# Patient Record
Sex: MALE | Race: WHITE | Employment: FULL TIME | URBAN - METROPOLITAN AREA
[De-identification: names, ages, dates, MRNs, and addresses within clinical notes are randomized per-mention and may not be internally consistent; named-entity substitution may affect disease eponyms.]

---

## 2018-09-11 ENCOUNTER — HOSPITAL ENCOUNTER (OUTPATIENT)
Dept: LAB | Age: 58
Discharge: HOME | End: 2018-09-11

## 2018-09-11 LAB
HIV 1+2 AB+HIV1 P24 AG SERPL QL IA: NONREACTIVE
HIV12 RESULT COMMENT, HHIVC: NORMAL

## 2018-09-11 PROCEDURE — 87389 HIV-1 AG W/HIV-1&-2 AB AG IA: CPT | Performed by: INTERNAL MEDICINE

## 2018-10-09 ENCOUNTER — HOSPITAL ENCOUNTER (OUTPATIENT)
Dept: LAB | Age: 58
Discharge: HOME | End: 2018-10-09

## 2018-10-09 LAB
COMMENT, HOLDF: NORMAL
HAV IGM SER QL: NONREACTIVE
HBV CORE IGM SER QL: NONREACTIVE
HBV SURFACE AG SER QL: <0.1 INDEX
HBV SURFACE AG SER QL: NEGATIVE
HCV AB SERPL QL IA: NONREACTIVE
HCV COMMENT,HCGAC: NORMAL
SAMPLES BEING HELD,HOLD: NORMAL
SP1: NORMAL
SP2: NORMAL
SP3: NORMAL

## 2018-10-09 PROCEDURE — 80074 ACUTE HEPATITIS PANEL: CPT | Performed by: INTERNAL MEDICINE

## 2020-02-28 ENCOUNTER — HOSPITAL ENCOUNTER (OUTPATIENT)
Dept: GENERAL RADIOLOGY | Age: 60
Discharge: HOME | End: 2020-02-28
Attending: INTERNAL MEDICINE
Payer: MEDICAID

## 2020-02-28 DIAGNOSIS — R06.02 SHORTNESS OF BREATH: ICD-10-CM

## 2020-02-28 PROCEDURE — 71046 X-RAY EXAM CHEST 2 VIEWS: CPT

## 2020-03-18 ENCOUNTER — HOSPITAL ENCOUNTER (OUTPATIENT)
Dept: CT IMAGING | Age: 60
Discharge: HOME | End: 2020-03-18
Attending: INTERNAL MEDICINE
Payer: MEDICAID

## 2020-03-18 VITALS — HEIGHT: 68 IN | BODY MASS INDEX: 26.37 KG/M2 | WEIGHT: 174 LBS

## 2020-03-18 DIAGNOSIS — Z87.891 PERSONAL HISTORY OF NICOTINE DEPENDENCE: ICD-10-CM

## 2020-03-18 PROCEDURE — G0297 LDCT FOR LUNG CA SCREEN: HCPCS

## 2021-05-13 ENCOUNTER — APPOINTMENT (OUTPATIENT)
Dept: NON INVASIVE DIAGNOSTICS | Age: 61
DRG: 192 | End: 2021-05-13
Attending: PHYSICIAN ASSISTANT
Payer: MEDICAID

## 2021-05-13 ENCOUNTER — APPOINTMENT (OUTPATIENT)
Dept: GENERAL RADIOLOGY | Age: 61
DRG: 192 | End: 2021-05-13
Attending: EMERGENCY MEDICINE
Payer: MEDICAID

## 2021-05-13 ENCOUNTER — HOSPITAL ENCOUNTER (INPATIENT)
Age: 61
LOS: 8 days | Discharge: HOME | DRG: 192 | End: 2021-05-21
Attending: EMERGENCY MEDICINE | Admitting: INTERNAL MEDICINE
Payer: MEDICAID

## 2021-05-13 DIAGNOSIS — R09.02 HYPOXIA: ICD-10-CM

## 2021-05-13 DIAGNOSIS — R53.81 PHYSICAL DEBILITY: ICD-10-CM

## 2021-05-13 DIAGNOSIS — R06.02 SHORTNESS OF BREATH: ICD-10-CM

## 2021-05-13 DIAGNOSIS — I49.3 FREQUENT PVCS: ICD-10-CM

## 2021-05-13 DIAGNOSIS — J96.01 ACUTE RESPIRATORY FAILURE WITH HYPOXIA (HCC): ICD-10-CM

## 2021-05-13 DIAGNOSIS — E88.09 HYPOALBUMINEMIA: ICD-10-CM

## 2021-05-13 DIAGNOSIS — Z79.899 RECEIVING INOTROPIC MEDICATION: ICD-10-CM

## 2021-05-13 DIAGNOSIS — Z71.89 ACP (ADVANCE CARE PLANNING): ICD-10-CM

## 2021-05-13 DIAGNOSIS — J44.1 COPD EXACERBATION (HCC): Primary | ICD-10-CM

## 2021-05-13 DIAGNOSIS — I50.9 CONGESTIVE HEART FAILURE, UNSPECIFIED HF CHRONICITY, UNSPECIFIED HEART FAILURE TYPE (HCC): ICD-10-CM

## 2021-05-13 DIAGNOSIS — R00.0 SINUS TACHYCARDIA: ICD-10-CM

## 2021-05-13 DIAGNOSIS — I50.21 ACUTE SYSTOLIC HEART FAILURE (HCC): ICD-10-CM

## 2021-05-13 DIAGNOSIS — I50.21 SYSTOLIC CHF, ACUTE (HCC): ICD-10-CM

## 2021-05-13 LAB
ALBUMIN SERPL-MCNC: 3.3 G/DL (ref 3.5–5)
ALBUMIN/GLOB SERPL: 0.9 {RATIO} (ref 1.1–2.2)
ALP SERPL-CCNC: 266 U/L (ref 45–117)
ALT SERPL-CCNC: 1224 U/L (ref 12–78)
ANION GAP SERPL CALC-SCNC: 8 MMOL/L (ref 5–15)
ARTERIAL PATENCY WRIST A: YES
AST SERPL-CCNC: 965 U/L (ref 15–37)
ATRIAL RATE: 124 BPM
ATRIAL RATE: 127 BPM
B PERT DNA SPEC QL NAA+PROBE: NOT DETECTED
BASE EXCESS BLD CALC-SCNC: 0 MMOL/L
BASOPHILS # BLD: 0.2 K/UL (ref 0–0.1)
BASOPHILS NFR BLD: 1 % (ref 0–1)
BDY SITE: ABNORMAL
BILIRUB SERPL-MCNC: 1.5 MG/DL (ref 0.2–1)
BORDETELLA PARAPERTUSSIS PCR, BORPAR: NOT DETECTED
BUN SERPL-MCNC: 32 MG/DL (ref 6–20)
BUN/CREAT SERPL: 21 (ref 12–20)
C PNEUM DNA SPEC QL NAA+PROBE: NOT DETECTED
CA-I BLD-SCNC: 1.48 MMOL/L (ref 1.12–1.32)
CALCIUM SERPL-MCNC: 11 MG/DL (ref 8.5–10.1)
CALCULATED P AXIS, ECG09: 55 DEGREES
CALCULATED R AXIS, ECG10: -78 DEGREES
CALCULATED R AXIS, ECG10: -79 DEGREES
CALCULATED T AXIS, ECG11: 78 DEGREES
CALCULATED T AXIS, ECG11: 91 DEGREES
CHLORIDE SERPL-SCNC: 98 MMOL/L (ref 97–108)
CO2 SERPL-SCNC: 27 MMOL/L (ref 21–32)
COMMENT, HOLDF: NORMAL
COVID-19 RAPID TEST, COVR: NOT DETECTED
CREAT SERPL-MCNC: 1.5 MG/DL (ref 0.7–1.3)
DIAGNOSIS, 93000: NORMAL
DIAGNOSIS, 93000: NORMAL
DIFFERENTIAL METHOD BLD: ABNORMAL
ECHO AO ROOT DIAM: 4.16 CM
ECHO AV AREA PEAK VELOCITY: 2.95 CM2
ECHO AV AREA/BSA PEAK VELOCITY: 1.5 CM2/M2
ECHO AV PEAK GRADIENT: 3.68 MMHG
ECHO AV PEAK VELOCITY: 95.96 CM/S
ECHO EST RA PRESSURE: 10 MMHG
ECHO LA MAJOR AXIS: 4.58 CM
ECHO LA MINOR AXIS: 2.36 CM
ECHO LV E' LATERAL VELOCITY: 2.89 CM/S
ECHO LV E' SEPTAL VELOCITY: 3.53 CM/S
ECHO LV EDV A2C: 160.77 ML
ECHO LV EDV A4C: 146.11 ML
ECHO LV EDV BP: 158.78 ML (ref 67–155)
ECHO LV EDV INDEX A4C: 75.3 ML/M2
ECHO LV EDV INDEX BP: 81.8 ML/M2
ECHO LV EDV NDEX A2C: 82.9 ML/M2
ECHO LV EJECTION FRACTION A2C: 31 PERCENT
ECHO LV EJECTION FRACTION A4C: 24 PERCENT
ECHO LV EJECTION FRACTION BIPLANE: 27.8 PERCENT (ref 55–100)
ECHO LV ESV A2C: 111.05 ML
ECHO LV ESV A4C: 111.39 ML
ECHO LV ESV BP: 114.67 ML (ref 22–58)
ECHO LV ESV INDEX A2C: 57.2 ML/M2
ECHO LV ESV INDEX A4C: 57.4 ML/M2
ECHO LV ESV INDEX BP: 59.1 ML/M2
ECHO LV INTERNAL DIMENSION DIASTOLIC: 5.78 CM (ref 4.2–5.9)
ECHO LV INTERNAL DIMENSION DIASTOLIC: 5.97 CM (ref 4.2–5.9)
ECHO LV INTERNAL DIMENSION SYSTOLIC: 5.24 CM
ECHO LV INTERNAL DIMENSION SYSTOLIC: 5.57 CM
ECHO LV IVSD: 0.95 CM (ref 0.6–1)
ECHO LV POSTERIOR WALL DIASTOLIC: 1 CM (ref 0.6–1)
ECHO LVOT DIAM: 2.44 CM
ECHO LVOT PEAK GRADIENT: 1.48 MMHG
ECHO LVOT PEAK VELOCITY: 60.81 CM/S
ECHO MV A VELOCITY: 33.79 CM/S
ECHO MV AREA PHT: 4.05 CM2
ECHO MV E DECELERATION TIME (DT): 187.43 MS
ECHO MV E VELOCITY: 79.46 CM/S
ECHO MV E/A RATIO: 2.35
ECHO MV E/E' LATERAL: 27.49
ECHO MV E/E' RATIO (AVERAGED): 25
ECHO MV E/E' SEPTAL: 22.51
ECHO MV PRESSURE HALF TIME (PHT): 54.36 MS
ECHO PV MAX VELOCITY: 90.87 CM/S
ECHO PV PEAK INSTANTANEOUS GRADIENT SYSTOLIC: 3.3 MMHG
ECHO RIGHT VENTRICULAR SYSTOLIC PRESSURE (RVSP): 31.28 MMHG
ECHO RV TAPSE: 2.1 CM (ref 1.5–2)
ECHO TV REGURGITANT MAX VELOCITY: 230.63 CM/S
ECHO TV REGURGITANT PEAK GRADIENT: 21.28 MMHG
EOSINOPHIL # BLD: 0.3 K/UL (ref 0–0.4)
EOSINOPHIL NFR BLD: 2 % (ref 0–7)
ERYTHROCYTE [DISTWIDTH] IN BLOOD BY AUTOMATED COUNT: 14.6 % (ref 11.5–14.5)
FLUAV H1 2009 PAND RNA SPEC QL NAA+PROBE: NOT DETECTED
FLUAV H1 RNA SPEC QL NAA+PROBE: NOT DETECTED
FLUAV H3 RNA SPEC QL NAA+PROBE: NOT DETECTED
FLUAV SUBTYP SPEC NAA+PROBE: NOT DETECTED
FLUBV RNA SPEC QL NAA+PROBE: NOT DETECTED
GAS FLOW.O2 O2 DELIVERY SYS: ABNORMAL L/MIN
GLOBULIN SER CALC-MCNC: 3.8 G/DL (ref 2–4)
GLUCOSE SERPL-MCNC: 94 MG/DL (ref 65–100)
HADV DNA SPEC QL NAA+PROBE: NOT DETECTED
HAV IGM SER QL: NONREACTIVE
HBV CORE IGM SER QL: NONREACTIVE
HBV SURFACE AG SER QL: 0.11 INDEX
HBV SURFACE AG SER QL: NEGATIVE
HCO3 BLD-SCNC: 24.3 MMOL/L (ref 22–26)
HCOV 229E RNA SPEC QL NAA+PROBE: NOT DETECTED
HCOV HKU1 RNA SPEC QL NAA+PROBE: NOT DETECTED
HCOV NL63 RNA SPEC QL NAA+PROBE: NOT DETECTED
HCOV OC43 RNA SPEC QL NAA+PROBE: NOT DETECTED
HCT VFR BLD AUTO: 44.6 % (ref 36.6–50.3)
HCV AB SERPL QL IA: NONREACTIVE
HCV COMMENT,HCGAC: NORMAL
HGB BLD-MCNC: 14.3 G/DL (ref 12.1–17)
HMPV RNA SPEC QL NAA+PROBE: NOT DETECTED
HPIV1 RNA SPEC QL NAA+PROBE: NOT DETECTED
HPIV2 RNA SPEC QL NAA+PROBE: NOT DETECTED
HPIV3 RNA SPEC QL NAA+PROBE: NOT DETECTED
HPIV4 RNA SPEC QL NAA+PROBE: NOT DETECTED
IMM GRANULOCYTES # BLD AUTO: 0.2 K/UL (ref 0–0.04)
IMM GRANULOCYTES NFR BLD AUTO: 1 % (ref 0–0.5)
LACTATE SERPL-SCNC: 3.7 MMOL/L (ref 0.4–2)
LYMPHOCYTES # BLD: 2.9 K/UL (ref 0.8–3.5)
LYMPHOCYTES NFR BLD: 18 % (ref 12–49)
M PNEUMO DNA SPEC QL NAA+PROBE: NOT DETECTED
MCH RBC QN AUTO: 33.5 PG (ref 26–34)
MCHC RBC AUTO-ENTMCNC: 32.1 G/DL (ref 30–36.5)
MCV RBC AUTO: 104.4 FL (ref 80–99)
MONOCYTES # BLD: 1.7 K/UL (ref 0–1)
MONOCYTES NFR BLD: 11 % (ref 5–13)
NEUTS SEG # BLD: 10.6 K/UL (ref 1.8–8)
NEUTS SEG NFR BLD: 67 % (ref 32–75)
NRBC # BLD: 0.04 K/UL (ref 0–0.01)
NRBC BLD-RTO: 0.3 PER 100 WBC
O2/TOTAL GAS SETTING VFR VENT: 50 %
P-R INTERVAL, ECG05: 146 MS
PCO2 BLD: 38.6 MMHG (ref 35–45)
PEEP RESPIRATORY: 6 CMH2O
PH BLD: 7.41 [PH] (ref 7.35–7.45)
PIP ISTAT,IPIP: 14
PLATELET # BLD AUTO: 327 K/UL (ref 150–400)
PO2 BLD: 188 MMHG (ref 80–100)
POTASSIUM SERPL-SCNC: 4.4 MMOL/L (ref 3.5–5.1)
PRESSURE SUPPORT SETTING VENT: 8 CMH2O
PROCALCITONIN SERPL-MCNC: 0.42 NG/ML
PROT SERPL-MCNC: 7.1 G/DL (ref 6.4–8.2)
Q-T INTERVAL, ECG07: 284 MS
Q-T INTERVAL, ECG07: 346 MS
QRS DURATION, ECG06: 92 MS
QRS DURATION, ECG06: 98 MS
QTC CALCULATION (BEZET), ECG08: 408 MS
QTC CALCULATION (BEZET), ECG08: 482 MS
RBC # BLD AUTO: 4.27 M/UL (ref 4.1–5.7)
RBC MORPH BLD: ABNORMAL
RSV RNA SPEC QL NAA+PROBE: NOT DETECTED
RV+EV RNA SPEC QL NAA+PROBE: NOT DETECTED
SAMPLES BEING HELD,HOLD: NORMAL
SAO2 % BLD: 100 % (ref 92–97)
SARS-COV-2 PCR, COVPCR: NOT DETECTED
SODIUM SERPL-SCNC: 133 MMOL/L (ref 136–145)
SOURCE, COVRS: NORMAL
SP1: NORMAL
SP2: NORMAL
SP3: NORMAL
SPECIMEN TYPE: ABNORMAL
TROPONIN I SERPL-MCNC: 0.32 NG/ML
TROPONIN I SERPL-MCNC: 0.33 NG/ML
TROPONIN I SERPL-MCNC: 0.38 NG/ML
VENTRICULAR RATE, ECG03: 117 BPM
VENTRICULAR RATE, ECG03: 124 BPM
WBC # BLD AUTO: 15.9 K/UL (ref 4.1–11.1)

## 2021-05-13 PROCEDURE — 74011000250 HC RX REV CODE- 250

## 2021-05-13 PROCEDURE — 0202U NFCT DS 22 TRGT SARS-COV-2: CPT

## 2021-05-13 PROCEDURE — 93005 ELECTROCARDIOGRAM TRACING: CPT

## 2021-05-13 PROCEDURE — 65660000001 HC RM ICU INTERMED STEPDOWN

## 2021-05-13 PROCEDURE — 74011000250 HC RX REV CODE- 250: Performed by: INTERNAL MEDICINE

## 2021-05-13 PROCEDURE — 87040 BLOOD CULTURE FOR BACTERIA: CPT

## 2021-05-13 PROCEDURE — 99223 1ST HOSP IP/OBS HIGH 75: CPT | Performed by: INTERNAL MEDICINE

## 2021-05-13 PROCEDURE — 5A09457 ASSISTANCE WITH RESPIRATORY VENTILATION, 24-96 CONSECUTIVE HOURS, CONTINUOUS POSITIVE AIRWAY PRESSURE: ICD-10-PCS | Performed by: EMERGENCY MEDICINE

## 2021-05-13 PROCEDURE — 80074 ACUTE HEPATITIS PANEL: CPT

## 2021-05-13 PROCEDURE — 74011250636 HC RX REV CODE- 250/636: Performed by: INTERNAL MEDICINE

## 2021-05-13 PROCEDURE — 94640 AIRWAY INHALATION TREATMENT: CPT

## 2021-05-13 PROCEDURE — 82803 BLOOD GASES ANY COMBINATION: CPT

## 2021-05-13 PROCEDURE — 80053 COMPREHEN METABOLIC PANEL: CPT

## 2021-05-13 PROCEDURE — 85025 COMPLETE CBC W/AUTO DIFF WBC: CPT

## 2021-05-13 PROCEDURE — 84484 ASSAY OF TROPONIN QUANT: CPT

## 2021-05-13 PROCEDURE — 36600 WITHDRAWAL OF ARTERIAL BLOOD: CPT

## 2021-05-13 PROCEDURE — 74011250636 HC RX REV CODE- 250/636: Performed by: PHYSICIAN ASSISTANT

## 2021-05-13 PROCEDURE — 94660 CPAP INITIATION&MGMT: CPT

## 2021-05-13 PROCEDURE — 87635 SARS-COV-2 COVID-19 AMP PRB: CPT

## 2021-05-13 PROCEDURE — 99285 EMERGENCY DEPT VISIT HI MDM: CPT

## 2021-05-13 PROCEDURE — 93306 TTE W/DOPPLER COMPLETE: CPT

## 2021-05-13 PROCEDURE — 36415 COLL VENOUS BLD VENIPUNCTURE: CPT

## 2021-05-13 PROCEDURE — 74011250637 HC RX REV CODE- 250/637: Performed by: EMERGENCY MEDICINE

## 2021-05-13 PROCEDURE — 84145 PROCALCITONIN (PCT): CPT

## 2021-05-13 PROCEDURE — 94664 DEMO&/EVAL PT USE INHALER: CPT

## 2021-05-13 PROCEDURE — 83605 ASSAY OF LACTIC ACID: CPT

## 2021-05-13 PROCEDURE — 71045 X-RAY EXAM CHEST 1 VIEW: CPT

## 2021-05-13 PROCEDURE — 74011250637 HC RX REV CODE- 250/637: Performed by: PHYSICIAN ASSISTANT

## 2021-05-13 RX ORDER — BUDESONIDE AND FORMOTEROL FUMARATE DIHYDRATE 160; 4.5 UG/1; UG/1
2 AEROSOL RESPIRATORY (INHALATION) 2 TIMES DAILY
COMMUNITY
End: 2022-06-22

## 2021-05-13 RX ORDER — FUROSEMIDE 10 MG/ML
40 INJECTION INTRAMUSCULAR; INTRAVENOUS 2 TIMES DAILY
Status: DISCONTINUED | OUTPATIENT
Start: 2021-05-13 | End: 2021-05-14

## 2021-05-13 RX ORDER — MILRINONE LACTATE 0.2 MG/ML
0.38 INJECTION, SOLUTION INTRAVENOUS CONTINUOUS
Status: DISCONTINUED | OUTPATIENT
Start: 2021-05-13 | End: 2021-05-14

## 2021-05-13 RX ORDER — ENOXAPARIN SODIUM 100 MG/ML
40 INJECTION SUBCUTANEOUS DAILY
Status: DISCONTINUED | OUTPATIENT
Start: 2021-05-14 | End: 2021-05-14

## 2021-05-13 RX ORDER — NITROGLYCERIN 0.4 MG/1
0.4 TABLET SUBLINGUAL
Status: COMPLETED | OUTPATIENT
Start: 2021-05-13 | End: 2021-05-13

## 2021-05-13 RX ORDER — GUAIFENESIN 100 MG/5ML
162 LIQUID (ML) ORAL
Status: COMPLETED | OUTPATIENT
Start: 2021-05-13 | End: 2021-05-13

## 2021-05-13 RX ORDER — IPRATROPIUM BROMIDE AND ALBUTEROL SULFATE 2.5; .5 MG/3ML; MG/3ML
3 SOLUTION RESPIRATORY (INHALATION)
Status: DISCONTINUED | OUTPATIENT
Start: 2021-05-13 | End: 2021-05-15

## 2021-05-13 RX ORDER — FUROSEMIDE 10 MG/ML
40 INJECTION INTRAMUSCULAR; INTRAVENOUS ONCE
Status: DISCONTINUED | OUTPATIENT
Start: 2021-05-13 | End: 2021-05-13

## 2021-05-13 RX ORDER — BUDESONIDE AND FORMOTEROL FUMARATE DIHYDRATE 160; 4.5 UG/1; UG/1
2 AEROSOL RESPIRATORY (INHALATION)
COMMUNITY
End: 2021-05-13

## 2021-05-13 RX ORDER — ACETAMINOPHEN 325 MG/1
650 TABLET ORAL
Status: DISCONTINUED | OUTPATIENT
Start: 2021-05-13 | End: 2021-05-21 | Stop reason: HOSPADM

## 2021-05-13 RX ORDER — ONDANSETRON 2 MG/ML
4 INJECTION INTRAMUSCULAR; INTRAVENOUS
Status: DISCONTINUED | OUTPATIENT
Start: 2021-05-13 | End: 2021-05-21 | Stop reason: HOSPADM

## 2021-05-13 RX ORDER — SODIUM CHLORIDE 0.9 % (FLUSH) 0.9 %
5-40 SYRINGE (ML) INJECTION EVERY 8 HOURS
Status: DISCONTINUED | OUTPATIENT
Start: 2021-05-13 | End: 2021-05-21 | Stop reason: HOSPADM

## 2021-05-13 RX ORDER — GUAIFENESIN 100 MG/5ML
81 LIQUID (ML) ORAL DAILY
Status: DISCONTINUED | OUTPATIENT
Start: 2021-05-13 | End: 2021-05-21 | Stop reason: HOSPADM

## 2021-05-13 RX ORDER — PROMETHAZINE HYDROCHLORIDE 25 MG/1
12.5 TABLET ORAL
Status: DISCONTINUED | OUTPATIENT
Start: 2021-05-13 | End: 2021-05-21 | Stop reason: HOSPADM

## 2021-05-13 RX ORDER — ACETAMINOPHEN 650 MG/1
650 SUPPOSITORY RECTAL
Status: DISCONTINUED | OUTPATIENT
Start: 2021-05-13 | End: 2021-05-21 | Stop reason: HOSPADM

## 2021-05-13 RX ORDER — SODIUM CHLORIDE 0.9 % (FLUSH) 0.9 %
5-40 SYRINGE (ML) INJECTION AS NEEDED
Status: DISCONTINUED | OUTPATIENT
Start: 2021-05-13 | End: 2021-05-21 | Stop reason: HOSPADM

## 2021-05-13 RX ORDER — ALBUTEROL SULFATE 90 UG/1
2 AEROSOL, METERED RESPIRATORY (INHALATION)
COMMUNITY

## 2021-05-13 RX ORDER — IPRATROPIUM BROMIDE AND ALBUTEROL SULFATE 2.5; .5 MG/3ML; MG/3ML
SOLUTION RESPIRATORY (INHALATION)
Status: COMPLETED
Start: 2021-05-13 | End: 2021-05-13

## 2021-05-13 RX ORDER — POLYETHYLENE GLYCOL 3350 17 G/17G
17 POWDER, FOR SOLUTION ORAL DAILY PRN
Status: DISCONTINUED | OUTPATIENT
Start: 2021-05-13 | End: 2021-05-21 | Stop reason: HOSPADM

## 2021-05-13 RX ADMIN — NITROGLYCERIN 0.4 MG: 0.4 TABLET, ORALLY DISINTEGRATING SUBLINGUAL at 10:59

## 2021-05-13 RX ADMIN — FUROSEMIDE 40 MG: 10 INJECTION, SOLUTION INTRAMUSCULAR; INTRAVENOUS at 12:11

## 2021-05-13 RX ADMIN — SACUBITRIL AND VALSARTAN 1 TABLET: 24; 26 TABLET, FILM COATED ORAL at 20:57

## 2021-05-13 RX ADMIN — Medication 10 ML: at 21:09

## 2021-05-13 RX ADMIN — FUROSEMIDE 40 MG: 10 INJECTION, SOLUTION INTRAMUSCULAR; INTRAVENOUS at 17:31

## 2021-05-13 RX ADMIN — Medication 10 ML: at 16:54

## 2021-05-13 RX ADMIN — IPRATROPIUM BROMIDE AND ALBUTEROL SULFATE 3 ML: .5; 3 SOLUTION RESPIRATORY (INHALATION) at 21:26

## 2021-05-13 RX ADMIN — METHYLPREDNISOLONE SODIUM SUCCINATE 40 MG: 40 INJECTION, POWDER, FOR SOLUTION INTRAMUSCULAR; INTRAVENOUS at 20:56

## 2021-05-13 RX ADMIN — IPRATROPIUM BROMIDE AND ALBUTEROL SULFATE 3 ML: .5; 3 SOLUTION RESPIRATORY (INHALATION) at 08:54

## 2021-05-13 RX ADMIN — MILRINONE LACTATE 0.2 MCG/KG/MIN: 200 INJECTION, SOLUTION INTRAVENOUS at 16:54

## 2021-05-13 RX ADMIN — METHYLPREDNISOLONE SODIUM SUCCINATE 40 MG: 40 INJECTION, POWDER, FOR SOLUTION INTRAMUSCULAR; INTRAVENOUS at 12:11

## 2021-05-13 RX ADMIN — ASPIRIN 162 MG: 81 TABLET, CHEWABLE ORAL at 10:58
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
## 2021-05-13 NOTE — Clinical Note
Right internal jugular vein. Accessed successfully. using fluoro and ultrasound guidance.  6FR X 10 CM SLENDER GLIDESHEATH INSERTED

## 2021-05-13 NOTE — ED TRIAGE NOTES
Triage: Pt arrives ambulatory from home with CC of shortness of breath. He reports he has been short of breath for 2 weeks. Denies CP. Denies cough. Hx of COPD. Respiratory rate 35 in triage.

## 2021-05-13 NOTE — ED NOTES
TRANSFER - OUT REPORT:    Verbal report given to GUICHO Goldstein(name) on Yvonne Nicholas  being transferred to [facility]) for routine progression of care       Report consisted of patients Situation, Background, Assessment and   Recommendations(SBAR). Information from the following report(s) SBAR, Kardex, ED Summary, Procedure Summary, Intake/Output, MAR, Recent Results and Cardiac Rhythm sinus tach was reviewed with the receiving nurse. Lines:   Peripheral IV 05/13/21 Right Antecubital (Active)   Site Assessment Clean, dry, & intact 05/13/21 0826   Phlebitis Assessment 0 05/13/21 0826   Infiltration Assessment 0 05/13/21 0826   Dressing Status Clean, dry, & intact 05/13/21 0826       Peripheral IV 05/13/21 Left Hand (Active)   Site Assessment Clean, dry, & intact 05/13/21 0826   Phlebitis Assessment 0 05/13/21 0826   Infiltration Assessment 0 05/13/21 0826   Dressing Status Clean, dry, & intact 05/13/21 5748        Opportunity for questions and clarification was provided.       Patient transported with:   Monitor  Registered Nurse   tian/RT

## 2021-05-13 NOTE — ED NOTES
65 - Assumed care of patient from triage. Pt presents in respiratory distress. RT called and requested to being bipap.     0878 - RT and Dr Brian Officer at bedside. Pt is on bipap at this time.

## 2021-05-13 NOTE — PROGRESS NOTES
Transition of Care Plan  1. RUR- 11%   2. DISPOSITION: TBD/subject to change pending recommendations; pending medical progression   - Pulmonology and Cardiology Consulted and Following   -Anticipate home with family assistance once medically stable for discharge. TBD. 3. F/U with PCP/Specialist   -Needs to re-establish care with PCP. Has not been seen by PCP since 2014. Will need f/u appointment scheduled prior to discharge. 4. Transport: Family    CM will continue to follow and assist with ZORAIDA needs as they arise. Reason for Admission:  Heart failure ([address]       RUR Score:  11%                   Plan for utilizing home health:     TBD/subject to change pending recommendations     PCP: YES First and Last name: Flores Dillon MD     Name of Practice: [facility]   Are you a current patient: Yes/No:  NO   Approximate date of last visit: July 29, 2014   Can you participate in a virtual visit with your PCP: Unknown                    Current Advanced Directive/Advance Care Plan: Full Code      Healthcare Decision Maker:  Neal Lemus [phone]    64year old male, AOx4. PTA independent with ADL's and IADL's. No DME utilized. Resides with girlfriend in their own home. Receives disability as source of income. Insurance verified: [employer] is utilized for prescriptions. Care Management Interventions  PCP Verified by CM:  Yes  Last Visit to PCP: 07/29/14  Palliative Care Criteria Met (RRAT>21 & CHF Dx)?: No  Mode of Transport at Discharge: (Family)  Transition of Care Consult (CM Consult): (No current CM consults or needs at this time)  Discharge Durable Medical Equipment: No  Physical Therapy Consult: No  Occupational Therapy Consult: No  Speech Therapy Consult: No  Current Support Network: Own Home, Other(Resides with Girlfriend)  Confirm Follow Up Transport: Family  Discharge Location  Discharge Placement: Home with family assistance(TBD/subject to change pending recommenations)    Lyanne Gaucher, MSW/CRM  Care Management  12:41 PM

## 2021-05-13 NOTE — ADVANCED PRACTICE NURSE
[facility], [address], [address]   [phone]  Adriel Perry  5/13/2021  3:41 PM      05/13/21   ECHO ADULT COMPLETE 05/13/2021 5/13/2021    Narrative · LV: Estimated LVEF is 20 - 25%. Normal wall thickness. Dilated left   ventricle. Severely reduced systolic function. Left ventricular diastolic   dysfunction. · LA: Mildly dilated left atrium. · RV: Mildly dilated right ventricle. Reduced systolic function. · AO: Mild aortic root dilatation. Signed by: Ave Chacko MD     D/w Dr. Radha Garner, reading physician. EF not greater than 20%. RV with reduced systolic function as well. BiV failure. Cardiomyopathy, NYHA class IV, will start Milrinone gtt and Entresto. Avoid BB with COPD exacerbation and RV failure. IVC dilated as well, suggesting hepatic congestion. Will need cardiac cath, once stable and compensated.       Radha Phipps PA-C
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
## 2021-05-13 NOTE — ED NOTES
Dr Halima Tomas notified of pt removing bipap and of trop result. Orders received for pt to be NPO and for resp panel with covid-19 PCR. Orders placed.

## 2021-05-13 NOTE — PROGRESS NOTES
Got perfectserve for admission       No ABG and no rapid covid    Cannot place admit order without above results due to covid floor needs

## 2021-05-13 NOTE — PROGRESS NOTES
Spiritual Care Assessment/Progress Note  [facility]      NAME: William Palomino      MRN: [id number]  AGE: 64 y.o. SEX: male  Mandaeism Affiliation: Worship   Language: English     5/13/2021     Total Time (in minutes): 20     Spiritual Assessment begun in [facility] [place] [facility] IMCU through conversation with:         []Patient        [] Family    [] Friend(s)        Reason for Consult: Initial/Spiritual assessment, patient floor     Spiritual beliefs: (Please include comment if needed)     [] Identifies with a angelina tradition:         [] Supported by a angelina community:            [] Claims no spiritual orientation:           [] Seeking spiritual identity:                [] Adheres to an individual form of spirituality:           [x] Not able to assess:                           Identified resources for coping:      [] Prayer                               [] Music                  [] Guided Imagery     [] Family/friends                 [] Pet visits     [] Devotional reading                         [x] Unknown     [] Other:                                               Interventions offered during this visit: (See comments for more details)                Plan of Care:     [] Support spiritual and/or cultural needs    [] Support AMD and/or advance care planning process      [] Support grieving process   [] Coordinate Rites and/or Rituals    [] Coordination with community clergy   [] No spiritual needs identified at this time   [] Detailed Plan of Care below (See Comments)  [] Make referral to Music Therapy  [] Make referral to Pet Therapy     [] Make referral to Addiction services  [] Make referral to [facility]  [] Make referral to Spiritual Care Partner  [] No future visits requested        [x] Follow up upon further referrals     Comments:  visit for initial spiritual assessment. Spoke to patient's nurse, Olivia Merino, who placed In-basket request for  visit on patient's behalf.   Patient on Droplet Plus isolation precautions awaiting Covid-19 rule out. Patient also uses bipap and finds it difficult to communicate via telephone. Attempted to contact patient via bedside telephone, but no answer. Patient appears to be resting. Nurse stated patient would like to speak to  during this hospitalization, but not urgent. May be best to await results of rule out testing and, if negative, can visit patient face-to-face. Please contact spiritual care for further referral or consult. Rev.  Joelle Richards MDiv, [facility], [facility]   paging service: [phone])

## 2021-05-13 NOTE — Clinical Note
Sheath: removed.   6FR X 10 CM SHEATH REMOVED AND TR BAND PLACED ON PATIENTS RIGHT WRIST AND INFLATED WITH 8 CC OF AIR

## 2021-05-13 NOTE — ED NOTES
Pt removed his BIPAP to eat. This RN found pt sitting on edge of bed in tripod position in respiratory distress. Assisted pt back to bed and placed BiPAP on pt. BIPAP increased from 35% to 50%. RT called. Pt presents gray in color. Denies CP. Heart rate more elevated. Estefani Avendano with cardiology in department. Notified him of pt's change in condition. EKG order obtained and completed. Reviewed by Estefani Avendano. Pt reminded importance of leaving BiPAP on and purpose of BiPAP. Pt verbalized his understanding. RT remains at bedside.
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
## 2021-05-13 NOTE — ED NOTES
Per lab, CMP and trop need to be recollected. Dr Michelle Deng notified. Pt also c/o heart burn. Dr Michelle Deng notified.

## 2021-05-14 LAB
ALBUMIN SERPL-MCNC: 3 G/DL (ref 3.5–5)
ALBUMIN/GLOB SERPL: 0.8 {RATIO} (ref 1.1–2.2)
ALP SERPL-CCNC: 219 U/L (ref 45–117)
ALT SERPL-CCNC: 930 U/L (ref 12–78)
ANION GAP SERPL CALC-SCNC: 6 MMOL/L (ref 5–15)
AST SERPL-CCNC: 479 U/L (ref 15–37)
BASOPHILS # BLD: 0 K/UL (ref 0–0.1)
BASOPHILS NFR BLD: 0 % (ref 0–1)
BILIRUB SERPL-MCNC: 0.9 MG/DL (ref 0.2–1)
BNP SERPL-MCNC: 7262 PG/ML
BUN SERPL-MCNC: 35 MG/DL (ref 6–20)
BUN/CREAT SERPL: 24 (ref 12–20)
CALCIUM SERPL-MCNC: 10.6 MG/DL (ref 8.5–10.1)
CHLORIDE SERPL-SCNC: 96 MMOL/L (ref 97–108)
CO2 SERPL-SCNC: 32 MMOL/L (ref 21–32)
CREAT SERPL-MCNC: 1.44 MG/DL (ref 0.7–1.3)
DIFFERENTIAL METHOD BLD: ABNORMAL
EOSINOPHIL # BLD: 0 K/UL (ref 0–0.4)
EOSINOPHIL NFR BLD: 0 % (ref 0–7)
ERYTHROCYTE [DISTWIDTH] IN BLOOD BY AUTOMATED COUNT: 13.6 % (ref 11.5–14.5)
GLOBULIN SER CALC-MCNC: 3.6 G/DL (ref 2–4)
GLUCOSE SERPL-MCNC: 144 MG/DL (ref 65–100)
HCT VFR BLD AUTO: 37.8 % (ref 36.6–50.3)
HGB BLD-MCNC: 12.6 G/DL (ref 12.1–17)
IMM GRANULOCYTES # BLD AUTO: 0.1 K/UL (ref 0–0.04)
IMM GRANULOCYTES NFR BLD AUTO: 1 % (ref 0–0.5)
LYMPHOCYTES # BLD: 0.8 K/UL (ref 0.8–3.5)
LYMPHOCYTES NFR BLD: 6 % (ref 12–49)
MCH RBC QN AUTO: 33.2 PG (ref 26–34)
MCHC RBC AUTO-ENTMCNC: 33.3 G/DL (ref 30–36.5)
MCV RBC AUTO: 99.7 FL (ref 80–99)
MONOCYTES # BLD: 0.4 K/UL (ref 0–1)
MONOCYTES NFR BLD: 3 % (ref 5–13)
NEUTS SEG # BLD: 11.5 K/UL (ref 1.8–8)
NEUTS SEG NFR BLD: 90 % (ref 32–75)
NRBC # BLD: 0 K/UL (ref 0–0.01)
NRBC BLD-RTO: 0 PER 100 WBC
PLATELET # BLD AUTO: 287 K/UL (ref 150–400)
PLATELET COMMENTS,PCOM: ABNORMAL
PMV BLD AUTO: 13 FL (ref 8.9–12.9)
POTASSIUM SERPL-SCNC: 3.8 MMOL/L (ref 3.5–5.1)
PROT SERPL-MCNC: 6.6 G/DL (ref 6.4–8.2)
RBC # BLD AUTO: 3.79 M/UL (ref 4.1–5.7)
RBC MORPH BLD: ABNORMAL
SODIUM SERPL-SCNC: 134 MMOL/L (ref 136–145)
TROPONIN I SERPL-MCNC: 0.26 NG/ML
WBC # BLD AUTO: 12.8 K/UL (ref 4.1–11.1)

## 2021-05-14 PROCEDURE — 74011250636 HC RX REV CODE- 250/636: Performed by: INTERNAL MEDICINE

## 2021-05-14 PROCEDURE — 99222 1ST HOSP IP/OBS MODERATE 55: CPT | Performed by: NURSE PRACTITIONER

## 2021-05-14 PROCEDURE — 74011000250 HC RX REV CODE- 250: Performed by: INTERNAL MEDICINE

## 2021-05-14 PROCEDURE — 99233 SBSQ HOSP IP/OBS HIGH 50: CPT | Performed by: INTERNAL MEDICINE

## 2021-05-14 PROCEDURE — 74011250636 HC RX REV CODE- 250/636: Performed by: PHYSICIAN ASSISTANT

## 2021-05-14 PROCEDURE — 74011250636 HC RX REV CODE- 250/636: Performed by: NURSE PRACTITIONER

## 2021-05-14 PROCEDURE — 77030029684 HC NEB SM VOL KT MONA -A

## 2021-05-14 PROCEDURE — 80053 COMPREHEN METABOLIC PANEL: CPT

## 2021-05-14 PROCEDURE — 36415 COLL VENOUS BLD VENIPUNCTURE: CPT

## 2021-05-14 PROCEDURE — 77010033678 HC OXYGEN DAILY

## 2021-05-14 PROCEDURE — 65660000001 HC RM ICU INTERMED STEPDOWN

## 2021-05-14 PROCEDURE — 85025 COMPLETE CBC W/AUTO DIFF WBC: CPT

## 2021-05-14 PROCEDURE — 83880 ASSAY OF NATRIURETIC PEPTIDE: CPT

## 2021-05-14 PROCEDURE — 77030012794 HC KT NSL CANN/HGH TRAN -A

## 2021-05-14 PROCEDURE — 94664 DEMO&/EVAL PT USE INHALER: CPT

## 2021-05-14 PROCEDURE — 74011250637 HC RX REV CODE- 250/637: Performed by: PHYSICIAN ASSISTANT

## 2021-05-14 PROCEDURE — 99222 1ST HOSP IP/OBS MODERATE 55: CPT | Performed by: INTERNAL MEDICINE

## 2021-05-14 PROCEDURE — 74011250637 HC RX REV CODE- 250/637: Performed by: NURSE PRACTITIONER

## 2021-05-14 PROCEDURE — 94640 AIRWAY INHALATION TREATMENT: CPT

## 2021-05-14 PROCEDURE — 84484 ASSAY OF TROPONIN QUANT: CPT

## 2021-05-14 RX ORDER — MILRINONE LACTATE 0.2 MG/ML
0.2 INJECTION, SOLUTION INTRAVENOUS CONTINUOUS
Status: DISCONTINUED | OUTPATIENT
Start: 2021-05-14 | End: 2021-05-18

## 2021-05-14 RX ORDER — ALBUMIN HUMAN 250 G/1000ML
12.5 SOLUTION INTRAVENOUS 2 TIMES DAILY
Status: DISCONTINUED | OUTPATIENT
Start: 2021-05-14 | End: 2021-05-15

## 2021-05-14 RX ORDER — FUROSEMIDE 10 MG/ML
20 INJECTION INTRAMUSCULAR; INTRAVENOUS 2 TIMES DAILY
Status: DISCONTINUED | OUTPATIENT
Start: 2021-05-14 | End: 2021-05-15

## 2021-05-14 RX ORDER — SPIRONOLACTONE 25 MG/1
12.5 TABLET ORAL DAILY
Status: DISCONTINUED | OUTPATIENT
Start: 2021-05-14 | End: 2021-05-21 | Stop reason: HOSPADM

## 2021-05-14 RX ORDER — HEPARIN SODIUM 5000 [USP'U]/ML
5000 INJECTION, SOLUTION INTRAVENOUS; SUBCUTANEOUS EVERY 8 HOURS
Status: DISCONTINUED | OUTPATIENT
Start: 2021-05-15 | End: 2021-05-21 | Stop reason: HOSPADM

## 2021-05-14 RX ORDER — CALCIUM CARBONATE 200(500)MG
400 TABLET,CHEWABLE ORAL ONCE
Status: COMPLETED | OUTPATIENT
Start: 2021-05-14 | End: 2021-05-14

## 2021-05-14 RX ADMIN — ENOXAPARIN SODIUM 40 MG: 40 INJECTION SUBCUTANEOUS at 09:39

## 2021-05-14 RX ADMIN — METHYLPREDNISOLONE SODIUM SUCCINATE 40 MG: 40 INJECTION, POWDER, FOR SOLUTION INTRAMUSCULAR; INTRAVENOUS at 22:19

## 2021-05-14 RX ADMIN — SACUBITRIL AND VALSARTAN 1 TABLET: 24; 26 TABLET, FILM COATED ORAL at 09:39

## 2021-05-14 RX ADMIN — Medication 10 ML: at 22:35

## 2021-05-14 RX ADMIN — MILRINONE LACTATE 0.3 MCG/KG/MIN: 200 INJECTION, SOLUTION INTRAVENOUS at 23:48

## 2021-05-14 RX ADMIN — IPRATROPIUM BROMIDE AND ALBUTEROL SULFATE 3 ML: .5; 3 SOLUTION RESPIRATORY (INHALATION) at 22:26

## 2021-05-14 RX ADMIN — CALCIUM CARBONATE (ANTACID) CHEW TAB 500 MG 400 MG: 500 CHEW TAB at 22:33

## 2021-05-14 RX ADMIN — IPRATROPIUM BROMIDE AND ALBUTEROL SULFATE 3 ML: .5; 3 SOLUTION RESPIRATORY (INHALATION) at 13:44

## 2021-05-14 RX ADMIN — SPIRONOLACTONE 12.5 MG: 25 TABLET ORAL at 13:09

## 2021-05-14 RX ADMIN — MILRINONE LACTATE 0.2 MCG/KG/MIN: 200 INJECTION, SOLUTION INTRAVENOUS at 09:45

## 2021-05-14 RX ADMIN — SACUBITRIL AND VALSARTAN 1 TABLET: 24; 26 TABLET, FILM COATED ORAL at 22:16

## 2021-05-14 RX ADMIN — ASPIRIN 81 MG: 81 TABLET, CHEWABLE ORAL at 09:39

## 2021-05-14 RX ADMIN — FUROSEMIDE 20 MG: 10 INJECTION, SOLUTION INTRAMUSCULAR; INTRAVENOUS at 17:56

## 2021-05-14 RX ADMIN — IPRATROPIUM BROMIDE AND ALBUTEROL SULFATE 3 ML: .5; 3 SOLUTION RESPIRATORY (INHALATION) at 07:34

## 2021-05-14 RX ADMIN — Medication 10 ML: at 06:39

## 2021-05-14 RX ADMIN — Medication 10 ML: at 14:00

## 2021-05-14 RX ADMIN — METHYLPREDNISOLONE SODIUM SUCCINATE 40 MG: 40 INJECTION, POWDER, FOR SOLUTION INTRAMUSCULAR; INTRAVENOUS at 09:39

## 2021-05-14 RX ADMIN — FUROSEMIDE 40 MG: 10 INJECTION, SOLUTION INTRAMUSCULAR; INTRAVENOUS at 09:39

## 2021-05-14 RX ADMIN — IVABRADINE 2.5 MG: 5 TABLET, FILM COATED ORAL at 17:56

## 2021-05-14 NOTE — PROGRESS NOTES
Spiritual Care Assessment/Progress Note  [facility]      NAME: Bryce Corcoran      MRN: [id number]  AGE: 64 y.o. SEX: male  Gnosticist Affiliation: Samaritan   Language: English     5/14/2021     Total Time (in minutes): 11     Spiritual Assessment begun in [facility] [place] [facility] I[facility] through conversation with:         []Patient        [] Family    [] Friend(s)        Reason for Consult: Initial/Spiritual assessment, patient floor     Spiritual beliefs: (Please include comment if needed)     [] Identifies with a angelina tradition:         [] Supported by a angelina community:            [] Claims no spiritual orientation:           [] Seeking spiritual identity:                [] Adheres to an individual form of spirituality:           [x] Not able to assess:                           Identified resources for coping:      [] Prayer                               [] Music                  [] Guided Imagery     [] Family/friends                 [] Pet visits     [] Devotional reading                         [x] Unknown     [] Other:                                             Interventions offered during this visit: (See comments for more details)                Plan of Care:     [] Support spiritual and/or cultural needs    [] Support AMD and/or advance care planning process      [] Support grieving process   [] Coordinate Rites and/or Rituals    [] Coordination with community clergy   [] No spiritual needs identified at this time   [] Detailed Plan of Care below (See Comments)  [] Make referral to Music Therapy  [] Make referral to Pet Therapy     [] Make referral to Addiction services  [] Make referral to [facility]  [] Make referral to Spiritual Care Partner  [] No future visits requested        [x] Follow up upon further referrals     Comments:  visit for initial spiritual assessment. Patient sleeping in bed facing the window with his back to the door.   Did not respond to knock at door, verbal greeting x 2, or presence in room. Decided not to disturb so he can continue to rest.  Please contact spiritual care for further referral or consult. Rev.  Brett Jang MDiv, [facility], [facility]   paging service: [phone])

## 2021-05-14 NOTE — NURSE NAVIGATOR
Chart reviewed by Heart Failure Nurse Navigator. Heart Failure database completed. EF:  20/25%     ACEi/ARB/ARNi: sacubitril-valsartan 24/26 mg twice daily    BB: held d/t COPD exacerbation and RV failure    Aldosterone Antagonist: need documentation as to contraindication    Obstructive Sleep Apnea Screening: Screening priority 1   STOP-BANG score:   Referred to Sleep Medicine:     CRT not currently indicated. NYHA Functional Class IV      Heart Failure Teach Back in Patient Education. Heart Failure Avoiding Triggers on Discharge Instructions. Cardiologist: CAV      Post discharge follow up phone call to be made within 48-72 hours of discharge.

## 2021-05-14 NOTE — PROGRESS NOTES
Problem: Discharge Planning  Goal: *Discharge to safe environment  Outcome: Progressing Towards Goal     Problem: Breathing Pattern - Ineffective  Goal: *Absence of hypoxia  Outcome: Progressing Towards Goal  Goal: *Use of effective breathing techniques  Outcome: Progressing Towards Goal  Goal: *PALLIATIVE CARE:  Alleviation of Dyspnea  Outcome: Progressing Towards Goal     Problem: Patient Education: Go to Patient Education Activity  Goal: Patient/Family Education  Outcome: Progressing Towards Goal     Problem: Chronic Obstructive Pulmonary Disease (COPD)  Goal: *Oxygen saturation during activity within specified parameters  Outcome: Progressing Towards Goal  Goal: *Able to remain out of bed as prescribed  Outcome: Progressing Towards Goal  Goal: *Absence of hypoxia  Outcome: Progressing Towards Goal  Goal: *Optimize nutritional status  Outcome: Progressing Towards Goal     Problem: Patient Education: Go to Patient Education Activity  Goal: Patient/Family Education  Outcome: Progressing Towards Goal     Problem: Fluid Volume - Risk of, Imbalanced  Goal: *Balanced intake and output  Outcome: Progressing Towards Goal     Problem: Patient Education: Go to Patient Education Activity  Goal: Patient/Family Education  Outcome: Progressing Towards Goal     Problem: Tissue Perfusion - Cardiopulmonary, Altered  Goal: *Optimize tissue perfusion  Outcome: Progressing Towards Goal  Goal: *Absence of hypoxia  Outcome: Progressing Towards Goal     Problem: Patient Education: Go to Patient Education Activity  Goal: Patient/Family Education  Outcome: Progressing Towards Goal

## 2021-05-14 NOTE — PROGRESS NOTES
Hospitalist Progress Note    NAME: Pedro Sharma   :  1960   MRN:  [id number]       Admission HPI/Chief Complaint:  Pedro Sharma is a 64 y.o. male who presents from home with wife for shortness of breath. He has a diagnosis of COPD for which he sees pulmonology. He has been saying that his shortness of breath is getting worse over the weeks. No associated fevers. He has been placed on steroids Zithromax and nebulizers by his pulmonologist but it has no improvement. He  has gotten first shot of Covid vaccine. He was getting worse to the point that he came to the ED with considerable shortness of breath. Was placed on BiPAP in the ER with considerable improvement. Rapid Covid is negative. Chest x-ray shows fluid overload. He is denying having any chest pain though the troponin is elevated. He did report of some heartburn earlier in the ER. We were asked to admit this patient for COPD exacerbation/heart failure  Subjective:   No acute overnight events. Pt feeling much better since admission. Was on bipap yesterday, now weaned down to 2L NC. Reports good UOP. NAD. Review of Systems:  No fever/chills, poor appetite, cough, sputum, SOB, N/V, D/C, CP, or abd pain. Tolerating PO  Objective:   VITALS:   Last 24hrs VS reviewed since prior progress note.  Most recent are:  Patient Vitals for the past 24 hrs:   Temp Pulse Resp BP SpO2   21 0800 -- -- -- -- 94 %   21 0734 -- -- -- -- 96 %   21 0638 97.7 °F (36.5 °C) (!) 121 21 96/79 97 %   21 0359 98 °F (36.7 °C) (!) 124 23 90/74 99 %   21 0308 -- -- -- -- 98 %   21 2353 97.8 °F (36.6 °C) (!) 121 25 98/62 100 %   21 2126 -- -- -- -- (!) 8 %   21 2056 -- (!) 125 -- 110/80 --   21 1846 97.7 °F (36.5 °C) (!) 121 20 (!) 121/92 100 %   21 1414 (!) 45.3 °F (7.4 °C) (!) 122 20 95/66 100 %   21 1402 -- -- -- -- 100 %   21 1312 -- -- -- (!) 166/115 --   21 1250 98.5 °F (36.9 °C) (!) 127 16 128/78 100 %   05/13/21 1245 -- -- -- -- 100 %   05/13/21 1240 -- (!) 126 27 -- 96 %   05/13/21 1237 -- (!) 132 (!) 31 -- 92 %   05/13/21 1236 -- (!) 132 27 -- (!) 80 %   05/13/21 1232 -- (!) 136 25 -- (!) 74 %       Intake/Output Summary (Last 24 hours) at 5/14/2021 1204  Last data filed at 5/14/2021 1131  Gross per 24 hour   Intake --   Output 5450 ml   Net -5450 ml        I had a face to face encounter and independently examined this patient on 5/14/2021, as outlined below:  PHYSICAL EXAM:  General: Alert, cooperative, no acute distress    EENT:  EOMI. Anicteric sclerae. MMM  Resp:  Decreased throughout, no audible w/r/r, No accessory muscle use  CV:  Tachy rate, regular, No edema  GI:  Soft, Non distended, Non tender  Neurologic:  Alert and oriented, normal speech, SARMIENTO  Psych:   Not anxious or agitated  Skin:  No rashes. No jaundice    Reviewed most current lab test results and cultures  YES  Reviewed most current radiology test results   YES  Reviewed patient's current orders and MAR    YES  PMH/SH reviewed - no change compared to H&P    Procedures: see electronic medical records for all procedures/Xrays and details which were not copied into this note but were reviewed prior to creation of Plan. LABS:  I reviewed today's most current labs and imaging studies.   Pertinent labs include:  Recent Labs     05/14/21  0414 05/13/21  0826   WBC 12.8* 15.9*   HGB 12.6 14.3   HCT 37.8 44.6    327     Recent Labs     05/14/21  0414 05/13/21  0946   * 133*   K 3.8 4.4   CL 96* 98   CO2 32 27   * 94   BUN 35* 32*   CREA 1.44* 1.50*   CA 10.6* 11.0*   ALB 3.0* 3.3*   TBILI 0.9 1.5*   * 1,224*       Care Plan discussed with: pt  ___________________________________________________________________  Assessment / Plan:  Acute hypoxic respiratory failure, improving  Started on BiPAP, now on NC, wean as able  Rapid Covid negative  Likely etiology COPD exacerbation and heart failure  Chest x-ray with pulmonary edema and troponin elevation     COPD exacerbation  Cont steroids, nebs  procalcitonin low 0.42     Acute heart failure EF 20%, BiV failure, NYHA IV on admission  Appreciate cardiology, milrinone, entresto, aldactone started  Cont lasix  Avoid BB with COPD exam and RV failure  Will need cardiac cath once stable/compensated, next week  AHF to see  Troponin mildly elevated likely 2/2 resp failure, hypoxia, increased creatinine, trending down  Congestive hepatopathy  Monitor BP    EDGAR vs CKD, unknown baseline  Cardiorenal  monitor    25.0 - 29.9 Overweight / Body mass index is 25.57 kg/m².     Code status: Full Code  Prophylaxis: Hep SQ  Recommended Disposition: tbd    Signed: Morena Farrell MD  5/14/2021 12:04 PM

## 2021-05-14 NOTE — PROGRESS NOTES
Bedside shift change report given to [address] [place] [address] (oncoming nurse) by Elena Hutchins (offgoing nurse). Report included the following information SBAR, Kardex, Intake/Output, MAR, Med Rec Status and Cardiac Rhythm sr.
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
## 2021-05-15 LAB
25(OH)D3 SERPL-MCNC: 16 NG/ML (ref 30–100)
ALBUMIN SERPL-MCNC: 2.9 G/DL (ref 3.5–5)
ALBUMIN/GLOB SERPL: 0.8 {RATIO} (ref 1.1–2.2)
ALP SERPL-CCNC: 215 U/L (ref 45–117)
ALT SERPL-CCNC: 721 U/L (ref 12–78)
ANION GAP SERPL CALC-SCNC: 7 MMOL/L (ref 5–15)
AST SERPL-CCNC: 241 U/L (ref 15–37)
BILIRUB SERPL-MCNC: 0.6 MG/DL (ref 0.2–1)
BNP SERPL-MCNC: 1697 PG/ML
BUN SERPL-MCNC: 48 MG/DL (ref 6–20)
BUN/CREAT SERPL: 28 (ref 12–20)
CALCIUM SERPL-MCNC: 9.6 MG/DL (ref 8.5–10.1)
CHLORIDE SERPL-SCNC: 96 MMOL/L (ref 97–108)
CK SERPL-CCNC: 286 U/L (ref 39–308)
CO2 SERPL-SCNC: 30 MMOL/L (ref 21–32)
CREAT SERPL-MCNC: 1.7 MG/DL (ref 0.7–1.3)
CRP SERPL HS-MCNC: >9.5 MG/L
ERYTHROCYTE [DISTWIDTH] IN BLOOD BY AUTOMATED COUNT: 13.9 % (ref 11.5–14.5)
ERYTHROCYTE [SEDIMENTATION RATE] IN BLOOD: 9 MM/HR (ref 0–20)
FERRITIN SERPL-MCNC: 444 NG/ML (ref 26–388)
GLOBULIN SER CALC-MCNC: 3.7 G/DL (ref 2–4)
GLUCOSE SERPL-MCNC: 134 MG/DL (ref 65–100)
HCT VFR BLD AUTO: 42.3 % (ref 36.6–50.3)
HGB BLD-MCNC: 13.8 G/DL (ref 12.1–17)
HIV 1+2 AB+HIV1 P24 AG SERPL QL IA: NONREACTIVE
HIV12 RESULT COMMENT, HHIVC: NORMAL
IRON SATN MFR SERPL: 8 % (ref 20–50)
IRON SERPL-MCNC: 27 UG/DL (ref 35–150)
MAGNESIUM SERPL-MCNC: 1.7 MG/DL (ref 1.6–2.4)
MCH RBC QN AUTO: 33 PG (ref 26–34)
MCHC RBC AUTO-ENTMCNC: 32.6 G/DL (ref 30–36.5)
MCV RBC AUTO: 101.2 FL (ref 80–99)
MYOGLOBIN SERPL-MCNC: 530 NG/ML (ref 16–116)
NRBC # BLD: 0 K/UL (ref 0–0.01)
NRBC BLD-RTO: 0 PER 100 WBC
PHOSPHATE SERPL-MCNC: 2.4 MG/DL (ref 2.6–4.7)
PLATELET # BLD AUTO: 351 K/UL (ref 150–400)
PMV BLD AUTO: 13 FL (ref 8.9–12.9)
POTASSIUM SERPL-SCNC: 4.3 MMOL/L (ref 3.5–5.1)
PREALB SERPL-MCNC: 16.6 MG/DL (ref 20–40)
PROT SERPL-MCNC: 6.6 G/DL (ref 6.4–8.2)
RBC # BLD AUTO: 4.18 M/UL (ref 4.1–5.7)
SODIUM SERPL-SCNC: 133 MMOL/L (ref 136–145)
T4 FREE SERPL-MCNC: 0.9 NG/DL (ref 0.8–1.5)
TIBC SERPL-MCNC: 340 UG/DL (ref 250–450)
TSH SERPL DL<=0.05 MIU/L-ACNC: 0.37 UIU/ML (ref 0.36–3.74)
URATE SERPL-MCNC: 12.2 MG/DL (ref 3.5–7.2)
VIT B12 SERPL-MCNC: 1883 PG/ML (ref 193–986)
WBC # BLD AUTO: 20 K/UL (ref 4.1–11.1)

## 2021-05-15 PROCEDURE — 84439 ASSAY OF FREE THYROXINE: CPT

## 2021-05-15 PROCEDURE — 84550 ASSAY OF BLOOD/URIC ACID: CPT

## 2021-05-15 PROCEDURE — 82607 VITAMIN B-12: CPT

## 2021-05-15 PROCEDURE — 86141 C-REACTIVE PROTEIN HS: CPT

## 2021-05-15 PROCEDURE — 85652 RBC SED RATE AUTOMATED: CPT

## 2021-05-15 PROCEDURE — 83735 ASSAY OF MAGNESIUM: CPT

## 2021-05-15 PROCEDURE — 85027 COMPLETE CBC AUTOMATED: CPT

## 2021-05-15 PROCEDURE — 74011250636 HC RX REV CODE- 250/636: Performed by: INTERNAL MEDICINE

## 2021-05-15 PROCEDURE — 86658 ENTEROVIRUS ANTIBODY: CPT

## 2021-05-15 PROCEDURE — 74011000250 HC RX REV CODE- 250: Performed by: INTERNAL MEDICINE

## 2021-05-15 PROCEDURE — 80053 COMPREHEN METABOLIC PANEL: CPT

## 2021-05-15 PROCEDURE — 83880 ASSAY OF NATRIURETIC PEPTIDE: CPT

## 2021-05-15 PROCEDURE — 83874 ASSAY OF MYOGLOBIN: CPT

## 2021-05-15 PROCEDURE — 84134 ASSAY OF PREALBUMIN: CPT

## 2021-05-15 PROCEDURE — 82306 VITAMIN D 25 HYDROXY: CPT

## 2021-05-15 PROCEDURE — 65660000001 HC RM ICU INTERMED STEPDOWN

## 2021-05-15 PROCEDURE — 87798 DETECT AGENT NOS DNA AMP: CPT

## 2021-05-15 PROCEDURE — 87389 HIV-1 AG W/HIV-1&-2 AB AG IA: CPT

## 2021-05-15 PROCEDURE — 87532 HHV-6 DNA AMP PROBE: CPT

## 2021-05-15 PROCEDURE — 74011250637 HC RX REV CODE- 250/637: Performed by: INTERNAL MEDICINE

## 2021-05-15 PROCEDURE — 74011250637 HC RX REV CODE- 250/637: Performed by: NURSE PRACTITIONER

## 2021-05-15 PROCEDURE — 36415 COLL VENOUS BLD VENIPUNCTURE: CPT

## 2021-05-15 PROCEDURE — 83540 ASSAY OF IRON: CPT

## 2021-05-15 PROCEDURE — 94660 CPAP INITIATION&MGMT: CPT

## 2021-05-15 PROCEDURE — 86644 CMV ANTIBODY: CPT

## 2021-05-15 PROCEDURE — 74011250636 HC RX REV CODE- 250/636: Performed by: SPECIALIST

## 2021-05-15 PROCEDURE — 99233 SBSQ HOSP IP/OBS HIGH 50: CPT | Performed by: SPECIALIST

## 2021-05-15 PROCEDURE — 82728 ASSAY OF FERRITIN: CPT

## 2021-05-15 PROCEDURE — 82784 ASSAY IGA/IGD/IGG/IGM EACH: CPT

## 2021-05-15 PROCEDURE — 94640 AIRWAY INHALATION TREATMENT: CPT

## 2021-05-15 PROCEDURE — 86618 LYME DISEASE ANTIBODY: CPT

## 2021-05-15 PROCEDURE — 74011250637 HC RX REV CODE- 250/637: Performed by: PHYSICIAN ASSISTANT

## 2021-05-15 PROCEDURE — 84443 ASSAY THYROID STIM HORMONE: CPT

## 2021-05-15 PROCEDURE — 82550 ASSAY OF CK (CPK): CPT

## 2021-05-15 PROCEDURE — 94664 DEMO&/EVAL PT USE INHALER: CPT

## 2021-05-15 PROCEDURE — 84100 ASSAY OF PHOSPHORUS: CPT

## 2021-05-15 RX ORDER — PREDNISONE 20 MG/1
40 TABLET ORAL
Status: COMPLETED | OUTPATIENT
Start: 2021-05-16 | End: 2021-05-17

## 2021-05-15 RX ORDER — ERGOCALCIFEROL 1.25 MG/1
50000 CAPSULE ORAL
Status: DISCONTINUED | OUTPATIENT
Start: 2021-05-15 | End: 2021-05-21 | Stop reason: HOSPADM

## 2021-05-15 RX ORDER — CALCIUM CARBONATE 200(500)MG
400 TABLET,CHEWABLE ORAL
Status: DISCONTINUED | OUTPATIENT
Start: 2021-05-15 | End: 2021-05-21 | Stop reason: HOSPADM

## 2021-05-15 RX ORDER — IPRATROPIUM BROMIDE AND ALBUTEROL SULFATE 2.5; .5 MG/3ML; MG/3ML
3 SOLUTION RESPIRATORY (INHALATION)
Status: DISCONTINUED | OUTPATIENT
Start: 2021-05-15 | End: 2021-05-16

## 2021-05-15 RX ORDER — FUROSEMIDE 10 MG/ML
20 INJECTION INTRAMUSCULAR; INTRAVENOUS DAILY
Status: DISCONTINUED | OUTPATIENT
Start: 2021-05-16 | End: 2021-05-17

## 2021-05-15 RX ORDER — ALLOPURINOL 100 MG/1
50 TABLET ORAL DAILY
Status: DISCONTINUED | OUTPATIENT
Start: 2021-05-15 | End: 2021-05-21 | Stop reason: HOSPADM

## 2021-05-15 RX ORDER — ALLOPURINOL 100 MG/1
100 TABLET ORAL DAILY
Status: DISCONTINUED | OUTPATIENT
Start: 2021-05-15 | End: 2021-05-15

## 2021-05-15 RX ADMIN — METHYLPREDNISOLONE SODIUM SUCCINATE 40 MG: 40 INJECTION, POWDER, FOR SOLUTION INTRAMUSCULAR; INTRAVENOUS at 09:11

## 2021-05-15 RX ADMIN — HEPARIN SODIUM 5000 UNITS: 5000 INJECTION INTRAVENOUS; SUBCUTANEOUS at 14:30

## 2021-05-15 RX ADMIN — Medication 10 ML: at 23:45

## 2021-05-15 RX ADMIN — IPRATROPIUM BROMIDE AND ALBUTEROL SULFATE 3 ML: .5; 3 SOLUTION RESPIRATORY (INHALATION) at 02:59

## 2021-05-15 RX ADMIN — ASPIRIN 81 MG: 81 TABLET, CHEWABLE ORAL at 09:11

## 2021-05-15 RX ADMIN — ERGOCALCIFEROL 50000 UNITS: 1.25 CAPSULE ORAL at 12:14

## 2021-05-15 RX ADMIN — IPRATROPIUM BROMIDE AND ALBUTEROL SULFATE 3 ML: .5; 3 SOLUTION RESPIRATORY (INHALATION) at 08:44

## 2021-05-15 RX ADMIN — SACUBITRIL AND VALSARTAN 1 TABLET: 24; 26 TABLET, FILM COATED ORAL at 20:25

## 2021-05-15 RX ADMIN — ALLOPURINOL 50 MG: 100 TABLET ORAL at 12:14

## 2021-05-15 RX ADMIN — Medication 10 ML: at 05:14

## 2021-05-15 RX ADMIN — IPRATROPIUM BROMIDE AND ALBUTEROL SULFATE 3 ML: .5; 3 SOLUTION RESPIRATORY (INHALATION) at 14:05

## 2021-05-15 RX ADMIN — CALCIUM CARBONATE (ANTACID) CHEW TAB 500 MG 400 MG: 500 CHEW TAB at 20:32

## 2021-05-15 RX ADMIN — MILRINONE LACTATE 0.2 MCG/KG/MIN: 200 INJECTION, SOLUTION INTRAVENOUS at 14:29

## 2021-05-15 RX ADMIN — HEPARIN SODIUM 5000 UNITS: 5000 INJECTION INTRAVENOUS; SUBCUTANEOUS at 23:45

## 2021-05-15 RX ADMIN — Medication 10 ML: at 14:30

## 2021-05-15 RX ADMIN — CALCIUM CARBONATE (ANTACID) CHEW TAB 500 MG 400 MG: 500 CHEW TAB at 12:14

## 2021-05-15 RX ADMIN — IPRATROPIUM BROMIDE AND ALBUTEROL SULFATE 3 ML: .5; 3 SOLUTION RESPIRATORY (INHALATION) at 22:01

## 2021-05-15 RX ADMIN — HEPARIN SODIUM 5000 UNITS: 5000 INJECTION INTRAVENOUS; SUBCUTANEOUS at 05:09

## 2021-05-15 RX ADMIN — IVABRADINE 5 MG: 5 TABLET, FILM COATED ORAL at 17:24

## 2021-05-15 RX ADMIN — IVABRADINE 2.5 MG: 5 TABLET, FILM COATED ORAL at 10:09

## 2021-05-15 RX ADMIN — SPIRONOLACTONE 12.5 MG: 25 TABLET ORAL at 09:11
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
## 2021-05-15 NOTE — PROGRESS NOTES
Hospitalist Progress Note    NAME: Yulissa Duran   :  1960   MRN:  [id number]       Admission HPI/Chief Complaint:  Yulissa Duran is a 64 y.o. male who presents from home with wife for shortness of breath. He has a diagnosis of COPD for which he sees pulmonology. He has been saying that his shortness of breath is getting worse over the weeks. No associated fevers. He has been placed on steroids Zithromax and nebulizers by his pulmonologist but it has no improvement. He  has gotten first shot of Covid vaccine. He was getting worse to the point that he came to the ED with considerable shortness of breath. Was placed on BiPAP in the ER with considerable improvement. Rapid Covid is negative. Chest x-ray shows fluid overload. He is denying having any chest pain though the troponin is elevated. He did report of some heartburn earlier in the ER. We were asked to admit this patient for COPD exacerbation/heart failure  Subjective:   No acute overnight events. Pt feeling much better since admission. Now on RA. NAD. Review of Systems:  No fever/chills, poor appetite, cough, sputum, SOB, N/V, D/C, CP, or abd pain. Tolerating PO  Objective:   VITALS:   Last 24hrs VS reviewed since prior progress note.  Most recent are:  Patient Vitals for the past 24 hrs:   Temp Pulse Resp BP SpO2   05/15/21 0844 -- -- -- -- 95 %   05/15/21 0642 97.8 °F (36.6 °C) (!) 119 24 (!) 108/92 96 %   05/15/21 0337 98 °F (36.7 °C) (!) 128 21 93/75 94 %   21 2348 -- (!) 118 18 90/67 93 %   21 2326 97.6 °F (36.4 °C) (!) 117 18 96/76 (!) 89 %   21 2228 -- -- -- -- 98 %   21 2216 -- (!) 122 -- 95/80 --   21 1917 97.3 °F (36.3 °C) (!) 131 21 (!) 87/44 --   05/14/21 1344 -- -- -- -- (!) 7 %   21 1206 97.7 °F (36.5 °C) (!) 121 20 90/70 97 %       Intake/Output Summary (Last 24 hours) at 5/15/2021 1131  Last data filed at 5/15/2021 0645  Gross per 24 hour   Intake --   Output 1400 ml   Net -1400 ml I had a face to face encounter and independently examined this patient on 5/15/2021, as outlined below:  PHYSICAL EXAM:  General: Alert, cooperative, no acute distress    EENT:  EOMI. Anicteric sclerae. MMM  Resp:  Decreased throughout, no audible w/r/r, No accessory muscle use  CV:  Tachy rate, regular, No edema  GI:  Soft, Non distended, Non tender  Neurologic:  Alert and oriented, normal speech, SARMIENTO  Psych:   Not anxious or agitated  Skin:  No rashes. No jaundice    Reviewed most current lab test results and cultures  YES  Reviewed most current radiology test results   YES  Reviewed patient's current orders and MAR    YES  PMH/SH reviewed - no change compared to H&P    Procedures: see electronic medical records for all procedures/Xrays and details which were not copied into this note but were reviewed prior to creation of Plan. LABS:  I reviewed today's most current labs and imaging studies.   Pertinent labs include:  Recent Labs     05/15/21  0237 05/14/21  0414 05/13/21  0826   WBC 20.0* 12.8* 15.9*   HGB 13.8 12.6 14.3   HCT 42.3 37.8 44.6    287 327     Recent Labs     05/15/21  0237 05/14/21  0414 05/13/21  0946   * 134* 133*   K 4.3 3.8 4.4   CL 96* 96* 98   CO2 30 32 27   * 144* 94   BUN 48* 35* 32*   CREA 1.70* 1.44* 1.50*   CA 9.6 10.6* 11.0*   MG 1.7  --   --    PHOS 2.4*  --   --    ALB 2.9* 3.0* 3.3*   TBILI 0.6 0.9 1.5*   * 930* 1,224*       Care Plan discussed with: pt  ___________________________________________________________________  Assessment / Plan:  Acute hypoxic respiratory failure, resolved  Started on BiPAP, now on RA  Rapid Covid negative  etiology COPD exacerbation and heart failure  Chest x-ray with pulmonary edema and troponin elevation     COPD exacerbation  Cont steroids, nebs  procalcitonin low 0.42  Leukocytosis 2/2 steroids     Acute heart failure EF 20%, BiV failure, NYHA IV on admission  Appreciate AHF/cardiology- milrinone, entresto, aldactone, corlanor, allopurinol started  Lasix, albumin, ASA  Avoid BB with COPD exam and RV failure  Plan RHC/LHC this week  Troponin mildly elevated likely 2/2 resp failure, hypoxia, increased creatinine, trending down  Congestive hepatopathy  Monitor BP  TSH wnl    EDGAR vs CKD, unknown baseline  Cardiorenal  monitor    25.0 - 29.9 Overweight / Body mass index is 25.3 kg/m².     Code status: Full Code  Prophylaxis: Hep SQ  Recommended Disposition: tbd    Signed: Lorena Hall MD  5/15/2021 11:36 AM

## 2021-05-15 NOTE — PROGRESS NOTES
Problem: Breathing Pattern - Ineffective  Goal: *Absence of hypoxia  Outcome: Progressing Towards Goal  Note: Pt sats appropriate on RA     Problem: Chronic Obstructive Pulmonary Disease (COPD)  Goal: *Oxygen saturation during activity within specified parameters  Outcome: Progressing Towards Goal  Note: On RA  Goal: *Able to remain out of bed as prescribed  Outcome: Progressing Towards Goal  Note: Pt up ad tom
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
## 2021-05-15 NOTE — PROGRESS NOTES
Spiritual Care Assessment/Progress Note  [facility]      NAME: Nahid Castellanos      MRN: [id number]  AGE: 64 y.o. SEX: male  Latter day Affiliation: Jew   Language: English     5/15/2021     Total Time (in minutes): 10     Spiritual Assessment begun in [facility] [facility] through conversation with:         []Patient        [] Family    [] Friend(s)        Reason for Consult: Initial visit     Spiritual beliefs: (Please include comment if needed)     [] Identifies with a angelina tradition:         [] Supported by a angelina community:            [] Claims no spiritual orientation:           [] Seeking spiritual identity:                [] Adheres to an individual form of spirituality:           [x] Not able to assess:                           Identified resources for coping:      [] Prayer                               [] Music                  [] Guided Imagery     [] Family/friends                 [] Pet visits     [] Devotional reading                         [x] Unknown     [] Other:                                              Interventions offered during this visit: (See comments for more details)    Patient Interventions: Initial visit           Plan of Care:     [] Support spiritual and/or cultural needs    [] Support AMD and/or advance care planning process      [] Support grieving process   [] Coordinate Rites and/or Rituals    [] Coordination with community clergy   [] No spiritual needs identified at this time   [] Detailed Plan of Care below (See Comments)  [] Make referral to Music Therapy  [] Make referral to Pet Therapy     [] Make referral to Addiction services  [] Make referral to [facility]  [] Make referral to Spiritual Care Partner  [] No future visits requested        [x] Follow up upon further referrals     Attempted to visit pt for initial spiritual assessment. Unable to complete assessment at this time as pt sleeping and did not awake. No family present.   Chaplain Jose Antonio, MDiv, MS, [facility]  [address] ([id number])

## 2021-05-16 LAB
ALBUMIN SERPL-MCNC: 2.9 G/DL (ref 3.5–5)
ALBUMIN/GLOB SERPL: 0.9 {RATIO} (ref 1.1–2.2)
ALP SERPL-CCNC: 196 U/L (ref 45–117)
ALT SERPL-CCNC: 556 U/L (ref 12–78)
ANION GAP SERPL CALC-SCNC: 5 MMOL/L (ref 5–15)
AST SERPL-CCNC: 171 U/L (ref 15–37)
BILIRUB SERPL-MCNC: 0.6 MG/DL (ref 0.2–1)
BNP SERPL-MCNC: 1063 PG/ML
BUN SERPL-MCNC: 45 MG/DL (ref 6–20)
BUN/CREAT SERPL: 36 (ref 12–20)
CALCIUM SERPL-MCNC: 8.4 MG/DL (ref 8.5–10.1)
CHLORIDE SERPL-SCNC: 101 MMOL/L (ref 97–108)
CMV IGG SERPL IA-ACNC: <0.6 U/ML (ref 0–0.59)
CMV IGM SERPL IA-ACNC: <30 AU/ML (ref 0–29.9)
CO2 SERPL-SCNC: 29 MMOL/L (ref 21–32)
COMMENT, HOLDF: NORMAL
CREAT SERPL-MCNC: 1.25 MG/DL (ref 0.7–1.3)
ERYTHROCYTE [DISTWIDTH] IN BLOOD BY AUTOMATED COUNT: 13.8 % (ref 11.5–14.5)
GLOBULIN SER CALC-MCNC: 3.2 G/DL (ref 2–4)
GLUCOSE SERPL-MCNC: 101 MG/DL (ref 65–100)
HCT VFR BLD AUTO: 39.7 % (ref 36.6–50.3)
HGB BLD-MCNC: 13 G/DL (ref 12.1–17)
LACTATE SERPL-SCNC: 0.9 MMOL/L (ref 0.4–2)
MCH RBC QN AUTO: 32.8 PG (ref 26–34)
MCHC RBC AUTO-ENTMCNC: 32.7 G/DL (ref 30–36.5)
MCV RBC AUTO: 100.3 FL (ref 80–99)
NRBC # BLD: 0 K/UL (ref 0–0.01)
NRBC BLD-RTO: 0 PER 100 WBC
PLATELET # BLD AUTO: 331 K/UL (ref 150–400)
PMV BLD AUTO: 12.6 FL (ref 8.9–12.9)
POTASSIUM SERPL-SCNC: 4.5 MMOL/L (ref 3.5–5.1)
PROT SERPL-MCNC: 6.1 G/DL (ref 6.4–8.2)
RBC # BLD AUTO: 3.96 M/UL (ref 4.1–5.7)
SAMPLES BEING HELD,HOLD: NORMAL
SODIUM SERPL-SCNC: 135 MMOL/L (ref 136–145)
WBC # BLD AUTO: 18.8 K/UL (ref 4.1–11.1)

## 2021-05-16 PROCEDURE — 74011250637 HC RX REV CODE- 250/637: Performed by: PHYSICIAN ASSISTANT

## 2021-05-16 PROCEDURE — 94640 AIRWAY INHALATION TREATMENT: CPT

## 2021-05-16 PROCEDURE — 74011250637 HC RX REV CODE- 250/637: Performed by: NURSE PRACTITIONER

## 2021-05-16 PROCEDURE — 74011250636 HC RX REV CODE- 250/636: Performed by: INTERNAL MEDICINE

## 2021-05-16 PROCEDURE — 74011250636 HC RX REV CODE- 250/636: Performed by: NURSE PRACTITIONER

## 2021-05-16 PROCEDURE — 74011000250 HC RX REV CODE- 250: Performed by: INTERNAL MEDICINE

## 2021-05-16 PROCEDURE — 99233 SBSQ HOSP IP/OBS HIGH 50: CPT | Performed by: SPECIALIST

## 2021-05-16 PROCEDURE — 36415 COLL VENOUS BLD VENIPUNCTURE: CPT

## 2021-05-16 PROCEDURE — 80053 COMPREHEN METABOLIC PANEL: CPT

## 2021-05-16 PROCEDURE — 74011250636 HC RX REV CODE- 250/636: Performed by: SPECIALIST

## 2021-05-16 PROCEDURE — 74011000258 HC RX REV CODE- 258: Performed by: NURSE PRACTITIONER

## 2021-05-16 PROCEDURE — 74011250637 HC RX REV CODE- 250/637: Performed by: INTERNAL MEDICINE

## 2021-05-16 PROCEDURE — 85027 COMPLETE CBC AUTOMATED: CPT

## 2021-05-16 PROCEDURE — 74011636637 HC RX REV CODE- 636/637: Performed by: INTERNAL MEDICINE

## 2021-05-16 PROCEDURE — 83605 ASSAY OF LACTIC ACID: CPT

## 2021-05-16 PROCEDURE — 83880 ASSAY OF NATRIURETIC PEPTIDE: CPT

## 2021-05-16 PROCEDURE — 65660000001 HC RM ICU INTERMED STEPDOWN

## 2021-05-16 PROCEDURE — 99233 SBSQ HOSP IP/OBS HIGH 50: CPT | Performed by: NURSE PRACTITIONER

## 2021-05-16 RX ORDER — IPRATROPIUM BROMIDE AND ALBUTEROL SULFATE 2.5; .5 MG/3ML; MG/3ML
3 SOLUTION RESPIRATORY (INHALATION)
Status: DISCONTINUED | OUTPATIENT
Start: 2021-05-16 | End: 2021-05-20

## 2021-05-16 RX ORDER — IPRATROPIUM BROMIDE AND ALBUTEROL SULFATE 2.5; .5 MG/3ML; MG/3ML
3 SOLUTION RESPIRATORY (INHALATION)
Status: DISCONTINUED | OUTPATIENT
Start: 2021-05-16 | End: 2021-05-21 | Stop reason: HOSPADM

## 2021-05-16 RX ADMIN — SPIRONOLACTONE 12.5 MG: 25 TABLET ORAL at 08:54

## 2021-05-16 RX ADMIN — MILRINONE LACTATE 0.2 MCG/KG/MIN: 200 INJECTION, SOLUTION INTRAVENOUS at 10:18

## 2021-05-16 RX ADMIN — HEPARIN SODIUM 5000 UNITS: 5000 INJECTION INTRAVENOUS; SUBCUTANEOUS at 06:54

## 2021-05-16 RX ADMIN — IVABRADINE 7.5 MG: 5 TABLET, FILM COATED ORAL at 08:54

## 2021-05-16 RX ADMIN — ALLOPURINOL 50 MG: 100 TABLET ORAL at 08:54

## 2021-05-16 RX ADMIN — IVABRADINE 7.5 MG: 5 TABLET, FILM COATED ORAL at 17:40

## 2021-05-16 RX ADMIN — ASPIRIN 81 MG: 81 TABLET, CHEWABLE ORAL at 08:54

## 2021-05-16 RX ADMIN — FUROSEMIDE 20 MG: 10 INJECTION, SOLUTION INTRAMUSCULAR; INTRAVENOUS at 08:55

## 2021-05-16 RX ADMIN — Medication 10 ML: at 21:06

## 2021-05-16 RX ADMIN — IRON SUCROSE 200 MG: 20 INJECTION, SOLUTION INTRAVENOUS at 10:15

## 2021-05-16 RX ADMIN — Medication 10 ML: at 06:54

## 2021-05-16 RX ADMIN — HEPARIN SODIUM 5000 UNITS: 5000 INJECTION INTRAVENOUS; SUBCUTANEOUS at 21:06

## 2021-05-16 RX ADMIN — PREDNISONE 40 MG: 20 TABLET ORAL at 08:54

## 2021-05-16 RX ADMIN — CALCIUM CARBONATE (ANTACID) CHEW TAB 500 MG 400 MG: 500 CHEW TAB at 23:48

## 2021-05-16 RX ADMIN — Medication 10 ML: at 17:43

## 2021-05-16 RX ADMIN — SACUBITRIL AND VALSARTAN 1 TABLET: 24; 26 TABLET, FILM COATED ORAL at 08:54

## 2021-05-16 RX ADMIN — IPRATROPIUM BROMIDE AND ALBUTEROL SULFATE 3 ML: .5; 3 SOLUTION RESPIRATORY (INHALATION) at 07:29

## 2021-05-16 RX ADMIN — SACUBITRIL AND VALSARTAN 1 TABLET: 24; 26 TABLET, FILM COATED ORAL at 21:07

## 2021-05-16 RX ADMIN — HEPARIN SODIUM 5000 UNITS: 5000 INJECTION INTRAVENOUS; SUBCUTANEOUS at 17:41

## 2021-05-16 NOTE — PROGRESS NOTES
Hospitalist Progress Note    NAME: El Jean   :  1960   MRN:  [id number]       Admission HPI/Chief Complaint:  El Jean is a 64 y.o. male who presents from home with wife for shortness of breath. He has a diagnosis of COPD for which he sees pulmonology. He has been saying that his shortness of breath is getting worse over the weeks. No associated fevers. He has been placed on steroids Zithromax and nebulizers by his pulmonologist but it has no improvement. He  has gotten first shot of Covid vaccine. He was getting worse to the point that he came to the ED with considerable shortness of breath. Was placed on BiPAP in the ER with considerable improvement. Rapid Covid is negative. Chest x-ray shows fluid overload. He is denying having any chest pain though the troponin is elevated. He did report of some heartburn earlier in the ER. We were asked to admit this patient for COPD exacerbation/heart failure  Subjective:   No acute overnight events. Pt feeling well. Remains on RA. No dyspnea w/ exertion. NAD. Review of Systems:  No fever/chills, poor appetite, cough, sputum, SOB, N/V, D/C, CP, or abd pain. Tolerating PO  Objective:   VITALS:   Last 24hrs VS reviewed since prior progress note.  Most recent are:  Patient Vitals for the past 24 hrs:   Temp Pulse Resp BP SpO2   21 0855 -- (!) 113 -- 95/81 --   21 0731 -- -- -- -- 95 %   21 0653 97.7 °F (36.5 °C) (!) 110 22 105/82 96 %   21 0400 98.2 °F (36.8 °C) (!) 104 19 100/73 95 %   21 0203 -- -- -- -- 95 %   05/15/21 2347 97.8 °F (36.6 °C) (!) 108 20 95/67 99 %   05/15/21 2202 -- -- -- -- 96 %   05/15/21 2025 -- (!) 105 -- 105/81 --   05/15/21 2022 -- (!) 104 16 105/81 95 %   05/15/21 1916 97.9 °F (36.6 °C) (!) 111 19 120/70 95 %   05/15/21 1532 -- (!) 116 22 100/76 95 %   05/15/21 1406 -- -- -- -- 95 %   05/15/21 1202 97.7 °F (36.5 °C) (!) 117 20 100/82 (!) 5 %       Intake/Output Summary (Last 24 hours) at 5/16/2021 1028  Last data filed at 5/16/2021 0400  Gross per 24 hour   Intake --   Output 2050 ml   Net -2050 ml        I had a face to face encounter and independently examined this patient on 5/16/2021, as outlined below:  PHYSICAL EXAM:  General: Alert, cooperative, no acute distress    EENT:  EOMI. Anicteric sclerae. MMM  Resp:  Decreased throughout, occ wheezes, No accessory muscle use  CV:  Tachy rate, regular, No edema  GI:  Soft, Non distended, Non tender  Neurologic:  Alert and oriented, normal speech, SARMIENTO  Psych:   Not anxious or agitated  Skin:  No rashes. No jaundice    Reviewed most current lab test results and cultures  YES  Reviewed most current radiology test results   YES  Reviewed patient's current orders and MAR    YES  PMH/SH reviewed - no change compared to H&P    Procedures: see electronic medical records for all procedures/Xrays and details which were not copied into this note but were reviewed prior to creation of Plan. LABS:  I reviewed today's most current labs and imaging studies.   Pertinent labs include:  Recent Labs     05/16/21  0601 05/15/21  0237 05/14/21 0414   WBC 18.8* 20.0* 12.8*   HGB 13.0 13.8 12.6   HCT 39.7 42.3 37.8    351 287     Recent Labs     05/16/21  0601 05/15/21  0237 05/14/21  0414   * 133* 134*   K 4.5 4.3 3.8    96* 96*   CO2 29 30 32   * 134* 144*   BUN 45* 48* 35*   CREA 1.25 1.70* 1.44*   CA 8.4* 9.6 10.6*   MG  --  1.7  --    PHOS  --  2.4*  --    ALB 2.9* 2.9* 3.0*   TBILI 0.6 0.6 0.9   * 721* 930*       Care Plan discussed with: pt  ___________________________________________________________________  Assessment / Plan:  Acute hypoxic respiratory failure, resolved  Started on BiPAP, now on RA  Rapid Covid negative  etiology COPD exacerbation and heart failure  Chest x-ray with pulmonary edema and troponin elevation     COPD exacerbation  Cont steroids, wean nebs per pt request  procalcitonin low 0.42  Leukocytosis 2/2 steroids     Acute heart failure EF 20%, BiV failure, NYHA IV on admission  Appreciate AHF/cardiology- milrinone, entresto, aldactone, corlanor, allopurinol started  Lasix, albumin, ASA  Avoid BB with COPD exam and RV failure  Plan RHC/C this week  Troponin mildly elevated likely 2/2 resp failure, hypoxia, increased creatinine, trending down  Congestive hepatopathy  Monitor BP  TSH wnl  B12 wnl  Vit D replacement    EDGAR vs CKD, unknown baseline, improving  Cardiorenal  monitor    25.0 - 29.9 Overweight / Body mass index is 25.8 kg/m².     Code status: Full Code  Prophylaxis: Hep SQ  Recommended Disposition: tbd    Signed: Karlie Zhong MD  5/16/2021 10:32 AM
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
## 2021-05-16 NOTE — PROGRESS NOTES
05/15/21 2202   CPAP/BIPAP   CPAP/BIPAP Start/Stop Off  (VS table on room air, NARD noted, not indicated at this time)   Device Mode BIPAP;S/T   $$ Bipap Daily Yes  (on S/B in room)

## 2021-05-17 LAB
ALBUMIN SERPL-MCNC: 2.9 G/DL (ref 3.5–5)
ALBUMIN/GLOB SERPL: 0.9 {RATIO} (ref 1.1–2.2)
ALP SERPL-CCNC: 186 U/L (ref 45–117)
ALT SERPL-CCNC: 477 U/L (ref 12–78)
ANION GAP SERPL CALC-SCNC: 6 MMOL/L (ref 5–15)
AST SERPL-CCNC: 132 U/L (ref 15–37)
BILIRUB SERPL-MCNC: 0.7 MG/DL (ref 0.2–1)
BNP SERPL-MCNC: 1209 PG/ML
BUN SERPL-MCNC: 38 MG/DL (ref 6–20)
BUN/CREAT SERPL: 27 (ref 12–20)
CALCIUM SERPL-MCNC: 7.7 MG/DL (ref 8.5–10.1)
CHLORIDE SERPL-SCNC: 105 MMOL/L (ref 97–108)
CO2 SERPL-SCNC: 28 MMOL/L (ref 21–32)
COXSACKIE A7 IGM, 163291: NEGATIVE TITER
CREAT SERPL-MCNC: 1.41 MG/DL (ref 0.7–1.3)
CV A16 IGG TITR SER IF: ABNORMAL TITER
CV A16 IGM TITR SER IF: NEGATIVE TITER
CV A24 IGG TITR SER IF: ABNORMAL TITER
CV A24 IGM TITR SER IF: NEGATIVE TITER
CV A7 IGG TITR SER IF: ABNORMAL TITER
CV A9 IGG TITR SER IF: ABNORMAL TITER
CV A9 IGM TITR SER IF: NEGATIVE TITER
GLOBULIN SER CALC-MCNC: 3.3 G/DL (ref 2–4)
GLUCOSE SERPL-MCNC: 80 MG/DL (ref 65–100)
MAGNESIUM SERPL-MCNC: 1.6 MG/DL (ref 1.6–2.4)
MYOGLOBIN SERPL-MCNC: 221 NG/ML (ref 16–116)
PHOSPHATE SERPL-MCNC: 1.9 MG/DL (ref 2.6–4.7)
POTASSIUM SERPL-SCNC: 4.1 MMOL/L (ref 3.5–5.1)
PROT SERPL-MCNC: 6.2 G/DL (ref 6.4–8.2)
SODIUM SERPL-SCNC: 139 MMOL/L (ref 136–145)

## 2021-05-17 PROCEDURE — 99152 MOD SED SAME PHYS/QHP 5/>YRS: CPT | Performed by: INTERNAL MEDICINE

## 2021-05-17 PROCEDURE — 36415 COLL VENOUS BLD VENIPUNCTURE: CPT

## 2021-05-17 PROCEDURE — 99233 SBSQ HOSP IP/OBS HIGH 50: CPT | Performed by: INTERNAL MEDICINE

## 2021-05-17 PROCEDURE — 99153 MOD SED SAME PHYS/QHP EA: CPT | Performed by: INTERNAL MEDICINE

## 2021-05-17 PROCEDURE — C1894 INTRO/SHEATH, NON-LASER: HCPCS | Performed by: INTERNAL MEDICINE

## 2021-05-17 PROCEDURE — 74011636637 HC RX REV CODE- 636/637: Performed by: INTERNAL MEDICINE

## 2021-05-17 PROCEDURE — 77030010221 HC SPLNT WR POS TELE -B: Performed by: INTERNAL MEDICINE

## 2021-05-17 PROCEDURE — 93460 R&L HRT ART/VENTRICLE ANGIO: CPT | Performed by: INTERNAL MEDICINE

## 2021-05-17 PROCEDURE — 94640 AIRWAY INHALATION TREATMENT: CPT

## 2021-05-17 PROCEDURE — 74011000250 HC RX REV CODE- 250: Performed by: INTERNAL MEDICINE

## 2021-05-17 PROCEDURE — C1751 CATH, INF, PER/CENT/MIDLINE: HCPCS | Performed by: INTERNAL MEDICINE

## 2021-05-17 PROCEDURE — 74011250637 HC RX REV CODE- 250/637: Performed by: INTERNAL MEDICINE

## 2021-05-17 PROCEDURE — 77030013406 HC CATH CTRL EDWD -B: Performed by: INTERNAL MEDICINE

## 2021-05-17 PROCEDURE — 99233 SBSQ HOSP IP/OBS HIGH 50: CPT | Performed by: NURSE PRACTITIONER

## 2021-05-17 PROCEDURE — 74011250636 HC RX REV CODE- 250/636: Performed by: SPECIALIST

## 2021-05-17 PROCEDURE — 83874 ASSAY OF MYOGLOBIN: CPT

## 2021-05-17 PROCEDURE — 74011250637 HC RX REV CODE- 250/637: Performed by: NURSE PRACTITIONER

## 2021-05-17 PROCEDURE — 74011250636 HC RX REV CODE- 250/636: Performed by: NURSE PRACTITIONER

## 2021-05-17 PROCEDURE — B2111ZZ FLUOROSCOPY OF MULTIPLE CORONARY ARTERIES USING LOW OSMOLAR CONTRAST: ICD-10-PCS | Performed by: INTERNAL MEDICINE

## 2021-05-17 PROCEDURE — 83880 ASSAY OF NATRIURETIC PEPTIDE: CPT

## 2021-05-17 PROCEDURE — 4A023N8 MEASUREMENT OF CARDIAC SAMPLING AND PRESSURE, BILATERAL, PERCUTANEOUS APPROACH: ICD-10-PCS | Performed by: INTERNAL MEDICINE

## 2021-05-17 PROCEDURE — 65660000001 HC RM ICU INTERMED STEPDOWN

## 2021-05-17 PROCEDURE — 74011250636 HC RX REV CODE- 250/636: Performed by: INTERNAL MEDICINE

## 2021-05-17 PROCEDURE — B2161ZZ FLUOROSCOPY OF RIGHT AND LEFT HEART USING LOW OSMOLAR CONTRAST: ICD-10-PCS | Performed by: INTERNAL MEDICINE

## 2021-05-17 PROCEDURE — 77030013744: Performed by: INTERNAL MEDICINE

## 2021-05-17 PROCEDURE — 83735 ASSAY OF MAGNESIUM: CPT

## 2021-05-17 PROCEDURE — 77030004532 HC CATH ANGI DX IMP BSC -A: Performed by: INTERNAL MEDICINE

## 2021-05-17 PROCEDURE — 74011000258 HC RX REV CODE- 258: Performed by: NURSE PRACTITIONER

## 2021-05-17 PROCEDURE — 84100 ASSAY OF PHOSPHORUS: CPT

## 2021-05-17 PROCEDURE — 74011250637 HC RX REV CODE- 250/637: Performed by: PHYSICIAN ASSISTANT

## 2021-05-17 PROCEDURE — 74011000636 HC RX REV CODE- 636: Performed by: INTERNAL MEDICINE

## 2021-05-17 PROCEDURE — 80053 COMPREHEN METABOLIC PANEL: CPT

## 2021-05-17 RX ORDER — FUROSEMIDE 20 MG/1
20 TABLET ORAL DAILY
Status: DISCONTINUED | OUTPATIENT
Start: 2021-05-18 | End: 2021-05-20

## 2021-05-17 RX ORDER — LIDOCAINE HYDROCHLORIDE 10 MG/ML
INJECTION INFILTRATION; PERINEURAL AS NEEDED
Status: DISCONTINUED | OUTPATIENT
Start: 2021-05-17 | End: 2021-05-17 | Stop reason: HOSPADM

## 2021-05-17 RX ORDER — FENTANYL CITRATE 50 UG/ML
INJECTION, SOLUTION INTRAMUSCULAR; INTRAVENOUS AS NEEDED
Status: DISCONTINUED | OUTPATIENT
Start: 2021-05-17 | End: 2021-05-17 | Stop reason: HOSPADM

## 2021-05-17 RX ORDER — HEPARIN SODIUM 1000 [USP'U]/ML
INJECTION, SOLUTION INTRAVENOUS; SUBCUTANEOUS AS NEEDED
Status: DISCONTINUED | OUTPATIENT
Start: 2021-05-17 | End: 2021-05-17 | Stop reason: HOSPADM

## 2021-05-17 RX ORDER — MAGNESIUM SULFATE HEPTAHYDRATE 40 MG/ML
2 INJECTION, SOLUTION INTRAVENOUS ONCE
Status: COMPLETED | OUTPATIENT
Start: 2021-05-17 | End: 2021-05-17

## 2021-05-17 RX ORDER — LANOLIN ALCOHOL/MO/W.PET/CERES
400 CREAM (GRAM) TOPICAL DAILY
Status: DISCONTINUED | OUTPATIENT
Start: 2021-05-17 | End: 2021-05-19

## 2021-05-17 RX ORDER — MIDAZOLAM HYDROCHLORIDE 1 MG/ML
INJECTION, SOLUTION INTRAMUSCULAR; INTRAVENOUS AS NEEDED
Status: DISCONTINUED | OUTPATIENT
Start: 2021-05-17 | End: 2021-05-17 | Stop reason: HOSPADM

## 2021-05-17 RX ADMIN — FUROSEMIDE 20 MG: 10 INJECTION, SOLUTION INTRAMUSCULAR; INTRAVENOUS at 09:39

## 2021-05-17 RX ADMIN — HEPARIN SODIUM 5000 UNITS: 5000 INJECTION INTRAVENOUS; SUBCUTANEOUS at 16:51

## 2021-05-17 RX ADMIN — IVABRADINE 7.5 MG: 5 TABLET, FILM COATED ORAL at 18:12

## 2021-05-17 RX ADMIN — SACUBITRIL AND VALSARTAN 1 TABLET: 24; 26 TABLET, FILM COATED ORAL at 21:20

## 2021-05-17 RX ADMIN — SACUBITRIL AND VALSARTAN 1 TABLET: 24; 26 TABLET, FILM COATED ORAL at 09:39

## 2021-05-17 RX ADMIN — IPRATROPIUM BROMIDE AND ALBUTEROL SULFATE 3 ML: .5; 3 SOLUTION RESPIRATORY (INHALATION) at 07:08

## 2021-05-17 RX ADMIN — HEPARIN SODIUM 5000 UNITS: 5000 INJECTION INTRAVENOUS; SUBCUTANEOUS at 21:20

## 2021-05-17 RX ADMIN — Medication 400 MG: at 12:33

## 2021-05-17 RX ADMIN — Medication 10 ML: at 07:12

## 2021-05-17 RX ADMIN — CALCIUM CARBONATE (ANTACID) CHEW TAB 500 MG 400 MG: 500 CHEW TAB at 21:20

## 2021-05-17 RX ADMIN — PREDNISONE 40 MG: 20 TABLET ORAL at 09:38

## 2021-05-17 RX ADMIN — ALLOPURINOL 50 MG: 100 TABLET ORAL at 09:39

## 2021-05-17 RX ADMIN — Medication 10 ML: at 21:20

## 2021-05-17 RX ADMIN — SODIUM PHOSPHATE, MONOBASIC, MONOHYDRATE: 276; 142 INJECTION, SOLUTION INTRAVENOUS at 18:03

## 2021-05-17 RX ADMIN — ACETAMINOPHEN 650 MG: 325 TABLET ORAL at 18:18

## 2021-05-17 RX ADMIN — MAGNESIUM SULFATE HEPTAHYDRATE 2 G: 40 INJECTION, SOLUTION INTRAVENOUS at 16:36

## 2021-05-17 RX ADMIN — IVABRADINE 7.5 MG: 5 TABLET, FILM COATED ORAL at 09:39

## 2021-05-17 RX ADMIN — SPIRONOLACTONE 12.5 MG: 25 TABLET ORAL at 09:39

## 2021-05-17 RX ADMIN — Medication 10 ML: at 14:00

## 2021-05-17 RX ADMIN — IRON SUCROSE 200 MG: 20 INJECTION, SOLUTION INTRAVENOUS at 09:40

## 2021-05-17 RX ADMIN — ASPIRIN 81 MG: 81 TABLET, CHEWABLE ORAL at 09:39

## 2021-05-17 NOTE — PROGRESS NOTES
Hospitalist Progress Note    NAME: Brea Rosa   :  1960   MRN:  [id number]       Admission HPI/Chief Complaint:  Brea Rosa is a 64 y.o. male who presents from home with wife for shortness of breath. He has a diagnosis of COPD for which he sees pulmonology. He has been saying that his shortness of breath is getting worse over the weeks. No associated fevers. He has been placed on steroids Zithromax and nebulizers by his pulmonologist but it has no improvement. He  has gotten first shot of Covid vaccine. He was getting worse to the point that he came to the ED with considerable shortness of breath. Was placed on BiPAP in the ER with considerable improvement. Rapid Covid is negative. Chest x-ray shows fluid overload. He is denying having any chest pain though the troponin is elevated. He did report of some heartburn earlier in the ER. We were asked to admit this patient for COPD exacerbation/heart failure  Subjective:   No acute overnight events. Pt feeling well. Remains on RA. No dyspnea w/ exertion. NAD. Review of Systems:  No fever/chills, poor appetite, cough, sputum, SOB, N/V, D/C, CP, or abd pain. Tolerating PO  Objective:   VITALS:   Last 24hrs VS reviewed since prior progress note.  Most recent are:  Patient Vitals for the past 24 hrs:   Temp Pulse Resp BP SpO2   21 1105 97.9 °F (36.6 °C) 97 22 102/66 97 %   21 0713 97.7 °F (36.5 °C) (!) 102 18 111/74 98 %   21 0708 -- -- -- -- 97 %   21 0309 98.2 °F (36.8 °C) 92 15 104/74 97 %   21 2336 97 °F (36.1 °C) (!) 101 21 (!) 122/98 93 %   21 2107 -- 100 24 103/72 97 %   21 1800 97.6 °F (36.4 °C) 95 25 101/86 97 %   21 1449 97.6 °F (36.4 °C) 79 24 100/70 94 %       Intake/Output Summary (Last 24 hours) at 2021 1305  Last data filed at 2021 0849  Gross per 24 hour   Intake 240 ml   Output 2750 ml   Net -2510 ml        I had a face to face encounter and independently examined this patient on 5/17/2021, as outlined below:  PHYSICAL EXAM:  General: Alert, cooperative, no acute distress    EENT:  EOMI. Anicteric sclerae. MMM  Resp:  Decreased throughout, No accessory muscle use  CV:  Tachy rate, regular, No edema  GI:  Soft, Non distended, Non tender  Neurologic:  Alert and oriented, normal speech, SARMIENTO  Psych:   Not anxious or agitated  Skin:  No rashes. No jaundice    Reviewed most current lab test results and cultures  YES  Reviewed most current radiology test results   YES  Reviewed patient's current orders and MAR    YES  PMH/SH reviewed - no change compared to H&P    Procedures: see electronic medical records for all procedures/Xrays and details which were not copied into this note but were reviewed prior to creation of Plan. LABS:  I reviewed today's most current labs and imaging studies.   Pertinent labs include:  Recent Labs     05/16/21  0601 05/15/21  0237   WBC 18.8* 20.0*   HGB 13.0 13.8   HCT 39.7 42.3    351     Recent Labs     05/17/21  0315 05/16/21  0601 05/15/21  0237    135* 133*   K 4.1 4.5 4.3    101 96*   CO2 28 29 30   GLU 80 101* 134*   BUN 38* 45* 48*   CREA 1.41* 1.25 1.70*   CA 7.7* 8.4* 9.6   MG 1.6  --  1.7   PHOS 1.9*  --  2.4*   ALB 2.9* 2.9* 2.9*   TBILI 0.7 0.6 0.6   * 556* 721*       Care Plan discussed with: pt  ___________________________________________________________________  Assessment / Plan:  Acute hypoxic respiratory failure, resolved  Started on BiPAP, now on RA  Rapid Covid negative  etiology COPD exacerbation and heart failure  Chest x-ray with pulmonary edema and troponin elevation     COPD exacerbation  Completed steroids, cont nebs  procalcitonin low 0.42  Leukocytosis 2/2 steroids     Acute heart failure EF 20%, BiV failure, NYHA IV on admission  Appreciate AHF/cardiology- milrinone, entresto, aldactone, corlanor, allopurinol  Lasix, albumin, ASA  Avoid BB with COPD exam and RV failure  RHC/LHC today  Troponin mildly elevated likely 2/2 resp failure, hypoxia, increased creatinine- trended down  Congestive hepatopathy  Monitor BP  TSH wnl  B12 wnl  Vit D replacement    EDGAR vs CKD, unknown baseline  Cardiorenal  Monitor    Replete mag, phos    25.0 - 29.9 Overweight / Body mass index is 25.79 kg/m².     Code status: Full Code  Prophylaxis: Hep SQ  Recommended Disposition: tbd    Signed: Kimberly Brice MD  5/17/2021 1:12 PM
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
## 2021-05-17 NOTE — PROGRESS NOTES
Problem: Breathing Pattern - Ineffective  Goal: *Absence of hypoxia  Outcome: Progressing Towards Goal     Problem: Fluid Volume - Risk of, Imbalanced  Goal: *Balanced intake and output  Outcome: Progressing Towards Goal     Problem: Falls - Risk of  Goal: *Absence of Falls  Description: Document Roshni Fall Risk and appropriate interventions in the flowsheet.   Outcome: Progressing Towards Goal  Note: Fall Risk Interventions         Medication Interventions: Evaluate medications/consider consulting pharmacy, Patient to call before getting OOB, Teach patient to arise slowly

## 2021-05-17 NOTE — PROGRESS NOTES
Cardiac Cath Lab Procedure Area Arrival Note:    Jacob Darnell arrived to Cardiac Cath Lab, Procedure Area. Patient identifiers verified with NAME and DATE OF BIRTH. Procedure verified with patient. Consent forms verified. Allergies verified. Patient informed of procedure and plan of care. Questions answered with review. Patient voiced understanding of procedure and plan of care. Patient on cardiac monitor, non-invasive blood pressure, SPO2 monitor. On room air. IV of NS on pump at 75 ml/hr. Patient status doing well without problems. Patient is A&Ox 4. Patient reports no chest pain. Patient medicated during procedure with orders obtained and verified by Dr. Saint Lobstein. Refer to patients Cardiac Cath Lab PROCEDURE REPORT for vital signs, assessment, status, and response during procedure, printed at end of case. Printed report on chart or scanned into chart.

## 2021-05-17 NOTE — PROGRESS NOTES
Cardiac Cath Lab Recovery Arrival Note:      Nahid Castellanos arrived to Cardiac Cath Lab, Recovery Area. Staff introduced to patient. Patient identifiers verified with NAME and DATE OF BIRTH. Procedure verified with patient. Consent forms reviewed and signed by patient or authorized representative and verified. Allergies verified. Patient and family oriented to department. Patient and family informed of procedure and plan of care. Questions answered with review. Patient prepped for procedure, per orders from physician, prior to arrival.    Patient on cardiac monitor, non-invasive blood pressure, SPO2 monitor. On room air. Patient is A&Ox 4. Patient reports no pain. Patient in stretcher, in low position, with side rails up, call bell within reach, patient instructed to call if assistance as needed. Patient prep in: [address], [place]. Prep by: CASTRO Wright RN
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
## 2021-05-17 NOTE — PROGRESS NOTES
Verbal bedside report given to [facility] RN oncoming nurse by Susana Monday. Maverick RN off-going nurse. Report included current pt status and condition, recent results, hx of present illness, heart rate and rhythm, and respiratory status.

## 2021-05-17 NOTE — PROGRESS NOTES
Spiritual Care Assessment/Progress Note  [facility]      NAME: Luis Yang      MRN: [id number]  AGE: 64 y.o. SEX: male  Worship Affiliation: Anabaptist   Language: English     5/17/2021     Total Time (in minutes): 7     Spiritual Assessment begun in [address] through conversation with:         []Patient        [] Family    [] Friend(s)        Reason for Consult: Initial/Spiritual assessment, patient floor     Spiritual beliefs: (Please include comment if needed)     [] Identifies with a angelina tradition:         [] Supported by a angelina community:            [] Claims no spiritual orientation:           [] Seeking spiritual identity:                [] Adheres to an individual form of spirituality:           [x] Not able to assess:                           Identified resources for coping:      [] Prayer                               [] Music                  [] Guided Imagery     [] Family/friends                 [] Pet visits     [] Devotional reading                         [x] Unknown     [] Other:                                              Interventions offered during this visit: (See comments for more details)    Patient Interventions: Initial visit           Plan of Care:     [] Support spiritual and/or cultural needs    [] Support AMD and/or advance care planning process      [] Support grieving process   [] Coordinate Rites and/or Rituals    [] Coordination with community clergy   [] No spiritual needs identified at this time   [] Detailed Plan of Care below (See Comments)  [] Make referral to Music Therapy  [] Make referral to Pet Therapy     [] Make referral to Addiction services  [] Make referral to [facility]  [] Make referral to Spiritual Care Partner  [] No future visits requested        [x] Follow up upon further referrals     Comments:  visit for routine follow up. Patient out of room for a procedure.   Please contact spiritual care for further referral or consult. Rev.  Grazyna Trent MDiv, [facility], [facility]   paging service: [phone])

## 2021-05-17 NOTE — PROGRESS NOTES
TRANSFER - OUT REPORT:    Verbal report given to Anne Marie(name) on Yulissa Semen  being transferred to [facility]) for routine progression of care       Report consisted of patients Situation, Background, Assessment and   Recommendations(SBAR). Information from the following report(s) Procedure Summary was reviewed with the receiving nurse. Lines:   Peripheral IV 05/17/21 Left Arm (Active)   Site Assessment Clean, dry, & intact 05/17/21 1322   Phlebitis Assessment 0 05/17/21 1322   Infiltration Assessment 0 05/17/21 1322   Dressing Status Clean, dry, & intact 05/17/21 1322   Dressing Type Transparent;Tape 05/17/21 1322   Hub Color/Line Status Pink 05/17/21 1322        Opportunity for questions and clarification was provided.       Patient transported with:   Monitor  Registered Nurse

## 2021-05-17 NOTE — PROCEDURES
Findings:  1) No significant CAD  2)no pulmonary HTN  3)Reduced CI  4)Normal left sided and slightly elevated right sided filling pressures  5)Reduced AIMEE may suggest poor RV function  6)high PVC burden    Access: right radial and right IJV no issues    Contrast 26 cc    Recommendations  1)GDMT for CAD  2)Will discuss regarding empiric short term Amiodarone for possible PVC cardiomyopathy

## 2021-05-17 NOTE — PROGRESS NOTES
Physician Progress Note      Celine Lopez  CSN #:                  [id number]  :                       1960  ADMIT DATE:       2021 8:15 AM  DISCH DATE:  RESPONDING  PROVIDER #:        Elda Matt MD          QUERY TEXT:    Patient admitted with acute respiratory failure. Noted documentation of 'COPD-stable' per  Pulmonary consult note and 'COPD exacerbation' in Hospitalists' and Cardiology notes. If possible, please document in progress notes and discharge summary if you are evaluating and /or treating any of the following: The medical record reflects the following:  Risk Factors: 62yo with acute respiratory failure, former smoker  Clinical Indicators:  -  Pulmonary  - Acute hypoxic resp failure- Due to HFrEF. Better after diuresis  - COPD- stable  - CMP EF 25% ECHO   -  Hospitalist  - Acute hypoxic respiratory failure, resolved  -Started on BiPAP, now on RA  -Rapid Covid negative  -etiology COPD exacerbation and heart failure  -Chest x-ray with pulmonary edema and troponin elevation  - COPD exacerbation  -Cont steroids, wean nebs per pt request  -procalcitonin low 0.42  -Leukocytosis 2/2 steroids  Treatment: Pulmonary consult- signed off, steroids and nebulizer treatments    Thank you,  Cindy Hernandez  [phone]  Options provided:  -- COPD exacerbation present as evidenced by, Please document evidence.   -- COPD exacerbation was ruled out  -- Other - I will add my own diagnosis  -- Disagree - Not applicable / Not valid  -- Disagree - Clinically unable to determine / Unknown  -- Refer to Clinical Documentation Reviewer    PROVIDER RESPONSE TEXT:    COPD exacerbation is present as evidenced by symptoms and exam    Query created by: Eduardo Carroll on 2021 4:24 PM      Electronically signed by:  Elda Matt MD 2021 9:49 AM

## 2021-05-17 NOTE — PROGRESS NOTES
Transfer to [address] [place] [address] from Procedure Area    Verbal report given to LISA GREEN [facility] on Luis Yang being transferred to Cardiac Cath Lab  for routine progression of care   Patient is post [facility] procedure. Patient stable upon transfer to . Report consisted of patients Situation, Background, Assessment and   Recommendations(SBAR). Information from the following report(s) SBAR, Procedure Summary and MAR was reviewed with the receiving nurse. Opportunity for questions and clarification was provided. Patient medicated during procedure with orders obtained and verified by Dr. Mary Davis. Refer to patient PROCEDURE REPORT for vital signs, assessment, status, and response during procedure.

## 2021-05-17 NOTE — PROGRESS NOTES
TRANSFER - IN REPORT:    Verbal report received from Philipp on William Palomino  being received from procedure for routine progression of care. Report consisted of patients Situation, Background, Assessment and Recommendations(SBAR). Information from the following report(s) Procedure Summary was reviewed with the receiving clinician. Opportunity for questions and clarification was provided. Assessment completed upon patients arrival to [address] [place] [address] and care [facility]. Cardiac Cath Lab Recovery Arrival Note:    William Palomino arrived to [facility] recovery area. Patient procedure= cardiac catheterization. Patient on cardiac monitor, non-invasive blood pressure, SPO2 monitor. On room air. IV  of 0.9% normal saline on pump at 25 ml/hr. Patient status doing well without problems. Patient is A&Ox 4. Patient reports no complaints. PROCEDURE SITE CHECK:    Procedure site:without any bleeding or hematoma, no pain/discomfort reported at procedure site. No change in patient status. Continue to monitor patient and status.

## 2021-05-18 ENCOUNTER — TELEPHONE (OUTPATIENT)
Dept: CARDIOLOGY CLINIC | Age: 61
End: 2021-05-18

## 2021-05-18 LAB
ALBUMIN SERPL ELPH-MCNC: 3.1 G/DL (ref 2.9–4.4)
ALBUMIN SERPL-MCNC: 2.7 G/DL (ref 3.5–5)
ALBUMIN/GLOB SERPL: 0.8 {RATIO} (ref 1.1–2.2)
ALBUMIN/GLOB SERPL: 1.1 {RATIO} (ref 0.7–1.7)
ALP SERPL-CCNC: 161 U/L (ref 45–117)
ALPHA1 GLOB SERPL ELPH-MCNC: 0.3 G/DL (ref 0–0.4)
ALPHA2 GLOB SERPL ELPH-MCNC: 0.8 G/DL (ref 0.4–1)
ALT SERPL-CCNC: 392 U/L (ref 12–78)
ANION GAP SERPL CALC-SCNC: 5 MMOL/L (ref 5–15)
AST SERPL-CCNC: 90 U/L (ref 15–37)
B BURGDOR IGG+IGM SER IA-IMP: NORMAL
B BURGDOR IGG+IGM SER IA-IMP: NORMAL
B BURGDOR IGG+IGM SER-ACNC: <0.91 ISR (ref 0–0.9)
B-GLOBULIN SERPL ELPH-MCNC: 1.1 G/DL (ref 0.7–1.3)
BACTERIA SPEC CULT: NORMAL
BASOPHILS # BLD: 0 K/UL (ref 0–0.1)
BASOPHILS NFR BLD: 0 % (ref 0–1)
BILIRUB SERPL-MCNC: 0.4 MG/DL (ref 0.2–1)
BNP SERPL-MCNC: 1495 PG/ML
BUN SERPL-MCNC: 35 MG/DL (ref 6–20)
BUN/CREAT SERPL: 30 (ref 12–20)
CALCIUM SERPL-MCNC: 7.3 MG/DL (ref 8.5–10.1)
CHLORIDE SERPL-SCNC: 106 MMOL/L (ref 97–108)
CO2 SERPL-SCNC: 27 MMOL/L (ref 21–32)
CREAT SERPL-MCNC: 1.18 MG/DL (ref 0.7–1.3)
CV B1 AB TITR SER CF: ABNORMAL {TITER}
CV B2 AB TITR SER CF: ABNORMAL {TITER}
CV B3 AB TITR SER CF: ABNORMAL {TITER}
CV B4 AB TITR SER CF: ABNORMAL {TITER}
CV B5 AB TITR SER CF: ABNORMAL {TITER}
CV B6 AB TITR SER CF: ABNORMAL {TITER}
DIFFERENTIAL METHOD BLD: ABNORMAL
EOSINOPHIL # BLD: 0.1 K/UL (ref 0–0.4)
EOSINOPHIL NFR BLD: 1 % (ref 0–7)
ERYTHROCYTE [DISTWIDTH] IN BLOOD BY AUTOMATED COUNT: 14.2 % (ref 11.5–14.5)
GAMMA GLOB SERPL ELPH-MCNC: 0.9 G/DL (ref 0.4–1.8)
GLOBULIN SER CALC-MCNC: 3.5 G/DL (ref 2–4)
GLOBULIN SER-MCNC: 3.1 G/DL (ref 2.2–3.9)
GLUCOSE SERPL-MCNC: 106 MG/DL (ref 65–100)
HCT VFR BLD AUTO: 44.8 % (ref 36.6–50.3)
HGB BLD-MCNC: 14.6 G/DL (ref 12.1–17)
IGA SERPL-MCNC: 270 MG/DL (ref 61–437)
IGG SERPL-MCNC: 851 MG/DL (ref 603–1613)
IGM SERPL-MCNC: 78 MG/DL (ref 20–172)
IMM GRANULOCYTES # BLD AUTO: 0 K/UL (ref 0–0.04)
IMM GRANULOCYTES NFR BLD AUTO: 0 % (ref 0–0.5)
INTERPRETATION SERPL IEP-IMP: NORMAL
KAPPA LC FREE SER-MCNC: 17.4 MG/L (ref 3.3–19.4)
KAPPA LC FREE/LAMBDA FREE SER: 0.94 {RATIO} (ref 0.26–1.65)
LAMBDA LC FREE SERPL-MCNC: 18.5 MG/L (ref 5.7–26.3)
LYMPHOCYTES # BLD: 2.1 K/UL (ref 0.8–3.5)
LYMPHOCYTES NFR BLD: 16 % (ref 12–49)
M PROTEIN SERPL ELPH-MCNC: NORMAL G/DL
MAGNESIUM SERPL-MCNC: 1.9 MG/DL (ref 1.6–2.4)
MCH RBC QN AUTO: 33.6 PG (ref 26–34)
MCHC RBC AUTO-ENTMCNC: 32.6 G/DL (ref 30–36.5)
MCV RBC AUTO: 103.2 FL (ref 80–99)
MONOCYTES # BLD: 1.2 K/UL (ref 0–1)
MONOCYTES NFR BLD: 9 % (ref 5–13)
NEUTS SEG # BLD: 9.9 K/UL (ref 1.8–8)
NEUTS SEG NFR BLD: 74 % (ref 32–75)
NRBC # BLD: 0 K/UL (ref 0–0.01)
NRBC BLD-RTO: 0 PER 100 WBC
PHOSPHATE SERPL-MCNC: 2.4 MG/DL (ref 2.6–4.7)
PLATELET # BLD AUTO: 364 K/UL (ref 150–400)
PMV BLD AUTO: 12.5 FL (ref 8.9–12.9)
POTASSIUM SERPL-SCNC: 3.8 MMOL/L (ref 3.5–5.1)
PROT SERPL-MCNC: 6.2 G/DL (ref 6.4–8.2)
PROT SERPL-MCNC: 6.2 G/DL (ref 6–8.5)
RBC # BLD AUTO: 4.34 M/UL (ref 4.1–5.7)
SERVICE CMNT-IMP: NORMAL
SODIUM SERPL-SCNC: 138 MMOL/L (ref 136–145)
WBC # BLD AUTO: 13.3 K/UL (ref 4.1–11.1)

## 2021-05-18 PROCEDURE — 74011250636 HC RX REV CODE- 250/636: Performed by: INTERNAL MEDICINE

## 2021-05-18 PROCEDURE — 85025 COMPLETE CBC W/AUTO DIFF WBC: CPT

## 2021-05-18 PROCEDURE — 99233 SBSQ HOSP IP/OBS HIGH 50: CPT | Performed by: INTERNAL MEDICINE

## 2021-05-18 PROCEDURE — 74011250636 HC RX REV CODE- 250/636: Performed by: NURSE PRACTITIONER

## 2021-05-18 PROCEDURE — 99223 1ST HOSP IP/OBS HIGH 75: CPT | Performed by: INTERNAL MEDICINE

## 2021-05-18 PROCEDURE — 74011250637 HC RX REV CODE- 250/637: Performed by: PHYSICIAN ASSISTANT

## 2021-05-18 PROCEDURE — 80053 COMPREHEN METABOLIC PANEL: CPT

## 2021-05-18 PROCEDURE — 99233 SBSQ HOSP IP/OBS HIGH 50: CPT | Performed by: NURSE PRACTITIONER

## 2021-05-18 PROCEDURE — 74011000250 HC RX REV CODE- 250: Performed by: INTERNAL MEDICINE

## 2021-05-18 PROCEDURE — 74011250637 HC RX REV CODE- 250/637: Performed by: INTERNAL MEDICINE

## 2021-05-18 PROCEDURE — 36415 COLL VENOUS BLD VENIPUNCTURE: CPT

## 2021-05-18 PROCEDURE — 83880 ASSAY OF NATRIURETIC PEPTIDE: CPT

## 2021-05-18 PROCEDURE — 83735 ASSAY OF MAGNESIUM: CPT

## 2021-05-18 PROCEDURE — 94640 AIRWAY INHALATION TREATMENT: CPT

## 2021-05-18 PROCEDURE — 74011250637 HC RX REV CODE- 250/637: Performed by: NURSE PRACTITIONER

## 2021-05-18 PROCEDURE — 84100 ASSAY OF PHOSPHORUS: CPT

## 2021-05-18 PROCEDURE — 65660000001 HC RM ICU INTERMED STEPDOWN

## 2021-05-18 RX ORDER — MEXILETINE HYDROCHLORIDE 150 MG/1
150 CAPSULE ORAL 3 TIMES DAILY
Status: DISCONTINUED | OUTPATIENT
Start: 2021-05-18 | End: 2021-05-21 | Stop reason: HOSPADM

## 2021-05-18 RX ORDER — DOBUTAMINE HYDROCHLORIDE 200 MG/100ML
2.5 INJECTION INTRAVENOUS CONTINUOUS
Status: DISCONTINUED | OUTPATIENT
Start: 2021-05-18 | End: 2021-05-19

## 2021-05-18 RX ORDER — DIGOXIN 125 MCG
0.12 TABLET ORAL DAILY
Status: DISCONTINUED | OUTPATIENT
Start: 2021-05-18 | End: 2021-05-20

## 2021-05-18 RX ORDER — HYDRALAZINE HYDROCHLORIDE 10 MG/1
10 TABLET, FILM COATED ORAL 3 TIMES DAILY
Status: DISCONTINUED | OUTPATIENT
Start: 2021-05-18 | End: 2021-05-21 | Stop reason: HOSPADM

## 2021-05-18 RX ADMIN — SACUBITRIL AND VALSARTAN 1 TABLET: 24; 26 TABLET, FILM COATED ORAL at 09:10

## 2021-05-18 RX ADMIN — FUROSEMIDE 20 MG: 20 TABLET ORAL at 09:12

## 2021-05-18 RX ADMIN — ALLOPURINOL 50 MG: 100 TABLET ORAL at 09:11

## 2021-05-18 RX ADMIN — DIGOXIN 0.12 MG: 125 TABLET ORAL at 10:02

## 2021-05-18 RX ADMIN — IVABRADINE 7.5 MG: 5 TABLET, FILM COATED ORAL at 18:03

## 2021-05-18 RX ADMIN — Medication 10 ML: at 13:57

## 2021-05-18 RX ADMIN — DOBUTAMINE HYDROCHLORIDE 2.5 MCG/KG/MIN: 200 INJECTION INTRAVENOUS at 10:06

## 2021-05-18 RX ADMIN — MEXILETINE HYDROCHLORIDE 150 MG: 150 CAPSULE ORAL at 23:21

## 2021-05-18 RX ADMIN — IPRATROPIUM BROMIDE AND ALBUTEROL SULFATE 3 ML: .5; 3 SOLUTION RESPIRATORY (INHALATION) at 08:12

## 2021-05-18 RX ADMIN — HEPARIN SODIUM 5000 UNITS: 5000 INJECTION INTRAVENOUS; SUBCUTANEOUS at 06:08

## 2021-05-18 RX ADMIN — ASPIRIN 81 MG: 81 TABLET, CHEWABLE ORAL at 09:10

## 2021-05-18 RX ADMIN — Medication 10 ML: at 21:46

## 2021-05-18 RX ADMIN — HEPARIN SODIUM 5000 UNITS: 5000 INJECTION INTRAVENOUS; SUBCUTANEOUS at 13:57

## 2021-05-18 RX ADMIN — HYDRALAZINE HYDROCHLORIDE 10 MG: 10 TABLET, FILM COATED ORAL at 10:04

## 2021-05-18 RX ADMIN — Medication 400 MG: at 09:12

## 2021-05-18 RX ADMIN — Medication 10 ML: at 06:02

## 2021-05-18 RX ADMIN — CALCIUM CARBONATE (ANTACID) CHEW TAB 500 MG 400 MG: 500 CHEW TAB at 09:20

## 2021-05-18 RX ADMIN — IVABRADINE 7.5 MG: 5 TABLET, FILM COATED ORAL at 09:09

## 2021-05-18 RX ADMIN — HEPARIN SODIUM 5000 UNITS: 5000 INJECTION INTRAVENOUS; SUBCUTANEOUS at 21:42

## 2021-05-18 RX ADMIN — SPIRONOLACTONE 12.5 MG: 25 TABLET ORAL at 09:12

## 2021-05-18 NOTE — TELEPHONE ENCOUNTER
----- Message from Omer Erickson NP sent at 5/18/2021 11:39 AM EDT -----  Can we request last PFTs and office note from [facility]       Last office note from 5/5/21 and PFTs from 8/20/20 will be faxed.

## 2021-05-18 NOTE — PROGRESS NOTES
Hospitalist Progress Note    NAME: El Jean   :  1960   MRN:  [id number]       Admission HPI/Chief Complaint:  El Jean is a 64 y.o. male who presents from home with wife for shortness of breath. He has a diagnosis of COPD for which he sees pulmonology. He has been saying that his shortness of breath is getting worse over the weeks. No associated fevers. He has been placed on steroids Zithromax and nebulizers by his pulmonologist but it has no improvement. He  has gotten first shot of Covid vaccine. He was getting worse to the point that he came to the ED with considerable shortness of breath. Was placed on BiPAP in the ER with considerable improvement. Rapid Covid is negative. Chest x-ray shows fluid overload. He is denying having any chest pain though the troponin is elevated. He did report of some heartburn earlier in the ER. We were asked to admit this patient for COPD exacerbation/heart failure  Subjective:   No acute overnight events. Pt feeling well. Remains on RA. No dyspnea w/ exertion. NAD. Remains on dobutamine drip  Had a left heart and right heart catheterization on  with no significant coronary artery disease noticed    Review of Systems:  No fever/chills, poor appetite, cough, sputum, SOB, N/V, D/C, CP, or abd pain. Tolerating PO  Objective:   VITALS:   Last 24hrs VS reviewed since prior progress note.  Most recent are:  Patient Vitals for the past 24 hrs:   Temp Pulse Resp BP SpO2   21 1332 -- 96 -- 101/65 --   21 1317 -- 94 -- (!) 99/54 --   21 1247 -- 100 -- 90/68 --   21 1217 -- 95 21 (!) 89/73 --   21 1147 -- 91 -- 95/73 --   21 1102 97.9 °F (36.6 °C) (!) 101 19 104/72 97 %   21 1002 -- (!) 106 -- 114/74 --   21 0808 -- -- -- 91/67 97 %   21 0806 98.5 °F (36.9 °C) 96 21 (!) 89/69 97 %   21 0633 97.9 °F (36.6 °C) 99 19 91/71 99 %   21 0200 98.2 °F (36.8 °C) 96 24 101/76 100 %   21 2333 98.2 °F (36.8 °C) 95 20 90/62 94 %   05/17/21 2120 -- 91 -- 116/69 --   05/17/21 2000 -- 89 15 -- 96 %   05/17/21 1853 -- -- -- 92/73 --   05/17/21 1849 98.1 °F (36.7 °C) 95 20 (!) 89/61 96 %   05/17/21 1600 -- -- -- -- 97 %   05/17/21 1514 97.7 °F (36.5 °C) 95 21 96/81 96 %   05/17/21 1445 -- 90 18 (!) 86/65 94 %       Intake/Output Summary (Last 24 hours) at 5/18/2021 1436  Last data filed at 5/18/2021 1400  Gross per 24 hour   Intake 0 ml   Output 1575 ml   Net -1575 ml        I had a face to face encounter and independently examined this patient on 5/18/2021, as outlined below:  PHYSICAL EXAM:  General: Alert, cooperative, no acute distress    EENT:  EOMI. Anicteric sclerae. MMM  Resp:  Decreased throughout, No accessory muscle use  CV:  Tachy rate, regular, No edema  GI:  Soft, Non distended, Non tender  Neurologic:  Alert and oriented, normal speech, SARMIENTO  Psych:   Not anxious or agitated  Skin:  No rashes. No jaundice    Reviewed most current lab test results and cultures  YES  Reviewed most current radiology test results   YES  Reviewed patient's current orders and MAR    YES  PMH/SH reviewed - no change compared to H&P    Procedures: see electronic medical records for all procedures/Xrays and details which were not copied into this note but were reviewed prior to creation of Plan. LABS:  I reviewed today's most current labs and imaging studies.   Pertinent labs include:  Recent Labs     05/18/21  0939 05/16/21  0601   WBC 13.3* 18.8*   HGB 14.6 13.0   HCT 44.8 39.7    331     Recent Labs     05/18/21  0206 05/17/21  0315 05/16/21  0601    139 135*   K 3.8 4.1 4.5    105 101   CO2 27 28 29   * 80 101*   BUN 35* 38* 45*   CREA 1.18 1.41* 1.25   CA 7.3* 7.7* 8.4*   MG 1.9 1.6  --    PHOS 2.4* 1.9*  --    ALB 2.7* 2.9* 2.9*   TBILI 0.4 0.7 0.6   * 477* 556*       Care Plan discussed with: pt  ___________________________________________________________________  Assessment / Plan:  Acute hypoxic respiratory failure, resolved  Started on BiPAP on admission, now on RA  Rapid Covid negative  Etiology most likely is acute CHF  Chest x-ray with pulmonary edema and troponin elevation  Patient is status post right and left heart catheterization with no significant coronary artery disease noticed  Management of heart failure as below     COPD exacerbation  Completed steroids, cont nebs  procalcitonin low 0.42  Leukocytosis 2/2 steroids and trending down     Acute systolic heart failure EF 20%, BiV failure, NYHA IV on admission  Appreciate AHF/cardiology-was started on milrinone, entresto, aldactone, corlanor, allopurinol  Lasix, albumin and ASA  Avoid BB with COPD exam and RV failure  Entresto discontinued due to hypotension  Currently on dobutamine drip, hydralazine 10 mg twice daily added, digoxin 125 mcg added for tachycardia in PVCs  Fluid restriction also discussed with the patient    Congestive hepatopathy/transaminitis  Monitor CMP, liver function is improving after diuresis  TSH wnl  B12 wnl  Vit D replacement    EDGAR vs CKD, unknown baseline  Presented with creatinine of 1.7. Has improved to 1.1 with diuresis  Monitor while being diuresed    Replete mag, phos    25.0 - 29.9 Overweight / Body mass index is 25.48 kg/m².     Code status: Full Code  Prophylaxis: Hep SQ  Recommended Disposition: tbd    Signed: Giselle Trevino MD  5/18/2021 1:12 PM
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
## 2021-05-18 NOTE — CARDIO/PULMONARY
Cardiac Rehab: Living with Heart Failure Booklet given to [facility]. Met with the pt., and his wife to begin HF education. Educated using teach back method. Discussed diagnosis definition and assessed patient understanding. Reviewed importance of daily weight monitoring and Low Sodium diet (0001-8078 mg. Daily), medication compliance and communication with his physician. Discussed the Cardiac Rehab Program, benefits, format, and encouraged enrollment, when eligible. Patient is interested and we will follow up, by phone, once eligible. Will await final plan and enroll if eligible. [facility] contact information is now on his AVS.  Sammy Blackwood RN
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
## 2021-05-18 NOTE — PROGRESS NOTES
ZORAIDA PLAN:  RUR-15%  Disposition-Home with girlfriend  Transport-Girlfriend    Patient will need a Lifevest at discharge    Will need home inotropes as a bridge to LVAD vs bridge to recovery. Will assess in 3 months. Will need OP sleep study    ELMER Saavedra, [address], RN, CRM
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
## 2021-05-19 LAB
ALBUMIN SERPL-MCNC: 2.9 G/DL (ref 3.5–5)
ALBUMIN/GLOB SERPL: 0.9 {RATIO} (ref 1.1–2.2)
ALP SERPL-CCNC: 144 U/L (ref 45–117)
ALT SERPL-CCNC: 316 U/L (ref 12–78)
ANION GAP SERPL CALC-SCNC: 7 MMOL/L (ref 5–15)
AST SERPL-CCNC: 78 U/L (ref 15–37)
BASOPHILS # BLD: 0 K/UL (ref 0–0.1)
BASOPHILS NFR BLD: 0 % (ref 0–1)
BILIRUB SERPL-MCNC: 0.6 MG/DL (ref 0.2–1)
BNP SERPL-MCNC: 996 PG/ML
BUN SERPL-MCNC: 35 MG/DL (ref 6–20)
BUN/CREAT SERPL: 28 (ref 12–20)
CALCIUM SERPL-MCNC: 7.5 MG/DL (ref 8.5–10.1)
CHLORIDE SERPL-SCNC: 105 MMOL/L (ref 97–108)
CO2 SERPL-SCNC: 24 MMOL/L (ref 21–32)
CREAT SERPL-MCNC: 1.25 MG/DL (ref 0.7–1.3)
DIFFERENTIAL METHOD BLD: ABNORMAL
EOSINOPHIL # BLD: 0.5 K/UL (ref 0–0.4)
EOSINOPHIL NFR BLD: 4 % (ref 0–7)
ERYTHROCYTE [DISTWIDTH] IN BLOOD BY AUTOMATED COUNT: 13.9 % (ref 11.5–14.5)
GLOBULIN SER CALC-MCNC: 3.3 G/DL (ref 2–4)
GLUCOSE SERPL-MCNC: 78 MG/DL (ref 65–100)
HCT VFR BLD AUTO: 42.1 % (ref 36.6–50.3)
HGB BLD-MCNC: 13.6 G/DL (ref 12.1–17)
IMM GRANULOCYTES # BLD AUTO: 0.1 K/UL (ref 0–0.04)
IMM GRANULOCYTES NFR BLD AUTO: 0 % (ref 0–0.5)
LYMPHOCYTES # BLD: 1.5 K/UL (ref 0.8–3.5)
LYMPHOCYTES NFR BLD: 12 % (ref 12–49)
MAGNESIUM SERPL-MCNC: 1.6 MG/DL (ref 1.6–2.4)
MCH RBC QN AUTO: 33.3 PG (ref 26–34)
MCHC RBC AUTO-ENTMCNC: 32.3 G/DL (ref 30–36.5)
MCV RBC AUTO: 102.9 FL (ref 80–99)
MONOCYTES # BLD: 1.2 K/UL (ref 0–1)
MONOCYTES NFR BLD: 9 % (ref 5–13)
NEUTS SEG # BLD: 9.3 K/UL (ref 1.8–8)
NEUTS SEG NFR BLD: 75 % (ref 32–75)
NRBC # BLD: 0 K/UL (ref 0–0.01)
NRBC BLD-RTO: 0 PER 100 WBC
PLATELET # BLD AUTO: 320 K/UL (ref 150–400)
PMV BLD AUTO: 12.6 FL (ref 8.9–12.9)
POTASSIUM SERPL-SCNC: 4.3 MMOL/L (ref 3.5–5.1)
PROT SERPL-MCNC: 6.2 G/DL (ref 6.4–8.2)
RBC # BLD AUTO: 4.09 M/UL (ref 4.1–5.7)
SODIUM SERPL-SCNC: 136 MMOL/L (ref 136–145)
WBC # BLD AUTO: 12.5 K/UL (ref 4.1–11.1)

## 2021-05-19 PROCEDURE — 80053 COMPREHEN METABOLIC PANEL: CPT

## 2021-05-19 PROCEDURE — 99233 SBSQ HOSP IP/OBS HIGH 50: CPT | Performed by: NURSE PRACTITIONER

## 2021-05-19 PROCEDURE — 74011250637 HC RX REV CODE- 250/637: Performed by: PHYSICIAN ASSISTANT

## 2021-05-19 PROCEDURE — 74011250637 HC RX REV CODE- 250/637: Performed by: INTERNAL MEDICINE

## 2021-05-19 PROCEDURE — 94664 DEMO&/EVAL PT USE INHALER: CPT

## 2021-05-19 PROCEDURE — 76937 US GUIDE VASCULAR ACCESS: CPT

## 2021-05-19 PROCEDURE — C1751 CATH, INF, PER/CENT/MIDLINE: HCPCS

## 2021-05-19 PROCEDURE — 77030020365 HC SOL INJ SOD CL 0.9% 50ML

## 2021-05-19 PROCEDURE — 74011250637 HC RX REV CODE- 250/637: Performed by: NURSE PRACTITIONER

## 2021-05-19 PROCEDURE — 85025 COMPLETE CBC W/AUTO DIFF WBC: CPT

## 2021-05-19 PROCEDURE — 99232 SBSQ HOSP IP/OBS MODERATE 35: CPT | Performed by: INTERNAL MEDICINE

## 2021-05-19 PROCEDURE — 74011000250 HC RX REV CODE- 250: Performed by: INTERNAL MEDICINE

## 2021-05-19 PROCEDURE — 94640 AIRWAY INHALATION TREATMENT: CPT

## 2021-05-19 PROCEDURE — 74011250636 HC RX REV CODE- 250/636: Performed by: INTERNAL MEDICINE

## 2021-05-19 PROCEDURE — 36415 COLL VENOUS BLD VENIPUNCTURE: CPT

## 2021-05-19 PROCEDURE — 65660000001 HC RM ICU INTERMED STEPDOWN

## 2021-05-19 PROCEDURE — 99233 SBSQ HOSP IP/OBS HIGH 50: CPT | Performed by: INTERNAL MEDICINE

## 2021-05-19 PROCEDURE — 99223 1ST HOSP IP/OBS HIGH 75: CPT | Performed by: NURSE PRACTITIONER

## 2021-05-19 PROCEDURE — 83880 ASSAY OF NATRIURETIC PEPTIDE: CPT

## 2021-05-19 PROCEDURE — 77010033678 HC OXYGEN DAILY

## 2021-05-19 PROCEDURE — 74011250636 HC RX REV CODE- 250/636: Performed by: NURSE PRACTITIONER

## 2021-05-19 PROCEDURE — 36573 INSJ PICC RS&I 5 YR+: CPT | Performed by: NURSE PRACTITIONER

## 2021-05-19 PROCEDURE — 83735 ASSAY OF MAGNESIUM: CPT

## 2021-05-19 RX ORDER — LANOLIN ALCOHOL/MO/W.PET/CERES
400 CREAM (GRAM) TOPICAL 2 TIMES DAILY
Status: DISCONTINUED | OUTPATIENT
Start: 2021-05-19 | End: 2021-05-21 | Stop reason: HOSPADM

## 2021-05-19 RX ORDER — DOBUTAMINE HYDROCHLORIDE 200 MG/100ML
5 INJECTION INTRAVENOUS CONTINUOUS
Status: DISCONTINUED | OUTPATIENT
Start: 2021-05-19 | End: 2021-05-21 | Stop reason: HOSPADM

## 2021-05-19 RX ORDER — MAGNESIUM SULFATE 1 G/100ML
1 INJECTION INTRAVENOUS ONCE
Status: COMPLETED | OUTPATIENT
Start: 2021-05-19 | End: 2021-05-19

## 2021-05-19 RX ADMIN — SPIRONOLACTONE 12.5 MG: 25 TABLET ORAL at 11:03

## 2021-05-19 RX ADMIN — DIGOXIN 0.12 MG: 125 TABLET ORAL at 11:03

## 2021-05-19 RX ADMIN — FUROSEMIDE 20 MG: 20 TABLET ORAL at 11:03

## 2021-05-19 RX ADMIN — ASPIRIN 81 MG: 81 TABLET, CHEWABLE ORAL at 11:02

## 2021-05-19 RX ADMIN — Medication 10 ML: at 06:00

## 2021-05-19 RX ADMIN — MAGNESIUM SULFATE HEPTAHYDRATE 1 G: 1 INJECTION, SOLUTION INTRAVENOUS at 11:03

## 2021-05-19 RX ADMIN — ALLOPURINOL 50 MG: 100 TABLET ORAL at 11:03

## 2021-05-19 RX ADMIN — HEPARIN SODIUM 5000 UNITS: 5000 INJECTION INTRAVENOUS; SUBCUTANEOUS at 06:00

## 2021-05-19 RX ADMIN — MEXILETINE HYDROCHLORIDE 150 MG: 150 CAPSULE ORAL at 21:48

## 2021-05-19 RX ADMIN — MEXILETINE HYDROCHLORIDE 150 MG: 150 CAPSULE ORAL at 15:55

## 2021-05-19 RX ADMIN — HYDRALAZINE HYDROCHLORIDE 10 MG: 10 TABLET, FILM COATED ORAL at 15:54

## 2021-05-19 RX ADMIN — MAGNESIUM GLUCONATE 500 MG ORAL TABLET 400 MG: 500 TABLET ORAL at 11:03

## 2021-05-19 RX ADMIN — MAGNESIUM GLUCONATE 500 MG ORAL TABLET 400 MG: 500 TABLET ORAL at 19:03

## 2021-05-19 RX ADMIN — IPRATROPIUM BROMIDE AND ALBUTEROL SULFATE 3 ML: .5; 3 SOLUTION RESPIRATORY (INHALATION) at 08:04

## 2021-05-19 RX ADMIN — IVABRADINE 7.5 MG: 5 TABLET, FILM COATED ORAL at 19:03

## 2021-05-19 RX ADMIN — IPRATROPIUM BROMIDE AND ALBUTEROL SULFATE 3 ML: .5; 3 SOLUTION RESPIRATORY (INHALATION) at 20:32

## 2021-05-19 RX ADMIN — HEPARIN SODIUM 5000 UNITS: 5000 INJECTION INTRAVENOUS; SUBCUTANEOUS at 15:55

## 2021-05-19 RX ADMIN — HYDRALAZINE HYDROCHLORIDE 10 MG: 10 TABLET, FILM COATED ORAL at 21:49

## 2021-05-19 RX ADMIN — DOBUTAMINE HYDROCHLORIDE 5 MCG/KG/MIN: 200 INJECTION INTRAVENOUS at 11:11

## 2021-05-19 RX ADMIN — DOBUTAMINE HYDROCHLORIDE 5 MCG/KG/MIN: 200 INJECTION INTRAVENOUS at 19:13

## 2021-05-19 RX ADMIN — Medication 10 ML: at 21:49

## 2021-05-19 RX ADMIN — IVABRADINE 7.5 MG: 5 TABLET, FILM COATED ORAL at 11:03

## 2021-05-19 RX ADMIN — HEPARIN SODIUM 5000 UNITS: 5000 INJECTION INTRAVENOUS; SUBCUTANEOUS at 21:48

## 2021-05-19 RX ADMIN — HYDRALAZINE HYDROCHLORIDE 10 MG: 10 TABLET, FILM COATED ORAL at 11:02

## 2021-05-19 RX ADMIN — Medication 10 ML: at 15:56

## 2021-05-19 RX ADMIN — MEXILETINE HYDROCHLORIDE 150 MG: 150 CAPSULE ORAL at 11:15

## 2021-05-19 NOTE — PROGRESS NOTES
Bedside shift change report given to Dawna Naik RN (oncoming nurse) by Pat Tam RN (offgoing nurse). Report included the following information SBAR, Kardex, ED Summary, Intake/Output, MAR, Accordion, Recent Results, Med Rec Status, Cardiac Rhythm NSR and Alarm Parameters .

## 2021-05-19 NOTE — PROGRESS NOTES
PICC Placement Note    PRE-PROCEDURE VERIFICATION  Correct Procedure: yes  Correct Site:  yes  Temperature: Temp: 97.9 °F (36.6 °C), Temperature Source: Temp Source: Oral  Recent Labs     05/19/21  0342 05/19/21  0338   BUN  --  35*   CREA  --  1.25     --    WBC 12.5*  --      Allergies: Ciprofloxacin  Education materials for PICC Care given: yes. See Patient Education activity for further details. PICC Booklet placed at bedside: yes    PROCEDURE DETAIL  Consent was obtained and all questions were answered related to risks and benefits. A double lumen PICC line was inserted, as a sterile procedure using ultrasound and modified Seldinger technique for Home IV Therapy. The following documentation is in addition to the PICC properties in the lines/airways flowsheet :  Lot #: [id number]  Lidocaine 1% administered intradermally :yes  Internal Catheter Total Length: 40 (cm) 2 cm out  Vein Selection for PICC:right basilic  Central Line Bundle followed yes  Complication Related to Insertion:yes, difficulty dropping, Nitinol used w/success    The placement was verified by EKG, MAX P WAVE @ 40 (cm). PER EKG PICC TIP @ C/A junction.           Line is okay to use: yes    Nely Lora RN

## 2021-05-19 NOTE — PROGRESS NOTES
05/18/21 2300   CPAP/BIPAP   CPAP/BIPAP Start/Stop Off  (pt stable on room air, not indicated at this time)   Device Mode BIPAP;S/T   $$ Bipap Daily   (no EIR- no charge)
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
## 2021-05-19 NOTE — PROGRESS NOTES
Hospitalist Progress Note    NAME: Brea Rosa   :  1960   MRN:  [id number]       Admission HPI/Chief Complaint:  Brea Rosa is a 64 y.o. male who presents from home with wife for shortness of breath. He has a diagnosis of COPD for which he sees pulmonology. He has been saying that his shortness of breath is getting worse over the weeks. No associated fevers. He has been placed on steroids Zithromax and nebulizers by his pulmonologist but it has no improvement. He  has gotten first shot of Covid vaccine. He was getting worse to the point that he came to the ED with considerable shortness of breath. Was placed on BiPAP in the ER with considerable improvement. Rapid Covid is negative. Chest x-ray shows fluid overload. He is denying having any chest pain though the troponin is elevated. He did report of some heartburn earlier in the ER. We were asked to admit this patient for COPD exacerbation/heart failure  Subjective:   No acute overnight events. Pt feeling well. Remains on RA. No dyspnea w/ exertion. NAD. Remains on dobutamine drip  Had a left heart and right heart catheterization on  with no significant coronary artery disease noticed  Received a PICC line for outpatient inotropic treatment by advanced heart failure team  Advanced heart failure team also discussed LifeVest with the patient    Review of Systems:  No fever/chills, poor appetite, cough, sputum, SOB, N/V, D/C, CP, or abd pain. Tolerating PO  Objective:   VITALS:   Last 24hrs VS reviewed since prior progress note.  Most recent are:  Patient Vitals for the past 24 hrs:   Temp Pulse Resp BP SpO2   21 1200 97.8 °F (36.6 °C) 97 23 (!) 87/66 99 %   21 1130 -- -- -- -- 97 %   21 1111 -- (!) 111 -- 102/70 --   21 0804 -- -- -- -- 99 %   21 0700 97.9 °F (36.6 °C) (!) 105 17 96/74 99 %   21 0652 -- (!) 103 18 122/87 96 %   21 0500 -- 100 20 103/78 96 %   21 0300 98 °F (36.7 °C) 98 20 (!) 112/97 97 %   05/18/21 2315 98.2 °F (36.8 °C) 97 16 104/66 98 %   05/18/21 2300 -- 96 17 -- 93 %   05/18/21 2015 -- 100 16 (!) 85/73 98 %   05/18/21 2003 97.8 °F (36.6 °C) 100 21 (!) 82/66 100 %   05/18/21 1803 -- (!) 107 -- (!) 89/65 --   05/18/21 1545 97.8 °F (36.6 °C) 98 18 90/71 97 %       Intake/Output Summary (Last 24 hours) at 5/19/2021 1425  Last data filed at 5/18/2021 1805  Gross per 24 hour   Intake 34.01 ml   Output 425 ml   Net -390.99 ml        I had a face to face encounter and independently examined this patient on 5/19/2021, as outlined below:  PHYSICAL EXAM:  General: Alert, cooperative, no acute distress    EENT:  EOMI. Anicteric sclerae. MMM  Resp:  Decreased throughout, No accessory muscle use  CV:  Tachy rate, regular, No edema  GI:  Soft, Non distended, Non tender  Neurologic:  Alert and oriented, normal speech, SARMIENTO  Psych:   Not anxious or agitated  Skin:  No rashes. No jaundice    Reviewed most current lab test results and cultures  YES  Reviewed most current radiology test results   YES  Reviewed patient's current orders and MAR    YES  PMH/SH reviewed - no change compared to H&P    Procedures: see electronic medical records for all procedures/Xrays and details which were not copied into this note but were reviewed prior to creation of Plan. LABS:  I reviewed today's most current labs and imaging studies.   Pertinent labs include:  Recent Labs     05/19/21  0342 05/18/21  0939   WBC 12.5* 13.3*   HGB 13.6 14.6   HCT 42.1 44.8    364     Recent Labs     05/19/21  0338 05/18/21  0206 05/17/21  0315    138 139   K 4.3 3.8 4.1    106 105   CO2 24 27 28   GLU 78 106* 80   BUN 35* 35* 38*   CREA 1.25 1.18 1.41*   CA 7.5* 7.3* 7.7*   MG 1.6 1.9 1.6   PHOS  --  2.4* 1.9*   ALB 2.9* 2.7* 2.9*   TBILI 0.6 0.4 0.7   * 392* 477*       Care Plan discussed with: pt  ___________________________________________________________________  Assessment / Plan:  Acute hypoxic respiratory failure, resolved  Started on BiPAP on admission, now on RA  Rapid Covid negative  Etiology most likely is acute CHF  Chest x-ray with pulmonary edema and troponin elevation  Patient is status post right and left heart catheterization with no significant coronary artery disease noticed  Management of heart failure as below     COPD exacerbation  Completed steroids, cont nebs  procalcitonin low 0.42  Leukocytosis 2/2 steroids and trending down     Acute systolic heart failure EF 20%, BiV failure, NYHA IV on admission  Appreciate AHF/cardiology-was started on milrinone, entresto, aldactone, corlanor, allopurinol  Lasix, albumin and ASA  Avoid BB with COPD exam and RV failure  Entresto discontinued due to hypotension  Currently on dobutamine drip, hydralazine 10 mg twice daily added, digoxin 125 mcg added for tachycardia and PVCs  Was evaluated by the EP and started on mexiletine for PVCs  Fluid restriction also discussed with the patient  Received PICC line for outpatient inotropic treatment  LifeVest discussed with the patient by advanced heart failure team    Congestive hepatopathy/transaminitis  Monitor CMP, liver function is improving after diuresis  TSH wnl  B12 wnl  Vit D replacement    EDGAR vs CKD, unknown baseline  Presented with creatinine of 1.7. Has improved to 1.2 with diuresis  Monitor while being diuresed    Replete mag, phos    25.0 - 29.9 Overweight / Body mass index is 24.34 kg/m².     Code status: Full Code  Prophylaxis: Hep SQ  Recommended Disposition: tbd    Signed: Reymundo Rinaldi MD  5/19/2021 1:12 PM

## 2021-05-19 NOTE — PROGRESS NOTES
Spiritual Care Assessment/Progress Note  [facility]      NAME: Adriel Perry      MRN: [id number]  AGE: 64 y.o.  SEX: male  Taoist Affiliation: Uatsdin   Language: English     5/19/2021     Total Time (in minutes): 29     Spiritual Assessment begun in [facility] IMCU through conversation with:         [x]Patient        [] Family    [] Friend(s)        Reason for Consult: Palliative Care, Initial/Spiritual Assessment     Spiritual beliefs: (Please include comment if needed)     [x] Identifies with a angelina tradition: Religious        [] Supported by a angelina community:            [] Claims no spiritual orientation:           [] Seeking spiritual identity:                [] Adheres to an individual form of spirituality:           [] Not able to assess:                           Identified resources for coping:      [x] Prayer                               [] Music                  [] Guided Imagery     [x] Family/friends                 [] Pet visits     [] Devotional reading                         [] Unknown     [] Other:                                              Interventions offered during this visit: (See comments for more details)    Patient Interventions: Affirmation of emotions/emotional suffering, Affirmation of angelina, Catharsis/review of pertinent events in supportive environment, Coping skills reviewed/reinforced, Iconic (affirming the presence of God/Higher Power), Prayer (actual)           Plan of Care:     [] Support spiritual and/or cultural needs    [] Support AMD and/or advance care planning process      [] Support grieving process   [] Coordinate Rites and/or Rituals    [] Coordination with community clergy   [] No spiritual needs identified at this time   [] Detailed Plan of Care below (See Comments)  [] Make referral to Music Therapy  [] Make referral to Pet Therapy     [] Make referral to Addiction services  [] Make referral to [facility]  [] Make referral to Spiritual Care Partner  [] No future visits requested        [x] Follow up upon further referrals     Comments:  visit for initial spiritual assessment, palliative care consult. Patient reclining in bed, resting. Good eye contact, friendly, smiling. Says he feels okay. Provided spiritual presence and listening as he spoke of his present thoughts, feelings, and concerns. Spoke of his health and events leading to this hospitalization. Spoke of his struggles with COPD saying he described symptoms to his doctor who told him he needed to come to the ED. Says he was shocked and surprised to learn about his heart condition. Expresses feelings of worry and some fear, but says he remains hopeful and finds comfort and hope in his angelina. Spoke about his angelina and angelina journey. He consented to prayer and a prayer was offered. His hope is to be able to return home soon as he awaits further treatment and the next steps in his care. He appeared comforted and encouraged as a result of this visit and expressed gratitude for this visit. Rev.  Unique Corbett MDiv, [facility], [facility]   paging service: [phone])

## 2021-05-19 NOTE — PROGRESS NOTES
Problem: Breathing Pattern - Ineffective  Goal: *Absence of hypoxia  Outcome: Progressing Towards Goal  Note: O2 sats stable on room air  Goal: *Use of effective breathing techniques  Outcome: Progressing Towards Goal     Problem: Chronic Obstructive Pulmonary Disease (COPD)  Goal: *Oxygen saturation during activity within specified parameters  Outcome: Progressing Towards Goal  Note: O2 sats stable on room air  Goal: *Able to remain out of bed as prescribed  Outcome: Progressing Towards Goal     Problem: Falls - Risk of  Goal: *Absence of Falls  Description: Document Roshni Fall Risk and appropriate interventions in the flowsheet.   Outcome: Progressing Towards Goal  Note: Fall Risk Interventions:            Medication Interventions: Evaluate medications/consider consulting pharmacy

## 2021-05-19 NOTE — PROGRESS NOTES
Transition plan of Care  RUR 14%-Low  Disposition plan to discharge home with dobutamine gtt. Referral sent to [facility] via [email or web address]. Will also need skilled nursing care for lab work and PICC routine care. Referral sent to [facility] for skilled nursing and they are reviewing. [facility] had accepted for IV infusion. Awaiting final confirmation for services. Update-[facility] can not provide nursing services. CM will sent to multiple vendors via [email or web address]. Update-referral for Lifevest noted and sent to [facility] via fax [phone]. CM also spoke with local rep Ivanna Alvarez [phone] and she will review and update this CM tomorrow morning.
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
## 2021-05-20 ENCOUNTER — TELEPHONE (OUTPATIENT)
Dept: CARDIOLOGY CLINIC | Age: 61
End: 2021-05-20

## 2021-05-20 LAB
ALBUMIN SERPL-MCNC: 2.9 G/DL (ref 3.5–5)
ALBUMIN/GLOB SERPL: 0.9 {RATIO} (ref 1.1–2.2)
ALP SERPL-CCNC: 137 U/L (ref 45–117)
ALT SERPL-CCNC: 267 U/L (ref 12–78)
ANION GAP SERPL CALC-SCNC: 5 MMOL/L (ref 5–15)
AST SERPL-CCNC: 65 U/L (ref 15–37)
ATRIAL RATE: 95 BPM
B19V DNA SPEC QL NAA+PROBE: NEGATIVE
BASOPHILS # BLD: 0 K/UL (ref 0–0.1)
BASOPHILS NFR BLD: 0 % (ref 0–1)
BILIRUB SERPL-MCNC: 0.6 MG/DL (ref 0.2–1)
BNP SERPL-MCNC: 1188 PG/ML
BUN SERPL-MCNC: 32 MG/DL (ref 6–20)
BUN/CREAT SERPL: 22 (ref 12–20)
CALCIUM SERPL-MCNC: 7.9 MG/DL (ref 8.5–10.1)
CALCULATED P AXIS, ECG09: 60 DEGREES
CALCULATED R AXIS, ECG10: -80 DEGREES
CALCULATED T AXIS, ECG11: 79 DEGREES
CHLORIDE SERPL-SCNC: 104 MMOL/L (ref 97–108)
CO2 SERPL-SCNC: 26 MMOL/L (ref 21–32)
CREAT SERPL-MCNC: 1.46 MG/DL (ref 0.7–1.3)
DIAGNOSIS, 93000: NORMAL
DIFFERENTIAL METHOD BLD: ABNORMAL
DIGOXIN SERPL-MCNC: 0.2 NG/ML (ref 0.9–2)
EBV DNA SPEC QL NAA+PROBE: NEGATIVE
EOSINOPHIL # BLD: 0.5 K/UL (ref 0–0.4)
EOSINOPHIL NFR BLD: 4 % (ref 0–7)
ERYTHROCYTE [DISTWIDTH] IN BLOOD BY AUTOMATED COUNT: 14 % (ref 11.5–14.5)
GLOBULIN SER CALC-MCNC: 3.3 G/DL (ref 2–4)
GLUCOSE SERPL-MCNC: 90 MG/DL (ref 65–100)
HCT VFR BLD AUTO: 38.8 % (ref 36.6–50.3)
HGB BLD-MCNC: 12.6 G/DL (ref 12.1–17)
HHV6 DNA SPEC NAA+PROBE: NEGATIVE
IMM GRANULOCYTES # BLD AUTO: 0.1 K/UL (ref 0–0.04)
IMM GRANULOCYTES NFR BLD AUTO: 0 % (ref 0–0.5)
LYMPHOCYTES # BLD: 1.1 K/UL (ref 0.8–3.5)
LYMPHOCYTES NFR BLD: 9 % (ref 12–49)
MAGNESIUM SERPL-MCNC: 1.7 MG/DL (ref 1.6–2.4)
MCH RBC QN AUTO: 33 PG (ref 26–34)
MCHC RBC AUTO-ENTMCNC: 32.5 G/DL (ref 30–36.5)
MCV RBC AUTO: 101.6 FL (ref 80–99)
MONOCYTES # BLD: 1.3 K/UL (ref 0–1)
MONOCYTES NFR BLD: 10 % (ref 5–13)
MYOGLOBIN SERPL-MCNC: 146 NG/ML (ref 16–116)
NEUTS SEG # BLD: 10 K/UL (ref 1.8–8)
NEUTS SEG NFR BLD: 77 % (ref 32–75)
NRBC # BLD: 0 K/UL (ref 0–0.01)
NRBC BLD-RTO: 0 PER 100 WBC
P-R INTERVAL, ECG05: 168 MS
PLATELET # BLD AUTO: 280 K/UL (ref 150–400)
PMV BLD AUTO: 12.5 FL (ref 8.9–12.9)
POTASSIUM SERPL-SCNC: 4.9 MMOL/L (ref 3.5–5.1)
PROT SERPL-MCNC: 6.2 G/DL (ref 6.4–8.2)
Q-T INTERVAL, ECG07: 364 MS
QRS DURATION, ECG06: 100 MS
QTC CALCULATION (BEZET), ECG08: 457 MS
RBC # BLD AUTO: 3.82 M/UL (ref 4.1–5.7)
SODIUM SERPL-SCNC: 135 MMOL/L (ref 136–145)
SPECIMEN SOURCE: NORMAL
SPECIMEN SOURCE: NORMAL
TROPONIN I SERPL-MCNC: 0.05 NG/ML
VENTRICULAR RATE, ECG03: 95 BPM
WBC # BLD AUTO: 13 K/UL (ref 4.1–11.1)

## 2021-05-20 PROCEDURE — 74011250637 HC RX REV CODE- 250/637: Performed by: INTERNAL MEDICINE

## 2021-05-20 PROCEDURE — 83735 ASSAY OF MAGNESIUM: CPT

## 2021-05-20 PROCEDURE — 93005 ELECTROCARDIOGRAM TRACING: CPT

## 2021-05-20 PROCEDURE — 84484 ASSAY OF TROPONIN QUANT: CPT

## 2021-05-20 PROCEDURE — 80162 ASSAY OF DIGOXIN TOTAL: CPT

## 2021-05-20 PROCEDURE — 65660000001 HC RM ICU INTERMED STEPDOWN

## 2021-05-20 PROCEDURE — 85025 COMPLETE CBC W/AUTO DIFF WBC: CPT

## 2021-05-20 PROCEDURE — 83874 ASSAY OF MYOGLOBIN: CPT

## 2021-05-20 PROCEDURE — 74011250636 HC RX REV CODE- 250/636: Performed by: NURSE PRACTITIONER

## 2021-05-20 PROCEDURE — 74011250637 HC RX REV CODE- 250/637: Performed by: NURSE PRACTITIONER

## 2021-05-20 PROCEDURE — 83880 ASSAY OF NATRIURETIC PEPTIDE: CPT

## 2021-05-20 PROCEDURE — 80053 COMPREHEN METABOLIC PANEL: CPT

## 2021-05-20 PROCEDURE — 36415 COLL VENOUS BLD VENIPUNCTURE: CPT

## 2021-05-20 PROCEDURE — 99233 SBSQ HOSP IP/OBS HIGH 50: CPT | Performed by: NURSE PRACTITIONER

## 2021-05-20 PROCEDURE — 74011250637 HC RX REV CODE- 250/637: Performed by: PHYSICIAN ASSISTANT

## 2021-05-20 PROCEDURE — 74011250636 HC RX REV CODE- 250/636: Performed by: INTERNAL MEDICINE

## 2021-05-20 RX ORDER — DIGOXIN 250 MCG
0.25 TABLET ORAL DAILY
Status: DISCONTINUED | OUTPATIENT
Start: 2021-05-21 | End: 2021-05-21 | Stop reason: HOSPADM

## 2021-05-20 RX ORDER — IPRATROPIUM BROMIDE AND ALBUTEROL SULFATE 2.5; .5 MG/3ML; MG/3ML
3 SOLUTION RESPIRATORY (INHALATION)
Status: DISCONTINUED | OUTPATIENT
Start: 2021-05-20 | End: 2021-05-21 | Stop reason: HOSPADM

## 2021-05-20 RX ORDER — COLCHICINE 0.6 MG/1
0.6 TABLET ORAL DAILY
Status: DISCONTINUED | OUTPATIENT
Start: 2021-05-20 | End: 2021-05-21 | Stop reason: HOSPADM

## 2021-05-20 RX ORDER — FUROSEMIDE 20 MG/1
10 TABLET ORAL DAILY
Status: DISCONTINUED | OUTPATIENT
Start: 2021-05-21 | End: 2021-05-21 | Stop reason: HOSPADM

## 2021-05-20 RX ORDER — MAGNESIUM SULFATE 1 G/100ML
1 INJECTION INTRAVENOUS ONCE
Status: COMPLETED | OUTPATIENT
Start: 2021-05-20 | End: 2021-05-21

## 2021-05-20 RX ADMIN — MAGNESIUM GLUCONATE 500 MG ORAL TABLET 400 MG: 500 TABLET ORAL at 08:32

## 2021-05-20 RX ADMIN — IVABRADINE 7.5 MG: 5 TABLET, FILM COATED ORAL at 18:22

## 2021-05-20 RX ADMIN — MEXILETINE HYDROCHLORIDE 150 MG: 150 CAPSULE ORAL at 22:52

## 2021-05-20 RX ADMIN — HEPARIN SODIUM 5000 UNITS: 5000 INJECTION INTRAVENOUS; SUBCUTANEOUS at 22:51

## 2021-05-20 RX ADMIN — ALLOPURINOL 50 MG: 100 TABLET ORAL at 08:31

## 2021-05-20 RX ADMIN — MAGNESIUM SULFATE HEPTAHYDRATE 1 G: 1 INJECTION, SOLUTION INTRAVENOUS at 10:31

## 2021-05-20 RX ADMIN — COLCHICINE 0.6 MG: 0.6 TABLET, FILM COATED ORAL at 13:33

## 2021-05-20 RX ADMIN — DIGOXIN 0.12 MG: 125 TABLET ORAL at 08:32

## 2021-05-20 RX ADMIN — ASPIRIN 81 MG: 81 TABLET, CHEWABLE ORAL at 08:31

## 2021-05-20 RX ADMIN — HEPARIN SODIUM 5000 UNITS: 5000 INJECTION INTRAVENOUS; SUBCUTANEOUS at 13:34

## 2021-05-20 RX ADMIN — Medication 10 ML: at 07:01

## 2021-05-20 RX ADMIN — DOBUTAMINE HYDROCHLORIDE 5 MCG/KG/MIN: 200 INJECTION INTRAVENOUS at 20:16

## 2021-05-20 RX ADMIN — SPIRONOLACTONE 12.5 MG: 25 TABLET ORAL at 08:32

## 2021-05-20 RX ADMIN — HYDRALAZINE HYDROCHLORIDE 10 MG: 10 TABLET, FILM COATED ORAL at 22:52

## 2021-05-20 RX ADMIN — MEXILETINE HYDROCHLORIDE 150 MG: 150 CAPSULE ORAL at 11:44

## 2021-05-20 RX ADMIN — FUROSEMIDE 20 MG: 20 TABLET ORAL at 08:32

## 2021-05-20 RX ADMIN — HYDRALAZINE HYDROCHLORIDE 10 MG: 10 TABLET, FILM COATED ORAL at 18:22

## 2021-05-20 RX ADMIN — HEPARIN SODIUM 5000 UNITS: 5000 INJECTION INTRAVENOUS; SUBCUTANEOUS at 06:09

## 2021-05-20 RX ADMIN — Medication 10 ML: at 13:33

## 2021-05-20 RX ADMIN — IVABRADINE 7.5 MG: 5 TABLET, FILM COATED ORAL at 08:31

## 2021-05-20 RX ADMIN — Medication 10 ML: at 22:52

## 2021-05-20 RX ADMIN — HYDRALAZINE HYDROCHLORIDE 10 MG: 10 TABLET, FILM COATED ORAL at 08:32

## 2021-05-20 RX ADMIN — MAGNESIUM GLUCONATE 500 MG ORAL TABLET 400 MG: 500 TABLET ORAL at 18:22

## 2021-05-20 RX ADMIN — MEXILETINE HYDROCHLORIDE 150 MG: 150 CAPSULE ORAL at 18:22

## 2021-05-20 RX ADMIN — CALCIUM CARBONATE (ANTACID) CHEW TAB 500 MG 400 MG: 500 CHEW TAB at 22:55

## 2021-05-20 NOTE — PROGRESS NOTES
[address] [facility] Adult  Hospitalist Group                                                                                          Hospitalist Progress Note  Dianna Harding MD  Answering service: [phone] from in house phone        Date of Service:  2021  NAME:  Bernadette Cotter  :  1960  MRN:  [id number]      Admission Summary:   Mechelle Caballero is a 64 y. o. male who presents from home with wife for shortness of breath.  He has a diagnosis of COPD for which he sees pulmonology. Amparo Evans has been saying that his shortness of breath is getting worse over the weeks.  No associated fevers. Amparo Krueger has been placed on steroids Zithromax and nebulizers by his pulmonologist but it has no improvement. Catie Go gotten first shot of Covid vaccine. He was getting worse to the point that he came to the ED with considerable shortness of breath.  Was placed on BiPAP in the ER with considerable improvement.  Rapid Covid is negative.  Chest x-ray shows fluid overload.  He is denying having any chest pain though the troponin is elevated. He did report of some heartburn earlier in the ER.   We were asked to admit this patient for COPD exacerbation    Interval history / Subjective:   Seen and examined discussed with him at bedside awaiting for the LifeVest to be delivered discussed with  ready for discharge with dobutamine drip at home patient has a PICC line     Assessment & Plan:     Acute hypoxic respiratory failure, resolved  Started on BiPAP on admission, now on RA  Rapid Covid negative  Etiology most likely is acute CHF  Chest x-ray with pulmonary edema and troponin elevation  Patient is status post right and left heart catheterization with no significant coronary artery disease noticed     COPD exacerbation  Completed steroids, cont nebs  procalcitonin low 0.42  Leukocytosis 2/2 steroids and trending down     Acute systolic heart failure EF 20%, BiV failure, NYHA IV on admission  Appreciate AHF/cardiology-was started on milrinone, entresto, aldactone, corlanor, allopurinol  Cont Dobutamine  5mcg/kg/min- watch HR and rhythm  No BBrx due to RV dysfunction and while on Dobutamine   No entresto due to hypotension   Lasix, albumin and ASA    Currently on dobutamine drip, hydralazine 10 mg twice daily added, digoxin 0.25  mcg added for tachycardia and PVCs    Was evaluated by the EP and started on mexiletine for PVCs  Fluid restriction also discussed with the patient  Received PICC line for outpatient inotropic treatment  LifeVest discussed with the patient by advanced heart failure team    Congestive hepatopathy/transaminitis  Monitor CMP, liver function is improving after diuresis  TSH wnl  B12 wnl  Vit D replacement    EDGAR vs CKD, unknown baseline  Presented with creatinine of 1.7. Has improved to 1.2 with diuresis  Monitor while being diuresed    Replete mag, phos    25.0 - 29.9 Overweight / Body mass index is 24.34 kg/m². Code status: Full Code  Prophylaxis: Hep SQ  Recommended Disposition: tbd      Care Plan discussed with: Patient/Family and Nurse  Anticipated Disposition: Home w/Family  Anticipated Discharge: Less than 24 hours     Hospital Problems  Never Reviewed        Codes Class Noted POA    Heart failure ([facility] ICD-10-CM: I50.9  ICD-9-CM: 428.9  5/13/2021 Unknown                Review of Systems:   A comprehensive review of systems was negative. Vital Signs:    Last 24hrs VS reviewed since prior progress note.  Most recent are:  Visit Vitals  BP 90/71 (BP 1 Location: Left upper arm, BP Patient Position: At rest;Sitting)   Pulse 98   Temp 98 °F (36.7 °C)   Resp 24   Ht 5' 8\" (1.727 m)   Wt 72.6 kg (160 lb 0.9 oz)   SpO2 99%   BMI 24.34 kg/m²         Intake/Output Summary (Last 24 hours) at 5/20/2021 1239  Last data filed at 5/20/2021 0409  Gross per 24 hour   Intake --   Output 3050 ml   Net -3050 ml        Physical Examination:     I had a face to face encounter with this patient and independently examined them on 5/20/2021 as outlined below:          Constitutional:  No acute distress, cooperative, pleasant    ENT:  Oral mucosa moist, oropharynx benign. Resp:  CTA bilaterally. No wheezing/rhonchi/rales. No accessory muscle use   CV:  Regular rhythm, normal rate, no murmurs, gallops, rubs    GI:  Soft, non distended, non tender. normoactive bowel sounds, no hepatosplenomegaly     Musculoskeletal:  No edema, warm, 2+ pulses throughout    Neurologic:  Moves all extremities. AAOx3, CN II-XII reviewed            Data Review:    I personally reviewed  Image and labs      Labs:     Recent Labs     05/20/21  0024 05/19/21  0342   WBC 13.0* 12.5*   HGB 12.6 13.6   HCT 38.8 42.1    320     Recent Labs     05/20/21  0024 05/19/21  0338 05/18/21  0206   * 136 138   K 4.9 4.3 3.8    105 106   CO2 26 24 27   BUN 32* 35* 35*   CREA 1.46* 1.25 1.18   GLU 90 78 106*   CA 7.9* 7.5* 7.3*   MG 1.7 1.6 1.9   PHOS  --   --  2.4*     Recent Labs     05/20/21  0024 05/19/21  0338 05/18/21  0206   * 316* 392*   * 144* 161*   TBILI 0.6 0.6 0.4   TP 6.2* 6.2* 6.2*   ALB 2.9* 2.9* 2.7*   GLOB 3.3 3.3 3.5     No results for input(s): INR, PTP, APTT, INREXT in the last 72 hours. No results for input(s): FE, TIBC, PSAT, FERR in the last 72 hours. No results found for: FOL, RBCF   No results for input(s): PH, PCO2, PO2 in the last 72 hours.   Recent Labs     05/20/21  1048   TROIQ 0.05*     No results found for: CHOL, CHOLX, CHLST, CHOLV, HDL, HDLP, LDL, LDLC, DLDLP, TGLX, TRIGL, TRIGP, CHHD, CHHDX  No results found for: GLUCPOC  No results found for: COLOR, APPRN, SPGRU, REFSG, MAGDY, PROTU, GLUCU, KETU, BILU, UROU, NAHUM, LEUKU, GLUKE, EPSU, BACTU, WBCU, RBCU, CASTS, UCRY      Medications Reviewed:     Current Facility-Administered Medications   Medication Dose Route Frequency    albuterol-ipratropium (DUO-NEB) 2.5 MG-0.5 MG/3 ML  3 mL Nebulization Q4H PRN    colchicine tablet 0.6 mg 0.6 mg Oral DAILY    [START ON 5/21/2021] furosemide (LASIX) tablet 10 mg  10 mg Oral DAILY    [START ON 5/21/2021] digoxin (LANOXIN) tablet 0.25 mg  0.25 mg Oral DAILY    magnesium oxide (MAG-OX) tablet 400 mg  400 mg Oral BID    DOBUTamine (DOBUTREX) 500 mg/250 mL (2,000 mcg/mL) infusion  5 mcg/kg/min IntraVENous CONTINUOUS    hydrALAZINE (APRESOLINE) tablet 10 mg  10 mg Oral TID    mexiletine (MEXITIL) capsule 150 mg  150 mg Oral TID    ivabradine (CORLANOR) tablet 7.5 mg  7.5 mg Oral BID WITH MEALS    albuterol-ipratropium (DUO-NEB) 2.5 MG-0.5 MG/3 ML  3 mL Nebulization Q4H PRN    ergocalciferol capsule 50,000 Units  50,000 Units Oral Q7D    allopurinoL (ZYLOPRIM) tablet 50 mg  50 mg Oral DAILY    calcium carbonate (TUMS) chewable tablet 400 mg [elemental]  400 mg Oral TID PRN    spironolactone (ALDACTONE) tablet 12.5 mg  12.5 mg Oral DAILY    heparin (porcine) injection 5,000 Units  5,000 Units SubCUTAneous Q8H    sodium chloride (NS) flush 5-40 mL  5-40 mL IntraVENous Q8H    sodium chloride (NS) flush 5-40 mL  5-40 mL IntraVENous PRN    acetaminophen (TYLENOL) tablet 650 mg  650 mg Oral Q6H PRN    Or    acetaminophen (TYLENOL) suppository 650 mg  650 mg Rectal Q6H PRN    polyethylene glycol (MIRALAX) packet 17 g  17 g Oral DAILY PRN    promethazine (PHENERGAN) tablet 12.5 mg  12.5 mg Oral Q6H PRN    Or    ondansetron (ZOFRAN) injection 4 mg  4 mg IntraVENous Q6H PRN    aspirin chewable tablet 81 mg  81 mg Oral DAILY     ______________________________________________________________________  EXPECTED LENGTH OF STAY: 5d 7h  ACTUAL LENGTH OF STAY:          7                 Caio Vo MD

## 2021-05-20 NOTE — PROGRESS NOTES
Transition Plan of Care  RUR 16%-Low  Disposition plan to discharge today if Kacie Marin is approved. Spoke with Rosanne Borges, clinical liaison for [facility] [phone] and they are waiting for approval from insurance. They will deliver to room once complete. Will be discharging home with Dobutamine IV. [facility] will provide that services as well as nursing for PICC care and lab draws. Spoke with Annia Murillo clinical liaison for vendor [phone], after Lifevest placed, she will come to hospital to connect pump.   Raina Lechuga, RN CRM  Ext [phone]

## 2021-05-20 NOTE — PROGRESS NOTES
Bedside shift change report given to Emily RN (oncoming nurse) by Mague Mares RN (offgoing nurse). Report included the following information SBAR, Kardex, ED Summary, Intake/Output, MAR, Accordion, Recent Results and Cardiac Rhythm sinus tach. Hourly rounding done, pt in sinus tach, on room air, O2 saturation greater than 95%, ambulated to bathroom/using urinal at bedside, had small bowel movement, gave patient wash cloths to bathe himself, no complaints of pain throughout shift, call bell within reach, use explained to pt    Problem: Discharge Planning  Goal: *Discharge to safe environment  Outcome: Progressing Towards Goal     Problem: Breathing Pattern - Ineffective  Goal: *Absence of hypoxia  Outcome: Progressing Towards Goal  Goal: *Use of effective breathing techniques  Outcome: Progressing Towards Goal     Problem: Chronic Obstructive Pulmonary Disease (COPD)  Goal: *Oxygen saturation during activity within specified parameters  Outcome: Progressing Towards Goal  Goal: *Able to remain out of bed as prescribed  Outcome: Progressing Towards Goal  Goal: *Absence of hypoxia  Outcome: Progressing Towards Goal  Goal: *Optimize nutritional status  Outcome: Progressing Towards Goal     Problem: Fluid Volume - Risk of, Imbalanced  Goal: *Balanced intake and output  Outcome: Progressing Towards Goal     Problem: Tissue Perfusion - Cardiopulmonary, Altered  Goal: *Optimize tissue perfusion  Outcome: Progressing Towards Goal  Goal: *Absence of hypoxia  Outcome: Progressing Towards Goal     Problem: Falls - Risk of  Goal: *Absence of Falls  Description: Document Roshni Fall Risk and appropriate interventions in the flowsheet.   Outcome: Progressing Towards Goal  Note: Fall Risk Interventions:            Medication Interventions: Bed/chair exit alarm, Teach patient to arise slowly

## 2021-05-20 NOTE — PROGRESS NOTES
Bedside shift change report given to GUICHO Singh (oncoming nurse) by Renuka Erazo (offgoing nurse). Report included the following information SBAR, Kardex, ED Summary, OR Summary, MAR and Recent Results.
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
## 2021-05-21 ENCOUNTER — TELEPHONE (OUTPATIENT)
Dept: CARDIOLOGY CLINIC | Age: 61
End: 2021-05-21

## 2021-05-21 VITALS
SYSTOLIC BLOOD PRESSURE: 111 MMHG | TEMPERATURE: 97.7 F | DIASTOLIC BLOOD PRESSURE: 83 MMHG | OXYGEN SATURATION: 99 % | HEIGHT: 68 IN | WEIGHT: 160.5 LBS | BODY MASS INDEX: 24.32 KG/M2 | HEART RATE: 107 BPM | RESPIRATION RATE: 25 BRPM

## 2021-05-21 PROBLEM — I50.9 HEART FAILURE (HCC): Status: RESOLVED | Noted: 2021-05-13 | Resolved: 2021-05-21

## 2021-05-21 LAB
ALBUMIN SERPL-MCNC: 3 G/DL (ref 3.5–5)
ALBUMIN/GLOB SERPL: 0.8 {RATIO} (ref 1.1–2.2)
ALP SERPL-CCNC: 116 U/L (ref 45–117)
ALT SERPL-CCNC: 236 U/L (ref 12–78)
ANION GAP SERPL CALC-SCNC: 7 MMOL/L (ref 5–15)
AST SERPL-CCNC: 64 U/L (ref 15–37)
BASOPHILS # BLD: 0 K/UL (ref 0–0.1)
BASOPHILS NFR BLD: 0 % (ref 0–1)
BILIRUB SERPL-MCNC: 0.8 MG/DL (ref 0.2–1)
BNP SERPL-MCNC: 2439 PG/ML
BUN SERPL-MCNC: 22 MG/DL (ref 6–20)
BUN/CREAT SERPL: 18 (ref 12–20)
CALCIUM SERPL-MCNC: 8.2 MG/DL (ref 8.5–10.1)
CHLORIDE SERPL-SCNC: 99 MMOL/L (ref 97–108)
CO2 SERPL-SCNC: 27 MMOL/L (ref 21–32)
CREAT SERPL-MCNC: 1.23 MG/DL (ref 0.7–1.3)
DIFFERENTIAL METHOD BLD: ABNORMAL
DIGOXIN SERPL-MCNC: 0.3 NG/ML (ref 0.9–2)
EOSINOPHIL # BLD: 0.5 K/UL (ref 0–0.4)
EOSINOPHIL NFR BLD: 5 % (ref 0–7)
ERYTHROCYTE [DISTWIDTH] IN BLOOD BY AUTOMATED COUNT: 13.9 % (ref 11.5–14.5)
GLOBULIN SER CALC-MCNC: 3.7 G/DL (ref 2–4)
GLUCOSE SERPL-MCNC: 138 MG/DL (ref 65–100)
HCT VFR BLD AUTO: 39.1 % (ref 36.6–50.3)
HGB BLD-MCNC: 12.9 G/DL (ref 12.1–17)
IMM GRANULOCYTES # BLD AUTO: 0.1 K/UL (ref 0–0.04)
IMM GRANULOCYTES NFR BLD AUTO: 1 % (ref 0–0.5)
LYMPHOCYTES # BLD: 1.4 K/UL (ref 0.8–3.5)
LYMPHOCYTES NFR BLD: 12 % (ref 12–49)
MAGNESIUM SERPL-MCNC: 1.8 MG/DL (ref 1.6–2.4)
MCH RBC QN AUTO: 33.1 PG (ref 26–34)
MCHC RBC AUTO-ENTMCNC: 33 G/DL (ref 30–36.5)
MCV RBC AUTO: 100.3 FL (ref 80–99)
MONOCYTES # BLD: 1.3 K/UL (ref 0–1)
MONOCYTES NFR BLD: 12 % (ref 5–13)
NEUTS SEG # BLD: 8.1 K/UL (ref 1.8–8)
NEUTS SEG NFR BLD: 70 % (ref 32–75)
NRBC # BLD: 0 K/UL (ref 0–0.01)
NRBC BLD-RTO: 0 PER 100 WBC
PLATELET # BLD AUTO: 279 K/UL (ref 150–400)
PMV BLD AUTO: 12.6 FL (ref 8.9–12.9)
POTASSIUM SERPL-SCNC: 4.3 MMOL/L (ref 3.5–5.1)
PROT SERPL-MCNC: 6.7 G/DL (ref 6.4–8.2)
RBC # BLD AUTO: 3.9 M/UL (ref 4.1–5.7)
SODIUM SERPL-SCNC: 133 MMOL/L (ref 136–145)
WBC # BLD AUTO: 11.4 K/UL (ref 4.1–11.1)

## 2021-05-21 PROCEDURE — 83735 ASSAY OF MAGNESIUM: CPT

## 2021-05-21 PROCEDURE — 74011250637 HC RX REV CODE- 250/637: Performed by: NURSE PRACTITIONER

## 2021-05-21 PROCEDURE — 99232 SBSQ HOSP IP/OBS MODERATE 35: CPT | Performed by: NURSE PRACTITIONER

## 2021-05-21 PROCEDURE — 74011250636 HC RX REV CODE- 250/636: Performed by: INTERNAL MEDICINE

## 2021-05-21 PROCEDURE — 99233 SBSQ HOSP IP/OBS HIGH 50: CPT | Performed by: NURSE PRACTITIONER

## 2021-05-21 PROCEDURE — 80162 ASSAY OF DIGOXIN TOTAL: CPT

## 2021-05-21 PROCEDURE — 36415 COLL VENOUS BLD VENIPUNCTURE: CPT

## 2021-05-21 PROCEDURE — 74011250637 HC RX REV CODE- 250/637: Performed by: PHYSICIAN ASSISTANT

## 2021-05-21 PROCEDURE — 85025 COMPLETE CBC W/AUTO DIFF WBC: CPT

## 2021-05-21 PROCEDURE — 80053 COMPREHEN METABOLIC PANEL: CPT

## 2021-05-21 PROCEDURE — 74011250637 HC RX REV CODE- 250/637: Performed by: INTERNAL MEDICINE

## 2021-05-21 PROCEDURE — 83880 ASSAY OF NATRIURETIC PEPTIDE: CPT

## 2021-05-21 PROCEDURE — 99233 SBSQ HOSP IP/OBS HIGH 50: CPT | Performed by: INTERNAL MEDICINE

## 2021-05-21 RX ORDER — LANOLIN ALCOHOL/MO/W.PET/CERES
400 CREAM (GRAM) TOPICAL 2 TIMES DAILY
Qty: 120 TABLET | Refills: 2 | Status: SHIPPED | OUTPATIENT
Start: 2021-05-21 | End: 2021-09-16 | Stop reason: SDUPTHER

## 2021-05-21 RX ORDER — MEXILETINE HYDROCHLORIDE 150 MG/1
150 CAPSULE ORAL 3 TIMES DAILY
Qty: 180 CAPSULE | Refills: 2 | Status: SHIPPED | OUTPATIENT
Start: 2021-05-21 | End: 2021-08-06 | Stop reason: SDUPTHER

## 2021-05-21 RX ORDER — SPIRONOLACTONE 25 MG/1
12.5 TABLET ORAL DAILY
Qty: 15 TABLET | Refills: 0 | Status: SHIPPED | OUTPATIENT
Start: 2021-05-22 | End: 2021-05-21 | Stop reason: SDUPTHER

## 2021-05-21 RX ORDER — COLCHICINE 0.6 MG/1
0.6 TABLET ORAL DAILY
Qty: 30 TABLET | Refills: 0 | Status: SHIPPED | OUTPATIENT
Start: 2021-05-22 | End: 2021-05-21 | Stop reason: SDUPTHER

## 2021-05-21 RX ORDER — DIGOXIN 250 MCG
0.25 TABLET ORAL DAILY
Qty: 30 TABLET | Refills: 0 | Status: SHIPPED | OUTPATIENT
Start: 2021-05-22 | End: 2021-05-21 | Stop reason: SDUPTHER

## 2021-05-21 RX ORDER — ALLOPURINOL 100 MG/1
100 TABLET ORAL DAILY
Qty: 90 TABLET | Refills: 2 | Status: SHIPPED | OUTPATIENT
Start: 2021-05-22 | End: 2021-06-21

## 2021-05-21 RX ORDER — HYDRALAZINE HYDROCHLORIDE 10 MG/1
10 TABLET, FILM COATED ORAL 3 TIMES DAILY
Qty: 90 TABLET | Refills: 0 | Status: SHIPPED | OUTPATIENT
Start: 2021-05-21 | End: 2021-05-21 | Stop reason: SDUPTHER

## 2021-05-21 RX ORDER — DOBUTAMINE HYDROCHLORIDE 200 MG/100ML
5 INJECTION INTRAVENOUS CONTINUOUS
Qty: 250 ML | Refills: 0 | Status: SHIPPED
Start: 2021-05-21 | End: 2021-11-03 | Stop reason: ALTCHOICE

## 2021-05-21 RX ORDER — ALLOPURINOL 100 MG/1
50 TABLET ORAL DAILY
Qty: 15 TABLET | Refills: 0 | Status: SHIPPED | OUTPATIENT
Start: 2021-05-22 | End: 2021-05-21 | Stop reason: SDUPTHER

## 2021-05-21 RX ORDER — GUAIFENESIN 100 MG/5ML
81 LIQUID (ML) ORAL DAILY
Qty: 30 TABLET | Refills: 0 | Status: SHIPPED | OUTPATIENT
Start: 2021-05-22 | End: 2021-05-21 | Stop reason: SDUPTHER

## 2021-05-21 RX ORDER — SPIRONOLACTONE 25 MG/1
12.5 TABLET ORAL DAILY
Qty: 90 TABLET | Refills: 2 | Status: SHIPPED | OUTPATIENT
Start: 2021-05-22 | End: 2021-08-06 | Stop reason: SDUPTHER

## 2021-05-21 RX ORDER — MEXILETINE HYDROCHLORIDE 150 MG/1
150 CAPSULE ORAL 3 TIMES DAILY
Qty: 90 CAPSULE | Refills: 0 | Status: SHIPPED | OUTPATIENT
Start: 2021-05-21 | End: 2021-05-21 | Stop reason: SDUPTHER

## 2021-05-21 RX ORDER — FUROSEMIDE 20 MG/1
10 TABLET ORAL DAILY
Qty: 90 TABLET | Refills: 2 | Status: SHIPPED | OUTPATIENT
Start: 2021-05-21 | End: 2021-08-06 | Stop reason: SDUPTHER

## 2021-05-21 RX ORDER — ERGOCALCIFEROL 1.25 MG/1
50000 CAPSULE ORAL
Qty: 12 CAPSULE | Refills: 0 | Status: SHIPPED | OUTPATIENT
Start: 2021-05-22 | End: 2021-08-08

## 2021-05-21 RX ORDER — FUROSEMIDE 20 MG/1
10 TABLET ORAL DAILY
Qty: 30 TABLET | Refills: 0 | Status: SHIPPED | OUTPATIENT
Start: 2021-05-21 | End: 2021-05-21 | Stop reason: SDUPTHER

## 2021-05-21 RX ORDER — GUAIFENESIN 100 MG/5ML
81 LIQUID (ML) ORAL DAILY
Qty: 90 TABLET | Refills: 2 | Status: SHIPPED | OUTPATIENT
Start: 2021-05-22 | End: 2021-08-06 | Stop reason: SDUPTHER

## 2021-05-21 RX ORDER — HYDRALAZINE HYDROCHLORIDE 10 MG/1
10 TABLET, FILM COATED ORAL 3 TIMES DAILY
Qty: 180 TABLET | Refills: 2 | Status: SHIPPED
Start: 2021-05-21 | End: 2021-09-16

## 2021-05-21 RX ORDER — DIGOXIN 250 MCG
0.25 TABLET ORAL DAILY
Qty: 90 TABLET | Refills: 2 | Status: SHIPPED | OUTPATIENT
Start: 2021-05-22 | End: 2021-06-02

## 2021-05-21 RX ORDER — COLCHICINE 0.6 MG/1
0.6 TABLET ORAL DAILY
Qty: 90 TABLET | Refills: 2 | Status: SHIPPED
Start: 2021-05-22 | End: 2021-05-24 | Stop reason: ALTCHOICE

## 2021-05-21 RX ADMIN — MEXILETINE HYDROCHLORIDE 150 MG: 150 CAPSULE ORAL at 08:55

## 2021-05-21 RX ADMIN — SPIRONOLACTONE 12.5 MG: 25 TABLET ORAL at 08:55

## 2021-05-21 RX ADMIN — COLCHICINE 0.6 MG: 0.6 TABLET, FILM COATED ORAL at 08:57

## 2021-05-21 RX ADMIN — DIGOXIN 0.25 MG: 250 TABLET ORAL at 08:57

## 2021-05-21 RX ADMIN — IVABRADINE 7.5 MG: 5 TABLET, FILM COATED ORAL at 08:56

## 2021-05-21 RX ADMIN — HEPARIN SODIUM 5000 UNITS: 5000 INJECTION INTRAVENOUS; SUBCUTANEOUS at 06:44

## 2021-05-21 RX ADMIN — FUROSEMIDE 10 MG: 20 TABLET ORAL at 08:57

## 2021-05-21 RX ADMIN — Medication 10 ML: at 06:49

## 2021-05-21 RX ADMIN — HYDRALAZINE HYDROCHLORIDE 10 MG: 10 TABLET, FILM COATED ORAL at 08:57

## 2021-05-21 RX ADMIN — HEPARIN SODIUM 5000 UNITS: 5000 INJECTION INTRAVENOUS; SUBCUTANEOUS at 14:11

## 2021-05-21 RX ADMIN — MAGNESIUM GLUCONATE 500 MG ORAL TABLET 400 MG: 500 TABLET ORAL at 08:56

## 2021-05-21 RX ADMIN — ASPIRIN 81 MG: 81 TABLET, CHEWABLE ORAL at 08:56

## 2021-05-21 RX ADMIN — ALLOPURINOL 50 MG: 100 TABLET ORAL at 08:56
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
## 2021-05-21 NOTE — DISCHARGE INSTRUCTIONS
Discharge Instructions       PATIENT ID: Sandra Villanueva  MRN: [id number]   YOB: 1960    DATE OF ADMISSION: 2021  8:15 AM    DATE OF DISCHARGE: 2021    PRIMARY CARE PROVIDER: Celena Anderson MD     ATTENDING PHYSICIAN: Moe Marti MD  DISCHARGING PROVIDER: Glenda Pelaez MD    To contact this individual call [phone] and ask the  to page. If unavailable ask to be transferred the Adult Hospitalist Department. DISCHARGE DIAGNOSES CHF     CONSULTATIONS: IP CONSULT TO CARDIOLOGY  IP CONSULT TO PULMONOLOGY  IP CONSULT TO ADVANCED HEART FAILURE  IP CONSULT TO ELECTROPHYSIOLOGY  IP CONSULT TO PALLIATIVE CARE - PROVIDER    PROCEDURES/SURGERIES: Procedure(s):  LEFT AND RIGHT HEART CATH / CORONARY ANGIOGRAPHY    PENDING TEST RESULTS:   At the time of discharge the following test results are still pending:     FOLLOW UP APPOINTMENTS:   Follow-up Information     Follow up With Specialties Details Why Contact Info Additional Information    [facility]  Will call to discuss enrollment in OP program for HF if eligible. [address] [facility] [address] [facility], [address]. Please arrive 15 minutes prior to your appointment time and you will register in the [place] [address], [address], on the first floor of the [address]. Telephone: [phone] Fax: [phone] Driving directions To [facility] [place]. Building: Driving WEST on [address], take exit [address] to [employer]. Turn left onto [address], then turn right into [employer] Parking lot Driving EAST on [address], take exit [address] [facility]. Turn right at the end of the exit ramp. Turn left onto [address], then turn right into [employer] lot.     Celena Anderson MD Internal Medicine In 1 week  [facility] 7 [phone] Jenni Garzon MD Cardiology In 2 weeks  [address] [facility]  [phone]              ADDITIONAL CARE RECOMMENDATIONS:     DIET: Cardiac Diet    ACTIVITY: Activity as tolerated    WOUND CARE:     EQUIPMENT needed:       Radiology      DISCHARGE MEDICATIONS:   See Medication Reconciliation Form    · It is important that you take the medication exactly as they are prescribed. · Keep your medication in the bottles provided by the pharmacist and keep a list of the medication names, dosages, and times to be taken in your wallet. · Do not take other medications without consulting your doctor. NOTIFY YOUR PHYSICIAN FOR ANY OF THE FOLLOWING:   Fever over 101 degrees for 24 hours. Chest pain, shortness of breath, fever, chills, nausea, vomiting, diarrhea, change in mentation, falling, weakness, bleeding. Severe pain or pain not relieved by medications. Or, any other signs or symptoms that you may have questions about.       DISPOSITION:  x  Home With:   OT  PT  HH  RN       SNF/Inpatient Rehab/LTAC    Independent/assisted living    Hospice    Other:     CDMP Checked:   Yes x     PROBLEM LIST Updated:  Yes x       Signed:   Chuy Benavidez MD  5/21/2021  10:26 AM
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
## 2021-05-21 NOTE — ADT AUTH CERT NOTES
Utilization Reviews       Heart Failure - Care Day 8 (5/20/2021) by Max Nguyen       Review Entered Review Status   5/20/2021 14:48 Completed      Criteria Review      Care Day: 8 Care Date: 5/20/2021 Level of Care: Intermediate Care    Guideline Day 3    Level Of Care    (X) Floor to discharge    5/20/2021 14:48:27 EDT by Max Nguyen      INTERMEDIATE CARE    Clinical Status    ( ) * Hemodynamic stability    5/20/2021 14:48:27 EDT by Max Nguyen      HR , 87/66 @ 1200,  BP 90//78    ( ) * Tachypnea absent    5/20/2021 14:48:27 EDT by Max Nguyen      RR 17-24    (X) * Oxygenation at baseline or acceptable for next level of care    5/20/2021 14:48:27 EDT by Max Nguyen      SATS 96-99% ON RA    (X) * Dyspnea at baseline or acceptable for next level of care    ( ) * Cardiac rate and rhythm acceptable    5/20/2021 14:48:27 EDT by Compa Wayne    (X) * Pulmonary edema absent or acceptable for next level of care    5/20/2021 14:48:27 EDT by Gamaliel Calderon 5/20    (X) * Peripheral or sacral edema absent or acceptable for next level of care    5/20/2021 14:48:27 EDT by Evon Mclaughlin    (X) * Mental status at baseline    5/20/2021 14:48:27 EDT by Max Nguyen      A&O    (X) * Volume status acceptable on oral medication    (X) * Renal function at baseline or acceptable for next level of care    5/20/2021 14:48:27 EDT by Max Nguyen      BUN: 32 (H), Creatinine: 1.46 (H)    (X) * Electrolyte abnormalities absent or acceptable for next level of care    5/20/2021 14:48:27 EDT by Max Nguyen      CA 7.9    (X) * Precipitating factors absent or controlled    ( ) * Discharge plans and education understood    Activity    (X) * Ambulatory or acceptable for next level of care    5/20/2021 14:48:27 EDT by Max Nguyen      AMBULATE W/ ASSIST    Routes    (X) * Oral hydration, medications, and diet    5/20/2021 14:48:27 EDT by Lauren Stevens      CARDIAC DIET    Interventions    (X) * Oxygen absent    5/20/2021 14:48:27 EDT by Lauren Stevens      ON RA    (X) Weigh    5/20/2021 14:48:27 EDT by Lauren Stevens      72.6 KG    Medications    (X) Diuretics    5/20/2021 14:48:27 EDT by Lauren Stevens      HYDRALAZINE 10 MG PO TID, LASIX 20 MG PO QD    (X) Possible aldosterone antagonist    5/20/2021 14:48:27 EDT by Genesis Erazo 12.5 MG PO QD    * Milestone   Additional Notes   5/20      IM NOTE:   Interval history / Subjective:   Seen and examined discussed with him at bedside awaiting for the LifeVest to be delivered discussed with  ready for discharge with dobutamine drip at home patient has a PICC line      PHYSICAL EXAM:   Constitutional: No acute distress, cooperative, pleasant    ENT: Oral mucosa moist, oropharynx benign. Resp: CTA bilaterally. No wheezing/rhonchi/rales. No accessory muscle use   CV: Regular rhythm, normal rate, no murmurs, gallops, rubs   GI: Soft, non distended, non tender. normoactive bowel sounds, no hepatosplenomegaly    Musculoskeletal: No edema, warm, 2+ pulses throughout   Neurologic: Moves all extremities.   AAOx3, CN II-XII reviewed      Assessment & Plan:   Acute hypoxic respiratory failure, resolved   Started on BiPAP on admission, now on RA   Rapid Covid negative   Etiology most likely is acute CHF   Chest x-ray with pulmonary edema and troponin elevation   Patient is status post right and left heart catheterization with no significant coronary artery disease noticed       COPD exacerbation   Completed steroids, cont nebs   procalcitonin low 0.42   Leukocytosis 2/2 steroids and trending down       Acute systolic heart failure EF 20%, BiV failure, NYHA IV on admission   Appreciate AHF/cardiology-was started on milrinone, entresto, aldactone, corlanor, allopurinol   Cont Dobutamine  5mcg/kg/min- watch HR and rhythm   No BBrx due to RV dysfunction and while on Dobutamine    No entresto due to hypotension    Lasix, albumin and ASA       Currently on dobutamine drip, hydralazine 10 mg twice daily added, digoxin 0.25  mcg added for tachycardia and PVCs       Was evaluated by the EP and started on mexiletine for PVCs   Fluid restriction also discussed with the patient   Received PICC line for outpatient inotropic treatment   LifeVest discussed with the patient by advanced heart failure team       Congestive hepatopathy/transaminitis   Monitor CMP, liver function is improving after diuresis   TSH wnl   B12 wnl   Vit D replacement       EDGAR vs CKD, unknown baseline   Presented with creatinine of 1.7.  Has improved to 1.2 with diuresis   Monitor while being diuresed       Replete mag, phos   25.0 - 29.9 Overweight / Body mass index is 24.34 kg/m².    Code status: Full Code   Prophylaxis: Hep SQ   Recommended Disposition: tbd            CARDIOLOGY NOTE:   Interval Events   No acute overnight events   Remains tachycardic but overall improved        PLAN OF CARE:   · New onset severe non-ischemic cardiomyopathy, LVEF 15%, low resting cardiac index, tachycardiac intolerant of GDMT   · Plan on discharge home with long-term inotropes as bridge to LV recovery over the next 3 months   · PVC suppression with mexiletine per Dr. Nafisa Bruner   · Coronaries clear; etiology of cardiomyopathy is unclear but coxackie abs titer is high; possible subacute myocarditis or PVC- or tachycardia-mediated   · Consider initating LVAD workup if FEV1 acceptable to tolerate open heart surgery; most recent PFT with severely reduced FEV1 last year, will d/w Dr. Marielena Lu:   Cont Dobutamine  5mcg/kg/min- watch HR and rhythm   No BBrx due to RV dysfunction and while on Dobutamine    No entresto due to hypotension    Cont hydralazine 10mg for elevated SVR from RHC   Increase Digoxin 0.25mcg for tachycardia, goal 0.7-1.2    Continue current dose of spironolactone, 12.5mg- unable to up titrate due to K   Cont Corlanor to 7.5mg BID   Decrease Lasix to 10mg daily PO   Start Colchicine 0.6mg daily for possible subacute myocarditis    Cont allopurinol 50mg daily   Continue ASA    Will need OP CMRI    Labs: prealbumin WNL   Venofer 200mg daily x 2- complete    TFTs WNL   Cont high dose Vit D supplement   Myoglobin 221 from 530   Repeat myoglobin    CK WNL   Gammopathy negative    + Coxsackie A and B abs noted   Trend CBC, CMP, PBNP   Will need OP sleep study    invitae genetic testing- pending    6 min walk today    Nutritionist consult   Heart failure education   Advanced care plan present on file   Plan at discharge: recommend flu and pneumonia vaccinations   Lifevest to be fit today    Cont Mexiletine per Dr. Maryanne Mann   PFTs today, will request [facility] P[facility] records from [facility]    D/w patient about etiology (myocarditis vs PVC), need to assess lung function to consider advanced therapies in the long term.  Will need home inotropes as a bridge to LVAD vs bridge to recovery. Will assess in 3 months. Home tomorrow            LABS:   WBC: 13   Sodium: 135 (L)   Potassium: 4.9   Chloride: 104   CO2: 26   Anion gap: 5   Glucose: 90   BUN: 32 (H)   Creatinine: 1.46 (H)   BUN/Creatinine ratio: 22 (H)   Calcium: 7.9 (L)   Magnesium: 1.7   GFR est non-AA: 49 (L)   GFR est AA: 59 (L)   Bilirubin, total: 0.6   Protein, total: 6.2 (L)   Albumin: 2.9 (L)   Globulin: 3.3   A-G Ratio: 0.9 (L)   ALT: 267 (H)   AST: 65 (H)   Alk.  phosphatase: 137 (H)      NT pro-BNP: 1,188 (H)   Digoxin level: 0.2 (L)   NT pro-BNP: 1,188 (H)   Digoxin level: 0.2 (L)         EKG: Sinus rhythm with occasional premature ventricular complexes    Biatrial enlargement, Left axis deviation,Cannot rule out Anterior infarct , age undetermined    When compared with ECG of 13-MAY-2021 12:42,Inverted T waves have replaced nonspecific T wave abnormality in Anterior    leads       MEDS:   ALLOPURINOL 50 MG PO QD, ASPIRIN 81 MG PO QD, COLCHICINE 0.6 MG PO QD, DOBUTAMINE DRIP,   HEPARIN SQ, HYDRALAZINE 10 MG PO TID, MAG OXIDE 400 MG PO BID, MEXITIL 150 MG PO TID   CORLANOR 7.5 MG PO BID, ALDACTONE 12.5 MG PO QD, DIGOXIN 0.125 MG PO QD, LASIX 20 MG PO QD         VS: T 98.2 F, HR , BP 90//78, RR 19-33, 98% RA   CARDIAC MONITORING, PALLIATIVE CAR CONSULT, AMBULATE W/ ASSIST, CARDIAC DIET, WEARABLE DEFIBRILLATOR       Heart Failure - Care Day 7 (5/19/2021) by Saritha Collins       Review Entered Review Status   5/20/2021 14:32 Completed      Criteria Review      Care Day: 7 Care Date: 5/19/2021 Level of Care: Intermediate Care    Guideline Day 3    Level Of Care    (X) Floor to discharge    5/20/2021 14:32:47 EDT by Saritha Collins      INTERMEDIATE CARE    Clinical Status    ( ) * Hemodynamic stability    5/20/2021 14:32:47 EDT by Saritha Collins      HR , BP 87//87    ( ) * Tachypnea absent    5/20/2021 14:32:47 EDT by Saritha Collins      RR 17-24    (X) * Oxygenation at baseline or acceptable for next level of care    5/20/2021 14:32:47 EDT by Saritha Collins      SATS 96-99% ON RA    (X) * Dyspnea at baseline or acceptable for next level of care    5/20/2021 14:32:47 EDT by Saritha Collins      PER IM NOTE: Pt feeling well. Remains on RA. No dyspnea w/ exertion    ( ) * Cardiac rate and rhythm acceptable    5/20/2021 14:32:47 EDT by Saritha Collins      PER CARDIOLOGY NOTE: Remains tachycardic but overall improved.  HR     (X) * Pulmonary edema absent or acceptable for next level of care    5/20/2021 14:32:47 EDT by Saritha Collins      NO IMAGING 5/19    (X) * Peripheral or sacral edema absent or acceptable for next level of care    5/20/2021 14:32:47 EDT by Tamela Jackson    (X) * Mental status at baseline    5/20/2021 14:32:47 EDT by Saritha Collins      A&O    (X) * Volume status acceptable on oral medication    (X) * Renal function at baseline or acceptable for next level of care    5/20/2021 14:32:47 EDT by Lauren Hitchcock      IMPROVING: Creatinine: 1.25, BUN 35    (X) * Electrolyte abnormalities absent or acceptable for next level of care    5/20/2021 14:32:47 EDT by Lauren Hitchcock      CA 7.5    (X) * Precipitating factors absent or controlled    ( ) * Discharge plans and education understood    Activity    (X) * Ambulatory or acceptable for next level of care    5/20/2021 14:32:47 EDT by Andreas Cuevas W/ ASSIST    Routes    (X) * Oral hydration, medications, and diet    5/20/2021 14:32:47 EDT by Young Christopher DIET    Interventions    (X) * Oxygen absent    5/20/2021 14:32:47 EDT by Lauren Hitchcock      ON RA    (X) Weigh    5/20/2021 14:32:47 EDT by Lauren Hitchcock      72.6 KG    Medications    (X) Diuretics    5/20/2021 14:32:47 EDT by Lauren Hitchcock      HYDRALAZINE 10 MG PO TID    (X) Possible aldosterone antagonist    5/20/2021 14:32:47 EDT by Sandhya Morteza 12.5 MG PO QD    * Milestone   Additional Notes   5/19      IM NOTE:   Interval history / Subjective:   No acute overnight events. Pt feeling well. Remains on RA. No dyspnea w/ exertion. NAD.  Remains on dobutamine drip   Had a left heart and right heart catheterization on 5/17 with no significant coronary artery disease noticed   Received a PICC line for outpatient inotropic treatment by advanced heart failure team   Advanced heart failure team also discussed LifeVest with the patient      General:          Alert, cooperative, no acute distress     EENT:              EOMI. Anicteric sclerae. MMM   Resp:               Decreased throughout, No accessory muscle use   CV:                  Tachy rate, regular, No edema   GI:                   Soft, Non distended, Non tender   Neurologic:       Alert and oriented, normal speech, SARMIENTO   Psych:   Not anxious or agitated   Skin:                No rashes.  No jaundice      Assessment / Plan:   Acute hypoxic respiratory failure, resolved   Started on BiPAP on admission, now on RA   Rapid Covid negative   Etiology most likely is acute CHF   Chest x-ray with pulmonary edema and troponin elevation   Patient is status post right and left heart catheterization with no significant coronary artery disease noticed   Management of heart failure as below       COPD exacerbation   Completed steroids, cont nebs   procalcitonin low 0.42   Leukocytosis 2/2 steroids and trending down       Acute systolic heart failure EF 20%, BiV failure, NYHA IV on admission   Appreciate AHF/cardiology-was started on milrinone, entresto, aldactone, corlanor, allopurinol   Lasix, albumin and ASA   Avoid BB with COPD exam and RV failure   Entresto discontinued due to hypotension   Currently on dobutamine drip, hydralazine 10 mg twice daily added, digoxin 125 mcg added for tachycardia and PVCs   Was evaluated by the EP and started on mexiletine for PVCs   Fluid restriction also discussed with the patient   Received PICC line for outpatient inotropic treatment   LifeVest discussed with the patient by advanced heart failure team       Congestive hepatopathy/transaminitis   Monitor CMP, liver function is improving after diuresis   TSH wnl   B12 wnl   Vit D replacement       EDGAR vs CKD, unknown baseline   Presented with creatinine of 1.7.  Has improved to 1.2 with diuresis   Monitor while being diuresed       Replete mag, phos          CARDIOLOGY NOTE:   REASON FOR CONSULT:   Acute systolic heart failure      Interval Events   No acute overnight events   Creatinine stable   Remains tachycardic but overall improved    Started on Mexiletine per Dr. Freida Orozco for PVC suppression           PLAN OF CARE:   · New onset severe non-ischemic cardiomyopathy, LVEF 15%, low resting cardiac index, tachycardiac intolerant of GDMT   · Plan on discharge home with long-term inotropes as bridge to LV recovery over the next 3 months   · PVC suppression with mexiletine per Dr. Freida Orozco   · Coronaries clear; etiology of cardiomyopathy is unclear but coxackie abs titer is high; possible subacute myocarditis or PVC- or tachycardia-mediated   · Consider iniating LVAD workup if FEV1 acceptable to tolerate open heart surgery; most recent PFT with severely reduced FEV1 last year, will d/w Dr. Alcon Hernadez       PLAN:   Increase Dobutamine to 5mcg/kg/min- watch HR and rhythm   No BBrx due to RV dysfunction and while on Dobutamine    No entresto due to hypotension    Cont hydralazine 10mg for elevated SVR from RHC   Cont Digoxin 0.125mcg for tachycardia, goal 0.7-1.2    Continue current dose of spironolactone, 12.5mg- unable to up titrate due to K   Cont Corlanor to 7.5mg BID   Continue  Lasix 20mg daily PO   Cont allopurinol 50mg daily   Continue ASA    Consider OP CMRI    Labs: prealbumin WNL   Venofer 200mg daily x 2- complete    TFTs WNL   Cont high dose Vit D supplement   Myoglobin 221 from 530   CK WNL   Gammopathy negative    + Coxsackie A and B abs noted   Trend CBC, CMP, PBNP   Will need OP sleep study    invitae genetic testing- pending    6 min walk today    Nutritionist consult   Heart failure education   Advanced care plan present on file   Plan at discharge: recommend flu and pneumonia vaccinations   Will need a lifevest at time of discharge- ordered    Started on Mexiletine per Dr. Wanda Strauss   PFTs today, will request old PFT records from [facility]    D/w patient about etiology (myocarditis vs PVC), need to assess lung function to consider advanced therapies in the long term.  Will need home inotropes as a bridge to LVAD vs bridge to recovery. Will assess in 3 months.           PALLIATIVE CARE CONSULT   64 y.o. male with a past history of  COPD, rt BBB, HTN,syncope, who was admitted on 5/13/2021 from home with a diagnosis of COPD exacerbation requiring BiPAP support, acute systolic heart failure (95%)~SMR onset;with Inotrope support/ dobutamine, acute respiratory failure with hypoxia, sinus tach, frequent PVC's, EDGAR versus CKD, transaminitis. Patient being considered for LVAD work-up.  He may require long-term inotrope support. Full code. No AMD.    Current medical issues leading to Palliative Medicine involvement include: We have been asked to support with advance care planning       PLAN:   1. Pt seen without family present.  Services introduced.     2. Pt alert and verbalized an accurate understanding of his medical condition   3. The goal is to continue current level of care.  I briefly discussed options of LVAD vs transplant explaining the the [facility] team would manage and determine options   4. Discussed the importance of having and AMD.  Pt expressed interest but needs to determine who will be the Ul. Tamika Billingsley shared that his wife and children would have difficulty making hard decisions. Sylvester Dale will think about it and agrees to follow up tomorrow   5. His mother  with hospice care and he had a lot of questions regarding artificial nutrition at end of life that I was able to answer. Sylvester Dale shared that he never understood why they stopped feeding her. 6. I explained that artifical nutrition is also addressed in the AMD and we could ensure that his wishes are writing. 7. All questions and concerns addressed   8. Initial consult note routed to primary continuity provider and/or primary health care team members   9. Communicated plan of care with: Palliative IDT, Hospital Health Care Team      LABS:   WBC: 12.5   BUN: 35 (H)   Creatinine: 1.25   BUN/Creatinine ratio: 28 (H)   Calcium: 7.5 (L)   Magnesium: 1.6   GFR est non-AA: 59 (L)   GFR est AA: >60   Bilirubin, total: 0.6   Protein, total: 6.2 (L)   Albumin: 2.9 (L)   Globulin: 3.3   A-G Ratio: 0.9 (L)   ALT: 316 (H)   AST: 78 (H)   Alk.  phosphatase: 144 (H)      NT pro-BNP: 996 (H)      MEDS:   ALLOPURINOL 50 MG PO QD, ASPIRIN 81 MG PO QD, DOBUTAMINE DRIP, HEPARIN SQ, , MAG OXIDE 400 MG PO BID, MEXITIL 150 MG PO TID, CORLANOR 7.5 MG PO BID, DUO-NEB X 2, DIGOXIN 0.125 MG PO QD, LASIX 20 MG PO QD VS: T 98 F, HR , BP 87//87, RR 17-24, 97% RA   CARDIAC MONITORING, PALLIATIVE CAR CONSULT, AMBULATE W/ ASSIST, CARDIAC DIET, WEARABLE DEFIBRILLATOR

## 2021-05-21 NOTE — TELEPHONE ENCOUNTER
Prior hiwot completed for corlanor via cover [employer]. Awaiting response. Prior hiwot approved, I called pharmacy, copay will be $0-they state patient has not picked up medication yet. I called patient, left voicemail to return my call. Patient returned call and left voicemail-I returned his call and left voicemail. I called patient and he will get corlanor today-he states pharmacy never notified him that it is ready.

## 2021-05-21 NOTE — PROGRESS NOTES
AT  [address] [place] [address] Road bedside  PFT was done but not able to print or transmit result at this time. Arsenio Torres underwent a 6 min walk test. His initial O2  saturation was   100% and his baseline heart rate was  109 BPM. He walked  5202ft at 97% o2 with hr of 115   .  His post O2  saturation was 100   % and his post walk heart rate was  114 BPM.

## 2021-05-21 NOTE — PROGRESS NOTES
Bedside shift change report given to [facility] (oncoming nurse) by Ron Barrett (offgoing nurse). Report included the following information SBAR, Kardex, ED Summary, OR Summary, MAR and Recent Results.

## 2021-05-21 NOTE — PROGRESS NOTES
Transition Plan of Care  RUR 14%-Low  Disposition plan to discharge home today. Patient received Lifevest last night. LAINEY left message for [facility] liaison Trace Jones [phone] that he is stable to discharge today. They have accepted services for Dobutamine as well as skilled nursing. Pradip Martinez RN CRM  Ext [phone]    Ysabel Freire with Chas[facility] Moura liaison for [facility] and they will deliver pump with dobutamine to room at 1400.

## 2021-05-21 NOTE — PROGRESS NOTES
Attempted to schedule hospital follow up PCP appointment. Awaiting call back from the office with a sooner appointment information. Earliest appointment available is  6/8/21. Pending patient discharge.   An Adamson, Care Management Specialist.

## 2021-05-21 NOTE — PROGRESS NOTES
Bedside shift change report given to GUICHO Singh (oncoming nurse) by Consuello Boxer (offgoing nurse). Report included the following information SBAR, Kardex, ED Summary, OR Summary, Intake/Output, MAR and Recent Results.

## 2021-05-21 NOTE — PROGRESS NOTES
Bedside shift change report given to Emily RN (oncoming nurse) by Zuleima Navas RN (offgoing nurse). Report included the following information SBAR, ED Summary, Intake/Output, MAR, Recent Results and Cardiac Rhythm sinus tach. Hourly rounding done, pt in sinus tach, on room air, O2 saturation greater than 95%, ambulated to bathroom, no complaints of pain throughout shift, call bell within reach, pt has washcloths and soap to bathe himself, use explained to pt    Problem: Discharge Planning  Goal: *Discharge to safe environment  Outcome: Progressing Towards Goal     Problem: Breathing Pattern - Ineffective  Goal: *Absence of hypoxia  Outcome: Progressing Towards Goal  Goal: *Use of effective breathing techniques  Outcome: Progressing Towards Goal     Problem: Chronic Obstructive Pulmonary Disease (COPD)  Goal: *Absence of hypoxia  Outcome: Progressing Towards Goal     Problem: Tissue Perfusion - Cardiopulmonary, Altered  Goal: *Optimize tissue perfusion  Outcome: Progressing Towards Goal  Note: Skin assessment     Problem: Falls - Risk of  Goal: *Absence of Falls  Description: Document Roshni Fall Risk and appropriate interventions in the flowsheet.   Outcome: Progressing Towards Goal  Note: Fall Risk Interventions:            Medication Interventions: Teach patient to arise slowly

## 2021-05-24 ENCOUNTER — TELEPHONE (OUTPATIENT)
Dept: CASE MANAGEMENT | Age: 61
End: 2021-05-24

## 2021-05-24 RX ORDER — COLCHICINE 0.6 MG/1
0.6 CAPSULE ORAL DAILY
Qty: 30 CAPSULE | Refills: 0 | Status: SHIPPED | OUTPATIENT
Start: 2021-05-24 | End: 2021-06-23

## 2021-05-24 NOTE — PROCEDURES
[address]   PULMONARY FUNCTION TEST    Name:  Isaac Ramsey  MR#:  [id number]  :  1960  ACCOUNT #:  [de-identified]  DATE OF SERVICE:  2021      CLINICAL INDICATION:  Shortness of breath. Spirometry is performed. Spirometry reveals severe airflow obstruction. The flow volume loop is consistent with an obstructive pattern.       Nelva Goldberg, MD      [id number]  D:  2021 15:14  T:  2021 17:41  JOB #:  [id number]

## 2021-05-24 NOTE — TELEPHONE ENCOUNTER
Requested Prescriptions     Signed Prescriptions Disp Refills    colchicine (MITIGARE) 0.6 mg capsule 30 Capsule 0     Sig: Take 1 Capsule by mouth daily for 30 days.      Authorizing Provider: Gary Savage     Ordering User: Behzad ROCK [place] will only cover capsules

## 2021-05-25 ENCOUNTER — TELEPHONE (OUTPATIENT)
Dept: CASE MANAGEMENT | Age: 61
End: 2021-05-25

## 2021-05-25 NOTE — TELEPHONE ENCOUNTER
Spoke with [email or web address]. Patient is at the pharmacy picking up his medications. Explained this was a follow up phone call to check to see how the patient was doing. She said I was the third person who had called. She states he is doing fine. She would let him know to call me back if he wanted to.

## 2021-05-25 NOTE — DISCHARGE SUMMARY
Discharge Summary       PATIENT ID: Jorge Jorgensen  MRN: [id number]   YOB: 1960    DATE OF ADMISSION: 5/13/2021  8:15 AM    DATE OF DISCHARGE: 5/21/21   PRIMARY CARE PROVIDER: Alcon Nicole MD     ATTENDING PHYSICIAN: Eriberto Blum  DISCHARGING PROVIDER: Maribeth Padilla MD    To contact this individual call [phone] and ask the  to page. If unavailable ask to be transferred the Adult Hospitalist Department. CONSULTATIONS: IP CONSULT TO CARDIOLOGY  IP CONSULT TO PULMONOLOGY  IP CONSULT TO ADVANCED HEART FAILURE  IP CONSULT TO ELECTROPHYSIOLOGY    PROCEDURES/SURGERIES: Procedure(s):  LEFT AND RIGHT HEART CATH / CORONARY ANGIOGRAPHY    ADMITTING [address] [place] [address] COURSE:   Dee Caballero is a 64 y. o. male who presents from home with wife for shortness of breath.  He has a diagnosis of COPD for which he sees pulmonology. Ray Oconnor has been saying that his shortness of breath is getting worse over the weeks.  No associated fevers. Ray Oconnor has been placed on steroids Zithromax and nebulizers by his pulmonologist but it has no improvement. Nolan Schrader gotten first shot of Covid vaccine. He was getting worse to the point that he came to the ED with considerable shortness of breath.  Was placed on BiPAP in the ER with considerable improvement.  Rapid Covid is negative.  Chest x-ray shows fluid overload.  He is denying having any chest pain though the troponin is elevated. He did report of some heartburn earlier in the ER.   We were asked to admit this patient for COPD exacerbation      Assessment & Plan:      Acute hypoxic respiratory failure, resolved       COPD exacerbation resolved     Acute systolic heart failure EF 20%, BiV failure, NYHA IV on admission and NYHA 3 at d/c  Appreciate AHF/cardiology-was started on milrinone, entresto, aldactone, corlanor, allopurinol  Cont Dobutamine  5mcg/kg/min- watch HR and rhythm  No BBrx due to RV dysfunction and while on Dobutamine   No entresto due to hypotension given life vets at d/c      Currently on dobutamine drip, hydralazine 10 mg twice daily added, digoxin 0.25  mcg added for tachycardia and PVCs       Congestive hepatopathy/transaminitis     EDGAR vs CKD, unknown baseline  Presented with creatinine of 1.7. Has improved to 1.2 with diuresis  Monitor while being diuresed     Replete mag, phos     25.0 - 29.9 Overweight / Body mass index is 24.34 kg/m². DISCHARGE DIAGNOSES / PLAN:      1.  d/c home     ADDITIONAL CARE RECOMMENDATIONS:        PENDING TEST RESULTS:   At the time of discharge the following test results are still pending:     FOLLOW UP APPOINTMENTS:    Follow-up Information     Follow up With Specialties Details Why Contact Info Additional Information    [facility]  Will call to discuss enrollment in OP program for HF if eligible. [address], [address]. Please arrive 15 minutes prior to your appointment time and you will register in the [place] [address], [address], on the first floor of the [address] Telephone: [phone] Fax: [phone] Driving directions To [facility] [place]. Building: Driving WEST on [address], take exit [address] to [employer]. Turn left onto [facility] [address] [facility], then turn right into [employer] Parking lot Driving EAST on [address], take exit [address] [facility]. Turn right at the end of the exit ramp. Turn left onto [facility] [address], then turn right into [employer] lot. Britney Blake MD Internal Medicine On 6/8/2021 Hospital follow up PCP appointment Tuesday, 6/8/21 at 12:00 p.m.   Please arrive at 11:30 a.m.  [facility]  [address] [place] [address] [facility] Cardiology On 5/27/2021 1:30pm [address] [place] [address] [phone] DIET: Cardiac Diet    ACTIVITY: Activity as tolerated    WOUND CARE:     EQUIPMENT needed:       DISCHARGE MEDICATIONS:  Discharge Medication List as of 5/21/2021  3:44 PM      START taking these medications    Details   DOBUTamine (DOBUTREX) 500 mg/250 mL (2,000 mcg/mL) infusion 363 mcg/min by IntraVENous route continuous. , No Print, Disp-250 mL, R-0      ergocalciferol (ERGOCALCIFEROL) 1,250 mcg (50,000 unit) capsule Take 1 Capsule by mouth every seven (7) days for 12 doses. , Normal, Disp-12 Capsule, R-0      magnesium oxide (MAG-OX) 400 mg tablet Take 1 Tablet by mouth two (2) times a day., Normal, Disp-120 Tablet, R-2         CONTINUE these medications which have CHANGED    Details   aspirin 81 mg chewable tablet Take 1 Tablet by mouth daily for 270 days. , Normal, Disp-90 Tablet, R-2      allopurinoL (ZYLOPRIM) 100 mg tablet Take 1 Tablet by mouth daily for 30 days. , Normal, Disp-90 Tablet, R-2      digoxin (LANOXIN) 0.25 mg tablet Take 1 Tablet by mouth daily. , Normal, Disp-90 Tablet, R-2      furosemide (LASIX) 20 mg tablet Take 0.5 Tablets by mouth daily. , Normal, Disp-90 Tablet, R-2      hydrALAZINE (APRESOLINE) 10 mg tablet Take 1 Tablet by mouth three (3) times daily. , Normal, Disp-180 Tablet, R-2      ivabradine (CORLANOR) 7.5 mg tablet Take 1 Tablet by mouth two (2) times daily (with meals). , Normal, Disp-180 Tablet, R-2      mexiletine (MEXITIL) 150 mg capsule Take 1 Capsule by mouth three (3) times daily. , Normal, Disp-180 Capsule, R-2      spironolactone (ALDACTONE) 25 mg tablet Take 0.5 Tablets by mouth daily. , Normal, Disp-90 Tablet, R-2      colchicine 0.6 mg tablet Take 1 Tablet by mouth daily for 30 days. , Normal, Disp-90 Tablet, R-2         CONTINUE these medications which have NOT CHANGED    Details   budesonide-formoteroL (Symbicort) 160-4.5 mcg/actuation HFAA Take 2 Puffs by inhalation two (2) times a day., Historical Med      albuterol (ProAir HFA) 90 mcg/actuation inhaler Take 2 Puffs by inhalation every four (4) hours as needed., Historical Med               NOTIFY YOUR PHYSICIAN FOR ANY OF THE FOLLOWING:   Fever over 101 degrees for 24 hours. Chest pain, shortness of breath, fever, chills, nausea, vomiting, diarrhea, change in mentation, falling, weakness, bleeding. Severe pain or pain not relieved by medications. Or, any other signs or symptoms that you may have questions about. DISPOSITION:  x  Home With:   OT  PT  HH  RN       Long term SNF/Inpatient Rehab    Independent/assisted living    Hospice    Other:       PATIENT CONDITION AT DISCHARGE:     Functional status    Poor    x Deconditioned     Independent      Cognition    x Lucid     Forgetful     Dementia      Catheters/lines (plus indication)    Mo    x PICC     PEG     None      Code status   x  Full code     DNR      PHYSICAL EXAMINATION AT DISCHARGE:  General:          Alert, cooperative, no distress, appears stated age. HEENT:           Atraumatic, anicteric sclerae, pink conjunctivae                          No oral ulcers, mucosa moist, throat clear, dentition fair  Neck:               Supple, symmetrical  Lungs:             Clear to auscultation bilaterally. No Wheezing or Rhonchi. No rales. Chest wall:      No tenderness  No Accessory muscle use. Heart:              Regular  rhythm,  No  murmur   No edema  Abdomen:        Soft, non-tender. Not distended. Bowel sounds normal  Extremities:     No cyanosis. No clubbing,                            Skin turgor normal, Capillary refill normal  Skin:                Not pale. Not Jaundiced  No rashes   Psych:             Not anxious or agitated.   Neurologic:      Alert, moves all extremities, answers questions appropriately and responds to commands       CHRONIC MEDICAL DIAGNOSES:  Problem List as of 5/21/2021 Never Reviewed        Codes Class Noted - Resolved    RESOLVED: [facility] failure ([facility] ICD-10-CM: I50.9  ICD-9-CM: 428.9  5/13/2021 - 5/21/2021 Greater than 30  minutes were spent with the patient on counseling and coordination of care    Signed:   Cooper Fang MD  5/25/2021  9:51 AM

## 2021-05-26 ENCOUNTER — TELEPHONE (OUTPATIENT)
Dept: CARDIOLOGY CLINIC | Age: 61
End: 2021-05-26

## 2021-05-26 NOTE — TELEPHONE ENCOUNTER
Patient called and left voicemail in preparation for his appt tomorrow, has questions to be answered: why is he being treated for gout, and can he get his second COVID vaccine( he missed getting it while in hospital)      I called patient, advised him per Naty Lala:  He can have his second vaccine and he is not being treated for gout, the colchicine is for the inflammation in his heart.  Can you please ask him if he can come at 1230 tomorrow instead of 130   He states understanding, has no further questions.

## 2021-05-27 ENCOUNTER — OFFICE VISIT (OUTPATIENT)
Dept: CARDIOLOGY CLINIC | Age: 61
End: 2021-05-27
Payer: MEDICAID

## 2021-05-27 VITALS
RESPIRATION RATE: 18 BRPM | BODY MASS INDEX: 24.95 KG/M2 | SYSTOLIC BLOOD PRESSURE: 92 MMHG | HEART RATE: 91 BPM | DIASTOLIC BLOOD PRESSURE: 70 MMHG | HEIGHT: 68 IN | OXYGEN SATURATION: 97 % | TEMPERATURE: 98.3 F | WEIGHT: 164.6 LBS

## 2021-05-27 DIAGNOSIS — I50.21 ACUTE SYSTOLIC CONGESTIVE HEART FAILURE (HCC): Primary | ICD-10-CM

## 2021-05-27 PROCEDURE — 99496 TRANSJ CARE MGMT HIGH F2F 7D: CPT | Performed by: NURSE PRACTITIONER

## 2021-05-27 RX ORDER — AZITHROMYCIN 250 MG/1
TABLET, FILM COATED ORAL
COMMUNITY
Start: 2021-05-05 | End: 2021-08-06 | Stop reason: ALTCHOICE

## 2021-05-27 RX ORDER — PREDNISONE 10 MG/1
TABLET ORAL
COMMUNITY
Start: 2021-05-05 | End: 2021-07-09
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
## 2021-05-27 NOTE — PROGRESS NOTES
Chief Complaint   Patient presents with   [facility] Follow Up     Visit Vitals  BP 92/70 (BP 1 Location: Left arm, BP Patient Position: Sitting, BP Cuff Size: Small adult)   Pulse 91   Temp 98.3 °F (36.8 °C) (Oral)   Resp 18   Ht 5' 8\" (1.727 m)   Wt 164 lb 9.6 oz (74.7 kg)   SpO2 97%   BMI 25.03 kg/m²     1. Have you been to the ER, urgent care clinic since your last visit? Hospitalized since your last visit? Yes    2. Have you seen or consulted any other health care providers outside of the [address] [place] [address] since your last visit? Include any pap smears or colon screening.  yes

## 2021-05-27 NOTE — PATIENT INSTRUCTIONS
Medication changes: No changes today Please take this to your pharmacy to notify them of the change in medications. Testing Ordered: An order for Cardiac MRI has been placed to be done in 3 months. You will be receiving an automated call from Coordination of Care to schedule this test. If you are unavailable to receive the call or would like to contact coordination of care yourself you may contact [phone] to schedule. You will need to contact coordination of care yourself if you miss their calls as they will only make 3 attempts to reach you. Other Recommendations:  
  
Ensure your drinking an adequate amount of water with a goal of 6-8 eight ounce glasses (1.5-2 liters) of fluid daily. Your urine should be clear and light yellow straw colored. If your blood pressure begins to consistently run below 90/60 and/or you begin to experience dizziness or lightheadedness, please contact the [facility] at [phone]. Follow up 3 weeks with [facility] Please monitor your weights daily upon waking and after using the bathroom. Keep a written records of your weights and bring to your next appointment. If you have a weight gain of 3 or more pounds overnight OR 5 or more pounds in one week please contact our office. Thank you for allowing us the privilege of being a part of your healthcare team! Please do not hesitate to contact our office at [phone] with any questions or concerns. Virtual Heart Failure Support Group [employer] invites you to learn more about heart failure and to share your questions, ideas, and experiences with others. Each month, the Heart Failure Support Group features a new educational topic and a guest speaker, followed by an interactive discussion. Our Heart Failure Nurse Navigator will moderate each session. You will be able to participate by phone, tablet or computer through [address] [place] [address].  This support group takes place on the 3rd Thursday of each month from 6:00-7:30PM. All individuals living with heart failure and their caregivers are welcome to join. If you are interested in participating, please contact us at [email or web address] and you will be sent the link to join the [employer].

## 2021-06-02 ENCOUNTER — TELEPHONE (OUTPATIENT)
Dept: CARDIOLOGY CLINIC | Age: 61
End: 2021-06-02

## 2021-06-02 RX ORDER — DIGOXIN 250 MCG
0.12 TABLET ORAL DAILY
Qty: 90 TABLET | Refills: 2
Start: 2021-06-02 | End: 2021-11-05

## 2021-06-02 NOTE — TELEPHONE ENCOUNTER
I called patient and advised him per CHERRY Lux:  Please decrease digoxin to 1/2 tab daily, check in 1 week   He states understanding. He also states that he is short of breath, has been trying to get his inhalers but is waiting for [facility] auth from [facility]. I advised he call again to notify them that he has been unable to get inhalers. He states BP is 91/62, , weight today is 160.6 lb, has been stable at 160 for past several days, he is taking lasix 10 mg daily.

## 2021-06-04 ENCOUNTER — TELEPHONE (OUTPATIENT)
Dept: CARDIOLOGY CLINIC | Age: 61
End: 2021-06-04

## 2021-06-04 NOTE — TELEPHONE ENCOUNTER
Returned patients call using two patient identifiers. Patient left message stating that he was out of doubatmine bags. However when speaking to patient he states he didn't mean to call [facility]. He also notes he just spoke with [employer] which notified him that new dobutamine bags she be brought to his home today. He states his dobutamine is still infusing and that he does not need to change his bag until tomorrow. Advised patient to call [facility] if he does not receive his new dobutamine bags today.  Azam Hernandez RN

## 2021-06-11 NOTE — ADT AUTH CERT NOTES
URGENT REQUEST:     REF # [id number]  PLEASE SEND FAX OR CALL WITH UPDATE OF DAYS APPROVED AND STATUS OF P2P DC SUMMARY HAS BEEN SUBMITTED ON 6/1      [facility]     [phone] [address] [place] [address]  [phone] PHONE

## 2021-06-16 ENCOUNTER — TELEPHONE (OUTPATIENT)
Dept: CARDIOLOGY CLINIC | Age: 61
End: 2021-06-16

## 2021-06-16 NOTE — TELEPHONE ENCOUNTER
Telephone Call RE:  Appointment reminder     Outcome:     [] Patient confirmed appointment   [] Patient rescheduled appointment for    [] Unable to reach   [] Left message              [] Other:       Confirmed appt with patient, screened for Covid. Reminder to arrive 15 minutes early for check-in and screening.   Owen Augustin

## 2021-06-17 ENCOUNTER — OFFICE VISIT (OUTPATIENT)
Dept: CARDIOLOGY CLINIC | Age: 61
End: 2021-06-17
Payer: MEDICAID

## 2021-06-17 VITALS
HEART RATE: 104 BPM | HEIGHT: 68 IN | BODY MASS INDEX: 24.95 KG/M2 | OXYGEN SATURATION: 96 % | TEMPERATURE: 97.8 F | RESPIRATION RATE: 18 BRPM | WEIGHT: 164.6 LBS | DIASTOLIC BLOOD PRESSURE: 82 MMHG | SYSTOLIC BLOOD PRESSURE: 120 MMHG

## 2021-06-17 DIAGNOSIS — R06.02 SHORTNESS OF BREATH: ICD-10-CM

## 2021-06-17 DIAGNOSIS — Z79.899 RECEIVING INOTROPIC MEDICATION: ICD-10-CM

## 2021-06-17 DIAGNOSIS — I50.84 END STAGE CONGESTIVE HEART FAILURE (HCC): Primary | ICD-10-CM

## 2021-06-17 PROCEDURE — 99214 OFFICE O/P EST MOD 30 MIN: CPT | Performed by: NURSE PRACTITIONER
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
## 2021-06-17 NOTE — PATIENT INSTRUCTIONS
Medication changes: No changes to medications today. Make sure to take the Mexiletine and the hydralazine 3 times a day- roughly every 6-8 hours. You can change the times of day that you take these to fit better with your schedule. Testing Ordered: We will plan for a repeat echocardiogram at the end of August.  You will be receiving an automated call from Coordination of Care to schedule this test. If you are unavailable to receive the call or would like to contact coordination of care yourself you may contact [phone] to schedule. You will need to contact coordination of care yourself if you miss their calls as they will only make 3 attempts to reach you. Other Recommendations:  
  
Ensure your drinking an adequate amount of water with a goal of 6-8 eight ounce glasses (1.5-2 liters) of fluid daily. Your urine should be clear and light yellow straw colored. If your blood pressure begins to consistently run below 90/60 and/or you begin to experience dizziness or lightheadedness, please contact the [facility] at [phone]. Follow up in 2 weeks with Genny Pickett Jajah Please monitor your weights daily upon waking and after using the bathroom. Keep a written records of your weights and bring to your next appointment. If you have a weight gain of 3 or more pounds overnight OR 5 or more pounds in one week please contact our office. Thank you for allowing us the privilege of being a part of your healthcare team! Please do not hesitate to contact our office at [phone] with any questions or concerns. Virtual Heart Failure Support Group [employer] invites you to learn more about heart failure and to share your questions, ideas, and experiences with others. Each month, the Heart Failure Support Group features a new educational topic and a guest speaker, followed by an interactive discussion.  Our Heart Failure Nurse Navigator will moderate each session. You will be able to participate by phone, tablet or computer through [address] [place] [address]. This support group takes place on the 3rd Thursday of each month from 6:00-7:30PM. All individuals living with heart failure and their caregivers are welcome to join. If you are interested in participating, please contact us at [email or web address] and you will be sent the link to join the [employer].

## 2021-06-30 ENCOUNTER — TELEPHONE (OUTPATIENT)
Dept: CARDIOLOGY CLINIC | Age: 61
End: 2021-06-30

## 2021-06-30 NOTE — TELEPHONE ENCOUNTER
Telephone Call RE:  Appointment reminder     Outcome:     [] Patient confirmed appointment   [] Patient rescheduled appointment for    [] Unable to reach   [] Left message              [] Other:       Left appt info and reminder to arrive 15 minutes prior to appt for screening and check in. Left msg concerning travel/exposure/Covid 19 symptoms /waiting for Covid test results and requested a return call if any applied.

## 2021-07-07 ENCOUNTER — TELEPHONE (OUTPATIENT)
Dept: CARDIOLOGY CLINIC | Age: 61
End: 2021-07-07

## 2021-07-08 ENCOUNTER — TELEPHONE (OUTPATIENT)
Dept: CARDIOLOGY CLINIC | Age: 61
End: 2021-07-08

## 2021-07-08 NOTE — TELEPHONE ENCOUNTER
Telephone Call RE:  Appointment reminder     Outcome:     [x] Patient confirmed appointment   [] Patient rescheduled appointment for    [] Unable to reach   [] Left message              [] Other:       Confirmed appt with patient, screened for Covid. Reminder to arrive 15 minutes early for check-in and screening.

## 2021-07-09 ENCOUNTER — OFFICE VISIT (OUTPATIENT)
Dept: CARDIOLOGY CLINIC | Age: 61
End: 2021-07-09
Payer: MEDICAID

## 2021-07-09 VITALS
SYSTOLIC BLOOD PRESSURE: 122 MMHG | TEMPERATURE: 98.1 F | WEIGHT: 166.6 LBS | BODY MASS INDEX: 25.25 KG/M2 | HEIGHT: 68 IN | HEART RATE: 96 BPM | RESPIRATION RATE: 16 BRPM | DIASTOLIC BLOOD PRESSURE: 76 MMHG | OXYGEN SATURATION: 97 %

## 2021-07-09 DIAGNOSIS — I50.21 ACUTE SYSTOLIC CONGESTIVE HEART FAILURE (HCC): ICD-10-CM

## 2021-07-09 DIAGNOSIS — I50.84 END STAGE CONGESTIVE HEART FAILURE (HCC): Primary | ICD-10-CM

## 2021-07-09 DIAGNOSIS — Z79.899 RECEIVING INOTROPIC MEDICATION: ICD-10-CM

## 2021-07-09 PROCEDURE — 93000 ELECTROCARDIOGRAM COMPLETE: CPT | Performed by: NURSE PRACTITIONER

## 2021-07-09 PROCEDURE — 99215 OFFICE O/P EST HI 40 MIN: CPT | Performed by: NURSE PRACTITIONER
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
## 2021-07-09 NOTE — PATIENT INSTRUCTIONS
Medication changes:    No medication changes today. Please take this to your pharmacy to notify them of the change in medications. Testing Ordered:    No testing today. We will request labs from your Home Health nurse. Other Recommendations:      Ensure your drinking an adequate amount of water with a goal of 6-8 eight ounce glasses (1.5-2 liters) of fluid daily. Your urine should be clear and light yellow straw colored. If your blood pressure begins to consistently run below 90/60 and/or you begin to experience dizziness or lightheadedness, please contact the Allie Pugh at [phone]. Follow up in 1 month with [facility]ie P[facility]. Please monitor your weights daily upon waking and after using the bathroom. Keep a written records of your weights and bring to your next appointment. If you have a weight gain of 3 or more pounds overnight OR 5 or more pounds in one week please contact our office. Thank you for allowing us the privilege of being a part of your healthcare team! Please do not hesitate to contact our office at [phone] with any questions or concerns. Virtual Heart Failure [address] invites you to learn more about heart failure and to share your questions, ideas, and experiences with others. Each month, the Heart Failure Support Group features a new educational topic and a guest speaker, followed by an interactive discussion. Our Heart Failure Nurse Navigator will moderate each session. You will be able to participate by phone, tablet or computer through [address] [place] [address]. This support group takes place on the 3rd Thursday of each month from 6:00-7:30PM. All individuals living with heart failure and their caregivers are welcome to join. If you are interested in participating, please contact us at [email or web address] and you will be sent the link to join the [employer].

## 2021-07-16 ENCOUNTER — TELEPHONE (OUTPATIENT)
Dept: CARDIOLOGY CLINIC | Age: 61
End: 2021-07-16
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
## 2021-07-22 ENCOUNTER — TELEPHONE (OUTPATIENT)
Dept: CARDIOLOGY CLINIC | Age: 61
End: 2021-07-22
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
## 2021-07-28 ENCOUNTER — TELEPHONE (OUTPATIENT)
Dept: CARDIOLOGY CLINIC | Age: 61
End: 2021-07-28

## 2021-07-28 NOTE — TELEPHONE ENCOUNTER
Isabelle Rodriguez called from [facility] she states blood pressure has yesterday 7/27 99/69 7/28 96/70 Hr between . She states patient is asymptomatic. Denies sob, swelling, palpitations, chest pain,  Or dizziness. She states that this is a down trend and patiens blood pressure is typically in the one teens systolic. She just wanted to report but patient states he's feeling good. Elbert Baird RN, NP  You 1 hour ago (9:08 AM)   EP  No changes for now    Message text      Called morgan from [facility] and notified her of the above information per Modesto Villasenor. Educated her to call if patients blood pressures trend down any more or if patient becomes symptomatic. She stated understanding and had no further questions.  Jimy Borrero RN

## 2021-07-28 NOTE — TELEPHONE ENCOUNTER
Please call Stefan Montero, patients infusion nurse to discuss patients Blood Pressure. She is concerned because he is a little hypertensive.   Her call back number is [phone]

## 2021-08-06 ENCOUNTER — TELEPHONE (OUTPATIENT)
Dept: CARDIOLOGY CLINIC | Age: 61
End: 2021-08-06

## 2021-08-06 ENCOUNTER — OFFICE VISIT (OUTPATIENT)
Dept: CARDIOLOGY CLINIC | Age: 61
End: 2021-08-06
Payer: MEDICAID

## 2021-08-06 ENCOUNTER — HOSPITAL ENCOUNTER (OUTPATIENT)
Dept: GENERAL RADIOLOGY | Age: 61
Discharge: HOME | End: 2021-08-06
Payer: MEDICAID

## 2021-08-06 VITALS
SYSTOLIC BLOOD PRESSURE: 110 MMHG | RESPIRATION RATE: 24 BRPM | TEMPERATURE: 97.9 F | BODY MASS INDEX: 24.98 KG/M2 | HEART RATE: 91 BPM | DIASTOLIC BLOOD PRESSURE: 68 MMHG | OXYGEN SATURATION: 94 % | WEIGHT: 164.8 LBS | HEIGHT: 68 IN

## 2021-08-06 DIAGNOSIS — Z79.899 RECEIVING INOTROPIC MEDICATION: ICD-10-CM

## 2021-08-06 DIAGNOSIS — L98.9 SKIN LESION: ICD-10-CM

## 2021-08-06 DIAGNOSIS — R06.00 DYSPNEA, UNSPECIFIED TYPE: ICD-10-CM

## 2021-08-06 DIAGNOSIS — I50.21 ACUTE SYSTOLIC CONGESTIVE HEART FAILURE (HCC): Primary | ICD-10-CM

## 2021-08-06 DIAGNOSIS — I50.84 END STAGE CONGESTIVE HEART FAILURE (HCC): ICD-10-CM

## 2021-08-06 PROCEDURE — 71046 X-RAY EXAM CHEST 2 VIEWS: CPT

## 2021-08-06 PROCEDURE — 99215 OFFICE O/P EST HI 40 MIN: CPT | Performed by: NURSE PRACTITIONER

## 2021-08-06 RX ORDER — SPIRONOLACTONE 25 MG/1
12.5 TABLET ORAL DAILY
Qty: 45 TABLET | Refills: 2 | Status: SHIPPED | OUTPATIENT
Start: 2021-08-06 | End: 2022-03-05

## 2021-08-06 RX ORDER — GUAIFENESIN 100 MG/5ML
81 LIQUID (ML) ORAL DAILY
Qty: 90 TABLET | Refills: 2 | Status: SHIPPED | OUTPATIENT
Start: 2021-08-06 | End: 2022-06-27

## 2021-08-06 RX ORDER — MEXILETINE HYDROCHLORIDE 150 MG/1
150 CAPSULE ORAL 3 TIMES DAILY
Qty: 90 CAPSULE | Refills: 1 | Status: SHIPPED | OUTPATIENT
Start: 2021-08-06 | End: 2022-02-02 | Stop reason: SDUPTHER

## 2021-08-06 RX ORDER — FUROSEMIDE 20 MG/1
10 TABLET ORAL DAILY
Qty: 90 TABLET | Refills: 0 | Status: ON HOLD | OUTPATIENT
Start: 2021-08-06 | End: 2022-06-22 | Stop reason: SDUPTHER

## 2021-08-06 NOTE — TELEPHONE ENCOUNTER
Left message on patient voicemail with his full name with message per CASTRO Hester that CXR is stable.
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
## 2021-08-06 NOTE — PATIENT INSTRUCTIONS
Medication changes:  Refills sent to [facility] today        Please take this to your pharmacy to notify them of the change in medications. Testing Ordered:  Please call Pulmonoloist for sleep study    Please have chest xray done today      Other Recommendations:      Ensure your drinking an adequate amount of water with a goal of 6-8 eight ounce glasses (1.5-2 liters) of fluid daily. Your urine should be clear and light yellow straw colored. If your blood pressure begins to consistently run below 90/60 and/or you begin to experience dizziness or lightheadedness, please contact the [facility] at [phone]. Follow up 2-3 days after MRI in September  with [facility]      Please monitor your weights daily upon waking and after using the bathroom. Keep a written records of your weights and bring to your next appointment. If you have a weight gain of 3 or more pounds overnight OR 5 or more pounds in one week please contact our office. Thank you for allowing us the privilege of being a part of your healthcare team! Please do not hesitate to contact our office at [phone] with any questions or concerns. Virtual Heart Failure [address] invites you to learn more about heart failure and to share your questions, ideas, and experiences with others. Each month, the Heart Failure Support Group features a new educational topic and a guest speaker, followed by an interactive discussion. Our Heart Failure Nurse Navigator will moderate each session. You will be able to participate by phone, tablet or computer through [address] [place] [address]. This support group takes place on the 3rd Thursday of each month from 6:00-7:30PM. All individuals living with heart failure and their caregivers are welcome to join. If you are interested in participating, please contact us at [email or web address] and you will be sent the link to join the [employer].

## 2021-08-09 ENCOUNTER — TELEPHONE (OUTPATIENT)
Dept: CARDIOLOGY CLINIC | Age: 61
End: 2021-08-09

## 2021-08-09 NOTE — TELEPHONE ENCOUNTER
Patient called and left a voicemail stating that he thinks is home health nurse should be drawing labs but the [facility] nurse is stating that she needs orders. He would like us to clarify orders for labs with [facility] Nurse. Rasta Martinez RN       Lab work scheduled. See next note.  Rasta Martinez RN

## 2021-08-10 ENCOUNTER — TELEPHONE (OUTPATIENT)
Dept: CARDIOLOGY CLINIC | Age: 61
End: 2021-08-10

## 2021-08-10 NOTE — TELEPHONE ENCOUNTER
Message  Received: Today  Ray Orta, ZANA Albrecht, RN  CMP, proBNP, Mg, CBC, dig with next [facility] visit. Monthly thereafter         Called [facility] and spoke with chirag in nursing department. Notified her on above lab orders per CASTRO Hester NP. She stated understanding and will let pharmacy and [facility] know. She had no further questions ANNA Ragland RN

## 2021-08-12 ENCOUNTER — TELEPHONE (OUTPATIENT)
Dept: CARDIOLOGY CLINIC | Age: 61
End: 2021-08-12

## 2021-08-19 ENCOUNTER — HOSPITAL ENCOUNTER (EMERGENCY)
Age: 61
Discharge: LEFT BEFORE DISCHARGE | End: 2021-08-20
Attending: EMERGENCY MEDICINE
Payer: MEDICAID

## 2021-08-19 VITALS
OXYGEN SATURATION: 95 % | TEMPERATURE: 96.9 F | HEART RATE: 103 BPM | SYSTOLIC BLOOD PRESSURE: 117 MMHG | RESPIRATION RATE: 18 BRPM | DIASTOLIC BLOOD PRESSURE: 82 MMHG

## 2021-08-19 PROCEDURE — 75810000275 HC EMERGENCY DEPT VISIT NO LEVEL OF CARE

## 2021-08-19 NOTE — ED TRIAGE NOTES
He is a heart failure patient who was advised by his nurse to come in after he sent her a picture of his right forearm where he had a \"dot\". It appears to be an area of ecchymosis. He says he thinks he is on a blood thinner. He has an infusion for his heart going of Dobutamine. He says he feels fine.

## 2021-08-20 NOTE — ED NOTES
According to the triage nurse patient has been called on multiple occasions and he has not responded to his phone  Nor has he returned to the emergency department. I did not evaluate this patient.

## 2021-08-20 NOTE — ED NOTES
He was called numerous times by que and Md. I left a message on his phone. He will be removed from the board

## 2021-09-14 ENCOUNTER — HOSPITAL ENCOUNTER (OUTPATIENT)
Dept: MRI IMAGING | Age: 61
Discharge: HOME | End: 2021-09-14
Attending: NURSE PRACTITIONER
Payer: MEDICAID

## 2021-09-14 DIAGNOSIS — I50.84 END STAGE CONGESTIVE HEART FAILURE (HCC): ICD-10-CM

## 2021-09-14 PROCEDURE — 75561 CARDIAC MRI FOR MORPH W/DYE: CPT

## 2021-09-14 PROCEDURE — 74011250636 HC RX REV CODE- 250/636: Performed by: NURSE PRACTITIONER

## 2021-09-14 PROCEDURE — 75561 CARDIAC MRI FOR MORPH W/DYE: CPT | Performed by: INTERNAL MEDICINE

## 2021-09-14 PROCEDURE — A9576 INJ PROHANCE MULTIPACK: HCPCS | Performed by: NURSE PRACTITIONER

## 2021-09-14 RX ADMIN — GADOTERIDOL 22 ML: 279.3 INJECTION, SOLUTION INTRAVENOUS at 13:53

## 2021-09-16 ENCOUNTER — TELEPHONE (OUTPATIENT)
Dept: CARDIOLOGY CLINIC | Age: 61
End: 2021-09-16

## 2021-09-16 ENCOUNTER — OFFICE VISIT (OUTPATIENT)
Dept: CARDIOLOGY CLINIC | Age: 61
End: 2021-09-16
Payer: MEDICAID

## 2021-09-16 VITALS
WEIGHT: 163.7 LBS | SYSTOLIC BLOOD PRESSURE: 108 MMHG | HEART RATE: 104 BPM | OXYGEN SATURATION: 96 % | BODY MASS INDEX: 24.81 KG/M2 | RESPIRATION RATE: 20 BRPM | DIASTOLIC BLOOD PRESSURE: 70 MMHG | TEMPERATURE: 98.2 F | HEIGHT: 68 IN

## 2021-09-16 DIAGNOSIS — R06.02 SHORTNESS OF BREATH: ICD-10-CM

## 2021-09-16 DIAGNOSIS — I50.21 ACUTE SYSTOLIC CONGESTIVE HEART FAILURE (HCC): Primary | ICD-10-CM

## 2021-09-16 PROCEDURE — 99215 OFFICE O/P EST HI 40 MIN: CPT | Performed by: INTERNAL MEDICINE

## 2021-09-16 PROCEDURE — 94618 PULMONARY STRESS TESTING: CPT | Performed by: INTERNAL MEDICINE

## 2021-09-16 PROCEDURE — 93000 ELECTROCARDIOGRAM COMPLETE: CPT | Performed by: INTERNAL MEDICINE

## 2021-09-16 RX ORDER — ALLOPURINOL 100 MG/1
TABLET ORAL
COMMUNITY
Start: 2021-08-20 | End: 2021-09-16

## 2021-09-16 RX ORDER — LANOLIN ALCOHOL/MO/W.PET/CERES
400 CREAM (GRAM) TOPICAL 2 TIMES DAILY
Qty: 60 TABLET | Refills: 2 | Status: SHIPPED | OUTPATIENT
Start: 2021-09-16 | End: 2021-11-30

## 2021-09-16 NOTE — TELEPHONE ENCOUNTER
Called [facility] and spoke with pharmacist nayeli. Advised her per Dr Vianca Pardo to decrease dobutamine rate to 2.5 mcg/kg/min. She states she will reach out to home health company so they can perform rate change. She also states that new dobutamine bags should be arriving today at the patients house around noon.  Amira Neal RN

## 2021-09-16 NOTE — PATIENT INSTRUCTIONS
Medication changes:    STOP TAKING Hydralazine, allopurinol, colchicine    A Nurse will be coming to decrease your dobutamine. Please take this to your pharmacy to notify them of the change in medications. Testing Ordered:    EKG done in clinic    6 min walk done in clinic     Other Recommendations: An order for Echo has been placed to be done 2 weeks. You will be receiving an automated call from [facility] to schedule this test. If you are unavailable to receive the call or would like to contact coordination of care yourself you may contact [phone] to schedule. You will need to contact coordination of care yourself if you miss their calls as they will only make 3 attempts to reach you. A referral to pulmonologist has been placed for evaluation of need for oxygen use. They will reachout to you for scheduling. Please call if you start to have any symptoms such as shortness of breath, weight gain, chest pain, palpitations, or dizziness. [phone] option 2      Ensure your drinking an adequate amount of water with a goal of 6-8 eight ounce glasses (1.5-2 liters) of fluid daily. Your urine should be clear and light yellow straw colored. If your blood pressure begins to consistently run below 90/60 and/or you begin to experience dizziness or lightheadedness, please contact the [facility] at [phone]. Follow up 2-3 weeks with Dr. Feliciano Diaz with [facility]      Please monitor your weights daily upon waking and after using the bathroom. Keep a written records of your weights and bring to your next appointment. If you have a weight gain of 3 or more pounds overnight OR 5 or more pounds in one week please contact our office. Thank you for allowing us the privilege of being a part of your healthcare team! Please do not hesitate to contact our office at [phone] with any questions or concerns.        Virtual Heart Failure Support [address] invites you to learn more about heart failure and to share your questions, ideas, and experiences with others. Each month, the Heart Failure Support Group features a new educational topic and a guest speaker, followed by an interactive discussion. Our Heart Failure Nurse Navigator will moderate each session. You will be able to participate by phone, tablet or computer through [address] [place] [address]. This support group takes place on the 3rd Thursday of each month from 6:00-7:30PM. All individuals living with heart failure and their caregivers are welcome to join. If you are interested in participating, please contact us at [email or web address] and you will be sent the link to join the [employer].

## 2021-09-16 NOTE — TELEPHONE ENCOUNTER
Referral to Pulmonary Disease faxed to [facility] with [facility] ([phone]). This will be reviewed and they will contact patient to schedule his appointment.
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
## 2021-09-20 ENCOUNTER — TELEPHONE (OUTPATIENT)
Dept: CARDIOLOGY CLINIC | Age: 61
End: 2021-09-20
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
## 2021-09-21 NOTE — TELEPHONE ENCOUNTER
Verified patient identity by two identifiers. Coordinated a 3 mo fu with patient, per Dr. Bonnie Hernandez on 12/28/2021, as requested. Patient verbalized all understanding and had no additional questions.     Future Appointments   Date Time Provider Debbi Davis   10/4/2021  8:00 AM ECHO LAB 2 [address]   12/28/2021  9:40 AM MD JAYCE Martino AMB

## 2021-09-23 ENCOUNTER — TELEPHONE (OUTPATIENT)
Dept: CARDIOLOGY CLINIC | Age: 61
End: 2021-09-23

## 2021-09-23 NOTE — TELEPHONE ENCOUNTER
Anastasia from [employer] called stating that there was a stat lab alert on patient for CPK of 528. Np notified.  Meryle Libman RN

## 2021-10-05 ENCOUNTER — TELEPHONE (OUTPATIENT)
Dept: CARDIOLOGY CLINIC | Age: 61
End: 2021-10-05

## 2021-10-05 DIAGNOSIS — I50.21 ACUTE SYSTOLIC CONGESTIVE HEART FAILURE (HCC): Primary | ICD-10-CM

## 2021-10-05 DIAGNOSIS — R06.02 SHORTNESS OF BREATH: ICD-10-CM

## 2021-10-05 NOTE — TELEPHONE ENCOUNTER
Virginia Brothers MD  You 18 hours ago (6:42 PM)   Katarina Pang  I called [facility] to peer for echocardiogram but the case was cancelled on Friday. Let's reorder echocardiogram.      Order placed for echo per Dr. Anya Huddleston.  Uriel Bruce RN

## 2021-10-27 ENCOUNTER — HOSPITAL ENCOUNTER (OUTPATIENT)
Dept: NON INVASIVE DIAGNOSTICS | Age: 61
Discharge: HOME | End: 2021-10-27
Attending: INTERNAL MEDICINE
Payer: MEDICAID

## 2021-10-27 ENCOUNTER — TELEPHONE (OUTPATIENT)
Dept: CARDIOLOGY CLINIC | Age: 61
End: 2021-10-27

## 2021-10-27 VITALS
SYSTOLIC BLOOD PRESSURE: 108 MMHG | HEIGHT: 68 IN | BODY MASS INDEX: 24.83 KG/M2 | WEIGHT: 163.8 LBS | DIASTOLIC BLOOD PRESSURE: 70 MMHG

## 2021-10-27 DIAGNOSIS — I50.21 ACUTE SYSTOLIC CONGESTIVE HEART FAILURE (HCC): ICD-10-CM

## 2021-10-27 PROCEDURE — 93356 MYOCRD STRAIN IMG SPCKL TRCK: CPT

## 2021-10-27 PROCEDURE — 93306 TTE W/DOPPLER COMPLETE: CPT | Performed by: SPECIALIST
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
## 2021-10-28 ENCOUNTER — TELEPHONE (OUTPATIENT)
Dept: CARDIOLOGY CLINIC | Age: 61
End: 2021-10-28

## 2021-10-28 LAB
ECHO AV AREA PEAK VELOCITY: 3.19 CM2
ECHO AV AREA/BSA PEAK VELOCITY: 1.7 CM2/M2
ECHO AV PEAK GRADIENT: 5.15 MMHG
ECHO AV PEAK VELOCITY: 113.46 CM/S
ECHO LA AREA 4C: 11.16 CM2
ECHO LA TO AORTIC ROOT RATIO: 0.3
ECHO LA TO AORTIC ROOT RATIO: 0.3
ECHO LA VOL 4C: 22.53 ML (ref 18–58)
ECHO LA VOLUME INDEX A4C: 11.98 ML/M2 (ref 16–28)
ECHO LV EDV A2C: 102.82 ML
ECHO LV EDV A4C: 122.76 ML
ECHO LV EDV BP: 123.67 ML (ref 67–155)
ECHO LV EDV INDEX A4C: 65.3 ML/M2
ECHO LV EDV INDEX BP: 65.8 ML/M2
ECHO LV EDV NDEX A2C: 54.7 ML/M2
ECHO LV EJECTION FRACTION A2C: 38 PERCENT
ECHO LV EJECTION FRACTION A4C: 38 PERCENT
ECHO LV EJECTION FRACTION BIPLANE: 40.3 PERCENT (ref 55–100)
ECHO LV ESV A2C: 63.76 ML
ECHO LV ESV A4C: 76.57 ML
ECHO LV ESV BP: 73.82 ML (ref 22–58)
ECHO LV ESV INDEX A2C: 33.9 ML/M2
ECHO LV ESV INDEX A4C: 40.7 ML/M2
ECHO LV ESV INDEX BP: 39.3 ML/M2
ECHO LV GLOBAL LONGITUDINAL STRAIN (GLS): -7.5 PERCENT
ECHO LVOT DIAM: 2.23 CM
ECHO LVOT PEAK GRADIENT: 3.43 MMHG
ECHO LVOT PEAK VELOCITY: 92.57 CM/S
ECHO RV INTERNAL DIMENSION: 3.47 CM
ECHO RV TAPSE: 2.78 CM (ref 1.5–2)
GLOBAL LONGITUDINAL STRAIN 2 CHAMBER: -5.8 PERCENT
GLOBAL LONGITUDINAL STRAIN 4 CHAMBER: -8.5 PERCENT
GLOBAL LONGITUDINAL STRAIN LONG AXIS: -8.3 PERCENT

## 2021-10-28 NOTE — TELEPHONE ENCOUNTER
Returned patients call using two patient identifiers. Patient states his [address] told him to call because his  bp this morning was 163/101 at 7:30. He states that he rechecked his bp again just a couple of minutes ago and it is now 140/92 hr 87. He notes that he took all of his morning medications early this morning around 4 am. Patient currently denies sob, swelling, palpitations, chest pain, dizziness, or headaches. Patient states \" im doing good\". Teressa Nelson RN, ZANA  You 37 minutes ago (10:33 AM)   EP  No med changes for today but he does not have a clinic appt scheduled. Lets see him in the next 3 weeks. Called patient using two patient identifiers. Advised patient on above information. Notified him that I will have yovany reachout to schedule 3 week follow up. He stated understanding and had no further questions.  Brenden Morejon RN

## 2021-10-29 ENCOUNTER — TELEPHONE (OUTPATIENT)
Dept: CARDIOLOGY CLINIC | Age: 61
End: 2021-10-29

## 2021-10-29 NOTE — TELEPHONE ENCOUNTER
Per Dr. Mario Abad, pt echo improved, have his dobutamine stopped and keep PICC care for a week to make sure pt does fine off of Dobutamine. Have pt set up for follow-up appointment w/ [facility] MD or NP next week. Call pt and let him know we are going to stop his dobutamine drip and to call [facility] the day after his dobutamine drip is turned off to provide his blood pressure and heart rate. Also check heart rate when I speak with pt to check heart rate on corlanor. [phone] - spoke w/ Aimee Mcclendon at [facility], gave V.O.R.B per Dr. Mario Abad to stop pt dobutamine drip and keep PICC care for a week to make sure pt does fine off of Dobutamine. Aimee Mcclendon stated she would inform nursing and have them stop his Dobutamine drip. [id number] - called pt using two pt identifiers and informed of HARRIETT Abad information above. Asked pt if he could provide me with his BP and heart rate from today, pt reports BP this morning of 153/110 and HR of 109. Asked pt if he could recheck his BP for me, pt retook BP and reports his BP being 93/60 and  and temp 98. 3. informed pt that was a high heart rate and pt states \"you got me excited with all this good news\". Informed pt not to forget to call [facility] the day after home health stops his dobutamine drip with his blood pressures and heart rates, pt states he keeps a daily log of his BP and HR and will call the day after his dobutamine is stopped. Informed I was going to transfer him to front desk to schedule a follow-up with [facility] next week, pt states understanding and was transferred to [facility] to schedule follow-up visit.

## 2021-11-02 NOTE — TELEPHONE ENCOUNTER
1421 - called pt using two pt identifiers to check BP and HR day after Dobutamine is stopped. Pt reports BP this AM was 150/102 and  and weight is 166.6 lbs. Informed pt BP was a little high and asked if pt could recheck his BP, pt rechecked BP and reports it being 151/95 . Confirmed appt w/ [facility] for tomorrow 11/3/21 at 9:00 AM w/ ALEXUS.  Steve Shepherd NP.    Trudy Aldridge RN

## 2021-11-03 ENCOUNTER — OFFICE VISIT (OUTPATIENT)
Dept: CARDIOLOGY CLINIC | Age: 61
End: 2021-11-03
Payer: MEDICAID

## 2021-11-03 VITALS
HEIGHT: 68 IN | DIASTOLIC BLOOD PRESSURE: 92 MMHG | TEMPERATURE: 97.8 F | OXYGEN SATURATION: 96 % | BODY MASS INDEX: 26.55 KG/M2 | WEIGHT: 175.2 LBS | SYSTOLIC BLOOD PRESSURE: 142 MMHG | HEART RATE: 86 BPM

## 2021-11-03 DIAGNOSIS — R06.02 SHORTNESS OF BREATH: ICD-10-CM

## 2021-11-03 DIAGNOSIS — I50.21 ACUTE SYSTOLIC CONGESTIVE HEART FAILURE (HCC): Primary | ICD-10-CM

## 2021-11-03 DIAGNOSIS — E55.9 VITAMIN D DEFICIENCY: ICD-10-CM

## 2021-11-03 PROCEDURE — 99000 SPECIMEN HANDLING OFFICE-LAB: CPT | Performed by: NURSE PRACTITIONER

## 2021-11-03 PROCEDURE — 94618 PULMONARY STRESS TESTING: CPT | Performed by: NURSE PRACTITIONER

## 2021-11-03 PROCEDURE — 99214 OFFICE O/P EST MOD 30 MIN: CPT | Performed by: NURSE PRACTITIONER

## 2021-11-03 RX ORDER — ACETAMINOPHEN 500 MG
2000 TABLET ORAL DAILY
Qty: 30 CAPSULE | Refills: 2 | Status: SHIPPED
Start: 2021-11-03 | End: 2021-11-05 | Stop reason: DRUGHIGH

## 2021-11-03 NOTE — PATIENT INSTRUCTIONS
Medication changes:    Start taking vitamin D (cholecalciferol) 2,000 units 1 tablet every day. Please take this to your pharmacy to notify them of the change in medications. Testing Ordered: An order for Echo has been placed to be done 1 month prior to follow-up w/ [facility]. You will be receiving an automated call from Coordination of Care to schedule this test. If you are unavailable to receive the call or would like to contact coordination of care yourself you may contact [phone] to schedule. You will need to contact coordination of care yourself if you miss their calls as they will only make 3 attempts to reach you. 6 minute walk done today in clinic. Labs were done today in clinic. We will call you with any abnormal results that require a change in medication regimen. Other Recommendations:      Stop wearing lifevest.    Ensure your drinking an adequate amount of water with a goal of 6-8 eight ounce glasses (1.5-2 liters) of fluid daily. Your urine should be clear and light yellow straw colored. If your blood pressure begins to consistently run below 90/60 and/or you begin to experience dizziness or lightheadedness, please [facility] at [phone]. Follow up 1 month w/ echo results with [facility]      Please monitor your weights daily upon waking and after using the bathroom. Keep a written records of your weights and bring to your next appointment. If you have a weight gain of 3 or more pounds overnight OR 5 or more pounds in one week please contact our office. Thank you for allowing us the privilege of being a part of your healthcare team! Please do not hesitate to contact our office at [phone] with any questions or concerns. Virtual Heart Failure [address] invites you to learn more about heart failure and to share your questions, ideas, and experiences with others.  Each month, the Heart Failure Support Group features a new educational topic and a guest speaker, followed by an interactive discussion. Our Heart Failure Nurse Navigator will moderate each session. You will be able to participate by phone, tablet or computer through [address] [place] [address]. This support group takes place on the 3rd Thursday of each month from 6:00-7:30PM. All individuals living with heart failure and their caregivers are welcome to join. If you are interested in participating, please contact us at [email or web address] and you will be sent the link to join the [employer].
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
## 2021-11-05 ENCOUNTER — TELEPHONE (OUTPATIENT)
Dept: CARDIOLOGY CLINIC | Age: 61
End: 2021-11-05

## 2021-11-05 LAB
25(OH)D3+25(OH)D2 SERPL-MCNC: 16.9 NG/ML (ref 30–100)
ALBUMIN SERPL-MCNC: 5.2 G/DL (ref 3.8–4.8)
ALBUMIN/GLOB SERPL: 1.8 {RATIO} (ref 1.2–2.2)
ALP SERPL-CCNC: 83 IU/L (ref 44–121)
ALT SERPL-CCNC: 35 IU/L (ref 0–44)
AST SERPL-CCNC: 47 IU/L (ref 0–40)
BILIRUB SERPL-MCNC: 0.3 MG/DL (ref 0–1.2)
BUN SERPL-MCNC: 29 MG/DL (ref 8–27)
BUN/CREAT SERPL: 16 (ref 10–24)
CALCIUM SERPL-MCNC: 10.4 MG/DL (ref 8.6–10.2)
CHLORIDE SERPL-SCNC: 96 MMOL/L (ref 96–106)
CO2 SERPL-SCNC: 22 MMOL/L (ref 20–29)
CREAT SERPL-MCNC: 1.8 MG/DL (ref 0.76–1.27)
DIGOXIN SERPL-MCNC: 1.8 NG/ML (ref 0.5–0.9)
ERYTHROCYTE [DISTWIDTH] IN BLOOD BY AUTOMATED COUNT: 13.2 % (ref 11.6–15.4)
GLOBULIN SER CALC-MCNC: 2.9 G/DL (ref 1.5–4.5)
GLUCOSE SERPL-MCNC: 94 MG/DL (ref 65–99)
HCT VFR BLD AUTO: 45.4 % (ref 37.5–51)
HGB BLD-MCNC: 14.7 G/DL (ref 13–17.7)
MAGNESIUM SERPL-MCNC: 2 MG/DL (ref 1.6–2.3)
MCH RBC QN AUTO: 33.2 PG (ref 26.6–33)
MCHC RBC AUTO-ENTMCNC: 32.4 G/DL (ref 31.5–35.7)
MCV RBC AUTO: 103 FL (ref 79–97)
NT-PROBNP SERPL-MCNC: 849 PG/ML (ref 0–210)
PLATELET # BLD AUTO: 449 X10E3/UL (ref 150–450)
POTASSIUM SERPL-SCNC: 4.4 MMOL/L (ref 3.5–5.2)
PROT SERPL-MCNC: 8.1 G/DL (ref 6–8.5)
RBC # BLD AUTO: 4.43 X10E6/UL (ref 4.14–5.8)
SODIUM SERPL-SCNC: 139 MMOL/L (ref 134–144)
WBC # BLD AUTO: 11.1 X10E3/UL (ref 3.4–10.8)

## 2021-11-05 RX ORDER — ERGOCALCIFEROL 1.25 MG/1
50000 CAPSULE ORAL
Qty: 12 CAPSULE | Refills: 0 | Status: SHIPPED | OUTPATIENT
Start: 2021-11-05 | End: 2022-01-22

## 2021-11-05 RX ORDER — DIGOXIN 250 MCG
0.12 TABLET ORAL EVERY OTHER DAY
Qty: 90 TABLET | Refills: 2 | Status: SHIPPED
Start: 2021-11-05 | End: 2022-06-22

## 2021-11-05 NOTE — TELEPHONE ENCOUNTER
----- Message from Jacinda Aviles NP sent at 11/5/2021  3:10 PM EDT -----  Please call Lisa Cervantes to discuss their abnormal lab results. Please have him increase to high dose vit D weekly, he has not budged. Reviewed above information and medication changes per CHERRY Lux Np. Educated patient to stop daily vitamin D supplement. Advised patient that new prescription for ergocalciferol has been sent to the pharmacy and he is to take one tablet once a week for 12 weeks. Patient stated understanding and had no further questions.  Verónica Simms RN

## 2021-11-05 NOTE — TELEPHONE ENCOUNTER
I called patient and advised him per Sky Mayers:  Hold dig over weekend, resume 1/2 tab every other day.  [facility] whenever they go back out. He states understanding, he confirmed he is taking digoxin in the evening. I called [facility] nurse Shanel Castellano [phone], she will make final [facility] visit Monday or Tuesday, pull PICC line and draw final set of labs, including digoxin level. She will then discharge patient from [facility] services.

## 2021-11-05 NOTE — PROGRESS NOTES
Please call Liang Barraza to discuss their abnormal lab results. Please have him increase to high dose vit D weekly, he has not budged.

## 2021-11-30 RX ORDER — LANOLIN ALCOHOL/MO/W.PET/CERES
CREAM (GRAM) TOPICAL
Qty: 60 TABLET | Refills: 2 | Status: SHIPPED | OUTPATIENT
Start: 2021-11-30

## 2022-02-02 DIAGNOSIS — I47.20 VENTRICULAR TACHYCARDIA: Primary | ICD-10-CM

## 2022-02-02 RX ORDER — MEXILETINE HYDROCHLORIDE 150 MG/1
150 CAPSULE ORAL 3 TIMES DAILY
Qty: 90 CAPSULE | Refills: 1 | Status: SHIPPED | OUTPATIENT
Start: 2022-02-02 | End: 2022-03-28

## 2022-03-04 ENCOUNTER — TELEPHONE (OUTPATIENT)
Dept: CARDIOLOGY CLINIC | Age: 62
End: 2022-03-04

## 2022-03-05 RX ORDER — SPIRONOLACTONE 25 MG/1
TABLET ORAL
Qty: 25 TABLET | Refills: 2 | Status: SHIPPED
Start: 2022-03-05 | End: 2022-06-22

## 2022-03-17 ENCOUNTER — TELEPHONE (OUTPATIENT)
Dept: CARDIOLOGY CLINIC | Age: 62
End: 2022-03-17

## 2022-03-17 NOTE — TELEPHONE ENCOUNTER
Telephone Call RE:  Appointment reminder     Outcome:     [x] Patient confirmed appointment   [] Patient rescheduled appointment for    [] Unable to reach  [] Left message             [] Other:     ---------------------             [x] Screened for [facility]

## 2022-03-27 DIAGNOSIS — I47.20 VENTRICULAR TACHYCARDIA: ICD-10-CM

## 2022-03-28 RX ORDER — MEXILETINE HYDROCHLORIDE 150 MG/1
CAPSULE ORAL
Qty: 90 CAPSULE | Refills: 1 | Status: SHIPPED | OUTPATIENT
Start: 2022-03-28 | End: 2022-06-27

## 2022-05-09 ENCOUNTER — TELEPHONE (OUTPATIENT)
Dept: CARDIOLOGY CLINIC | Age: 62
End: 2022-05-09

## 2022-05-12 ENCOUNTER — TELEPHONE (OUTPATIENT)
Dept: CARDIOLOGY CLINIC | Age: 62
End: 2022-05-12

## 2022-05-27 ENCOUNTER — TELEPHONE (OUTPATIENT)
Dept: CARDIOLOGY CLINIC | Age: 62
End: 2022-05-27

## 2022-05-27 NOTE — TELEPHONE ENCOUNTER
Prior auth completed for corlanor via covermymeds for next fill (per [employer], patient was given temporary fill). Prior auth approved.

## 2022-06-14 ENCOUNTER — TELEPHONE (OUTPATIENT)
Dept: CARDIOLOGY CLINIC | Age: 62
End: 2022-06-14

## 2022-06-14 NOTE — TELEPHONE ENCOUNTER
Patient called to request that lab results be requested from [facility] for his appt on 6/16/22. I [facility] to [facility]. He also stated that since he started Nucala (prescribed by Pulmonary), he has had swelling on his entire left side, including face, neck, arm, leg. He reports being short of breath. He does not know his weight or blood pressure. I advised he present to ED for possible side effects of Nucala, he states he \"will wait until he comes to clinic, he just wanted to give us a head's up\". I repeated the danger of waiting, he refuses to go to ED. I also advised he call [facility] MD and report side effects. He states understanding.

## 2022-06-15 ENCOUNTER — HOSPITAL ENCOUNTER (INPATIENT)
Age: 62
LOS: 7 days | Discharge: HOME | DRG: 871 | End: 2022-06-22
Attending: STUDENT IN AN ORGANIZED HEALTH CARE EDUCATION/TRAINING PROGRAM | Admitting: HOSPITALIST
Payer: MEDICARE

## 2022-06-15 ENCOUNTER — APPOINTMENT (OUTPATIENT)
Dept: VASCULAR SURGERY | Age: 62
DRG: 871 | End: 2022-06-15
Attending: HOSPITALIST
Payer: MEDICARE

## 2022-06-15 ENCOUNTER — TELEPHONE (OUTPATIENT)
Dept: CARDIOLOGY CLINIC | Age: 62
End: 2022-06-15

## 2022-06-15 ENCOUNTER — APPOINTMENT (OUTPATIENT)
Dept: GENERAL RADIOLOGY | Age: 62
DRG: 871 | End: 2022-06-15
Attending: STUDENT IN AN ORGANIZED HEALTH CARE EDUCATION/TRAINING PROGRAM
Payer: MEDICARE

## 2022-06-15 ENCOUNTER — APPOINTMENT (OUTPATIENT)
Dept: CT IMAGING | Age: 62
DRG: 871 | End: 2022-06-15
Attending: STUDENT IN AN ORGANIZED HEALTH CARE EDUCATION/TRAINING PROGRAM
Payer: MEDICARE

## 2022-06-15 DIAGNOSIS — R09.02 HYPOXIA: ICD-10-CM

## 2022-06-15 DIAGNOSIS — Z79.899 RECEIVING INOTROPIC MEDICATION: ICD-10-CM

## 2022-06-15 DIAGNOSIS — J44.1 ACUTE EXACERBATION OF CHRONIC OBSTRUCTIVE PULMONARY DISEASE (COPD) (HCC): Primary | ICD-10-CM

## 2022-06-15 DIAGNOSIS — M79.89 LEFT ARM SWELLING: ICD-10-CM

## 2022-06-15 DIAGNOSIS — I26.93 SINGLE SUBSEGMENTAL PULMONARY EMBOLISM WITHOUT ACUTE COR PULMONALE (HCC): ICD-10-CM

## 2022-06-15 DIAGNOSIS — I50.9 ACUTE CONGESTIVE HEART FAILURE, UNSPECIFIED HEART FAILURE TYPE (HCC): ICD-10-CM

## 2022-06-15 DIAGNOSIS — R79.89 POSITIVE D DIMER: ICD-10-CM

## 2022-06-15 PROBLEM — J44.9 COPD (CHRONIC OBSTRUCTIVE PULMONARY DISEASE) (HCC): Status: ACTIVE | Noted: 2022-06-15

## 2022-06-15 PROBLEM — A41.9 SEPSIS (HCC): Status: ACTIVE | Noted: 2022-06-15

## 2022-06-15 LAB
ALBUMIN SERPL-MCNC: 3.6 G/DL (ref 3.5–5)
ALBUMIN/GLOB SERPL: 0.7 {RATIO} (ref 1.1–2.2)
ALP SERPL-CCNC: 114 U/L (ref 45–117)
ALT SERPL-CCNC: 160 U/L (ref 12–78)
ANION GAP SERPL CALC-SCNC: 8 MMOL/L (ref 5–15)
APPEARANCE UR: CLEAR
APTT PPP: 25.1 SEC (ref 22.1–31)
APTT PPP: >130 SEC (ref 22.1–31)
AST SERPL-CCNC: 101 U/L (ref 15–37)
BACTERIA URNS QL MICRO: NEGATIVE /HPF
BASOPHILS # BLD: 0 K/UL (ref 0–0.1)
BASOPHILS # BLD: 0.1 K/UL (ref 0–0.1)
BASOPHILS NFR BLD: 0 % (ref 0–1)
BASOPHILS NFR BLD: 1 % (ref 0–1)
BILIRUB SERPL-MCNC: 1.4 MG/DL (ref 0.2–1)
BILIRUB UR QL: NEGATIVE
BNP SERPL-MCNC: ABNORMAL PG/ML
BUN SERPL-MCNC: 38 MG/DL (ref 6–20)
BUN/CREAT SERPL: 21 (ref 12–20)
CALCIUM SERPL-MCNC: 9 MG/DL (ref 8.5–10.1)
CHLORIDE SERPL-SCNC: 100 MMOL/L (ref 97–108)
CO2 SERPL-SCNC: 24 MMOL/L (ref 21–32)
COLOR UR: NORMAL
COMMENT, HOLDF: NORMAL
COMMENT, HOLDF: NORMAL
CREAT SERPL-MCNC: 1.79 MG/DL (ref 0.7–1.3)
D DIMER PPP FEU-MCNC: 4.01 MG/L FEU (ref 0–0.65)
DIFFERENTIAL METHOD BLD: ABNORMAL
DIFFERENTIAL METHOD BLD: ABNORMAL
EOSINOPHIL # BLD: 0 K/UL (ref 0–0.4)
EOSINOPHIL # BLD: 0 K/UL (ref 0–0.4)
EOSINOPHIL NFR BLD: 0 % (ref 0–7)
EOSINOPHIL NFR BLD: 0 % (ref 0–7)
EPITH CASTS URNS QL MICRO: NORMAL /LPF
ERYTHROCYTE [DISTWIDTH] IN BLOOD BY AUTOMATED COUNT: 14.6 % (ref 11.5–14.5)
ERYTHROCYTE [DISTWIDTH] IN BLOOD BY AUTOMATED COUNT: 14.8 % (ref 11.5–14.5)
ERYTHROCYTE [SEDIMENTATION RATE] IN BLOOD: 6 MM/HR (ref 0–20)
GLOBULIN SER CALC-MCNC: 5 G/DL (ref 2–4)
GLUCOSE SERPL-MCNC: 127 MG/DL (ref 65–100)
GLUCOSE UR STRIP.AUTO-MCNC: NEGATIVE MG/DL
HCT VFR BLD AUTO: 46.2 % (ref 36.6–50.3)
HCT VFR BLD AUTO: 47.4 % (ref 36.6–50.3)
HGB BLD-MCNC: 14.7 G/DL (ref 12.1–17)
HGB BLD-MCNC: 15.1 G/DL (ref 12.1–17)
HGB UR QL STRIP: NEGATIVE
HYALINE CASTS URNS QL MICRO: NORMAL /LPF (ref 0–5)
IMM GRANULOCYTES # BLD AUTO: 0 K/UL (ref 0–0.04)
IMM GRANULOCYTES # BLD AUTO: 0 K/UL (ref 0–0.04)
IMM GRANULOCYTES NFR BLD AUTO: 0 % (ref 0–0.5)
IMM GRANULOCYTES NFR BLD AUTO: 0 % (ref 0–0.5)
KETONES UR QL STRIP.AUTO: NEGATIVE MG/DL
LACTATE SERPL-SCNC: 2 MMOL/L (ref 0.4–2)
LACTATE SERPL-SCNC: 2.3 MMOL/L (ref 0.4–2)
LEUKOCYTE ESTERASE UR QL STRIP.AUTO: NEGATIVE
LYMPHOCYTES # BLD: 0.6 K/UL (ref 0.8–3.5)
LYMPHOCYTES # BLD: 1.5 K/UL (ref 0.8–3.5)
LYMPHOCYTES NFR BLD: 16 % (ref 12–49)
LYMPHOCYTES NFR BLD: 7 % (ref 12–49)
MAGNESIUM SERPL-MCNC: 1.9 MG/DL (ref 1.6–2.4)
MCH RBC QN AUTO: 30.9 PG (ref 26–34)
MCH RBC QN AUTO: 31 PG (ref 26–34)
MCHC RBC AUTO-ENTMCNC: 31.8 G/DL (ref 30–36.5)
MCHC RBC AUTO-ENTMCNC: 31.9 G/DL (ref 30–36.5)
MCV RBC AUTO: 97.1 FL (ref 80–99)
MCV RBC AUTO: 97.3 FL (ref 80–99)
MONOCYTES # BLD: 0.2 K/UL (ref 0–1)
MONOCYTES # BLD: 0.9 K/UL (ref 0–1)
MONOCYTES NFR BLD: 10 % (ref 5–13)
MONOCYTES NFR BLD: 2 % (ref 5–13)
NEUTS SEG # BLD: 7 K/UL (ref 1.8–8)
NEUTS SEG # BLD: 8.3 K/UL (ref 1.8–8)
NEUTS SEG NFR BLD: 73 % (ref 32–75)
NEUTS SEG NFR BLD: 91 % (ref 32–75)
NITRITE UR QL STRIP.AUTO: NEGATIVE
NRBC # BLD: 0 K/UL (ref 0–0.01)
NRBC # BLD: 0 K/UL (ref 0–0.01)
NRBC BLD-RTO: 0 PER 100 WBC
NRBC BLD-RTO: 0 PER 100 WBC
PH UR STRIP: 5.5 [PH] (ref 5–8)
PLATELET # BLD AUTO: 321 K/UL (ref 150–400)
PLATELET # BLD AUTO: 354 K/UL (ref 150–400)
PLATELET COMMENTS,PCOM: ABNORMAL
PMV BLD AUTO: 12.6 FL (ref 8.9–12.9)
PMV BLD AUTO: 12.9 FL (ref 8.9–12.9)
POTASSIUM SERPL-SCNC: 4.8 MMOL/L (ref 3.5–5.1)
PROCALCITONIN SERPL-MCNC: 0.2 NG/ML
PROT SERPL-MCNC: 8.6 G/DL (ref 6.4–8.2)
PROT UR STRIP-MCNC: NEGATIVE MG/DL
RBC # BLD AUTO: 4.76 M/UL (ref 4.1–5.7)
RBC # BLD AUTO: 4.87 M/UL (ref 4.1–5.7)
RBC #/AREA URNS HPF: NORMAL /HPF (ref 0–5)
RBC MORPH BLD: ABNORMAL
SAMPLES BEING HELD,HOLD: NORMAL
SAMPLES BEING HELD,HOLD: NORMAL
SODIUM SERPL-SCNC: 132 MMOL/L (ref 136–145)
SP GR UR REFRACTOMETRY: 1.02 (ref 1–1.03)
THERAPEUTIC RANGE,PTTT: ABNORMAL SECS (ref 58–77)
THERAPEUTIC RANGE,PTTT: NORMAL SECS (ref 58–77)
TROPONIN-HIGH SENSITIVITY: 149 NG/L (ref 0–76)
TROPONIN-HIGH SENSITIVITY: 194 NG/L (ref 0–76)
TROPONIN-HIGH SENSITIVITY: 205 NG/L (ref 0–76)
UR CULT HOLD, URHOLD: NORMAL
UROBILINOGEN UR QL STRIP.AUTO: 0.2 EU/DL (ref 0.2–1)
WBC # BLD AUTO: 9.1 K/UL (ref 4.1–11.1)
WBC # BLD AUTO: 9.6 K/UL (ref 4.1–11.1)
WBC URNS QL MICRO: NORMAL /HPF (ref 0–4)

## 2022-06-15 PROCEDURE — 94664 DEMO&/EVAL PT USE INHALER: CPT

## 2022-06-15 PROCEDURE — 94640 AIRWAY INHALATION TREATMENT: CPT

## 2022-06-15 PROCEDURE — 96375 TX/PRO/DX INJ NEW DRUG ADDON: CPT

## 2022-06-15 PROCEDURE — 74011000258 HC RX REV CODE- 258: Performed by: HOSPITALIST

## 2022-06-15 PROCEDURE — 99222 1ST HOSP IP/OBS MODERATE 55: CPT | Performed by: NURSE PRACTITIONER

## 2022-06-15 PROCEDURE — 96368 THER/DIAG CONCURRENT INF: CPT

## 2022-06-15 PROCEDURE — 71045 X-RAY EXAM CHEST 1 VIEW: CPT

## 2022-06-15 PROCEDURE — 96366 THER/PROPH/DIAG IV INF ADDON: CPT

## 2022-06-15 PROCEDURE — 74011250636 HC RX REV CODE- 250/636: Performed by: NURSE PRACTITIONER

## 2022-06-15 PROCEDURE — 74011000250 HC RX REV CODE- 250: Performed by: NURSE PRACTITIONER

## 2022-06-15 PROCEDURE — 96376 TX/PRO/DX INJ SAME DRUG ADON: CPT

## 2022-06-15 PROCEDURE — 80053 COMPREHEN METABOLIC PANEL: CPT

## 2022-06-15 PROCEDURE — 85652 RBC SED RATE AUTOMATED: CPT

## 2022-06-15 PROCEDURE — 96365 THER/PROPH/DIAG IV INF INIT: CPT

## 2022-06-15 PROCEDURE — 81001 URINALYSIS AUTO W/SCOPE: CPT

## 2022-06-15 PROCEDURE — 99285 EMERGENCY DEPT VISIT HI MDM: CPT

## 2022-06-15 PROCEDURE — 85730 THROMBOPLASTIN TIME PARTIAL: CPT

## 2022-06-15 PROCEDURE — 74011250636 HC RX REV CODE- 250/636: Performed by: STUDENT IN AN ORGANIZED HEALTH CARE EDUCATION/TRAINING PROGRAM

## 2022-06-15 PROCEDURE — G0378 HOSPITAL OBSERVATION PER HR: HCPCS

## 2022-06-15 PROCEDURE — 83605 ASSAY OF LACTIC ACID: CPT

## 2022-06-15 PROCEDURE — 74011250637 HC RX REV CODE- 250/637: Performed by: HOSPITALIST

## 2022-06-15 PROCEDURE — 84484 ASSAY OF TROPONIN QUANT: CPT

## 2022-06-15 PROCEDURE — 71275 CT ANGIOGRAPHY CHEST: CPT

## 2022-06-15 PROCEDURE — 74011000636 HC RX REV CODE- 636: Performed by: RADIOLOGY

## 2022-06-15 PROCEDURE — 85025 COMPLETE CBC W/AUTO DIFF WBC: CPT

## 2022-06-15 PROCEDURE — 74011000250 HC RX REV CODE- 250: Performed by: HOSPITALIST

## 2022-06-15 PROCEDURE — 74011250636 HC RX REV CODE- 250/636: Performed by: HOSPITALIST

## 2022-06-15 PROCEDURE — 83880 ASSAY OF NATRIURETIC PEPTIDE: CPT

## 2022-06-15 PROCEDURE — 84145 PROCALCITONIN (PCT): CPT

## 2022-06-15 PROCEDURE — 93005 ELECTROCARDIOGRAM TRACING: CPT

## 2022-06-15 PROCEDURE — 96367 TX/PROPH/DG ADDL SEQ IV INF: CPT

## 2022-06-15 PROCEDURE — 85379 FIBRIN DEGRADATION QUANT: CPT

## 2022-06-15 PROCEDURE — 83735 ASSAY OF MAGNESIUM: CPT

## 2022-06-15 PROCEDURE — 74011000250 HC RX REV CODE- 250: Performed by: STUDENT IN AN ORGANIZED HEALTH CARE EDUCATION/TRAINING PROGRAM

## 2022-06-15 PROCEDURE — 65660000001 HC RM ICU INTERMED STEPDOWN

## 2022-06-15 PROCEDURE — 36415 COLL VENOUS BLD VENIPUNCTURE: CPT

## 2022-06-15 PROCEDURE — 96374 THER/PROPH/DIAG INJ IV PUSH: CPT

## 2022-06-15 PROCEDURE — 93971 EXTREMITY STUDY: CPT

## 2022-06-15 RX ORDER — FUROSEMIDE 20 MG/1
10 TABLET ORAL DAILY
Status: DISCONTINUED | OUTPATIENT
Start: 2022-06-16 | End: 2022-06-15

## 2022-06-15 RX ORDER — HEPARIN SODIUM 10000 [USP'U]/100ML
18-36 INJECTION, SOLUTION INTRAVENOUS
Status: DISCONTINUED | OUTPATIENT
Start: 2022-06-15 | End: 2022-06-15 | Stop reason: SDUPTHER

## 2022-06-15 RX ORDER — PROMETHAZINE HYDROCHLORIDE 25 MG/1
12.5 TABLET ORAL
Status: DISCONTINUED | OUTPATIENT
Start: 2022-06-15 | End: 2022-06-22 | Stop reason: HOSPADM

## 2022-06-15 RX ORDER — ONDANSETRON 2 MG/ML
4 INJECTION INTRAMUSCULAR; INTRAVENOUS
Status: DISCONTINUED | OUTPATIENT
Start: 2022-06-15 | End: 2022-06-22 | Stop reason: HOSPADM

## 2022-06-15 RX ORDER — SODIUM CHLORIDE 0.9 % (FLUSH) 0.9 %
5-40 SYRINGE (ML) INJECTION EVERY 8 HOURS
Status: DISCONTINUED | OUTPATIENT
Start: 2022-06-15 | End: 2022-06-22 | Stop reason: HOSPADM

## 2022-06-15 RX ORDER — MILRINONE LACTATE 0.2 MG/ML
0.38 INJECTION, SOLUTION INTRAVENOUS CONTINUOUS
Status: DISCONTINUED | OUTPATIENT
Start: 2022-06-15 | End: 2022-06-17

## 2022-06-15 RX ORDER — ACETAMINOPHEN 650 MG/1
650 SUPPOSITORY RECTAL
Status: DISCONTINUED | OUTPATIENT
Start: 2022-06-15 | End: 2022-06-22 | Stop reason: HOSPADM

## 2022-06-15 RX ORDER — ACETAMINOPHEN 325 MG/1
650 TABLET ORAL
Status: DISCONTINUED | OUTPATIENT
Start: 2022-06-15 | End: 2022-06-22 | Stop reason: HOSPADM

## 2022-06-15 RX ORDER — DIGOXIN 125 MCG
0.12 TABLET ORAL EVERY OTHER DAY
Status: DISCONTINUED | OUTPATIENT
Start: 2022-06-15 | End: 2022-06-16

## 2022-06-15 RX ORDER — POLYETHYLENE GLYCOL 3350 17 G/17G
17 POWDER, FOR SOLUTION ORAL DAILY PRN
Status: DISCONTINUED | OUTPATIENT
Start: 2022-06-15 | End: 2022-06-22 | Stop reason: HOSPADM

## 2022-06-15 RX ORDER — MEXILETINE HYDROCHLORIDE 150 MG/1
150 CAPSULE ORAL EVERY 8 HOURS
Status: DISCONTINUED | OUTPATIENT
Start: 2022-06-15 | End: 2022-06-22 | Stop reason: HOSPADM

## 2022-06-15 RX ORDER — ENOXAPARIN SODIUM 100 MG/ML
40 INJECTION SUBCUTANEOUS DAILY
Status: DISCONTINUED | OUTPATIENT
Start: 2022-06-16 | End: 2022-06-15

## 2022-06-15 RX ORDER — IPRATROPIUM BROMIDE AND ALBUTEROL SULFATE 2.5; .5 MG/3ML; MG/3ML
3 SOLUTION RESPIRATORY (INHALATION)
Status: COMPLETED | OUTPATIENT
Start: 2022-06-15 | End: 2022-06-15

## 2022-06-15 RX ORDER — BUMETANIDE 0.25 MG/ML
1 INJECTION INTRAMUSCULAR; INTRAVENOUS 2 TIMES DAILY
Status: DISCONTINUED | OUTPATIENT
Start: 2022-06-15 | End: 2022-06-18

## 2022-06-15 RX ORDER — HEPARIN SODIUM 10000 [USP'U]/100ML
16-36 INJECTION, SOLUTION INTRAVENOUS
Status: DISCONTINUED | OUTPATIENT
Start: 2022-06-15 | End: 2022-06-17 | Stop reason: SDUPTHER

## 2022-06-15 RX ORDER — LANOLIN ALCOHOL/MO/W.PET/CERES
400 CREAM (GRAM) TOPICAL 2 TIMES DAILY
Status: DISCONTINUED | OUTPATIENT
Start: 2022-06-15 | End: 2022-06-22 | Stop reason: HOSPADM

## 2022-06-15 RX ORDER — ALBUTEROL SULFATE 0.83 MG/ML
2.5 SOLUTION RESPIRATORY (INHALATION)
Status: DISCONTINUED | OUTPATIENT
Start: 2022-06-15 | End: 2022-06-17

## 2022-06-15 RX ORDER — FUROSEMIDE 10 MG/ML
40 INJECTION INTRAMUSCULAR; INTRAVENOUS
Status: COMPLETED | OUTPATIENT
Start: 2022-06-15 | End: 2022-06-15

## 2022-06-15 RX ORDER — SPIRONOLACTONE 25 MG/1
12.5 TABLET ORAL DAILY
Status: DISCONTINUED | OUTPATIENT
Start: 2022-06-16 | End: 2022-06-19

## 2022-06-15 RX ORDER — SODIUM CHLORIDE 0.9 % (FLUSH) 0.9 %
5-40 SYRINGE (ML) INJECTION AS NEEDED
Status: DISCONTINUED | OUTPATIENT
Start: 2022-06-15 | End: 2022-06-22 | Stop reason: HOSPADM

## 2022-06-15 RX ADMIN — MILRINONE LACTATE 0.38 MCG/KG/MIN: 0.2 INJECTION, SOLUTION INTRAVENOUS at 17:46

## 2022-06-15 RX ADMIN — ALBUTEROL SULFATE 2.5 MG: 2.5 SOLUTION RESPIRATORY (INHALATION) at 16:00

## 2022-06-15 RX ADMIN — VANCOMYCIN HYDROCHLORIDE 2250 MG: 10 INJECTION, POWDER, LYOPHILIZED, FOR SOLUTION INTRAVENOUS at 17:36

## 2022-06-15 RX ADMIN — Medication 10 ML: at 16:07

## 2022-06-15 RX ADMIN — BUMETANIDE 1 MG: 0.25 INJECTION, SOLUTION INTRAMUSCULAR; INTRAVENOUS at 17:31

## 2022-06-15 RX ADMIN — PIPERACILLIN AND TAZOBACTAM 4.5 G: 4; .5 INJECTION, POWDER, LYOPHILIZED, FOR SOLUTION INTRAVENOUS at 15:53

## 2022-06-15 RX ADMIN — Medication 400 MG: at 17:35

## 2022-06-15 RX ADMIN — METHYLPREDNISOLONE SODIUM SUCCINATE 125 MG: 125 INJECTION, POWDER, FOR SOLUTION INTRAMUSCULAR; INTRAVENOUS at 11:12

## 2022-06-15 RX ADMIN — DIGOXIN 0.12 MG: 125 TABLET ORAL at 17:56

## 2022-06-15 RX ADMIN — Medication 5 ML: at 22:00

## 2022-06-15 RX ADMIN — ALBUTEROL SULFATE 2.5 MG: 2.5 SOLUTION RESPIRATORY (INHALATION) at 20:36

## 2022-06-15 RX ADMIN — HEPARIN SODIUM AND DEXTROSE 16 UNITS/KG/HR: 10000; 5 INJECTION INTRAVENOUS at 16:01

## 2022-06-15 RX ADMIN — FUROSEMIDE 40 MG: 10 INJECTION, SOLUTION INTRAVENOUS at 11:44

## 2022-06-15 RX ADMIN — IOPAMIDOL 70 ML: 755 INJECTION, SOLUTION INTRAVENOUS at 12:08

## 2022-06-15 RX ADMIN — IPRATROPIUM BROMIDE AND ALBUTEROL SULFATE 3 ML: .5; 3 SOLUTION RESPIRATORY (INHALATION) at 11:30

## 2022-06-15 RX ADMIN — MEXILETINE HYDROCHLORIDE 150 MG: 150 CAPSULE ORAL at 17:53

## 2022-06-15 RX ADMIN — IVABRADINE 7.5 MG: 7.5 TABLET, FILM COATED ORAL at 17:55

## 2022-06-15 RX ADMIN — METHYLPREDNISOLONE SODIUM SUCCINATE 40 MG: 40 INJECTION, POWDER, FOR SOLUTION INTRAMUSCULAR; INTRAVENOUS at 15:52

## 2022-06-15 RX ADMIN — METHYLPREDNISOLONE SODIUM SUCCINATE 40 MG: 40 INJECTION, POWDER, FOR SOLUTION INTRAMUSCULAR; INTRAVENOUS at 22:34

## 2022-06-15 RX ADMIN — PIPERACILLIN SODIUM AND TAZOBACTAM SODIUM 3.38 G: 3; 375 INJECTION, POWDER, FOR SOLUTION INTRAVENOUS at 22:35

## 2022-06-15 NOTE — ED TRIAGE NOTES
Patient presents from home with complaints of worsening SOB and left arm swelling over the last 4 days. Patient has history of copd and CHF. Patient dusky in triage and very tachypneaic.  Patient back to room 4
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
## 2022-06-15 NOTE — H&P
[address] [facility]  HISTORY AND PHYSICAL    Name:  Pilo Padilla  MR#:  [id number]  :  1960  ACCOUNT #:  [de-identified]  ADMIT DATE:  06/15/2022      ADMITTING ATTENDING:  Cl Plata MD    CHIEF COMPLAINT:  Shortness of breath and swelling of left upper extremity. HISTORY OF PRESENT ILLNESS:  This is a 70-year-old male with a past medical history of severe nonischemic cardiomyopathy presumed secondary to myocarditis () with improved cardiac function, hypertension, COPD, asthma, dilated ascending aorta, and former smoker. He comes over here because of shortness of breath and left upper extremity swelling. As far as his shortness of breath is concerned, he says that he has been having worsening shortness of breath since last 2 months, which has really got worse in the last couple of days. In the ER, initially the patient required 15 liters and then he was put on BiPAP and currently he feels relatively stable. He says that he never had any chest pain, fever, cough, nausea, vomiting, headache, or dizziness. No changes in bowel movement. No nasal congestion. No abdominal pain. No chest pain. He also mentions that his shortness of breath is so worse now, it is hard for him to breathe even while he is well rested. He also says that since last 3-4 days, he also has swelling in his left upper extremity. He has some tingling, but no significant pain. He says that his swelling of left upper arm is getting slightly better. Addendum:  While in the ED the patient was hypothermic and had to be put on [employer]. REVIEW OF SYSTEMS:  Pertinent positive as above. Rest of review of systems were done, they were all negative except for above. PAST MEDICAL HISTORY:  1. Severe nonischemic cardiomyopathy. 2.  Hypertension. 3.  COPD. 4.  Asthma. 5.  Dilated ascending aorta. 6.  Former smoker. PAST SURGICAL HISTORY:  Bilateral shoulder repair.     SOCIAL HISTORY:  The patient is a former smoker, quit in 2021. Nonalcoholic. Non-IV drug abuser. ALLERGIES:  THE PATIENT IS ALLERGIC TO CIPROFLOXACIN. FAMILY HISTORY:  Not significant for coronary artery disease. HOME MEDICATIONS:  The patient is on following medications:  1. Mexitil 150 mg capsule p.o. t.i.d.  2.  Aldactone 25 mg tablet, half tablet p.o. daily. 3.  Magnesium oxide 400 mg p.o. b.i.d.  4.  Digoxin 0.25 mg tablet 0.5 mg every other day. 5.  Lasix 20 mg tablet, half tablet daily. 6.  Corlanor 7.5 mg tablet p.o. b.i.d.  7.  Symbicort 160/4.5 two puffs b.i.d.  8.  Albuterol ProAir two puffs q.4 h. p.r.n. PHYSICAL EXAMINATION:  VITAL SIGNS:  At the time when the patient was seen, the patient's vitals were as follows:  Blood pressure 143/125, pulse 103, afebrile, respiratory rate 23, and saturating 90% on BiPAP. HEENT:  Head:  Normocephalic, atraumatic. Eyes:  PERRLA. EOMI. Ears:  Bilateral hearing normal.  No growth. Nose:  No polyps, no bleeding. Mouth:  Dry mucous membranes. Decent oral hygiene. NECK:  Supple. No JVD. No thyromegaly. RESPIRATORY:  Bilateral diminished breath sounds. No wheezing. No crackles. CARDIOVASCULAR:  S1, S2 normal.  No murmurs, rubs, or gallops. GI:  Bowel sounds present. Soft, nontender, and nondistended. NEUROLOGIC:  Cranial nerves II-XII intact. Motor 5/5 bilaterally in upper limbs and lower limbs. Sensory normal.  PSYCHIATRIC:  Mood and affect appropriate. Alert, awake, and oriented x3. EXTREMITIES:  Left upper extremity is swollen until half of arm on the left upper extremity. LABORATORY AND RADIOLOGICAL RESULTS:  WBC 9.6, hemoglobin 15.1, hematocrit 47, and a platelet count of 460. D-dimer of 4.01. Sodium 132, potassium 4.8, chloride 100, bicarbonate 24, BUN 38, creatinine 1.79. High sensitivity troponin 205, [place] [address]. CTA of the chest was done which shows small segmental filling defect in the right lower lobe, nonocclusive.   Mild cardiomegaly with findings compatible with diminished right heart function, more likely related to intrinsic heart disease versus acute pulmonary hypertension relating to the pulmonary embolism. Left basilar consolidation suspicious for pneumonia, emphysema, small right and trace left pleural effusion. EKG not done. We will wait for the EKG. ASSESSMENT AND PLAN:  This is a 77-year-old male with a past medical history of severe nonischemic cardiomyopathy; hypertension; chronic obstructive pulmonary disease; asthma; dilated ascending aorta; and former smoker. He comes over here with left upper extremity swelling and progressively worsening shortness of breath suggestive of pulmonary embolism. 1.  Acute respiratory failure with hypoxia:  Unclear etiology, but seems likely secondary to acute pulmonary embolism. We will put the patient on heparin protocol. We will get an echo on the patient and we will also consult the Pulmonary regarding the same. I am in the process of talking to Interventional Radiology to see if there is any acute intervention needed. The patient has a history of severe nonischemic cardiomyopathy secondary to presumed myocarditis. He had normal coronaries done in 05/2021, but his troponin is elevated. We will cycle the troponins and we will get an EKG and we will follow the echo on the patient. I have already requested the patient's cardiology team to evaluate the patient. 2.  Probable pneumonia:  I have low suspicion that the patient has pneumonia, but for now, we will put the patient on antibiotics. If the patient continues to get better, we will take the patient off antibiotics. 3.  Hypertension: We will continue the patient's home medications. 4.  Chronic obstructive pulmonary disease: We will restart the patient's inhalers. For now, we will also put the patient on IV steroids. We might need to consider taking him off it tomorrow. 5.  Dilated ascending aorta:  Stable.   6.  Former smoker: I encouraged the patient for quitting smoking and he has not been smoking since the last 1-1/2 years. 7.  The patient is full code. Addendum:  Severe sepsis likely sec to pneumonia  -Follow cultures  -Add vancomycin to Zosyn  [employer], wean as tolerated  -Low threshold to transfer the patient to ICU  -Sepsis reassessment completed    >35 min of critical care time spent in taking care of the patient including evaluating the patient, coordinating with consultants, RN, and developing the plan.       Lora Saucedo MD      [id number]  D:  06/15/2022 14:37  T:  06/15/2022 15:57  JOB #:  [id number]

## 2022-06-15 NOTE — TELEPHONE ENCOUNTER
Telephone Call RE:  Appointment reminder     Outcome:     [] Patient confirmed appointment   [] Patient rescheduled appointment for    [x] Unable to reach  [x] Left message             [] Other:     ---------------------             [] Screened for [facility]

## 2022-06-15 NOTE — PROGRESS NOTES
[id number]  Acute respiratory failure  Acute PE  Pneumonia    Spoke to IR, the patient has a small PE, nothing to do besides anticoagulation  Raghavendra Silverman MD

## 2022-06-15 NOTE — PROGRESS NOTES
Pharmacist Note - Vancomycin Dosing    Consult provided for this 58 y.o. male for indication of empiric for acute respiratory failure. Antibiotic regimen(s): vancomycin + Zosyn  Patient on vancomycin PTA? NO     Recent Labs     06/15/22  1529 06/15/22  1018   WBC 9.1 9.6   CREA  --  1.79*   BUN  --  38*     Frequency of BMP: x 1 tomorrow (Scr seems to be at baseline)  Height: 172.7 cm  Weight: 92.5 kg  Est CrCl: 47 ml/min  Temp (24hrs), Av.9 °F (36.1 °C), Min:93.9 °F (34.4 °C), Max:97.9 °F (36.6 °C)    Cultures: NA    MRSA Swab ordered (if applicable)? YES    The plan below is expected to result in a target range of AUC/ABBI 400-600    Therapy will be initiated with a loading dose of 2250 mg IV x 1 to be followed by a maintenance dose of 1250 mg IV every 24 hours. Pharmacy to follow patient daily and order levels / make dose adjustments as appropriate. *Vancomycin has been dosed used Bayesian kinetics software to target an AUC/ABBI of 400-600, which provides adequate exposure for an assumed infection due to MRSA with an ABBI of 1 or less while reducing the risk of nephrotoxicity as seen with traditional trough based dosing goals.

## 2022-06-15 NOTE — ED NOTES
Md notified of pt mews score and temp. Md notified of how pt refused rectal temp.   Md notified of pt being put on bear hugger

## 2022-06-15 NOTE — ED NOTES
Verbal shift change report given to Nancy Watts RN (oncoming nurse) by William Mcgill RN (offgoing nurse). Report included the following information SBAR, ED Summary, Intake/Output, MAR and Recent Results.

## 2022-06-16 LAB
25(OH)D3 SERPL-MCNC: 32.4 NG/ML (ref 30–100)
ALBUMIN SERPL-MCNC: 2.7 G/DL (ref 3.5–5)
ALBUMIN/GLOB SERPL: 0.7 {RATIO} (ref 1.1–2.2)
ALP SERPL-CCNC: 80 U/L (ref 45–117)
ALT SERPL-CCNC: 111 U/L (ref 12–78)
ANION GAP SERPL CALC-SCNC: 9 MMOL/L (ref 5–15)
APTT PPP: 48.9 SEC (ref 22.1–31)
APTT PPP: 78.9 SEC (ref 22.1–31)
APTT PPP: 83.5 SEC (ref 22.1–31)
APTT PPP: 83.8 SEC (ref 22.1–31)
AST SERPL-CCNC: 78 U/L (ref 15–37)
ATRIAL RATE: 103 BPM
BASOPHILS # BLD: 0 K/UL (ref 0–0.1)
BASOPHILS NFR BLD: 0 % (ref 0–1)
BILIRUB SERPL-MCNC: 1.2 MG/DL (ref 0.2–1)
BNP SERPL-MCNC: ABNORMAL PG/ML
BUN SERPL-MCNC: 38 MG/DL (ref 6–20)
BUN/CREAT SERPL: 20 (ref 12–20)
CALCIUM SERPL-MCNC: 8 MG/DL (ref 8.5–10.1)
CALCULATED P AXIS, ECG09: 40 DEGREES
CALCULATED R AXIS, ECG10: -85 DEGREES
CALCULATED T AXIS, ECG11: 70 DEGREES
CHLORIDE SERPL-SCNC: 101 MMOL/L (ref 97–108)
CK SERPL-CCNC: 850 U/L (ref 39–308)
CO2 SERPL-SCNC: 24 MMOL/L (ref 21–32)
COMMENT, HOLDF: NORMAL
CREAT SERPL-MCNC: 1.88 MG/DL (ref 0.7–1.3)
DIAGNOSIS, 93000: NORMAL
DIFFERENTIAL METHOD BLD: ABNORMAL
DIGOXIN SERPL-MCNC: 1.6 NG/ML (ref 0.9–2)
EOSINOPHIL # BLD: 0.1 K/UL (ref 0–0.4)
EOSINOPHIL NFR BLD: 2 % (ref 0–7)
ERYTHROCYTE [DISTWIDTH] IN BLOOD BY AUTOMATED COUNT: 14.6 % (ref 11.5–14.5)
FERRITIN SERPL-MCNC: 186 NG/ML (ref 26–388)
GLOBULIN SER CALC-MCNC: 3.7 G/DL (ref 2–4)
GLUCOSE SERPL-MCNC: 114 MG/DL (ref 65–100)
HCT VFR BLD AUTO: 37 % (ref 36.6–50.3)
HGB BLD-MCNC: 12.3 G/DL (ref 12.1–17)
IMM GRANULOCYTES # BLD AUTO: 0 K/UL (ref 0–0.04)
IMM GRANULOCYTES NFR BLD AUTO: 0 % (ref 0–0.5)
IRON SATN MFR SERPL: 6 % (ref 20–50)
IRON SERPL-MCNC: 24 UG/DL (ref 35–150)
LYMPHOCYTES # BLD: 0.4 K/UL (ref 0.8–3.5)
LYMPHOCYTES NFR BLD: 8 % (ref 12–49)
MCH RBC QN AUTO: 31.5 PG (ref 26–34)
MCHC RBC AUTO-ENTMCNC: 33.2 G/DL (ref 30–36.5)
MCV RBC AUTO: 94.6 FL (ref 80–99)
MONOCYTES # BLD: 0.3 K/UL (ref 0–1)
MONOCYTES NFR BLD: 5 % (ref 5–13)
NEUTS SEG # BLD: 4.7 K/UL (ref 1.8–8)
NEUTS SEG NFR BLD: 85 % (ref 32–75)
NRBC # BLD: 0 K/UL (ref 0–0.01)
NRBC BLD-RTO: 0 PER 100 WBC
P-R INTERVAL, ECG05: 156 MS
PLATELET # BLD AUTO: 269 K/UL (ref 150–400)
PLATELET COMMENTS,PCOM: ABNORMAL
PMV BLD AUTO: 12.3 FL (ref 8.9–12.9)
POTASSIUM SERPL-SCNC: 3.9 MMOL/L (ref 3.5–5.1)
PROT SERPL-MCNC: 6.4 G/DL (ref 6.4–8.2)
Q-T INTERVAL, ECG07: 356 MS
QRS DURATION, ECG06: 98 MS
QTC CALCULATION (BEZET), ECG08: 466 MS
RBC # BLD AUTO: 3.91 M/UL (ref 4.1–5.7)
RBC MORPH BLD: ABNORMAL
SAMPLES BEING HELD,HOLD: NORMAL
SODIUM SERPL-SCNC: 134 MMOL/L (ref 136–145)
THERAPEUTIC RANGE,PTTT: ABNORMAL SECS (ref 58–77)
TIBC SERPL-MCNC: 415 UG/DL (ref 250–450)
TROPONIN-HIGH SENSITIVITY: 183 NG/L (ref 0–76)
VENTRICULAR RATE, ECG03: 103 BPM
WBC # BLD AUTO: 5.5 K/UL (ref 4.1–11.1)

## 2022-06-16 PROCEDURE — G0378 HOSPITAL OBSERVATION PER HR: HCPCS

## 2022-06-16 PROCEDURE — 74011250637 HC RX REV CODE- 250/637: Performed by: HOSPITALIST

## 2022-06-16 PROCEDURE — 74011250636 HC RX REV CODE- 250/636: Performed by: PHYSICIAN ASSISTANT

## 2022-06-16 PROCEDURE — 74011250636 HC RX REV CODE- 250/636: Performed by: NURSE PRACTITIONER

## 2022-06-16 PROCEDURE — 85730 THROMBOPLASTIN TIME PARTIAL: CPT

## 2022-06-16 PROCEDURE — 82306 VITAMIN D 25 HYDROXY: CPT

## 2022-06-16 PROCEDURE — 96376 TX/PRO/DX INJ SAME DRUG ADON: CPT

## 2022-06-16 PROCEDURE — 83540 ASSAY OF IRON: CPT

## 2022-06-16 PROCEDURE — 96368 THER/DIAG CONCURRENT INF: CPT

## 2022-06-16 PROCEDURE — 74011000250 HC RX REV CODE- 250: Performed by: NURSE PRACTITIONER

## 2022-06-16 PROCEDURE — APPSS45 APP SPLIT SHARED TIME 31-45 MINUTES: Performed by: NURSE PRACTITIONER

## 2022-06-16 PROCEDURE — 83880 ASSAY OF NATRIURETIC PEPTIDE: CPT

## 2022-06-16 PROCEDURE — 94660 CPAP INITIATION&MGMT: CPT

## 2022-06-16 PROCEDURE — 74011250637 HC RX REV CODE- 250/637: Performed by: PHYSICIAN ASSISTANT

## 2022-06-16 PROCEDURE — 85025 COMPLETE CBC W/AUTO DIFF WBC: CPT

## 2022-06-16 PROCEDURE — 36415 COLL VENOUS BLD VENIPUNCTURE: CPT

## 2022-06-16 PROCEDURE — 74011000250 HC RX REV CODE- 250: Performed by: HOSPITALIST

## 2022-06-16 PROCEDURE — 77010033678 HC OXYGEN DAILY

## 2022-06-16 PROCEDURE — 65660000001 HC RM ICU INTERMED STEPDOWN

## 2022-06-16 PROCEDURE — 94664 DEMO&/EVAL PT USE INHALER: CPT

## 2022-06-16 PROCEDURE — 74011250636 HC RX REV CODE- 250/636: Performed by: HOSPITALIST

## 2022-06-16 PROCEDURE — 74011000258 HC RX REV CODE- 258: Performed by: HOSPITALIST

## 2022-06-16 PROCEDURE — 96366 THER/PROPH/DIAG IV INF ADDON: CPT

## 2022-06-16 PROCEDURE — 94640 AIRWAY INHALATION TREATMENT: CPT

## 2022-06-16 PROCEDURE — 74011000250 HC RX REV CODE- 250: Performed by: PHYSICIAN ASSISTANT

## 2022-06-16 PROCEDURE — 82550 ASSAY OF CK (CPK): CPT

## 2022-06-16 PROCEDURE — 80053 COMPREHEN METABOLIC PANEL: CPT

## 2022-06-16 PROCEDURE — 96375 TX/PRO/DX INJ NEW DRUG ADDON: CPT

## 2022-06-16 PROCEDURE — 82728 ASSAY OF FERRITIN: CPT

## 2022-06-16 PROCEDURE — 81240 F2 GENE: CPT

## 2022-06-16 PROCEDURE — 80162 ASSAY OF DIGOXIN TOTAL: CPT

## 2022-06-16 RX ORDER — BUDESONIDE 0.5 MG/2ML
500 INHALANT ORAL
Status: DISCONTINUED | OUTPATIENT
Start: 2022-06-16 | End: 2022-06-17

## 2022-06-16 RX ORDER — DIGOXIN 125 MCG
0.06 TABLET ORAL EVERY OTHER DAY
Status: DISCONTINUED | OUTPATIENT
Start: 2022-06-17 | End: 2022-06-22 | Stop reason: HOSPADM

## 2022-06-16 RX ORDER — HEPARIN SODIUM 1000 [USP'U]/ML
2000 INJECTION, SOLUTION INTRAVENOUS; SUBCUTANEOUS ONCE
Status: COMPLETED | OUTPATIENT
Start: 2022-06-16 | End: 2022-06-16

## 2022-06-16 RX ORDER — DOXYCYCLINE HYCLATE 100 MG
100 TABLET ORAL 2 TIMES DAILY
Status: COMPLETED | OUTPATIENT
Start: 2022-06-16 | End: 2022-06-20

## 2022-06-16 RX ADMIN — ARFORMOTEROL TARTRATE: 15 SOLUTION RESPIRATORY (INHALATION) at 21:06

## 2022-06-16 RX ADMIN — METHYLPREDNISOLONE SODIUM SUCCINATE 40 MG: 40 INJECTION, POWDER, FOR SOLUTION INTRAMUSCULAR; INTRAVENOUS at 16:08

## 2022-06-16 RX ADMIN — HEPARIN SODIUM AND DEXTROSE 12 UNITS/KG/HR: 10000; 5 INJECTION INTRAVENOUS at 07:32

## 2022-06-16 RX ADMIN — MILRINONE LACTATE 0.38 MCG/KG/MIN: 0.2 INJECTION, SOLUTION INTRAVENOUS at 14:10

## 2022-06-16 RX ADMIN — METHYLPREDNISOLONE SODIUM SUCCINATE 40 MG: 40 INJECTION, POWDER, FOR SOLUTION INTRAMUSCULAR; INTRAVENOUS at 04:49

## 2022-06-16 RX ADMIN — ALBUTEROL SULFATE 2.5 MG: 2.5 SOLUTION RESPIRATORY (INHALATION) at 15:16

## 2022-06-16 RX ADMIN — SODIUM CHLORIDE, PRESERVATIVE FREE 1 G: 5 INJECTION INTRAVENOUS at 16:20

## 2022-06-16 RX ADMIN — MILRINONE LACTATE 0.38 MCG/KG/MIN: 0.2 INJECTION, SOLUTION INTRAVENOUS at 23:33

## 2022-06-16 RX ADMIN — MILRINONE LACTATE 0.38 MCG/KG/MIN: 0.2 INJECTION, SOLUTION INTRAVENOUS at 03:16

## 2022-06-16 RX ADMIN — ALBUTEROL SULFATE 2.5 MG: 2.5 SOLUTION RESPIRATORY (INHALATION) at 08:14

## 2022-06-16 RX ADMIN — BUMETANIDE 1 MG: 0.25 INJECTION, SOLUTION INTRAMUSCULAR; INTRAVENOUS at 16:08

## 2022-06-16 RX ADMIN — Medication 400 MG: at 16:27

## 2022-06-16 RX ADMIN — DOXYCYCLINE HYCLATE 100 MG: 100 TABLET, COATED ORAL at 16:20

## 2022-06-16 RX ADMIN — Medication 5 ML: at 06:00

## 2022-06-16 RX ADMIN — METHYLPREDNISOLONE SODIUM SUCCINATE 40 MG: 40 INJECTION, POWDER, FOR SOLUTION INTRAMUSCULAR; INTRAVENOUS at 23:06

## 2022-06-16 RX ADMIN — MEXILETINE HYDROCHLORIDE 150 MG: 150 CAPSULE ORAL at 16:33

## 2022-06-16 RX ADMIN — SPIRONOLACTONE 12.5 MG: 25 TABLET ORAL at 16:21

## 2022-06-16 RX ADMIN — PIPERACILLIN SODIUM AND TAZOBACTAM SODIUM 3.38 G: 3; 375 INJECTION, POWDER, FOR SOLUTION INTRAVENOUS at 06:43

## 2022-06-16 RX ADMIN — ARFORMOTEROL TARTRATE: 15 SOLUTION RESPIRATORY (INHALATION) at 08:14

## 2022-06-16 RX ADMIN — IVABRADINE 7.5 MG: 7.5 TABLET, FILM COATED ORAL at 16:34

## 2022-06-16 RX ADMIN — Medication 10 ML: at 23:06

## 2022-06-16 RX ADMIN — ALBUTEROL SULFATE 2.5 MG: 2.5 SOLUTION RESPIRATORY (INHALATION) at 02:24

## 2022-06-16 RX ADMIN — Medication 400 MG: at 23:05

## 2022-06-16 RX ADMIN — Medication 10 ML: at 16:27

## 2022-06-16 RX ADMIN — ALBUTEROL SULFATE 2.5 MG: 2.5 SOLUTION RESPIRATORY (INHALATION) at 21:06

## 2022-06-16 RX ADMIN — HEPARIN SODIUM 2000 UNITS: 1000 INJECTION INTRAVENOUS; SUBCUTANEOUS at 16:30
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
## 2022-06-16 NOTE — ED NOTES
Patient is to remain NPO with no exceptions until an attending can reassess later this AM per NP. PO medication should be re-timed to 0900.
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
## 2022-06-16 NOTE — ED NOTES
Verbal shift change report given to GUICHO Sánchez (oncoming nurse) by Lova Duane, RN (offgoing nurse). Report included the following information SBAR, ED Summary, Intake/Output, MAR and Recent Results.

## 2022-06-16 NOTE — ED NOTES
Patient stated he was given ice chips during previous shift. Patient has had no ice chips or water since 2100. NP notified about potential change in diet.

## 2022-06-16 NOTE — ED NOTES
Called respiratory therapy and asked them to wean pt off BIPAP per verbal order from Dr. Ashley Neff.

## 2022-06-16 NOTE — PROGRESS NOTES
TRANSFER - IN REPORT:    Verbal report received from PoornimaRN(name) on Evette Don  being received from ED(unit) for routine progression of care      Report consisted of patients Situation, Background, Assessment and   Recommendations(SBAR). Information from the following report(s) SBAR, ED Summary, Procedure Summary, Intake/Output, MAR, Recent Results and Med Rec Status was reviewed with the receiving nurse. Opportunity for questions and clarification was provided. Assessment completed upon patients arrival to unit and care assumed.

## 2022-06-16 NOTE — PROGRESS NOTES
Problem: Falls - Risk of  Goal: *Absence of Falls  Description: Document [facility] Fall Risk and appropriate interventions in the flowsheet.   Note: Fall Risk Interventions:  Mobility Interventions: Patient to call before getting OOB         Medication Interventions: Patient to call before getting OOB

## 2022-06-16 NOTE — NURSE NAVIGATOR
Chart reviewed by Heart Failure Nurse Navigator. Heart Failure database completed. EF:  Prior EF 5/2021 20/25; 10/2021 38%; repeat echo pending    ACEi/ARB/ARNi: held due to acute decompensation    BB: held due to acute decompensation    Aldosterone Antagonist: spironolactone 12.5 mg daily    Obstructive Sleep Apnea Screening: screening priority 1   Referred to Sleep Medicine:     CRT: no Life Vest currently, nor ICD. NYHA Functional Class III on admission. Heart Failure Teach Back in Patient Education. Heart Failure Avoiding Triggers on Discharge Instructions. Cardiologist: Dr. Pam Ocampo [facility])      Post discharge follow up phone call to be made within 48-72 hours of discharge. Provider Message on KARDEX:     TJ requires ACP conversation with all HF patients. Please include . lifegoals (smart phrase) in your documentation. Patient's personal goals include:   Important upcoming milestones or family events: The patient identifies the following as important for living well:      Thank you,  HF Navigator Team

## 2022-06-16 NOTE — ED NOTES
All patient's oral meds where on hold this morning related to being on BIPAP and NPO>  Pt taken off of BiPap and tolerated well. Pt given food and ate 100% and will now get PO meds. Milrinone drip is out; pharmacy notified awaiting another bag.
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
## 2022-06-16 NOTE — PROGRESS NOTES
[address]  Hospitalist Group                                                                                          Hospitalist Progress Note  Clifford Diaz MD  Answering service: [phone] from in house phone        Date of Service:  2022  NAME:  Stepan Mccloud  :  1960  MRN:  [id number]      Admission Summary: This is a 66-year-old male with a past medical history of severe nonischemic cardiomyopathy presumed secondary to myocarditis () with improved cardiac function, hypertension, COPD, asthma, dilated ascending aorta, and former smoker. He comes over here because of shortness of breath and left upper extremity swelling. As far as his shortness of breath is concerned, he says that he has been having worsening shortness of breath since last 2 months, which has really got worse in the last couple of days. In the ER, initially the patient required 15 liters and then he was put on BiPAP and currently he feels relatively stable. He says that he never had any chest pain, fever, cough, nausea, vomiting, headache, or dizziness. No changes in bowel movement. No nasal congestion. No abdominal pain. No chest pain. He also mentions that his shortness of breath is so worse now, it is hard for him to breathe even while he is well rested. He also says that since last 3-4 days, he also has swelling in his left upper extremity. He has some tingling, but no significant pain. He says that his swelling of left upper arm is getting slightly better.        Interval history / Subjective:   F/u Acute respiratory failure  Continues to be on Bipap, failed weaning yesterday, will tray again   Was hypothermic last night     Assessment & Plan:     Acute respiratory failure with hypoxia  Unclear etiology, secondary to acute PE vs Pneumonia vs CHF vs COPD exacerbation  -Urinalysis unremarkable  -Follow cultures  -Echo  -CTA subsegmental PE  -Heparin gtt, switch to NOAC once more stable  -Continue Vanc/Zosyn  -Appreciate adv heart failure  -Consult Pulmonary    Acute on chronic CHF NYHA III  -Last Echo 10/2021 improvement from 20-25% to 38%  -Repeat Echo  -on Milrinone gtt  -Bumex IV BID  -No BB/ACEi? ARB/ARNI  -continue digoxin/corlanor/aldactone  -Continue Mexilitine  -Appreciate discussion with adv heart failure    Probable Pneumonia: Antibiotics as above  Acute RUE DVT/PE: sec to ?sedentary. Heparin gtt, switch to NOAC when more stable    CKD stage III. stable  Hypertension: Continue home meds  COPD: continue home inhalers. Will switch Solumedrol to short course of oral prednisone  Dilated ascending aorta:  Stable. Former smoker: Encouraged the patient for quitting smoking and he has not been smoking since the last 1-1/2 years. NPO, will start cardiac diet if is off bipap     Code status: FULL CODE  Prophylaxis: Heparin gtt    >35 min of critical care time spent with the patient reviewing the records, formulating the plan, coordinating with the consultants and updating the patient        Plan: Wean off supp oxygen as able, will see if the patient can come off [address] discussed with: Patient  Anticipated Disposition: home in 2-3 days     Hospital Problems  Date Reviewed: 6/15/2022          Codes Class Noted POA    COPD (chronic obstructive pulmonary disease) ([facility] [address] ICD-10-CM: J44.9  ICD-9-CM: 193  6/15/2022 Unknown        * (Principal) Sepsis ([facility] [address] ICD-10-CM: A41.9  ICD-9-CM: 038.9, 995.91  6/15/2022 Yes                Review of Systems:   A comprehensive review of systems was negative except for that written in the HPI. Vital Signs:    Last 24hrs VS reviewed since prior progress note.  Most recent are:  Visit Vitals  /66   Pulse (!) 109   Temp 97.9 °F (36.6 °C)   Resp 22   Ht 5' 8\" (1.727 m)   Wt 92.5 kg (203 lb 14.8 oz)   SpO2 99%   BMI 31.01 kg/m²         Intake/Output Summary (Last 24 hours) at 6/16/2022 0980  Last data filed at 6/16/2022 0235  Gross per 24 hour   Intake 600 ml   Output --   Net 600 ml        Physical Examination:     I had a face to face encounter with this patient and independently examined them on 6/16/2022 as outlined below:          Constitutional:  No acute distress   ENT:  Oral mucosa moist, oropharynx benign. Resp:  Bilateral diminished breath sounds but no wheezes. No accessory muscle use. CV:  Regular rhythm, normal rate, no murmurs, gallops, rubs    GI:  Soft, non distended, non tender. normoactive bowel sounds, no hepatosplenomegaly     Musculoskeletal:  No edema, warm, 2+ pulses throughout    Neurologic:  Moves all extremities. AAOx3, CN II-XII reviewed            Data Review:    Review and/or order of clinical lab test      Labs:     Recent Labs     06/16/22  0311 06/15/22  1529   WBC 5.5 9.1   HGB 12.3 14.7   HCT 37.0 46.2    321     Recent Labs     06/16/22  0311 06/15/22  1018   * 132*   K 3.9 4.8    100   CO2 24 24   BUN 38* 38*   CREA 1.88* 1.79*   * 127*   CA 8.0* 9.0   MG  --  1.9     Recent Labs     06/16/22  0311 06/15/22  1018   * 160*   AP 80 114   TBILI 1.2* 1.4*   TP 6.4 8.6*   ALB 2.7* 3.6   GLOB 3.7 5.0*     Recent Labs     06/16/22  0647 06/15/22  2344 06/15/22  2157   APTT 83.5* 78.9* >130.0*      Recent Labs     06/16/22 0311   TIBC 415   PSAT 6*   FERR 186      No results found for: FOL, RBCF   No results for input(s): PH, PCO2, PO2 in the last 72 hours. No results for input(s): CPK, CKNDX, TROIQ in the last 72 hours.     No lab exists for component: CPKMB  No results found for: CHOL, CHOLX, CHLST, CHOLV, HDL, HDLP, LDL, LDLC, DLDLP, TGLX, TRIGL, TRIGP, CHHD, CHHDX  No results found for: [facility]  Lab Results   Component Value Date/Time    Color YELLOW/STRAW 06/15/2022 03:29 PM    Appearance CLEAR 06/15/2022 03:29 PM    Specific gravity 1.022 06/15/2022 03:29 PM    pH (UA) 5.5 06/15/2022 03:29 PM    Protein Negative 06/15/2022 03:29 PM    Glucose Negative 06/15/2022 03:29 PM Ketone Negative 06/15/2022 03:29 PM    Bilirubin Negative 06/15/2022 03:29 PM    Urobilinogen 0.2 06/15/2022 03:29 PM    Nitrites Negative 06/15/2022 03:29 PM    Leukocyte Esterase Negative 06/15/2022 03:29 PM    Epithelial cells FEW 06/15/2022 03:29 PM    Bacteria Negative 06/15/2022 03:29 PM    WBC 0-4 06/15/2022 03:29 PM    RBC 0-5 06/15/2022 03:29 PM         Medications Reviewed:     Current Facility-Administered Medications   Medication Dose Route Frequency    arformoterol 15 mcg/budesonide 0.25 mg neb solution   Nebulization BID RT    albuterol (PROVENTIL VENTOLIN) nebulizer solution 2.5 mg  2.5 mg Nebulization Q4H RT    ivabradine (CORLANOR) tablet 7.5 mg  7.5 mg Oral BID WITH MEALS    digoxin (LANOXIN) tablet 0.125 mg  0.125 mg Oral EVERY OTHER DAY    magnesium oxide (MAG-OX) tablet 400 mg  400 mg Oral BID    spironolactone (ALDACTONE) tablet 12.5 mg  12.5 mg Oral DAILY    mexiletine (MEXITIL) capsule 150 mg  150 mg Oral Q8H    sodium chloride (NS) flush 5-40 mL  5-40 mL IntraVENous Q8H    sodium chloride (NS) flush 5-40 mL  5-40 mL IntraVENous PRN    acetaminophen (TYLENOL) tablet 650 mg  650 mg Oral Q6H PRN    Or    acetaminophen (TYLENOL) suppository 650 mg  650 mg Rectal Q6H PRN    polyethylene glycol (MIRALAX) packet 17 g  17 g Oral DAILY PRN    promethazine (PHENERGAN) tablet 12.5 mg  12.5 mg Oral Q6H PRN    Or    ondansetron (ZOFRAN) injection 4 mg  4 mg IntraVENous Q6H PRN    methylPREDNISolone (PF) (SOLU-MEDROL) injection 40 mg  40 mg IntraVENous Q6H    piperacillin-tazobactam (ZOSYN) 3.375 g in 0.9% sodium chloride (MBP/ADV) 100 mL MBP  3.375 g IntraVENous Q8H    heparin 25,000 units in D5W 250 ml infusion  16-36 Units/kg/hr IntraVENous TITRATE    milrinone (PRIMACOR) 20 MG/100 ML D5W infusion  0.375 mcg/kg/min IntraVENous CONTINUOUS    bumetanide (BUMEX) injection 1 mg  1 mg IntraVENous BID    vancomycin (VANCOCIN) 1,250 mg in 0.9% sodium chloride 250 mL ([id number])  1,250 mg IntraVENous Q24H    vancomycin - pharmacy to dose   Other Rx Dosing/Monitoring     Current Outpatient Medications   Medication Sig    mexiletine (MEXITIL) 150 mg capsule TAKE ONE CAPSULE BY MOUTH THREE TIMES A DAY    spironolactone (ALDACTONE) 25 mg tablet TAKE 1/2 TABLET BY MOUTH DAILY    magnesium oxide (MAG-OX) 400 mg tablet TAKE ONE TABLET BY MOUTH TWICE A DAY    digoxin (LANOXIN) 0.25 mg tablet Take 0.5 Tablets by mouth every other day.  furosemide (LASIX) 20 mg tablet Take 0.5 Tablets by mouth daily.  ivabradine (CORLANOR) 7.5 mg tablet Take 1 Tablet by mouth two (2) times daily (with meals).  budesonide-formoteroL (Symbicort) 160-4.5 mcg/actuation HFAA Take 2 Puffs by inhalation two (2) times a day.  albuterol (ProAir HFA) 90 mcg/actuation inhaler Take 2 Puffs by inhalation every four (4) hours as needed.  Patient uses at most twice per week     ______________________________________________________________________  EXPECTED LENGTH OF STAY: - - -  ACTUAL LENGTH OF STAY:          Yady Gomez MD

## 2022-06-17 ENCOUNTER — APPOINTMENT (OUTPATIENT)
Dept: GENERAL RADIOLOGY | Age: 62
DRG: 871 | End: 2022-06-17
Attending: INTERNAL MEDICINE
Payer: MEDICARE

## 2022-06-17 ENCOUNTER — APPOINTMENT (OUTPATIENT)
Dept: NON INVASIVE DIAGNOSTICS | Age: 62
DRG: 871 | End: 2022-06-17
Attending: HOSPITALIST
Payer: MEDICARE

## 2022-06-17 ENCOUNTER — APPOINTMENT (OUTPATIENT)
Dept: GENERAL RADIOLOGY | Age: 62
DRG: 871 | End: 2022-06-17
Attending: FAMILY MEDICINE
Payer: MEDICARE

## 2022-06-17 ENCOUNTER — APPOINTMENT (OUTPATIENT)
Dept: VASCULAR SURGERY | Age: 62
DRG: 871 | End: 2022-06-17
Attending: INTERNAL MEDICINE
Payer: MEDICARE

## 2022-06-17 LAB
ANION GAP SERPL CALC-SCNC: 11 MMOL/L (ref 5–15)
APTT PPP: 40.7 SEC (ref 22.1–31)
APTT PPP: 49.8 SEC (ref 22.1–31)
APTT PPP: 55 SEC (ref 22.1–31)
APTT PPP: >130 SEC (ref 22.1–31)
BASOPHILS # BLD: 0 K/UL (ref 0–0.1)
BASOPHILS NFR BLD: 0 % (ref 0–1)
BNP SERPL-MCNC: ABNORMAL PG/ML
BUN SERPL-MCNC: 45 MG/DL (ref 6–20)
BUN/CREAT SERPL: 22 (ref 12–20)
CALCIUM SERPL-MCNC: 7.8 MG/DL (ref 8.5–10.1)
CHLORIDE SERPL-SCNC: 98 MMOL/L (ref 97–108)
CO2 SERPL-SCNC: 20 MMOL/L (ref 21–32)
CREAT SERPL-MCNC: 2.07 MG/DL (ref 0.7–1.3)
DIFFERENTIAL METHOD BLD: ABNORMAL
DIGOXIN SERPL-MCNC: 1.3 NG/ML (ref 0.9–2)
ECHO LA DIAMETER INDEX: 2.22 CM/M2
ECHO LA DIAMETER: 4.3 CM
ECHO LA VOL 4C: 93 ML (ref 18–58)
ECHO LA VOLUME INDEX A4C: 48 ML/M2 (ref 16–34)
ECHO LV FRACTIONAL SHORTENING: 12 % (ref 28–44)
ECHO LV INTERNAL DIMENSION DIASTOLE INDEX: 2.99 CM/M2
ECHO LV INTERNAL DIMENSION DIASTOLIC: 5.8 CM (ref 4.2–5.9)
ECHO LV INTERNAL DIMENSION SYSTOLIC INDEX: 2.63 CM/M2
ECHO LV INTERNAL DIMENSION SYSTOLIC: 5.1 CM
ECHO LV IVSD: 0.9 CM (ref 0.6–1)
ECHO LV MASS 2D: 218.1 G (ref 88–224)
ECHO LV MASS INDEX 2D: 112.4 G/M2 (ref 49–115)
ECHO LV POSTERIOR WALL DIASTOLIC: 1 CM (ref 0.6–1)
ECHO LV RELATIVE WALL THICKNESS RATIO: 0.34
ECHO RV TAPSE: 1.5 CM (ref 1.7–?)
ECHO TV REGURGITANT MAX VELOCITY: 3.24 M/S
ECHO TV REGURGITANT PEAK GRADIENT: 42 MMHG
EOSINOPHIL # BLD: 0 K/UL (ref 0–0.4)
EOSINOPHIL NFR BLD: 0 % (ref 0–7)
ERYTHROCYTE [DISTWIDTH] IN BLOOD BY AUTOMATED COUNT: 14.7 % (ref 11.5–14.5)
GLUCOSE SERPL-MCNC: 152 MG/DL (ref 65–100)
HCT VFR BLD AUTO: 38.3 % (ref 36.6–50.3)
HGB BLD-MCNC: 12.6 G/DL (ref 12.1–17)
IMM GRANULOCYTES # BLD AUTO: 0.1 K/UL (ref 0–0.04)
IMM GRANULOCYTES NFR BLD AUTO: 1 % (ref 0–0.5)
LYMPHOCYTES # BLD: 0.4 K/UL (ref 0.8–3.5)
LYMPHOCYTES NFR BLD: 3 % (ref 12–49)
MCH RBC QN AUTO: 32.1 PG (ref 26–34)
MCHC RBC AUTO-ENTMCNC: 32.9 G/DL (ref 30–36.5)
MCV RBC AUTO: 97.7 FL (ref 80–99)
MONOCYTES # BLD: 0.9 K/UL (ref 0–1)
MONOCYTES NFR BLD: 7 % (ref 5–13)
NEUTS SEG # BLD: 12.1 K/UL (ref 1.8–8)
NEUTS SEG NFR BLD: 89 % (ref 32–75)
NRBC # BLD: 0 K/UL (ref 0–0.01)
NRBC BLD-RTO: 0 PER 100 WBC
PLATELET # BLD AUTO: 323 K/UL (ref 150–400)
PLATELET COMMENTS,PCOM: ABNORMAL
PMV BLD AUTO: 12.1 FL (ref 8.9–12.9)
POTASSIUM SERPL-SCNC: 4.2 MMOL/L (ref 3.5–5.1)
RBC # BLD AUTO: 3.92 M/UL (ref 4.1–5.7)
RBC MORPH BLD: ABNORMAL
SODIUM SERPL-SCNC: 129 MMOL/L (ref 136–145)
THERAPEUTIC RANGE,PTTT: ABNORMAL SECS (ref 58–77)
WBC # BLD AUTO: 13.5 K/UL (ref 4.1–11.1)

## 2022-06-17 PROCEDURE — 74011000250 HC RX REV CODE- 250: Performed by: PHYSICIAN ASSISTANT

## 2022-06-17 PROCEDURE — 94640 AIRWAY INHALATION TREATMENT: CPT

## 2022-06-17 PROCEDURE — 65660000001 HC RM ICU INTERMED STEPDOWN

## 2022-06-17 PROCEDURE — 36415 COLL VENOUS BLD VENIPUNCTURE: CPT

## 2022-06-17 PROCEDURE — 74011250636 HC RX REV CODE- 250/636: Performed by: HOSPITALIST

## 2022-06-17 PROCEDURE — 80162 ASSAY OF DIGOXIN TOTAL: CPT

## 2022-06-17 PROCEDURE — 80048 BASIC METABOLIC PNL TOTAL CA: CPT

## 2022-06-17 PROCEDURE — 96368 THER/DIAG CONCURRENT INF: CPT

## 2022-06-17 PROCEDURE — 74011250636 HC RX REV CODE- 250/636: Performed by: PHYSICIAN ASSISTANT

## 2022-06-17 PROCEDURE — 93306 TTE W/DOPPLER COMPLETE: CPT

## 2022-06-17 PROCEDURE — 93970 EXTREMITY STUDY: CPT

## 2022-06-17 PROCEDURE — 93306 TTE W/DOPPLER COMPLETE: CPT | Performed by: INTERNAL MEDICINE

## 2022-06-17 PROCEDURE — 74011250636 HC RX REV CODE- 250/636: Performed by: INTERNAL MEDICINE

## 2022-06-17 PROCEDURE — 74011250637 HC RX REV CODE- 250/637: Performed by: NURSE PRACTITIONER

## 2022-06-17 PROCEDURE — 83880 ASSAY OF NATRIURETIC PEPTIDE: CPT

## 2022-06-17 PROCEDURE — 85730 THROMBOPLASTIN TIME PARTIAL: CPT

## 2022-06-17 PROCEDURE — 96375 TX/PRO/DX INJ NEW DRUG ADDON: CPT

## 2022-06-17 PROCEDURE — G0378 HOSPITAL OBSERVATION PER HR: HCPCS

## 2022-06-17 PROCEDURE — 74011000250 HC RX REV CODE- 250: Performed by: NURSE PRACTITIONER

## 2022-06-17 PROCEDURE — 74011250637 HC RX REV CODE- 250/637: Performed by: HOSPITALIST

## 2022-06-17 PROCEDURE — 71045 X-RAY EXAM CHEST 1 VIEW: CPT

## 2022-06-17 PROCEDURE — 74011250636 HC RX REV CODE- 250/636: Performed by: NURSE PRACTITIONER

## 2022-06-17 PROCEDURE — 74011250637 HC RX REV CODE- 250/637: Performed by: PHYSICIAN ASSISTANT

## 2022-06-17 PROCEDURE — 99233 SBSQ HOSP IP/OBS HIGH 50: CPT | Performed by: NURSE PRACTITIONER

## 2022-06-17 PROCEDURE — 74011000258 HC RX REV CODE- 258: Performed by: NURSE PRACTITIONER

## 2022-06-17 PROCEDURE — 96366 THER/PROPH/DIAG IV INF ADDON: CPT

## 2022-06-17 PROCEDURE — 85025 COMPLETE CBC W/AUTO DIFF WBC: CPT

## 2022-06-17 PROCEDURE — 74011000250 HC RX REV CODE- 250: Performed by: HOSPITALIST

## 2022-06-17 PROCEDURE — 73620 X-RAY EXAM OF FOOT: CPT

## 2022-06-17 PROCEDURE — 96376 TX/PRO/DX INJ SAME DRUG ADON: CPT

## 2022-06-17 RX ORDER — IPRATROPIUM BROMIDE AND ALBUTEROL SULFATE 2.5; .5 MG/3ML; MG/3ML
3 SOLUTION RESPIRATORY (INHALATION)
Status: DISCONTINUED | OUTPATIENT
Start: 2022-06-17 | End: 2022-06-18

## 2022-06-17 RX ORDER — ALBUTEROL SULFATE 0.83 MG/ML
2.5 SOLUTION RESPIRATORY (INHALATION)
Status: DISCONTINUED | OUTPATIENT
Start: 2022-06-17 | End: 2022-06-20

## 2022-06-17 RX ORDER — HEPARIN SODIUM 1000 [USP'U]/ML
40 INJECTION, SOLUTION INTRAVENOUS; SUBCUTANEOUS ONCE
Status: COMPLETED | OUTPATIENT
Start: 2022-06-17 | End: 2022-06-17

## 2022-06-17 RX ORDER — IPRATROPIUM BROMIDE AND ALBUTEROL SULFATE 2.5; .5 MG/3ML; MG/3ML
3 SOLUTION RESPIRATORY (INHALATION)
Status: DISCONTINUED | OUTPATIENT
Start: 2022-06-17 | End: 2022-06-17

## 2022-06-17 RX ORDER — MILRINONE LACTATE 0.2 MG/ML
0.25 INJECTION, SOLUTION INTRAVENOUS CONTINUOUS
Status: DISCONTINUED | OUTPATIENT
Start: 2022-06-17 | End: 2022-06-20

## 2022-06-17 RX ORDER — TRAMADOL HYDROCHLORIDE 50 MG/1
50 TABLET ORAL
Status: DISCONTINUED | OUTPATIENT
Start: 2022-06-17 | End: 2022-06-22 | Stop reason: HOSPADM

## 2022-06-17 RX ADMIN — Medication 400 MG: at 20:31

## 2022-06-17 RX ADMIN — MEXILETINE HYDROCHLORIDE 150 MG: 150 CAPSULE ORAL at 18:10

## 2022-06-17 RX ADMIN — IPRATROPIUM BROMIDE AND ALBUTEROL SULFATE 3 ML: .5; 3 SOLUTION RESPIRATORY (INHALATION) at 15:36

## 2022-06-17 RX ADMIN — MEXILETINE HYDROCHLORIDE 150 MG: 150 CAPSULE ORAL at 01:33

## 2022-06-17 RX ADMIN — DIGOXIN 0.06 MG: 125 TABLET ORAL at 15:12

## 2022-06-17 RX ADMIN — IRON SUCROSE 200 MG: 20 INJECTION, SOLUTION INTRAVENOUS at 09:27

## 2022-06-17 RX ADMIN — IVABRADINE 7.5 MG: 7.5 TABLET, FILM COATED ORAL at 18:10

## 2022-06-17 RX ADMIN — IPRATROPIUM BROMIDE AND ALBUTEROL SULFATE 3 ML: .5; 3 SOLUTION RESPIRATORY (INHALATION) at 20:36

## 2022-06-17 RX ADMIN — MILRINONE LACTATE 0.25 MCG/KG/MIN: 0.2 INJECTION, SOLUTION INTRAVENOUS at 19:42

## 2022-06-17 RX ADMIN — METHYLPREDNISOLONE SODIUM SUCCINATE 40 MG: 40 INJECTION, POWDER, FOR SOLUTION INTRAMUSCULAR; INTRAVENOUS at 04:51

## 2022-06-17 RX ADMIN — Medication 10 ML: at 15:19

## 2022-06-17 RX ADMIN — Medication 10 ML: at 06:00

## 2022-06-17 RX ADMIN — SPIRONOLACTONE 12.5 MG: 25 TABLET ORAL at 08:53

## 2022-06-17 RX ADMIN — SODIUM CHLORIDE, PRESERVATIVE FREE 1 G: 5 INJECTION INTRAVENOUS at 13:34

## 2022-06-17 RX ADMIN — Medication 400 MG: at 11:37

## 2022-06-17 RX ADMIN — Medication 12 UNITS/KG/HR: at 01:31

## 2022-06-17 RX ADMIN — MEXILETINE HYDROCHLORIDE 150 MG: 150 CAPSULE ORAL at 09:00

## 2022-06-17 RX ADMIN — IPRATROPIUM BROMIDE AND ALBUTEROL SULFATE 3 ML: .5; 3 SOLUTION RESPIRATORY (INHALATION) at 11:28

## 2022-06-17 RX ADMIN — BUMETANIDE 1 MG: 0.25 INJECTION, SOLUTION INTRAMUSCULAR; INTRAVENOUS at 08:52

## 2022-06-17 RX ADMIN — IVABRADINE 7.5 MG: 7.5 TABLET, FILM COATED ORAL at 09:26

## 2022-06-17 RX ADMIN — Medication 10 ML: at 21:40

## 2022-06-17 RX ADMIN — METHYLPREDNISOLONE SODIUM SUCCINATE 60 MG: 40 INJECTION, POWDER, FOR SOLUTION INTRAMUSCULAR; INTRAVENOUS at 15:14

## 2022-06-17 RX ADMIN — METHYLPREDNISOLONE SODIUM SUCCINATE 40 MG: 40 INJECTION, POWDER, FOR SOLUTION INTRAMUSCULAR; INTRAVENOUS at 08:51

## 2022-06-17 RX ADMIN — MILRINONE LACTATE 0.38 MCG/KG/MIN: 0.2 INJECTION, SOLUTION INTRAVENOUS at 08:47

## 2022-06-17 RX ADMIN — HEPARIN SODIUM 3210 UNITS: 1000 INJECTION INTRAVENOUS; SUBCUTANEOUS at 08:57

## 2022-06-17 RX ADMIN — DOXYCYCLINE HYCLATE 100 MG: 100 TABLET, COATED ORAL at 08:54

## 2022-06-17 RX ADMIN — METHYLPREDNISOLONE SODIUM SUCCINATE 60 MG: 40 INJECTION, POWDER, FOR SOLUTION INTRAMUSCULAR; INTRAVENOUS at 20:31

## 2022-06-17 RX ADMIN — DOXYCYCLINE HYCLATE 100 MG: 100 TABLET, COATED ORAL at 18:10

## 2022-06-17 RX ADMIN — Medication 11 UNITS/KG/HR: at 16:26

## 2022-06-17 NOTE — PROGRESS NOTES
Physician Progress Note      Christina Holley  CSN #:                  [id number]  :                       1960  ADMIT DATE:       6/15/2022 10:13 AM  DISCH DATE:  RESPONDING  PROVIDER #:        Geeta Johnson MD          QUERY TEXT:    Pt admitted with CHF . Pt noted to have pneumonia . If possible, please document in the progress notes and discharge summary if you are evaluating and/or treating any of the following:    Note: CAP and HCAP indicate where the pneumonia was acquired, not a specific type. The medical record reflects the following:  Risk Factors: COPD/ CHF  Clinical Indicators:  CTA  Left basilar consolidation suspicious for pneumonia. Treatment: rocephin IV, vancomycin IV, zosyn IV    Thank you,  Ya Quintero RN, CDI, CRCR, CCDS  Certified Clinical Documentation Integrity  Specialist  [phone]  You can also contact me via [facility]. Options provided:  -- Bacterial pneumonia  -- Gram negative pneumonia  -- Gram positive pneumonia  -- MRSA pneumonia  -- MSSA pneumonia  -- Aspiration pneumonia  -- Other - I will add my own diagnosis  -- Disagree - Not applicable / Not valid  -- Disagree - Clinically unable to determine / Unknown  -- Refer to Clinical Documentation Reviewer    PROVIDER RESPONSE TEXT:    Provider is clinically unable to determine a response to this query.     Query created by: Toyin Milligan on 2022 10:22 AM      Electronically signed by:  Geeta Johnson MD 2022 11:27 AM

## 2022-06-17 NOTE — PROGRESS NOTES
Transition of Care Plan  RUR 10%    Disposition   Home     Transportation  Wife     Medical follow up   PCP and specialist    Contact  Life Partner-- \"wife\"  jessica Aldridge [phone]    Reason for Admission:  --SEPSIS  (SOB, upper lrft extremity swelling/ Acute respiratory failure with hypoxia)  Hx of nonischemic cardiomyopathy,  Hypertension,  COPD, asthma                   RUR Score:        10%             Plan for utilizing home health:     Not at this time but will arrange if ordered     PCP: First and Last name: Page Love MD     Name of Practice:    Are you a current patient: Yes/No: yes   Approximate date of last visit:    Can you participate in a virtual visit with your PCP:                     Current Advanced Directive/Advance Care Plan: Full Code      Healthcare Decision Maker:   Click here to complete [address] including selection of the Healthcare Decision Maker Relationship (ie \"Primary\")             Primary Decision Maker: Adama Gann Girlfriend - [phone]                  Transition of Care Plan:       Home with life partner and medical follow up     CM met with patient in his room to introduce self and explain role. Patient was alert and oriented-- Confirmed demographics, PCP and insurance-- [employer]. Secures medications at [facility]   Yes-- SN  [facility]  Home infusion  Yes 2021-- [facility] (dobutamine)  Rehab/ None  Life Vest   _-[facility] 2021  Self care prior to admission    Patient lives at home with his life partner,  and 34year old her son  In a one level home. Patient was self care and independent --driving and using no dme prior to admission. Patient has 2 other adult sons who live in the area. He uses no home 02 nor DME    No longer needing life vest nor inotrope. (dsicharged 5/21 with life vest and inotrope)    He hopes he will not require home IV abx when discharged but said he would use [facility] again to administer any form of infusion at home. Patient was a  by profession. He has been receiving SSDI for about 2 years. CM will follow patient's medical progress and assist with any needs that arise-- pending medical progress. Care Management Interventions  PCP Verified by CM:  Yes  Mode of Transport at Discharge:  (life partner )  Discharge Durable Medical Equipment: No  Physical Therapy Consult: No  Occupational Therapy Consult: No  Speech Therapy Consult: No  Support Systems: Spouse/Significant Other,Child(boris),Other Family Member(s)  Confirm Follow Up Transport: Family (self)  Discharge Location  Patient Expects to be Discharged to[de-identified] Home (CM to follow for transition of care needs)

## 2022-06-17 NOTE — NURSE NAVIGATOR
Heart Failure Nurse Navigator rounds completed. HF NN provided introduction to self and nurse navigator role. Patient states he has Living With Heart Failure booklet, calendar and magnet. He has no access to a computer to participate in the support group meeting. Introduced patient to Preparing for Successful Discharge - Heart Failure check list and explained that knowledge of these topics will help facilitate successful self management upon discharge. Patient states he does daily weights. He states he has the calendar in his bag here. He does not use salt. Advised patient that follow up appointment will be scheduled and placed on Discharge Instructions prior to discharge. Patient given [facility] flyer and is agreeable to services as needed. Will continue to follow until discharge.
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
## 2022-06-17 NOTE — PROGRESS NOTES
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 He comes over here because of shortness of breath and left upper extremity swelling. As far as his shortness of breath is concerned, he says that he has been having worsening shortness of breath since last 2 months, which has really got worse in the last couple of days. In the ER, initially the patient required 15 liters and then he was put on BiPAP and currently he feels relatively stable. He says that he never had any chest pain, fever, cough, nausea, vomiting, headache, or dizziness. No changes in bowel movement. No nasal congestion. No abdominal pain. No chest pain. He also mentions that his shortness of breath is so worse now, it is hard for him to breathe even while he is well rested. He also says that since last 3-4 days, he also has swelling in his left upper extremity. He has some tingling, but no significant pain. He says that his swelling of left upper arm is getting slightly better. Interval history / Subjective:   F/u Acute respiratory failure    Patient is seen and examined at bedside this AM. He c/o right foot swelling - duplex and XR ordered. He still has left arm swelling- explained that it is due to clot.  He dis not like nebulizer and wants to use his home inhalers - I am ok with that   Discussed with nursing      Assessment & Plan:     Acute hypoxia respiratory failure - improving Secondary to acute PE vs Pneumonia vs CHF vs COPD exacerbation  -CTA subsegmental PE  - procalcitonin 0.2   -Heparin gtt, switch to NOAC once more stable  -Appreciate Pulmonary input  - c/w IV abx Ceftriaxone, Doxy  - increased IV steroids 60mg q6h per pulm  - c/w home inhalers. Added duoneb  - off bipap. saturating on 3l NC    Acute on chronic CHF NYHA III  -Echo : 30 - 35%. Moderate global hypokinesis present. modearte TR  -on Milrinone gtt - decreased the rate   -Bumex IV BID  -No BB/ACEi? ARB/ARNI  -continue digoxin/corlanor/aldactone, Mexilitine  -Appreciate adv heart failure input     Acute RUE DVT/PE:   - c/w  Heparin gtt, switch to NOAC when more stable    CKD stage III. stable  Hypertension: Continue home meds  Dilated ascending aorta:  Stable. Former smoker:       Code status: FULL CODE  Prophylaxis: Heparin gtt    Plan: Wean off supp oxygen as able, will see if the patient can come off bipap  Care Plan discussed with: Patient  Anticipated Disposition: likely home with [facility] in 2-3 days     Hospital Problems  Date Reviewed: 6/15/2022          Codes Class Noted POA    COPD (chronic obstructive pulmonary disease) ([facility] ICD-10-CM: J44.9  ICD-9-CM: 653  6/15/2022 Unknown        * (Principal) Sepsis ([facility] ICD-10-CM: A41.9  ICD-9-CM: 038.9, 995.91  6/15/2022 Yes                Review of Systems:   A comprehensive review of systems was negative except for that written in the HPI. Vital Signs:    Last 24hrs VS reviewed since prior progress note.  Most recent are:  Visit Vitals  /89   Pulse (!) 121   Temp 97.5 °F (36.4 °C)   Resp 18   Ht 5' 8\" (1.727 m)   Wt 80.3 kg (177 lb 0.5 oz)   SpO2 100%   BMI 26.92 kg/m²         Intake/Output Summary (Last 24 hours) at 6/17/2022 1723  Last data filed at 6/17/2022 1522  Gross per 24 hour   Intake --   Output 1550 ml   Net -1550 ml        Physical Examination:     I had a face to face encounter with this patient and independently examined them on 6/17/2022 as outlined below:          Constitutional:  moderate distress   ENT:  Oral mucosa moist, oropharynx benign. Resp:  Bilateral diminished breath sounds but no wheezes. No accessory muscle use. CV:  Regular rhythm, normal rate, no murmurs, gallops, rubs    GI:  Soft, non distended, non tender. normoactive bowel sounds, no hepatosplenomegaly     Musculoskeletal:  left arm swollen. Right leg 1+ edema     Neurologic:  Moves all extremities. AAOx3, CN II-XII reviewed            Data Review:    Review and/or order of clinical lab test      Labs:     Recent Labs     06/17/22  0529 06/16/22 0311   WBC 13.5* 5.5   HGB 12.6 12.3   HCT 38.3 37.0    269     Recent Labs     06/17/22  0529 06/16/22  0311 06/15/22  1018   * 134* 132*   K 4.2 3.9 4.8   CL 98 101 100   CO2 20* 24 24   BUN 45* 38* 38*   CREA 2.07* 1.88* 1.79*   * 114* 127*   CA 7.8* 8.0* 9.0   MG  --   --  1.9     Recent Labs     06/16/22  0311 06/15/22  1018   * 160*   AP 80 114   TBILI 1.2* 1.4*   TP 6.4 8.6*   ALB 2.7* 3.6   GLOB 3.7 5.0*     Recent Labs     06/17/22  1532 06/17/22  1150 06/17/22  0529   APTT 55.0* >130.0* 49.8*      Recent Labs     06/16/22 0311   TIBC 415   PSAT 6*   FERR 186      No results found for: FOL, RBCF   No results for input(s): PH, PCO2, PO2 in the last 72 hours.   Recent Labs     06/16/22  1319   *     No results found for: CHOL, CHOLX, CHLST, CHOLV, HDL, HDLP, LDL, LDLC, DLDLP, TGLX, TRIGL, TRIGP, CHHD, CHHDX  No results found for: [facility]  Lab Results   Component Value Date/Time    Color YELLOW/STRAW 06/15/2022 03:29 PM    Appearance CLEAR 06/15/2022 03:29 PM    Specific gravity 1.022 06/15/2022 03:29 PM    pH (UA) 5.5 06/15/2022 03:29 PM    Protein Negative 06/15/2022 03:29 PM    Glucose Negative 06/15/2022 03:29 PM    Ketone Negative 06/15/2022 03:29 PM    Bilirubin Negative 06/15/2022 03:29 PM    Urobilinogen 0.2 06/15/2022 03:29 PM    Nitrites Negative 06/15/2022 03:29 PM    Leukocyte Esterase Negative 06/15/2022 03:29 PM    Epithelial cells FEW 06/15/2022 03:29 PM    Bacteria Negative 06/15/2022 03:29 PM    WBC 0-4 06/15/2022 03:29 PM    RBC 0-5 06/15/2022 03:29 PM         Medications Reviewed:     Current Facility-Administered Medications   Medication Dose Route Frequency    heparin 25,000 units in  mL infusion  16-36 Units/kg/hr IntraVENous TITRATE    albuterol (PROVENTIL VENTOLIN) nebulizer solution 2.5 mg  2.5 mg Nebulization Q6H PRN    milrinone (PRIMACOR) 20 MG/100 ML D5W infusion  0.25 mcg/kg/min IntraVENous CONTINUOUS    fluticasone-umeclidin-vilanter (TRELEGY ELLIPTA) inhaler 1 Puff (Patient Supplied)  1 Puff Inhalation DAILY    methylPREDNISolone (PF) (SOLU-MEDROL) injection 60 mg  60 mg IntraVENous Q6H    albuterol-ipratropium (DUO-NEB) 2.5 MG-0.5 MG/3 ML  3 mL Nebulization Q4HWA RT    traMADoL (ULTRAM) tablet 50 mg  50 mg Oral Q6H PRN    iron sucrose (VENOFER) 200 mg in 0.9% sodium chloride 100 mL IVPB  200 mg IntraVENous DAILY    digoxin (LANOXIN) tablet 0.0625 mg  0.0625 mg Oral EVERY OTHER DAY    cefTRIAXone (ROCEPHIN) 1 g in 0.9% sodium chloride 10 mL IV syringe  1 g IntraVENous Q24H    doxycycline (VIBRA-TABS) tablet 100 mg  100 mg Oral BID    ivabradine (CORLANOR) tablet 7.5 mg  7.5 mg Oral BID WITH MEALS    magnesium oxide (MAG-OX) tablet 400 mg  400 mg Oral BID    spironolactone (ALDACTONE) tablet 12.5 mg  12.5 mg Oral DAILY    mexiletine (MEXITIL) capsule 150 mg  150 mg Oral Q8H    sodium chloride (NS) flush 5-40 mL  5-40 mL IntraVENous Q8H    sodium chloride (NS) flush 5-40 mL  5-40 mL IntraVENous PRN    acetaminophen (TYLENOL) tablet 650 mg  650 mg Oral Q6H PRN    Or    acetaminophen (TYLENOL) suppository 650 mg  650 mg Rectal Q6H PRN    polyethylene glycol (MIRALAX) packet 17 g  17 g Oral DAILY PRN    promethazine (PHENERGAN) tablet 12.5 mg  12.5 mg Oral Q6H PRN    Or    ondansetron (ZOFRAN) injection 4 mg  4 mg IntraVENous Q6H PRN    [Held by provider] bumetanide (BUMEX) injection 1 mg  1 mg IntraVENous BID     ______________________________________________________________________  EXPECTED LENGTH OF STAY: 3d 19h  ACTUAL LENGTH OF STAY:          2                 Joselyn Boyd MD
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
## 2022-06-17 NOTE — PROGRESS NOTES
Occupational Therapy    Orders acknowledged, chart reviewed. Patient reporting severe LUE pain and edema that has increased within the hour. Patient also reporting difficulty with breathing and SOB. L UE re-positioned to support hand (still maintaining neutral position/not elevated). Patient demonstrating LB functional ROM; stating once pain decreases will complete ADLs and functional mobility OOB with nursing. Will follow up once medically stable and appropriate to participate. Thank you,  Rayne Chow  Regarding student involvement in patient care:  A student participated in this treatment session. Per CMS Medicare statements and AOTA guidelines I certify that the following was true:  1. I was present and directly observed the entire session. 2. I made all skilled judgments and clinical decisions regarding care. 3. I am the practitioner responsible for assessment, treatment, and documentation.

## 2022-06-17 NOTE — PROGRESS NOTES
Physical Therapy    Consult received and chart reviewed. Patient admitted with SOB, LUE DVT, hypoxia. He has history of CHF and previously required lifvest and pressors, but has been able to wean from those prior to admission. At this time, he is reporting severe LUE pain that has been acutely changing since he sat up. He reports increased edema and erythema. Strong radial pulse noted and no tingling or numbness reported. Elevated RUE and contacted RN and MD.  Note most recent aPTT was 130. We will follow up with PT assessment when he is more medically appropriate to participate.     Ilene Elizondo, PT

## 2022-06-18 ENCOUNTER — APPOINTMENT (OUTPATIENT)
Dept: ULTRASOUND IMAGING | Age: 62
DRG: 871 | End: 2022-06-18
Attending: INTERNAL MEDICINE
Payer: MEDICARE

## 2022-06-18 ENCOUNTER — APPOINTMENT (OUTPATIENT)
Dept: GENERAL RADIOLOGY | Age: 62
DRG: 871 | End: 2022-06-18
Attending: INTERNAL MEDICINE
Payer: MEDICARE

## 2022-06-18 LAB
ANION GAP SERPL CALC-SCNC: 11 MMOL/L (ref 5–15)
APTT PPP: 52.9 SEC (ref 22.1–31)
APTT PPP: 56.9 SEC (ref 22.1–31)
APTT PPP: >130 SEC (ref 22.1–31)
ARTERIAL PATENCY WRIST A: POSITIVE
ARTERIAL PATENCY WRIST A: POSITIVE
BASE DEFICIT BLD-SCNC: 2.4 MMOL/L
BASE DEFICIT BLD-SCNC: 4.7 MMOL/L
BDY SITE: ABNORMAL
BDY SITE: ABNORMAL
BNP SERPL-MCNC: ABNORMAL PG/ML
BUN SERPL-MCNC: 51 MG/DL (ref 6–20)
BUN/CREAT SERPL: 21 (ref 12–20)
CALCIUM SERPL-MCNC: 8.8 MG/DL (ref 8.5–10.1)
CHLORIDE SERPL-SCNC: 93 MMOL/L (ref 97–108)
CO2 SERPL-SCNC: 23 MMOL/L (ref 21–32)
CREAT SERPL-MCNC: 2.44 MG/DL (ref 0.7–1.3)
DIGOXIN SERPL-MCNC: 1.5 NG/ML (ref 0.9–2)
ERYTHROCYTE [DISTWIDTH] IN BLOOD BY AUTOMATED COUNT: 14.8 % (ref 11.5–14.5)
GAS FLOW.O2 O2 DELIVERY SYS: ABNORMAL L/MIN
GAS FLOW.O2 O2 DELIVERY SYS: ABNORMAL L/MIN
GLUCOSE SERPL-MCNC: 151 MG/DL (ref 65–100)
HCO3 BLD-SCNC: 18.9 MMOL/L (ref 22–26)
HCO3 BLD-SCNC: 21.7 MMOL/L (ref 22–26)
HCT VFR BLD AUTO: 38.4 % (ref 36.6–50.3)
HGB BLD-MCNC: 12.7 G/DL (ref 12.1–17)
MCH RBC QN AUTO: 31.2 PG (ref 26–34)
MCHC RBC AUTO-ENTMCNC: 33.1 G/DL (ref 30–36.5)
MCV RBC AUTO: 94.3 FL (ref 80–99)
NRBC # BLD: 0 K/UL (ref 0–0.01)
NRBC BLD-RTO: 0 PER 100 WBC
PCO2 BLD: 30.5 MMHG (ref 35–45)
PCO2 BLD: 34.9 MMHG (ref 35–45)
PH BLD: 7.4 [PH] (ref 7.35–7.45)
PH BLD: 7.4 [PH] (ref 7.35–7.45)
PLATELET # BLD AUTO: 288 K/UL (ref 150–400)
PMV BLD AUTO: 12.5 FL (ref 8.9–12.9)
PO2 BLD: 106 MMHG (ref 80–100)
PO2 BLD: 91 MMHG (ref 80–100)
POTASSIUM SERPL-SCNC: 4.7 MMOL/L (ref 3.5–5.1)
RBC # BLD AUTO: 4.07 M/UL (ref 4.1–5.7)
SAO2 % BLD: 97.2 % (ref 92–97)
SAO2 % BLD: 98.2 % (ref 92–97)
SODIUM SERPL-SCNC: 127 MMOL/L (ref 136–145)
SPECIMEN TYPE: ABNORMAL
SPECIMEN TYPE: ABNORMAL
THERAPEUTIC RANGE,PTTT: ABNORMAL SECS (ref 58–77)
WBC # BLD AUTO: 11.3 K/UL (ref 4.1–11.1)

## 2022-06-18 PROCEDURE — 74011250636 HC RX REV CODE- 250/636: Performed by: INTERNAL MEDICINE

## 2022-06-18 PROCEDURE — 74011250636 HC RX REV CODE- 250/636: Performed by: PHYSICIAN ASSISTANT

## 2022-06-18 PROCEDURE — 85730 THROMBOPLASTIN TIME PARTIAL: CPT

## 2022-06-18 PROCEDURE — 96368 THER/DIAG CONCURRENT INF: CPT

## 2022-06-18 PROCEDURE — 74011250636 HC RX REV CODE- 250/636: Performed by: HOSPITALIST

## 2022-06-18 PROCEDURE — 94640 AIRWAY INHALATION TREATMENT: CPT

## 2022-06-18 PROCEDURE — 74011000250 HC RX REV CODE- 250: Performed by: INTERNAL MEDICINE

## 2022-06-18 PROCEDURE — 74011250637 HC RX REV CODE- 250/637: Performed by: HOSPITALIST

## 2022-06-18 PROCEDURE — 74011250636 HC RX REV CODE- 250/636: Performed by: NURSE PRACTITIONER

## 2022-06-18 PROCEDURE — 74011250637 HC RX REV CODE- 250/637: Performed by: INTERNAL MEDICINE

## 2022-06-18 PROCEDURE — 65660000001 HC RM ICU INTERMED STEPDOWN

## 2022-06-18 PROCEDURE — 96375 TX/PRO/DX INJ NEW DRUG ADDON: CPT

## 2022-06-18 PROCEDURE — 74011250637 HC RX REV CODE- 250/637: Performed by: PHYSICIAN ASSISTANT

## 2022-06-18 PROCEDURE — 82803 BLOOD GASES ANY COMBINATION: CPT

## 2022-06-18 PROCEDURE — 74011000250 HC RX REV CODE- 250: Performed by: HOSPITALIST

## 2022-06-18 PROCEDURE — 74011250637 HC RX REV CODE- 250/637: Performed by: NURSE PRACTITIONER

## 2022-06-18 PROCEDURE — 96366 THER/PROPH/DIAG IV INF ADDON: CPT

## 2022-06-18 PROCEDURE — 71045 X-RAY EXAM CHEST 1 VIEW: CPT

## 2022-06-18 PROCEDURE — 51798 US URINE CAPACITY MEASURE: CPT

## 2022-06-18 PROCEDURE — 80048 BASIC METABOLIC PNL TOTAL CA: CPT

## 2022-06-18 PROCEDURE — 99233 SBSQ HOSP IP/OBS HIGH 50: CPT | Performed by: NURSE PRACTITIONER

## 2022-06-18 PROCEDURE — 83880 ASSAY OF NATRIURETIC PEPTIDE: CPT

## 2022-06-18 PROCEDURE — 80162 ASSAY OF DIGOXIN TOTAL: CPT

## 2022-06-18 PROCEDURE — 74011000258 HC RX REV CODE- 258: Performed by: NURSE PRACTITIONER

## 2022-06-18 PROCEDURE — 74011000250 HC RX REV CODE- 250: Performed by: PHYSICIAN ASSISTANT

## 2022-06-18 PROCEDURE — 85027 COMPLETE CBC AUTOMATED: CPT

## 2022-06-18 PROCEDURE — 36415 COLL VENOUS BLD VENIPUNCTURE: CPT

## 2022-06-18 PROCEDURE — G0378 HOSPITAL OBSERVATION PER HR: HCPCS

## 2022-06-18 PROCEDURE — 96376 TX/PRO/DX INJ SAME DRUG ADON: CPT

## 2022-06-18 PROCEDURE — 36600 WITHDRAWAL OF ARTERIAL BLOOD: CPT

## 2022-06-18 PROCEDURE — 94660 CPAP INITIATION&MGMT: CPT

## 2022-06-18 RX ORDER — ALPRAZOLAM 0.5 MG/1
0.5 TABLET ORAL DAILY PRN
Status: DISCONTINUED | OUTPATIENT
Start: 2022-06-18 | End: 2022-06-22 | Stop reason: HOSPADM

## 2022-06-18 RX ORDER — HEPARIN SODIUM 1000 [USP'U]/ML
80 INJECTION, SOLUTION INTRAVENOUS; SUBCUTANEOUS ONCE
Status: COMPLETED | OUTPATIENT
Start: 2022-06-18 | End: 2022-06-18

## 2022-06-18 RX ORDER — BUMETANIDE 0.25 MG/ML
2 INJECTION INTRAMUSCULAR; INTRAVENOUS EVERY 8 HOURS
Status: DISCONTINUED | OUTPATIENT
Start: 2022-06-18 | End: 2022-06-19

## 2022-06-18 RX ORDER — CALCIUM CARBONATE 200(500)MG
200 TABLET,CHEWABLE ORAL
Status: DISCONTINUED | OUTPATIENT
Start: 2022-06-18 | End: 2022-06-22 | Stop reason: HOSPADM

## 2022-06-18 RX ORDER — LIDOCAINE 4 G/100G
1 PATCH TOPICAL EVERY 24 HOURS
Status: DISCONTINUED | OUTPATIENT
Start: 2022-06-18 | End: 2022-06-22 | Stop reason: HOSPADM

## 2022-06-18 RX ORDER — TIZANIDINE 4 MG/1
2 TABLET ORAL
Status: DISCONTINUED | OUTPATIENT
Start: 2022-06-18 | End: 2022-06-22 | Stop reason: HOSPADM

## 2022-06-18 RX ORDER — HYDROMORPHONE HYDROCHLORIDE 1 MG/ML
0.5 INJECTION, SOLUTION INTRAMUSCULAR; INTRAVENOUS; SUBCUTANEOUS
Status: DISCONTINUED | OUTPATIENT
Start: 2022-06-18 | End: 2022-06-22 | Stop reason: HOSPADM

## 2022-06-18 RX ORDER — LANOLIN ALCOHOL/MO/W.PET/CERES
3 CREAM (GRAM) TOPICAL
Status: DISCONTINUED | OUTPATIENT
Start: 2022-06-18 | End: 2022-06-22 | Stop reason: HOSPADM

## 2022-06-18 RX ORDER — IPRATROPIUM BROMIDE 0.5 MG/2.5ML
0.5 SOLUTION RESPIRATORY (INHALATION)
Status: DISCONTINUED | OUTPATIENT
Start: 2022-06-18 | End: 2022-06-20

## 2022-06-18 RX ADMIN — CALCIUM CARBONATE (ANTACID) CHEW TAB 500 MG 200 MG: 500 CHEW TAB at 23:12

## 2022-06-18 RX ADMIN — Medication 10 ML: at 13:21

## 2022-06-18 RX ADMIN — BUMETANIDE 2 MG: 0.25 INJECTION, SOLUTION INTRAMUSCULAR; INTRAVENOUS at 13:07

## 2022-06-18 RX ADMIN — HYDROMORPHONE HYDROCHLORIDE 0.5 MG: 1 INJECTION, SOLUTION INTRAMUSCULAR; INTRAVENOUS; SUBCUTANEOUS at 13:17

## 2022-06-18 RX ADMIN — MEXILETINE HYDROCHLORIDE 150 MG: 150 CAPSULE ORAL at 20:41

## 2022-06-18 RX ADMIN — IRON SUCROSE 200 MG: 20 INJECTION, SOLUTION INTRAVENOUS at 09:26

## 2022-06-18 RX ADMIN — Medication 400 MG: at 23:13

## 2022-06-18 RX ADMIN — MEXILETINE HYDROCHLORIDE 150 MG: 150 CAPSULE ORAL at 09:27

## 2022-06-18 RX ADMIN — MILRINONE LACTATE 0.25 MCG/KG/MIN: 0.2 INJECTION, SOLUTION INTRAVENOUS at 06:40

## 2022-06-18 RX ADMIN — METHYLPREDNISOLONE SODIUM SUCCINATE 60 MG: 40 INJECTION, POWDER, FOR SOLUTION INTRAMUSCULAR; INTRAVENOUS at 15:13

## 2022-06-18 RX ADMIN — DOXYCYCLINE HYCLATE 100 MG: 100 TABLET, COATED ORAL at 09:27

## 2022-06-18 RX ADMIN — METHYLPREDNISOLONE SODIUM SUCCINATE 60 MG: 40 INJECTION, POWDER, FOR SOLUTION INTRAMUSCULAR; INTRAVENOUS at 03:44

## 2022-06-18 RX ADMIN — Medication 10 ML: at 05:30

## 2022-06-18 RX ADMIN — ONDANSETRON HYDROCHLORIDE 4 MG: 2 SOLUTION INTRAMUSCULAR; INTRAVENOUS at 21:04

## 2022-06-18 RX ADMIN — Medication 3 MG: at 23:13

## 2022-06-18 RX ADMIN — POLYETHYLENE GLYCOL 3350 17 G: 17 POWDER, FOR SOLUTION ORAL at 09:46

## 2022-06-18 RX ADMIN — HEPARIN SODIUM 6420 UNITS: 1000 INJECTION, SOLUTION INTRAVENOUS; SUBCUTANEOUS at 00:37

## 2022-06-18 RX ADMIN — SODIUM CHLORIDE, PRESERVATIVE FREE 1 G: 5 INJECTION INTRAVENOUS at 13:21

## 2022-06-18 RX ADMIN — IVABRADINE 7.5 MG: 7.5 TABLET, FILM COATED ORAL at 17:04

## 2022-06-18 RX ADMIN — METHYLPREDNISOLONE SODIUM SUCCINATE 60 MG: 40 INJECTION, POWDER, FOR SOLUTION INTRAMUSCULAR; INTRAVENOUS at 21:18

## 2022-06-18 RX ADMIN — Medication 10 ML: at 23:27

## 2022-06-18 RX ADMIN — DOXYCYCLINE HYCLATE 100 MG: 100 TABLET, COATED ORAL at 17:04

## 2022-06-18 RX ADMIN — TIZANIDINE 2 MG: 4 TABLET ORAL at 17:04

## 2022-06-18 RX ADMIN — METHYLPREDNISOLONE SODIUM SUCCINATE 60 MG: 40 INJECTION, POWDER, FOR SOLUTION INTRAMUSCULAR; INTRAVENOUS at 09:27

## 2022-06-18 RX ADMIN — MEXILETINE HYDROCHLORIDE 150 MG: 150 CAPSULE ORAL at 00:44

## 2022-06-18 RX ADMIN — CHLOROTHIAZIDE 500 MG: 250 SUSPENSION ORAL at 15:13

## 2022-06-18 RX ADMIN — IVABRADINE 7.5 MG: 7.5 TABLET, FILM COATED ORAL at 09:27

## 2022-06-18 RX ADMIN — Medication 400 MG: at 13:20

## 2022-06-18 RX ADMIN — BUMETANIDE 2 MG: 0.25 INJECTION, SOLUTION INTRAMUSCULAR; INTRAVENOUS at 23:13

## 2022-06-18 RX ADMIN — ALPRAZOLAM 0.5 MG: 0.5 TABLET ORAL at 13:18

## 2022-06-18 NOTE — PROGRESS NOTES
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 He comes over here because of shortness of breath and left upper extremity swelling. As far as his shortness of breath is concerned, he says that he has been having worsening shortness of breath since last 2 months, which has really got worse in the last couple of days. In the ER, initially the patient required 15 liters and then he was put on BiPAP and currently he feels relatively stable. He says that he never had any chest pain, fever, cough, nausea, vomiting, headache, or dizziness. No changes in bowel movement. No nasal congestion. No abdominal pain. No chest pain. He also mentions that his shortness of breath is so worse now, it is hard for him to breathe even while he is well rested. He also says that since last 3-4 days, he also has swelling in his left upper extremity. He has some tingling, but no significant pain. He says that his swelling of left upper arm is getting slightly better. Interval history / Subjective:   F/u Acute respiratory failure    Patient is seen and examined at bedside this AM. He is sob and sitting on edge of bed. Still has left arm swelling - reassured him. He feels sob after neb treatment - refusing now   Discussed with nursing     RRT at 11am due to tachypnea - ABG compensated, CXR : clear. bumex restarted by nephro.  Added small dose of xanax and dilaudid prn     Spoke with flo Ochoa on phone and updated patient's status - resp distress worsneing- unclear reason - normal ABG and CXr- clear but pt has severe COPD, possible PNA, PE and CHF. Now worsening cr - nephor on board, restarted diuretics. High risk for decompensation      Assessment & Plan:     Acute hypoxia respiratory failure - improving   Secondary to acute PE vs Pneumonia vs CHF vs COPD exacerbation  -CTA subsegmental PE  - procalcitonin 0.2   -Heparin gtt, switch to NOAC once more stable  -Appreciate Pulmonary input  - c/w IV abx Ceftriaxone, Doxy  - increased IV steroids 60mg q6h per pulm 6/17  - c/w home inhalers. Pt refusing nebs. Added atrovent inhaler   - off bipap. saturating on RA, placed on O2 due to tachypnea   - ABG 6/18: normal. CXR: clear  - bipap hs     Acute on chronic CHF NYHA III  -Echo: 30 - 35%. Moderate global hypokinesis present. modeartdanica TR  -on Milrinone gtt - decreased the rate   -No BB/ACEi/ARB/ARNI  -continue digoxin/corlanor/aldactone, Mexilitine  -Appreciate adv heart failure input  - restarted bumex 2mg tid by nephro      Acute RUE DVT/PE:   - c/w  Heparin gtt, switch to NOAC when more stable    EDGAR on CKD stage III. - cr worsening  - appreciate nephrology input  - Renal us ordered  - will monitor renal function    Hyponatremia - worsening  - aldactone on hold  - will monitor    Hypertension: Continue home meds  Dilated ascending aorta:  Stable. Former smoker    Poor prognosis.  High risk for decompensation     Code status: FULL   Prophylaxis: Heparin gtt  Care Plan discussed with: Patient  Anticipated Disposition: likely home with [place] when ready      Hospital Problems  Date Reviewed: 6/15/2022          Codes Class Noted POA    COPD (chronic obstructive pulmonary disease) ([facility] [address] ICD-10-CM: J44.9  ICD-9-CM: 332  6/15/2022 Unknown        * (Principal) Sepsis ([facility] [address] ICD-10-CM: A41.9  ICD-9-CM: 038.9, 995.91  6/15/2022 Yes                Review of Systems:   A comprehensive review of systems was negative except for that written in the HPI. Vital Signs:    Last 24hrs VS reviewed since prior progress note. Most recent are:  Visit Vitals  BP (!) 142/95   Pulse (!) 104   Temp 96.9 °F (36.1 °C)   Resp 22   Ht 5' 8\" (1.727 m)   Wt 80.6 kg (177 lb 11.1 oz)   SpO2 100%   BMI 27.02 kg/m²         Intake/Output Summary (Last 24 hours) at 6/18/2022 1500  Last data filed at 6/18/2022 0435  Gross per 24 hour   Intake 73.27 ml   Output 775 ml   Net -701.73 ml        Physical Examination:     I had a face to face encounter with this patient and independently examined them on 6/18/2022 as outlined below:          Constitutional:  moderate - severe resp distress   ENT:  Oral mucosa moist, oropharynx benign. Resp:  Bilateral diminished breath sounds, tachypnea    CV:  Regular rhythm, normal rate, no murmurs, gallops, rubs    GI:  Soft, non distended, non tender. normoactive bowel sounds, no hepatosplenomegaly     Musculoskeletal:  left arm swollen. Right leg 1+ edema     Neurologic:  Moves all extremities. AAOx3, CN II-XII reviewed            Data Review:    Review and/or order of clinical lab test      Labs:     Recent Labs     06/18/22  0533 06/17/22  0529   WBC 11.3* 13.5*   HGB 12.7 12.6   HCT 38.4 38.3    323     Recent Labs     06/18/22  0533 06/17/22  0529 06/16/22  0311   * 129* 134*   K 4.7 4.2 3.9   CL 93* 98 101   CO2 23 20* 24   BUN 51* 45* 38*   CREA 2.44* 2.07* 1.88*   * 152* 114*   CA 8.8 7.8* 8.0*     Recent Labs     06/16/22  0311   *   AP 80   TBILI 1.2*   TP 6.4   ALB 2.7*   GLOB 3.7     Recent Labs     06/18/22  0940 06/18/22  0533 06/17/22  2304   APTT 56.9* >130.0* 40.7*      Recent Labs     06/16/22 0311   TIBC 415   PSAT 6*   FERR 186      No results found for: FOL, RBCF   No results for input(s): PH, PCO2, PO2 in the last 72 hours.   Recent Labs     06/16/22  1319   *     No results found for: CHOL, CHOLX, CHLST, CHOLV, HDL, HDLP, LDL, LDLC, DLDLP, TGLX, TRIGL, TRIGP, CHHD, CHHDX  No results found for: [facility]  Lab Results   Component Value Date/Time    Color YELLOW/STRAW 06/15/2022 03:29 PM    Appearance CLEAR 06/15/2022 03:29 PM    Specific gravity 1.022 06/15/2022 03:29 PM    pH (UA) 5.5 06/15/2022 03:29 PM    Protein Negative 06/15/2022 03:29 PM    Glucose Negative 06/15/2022 03:29 PM    Ketone Negative 06/15/2022 03:29 PM    Bilirubin Negative 06/15/2022 03:29 PM    Urobilinogen 0.2 06/15/2022 03:29 PM    Nitrites Negative 06/15/2022 03:29 PM    Leukocyte Esterase Negative 06/15/2022 03:29 PM    Epithelial cells FEW 06/15/2022 03:29 PM    Bacteria Negative 06/15/2022 03:29 PM    WBC 0-4 06/15/2022 03:29 PM    RBC 0-5 06/15/2022 03:29 PM         Medications Reviewed:     Current Facility-Administered Medications   Medication Dose Route Frequency    bumetanide (BUMEX) injection 2 mg  2 mg IntraVENous Q8H    chlorothiazide (DIURIL) 250 mg/5 mL oral suspension 500 mg  500 mg Oral Q12H    ALPRAZolam (XANAX) tablet 0.5 mg  0.5 mg Oral DAILY PRN    HYDROmorphone (DILAUDID) injection 0.5 mg  0.5 mg IntraVENous Q6H PRN    heparin 25,000 units in  mL infusion  16-36 Units/kg/hr IntraVENous TITRATE    albuterol (PROVENTIL VENTOLIN) nebulizer solution 2.5 mg  2.5 mg Nebulization Q6H PRN    milrinone (PRIMACOR) 20 MG/100 ML D5W infusion  0.25 mcg/kg/min IntraVENous CONTINUOUS    fluticasone-umeclidin-vilanter (TRELEGY ELLIPTA) inhaler 1 Puff (Patient Supplied)  1 Puff Inhalation DAILY    methylPREDNISolone (PF) (SOLU-MEDROL) injection 60 mg  60 mg IntraVENous Q6H    albuterol-ipratropium (DUO-NEB) 2.5 MG-0.5 MG/3 ML  3 mL Nebulization Q4HWA RT    traMADoL (ULTRAM) tablet 50 mg  50 mg Oral Q6H PRN    digoxin (LANOXIN) tablet 0.0625 mg  0.0625 mg Oral EVERY OTHER DAY    cefTRIAXone (ROCEPHIN) 1 g in 0.9% sodium chloride 10 mL IV syringe  1 g IntraVENous Q24H    doxycycline (VIBRA-TABS) tablet 100 mg  100 mg Oral BID    ivabradine (CORLANOR) tablet 7.5 mg 7.5 mg Oral BID WITH MEALS    magnesium oxide (MAG-OX) tablet 400 mg  400 mg Oral BID    [Held by provider] spironolactone (ALDACTONE) tablet 12.5 mg  12.5 mg Oral DAILY    mexiletine (MEXITIL) capsule 150 mg  150 mg Oral Q8H    sodium chloride (NS) flush 5-40 mL  5-40 mL IntraVENous Q8H    sodium chloride (NS) flush 5-40 mL  5-40 mL IntraVENous PRN    acetaminophen (TYLENOL) tablet 650 mg  650 mg Oral Q6H PRN    Or    acetaminophen (TYLENOL) suppository 650 mg  650 mg Rectal Q6H PRN    polyethylene glycol (MIRALAX) packet 17 g  17 g Oral DAILY PRN    promethazine (PHENERGAN) tablet 12.5 mg  12.5 mg Oral Q6H PRN    Or    ondansetron (ZOFRAN) injection 4 mg  4 mg IntraVENous Q6H PRN     ______________________________________________________________________  EXPECTED LENGTH OF STAY: 3d 19h  ACTUAL LENGTH OF STAY:          3                 Chiara Butler MD

## 2022-06-18 NOTE — ROUTINE PROCESS
Came to bedside to do US, XR was also there to do images as well. XR obtain images first. When went to bedside. patient was having hard time catching breath and being still for images. Patient differed US images for now, try again later. GUICHO Chung) called and made aware of patient current status.

## 2022-06-18 NOTE — PROGRESS NOTES
Pt having difficulty breathing,unable to catch breath, using accessory muscles to breath. Rapid response called, Dr Ignacia perez. RRT at bedside. Pt seems very anxious. Xanax and dialudid given for back pain. Pt sats are in 90's.

## 2022-06-18 NOTE — PROGRESS NOTES
Problem: Falls - Risk of  Goal: *Absence of Falls  Description: Document Manuelmegan Costa Fall Risk and appropriate interventions in the flowsheet.   Outcome: Progressing Towards Goal  Note: Fall Risk Interventions:  Mobility Interventions: Strengthening exercises (ROM-active/passive),Patient to call before getting OOB,Communicate number of staff needed for ambulation/transfer         Medication Interventions: Patient to call before getting OOB,Teach patient to arise slowly                   Problem: Heart Failure: Day 2  Goal: Respiratory  Outcome: Progressing Towards Goal  Goal: *Oxygen saturation within defined limits  Outcome: Progressing Towards Goal

## 2022-06-18 NOTE — PROGRESS NOTES
Verbal shift change report given to Yoel Anthony RN (oncoming nurse) by Alec Hogue RN (offgoing nurse).  Report included the following information SBAR, ED Summary, Intake/Output, MAR and Recent Results
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
## 2022-06-18 NOTE — PROGRESS NOTES
Chart reviewed in preparation for PT eval. Upon entering room pt observed on toilet having BM with staff. Requesting f/u later. Will defer at this time and f/u as able and appropriate. Follow up: Pt subsequently had a rapid response called later in morning due to tachychardia, tachypneic, with increased anxiety. Will continue to defer at this time.      Terrance Saucedo PT, DPT
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
## 2022-06-18 NOTE — PROGRESS NOTES
Rapid response called overhead at this time. RRT responding. Pt sitting on edge of bed with increased work of breathing. Pt slightly tachycardiac in low 100's, tachypneic in the low 30's, however O2 sats remained mid to upper 90's. Pt appears anxious. No PRN meds at this time. Dr. Yoav Stack at bedside. Orders received for anxiety and pain medications. Assisted pt back into bed, side rails x 3 raised for pt safety. Call bell within reach. Checked in with primary RN prior to leaving to address concerns.

## 2022-06-19 ENCOUNTER — APPOINTMENT (OUTPATIENT)
Dept: ULTRASOUND IMAGING | Age: 62
DRG: 871 | End: 2022-06-19
Attending: INTERNAL MEDICINE
Payer: MEDICARE

## 2022-06-19 LAB
ALBUMIN SERPL-MCNC: 3 G/DL (ref 3.5–5)
ANION GAP SERPL CALC-SCNC: 11 MMOL/L (ref 5–15)
APTT PPP: 31.2 SEC (ref 22.1–31)
BNP SERPL-MCNC: ABNORMAL PG/ML
BUN SERPL-MCNC: 65 MG/DL (ref 6–20)
BUN/CREAT SERPL: 21 (ref 12–20)
CALCIUM SERPL-MCNC: 8.4 MG/DL (ref 8.5–10.1)
CHLORIDE SERPL-SCNC: 92 MMOL/L (ref 97–108)
CK SERPL-CCNC: 1047 U/L (ref 39–308)
CO2 SERPL-SCNC: 23 MMOL/L (ref 21–32)
CREAT SERPL-MCNC: 3.06 MG/DL (ref 0.7–1.3)
ERYTHROCYTE [DISTWIDTH] IN BLOOD BY AUTOMATED COUNT: 14.7 % (ref 11.5–14.5)
GLUCOSE SERPL-MCNC: 130 MG/DL (ref 65–100)
HCT VFR BLD AUTO: 37 % (ref 36.6–50.3)
HGB BLD-MCNC: 12.2 G/DL (ref 12.1–17)
MCH RBC QN AUTO: 31 PG (ref 26–34)
MCHC RBC AUTO-ENTMCNC: 33 G/DL (ref 30–36.5)
MCV RBC AUTO: 94.1 FL (ref 80–99)
MYOGLOBIN SERPL-MCNC: 1792 NG/ML (ref 16–116)
NRBC # BLD: 0.03 K/UL (ref 0–0.01)
NRBC BLD-RTO: 0.3 PER 100 WBC
PHOSPHATE SERPL-MCNC: 4.6 MG/DL (ref 2.6–4.7)
PLATELET # BLD AUTO: 230 K/UL (ref 150–400)
POTASSIUM SERPL-SCNC: 4.4 MMOL/L (ref 3.5–5.1)
POTASSIUM SERPL-SCNC: 5.8 MMOL/L (ref 3.5–5.1)
RBC # BLD AUTO: 3.93 M/UL (ref 4.1–5.7)
SODIUM SERPL-SCNC: 126 MMOL/L (ref 136–145)
THERAPEUTIC RANGE,PTTT: ABNORMAL SECS (ref 58–77)
WBC # BLD AUTO: 10 K/UL (ref 4.1–11.1)

## 2022-06-19 PROCEDURE — 74011250636 HC RX REV CODE- 250/636: Performed by: PHYSICIAN ASSISTANT

## 2022-06-19 PROCEDURE — 74011000250 HC RX REV CODE- 250: Performed by: PHYSICIAN ASSISTANT

## 2022-06-19 PROCEDURE — 96368 THER/DIAG CONCURRENT INF: CPT

## 2022-06-19 PROCEDURE — 74011250636 HC RX REV CODE- 250/636: Performed by: NURSE PRACTITIONER

## 2022-06-19 PROCEDURE — 85730 THROMBOPLASTIN TIME PARTIAL: CPT

## 2022-06-19 PROCEDURE — 74011250637 HC RX REV CODE- 250/637: Performed by: HOSPITALIST

## 2022-06-19 PROCEDURE — 36415 COLL VENOUS BLD VENIPUNCTURE: CPT

## 2022-06-19 PROCEDURE — 65660000001 HC RM ICU INTERMED STEPDOWN

## 2022-06-19 PROCEDURE — 85027 COMPLETE CBC AUTOMATED: CPT

## 2022-06-19 PROCEDURE — 83874 ASSAY OF MYOGLOBIN: CPT

## 2022-06-19 PROCEDURE — 83880 ASSAY OF NATRIURETIC PEPTIDE: CPT

## 2022-06-19 PROCEDURE — 74011250637 HC RX REV CODE- 250/637: Performed by: INTERNAL MEDICINE

## 2022-06-19 PROCEDURE — 74011250637 HC RX REV CODE- 250/637: Performed by: PHYSICIAN ASSISTANT

## 2022-06-19 PROCEDURE — 74011000250 HC RX REV CODE- 250: Performed by: HOSPITALIST

## 2022-06-19 PROCEDURE — 97161 PT EVAL LOW COMPLEX 20 MIN: CPT | Performed by: PHYSICAL THERAPIST

## 2022-06-19 PROCEDURE — 99233 SBSQ HOSP IP/OBS HIGH 50: CPT | Performed by: NURSE PRACTITIONER

## 2022-06-19 PROCEDURE — 82550 ASSAY OF CK (CPK): CPT

## 2022-06-19 PROCEDURE — 80069 RENAL FUNCTION PANEL: CPT

## 2022-06-19 PROCEDURE — 94640 AIRWAY INHALATION TREATMENT: CPT

## 2022-06-19 PROCEDURE — 76770 US EXAM ABDO BACK WALL COMP: CPT

## 2022-06-19 PROCEDURE — 97530 THERAPEUTIC ACTIVITIES: CPT | Performed by: PHYSICAL THERAPIST

## 2022-06-19 PROCEDURE — 74011250636 HC RX REV CODE- 250/636: Performed by: INTERNAL MEDICINE

## 2022-06-19 PROCEDURE — G0378 HOSPITAL OBSERVATION PER HR: HCPCS

## 2022-06-19 PROCEDURE — 74011000250 HC RX REV CODE- 250: Performed by: INTERNAL MEDICINE

## 2022-06-19 PROCEDURE — 84132 ASSAY OF SERUM POTASSIUM: CPT

## 2022-06-19 PROCEDURE — 74011250636 HC RX REV CODE- 250/636: Performed by: HOSPITALIST

## 2022-06-19 PROCEDURE — 96366 THER/PROPH/DIAG IV INF ADDON: CPT

## 2022-06-19 PROCEDURE — 96376 TX/PRO/DX INJ SAME DRUG ADON: CPT

## 2022-06-19 RX ORDER — BUMETANIDE 0.25 MG/ML
2 INJECTION INTRAMUSCULAR; INTRAVENOUS EVERY 8 HOURS
Status: COMPLETED | OUTPATIENT
Start: 2022-06-19 | End: 2022-06-21

## 2022-06-19 RX ORDER — HEPARIN SODIUM 1000 [USP'U]/ML
6420 INJECTION, SOLUTION INTRAVENOUS; SUBCUTANEOUS ONCE
Status: COMPLETED | OUTPATIENT
Start: 2022-06-19 | End: 2022-06-19

## 2022-06-19 RX ADMIN — IVABRADINE 7.5 MG: 7.5 TABLET, FILM COATED ORAL at 17:02

## 2022-06-19 RX ADMIN — Medication 10 ML: at 22:31

## 2022-06-19 RX ADMIN — METHYLPREDNISOLONE SODIUM SUCCINATE 60 MG: 40 INJECTION, POWDER, FOR SOLUTION INTRAMUSCULAR; INTRAVENOUS at 16:39

## 2022-06-19 RX ADMIN — METHYLPREDNISOLONE SODIUM SUCCINATE 60 MG: 40 INJECTION, POWDER, FOR SOLUTION INTRAMUSCULAR; INTRAVENOUS at 22:31

## 2022-06-19 RX ADMIN — HEPARIN SODIUM 6420 UNITS: 1000 INJECTION INTRAVENOUS; SUBCUTANEOUS at 07:20

## 2022-06-19 RX ADMIN — APIXABAN 10 MG: 5 TABLET, FILM COATED ORAL at 16:38

## 2022-06-19 RX ADMIN — Medication 10 ML: at 07:23

## 2022-06-19 RX ADMIN — MEXILETINE HYDROCHLORIDE 150 MG: 150 CAPSULE ORAL at 17:02

## 2022-06-19 RX ADMIN — DOXYCYCLINE HYCLATE 100 MG: 100 TABLET, COATED ORAL at 16:38

## 2022-06-19 RX ADMIN — TRAMADOL HYDROCHLORIDE 50 MG: 50 TABLET, FILM COATED ORAL at 22:00

## 2022-06-19 RX ADMIN — DOXYCYCLINE HYCLATE 100 MG: 100 TABLET, COATED ORAL at 17:05

## 2022-06-19 RX ADMIN — CHLOROTHIAZIDE 500 MG: 250 SUSPENSION ORAL at 17:02

## 2022-06-19 RX ADMIN — Medication 17 UNITS/KG/HR: at 07:19

## 2022-06-19 RX ADMIN — Medication 400 MG: at 13:09

## 2022-06-19 RX ADMIN — BUMETANIDE 2 MG: 0.25 INJECTION INTRAMUSCULAR; INTRAVENOUS at 13:10

## 2022-06-19 RX ADMIN — SODIUM CHLORIDE, PRESERVATIVE FREE 1 G: 5 INJECTION INTRAVENOUS at 13:12

## 2022-06-19 RX ADMIN — MEXILETINE HYDROCHLORIDE 150 MG: 150 CAPSULE ORAL at 01:38

## 2022-06-19 RX ADMIN — MILRINONE LACTATE 0.25 MCG/KG/MIN: 0.2 INJECTION, SOLUTION INTRAVENOUS at 21:00

## 2022-06-19 RX ADMIN — CHLOROTHIAZIDE 500 MG: 250 SUSPENSION ORAL at 01:45

## 2022-06-19 RX ADMIN — BUMETANIDE 2 MG: 0.25 INJECTION, SOLUTION INTRAMUSCULAR; INTRAVENOUS at 07:22

## 2022-06-19 RX ADMIN — Medication 10 ML: at 14:00

## 2022-06-19 RX ADMIN — MILRINONE LACTATE 0.25 MCG/KG/MIN: 0.2 INJECTION, SOLUTION INTRAVENOUS at 00:00

## 2022-06-19 RX ADMIN — DOXYCYCLINE HYCLATE 100 MG: 100 TABLET, COATED ORAL at 08:48

## 2022-06-19 RX ADMIN — Medication 400 MG: at 22:32

## 2022-06-19 RX ADMIN — Medication 3 MG: at 22:31

## 2022-06-19 RX ADMIN — MEXILETINE HYDROCHLORIDE 150 MG: 150 CAPSULE ORAL at 08:49

## 2022-06-19 RX ADMIN — BUMETANIDE 2 MG: 0.25 INJECTION INTRAMUSCULAR; INTRAVENOUS at 22:31

## 2022-06-19 RX ADMIN — METHYLPREDNISOLONE SODIUM SUCCINATE 60 MG: 40 INJECTION, POWDER, FOR SOLUTION INTRAMUSCULAR; INTRAVENOUS at 01:37

## 2022-06-19 RX ADMIN — METHYLPREDNISOLONE SODIUM SUCCINATE 60 MG: 40 INJECTION, POWDER, FOR SOLUTION INTRAMUSCULAR; INTRAVENOUS at 08:50

## 2022-06-19 RX ADMIN — IVABRADINE 7.5 MG: 7.5 TABLET, FILM COATED ORAL at 08:49

## 2022-06-19 NOTE — PROGRESS NOTES
[facility] Adult  Hospitalist Group                                                                                          Hospitalist Progress Note  Heaven Castorena MD  Answering service: [phone] from in house phone        Date of Service:  2022  NAME:  Kelley Berger  :  1960  MRN:  [id number]      Admission Summary: This is a 80-year-old male with a past medical history of severe nonischemic cardiomyopathy presumed secondary to myocarditis () with improved cardiac function, hypertension, COPD, asthma, dilated ascending aorta, and former smoker. He comes over here because of shortness of breath and left upper extremity swelling. As far as his shortness of breath is concerned, he says that he has been having worsening shortness of breath since last 2 months, which has really got worse in the last couple of days. In the ER, initially the patient required 15 liters and then he was put on BiPAP and currently he feels relatively stable. He says that he never had any chest pain, fever, cough, nausea, vomiting, headache, or dizziness. No changes in bowel movement. No nasal congestion. No abdominal pain. No chest pain. He also mentions that his shortness of breath is so worse now, it is hard for him to breathe even while he is well rested. He also says that since last 3-4 days, he also has swelling in his left upper extremity. He has some tingling, but no significant pain. He says that his swelling of left upper arm is getting slightly better. Interval history / Subjective:   F/u Acute respiratory failure    Patient is seen and examined at bedside this AM. He feels better today. Sob improving. UOP 1900ml.    Discussed with nursing and nephro Dr. Patti Cisneros - repeat K is normal          Assessment & Plan:     Acute hypoxia respiratory failure - improving   Secondary to acute PE vs Pneumonia vs CHF vs COPD exacerbation  -CTA subsegmental PE  - procalcitonin 0.2 -Heparin gtt - changed to Eliquis on 6/19   -Appreciate Pulmonary input  - c/w IV abx Ceftriaxone, Doxy  - increased IV steroids 60mg q6h per pulm 6/17  - c/w home inhalers. Pt refusing nebs. Added atrovent inhaler   - off bipap. - ABG 6/18: normal. CXR: clear  - saturating on 3l/ RA ( pt using oxygen for symptom relief)     Acute on chronic CHF NYHA III  -Echo: 30 - 35%. Moderate global hypokinesis present. modearte TR  -on Milrinone gtt - decreased the rate   -No BB/ACEi/ARB/ARNI  -continue corlanor, Mexilitine. Digoxin on hold   -Appreciate adv heart failure input  - c/w bumex 2mg tid by nephro      Acute RUE DVT/PE:   - started on Eliquis 6/19    EDGAR on CKD stage III. - cr worsening  - appreciate nephrology input  - Renal us: No hydronephrosis  - will monitor renal function    Hyponatremia - worsening  - aldactone on hold  - will monitor    Hypertension: Continue home meds  Dilated ascending aorta:  Stable. Former smoker    Poor prognosis. High risk for decompensation     Code status: FULL   Prophylaxis: Heparin gtt  Care Plan discussed with: Patient  Anticipated Disposition: likely home with [facility] when ready      Hospital Problems  Date Reviewed: 6/15/2022          Codes Class Noted POA    COPD (chronic obstructive pulmonary disease) ([facility] ICD-10-CM: J44.9  ICD-9-CM: 107  6/15/2022 Unknown        * (Principal) Sepsis ([facility] ICD-10-CM: A41.9  ICD-9-CM: 038.9, 995.91  6/15/2022 Yes                Review of Systems:   A comprehensive review of systems was negative except for that written in the HPI. Vital Signs:    Last 24hrs VS reviewed since prior progress note.  Most recent are:  Visit Vitals  /89   Pulse (!) 108   Temp 97.5 °F (36.4 °C)   Resp 18   Ht 5' 8\" (1.727 m)   Wt 78.4 kg (172 lb 13.5 oz)   SpO2 96%   BMI 26.28 kg/m²         Intake/Output Summary (Last 24 hours) at 6/19/2022 1418  Last data filed at 6/19/2022 0813  Gross per 24 hour   Intake 370.05 ml   Output 3300 ml   Net -2929.95 ml Physical Examination:     I had a face to face encounter with this patient and independently examined them on 6/19/2022 as outlined below:          Constitutional:  moderate resp distress   ENT:  Oral mucosa moist, oropharynx benign. Resp:  Bilateral diminished breath sounds, tachypnea    CV:  Regular rhythm, normal rate, no murmurs, gallops, rubs    GI:  Soft, non distended, non tender. normoactive bowel sounds, no hepatosplenomegaly     Musculoskeletal:  left arm swollen. Right leg 1+ edema     Neurologic:  Moves all extremities. AAOx3, CN II-XII reviewed            Data Review:    Review and/or order of clinical lab test      Labs:     Recent Labs     06/19/22 0414 06/18/22  0533   WBC 10.0 11.3*   HGB 12.2 12.7   HCT 37.0 38.4    288     Recent Labs     06/19/22  0902 06/19/22  0414 06/18/22  0533 06/17/22  0529 06/17/22  0529   NA  --  126* 127*  --  129*   K 4.4 5.8* 4.7   < > 4.2   CL  --  92* 93*  --  98   CO2  --  23 23  --  20*   BUN  --  65* 51*  --  45*   CREA  --  3.06* 2.44*  --  2.07*   GLU  --  130* 151*  --  152*   CA  --  8.4* 8.8  --  7.8*   PHOS  --  4.6  --   --   --     < > = values in this interval not displayed. Recent Labs     06/19/22 0414   ALB 3.0*     Recent Labs     06/19/22  0414 06/18/22  1415 06/18/22  0940   APTT 31.2* 52.9* 56.9*      No results for input(s): FE, TIBC, PSAT, FERR in the last 72 hours. No results found for: FOL, RBCF   No results for input(s): PH, PCO2, PO2 in the last 72 hours.   Recent Labs     06/19/22  0902   CPK 1,047*     No results found for: CHOL, CHOLX, CHLST, CHOLV, HDL, HDLP, LDL, LDLC, DLDLP, TGLX, TRIGL, TRIGP, CHHD, CHHDX  No results found for: [facility]  Lab Results   Component Value Date/Time    Color YELLOW/STRAW 06/15/2022 03:29 PM    Appearance CLEAR 06/15/2022 03:29 PM    Specific gravity 1.022 06/15/2022 03:29 PM    pH (UA) 5.5 06/15/2022 03:29 PM    Protein Negative 06/15/2022 03:29 PM    Glucose Negative 06/15/2022 03:29 PM Ketone Negative 06/15/2022 03:29 PM    Bilirubin Negative 06/15/2022 03:29 PM    Urobilinogen 0.2 06/15/2022 03:29 PM    Nitrites Negative 06/15/2022 03:29 PM    Leukocyte Esterase Negative 06/15/2022 03:29 PM    Epithelial cells FEW 06/15/2022 03:29 PM    Bacteria Negative 06/15/2022 03:29 PM    WBC 0-4 06/15/2022 03:29 PM    RBC 0-5 06/15/2022 03:29 PM         Medications Reviewed:     Current Facility-Administered Medications   Medication Dose Route Frequency    bumetanide (BUMEX) injection 2 mg  2 mg IntraVENous Q8H    chlorothiazide (DIURIL) 250 mg/5 mL oral suspension 500 mg  500 mg Oral Q12H    ALPRAZolam (XANAX) tablet 0.5 mg  0.5 mg Oral DAILY PRN    HYDROmorphone (DILAUDID) injection 0.5 mg  0.5 mg IntraVENous Q6H PRN    melatonin tablet 3 mg  3 mg Oral QHS    ipratropium (ATROVENT) 0.02 % nebulizer solution 0.5 mg  0.5 mg Nebulization Q6H RT    tiZANidine (ZANAFLEX) tablet 2 mg  2 mg Oral Q6H PRN    lidocaine 4 % patch 1 Patch  1 Patch TransDERmal Q24H    calcium carbonate (TUMS) chewable tablet 200 mg [elemental]  200 mg Oral TID PRN    heparin 25,000 units in  mL infusion  16-36 Units/kg/hr IntraVENous TITRATE    albuterol (PROVENTIL VENTOLIN) nebulizer solution 2.5 mg  2.5 mg Nebulization Q6H PRN    milrinone (PRIMACOR) 20 MG/100 ML D5W infusion  0.25 mcg/kg/min IntraVENous CONTINUOUS    fluticasone-umeclidin-vilanter (TRELEGY ELLIPTA) inhaler 1 Puff (Patient Supplied)  1 Puff Inhalation DAILY    methylPREDNISolone (PF) (SOLU-MEDROL) injection 60 mg  60 mg IntraVENous Q6H    traMADoL (ULTRAM) tablet 50 mg  50 mg Oral Q6H PRN    [Held by provider] digoxin (LANOXIN) tablet 0.0625 mg  0.0625 mg Oral EVERY OTHER DAY    cefTRIAXone (ROCEPHIN) 1 g in 0.9% sodium chloride 10 mL IV syringe  1 g IntraVENous Q24H    doxycycline (VIBRA-TABS) tablet 100 mg  100 mg Oral BID    ivabradine (CORLANOR) tablet 7.5 mg  7.5 mg Oral BID WITH MEALS    magnesium oxide (MAG-OX) tablet 400 mg  400 mg Oral BID    mexiletine (MEXITIL) capsule 150 mg  150 mg Oral Q8H    sodium chloride (NS) flush 5-40 mL  5-40 mL IntraVENous Q8H    sodium chloride (NS) flush 5-40 mL  5-40 mL IntraVENous PRN    acetaminophen (TYLENOL) tablet 650 mg  650 mg Oral Q6H PRN    Or    acetaminophen (TYLENOL) suppository 650 mg  650 mg Rectal Q6H PRN    polyethylene glycol (MIRALAX) packet 17 g  17 g Oral DAILY PRN    promethazine (PHENERGAN) tablet 12.5 mg  12.5 mg Oral Q6H PRN    Or    ondansetron (ZOFRAN) injection 4 mg  4 mg IntraVENous Q6H PRN     ______________________________________________________________________  EXPECTED LENGTH OF STAY: 3d 19h  ACTUAL LENGTH OF STAY:          4                 Bautista Radford MD

## 2022-06-19 NOTE — PROGRESS NOTES
Nephrology Progress Note  [facility]     www. [email or web address] [employer]  Phone - [phone]   Patient: Avvia Lockhart    YOB: 1960        Date- 6/19/2022   Admit Date: 6/15/2022  CC: Follow up for EDGAR on CKD          IMPRESSION & PLAN:   ·  EDGAR stage II(suspect secondary to, contrast-induced nephropathy, cardiorenal syndrome)  · CKD stage III (baseline creatinine 1.6-1.8)  · Hyperkalemia  · Systolic heart failure(EF 30 to 35%)  · Volume overload  · Acute respiratory failure  · Cyanosis  · Hyponatremia  · Nonischemic cardiomyopathy  · Acute right upper extremity DVT with PE   Severe COPD    PLAN-   Creatinine slightly elevated.  Hyperkalemia and acidosis better.  1.9L monitor urine output.  Plan to continue on Bumex 2 mg IV 3 times daily.  Check labs daily.  No acute need for RRT from volume laboratory standpoint. Subjective: Interval History:   -Seen and examined this morning.  -Hypoxia significantly improved. -Creatinine elevated but hyperkalemia better, 1.9 L urine output    Objective:   Vitals:    06/19/22 0600 06/19/22 0800 06/19/22 0843 06/19/22 1000   BP:   (!) 102/58    Pulse: (!) 101  (!) 110 (!) 106   Resp:   18    Temp:   97.6 °F (36.4 °C)    SpO2:  94% 94%    Weight:       Height:          06/18 0701 - 06/19 0700  In: 370.1 [P.O.:240; I.V.:130.1]  Out: 1950 [Urine:1950]  Last 3 Recorded Weights in this Encounter    06/16/22 1530 06/17/22 1641 06/18/22 0225   Weight: 80.3 kg (177 lb 0.5 oz) 80.3 kg (177 lb 0.5 oz) 80.6 kg (177 lb 11.1 oz)      Physical exam:    GEN: NAD  NECK- Supple, no mass  RESP: No wheezing, Clear b/l  CVS: S1,S2  RRR  NEURO: Normal speech, Non focal  EXT: No Edema   PSYCH: Normal Mood    Chart reviewed. Pertinent Notes reviewed.      Data Review :  Recent Labs     06/19/22  0902 06/19/22  0414 06/18/22  0533 06/17/22  0529 06/17/22  0529   NA  --  126* 127*  --  129*   K 4.4 5.8* 4.7   < > 4.2   CL  --  92* 93*  --  98   CO2  --  23 23 --  20*   BUN  --  65* 51*  --  45*   CREA  --  3.06* 2.44*  --  2.07*   GLU  --  130* 151*  --  152*   CA  --  8.4* 8.8  --  7.8*   PHOS  --  4.6  --   --   --     < > = values in this interval not displayed. Recent Labs     06/19/22  0414 06/18/22  0533 06/17/22  0529   WBC 10.0 11.3* 13.5*   HGB 12.2 12.7 12.6   HCT 37.0 38.4 38.3    288 323     No results for input(s): FE, TIBC, PSAT, FERR in the last 72 hours.    Medication list  reviewed  Current Facility-Administered Medications   Medication Dose Route Frequency    bumetanide (BUMEX) injection 2 mg  2 mg IntraVENous Q8H    chlorothiazide (DIURIL) 250 mg/5 mL oral suspension 500 mg  500 mg Oral Q12H    ALPRAZolam (XANAX) tablet 0.5 mg  0.5 mg Oral DAILY PRN    HYDROmorphone (DILAUDID) injection 0.5 mg  0.5 mg IntraVENous Q6H PRN    melatonin tablet 3 mg  3 mg Oral QHS    ipratropium (ATROVENT) 0.02 % nebulizer solution 0.5 mg  0.5 mg Nebulization Q6H RT    tiZANidine (ZANAFLEX) tablet 2 mg  2 mg Oral Q6H PRN    lidocaine 4 % patch 1 Patch  1 Patch TransDERmal Q24H    calcium carbonate (TUMS) chewable tablet 200 mg [elemental]  200 mg Oral TID PRN    heparin 25,000 units in  mL infusion  16-36 Units/kg/hr IntraVENous TITRATE    albuterol (PROVENTIL VENTOLIN) nebulizer solution 2.5 mg  2.5 mg Nebulization Q6H PRN    milrinone (PRIMACOR) 20 MG/100 ML D5W infusion  0.25 mcg/kg/min IntraVENous CONTINUOUS    fluticasone-umeclidin-vilanter (TRELEGY ELLIPTA) inhaler 1 Puff (Patient Supplied)  1 Puff Inhalation DAILY    methylPREDNISolone (PF) (SOLU-MEDROL) injection 60 mg  60 mg IntraVENous Q6H    traMADoL (ULTRAM) tablet 50 mg  50 mg Oral Q6H PRN    [Held by provider] digoxin (LANOXIN) tablet 0.0625 mg  0.0625 mg Oral EVERY OTHER DAY    cefTRIAXone (ROCEPHIN) 1 g in 0.9% sodium chloride 10 mL IV syringe  1 g IntraVENous Q24H    doxycycline (VIBRA-TABS) tablet 100 mg  100 mg Oral BID    ivabradine (CORLANOR) tablet 7.5 mg  7.5 mg Oral BID WITH MEALS    magnesium oxide (MAG-OX) tablet 400 mg  400 mg Oral BID    mexiletine (MEXITIL) capsule 150 mg  150 mg Oral Q8H    sodium chloride (NS) flush 5-40 mL  5-40 mL IntraVENous Q8H    sodium chloride (NS) flush 5-40 mL  5-40 mL IntraVENous PRN    acetaminophen (TYLENOL) tablet 650 mg  650 mg Oral Q6H PRN    Or    acetaminophen (TYLENOL) suppository 650 mg  650 mg Rectal Q6H PRN    polyethylene glycol (MIRALAX) packet 17 g  17 g Oral DAILY PRN    promethazine (PHENERGAN) tablet 12.5 mg  12.5 mg Oral Q6H PRN    Or    ondansetron (ZOFRAN) injection 4 mg  4 mg IntraVENous Q6H PRN          Noni Vitale MD              [facility]  [facility]  [facility] [address], [address]  [place], [address] [place]  Phone - [phone]               Fax - [phone]

## 2022-06-19 NOTE — PROGRESS NOTES
2130  Bedside shift change (staff change) report given to [facility] (oncoming nurse) by Glenn Camacho RN (offgoing nurse). Report included the following information SBAR, Kardex, ED Summary, Intake/Output, and Recent Results.
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
## 2022-06-19 NOTE — PROGRESS NOTES
0700  Dressing changed to weeping left arm, 4x4, roll gauze and tape applied. 0745  Bedside shift change report given to Stanton Coto (oncoming nurse) by [facility] nurse). Report included the following information SBAR, Kardex, ED Summary, Intake/Output, and Recent Results.
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
## 2022-06-20 LAB
ALBUMIN SERPL-MCNC: 3.4 G/DL (ref 3.5–5)
ANION GAP SERPL CALC-SCNC: 12 MMOL/L (ref 5–15)
BNP SERPL-MCNC: ABNORMAL PG/ML
BUN SERPL-MCNC: 73 MG/DL (ref 6–20)
BUN/CREAT SERPL: 27 (ref 12–20)
CALCIUM SERPL-MCNC: 8.9 MG/DL (ref 8.5–10.1)
CHLORIDE SERPL-SCNC: 89 MMOL/L (ref 97–108)
CK SERPL-CCNC: 966 U/L (ref 39–308)
CO2 SERPL-SCNC: 27 MMOL/L (ref 21–32)
CREAT SERPL-MCNC: 2.73 MG/DL (ref 0.7–1.3)
DIGOXIN SERPL-MCNC: 1.1 NG/ML (ref 0.9–2)
ERYTHROCYTE [SEDIMENTATION RATE] IN BLOOD: 4 MM/HR (ref 0–20)
GLUCOSE SERPL-MCNC: 130 MG/DL (ref 65–100)
MYOGLOBIN SERPL-MCNC: 1589 NG/ML (ref 16–116)
PHOSPHATE SERPL-MCNC: 3.6 MG/DL (ref 2.6–4.7)
POTASSIUM SERPL-SCNC: 3.9 MMOL/L (ref 3.5–5.1)
PROCALCITONIN SERPL-MCNC: 0.42 NG/ML
SODIUM SERPL-SCNC: 128 MMOL/L (ref 136–145)

## 2022-06-20 PROCEDURE — 94640 AIRWAY INHALATION TREATMENT: CPT

## 2022-06-20 PROCEDURE — 74011250636 HC RX REV CODE- 250/636: Performed by: PHYSICIAN ASSISTANT

## 2022-06-20 PROCEDURE — 96376 TX/PRO/DX INJ SAME DRUG ADON: CPT

## 2022-06-20 PROCEDURE — 74011250637 HC RX REV CODE- 250/637: Performed by: NURSE PRACTITIONER

## 2022-06-20 PROCEDURE — 74011000250 HC RX REV CODE- 250: Performed by: HOSPITALIST

## 2022-06-20 PROCEDURE — 74011250636 HC RX REV CODE- 250/636: Performed by: INTERNAL MEDICINE

## 2022-06-20 PROCEDURE — 74011250636 HC RX REV CODE- 250/636: Performed by: NURSE PRACTITIONER

## 2022-06-20 PROCEDURE — 80069 RENAL FUNCTION PANEL: CPT

## 2022-06-20 PROCEDURE — 74011250637 HC RX REV CODE- 250/637: Performed by: PHYSICIAN ASSISTANT

## 2022-06-20 PROCEDURE — 36415 COLL VENOUS BLD VENIPUNCTURE: CPT

## 2022-06-20 PROCEDURE — 99233 SBSQ HOSP IP/OBS HIGH 50: CPT | Performed by: INTERNAL MEDICINE

## 2022-06-20 PROCEDURE — 84145 PROCALCITONIN (PCT): CPT

## 2022-06-20 PROCEDURE — 85652 RBC SED RATE AUTOMATED: CPT

## 2022-06-20 PROCEDURE — 74011250637 HC RX REV CODE- 250/637: Performed by: INTERNAL MEDICINE

## 2022-06-20 PROCEDURE — 65660000001 HC RM ICU INTERMED STEPDOWN

## 2022-06-20 PROCEDURE — 97165 OT EVAL LOW COMPLEX 30 MIN: CPT

## 2022-06-20 PROCEDURE — 82550 ASSAY OF CK (CPK): CPT

## 2022-06-20 PROCEDURE — 74011250637 HC RX REV CODE- 250/637: Performed by: HOSPITALIST

## 2022-06-20 PROCEDURE — 74011000250 HC RX REV CODE- 250: Performed by: PHYSICIAN ASSISTANT

## 2022-06-20 PROCEDURE — 74011000250 HC RX REV CODE- 250: Performed by: INTERNAL MEDICINE

## 2022-06-20 PROCEDURE — 97535 SELF CARE MNGMENT TRAINING: CPT

## 2022-06-20 PROCEDURE — 96366 THER/PROPH/DIAG IV INF ADDON: CPT

## 2022-06-20 PROCEDURE — G0378 HOSPITAL OBSERVATION PER HR: HCPCS

## 2022-06-20 PROCEDURE — 83874 ASSAY OF MYOGLOBIN: CPT

## 2022-06-20 PROCEDURE — 80162 ASSAY OF DIGOXIN TOTAL: CPT

## 2022-06-20 PROCEDURE — 83880 ASSAY OF NATRIURETIC PEPTIDE: CPT

## 2022-06-20 RX ORDER — MILRINONE LACTATE 0.2 MG/ML
0.12 INJECTION, SOLUTION INTRAVENOUS CONTINUOUS
Status: DISCONTINUED | OUTPATIENT
Start: 2022-06-20 | End: 2022-06-21

## 2022-06-20 RX ADMIN — IVABRADINE 7.5 MG: 7.5 TABLET, FILM COATED ORAL at 09:54

## 2022-06-20 RX ADMIN — CHLOROTHIAZIDE 500 MG: 250 SUSPENSION ORAL at 03:50

## 2022-06-20 RX ADMIN — APIXABAN 10 MG: 5 TABLET, FILM COATED ORAL at 17:49

## 2022-06-20 RX ADMIN — MILRINONE LACTATE 0.25 MCG/KG/MIN: 0.2 INJECTION, SOLUTION INTRAVENOUS at 13:08

## 2022-06-20 RX ADMIN — MILRINONE LACTATE 0.12 MCG/KG/MIN: 0.2 INJECTION, SOLUTION INTRAVENOUS at 13:49

## 2022-06-20 RX ADMIN — Medication 400 MG: at 22:18

## 2022-06-20 RX ADMIN — IVABRADINE 7.5 MG: 7.5 TABLET, FILM COATED ORAL at 17:49

## 2022-06-20 RX ADMIN — DOXYCYCLINE HYCLATE 100 MG: 100 TABLET, COATED ORAL at 09:53

## 2022-06-20 RX ADMIN — Medication 400 MG: at 13:40

## 2022-06-20 RX ADMIN — Medication 3 MG: at 22:18

## 2022-06-20 RX ADMIN — MEXILETINE HYDROCHLORIDE 150 MG: 150 CAPSULE ORAL at 09:54

## 2022-06-20 RX ADMIN — Medication 10 ML: at 13:09

## 2022-06-20 RX ADMIN — METHYLPREDNISOLONE SODIUM SUCCINATE 40 MG: 40 INJECTION, POWDER, FOR SOLUTION INTRAMUSCULAR; INTRAVENOUS at 13:08

## 2022-06-20 RX ADMIN — BUMETANIDE 2 MG: 0.25 INJECTION INTRAMUSCULAR; INTRAVENOUS at 22:18

## 2022-06-20 RX ADMIN — CALCIUM CARBONATE (ANTACID) CHEW TAB 500 MG 200 MG: 500 CHEW TAB at 09:57

## 2022-06-20 RX ADMIN — METHYLPREDNISOLONE SODIUM SUCCINATE 60 MG: 40 INJECTION, POWDER, FOR SOLUTION INTRAMUSCULAR; INTRAVENOUS at 03:50

## 2022-06-20 RX ADMIN — BUMETANIDE 2 MG: 0.25 INJECTION INTRAMUSCULAR; INTRAVENOUS at 13:08

## 2022-06-20 RX ADMIN — METHYLPREDNISOLONE SODIUM SUCCINATE 40 MG: 40 INJECTION, POWDER, FOR SOLUTION INTRAMUSCULAR; INTRAVENOUS at 22:18

## 2022-06-20 RX ADMIN — DOXYCYCLINE HYCLATE 100 MG: 100 TABLET, COATED ORAL at 17:49

## 2022-06-20 RX ADMIN — SODIUM CHLORIDE, PRESERVATIVE FREE 1 G: 5 INJECTION INTRAVENOUS at 13:08

## 2022-06-20 RX ADMIN — Medication 10 ML: at 22:18

## 2022-06-20 RX ADMIN — APIXABAN 10 MG: 5 TABLET, FILM COATED ORAL at 09:53

## 2022-06-20 RX ADMIN — MEXILETINE HYDROCHLORIDE 150 MG: 150 CAPSULE ORAL at 01:06

## 2022-06-20 RX ADMIN — MEXILETINE HYDROCHLORIDE 150 MG: 150 CAPSULE ORAL at 17:49

## 2022-06-20 RX ADMIN — Medication 10 ML: at 06:38

## 2022-06-20 RX ADMIN — BUMETANIDE 2 MG: 0.25 INJECTION INTRAMUSCULAR; INTRAVENOUS at 06:38

## 2022-06-20 NOTE — PROGRESS NOTES
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 He comes over here because of shortness of breath and left upper extremity swelling. As far as his shortness of breath is concerned, he says that he has been having worsening shortness of breath since last 2 months, which has really got worse in the last couple of days. In the ER, initially the patient required 15 liters and then he was put on BiPAP and currently he feels relatively stable. He says that he never had any chest pain, fever, cough, nausea, vomiting, headache, or dizziness. No changes in bowel movement. No nasal congestion. No abdominal pain. No chest pain. He also mentions that his shortness of breath is so worse now, it is hard for him to breathe even while he is well rested. He also says that since last 3-4 days, he also has swelling in his left upper extremity. He has some tingling, but no significant pain. He says that his swelling of left upper arm is getting slightly better. Interval history / Subjective:   F/u Acute respiratory failure    Patient is seen and examined at bedside this AM. He feels ok. Sob improving. He does have sore throat.    Discussed with nursing and cm          Assessment & Plan:     Acute hypoxia respiratory failure - improved  Secondary to acute PE vs Pneumonia vs CHF vs COPD exacerbation  -CTA subsegmental PE  - procalcitonin 0.2   -Heparin gtt - changed to Eliquis on 6/19   -Appreciate Pulmonary input  - completed abx Ceftriaxone, Doxy  - IV steroids - weaning down   - c/w home inhalers. Pt refusing nebs  - off bipap.   - saturating on RA ( pt using oxygen for symptom relief)     Acute on chronic CHF NYHA III  -Echo: 30 - 35%. Moderate global hypokinesis present. modearte TR  -on Milrinone gtt - weaning down   -No BB/ACEi/ARB/ARNI  -continue corlanor, Mexilitine. Digoxin on hold   -Appreciate adv heart failure input  - c/w bumex 2mg tid by nephro      Acute RUE DVT/PE:   - started on Eliquis 6/19    EDGAR on CKD stage III. - cr improving   - appreciate nephrology input  - Renal us: No hydronephrosis  - will monitor renal function    Hyponatremia - improving   - aldactone on hold  - will monitor    Dilated ascending aorta:  Stable. Former smoker    Poor prognosis. High risk for decompensation     Code status: FULL   Prophylaxis: Heparin gtt  Care Plan discussed with: Patient  Anticipated Disposition: likely home with [place] in 2-3 days      Hospital Problems  Date Reviewed: 6/15/2022          Codes Class Noted POA    COPD (chronic obstructive pulmonary disease) ([facility] ICD-10-CM: J44.9  ICD-9-CM: 544  6/15/2022 Unknown        * (Principal) Sepsis ([facility] ICD-10-CM: A41.9  ICD-9-CM: 038.9, 995.91  6/15/2022 Yes                Review of Systems:   A comprehensive review of systems was negative except for that written in the HPI. Vital Signs:    Last 24hrs VS reviewed since prior progress note.  Most recent are:  Visit Vitals  /69 (BP 1 Location: Right upper arm, BP Patient Position: Sitting)   Pulse (!) 104   Temp 98.4 °F (36.9 °C)   Resp 17   Ht 5' 8\" (1.727 m)   Wt 74.2 kg (163 lb 9.3 oz)   SpO2 96%   BMI 24.87 kg/m²         Intake/Output Summary (Last 24 hours) at 6/20/2022 1519  Last data filed at 6/20/2022 1348  Gross per 24 hour   Intake 114 ml   Output 4550 ml   Net -4436 ml        Physical Examination:     I had a face to face encounter with this patient and independently examined them on 6/20/2022 as outlined below:          Constitutional:  moderate resp distress   ENT:  Oral mucosa moist, oropharynx benign. Resp:  Bilateral diminished breath sounds, tachypnea    CV:  Regular rhythm, normal rate, no murmurs, gallops, rubs    GI:  Soft, non distended, non tender. normoactive bowel sounds, no hepatosplenomegaly     Musculoskeletal:  left arm swollen. Right leg 1+ edema     Neurologic:  Moves all extremities. AAOx3, CN II-XII reviewed            Data Review:    Review and/or order of clinical lab test      Labs:     Recent Labs     06/19/22 0414 06/18/22  0533   WBC 10.0 11.3*   HGB 12.2 12.7   HCT 37.0 38.4    288     Recent Labs     06/20/22  0358 06/19/22  0902 06/19/22 0414 06/18/22  0533 06/18/22  0533   *  --  126*  --  127*   K 3.9 4.4 5.8*   < > 4.7   CL 89*  --  92*  --  93*   CO2 27  --  23  --  23   BUN 73*  --  65*  --  51*   CREA 2.73*  --  3.06*  --  2.44*   *  --  130*  --  151*   CA 8.9  --  8.4*  --  8.8   PHOS 3.6  --  4.6  --   --     < > = values in this interval not displayed. Recent Labs     06/20/22 0358 06/19/22 0414   ALB 3.4* 3.0*     Recent Labs     06/19/22  0414 06/18/22  1415 06/18/22  0940   APTT 31.2* 52.9* 56.9*      No results for input(s): FE, TIBC, PSAT, FERR in the last 72 hours. No results found for: FOL, RBCF   No results for input(s): PH, PCO2, PO2 in the last 72 hours.   Recent Labs     06/20/22  0359 06/19/22  0902   * 1,047*     No results found for: CHOL, CHOLX, CHLST, CHOLV, HDL, HDLP, LDL, LDLC, DLDLP, TGLX, TRIGL, TRIGP, CHHD, CHHDX  No results found for: [facility]  Lab Results   Component Value Date/Time    Color YELLOW/STRAW 06/15/2022 03:29 PM    Appearance CLEAR 06/15/2022 03:29 PM    Specific gravity 1.022 06/15/2022 03:29 PM    pH (UA) 5.5 06/15/2022 03:29 PM    Protein Negative 06/15/2022 03:29 PM    Glucose Negative 06/15/2022 03:29 PM    Ketone Negative 06/15/2022 03:29 PM Bilirubin Negative 06/15/2022 03:29 PM    Urobilinogen 0.2 06/15/2022 03:29 PM    Nitrites Negative 06/15/2022 03:29 PM    Leukocyte Esterase Negative 06/15/2022 03:29 PM    Epithelial cells FEW 06/15/2022 03:29 PM    Bacteria Negative 06/15/2022 03:29 PM    WBC 0-4 06/15/2022 03:29 PM    RBC 0-5 06/15/2022 03:29 PM         Medications Reviewed:     Current Facility-Administered Medications   Medication Dose Route Frequency    methylPREDNISolone (PF) (SOLU-MEDROL) injection 40 mg  40 mg IntraVENous Q8H    milrinone (PRIMACOR) 20 MG/100 ML D5W infusion  0.125 mcg/kg/min IntraVENous CONTINUOUS    bumetanide (BUMEX) injection 2 mg  2 mg IntraVENous Q8H    apixaban (ELIQUIS) tablet 10 mg  10 mg Oral BID    [START ON 6/26/2022] apixaban (ELIQUIS) tablet 5 mg  5 mg Oral BID    ALPRAZolam (XANAX) tablet 0.5 mg  0.5 mg Oral DAILY PRN    HYDROmorphone (DILAUDID) injection 0.5 mg  0.5 mg IntraVENous Q6H PRN    melatonin tablet 3 mg  3 mg Oral QHS    tiZANidine (ZANAFLEX) tablet 2 mg  2 mg Oral Q6H PRN    lidocaine 4 % patch 1 Patch  1 Patch TransDERmal Q24H    calcium carbonate (TUMS) chewable tablet 200 mg [elemental]  200 mg Oral TID PRN    fluticasone-umeclidin-vilanter (TRELEGY ELLIPTA) inhaler 1 Puff (Patient Supplied)  1 Puff Inhalation DAILY    traMADoL (ULTRAM) tablet 50 mg  50 mg Oral Q6H PRN    [Held by provider] digoxin (LANOXIN) tablet 0.0625 mg  0.0625 mg Oral EVERY OTHER DAY    doxycycline (VIBRA-TABS) tablet 100 mg  100 mg Oral BID    ivabradine (CORLANOR) tablet 7.5 mg  7.5 mg Oral BID WITH MEALS    magnesium oxide (MAG-OX) tablet 400 mg  400 mg Oral BID    mexiletine (MEXITIL) capsule 150 mg  150 mg Oral Q8H    sodium chloride (NS) flush 5-40 mL  5-40 mL IntraVENous Q8H    sodium chloride (NS) flush 5-40 mL  5-40 mL IntraVENous PRN    acetaminophen (TYLENOL) tablet 650 mg  650 mg Oral Q6H PRN    Or    acetaminophen (TYLENOL) suppository 650 mg  650 mg Rectal Q6H PRN    polyethylene glycol (MIRALAX) packet 17 g  17 g Oral DAILY PRN    promethazine (PHENERGAN) tablet 12.5 mg  12.5 mg Oral Q6H PRN    Or    ondansetron (ZOFRAN) injection 4 mg  4 mg IntraVENous Q6H PRN     ______________________________________________________________________  EXPECTED LENGTH OF STAY: 3d 19h  ACTUAL LENGTH OF STAY:          Barry Cheung MD
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
## 2022-06-20 NOTE — PROGRESS NOTES
Occupational Therapy    Seen for OT evaluation, patient reports up ad/tom independent in room and setup for all ADLs, completed evaluation with HR up to 110 during standing activity, no other needs. Formal note to follow    Abiodun Laird.  MS Harrison OTR/L
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
## 2022-06-20 NOTE — PROGRESS NOTES
0800 Bedside shift change report given to Easiaid (oncoming nurse) by Oksana Serna (offgoing nurse).  Report included the following information SBAR, Kardex, MAR and Cardiac Rhythm ST.
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
## 2022-06-20 NOTE — PROGRESS NOTES
Nephrology Progress Note  [address] [facility]     www. [employer]  Phone - [phone]   Patient: Evette Don    YOB: 1960        Date- 6/20/2022   Admit Date: 6/15/2022  CC: Follow up for EDGAR on CKD          IMPRESSION & PLAN:   ·  EDGAR stage II(suspect secondary to, contrast-induced nephropathy, cardiorenal syndrome)  · CKD stage III (baseline creatinine 1.6-1.8)  · Hyperkalemia, improving  · Hyponatremia  · Systolic heart failure(EF 30 to 35%)  · Volume overload  · Acute respiratory failure  · Cyanosis  · Hyponatremia  · Nonischemic cardiomyopathy  · Acute right upper extremity DVT with PE   Severe COPD    PLAN-   Creatinine better, UOP improving   Cont current diuretics and inotropes   Cont daily labs and I/Os   No need for RRT at this time   Subjective: Interval History:   Seen and examined this morning. Resting in bed. Cr better, excellent UOP. No cp, sob reported. BP stable. Objective:   Vitals:    06/20/22 0400 06/20/22 0401 06/20/22 0559 06/20/22 0646   BP: 123/78   (!) 116/94   Pulse: (!) 109 (!) 107 (!) 113 (!) 106   Resp: 21   20   Temp:    97.8 °F (36.6 °C)   SpO2: 100%   100%   Weight:       Height:          06/19 0701 - 06/20 0700  In: 114 [I.V.:114]  Out: 5650 [Urine:5650]  Last 3 Recorded Weights in this Encounter    06/18/22 0225 06/19/22 1027 06/20/22 0348   Weight: 80.6 kg (177 lb 11.1 oz) 78.4 kg (172 lb 13.5 oz) 74.2 kg (163 lb 9.3 oz)      Physical exam:    GEN: NAD  NECK- Supple, no mass  RESP: No wheezing, Clear b/l  CVS: S1,S2  RRR  NEURO: Normal speech, Non focal  EXT: No Edema   PSYCH: Normal Mood    Chart reviewed. Pertinent Notes reviewed.      Data Review :  Recent Labs     06/20/22  0358 06/19/22  0902 06/19/22  0414 06/18/22  0533 06/18/22  0533   *  --  126*  --  127*   K 3.9 4.4 5.8*   < > 4.7   CL 89*  --  92*  --  93*   CO2 27  --  23  --  23   BUN 73*  --  65*  --  51*   CREA 2.73*  --  3.06*  --  2.44*   *  -- 130*  --  151*   CA 8.9  --  8.4*  --  8.8   PHOS 3.6  --  4.6  --   --     < > = values in this interval not displayed. Recent Labs     06/19/22  0414 06/18/22  0533   WBC 10.0 11.3*   HGB 12.2 12.7   HCT 37.0 38.4    288     No results for input(s): FE, TIBC, PSAT, FERR in the last 72 hours.    Medication list  reviewed  Current Facility-Administered Medications   Medication Dose Route Frequency    bumetanide (BUMEX) injection 2 mg  2 mg IntraVENous Q8H    apixaban (ELIQUIS) tablet 10 mg  10 mg Oral BID    [START ON 6/26/2022] apixaban (ELIQUIS) tablet 5 mg  5 mg Oral BID    ALPRAZolam (XANAX) tablet 0.5 mg  0.5 mg Oral DAILY PRN    HYDROmorphone (DILAUDID) injection 0.5 mg  0.5 mg IntraVENous Q6H PRN    melatonin tablet 3 mg  3 mg Oral QHS    ipratropium (ATROVENT) 0.02 % nebulizer solution 0.5 mg  0.5 mg Nebulization Q6H RT    tiZANidine (ZANAFLEX) tablet 2 mg  2 mg Oral Q6H PRN    lidocaine 4 % patch 1 Patch  1 Patch TransDERmal Q24H    calcium carbonate (TUMS) chewable tablet 200 mg [elemental]  200 mg Oral TID PRN    albuterol (PROVENTIL VENTOLIN) nebulizer solution 2.5 mg  2.5 mg Nebulization Q6H PRN    milrinone (PRIMACOR) 20 MG/100 ML D5W infusion  0.25 mcg/kg/min IntraVENous CONTINUOUS    fluticasone-umeclidin-vilanter (TRELEGY ELLIPTA) inhaler 1 Puff (Patient Supplied)  1 Puff Inhalation DAILY    methylPREDNISolone (PF) (SOLU-MEDROL) injection 60 mg  60 mg IntraVENous Q6H    traMADoL (ULTRAM) tablet 50 mg  50 mg Oral Q6H PRN    [Held by provider] digoxin (LANOXIN) tablet 0.0625 mg  0.0625 mg Oral EVERY OTHER DAY    cefTRIAXone (ROCEPHIN) 1 g in 0.9% sodium chloride 10 mL IV syringe  1 g IntraVENous Q24H    doxycycline (VIBRA-TABS) tablet 100 mg  100 mg Oral BID    ivabradine (CORLANOR) tablet 7.5 mg  7.5 mg Oral BID WITH MEALS    magnesium oxide (MAG-OX) tablet 400 mg  400 mg Oral BID    mexiletine (MEXITIL) capsule 150 mg  150 mg Oral Q8H    sodium chloride (NS) flush 5-40 mL  5-40 mL IntraVENous Q8H    sodium chloride (NS) flush 5-40 mL  5-40 mL IntraVENous PRN    acetaminophen (TYLENOL) tablet 650 mg  650 mg Oral Q6H PRN    Or    acetaminophen (TYLENOL) suppository 650 mg  650 mg Rectal Q6H PRN    polyethylene glycol (MIRALAX) packet 17 g  17 g Oral DAILY PRN    promethazine (PHENERGAN) tablet 12.5 mg  12.5 mg Oral Q6H PRN    Or    ondansetron (ZOFRAN) injection 4 mg  4 mg IntraVENous Q6H PRN          Austin Moffett MD              [facility]  formerly [facility] [place] [address], [place], [address]  Phone - [phone]               Fax - [phone]

## 2022-06-21 LAB
ALBUMIN SERPL-MCNC: 3.3 G/DL (ref 3.5–5)
ANION GAP SERPL CALC-SCNC: 6 MMOL/L (ref 5–15)
ANION GAP SERPL CALC-SCNC: 9 MMOL/L (ref 5–15)
ARTERIAL PATENCY WRIST A: YES
BASE EXCESS BLDA CALC-SCNC: 19.3 MMOL/L
BDY SITE: ABNORMAL
BNP SERPL-MCNC: ABNORMAL PG/ML
BUN SERPL-MCNC: 66 MG/DL (ref 6–20)
BUN SERPL-MCNC: 69 MG/DL (ref 6–20)
BUN/CREAT SERPL: 29 (ref 12–20)
BUN/CREAT SERPL: 32 (ref 12–20)
CALCIUM SERPL-MCNC: 9.2 MG/DL (ref 8.5–10.1)
CALCIUM SERPL-MCNC: 9.6 MG/DL (ref 8.5–10.1)
CHLORIDE SERPL-SCNC: 79 MMOL/L (ref 97–108)
CHLORIDE SERPL-SCNC: 79 MMOL/L (ref 97–108)
CO2 SERPL-SCNC: 42 MMOL/L (ref 21–32)
CO2 SERPL-SCNC: 42 MMOL/L (ref 21–32)
CREAT SERPL-MCNC: 2.06 MG/DL (ref 0.7–1.3)
CREAT SERPL-MCNC: 2.36 MG/DL (ref 0.7–1.3)
DIGOXIN SERPL-MCNC: 0.8 NG/ML (ref 0.9–2)
ERYTHROCYTE [DISTWIDTH] IN BLOOD BY AUTOMATED COUNT: 14.2 % (ref 11.5–14.5)
FIO2 ON VENT: 21 %
GLUCOSE SERPL-MCNC: 115 MG/DL (ref 65–100)
GLUCOSE SERPL-MCNC: 123 MG/DL (ref 65–100)
HCO3 BLDA-SCNC: 45 MMOL/L (ref 22–26)
HCT VFR BLD AUTO: 41.3 % (ref 36.6–50.3)
HGB BLD-MCNC: 14 G/DL (ref 12.1–17)
MCH RBC QN AUTO: 31.6 PG (ref 26–34)
MCHC RBC AUTO-ENTMCNC: 33.9 G/DL (ref 30–36.5)
MCV RBC AUTO: 93.2 FL (ref 80–99)
NRBC # BLD: 0.03 K/UL (ref 0–0.01)
NRBC BLD-RTO: 0.2 PER 100 WBC
PCO2 BLDA: 53 MMHG (ref 35–45)
PH BLDA: 7.55 [PH] (ref 7.35–7.45)
PHOSPHATE SERPL-MCNC: 2.6 MG/DL (ref 2.6–4.7)
PLATELET # BLD AUTO: 256 K/UL (ref 150–400)
PMV BLD AUTO: 12.9 FL (ref 8.9–12.9)
PO2 BLDA: 73 MMHG (ref 80–100)
POTASSIUM SERPL-SCNC: 3 MMOL/L (ref 3.5–5.1)
POTASSIUM SERPL-SCNC: 3.3 MMOL/L (ref 3.5–5.1)
RBC # BLD AUTO: 4.43 M/UL (ref 4.1–5.7)
SAO2 % BLD: 96 % (ref 92–97)
SAO2% DEVICE SAO2% SENSOR NAME: ABNORMAL
SODIUM SERPL-SCNC: 127 MMOL/L (ref 136–145)
SODIUM SERPL-SCNC: 130 MMOL/L (ref 136–145)
SPECIMEN SITE: ABNORMAL
WBC # BLD AUTO: 15.1 K/UL (ref 4.1–11.1)

## 2022-06-21 PROCEDURE — 83880 ASSAY OF NATRIURETIC PEPTIDE: CPT

## 2022-06-21 PROCEDURE — G0378 HOSPITAL OBSERVATION PER HR: HCPCS

## 2022-06-21 PROCEDURE — 74011000250 HC RX REV CODE- 250: Performed by: INTERNAL MEDICINE

## 2022-06-21 PROCEDURE — 99232 SBSQ HOSP IP/OBS MODERATE 35: CPT | Performed by: NURSE PRACTITIONER

## 2022-06-21 PROCEDURE — 80162 ASSAY OF DIGOXIN TOTAL: CPT

## 2022-06-21 PROCEDURE — 36415 COLL VENOUS BLD VENIPUNCTURE: CPT

## 2022-06-21 PROCEDURE — 74011250637 HC RX REV CODE- 250/637: Performed by: HOSPITALIST

## 2022-06-21 PROCEDURE — 96366 THER/PROPH/DIAG IV INF ADDON: CPT

## 2022-06-21 PROCEDURE — 96376 TX/PRO/DX INJ SAME DRUG ADON: CPT

## 2022-06-21 PROCEDURE — 85027 COMPLETE CBC AUTOMATED: CPT

## 2022-06-21 PROCEDURE — 80048 BASIC METABOLIC PNL TOTAL CA: CPT

## 2022-06-21 PROCEDURE — 74011250636 HC RX REV CODE- 250/636: Performed by: PHYSICIAN ASSISTANT

## 2022-06-21 PROCEDURE — 36600 WITHDRAWAL OF ARTERIAL BLOOD: CPT

## 2022-06-21 PROCEDURE — 74011000250 HC RX REV CODE- 250: Performed by: HOSPITALIST

## 2022-06-21 PROCEDURE — 94640 AIRWAY INHALATION TREATMENT: CPT

## 2022-06-21 PROCEDURE — 80069 RENAL FUNCTION PANEL: CPT

## 2022-06-21 PROCEDURE — 65660000001 HC RM ICU INTERMED STEPDOWN

## 2022-06-21 PROCEDURE — 82803 BLOOD GASES ANY COMBINATION: CPT

## 2022-06-21 PROCEDURE — 74011250637 HC RX REV CODE- 250/637: Performed by: INTERNAL MEDICINE

## 2022-06-21 RX ORDER — PREDNISONE 20 MG/1
40 TABLET ORAL
Status: DISCONTINUED | OUTPATIENT
Start: 2022-06-22 | End: 2022-06-22 | Stop reason: HOSPADM

## 2022-06-21 RX ORDER — POTASSIUM CHLORIDE 750 MG/1
40 TABLET, FILM COATED, EXTENDED RELEASE ORAL
Status: COMPLETED | OUTPATIENT
Start: 2022-06-21 | End: 2022-06-21

## 2022-06-21 RX ADMIN — APIXABAN 10 MG: 5 TABLET, FILM COATED ORAL at 19:38

## 2022-06-21 RX ADMIN — POTASSIUM CHLORIDE 40 MEQ: 750 TABLET, EXTENDED RELEASE ORAL at 11:13

## 2022-06-21 RX ADMIN — IVABRADINE 7.5 MG: 7.5 TABLET, FILM COATED ORAL at 19:38

## 2022-06-21 RX ADMIN — METHYLPREDNISOLONE SODIUM SUCCINATE 40 MG: 40 INJECTION, POWDER, FOR SOLUTION INTRAMUSCULAR; INTRAVENOUS at 13:28

## 2022-06-21 RX ADMIN — Medication 10 ML: at 19:39

## 2022-06-21 RX ADMIN — MEXILETINE HYDROCHLORIDE 150 MG: 150 CAPSULE ORAL at 19:38

## 2022-06-21 RX ADMIN — Medication 400 MG: at 13:29

## 2022-06-21 RX ADMIN — IVABRADINE 7.5 MG: 7.5 TABLET, FILM COATED ORAL at 11:14

## 2022-06-21 RX ADMIN — Medication 400 MG: at 22:22

## 2022-06-21 RX ADMIN — MEXILETINE HYDROCHLORIDE 150 MG: 150 CAPSULE ORAL at 11:14

## 2022-06-21 RX ADMIN — Medication 3 MG: at 22:22

## 2022-06-21 RX ADMIN — Medication 10 ML: at 22:22

## 2022-06-21 RX ADMIN — APIXABAN 10 MG: 5 TABLET, FILM COATED ORAL at 11:14

## 2022-06-21 RX ADMIN — BUMETANIDE 2 MG: 0.25 INJECTION INTRAMUSCULAR; INTRAVENOUS at 06:17

## 2022-06-21 RX ADMIN — METHYLPREDNISOLONE SODIUM SUCCINATE 40 MG: 40 INJECTION, POWDER, FOR SOLUTION INTRAMUSCULAR; INTRAVENOUS at 06:17

## 2022-06-21 RX ADMIN — METHYLPREDNISOLONE SODIUM SUCCINATE 40 MG: 40 INJECTION, POWDER, FOR SOLUTION INTRAMUSCULAR; INTRAVENOUS at 22:22

## 2022-06-21 RX ADMIN — Medication 10 ML: at 06:18

## 2022-06-21 RX ADMIN — MEXILETINE HYDROCHLORIDE 150 MG: 150 CAPSULE ORAL at 06:24

## 2022-06-21 NOTE — PROGRESS NOTES
1119 this RN in pt room when RT attempted ABG, pt refused stating he doesn't think he needs it. MD notified  [address] [facility] [address] Analilia Nunez talked to pt, explained need for ABG, pt now agreeable to ABG.  RT notified

## 2022-06-21 NOTE — PROGRESS NOTES
Nephrology Progress Note  [address] [facility]     www. [facility]  Phone - [phone]   Patient: Chikis Hilario    YOB: 1960        Date- 6/21/2022   Admit Date: 6/15/2022  CC: Follow up for EDGAR on CKD          IMPRESSION & PLAN:   ·  EDGAR stage II(suspect secondary to, contrast-induced nephropathy, cardiorenal syndrome)  · CKD stage III (baseline creatinine 1.6-1.8)  · Hyperkalemia, improving  · Hyponatremia  · Systolic heart failure(EF 30 to 35%)  · Volume overload  · Acute respiratory failure  · Cyanosis  · Hyponatremia  · Nonischemic cardiomyopathy  · Acute right upper extremity DVT with PE   Severe COPD    PLAN-   Creatinine better, UOP improving   Agree with discontinuing diuretics   Replete K   Trend bicarb, should drop down after holding loop diuretics   Cont milrinone per HF service   Subjective: Interval History:   Seen and examined. On RA, feeling better. UOP > 6 L , bicarb up, K low. No cp, sob, n/v/d reported    Objective:   Vitals:    06/21/22 0554 06/21/22 0617 06/21/22 0700 06/21/22 0744   BP:  115/76 112/83    Pulse: (!) 108 (!) 106 (!) 110    Resp:  14 18    Temp:  97.8 °F (36.6 °C) 98 °F (36.7 °C)    SpO2:  96% 97% 91%   Weight:   68.3 kg (150 lb 9.6 oz)    Height:          06/20 0701 - 06/21 0700  In: 134 [I.V.:134]  Out: 7330 [Urine:7330]  Last 3 Recorded Weights in this Encounter    06/19/22 1027 06/20/22 0348 06/21/22 0700   Weight: 78.4 kg (172 lb 13.5 oz) 74.2 kg (163 lb 9.3 oz) 68.3 kg (150 lb 9.6 oz)      Physical exam:    GEN: NAD  NECK- Supple, no mass  RESP: No wheezing, Clear b/l  CVS: S1,S2  RRR  NEURO: Normal speech, Non focal  EXT: No Edema   PSYCH: Normal Mood    Chart reviewed. Pertinent Notes reviewed.      Data Review :  Recent Labs     06/21/22  0721 06/21/22  0359 06/20/22  0358 06/19/22  0902 06/19/22  0414   * 127* 128*  --  126*   K 3.3* 3.0* 3.9   < > 5.8*   CL 79* 79* 89*  --  92*   CO2 42* 42* 27  --  23   BUN 66* 69* 73*  --  65*   CREA 2.06* 2.36* 2.73*  --  3.06*   * 123* 130*  --  130*   CA 9.2 9.6 8.9  --  8.4*   PHOS  --  2.6 3.6  --  4.6    < > = values in this interval not displayed. Recent Labs     06/21/22  0359 06/19/22  0414   WBC 15.1* 10.0   HGB 14.0 12.2   HCT 41.3 37.0    230     No results for input(s): FE, TIBC, PSAT, FERR in the last 72 hours.    Medication list  reviewed  Current Facility-Administered Medications   Medication Dose Route Frequency    potassium chloride SR (KLOR-CON 10) tablet 40 mEq  40 mEq Oral NOW    methylPREDNISolone (PF) (SOLU-MEDROL) injection 40 mg  40 mg IntraVENous Q8H    apixaban (ELIQUIS) tablet 10 mg  10 mg Oral BID    [START ON 6/26/2022] apixaban (ELIQUIS) tablet 5 mg  5 mg Oral BID    ALPRAZolam (XANAX) tablet 0.5 mg  0.5 mg Oral DAILY PRN    HYDROmorphone (DILAUDID) injection 0.5 mg  0.5 mg IntraVENous Q6H PRN    melatonin tablet 3 mg  3 mg Oral QHS    tiZANidine (ZANAFLEX) tablet 2 mg  2 mg Oral Q6H PRN    lidocaine 4 % patch 1 Patch  1 Patch TransDERmal Q24H    calcium carbonate (TUMS) chewable tablet 200 mg [elemental]  200 mg Oral TID PRN    fluticasone-umeclidin-vilanter (TRELEGY ELLIPTA) inhaler 1 Puff (Patient Supplied)  1 Puff Inhalation DAILY    traMADoL (ULTRAM) tablet 50 mg  50 mg Oral Q6H PRN    [Held by provider] digoxin (LANOXIN) tablet 0.0625 mg  0.0625 mg Oral EVERY OTHER DAY    ivabradine (CORLANOR) tablet 7.5 mg  7.5 mg Oral BID WITH MEALS    magnesium oxide (MAG-OX) tablet 400 mg  400 mg Oral BID    mexiletine (MEXITIL) capsule 150 mg  150 mg Oral Q8H    sodium chloride (NS) flush 5-40 mL  5-40 mL IntraVENous Q8H    sodium chloride (NS) flush 5-40 mL  5-40 mL IntraVENous PRN    acetaminophen (TYLENOL) tablet 650 mg  650 mg Oral Q6H PRN    Or    acetaminophen (TYLENOL) suppository 650 mg  650 mg Rectal Q6H PRN    polyethylene glycol (MIRALAX) packet 17 g  17 g Oral DAILY PRN    promethazine (PHENERGAN) tablet 12.5 mg  12.5 mg Oral Q6H PRN    Or    ondansetron (ZOFRAN) injection 4 mg  4 mg IntraVENous Q6H PRN          [facility]  [facility] [place] [address], [facility], [address] [facility]  Phone - [phone]               Fax - [phone]

## 2022-06-21 NOTE — PROGRESS NOTES
[address] 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 He comes over here because of shortness of breath and left upper extremity swelling. As far as his shortness of breath is concerned, he says that he has been having worsening shortness of breath since last 2 months, which has really got worse in the last couple of days. In the ER, initially the patient required 15 liters and then he was put on BiPAP and currently he feels relatively stable. He says that he never had any chest pain, fever, cough, nausea, vomiting, headache, or dizziness. No changes in bowel movement. No nasal congestion. No abdominal pain. No chest pain. He also mentions that his shortness of breath is so worse now, it is hard for him to breathe even while he is well rested. He also says that since last 3-4 days, he also has swelling in his left upper extremity. He has some tingling, but no significant pain. He says that his swelling of left upper arm is getting slightly better. Interval history / Subjective:   F/u Acute respiratory failure    Patient is seen and examined at bedside this AM. He feels ok. Sob improving. Wants to go home.  Explained that he needs to be weaned off Milrinone gtt  Discussed with nursing and cm          Assessment & Plan:     Acute hypoxia respiratory failure - improved  Secondary to acute PE vs Pneumonia vs CHF vs COPD exacerbation  -CTA subsegmental PE  - procalcitonin 0.2   -Heparin gtt - changed to Eliquis on 6/19   -Appreciate Pulmonary input  - completed abx Ceftriaxone, Doxy  - Steroids - changed to po prednisone tomorrow   - c/w home inhalers. Pt refusing nebs  - off bipap.   - saturating on RA     Acute on chronic CHF NYHA III  -Echo: 30 - 35%. Moderate global hypokinesis present. modearte TR  -on Milrinone gtt - weaning off   -No BB/ACEi/ARB/ARNI  -continue corlanor, Mexilitine. Digoxin on hold   -Appreciate adv heart failure input  - stopped diuretics due to elevated bicarb     Acute RUE DVT/PE:   - started on Eliquis 6/19    EDGAR on CKD stage III. - cr improving   - appreciate nephrology input  - Renal us: No hydronephrosis  - will monitor renal function    Hyponatremia - improving   - aldactone on hold  - will monitor    Dilated ascending aorta:  Stable. Former smoker    Poor prognosis. Code status: FULL   Prophylaxis: Eliquis   Care Plan discussed with: Patient  Anticipated Disposition: possible dc tomorrow home with [facility] Problems  Date Reviewed: 6/15/2022          Codes Class Noted POA    COPD (chronic obstructive pulmonary disease) ([facility] ICD-10-CM: J44.9  ICD-9-CM: 926  6/15/2022 Unknown        * (Principal) Sepsis ([facility] ICD-10-CM: A41.9  ICD-9-CM: 038.9, 995.91  6/15/2022 Yes                Review of Systems:   A comprehensive review of systems was negative except for that written in the HPI. Vital Signs:    Last 24hrs VS reviewed since prior progress note.  Most recent are:  Visit Vitals  BP 99/85 (BP 1 Location: Right upper arm, BP Patient Position: At rest)   Pulse (!) 104   Temp 98.2 °F (36.8 °C)   Resp 17   Ht 5' 8\" (1.727 m)   Wt 68.3 kg (150 lb 9.6 oz)   SpO2 99%   BMI 22.90 kg/m²         Intake/Output Summary (Last 24 hours) at 6/21/2022 1558  Last data filed at 6/21/2022 1400  Gross per 24 hour   Intake --   Output 7830 ml   Net -7830 ml        Physical Examination:     I had a face to face encounter with this patient and independently examined them on 6/21/2022 as outlined below:          Constitutional:  mild resp distress   ENT:  Oral mucosa moist, oropharynx benign. Resp:  Bilateral diminished breath sounds   CV:  Regular rhythm, normal rate, no murmurs, gallops, rubs    GI:  Soft, non distended, non tender. normoactive bowel sounds, no hepatosplenomegaly     Musculoskeletal:  left arm swollen. Right leg 1+ edema     Neurologic:  Moves all extremities. AAOx3, CN II-XII reviewed            Data Review:    Review and/or order of clinical lab test      Labs:     Recent Labs     06/21/22  0359 06/19/22 0414   WBC 15.1* 10.0   HGB 14.0 12.2   HCT 41.3 37.0    230     Recent Labs     06/21/22  0721 06/21/22 0359 06/20/22  0358 06/19/22  0902 06/19/22  0414   * 127* 128*  --  126*   K 3.3* 3.0* 3.9   < > 5.8*   CL 79* 79* 89*  --  92*   CO2 42* 42* 27  --  23   BUN 66* 69* 73*  --  65*   CREA 2.06* 2.36* 2.73*  --  3.06*   * 123* 130*  --  130*   CA 9.2 9.6 8.9  --  8.4*   PHOS  --  2.6 3.6  --  4.6    < > = values in this interval not displayed. Recent Labs     06/21/22  0359 06/20/22 0358 06/19/22 0414   ALB 3.3* 3.4* 3.0*     Recent Labs     06/19/22 0414   APTT 31.2*      No results for input(s): FE, TIBC, PSAT, FERR in the last 72 hours. No results found for: FOL, RBCF   No results for input(s): PH, PCO2, PO2 in the last 72 hours.   Recent Labs     06/20/22  0359 06/19/22  0902   * 1,047*     No results found for: CHOL, CHOLX, CHLST, CHOLV, HDL, HDLP, LDL, LDLC, DLDLP, TGLX, TRIGL, TRIGP, CHHD, CHHDX  No results found for: [facility]  Lab Results   Component Value Date/Time    Color YELLOW/STRAW 06/15/2022 03:29 PM    Appearance CLEAR 06/15/2022 03:29 PM    Specific gravity 1.022 06/15/2022 03:29 PM    pH (UA) 5.5 06/15/2022 03:29 PM    Protein Negative 06/15/2022 03:29 PM    Glucose Negative 06/15/2022 03:29 PM    Ketone Negative 06/15/2022 03:29 PM    Bilirubin Negative 06/15/2022 03:29 PM Urobilinogen 0.2 06/15/2022 03:29 PM    Nitrites Negative 06/15/2022 03:29 PM    Leukocyte Esterase Negative 06/15/2022 03:29 PM    Epithelial cells FEW 06/15/2022 03:29 PM    Bacteria Negative 06/15/2022 03:29 PM    WBC 0-4 06/15/2022 03:29 PM    RBC 0-5 06/15/2022 03:29 PM         Medications Reviewed:     Current Facility-Administered Medications   Medication Dose Route Frequency    methylPREDNISolone (PF) (SOLU-MEDROL) injection 40 mg  40 mg IntraVENous Q8H    [START ON 6/22/2022] predniSONE (DELTASONE) tablet 40 mg  40 mg Oral DAILY WITH BREAKFAST    apixaban (ELIQUIS) tablet 10 mg  10 mg Oral BID    [START ON 6/26/2022] apixaban (ELIQUIS) tablet 5 mg  5 mg Oral BID    ALPRAZolam (XANAX) tablet 0.5 mg  0.5 mg Oral DAILY PRN    HYDROmorphone (DILAUDID) injection 0.5 mg  0.5 mg IntraVENous Q6H PRN    melatonin tablet 3 mg  3 mg Oral QHS    tiZANidine (ZANAFLEX) tablet 2 mg  2 mg Oral Q6H PRN    lidocaine 4 % patch 1 Patch  1 Patch TransDERmal Q24H    calcium carbonate (TUMS) chewable tablet 200 mg [elemental]  200 mg Oral TID PRN    fluticasone-umeclidin-vilanter (TRELEGY ELLIPTA) inhaler 1 Puff (Patient Supplied)  1 Puff Inhalation DAILY    traMADoL (ULTRAM) tablet 50 mg  50 mg Oral Q6H PRN    [Held by provider] digoxin (LANOXIN) tablet 0.0625 mg  0.0625 mg Oral EVERY OTHER DAY    ivabradine (CORLANOR) tablet 7.5 mg  7.5 mg Oral BID WITH MEALS    magnesium oxide (MAG-OX) tablet 400 mg  400 mg Oral BID    mexiletine (MEXITIL) capsule 150 mg  150 mg Oral Q8H    sodium chloride (NS) flush 5-40 mL  5-40 mL IntraVENous Q8H    sodium chloride (NS) flush 5-40 mL  5-40 mL IntraVENous PRN    acetaminophen (TYLENOL) tablet 650 mg  650 mg Oral Q6H PRN    Or    acetaminophen (TYLENOL) suppository 650 mg  650 mg Rectal Q6H PRN    polyethylene glycol (MIRALAX) packet 17 g  17 g Oral DAILY PRN    promethazine (PHENERGAN) tablet 12.5 mg  12.5 mg Oral Q6H PRN    Or    ondansetron (ZOFRAN) injection 4 mg  4 mg IntraVENous Q6H PRN     ______________________________________________________________________  EXPECTED LENGTH OF STAY: 3d 19h  ACTUAL LENGTH OF STAY:          Teresita Andrade MD
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
## 2022-06-21 NOTE — PROGRESS NOTES
Chart checked, pt cleared by nursing. Attempted to work with pt but he adamantly and somewhat politely declined to work with me stating that he was expecting his wife momentarily and wanted to have breakfast with her and had just been up amb to the bathroom and felt like he was making gains. Will follow and see as able and pt is agreeable. Baby Delmi, PT
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
## 2022-06-22 ENCOUNTER — TELEPHONE (OUTPATIENT)
Dept: CARDIOLOGY CLINIC | Age: 62
End: 2022-06-22

## 2022-06-22 VITALS
DIASTOLIC BLOOD PRESSURE: 87 MMHG | HEART RATE: 119 BPM | SYSTOLIC BLOOD PRESSURE: 122 MMHG | WEIGHT: 153.66 LBS | OXYGEN SATURATION: 91 % | TEMPERATURE: 98.4 F | BODY MASS INDEX: 23.29 KG/M2 | HEIGHT: 68 IN | RESPIRATION RATE: 18 BRPM

## 2022-06-22 LAB
ANION GAP SERPL CALC-SCNC: 6 MMOL/L (ref 5–15)
BNP SERPL-MCNC: ABNORMAL PG/ML
BUN SERPL-MCNC: 59 MG/DL (ref 6–20)
BUN/CREAT SERPL: 34 (ref 12–20)
CALCIUM SERPL-MCNC: 8.6 MG/DL (ref 8.5–10.1)
CHLORIDE SERPL-SCNC: 82 MMOL/L (ref 97–108)
CO2 SERPL-SCNC: 42 MMOL/L (ref 21–32)
CREAT SERPL-MCNC: 1.74 MG/DL (ref 0.7–1.3)
DIGOXIN SERPL-MCNC: 0.8 NG/ML (ref 0.9–2)
ERYTHROCYTE [DISTWIDTH] IN BLOOD BY AUTOMATED COUNT: 14.5 % (ref 11.5–14.5)
F2 GENE MUT ANL BLD/T: NORMAL
GLUCOSE SERPL-MCNC: 145 MG/DL (ref 65–100)
HCT VFR BLD AUTO: 38.4 % (ref 36.6–50.3)
HGB BLD-MCNC: 13.2 G/DL (ref 12.1–17)
MCH RBC QN AUTO: 31.3 PG (ref 26–34)
MCHC RBC AUTO-ENTMCNC: 34.4 G/DL (ref 30–36.5)
MCV RBC AUTO: 91 FL (ref 80–99)
NRBC # BLD: 0.05 K/UL (ref 0–0.01)
NRBC BLD-RTO: 0.3 PER 100 WBC
PLATELET # BLD AUTO: 236 K/UL (ref 150–400)
PMV BLD AUTO: 12.4 FL (ref 8.9–12.9)
POTASSIUM SERPL-SCNC: 3.5 MMOL/L (ref 3.5–5.1)
RBC # BLD AUTO: 4.22 M/UL (ref 4.1–5.7)
SODIUM SERPL-SCNC: 130 MMOL/L (ref 136–145)
WBC # BLD AUTO: 17.2 K/UL (ref 4.1–11.1)

## 2022-06-22 PROCEDURE — 74011636637 HC RX REV CODE- 636/637: Performed by: PHYSICIAN ASSISTANT

## 2022-06-22 PROCEDURE — 94640 AIRWAY INHALATION TREATMENT: CPT

## 2022-06-22 PROCEDURE — 74011000250 HC RX REV CODE- 250: Performed by: HOSPITALIST

## 2022-06-22 PROCEDURE — 36415 COLL VENOUS BLD VENIPUNCTURE: CPT

## 2022-06-22 PROCEDURE — G0378 HOSPITAL OBSERVATION PER HR: HCPCS

## 2022-06-22 PROCEDURE — 85027 COMPLETE CBC AUTOMATED: CPT

## 2022-06-22 PROCEDURE — 94760 N-INVAS EAR/PLS OXIMETRY 1: CPT

## 2022-06-22 PROCEDURE — 74011250637 HC RX REV CODE- 250/637: Performed by: INTERNAL MEDICINE

## 2022-06-22 PROCEDURE — 74011250637 HC RX REV CODE- 250/637: Performed by: NURSE PRACTITIONER

## 2022-06-22 PROCEDURE — 83880 ASSAY OF NATRIURETIC PEPTIDE: CPT

## 2022-06-22 PROCEDURE — 74011250637 HC RX REV CODE- 250/637: Performed by: HOSPITALIST

## 2022-06-22 PROCEDURE — 80162 ASSAY OF DIGOXIN TOTAL: CPT

## 2022-06-22 PROCEDURE — 80048 BASIC METABOLIC PNL TOTAL CA: CPT

## 2022-06-22 RX ORDER — FLUTICASONE FUROATE, UMECLIDINIUM BROMIDE AND VILANTEROL TRIFENATATE 200; 62.5; 25 UG/1; UG/1; UG/1
1 POWDER RESPIRATORY (INHALATION) DAILY
Qty: 1 EACH | Refills: 0 | Status: SHIPPED
Start: 2022-06-22

## 2022-06-22 RX ORDER — FUROSEMIDE 20 MG/1
10 TABLET ORAL AS NEEDED
Qty: 90 TABLET | Refills: 0 | Status: SHIPPED | OUTPATIENT
Start: 2022-06-22

## 2022-06-22 RX ORDER — PREDNISONE 10 MG/1
TABLET ORAL
Qty: 20 TABLET | Refills: 0 | Status: SHIPPED | OUTPATIENT
Start: 2022-06-23 | End: 2022-07-01

## 2022-06-22 RX ORDER — DIGOXIN 125 MCG
0.06 TABLET ORAL EVERY OTHER DAY
Qty: 30 TABLET | Refills: 0 | Status: SHIPPED | OUTPATIENT
Start: 2022-06-22

## 2022-06-22 RX ADMIN — MEXILETINE HYDROCHLORIDE 150 MG: 150 CAPSULE ORAL at 04:04

## 2022-06-22 RX ADMIN — Medication 400 MG: at 11:58

## 2022-06-22 RX ADMIN — APIXABAN 10 MG: 5 TABLET, FILM COATED ORAL at 10:04

## 2022-06-22 RX ADMIN — PREDNISONE 40 MG: 20 TABLET ORAL at 10:09

## 2022-06-22 RX ADMIN — Medication 10 ML: at 06:47

## 2022-06-22 RX ADMIN — MEXILETINE HYDROCHLORIDE 150 MG: 150 CAPSULE ORAL at 10:06

## 2022-06-22 RX ADMIN — IVABRADINE 7.5 MG: 7.5 TABLET, FILM COATED ORAL at 10:05

## 2022-06-22 RX ADMIN — CALCIUM CARBONATE (ANTACID) CHEW TAB 500 MG 200 MG: 500 CHEW TAB at 04:04

## 2022-06-22 NOTE — PROGRESS NOTES
Nephrology Progress Note  [address]     [facility]. [facility]  Phone - [phone]   Patient: Evette Don    YOB: 1960        Date- 6/22/2022   Admit Date: 6/15/2022  CC: Follow up for EDGAR on CKD          IMPRESSION & PLAN:   ·  EDGAR stage II(suspect secondary to, contrast-induced nephropathy, cardiorenal syndrome)  · CKD stage III (baseline creatinine 1.6-1.8)  · Hyperkalemia, improving  · Hyponatremia  · Systolic heart failure(EF 30 to 35%)  · Volume overload  · Acute respiratory failure  · Cyanosis  · Hyponatremia  · Nonischemic cardiomyopathy  · Acute right upper extremity DVT with PE   Severe COPD    PLAN:   Creatinine better, UOP improving   Cont present care   Ok for d/c from renal perspective   Will need f/u with us in 2 weeks post d/c   Subjective: Interval History:   Seen and examined. On RA, feeling better. Cr stable. Bicarb remains at 43. Pt feeling well. Denies cp, sob, n/v/d    Objective:   Vitals:    06/22/22 0647 06/22/22 0731 06/22/22 0800 06/22/22 1000   BP: 116/87      Pulse: (!) 103  (!) 104 (!) 110   Resp: 18      Temp: 97.7 °F (36.5 °C)      SpO2: 95% 92%     Weight:       Height:          06/21 0701 - 06/22 0700  In: -   Out: 2100 [Urine:2100]  Last 3 Recorded Weights in this Encounter    06/20/22 0348 06/21/22 0700 06/22/22 0345   Weight: 74.2 kg (163 lb 9.3 oz) 68.3 kg (150 lb 9.6 oz) 69.7 kg (153 lb 10.6 oz)      Physical exam:    GEN: NAD  NECK- Supple, no mass  RESP: No wheezing, Clear b/l  CVS: S1,S2  RRR  NEURO: Normal speech, Non focal  EXT: No Edema   PSYCH: Normal Mood    Chart reviewed. Pertinent Notes reviewed.      Data Review :  Recent Labs     06/22/22  0345 06/21/22  0721 06/21/22  0359 06/20/22  0358 06/20/22  0358   * 130* 127*   < > 128*   K 3.5 3.3* 3.0*   < > 3.9   CL 82* 79* 79*   < > 89*   CO2 42* 42* 42*   < > 27   BUN 59* 66* 69*   < > 73*   CREA 1.74* 2.06* 2.36*   < > 2.73*   * 115* 123*   < > 130*   CA 8.6 9.2 9.6   < > 8.9   PHOS  --   --  2.6  --  3.6    < > = values in this interval not displayed. Recent Labs     06/22/22  0345 06/21/22  0359   WBC 17.2* 15.1*   HGB 13.2 14.0   HCT 38.4 41.3    256     No results for input(s): FE, TIBC, PSAT, FERR in the last 72 hours.    Medication list  reviewed  Current Facility-Administered Medications   Medication Dose Route Frequency    predniSONE (DELTASONE) tablet 40 mg  40 mg Oral DAILY WITH BREAKFAST    apixaban (ELIQUIS) tablet 10 mg  10 mg Oral BID    [START ON 6/26/2022] apixaban (ELIQUIS) tablet 5 mg  5 mg Oral BID    ALPRAZolam (XANAX) tablet 0.5 mg  0.5 mg Oral DAILY PRN    HYDROmorphone (DILAUDID) injection 0.5 mg  0.5 mg IntraVENous Q6H PRN    melatonin tablet 3 mg  3 mg Oral QHS    tiZANidine (ZANAFLEX) tablet 2 mg  2 mg Oral Q6H PRN    lidocaine 4 % patch 1 Patch  1 Patch TransDERmal Q24H    calcium carbonate (TUMS) chewable tablet 200 mg [elemental]  200 mg Oral TID PRN    fluticasone-umeclidin-vilanter (TRELEGY ELLIPTA) inhaler 1 Puff (Patient Supplied)  1 Puff Inhalation DAILY    traMADoL (ULTRAM) tablet 50 mg  50 mg Oral Q6H PRN    [Held by provider] digoxin (LANOXIN) tablet 0.0625 mg  0.0625 mg Oral EVERY OTHER DAY    ivabradine (CORLANOR) tablet 7.5 mg  7.5 mg Oral BID WITH MEALS    magnesium oxide (MAG-OX) tablet 400 mg  400 mg Oral BID    mexiletine (MEXITIL) capsule 150 mg  150 mg Oral Q8H    sodium chloride (NS) flush 5-40 mL  5-40 mL IntraVENous Q8H    sodium chloride (NS) flush 5-40 mL  5-40 mL IntraVENous PRN    acetaminophen (TYLENOL) tablet 650 mg  650 mg Oral Q6H PRN    Or    acetaminophen (TYLENOL) suppository 650 mg  650 mg Rectal Q6H PRN    polyethylene glycol (MIRALAX) packet 17 g  17 g Oral DAILY PRN    promethazine (PHENERGAN) tablet 12.5 mg  12.5 mg Oral Q6H PRN    Or    ondansetron (ZOFRAN) injection 4 mg  4 mg IntraVENous Q6H PRN          Ilda Mayberry MD              [facility] [address], [address]  [place], [address]  Phone - [phone]               Fax - [phone]

## 2022-06-22 NOTE — DISCHARGE INSTRUCTIONS
Please bring this form with you to show your primary care provider at your follow-up appointment. Primary care provider:  Dr. Elston Essex, MD    Discharging provider:  Sangeeta Hunt MD    You have been admitted to the hospital with the following diagnoses:  · [facility] [J44.9]    FOLLOW-UP CARE RECOMMENDATIONS:    APPOINTMENTS:  · Follow-up with primary care provider, Dr. Elston Essex, MD  -  Please call [phone] shortly after discharge to set up an appointment to be seen in  1 week   · Cardiology, Pulmonology and Nephrology f/u in 1-2 weeks     FOLLOW-UP TESTS recommended: BMP in 1 week     PENDING TEST RESULTS:  At the time of your discharge the following test results are still pending: none   Please make sure you review these results with your outpatient follow-up provider(s). SYMPTOMS to watch for: chest pain, shortness of breath, fever, chills, nausea, vomiting, diarrhea, change in mentation, falling, weakness, bleeding. DIET/what to eat:  Cardiac Diet    ACTIVITY:  Activity as tolerated    What to do if new or unexpected symptoms occur? If you experience any of the above symptoms (or should other concerns or questions arise after discharge) please call your primary care physician. Return to the emergency room if you cannot get hold of your doctor. · It is very important that you keep your follow-up appointment(s). · Please bring discharge papers, medication list (and/or medication bottles) to your follow-up appointments for review by your outpatient provider(s). · Please check the list of medications and be sure it includes every medication (even non-prescription medications) that your provider wants you to take. · It is important that you take the medication exactly as they are prescribed.    · Keep your medication in the bottles provided by the pharmacist and keep a list of the medication names, dosages, and times to be taken in your wallet. · Do not take other medications without consulting your doctor. · If you have any questions about your medications or other instructions, please talk to your nurse or care provider before you leave the hospital.    I understand that if any problems occur once I am at home I am to contact my physician. These instructions were explained to me and I had the opportunity to ask questions.

## 2022-06-22 NOTE — DISCHARGE SUMMARY
Discharge Summary     Patient:  Maira Aguayo       MRN: [id number]       YOB: 1960       Age: 58 y.o. Date of admission:  6/15/2022    Date of discharge:  6/22/2022    Primary care provider: Dr. Sol Ellison MD    Admitting provider:  Bartolo Hammonds MD    Discharging provider:  Wagner Gordon 91.: [phone]. If unavailable, call [phone] and ask the  to page the triage hospitalist.    Consultations  · [address] [place] [address]  · IP CONSULT TO NEPHROLOGY  · IP CONSULT TO PULMONOLOGY    Procedures  · * No surgery found *    Discharge destination: Home. The patient is stable for discharge. Admission diagnosis  · COPD (chronic obstructive pulmonary disease) ([facility] [J44.9]    Current Discharge Medication List      START taking these medications    Details   fluticasone-umeclidin-vilanter (Trelegy Ellipta) 200-62.5-25 mcg inhaler Take 1 Puff by inhalation daily. Qty: 1 Each, Refills: 0  Start date: 6/22/2022      !! apixaban (ELIQUIS) 5 mg tablet Take 2 Tablets by mouth two (2) times a day for 8 doses. Qty: 16 Tablet, Refills: 0  Start date: 6/22/2022, End date: 6/26/2022      !! apixaban (ELIQUIS) 5 mg tablet Take 1 Tablet by mouth two (2) times a day. Indications: a clot in the lung  Qty: 60 Tablet, Refills: 0  Start date: 6/26/2022      predniSONE (DELTASONE) 10 mg tablet Take 40 mg by mouth daily (with breakfast) for 2 days, THEN 30 mg daily (with breakfast) for 2 days, THEN 20 mg daily (with breakfast) for 2 days, THEN 10 mg daily (with breakfast) for 2 days. Qty: 20 Tablet, Refills: 0  Start date: 6/23/2022, End date: 7/1/2022       !! - Potential duplicate medications found. Please discuss with provider.       CONTINUE these medications which have NOT CHANGED    Details   mexiletine (MEXITIL) 150 mg capsule TAKE ONE CAPSULE BY MOUTH THREE TIMES A DAY  Qty: 90 Capsule, Refills: 1    Associated Diagnoses: Ventricular tachycardia (HCC)      magnesium oxide (MAG-OX) 400 mg tablet TAKE ONE TABLET BY MOUTH TWICE A DAY  Qty: 60 Tablet, Refills: 2      digoxin (LANOXIN) 0.25 mg tablet Take 0.5 Tablets by mouth every other day. Qty: 90 Tablet, Refills: 2      ivabradine (CORLANOR) 7.5 mg tablet Take 1 Tablet by mouth two (2) times daily (with meals). Qty: 180 Tablet, Refills: 2      albuterol (ProAir HFA) 90 mcg/actuation inhaler Take 2 Puffs by inhalation every four (4) hours as needed. Patient uses at most twice per week         STOP taking these medications       spironolactone (ALDACTONE) 25 mg tablet Comments:   Reason for Stopping:         furosemide (LASIX) 20 mg tablet Comments:   Reason for Stopping:         budesonide-formoteroL (Symbicort) 160-4.5 mcg/actuation HFAA Comments:   Reason for Stopping:                Follow-up Information     Follow up With Specialties Details Why Contact Info    Belinda Alexis MD Internal Medicine Physician In 1 week  [address] [place] [address] Drive 14 [facility], [address] Vascular Surgery, Cardiovascular Disease Physician In 1 week  [address]      Sammy Walters MD Nephrology In 1 week  [address] [id number] [phone]      [facility] Pulmonary Disease In 2 weeks  [address] [place] [address]  [phone]            Final discharge diagnoses and brief hospital course  This is a 42-year-old male with a past medical history of severe nonischemic cardiomyopathy 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 Vincent Larkin comes over here because of shortness of breath and left upper extremity swelling.  As far as his shortness of breath is concerned, he says that he has been having worsening shortness of breath since last 2 months, which has really got worse in the last couple of days.  In the ER, initially the patient required 15 liters and then he was put on BiPAP and currently he feels relatively stable.  He says that he never had any chest pain, fever, cough, nausea, vomiting, headache, or dizziness.  No changes in bowel movement.  No nasal congestion.  No abdominal pain.  No chest pain.  He also mentions that his shortness of breath is so worse now, it is hard for him to breathe even while he is well rested. Ai Coppola also says that since last 3-4 days, he also has swelling in his left upper extremity.  He has some tingling, but no significant pain.  He says that his swelling of left upper arm is getting slightly better. Acute hypoxia respiratory failure resolved   Secondary to acute PE, Pneumonia , CHF and COPD exacerbation  -CTA subsegmental PE  - procalcitonin 0.2   -Heparin gtt - changed to Eliquis on 6/19   -Appreciate Pulmonary input  - completed abx Ceftriaxone, Doxy  - on po prednisone    - c/w home inhalers. Pt refusing nebs  - off bipap.   - saturating on RA      Acute on chronic CHF NYHA III  -Echo: 30 - 35%. Moderate global hypokinesis present. sylvain TR  -on Milrinone gtt - weaned off 6/21  -No BB/ACEi/ARB/ARNI  -continue corlanor, Mexilitine, Digoxin   -Appreciate adv heart failure input  - stopped diuretics due to elevated bicarb   - refused AICD/ life vest      Acute RUE DVT/PE:   - started on Eliquis 6/19     EDGAR on CKD stage III. - cr improving   - appreciate nephrology input  - Renal us: No hydronephrosis  - will monitor renal function  - metabolic alkalosis with sec resp acidosis due to over diuresis  - held diuretics on dc  - discussed with nephrology Dr. Leticia Hancock: ok to dc      Hyponatremia - improved  - aldactone on hold  - will monitor    Leucocytosis - likely due to steroids      Dilated ascending aorta:  Stable.   Former smoker    Discharge recommendations:  PCP f/u in  1 week   Cardiology, Nephrology and pulmonology f/u   BMP in 1 week  Diuretics on hold   Refused [facility]    Discharge plan discussed in detail with patient. He understood and agreed      Physical examination at discharge  Visit Vitals  /87 (BP 1 Location: Right upper arm, BP Patient Position: At rest)   Pulse (!) 119   Temp 98.4 °F (36.9 °C)   Resp 18   Ht 5' 8\" (1.727 m)   Wt 69.7 kg (153 lb 10.6 oz)   SpO2 91%   BMI 23.36 kg/m²     Constitutional:  no distress   ENT:  Oral mucosa moist, oropharynx benign. Resp:  Bilateral diminished breath sounds   CV:  Regular rhythm, normal rate, no murmurs, gallops, rubs    GI:  Soft, non distended, non tender. normoactive bowel sounds, no hepatosplenomegaly     Musculoskeletal:  left arm swollen. Right leg 1+ edema     Neurologic:  Moves all extremities. AAOx3, CN II-XII reviewed                               Pertinent imaging studies:    Per EMR     Recent Labs     06/22/22  0345 06/21/22 0359   WBC 17.2* 15.1*   HGB 13.2 14.0   HCT 38.4 41.3    256     Recent Labs     06/22/22  0345 06/21/22  0721 06/21/22  0359 06/20/22  0358 06/20/22 0358   * 130* 127*   < > 128*   K 3.5 3.3* 3.0*   < > 3.9   CL 82* 79* 79*   < > 89*   CO2 42* 42* 42*   < > 27   BUN 59* 66* 69*   < > 73*   CREA 1.74* 2.06* 2.36*   < > 2.73*   * 115* 123*   < > 130*   CA 8.6 9.2 9.6   < > 8.9   PHOS  --   --  2.6  --  3.6    < > = values in this interval not displayed. Recent Labs     06/21/22  0359 06/20/22  0358   ALB 3.3* 3.4*     No results for input(s): INR, PTP, APTT, INREXT in the last 72 hours. No results for input(s): FE, TIBC, PSAT, FERR in the last 72 hours.    Recent Labs     06/21/22  1901   PH 7.55*   PCO2 53*   PO2 73*     Recent Labs     06/20/22  0359   *     No components found for: GLPOC    Chronic Diagnoses:    Problem List as of 6/22/2022 Date Reviewed: 6/15/2022          Codes Class Noted - Resolved    COPD (chronic obstructive pulmonary disease) ([facility] ICD-10-CM: J44.9  ICD-9-CM: 751  6/15/2022 - Present        * (Principal) Sepsis ([facility] ICD-10-CM: A41.9  ICD-9-CM: 038.9, 995.91  6/15/2022 - Present        RESOLVED: Heart failure ([facility] ICD-10-CM: I50.9  ICD-9-CM: 428.9  5/13/2021 - 5/21/2021              Time spent on discharge related activities today greater than 30 minutes. Signed:  Aron Schofield MD                 Hospitalist, Internal Medicine      Cc:  Lyudmila Lees MD

## 2022-06-22 NOTE — PROGRESS NOTES
I have reviewed discharge instructions with the patient including heart failure teaching, follow up appointments, and medications. The patient verbalized understanding.
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
## 2022-06-22 NOTE — TELEPHONE ENCOUNTER
Received call from Lindy Barakat NP requesting one week follow up appointment. Hospital follow up appointment has been scheduled for Wednesday, 6/29/22 at 8:30 a.m.
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
## 2022-06-22 NOTE — PROGRESS NOTES
2000 Verbal bedside update given to Darris Boas, RN returning nurse by Patti Siemens, RN off-going nurse. Report included update on pt status and condition, recent results,heart rate and rhythm, respiratory status and MAR.

## 2022-06-23 NOTE — ADT AUTH CERT NOTES
Utilization Reviews         Pulmonary Embolism - Care Day 7 (6/21/2022) by Kirsten Ponce       Review Status Review Entered   Completed 6/22/2022 15:09      Criteria Review      Care Day: 7 Care Date: 6/21/2022 Level of Care: Intermediate Care    Guideline Day 2    Level Of Care    ( ) ICU or floor    6/22/2022 15:09:32 EDT by Kirsten Ponce      6/21 imcu    Clinical Status    ( ) * Hemodynamic stability    6/22/2022 15:09:32 EDT by Kirsten Ponce        BP 99/85    (X) * Oxygenation stable or improved    6/22/2022 15:09:32 EDT by Kirsten Ponce      100% RA    (X) * Dyspnea absent or improved    (X) * Tachypnea absent or improved    6/22/2022 15:09:32 EDT by Kirsten Ponce      RR 18    ( ) * PTT in therapeutic range or not indicated    6/22/2022 15:09:32 EDT by Kirsten Ponce      not indicated    Activity    ( ) * Limited ambulation    6/22/2022 15:09:32 EDT by Kirsten Ponce      up with assist    Routes    (X) Oral hydration    (X) Oral medications    6/22/2022 15:09:32 EDT by Kirsten Ponce      PO eliquis 10mg 2xdaily  PO corlanor 7.5mg 2xdaily  PO mag-ox 400mg 2xdaily  PO melatonin 3mg bedtime  PO mexitil 150mg q8h  PO klor-con 40 meq given x1    (X) Usual diet    6/22/2022 15:09:32 EDT by Kirsten Ponce      reg diet    Interventions    (X) Platelet count    1/48/3198 15:09:32 EDT by Kirsten Ponce      256    * Milestone   Additional Notes   6/21 imcu      Pertinent Updates: On RA, feeling better. UOP > 6 L , bicarb up, K low.        Vitals:   97.5 °F (36.4 °C) 108 114/91 17 100 % RA    103 108   /96 103/75 117/91      Abnl/Pertinent Labs:   WBC: 15.1 (H)   NRBC: 0.2 (H)   Sodium: 127 (L)   Potassium: 3.0 (L)   Chloride: 79 (L)   CO2: 42 (HH)   Glucose: 123 (H)   BUN: 69 (H)   Creatinine: 2.36 (H)   BUN/Creatinine ratio: 29 (H)   GFR est non-AA: 28 (L)   GFR est AA: 34 (L)   Albumin: 3.3 (L)   NT pro-BNP: 12,525 (H)   Digoxin level: 0.8 (L) Glucose: 123 (H)   pH: 7.55 (H)   PCO2: 53 (H)   PO2: 73 (L)   BICARBONATE: 45 (H)      Physical Exam:   General: Awake, alert, no acute distress    HEENT: NC/AT, EOMI, moist mucous membranes    Neck: Supple, no meningismus, no masses or swelling    HEART: irregularly irregular, no murmurs/rubs/gallops    Lungs: No deformities, normal chest rise and fall, no increased work of breathing    Lung auscultation: Decreased air movement bilaterally, relatively clear to auscultation    Abdomen: Soft/NT, non rigid mildly distended    Extremities: No cyanosis/clubbing, normal peripheral pulses, LUE edema/erythema   Skin: Dry, normal temperature    Neuro: A&O x 3, normal speech    Psych: Normal affect, normal mood       Cardio:   Goal weight 162-167lb based on previous RHC   Cont Corlanor 7.5mg BID   Continue Eliquis    S/p Venofer 200mg x 2    Cont Vit D daily supplement   Continue mexilitine per Dr. Gordo Chapman IV solumedrol per pulmonary, transition to PO tomorrow    Continue duonebs per pulmonary    Does not have AICD or Lifevest     Consider RHC as OP   Palliative Care consult to discuss goals/limits of care   Recommend 6MWT to eval home O2 need but will defer to Pulmonary        Pulmo:   -- O2 with titration above 90%. Currently on room air   --It sounds like he had an allergic reaction to one of the nebs.      --reduce steroids to PO    -- Rocephin/doxy; completed    -- Anticoagulation   -- Volume management   -- Nebs   -- followed by Dr Javon Bruno; patient should only be on trelegy alone on discharge        Nephro:   Creatinine better, UOP improving   Agree with discontinuing diuretics   Replete K   Trend bicarb, should drop down after holding loop diuretics   Cont milrinone per HF service      Medications:   IN trelegy ellipta 1puff daily   IV bumex 2mg q8h dcd   IV rocephin 1g q24h   IV solu-medrol 40mg q8h dcd      Orders:   Daily labs               Pulmonary Embolism - Care Day 6 (6/20/2022) by Gómez Love Garrett       Review Status Review Entered   Completed 6/22/2022 14:48      Criteria Review      Care Day: 6 Care Date: 6/20/2022 Level of Care: Intermediate Care    Guideline Day 2    Level Of Care    ( ) ICU or floor    6/22/2022 14:48:31 EDT by Kirsten Ponce      6/20 imcu    Clinical Status    ( ) * Hemodynamic stability    6/22/2022 14:48:31 EDT by Kirsten Ponce        /90    (X) * Oxygenation stable or improved    6/22/2022 14:48:31 EDT by Kirsten Ponce      95% ra    (X) * Dyspnea absent or improved    ( ) * Tachypnea absent or improved    6/22/2022 14:48:31 EDT by Kirsten Ponce      RR 20    (X) * PTT in therapeutic range or not indicated    6/22/2022 14:48:31 EDT by Kirsten Ponce      not indicated    Activity    ( ) * Limited ambulation    6/22/2022 14:48:31 EDT by Kirsten Ponce      aat with assist    Routes    (X) Oral hydration    (X) Oral medications    6/22/2022 14:48:31 EDT by Kirsten Ponce      PO eliquis 10mg 2xdaily  PO tums 200mg 3xdaily prn x1  PO corlanor 7.5mg 2xdaily  PO mag-ox 400mg 2xdaily  PO melatonin 3mg bedtime  PO mexitil 150mh q8h  PO diuril 500mg q12h  PO vibra-tabs 100mg 2xdaily    (X) Usual diet    6/22/2022 14:48:31 EDT by Kirsten Ponce      reg diet    Interventions    (X) Platelet count    4/91/3290 14:48:31 EDT by Kirsten Ponce      236    * Milestone   Additional Notes   6/20 imcu      Pertinent Updates:   Notes worsening breathing with neb medications.  Reports less wheeze       Vitals:   97.8 °F (36.6 °C) 106 116/94 100 %     102 105   /90 116/94 100/71      Abnl/Pertinent Labs:   WBC: 17.2 (H)   NRBC: 0.3 (H)   Sodium: 130 (L)   Chloride: 82 (L)   CO2: 42 (HH)   Glucose: 145 (H)   BUN: 59 (H)   Creatinine: 1.74 (H)   BUN/Creatinine ratio: 34 (H)   GFR est non-AA: 40 (L)   GFR est AA: 48 (L)   NT pro-BNP: 10,569 (H)   Digoxin level: 0.8 (L)      Physical Exam:   General: Awake, alert, no acute distress HEENT: NC/AT, EOMI, moist mucous membranes    Neck: Supple, no meningismus, no masses or swelling    HEART: irregularly irregular, no murmurs/rubs/gallops    Lungs: No deformities, normal chest rise and fall, no increased work of breathing    Lung auscultation: Decreased air movement bilaterally, relatively clear to auscultation    Abdomen: Soft/NT, non rigid mildly distended    Extremities: No cyanosis/clubbing, normal peripheral pulses, LUE edema/erythema   Skin: Dry, normal temperature    Neuro: A&O x 3, normal speech    Psych: Normal affect, normal mood       Nephro:   PLAN-   Creatinine better, UOP improving   Cont current diuretics and inotropes   Cont daily labs and I/Os   No need for RRT at this time      Pulmo:   -- O2 with titration above 90%. Currently on room air   --It sounds like he had an allergic reaction to one of the nebs. I have stopped them all for now, continue trelegy though    --reduce steroids   -- Rocephin/doxy   -- Anticoagulation   -- Currently on milrinone drip   -- Volume management   -- ICS/LABA   -- Nebs   -- f/u factor v/prothrombin labs, still pending   -- followed by Dr Enid Seay        Age appropriate cancer screenings as outpatient   Will need to follow up on the pulm nodule seen on CT in 6 to 12 months      Cardio:   Decrease milrinone 0.125mcg/kg/min and wean to off tomorrow    Remains in Bumex 2mg TID- further recs per nephrology   Goal weight 162-167lb based on previous RHC   No BBrx during acute decompensation    No ACE/ARB/ARNI during acute decompensation     Hold digoxin for elevated level of 1.5, goal 0.7-1.2. trend   Hold spironolactone today for hyponatremia and hyperkalemia    Cont Corlanor 7.5mg BID   Not taking ASA?     Continue heparin gtt per primary   S/p Venofer 200mg x 2    Cont Vit D daily supplement   Continue mexilitine per Dr. Romero Mom solumedrol per pulmonary    Continue duonebs per pulmonary    Cont antibiotics per primary team   Repeat PCT, ESR,   Check CK, Myoglobin- rhabdo? Does not have AICD or Lifevest     Consider RHC when able to lay flat       Medications:   IN trelegy ellipta 1puff daily   IV bumex 2mg q8h    IV rocephin 1g q24h   IV solu-medrol 40mg changed to q8h   primacor drip      Orders:   daily labs, daily weights      OT:   Based on the objective data described below, the patient presents with fair activity tolerance and endurance s/p admission with sepsis with ARF, CHF now with R UE DVT. Currently independent to mod I with functional mobility in room and up to setup for ADLs with HR up to 115 with activity requiring retraining for pacing, rest breaks and activity modifications, patient receptive and stood for 4 minutes without A. Recommend home with family and no OT needs.        Current Level of Function (ADLs/self-care): setup to mod I       Functional Outcome Measure:  The patient scored 65/100 on the mildly decreased from baseline outcome measure which is indicative of mild impairment.                 Pulmonary Embolism - Care Day 5 (6/19/2022) by David Zapata       Review Status Review Entered   Completed 6/22/2022 13:17      Criteria Review      Care Day: 5 Care Date: 6/19/2022 Level of Care: Intermediate Care    Guideline Day 2    Level Of Care    ( ) ICU or floor    6/22/2022 13:17:15 EDT by David Zapata      6/19 imcu    Clinical Status    ( ) * Hemodynamic stability    6/22/2022 13:17:15 EDT by David Zapata        /58    (X) * Oxygenation stable or improved    6/22/2022 13:17:15 EDT by David Zapata      94% ra    (X) * Dyspnea absent or improved    ( ) * Tachypnea absent or improved    6/22/2022 13:17:15 EDT by David Zapata      RR 24    ( ) * PTT in therapeutic range or not indicated    6/22/2022 13:17:15 EDT by David Zapata      aPTT: 31.2 (H)    Activity    ( ) * Limited ambulation    6/22/2022 13:17:15 EDT by David Zapata      aat with assist    Routes    (X) Oral hydration    (X) Oral medications    6/22/2022 13:17:15 EDT by Manny Edwards      PO eliquis 10mg 2xdaily  PO corlanor 7.5mg 2xdaily  PO mag-ox 400mg 2xdaily  PO melatonin 3mg bedtime  PO mexitil 150mh q8h  PO ultram 50mg q6h prn given x1  PO diuril 500mg q12h  PO vibra-tabs 100mg 2xdaily    (X) Usual diet    6/22/2022 13:17:15 EDT by Manny Edwards      reg diet    Interventions    (X) Platelet count    8/38/9102 13:17:15 EDT by Manny Edwards      230    (X) Possible PTT    Medications    (X) Anticoagulants    6/22/2022 13:17:16 EDT by Manny Edwards      IV heparin 6420 units given x1  heparin 37015 units titrate dcd    * Milestone   Additional Notes   6/19 imcu      Pertinent Updates:   -Hypoxia significantly improved. -Creatinine elevated but hyperkalemia better, 1.9 L urine output      Vitals:   97.6 110 18 102/58 94% RA    107 109   /58 120/83 106/89      Abnl/Pertinent Labs:   NRBC: 0.3 (H)   RBC: 3.93 (L)   RDW: 14.7 (H)   Sodium: 126 (L)   Potassium: 5.8, 4.4   Chloride: 92 (L)   Glucose: 130 (H)   BUN: 65 (H)   Creatinine: 3.06 (H)   BUN/Creatinine ratio: 21 (H)   Calcium: 8.4 (L)   GFR est non-AA: 21 (L)   GFR est AA: 25 (L)   Albumin: 3.0 (L)   CK: 1,047 (H)   Myoglobin: 1,792 (H)   NT pro-BNP: 24,398 (H)      Physical Exam:   Constitutional: moderate resp distress   ENT: Oral mucosa moist, oropharynx benign. Resp: Bilateral diminished breath sounds, tachypnea    CV: Regular rhythm, normal rate, no murmurs, gallops, rubs    GI: Soft, non distended, non tender. normoactive bowel sounds, no hepatosplenomegaly     Musculoskeletal: left arm swollen. Right leg 1+ edema     Neurologic: Moves all extremities.  AAOx3, CN II-XII reviewed      MD Consults/Assessments & Plans:   Acute hypoxia respiratory failure - improving    - procalcitonin 0.2    -Heparin gtt - changed to Eliquis on 6/19    -Appreciate Pulmonary input   - c/w IV abx Ceftriaxone, Doxy   - c/w home inhalers.  Pt refusing nebs. Added atrovent inhaler    - off bipap.    - saturating on 3l/ RA ( pt using oxygen for symptom relief)        Acute on chronic CHF NYHA III   -Echo: 30 - 35%.  Moderate global hypokinesis present. modearte TR   -on Milrinone gtt - decreased the rate    -No BB/ACEi/ARB/ARNI   -continue corlanor, Mexilitine. Digoxin on hold    -Appreciate adv heart failure input   - c/w bumex 2mg tid by nephro         Acute RUE DVT/PE:    - started on Eliquis 6/19       EDGAR on CKD stage III. - cr worsening   - appreciate nephrology input   - Renal us: No hydronephrosis   - will monitor renal function       Hyponatremia - worsening   - aldactone on hold   - will monitor       Hypertension: Continue home meds   Dilated ascending aorta:  Stable. Former smoker       Poor prognosis. High risk for decompensation        Code status: FULL    Prophylaxis: Heparin gtt   Care Plan discussed with: Patient   [facility]       Cardio:   RECOMMENDATIONS:   Continue Milrinone 0.25mcg/kg/min- limited with HR at this time    Remains in Bumex 2mg TID- further recs per nephrology   Goal weight 162-167lb based on previous RHC   No BBrx during acute decompensation    No ACE/ARB/ARNI during acute decompensation     Hold digoxin for elevated level of 1.5, goal 0.7-1.2. trend   Hold spironolactone today for hyponatremia and hyperkalemia    Cont Corlanor 7.5mg BID   Not taking ASA? Continue heparin gtt per primary   S/p Venofer 200mg x 2    Cont Vit D daily supplement   Continue mexilitine per Dr. Carvalho Erp solumedrol per pulmonary    Continue duonebs per pulmonary    Cont antibiotics per primary team   Repeat PCT, ESR,   Check CK, Myoglobin- rhabdo? Does not have AICD or Lifevest     Consider RHC when able to lay flat        Nephro:   PLAN-   Creatinine slightly elevated. Hyperkalemia and acidosis better. 1.9L monitor urine output. Plan to continue on Bumex 2 mg IV 3 times daily. Check labs daily. No acute need for RRT from volume laboratory standpoint. Medications:   IN trelegy ellipta 1puff daily   TD lidocaine 4% 1patch q24h   IV bumex 2mg q8h dcd   IV rocephin 1g q24h   IV solu-medrol 40mg q6h   primacor drip      Orders:   daily labs, daily weights      PT:   Based on the objective data described below, the patient presents with independent bed mobility.  He is sitting up in the bed on arrival and prefers to sit in the bed as opposed to a chair.  He also declined a recliner. Cecy Bonds getting organized and putting on a gown he is able to come to standing with supervision and has good standing balance without an assistive device.  He was able to stand EOB for 5 minutes and take some steps forward and back with unchanging vitals and no dizziness.  He has been getting up independently to use the urinal.  He was so SOB yesterday that today we elected to stay close to the bed and will begin ambulation at our next visit if he continues to progress.  He was not fatigued with this exertion today. He is to have family visitors this afternoon due to Father's Noel Hernandez wants to wait and see how he progresses before he decides on [facility].     Current Level of Function Impacting Discharge (mobility/balance):  Not yet ambulating away from the bed.     Functional Outcome Measure:  The patient scored Total: 55/100 on the Barthel Index outcome measure which is indicative of being 45 in basic self-care.     Other factors to consider for discharge: None    Patient will benefit from skilled therapy intervention to address the above noted impairments.                 Pulmonary Embolism - Care Day 3 (6/17/2022) by Daniela Gonzalez       Review Status Review Entered   Completed 6/17/2022 14:35      Criteria Review      Care Day: 3 Care Date: 6/17/2022 Level of Care: Intermediate Care    Guideline Day 2    Level Of Care    (X) ICU or floor    6/17/2022 14:35:07 EDT by Daniela Gonzalez      6/17 Intermediate Care    Clinical Status    ( ) * Hemodynamic stability    6/17/2022 14:35:07 EDT by Humberto Vegas      97.5 °F (36.4 °C) 123  122/101  28 100 % 3L nc    ( ) * Oxygenation stable or improved    6/17/2022 14:35:07 EDT by Humberto Vegas      weaned to 3L nc    (X) * Dyspnea absent or improved    6/17/2022 14:35:07 EDT by Humberto Vegas      improved    ( ) * Tachypnea absent or improved    6/17/2022 14:35:07 EDT by Humberto Vegas      RR: 24, 28    ( ) * PTT in therapeutic range or not indicated    6/17/2022 14:35:07 EDT by Humberto Vegas      aPTT: 49.8 (H)    Activity    ( ) * Limited ambulation    6/17/2022 14:35:07 EDT by Humberto Vegas      AAt w/ assist    Routes    (X) Oral hydration    6/17/2022 14:35:07 EDT by Humberto Vegas      po hydration    (X) Oral medications    6/17/2022 14:35:07 EDT by Humberto Vegas      PO Aldactone 12.5mg daily  PO Corlanor 7.5mg 2x daily  PO Mag-Ox 400mg 2x daily  PO Mexitil 150mg q8h  po digoxin 0.0625 mg q other day  PO doxycycline 100mg 2x daily  Neb albuterol 2.5mg q4h  Neb Duo Neb 3ml q6h increased to q4h while awake    (X) Usual diet    6/17/2022 14:35:07 EDT by Humberto Vegas      ADULT DIET Regular    Interventions    (X) Platelet count    4/96/8119 14:35:07 EDT by Humberto Vegas      PLATELET: 284    (X) Possible PTT    6/17/2022 14:35:07 EDT by Humberto Vegas      aPTT: 49.8 (H)    (X) Possible cardiac monitoring    6/17/2022 14:35:07 EDT by Humberto Vegas      c/r monitoring    Medications    (X) Anticoagulants    6/17/2022 14:35:07 EDT by Humberto Vegas      heparin drip titrate  IV heparin 3210 units given 1x    * Milestone   Additional Notes   6/17 Intermediate Care       Pertinent Updates:   on 3L nc 32% fiO2      Abnl/Pertinent Labs   aPTT: >130.0 (HH)   WBC: 13.5 (H)   RBC: 3.92 (L)   RDW: 14.7 (H)   Sodium: 129 (L)   CO2: 20 (L)   Glucose: 152 (H)   BUN: 45 (H)   Creatinine: 2.07 (H)   BUN/Creatinine ratio: 22 (H) Calcium: 7.8 (L)   GFR est non-AA: 33 (L)   GFR est AA: 40 (L)   NT pro-BNP: 11,822 (H)   Digoxin level: 1.3      Right Lower Venous   The common femoral, great saphenous, profunda femoral, femoral, popliteal, gastrocnemius, posterior tibial, peroneal and saphenous femoral junction vein(s) were imaged in the transverse and longitudinal planes. The vessels showed normal color filling and compressibility. Doppler interrogation of the veins showed phasic and spontaneous flow. Left Lower Venous   The common femoral, great saphenous, saphenous femoral junction, profunda femoral, femoral, popliteal, gastrocnemius, posterior tibial and peroneal vein(s) were imaged in the transverse and longitudinal planes. The vessels showed normal color filling and compressibility. Doppler interrogation of the veins showed phasic and spontaneous flow. Physical Exam:   General: Awake, alert, no acute distress    HEENT: NC/AT, EOMI, moist mucous membranes    Neck: Supple, no meningismus, no masses or swelling    HEART: irregularly irregular, no murmurs/rubs/gallops    Lungs: No deformities, normal chest rise and fall, no increased work of breathing    Lung auscultation: Decreased air movement bilaterally, relatively clear to auscultation    Abdomen: Soft/NT, non rigid mildly distended    Extremities: No cyanosis/clubbing, normal peripheral pulses, LUE edema/erythema   Skin: Dry, normal temperature    Neuro: A&O x 3, normal speech       MD Assessments & Plans:   Per Pulmonary:   O2, bipap if needed.  Typically not on o2 at home.    Increase steroids   Rocephin/doxy   Anticoagulation   Cardiac meds incl milrinone drip   Volume management   ICS/LABA   Nebs   f/u factor v/prothrombin labs      Per Cardiology   Decrease Milrinone 0.25mcg/kg/min due to worsening tachycardia    Hold Bumex for increased creatinine, does not appear significantly volume overloaded    Goal weight 162-167lb based on previous RHC   No BBrx during acute decompensation    No ACE/ARB/ARNI during acute decompensation     Cont digoxin 0.0625mcg every other day, goal 0.7-1.2. Check level    Continue spironolactone, 12.5mg   Cont Corlanor 7.5mg BID   Cont IV solumedrol per pulmonary    Cont antibiotics per primary team   Neg UA, PCT and ESR   Does not have AICD or Lifevest    Repeat TTE ordered       Medications:   IV Bumex 1mg 2x daily   IV rocephin 1g q24h    IN fluticasone-umeclidin-vilanter inhaler 1 Puff daily   IV Venofer 200mg daily   IV solumedrol 40mg q6h increased to IV 60mg q6h   Iv Primacor 0.375mcg/kg/min changed to 0.25mcg/kg/min 6ml/hr      Orders:   labs, OT PT consult   xr foot RT      PT Note:   Patient admitted with SOB, LUE DVT, hypoxia.  He has history of CHF and previously required lifvest and pressors, but has been able to wean from those prior to admission.  At this time, he is reporting severe LUE pain that has been acutely changing since he sat up. Cory Lara reports increased edema and erythema.  Strong radial pulse noted and no tingling or numbness reported.  Elevated RUE and contacted RN and MD. Daisy Graham most recent aPTT was 130.  We will follow up with PT assessment when he is more medically appropriate to participate. OT Note:   Patient reporting severe LUE pain and edema that has increased within the hour. Patient also reporting difficulty with breathing and SOB. L UE re-positioned to support hand (still maintaining neutral position/not elevated). Patient demonstrating LB functional ROM; stating once pain decreases will complete ADLs and functional mobility OOB with nursing.  Will follow up once medically stable and appropriate to participate      CM note:   Transition of Care Plan:       Home with life partner and medical follow up    CM met with patient in his room to introduce self and explain role.  Patient was alert and oriented-- Confirmed demographics, PCP and insurance-- [employer].  Secures medications at [facility]

## 2022-06-25 DIAGNOSIS — I47.20 VENTRICULAR TACHYCARDIA: ICD-10-CM

## 2022-06-27 RX ORDER — IVABRADINE 7.5 MG/1
TABLET, FILM COATED ORAL
Qty: 60 TABLET | Refills: 2 | Status: SHIPPED | OUTPATIENT
Start: 2022-06-27

## 2022-06-27 RX ORDER — GUAIFENESIN 100 MG/5ML
LIQUID (ML) ORAL
Qty: 30 TABLET | Refills: 2 | Status: SHIPPED | OUTPATIENT
Start: 2022-06-27

## 2022-06-27 RX ORDER — MEXILETINE HYDROCHLORIDE 150 MG/1
CAPSULE ORAL
Qty: 90 CAPSULE | Refills: 1 | Status: SHIPPED | OUTPATIENT
Start: 2022-06-27

## 2022-06-28 ENCOUNTER — TELEPHONE (OUTPATIENT)
Dept: CARDIOLOGY CLINIC | Age: 62
End: 2022-06-28

## 2022-06-28 NOTE — TELEPHONE ENCOUNTER
Telephone Call RE:  Appointment reminder     Outcome:     [x] Patient confirmed appointment   [] Patient rescheduled appointment for    [] Unable to reach  [] Left message             [] Other:     ---------------------             [] Screened for [facility]
normal...

## 2022-07-01 NOTE — ADT AUTH CERT NOTES
6/10/2020 Last office visit, refill requested:  gabapentin (NEURONTIN) 400 MG capsule 90 capsule 0 9/22/2020        Wt Readings from Last 1 Encounters:   09/23/20 99.3 kg        BP Readings from Last 2 Encounters:   09/23/20 130/80   08/05/20 (!) 138/94   ]    Lab Results   Component Value Date    SODIUM 130 (L) 05/13/2020    POTASSIUM 3.8 05/13/2020    CHLORIDE 97 (L) 05/13/2020    CO2 28 05/13/2020    BUN 10 05/13/2020    CREATININE 0.79 05/13/2020    GLUCOSE 105 (H) 05/13/2020     No results found for: HGBA1C  TSH (mcUnits/mL)   Date Value   07/07/2017 2.100     Lab Results   Component Value Date    CHOLESTEROL 165 06/10/2020    HDL 59 06/10/2020    CALCLDL 82 06/10/2020    TRIGLYCERIDE 118 06/10/2020     Lab Results   Component Value Date    AST 21 05/11/2020    GPT 17 05/11/2020    ALKPT 108 05/11/2020    BILIRUBIN 1.8 (H) 05/11/2020        Utilization Reviews         Pulmonary Embolism - Care Day 7 (6/21/2022) by Abdon Moore       Review Status Review Entered   Completed 6/22/2022 15:09      Criteria Review      Care Day: 7 Care Date: 6/21/2022 Level of Care: Intermediate Care    Guideline Day 2    Level Of Care    ( ) ICU or floor    6/22/2022 15:09:32 EDT by Abdon Moore      6/21 imcu    Clinical Status    ( ) * Hemodynamic stability    6/22/2022 15:09:32 EDT by Abdon Moore        BP 99/85    (X) * Oxygenation stable or improved    6/22/2022 15:09:32 EDT by Abdon Moore      100% RA    (X) * Dyspnea absent or improved    (X) * Tachypnea absent or improved    6/22/2022 15:09:32 EDT by Abdon Moore      RR 18    ( ) * PTT in therapeutic range or not indicated    6/22/2022 15:09:32 EDT by Abdon Moore      not indicated    Activity    ( ) * Limited ambulation    6/22/2022 15:09:32 EDT by Abdon Moore      up with assist    Routes    (X) Oral hydration    (X) Oral medications    6/22/2022 15:09:32 EDT by Abdon Moore      PO eliquis 10mg 2xdaily  PO corlanor 7.5mg 2xdaily  PO mag-ox 400mg 2xdaily  PO melatonin 3mg bedtime  PO mexitil 150mg q8h  PO klor-con 40 meq given x1    (X) Usual diet    6/22/2022 15:09:32 EDT by Abdon Moore      reg diet    Interventions    (X) Platelet count    9/32/7707 15:09:32 EDT by Abdon Moore      256    * Milestone   Additional Notes   6/21 imcu      Pertinent Updates: On RA, feeling better. UOP > 6 L , bicarb up, K low.        Vitals:   97.5 °F (36.4 °C) 108 114/91 17 100 % RA    103 108   /96 103/75 117/91      Abnl/Pertinent Labs:   WBC: 15.1 (H)   NRBC: 0.2 (H)   Sodium: 127 (L)   Potassium: 3.0 (L)   Chloride: 79 (L)   CO2: 42 (HH)   Glucose: 123 (H)   BUN: 69 (H)   Creatinine: 2.36 (H)   BUN/Creatinine ratio: 29 (H)   GFR est non-AA: 28 (L)   GFR est AA: 34 (L)   Albumin: 3.3 (L)   NT pro-BNP: 12,525 (H)   Digoxin level: 0.8 (L) Glucose: 123 (H)   pH: 7.55 (H)   PCO2: 53 (H)   PO2: 73 (L)   BICARBONATE: 45 (H)      Physical Exam:   General: Awake, alert, no acute distress    HEENT: NC/AT, EOMI, moist mucous membranes    Neck: Supple, no meningismus, no masses or swelling    HEART: irregularly irregular, no murmurs/rubs/gallops    Lungs: No deformities, normal chest rise and fall, no increased work of breathing    Lung auscultation: Decreased air movement bilaterally, relatively clear to auscultation    Abdomen: Soft/NT, non rigid mildly distended    Extremities: No cyanosis/clubbing, normal peripheral pulses, LUE edema/erythema   Skin: Dry, normal temperature    Neuro: A&O x 3, normal speech    Psych: Normal affect, normal mood       Cardio:   Goal weight 162-167lb based on previous RHC   Cont Corlanor 7.5mg BID   Continue Eliquis    S/p Venofer 200mg x 2    Cont Vit D daily supplement   Continue mexilitine per Dr. Salamanca Comes IV solumedrol per pulmonary, transition to PO tomorrow    Continue duonebs per pulmonary    Does not have AICD or Lifevest     Consider RHC as OP   Palliative Care consult to discuss goals/limits of care   Recommend 6MWT to eval home O2 need but will defer to Pulmonary        Pulmo:   -- O2 with titration above 90%. Currently on room air   --It sounds like he had an allergic reaction to one of the nebs.      --reduce steroids to PO    -- Rocephin/doxy; completed    -- Anticoagulation   -- Volume management   -- Nebs   -- followed by Dr Natalia Candelario; patient should only be on trelegy alone on discharge        Nephro:   Creatinine better, UOP improving   Agree with discontinuing diuretics   Replete K   Trend bicarb, should drop down after holding loop diuretics   Cont milrinone per HF service      Medications:   IN trelegy ellipta 1puff daily   IV bumex 2mg q8h dcd   IV rocephin 1g q24h   IV solu-medrol 40mg q8h dcd      Orders:   Daily labs               Pulmonary Embolism - Care Day 6 (6/20/2022) by Mady Orlando Garrett       Review Status Review Entered   Completed 6/22/2022 14:48      Criteria Review      Care Day: 6 Care Date: 6/20/2022 Level of Care: Intermediate Care    Guideline Day 2    Level Of Care    ( ) ICU or floor    6/22/2022 14:48:31 EDT by Jasmin Hampton      6/20 imcu    Clinical Status    ( ) * Hemodynamic stability    6/22/2022 14:48:31 EDT by Jasmin Hampton        /90    (X) * Oxygenation stable or improved    6/22/2022 14:48:31 EDT by Jasmin Hampton      95% ra    (X) * Dyspnea absent or improved    ( ) * Tachypnea absent or improved    6/22/2022 14:48:31 EDT by Jasmin Hampton      RR 20    (X) * PTT in therapeutic range or not indicated    6/22/2022 14:48:31 EDT by Jasmin Hampton      not indicated    Activity    ( ) * Limited ambulation    6/22/2022 14:48:31 EDT by Jasmin Hampton      aat with assist    Routes    (X) Oral hydration    (X) Oral medications    6/22/2022 14:48:31 EDT by Jasmin Hampton      PO eliquis 10mg 2xdaily  PO tums 200mg 3xdaily prn x1  PO corlanor 7.5mg 2xdaily  PO mag-ox 400mg 2xdaily  PO melatonin 3mg bedtime  PO mexitil 150mh q8h  PO diuril 500mg q12h  PO vibra-tabs 100mg 2xdaily    (X) Usual diet    6/22/2022 14:48:31 EDT by Jasmin Hampton      reg diet    Interventions    (X) Platelet count    6/80/8645 14:48:31 EDT by Jasmin Hampton      236    * Milestone   Additional Notes   6/20 imcu      Pertinent Updates:   Notes worsening breathing with neb medications.  Reports less wheeze       Vitals:   97.8 °F (36.6 °C) 106 116/94 100 %     102 105   /90 116/94 100/71      Abnl/Pertinent Labs:   WBC: 17.2 (H)   NRBC: 0.3 (H)   Sodium: 130 (L)   Chloride: 82 (L)   CO2: 42 (HH)   Glucose: 145 (H)   BUN: 59 (H)   Creatinine: 1.74 (H)   BUN/Creatinine ratio: 34 (H)   GFR est non-AA: 40 (L)   GFR est AA: 48 (L)   NT pro-BNP: 10,569 (H)   Digoxin level: 0.8 (L)      Physical Exam:   General: Awake, alert, no acute distress HEENT: NC/AT, EOMI, moist mucous membranes    Neck: Supple, no meningismus, no masses or swelling    HEART: irregularly irregular, no murmurs/rubs/gallops    Lungs: No deformities, normal chest rise and fall, no increased work of breathing    Lung auscultation: Decreased air movement bilaterally, relatively clear to auscultation    Abdomen: Soft/NT, non rigid mildly distended    Extremities: No cyanosis/clubbing, normal peripheral pulses, LUE edema/erythema   Skin: Dry, normal temperature    Neuro: A&O x 3, normal speech    Psych: Normal affect, normal mood       Nephro:   PLAN-   Creatinine better, UOP improving   Cont current diuretics and inotropes   Cont daily labs and I/Os   No need for RRT at this time      Pulmo:   -- O2 with titration above 90%. Currently on room air   --It sounds like he had an allergic reaction to one of the nebs. I have stopped them all for now, continue trelegy though    --reduce steroids   -- Rocephin/doxy   -- Anticoagulation   -- Currently on milrinone drip   -- Volume management   -- ICS/LABA   -- Nebs   -- f/u factor v/prothrombin labs, still pending   -- followed by Dr Rolando Darling        Age appropriate cancer screenings as outpatient   Will need to follow up on the pulm nodule seen on CT in 6 to 12 months      Cardio:   Decrease milrinone 0.125mcg/kg/min and wean to off tomorrow    Remains in Bumex 2mg TID- further recs per nephrology   Goal weight 162-167lb based on previous RHC   No BBrx during acute decompensation    No ACE/ARB/ARNI during acute decompensation     Hold digoxin for elevated level of 1.5, goal 0.7-1.2. trend   Hold spironolactone today for hyponatremia and hyperkalemia    Cont Corlanor 7.5mg BID   Not taking ASA?     Continue heparin gtt per primary   S/p Venofer 200mg x 2    Cont Vit D daily supplement   Continue mexilitine per Dr. Matt Ratel solumedrol per pulmonary    Continue duonebs per pulmonary    Cont antibiotics per primary team   Repeat PCT, ESR,   Check CK, Myoglobin- rhabdo? Does not have AICD or Lifevest     Consider RHC when able to lay flat       Medications:   IN trelegy ellipta 1puff daily   IV bumex 2mg q8h    IV rocephin 1g q24h   IV solu-medrol 40mg changed to q8h   primacor drip      Orders:   daily labs, daily weights      OT:   Based on the objective data described below, the patient presents with fair activity tolerance and endurance s/p admission with sepsis with ARF, CHF now with R UE DVT. Currently independent to mod I with functional mobility in room and up to setup for ADLs with HR up to 115 with activity requiring retraining for pacing, rest breaks and activity modifications, patient receptive and stood for 4 minutes without A. Recommend home with family and no OT needs.        Current Level of Function (ADLs/self-care): setup to mod I       Functional Outcome Measure:  The patient scored 65/100 on the mildly decreased from baseline outcome measure which is indicative of mild impairment.                 Pulmonary Embolism - Care Day 5 (6/19/2022) by Rod Clark       Review Status Review Entered   Completed 6/22/2022 13:17      Criteria Review      Care Day: 5 Care Date: 6/19/2022 Level of Care: Intermediate Care    Guideline Day 2    Level Of Care    ( ) ICU or floor    6/22/2022 13:17:15 EDT by Rod Clark      6/19 imcu    Clinical Status    ( ) * Hemodynamic stability    6/22/2022 13:17:15 EDT by Rod Clark        /58    (X) * Oxygenation stable or improved    6/22/2022 13:17:15 EDT by Rod Clark      94% ra    (X) * Dyspnea absent or improved    ( ) * Tachypnea absent or improved    6/22/2022 13:17:15 EDT by Rod Clark      RR 24    ( ) * PTT in therapeutic range or not indicated    6/22/2022 13:17:15 EDT by Rod Clark      aPTT: 31.2 (H)    Activity    ( ) * Limited ambulation    6/22/2022 13:17:15 EDT by Rod Clark      aat with assist    Routes    (X) Oral hydration    (X) Oral medications    6/22/2022 13:17:15 EDT by Merline Keel      PO eliquis 10mg 2xdaily  PO corlanor 7.5mg 2xdaily  PO mag-ox 400mg 2xdaily  PO melatonin 3mg bedtime  PO mexitil 150mh q8h  PO ultram 50mg q6h prn given x1  PO diuril 500mg q12h  PO vibra-tabs 100mg 2xdaily    (X) Usual diet    6/22/2022 13:17:15 EDT by Merline Keel      reg diet    Interventions    (X) Platelet count    7/43/8054 13:17:15 EDT by Merline Keel      230    (X) Possible PTT    Medications    (X) Anticoagulants    6/22/2022 13:17:16 EDT by Merline Keel      IV heparin 6420 units given x1  heparin 45129 units titrate dcd    * Milestone   Additional Notes   6/19 imcu      Pertinent Updates:   -Hypoxia significantly improved. -Creatinine elevated but hyperkalemia better, 1.9 L urine output      Vitals:   97.6 110 18 102/58 94% RA    107 109   /58 120/83 106/89      Abnl/Pertinent Labs:   NRBC: 0.3 (H)   RBC: 3.93 (L)   RDW: 14.7 (H)   Sodium: 126 (L)   Potassium: 5.8, 4.4   Chloride: 92 (L)   Glucose: 130 (H)   BUN: 65 (H)   Creatinine: 3.06 (H)   BUN/Creatinine ratio: 21 (H)   Calcium: 8.4 (L)   GFR est non-AA: 21 (L)   GFR est AA: 25 (L)   Albumin: 3.0 (L)   CK: 1,047 (H)   Myoglobin: 1,792 (H)   NT pro-BNP: 24,398 (H)      Physical Exam:   Constitutional: moderate resp distress   ENT: Oral mucosa moist, oropharynx benign. Resp: Bilateral diminished breath sounds, tachypnea    CV: Regular rhythm, normal rate, no murmurs, gallops, rubs    GI: Soft, non distended, non tender. normoactive bowel sounds, no hepatosplenomegaly     Musculoskeletal: left arm swollen. Right leg 1+ edema     Neurologic: Moves all extremities.  AAOx3, CN II-XII reviewed      MD Consults/Assessments & Plans:   Acute hypoxia respiratory failure - improving    - procalcitonin 0.2    -Heparin gtt - changed to Eliquis on 6/19    -Appreciate Pulmonary input   - c/w IV abx Ceftriaxone, Doxy   - c/w home inhalers.  Pt refusing nebs. Added atrovent inhaler    - off bipap.    - saturating on 3l/ RA ( pt using oxygen for symptom relief)        Acute on chronic CHF NYHA III   -Echo: 30 - 35%.  Moderate global hypokinesis present. modearte TR   -on Milrinone gtt - decreased the rate    -No BB/ACEi/ARB/ARNI   -continue corlanor, Mexilitine. Digoxin on hold    -Appreciate adv heart failure input   - c/w bumex 2mg tid by nephro         Acute RUE DVT/PE:    - started on Eliquis 6/19       EDGAR on CKD stage III. - cr worsening   - appreciate nephrology input   - Renal us: No hydronephrosis   - will monitor renal function       Hyponatremia - worsening   - aldactone on hold   - will monitor       Hypertension: Continue home meds   Dilated ascending aorta:  Stable. Former smoker       Poor prognosis. High risk for decompensation        Code status: FULL    Prophylaxis: Heparin gtt   Care Plan discussed with: Patient   [facility]       Cardio:   RECOMMENDATIONS:   Continue Milrinone 0.25mcg/kg/min- limited with HR at this time    Remains in Bumex 2mg TID- further recs per nephrology   Goal weight 162-167lb based on previous RHC   No BBrx during acute decompensation    No ACE/ARB/ARNI during acute decompensation     Hold digoxin for elevated level of 1.5, goal 0.7-1.2. trend   Hold spironolactone today for hyponatremia and hyperkalemia    Cont Corlanor 7.5mg BID   Not taking ASA? Continue heparin gtt per primary   S/p Venofer 200mg x 2    Cont Vit D daily supplement   Continue mexilitine per Dr. Paolo Lucas solumedrol per pulmonary    Continue duonebs per pulmonary    Cont antibiotics per primary team   Repeat PCT, ESR,   Check CK, Myoglobin- rhabdo? Does not have AICD or Lifevest     Consider RHC when able to lay flat        Nephro:   PLAN-   Creatinine slightly elevated. Hyperkalemia and acidosis better. 1.9L monitor urine output. Plan to continue on Bumex 2 mg IV 3 times daily. Check labs daily. No acute need for RRT from volume laboratory standpoint. Medications:   IN trelegy ellipta 1puff daily   TD lidocaine 4% 1patch q24h   IV bumex 2mg q8h dcd   IV rocephin 1g q24h   IV solu-medrol 40mg q6h   primacor drip      Orders:   daily labs, daily weights      PT:   Based on the objective data described below, the patient presents with independent bed mobility.  He is sitting up in the bed on arrival and prefers to sit in the bed as opposed to a chair.  He also declined a recliner. Chioma Fam getting organized and putting on a gown he is able to come to standing with supervision and has good standing balance without an assistive device.  He was able to stand EOB for 5 minutes and take some steps forward and back with unchanging vitals and no dizziness.  He has been getting up independently to use the urinal.  He was so SOB yesterday that today we elected to stay close to the bed and will begin ambulation at our next visit if he continues to progress.  He was not fatigued with this exertion today. He is to have family visitors this afternoon due to Father's Sunita Cyr wants to wait and see how he progresses before he decides on [facility].     Current Level of Function Impacting Discharge (mobility/balance):  Not yet ambulating away from the bed.     Functional Outcome Measure:  The patient scored Total: 55/100 on the Barthel Index outcome measure which is indicative of being 45 in basic self-care.     Other factors to consider for discharge: None    Patient will benefit from skilled therapy intervention to address the above noted impairments.                 Pulmonary Embolism - Care Day 3 (6/17/2022) by Francis Malin       Review Status Review Entered   Completed 6/17/2022 14:35      Criteria Review      Care Day: 3 Care Date: 6/17/2022 Level of Care: Intermediate Care    Guideline Day 2    Level Of Care    (X) ICU or floor    6/17/2022 14:35:07 EDT by Francis Malin      6/17 Intermediate Care    Clinical Status    ( ) * Hemodynamic stability    6/17/2022 14:35:07 EDT by Matt Hodges      97.5 °F (36.4 °C) 123  122/101  28 100 % 3L nc    ( ) * Oxygenation stable or improved    6/17/2022 14:35:07 EDT by Matt Hodges      weaned to 3L nc    (X) * Dyspnea absent or improved    6/17/2022 14:35:07 EDT by Matt Hodges      improved    ( ) * Tachypnea absent or improved    6/17/2022 14:35:07 EDT by Matt Hodges      RR: 24, 28    ( ) * PTT in therapeutic range or not indicated    6/17/2022 14:35:07 EDT by Matt Hodges      aPTT: 49.8 (H)    Activity    ( ) * Limited ambulation    6/17/2022 14:35:07 EDT by Matt Hodges      AAt w/ assist    Routes    (X) Oral hydration    6/17/2022 14:35:07 EDT by Matt Hodges      po hydration    (X) Oral medications    6/17/2022 14:35:07 EDT by Matt Hodges      PO Aldactone 12.5mg daily  PO Corlanor 7.5mg 2x daily  PO Mag-Ox 400mg 2x daily  PO Mexitil 150mg q8h  po digoxin 0.0625 mg q other day  PO doxycycline 100mg 2x daily  Neb albuterol 2.5mg q4h  Neb Duo Neb 3ml q6h increased to q4h while awake    (X) Usual diet    6/17/2022 14:35:07 EDT by Matt Hodges      ADULT DIET Regular    Interventions    (X) Platelet count    5/76/8989 14:35:07 EDT by Matt Hodges      PLATELET: 158    (X) Possible PTT    6/17/2022 14:35:07 EDT by Matt Hodges      aPTT: 49.8 (H)    (X) Possible cardiac monitoring    6/17/2022 14:35:07 EDT by Matt Hodges      c/r monitoring    Medications    (X) Anticoagulants    6/17/2022 14:35:07 EDT by Matt Hodges      heparin drip titrate  IV heparin 3210 units given 1x    * Milestone   Additional Notes   6/17 Intermediate Care       Pertinent Updates:   on 3L nc 32% fiO2      Abnl/Pertinent Labs   aPTT: >130.0 (HH)   WBC: 13.5 (H)   RBC: 3.92 (L)   RDW: 14.7 (H)   Sodium: 129 (L)   CO2: 20 (L)   Glucose: 152 (H)   BUN: 45 (H)   Creatinine: 2.07 (H)   BUN/Creatinine ratio: 22 (H) Calcium: 7.8 (L)   GFR est non-AA: 33 (L)   GFR est AA: 40 (L)   NT pro-BNP: 11,822 (H)   Digoxin level: 1.3      Right Lower Venous   The common femoral, great saphenous, profunda femoral, femoral, popliteal, gastrocnemius, posterior tibial, peroneal and saphenous femoral junction vein(s) were imaged in the transverse and longitudinal planes. The vessels showed normal color filling and compressibility. Doppler interrogation of the veins showed phasic and spontaneous flow. Left Lower Venous   The common femoral, great saphenous, saphenous femoral junction, profunda femoral, femoral, popliteal, gastrocnemius, posterior tibial and peroneal vein(s) were imaged in the transverse and longitudinal planes. The vessels showed normal color filling and compressibility. Doppler interrogation of the veins showed phasic and spontaneous flow. Physical Exam:   General: Awake, alert, no acute distress    HEENT: NC/AT, EOMI, moist mucous membranes    Neck: Supple, no meningismus, no masses or swelling    HEART: irregularly irregular, no murmurs/rubs/gallops    Lungs: No deformities, normal chest rise and fall, no increased work of breathing    Lung auscultation: Decreased air movement bilaterally, relatively clear to auscultation    Abdomen: Soft/NT, non rigid mildly distended    Extremities: No cyanosis/clubbing, normal peripheral pulses, LUE edema/erythema   Skin: Dry, normal temperature    Neuro: A&O x 3, normal speech       MD Assessments & Plans:   Per Pulmonary:   O2, bipap if needed.  Typically not on o2 at home.    Increase steroids   Rocephin/doxy   Anticoagulation   Cardiac meds incl milrinone drip   Volume management   ICS/LABA   Nebs   f/u factor v/prothrombin labs      Per Cardiology   Decrease Milrinone 0.25mcg/kg/min due to worsening tachycardia    Hold Bumex for increased creatinine, does not appear significantly volume overloaded    Goal weight 162-167lb based on previous RHC   No BBrx during acute decompensation    No ACE/ARB/ARNI during acute decompensation     Cont digoxin 0.0625mcg every other day, goal 0.7-1.2. Check level    Continue spironolactone, 12.5mg   Cont Corlanor 7.5mg BID   Cont IV solumedrol per pulmonary    Cont antibiotics per primary team   Neg UA, PCT and ESR   Does not have AICD or Lifevest    Repeat TTE ordered       Medications:   IV Bumex 1mg 2x daily   IV rocephin 1g q24h    IN fluticasone-umeclidin-vilanter inhaler 1 Puff daily   IV Venofer 200mg daily   IV solumedrol 40mg q6h increased to IV 60mg q6h   Iv Primacor 0.375mcg/kg/min changed to 0.25mcg/kg/min 6ml/hr      Orders:   labs, OT PT consult   xr foot RT      PT Note:   Patient admitted with SOB, LUE DVT, hypoxia.  He has history of CHF and previously required lifvest and pressors, but has been able to wean from those prior to admission.  At this time, he is reporting severe LUE pain that has been acutely changing since he sat up. Brittnee Anderson reports increased edema and erythema.  Strong radial pulse noted and no tingling or numbness reported.  Elevated RUE and contacted RN and MD. Riri Rinaldi most recent aPTT was 130.  We will follow up with PT assessment when he is more medically appropriate to participate. OT Note:   Patient reporting severe LUE pain and edema that has increased within the hour. Patient also reporting difficulty with breathing and SOB. L UE re-positioned to support hand (still maintaining neutral position/not elevated). Patient demonstrating LB functional ROM; stating once pain decreases will complete ADLs and functional mobility OOB with nursing.  Will follow up once medically stable and appropriate to participate      CM note:   Transition of Care Plan:       Home with life partner and medical follow up    CM met with patient in his room to introduce self and explain role.  Patient was alert and oriented-- Confirmed demographics, PCP and insurance-- [employer]

## 2022-08-08 ENCOUNTER — APPOINTMENT (OUTPATIENT)
Dept: CT IMAGING | Age: 62
DRG: 291 | End: 2022-08-08
Attending: EMERGENCY MEDICINE
Payer: MEDICARE

## 2022-08-08 ENCOUNTER — HOSPITAL ENCOUNTER (INPATIENT)
Age: 62
LOS: 11 days | Discharge: HOME HEALTH | DRG: 291 | End: 2022-08-19
Attending: EMERGENCY MEDICINE | Admitting: STUDENT IN AN ORGANIZED HEALTH CARE EDUCATION/TRAINING PROGRAM
Payer: MEDICARE

## 2022-08-08 ENCOUNTER — APPOINTMENT (OUTPATIENT)
Dept: GENERAL RADIOLOGY | Age: 62
DRG: 291 | End: 2022-08-08
Attending: STUDENT IN AN ORGANIZED HEALTH CARE EDUCATION/TRAINING PROGRAM
Payer: MEDICARE

## 2022-08-08 ENCOUNTER — APPOINTMENT (OUTPATIENT)
Dept: GENERAL RADIOLOGY | Age: 62
DRG: 291 | End: 2022-08-08
Attending: EMERGENCY MEDICINE
Payer: MEDICARE

## 2022-08-08 DIAGNOSIS — R06.02 SHORTNESS OF BREATH: ICD-10-CM

## 2022-08-08 DIAGNOSIS — R63.30 FEEDING DIFFICULTIES: ICD-10-CM

## 2022-08-08 DIAGNOSIS — E88.09 HYPOALBUMINEMIA: ICD-10-CM

## 2022-08-08 DIAGNOSIS — Z51.5 PALLIATIVE CARE ENCOUNTER: ICD-10-CM

## 2022-08-08 DIAGNOSIS — I46.9 CARDIAC ARREST (HCC): Primary | ICD-10-CM

## 2022-08-08 DIAGNOSIS — I50.9 CHRONIC CONGESTIVE HEART FAILURE, UNSPECIFIED HEART FAILURE TYPE (HCC): ICD-10-CM

## 2022-08-08 DIAGNOSIS — R25.1 EPISODE OF SHAKING: ICD-10-CM

## 2022-08-08 LAB
ALBUMIN SERPL-MCNC: 2.8 G/DL (ref 3.5–5)
ALBUMIN/GLOB SERPL: 0.6 {RATIO} (ref 1.1–2.2)
ALP SERPL-CCNC: 91 U/L (ref 45–117)
ALT SERPL-CCNC: 28 U/L (ref 12–78)
AMPHET UR QL SCN: NEGATIVE
ANION GAP SERPL CALC-SCNC: 10 MMOL/L (ref 5–15)
ANION GAP SERPL CALC-SCNC: 11 MMOL/L (ref 5–15)
APPEARANCE UR: ABNORMAL
ARTERIAL PATENCY WRIST A: POSITIVE
AST SERPL-CCNC: 32 U/L (ref 15–37)
ATRIAL RATE: 64 BPM
ATRIAL RATE: 82 BPM
BACTERIA URNS QL MICRO: ABNORMAL /HPF
BARBITURATES UR QL SCN: NEGATIVE
BASE DEFICIT BLD-SCNC: 1.2 MMOL/L
BASOPHILS # BLD: 0.1 K/UL (ref 0–0.1)
BASOPHILS NFR BLD: 1 % (ref 0–1)
BDY SITE: ABNORMAL
BENZODIAZ UR QL: POSITIVE
BILIRUB SERPL-MCNC: 0.4 MG/DL (ref 0.2–1)
BILIRUB UR QL: NEGATIVE
BNP SERPL-MCNC: 8490 PG/ML
BUN SERPL-MCNC: 27 MG/DL (ref 6–20)
BUN SERPL-MCNC: 32 MG/DL (ref 6–20)
BUN/CREAT SERPL: 17 (ref 12–20)
BUN/CREAT SERPL: 18 (ref 12–20)
CA-I BLD-SCNC: 1.15 MMOL/L (ref 1.12–1.32)
CALCIUM SERPL-MCNC: 9.1 MG/DL (ref 8.5–10.1)
CALCIUM SERPL-MCNC: 9.2 MG/DL (ref 8.5–10.1)
CALCULATED P AXIS, ECG09: 39 DEGREES
CALCULATED P AXIS, ECG09: 53 DEGREES
CALCULATED R AXIS, ECG10: -76 DEGREES
CALCULATED R AXIS, ECG10: -86 DEGREES
CALCULATED T AXIS, ECG11: 81 DEGREES
CALCULATED T AXIS, ECG11: 93 DEGREES
CANNABINOIDS UR QL SCN: NEGATIVE
CHLORIDE SERPL-SCNC: 101 MMOL/L (ref 97–108)
CHLORIDE SERPL-SCNC: 103 MMOL/L (ref 97–108)
CO2 SERPL-SCNC: 24 MMOL/L (ref 21–32)
CO2 SERPL-SCNC: 25 MMOL/L (ref 21–32)
COCAINE UR QL SCN: NEGATIVE
COLOR UR: ABNORMAL
COMMENT, HOLDF: NORMAL
CREAT SERPL-MCNC: 1.52 MG/DL (ref 0.7–1.3)
CREAT SERPL-MCNC: 1.86 MG/DL (ref 0.7–1.3)
DIAGNOSIS, 93000: NORMAL
DIAGNOSIS, 93000: NORMAL
DIFFERENTIAL METHOD BLD: ABNORMAL
DIGOXIN SERPL-MCNC: <0.2 NG/ML (ref 0.9–2)
DRUG SCRN COMMENT,DRGCM: ABNORMAL
EOSINOPHIL # BLD: 0.4 K/UL (ref 0–0.4)
EOSINOPHIL NFR BLD: 3 % (ref 0–7)
EPITH CASTS URNS QL MICRO: ABNORMAL /LPF
ERYTHROCYTE [DISTWIDTH] IN BLOOD BY AUTOMATED COUNT: 17.1 % (ref 11.5–14.5)
ERYTHROCYTE [DISTWIDTH] IN BLOOD BY AUTOMATED COUNT: 17.2 % (ref 11.5–14.5)
GAS FLOW.O2 O2 DELIVERY SYS: ABNORMAL L/MIN
GAS FLOW.O2 SETTING OXYMISER: 14 BPM
GLOBULIN SER CALC-MCNC: 4.6 G/DL (ref 2–4)
GLUCOSE SERPL-MCNC: 129 MG/DL (ref 65–100)
GLUCOSE SERPL-MCNC: 216 MG/DL (ref 65–100)
GLUCOSE UR STRIP.AUTO-MCNC: NEGATIVE MG/DL
HCO3 BLD-SCNC: 25.5 MMOL/L (ref 22–26)
HCT VFR BLD AUTO: 35.1 % (ref 36.6–50.3)
HCT VFR BLD AUTO: 36.1 % (ref 36.6–50.3)
HGB BLD-MCNC: 11.4 G/DL (ref 12.1–17)
HGB BLD-MCNC: 11.4 G/DL (ref 12.1–17)
HGB UR QL STRIP: ABNORMAL
HYALINE CASTS URNS QL MICRO: ABNORMAL /LPF (ref 0–5)
IMM GRANULOCYTES # BLD AUTO: 0 K/UL
IMM GRANULOCYTES NFR BLD AUTO: 0 %
KETONES UR QL STRIP.AUTO: NEGATIVE MG/DL
LACTATE SERPL-SCNC: 1.6 MMOL/L (ref 0.4–2)
LACTATE SERPL-SCNC: 4.7 MMOL/L (ref 0.4–2)
LEUKOCYTE ESTERASE UR QL STRIP.AUTO: NEGATIVE
LYMPHOCYTES # BLD: 2.3 K/UL (ref 0.8–3.5)
LYMPHOCYTES NFR BLD: 16 % (ref 12–49)
MAGNESIUM SERPL-MCNC: 3.8 MG/DL (ref 1.6–2.4)
MCH RBC QN AUTO: 30 PG (ref 26–34)
MCH RBC QN AUTO: 30.2 PG (ref 26–34)
MCHC RBC AUTO-ENTMCNC: 31.6 G/DL (ref 30–36.5)
MCHC RBC AUTO-ENTMCNC: 32.5 G/DL (ref 30–36.5)
MCV RBC AUTO: 92.9 FL (ref 80–99)
MCV RBC AUTO: 95 FL (ref 80–99)
METAMYELOCYTES NFR BLD MANUAL: 2 %
METHADONE UR QL: NEGATIVE
MONOCYTES # BLD: 1.6 K/UL (ref 0–1)
MONOCYTES NFR BLD: 11 % (ref 5–13)
NEUTS BAND NFR BLD MANUAL: 1 % (ref 0–6)
NEUTS SEG # BLD: 9.5 K/UL (ref 1.8–8)
NEUTS SEG NFR BLD: 66 % (ref 32–75)
NITRITE UR QL STRIP.AUTO: NEGATIVE
NRBC # BLD: 0 K/UL (ref 0–0.01)
NRBC # BLD: 0 K/UL (ref 0–0.01)
NRBC BLD-RTO: 0 PER 100 WBC
NRBC BLD-RTO: 0 PER 100 WBC
O2/TOTAL GAS SETTING VFR VENT: 100 %
OPIATES UR QL: NEGATIVE
P-R INTERVAL, ECG05: 202 MS
P-R INTERVAL, ECG05: 228 MS
PCO2 BLD: 50 MMHG (ref 35–45)
PCP UR QL: NEGATIVE
PEEP RESPIRATORY: 5 CMH2O
PH BLD: 7.32 [PH] (ref 7.35–7.45)
PH UR STRIP: 6 [PH] (ref 5–8)
PHOSPHATE SERPL-MCNC: 7.3 MG/DL (ref 2.6–4.7)
PLATELET # BLD AUTO: 262 K/UL (ref 150–400)
PLATELET # BLD AUTO: 291 K/UL (ref 150–400)
PMV BLD AUTO: 11 FL (ref 8.9–12.9)
PMV BLD AUTO: 11.3 FL (ref 8.9–12.9)
PO2 BLD: 472 MMHG (ref 80–100)
POTASSIUM SERPL-SCNC: 3.8 MMOL/L (ref 3.5–5.1)
POTASSIUM SERPL-SCNC: 4 MMOL/L (ref 3.5–5.1)
PRESSURE SUPPORT SETTING VENT: 8 CMH2O
PROT SERPL-MCNC: 7.4 G/DL (ref 6.4–8.2)
PROT UR STRIP-MCNC: 300 MG/DL
Q-T INTERVAL, ECG07: 478 MS
Q-T INTERVAL, ECG07: 544 MS
QRS DURATION, ECG06: 150 MS
QRS DURATION, ECG06: 162 MS
QTC CALCULATION (BEZET), ECG08: 558 MS
QTC CALCULATION (BEZET), ECG08: 561 MS
RBC # BLD AUTO: 3.78 M/UL (ref 4.1–5.7)
RBC # BLD AUTO: 3.8 M/UL (ref 4.1–5.7)
RBC #/AREA URNS HPF: ABNORMAL /HPF (ref 0–5)
RBC MORPH BLD: ABNORMAL
RBC MORPH BLD: ABNORMAL
SAMPLES BEING HELD,HOLD: NORMAL
SAO2 % BLD: 100 % (ref 92–97)
SODIUM SERPL-SCNC: 136 MMOL/L (ref 136–145)
SODIUM SERPL-SCNC: 138 MMOL/L (ref 136–145)
SP GR UR REFRACTOMETRY: 1.02 (ref 1–1.03)
SPECIMEN TYPE: ABNORMAL
TROPONIN-HIGH SENSITIVITY: 72 NG/L (ref 0–76)
TROPONIN-HIGH SENSITIVITY: 802 NG/L (ref 0–76)
TROPONIN-HIGH SENSITIVITY: 938 NG/L (ref 0–76)
UR CULT HOLD, URHOLD: NORMAL
UROBILINOGEN UR QL STRIP.AUTO: 0.2 EU/DL (ref 0.2–1)
VENTILATION MODE VENT: ABNORMAL
VENTRICULAR RATE, ECG03: 64 BPM
VENTRICULAR RATE, ECG03: 82 BPM
VT SETTING VENT: 500 ML
WBC # BLD AUTO: 14.2 K/UL (ref 4.1–11.1)
WBC # BLD AUTO: 18 K/UL (ref 4.1–11.1)
WBC URNS QL MICRO: ABNORMAL /HPF (ref 0–4)

## 2022-08-08 PROCEDURE — 84100 ASSAY OF PHOSPHORUS: CPT

## 2022-08-08 PROCEDURE — 96374 THER/PROPH/DIAG INJ IV PUSH: CPT

## 2022-08-08 PROCEDURE — 71045 X-RAY EXAM CHEST 1 VIEW: CPT

## 2022-08-08 PROCEDURE — 80162 ASSAY OF DIGOXIN TOTAL: CPT

## 2022-08-08 PROCEDURE — 77030005513 HC CATH URETH FOL11 MDII -B

## 2022-08-08 PROCEDURE — 06HY33Z INSERTION OF INFUSION DEVICE INTO LOWER VEIN, PERCUTANEOUS APPROACH: ICD-10-PCS | Performed by: STUDENT IN AN ORGANIZED HEALTH CARE EDUCATION/TRAINING PROGRAM

## 2022-08-08 PROCEDURE — 99223 1ST HOSP IP/OBS HIGH 75: CPT | Performed by: PSYCHIATRY & NEUROLOGY

## 2022-08-08 PROCEDURE — 70450 CT HEAD/BRAIN W/O DYE: CPT

## 2022-08-08 PROCEDURE — 84484 ASSAY OF TROPONIN QUANT: CPT

## 2022-08-08 PROCEDURE — 85027 COMPLETE CBC AUTOMATED: CPT

## 2022-08-08 PROCEDURE — 82803 BLOOD GASES ANY COMBINATION: CPT

## 2022-08-08 PROCEDURE — 83735 ASSAY OF MAGNESIUM: CPT

## 2022-08-08 PROCEDURE — 65620000000 HC RM CCU GENERAL

## 2022-08-08 PROCEDURE — 74011000250 HC RX REV CODE- 250: Performed by: STUDENT IN AN ORGANIZED HEALTH CARE EDUCATION/TRAINING PROGRAM

## 2022-08-08 PROCEDURE — 93005 ELECTROCARDIOGRAM TRACING: CPT

## 2022-08-08 PROCEDURE — 99285 EMERGENCY DEPT VISIT HI MDM: CPT

## 2022-08-08 PROCEDURE — 85025 COMPLETE CBC W/AUTO DIFF WBC: CPT

## 2022-08-08 PROCEDURE — 80307 DRUG TEST PRSMV CHEM ANLYZR: CPT

## 2022-08-08 PROCEDURE — 83880 ASSAY OF NATRIURETIC PEPTIDE: CPT

## 2022-08-08 PROCEDURE — 95816 EEG AWAKE AND DROWSY: CPT | Performed by: PSYCHIATRY & NEUROLOGY

## 2022-08-08 PROCEDURE — 81001 URINALYSIS AUTO W/SCOPE: CPT

## 2022-08-08 PROCEDURE — 77030013797 HC KT TRNSDUC PRSSR EDWD -A

## 2022-08-08 PROCEDURE — 74011250636 HC RX REV CODE- 250/636: Performed by: STUDENT IN AN ORGANIZED HEALTH CARE EDUCATION/TRAINING PROGRAM

## 2022-08-08 PROCEDURE — 74011000258 HC RX REV CODE- 258: Performed by: STUDENT IN AN ORGANIZED HEALTH CARE EDUCATION/TRAINING PROGRAM

## 2022-08-08 PROCEDURE — 83605 ASSAY OF LACTIC ACID: CPT

## 2022-08-08 PROCEDURE — 74011000258 HC RX REV CODE- 258: Performed by: EMERGENCY MEDICINE

## 2022-08-08 PROCEDURE — 36415 COLL VENOUS BLD VENIPUNCTURE: CPT

## 2022-08-08 PROCEDURE — 74011250636 HC RX REV CODE- 250/636: Performed by: EMERGENCY MEDICINE

## 2022-08-08 PROCEDURE — 94002 VENT MGMT INPAT INIT DAY: CPT

## 2022-08-08 PROCEDURE — 5A1935Z RESPIRATORY VENTILATION, LESS THAN 24 CONSECUTIVE HOURS: ICD-10-PCS | Performed by: STUDENT IN AN ORGANIZED HEALTH CARE EDUCATION/TRAINING PROGRAM

## 2022-08-08 PROCEDURE — 3E033XZ INTRODUCTION OF VASOPRESSOR INTO PERIPHERAL VEIN, PERCUTANEOUS APPROACH: ICD-10-PCS | Performed by: EMERGENCY MEDICINE

## 2022-08-08 PROCEDURE — 95706 EEG WO VID 2-12HR INTMT MNTR: CPT | Performed by: STUDENT IN AN ORGANIZED HEALTH CARE EDUCATION/TRAINING PROGRAM

## 2022-08-08 PROCEDURE — 80053 COMPREHEN METABOLIC PANEL: CPT

## 2022-08-08 PROCEDURE — 36600 WITHDRAWAL OF ARTERIAL BLOOD: CPT

## 2022-08-08 RX ORDER — FENTANYL CITRATE 50 UG/ML
100 INJECTION, SOLUTION INTRAMUSCULAR; INTRAVENOUS ONCE
Status: DISCONTINUED | OUTPATIENT
Start: 2022-08-08 | End: 2022-08-08

## 2022-08-08 RX ORDER — ONDANSETRON 4 MG/1
4 TABLET, ORALLY DISINTEGRATING ORAL
Status: DISCONTINUED | OUTPATIENT
Start: 2022-08-08 | End: 2022-08-20 | Stop reason: HOSPADM

## 2022-08-08 RX ORDER — ONDANSETRON 2 MG/ML
4 INJECTION INTRAMUSCULAR; INTRAVENOUS
Status: DISCONTINUED | OUTPATIENT
Start: 2022-08-08 | End: 2022-08-20 | Stop reason: HOSPADM

## 2022-08-08 RX ORDER — ACETAMINOPHEN 650 MG/1
650 SUPPOSITORY RECTAL
Status: DISCONTINUED | OUTPATIENT
Start: 2022-08-08 | End: 2022-08-20 | Stop reason: HOSPADM

## 2022-08-08 RX ORDER — CHLORHEXIDINE GLUCONATE 1.2 MG/ML
15 RINSE ORAL EVERY 12 HOURS
Status: DISCONTINUED | OUTPATIENT
Start: 2022-08-08 | End: 2022-08-20 | Stop reason: HOSPADM

## 2022-08-08 RX ORDER — SODIUM CHLORIDE 0.9 % (FLUSH) 0.9 %
5-40 SYRINGE (ML) INJECTION EVERY 8 HOURS
Status: DISCONTINUED | OUTPATIENT
Start: 2022-08-08 | End: 2022-08-20 | Stop reason: HOSPADM

## 2022-08-08 RX ORDER — MILRINONE LACTATE 0.2 MG/ML
0.25 INJECTION, SOLUTION INTRAVENOUS CONTINUOUS
Status: DISCONTINUED | OUTPATIENT
Start: 2022-08-08 | End: 2022-08-20 | Stop reason: HOSPADM

## 2022-08-08 RX ORDER — MAGNESIUM SULFATE HEPTAHYDRATE 40 MG/ML
2 INJECTION, SOLUTION INTRAVENOUS
Status: COMPLETED | OUTPATIENT
Start: 2022-08-08 | End: 2022-08-09

## 2022-08-08 RX ORDER — DEXMEDETOMIDINE HYDROCHLORIDE 4 UG/ML
.1-1.5 INJECTION, SOLUTION INTRAVENOUS
Status: DISCONTINUED | OUTPATIENT
Start: 2022-08-08 | End: 2022-08-16

## 2022-08-08 RX ORDER — POLYETHYLENE GLYCOL 3350 17 G/17G
17 POWDER, FOR SOLUTION ORAL DAILY PRN
Status: DISCONTINUED | OUTPATIENT
Start: 2022-08-08 | End: 2022-08-20 | Stop reason: HOSPADM

## 2022-08-08 RX ORDER — FENTANYL CITRATE 50 UG/ML
100 INJECTION, SOLUTION INTRAMUSCULAR; INTRAVENOUS ONCE
Status: COMPLETED | OUTPATIENT
Start: 2022-08-08 | End: 2022-08-08

## 2022-08-08 RX ORDER — SODIUM CHLORIDE 0.9 % (FLUSH) 0.9 %
5-40 SYRINGE (ML) INJECTION AS NEEDED
Status: DISCONTINUED | OUTPATIENT
Start: 2022-08-08 | End: 2022-08-20 | Stop reason: HOSPADM

## 2022-08-08 RX ORDER — FENTANYL CITRATE 50 UG/ML
INJECTION, SOLUTION INTRAMUSCULAR; INTRAVENOUS
Status: DISCONTINUED
Start: 2022-08-08 | End: 2022-08-08

## 2022-08-08 RX ORDER — SODIUM CHLORIDE 0.9 % (FLUSH) 0.9 %
5-40 SYRINGE (ML) INJECTION EVERY 8 HOURS
Status: DISCONTINUED | OUTPATIENT
Start: 2022-08-08 | End: 2022-08-16

## 2022-08-08 RX ORDER — ACETAMINOPHEN 325 MG/1
650 TABLET ORAL
Status: DISCONTINUED | OUTPATIENT
Start: 2022-08-08 | End: 2022-08-20 | Stop reason: HOSPADM

## 2022-08-08 RX ORDER — NOREPINEPHRINE BITARTRATE/D5W 8 MG/250ML
.5-2 PLASTIC BAG, INJECTION (ML) INTRAVENOUS
Status: DISPENSED | OUTPATIENT
Start: 2022-08-08 | End: 2022-08-09

## 2022-08-08 RX ORDER — CHLORHEXIDINE GLUCONATE 1.2 MG/ML
15 RINSE ORAL EVERY 12 HOURS
Status: DISCONTINUED | OUTPATIENT
Start: 2022-08-08 | End: 2022-08-08 | Stop reason: SDUPTHER

## 2022-08-08 RX ORDER — ENOXAPARIN SODIUM 100 MG/ML
40 INJECTION SUBCUTANEOUS DAILY
Status: DISCONTINUED | OUTPATIENT
Start: 2022-08-09 | End: 2022-08-13

## 2022-08-08 RX ORDER — FENTANYL CITRATE 50 UG/ML
50 INJECTION, SOLUTION INTRAMUSCULAR; INTRAVENOUS
Status: ACTIVE | OUTPATIENT
Start: 2022-08-08 | End: 2022-08-08

## 2022-08-08 RX ORDER — MIDAZOLAM HYDROCHLORIDE 1 MG/ML
4 INJECTION, SOLUTION INTRAMUSCULAR; INTRAVENOUS ONCE
Status: COMPLETED | OUTPATIENT
Start: 2022-08-08 | End: 2022-08-08

## 2022-08-08 RX ORDER — IPRATROPIUM BROMIDE AND ALBUTEROL SULFATE 2.5; .5 MG/3ML; MG/3ML
3 SOLUTION RESPIRATORY (INHALATION)
Status: DISCONTINUED | OUTPATIENT
Start: 2022-08-08 | End: 2022-08-20 | Stop reason: HOSPADM

## 2022-08-08 RX ADMIN — FENTANYL CITRATE 100 MCG: 50 INJECTION, SOLUTION INTRAMUSCULAR; INTRAVENOUS at 12:37

## 2022-08-08 RX ADMIN — SODIUM CHLORIDE, PRESERVATIVE FREE 10 ML: 5 INJECTION INTRAVENOUS at 15:29

## 2022-08-08 RX ADMIN — SODIUM CHLORIDE, PRESERVATIVE FREE 10 ML: 5 INJECTION INTRAVENOUS at 21:36

## 2022-08-08 RX ADMIN — NOREPINEPHRINE BITARTRATE 8 MCG/MIN: 1 INJECTION, SOLUTION, CONCENTRATE INTRAVENOUS at 12:48

## 2022-08-08 RX ADMIN — DEXTROSE MONOHYDRATE 150 MG: 50 INJECTION, SOLUTION INTRAVENOUS at 13:10

## 2022-08-08 RX ADMIN — MAGNESIUM SULFATE IN WATER 2 G: 40 INJECTION, SOLUTION INTRAVENOUS at 16:28

## 2022-08-08 RX ADMIN — MILRINONE LACTATE 0.38 MCG/KG/MIN: 0.2 INJECTION, SOLUTION INTRAVENOUS at 23:50

## 2022-08-08 RX ADMIN — MILRINONE LACTATE 0.38 MCG/KG/MIN: 0.2 INJECTION, SOLUTION INTRAVENOUS at 13:58

## 2022-08-08 RX ADMIN — AMIODARONE HYDROCHLORIDE 1 MG/MIN: 50 INJECTION, SOLUTION INTRAVENOUS at 13:18

## 2022-08-08 RX ADMIN — VASOPRESSIN 0.04 UNITS/MIN: 20 INJECTION PARENTERAL at 17:54

## 2022-08-08 RX ADMIN — DEXMEDETOMIDINE HYDROCHLORIDE 1.5 MCG/KG/HR: 4 INJECTION, SOLUTION INTRAVENOUS at 21:54

## 2022-08-08 RX ADMIN — MAGNESIUM SULFATE IN WATER 2 G: 40 INJECTION, SOLUTION INTRAVENOUS at 15:41

## 2022-08-08 RX ADMIN — AMIODARONE HYDROCHLORIDE 1 MG/MIN: 50 INJECTION, SOLUTION INTRAVENOUS at 18:36

## 2022-08-08 RX ADMIN — SODIUM CHLORIDE, PRESERVATIVE FREE 20 MG: 5 INJECTION INTRAVENOUS at 21:35

## 2022-08-08 RX ADMIN — DEXMEDETOMIDINE HYDROCHLORIDE 1.5 MCG/KG/HR: 4 INJECTION, SOLUTION INTRAVENOUS at 18:35

## 2022-08-08 RX ADMIN — DEXMEDETOMIDINE HYDROCHLORIDE 0.4 MCG/KG/HR: 4 INJECTION, SOLUTION INTRAVENOUS at 15:18

## 2022-08-08 RX ADMIN — MIDAZOLAM 4 MG: 1 INJECTION, SOLUTION INTRAMUSCULAR; INTRAVENOUS at 15:56

## 2022-08-08 RX ADMIN — CHLORHEXIDINE GLUCONATE 15 ML: 1.2 RINSE ORAL at 21:35

## 2022-08-08 NOTE — ED TRIAGE NOTES
Pt arrives with EMS from home intubated. Witnessed arrest after \"seizure like activity. CPR done by EMS and shocked x 2 in field before ROSC return    Pt had recent defibrillator put in. Per family, pt was defib yesterday     5mg versed given by EMS post intubation.      700 cc NS given by EMS

## 2022-08-08 NOTE — ED NOTES
TRANSFER - OUT REPORT:    Verbal report given to GUICHO Walton(name) on Genet Sanchez  being transferred to [facility](unit) for routine progression of care       Report consisted of patients Situation, Background, Assessment and   Recommendations(SBAR). Information from the following report(s) SBAR, Kardex, ED Summary, Procedure Summary, MAR, Recent Results, and Cardiac Rhythm NSR  was reviewed with the receiving nurse. Lines:   Peripheral IV 08/08/22 Left Antecubital (Active)   Site Assessment Clean, dry, & intact 08/08/22 1235   Phlebitis Assessment 0 08/08/22 1235   Infiltration Assessment 0 08/08/22 1235       Peripheral IV 08/08/22 Right Forearm (Active)       Intraosseous Line 08/08/22 Left (Active)   Site Assessment Clean, dry, & intact 08/08/22 1245   Dressing Status Clean, dry, & intact 08/08/22 1245        Opportunity for questions and clarification was provided.       Patient transported with:   Monitor  O2 @ VENT liters  Registered Nurse

## 2022-08-08 NOTE — CONSULTS
Consult notes, case reviewed with intensivist.Agree with code ICE. Please interrogate AICD if not done already. Continue milrinone support and add vasopressin for BP support. Full consult in the morning with Dr. Chase Freitas.
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
## 2022-08-08 NOTE — CONSULTS
Neuro consult completed. Dictated note to follow. EEG reviewed at bedside, very low voltage, no seizure activity seen, full note to follow.
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
## 2022-08-08 NOTE — PROCEDURES
Procedure Note - Central Venous Access:   Performed by Kacie Rojas MD  Diagnosis: Vfib arrest  Insertion Date: 08/08/22  Time:3:47 PM  Obtained Consent? no; emergent   Procedure Location:  ICU. Immediately prior to the procedure, the patient was reevaluated and found suitable for the planned procedure and any planned medications. Immediately prior to the procedure a time out was called to verify the correct patient, procedure, equipment, staff, and marking as appropriate. Central line Bundle:  Full sterile barrier precautions used. 7-Step Sterility Protocol followed. (cap, mask sterile gown, sterile gloves, large sterile sheet, hand hygiene, 2% chlorhexidine for cutaneous antisepsis)  5 mL 1% Lidocaine placed at insertion site. Patient positioned in Trendelenburg? N/A   The site was prepped with ChloraPrep, sterile drape. Catheter inserted into a new site. Using Seldinger technique a zoll quatro cooling catheter was placed in the Right femoral vein via direct cannulation with 1 number of attempts for TTM. Ultrasound Guidance was utilized. There was good dark, non-pulsatile blood return in all ports. Femoral Site? yes. If Yes, reason femoral site was chosen: Quatro catheter  Catheter secured. Biopatch/CHG bio-occlusive dressing in place? yes. The following complications were encountered: None. A follow-up chest x-ray was ordered post procedure. The procedure was tolerated well.         Kacie Rojas MD  Critical Care Medicine  [facility]

## 2022-08-08 NOTE — PROGRESS NOTES
Critical Care consulted for admission for Vfib arrest, admit orders placed. Will evaluate patient shortly, full note to follow.

## 2022-08-08 NOTE — PROGRESS NOTES
1400 TRANSFER - IN REPORT:    Verbal report received from [facility]) on Mally Butler  being received from ED(unit) for urgent transfer      Report consisted of patients Situation, Background, Assessment and   Recommendations(SBAR). Information from the following report(s) SBAR was reviewed with the receiving nurse. Opportunity for questions and clarification was provided. Assessment completed upon patients arrival to unit and care assumed. Primary Nurse Jet Milner RN and Jania Darnell RN performed a dual skin assessment on this patient Impairment noted- left head abrasion. SHAYAN InToflaquita    Steven score is 10    1500 Neela Angela MD at bedside. Quatro catheter placed in L femoral. Ordered target temperature for 36 degrees celsius for 24 hours. 1510 Pt attatched to [employer]. 1515 Posturing noted. Percedex ordered by Neela Angela MD and started. Neurology paged. EEG ordered by Kalee Saucedo MD.     1556 Pt continues to posture. Nystagmus noted. Neela Angela MD at bedside. 4 mg IV versed ordered. 1606 Goal temp of 36.9 achieved. 1630 Shivering noted. Bairhugger placed on pt.    1700 EEG tech at bedside    [address] NP updated on patient condition. Vasopressin ordered by Neela Angela MD.    Sd Harrell MP at bedside. No orders received. 1930 Bedside and Verbal shift change report given to [address] [place] [address] (oncoming nurse) by Cat RN (offgoing nurse). Report included the following information SBAR.

## 2022-08-09 ENCOUNTER — APPOINTMENT (OUTPATIENT)
Dept: NON INVASIVE DIAGNOSTICS | Age: 62
DRG: 291 | End: 2022-08-09
Attending: STUDENT IN AN ORGANIZED HEALTH CARE EDUCATION/TRAINING PROGRAM
Payer: MEDICARE

## 2022-08-09 ENCOUNTER — APPOINTMENT (OUTPATIENT)
Dept: GENERAL RADIOLOGY | Age: 62
DRG: 291 | End: 2022-08-09
Attending: STUDENT IN AN ORGANIZED HEALTH CARE EDUCATION/TRAINING PROGRAM
Payer: MEDICARE

## 2022-08-09 LAB
ANION GAP SERPL CALC-SCNC: 10 MMOL/L (ref 5–15)
ANION GAP SERPL CALC-SCNC: 8 MMOL/L (ref 5–15)
ANION GAP SERPL CALC-SCNC: 9 MMOL/L (ref 5–15)
APTT PPP: 29.5 SEC (ref 22.1–31)
ARTERIAL PATENCY WRIST A: POSITIVE
BASE DEFICIT BLD-SCNC: 2.8 MMOL/L
BDY SITE: ABNORMAL
BUN SERPL-MCNC: 33 MG/DL (ref 6–20)
BUN SERPL-MCNC: 36 MG/DL (ref 6–20)
BUN SERPL-MCNC: 41 MG/DL (ref 6–20)
BUN/CREAT SERPL: 17 (ref 12–20)
BUN/CREAT SERPL: 17 (ref 12–20)
BUN/CREAT SERPL: 18 (ref 12–20)
CALCIUM SERPL-MCNC: 8.9 MG/DL (ref 8.5–10.1)
CALCIUM SERPL-MCNC: 8.9 MG/DL (ref 8.5–10.1)
CALCIUM SERPL-MCNC: 9.1 MG/DL (ref 8.5–10.1)
CHLORIDE SERPL-SCNC: 101 MMOL/L (ref 97–108)
CHLORIDE SERPL-SCNC: 101 MMOL/L (ref 97–108)
CHLORIDE SERPL-SCNC: 102 MMOL/L (ref 97–108)
CO2 SERPL-SCNC: 23 MMOL/L (ref 21–32)
CO2 SERPL-SCNC: 24 MMOL/L (ref 21–32)
CO2 SERPL-SCNC: 26 MMOL/L (ref 21–32)
CREAT SERPL-MCNC: 1.96 MG/DL (ref 0.7–1.3)
CREAT SERPL-MCNC: 2.06 MG/DL (ref 0.7–1.3)
CREAT SERPL-MCNC: 2.22 MG/DL (ref 0.7–1.3)
ECHO AR MAX VEL PISA: 2.4 M/S
ECHO AV AREA PEAK VELOCITY: 3.4 CM2
ECHO AV AREA/BSA PEAK VELOCITY: 1.8 CM2/M2
ECHO AV PEAK GRADIENT: 2 MMHG
ECHO AV PEAK VELOCITY: 0.7 M/S
ECHO AV REGURGITANT PHT: 1428.4 MILLISECOND
ECHO AV VELOCITY RATIO: 0.86
ECHO EST RA PRESSURE: 3 MMHG
ECHO LV FRACTIONAL SHORTENING: 6 % (ref 28–44)
ECHO LV INTERNAL DIMENSION DIASTOLE INDEX: 3.41 CM/M2
ECHO LV INTERNAL DIMENSION DIASTOLIC: 6.3 CM (ref 4.2–5.9)
ECHO LV INTERNAL DIMENSION SYSTOLIC INDEX: 3.19 CM/M2
ECHO LV INTERNAL DIMENSION SYSTOLIC: 5.9 CM
ECHO LV IVSD: 0.7 CM (ref 0.6–1)
ECHO LV MASS 2D: 157.8 G (ref 88–224)
ECHO LV MASS INDEX 2D: 85.3 G/M2 (ref 49–115)
ECHO LV POSTERIOR WALL DIASTOLIC: 0.6 CM (ref 0.6–1)
ECHO LV RELATIVE WALL THICKNESS RATIO: 0.19
ECHO LVOT AREA: 4.2 CM2
ECHO LVOT DIAM: 2.3 CM
ECHO LVOT PEAK GRADIENT: 1 MMHG
ECHO LVOT PEAK VELOCITY: 0.6 M/S
ECHO MV E VELOCITY: 0.44 M/S
ECHO RIGHT VENTRICULAR SYSTOLIC PRESSURE (RVSP): 31 MMHG
ECHO RV INTERNAL DIMENSION: 5.1 CM
ECHO RV TAPSE: 1.6 CM (ref 1.7–?)
ECHO TV REGURGITANT MAX VELOCITY: 2.66 M/S
ECHO TV REGURGITANT PEAK GRADIENT: 28 MMHG
ERYTHROCYTE [DISTWIDTH] IN BLOOD BY AUTOMATED COUNT: 17.2 % (ref 11.5–14.5)
GAS FLOW.O2 O2 DELIVERY SYS: ABNORMAL L/MIN
GAS FLOW.O2 SETTING OXYMISER: 18 BPM
GLUCOSE SERPL-MCNC: 134 MG/DL (ref 65–100)
GLUCOSE SERPL-MCNC: 174 MG/DL (ref 65–100)
GLUCOSE SERPL-MCNC: 179 MG/DL (ref 65–100)
HCO3 BLD-SCNC: 22 MMOL/L (ref 22–26)
HCT VFR BLD AUTO: 35.6 % (ref 36.6–50.3)
HGB BLD-MCNC: 11.3 G/DL (ref 12.1–17)
INR PPP: 1.1 (ref 0.9–1.1)
MAGNESIUM SERPL-MCNC: 2.7 MG/DL (ref 1.6–2.4)
MAGNESIUM SERPL-MCNC: 2.8 MG/DL (ref 1.6–2.4)
MAGNESIUM SERPL-MCNC: 3.2 MG/DL (ref 1.6–2.4)
MCH RBC QN AUTO: 30.1 PG (ref 26–34)
MCHC RBC AUTO-ENTMCNC: 31.7 G/DL (ref 30–36.5)
MCV RBC AUTO: 94.7 FL (ref 80–99)
NRBC # BLD: 0 K/UL (ref 0–0.01)
NRBC BLD-RTO: 0 PER 100 WBC
O2/TOTAL GAS SETTING VFR VENT: 40 %
PCO2 BLD: 37.7 MMHG (ref 35–45)
PEEP RESPIRATORY: 5 CMH2O
PH BLD: 7.38 [PH] (ref 7.35–7.45)
PLATELET # BLD AUTO: 275 K/UL (ref 150–400)
PMV BLD AUTO: 11.3 FL (ref 8.9–12.9)
PO2 BLD: 136 MMHG (ref 80–100)
POTASSIUM SERPL-SCNC: 4.5 MMOL/L (ref 3.5–5.1)
POTASSIUM SERPL-SCNC: 4.8 MMOL/L (ref 3.5–5.1)
POTASSIUM SERPL-SCNC: 4.9 MMOL/L (ref 3.5–5.1)
PRESSURE SUPPORT SETTING VENT: 8 CMH2O
PROTHROMBIN TIME: 11.4 SEC (ref 9–11.1)
RBC # BLD AUTO: 3.76 M/UL (ref 4.1–5.7)
SAO2 % BLD: 99 % (ref 92–97)
SODIUM SERPL-SCNC: 133 MMOL/L (ref 136–145)
SODIUM SERPL-SCNC: 135 MMOL/L (ref 136–145)
SODIUM SERPL-SCNC: 136 MMOL/L (ref 136–145)
SPECIMEN TYPE: ABNORMAL
THERAPEUTIC RANGE,PTTT: NORMAL SECS (ref 58–77)
TROPONIN-HIGH SENSITIVITY: 790 NG/L (ref 0–76)
VENTILATION MODE VENT: ABNORMAL
VT SETTING VENT: 50 ML
WBC # BLD AUTO: 16.9 K/UL (ref 4.1–11.1)

## 2022-08-09 PROCEDURE — 74018 RADEX ABDOMEN 1 VIEW: CPT

## 2022-08-09 PROCEDURE — 74011000250 HC RX REV CODE- 250: Performed by: STUDENT IN AN ORGANIZED HEALTH CARE EDUCATION/TRAINING PROGRAM

## 2022-08-09 PROCEDURE — 74011000258 HC RX REV CODE- 258: Performed by: STUDENT IN AN ORGANIZED HEALTH CARE EDUCATION/TRAINING PROGRAM

## 2022-08-09 PROCEDURE — 84484 ASSAY OF TROPONIN QUANT: CPT

## 2022-08-09 PROCEDURE — 94664 DEMO&/EVAL PT USE INHALER: CPT

## 2022-08-09 PROCEDURE — 85730 THROMBOPLASTIN TIME PARTIAL: CPT

## 2022-08-09 PROCEDURE — 93306 TTE W/DOPPLER COMPLETE: CPT

## 2022-08-09 PROCEDURE — 93306 TTE W/DOPPLER COMPLETE: CPT | Performed by: INTERNAL MEDICINE

## 2022-08-09 PROCEDURE — 74011250636 HC RX REV CODE- 250/636: Performed by: EMERGENCY MEDICINE

## 2022-08-09 PROCEDURE — 74011250636 HC RX REV CODE- 250/636: Performed by: INTERNAL MEDICINE

## 2022-08-09 PROCEDURE — 83735 ASSAY OF MAGNESIUM: CPT

## 2022-08-09 PROCEDURE — 65620000000 HC RM CCU GENERAL

## 2022-08-09 PROCEDURE — 36600 WITHDRAWAL OF ARTERIAL BLOOD: CPT

## 2022-08-09 PROCEDURE — 5A0945A ASSISTANCE WITH RESPIRATORY VENTILATION, 24-96 CONSECUTIVE HOURS, HIGH NASAL FLOW/VELOCITY: ICD-10-PCS | Performed by: STUDENT IN AN ORGANIZED HEALTH CARE EDUCATION/TRAINING PROGRAM

## 2022-08-09 PROCEDURE — 74011250636 HC RX REV CODE- 250/636: Performed by: STUDENT IN AN ORGANIZED HEALTH CARE EDUCATION/TRAINING PROGRAM

## 2022-08-09 PROCEDURE — 99222 1ST HOSP IP/OBS MODERATE 55: CPT | Performed by: NURSE PRACTITIONER

## 2022-08-09 PROCEDURE — 94640 AIRWAY INHALATION TREATMENT: CPT

## 2022-08-09 PROCEDURE — 74011250637 HC RX REV CODE- 250/637: Performed by: STUDENT IN AN ORGANIZED HEALTH CARE EDUCATION/TRAINING PROGRAM

## 2022-08-09 PROCEDURE — 74011000258 HC RX REV CODE- 258: Performed by: EMERGENCY MEDICINE

## 2022-08-09 PROCEDURE — 36415 COLL VENOUS BLD VENIPUNCTURE: CPT

## 2022-08-09 PROCEDURE — 85610 PROTHROMBIN TIME: CPT

## 2022-08-09 PROCEDURE — P9045 ALBUMIN (HUMAN), 5%, 250 ML: HCPCS | Performed by: INTERNAL MEDICINE

## 2022-08-09 PROCEDURE — 97166 OT EVAL MOD COMPLEX 45 MIN: CPT

## 2022-08-09 PROCEDURE — 80048 BASIC METABOLIC PNL TOTAL CA: CPT

## 2022-08-09 PROCEDURE — P9045 ALBUMIN (HUMAN), 5%, 250 ML: HCPCS | Performed by: STUDENT IN AN ORGANIZED HEALTH CARE EDUCATION/TRAINING PROGRAM

## 2022-08-09 PROCEDURE — 71045 X-RAY EXAM CHEST 1 VIEW: CPT

## 2022-08-09 PROCEDURE — 82803 BLOOD GASES ANY COMBINATION: CPT

## 2022-08-09 PROCEDURE — 94003 VENT MGMT INPAT SUBQ DAY: CPT

## 2022-08-09 PROCEDURE — 85027 COMPLETE CBC AUTOMATED: CPT

## 2022-08-09 PROCEDURE — 74011000250 HC RX REV CODE- 250: Performed by: EMERGENCY MEDICINE

## 2022-08-09 RX ORDER — ALBUMIN HUMAN 50 G/1000ML
12.5 SOLUTION INTRAVENOUS ONCE
Status: COMPLETED | OUTPATIENT
Start: 2022-08-09 | End: 2022-08-09

## 2022-08-09 RX ORDER — ALBUMIN HUMAN 50 G/1000ML
25 SOLUTION INTRAVENOUS ONCE
Status: COMPLETED | OUTPATIENT
Start: 2022-08-09 | End: 2022-08-09

## 2022-08-09 RX ORDER — ARFORMOTEROL TARTRATE 15 UG/2ML
15 SOLUTION RESPIRATORY (INHALATION)
Status: DISCONTINUED | OUTPATIENT
Start: 2022-08-09 | End: 2022-08-20 | Stop reason: HOSPADM

## 2022-08-09 RX ORDER — ALBUMIN HUMAN 50 G/1000ML
SOLUTION INTRAVENOUS
Status: DISPENSED
Start: 2022-08-09 | End: 2022-08-09

## 2022-08-09 RX ORDER — BUDESONIDE 0.5 MG/2ML
500 INHALANT ORAL
Status: DISCONTINUED | OUTPATIENT
Start: 2022-08-09 | End: 2022-08-20 | Stop reason: HOSPADM

## 2022-08-09 RX ORDER — NOREPINEPHRINE BITARTRATE/D5W 8 MG/250ML
.5-16 PLASTIC BAG, INJECTION (ML) INTRAVENOUS
Status: DISCONTINUED | OUTPATIENT
Start: 2022-08-09 | End: 2022-08-12

## 2022-08-09 RX ORDER — GUAIFENESIN 100 MG/5ML
81 LIQUID (ML) ORAL DAILY
Status: DISCONTINUED | OUTPATIENT
Start: 2022-08-09 | End: 2022-08-20 | Stop reason: HOSPADM

## 2022-08-09 RX ORDER — IPRATROPIUM BROMIDE 0.5 MG/2.5ML
0.5 SOLUTION RESPIRATORY (INHALATION)
Status: DISCONTINUED | OUTPATIENT
Start: 2022-08-09 | End: 2022-08-20 | Stop reason: HOSPADM

## 2022-08-09 RX ADMIN — ARFORMOTEROL TARTRATE 15 MCG: 15 SOLUTION RESPIRATORY (INHALATION) at 12:18

## 2022-08-09 RX ADMIN — ARFORMOTEROL TARTRATE 15 MCG: 15 SOLUTION RESPIRATORY (INHALATION) at 20:36

## 2022-08-09 RX ADMIN — IPRATROPIUM BROMIDE 0.5 MG: 0.5 SOLUTION RESPIRATORY (INHALATION) at 12:18

## 2022-08-09 RX ADMIN — AMIODARONE HYDROCHLORIDE 0.5 MG/MIN: 50 INJECTION, SOLUTION INTRAVENOUS at 08:15

## 2022-08-09 RX ADMIN — ENOXAPARIN SODIUM 40 MG: 100 INJECTION SUBCUTANEOUS at 08:00

## 2022-08-09 RX ADMIN — VASOPRESSIN 0.04 UNITS/MIN: 20 INJECTION PARENTERAL at 19:15

## 2022-08-09 RX ADMIN — VASOPRESSIN 0.04 UNITS/MIN: 20 INJECTION PARENTERAL at 10:00

## 2022-08-09 RX ADMIN — ALBUMIN (HUMAN) 25 G: 12.5 INJECTION, SOLUTION INTRAVENOUS at 09:38

## 2022-08-09 RX ADMIN — SODIUM CHLORIDE, PRESERVATIVE FREE 10 ML: 5 INJECTION INTRAVENOUS at 13:10

## 2022-08-09 RX ADMIN — DEXMEDETOMIDINE HYDROCHLORIDE 1.5 MCG/KG/HR: 4 INJECTION, SOLUTION INTRAVENOUS at 05:14

## 2022-08-09 RX ADMIN — NOREPINEPHRINE BITARTRATE 5 MCG/MIN: 1 INJECTION, SOLUTION, CONCENTRATE INTRAVENOUS at 06:51

## 2022-08-09 RX ADMIN — CHLORHEXIDINE GLUCONATE 15 ML: 1.2 RINSE ORAL at 21:13

## 2022-08-09 RX ADMIN — ASPIRIN 81 MG CHEWABLE TABLET 81 MG: 81 TABLET CHEWABLE at 11:06

## 2022-08-09 RX ADMIN — IPRATROPIUM BROMIDE 0.5 MG: 0.5 SOLUTION RESPIRATORY (INHALATION) at 20:36

## 2022-08-09 RX ADMIN — CHLORHEXIDINE GLUCONATE 15 ML: 1.2 RINSE ORAL at 08:00

## 2022-08-09 RX ADMIN — BUDESONIDE 500 MCG: 0.5 INHALANT RESPIRATORY (INHALATION) at 12:18

## 2022-08-09 RX ADMIN — VASOPRESSIN 0.04 UNITS/MIN: 20 INJECTION PARENTERAL at 02:02

## 2022-08-09 RX ADMIN — SODIUM CHLORIDE, PRESERVATIVE FREE 20 MG: 5 INJECTION INTRAVENOUS at 08:00

## 2022-08-09 RX ADMIN — MILRINONE LACTATE 0.38 MCG/KG/MIN: 0.2 INJECTION, SOLUTION INTRAVENOUS at 13:28

## 2022-08-09 RX ADMIN — ALBUMIN (HUMAN) 12.5 G: 12.5 INJECTION, SOLUTION INTRAVENOUS at 02:53

## 2022-08-09 RX ADMIN — BUDESONIDE 500 MCG: 0.5 INHALANT RESPIRATORY (INHALATION) at 20:36

## 2022-08-09 RX ADMIN — DEXMEDETOMIDINE HYDROCHLORIDE 1.5 MCG/KG/HR: 4 INJECTION, SOLUTION INTRAVENOUS at 01:34

## 2022-08-09 RX ADMIN — DEXMEDETOMIDINE HYDROCHLORIDE 0.4 MCG/KG/HR: 4 INJECTION, SOLUTION INTRAVENOUS at 18:15
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
## 2022-08-09 NOTE — PROGRESS NOTES
CRITICAL CARE NOTE      Name: Ravi Dale   : 1960   MRN: [id number]   Date: 2022      REASON FOR ICU ADMISSION:  Vfib arrest, acute hypoxic respiratory failure     PRINCIPAL ICU DIAGNOSIS   Post Vfib arrest on TTM  Suspect hypoxemic brain injury  NICM EF 25-30% s/p ICD  COPD  CKD 3/4    BRIEF PATIENT SUMMARY   Pt is 58 y.o M w/ PMH NICM (25-30% NYHA class IIIB ) s/p ICD, COPD who presented to ED via EMS after Vfib arrest. Pt recently w/ ICD placement and initiation of continuous IV milrinone therapy at OSH about 3wks PTA. Pt continued on milrinone in ED, started on levophed, and received bolus amiodarone and started on infusion. Critical care consulted for admission and pt started on TTM after CTH neg for bleed, EEG neg for seizures. COMPREHENSIVE ASSESSMENT & PLAN:SYSTEM BASED     24 HOUR EVENTS:   Pt w/ neurological improvement, alert and following commands this AM. Extubated to high flow prongs. On minimal levophed, continued on milrinone. POCUS eval consistent w/ volume responsiveness. Will give IVF bolus and continue to monitor in ICU.      NEUROLOGICAL:   Awake this AM, slowed and confused  Suspect anoxic injury, monitor for improvement  Re-warmed  Monitor for acute neuro change  Delirium precautions, avoid benzo/opiates  PT/OT    PULMONOLOGY:   O2 per HFNP, wean as tolerated  Re-start home inhalers  spO2 goal 88-92%  PRN duo-nebs  Sit up, OOB as tolerated, IS    CARDIOVASCULAR:   ICD from [address] placed 22 by Dr. Mckenzie Esparza per ED RN report  Device Interrogated in ED w/ follow report  C/w milrinone, amiodarone gtt  On minimal levophed, vasopressin gtt, MAP goal >70, wean as tolerated  CE's downtrended, ECHO pending read  Advanced heart failure team consulted, appreciate input  Re-start ASA, previously not tolerating GDMT per HF team notes, will discuss along w/ timing of re-initiation of AC  Ensure euvolemia    GASTROINTESTINAL   Swallow eval  ADAT  PPI     RENAL/ELECTROLYTE/FLUIDS:   Renal fx at baseline  Strict I/Os  Daily RFP  Mg/Phos/K -> 2/3/4    ENDOCRINE:   Glucose goal 120-180    HEMATOLOGY/ONCOLOGY:   Lovenox ppx, monitor for signs of acute bleed    INFECTIOUS DISEASE:   No acute issues    ICU DAILY CHECKLIST     Code Status:Full per discussion w/ wife at bedside in ED  Will consult palliative care given overall medical co-morbidities      DVT Prophylaxis:SCDs, lovenox  T/L/D: R femoral quatro cooling catheter, RIJ maza  SUP: Pepsid  Diet: NPO  Activity Level:Bed rest  ABCDEF Bundle/Checklist Completed:Yes  Disposition: Stay in ICU  Multidisciplinary Rounds Completed:  N/A  Patient/Family Updated: Yes    Nellie Moore is 58 y.o M w/ PMH NICM (25-30% NYHA class IIIB ) s/p ICD, COPD who presented to ED via EMS after Vfib arrest. Hx obtained from wife at bedside due to acuity of condition. States pt collapsed mid conversation approx 1hr PTA, states pt w/ head impact on floor on collapse w/ \"3 big twitches\" immediately (no incontinence or other tonic/clonic activity). On EMS arrival pt noted to be in Vfib, ACLS protocol started w/ ROSC after defibrillation x2, pt intubated for airway protection in field, given versed en route. Wife states pt had 1 episode of \"chest cramping\" day prior, unsure if it was ICD discharge. Pt recently w/ ICD placement and initiation of continuous IV milrinone therapy at OSH about 3wks PTA. Pt continued on milrinone in ED, started on levophed, and received bolus amiodarone and started on infusion. Critical care consulted for admission. SUBJECTIVE   Review of Systems   Unable to perform ROS: Mental acuity      OBJECTIVE     Labs and Data: Reviewed 08/09/22  Medications: Reviewed 08/09/22  Imaging: Reviewed 08/09/22    Physical Exam  Vitals and nursing note reviewed. Constitutional:       General: He is not in acute distress. Appearance: He is normal weight.  He is ill-appearing (chronically). He is not diaphoretic. HENT:      Head: Normocephalic. Comments: Bruising around L eye     Mouth/Throat:      Mouth: Mucous membranes are dry. Pharynx: No oropharyngeal exudate. Eyes:      General: No scleral icterus. Extraocular Movements: Extraocular movements intact. Conjunctiva/sclera: Conjunctivae normal.      Pupils: Pupils are equal, round, and reactive to light. Cardiovascular:      Rate and Rhythm: Normal rate and regular rhythm. Heart sounds: Normal heart sounds. No murmur heard. Comments: RIJ/R fem CVC  Pulmonary:      Effort: Pulmonary effort is normal. No respiratory distress. Breath sounds: No wheezing. Abdominal:      General: Abdomen is flat. Bowel sounds are normal. There is no distension. Palpations: Abdomen is soft. Tenderness: There is no abdominal tenderness. Musculoskeletal:         General: No swelling. Normal range of motion. Cervical back: Normal range of motion and neck supple. Skin:     General: Skin is dry. Capillary Refill: Capillary refill takes less than 2 seconds. Coloration: Skin is not jaundiced. Neurological:      General: No focal deficit present. Mental Status: He is alert. He is disoriented.    Psychiatric:      Comments: Slow to respond, pleasantly confused        Visit Vitals  /70   Pulse 62   Temp 99 °F (37.2 °C)   Resp 21   Ht 5' 8\" (1.727 m)   Wt 71.6 kg (157 lb 13.6 oz)   SpO2 92%   BMI 24.00 kg/m²    O2 Flow Rate (L/min): 30 l/min O2 Device: Hi flow nasal cannula, Heated Temp (24hrs), Av.5 °F (36.4 °C), Min:96.1 °F (35.6 °C), Max:99.5 °F (37.5 °C)           Intake/Output:     Intake/Output Summary (Last 24 hours) at 2022 1003  Last data filed at 2022 0900  Gross per 24 hour   Intake 1973.9 ml   Output 860 ml   Net 1113.9 ml       Imaging    06/15/22    ECHO ADULT COMPLETE 2022    Interpretation Summary  Formatting of this result is different from the original.      Left Ventricle: Moderately reduced left ventricular systolic function with a visually estimated EF of 30 - 35%. Left ventricle is mildly dilated. Normal wall thickness. Moderate global hypokinesis present. Aortic Valve: Tricuspid valve. Mild to moderate paravalvular regurgitation. Mitral Valve: Mild regurgitation. Tricuspid Valve: Moderate regurgitation. Left Atrium: Left atrium is mildly dilated. Aorta: Not assessed. Signed by: Manuel Mckinney MD on 6/17/2022  4:54 PM     Other pertinent imaging reviewed and interpreted as noted above    CRITICAL CARE DOCUMENTATION  I had a face to face encounter with the patient, reviewed and interpreted patient data including clinical events, labs, images, vital signs, I/O's, and examined patient. I have discussed the case and the plan and management of the patient's care with the consulting services, the bedside nurses and the respiratory therapist.      NOTE OF PERSONAL INVOLVEMENT IN CARE   This patient has a high probability of imminent, clinically significant deterioration, which requires the highest level of preparedness to intervene urgently. I participated in the decision-making and personally managed or directed the management of the following life and organ supporting interventions that required my frequent assessment to treat or prevent imminent deterioration. I personally spent 45 minutes of critical care time. This is time spent at this critically ill patient's bedside actively involved in patient care as well as the coordination of care. This does not include any procedural time which has been billed separately. Joo Person MD  Staff [facility]
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
## 2022-08-09 NOTE — PROGRESS NOTES
Renal Dosing/Monitoring  Medication: famotidine   Current regimen:  20mg every 12 hr  Recent Labs     08/09/22  1313 08/09/22  0634 08/09/22  0000   CREA 2.22* 2.06* 1.96*   BUN 41* 36* 33*     Estimated CrCl:  30-35 ml/min  Plan: Change to 20mg IV q24h for Crcl <50 ml/min per [facility] P&T protocol

## 2022-08-09 NOTE — PROGRESS NOTES
1930: Bedside and Verbal shift change report given to Sandra Castellanos RN (oncoming nurse) by GUICHO Walton (offgoing nurse). Report included the following information SBAR, Kardex, ED Summary, OR Summary, Procedure Summary, Intake/Output, MAR, Recent Results, Cardiac Rhythm SR, and Alarm Parameters . 1945: Lines and gtts verified. 46: MD notified of UOP < 30cc/hr for 3 hours. Orders received for albumin. 7424: Pt able to nod head appropriately. 0630Cesar Sanchez MD at bedside. Pt following commands in all extremities. Orders received to re-warm. 0937Jenny Gu MD at beside. Pt placed on SBT. 0730: Bedside and Verbal shift change report given to GUICHO Walton (oncoming nurse) by Sandra Castellanos RN (offgoing nurse). Report included the following information SBAR, Kardex, ED Summary, OR Summary, Procedure Summary, Intake/Output, MAR, Recent Results, Cardiac Rhythm SR, and Alarm Parameters .

## 2022-08-09 NOTE — PROGRESS NOTES
0730 Bedside and Verbal shift change report given to Cat RN (oncoming nurse) by Ofelia Burgess (offgoing nurse). Report included the following information SBAR.     0745 ECHO at bedside. Ana Mendoza MD and RT at bedside. Pt extubated to hi flow. Precedex off.     Isamar Blanchard MD ordered albumin. 2666 Pt reached goal temp of 37 degrees celsius. [address] at bedside. Nebulizers ordered and aspirin ordered. 1100 Pt passed bedside swallow screen. 845 [facility] OT at bedside working with patient. 1400 Pt temperature not registering on monitor. When RN arrived to room to assess pt was getting out of bed, pulling at lines. Pt helped back to bed. Bed alarm reset and video consent obtained from wife. Patient: William Hoffman MRN: [id number]    Age: 58 y.o. YOB: 1960 Room/Bed: [facility]/   Consent for video monitoring obtained after the below has been explained to the patient/family on 8/9/2022: They are being monitored continuously in an effort to promote their safety. That there may be times when the camera will be discontinued to provide care to me to ensure my dignity, such as during bathing or any activity that risks me being exposed. They have the right to opt out of having this surveillance monitoring at any time. It has been explained to the patient/family and they understand that the hospital does not maintain any recording of this surveillance monitoring. Brittnee Guerra, RN    1600 Leonila Santillan MD ordered milrinone to be decreased to . 25mcg/kg/min    1620 Levophed off.     1745 Pt continuing to attempt to get out of bed. Indiana Sena MD notified. Precedex restarted.
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
## 2022-08-10 LAB
ANION GAP SERPL CALC-SCNC: 11 MMOL/L (ref 5–15)
APTT PPP: 30.3 SEC (ref 22.1–31)
BUN SERPL-MCNC: 46 MG/DL (ref 6–20)
BUN/CREAT SERPL: 29 (ref 12–20)
CALCIUM SERPL-MCNC: 8.6 MG/DL (ref 8.5–10.1)
CHLORIDE SERPL-SCNC: 102 MMOL/L (ref 97–108)
CO2 SERPL-SCNC: 23 MMOL/L (ref 21–32)
CREAT SERPL-MCNC: 1.59 MG/DL (ref 0.7–1.3)
ERYTHROCYTE [DISTWIDTH] IN BLOOD BY AUTOMATED COUNT: 17.6 % (ref 11.5–14.5)
GLUCOSE SERPL-MCNC: 138 MG/DL (ref 65–100)
HCT VFR BLD AUTO: 29.8 % (ref 36.6–50.3)
HGB BLD-MCNC: 9.6 G/DL (ref 12.1–17)
INR PPP: 1.2 (ref 0.9–1.1)
MAGNESIUM SERPL-MCNC: 2.4 MG/DL (ref 1.6–2.4)
MCH RBC QN AUTO: 30.1 PG (ref 26–34)
MCHC RBC AUTO-ENTMCNC: 32.2 G/DL (ref 30–36.5)
MCV RBC AUTO: 93.4 FL (ref 80–99)
NRBC # BLD: 0.02 K/UL (ref 0–0.01)
NRBC BLD-RTO: 0.1 PER 100 WBC
PLATELET # BLD AUTO: 175 K/UL (ref 150–400)
PMV BLD AUTO: 12 FL (ref 8.9–12.9)
POTASSIUM SERPL-SCNC: 4.4 MMOL/L (ref 3.5–5.1)
PROTHROMBIN TIME: 12.2 SEC (ref 9–11.1)
RBC # BLD AUTO: 3.19 M/UL (ref 4.1–5.7)
SODIUM SERPL-SCNC: 136 MMOL/L (ref 136–145)
THERAPEUTIC RANGE,PTTT: NORMAL SECS (ref 58–77)
WBC # BLD AUTO: 13.4 K/UL (ref 4.1–11.1)

## 2022-08-10 PROCEDURE — 92610 EVALUATE SWALLOWING FUNCTION: CPT

## 2022-08-10 PROCEDURE — 85730 THROMBOPLASTIN TIME PARTIAL: CPT

## 2022-08-10 PROCEDURE — 74011250637 HC RX REV CODE- 250/637: Performed by: STUDENT IN AN ORGANIZED HEALTH CARE EDUCATION/TRAINING PROGRAM

## 2022-08-10 PROCEDURE — 85027 COMPLETE CBC AUTOMATED: CPT

## 2022-08-10 PROCEDURE — 97530 THERAPEUTIC ACTIVITIES: CPT

## 2022-08-10 PROCEDURE — 97535 SELF CARE MNGMENT TRAINING: CPT

## 2022-08-10 PROCEDURE — 74011000250 HC RX REV CODE- 250: Performed by: STUDENT IN AN ORGANIZED HEALTH CARE EDUCATION/TRAINING PROGRAM

## 2022-08-10 PROCEDURE — 85610 PROTHROMBIN TIME: CPT

## 2022-08-10 PROCEDURE — 74011250636 HC RX REV CODE- 250/636: Performed by: STUDENT IN AN ORGANIZED HEALTH CARE EDUCATION/TRAINING PROGRAM

## 2022-08-10 PROCEDURE — 74011250636 HC RX REV CODE- 250/636: Performed by: INTERNAL MEDICINE

## 2022-08-10 PROCEDURE — 99233 SBSQ HOSP IP/OBS HIGH 50: CPT | Performed by: PSYCHIATRY & NEUROLOGY

## 2022-08-10 PROCEDURE — 97161 PT EVAL LOW COMPLEX 20 MIN: CPT

## 2022-08-10 PROCEDURE — 99233 SBSQ HOSP IP/OBS HIGH 50: CPT | Performed by: INTERNAL MEDICINE

## 2022-08-10 PROCEDURE — 51798 US URINE CAPACITY MEASURE: CPT

## 2022-08-10 PROCEDURE — 36415 COLL VENOUS BLD VENIPUNCTURE: CPT

## 2022-08-10 PROCEDURE — 65620000000 HC RM CCU GENERAL

## 2022-08-10 PROCEDURE — 94640 AIRWAY INHALATION TREATMENT: CPT

## 2022-08-10 PROCEDURE — 80048 BASIC METABOLIC PNL TOTAL CA: CPT

## 2022-08-10 PROCEDURE — 74011000258 HC RX REV CODE- 258: Performed by: STUDENT IN AN ORGANIZED HEALTH CARE EDUCATION/TRAINING PROGRAM

## 2022-08-10 PROCEDURE — 83735 ASSAY OF MAGNESIUM: CPT

## 2022-08-10 PROCEDURE — P9045 ALBUMIN (HUMAN), 5%, 250 ML: HCPCS | Performed by: STUDENT IN AN ORGANIZED HEALTH CARE EDUCATION/TRAINING PROGRAM

## 2022-08-10 RX ORDER — ALBUMIN HUMAN 50 G/1000ML
SOLUTION INTRAVENOUS
Status: DISCONTINUED
Start: 2022-08-10 | End: 2022-08-10

## 2022-08-10 RX ORDER — ALBUMIN HUMAN 50 G/1000ML
25 SOLUTION INTRAVENOUS ONCE
Status: COMPLETED | OUTPATIENT
Start: 2022-08-10 | End: 2022-08-10

## 2022-08-10 RX ORDER — ALBUMIN HUMAN 50 G/1000ML
12.5 SOLUTION INTRAVENOUS ONCE
Status: COMPLETED | OUTPATIENT
Start: 2022-08-10 | End: 2022-08-10

## 2022-08-10 RX ADMIN — VASOPRESSIN 0.02 UNITS/MIN: 20 INJECTION PARENTERAL at 17:45

## 2022-08-10 RX ADMIN — BUDESONIDE 500 MCG: 0.5 INHALANT RESPIRATORY (INHALATION) at 20:31

## 2022-08-10 RX ADMIN — ACETAMINOPHEN 650 MG: 325 TABLET ORAL at 19:46

## 2022-08-10 RX ADMIN — SODIUM CHLORIDE, PRESERVATIVE FREE 20 MG: 5 INJECTION INTRAVENOUS at 08:00

## 2022-08-10 RX ADMIN — BUDESONIDE 500 MCG: 0.5 INHALANT RESPIRATORY (INHALATION) at 08:20

## 2022-08-10 RX ADMIN — SODIUM CHLORIDE, PRESERVATIVE FREE 10 ML: 5 INJECTION INTRAVENOUS at 14:02

## 2022-08-10 RX ADMIN — CHLORHEXIDINE GLUCONATE 15 ML: 1.2 RINSE ORAL at 08:00

## 2022-08-10 RX ADMIN — MILRINONE LACTATE 0.25 MCG/KG/MIN: 0.2 INJECTION, SOLUTION INTRAVENOUS at 09:00

## 2022-08-10 RX ADMIN — DEXMEDETOMIDINE HYDROCHLORIDE 1.5 MCG/KG/HR: 4 INJECTION, SOLUTION INTRAVENOUS at 00:52

## 2022-08-10 RX ADMIN — ALBUMIN (HUMAN) 25 G: 12.5 INJECTION, SOLUTION INTRAVENOUS at 17:00

## 2022-08-10 RX ADMIN — IPRATROPIUM BROMIDE 0.5 MG: 0.5 SOLUTION RESPIRATORY (INHALATION) at 15:41

## 2022-08-10 RX ADMIN — CHLORHEXIDINE GLUCONATE 15 ML: 1.2 RINSE ORAL at 19:51

## 2022-08-10 RX ADMIN — ALBUMIN (HUMAN) 12.5 G: 12.5 INJECTION, SOLUTION INTRAVENOUS at 09:00

## 2022-08-10 RX ADMIN — IPRATROPIUM BROMIDE 0.5 MG: 0.5 SOLUTION RESPIRATORY (INHALATION) at 20:31

## 2022-08-10 RX ADMIN — VASOPRESSIN 0.04 UNITS/MIN: 20 INJECTION PARENTERAL at 03:52

## 2022-08-10 RX ADMIN — ARFORMOTEROL TARTRATE 15 MCG: 15 SOLUTION RESPIRATORY (INHALATION) at 20:31

## 2022-08-10 RX ADMIN — IPRATROPIUM BROMIDE 0.5 MG: 0.5 SOLUTION RESPIRATORY (INHALATION) at 08:20

## 2022-08-10 RX ADMIN — ASPIRIN 81 MG CHEWABLE TABLET 81 MG: 81 TABLET CHEWABLE at 08:00

## 2022-08-10 RX ADMIN — ARFORMOTEROL TARTRATE 15 MCG: 15 SOLUTION RESPIRATORY (INHALATION) at 08:20

## 2022-08-10 RX ADMIN — ENOXAPARIN SODIUM 40 MG: 100 INJECTION SUBCUTANEOUS at 08:00

## 2022-08-10 RX ADMIN — DEXMEDETOMIDINE HYDROCHLORIDE 1.5 MCG/KG/HR: 4 INJECTION, SOLUTION INTRAVENOUS at 04:32

## 2022-08-10 NOTE — PROGRESS NOTES
08/10/22 1542   Oxygen Therapy   O2 Sat (%) 100 %   Pulse via Oximetry 96 beats per minute   O2 Device Heated; Hi flow nasal cannula   O2 Flow Rate (L/min) 18 l/min  (weaned)   O2 Temperature 93.2 °F (34 °C)   FIO2 (%) 55 %  (weaned)

## 2022-08-10 NOTE — PROGRESS NOTES
Neurology Progress Note     NAME: Adela Pickett   :  1960   MRN:  [id number]   DATE:  8/10/2022    Assessment:     Active Problems:    Cardiac arrest ([address] (2022)      Pt 63yo male admitted 22 with out-of-hospital ventricular fibrillation arrest with resuscitation by EMS, with episode of decerebrate posturing with shaking while intubated, sedated, and on hypothermia protocol in the CCU. EEG 22 with very low amplitude and no signs of seizure activity. History and EEG findings were concerning for possible global hypoxic injury. CTH negative. Pt was warmed and extubated yesterday. Exam-temp 101.1 alert, oriented to person, [address] [place] [address], not age - \"21\", not month or year \"April\" \"\", no dysarthria, language-able to name repeat and follow all commands, remainder of exam is nonfocal and unremarkable    Out of hospital Vfib arrest.  He has made remarkable neurological recovery. Still waxing and waning memory. Hopefully memory will continue to clear with time. Plan:   Please call if needed    Subjective:   Patient is sitting in a chair beside the bed without any complaints. Nursing report waxing and waning orientation.     Objective:   Chart reviewed since last seen    Current Facility-Administered Medications   Medication Dose Route Frequency    albumin human 5% (BUMINATE) 5 % solution        alteplase (CATHFLO) 1 mg in sterile water (preservative free) 1 mL injection  1 mg InterCATHeter PRN    aspirin chewable tablet 81 mg  81 mg Oral DAILY    budesonide (PULMICORT) 500 mcg/2 ml nebulizer suspension  500 mcg Nebulization BID RT    And    arformoteroL (BROVANA) neb solution 15 mcg  15 mcg Nebulization BID RT    ipratropium (ATROVENT) 0.02 % nebulizer solution 0.5 mg  0.5 mg Nebulization Q6H RT    NOREPINephrine (LEVOPHED) 8 mg in 5% dextrose 250mL (32 mcg/mL) infusion  0.5-16 mcg/min IntraVENous TITRATE    famotidine (PF) (PEPCID) 20 mg in 0.9% sodium chloride 10 mL injection  20 mg IntraVENous Q24H    amiodarone (CORDARONE) 375 mg/250 mL D5W infusion  0.5-1 mg/min IntraVENous TITRATE    milrinone (PRIMACOR) 20 MG/100 ML D5W infusion  0.25 mcg/kg/min (Order-Specific) IntraVENous CONTINUOUS    dexmedeTOMidine in 0.9 % NaCl (PRECEDEX) 400 mcg/100 mL (4 mcg/mL) infusion soln  0.1-1.5 mcg/kg/hr IntraVENous TITRATE    sodium chloride (NS) flush 5-40 mL  5-40 mL IntraVENous Q8H    sodium chloride (NS) flush 5-40 mL  5-40 mL IntraVENous PRN    acetaminophen (TYLENOL) suppository 650 mg  650 mg Rectal Q4H PRN    chlorhexidine (PERIDEX) 0.12 % mouthwash 15 mL  15 mL Oral Q12H    sodium chloride (NS) flush 5-40 mL  5-40 mL IntraVENous Q8H    sodium chloride (NS) flush 5-40 mL  5-40 mL IntraVENous PRN    acetaminophen (TYLENOL) tablet 650 mg  650 mg Oral Q6H PRN    Or    acetaminophen (TYLENOL) suppository 650 mg  650 mg Rectal Q6H PRN    polyethylene glycol (MIRALAX) packet 17 g  17 g Oral DAILY PRN    ondansetron (ZOFRAN ODT) tablet 4 mg  4 mg Oral Q8H PRN    Or    ondansetron (ZOFRAN) injection 4 mg  4 mg IntraVENous Q6H PRN    enoxaparin (LOVENOX) injection 40 mg  40 mg SubCUTAneous DAILY    vasopressin (VASOSTRICT) 20 Units in 0.9% sodium chloride 100 mL infusion  0.04 Units/min IntraVENous CONTINUOUS    albuterol-ipratropium (DUO-NEB) 2.5 MG-0.5 MG/3 ML  3 mL Nebulization Q4H PRN       Visit Vitals  BP 95/63   Pulse 81   Temp (!) 101.1 °F (38.4 °C)   Resp 23   Ht 5' 8\" (1.727 m)   Wt 165 lb 5.5 oz (75 kg)   SpO2 100%   BMI 25.14 kg/m²     Temp (24hrs), Av.2 °F (37.3 °C), Min:98.4 °F (36.9 °C), Max:101.1 °F (38.4 °C)      08/10 0701 - 08/10 1900  In: 352 [I.V.:352]  Out: 240 [Urine:240]  1901 - 08/10 0700  In: 2746.5 [I.V.:2746.5]  Out: 960 [Urine:960]      Physical Exam:  General: Well developed well nourished patient in no apparent distress. Cardiac: Regular rate and rhythm  Extremities: 2+ Radial pulses, no cyanosis or edema    Neurological Exam:  Mental Status: oriented to person, [facility], [place], [place], not age - \"21\", not month or year \"April\" \"2003\", no dysarthria, language-able to name repeat and follow all commands   Cranial Nerves:   VFF, PERRL, EOMI, no nystagmus, no ptosis. Facial movement is symmetric. Palate is midline. Tongue is midline. Hearing is intact. Motor:  5/5 strength in upper and lower proximal and distal muscles. Normal bulk and tone. No PD. No tremors   Reflexes:   Deep tendon reflexes 2+ and symmetric. Toes downgoing.    Sensory:   Intact to LT   Gait:  Unable to assess due to patient factors   Cerebellar:  Intact FTN and AVELINO         Lab Review   Recent Results (from the past 24 hour(s))   METABOLIC PANEL, BASIC    Collection Time: 08/09/22  1:13 PM   Result Value Ref Range    Sodium 133 (L) 136 - 145 mmol/L    Potassium 4.8 3.5 - 5.1 mmol/L    Chloride 101 97 - 108 mmol/L    CO2 24 21 - 32 mmol/L    Anion gap 8 5 - 15 mmol/L    Glucose 134 (H) 65 - 100 mg/dL    BUN 41 (H) 6 - 20 MG/DL    Creatinine 2.22 (H) 0.70 - 1.30 MG/DL    BUN/Creatinine ratio 18 12 - 20      GFR est AA 37 (L) >60 ml/min/1.73m2    GFR est non-AA 30 (L) >60 ml/min/1.73m2    Calcium 9.1 8.5 - 10.1 MG/DL   MAGNESIUM    Collection Time: 08/09/22  1:13 PM   Result Value Ref Range    Magnesium 2.7 (H) 1.6 - 2.4 mg/dL   METABOLIC PANEL, BASIC    Collection Time: 08/10/22  4:37 AM   Result Value Ref Range    Sodium 136 136 - 145 mmol/L    Potassium 4.4 3.5 - 5.1 mmol/L    Chloride 102 97 - 108 mmol/L    CO2 23 21 - 32 mmol/L    Anion gap 11 5 - 15 mmol/L    Glucose 138 (H) 65 - 100 mg/dL    BUN 46 (H) 6 - 20 MG/DL    Creatinine 1.59 (H) 0.70 - 1.30 MG/DL    BUN/Creatinine ratio 29 (H) 12 - 20      GFR est AA 54 (L) >60 ml/min/1.73m2    GFR est non-AA 44 (L) >60 ml/min/1.73m2    Calcium 8.6 8.5 - 10.1 MG/DL   MAGNESIUM    Collection Time: 08/10/22  4:37 AM   Result Value Ref Range    Magnesium 2.4 1.6 - 2.4 mg/dL   PROTHROMBIN TIME + INR    Collection Time: 08/10/22  4:37 AM   Result Value Ref Range    INR 1.2 (H) 0.9 - 1.1      Prothrombin time 12.2 (H) 9.0 - 11.1 sec   PTT    Collection Time: 08/10/22  4:37 AM   Result Value Ref Range    aPTT 30.3 22.1 - 31.0 sec    aPTT, therapeutic range     58.0 - 77.0 SECS   CBC W/O DIFF    Collection Time: 08/10/22  4:38 AM   Result Value Ref Range    WBC 13.4 (H) 4.1 - 11.1 K/uL    RBC 3.19 (L) 4.10 - 5.70 M/uL    HGB 9.6 (L) 12.1 - 17.0 g/dL    HCT 29.8 (L) 36.6 - 50.3 %    MCV 93.4 80.0 - 99.0 FL    MCH 30.1 26.0 - 34.0 PG    MCHC 32.2 30.0 - 36.5 g/dL    RDW 17.6 (H) 11.5 - 14.5 %    PLATELET 038 668 - 952 K/uL    MPV 12.0 8.9 - 12.9 FL    NRBC 0.1 (H) 0  WBC    ABSOLUTE NRBC 0.02 (H) 0.00 - 0.01 K/uL       Additional comments:  I have reviewed the patient's new clinical lab test results. I have personally reviewed the patient's radiographs. CT Results (most recent):  Results from Hospital Encounter encounter on 08/08/22    CT HEAD WO CONT    Narrative  CLINICAL HISTORY: post arrest, fall, front left contusion on eliquis  INDICATION: post arrest, fall, front left contusion on eliquis  COMPARISON: None. CT dose reduction was achieved through use of a standardized protocol tailored  for this examination and automatic exposure control for dose modulation. TECHNIQUE: Serial axial images with a collimation of 5 mm were obtained from the  skull base through the vertex  FINDINGS:  The sulci and ventricles are within normal limits for patient age. There is no  evidence of an acute infarction, hemorrhage, or mass-effect. There is no  evidence of midline shift or hydrocephalus. Posterior fossa structures are  unremarkable. No extra-axial collections are seen. Mastoid air cells are well pneumatized and clear. There is no evidence of depressed skull fractures of soft tissue swelling.     Impression  No acute intracranial process. Care Plan discussed with:  Patient x   Family    RN x   Care Manager    Consultant/Specialist:       Signed: Greer Ellis MD

## 2022-08-10 NOTE — PROGRESS NOTES
0730 Bedside and Verbal shift change report given to Cat RN (oncoming nurse) by David Smith (offgoing nurse). Report included the following information Lam Sosa MD at bedside. Ordered to stop vaso and half precedex and turn off if tolerated. 0855 BP 80s/40s. Anna Encarnacion MD notified. Ordered to restart vaso at half dose and give bolus of 5% albumin. Alonso Lind MD at bedside. NICOM spot check monitoring ordered. 0940 Pt disconnected from zoll machine    1000 NICOM at bedside. CO 4.4, CI 2.7, SVI 36, TPR 1268    1030 Pt up to chair with PT and OT    1230 Speech therapy at bedside. Pt cleared for single sips of full liquid. 1600 Returned to bed. 1930 Bedside and Verbal shift change report given to Cat RN (oncoming nurse) by Crystal Clark (offgoing nurse). Report included the following information SBAR.

## 2022-08-10 NOTE — PROGRESS NOTES
Problem: Self Care Deficits Care Plan (Adult)  Goal: *Acute Goals and Plan of Care (Insert Text)  Description: FUNCTIONAL STATUS PRIOR TO ADMISSION: Patient was independent and active without use of DME.     HOME SUPPORT: The patient lived with wife but did not require assist.    Occupational Therapy Goals  Initiated 8/9/2022  1. Patient will perform grooming in standing with independence within 7 day(s). 2.  Patient will perform bathing with independence within 7 day(s). 3.  Patient will perform lower body dressing with independence within 7 day(s). 4.  Patient will perform toilet transfers with independence within 7 day(s). 5.  Patient will perform all aspects of toileting with independence within 7 day(s). 6.  Patient will participate in upper extremity therapeutic exercise/activities with independence for 5 minutes within 7 day(s). 7.  Patient will utilize energy conservation techniques during functional activities with verbal cues within 7 day(s). Outcome: Progressing Towards Goal     OCCUPATIONAL THERAPY TREATMENT  Patient: Agustina Park (29 y.o. male)  Date: 8/10/2022  Diagnosis: Cardiac arrest [facility]) [I46.9] <principal problem not specified>      Precautions: Fall  Chart, occupational therapy assessment, plan of care, and goals were reviewed. ASSESSMENT  Patient continues with skilled OT services and is progressing towards goals. Pt's performance of ADL/IADL tasks continues to be limited at this time by impaired balance, cognition (attention, sequencing, orientation, command following, safety awareness), generalized weakness, and decreased activity tolerance. Pt received semi-supine, A&Ox1 (person only), but agreeable to participation in therapy session. He is somewhat perseverative during ADLs and conversation, though easily redirectable with VC. Though pt mobilized with CGA overall, he remains a high fall risk d/t impaired safety awareness and impulsivity.  Continue to recommend d/c to rehab as pt is well below his functional baseline of independent. Current Level of Function Impacting Discharge (ADLs): CGA seated LB dressing, CGA functional transfers in prep for ADLs    Other factors to consider for discharge: PLOF, cognition          PLAN :  Patient continues to benefit from skilled intervention to address the above impairments. Continue treatment per established plan of care to address goals. Recommendation for discharge: (in order for the patient to meet his/her long term goals)  Therapy 3 hours per day 5-7 days per week    This discharge recommendation:  Has been made in collaboration with the attending provider and/or case management    IF patient discharges home will need the following DME: TBD       SUBJECTIVE:   Patient stated Are we in the [place]?     OBJECTIVE DATA SUMMARY:   Cognitive/Behavioral Status:  Neurologic State: Alert  Orientation Level: Oriented to person;Disoriented to place; Disoriented to situation;Disoriented to time  Cognition: Follows commands; Impulsive;Memory loss;Decreased attention/concentration  Perception: Appears intact  Perseveration: Perseverates during conversation  Safety/Judgement: Decreased awareness of environment;Decreased awareness of need for assistance;Decreased awareness of need for safety;Decreased insight into deficits    Functional Mobility and Transfers for ADLs:  Bed Mobility:  Rolling: Contact guard assistance; Additional time; Other (comment) (cues for sequencing)  Supine to Sit: Contact guard assistance;Assist x1;Additional time (cues for sequencing)  Scooting: Contact guard assistance    Transfers:  Sit to Stand: Contact guard assistance;Assist x1     Bed to Chair: Minimum assistance;Assist x1 (for safety, balance)    Balance:  Sitting: Intact; Without support  Standing: Impaired; With support  Standing - Static: Fair  Standing - Dynamic : Fair;Constant support    ADL Intervention:                                Lower Body Dressing Assistance  Socks: Contact guard assistance  Leg Crossed Method Used: No  Position Performed: Seated edge of bed  Cues: Verbal cues provided         Cognitive Retraining  Orientation Retraining: Reorienting;Situation;Time;Awareness of environment  Problem Solving: Awareness of environment; Identifying the task  Executive Functions: Executing cognitive plans  Following Commands: Follows one step commands/directions  Safety/Judgement: Decreased awareness of environment;Decreased awareness of need for assistance;Decreased awareness of need for safety;Decreased insight into deficits  Cues: Verbal cues provided      Pain:  None reported     Activity Tolerance:   Good and Fair    After treatment patient left in no apparent distress:   Sitting in chair, Call bell within reach, and Bed / chair alarm activated, RN notified of session/pt status     COMMUNICATION/COLLABORATION:   The patients plan of care was discussed with: Physical therapist and Registered nurse.      Alona Carlos OT  Time Calculation: 24 mins

## 2022-08-10 NOTE — PROGRESS NOTES
08/10/22 0822   Oxygen Therapy   O2 Sat (%) 100 %   Pulse via Oximetry 66 beats per minute   O2 Device Heated; Hi flow nasal cannula   O2 Flow Rate (L/min) 24 l/min  (WEANED)   O2 Temperature 93.2 °F (34 °C)   FIO2 (%) 62 %  (weaned)

## 2022-08-10 NOTE — PROGRESS NOTES
CRITICAL CARE NOTE      Name: Genet Sanchez   : 1960   MRN: [id number]   Date: 8/10/2022      REASON FOR ICU ADMISSION:  Vfib arrest, acute hypoxic respiratory failure     PRINCIPAL ICU DIAGNOSIS   Post Vfib arrest on TTM  Suspect hypoxemic brain injury  NICM EF 25-30% s/p ICD  COPD  CKD 3/4    BRIEF PATIENT SUMMARY   Pt is 58 y.o M w/ PMH NICM (25-30% NYHA class IIIB ) s/p ICD, COPD who presented to ED via EMS after Vfib arrest. Pt recently w/ ICD placement and initiation of continuous IV milrinone therapy at OSH about 3wks PTA. Pt continued on milrinone in ED, started on levophed, and received bolus amiodarone and started on infusion. Critical care consulted for admission and pt started on TTM after CTH neg for bleed, EEG neg for seizures. Neuro improvement, extubated and re-warmed. COMPREHENSIVE ASSESSMENT & PLAN:SYSTEM BASED     24 HOUR EVENTS:   Improving mentation, waxing and waining. Improved UOP, remains on vaso and milrinone. NEUROLOGICAL:   Awake this AM, precedex off.   Suspect anoxic injury, monitor for improvement  Re-warmed  Monitor for acute neuro change  Delirium precautions, avoid benzo/opiates  PT/OT    PULMONOLOGY:   O2 per HFNP, wean as tolerated  C/w home inhalers  spO2 goal 88-92%  PRN duo-nebs  Sit up, OOB as tolerated, IS    CARDIOVASCULAR:   ICD from [address] placed 22 by Dr. Lauretha Lesches per ED RN report  Device Interrogated in ED w/ follow report  C/w milrinone, amio stopped, monitor  On minimal vasopressin gtt, wean as tolerated  Advanced heart failure team consulted, appreciate input  Re-start ASA, previously not tolerating GDMT per HF team notes, will discuss along w/ timing of re-initiation of AC  Ensure euvolemia    GASTROINTESTINAL   Swallow eval  ADAT  PPI     RENAL/ELECTROLYTE/FLUIDS:   Renal fx at baseline  Strict I/Os  Daily RFP  Mg/Phos/K -> 2/3/4    ENDOCRINE:   Glucose goal 120-180    HEMATOLOGY/ONCOLOGY:   Lovenox ppx, monitor for signs of acute bleed    INFECTIOUS DISEASE:   No acute issues    ICU DAILY CHECKLIST     Code Status:Full per discussion w/ wife at bedside in ED  Will consult palliative care given overall medical co-morbidities      DVT Prophylaxis:SCDs, lovenox  T/L/D: R femoral quatro cooling catheter, RIJ maza  SUP: [employer]  Diet: NPO  Activity Level:Bed rest  ABCDEF Bundle/Checklist Completed:Yes  Disposition: Stay in ICU  Multidisciplinary Rounds Completed:  N/A  Patient/Family Updated: Yes    Edilma Gibbs is 58 y.o M w/ PMH NICM (25-30% NYHA class IIIB ) s/p ICD, COPD who presented to ED via EMS after Vfib arrest. Hx obtained from wife at bedside due to acuity of condition. States pt collapsed mid conversation approx 1hr PTA, states pt w/ head impact on floor on collapse w/ \"3 big twitches\" immediately (no incontinence or other tonic/clonic activity). On EMS arrival pt noted to be in Vfib, ACLS protocol started w/ ROSC after defibrillation x2, pt intubated for airway protection in field, given versed en route. Wife states pt had 1 episode of \"chest cramping\" day prior, unsure if it was ICD discharge. Pt recently w/ ICD placement and initiation of continuous IV milrinone therapy at OSH about 3wks PTA. Pt continued on milrinone in ED, started on levophed, and received bolus amiodarone and started on infusion. Critical care consulted for admission. 8/9 Pt w/ neurological improvement, alert and following commands. Extubated to high flow prongs. On minimal levophed, continued on milrinone. SUBJECTIVE   Review of Systems   Unable to perform ROS: Mental acuity      OBJECTIVE     Labs and Data: Reviewed 08/10/22  Medications: Reviewed 08/10/22  Imaging: Reviewed 08/10/22    Physical Exam  Vitals and nursing note reviewed. Constitutional:       General: He is not in acute distress. Appearance: He is normal weight. He is ill-appearing (chronically). He is not diaphoretic.    HENT:      Head: Normocephalic. Comments: Bruising around L eye     Mouth/Throat:      Mouth: Mucous membranes are dry. Pharynx: No oropharyngeal exudate. Eyes:      General: No scleral icterus. Extraocular Movements: Extraocular movements intact. Conjunctiva/sclera: Conjunctivae normal.      Pupils: Pupils are equal, round, and reactive to light. Cardiovascular:      Rate and Rhythm: Normal rate and regular rhythm. Heart sounds: Normal heart sounds. No murmur heard. Comments: RIJ/R fem CVC  Pulmonary:      Effort: Pulmonary effort is normal. No respiratory distress. Breath sounds: No wheezing. Abdominal:      General: Abdomen is flat. Bowel sounds are normal. There is no distension. Palpations: Abdomen is soft. Tenderness: There is no abdominal tenderness. Musculoskeletal:         General: No swelling. Normal range of motion. Cervical back: Normal range of motion and neck supple. Skin:     General: Skin is dry. Capillary Refill: Capillary refill takes less than 2 seconds. Coloration: Skin is not jaundiced. Neurological:      General: No focal deficit present. Mental Status: He is alert. He is disoriented.    Psychiatric:         Mood and Affect: Mood normal.      Comments: pleasantly confused        Visit Vitals  BP (!) 75/47   Pulse 73   Temp 99.9 °F (37.7 °C)   Resp 15   Ht 5' 8\" (1.727 m)   Wt 75 kg (165 lb 5.5 oz)   SpO2 100%   BMI 25.14 kg/m²    O2 Flow Rate (L/min): 24 l/min (WEANED) O2 Device: Heated, Hi flow nasal cannula Temp (24hrs), Av.1 °F (37.3 °C), Min:98.4 °F (36.9 °C), Max:99.9 °F (37.7 °C)           Intake/Output:     Intake/Output Summary (Last 24 hours) at 8/10/2022 1009  Last data filed at 8/10/2022 1000  Gross per 24 hour   Intake 1186.45 ml   Output 765 ml   Net 421.45 ml         Imaging    06/15/22    ECHO ADULT COMPLETE 2022    Interpretation Summary  Formatting of this result is different from the original. Left Ventricle: Moderately reduced left ventricular systolic function with a visually estimated EF of 30 - 35%. Left ventricle is mildly dilated. Normal wall thickness. Moderate global hypokinesis present. Aortic Valve: Tricuspid valve. Mild to moderate paravalvular regurgitation. Mitral Valve: Mild regurgitation. Tricuspid Valve: Moderate regurgitation. Left Atrium: Left atrium is mildly dilated. Aorta: Not assessed. Signed by: Wadell Goodell, MD on 6/17/2022  4:54 PM     Other pertinent imaging reviewed and interpreted as noted above    CRITICAL CARE DOCUMENTATION  I had a face to face encounter with the patient, reviewed and interpreted patient data including clinical events, labs, images, vital signs, I/O's, and examined patient. I have discussed the case and the plan and management of the patient's care with the consulting services, the bedside nurses and the respiratory therapist.      NOTE OF PERSONAL INVOLVEMENT IN CARE   This patient has a high probability of imminent, clinically significant deterioration, which requires the highest level of preparedness to intervene urgently. I participated in the decision-making and personally managed or directed the management of the following life and organ supporting interventions that required my frequent assessment to treat or prevent imminent deterioration. I personally spent 45 minutes of critical care time. This is time spent at this critically ill patient's bedside actively involved in patient care as well as the coordination of care. This does not include any procedural time which has been billed separately. Joo Melton MD  Staff [address] [place] [address]
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
## 2022-08-10 NOTE — PROGRESS NOTES
1930: Bedside and Verbal shift change report given to Ciro Diaz RN (oncoming nurse) by GUICHO Walton (offgoing nurse). Report included the following information SBAR, Kardex, ED Summary, OR Summary, Procedure Summary, Intake/Output, MAR, Recent Results, Cardiac Rhythm SR, and Alarm Parameters . 1945: Lines and gtts verified. 0730: Bedside and Verbal shift change report given to Isabelle RN (oncoming nurse) by Ciro Diaz RN (offgoing nurse). Report included the following information SBAR, Kardex, ED Summary, OR Summary, Procedure Summary, Intake/Output, MAR, Recent Results, Cardiac Rhythm SR, and Alarm Parameters .
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
## 2022-08-11 PROBLEM — I50.23 ACUTE ON CHRONIC SYSTOLIC HEART FAILURE (HCC): Status: ACTIVE | Noted: 2022-08-11

## 2022-08-11 LAB
ANION GAP SERPL CALC-SCNC: 11 MMOL/L (ref 5–15)
APTT PPP: 30.1 SEC (ref 22.1–31)
BUN SERPL-MCNC: 32 MG/DL (ref 6–20)
BUN/CREAT SERPL: 27 (ref 12–20)
CALCIUM SERPL-MCNC: 8.9 MG/DL (ref 8.5–10.1)
CHLORIDE SERPL-SCNC: 103 MMOL/L (ref 97–108)
CO2 SERPL-SCNC: 23 MMOL/L (ref 21–32)
CREAT SERPL-MCNC: 1.18 MG/DL (ref 0.7–1.3)
ERYTHROCYTE [DISTWIDTH] IN BLOOD BY AUTOMATED COUNT: 17.7 % (ref 11.5–14.5)
GLUCOSE SERPL-MCNC: 108 MG/DL (ref 65–100)
HCT VFR BLD AUTO: 26.9 % (ref 36.6–50.3)
HGB BLD-MCNC: 8.7 G/DL (ref 12.1–17)
INR PPP: 1.2 (ref 0.9–1.1)
MAGNESIUM SERPL-MCNC: 2 MG/DL (ref 1.6–2.4)
MCH RBC QN AUTO: 30 PG (ref 26–34)
MCHC RBC AUTO-ENTMCNC: 32.3 G/DL (ref 30–36.5)
MCV RBC AUTO: 92.8 FL (ref 80–99)
NRBC # BLD: 0 K/UL (ref 0–0.01)
NRBC BLD-RTO: 0 PER 100 WBC
PLATELET # BLD AUTO: 172 K/UL (ref 150–400)
PMV BLD AUTO: 11.9 FL (ref 8.9–12.9)
POTASSIUM SERPL-SCNC: 3.9 MMOL/L (ref 3.5–5.1)
PROCALCITONIN SERPL-MCNC: 0.54 NG/ML
PROTHROMBIN TIME: 12.7 SEC (ref 9–11.1)
RBC # BLD AUTO: 2.9 M/UL (ref 4.1–5.7)
SODIUM SERPL-SCNC: 137 MMOL/L (ref 136–145)
THERAPEUTIC RANGE,PTTT: NORMAL SECS (ref 58–77)
WBC # BLD AUTO: 11.2 K/UL (ref 4.1–11.1)

## 2022-08-11 PROCEDURE — 74011250637 HC RX REV CODE- 250/637: Performed by: STUDENT IN AN ORGANIZED HEALTH CARE EDUCATION/TRAINING PROGRAM

## 2022-08-11 PROCEDURE — 85730 THROMBOPLASTIN TIME PARTIAL: CPT

## 2022-08-11 PROCEDURE — 85027 COMPLETE CBC AUTOMATED: CPT

## 2022-08-11 PROCEDURE — 74011250636 HC RX REV CODE- 250/636: Performed by: STUDENT IN AN ORGANIZED HEALTH CARE EDUCATION/TRAINING PROGRAM

## 2022-08-11 PROCEDURE — P9045 ALBUMIN (HUMAN), 5%, 250 ML: HCPCS | Performed by: STUDENT IN AN ORGANIZED HEALTH CARE EDUCATION/TRAINING PROGRAM

## 2022-08-11 PROCEDURE — 74011250636 HC RX REV CODE- 250/636: Performed by: INTERNAL MEDICINE

## 2022-08-11 PROCEDURE — 83735 ASSAY OF MAGNESIUM: CPT

## 2022-08-11 PROCEDURE — 65620000000 HC RM CCU GENERAL

## 2022-08-11 PROCEDURE — 94640 AIRWAY INHALATION TREATMENT: CPT

## 2022-08-11 PROCEDURE — 85610 PROTHROMBIN TIME: CPT

## 2022-08-11 PROCEDURE — 84145 PROCALCITONIN (PCT): CPT

## 2022-08-11 PROCEDURE — 97530 THERAPEUTIC ACTIVITIES: CPT

## 2022-08-11 PROCEDURE — 97116 GAIT TRAINING THERAPY: CPT

## 2022-08-11 PROCEDURE — 92526 ORAL FUNCTION THERAPY: CPT

## 2022-08-11 PROCEDURE — 99233 SBSQ HOSP IP/OBS HIGH 50: CPT | Performed by: NURSE PRACTITIONER

## 2022-08-11 PROCEDURE — 92523 SPEECH SOUND LANG COMPREHEN: CPT

## 2022-08-11 PROCEDURE — 97535 SELF CARE MNGMENT TRAINING: CPT

## 2022-08-11 PROCEDURE — 74011000250 HC RX REV CODE- 250: Performed by: STUDENT IN AN ORGANIZED HEALTH CARE EDUCATION/TRAINING PROGRAM

## 2022-08-11 PROCEDURE — 80048 BASIC METABOLIC PNL TOTAL CA: CPT

## 2022-08-11 PROCEDURE — 36415 COLL VENOUS BLD VENIPUNCTURE: CPT

## 2022-08-11 RX ORDER — PHENOBARBITAL SODIUM 65 MG/ML
65 INJECTION INTRAMUSCULAR
Status: DISCONTINUED | OUTPATIENT
Start: 2022-08-11 | End: 2022-08-12

## 2022-08-11 RX ORDER — HALOPERIDOL 5 MG/ML
INJECTION INTRAMUSCULAR
Status: DISPENSED
Start: 2022-08-11 | End: 2022-08-12

## 2022-08-11 RX ORDER — HALOPERIDOL 5 MG/ML
5 INJECTION INTRAMUSCULAR ONCE
Status: COMPLETED | OUTPATIENT
Start: 2022-08-11 | End: 2022-08-11

## 2022-08-11 RX ORDER — ALBUMIN HUMAN 50 G/1000ML
25 SOLUTION INTRAVENOUS ONCE
Status: COMPLETED | OUTPATIENT
Start: 2022-08-11 | End: 2022-08-12

## 2022-08-11 RX ORDER — MIDAZOLAM HYDROCHLORIDE 1 MG/ML
0.5 INJECTION, SOLUTION INTRAMUSCULAR; INTRAVENOUS ONCE
Status: COMPLETED | OUTPATIENT
Start: 2022-08-11 | End: 2022-08-11

## 2022-08-11 RX ORDER — DIPHENHYDRAMINE HYDROCHLORIDE 50 MG/ML
25 INJECTION, SOLUTION INTRAMUSCULAR; INTRAVENOUS ONCE
Status: COMPLETED | OUTPATIENT
Start: 2022-08-11 | End: 2022-08-11

## 2022-08-11 RX ORDER — MIDODRINE HYDROCHLORIDE 5 MG/1
5 TABLET ORAL EVERY 8 HOURS
Status: DISCONTINUED | OUTPATIENT
Start: 2022-08-11 | End: 2022-08-15

## 2022-08-11 RX ADMIN — DEXMEDETOMIDINE HYDROCHLORIDE 1.4 MCG/KG/HR: 4 INJECTION, SOLUTION INTRAVENOUS at 22:08

## 2022-08-11 RX ADMIN — IPRATROPIUM BROMIDE 0.5 MG: 0.5 SOLUTION RESPIRATORY (INHALATION) at 13:41

## 2022-08-11 RX ADMIN — ACETAMINOPHEN 650 MG: 325 TABLET ORAL at 12:15

## 2022-08-11 RX ADMIN — MIDODRINE HYDROCHLORIDE 5 MG: 5 TABLET ORAL at 08:43

## 2022-08-11 RX ADMIN — MILRINONE LACTATE 0.25 MCG/KG/MIN: 0.2 INJECTION, SOLUTION INTRAVENOUS at 22:08

## 2022-08-11 RX ADMIN — DEXMEDETOMIDINE HYDROCHLORIDE 0.2 MCG/KG/HR: 4 INJECTION, SOLUTION INTRAVENOUS at 02:36

## 2022-08-11 RX ADMIN — DIPHENHYDRAMINE HYDROCHLORIDE 25 MG: 50 INJECTION INTRAMUSCULAR; INTRAVENOUS at 17:11

## 2022-08-11 RX ADMIN — DEXMEDETOMIDINE HYDROCHLORIDE 1.4 MCG/KG/HR: 4 INJECTION, SOLUTION INTRAVENOUS at 18:13

## 2022-08-11 RX ADMIN — SODIUM CHLORIDE, PRESERVATIVE FREE 20 MG: 5 INJECTION INTRAVENOUS at 08:43

## 2022-08-11 RX ADMIN — IPRATROPIUM BROMIDE 0.5 MG: 0.5 SOLUTION RESPIRATORY (INHALATION) at 01:43

## 2022-08-11 RX ADMIN — SODIUM CHLORIDE, PRESERVATIVE FREE 10 ML: 5 INJECTION INTRAVENOUS at 15:19

## 2022-08-11 RX ADMIN — ENOXAPARIN SODIUM 40 MG: 100 INJECTION SUBCUTANEOUS at 08:43

## 2022-08-11 RX ADMIN — ARFORMOTEROL TARTRATE 15 MCG: 15 SOLUTION RESPIRATORY (INHALATION) at 08:13

## 2022-08-11 RX ADMIN — MIDAZOLAM 0.5 MG: 1 INJECTION, SOLUTION INTRAMUSCULAR; INTRAVENOUS at 17:10

## 2022-08-11 RX ADMIN — CHLORHEXIDINE GLUCONATE 15 ML: 1.2 RINSE ORAL at 21:51

## 2022-08-11 RX ADMIN — ASPIRIN 81 MG CHEWABLE TABLET 81 MG: 81 TABLET CHEWABLE at 08:43

## 2022-08-11 RX ADMIN — DEXMEDETOMIDINE HYDROCHLORIDE 0.2 MCG/KG/HR: 4 INJECTION, SOLUTION INTRAVENOUS at 06:21

## 2022-08-11 RX ADMIN — HALOPERIDOL LACTATE 5 MG: 5 INJECTION, SOLUTION INTRAMUSCULAR at 15:15

## 2022-08-11 RX ADMIN — ALBUMIN (HUMAN) 25 G: 12.5 INJECTION, SOLUTION INTRAVENOUS at 16:37

## 2022-08-11 RX ADMIN — ACETAMINOPHEN 650 MG: 325 TABLET ORAL at 06:38

## 2022-08-11 RX ADMIN — HALOPERIDOL LACTATE 5 MG: 5 INJECTION, SOLUTION INTRAMUSCULAR at 17:11

## 2022-08-11 RX ADMIN — IPRATROPIUM BROMIDE 0.5 MG: 0.5 SOLUTION RESPIRATORY (INHALATION) at 08:49

## 2022-08-11 RX ADMIN — IPRATROPIUM BROMIDE AND ALBUTEROL SULFATE 3 ML: .5; 3 SOLUTION RESPIRATORY (INHALATION) at 06:30

## 2022-08-11 RX ADMIN — MILRINONE LACTATE 0.25 MCG/KG/MIN: 0.2 INJECTION, SOLUTION INTRAVENOUS at 04:16

## 2022-08-11 RX ADMIN — BUDESONIDE 500 MCG: 0.5 INHALANT RESPIRATORY (INHALATION) at 08:13

## 2022-08-11 NOTE — PROGRESS NOTES
Critical Care Team update    Pt w/ acute episode of non-direct ible agitation, endorsing AVH, refractory to precedex gtt at max dose. Improvement w/ versed/benadryl/haldol. Significant other did not endorse EtOH on admission, however clinical suspicion of VIRGEN. Will place on CIWA and symptom based phenobarb dosing. Joo Ramos MD  Staff [facility]
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
## 2022-08-11 NOTE — PROGRESS NOTES
CRITICAL CARE PROGRESS/ TRANSFER NOTE      Name: Selena Lemos   : 1960   MRN: [id number]   Date: 2022      REASON FOR ICU ADMISSION:  Vfib arrest, acute hypoxic respiratory failure     PRINCIPAL ICU DIAGNOSIS   Post Vfib arrest   Suspect hypoxemic brain injury  ICU delirium  NICM EF (prior 25-30%) s/p ICD, now 10-15% post Vfib arrest  COPD  EDGAR ON CKD 3, Present on admission    BRIEF PATIENT SUMMARY   Pt is 58 y.o M w/ PMH NICM (25-30% NYHA class IIIB ) s/p ICD, COPD who presented to ED via EMS after Vfib arrest. Pt recently w/ ICD placement and initiation of continuous IV milrinone therapy at OSH about 3wks PTA. Pt continued on milrinone in ED, started on levophed, and received bolus amiodarone and started on infusion. Critical care consulted for admission and pt started on TTM after CTH neg for bleed, EEG neg for seizures. Neuro improvement, extubated and re-warmed. COMPREHENSIVE ASSESSMENT & PLAN:SYSTEM BASED     24 HOUR EVENTS:   Impulsive but re-direct ible, off vasopressin, remains on milrinone.      NEUROLOGICAL:   Awake this AM, precedex off, will require sitter 2/2 impulsivity   Suspect anoxic injury, monitor for improvement  Monitor for acute neuro change  Delirium precautions, avoid benzo/opiates  PT/OT    PULMONOLOGY:   O2 per HFNP, wean as tolerated to NC  C/w home inhalers  spO2 goal 88-92%  PRN duo-nebs  Sit up, OOB as tolerated, IS    CARDIOVASCULAR:   ICD from [address] placed 22 by Dr. Mary Anne Morales per ED RN report  Device Interrogated in ED w/ follow report  C/w milrinone, amio stopped, monitor  Advanced heart failure team consulted, appreciate input  ASA, previously unable to tolerate other GDMT  AC if still indicated, awaiting heart failure team input  Ensure euvolemia    GASTROINTESTINAL   Dysphagia diet  PPI     RENAL/ELECTROLYTE/FLUIDS:   Renal fx at baseline  Strict I/Os  Daily RFP  Mg/Phos/K -> 2/3/4    ENDOCRINE:   Glucose goal 120-180    HEMATOLOGY/ONCOLOGY:   Lovenox ppx, monitor for signs of acute bleed    INFECTIOUS DISEASE:   No acute issues    ICU DAILY CHECKLIST     Code Status:Full per discussion w/ wife at bedside in ED  Will consult palliative care given overall medical co-morbidities      DVT Prophylaxis:SCDs, lovenox  T/L/D: CHANDRIKA maza  SUP: Pepsid  Diet: NPO  Activity Level: Ad tom w/ supervision, fall precautions  ABCDEF Bundle/Checklist Completed:Yes  Disposition: Stay in ICU, transfer to floor if able to wean HFNC and keep off precedex  Multidisciplinary Rounds Completed:  yes  Patient/Family Updated: Yes, palliative consulted given overall co-morbidities    Cece Regalado     Pt is 58 y.o M w/ PMH NICM (25-30% NYHA class IIIB ) s/p ICD, COPD who presented to ED via EMS after Vfib arrest. Hx obtained from wife at bedside due to acuity of condition. States pt collapsed mid conversation approx 1hr PTA, states pt w/ head impact on floor on collapse w/ \"3 big twitches\" immediately (no incontinence or other tonic/clonic activity). On EMS arrival pt noted to be in Vfib, ACLS protocol started w/ ROSC after defibrillation x2, pt intubated for airway protection in field, given versed en route. Wife states pt had 1 episode of \"chest cramping\" day prior, unsure if it was ICD discharge. Pt recently w/ ICD placement and initiation of continuous IV milrinone therapy at OSH about 3wks PTA. Pt continued on milrinone in ED, started on levophed, and received bolus amiodarone and started on infusion. Critical care consulted for admission. 8/9 Pt w/ neurological improvement, alert and following commands. Extubated to high flow prongs. On minimal levophed, continued on milrinone. SUBJECTIVE   Review of Systems   Unable to perform ROS: Mental acuity      OBJECTIVE     Labs and Data: Reviewed 08/11/22  Medications: Reviewed 08/11/22  Imaging: Reviewed 08/11/22    Physical Exam  Vitals and nursing note reviewed. Constitutional:       General: He is not in acute distress. Appearance: He is normal weight. He is ill-appearing (chronically). He is not diaphoretic. HENT:      Head: Normocephalic. Comments: Bruising around L eye     Mouth/Throat:      Mouth: Mucous membranes are dry. Pharynx: No oropharyngeal exudate. Eyes:      General: No scleral icterus. Extraocular Movements: Extraocular movements intact. Conjunctiva/sclera: Conjunctivae normal.      Pupils: Pupils are equal, round, and reactive to light. Cardiovascular:      Rate and Rhythm: Normal rate and regular rhythm. Heart sounds: Normal heart sounds. No murmur heard. Comments: RIJ/R fem CVC  Pulmonary:      Effort: Pulmonary effort is normal. No respiratory distress. Breath sounds: No wheezing. Abdominal:      General: Abdomen is flat. Bowel sounds are normal. There is no distension. Palpations: Abdomen is soft. Tenderness: There is no abdominal tenderness. Musculoskeletal:         General: No swelling. Normal range of motion. Cervical back: Normal range of motion and neck supple. Skin:     General: Skin is dry. Capillary Refill: Capillary refill takes less than 2 seconds. Coloration: Skin is not jaundiced. Neurological:      General: No focal deficit present. Mental Status: He is alert. He is disoriented.    Psychiatric:         Mood and Affect: Mood normal.      Comments: pleasantly confused        Visit Vitals  /82   Pulse (!) 113   Temp 98.1 °F (36.7 °C)   Resp 23   Ht 5' 8\" (1.727 m)   Wt 74 kg (163 lb 2.3 oz)   SpO2 97%   BMI 24.81 kg/m²    O2 Flow Rate (L/min): 2 l/min O2 Device: Nasal cannula Temp (24hrs), Av.3 °F (36.8 °C), Min:98.1 °F (36.7 °C), Max:98.5 °F (36.9 °C)           Intake/Output:     Intake/Output Summary (Last 24 hours) at 2022 1445  Last data filed at 2022 1328  Gross per 24 hour   Intake 836.44 ml   Output 1765 ml   Net -928.56 ml Imaging    06/15/22    ECHO ADULT COMPLETE 06/17/2022 6/17/2022    Interpretation Summary  Formatting of this result is different from the original.      Left Ventricle: Moderately reduced left ventricular systolic function with a visually estimated EF of 30 - 35%. Left ventricle is mildly dilated. Normal wall thickness. Moderate global hypokinesis present. Aortic Valve: Tricuspid valve. Mild to moderate paravalvular regurgitation. Mitral Valve: Mild regurgitation. Tricuspid Valve: Moderate regurgitation. Left Atrium: Left atrium is mildly dilated. Aorta: Not assessed. Signed by: Zoila Mix MD on 6/17/2022  4:54 PM     Other pertinent imaging reviewed and interpreted as noted above    CRITICAL CARE DOCUMENTATION  I had a face to face encounter with the patient, reviewed and interpreted patient data including clinical events, labs, images, vital signs, I/O's, and examined patient. I have discussed the case and the plan and management of the patient's care with the consulting services, the bedside nurses and the respiratory therapist.      NOTE OF PERSONAL INVOLVEMENT IN CARE   This patient has a high probability of imminent, clinically significant deterioration, which requires the highest level of preparedness to intervene urgently. I participated in the decision-making and personally managed or directed the management of the following life and organ supporting interventions that required my frequent assessment to treat or prevent imminent deterioration. I personally spent 45 minutes of critical care time. This is time spent at this critically ill patient's bedside actively involved in patient care as well as the coordination of care. This does not include any procedural time which has been billed separately. Joo Avila MD  Staff [facility]

## 2022-08-11 NOTE — PROGRESS NOTES
1930: Bedside and Verbal shift change report given to Gisselle Dhillon RN (oncoming nurse) by GUICHO Walton (offgoing nurse). Report included the following information SBAR, Kardex, ED Summary, OR Summary, Procedure Summary, Intake/Output, MAR, Recent Results, Cardiac Rhythm SR/ST, and Alarm Parameters . 1945: Gtts verified. 2312: Precedex gtt restarted d/t agitation. 200: Vaso gtt weaned off.    0730: Bedside and Verbal shift change report given to Marivel Ngo RN (oncoming nurse) by Gisselle Dhillon RN (offgoing nurse). Report included the following information SBAR, Kardex, ED Summary, OR Summary, Procedure Summary, Intake/Output, MAR, Recent Results, Cardiac Rhythm SR, and Alarm Parameters .
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
## 2022-08-11 NOTE — PROGRESS NOTES
1930-Bedside shift change report given to Nickie Campbell (oncoming nurse) by Michael Lewis RN (offgoing nurse). Report included the following information SBAR, Kardex, Intake/Output, MAR, Recent Results, and Cardiac Rhythm NSR . Drips: yi@ 0.25, [email or web address]    2000-Care assumed. 2255-Pt waking up, asking for assistance with urinal when staff entered. Pt wheezing with exertion, desatting on 2L NC, pt refused neb treatments and stated he was \"fine\". 0020-Pt complaining of chest pain, EKG ordered STAT. No change from previous EKGs. Neb tx given PRN for wheezing, pt agreeable and conversational at this time. Pain subsided without intervention at this time. 0200-Pt in and out of ventricular rhythm. BP stable, no changes to HR.    0730-Bedside shift change report given to Simona Molina RN (oncoming nurse) by Joe Busby RN (offgoing nurse). Report included the following information SBAR, Kardex, Intake/Output, MAR, Recent Results, and Cardiac Rhythm NSR .

## 2022-08-11 NOTE — PROGRESS NOTES
Problem: Dysphagia (Adult)  Goal: *Acute Goals and Plan of Care (Insert Text)  Description: Speech pathology goals  Initiated 8/10/2022  1. Patient will tolerate full liquid diet with no overt s/s aspiration or adverse effects within 7 days  2. Patient will tolerate diet liberalization within 7 days  Outcome: Progressing Towards Goal     Problem: Neurolinguistics Impaired (Adult)  Goal: *Acute Goals and Plan of Care (Insert Text)  Description: Speech Therapy Goals  Initiated 8/11/22    1. Pt will be oriented with use of visual aids with 100% accuracy within 7 days. 2. Pt will utilize memory strategies with 60% accuracy within 7 days. Outcome: Progressing Towards Goal     SPEECH LANGUAGE PATHOLOGY DYSPHAGIA TREATMENT AND INTEGRATED LANGUAGE EVALUATION  Patient: Eze Ozuna (3 y.o. male)  Date: 8/11/2022  Diagnosis: Cardiac arrest ([facility] [address] [I46.9] <principal problem not specified>      Precautions:  Fall    ASSESSMENT:  Patient with improved tolerance of PO trials on this date without any overt s/s of aspiration nor overt difficulty. Pt still with periods of tachypnea and is very confused and therefore will advance diet to easy to chew with supervision. Will continue follow. Additionally, pt presents with moderate integrated language deficits characterized by decreased orientation, poor verbal recall, reduced problem solving, and no insight into deficits. Will benefit from continued SLP at this and the next level of care. PLAN:  Recommendations and Planned Interventions:  --easy to chew diet/thin liquids  --good oral care  --supervision    Recommend utilizing the following strategies to facilitate patient's functional command following  and orientation  -- Provide patient with visual cues     Patient continues to benefit from skilled intervention to address the above impairments. Continue treatment per established plan of care.   Discharge Recommendations:  Inpatient Rehab     SUBJECTIVE:   Patient stated, \"Wow I feel like royalty unsarcastically after SLP gave him a container of applesauce. OBJECTIVE:   Cognitive and Communication Status:  Neurologic State: Confused  Orientation Level: Oriented to person, Disoriented to time, Disoriented to situation, Disoriented to place  Cognition: Follows commands  Perception: Appears intact  Perseveration: Perseverates during conversation  Safety/Judgement: Decreased awareness of environment, Decreased awareness of need for assistance, Decreased awareness of need for safety, Decreased insight into deficits      Speech Treatment and Interventions:    Neuro-Linguistics:        Verbal Problem Solving: Impaired  Verbal Organization: Impaired  Thought Organization: Impaired     Memory: Impaired  Attention : Impaired               Pain:  Pain Scale 1: Numeric (0 - 10)  Pain Intensity 1: 5  Pain Location 1: Chest    After treatment:   Call bell within reach and Nursing notified    COMMUNICATION/EDUCATION:     The patient's plan of care including recommendations, planned interventions, and recommended diet changes were discussed with: Registered nurse.        DAHLIA Barnard  Time Calculation: 18 mins
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
## 2022-08-11 NOTE — PROGRESS NOTES
0730 Bedside and Verbal shift change report given to Cecelia So (oncoming nurse) by Eugene Lockhart (offgoing nurse). Report included the following information SBAR, Kardex, ED Summary, Procedure Summary, Intake/Output, MAR, Accordion, and Recent Results. 1510 Pt becoming very agitated on 1.5 precedex pulling lines, Dr. Be Romano paged, orders for haldol received.

## 2022-08-12 LAB
ANION GAP SERPL CALC-SCNC: 9 MMOL/L (ref 5–15)
APTT PPP: 31.5 SEC (ref 22.1–31)
ATRIAL RATE: 80 BPM
BUN SERPL-MCNC: 19 MG/DL (ref 6–20)
BUN/CREAT SERPL: 22 (ref 12–20)
CALCIUM SERPL-MCNC: 9 MG/DL (ref 8.5–10.1)
CALCULATED P AXIS, ECG09: 57 DEGREES
CALCULATED R AXIS, ECG10: -80 DEGREES
CALCULATED T AXIS, ECG11: -58 DEGREES
CHLORIDE SERPL-SCNC: 110 MMOL/L (ref 97–108)
CO2 SERPL-SCNC: 24 MMOL/L (ref 21–32)
CREAT SERPL-MCNC: 0.87 MG/DL (ref 0.7–1.3)
DIAGNOSIS, 93000: NORMAL
ERYTHROCYTE [DISTWIDTH] IN BLOOD BY AUTOMATED COUNT: 17.9 % (ref 11.5–14.5)
GLUCOSE SERPL-MCNC: 127 MG/DL (ref 65–100)
HCT VFR BLD AUTO: 26.9 % (ref 36.6–50.3)
HGB BLD-MCNC: 8.7 G/DL (ref 12.1–17)
INR PPP: 1.2 (ref 0.9–1.1)
MAGNESIUM SERPL-MCNC: 1.9 MG/DL (ref 1.6–2.4)
MCH RBC QN AUTO: 30.6 PG (ref 26–34)
MCHC RBC AUTO-ENTMCNC: 32.3 G/DL (ref 30–36.5)
MCV RBC AUTO: 94.7 FL (ref 80–99)
NRBC # BLD: 0 K/UL (ref 0–0.01)
NRBC BLD-RTO: 0 PER 100 WBC
P-R INTERVAL, ECG05: 200 MS
PLATELET # BLD AUTO: 197 K/UL (ref 150–400)
PMV BLD AUTO: 11.7 FL (ref 8.9–12.9)
POTASSIUM SERPL-SCNC: 3.6 MMOL/L (ref 3.5–5.1)
PROTHROMBIN TIME: 12.7 SEC (ref 9–11.1)
Q-T INTERVAL, ECG07: 468 MS
QRS DURATION, ECG06: 116 MS
QTC CALCULATION (BEZET), ECG08: 539 MS
RBC # BLD AUTO: 2.84 M/UL (ref 4.1–5.7)
SODIUM SERPL-SCNC: 143 MMOL/L (ref 136–145)
THERAPEUTIC RANGE,PTTT: ABNORMAL SECS (ref 58–77)
VENTRICULAR RATE, ECG03: 80 BPM
WBC # BLD AUTO: 9.9 K/UL (ref 4.1–11.1)

## 2022-08-12 PROCEDURE — 74011250636 HC RX REV CODE- 250/636: Performed by: STUDENT IN AN ORGANIZED HEALTH CARE EDUCATION/TRAINING PROGRAM

## 2022-08-12 PROCEDURE — 65620000000 HC RM CCU GENERAL

## 2022-08-12 PROCEDURE — 93005 ELECTROCARDIOGRAM TRACING: CPT

## 2022-08-12 PROCEDURE — 92526 ORAL FUNCTION THERAPY: CPT

## 2022-08-12 PROCEDURE — 92507 TX SP LANG VOICE COMM INDIV: CPT

## 2022-08-12 PROCEDURE — 36415 COLL VENOUS BLD VENIPUNCTURE: CPT

## 2022-08-12 PROCEDURE — 97530 THERAPEUTIC ACTIVITIES: CPT

## 2022-08-12 PROCEDURE — 85610 PROTHROMBIN TIME: CPT

## 2022-08-12 PROCEDURE — 83735 ASSAY OF MAGNESIUM: CPT

## 2022-08-12 PROCEDURE — 80048 BASIC METABOLIC PNL TOTAL CA: CPT

## 2022-08-12 PROCEDURE — 74011250637 HC RX REV CODE- 250/637: Performed by: STUDENT IN AN ORGANIZED HEALTH CARE EDUCATION/TRAINING PROGRAM

## 2022-08-12 PROCEDURE — 94640 AIRWAY INHALATION TREATMENT: CPT

## 2022-08-12 PROCEDURE — 99233 SBSQ HOSP IP/OBS HIGH 50: CPT | Performed by: NURSE PRACTITIONER

## 2022-08-12 PROCEDURE — 97116 GAIT TRAINING THERAPY: CPT

## 2022-08-12 PROCEDURE — 74011000250 HC RX REV CODE- 250: Performed by: STUDENT IN AN ORGANIZED HEALTH CARE EDUCATION/TRAINING PROGRAM

## 2022-08-12 PROCEDURE — 85730 THROMBOPLASTIN TIME PARTIAL: CPT

## 2022-08-12 PROCEDURE — 74011250636 HC RX REV CODE- 250/636: Performed by: INTERNAL MEDICINE

## 2022-08-12 PROCEDURE — 97535 SELF CARE MNGMENT TRAINING: CPT

## 2022-08-12 PROCEDURE — 85027 COMPLETE CBC AUTOMATED: CPT

## 2022-08-12 RX ORDER — PHENOBARBITAL SODIUM 65 MG/ML
65 INJECTION INTRAMUSCULAR
Status: DISCONTINUED | OUTPATIENT
Start: 2022-08-12 | End: 2022-08-20 | Stop reason: HOSPADM

## 2022-08-12 RX ORDER — PHENOBARBITAL 32.4 MG/1
64.8 TABLET ORAL 3 TIMES DAILY
Status: DISCONTINUED | OUTPATIENT
Start: 2022-08-12 | End: 2022-08-20 | Stop reason: HOSPADM

## 2022-08-12 RX ORDER — LIDOCAINE 4 G/100G
1 PATCH TOPICAL EVERY 24 HOURS
Status: DISCONTINUED | OUTPATIENT
Start: 2022-08-12 | End: 2022-08-16

## 2022-08-12 RX ORDER — CALCIUM CARBONATE 200(500)MG
200 TABLET,CHEWABLE ORAL
Status: DISCONTINUED | OUTPATIENT
Start: 2022-08-12 | End: 2022-08-20 | Stop reason: HOSPADM

## 2022-08-12 RX ORDER — LORAZEPAM 2 MG/1
2 TABLET ORAL ONCE
Status: COMPLETED | OUTPATIENT
Start: 2022-08-12 | End: 2022-08-12

## 2022-08-12 RX ORDER — DEXTROMETHORPHAN POLISTIREX 30 MG/5ML
30 SUSPENSION ORAL EVERY 12 HOURS
Status: DISCONTINUED | OUTPATIENT
Start: 2022-08-12 | End: 2022-08-20 | Stop reason: HOSPADM

## 2022-08-12 RX ADMIN — MIDODRINE HYDROCHLORIDE 5 MG: 5 TABLET ORAL at 21:02

## 2022-08-12 RX ADMIN — PHENOBARBITAL 64.8 MG: 32.4 TABLET ORAL at 18:48

## 2022-08-12 RX ADMIN — IPRATROPIUM BROMIDE AND ALBUTEROL SULFATE 3 ML: .5; 3 SOLUTION RESPIRATORY (INHALATION) at 17:50

## 2022-08-12 RX ADMIN — PHENOBARBITAL SODIUM 65 MG: 65 INJECTION INTRAMUSCULAR; INTRAVENOUS at 00:46

## 2022-08-12 RX ADMIN — ACETAMINOPHEN 650 MG: 325 TABLET ORAL at 01:00

## 2022-08-12 RX ADMIN — CHLORHEXIDINE GLUCONATE 15 ML: 1.2 RINSE ORAL at 09:46

## 2022-08-12 RX ADMIN — Medication 30 MG: at 21:29

## 2022-08-12 RX ADMIN — SODIUM CHLORIDE, PRESERVATIVE FREE 20 MG: 5 INJECTION INTRAVENOUS at 20:17

## 2022-08-12 RX ADMIN — SODIUM CHLORIDE, PRESERVATIVE FREE 20 MG: 5 INJECTION INTRAVENOUS at 09:45

## 2022-08-12 RX ADMIN — ARFORMOTEROL TARTRATE 15 MCG: 15 SOLUTION RESPIRATORY (INHALATION) at 20:38

## 2022-08-12 RX ADMIN — PHENOBARBITAL SODIUM 65 MG: 65 INJECTION INTRAMUSCULAR; INTRAVENOUS at 12:41

## 2022-08-12 RX ADMIN — IPRATROPIUM BROMIDE 0.5 MG: 0.5 SOLUTION RESPIRATORY (INHALATION) at 14:46

## 2022-08-12 RX ADMIN — DEXMEDETOMIDINE HYDROCHLORIDE 1.4 MCG/KG/HR: 4 INJECTION, SOLUTION INTRAVENOUS at 05:55

## 2022-08-12 RX ADMIN — DEXMEDETOMIDINE HYDROCHLORIDE 1.2 MCG/KG/HR: 4 INJECTION, SOLUTION INTRAVENOUS at 09:45

## 2022-08-12 RX ADMIN — CHLORHEXIDINE GLUCONATE 15 ML: 1.2 RINSE ORAL at 21:03

## 2022-08-12 RX ADMIN — MILRINONE LACTATE 0.25 MCG/KG/MIN: 0.2 INJECTION, SOLUTION INTRAVENOUS at 13:44

## 2022-08-12 RX ADMIN — BUDESONIDE 500 MCG: 0.5 INHALANT RESPIRATORY (INHALATION) at 08:25

## 2022-08-12 RX ADMIN — MIDODRINE HYDROCHLORIDE 5 MG: 5 TABLET ORAL at 06:38

## 2022-08-12 RX ADMIN — ARFORMOTEROL TARTRATE 15 MCG: 15 SOLUTION RESPIRATORY (INHALATION) at 08:25

## 2022-08-12 RX ADMIN — CALCIUM CARBONATE (ANTACID) CHEW TAB 500 MG 200 MG: 500 CHEW TAB at 15:51

## 2022-08-12 RX ADMIN — ASPIRIN 81 MG CHEWABLE TABLET 81 MG: 81 TABLET CHEWABLE at 09:46

## 2022-08-12 RX ADMIN — SODIUM CHLORIDE, PRESERVATIVE FREE 10 ML: 5 INJECTION INTRAVENOUS at 17:13

## 2022-08-12 RX ADMIN — ACETAMINOPHEN 650 MG: 325 TABLET ORAL at 11:29

## 2022-08-12 RX ADMIN — ENOXAPARIN SODIUM 40 MG: 100 INJECTION SUBCUTANEOUS at 09:45

## 2022-08-12 RX ADMIN — MIDODRINE HYDROCHLORIDE 5 MG: 5 TABLET ORAL at 15:08

## 2022-08-12 RX ADMIN — ACETAMINOPHEN 650 MG: 325 TABLET ORAL at 17:12

## 2022-08-12 RX ADMIN — IPRATROPIUM BROMIDE 0.5 MG: 0.5 SOLUTION RESPIRATORY (INHALATION) at 20:39

## 2022-08-12 RX ADMIN — IPRATROPIUM BROMIDE 0.5 MG: 0.5 SOLUTION RESPIRATORY (INHALATION) at 08:25

## 2022-08-12 RX ADMIN — IPRATROPIUM BROMIDE AND ALBUTEROL SULFATE 3 ML: .5; 3 SOLUTION RESPIRATORY (INHALATION) at 00:00

## 2022-08-12 RX ADMIN — IPRATROPIUM BROMIDE AND ALBUTEROL SULFATE 3 ML: .5; 3 SOLUTION RESPIRATORY (INHALATION) at 12:42

## 2022-08-12 RX ADMIN — Medication 30 MG: at 13:44

## 2022-08-12 RX ADMIN — LORAZEPAM 2 MG: 2 TABLET ORAL at 18:48

## 2022-08-12 RX ADMIN — IPRATROPIUM BROMIDE 0.5 MG: 0.5 SOLUTION RESPIRATORY (INHALATION) at 00:29

## 2022-08-12 RX ADMIN — DEXMEDETOMIDINE HYDROCHLORIDE 1 MCG/KG/HR: 4 INJECTION, SOLUTION INTRAVENOUS at 19:36

## 2022-08-12 RX ADMIN — DEXMEDETOMIDINE HYDROCHLORIDE 1.4 MCG/KG/HR: 4 INJECTION, SOLUTION INTRAVENOUS at 02:05

## 2022-08-12 RX ADMIN — BUDESONIDE 500 MCG: 0.5 INHALANT RESPIRATORY (INHALATION) at 20:39

## 2022-08-12 NOTE — PROGRESS NOTES
1930-Bedside shift change report given to Yury Guzmán RN (oncoming nurse) by Leslie Parker RN (offgoing nurse). Report included the following information SBAR, Kardex, Intake/Output, MAR, Recent Results, and Cardiac Rhythm NSR . Drips: Bruce@[employer], yi@0.25    2000-Care assumed. Pt resting comfortably at this time. 0500-Pt up with one assistance to bedside commode to void. Conversational, redirectable when confused. Pt at ease with no agitation at this time. 0630-Per MD, stopped precedex. Pt A/Ox2, conversational and calm at this time. 0730-Bedside shift change report given to Adelso Cyr (oncoming nurse) by Yury Guzmán RN (offgoing nurse). Report included the following information SBAR, Kardex, Intake/Output, MAR, Recent Results, and Cardiac Rhythm NSR .      Drips: [email or web address]

## 2022-08-12 NOTE — PROGRESS NOTES
Problem: Self Care Deficits Care Plan (Adult)  Goal: *Acute Goals and Plan of Care (Insert Text)  Description: FUNCTIONAL STATUS PRIOR TO ADMISSION: Patient was independent and active without use of DME.     HOME SUPPORT: The patient lived with wife but did not require assist.    Occupational Therapy Goals  Initiated 8/9/2022  1. Patient will perform grooming in standing with independence within 7 day(s). 2.  Patient will perform bathing with independence within 7 day(s). 3.  Patient will perform lower body dressing with independence within 7 day(s). 4.  Patient will perform toilet transfers with independence within 7 day(s). 5.  Patient will perform all aspects of toileting with independence within 7 day(s). 6.  Patient will participate in upper extremity therapeutic exercise/activities with independence for 5 minutes within 7 day(s). 7.  Patient will utilize energy conservation techniques during functional activities with verbal cues within 7 day(s). Outcome: Progressing Towards Goal   OCCUPATIONAL THERAPY TREATMENT  Patient: Genesis Saravia (70 y.o. male)  Date: 8/12/2022  Diagnosis: Cardiac arrest [facility]) [I46.9] <principal problem not specified>      Precautions: Fall  Chart, occupational therapy assessment, plan of care, and goals were reviewed. ASSESSMENT  Patient continues with skilled OT services and is progressing towards goals. Pt's performance of ADL/IADL tasks continues to be limited at this time by impaired cognition (safety awareness, impulsivity, attention), decreased activity tolerance, and impaired balance. Pt received semi-supine and agreeable to participation in therapy session. 3LNC donned for functional mobility/OOB ADLs as pt with consistently low, but unreliable, pleth readings. Pt continues to require min to CGA to complete functional transfers and mobility to sink within room, primary assist required d/t poor safety awareness and impulsivity.  Pt making intermittent sexually inappropriate comments towards therapists, however redirectable. As pt is well below his functional baseline, is a high fall risk, and with significant cognitive/safety deficits recommend d/c to IPR. Second session completed following discussion with RN regarding HF team's request for completion of MOCA. Pt scored 15/30. Assessment results outlined below, RN notified, and scored assessment placed in pt's binder. Current Level of Function Impacting Discharge (ADLs): up to min A functional transfers for safety, standing grooming close CGA for safety     Other factors to consider for discharge: PLOF, poor insight/safety awareness, cognitive deficits          PLAN :  Patient continues to benefit from skilled intervention to address the above impairments. Continue treatment per established plan of care to address goals. Recommendation for discharge: (in order for the patient to meet his/her long term goals)  Therapy 3 hours per day 5-7 days per week    This discharge recommendation:  Has not yet been discussed the attending provider and/or case management    IF patient discharges home will need the following DME: TBD       SUBJECTIVE:   Patient stated Oh why are you closing the door? You want privacy with me?    OBJECTIVE DATA SUMMARY:   Cognitive/Behavioral Status:  Neurologic State: Alert;Confused  Orientation Level: Oriented to person;Oriented to place; Disoriented to situation;Disoriented to time  Cognition: Decreased attention/concentration;Decreased command following; Impulsive;Poor safety awareness  Perception: Appears intact  Perseveration: Perseverates during ADLS;Perseverates during conversation;Perseverates during mobility  Safety/Judgement: Decreased insight into deficits; Decreased awareness of need for safety;Decreased awareness of need for assistance;Decreased awareness of environment    Functional Mobility and Transfers for ADLs:  Bed Mobility:  Rolling: Contact guard assistance  Supine to Sit: Contact guard assistance  Scooting: Contact guard assistance    Transfers:  Sit to Stand: Contact guard assistance;Assist x1  Functional Transfers  Bathroom Mobility: Contact guard assistance (to sink within room)       Balance:  Sitting: Intact; Without support  Standing: Impaired; With support  Standing - Static: Fair;Constant support  Standing - Dynamic : Fair;Constant support    ADL Intervention:       Grooming  Grooming Assistance: Contact guard assistance  Position Performed: Standing (at sink)  Washing Hands: Contact guard assistance  Brushing/Combing Hair: Set-up (seated)  Cues: Verbal cues provided                                  Cognitive Retraining  Safety/Judgement: Decreased insight into deficits; Decreased awareness of need for safety;Decreased awareness of need for assistance;Decreased awareness of environment    [place] Cognitive Assessment [facility] ([place], [place])):    Patient scored 15/30. The patient scored lower in areas of visuospatial/executive, attention, language, and delayed recall. Confounding factor(s) include: impaired attention, pain, pt without reading glasses at time of assessment. The [place] Cognitive Assessment [facility] ([place], [place])) is designed to assess cognition in areas of visuospatial, executive, naming, memory, attention, language, abstraction, delayed recall, and orientation. Score of greater than or equal to 26/30 is considered normal cognition. Score of less than 26 indicates mild cognitive impairment. Score of 16 or lower indicates severe cognitive impairment. Paulette Amos I., Betzaida Leyva., JERONIMO Garcia. (2005). The [facility] Cognitive Assessment, MoCA: A brief screening tool for mild cognitive impairment. Journal of the [address] [place] [address], 76, [phone].        Pain:  Pt continues to report R flank pain and pain from coughing     Activity Tolerance:   Fair    After treatment patient left in no apparent distress:   Call bell within reach, Bed / chair alarm activated, and Side rails x 3, bed in modified chair position, RN notified of session     COMMUNICATION/COLLABORATION:   The patients plan of care was discussed with: Physical therapist and Registered nurse.      Francisco Javier Jiang OT  Total Time: 40 minutes
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
## 2022-08-12 NOTE — PROGRESS NOTES
Problem: Mobility Impaired (Adult and Pediatric)  Goal: *Acute Goals and Plan of Care (Insert Text)  Description: FUNCTIONAL STATUS PRIOR TO ADMISSION: Patient was independent and active without use of DME.    HOME SUPPORT PRIOR TO ADMISSION: The patient lived with wife but did not require assist.    Physical Therapy Goals  Initiated 8/10/2022  1. Patient will move from supine to sit and sit to supine  in bed with modified independence within 7 day(s). 2.  Patient will transfer from bed to chair and chair to bed with modified independence using the least restrictive device within 7 day(s). 3.  Patient will perform sit to stand with modified independence within 7 day(s). 4.  Patient will ambulate with modified independence for 50 feet with the least restrictive device within 7 day(s). Outcome: Progressing Towards Goal   PHYSICAL THERAPY TREATMENT  Patient: Suha Baker (2 y.o. male)  Date: 8/12/2022  Diagnosis: Cardiac arrest [facility]) [I46.9] <principal problem not specified>      Precautions: Fall  Chart, physical therapy assessment, plan of care and goals were reviewed. ASSESSMENT  Patient continues with skilled PT services and is progressing towards goals. Patient demonstrates improved activity tolerance on this date though continues to be limited by JARRETT with inconsistent SPO2 pleth reading, and continues to demonstrate lack of insight into deficits and poor environmental safety awareness with very high risk for falls. Noted patient code atlas yesterday with episode of non-redirectable agitation. Upon entering room, noted patient with breathing treatment mask hooked to O2 on wall, though mask lying beside patient, and patient at 95% on RA. Patient with no agitation during session today, though did demonstrate sexually inappropriate conversation. Patient impulsive with mobility but redirectable.  Patient ambulated 60 feet with min A via L HHA with mod-max cues for safety and environmental safety awareness. Patient demonstrated increasing dyspnea with increased ambulation, though not able to obtain accurate SPO2 reading. Upon return to room, patient educated and cued to use PLB and SPO2 improved to 95% on 3L via NC with good pleth. Patient continues to demonstrate high risk for falls and poor safety, as well as increased need for assistance to reduce risk for falls, and will require IPR at discharge for continued rehabilitation. Current Level of Function Impacting Discharge (mobility/balance): poor safety awareness requiring min A for ambulation of 60 feet    Other factors to consider for discharge: poor safety awareness, lack of insight into deficits, impulsivity          PLAN :  Patient continues to benefit from skilled intervention to address the above impairments. Continue treatment per established plan of care. to address goals. Recommendation for discharge: (in order for the patient to meet his/her long term goals)  Therapy 3 hours per day 5-7 days per week    This discharge recommendation:  A follow-up discussion with the attending provider and/or case management is planned    IF patient discharges home will need the following DME: to be determined (TBD)       SUBJECTIVE:   Patient stated \"That's a compliment because you know big fingers mean big something else.     OBJECTIVE DATA SUMMARY:   Critical Behavior:  Neurologic State: Alert, Confused  Orientation Level: Oriented to person, Disoriented to time, Disoriented to situation, Disoriented to place  Cognition: Impulsive  Safety/Judgement: Decreased insight into deficits  Functional Mobility Training:  Bed Mobility:  Rolling: Contact guard assistance  Supine to Sit: Contact guard assistance     Scooting: Contact guard assistance        Transfers:  Sit to Stand: Contact guard assistance;Assist x1  Stand to Sit: Contact guard assistance;Assist x1                             Balance:  Sitting: Intact; Without support  Standing: Impaired; With support  Standing - Static: Fair;Constant support  Standing - Dynamic : Fair;Constant support  Ambulation/Gait Training:  Distance (ft): 60 Feet (ft)  Assistive Device: Gait belt; Other (comment) (L HHA)  Ambulation - Level of Assistance: Minimal assistance        Gait Abnormalities: Decreased step clearance; Altered arm swing;Path deviations;Trunk sway increased        Base of Support: Widened     Speed/Milena: Pace decreased (<100 feet/min)  Step Length: Right shortened;Left shortened          Therapeutic Exercises:   Patient educated on PLB to improve O2 saturation and to reduce SOB    Pain Ratin/10    Activity Tolerance:   Fair, desaturates with exertion and requires oxygen, and requires rest breaks    After treatment patient left in no apparent distress:   Supine in bed, Call bell within reach, and Bed / chair alarm activated    COMMUNICATION/COLLABORATION:   The patients plan of care was discussed with: Occupational therapist and Registered nurse.      Josh Nesbitt, PT   Time Calculation: 25 mins

## 2022-08-12 NOTE — PROGRESS NOTES
Physician Progress Note      Lonnie Mcdaniels  CSN #:                  [id number]  :                       1960  ADMIT DATE:       2022 12:25 PM  [address] [place] DATE:  RESPONDING  PROVIDER #:        Katie Teran MD          QUERY TEXT:    Pt admitted with Acute CHF/ Cardiac Arrest. Pt noted to have VFib arrest, NICM, EDGAR, Possible PAULINO. If possible, please document in the progress notes and discharge summary if you are evaluating and/or treating any of the following: The medical record reflects the following:    Risk Factors: CHF, CKD, NICM, COPD    Clinical Indicators:  Lab trends and VS trends since 22    WBC:      14.2->18.0->16.9->13.4->11.2->9.9  Temp:     97.3-> TTM (96.1-96.8)-> Warmed-> 99.5->101.1->98.5  Cr:           1.86->1.96->2.06->2.22->1.59->1.18->0.87  Trop:       72->802->938->790     PN- Intensivist  Stage C, NYHA class IIIA symptoms  - Unknown etiology dilated cardiomyopathy, LVEF 20-25% (new onset 2021) improved to 33% (by cMRI on dobutamine )  - Most likely etiology is myocarditis as evidenced by area of healing mycoarditis by cMRI and high titer of coxackie antibodies; another explanation would be PVC- or tachycardia-mediated. - Low resting cardiac index 1.76 with normal filling pressures (21) on chronic inotropic support with dobutamine 5 mcg/kg/min     PN- Cardiology  Pt w/ neurological improvement, alert and following commands this AM. Extubated to high flow prongs. On minimal levophed, continued on milrinone. POCUS eval consistent w/ volume responsiveness. Will give IVF bolus and continue to monitor in ICU.    8/10 PN- Neuro  History and EEG findings were concerning for possible global hypoxic injury. CTH negative. Pt was warmed and extubated yesterday. Exam-temp 101.1 alert, oriented to person    ECHO 22:    -  Left? Ventricle: Severely reduced left ventricular systolic function with a visually estimated EF of 10 -15%.  Left ventricle is severely dilated. Normal wall thickness. Severe global hypokinesis present. -  Mitral?Valve: Mild regurgitation.  -  Tricuspid? Valve: Moderate to severe regurgitation. Normal RVSP. Treatment: TTM, Milrinone 0.25mcg/kg/min continuous IV, Levophed titratable gtt for goal MAP>65 mmHg, Amiodarone 150mg bolus followed by 0.5-1 mg/min continuous gtt, EEG, IVF bolus with 5% Albmuni 12.5-25gm IV, ASA 81mg tablet PO Daily, APAP 650mg tablet PO Q 6hrs PRN mild pain/ fever, Precedex titratable infusion RASS 0 to -1, Vasopressin continuous gtt 0.04 Units/min, Imaging    Thank you,  Sameera Rocha, JONN, RN, CCRN  [phone]  Options provided:  -- Cardiogenic Shock  -- Hemorrhagic Shock  -- Neurogenic Shock  -- Traumatic Shock  -- Septic Shock  -- Hypovolemic Shock  -- Hypovolemia without Shock  -- Hypotension without Shock  -- Other - I will add my own diagnosis  -- Disagree - Not applicable / Not valid  -- Disagree - Clinically unable to determine / Unknown  -- Refer to Clinical Documentation Reviewer    PROVIDER RESPONSE TEXT:    This patient has Cardiogenic Shock.     Query created by: Timmy Belle on 8/12/2022 12:20 PM      Electronically signed by:  Josselyn Luis MD 8/12/2022 12:23 PM

## 2022-08-12 NOTE — PROGRESS NOTES
Spiritual Care Assessment/Progress Note  [facility]      NAME: Eleonora Benton      MRN: [id number]  AGE: 58 y.o.  SEX: male  Jainism Affiliation: Latter-day   Language: English     8/12/2022     Total Time (in minutes): 5     Spiritual Assessment begun in [facility] CORONARY CARE through conversation with:         []Patient        [] Family    [] Friend(s)        Reason for Consult: Palliative Care, Initial/Spiritual Assessment     Spiritual beliefs: (Please include comment if needed)     [] Identifies with a angelina tradition:         [] Supported by a angelina community:            [] Claims no spiritual orientation:           [] Seeking spiritual identity:                [] Adheres to an individual form of spirituality:           [x] Not able to assess:                           Identified resources for coping:      [] Prayer                               [] Music                  [] Guided Imagery     [] Family/friends                 [] Pet visits     [] Devotional reading                         [] Unknown     [] Other:                                               Interventions offered during this visit: (See comments for more details)    Patient Interventions: Initial visit, Other (comment) (Note left at bedside)     Family/Friend(s): Other (comment) (Note left at bedside)     Plan of Care:     [] Support spiritual and/or cultural needs    [] Support AMD and/or advance care planning process      [] Support grieving process   [] Coordinate Rites and/or Rituals    [] Coordination with community clergy   [] No spiritual needs identified at this time   [] Detailed Plan of Care below (See Comments)  [] Make referral to Music Therapy  [] Make referral to Pet Therapy     [] Make referral to Addiction services  [] Make referral to [facility]  [] Make referral to Spiritual Care Partner  [] No future visits requested        [] Contact Spiritual Care for further referrals     Comments: Visited Mr Isabelle Mar in CCU-24 for initial Palliative Care spiritual assessment. Mr Azul Mann was sitting up in bed with his eyes closed; he did not respond when name was called. Left note at bedside assuring patient and family of  availability for support. : Rev. Kathryn Sen.  Jabier Shankar; [facility], to contact [phone] [address] call: [phone]

## 2022-08-12 NOTE — PROGRESS NOTES
CRITICAL CARE PROGRESS/ TRANSFER NOTE      Name: Nano Orlando   : 1960   MRN: [id number]   Date: 2022      REASON FOR ICU ADMISSION:  Vfib arrest, acute hypoxic respiratory failure     PRINCIPAL ICU DIAGNOSIS   Post Vfib arrest  Cardiogenic shock  Suspect hypoxemic brain injury  ICU delirium  Suspect alcohol withdrawal syndrome  NICM EF (prior 25-30%) s/p ICD, now 10-15% post Vfib arrest  COPD  EDGAR ON CKD 3, Present on admission    BRIEF PATIENT SUMMARY   Pt is 58 y.o M w/ PMH NICM (25-30% NYHA class IIIB ) s/p ICD, COPD who presented to ED via EMS after Vfib arrest. Pt recently w/ ICD placement and initiation of continuous IV milrinone therapy at OSH about 3wks PTA. Pt continued on milrinone in ED, started on levophed, and received bolus amiodarone and started on infusion. Critical care consulted for admission and pt started on TTM after CTH neg for bleed, EEG neg for seizures. Neuro improvement, extubated and re-warmed. COMPREHENSIVE ASSESSMENT & PLAN:SYSTEM BASED     24 HOUR EVENTS:   Remains intermittently confused and agitated, only required push phenobarb 1 time overnight.     Remains on Precedex infusion and milrinone drip    NEUROLOGICAL:   Precedex drip as needed, symptom triggered phenobarb for CIWA greater than 12  will require sitter 2/2 impulsivity   Suspect anoxic injury, versus ICU delirium versus VIRGEN however likely multifactorial  Monitor for acute neuro change  Delirium precautions, avoid benzo/opiates  PT/OT    PULMONOLOGY:   O2 per nasal cannula  C/w home inhalers  spO2 goal 88-92%  PRN duo-nebs  Sit up, OOB as tolerated, IS    CARDIOVASCULAR:   ICD from [facility] placed 22 by Dr. Katya Duckworth per ED RN report  Device Interrogated in ED w/ follow report  C/w milrinone, amio stopped, monitor  Advanced heart failure team consulted, appreciate input  ASA, previously unable to tolerate other GDMT  [facility] if still indicated, awaiting heart failure team input  Ensure euvolemia    GASTROINTESTINAL   Dysphagia diet  PPI     RENAL/ELECTROLYTE/FLUIDS:   Renal fx at baseline  Strict I/Os  Daily RFP  Mg/Phos/K -> 2/3/4    ENDOCRINE:   Glucose goal 120-180    HEMATOLOGY/ONCOLOGY:   Lovenox ppx, monitor for signs of acute bleed    INFECTIOUS DISEASE:   No acute issues    ICU DAILY CHECKLIST     Code Status:Full per discussion w/ wife at bedside in ED  Will consult palliative care given overall medical co-morbidities      DVT Prophylaxis:SCDs, lovenox  T/L/D: CHANDRIKA maza  SUP: Pepsid  Diet: NPO  Activity Level: Ad tom w/ supervision, fall precautions  ABCDEF Bundle/Checklist Completed:Yes  Disposition: Stay in ICU, transfer to floor if able to wean HFNC and keep off precedex  Multidisciplinary Rounds Completed:  yes  Patient/Family Updated: Yes, palliative consulted given overall co-morbidities    Fátimane Gosselin     Pt is 58 y.o M w/ PMH NICM (25-30% NYHA class IIIB ) s/p ICD, COPD who presented to ED via EMS after Vfib arrest. Hx obtained from wife at bedside due to acuity of condition. States pt collapsed mid conversation approx 1hr PTA, states pt w/ head impact on floor on collapse w/ \"3 big twitches\" immediately (no incontinence or other tonic/clonic activity). On EMS arrival pt noted to be in Vfib, ACLS protocol started w/ ROSC after defibrillation x2, pt intubated for airway protection in field, given versed en route. Wife states pt had 1 episode of \"chest cramping\" day prior, unsure if it was ICD discharge. Pt recently w/ ICD placement and initiation of continuous IV milrinone therapy at OSH about 3wks PTA. Pt continued on milrinone in ED, started on levophed, and received bolus amiodarone and started on infusion. Critical care consulted for admission. 8/9 Pt w/ neurological improvement, alert and following commands. Extubated to high flow prongs. On minimal levophed, continued on milrinone.    8/11 acutely agitated possible alcohol withdrawal syndrome    SUBJECTIVE   Review of Systems   Unable to perform ROS: Mental acuity      OBJECTIVE     Labs and Data: Reviewed 22  Medications: Reviewed 22  Imaging: Reviewed 22    Physical Exam  Vitals and nursing note reviewed. Constitutional:       General: He is not in acute distress. Appearance: He is normal weight. He is ill-appearing (chronically). He is not diaphoretic. HENT:      Head: Normocephalic. Comments: Bruising around L eye     Mouth/Throat:      Mouth: Mucous membranes are dry. Pharynx: No oropharyngeal exudate. Eyes:      General: No scleral icterus. Extraocular Movements: Extraocular movements intact. Conjunctiva/sclera: Conjunctivae normal.      Pupils: Pupils are equal, round, and reactive to light. Cardiovascular:      Rate and Rhythm: Normal rate and regular rhythm. Heart sounds: Normal heart sounds. No murmur heard. Comments: RIJ/R fem CVC  Pulmonary:      Effort: Pulmonary effort is normal. No respiratory distress. Breath sounds: No wheezing. Abdominal:      General: Abdomen is flat. Bowel sounds are normal. There is no distension. Palpations: Abdomen is soft. Tenderness: There is no abdominal tenderness. Musculoskeletal:         General: No swelling. Normal range of motion. Cervical back: Normal range of motion and neck supple. Skin:     General: Skin is dry. Capillary Refill: Capillary refill takes less than 2 seconds. Coloration: Skin is not jaundiced. Neurological:      General: No focal deficit present. Mental Status: He is alert. He is disoriented.    Psychiatric:         Mood and Affect: Mood normal.      Comments: pleasantly confused        Visit Vitals  /70   Pulse (!) 106   Temp 98 °F (36.7 °C)   Resp 27   Ht 5' 8\" (1.727 m)   Wt 71.4 kg (157 lb 6.5 oz)   SpO2 93%   BMI 23.93 kg/m²    O2 Flow Rate (L/min): 2 l/min O2 Device: Nasal cannula Temp (24hrs), Av.1 °F (36.7 °C), Min:98 °F (36.7 °C), Max:98.5 °F (36.9 °C)           Intake/Output:     Intake/Output Summary (Last 24 hours) at 8/12/2022 1224  Last data filed at 8/12/2022 1215  Gross per 24 hour   Intake 1154.86 ml   Output 2100 ml   Net -945.14 ml         Imaging    06/15/22    ECHO ADULT COMPLETE 06/17/2022 6/17/2022    Interpretation Summary  Formatting of this result is different from the original.      Left Ventricle: Moderately reduced left ventricular systolic function with a visually estimated EF of 30 - 35%. Left ventricle is mildly dilated. Normal wall thickness. Moderate global hypokinesis present. Aortic Valve: Tricuspid valve. Mild to moderate paravalvular regurgitation. Mitral Valve: Mild regurgitation. Tricuspid Valve: Moderate regurgitation. Left Atrium: Left atrium is mildly dilated. Aorta: Not assessed. Signed by: Jocy Brink MD on 6/17/2022  4:54 PM     Other pertinent imaging reviewed and interpreted as noted above    CRITICAL CARE DOCUMENTATION  I had a face to face encounter with the patient, reviewed and interpreted patient data including clinical events, labs, images, vital signs, I/O's, and examined patient. I have discussed the case and the plan and management of the patient's care with the consulting services, the bedside nurses and the respiratory therapist.      NOTE OF PERSONAL INVOLVEMENT IN CARE   This patient has a high probability of imminent, clinically significant deterioration, which requires the highest level of preparedness to intervene urgently. I participated in the decision-making and personally managed or directed the management of the following life and organ supporting interventions that required my frequent assessment to treat or prevent imminent deterioration. I personally spent 45 minutes of critical care time. This is time spent at this critically ill patient's bedside actively involved in patient care as well as the coordination of care.   This does not include any procedural time which has been billed separately. Joo Read MD  Staff [facility]

## 2022-08-12 NOTE — PROGRESS NOTES
0730 Shift report received from [address] into chair, 1 person assist    1130 Pt c/o pain in chest, PRN tylenol given    1240 Pt anxious + agitated, states he can't breathe. PRN phenobarb given for ciwa 22 and duoneb given    1530 Pt c/o pain in ribs + chest, unable to give him any PRN pain medication. Intensivist made aware, orders received. 1745 Extreme agitation, patient is confused asking about his dvds, states he is short of breath, trying to get out of bed. Ciwa 25, no orders for phenobarb, restarted precedex given prn breathing treatment, and redirected.

## 2022-08-12 NOTE — PROGRESS NOTES
Problem: Dysphagia (Adult)  Goal: *Acute Goals and Plan of Care (Insert Text)  Description: Speech pathology goals  Initiated 8/10/2022  1. Patient will tolerate full liquid diet with no overt s/s aspiration or adverse effects within 7 days  2. Patient will tolerate diet liberalization within 7 days  Outcome: Progressing Towards Goal     Problem: Neurolinguistics Impaired (Adult)  Goal: *Acute Goals and Plan of Care (Insert Text)  Description: Speech Therapy Goals  Initiated 8/11/22    1. Pt will be oriented with use of visual aids with 100% accuracy within 7 days. 2. Pt will utilize memory strategies with 60% accuracy within 7 days. Outcome: Progressing Towards Goal     SPEECH LANGUAGE PATHOLOGY DYSPHAGIA AND SPEECH TREATMENT  Patient: Suha Baker (49 y.o. male)  Date: 8/12/2022  Diagnosis: Cardiac arrest [facility]) [I46.9] <principal problem not specified>      Precautions:  Fall    ASSESSMENT:  Patient has been tolerating soft and bite sized diet/thin liquids with no difficulties or adverse effects. Bedside, patient with no difficulties or s/s of aspiration with any consistencies, however very impulsive with PO. Given tolerance and bedside presentation, recommend continue soft and bite sized diet/thin liquids with precautions as outlined below. SLP to continue to follow for diet tolerance and liberalization as appropriate. Patient also continues with integrated language deficits characterized by poor orientation, poor verbal recall, reduced problem solving, and no insight into deficits. Patient required max cues to identify what he was missing to consume pudding and pears (spoon). Benefited from visual cues to facilitate orientation. Will benefit from continued SLP at this and the next level of care.         PLAN:  Recommendations and Planned Interventions:  -- soft and bite sized diet/ thin liquids  -- general aspiration precautions including completely upright for all PO   -- assistance during mealtimes as needed due to impulsivity  -- SLP to continue to follow for diet tolerance and integrated language    Patient continues to benefit from skilled intervention to address the above impairments. Continue treatment per established plan of care. Discharge Recommendations: To Be Determined     SUBJECTIVE:   Patient stated Would you like a bite?  re:pears. OBJECTIVE:   Cognitive and Communication Status:  Neurologic State: Alert, Confused  Orientation Level: Oriented to person, Disoriented to time, Disoriented to situation, Disoriented to place  Cognition: Impulsive  Perception: Appears intact  Perseveration: Perseverates during ADLS  Safety/Judgement: Decreased insight into deficits    Dysphagia Treatment and Interventions:  Oral Assessment:  Oral Assessment  Labial: No impairment  Dentition: Natural  Oral Hygiene: moist oral mucosa  Lingual: No impairment  Velum: No impairment  Mandible: No impairment  P.O. Trials:  Patient Position: sitting upright in bed  Vocal quality prior to P.O.: No impairment  Consistency Presented: Thin liquid; Solid  How Presented: Self-fed/presented     Bolus Acceptance: No impairment  Bolus Formation/Control: No impairment     Propulsion: No impairment  Oral Residue: None  Initiation of Swallow: No impairment  Laryngeal Elevation: Functional  Aspiration Signs/Symptoms: None  Pharyngeal Phase Characteristics: No impairment, issues, or problems                                                                                           Speech Treatment and Interventions:    Language Comprehension and Expression:     Verbal Expression     Neuro-Linguistics:        Verbal Problem Solving: Impaired  Verbal Organization: Impaired  Thought Organization: Impaired     Memory: Impaired  Attention : Impaired         Pain:  Pain Scale 1: Numeric (0 - 10)  Pain Intensity 1: 3  Pain Location 1: Chest    After treatment:   Patient left in no apparent distress in bed, Call bell within reach, and Nursing notified    COMMUNICATION/EDUCATION:     The patient's plan of care including recommendations, planned interventions, and recommended diet changes were discussed with: Registered nurse.      Rich Servin [facility] Pathologist     Time Calculation: 10 mins

## 2022-08-13 LAB
ANION GAP SERPL CALC-SCNC: 8 MMOL/L (ref 5–15)
APTT PPP: 29.3 SEC (ref 22.1–31)
BUN SERPL-MCNC: 17 MG/DL (ref 6–20)
BUN/CREAT SERPL: 20 (ref 12–20)
CALCIUM SERPL-MCNC: 8.8 MG/DL (ref 8.5–10.1)
CHLORIDE SERPL-SCNC: 106 MMOL/L (ref 97–108)
CO2 SERPL-SCNC: 24 MMOL/L (ref 21–32)
CREAT SERPL-MCNC: 0.86 MG/DL (ref 0.7–1.3)
ERYTHROCYTE [DISTWIDTH] IN BLOOD BY AUTOMATED COUNT: 18.2 % (ref 11.5–14.5)
GLUCOSE SERPL-MCNC: 123 MG/DL (ref 65–100)
HCT VFR BLD AUTO: 27.6 % (ref 36.6–50.3)
HGB BLD-MCNC: 8.9 G/DL (ref 12.1–17)
INR PPP: 1.2 (ref 0.9–1.1)
MAGNESIUM SERPL-MCNC: 1.7 MG/DL (ref 1.6–2.4)
MCH RBC QN AUTO: 30.6 PG (ref 26–34)
MCHC RBC AUTO-ENTMCNC: 32.2 G/DL (ref 30–36.5)
MCV RBC AUTO: 94.8 FL (ref 80–99)
NRBC # BLD: 0.02 K/UL (ref 0–0.01)
NRBC BLD-RTO: 0.2 PER 100 WBC
PLATELET # BLD AUTO: 241 K/UL (ref 150–400)
PMV BLD AUTO: 11.2 FL (ref 8.9–12.9)
POTASSIUM SERPL-SCNC: 3.5 MMOL/L (ref 3.5–5.1)
PROTHROMBIN TIME: 12.3 SEC (ref 9–11.1)
RBC # BLD AUTO: 2.91 M/UL (ref 4.1–5.7)
SODIUM SERPL-SCNC: 138 MMOL/L (ref 136–145)
THERAPEUTIC RANGE,PTTT: NORMAL SECS (ref 58–77)
WBC # BLD AUTO: 11.6 K/UL (ref 4.1–11.1)

## 2022-08-13 PROCEDURE — 74011250636 HC RX REV CODE- 250/636: Performed by: STUDENT IN AN ORGANIZED HEALTH CARE EDUCATION/TRAINING PROGRAM

## 2022-08-13 PROCEDURE — 85730 THROMBOPLASTIN TIME PARTIAL: CPT

## 2022-08-13 PROCEDURE — 74011000250 HC RX REV CODE- 250: Performed by: STUDENT IN AN ORGANIZED HEALTH CARE EDUCATION/TRAINING PROGRAM

## 2022-08-13 PROCEDURE — 36415 COLL VENOUS BLD VENIPUNCTURE: CPT

## 2022-08-13 PROCEDURE — 74011250637 HC RX REV CODE- 250/637: Performed by: STUDENT IN AN ORGANIZED HEALTH CARE EDUCATION/TRAINING PROGRAM

## 2022-08-13 PROCEDURE — 74011250637 HC RX REV CODE- 250/637: Performed by: INTERNAL MEDICINE

## 2022-08-13 PROCEDURE — 85027 COMPLETE CBC AUTOMATED: CPT

## 2022-08-13 PROCEDURE — 65660000001 HC RM ICU INTERMED STEPDOWN

## 2022-08-13 PROCEDURE — 74011250636 HC RX REV CODE- 250/636: Performed by: INTERNAL MEDICINE

## 2022-08-13 PROCEDURE — 94640 AIRWAY INHALATION TREATMENT: CPT

## 2022-08-13 PROCEDURE — 85610 PROTHROMBIN TIME: CPT

## 2022-08-13 PROCEDURE — 83735 ASSAY OF MAGNESIUM: CPT

## 2022-08-13 PROCEDURE — 80048 BASIC METABOLIC PNL TOTAL CA: CPT

## 2022-08-13 RX ORDER — SPIRONOLACTONE 25 MG/1
12.5 TABLET ORAL DAILY
Status: DISCONTINUED | OUTPATIENT
Start: 2022-08-13 | End: 2022-08-20 | Stop reason: HOSPADM

## 2022-08-13 RX ADMIN — IPRATROPIUM BROMIDE 0.5 MG: 0.5 SOLUTION RESPIRATORY (INHALATION) at 07:54

## 2022-08-13 RX ADMIN — IPRATROPIUM BROMIDE AND ALBUTEROL SULFATE 3 ML: .5; 3 SOLUTION RESPIRATORY (INHALATION) at 16:16

## 2022-08-13 RX ADMIN — IPRATROPIUM BROMIDE AND ALBUTEROL SULFATE 3 ML: .5; 3 SOLUTION RESPIRATORY (INHALATION) at 00:47

## 2022-08-13 RX ADMIN — SODIUM CHLORIDE, PRESERVATIVE FREE 20 MG: 5 INJECTION INTRAVENOUS at 21:27

## 2022-08-13 RX ADMIN — SODIUM CHLORIDE, PRESERVATIVE FREE 20 MG: 5 INJECTION INTRAVENOUS at 08:38

## 2022-08-13 RX ADMIN — ACETAMINOPHEN 650 MG: 325 TABLET ORAL at 23:27

## 2022-08-13 RX ADMIN — ASPIRIN 81 MG CHEWABLE TABLET 81 MG: 81 TABLET CHEWABLE at 08:38

## 2022-08-13 RX ADMIN — IPRATROPIUM BROMIDE 0.5 MG: 0.5 SOLUTION RESPIRATORY (INHALATION) at 19:36

## 2022-08-13 RX ADMIN — EMPAGLIFLOZIN 10 MG: 10 TABLET, FILM COATED ORAL at 14:06

## 2022-08-13 RX ADMIN — SODIUM CHLORIDE, PRESERVATIVE FREE 10 ML: 5 INJECTION INTRAVENOUS at 14:06

## 2022-08-13 RX ADMIN — Medication 30 MG: at 21:27

## 2022-08-13 RX ADMIN — PHENOBARBITAL 64.8 MG: 32.4 TABLET ORAL at 08:38

## 2022-08-13 RX ADMIN — Medication 30 MG: at 08:43

## 2022-08-13 RX ADMIN — MIDODRINE HYDROCHLORIDE 5 MG: 5 TABLET ORAL at 05:11

## 2022-08-13 RX ADMIN — ENOXAPARIN SODIUM 40 MG: 100 INJECTION SUBCUTANEOUS at 08:38

## 2022-08-13 RX ADMIN — SODIUM CHLORIDE, PRESERVATIVE FREE 10 ML: 5 INJECTION INTRAVENOUS at 21:34

## 2022-08-13 RX ADMIN — IPRATROPIUM BROMIDE 0.5 MG: 0.5 SOLUTION RESPIRATORY (INHALATION) at 13:48

## 2022-08-13 RX ADMIN — APIXABAN 5 MG: 5 TABLET, FILM COATED ORAL at 17:01

## 2022-08-13 RX ADMIN — PHENOBARBITAL 64.8 MG: 32.4 TABLET ORAL at 21:28

## 2022-08-13 RX ADMIN — ARFORMOTEROL TARTRATE 15 MCG: 15 SOLUTION RESPIRATORY (INHALATION) at 07:53

## 2022-08-13 RX ADMIN — PHENOBARBITAL SODIUM 65 MG: 65 INJECTION INTRAMUSCULAR; INTRAVENOUS at 11:13

## 2022-08-13 RX ADMIN — DEXMEDETOMIDINE HYDROCHLORIDE 1 MCG/KG/HR: 4 INJECTION, SOLUTION INTRAVENOUS at 01:43

## 2022-08-13 RX ADMIN — BUDESONIDE 500 MCG: 0.5 INHALANT RESPIRATORY (INHALATION) at 19:36

## 2022-08-13 RX ADMIN — APIXABAN 5 MG: 5 TABLET, FILM COATED ORAL at 11:46

## 2022-08-13 RX ADMIN — SODIUM CHLORIDE, PRESERVATIVE FREE 10 ML: 5 INJECTION INTRAVENOUS at 21:33

## 2022-08-13 RX ADMIN — MILRINONE LACTATE 0.25 MCG/KG/MIN: 0.2 INJECTION, SOLUTION INTRAVENOUS at 09:58

## 2022-08-13 RX ADMIN — CALCIUM CARBONATE (ANTACID) CHEW TAB 500 MG 200 MG: 500 CHEW TAB at 17:01

## 2022-08-13 RX ADMIN — BUDESONIDE 500 MCG: 0.5 INHALANT RESPIRATORY (INHALATION) at 07:53

## 2022-08-13 RX ADMIN — CALCIUM CARBONATE (ANTACID) CHEW TAB 500 MG 200 MG: 500 CHEW TAB at 08:38

## 2022-08-13 RX ADMIN — ARFORMOTEROL TARTRATE 15 MCG: 15 SOLUTION RESPIRATORY (INHALATION) at 19:36

## 2022-08-13 RX ADMIN — MIDODRINE HYDROCHLORIDE 5 MG: 5 TABLET ORAL at 14:06

## 2022-08-13 RX ADMIN — ACETAMINOPHEN 650 MG: 325 TABLET ORAL at 05:11

## 2022-08-13 RX ADMIN — CALCIUM CARBONATE (ANTACID) CHEW TAB 500 MG 200 MG: 500 CHEW TAB at 11:46

## 2022-08-13 RX ADMIN — SPIRONOLACTONE 12.5 MG: 25 TABLET ORAL at 14:06

## 2022-08-13 NOTE — PROGRESS NOTES
0730 Bedside shift change report given to [address] (oncoming nurse) by Nava Gomez RN (offgoing nurse). Report included the following information SBAR, Kardex, Intake/Output, MAR, Recent Results, Med Rec Status, and Cardiac Rhythm Sinus Tachy . [address] [facility] at bedside. Orders received to perform NICOM. 1045 NICOM performed. Discussed with Nav Davies MD.    CO 4.1; CI 2.7; SVI 25.    1100 Pt more agitated, taking off leads, stating \"I'm getting out of here. \" PRN phenobarbital given for CIWA > 12.    1930 Bedside shift change report given to Marla 1690 (oncoming nurse) by Cora Peralta RN (offgoing nurse). Report included the following information SBAR, Kardex, ED Summary, Procedure Summary, Intake/Output, MAR, Recent Results, Med Rec Status, and Cardiac Rhythm Sinus Tachy .
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
## 2022-08-13 NOTE — PROGRESS NOTES
1930 Bedside and Verbal shift change report given to Grace Kaba RN (oncoming nurse) by Lauro Duron RN (offgoing nurse). Report included the following information SBAR, MAR, and Cardiac Rhythm ST, SR, Vpaced . 2130 Patient sitting on the side of the bed with keys in his hands. He reports \"I'm going home and I'll be back in the morning. \" RN discussed patient's current condition and reinforced how unsafe it would be for patient to leave the hospital. He verbalized understanding and threw keys to his wife. Wife left patient's bedside shortly thereafter. Patient cooperative and took PO meds w/o complaint. Currently up to the chair. 2300 Patient back in bed.    2325 Patient c/o chest pain. Tylenol given. 0000 Bedside and Verbal shift change report given to [facility], RN (oncoming nurse) by Grace Kaba RN (offgoing nurse).  Report included the following information SBAR, MAR, and Cardiac Rhythm ST .
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
## 2022-08-13 NOTE — PROGRESS NOTES
Pt accepted for transfer to [address] [place] [address] Unit with telemetry by Hospitalist service.      Sravan Means MD
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
## 2022-08-14 ENCOUNTER — APPOINTMENT (OUTPATIENT)
Dept: GENERAL RADIOLOGY | Age: 62
DRG: 291 | End: 2022-08-14
Attending: STUDENT IN AN ORGANIZED HEALTH CARE EDUCATION/TRAINING PROGRAM
Payer: MEDICARE

## 2022-08-14 LAB
ANION GAP SERPL CALC-SCNC: 10 MMOL/L (ref 5–15)
ANION GAP SERPL CALC-SCNC: 9 MMOL/L (ref 5–15)
APTT PPP: 34.6 SEC (ref 22.1–31)
B PERT DNA SPEC QL NAA+PROBE: NOT DETECTED
BNP SERPL-MCNC: ABNORMAL PG/ML
BORDETELLA PARAPERTUSSIS PCR, BORPAR: NOT DETECTED
BUN SERPL-MCNC: 17 MG/DL (ref 6–20)
BUN SERPL-MCNC: 22 MG/DL (ref 6–20)
BUN/CREAT SERPL: 13 (ref 12–20)
BUN/CREAT SERPL: 14 (ref 12–20)
C PNEUM DNA SPEC QL NAA+PROBE: NOT DETECTED
CALCIUM SERPL-MCNC: 9.6 MG/DL (ref 8.5–10.1)
CALCIUM SERPL-MCNC: 9.7 MG/DL (ref 8.5–10.1)
CHLORIDE SERPL-SCNC: 101 MMOL/L (ref 97–108)
CHLORIDE SERPL-SCNC: 104 MMOL/L (ref 97–108)
CK SERPL-CCNC: 195 U/L (ref 39–308)
CO2 SERPL-SCNC: 22 MMOL/L (ref 21–32)
CO2 SERPL-SCNC: 28 MMOL/L (ref 21–32)
CREAT SERPL-MCNC: 1.32 MG/DL (ref 0.7–1.3)
CREAT SERPL-MCNC: 1.56 MG/DL (ref 0.7–1.3)
ERYTHROCYTE [DISTWIDTH] IN BLOOD BY AUTOMATED COUNT: 19 % (ref 11.5–14.5)
FLUAV H1 2009 PAND RNA SPEC QL NAA+PROBE: NOT DETECTED
FLUAV H1 RNA SPEC QL NAA+PROBE: NOT DETECTED
FLUAV H3 RNA SPEC QL NAA+PROBE: NOT DETECTED
FLUAV SUBTYP SPEC NAA+PROBE: NOT DETECTED
FLUBV RNA SPEC QL NAA+PROBE: NOT DETECTED
GLUCOSE SERPL-MCNC: 97 MG/DL (ref 65–100)
GLUCOSE SERPL-MCNC: 98 MG/DL (ref 65–100)
HADV DNA SPEC QL NAA+PROBE: NOT DETECTED
HCOV 229E RNA SPEC QL NAA+PROBE: NOT DETECTED
HCOV HKU1 RNA SPEC QL NAA+PROBE: NOT DETECTED
HCOV NL63 RNA SPEC QL NAA+PROBE: NOT DETECTED
HCOV OC43 RNA SPEC QL NAA+PROBE: NOT DETECTED
HCT VFR BLD AUTO: 31 % (ref 36.6–50.3)
HGB BLD-MCNC: 10.1 G/DL (ref 12.1–17)
HMPV RNA SPEC QL NAA+PROBE: NOT DETECTED
HPIV1 RNA SPEC QL NAA+PROBE: NOT DETECTED
HPIV2 RNA SPEC QL NAA+PROBE: NOT DETECTED
HPIV3 RNA SPEC QL NAA+PROBE: NOT DETECTED
HPIV4 RNA SPEC QL NAA+PROBE: NOT DETECTED
INR PPP: 1.3 (ref 0.9–1.1)
LACTATE SERPL-SCNC: 1.7 MMOL/L (ref 0.4–2)
M PNEUMO DNA SPEC QL NAA+PROBE: NOT DETECTED
MAGNESIUM SERPL-MCNC: 1.5 MG/DL (ref 1.6–2.4)
MAGNESIUM SERPL-MCNC: 1.7 MG/DL (ref 1.6–2.4)
MCH RBC QN AUTO: 30.8 PG (ref 26–34)
MCHC RBC AUTO-ENTMCNC: 32.6 G/DL (ref 30–36.5)
MCV RBC AUTO: 94.5 FL (ref 80–99)
NRBC # BLD: 0.03 K/UL (ref 0–0.01)
NRBC BLD-RTO: 0.2 PER 100 WBC
PLATELET # BLD AUTO: 352 K/UL (ref 150–400)
PMV BLD AUTO: 11.2 FL (ref 8.9–12.9)
POTASSIUM SERPL-SCNC: 3.8 MMOL/L (ref 3.5–5.1)
POTASSIUM SERPL-SCNC: 4 MMOL/L (ref 3.5–5.1)
PROCALCITONIN SERPL-MCNC: 0.9 NG/ML
PROTHROMBIN TIME: 13 SEC (ref 9–11.1)
RBC # BLD AUTO: 3.28 M/UL (ref 4.1–5.7)
RSV RNA SPEC QL NAA+PROBE: NOT DETECTED
RV+EV RNA SPEC QL NAA+PROBE: NOT DETECTED
SARS-COV-2 PCR, COVPCR: NOT DETECTED
SODIUM SERPL-SCNC: 136 MMOL/L (ref 136–145)
SODIUM SERPL-SCNC: 138 MMOL/L (ref 136–145)
THERAPEUTIC RANGE,PTTT: ABNORMAL SECS (ref 58–77)
WBC # BLD AUTO: 16.4 K/UL (ref 4.1–11.1)

## 2022-08-14 PROCEDURE — 83735 ASSAY OF MAGNESIUM: CPT

## 2022-08-14 PROCEDURE — 65620000000 HC RM CCU GENERAL

## 2022-08-14 PROCEDURE — 82550 ASSAY OF CK (CPK): CPT

## 2022-08-14 PROCEDURE — 74011000250 HC RX REV CODE- 250: Performed by: STUDENT IN AN ORGANIZED HEALTH CARE EDUCATION/TRAINING PROGRAM

## 2022-08-14 PROCEDURE — 74011250636 HC RX REV CODE- 250/636: Performed by: INTERNAL MEDICINE

## 2022-08-14 PROCEDURE — 84145 PROCALCITONIN (PCT): CPT

## 2022-08-14 PROCEDURE — 94640 AIRWAY INHALATION TREATMENT: CPT

## 2022-08-14 PROCEDURE — 74011250636 HC RX REV CODE- 250/636

## 2022-08-14 PROCEDURE — 80048 BASIC METABOLIC PNL TOTAL CA: CPT

## 2022-08-14 PROCEDURE — 83605 ASSAY OF LACTIC ACID: CPT

## 2022-08-14 PROCEDURE — 83880 ASSAY OF NATRIURETIC PEPTIDE: CPT

## 2022-08-14 PROCEDURE — 74011000258 HC RX REV CODE- 258: Performed by: INTERNAL MEDICINE

## 2022-08-14 PROCEDURE — 74011250637 HC RX REV CODE- 250/637: Performed by: STUDENT IN AN ORGANIZED HEALTH CARE EDUCATION/TRAINING PROGRAM

## 2022-08-14 PROCEDURE — 85610 PROTHROMBIN TIME: CPT

## 2022-08-14 PROCEDURE — 74011250637 HC RX REV CODE- 250/637: Performed by: INTERNAL MEDICINE

## 2022-08-14 PROCEDURE — 85730 THROMBOPLASTIN TIME PARTIAL: CPT

## 2022-08-14 PROCEDURE — 77030013797 HC KT TRNSDUC PRSSR EDWD -A

## 2022-08-14 PROCEDURE — 77030005513 HC CATH URETH FOL11 MDII -B

## 2022-08-14 PROCEDURE — 0202U NFCT DS 22 TRGT SARS-COV-2: CPT

## 2022-08-14 PROCEDURE — 71045 X-RAY EXAM CHEST 1 VIEW: CPT

## 2022-08-14 PROCEDURE — 85027 COMPLETE CBC AUTOMATED: CPT

## 2022-08-14 PROCEDURE — 74011250636 HC RX REV CODE- 250/636: Performed by: STUDENT IN AN ORGANIZED HEALTH CARE EDUCATION/TRAINING PROGRAM

## 2022-08-14 PROCEDURE — 74011000250 HC RX REV CODE- 250: Performed by: INTERNAL MEDICINE

## 2022-08-14 PROCEDURE — 87040 BLOOD CULTURE FOR BACTERIA: CPT

## 2022-08-14 PROCEDURE — 36415 COLL VENOUS BLD VENIPUNCTURE: CPT

## 2022-08-14 RX ORDER — HALOPERIDOL 5 MG/ML
5 INJECTION INTRAMUSCULAR ONCE
Status: COMPLETED | OUTPATIENT
Start: 2022-08-14 | End: 2022-08-14

## 2022-08-14 RX ORDER — BUMETANIDE 0.25 MG/ML
1 INJECTION INTRAMUSCULAR; INTRAVENOUS ONCE
Status: COMPLETED | OUTPATIENT
Start: 2022-08-14 | End: 2022-08-14

## 2022-08-14 RX ORDER — HALOPERIDOL 5 MG/ML
INJECTION INTRAMUSCULAR
Status: COMPLETED
Start: 2022-08-14 | End: 2022-08-14

## 2022-08-14 RX ADMIN — DEXMEDETOMIDINE HYDROCHLORIDE 1 MCG/KG/HR: 4 INJECTION, SOLUTION INTRAVENOUS at 18:21

## 2022-08-14 RX ADMIN — SODIUM CHLORIDE, PRESERVATIVE FREE 10 ML: 5 INJECTION INTRAVENOUS at 21:23

## 2022-08-14 RX ADMIN — PHENOBARBITAL 64.8 MG: 32.4 TABLET ORAL at 21:22

## 2022-08-14 RX ADMIN — HALOPERIDOL 5 MG: 5 INJECTION INTRAMUSCULAR at 14:27

## 2022-08-14 RX ADMIN — Medication 30 MG: at 08:51

## 2022-08-14 RX ADMIN — PHENOBARBITAL 64.8 MG: 32.4 TABLET ORAL at 08:44

## 2022-08-14 RX ADMIN — SODIUM CHLORIDE, PRESERVATIVE FREE 10 ML: 5 INJECTION INTRAVENOUS at 06:13

## 2022-08-14 RX ADMIN — METHYLPREDNISOLONE SODIUM SUCCINATE 40 MG: 40 INJECTION, POWDER, FOR SOLUTION INTRAMUSCULAR; INTRAVENOUS at 14:16

## 2022-08-14 RX ADMIN — BUDESONIDE 500 MCG: 0.5 INHALANT RESPIRATORY (INHALATION) at 07:55

## 2022-08-14 RX ADMIN — IPRATROPIUM BROMIDE 0.5 MG: 0.5 SOLUTION RESPIRATORY (INHALATION) at 20:21

## 2022-08-14 RX ADMIN — SODIUM CHLORIDE, PRESERVATIVE FREE 10 ML: 5 INJECTION INTRAVENOUS at 14:31

## 2022-08-14 RX ADMIN — BUDESONIDE 500 MCG: 0.5 INHALANT RESPIRATORY (INHALATION) at 20:21

## 2022-08-14 RX ADMIN — BUMETANIDE 1 MG: 0.25 INJECTION, SOLUTION INTRAMUSCULAR; INTRAVENOUS at 11:41

## 2022-08-14 RX ADMIN — BUMETANIDE 0.5 MG/HR: 0.25 INJECTION, SOLUTION INTRAMUSCULAR; INTRAVENOUS at 12:10

## 2022-08-14 RX ADMIN — CALCIUM CARBONATE (ANTACID) CHEW TAB 500 MG 200 MG: 500 CHEW TAB at 07:08

## 2022-08-14 RX ADMIN — ASPIRIN 81 MG CHEWABLE TABLET 81 MG: 81 TABLET CHEWABLE at 08:44

## 2022-08-14 RX ADMIN — SODIUM CHLORIDE, PRESERVATIVE FREE 20 ML: 5 INJECTION INTRAVENOUS at 21:23

## 2022-08-14 RX ADMIN — APIXABAN 5 MG: 5 TABLET, FILM COATED ORAL at 18:21

## 2022-08-14 RX ADMIN — APIXABAN 5 MG: 5 TABLET, FILM COATED ORAL at 08:44

## 2022-08-14 RX ADMIN — MILRINONE LACTATE 0.25 MCG/KG/MIN: 0.2 INJECTION, SOLUTION INTRAVENOUS at 22:07

## 2022-08-14 RX ADMIN — ARFORMOTEROL TARTRATE 15 MCG: 15 SOLUTION RESPIRATORY (INHALATION) at 07:55

## 2022-08-14 RX ADMIN — CALCIUM CARBONATE (ANTACID) CHEW TAB 500 MG 200 MG: 500 CHEW TAB at 18:21

## 2022-08-14 RX ADMIN — HALOPERIDOL LACTATE 5 MG: 5 INJECTION, SOLUTION INTRAMUSCULAR at 14:27

## 2022-08-14 RX ADMIN — DEXMEDETOMIDINE HYDROCHLORIDE 1.1 MCG/KG/HR: 4 INJECTION, SOLUTION INTRAVENOUS at 22:07

## 2022-08-14 RX ADMIN — EMPAGLIFLOZIN 10 MG: 10 TABLET, FILM COATED ORAL at 08:50

## 2022-08-14 RX ADMIN — CALCIUM CARBONATE (ANTACID) CHEW TAB 500 MG 200 MG: 500 CHEW TAB at 11:33

## 2022-08-14 RX ADMIN — ARFORMOTEROL TARTRATE 15 MCG: 15 SOLUTION RESPIRATORY (INHALATION) at 20:21

## 2022-08-14 RX ADMIN — METHYLPREDNISOLONE SODIUM SUCCINATE 40 MG: 40 INJECTION, POWDER, FOR SOLUTION INTRAMUSCULAR; INTRAVENOUS at 20:02

## 2022-08-14 RX ADMIN — SODIUM CHLORIDE, PRESERVATIVE FREE 20 MG: 5 INJECTION INTRAVENOUS at 21:22

## 2022-08-14 RX ADMIN — IPRATROPIUM BROMIDE 0.5 MG: 0.5 SOLUTION RESPIRATORY (INHALATION) at 07:55

## 2022-08-14 RX ADMIN — IPRATROPIUM BROMIDE 0.5 MG: 0.5 SOLUTION RESPIRATORY (INHALATION) at 02:39

## 2022-08-14 RX ADMIN — PIPERACILLIN AND TAZOBACTAM 4.5 G: 4; .5 INJECTION, POWDER, LYOPHILIZED, FOR SOLUTION INTRAVENOUS at 14:20

## 2022-08-14 RX ADMIN — IPRATROPIUM BROMIDE AND ALBUTEROL SULFATE 3 ML: .5; 3 SOLUTION RESPIRATORY (INHALATION) at 00:02

## 2022-08-14 RX ADMIN — MIDODRINE HYDROCHLORIDE 5 MG: 5 TABLET ORAL at 21:22

## 2022-08-14 RX ADMIN — BUMETANIDE 0.5 MG/HR: 0.25 INJECTION, SOLUTION INTRAMUSCULAR; INTRAVENOUS at 21:23

## 2022-08-14 RX ADMIN — SODIUM CHLORIDE, PRESERVATIVE FREE 20 MG: 5 INJECTION INTRAVENOUS at 08:44

## 2022-08-14 RX ADMIN — PIPERACILLIN AND TAZOBACTAM 3.38 G: 3; .375 INJECTION, POWDER, FOR SOLUTION INTRAVENOUS at 20:02

## 2022-08-14 RX ADMIN — MILRINONE LACTATE 0.25 MCG/KG/MIN: 0.2 INJECTION, SOLUTION INTRAVENOUS at 03:29

## 2022-08-14 RX ADMIN — DEXMEDETOMIDINE HYDROCHLORIDE 0.4 MCG/KG/HR: 4 INJECTION, SOLUTION INTRAVENOUS at 12:18

## 2022-08-14 RX ADMIN — SPIRONOLACTONE 12.5 MG: 25 TABLET ORAL at 08:44

## 2022-08-14 RX ADMIN — Medication 30 MG: at 21:22
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
## 2022-08-14 NOTE — PROGRESS NOTES
[address] [facility]  Hospitalist Group                                                                                          Hospitalist Progress Note  Dawn Flynn MD  Answering service: [phone] OR [phone] from in house phone        Date of Service:  2022  NAME:  Abram Hartley  :  1960  MRN:  [id number]      Admission Summary:   Abram Hartley is a 58 y.o. male with a pmhx of ICD placement, COPD, alcohol abuse, who presented via EMS after V fib arrest. ROSC was aceived after defibrillation x 2, patient was intubated in the field and transported to [facility] for further management. Of note, patient had recent ICD placement with continuous IV milrinone therapy at OSH about 3 weeks ago. Patient was started on levophed and amio in the ED and admitted to ICU for further management. Patient was successfully extubated on , and has remained stable on 1 to 2L NC. He developed severe delirium which was attributed to ICU delirium and alcohol withdrawal. He was started on phenobarb therapy with improvement in withdrawal symptoms. Of note, patient claims to only drink a few times per week and does not drink heavily. Interval history / Subjective: This morning patient was increasingly wheezing compared to previous. He denied any chest pain or SOB.       Assessment & Plan:     #EDGAR on CKD  #Respiratory distress   #History of COPD   - Pt with doubled Cr this morning, poor urine output   - Increased WOB and wheezing on examination   - Tachycardiac with boarderline Bps   - Concern for clinical worsening   Plan:   - Transfer back to CCU for higher level of care  - Obtain CXR, RVP, lactic acid, pro shasta   - Consider diuresis given increase in Cr and low urine output, defer to heart failure team  - Continue pulmicort/brovana, ipratropium  - Supplemental oxygen therapy goal SpO2 >88%     #Cardiac arrest 2/2 ventral fibrillation  #ICD in place on milrinone therapy  - Dilated cardiomyopathy with LVEF 20-25%, improved to 33%  - ICD in place; currently on continuous milrinone therapy  - Currently in sinus tachycardia  Plan:  - Telemetry  - Cardiology consulted, appreciate any additional recommendations       #Alcohol withdrawal  - Pt with symptoms of acute hospital withdrawal, improved on phenobarb therapy  - Has required prn doses today  Plan:  - Currently on phenobarb 64.8mg with additional prn for withdrawal symptoms  - Will reassess daily and wean as tolerated  - CIWA in place     History of PE on eliquis  - Continue eliquis therapy          Code status: FULL CODE  Prophylaxis: Eliquis therapy  Care Plan discussed with: patient, heart failure team, ICU team  Anticipated Disposition: >48h     Hospital Problems  Date Reviewed: 6/15/2022            Codes Class Noted POA    Acute on chronic systolic heart failure ([facility] [address] ICD-10-CM: B73.62  ICD-9-CM: 428.23  8/11/2022 Unknown        Cardiac arrest [facility]) ICD-10-CM: I46.9  ICD-9-CM: 427.5  8/8/2022 Unknown             Review of Systems:   A comprehensive review of systems was negative except for that written in the HPI. Vital Signs:    Last 24hrs VS reviewed since prior progress note. Most recent are:  Visit Vitals  BP (!) 142/96 (BP 1 Location: Left upper arm, BP Patient Position: At rest)   Pulse (!) 112   Temp (!) 96.6 °F (35.9 °C)   Resp (!) 33   Ht 5' 8\" (1.727 m)   Wt 72 kg (158 lb 11.7 oz)   SpO2 95%   BMI 24.14 kg/m²         Intake/Output Summary (Last 24 hours) at 8/14/2022 1315  Last data filed at 8/14/2022 0600  Gross per 24 hour   Intake 88.4 ml   Output 795 ml   Net -706.6 ml        Physical Examination:     I had a face to face encounter with this patient and independently examined them on 8/14/2022 as outlined below:          Constitutional:  Cooperative, pleasant    ENT:  Oral mucosa moist, oropharynx benign. Resp:  Mild increase work of breathing. Wheezing bilaterally.      CV:  Tachycardiac, occasional irregular beat; warm, cap refill 2 seconds    GI:  Soft, non distended, non tender. normoactive bowel sounds, no hepatosplenomegaly     MSK:  2+ pulses throughout    Neurologic:  Moves all extremities. AAOx3, CN II-XII reviewed            Data Review:    Review and/or order of clinical lab test  Review and/or order of tests in the radiology section of [facility]  Review and/or order of tests in the medicine section of [facility]      Labs:     Recent Labs     08/14/22 0416 08/13/22 0411   WBC 16.4* 11.6*   HGB 10.1* 8.9*   HCT 31.0* 27.6*    241     Recent Labs     08/14/22 0416 08/13/22 0411 08/12/22 0407    138 143   K 4.0 3.5 3.6    106 110*   CO2 22 24 24   BUN 17 17 19   CREA 1.32* 0.86 0.87   GLU 98 123* 127*   CA 9.6 8.8 9.0   MG 1.7 1.7 1.9     No results for input(s): ALT, AP, TBIL, TBILI, TP, ALB, GLOB, GGT, AML, LPSE in the last 72 hours. No lab exists for component: SGOT, GPT, AMYP, HLPSE  Recent Labs     08/14/22 0416 08/13/22 0411 08/12/22 0407   INR 1.3* 1.2* 1.2*   PTP 13.0* 12.3* 12.7*   APTT 34.6* 29.3 31.5*      No results for input(s): FE, TIBC, PSAT, FERR in the last 72 hours. No results found for: FOL, RBCF   No results for input(s): PH, PCO2, PO2 in the last 72 hours.   Recent Labs     08/14/22 0416        No results found for: CHOL, CHOLX, CHLST, CHOLV, HDL, HDLP, LDL, LDLC, DLDLP, TGLX, TRIGL, TRIGP, CHHD, CHHDX  No results found for: [facility]  Lab Results   Component Value Date/Time    Color YELLOW/STRAW 08/08/2022 05:26 PM    Appearance CLOUDY (A) 08/08/2022 05:26 PM    Specific gravity 1.016 08/08/2022 05:26 PM    pH (UA) 6.0 08/08/2022 05:26 PM    Protein 300 (A) 08/08/2022 05:26 PM    Glucose Negative 08/08/2022 05:26 PM    Ketone Negative 08/08/2022 05:26 PM    Bilirubin Negative 08/08/2022 05:26 PM    Urobilinogen 0.2 08/08/2022 05:26 PM    Nitrites Negative 08/08/2022 05:26 PM    Leukocyte Esterase Negative 08/08/2022 05:26 PM    Epithelial cells FEW 08/08/2022 05:26 PM Bacteria 1+ (A) 08/08/2022 05:26 PM    WBC 5-10 08/08/2022 05:26 PM    RBC  08/08/2022 05:26 PM         Medications Reviewed:     Current Facility-Administered Medications   Medication Dose Route Frequency    bumetanide (BUMEX) 0.25 mg/mL infusion  0.5 mg/hr IntraVENous CONTINUOUS    apixaban (ELIQUIS) tablet 5 mg  5 mg Oral BID    empagliflozin (JARDIANCE) tablet 10 mg  10 mg Oral DAILY    spironolactone (ALDACTONE) tablet 12.5 mg  12.5 mg Oral DAILY    famotidine (PF) (PEPCID) 20 mg in 0.9% sodium chloride 10 mL injection  20 mg IntraVENous Q12H    dextromethorphan (DELSYM) 30 mg/5 mL syrup 30 mg  30 mg Oral Q12H    calcium carbonate (TUMS) chewable tablet 200 mg [elemental]  200 mg Oral TID WITH MEALS    lidocaine 4 % patch 1 Patch  1 Patch TransDERmal Q24H    PHENobarbital (LUMINAL) injection 65 mg  65 mg IntraVENous Q8H PRN    PHENobarbitaL (LUMINAL) tablet 64.8 mg  64.8 mg Oral TID    midodrine (PROAMATINE) tablet 5 mg  5 mg Oral Q8H    alteplase (CATHFLO) 1 mg in sterile water (preservative free) 1 mL injection  1 mg InterCATHeter PRN    aspirin chewable tablet 81 mg  81 mg Oral DAILY    budesonide (PULMICORT) 500 mcg/2 ml nebulizer suspension  500 mcg Nebulization BID RT    And    arformoteroL (BROVANA) neb solution 15 mcg  15 mcg Nebulization BID RT    ipratropium (ATROVENT) 0.02 % nebulizer solution 0.5 mg  0.5 mg Nebulization Q6H RT    milrinone (PRIMACOR) 20 MG/100 ML D5W infusion  0.25 mcg/kg/min (Order-Specific) IntraVENous CONTINUOUS    dexmedeTOMidine in 0.9 % NaCl (PRECEDEX) 400 mcg/100 mL (4 mcg/mL) infusion soln  0.1-1.5 mcg/kg/hr IntraVENous TITRATE    sodium chloride (NS) flush 5-40 mL  5-40 mL IntraVENous Q8H    sodium chloride (NS) flush 5-40 mL  5-40 mL IntraVENous PRN    acetaminophen (TYLENOL) suppository 650 mg  650 mg Rectal Q4H PRN    chlorhexidine (PERIDEX) 0.12 % mouthwash 15 mL  15 mL Oral Q12H    sodium chloride (NS) flush 5-40 mL  5-40 mL IntraVENous Q8H    sodium chloride (NS) flush 5-40 mL  5-40 mL IntraVENous PRN    acetaminophen (TYLENOL) tablet 650 mg  650 mg Oral Q6H PRN    Or    acetaminophen (TYLENOL) suppository 650 mg  650 mg Rectal Q6H PRN    polyethylene glycol (MIRALAX) packet 17 g  17 g Oral DAILY PRN    ondansetron (ZOFRAN ODT) tablet 4 mg  4 mg Oral Q8H PRN    Or    ondansetron (ZOFRAN) injection 4 mg  4 mg IntraVENous Q6H PRN    albuterol-ipratropium (DUO-NEB) 2.5 MG-0.5 MG/3 ML  3 mL Nebulization Q4H PRN     ______________________________________________________________________  EXPECTED LENGTH OF STAY: 3d 19h  ACTUAL LENGTH OF STAY:          6                 Diana Hartley MD     CRITICAL CARE ATTESTATION:  I had a face to face encounter with the patient, reviewed and interpreted patient data including clinical events, labs, images, vital signs, I/O's, and examined patient. I have discussed the case and the plan and management of the patient's care with the consulting services, the bedside nurses and necessary ancillary providers. NOTE OF PERSONAL INVOLVEMENT IN CARE   This patient has a high probability of imminent, clinically significant deterioration, which requires the highest level of preparedness to intervene urgently. I participated in the decision-making and personally managed or directed the management of the following life and organ supporting interventions that required my frequent assessment to treat or prevent imminent deterioration. I personally spent 30 minutes of critical care time. This is time spent at this critically ill patient's bedside actively involved in patient care as well as the coordination of care and discussions with the patient's family. This does not include any procedural time which has been billed separately.

## 2022-08-14 NOTE — PROGRESS NOTES
0000: Bedside shift change report given to Severiano Keeler, RN (oncoming nurse) by Brittnee Bear RN (offgoing nurse). Report included the following information SBAR, Kardex, ED Summary, Intake/Output, MAR, Recent Results, Cardiac Rhythm Sinus Tach with PVC's, Alarm Parameters , and Quality Measures. 0140: Patient restless, removing nasal cannula and requesting to sit on the side of the bed. RN suggested patient sit in the recliner for safety. Patient transferred to recliner via stand/pivot. 0400: Patient less restless. OOB in recliner working on a crossword/word search.
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
## 2022-08-14 NOTE — PROGRESS NOTES
0730 Bedside shift change report given to [address] (oncoming nurse) by Carolyn Charlton RN (offgoing nurse). Report included the following information SBAR, Kardex, ED Summary, Procedure Summary, Intake/Output, MAR, Recent Results, Med Rec Status, Cardiac Rhythm Sinus Tachy, and Alarm Parameters . 0900 Pt tachypneic with increasing dyspnea on exertion. Decreased urine output overnight. Deandra Bear MD notified. [address] at bedside. Orders received to transduce CVP and send labs. CVP 20-22.     1030 Labs sent. 1115 Pt appears hypoxic and dyspneic at rest. Kristian Jameson MD at bedside. Orders received to give 1mg bumex and start bumex gtt @ 0.5    1200 Pt becoming more agitated and confused, hallucinating objects outside the window, attempted to get out of bed multiple times despite being told to call for assistance. Precedex gtt restarted @ 0.4.     1300 Pt unable to use urinal d/t decreased safety awareness and strict I/Os required. Coude delgado catheter placed per Kristian Jameson MD.    1400 Blood cultures drawn and abx started per Kristian Jameson MD    [id number] Pt still restless and agitated. Threw himself to the edge of the bed and pulling at IV tubing. Pt states \"I need to get my pants back on so I can get the hell out of here. \" Kristian Jameson MD notified. 5mg Halodol given. 1600 Pt resting comfortably and breathing regularly. 1930 Bedside shift change report given to Shivani Munguia (oncoming nurse) by Audrey Torres RN (offgoing nurse). Report included the following information SBAR, Kardex, ED Summary, Procedure Summary, Intake/Output, MAR, Recent Results, Med Rec Status, Cardiac Rhythm NSR, and Alarm Parameters .

## 2022-08-15 LAB
ANION GAP SERPL CALC-SCNC: 11 MMOL/L (ref 5–15)
APTT PPP: 34.2 SEC (ref 22.1–31)
BACTERIA SPEC CULT: NORMAL
BACTERIA SPEC CULT: NORMAL
BNP SERPL-MCNC: ABNORMAL PG/ML
BUN SERPL-MCNC: 26 MG/DL (ref 6–20)
BUN/CREAT SERPL: 16 (ref 12–20)
CALCIUM SERPL-MCNC: 9.3 MG/DL (ref 8.5–10.1)
CHLORIDE SERPL-SCNC: 100 MMOL/L (ref 97–108)
CO2 SERPL-SCNC: 27 MMOL/L (ref 21–32)
CREAT SERPL-MCNC: 1.63 MG/DL (ref 0.7–1.3)
ERYTHROCYTE [DISTWIDTH] IN BLOOD BY AUTOMATED COUNT: 18.4 % (ref 11.5–14.5)
GLUCOSE SERPL-MCNC: 125 MG/DL (ref 65–100)
HCT VFR BLD AUTO: 30.8 % (ref 36.6–50.3)
HGB BLD-MCNC: 10.2 G/DL (ref 12.1–17)
INR PPP: 1.4 (ref 0.9–1.1)
MAGNESIUM SERPL-MCNC: 1.5 MG/DL (ref 1.6–2.4)
MCH RBC QN AUTO: 30.3 PG (ref 26–34)
MCHC RBC AUTO-ENTMCNC: 33.1 G/DL (ref 30–36.5)
MCV RBC AUTO: 91.4 FL (ref 80–99)
NRBC # BLD: 0 K/UL (ref 0–0.01)
NRBC BLD-RTO: 0 PER 100 WBC
PLATELET # BLD AUTO: 321 K/UL (ref 150–400)
PMV BLD AUTO: 11.2 FL (ref 8.9–12.9)
POTASSIUM SERPL-SCNC: 3.5 MMOL/L (ref 3.5–5.1)
PROTHROMBIN TIME: 14.2 SEC (ref 9–11.1)
RBC # BLD AUTO: 3.37 M/UL (ref 4.1–5.7)
SERVICE CMNT-IMP: NORMAL
SODIUM SERPL-SCNC: 138 MMOL/L (ref 136–145)
THERAPEUTIC RANGE,PTTT: ABNORMAL SECS (ref 58–77)
WBC # BLD AUTO: 8.5 K/UL (ref 4.1–11.1)

## 2022-08-15 PROCEDURE — 85027 COMPLETE CBC AUTOMATED: CPT

## 2022-08-15 PROCEDURE — 74011250637 HC RX REV CODE- 250/637: Performed by: INTERNAL MEDICINE

## 2022-08-15 PROCEDURE — 74011250636 HC RX REV CODE- 250/636: Performed by: INTERNAL MEDICINE

## 2022-08-15 PROCEDURE — 74011000250 HC RX REV CODE- 250: Performed by: STUDENT IN AN ORGANIZED HEALTH CARE EDUCATION/TRAINING PROGRAM

## 2022-08-15 PROCEDURE — 97530 THERAPEUTIC ACTIVITIES: CPT

## 2022-08-15 PROCEDURE — 83880 ASSAY OF NATRIURETIC PEPTIDE: CPT

## 2022-08-15 PROCEDURE — 85610 PROTHROMBIN TIME: CPT

## 2022-08-15 PROCEDURE — 65620000000 HC RM CCU GENERAL

## 2022-08-15 PROCEDURE — 92526 ORAL FUNCTION THERAPY: CPT | Performed by: SPEECH-LANGUAGE PATHOLOGIST

## 2022-08-15 PROCEDURE — 92507 TX SP LANG VOICE COMM INDIV: CPT | Performed by: SPEECH-LANGUAGE PATHOLOGIST

## 2022-08-15 PROCEDURE — 74011000258 HC RX REV CODE- 258: Performed by: INTERNAL MEDICINE

## 2022-08-15 PROCEDURE — 99233 SBSQ HOSP IP/OBS HIGH 50: CPT | Performed by: NURSE PRACTITIONER

## 2022-08-15 PROCEDURE — 94640 AIRWAY INHALATION TREATMENT: CPT

## 2022-08-15 PROCEDURE — 97116 GAIT TRAINING THERAPY: CPT

## 2022-08-15 PROCEDURE — 36415 COLL VENOUS BLD VENIPUNCTURE: CPT

## 2022-08-15 PROCEDURE — 83735 ASSAY OF MAGNESIUM: CPT

## 2022-08-15 PROCEDURE — 85730 THROMBOPLASTIN TIME PARTIAL: CPT

## 2022-08-15 PROCEDURE — 74011250637 HC RX REV CODE- 250/637: Performed by: STUDENT IN AN ORGANIZED HEALTH CARE EDUCATION/TRAINING PROGRAM

## 2022-08-15 PROCEDURE — 74011250636 HC RX REV CODE- 250/636: Performed by: STUDENT IN AN ORGANIZED HEALTH CARE EDUCATION/TRAINING PROGRAM

## 2022-08-15 PROCEDURE — P9045 ALBUMIN (HUMAN), 5%, 250 ML: HCPCS | Performed by: INTERNAL MEDICINE

## 2022-08-15 PROCEDURE — 80048 BASIC METABOLIC PNL TOTAL CA: CPT

## 2022-08-15 RX ORDER — ALBUMIN HUMAN 50 G/1000ML
12.5 SOLUTION INTRAVENOUS ONCE
Status: COMPLETED | OUTPATIENT
Start: 2022-08-15 | End: 2022-08-15

## 2022-08-15 RX ORDER — MIDODRINE HYDROCHLORIDE 5 MG/1
10 TABLET ORAL EVERY 8 HOURS
Status: DISCONTINUED | OUTPATIENT
Start: 2022-08-15 | End: 2022-08-18

## 2022-08-15 RX ORDER — NOREPINEPHRINE BITARTRATE/D5W 8 MG/250ML
.5-3 PLASTIC BAG, INJECTION (ML) INTRAVENOUS
Status: DISCONTINUED | OUTPATIENT
Start: 2022-08-15 | End: 2022-08-16

## 2022-08-15 RX ADMIN — ALBUMIN (HUMAN) 12.5 G: 12.5 INJECTION, SOLUTION INTRAVENOUS at 13:04

## 2022-08-15 RX ADMIN — Medication 30 MG: at 21:24

## 2022-08-15 RX ADMIN — SODIUM CHLORIDE, PRESERVATIVE FREE 20 MG: 5 INJECTION INTRAVENOUS at 09:58

## 2022-08-15 RX ADMIN — MILRINONE LACTATE 0.25 MCG/KG/MIN: 0.2 INJECTION, SOLUTION INTRAVENOUS at 17:37

## 2022-08-15 RX ADMIN — PIPERACILLIN AND TAZOBACTAM 3.38 G: 3; .375 INJECTION, POWDER, FOR SOLUTION INTRAVENOUS at 12:18

## 2022-08-15 RX ADMIN — METHYLPREDNISOLONE SODIUM SUCCINATE 40 MG: 40 INJECTION, POWDER, FOR SOLUTION INTRAMUSCULAR; INTRAVENOUS at 02:16

## 2022-08-15 RX ADMIN — Medication 30 MG: at 09:26

## 2022-08-15 RX ADMIN — SODIUM CHLORIDE, PRESERVATIVE FREE 10 ML: 5 INJECTION INTRAVENOUS at 06:36

## 2022-08-15 RX ADMIN — APIXABAN 5 MG: 5 TABLET, FILM COATED ORAL at 17:46

## 2022-08-15 RX ADMIN — ARFORMOTEROL TARTRATE 15 MCG: 15 SOLUTION RESPIRATORY (INHALATION) at 21:26

## 2022-08-15 RX ADMIN — ALBUMIN (HUMAN) 12.5 G: 12.5 INJECTION, SOLUTION INTRAVENOUS at 10:58

## 2022-08-15 RX ADMIN — MIDODRINE HYDROCHLORIDE 10 MG: 5 TABLET ORAL at 21:04

## 2022-08-15 RX ADMIN — ARFORMOTEROL TARTRATE 15 MCG: 15 SOLUTION RESPIRATORY (INHALATION) at 07:20

## 2022-08-15 RX ADMIN — MIDODRINE HYDROCHLORIDE 5 MG: 5 TABLET ORAL at 06:34

## 2022-08-15 RX ADMIN — METHYLPREDNISOLONE SODIUM SUCCINATE 40 MG: 40 INJECTION, POWDER, FOR SOLUTION INTRAMUSCULAR; INTRAVENOUS at 10:00

## 2022-08-15 RX ADMIN — CHLORHEXIDINE GLUCONATE 15 ML: 1.2 RINSE ORAL at 09:59

## 2022-08-15 RX ADMIN — EMPAGLIFLOZIN 10 MG: 10 TABLET, FILM COATED ORAL at 09:26

## 2022-08-15 RX ADMIN — METHYLPREDNISOLONE SODIUM SUCCINATE 40 MG: 40 INJECTION, POWDER, FOR SOLUTION INTRAMUSCULAR; INTRAVENOUS at 21:05

## 2022-08-15 RX ADMIN — METHYLPREDNISOLONE SODIUM SUCCINATE 40 MG: 40 INJECTION, POWDER, FOR SOLUTION INTRAMUSCULAR; INTRAVENOUS at 13:05

## 2022-08-15 RX ADMIN — IPRATROPIUM BROMIDE 0.5 MG: 0.5 SOLUTION RESPIRATORY (INHALATION) at 15:27

## 2022-08-15 RX ADMIN — CALCIUM CARBONATE (ANTACID) CHEW TAB 500 MG 200 MG: 500 CHEW TAB at 09:59

## 2022-08-15 RX ADMIN — CALCIUM CARBONATE (ANTACID) CHEW TAB 500 MG 200 MG: 500 CHEW TAB at 17:46

## 2022-08-15 RX ADMIN — ACETAMINOPHEN 650 MG: 325 TABLET ORAL at 06:40

## 2022-08-15 RX ADMIN — DEXMEDETOMIDINE HYDROCHLORIDE 1 MCG/KG/HR: 4 INJECTION, SOLUTION INTRAVENOUS at 03:15

## 2022-08-15 RX ADMIN — DEXMEDETOMIDINE HYDROCHLORIDE 0.8 MCG/KG/HR: 4 INJECTION, SOLUTION INTRAVENOUS at 09:21

## 2022-08-15 RX ADMIN — IPRATROPIUM BROMIDE 0.5 MG: 0.5 SOLUTION RESPIRATORY (INHALATION) at 07:23

## 2022-08-15 RX ADMIN — CALCIUM CARBONATE (ANTACID) CHEW TAB 500 MG 200 MG: 500 CHEW TAB at 13:04

## 2022-08-15 RX ADMIN — BUDESONIDE 500 MCG: 0.5 INHALANT RESPIRATORY (INHALATION) at 21:26

## 2022-08-15 RX ADMIN — PIPERACILLIN AND TAZOBACTAM 3.38 G: 3; .375 INJECTION, POWDER, FOR SOLUTION INTRAVENOUS at 21:05

## 2022-08-15 RX ADMIN — PIPERACILLIN AND TAZOBACTAM 3.38 G: 3; .375 INJECTION, POWDER, FOR SOLUTION INTRAVENOUS at 04:29

## 2022-08-15 RX ADMIN — BUDESONIDE 500 MCG: 0.5 INHALANT RESPIRATORY (INHALATION) at 07:20

## 2022-08-15 RX ADMIN — SODIUM CHLORIDE, PRESERVATIVE FREE 10 ML: 5 INJECTION INTRAVENOUS at 21:05

## 2022-08-15 RX ADMIN — SODIUM CHLORIDE, PRESERVATIVE FREE 10 ML: 5 INJECTION INTRAVENOUS at 21:06

## 2022-08-15 RX ADMIN — SODIUM CHLORIDE, PRESERVATIVE FREE 20 MG: 5 INJECTION INTRAVENOUS at 21:05

## 2022-08-15 RX ADMIN — ASPIRIN 81 MG CHEWABLE TABLET 81 MG: 81 TABLET CHEWABLE at 09:58

## 2022-08-15 RX ADMIN — MIDODRINE HYDROCHLORIDE 10 MG: 5 TABLET ORAL at 13:04

## 2022-08-15 RX ADMIN — APIXABAN 5 MG: 5 TABLET, FILM COATED ORAL at 09:59

## 2022-08-15 RX ADMIN — CHLORHEXIDINE GLUCONATE 15 ML: 1.2 RINSE ORAL at 21:05

## 2022-08-15 RX ADMIN — IPRATROPIUM BROMIDE 0.5 MG: 0.5 SOLUTION RESPIRATORY (INHALATION) at 21:26

## 2022-08-15 RX ADMIN — SODIUM CHLORIDE, PRESERVATIVE FREE 10 ML: 5 INJECTION INTRAVENOUS at 13:05

## 2022-08-15 NOTE — PROGRESS NOTES
Problem: Mobility Impaired (Adult and Pediatric)  Goal: *Acute Goals and Plan of Care (Insert Text)  Description: FUNCTIONAL STATUS PRIOR TO ADMISSION: Patient was independent and active without use of DME.    HOME SUPPORT PRIOR TO ADMISSION: The patient lived with wife but did not require assist.    Physical Therapy Goals  Initiated 8/10/2022  1. Patient will move from supine to sit and sit to supine  in bed with modified independence within 7 day(s). 2.  Patient will transfer from bed to chair and chair to bed with modified independence using the least restrictive device within 7 day(s). 3.  Patient will perform sit to stand with modified independence within 7 day(s). 4.  Patient will ambulate with modified independence for 50 feet with the least restrictive device within 7 day(s). Outcome: Progressing Towards Goal   PHYSICAL THERAPY TREATMENT  Patient: Greta Whitney (27 y.o. male)  Date: 8/15/2022  Diagnosis: Cardiac arrest [facility]) [I46.9] <principal problem not specified>      Precautions: Fall  Chart, physical therapy assessment, plan of care and goals were reviewed. ASSESSMENT  Patient continues with skilled PT services and is progressing towards goals. Patient with improved mental status, improved command following, and improved O2 saturation today, however continues to demonstrate impulsivity with mobility and today with increased need for assistance during gait with ataxic-appearing gait mechanics, which are new. Patient found on 2L/min O2 with SPO2 100% at rest with general hypotension but stable throughout session. Patient completed bed mobility with SBA and transfers with CGA. Previously patient demonstrating widened KARINA, however today patient demonstrated narrow KARINA with intermittent scissoring of LEs during ambulation, requiring min-mod A x 1-2 to prevent falls.  Patient cued for upright posture, to maintain COG over KARINA, and to widen KARINA, which patient able to correct however did not maintain throughout rest of gait training. SPO2 stable >95% on 2L/min throughout mobility. Patient left sitting in chair with chair alarm activated and call bell in reach. Continued recommendation for IPR at discharge to improve safety and independence with functional mobility. Current Level of Function Impacting Discharge (mobility/balance): ambulates 40 feet with min-mod A x 1-2     Other factors to consider for discharge: AMS, impulsivity, high risk for falls         PLAN :  Patient continues to benefit from skilled intervention to address the above impairments. Continue treatment per established plan of care. to address goals. Recommendation for discharge: (in order for the patient to meet his/her long term goals)  Therapy 3 hours per day 5-7 days per week    This discharge recommendation:  A follow-up discussion with the attending provider and/or case management is planned    IF patient discharges home will need the following DME: to be determined (TBD)       SUBJECTIVE:   Patient stated I need to put some pants on.    OBJECTIVE DATA SUMMARY:   Critical Behavior:  Neurologic State: Alert, Appropriate for age  Orientation Level: Oriented to place, Oriented to person, Oriented to situation, Disoriented to time  Cognition: Memory loss, Impulsive, Impaired decision making, Follows commands, Recognition of people/places  Safety/Judgement: Decreased insight into deficits, Decreased awareness of need for safety, Decreased awareness of need for assistance, Decreased awareness of environment  Functional Mobility Training:  Bed Mobility:  Rolling: Stand-by assistance  Supine to Sit: Stand-by assistance     Scooting: Stand-by assistance        Transfers:  Sit to Stand: Contact guard assistance;Assist x1  Stand to Sit: Contact guard assistance;Assist x1        Bed to Chair: Minimum assistance;Assist x1                    Balance:  Sitting: Intact; Without support  Standing: Impaired; With support  Standing - Static: Fair;Constant support  Standing - Dynamic : Fair;Poor;Constant support  Ambulation/Gait Training:  Distance (ft): 40 Feet (ft)  Assistive Device: Gait belt; Other (comment) (R HHA)  Ambulation - Level of Assistance: Minimal assistance; Moderate assistance;Assist x1        Gait Abnormalities: Ataxic;Decreased step clearance; Path deviations;Scissoring;Trunk sway increased        Base of Support: Narrowed     Speed/Milena: Pace decreased (<100 feet/min)  Step Length: Right shortened;Left shortened             Pain Ratin/10     Activity Tolerance:   Good    After treatment patient left in no apparent distress:   Sitting in chair, Call bell within reach, and Bed / chair alarm activated    COMMUNICATION/COLLABORATION:   The patients plan of care was discussed with: Occupational therapist and Registered nurse.      Yusra Larkin, PT   Time Calculation: 30 mins
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
## 2022-08-15 NOTE — PROGRESS NOTES
Problem: Self Care Deficits Care Plan (Adult)  Goal: *Acute Goals and Plan of Care (Insert Text)  Description: FUNCTIONAL STATUS PRIOR TO ADMISSION: Patient was independent and active without use of DME.     HOME SUPPORT: The patient lived with wife but did not require assist.    Occupational Therapy Goals  Initiated 8/9/2022  1. Patient will perform grooming in standing with independence within 7 day(s). 2.  Patient will perform bathing with independence within 7 day(s). 3.  Patient will perform lower body dressing with independence within 7 day(s). 4.  Patient will perform toilet transfers with independence within 7 day(s). 5.  Patient will perform all aspects of toileting with independence within 7 day(s). 6.  Patient will participate in upper extremity therapeutic exercise/activities with independence for 5 minutes within 7 day(s). 7.  Patient will utilize energy conservation techniques during functional activities with verbal cues within 7 day(s). Outcome: Progressing Towards Goal       OCCUPATIONAL THERAPY TREATMENT  Patient: Francesca Mckinley (25 y.o. male)  Date: 8/15/2022  Diagnosis: Cardiac arrest [facility]) [I46.9] <principal problem not specified>      Precautions: Fall  Chart, occupational therapy assessment, plan of care, and goals were reviewed. ASSESSMENT  Patient continues with skilled OT services and is slowly progressing towards goals. Pt more appropriate and cooperative this date with conversation. Pt was SBA for bed mobility with intact sitting balance, CGA to stand. BP soft throughout session but pt without complaints. With functional mobility, pt with several LOBs, needing mod A x 1-2 for correction. ADLs performed seated due to prolonged hypotension and RN to setup IV for Albumin. Pt remains below his ADL and functional mobility baseline, needing A for balance, standing ADLs. Recommend IPR.     Current Level of Function Impacting Discharge (ADLs): mod A for balance loss, decreased insight into deficits, high fall risk    Other factors to consider for discharge:          PLAN :  Patient continues to benefit from skilled intervention to address the above impairments. Continue treatment per established plan of care to address goals. Recommend with staff: OOB to chair, BSC    Recommend next OT session: Standing ADLs    Recommendation for discharge: (in order for the patient to meet his/her long term goals)  Therapy 3 hours per day 5-7 days per week    This discharge recommendation:  Has been made in collaboration with the attending provider and/or case management    IF patient discharges home will need the following DME: TBD       SUBJECTIVE:   Patient stated I was a .     OBJECTIVE DATA SUMMARY:   Cognitive/Behavioral Status:  Neurologic State: Alert  Orientation Level: Oriented to person;Oriented to situation;Disoriented to time;Disoriented to place  Cognition: Decreased attention/concentration; Follows commands             Functional Mobility and Transfers for ADLs:  Bed Mobility:  Rolling: Stand-by assistance  Supine to Sit: Stand-by assistance  Scooting: Stand-by assistance    Transfers:  Sit to Stand: Contact guard assistance;Assist x1     Bed to Chair: Minimum assistance;Assist x1    Balance:  Sitting: Intact; Without support  Standing: Impaired; With support  Standing - Static: Fair;Constant support  Standing - Dynamic : Fair;Poor;Constant support    ADL Intervention:   Performed seated due to hypotension post short distance functional mobility. Grooming  Position Performed: Seated in chair  Brushing Teeth: Set-up         Type of Bath: Chlorhexidine (CHG)         Pain:  No complaints    Activity Tolerance:   Fair    After treatment patient left in no apparent distress:   Sitting in chair, Call bell within reach, and Bed / chair alarm activated    COMMUNICATION/COLLABORATION:   The patients plan of care was discussed with: Physical therapist and Registered nurse. Jalen Pike, OT  Time Calculation: 33 mins

## 2022-08-15 NOTE — PROGRESS NOTES
Problem: Dysphagia (Adult)  Goal: *Acute Goals and Plan of Care (Insert Text)  Description: Speech pathology goals  Initiated 8/10/2022  1. Patient will tolerate full liquid diet with no overt s/s aspiration or adverse effects within 7 days  2. Patient will tolerate diet liberalization within 7 days  Outcome: Progressing Towards Goal     Problem: Neurolinguistics Impaired (Adult)  Goal: *Acute Goals and Plan of Care (Insert Text)  Description: Speech Therapy Goals  Initiated 8/11/22    1. Pt will be oriented with use of visual aids with 100% accuracy within 7 days. 2. Pt will utilize memory strategies with 60% accuracy within 7 days. Outcome: Progressing Towards Goal     SPEECH LANGUAGE PATHOLOGY DYSPHAGIA AND SPEECH TREATMENT  Patient: Greta Whitney (2 y.o. male)  Date: 8/15/2022  Diagnosis: Cardiac arrest [facility]) [I46.9] <principal problem not specified>      Precautions:  Fall    ASSESSMENT:  Patient and RN report good intake of meals and no difficulty. Lunch tray at bedside and all meal eaten. Patient tolerated solid and thin liquids without difficulty but with delayed cough x 1. No other s/s aspiration. Patient oriented to self and situation this date. Oriented to hospital but not [facility]. Oriented to month but not day or date. Introduced recall strategies and patient verbalized understanding but became drowsy and closed his eyes. PLAN:  Recommendations and Planned Interventions:  Continue easy to chew diet, thin liquids  Sit up for all meals  Small bites and sips  Recall strategies:  Write, repeat, visualize and associate  Patient continues to benefit from skilled intervention to address the above impairments. Continue treatment per established plan of care. Discharge Recommendations: To Be Determined     SUBJECTIVE:   Patient stated I should write that down. Mendel Baumgarten  RN approved visit.     OBJECTIVE:   Cognitive and Communication Status:  Neurologic State: Alert  Orientation Level: Oriented to person, Oriented to situation, Disoriented to time, Disoriented to place  Cognition: Decreased attention/concentration, Follows commands  Perception: Appears intact  Perseveration: Perseverates during ADLS, Perseverates during conversation, Perseverates during mobility  Safety/Judgement: Decreased insight into deficits, Decreased awareness of need for safety, Decreased awareness of need for assistance, Decreased awareness of environment    Dysphagia Treatment and Interventions:  Oral Assessment:     P.O. Trials:  Patient Position: Upright in bed  Vocal quality prior to P.O.: No impairment  Consistency Presented: Thin liquid; Solid  How Presented: Self-fed/presented;Straw     Bolus Acceptance: No impairment  Bolus Formation/Control: No impairment     Propulsion: No impairment  Oral Residue: None        Aspiration Signs/Symptoms: Delayed cough/throat clear                      Speech Treatment and Interventions:    Language Comprehension and Expression:  Auditory Comprehension   Hearing Aid: None  Verbal Expression     Neuro-Linguistics:                    Memory: Impaired                                 After treatment:   Patient left in no apparent distress in bed, Call bell within reach, and Nursing notified    COMMUNICATION/EDUCATION:   Patient was educated regarding role of SLP, recall strategies and POC. The patient's plan of care including recommendations, planned interventions, and recommended diet changes were discussed with: Registered nurse.        Kristin Garcia, SLP  Time Calculation: 18 mins

## 2022-08-15 NOTE — PROGRESS NOTES
~2500ml urine output over past 6 hrs  Bumex gtt @ 0.5mg/hr  CVP recheck-10, (unable to continuously monitor d/t access)    Spoke with MD Meneses~bumex gtt off for now  Pt stable on 3LNC

## 2022-08-15 NOTE — ROUTINE PROCESS
07:30 SBAR from [address] [facility]    10:00 Very talkative this am, CVP around 3, will hold Bumex and aldactone, MD and HF made aware. Will slowly wean Precedex. 10:15 Working with PT, up in chair with chair alarm on. He is oriented but still not quite 100% intact, BP soft    11:30 BP still very low, assisted back to bed, receiving albumin    12:55 CVP better but bp still somewhat low, more albumin ordered. 16:00 Off Precedex, BP better and CVP improved. he is very talkative but not agitated. 19:30 Bedside shift change report given to Jamar Winkler (oncoming nurse) by MOM (offgoing nurse). Report included the following information SBAR, Kardex, Procedure Summary, Intake/Output, MAR, Accordion, Recent Results, Med Rec Status, Cardiac Rhythm V Paced/SNR with right BBB, Alarm Parameters , Pre Procedure Checklist, Procedure Verification, and Quality Measures.
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
## 2022-08-16 LAB
ANION GAP SERPL CALC-SCNC: 8 MMOL/L (ref 5–15)
APTT PPP: 25.4 SEC (ref 22.1–31)
BNP SERPL-MCNC: ABNORMAL PG/ML
BUN SERPL-MCNC: 44 MG/DL (ref 6–20)
BUN/CREAT SERPL: 25 (ref 12–20)
CALCIUM SERPL-MCNC: 9.1 MG/DL (ref 8.5–10.1)
CHLORIDE SERPL-SCNC: 98 MMOL/L (ref 97–108)
CO2 SERPL-SCNC: 28 MMOL/L (ref 21–32)
CREAT SERPL-MCNC: 1.77 MG/DL (ref 0.7–1.3)
ERYTHROCYTE [DISTWIDTH] IN BLOOD BY AUTOMATED COUNT: 18.2 % (ref 11.5–14.5)
GLUCOSE SERPL-MCNC: 132 MG/DL (ref 65–100)
HCT VFR BLD AUTO: 28.4 % (ref 36.6–50.3)
HGB BLD-MCNC: 9.7 G/DL (ref 12.1–17)
INR PPP: 1.3 (ref 0.9–1.1)
MAGNESIUM SERPL-MCNC: 1.6 MG/DL (ref 1.6–2.4)
MCH RBC QN AUTO: 30.3 PG (ref 26–34)
MCHC RBC AUTO-ENTMCNC: 34.2 G/DL (ref 30–36.5)
MCV RBC AUTO: 88.8 FL (ref 80–99)
NRBC # BLD: 0 K/UL (ref 0–0.01)
NRBC BLD-RTO: 0 PER 100 WBC
PLATELET # BLD AUTO: 349 K/UL (ref 150–400)
PMV BLD AUTO: 11.3 FL (ref 8.9–12.9)
POTASSIUM SERPL-SCNC: 3.1 MMOL/L (ref 3.5–5.1)
PROTHROMBIN TIME: 13.2 SEC (ref 9–11.1)
RBC # BLD AUTO: 3.2 M/UL (ref 4.1–5.7)
SODIUM SERPL-SCNC: 134 MMOL/L (ref 136–145)
THERAPEUTIC RANGE,PTTT: NORMAL SECS (ref 58–77)
WBC # BLD AUTO: 21.2 K/UL (ref 4.1–11.1)

## 2022-08-16 PROCEDURE — 74011000250 HC RX REV CODE- 250: Performed by: STUDENT IN AN ORGANIZED HEALTH CARE EDUCATION/TRAINING PROGRAM

## 2022-08-16 PROCEDURE — 74011250637 HC RX REV CODE- 250/637: Performed by: NURSE PRACTITIONER

## 2022-08-16 PROCEDURE — 36415 COLL VENOUS BLD VENIPUNCTURE: CPT

## 2022-08-16 PROCEDURE — 85610 PROTHROMBIN TIME: CPT

## 2022-08-16 PROCEDURE — 74011250637 HC RX REV CODE- 250/637: Performed by: INTERNAL MEDICINE

## 2022-08-16 PROCEDURE — 74011250636 HC RX REV CODE- 250/636: Performed by: INTERNAL MEDICINE

## 2022-08-16 PROCEDURE — 97535 SELF CARE MNGMENT TRAINING: CPT

## 2022-08-16 PROCEDURE — 99233 SBSQ HOSP IP/OBS HIGH 50: CPT | Performed by: NURSE PRACTITIONER

## 2022-08-16 PROCEDURE — 92526 ORAL FUNCTION THERAPY: CPT | Performed by: SPEECH-LANGUAGE PATHOLOGIST

## 2022-08-16 PROCEDURE — 74011000258 HC RX REV CODE- 258: Performed by: INTERNAL MEDICINE

## 2022-08-16 PROCEDURE — 83880 ASSAY OF NATRIURETIC PEPTIDE: CPT

## 2022-08-16 PROCEDURE — 94640 AIRWAY INHALATION TREATMENT: CPT

## 2022-08-16 PROCEDURE — 74011250637 HC RX REV CODE- 250/637: Performed by: STUDENT IN AN ORGANIZED HEALTH CARE EDUCATION/TRAINING PROGRAM

## 2022-08-16 PROCEDURE — 97116 GAIT TRAINING THERAPY: CPT

## 2022-08-16 PROCEDURE — 83735 ASSAY OF MAGNESIUM: CPT

## 2022-08-16 PROCEDURE — 80048 BASIC METABOLIC PNL TOTAL CA: CPT

## 2022-08-16 PROCEDURE — 65660000001 HC RM ICU INTERMED STEPDOWN

## 2022-08-16 PROCEDURE — 85730 THROMBOPLASTIN TIME PARTIAL: CPT

## 2022-08-16 PROCEDURE — 74011250637 HC RX REV CODE- 250/637: Performed by: HOSPITALIST

## 2022-08-16 PROCEDURE — 85027 COMPLETE CBC AUTOMATED: CPT

## 2022-08-16 PROCEDURE — 74011250636 HC RX REV CODE- 250/636: Performed by: STUDENT IN AN ORGANIZED HEALTH CARE EDUCATION/TRAINING PROGRAM

## 2022-08-16 RX ORDER — HYDROCODONE BITARTRATE AND ACETAMINOPHEN 5; 325 MG/1; MG/1
1 TABLET ORAL
Status: DISCONTINUED | OUTPATIENT
Start: 2022-08-16 | End: 2022-08-20 | Stop reason: HOSPADM

## 2022-08-16 RX ORDER — POTASSIUM CHLORIDE 29.8 MG/ML
20 INJECTION INTRAVENOUS
Status: COMPLETED | OUTPATIENT
Start: 2022-08-16 | End: 2022-08-16

## 2022-08-16 RX ORDER — FAMOTIDINE 20 MG/1
20 TABLET, FILM COATED ORAL DAILY
Status: DISCONTINUED | OUTPATIENT
Start: 2022-08-17 | End: 2022-08-20 | Stop reason: HOSPADM

## 2022-08-16 RX ORDER — BUMETANIDE 1 MG/1
1 TABLET ORAL DAILY
Status: DISCONTINUED | OUTPATIENT
Start: 2022-08-16 | End: 2022-08-18

## 2022-08-16 RX ORDER — MAGNESIUM SULFATE HEPTAHYDRATE 40 MG/ML
2 INJECTION, SOLUTION INTRAVENOUS ONCE
Status: COMPLETED | OUTPATIENT
Start: 2022-08-16 | End: 2022-08-16

## 2022-08-16 RX ORDER — FAMOTIDINE 20 MG/1
20 TABLET, FILM COATED ORAL 2 TIMES DAILY
Status: DISCONTINUED | OUTPATIENT
Start: 2022-08-16 | End: 2022-08-16

## 2022-08-16 RX ORDER — TRAZODONE HYDROCHLORIDE 50 MG/1
TABLET ORAL
Status: CANCELLED | OUTPATIENT
Start: 2022-08-16

## 2022-08-16 RX ADMIN — METHYLPREDNISOLONE SODIUM SUCCINATE 40 MG: 40 INJECTION, POWDER, FOR SOLUTION INTRAMUSCULAR; INTRAVENOUS at 13:51

## 2022-08-16 RX ADMIN — SPIRONOLACTONE 12.5 MG: 25 TABLET ORAL at 08:37

## 2022-08-16 RX ADMIN — CALCIUM CARBONATE (ANTACID) CHEW TAB 500 MG 200 MG: 500 CHEW TAB at 18:16

## 2022-08-16 RX ADMIN — SODIUM CHLORIDE, PRESERVATIVE FREE 10 ML: 5 INJECTION INTRAVENOUS at 22:25

## 2022-08-16 RX ADMIN — POTASSIUM CHLORIDE 20 MEQ: 29.8 INJECTION, SOLUTION INTRAVENOUS at 06:36

## 2022-08-16 RX ADMIN — ACETAMINOPHEN 650 MG: 325 TABLET ORAL at 14:58

## 2022-08-16 RX ADMIN — CALCIUM CARBONATE (ANTACID) CHEW TAB 500 MG 200 MG: 500 CHEW TAB at 08:36

## 2022-08-16 RX ADMIN — BUDESONIDE 500 MCG: 0.5 INHALANT RESPIRATORY (INHALATION) at 07:20

## 2022-08-16 RX ADMIN — SODIUM CHLORIDE, PRESERVATIVE FREE 10 ML: 5 INJECTION INTRAVENOUS at 05:44

## 2022-08-16 RX ADMIN — Medication 30 MG: at 20:39

## 2022-08-16 RX ADMIN — MIDODRINE HYDROCHLORIDE 10 MG: 5 TABLET ORAL at 05:44

## 2022-08-16 RX ADMIN — IPRATROPIUM BROMIDE 0.5 MG: 0.5 SOLUTION RESPIRATORY (INHALATION) at 03:16

## 2022-08-16 RX ADMIN — CHLORHEXIDINE GLUCONATE 15 ML: 1.2 RINSE ORAL at 08:35

## 2022-08-16 RX ADMIN — ARFORMOTEROL TARTRATE 15 MCG: 15 SOLUTION RESPIRATORY (INHALATION) at 19:59

## 2022-08-16 RX ADMIN — ASPIRIN 81 MG CHEWABLE TABLET 81 MG: 81 TABLET CHEWABLE at 08:36

## 2022-08-16 RX ADMIN — ARFORMOTEROL TARTRATE 15 MCG: 15 SOLUTION RESPIRATORY (INHALATION) at 07:20

## 2022-08-16 RX ADMIN — BUDESONIDE 500 MCG: 0.5 INHALANT RESPIRATORY (INHALATION) at 19:59

## 2022-08-16 RX ADMIN — IPRATROPIUM BROMIDE 0.5 MG: 0.5 SOLUTION RESPIRATORY (INHALATION) at 07:20

## 2022-08-16 RX ADMIN — FAMOTIDINE 20 MG: 20 TABLET, FILM COATED ORAL at 10:49

## 2022-08-16 RX ADMIN — APIXABAN 5 MG: 5 TABLET, FILM COATED ORAL at 18:16

## 2022-08-16 RX ADMIN — EMPAGLIFLOZIN 10 MG: 10 TABLET, FILM COATED ORAL at 08:37

## 2022-08-16 RX ADMIN — APIXABAN 5 MG: 5 TABLET, FILM COATED ORAL at 08:37

## 2022-08-16 RX ADMIN — PIPERACILLIN AND TAZOBACTAM 3.38 G: 3; .375 INJECTION, POWDER, FOR SOLUTION INTRAVENOUS at 20:33

## 2022-08-16 RX ADMIN — MILRINONE LACTATE 0.25 MCG/KG/MIN: 0.2 INJECTION, SOLUTION INTRAVENOUS at 10:48

## 2022-08-16 RX ADMIN — MAGNESIUM SULFATE HEPTAHYDRATE 2 G: 40 INJECTION, SOLUTION INTRAVENOUS at 06:36

## 2022-08-16 RX ADMIN — Medication 30 MG: at 08:35

## 2022-08-16 RX ADMIN — SODIUM CHLORIDE, PRESERVATIVE FREE 10 ML: 5 INJECTION INTRAVENOUS at 05:45

## 2022-08-16 RX ADMIN — CALCIUM CARBONATE (ANTACID) CHEW TAB 500 MG 200 MG: 500 CHEW TAB at 11:02

## 2022-08-16 RX ADMIN — PIPERACILLIN AND TAZOBACTAM 3.38 G: 3; .375 INJECTION, POWDER, FOR SOLUTION INTRAVENOUS at 11:02

## 2022-08-16 RX ADMIN — POTASSIUM CHLORIDE 20 MEQ: 29.8 INJECTION, SOLUTION INTRAVENOUS at 07:32

## 2022-08-16 RX ADMIN — SODIUM CHLORIDE, PRESERVATIVE FREE 10 ML: 5 INJECTION INTRAVENOUS at 13:51

## 2022-08-16 RX ADMIN — METHYLPREDNISOLONE SODIUM SUCCINATE 40 MG: 40 INJECTION, POWDER, FOR SOLUTION INTRAMUSCULAR; INTRAVENOUS at 22:25

## 2022-08-16 RX ADMIN — IPRATROPIUM BROMIDE 0.5 MG: 0.5 SOLUTION RESPIRATORY (INHALATION) at 15:30

## 2022-08-16 RX ADMIN — HYDROCODONE BITARTRATE AND ACETAMINOPHEN 1 TABLET: 5; 325 TABLET ORAL at 18:16

## 2022-08-16 RX ADMIN — METHYLPREDNISOLONE SODIUM SUCCINATE 40 MG: 40 INJECTION, POWDER, FOR SOLUTION INTRAMUSCULAR; INTRAVENOUS at 01:59

## 2022-08-16 RX ADMIN — BUMETANIDE 1 MG: 1 TABLET ORAL at 08:37

## 2022-08-16 RX ADMIN — MIDODRINE HYDROCHLORIDE 10 MG: 5 TABLET ORAL at 13:51

## 2022-08-16 RX ADMIN — POTASSIUM CHLORIDE 20 MEQ: 29.8 INJECTION, SOLUTION INTRAVENOUS at 09:50

## 2022-08-16 RX ADMIN — PIPERACILLIN AND TAZOBACTAM 3.38 G: 3; .375 INJECTION, POWDER, FOR SOLUTION INTRAVENOUS at 03:06

## 2022-08-16 RX ADMIN — CHLORHEXIDINE GLUCONATE 15 ML: 1.2 RINSE ORAL at 20:33

## 2022-08-16 RX ADMIN — IPRATROPIUM BROMIDE 0.5 MG: 0.5 SOLUTION RESPIRATORY (INHALATION) at 19:59

## 2022-08-16 RX ADMIN — POTASSIUM CHLORIDE 20 MEQ: 29.8 INJECTION, SOLUTION INTRAVENOUS at 08:34

## 2022-08-16 NOTE — ROUTINE PROCESS
07:30 SBAR from Karen Fox    10:35  at bedside for transfer    10:55 Patients wife at bedside    11:30 Up walking in halls with PT.     11:35 TRANSFER - OUT REPORT:    Verbal report given to Robbin(name) on Abram Half  being transferred to CVSU(unit) for routine progression of care       Report consisted of patients Situation, Background, Assessment and   Recommendations(SBAR). Information from the following report(s) SBAR, Kardex, Procedure Summary, Intake/Output, MAR, Accordion, Recent Results, Med Rec Status, Cardiac Rhythm ST right BBB, Alarm Parameters , Pre Procedure Checklist, Procedure Verification, and Quality Measures was reviewed with the receiving nurse. Lines:   Venous Access Device Mojica - PTA Upper chest (subclavicular area, right (Active)   Central Line Being Utilized Yes 08/16/22 0800   Criteria for Appropriate Use Irritant/vesicant medication 08/16/22 0800   Site Assessment Clean, dry, & intact 08/16/22 0800   Date of Last Dressing Change 08/16/22 08/16/22 0800   Dressing Status Clean, dry, & intact 08/16/22 0800   Dressing Type Bacteriocidal;Disk with Chlorhexadine gluconate (CHG); Transparent 08/16/22 0800   Action Taken Open ports on tubing capped 08/16/22 0800   Positive Blood Return (Medial Site) Yes 08/16/22 0800   Action Taken (Medial Site) Infusing 08/16/22 0800   Positive Blood Return (Lateral Site) Yes 08/16/22 0800   Action Taken (Lateral Site) Infusing 08/16/22 0800   Alcohol Cap Used Yes 08/16/22 0800       Peripheral IV 08/14/22 Anterior; Left Forearm (Active)   Site Assessment Clean, dry, & intact 08/16/22 0800   Phlebitis Assessment 0 08/16/22 0800   Infiltration Assessment 0 08/16/22 0800   Dressing Status Clean, dry, & intact 08/16/22 0800   Dressing Type Bacteriocidal;Transparent 08/16/22 0800   Hub Color/Line Status Blue 08/16/22 0800   Action Taken Open ports on tubing capped 08/16/22 0800   Alcohol Cap Used Yes 08/16/22 0800        Opportunity for questions and clarification was provided.       Patient transported with:   Monitor  Registered Nurse  Tech

## 2022-08-16 NOTE — PROGRESS NOTES
Problem: Mobility Impaired (Adult and Pediatric)  Goal: *Acute Goals and Plan of Care (Insert Text)  Description: FUNCTIONAL STATUS PRIOR TO ADMISSION: Patient was independent and active without use of DME.    HOME SUPPORT PRIOR TO ADMISSION: The patient lived with wife but did not require assist.    Physical Therapy Goals  Initiated 8/10/2022  1. Patient will move from supine to sit and sit to supine  in bed with modified independence within 7 day(s). 2.  Patient will transfer from bed to chair and chair to bed with modified independence using the least restrictive device within 7 day(s). 3.  Patient will perform sit to stand with modified independence within 7 day(s). 4.  Patient will ambulate with modified independence for 50 feet with the least restrictive device within 7 day(s). Outcome: Progressing Towards Goal   PHYSICAL THERAPY TREATMENT  Patient: Nathanael Jordan (86 y.o. male)  Date: 8/16/2022  Diagnosis: Cardiac arrest [facility]) [I46.9] <principal problem not specified>      Precautions: Fall  Chart, physical therapy assessment, plan of care and goals were reviewed. ASSESSMENT  Patient continues with skilled PT services and is progressing towards goals. Patient found standing at sink with OT participating in ADLs. Patient agreeable to gait training with PT. Patient ambulated 50 feet x 2 with standing rest break and cues for PLB 2/2 endorsement of SOB between bouts. Patient continues to demonstrate ataxic gait pattern with scissoring, narrow KARINA, and increased trunk sway, though is able to widen stance and improve gait stability with frequent cues. Patient with improved oxygenation during activity today, demonstrating SPO2 >90% on RA while ambulating and recovering to 96% with seated rest at end of gait and cues for PLB.  Patient did get tachy up to HR 131bpm during gait but again recovering to 90s with seated rest. Patient continues to demonstrate improved safety and commend following with less impulsivity during therapeutic activities and continues to be excellent candidate for IPR at discharge to promote return to independent PLOF. .     Current Level of Function Impacting Discharge (mobility/balance): ambulates 50 feet x 2 with min A    Other factors to consider for discharge: JARRETT, tachy with exertion, ataxic gait, impulsivity          PLAN :  Patient continues to benefit from skilled intervention to address the above impairments. Continue treatment per established plan of care. to address goals. Recommendation for discharge: (in order for the patient to meet his/her long term goals)  Therapy 3 hours per day 5-7 days per week    This discharge recommendation:  A follow-up discussion with the attending provider and/or case management is planned    IF patient discharges home will need the following DME: to be determined (TBD)       SUBJECTIVE:   Patient stated I want to walk.     OBJECTIVE DATA SUMMARY:   Critical Behavior:  Neurologic State: Alert  Orientation Level: Oriented X4  Cognition: Appropriate decision making, Appropriate for age attention/concentration, Appropriate safety awareness, Memory loss  Safety/Judgement: Decreased insight into deficits, Decreased awareness of need for safety, Decreased awareness of need for assistance, Decreased awareness of environment  Functional Mobility Training:  Bed Mobility:     Supine to Sit: Stand-by assistance              Transfers:  Sit to Stand: Contact guard assistance  Stand to Sit: Contact guard assistance        Bed to Chair: Contact guard assistance;Minimum assistance; Additional time                    Balance:  Sitting: Intact; Without support  Standing: Impaired; Without support  Standing - Static: Constant support;Good  Standing - Dynamic : Fair  Ambulation/Gait Training:  Distance (ft): 50 Feet (ft) (50 feet x 2)  Assistive Device: Gait belt  Ambulation - Level of Assistance: Minimal assistance;Assist x1        Gait Abnormalities: Ataxic; Altered arm swing;Decreased step clearance; Path deviations;Scissoring;Trunk sway increased        Base of Support: Narrowed     Speed/Milena: Pace decreased (<100 feet/min); Fluctuations  Step Length: Right shortened;Left shortened                    Pain Ratin/10    Activity Tolerance:   Good, SpO2 stable on RA, requires rest breaks, and observed SOB with activity    After treatment patient left in no apparent distress:   Sitting in chair, Call bell within reach, and Bed / chair alarm activated    COMMUNICATION/COLLABORATION:   The patients plan of care was discussed with: Occupational therapist and Registered nurse.      Kat Dasilva, PT   Time Calculation: 11 mins
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
## 2022-08-16 NOTE — PROGRESS NOTES
Problem: Self Care Deficits Care Plan (Adult)  Goal: *Acute Goals and Plan of Care (Insert Text)  Description: FUNCTIONAL STATUS PRIOR TO ADMISSION: Patient was independent and active without use of DME.     HOME SUPPORT: The patient lived with wife but did not require assist.    Occupational Therapy Goals  Initiated 8/9/2022, continue all goals 8/16/22  1. Patient will perform grooming in standing with independence within 7 day(s). 2.  Patient will perform bathing with independence within 7 day(s). 3.  Patient will perform lower body dressing with independence within 7 day(s). 4.  Patient will perform toilet transfers with independence within 7 day(s). 5.  Patient will perform all aspects of toileting with independence within 7 day(s). 6.  Patient will participate in upper extremity therapeutic exercise/activities with independence for 5 minutes within 7 day(s). 7.  Patient will utilize energy conservation techniques during functional activities with verbal cues within 7 day(s). Outcome: Progressing Towards Goal    OCCUPATIONAL THERAPY TREATMENT/WEEKLY RE-ASSESSMENT  Patient: Rajat Macias (89 y.o. male)  Date: 8/16/2022  Diagnosis: Cardiac arrest [facility]) [I46.9] <principal problem not specified>      Precautions: Fall  Chart, occupational therapy assessment, plan of care, and goals were reviewed. ASSESSMENT: all OT goals remain appropriate    Patient continues with skilled OT services and is progressing towards goals. Pt with improved  BP today and tolerated session on room air. Max HR 131bpm with activity. He requires constant CGA for balance and standing ADLs due to balance deficits and fall risk. CGA to stand at sink to perform grooming ADLs. Balance improved with continued activity but pt remains well below his active and independent baseline. Recommend IPR at discharge to maximize optimal recovery.     Current Level of Function Impacting Discharge (ADLs): CGA, min A for occasional balance loss, short term memory loss    Other factors to consider for discharge: from home         PLAN :  Goals have been updated based on progression since last assessment. Patient continues to benefit from skilled intervention to address the above impairments. Continue to follow patient 5 times a week to address goals. Recommend with staff: Eriberto Hopper to chairDEEPAK x 1    Recommend next OT session: ADLs, bathroom mobility    Recommendation for discharge: (in order for the patient to meet his/her long term goals)  Therapy 3 hours per day 5-7 days per week    This discharge recommendation:  Has been made in collaboration with the attending provider and/or case management    IF patient discharges home will need the following DME: TBD       SUBJECTIVE:   Patient stated is that a real plant!?    OBJECTIVE DATA SUMMARY:   Cognitive/Behavioral Status:  Neurologic State: Alert  Orientation Level: Oriented X4  Cognition: Appropriate decision making; Appropriate for age attention/concentration; Appropriate safety awareness;Memory loss             Functional Mobility and Transfers for ADLs:  Bed Mobility:  Supine to Sit: Stand-by assistance    Transfers:  Sit to Stand: Contact guard assistance     Bed to Chair: Contact guard assistance;Minimum assistance; Additional time    Balance:  Sitting: Intact; Without support  Standing: Impaired; Without support  Standing - Static: Constant support;Good  Standing - Dynamic : Fair    ADL Intervention:       Grooming  Position Performed: Standing (at sink)  Brushing Teeth: Contact guard assistance                   Pain:  No complaints    Activity Tolerance: WNL  Max HR 131bpm with activity    After treatment patient left in no apparent distress:   Sitting in chair, Call bell within reach, Bed / chair alarm activated, and Caregiver / family present    COMMUNICATION/COLLABORATION:   The patients plan of care was discussed with: Physical therapist and Registered nurse.      Leanne Sanabria, OT  Time Calculation: 19 mins
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
## 2022-08-16 NOTE — PROGRESS NOTES
Problem: Dysphagia (Adult)  Goal: *Acute Goals and Plan of Care (Insert Text)  Description: Speech pathology goals  Initiated 8/10/2022  1. Patient will tolerate full liquid diet with no overt s/s aspiration or adverse effects within 7 days  2. Patient will tolerate diet liberalization within 7 days  Outcome: Progressing Towards Goal       SPEECH LANGUAGE PATHOLOGY DYSPHAGIA TREATMENT  Patient: Alma Nguyen (63 y.o. male)  Date: 8/16/2022  Diagnosis: Cardiac arrest [facility]) [I46.9] <principal problem not specified>      Precautions: Fall    ASSESSMENT:  Patient seen in follow up. Deferred cog goals but patient was O x 4 today. He had eaten little past few days but he, family and RN deny difficulty with PO. Was not interested in trying solids today, had hiccups. Engaged in session. Tolerating diet well. PLAN:  Recommendations and Planned Interventions:  Continue regular texture easy to chew diet. Upright for PO  Continue cog goals as needed, will benefit from cog-communication goals in next level of care  Patient continues to benefit from skilled intervention to address the above impairments. Continue treatment per established plan of care. Discharge Recommendations:  Home Health, Inpatient Rehab, and Outpatient     SUBJECTIVE:   Patient stated If she has the hiccups I tell her our account is overdrawn [to scre her] and that works every time. \" Good sense of humor, joking and interactive    OBJECTIVE:   Cognitive and Communication Status:  Neurologic State: Alert  Orientation Level: Oriented X4  Cognition: Appropriate for age attention/concentration  Perception: Appears intact  Perseveration: No perseveration noted  Safety/Judgement: Decreased insight into deficits, Decreased awareness of need for safety, Decreased awareness of need for assistance, Decreased awareness of environment  Dysphagia Treatment:  Oral Assessment:  Oral Assessment  Labial: No impairment  Dentition: Natural  Oral Hygiene: clean  Lingual: No impairment  P.O. Trials:  Patient Position: up at edge of bed  Vocal quality prior to P.O.: No impairment  Consistency Presented: Thin liquid;Puree (frozen ice)  How Presented: Self-fed/presented;Spoon     Bolus Acceptance: No impairment  Bolus Formation/Control: No impairment     Propulsion: No impairment  Oral Residue: None     Laryngeal Elevation: Functional  Aspiration Signs/Symptoms:  (cough on first sip of soda from a bottle, was not repeated with subsequent sips)                                                         Pain:  Pain Scale 1: Numeric (0 - 10)  Pain Intensity 1: 0       After treatment:   Patient left in no apparent distress sitting up in chair    COMMUNICATION/EDUCATION:   Patient was educated regarding reasons for SLP eval and importance of upright posture for safe swallow. Patient expressed understanding and agreement. The patient's plan of care including recommendations, planned interventions, and recommended diet changes were discussed with: Registered nurse.      DAHLIA Damico  Time Calculation: 15 mins

## 2022-08-16 NOTE — PROGRESS NOTES
Famotidine - Pharmacy Renal dose protocol:  Current regimen:  20 mg po Q 12 hr  Recent Labs     08/16/22  0307 08/15/22  0327 08/14/22 2110   CREA 1.77* 1.63* 1.56*   BUN 44* 26* 22*     Estimated CrCl:  41 ml/min    Plan:   Change to 20 mg po Q 24 hr with respect to creatinine clearance less than 50 mL/min per [facility]/[employer]-approved Renal Dosing Adjustment protocol. Pharmacy will continue to monitor this patient daily for changes in clinical status and renal function.

## 2022-08-16 NOTE — PROGRESS NOTES
2000 Received report from BODØ and assumed care. 2200 Wife called and update provided. 2300 Assisted to [facility] for BM.  0000 Complete chlorhexidine bed bath provided, tolerated well.  0315 Labs drawn. 0630 Dr. Janett Merchant at bedside, update provided, order received for electrolyte replacement. 0730 Bedside and Verbal shift change report given to BERTHA (oncoming nurse) by Jami Mendosa (offgoing nurse). Report included the following information SBAR, Kardex, Intake/Output, MAR, and Recent Results.
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
## 2022-08-16 NOTE — PROGRESS NOTES
[facility] Adult  Hospitalist Group                                                                                          Hospitalist Progress Note  Odilia Beavers MD  Answering service: [phone] OR [phone] from in house phone        Date of Service:  2022  NAME:  Keke Lundy  :  1960  MRN:  [id number]      Admission Summary:   Keke Lundy is a 58 y.o. male with a pmhx of ICD placement, COPD, alcohol abuse, who presented via EMS after V fib arrest. ROSC was aceived after defibrillation x 2, patient was intubated in the field and transported to [facility] for further management. Of note, patient had recent ICD placement with continuous IV milrinone therapy at OSH about 3 weeks ago. Patient was started on levophed and amio in the ED and admitted to ICU for further management. Patient was successfully extubated on , and has remained stable on 1 to 2L NC. He developed severe delirium which was attributed to ICU delirium and alcohol withdrawal. He was started on phenobarb therapy with improvement in withdrawal symptoms. Of note, patient claims to only drink a few times per week and does not drink heavily. On  the patient was increasingly wheezy and because of concerns of worsening the patient was transferred to ICU       Interval history / Subjective:   Respiratory distress  The patient was transferred back to hospitalist service   Currently on room air and saturating 92-97%  No fever  Leukocytosis   Hiccups  Wants to get delgado's out     Assessment & Plan:       #Respiratory distress  #History of COPD  - Solumedrol/Pulmicort    ? Aspiration Pneumonia  -Follow cultures, no growth so far  -On Zosyn continue for a total of 5 days    Hypokalemia: replace as needed  Hypomagnesemia: replace as needed  #Cardiac arrest 2/2 ventricular fibrillation  #ICD in place on milrinone therapy  - Dilated cardiomyopathy with LVEF 20-25%, improved to 33%  - ICD in place; currently on continuous milrinone therapy  -On Midodrine  -Mo catheter to come out 8/16  -Continue ASA/Aldactone/Jardiance/Bumex/Aldactone  -Appreciate Adv heart failure    #EDGAR on CKD: creatinine worsening likely sec to overdiuresis  -Albumin given 8/15  -Monitor, if creatinine worse tomorrow, will consider nephrology input      Anemia: follow work up  #Alcohol withdrawal  - Pt with symptoms of acute hospital withdrawal, improved on phenobarb therapy  -Continue CIWA protocol     History of PE on eliquis  - Continue eliquis therapy      Cardiac diet     Code status: FULL CODE  Prophylaxis: Eliquis therapy  Care Plan discussed with: patient, CCU team  Anticipated Disposition: >48h        Hospital Problems  Date Reviewed: 6/15/2022            Codes Class Noted POA    Acute on chronic systolic heart failure ([facility] [address] ICD-10-CM: J64.11  ICD-9-CM: 428.23  8/11/2022 Unknown        Cardiac arrest [facility]) ICD-10-CM: I46.9  ICD-9-CM: 427.5  8/8/2022 Unknown             Review of Systems:   A comprehensive review of systems was negative except for that written in the HPI. Vital Signs:    Last 24hrs VS reviewed since prior progress note. Most recent are:  Visit Vitals  BP (!) 131/98   Pulse (!) 111   Temp 98.1 °F (36.7 °C)   Resp 22   Ht 5' 8\" (1.727 m)   Wt 71.5 kg (157 lb 10.1 oz)   SpO2 92%   BMI 23.97 kg/m²         Intake/Output Summary (Last 24 hours) at 8/16/2022 1000  Last data filed at 8/16/2022 0900  Gross per 24 hour   Intake 1704.63 ml   Output 1955 ml   Net -250.37 ml        Physical Examination:     I had a face to face encounter with this patient and independently examined them on 8/16/2022 as outlined below:          Constitutional:  No acute distress   ENT:  Oral mucosa moist, oropharynx benign. Resp:  CTA bilaterally. No wheezing/rhonchi/rales. No accessory muscle use. CV:  Regular rhythm, normal rate, no murmurs, gallops, rubs    GI:  Soft, non distended, non tender.  normoactive bowel sounds, no hepatosplenomegaly     Musculoskeletal:  No edema, warm, 2+ pulses throughout    Neurologic:  Moves all extremities. AAOx3, CN II-XII reviewed            Data Review:    Review and/or order of clinical lab test      Labs:     Recent Labs     08/16/22  0307 08/15/22  0327   WBC 21.2* 8.5   HGB 9.7* 10.2*   HCT 28.4* 30.8*    321     Recent Labs     08/16/22  0307 08/15/22  0327 08/14/22  2110   * 138 138   K 3.1* 3.5 3.8   CL 98 100 101   CO2 28 27 28   BUN 44* 26* 22*   CREA 1.77* 1.63* 1.56*   * 125* 97   CA 9.1 9.3 9.7   MG 1.6 1.5* 1.5*     No results for input(s): ALT, AP, TBIL, TBILI, TP, ALB, GLOB, GGT, AML, LPSE in the last 72 hours. No lab exists for component: SGOT, GPT, AMYP, HLPSE  Recent Labs     08/16/22  0307 08/15/22  0327 08/14/22  0416   INR 1.3* 1.4* 1.3*   PTP 13.2* 14.2* 13.0*   APTT 25.4 34.2* 34.6*      No results for input(s): FE, TIBC, PSAT, FERR in the last 72 hours. No results found for: FOL, RBCF   No results for input(s): PH, PCO2, PO2 in the last 72 hours.   Recent Labs     08/14/22 0416        No results found for: CHOL, CHOLX, CHLST, CHOLV, HDL, HDLP, LDL, LDLC, DLDLP, TGLX, TRIGL, TRIGP, CHHD, CHHDX  No results found for: [facility]  Lab Results   Component Value Date/Time    Color YELLOW/STRAW 08/08/2022 05:26 PM    Appearance CLOUDY (A) 08/08/2022 05:26 PM    Specific gravity 1.016 08/08/2022 05:26 PM    pH (UA) 6.0 08/08/2022 05:26 PM    Protein 300 (A) 08/08/2022 05:26 PM    Glucose Negative 08/08/2022 05:26 PM    Ketone Negative 08/08/2022 05:26 PM    Bilirubin Negative 08/08/2022 05:26 PM    Urobilinogen 0.2 08/08/2022 05:26 PM    Nitrites Negative 08/08/2022 05:26 PM    Leukocyte Esterase Negative 08/08/2022 05:26 PM    Epithelial cells FEW 08/08/2022 05:26 PM    Bacteria 1+ (A) 08/08/2022 05:26 PM    WBC 5-10 08/08/2022 05:26 PM    RBC  08/08/2022 05:26 PM         Medications Reviewed:     Current Facility-Administered Medications Medication Dose Route Frequency    potassium chloride 20 mEq in 50 ml IVPB  20 mEq IntraVENous Q1H    methylPREDNISolone (PF) (SOLU-MEDROL) injection 40 mg  40 mg IntraVENous Q8H    bumetanide (BUMEX) tablet 1 mg  1 mg Oral DAILY    famotidine (PEPCID) tablet 20 mg  20 mg Oral BID    midodrine (PROAMATINE) tablet 10 mg  10 mg Oral Q8H    NOREPINephrine (LEVOPHED) 8 mg in 5% dextrose 250mL (32 mcg/mL) infusion  0.5-30 mcg/min IntraVENous TITRATE    piperacillin-tazobactam (ZOSYN) 3.375 g in 0.9% sodium chloride (MBP/ADV) 100 mL MBP  3.375 g IntraVENous Q8H    apixaban (ELIQUIS) tablet 5 mg  5 mg Oral BID    empagliflozin (JARDIANCE) tablet 10 mg  10 mg Oral DAILY    spironolactone (ALDACTONE) tablet 12.5 mg  12.5 mg Oral DAILY    dextromethorphan (DELSYM) 30 mg/5 mL syrup 30 mg  30 mg Oral Q12H    calcium carbonate (TUMS) chewable tablet 200 mg [elemental]  200 mg Oral TID WITH MEALS    PHENobarbital (LUMINAL) injection 65 mg  65 mg IntraVENous Q8H PRN    [Held by provider] PHENobarbitaL (LUMINAL) tablet 64.8 mg  64.8 mg Oral TID    alteplase (CATHFLO) 1 mg in sterile water (preservative free) 1 mL injection  1 mg InterCATHeter PRN    aspirin chewable tablet 81 mg  81 mg Oral DAILY    budesonide (PULMICORT) 500 mcg/2 ml nebulizer suspension  500 mcg Nebulization BID RT    And    arformoteroL (BROVANA) neb solution 15 mcg  15 mcg Nebulization BID RT    ipratropium (ATROVENT) 0.02 % nebulizer solution 0.5 mg  0.5 mg Nebulization Q6H RT    milrinone (PRIMACOR) 20 MG/100 ML D5W infusion  0.25 mcg/kg/min (Order-Specific) IntraVENous CONTINUOUS    dexmedeTOMidine in 0.9 % NaCl (PRECEDEX) 400 mcg/100 mL (4 mcg/mL) infusion soln  0.1-1.5 mcg/kg/hr IntraVENous TITRATE    sodium chloride (NS) flush 5-40 mL  5-40 mL IntraVENous Q8H    sodium chloride (NS) flush 5-40 mL  5-40 mL IntraVENous PRN    acetaminophen (TYLENOL) suppository 650 mg  650 mg Rectal Q4H PRN    chlorhexidine (PERIDEX) 0.12 % mouthwash 15 mL  15 mL Oral Q12H    sodium chloride (NS) flush 5-40 mL  5-40 mL IntraVENous Q8H    sodium chloride (NS) flush 5-40 mL  5-40 mL IntraVENous PRN    acetaminophen (TYLENOL) tablet 650 mg  650 mg Oral Q6H PRN    Or    acetaminophen (TYLENOL) suppository 650 mg  650 mg Rectal Q6H PRN    polyethylene glycol (MIRALAX) packet 17 g  17 g Oral DAILY PRN    ondansetron (ZOFRAN ODT) tablet 4 mg  4 mg Oral Q8H PRN    Or    ondansetron (ZOFRAN) injection 4 mg  4 mg IntraVENous Q6H PRN    albuterol-ipratropium (DUO-NEB) 2.5 MG-0.5 MG/3 ML  3 mL Nebulization Q4H PRN     ______________________________________________________________________  EXPECTED LENGTH OF STAY: 3d 19h  ACTUAL LENGTH OF STAY:          Malou Navarro MD

## 2022-08-17 ENCOUNTER — APPOINTMENT (OUTPATIENT)
Dept: GENERAL RADIOLOGY | Age: 62
DRG: 291 | End: 2022-08-17
Attending: INTERNAL MEDICINE
Payer: MEDICARE

## 2022-08-17 LAB
ANION GAP SERPL CALC-SCNC: 15 MMOL/L (ref 5–15)
APTT PPP: 32.9 SEC (ref 22.1–31)
BNP SERPL-MCNC: ABNORMAL PG/ML
BUN SERPL-MCNC: 46 MG/DL (ref 6–20)
BUN/CREAT SERPL: 19 (ref 12–20)
CALCIUM SERPL-MCNC: 10.5 MG/DL (ref 8.5–10.1)
CHLORIDE SERPL-SCNC: 97 MMOL/L (ref 97–108)
CO2 SERPL-SCNC: 23 MMOL/L (ref 21–32)
CREAT SERPL-MCNC: 2.38 MG/DL (ref 0.7–1.3)
ERYTHROCYTE [DISTWIDTH] IN BLOOD BY AUTOMATED COUNT: 19 % (ref 11.5–14.5)
GLUCOSE SERPL-MCNC: 138 MG/DL (ref 65–100)
HCT VFR BLD AUTO: 32.2 % (ref 36.6–50.3)
HGB BLD-MCNC: 10.4 G/DL (ref 12.1–17)
INR PPP: 1.5 (ref 0.9–1.1)
MAGNESIUM SERPL-MCNC: 2.4 MG/DL (ref 1.6–2.4)
MCH RBC QN AUTO: 30.5 PG (ref 26–34)
MCHC RBC AUTO-ENTMCNC: 32.3 G/DL (ref 30–36.5)
MCV RBC AUTO: 94.4 FL (ref 80–99)
NRBC # BLD: 0.03 K/UL (ref 0–0.01)
NRBC BLD-RTO: 0.2 PER 100 WBC
PLATELET # BLD AUTO: 451 K/UL (ref 150–400)
PMV BLD AUTO: 11.3 FL (ref 8.9–12.9)
POTASSIUM SERPL-SCNC: 4.1 MMOL/L (ref 3.5–5.1)
PROTHROMBIN TIME: 15.1 SEC (ref 9–11.1)
RBC # BLD AUTO: 3.41 M/UL (ref 4.1–5.7)
SODIUM SERPL-SCNC: 135 MMOL/L (ref 136–145)
THERAPEUTIC RANGE,PTTT: ABNORMAL SECS (ref 58–77)
WBC # BLD AUTO: 17.9 K/UL (ref 4.1–11.1)

## 2022-08-17 PROCEDURE — 97530 THERAPEUTIC ACTIVITIES: CPT

## 2022-08-17 PROCEDURE — 85730 THROMBOPLASTIN TIME PARTIAL: CPT

## 2022-08-17 PROCEDURE — 74011000250 HC RX REV CODE- 250: Performed by: STUDENT IN AN ORGANIZED HEALTH CARE EDUCATION/TRAINING PROGRAM

## 2022-08-17 PROCEDURE — 83880 ASSAY OF NATRIURETIC PEPTIDE: CPT

## 2022-08-17 PROCEDURE — 74011250637 HC RX REV CODE- 250/637: Performed by: STUDENT IN AN ORGANIZED HEALTH CARE EDUCATION/TRAINING PROGRAM

## 2022-08-17 PROCEDURE — 74011250636 HC RX REV CODE- 250/636: Performed by: INTERNAL MEDICINE

## 2022-08-17 PROCEDURE — 65660000001 HC RM ICU INTERMED STEPDOWN

## 2022-08-17 PROCEDURE — 74011250637 HC RX REV CODE- 250/637: Performed by: NURSE PRACTITIONER

## 2022-08-17 PROCEDURE — 94640 AIRWAY INHALATION TREATMENT: CPT

## 2022-08-17 PROCEDURE — 85610 PROTHROMBIN TIME: CPT

## 2022-08-17 PROCEDURE — 83735 ASSAY OF MAGNESIUM: CPT

## 2022-08-17 PROCEDURE — 99233 SBSQ HOSP IP/OBS HIGH 50: CPT | Performed by: NURSE PRACTITIONER

## 2022-08-17 PROCEDURE — 92507 TX SP LANG VOICE COMM INDIV: CPT

## 2022-08-17 PROCEDURE — 74011000258 HC RX REV CODE- 258: Performed by: INTERNAL MEDICINE

## 2022-08-17 PROCEDURE — 97116 GAIT TRAINING THERAPY: CPT

## 2022-08-17 PROCEDURE — 80048 BASIC METABOLIC PNL TOTAL CA: CPT

## 2022-08-17 PROCEDURE — 92526 ORAL FUNCTION THERAPY: CPT

## 2022-08-17 PROCEDURE — 74011250637 HC RX REV CODE- 250/637: Performed by: INTERNAL MEDICINE

## 2022-08-17 PROCEDURE — 74011250637 HC RX REV CODE- 250/637: Performed by: HOSPITALIST

## 2022-08-17 PROCEDURE — 71045 X-RAY EXAM CHEST 1 VIEW: CPT

## 2022-08-17 PROCEDURE — 85027 COMPLETE CBC AUTOMATED: CPT

## 2022-08-17 PROCEDURE — 36415 COLL VENOUS BLD VENIPUNCTURE: CPT

## 2022-08-17 PROCEDURE — 97535 SELF CARE MNGMENT TRAINING: CPT

## 2022-08-17 RX ADMIN — ACETAMINOPHEN 650 MG: 325 TABLET ORAL at 13:34

## 2022-08-17 RX ADMIN — CALCIUM CARBONATE (ANTACID) CHEW TAB 500 MG 200 MG: 500 CHEW TAB at 12:25

## 2022-08-17 RX ADMIN — PIPERACILLIN AND TAZOBACTAM 3.38 G: 3; .375 INJECTION, POWDER, FOR SOLUTION INTRAVENOUS at 12:25

## 2022-08-17 RX ADMIN — MILRINONE LACTATE 0.25 MCG/KG/MIN: 0.2 INJECTION, SOLUTION INTRAVENOUS at 06:09

## 2022-08-17 RX ADMIN — CALCIUM CARBONATE (ANTACID) CHEW TAB 500 MG 200 MG: 500 CHEW TAB at 17:42

## 2022-08-17 RX ADMIN — BUDESONIDE 500 MCG: 0.5 INHALANT RESPIRATORY (INHALATION) at 07:35

## 2022-08-17 RX ADMIN — SODIUM CHLORIDE, PRESERVATIVE FREE 10 ML: 5 INJECTION INTRAVENOUS at 13:34

## 2022-08-17 RX ADMIN — METHYLPREDNISOLONE SODIUM SUCCINATE 40 MG: 40 INJECTION, POWDER, FOR SOLUTION INTRAMUSCULAR; INTRAVENOUS at 13:34

## 2022-08-17 RX ADMIN — EMPAGLIFLOZIN 10 MG: 10 TABLET, FILM COATED ORAL at 09:29

## 2022-08-17 RX ADMIN — Medication 30 MG: at 09:29

## 2022-08-17 RX ADMIN — ASPIRIN 81 MG CHEWABLE TABLET 81 MG: 81 TABLET CHEWABLE at 09:21

## 2022-08-17 RX ADMIN — IPRATROPIUM BROMIDE 0.5 MG: 0.5 SOLUTION RESPIRATORY (INHALATION) at 13:28

## 2022-08-17 RX ADMIN — APIXABAN 5 MG: 5 TABLET, FILM COATED ORAL at 17:42

## 2022-08-17 RX ADMIN — IPRATROPIUM BROMIDE 0.5 MG: 0.5 SOLUTION RESPIRATORY (INHALATION) at 21:13

## 2022-08-17 RX ADMIN — ARFORMOTEROL TARTRATE 15 MCG: 15 SOLUTION RESPIRATORY (INHALATION) at 07:36

## 2022-08-17 RX ADMIN — HYDROCODONE BITARTRATE AND ACETAMINOPHEN 1 TABLET: 5; 325 TABLET ORAL at 03:35

## 2022-08-17 RX ADMIN — SODIUM CHLORIDE, PRESERVATIVE FREE 10 ML: 5 INJECTION INTRAVENOUS at 22:32

## 2022-08-17 RX ADMIN — ARFORMOTEROL TARTRATE 15 MCG: 15 SOLUTION RESPIRATORY (INHALATION) at 21:13

## 2022-08-17 RX ADMIN — Medication 30 MG: at 22:26

## 2022-08-17 RX ADMIN — BUDESONIDE 500 MCG: 0.5 INHALANT RESPIRATORY (INHALATION) at 21:13

## 2022-08-17 RX ADMIN — CALCIUM CARBONATE (ANTACID) CHEW TAB 500 MG 200 MG: 500 CHEW TAB at 09:20

## 2022-08-17 RX ADMIN — SPIRONOLACTONE 12.5 MG: 25 TABLET ORAL at 09:21

## 2022-08-17 RX ADMIN — PIPERACILLIN AND TAZOBACTAM 3.38 G: 3; .375 INJECTION, POWDER, FOR SOLUTION INTRAVENOUS at 20:34

## 2022-08-17 RX ADMIN — FAMOTIDINE 20 MG: 20 TABLET, FILM COATED ORAL at 09:22

## 2022-08-17 RX ADMIN — CHLORHEXIDINE GLUCONATE 15 ML: 1.2 RINSE ORAL at 20:34

## 2022-08-17 RX ADMIN — BUMETANIDE 1 MG: 1 TABLET ORAL at 09:21

## 2022-08-17 RX ADMIN — PIPERACILLIN AND TAZOBACTAM 3.38 G: 3; .375 INJECTION, POWDER, FOR SOLUTION INTRAVENOUS at 03:35

## 2022-08-17 RX ADMIN — METHYLPREDNISOLONE SODIUM SUCCINATE 40 MG: 40 INJECTION, POWDER, FOR SOLUTION INTRAMUSCULAR; INTRAVENOUS at 22:26

## 2022-08-17 RX ADMIN — APIXABAN 5 MG: 5 TABLET, FILM COATED ORAL at 09:21

## 2022-08-17 RX ADMIN — SODIUM CHLORIDE, PRESERVATIVE FREE 10 ML: 5 INJECTION INTRAVENOUS at 05:13

## 2022-08-17 RX ADMIN — IPRATROPIUM BROMIDE 0.5 MG: 0.5 SOLUTION RESPIRATORY (INHALATION) at 07:37

## 2022-08-17 RX ADMIN — METHYLPREDNISOLONE SODIUM SUCCINATE 40 MG: 40 INJECTION, POWDER, FOR SOLUTION INTRAMUSCULAR; INTRAVENOUS at 06:00

## 2022-08-17 RX ADMIN — CHLORHEXIDINE GLUCONATE 15 ML: 1.2 RINSE ORAL at 09:23

## 2022-08-17 NOTE — PROGRESS NOTES
Transitions of Care Plan  RUR: 24% - high  Clinical Update: s/p cardiac arrest; anoxic injury; on milrinone  Consults: multiple  Baseline: chronic milrinone at home - followed by Nitin; resides w/ wife; independent  Barriers to Discharge: medical  Disposition: acute rehab if able to wean off milrinone; home health if appropriate w/ inotrope therapy (?safety concern?)  Estimated Discharge Date: 2+ days    CM updated by [facility] NP - patient not medically stable for discharge or inotrope wean. Nitin HF MD requests patient remain on inotrope; patient remains a safety concern for impulsivity with PICC line if to discharge home. Additionally, therapy recommends acute rehab. CM updated [facility] - patient not medically stable - will provide update next week on plans. CM will continue to follow.     Patt Romano, MPH  Care Manager [facility]  Available via [employer] or
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
## 2022-08-17 NOTE — PROGRESS NOTES
Problem: Dysphagia (Adult)  Goal: *Acute Goals and Plan of Care (Insert Text)  Description: Speech pathology goals  Initiated 8/10/2022  1. Patient will tolerate full liquid diet with no overt s/s aspiration or adverse effects within 7 days. MET  2. Patient will tolerate diet liberalization within 7 days. MET  Outcome: Resolved/Met     Problem: Neurolinguistics Impaired (Adult)  Goal: *Acute Goals and Plan of Care (Insert Text)  Description: Speech Therapy Goals  Initiated 8/11/22    1. Pt will be oriented with use of visual aids with 100% accuracy within 7 days. 2. Pt will utilize memory strategies with 60% accuracy within 7 days. Outcome: Progressing Towards Goal     SPEECH LANGUAGE PATHOLOGY DYSPHAGIA AND SPEECH TREATMENT  Patient: Roshan Lindsey (53 y.o. male)  Date: 8/17/2022  Diagnosis: Cardiac arrest [facility]) [I46.9] <principal problem not specified>      Precautions:  Fall    ASSESSMENT:  Patient with no oral/pharyngeal dysphagia, and no overt s/s aspiration observed. Recommend advance to regular/thin liquid diet, and SLP will complete POC. Patient with improved orientation per Orientation Log. Patient reported improvements, however he acknowledges that he is still not at his mental status baseline. Recommend continue integrated language treatment at next level of care. PLAN:  Recommendations and Planned Interventions:  -Regular/thin liquid diet  -SLP to follow for integrated language in this setting and at next level of care  Patient continues to benefit from skilled intervention to address the above impairments. Continue treatment per established plan of care. Discharge Recommendations: To Be Determined     SUBJECTIVE:   Patient stated I am so sorry as SLP startled patient when awakening him and he yelled.     OBJECTIVE:   Cognitive and Communication Status:  Neurologic State: Alert  Orientation Level: Oriented X4  Cognition: Follows commands  Perception: Appears intact  Perseveration: No perseveration noted  Safety/Judgement: Decreased awareness of need for assistance, Decreased awareness of need for safety    Dysphagia Treatment and Interventions:     P.O. Trials:  Patient Position: upright in bed  Vocal quality prior to P.O.: No impairment  Consistency Presented: Thin liquid; Solid  How Presented: Self-fed/presented;Straw;Successive swallows     Bolus Acceptance: No impairment  Bolus Formation/Control: No impairment     Propulsion: No impairment  Oral Residue: None        Aspiration Signs/Symptoms: None  Pharyngeal Phase Characteristics: No impairment, issues, or problems                  Speech Treatment and Interventions:     Language Comprehension and Expression:  Auditory Comprehension   Hearing Aid: None  Verbal Expression     Neuro-Linguistics:      Orientation Log (O-Log)    City: 3  Kind of Place: 3  Name of Hospital: 3  Month: 2  Date: 3  Year: 3  Day of the Week: 3  Clock Time: 3  Etiology/Event: 3  Pathology/Deficits: 3  Raw Score:  29/30      O-Log Scoring Key:  3 = correct spontaneously or upon first free recall attempt;  2 = correct upon logical cueing (e.g., \"That was yesterday, so today must be \");  1 = correct upon multiple choice or phonemic cuing; and  0 = incorrect despite cueing, inappropriate response, or unable to respond. Pain:  Pain Scale 1: Numeric (0 - 10)  Pain Intensity 1: 0  Pain Location 1: Rib cage    After treatment:   Patient left in no apparent distress in bed, Call bell within reach, and Nursing notified    COMMUNICATION/EDUCATION:   Patient was educated regarding his deficit(s) of WFL swallow, cognitive deficits as this relates to his diagnosis. He demonstrated Good understanding as evidenced by verbalizing understanding. The patient's plan of care including recommendations, planned interventions, and recommended diet changes were discussed with: Registered nurse.        Jose M Nava SLP  Time Calculation: 16 mins

## 2022-08-17 NOTE — PROGRESS NOTES
[address] [facility]  Hospitalist Group                                                                                          Hospitalist Progress Note  Aniket Elam MD  Answering service: [phone] from in house phone        Date of Service:  2022  NAME:  Suha Baker  :  1960  MRN:  [id number]      Admission Summary:   Suha Baekr is a 58 y.o. male with a pmhx of ICD placement, COPD, alcohol abuse, who presented via EMS after V fib arrest. ROSC was aceived after defibrillation x 2, patient was intubated in the field and transported to [facility] for further management. Of note, patient had recent ICD placement with continuous IV milrinone therapy at OSH about 3 weeks ago. Patient was started on levophed and amio in the ED and admitted to ICU for further management. Patient was successfully extubated on , and has remained stable on 1 to 2L NC. He developed severe delirium which was attributed to ICU delirium and alcohol withdrawal. He was started on phenobarb therapy with improvement in withdrawal symptoms. Of note, patient claims to only drink a few times per week and does not drink heavily. On  the patient was increasingly wheezy and because of concerns of worsening the patient was transferred to ICU       Interval history / Subjective:   Respiratory distress    Patient is seen and examined at bedside this AM. He is alert and oriented x3. Wants to go home. Discussed with nursing, cm, PT     Assessment & Plan:     #Cardiac arrest 2/2 ventricular fibrillation  # Acute on Chronic CHF  #ICD in place  - Dilated cardiomyopathy with LVEF 20-25%, improved to 33%  - currently on continuous milrinone therapy  -Continue ASA/Aldactone/Jardiance  -  hold Bumex/Aldactone due to worsening cr   -Appreciate Adv heart failure    #Acute hypoxic Respiratory failure   # Hx COPD  ? Aspiration Pneumonia  - Solumedrol - weaning down   - leucocytosis improving 17 - could be due to steroids ?  -cultures, no growth so far  -On Zosyn x 5 days  - saturating on 3l NC, try to wean off     #EDGAR on CKD: creatinine worsening   - likely sec to overdiuresis  -Albumin given 8/15  - nephrology consulted  - will monitor renal function      #Alcohol withdrawal  Encephalopathy - improved   -  improved on phenobarb therapy  -Continue CIWA protocol     History of PE   - Continue eliquis      Code status: FULL CODE  Prophylaxis: [address] discussed with: patient  Anticipated Disposition: >48h        Hospital Problems  Date Reviewed: 6/15/2022            Codes Class Noted POA    Acute on chronic systolic heart failure ([facility] [address] ICD-10-CM: X95.47  ICD-9-CM: 428.23  8/11/2022 Unknown        Cardiac arrest [facility]) ICD-10-CM: I46.9  ICD-9-CM: 427.5  8/8/2022 Unknown           Review of Systems:   A comprehensive review of systems was negative except for that written in the HPI. Vital Signs:    Last 24hrs VS reviewed since prior progress note. Most recent are:  Visit Vitals  BP (!) 133/99 (BP 1 Location: Right upper arm, BP Patient Position: At rest;Sitting)   Pulse (!) 113   Temp 97.6 °F (36.4 °C)   Resp 18   Ht 5' 8\" (1.727 m)   Wt 71.2 kg (156 lb 15.5 oz)   SpO2 96%   BMI 23.87 kg/m²         Intake/Output Summary (Last 24 hours) at 8/17/2022 1701  Last data filed at 8/17/2022 1521  Gross per 24 hour   Intake 760 ml   Output 660 ml   Net 100 ml          Physical Examination:     I had a face to face encounter with this patient and independently examined them on 8/17/2022 as outlined below:          Constitutional:  No acute distress   ENT:  Oral mucosa moist, oropharynx benign. Resp:  CTA bilaterally. No wheezing/rhonchi/rales. No accessory muscle use. CV:  Regular rhythm, Tachy, no murmurs, gallops, rubs    GI:  Soft, non distended, non tender. normoactive bowel sounds, no hepatosplenomegaly     Musculoskeletal:  No edema, warm, 2+ pulses throughout    Neurologic:  Moves all extremities. AAOx3, CN II-XII reviewed            Data Review:    Review and/or order of clinical lab test      Labs:     Recent Labs     08/17/22 0354 08/16/22 0307   WBC 17.9* 21.2*   HGB 10.4* 9.7*   HCT 32.2* 28.4*   * 349       Recent Labs     08/17/22  0354 08/16/22  0307 08/15/22  0327   * 134* 138   K 4.1 3.1* 3.5   CL 97 98 100   CO2 23 28 27   BUN 46* 44* 26*   CREA 2.38* 1.77* 1.63*   * 132* 125*   CA 10.5* 9.1 9.3   MG 2.4 1.6 1.5*       No results for input(s): ALT, AP, TBIL, TBILI, TP, ALB, GLOB, GGT, AML, LPSE in the last 72 hours. No lab exists for component: SGOT, GPT, AMYP, HLPSE  Recent Labs     08/17/22  0354 08/16/22  0307 08/15/22  0327   INR 1.5* 1.3* 1.4*   PTP 15.1* 13.2* 14.2*   APTT 32.9* 25.4 34.2*        No results for input(s): FE, TIBC, PSAT, FERR in the last 72 hours. No results found for: FOL, RBCF   No results for input(s): PH, PCO2, PO2 in the last 72 hours. No results for input(s): CPK, CKNDX, TROIQ in the last 72 hours.     No lab exists for component: CPKMB    No results found for: CHOL, CHOLX, CHLST, CHOLV, HDL, HDLP, LDL, LDLC, DLDLP, TGLX, TRIGL, TRIGP, CHHD, CHHDX  No results found for: [facility]  Lab Results   Component Value Date/Time    Color YELLOW/STRAW 08/08/2022 05:26 PM    Appearance CLOUDY (A) 08/08/2022 05:26 PM    Specific gravity 1.016 08/08/2022 05:26 PM    pH (UA) 6.0 08/08/2022 05:26 PM    Protein 300 (A) 08/08/2022 05:26 PM    Glucose Negative 08/08/2022 05:26 PM    Ketone Negative 08/08/2022 05:26 PM    Bilirubin Negative 08/08/2022 05:26 PM    Urobilinogen 0.2 08/08/2022 05:26 PM    Nitrites Negative 08/08/2022 05:26 PM    Leukocyte Esterase Negative 08/08/2022 05:26 PM    Epithelial cells FEW 08/08/2022 05:26 PM    Bacteria 1+ (A) 08/08/2022 05:26 PM    WBC 5-10 08/08/2022 05:26 PM    RBC  08/08/2022 05:26 PM         Medications Reviewed:     Current Facility-Administered Medications   Medication Dose Route Frequency    methylPREDNISolone (PF) (SOLU-MEDROL) injection 40 mg  40 mg IntraVENous Q8H    bumetanide (BUMEX) tablet 1 mg  1 mg Oral DAILY    famotidine (PEPCID) tablet 20 mg  20 mg Oral DAILY    HYDROcodone-acetaminophen (NORCO) 5-325 mg per tablet 1 Tablet  1 Tablet Oral Q6H PRN    [Held by provider] midodrine (PROAMATINE) tablet 10 mg  10 mg Oral Q8H    piperacillin-tazobactam (ZOSYN) 3.375 g in 0.9% sodium chloride (MBP/ADV) 100 mL MBP  3.375 g IntraVENous Q8H    apixaban (ELIQUIS) tablet 5 mg  5 mg Oral BID    empagliflozin (JARDIANCE) tablet 10 mg  10 mg Oral DAILY    spironolactone (ALDACTONE) tablet 12.5 mg  12.5 mg Oral DAILY    dextromethorphan (DELSYM) 30 mg/5 mL syrup 30 mg  30 mg Oral Q12H    calcium carbonate (TUMS) chewable tablet 200 mg [elemental]  200 mg Oral TID WITH MEALS    PHENobarbital (LUMINAL) injection 65 mg  65 mg IntraVENous Q8H PRN    [Held by provider] PHENobarbitaL (LUMINAL) tablet 64.8 mg  64.8 mg Oral TID    alteplase (CATHFLO) 1 mg in sterile water (preservative free) 1 mL injection  1 mg InterCATHeter PRN    aspirin chewable tablet 81 mg  81 mg Oral DAILY    budesonide (PULMICORT) 500 mcg/2 ml nebulizer suspension  500 mcg Nebulization BID RT    And    arformoteroL (BROVANA) neb solution 15 mcg  15 mcg Nebulization BID RT    ipratropium (ATROVENT) 0.02 % nebulizer solution 0.5 mg  0.5 mg Nebulization Q6H RT    milrinone (PRIMACOR) 20 MG/100 ML D5W infusion  0.25 mcg/kg/min (Order-Specific) IntraVENous CONTINUOUS    sodium chloride (NS) flush 5-40 mL  5-40 mL IntraVENous Q8H    sodium chloride (NS) flush 5-40 mL  5-40 mL IntraVENous PRN    acetaminophen (TYLENOL) suppository 650 mg  650 mg Rectal Q4H PRN    chlorhexidine (PERIDEX) 0.12 % mouthwash 15 mL  15 mL Oral Q12H    sodium chloride (NS) flush 5-40 mL  5-40 mL IntraVENous PRN    acetaminophen (TYLENOL) tablet 650 mg  650 mg Oral Q6H PRN    Or    acetaminophen (TYLENOL) suppository 650 mg  650 mg Rectal Q6H PRN    polyethylene glycol (MIRALAX) packet 17 g 17 g Oral DAILY PRN    ondansetron (ZOFRAN ODT) tablet 4 mg  4 mg Oral Q8H PRN    Or    ondansetron (ZOFRAN) injection 4 mg  4 mg IntraVENous Q6H PRN    albuterol-ipratropium (DUO-NEB) 2.5 MG-0.5 MG/3 ML  3 mL Nebulization Q4H PRN     ______________________________________________________________________  EXPECTED LENGTH OF STAY: 3d 19h  ACTUAL LENGTH OF STAY:          Mamie Arana MD

## 2022-08-17 NOTE — NURSE NAVIGATOR
HEART FAILURE NURSE NAVIGATOR NOTE  [address]    Patient chart was reviewed by Heart Failure (HF) Nurse Navigators for compliance of prescribed treatment with guidelines directed medical therapy (GDMT) and HF database completed. Please, review beneath recommendations for symptomatic patients with HF with Reduced Ejection Fraction ? 40% (HFrEF) for your consideration when taking care of this patient. Current Medical Therapy:    Name Margo HA 1960   LVEF 10/15%   NYHA Functional Class IIIa   ARNi/ACEi/ARB Contraindicated d/t hypotension   Beta-blocker Contraindicated d/t end stage lung disease   Aldosterone Antagonist Spironolactone 12.5 mg qd   SGLT2 inhibitor Jardiance 10mg qd   Hydralazine/Isosorbide Dinitrate    Consulting Cardiologist: [facility]     Recommendations:    Please, add the following GDMT for HFrEF ? 40% [Class 1] or document in discharge summary/progress note why patient cannot take the medication:  ARNi/ACEi or ARB  Beta-blockers (carvedilol, sustained-release metoprolol succinate or bisoprolol)  Aldosterone antagonists GFR > 30 and K< 5  SGLT2 inhibitor  Hydralazine/Isosorbide dinitrate for  Americans with Class III/IV symptoms  Adjust diuretic dose at discharge if hospitalized for volume overload    Consider adding the following GDMT for HFrEF ? 40%, if appropriate [Class 2b]:   Ivabradine for patients on maximally tolerated beta-blocker dose in order to achieve HR 70-80bpm  Digoxin, goal level 0.5-0.9  Polyunsaturated fatty acids  Vericuguat  For patient with hyperkalemia while on RAASi > 5.5, consider adding potassium binders (patiromer, sodium zirconium cycosilicate)    Note: the following medications may be potentially harmful in heart failure [Class 3]:  Calcium channel blockers (doxazosin, diltiazem, verapamil, nifedipine)  Antiarrhythmics (flecanide, disopyrimide, sotalol, dronedarone)  Diabetes medications (thiasolidinediones, saxagliptin, alogliptin)  NSAIDs and RODRIGUEZ 2 inhibitors    Consider vaccinations for respiratory illnesses (flu, pneumonia, covid) [Class 2b]    For eligible patients with LVEF < 35% consider discharge with wearable defibrillation [Class 2b] and/or referral for ICD implantation [Class 1] for prevention of sudden cardiac death. If your patient is a woman in childbearing age (16-50 years). If appropriate, please,  patient to speak to provider when planning to become pregnant due to teratogenic effect of some HF medications and increased risk/potential contraindication of pregnancy [Class 1]    Due to severely reduced LVEF < 25% and/or recurrent hospitalizations this patient may benefit from referral to Advanced Heart Failure Program to assess suitability for advanced therapies, such as left-ventricular assist device, heart transplantation, palliative inotropes or palliation [Class 1]. To obtain in-patient consultation or refer to [address] [place] [address], call [phone]    Patient Education:     Teach back in heart failure education provided, including information about medical therapy, lifestyle modifications, diet and fluid restrictions, physical activity. Educational resources provided: Living with Heart Failure booklet; Signs/Symptoms magnet; Weight Calendar; [facility] flyer; Preparation for Successful Discharge Checklist.  Information provided about HF support group. Heart failure avoiding triggers on discharge instructions. Plan for Transitional Care:    Post discharge follow up phone call to be made within 48-72 hours of discharge. Patient will follow-up with PCP. Patient will follow-up with Primary Cardiologist.  Obstructive sleep apnea screening done and patient was referred to Sleep Medicine. Referral/follow-up with Cardiac Rehabilitation.   Referral/follow-up with Advanced Heart Failure Specialist.  Referral/follow-up with Palliative Care Specialist.      Heart Failure Nurse Navigator  Phone: [phone]  /  [phone]    *Recommendations listed above are based on 2022 AHA/ACC/HFSA Guideline for the Management of Heart Failure: A Report of the [address] on Clinical Practice Guidelines. Circulation 2022; [id number]. and 2017 AHA/ACC/HRS guideline for management of patients with ventricular arrhythmias and the prevention of sudden cardiac death: A Report of the [facility] of Cardiology/American Heart Association Task Force on Clinical Practice Guidelines and the Heart Rhythm Society.  Heart Rhythm, Vol 15, No 10, October 2018 *Class of Recommendation: Class 1 (strong), Class 2a (moderate), Class 2b (weak), Class 3 (not recommended, potentially harmful)

## 2022-08-17 NOTE — PROGRESS NOTES
Comprehensive Nutrition Assessment    Type and Reason for Visit: Initial (LOS)    Nutrition Recommendations/Plan:   Continue with Easy to Chew diet per SLP rec's       Malnutrition Assessment:  Malnutrition Status:  No malnutrition (08/17/22 1050)         Nutrition Assessment:    57 yo male admitted for cardiac arrest.  PMHx: cardiomyopathy s/p ICD placement, COPD, ETOH abuse. Intubated on admission, extubated 8/9. Noted to have gone through ETOH withdrawal since admission. Pt sleeping soundly in bed at time of visit, did not wake to me calling his name. Last intake documented as % meal.  SLP following and recommending Easy to [employer] which is ordered. Last phos obtained: 7.3 (from 8/8). Weight hx in EMR indicates 4% weight los sover last 10 months, not significant for time frame. Pt appeared well nourished per visual assessment. Nutritionally Significant Medications:  Bumex, Tums, Jardiance, Pepcid, solumedrol, Aldactone, Milrinone      Estimated Daily Nutrient Needs:  Energy Requirements Based On: Kcal/kg  Weight Used for Energy Requirements: Current  Energy (kcal/day): 9315-2866 (25-30 kcals/kg)  Weight Used for Protein Requirements: Current  Protein (g/day): 89 (1.25 gm/kg (20%))  Method Used for Fluid Requirements: 1 ml/kcal  Fluid (ml/day): 1780    Nutrition Related Findings:   Edema: none  Last BM: 08/16/22, Formed    Wounds: None      Current Nutrition Therapies:  Diet: Easy to Chew  Supplements: none  Meal intake: Patient Vitals for the past 168 hrs:   % Diet Eaten   08/15/22 0925 76 - 100%   08/11/22 0901 26 - 50%     Supplement intake: No data found. Nutrition Support: none      Anthropometric Measures:  Height: 5' 8\" (172.7 cm)  Ideal Body Weight (IBW): 154 lbs (70 kg)     Current Body Wt:  71.2 kg (156 lb 15.5 oz), 101.9 % IBW. Standing scale  Current BMI (kg/m2): 23.9        Weight Adjustment: No adjustment                 BMI Category: Normal weight (BMI 18.5-24. 9)    Wt Readings from Last 10 Encounters:   08/17/22 71.2 kg (156 lb 15.5 oz)   06/22/22 69.7 kg (153 lb 10.6 oz)   11/03/21 79.5 kg (175 lb 3.2 oz)   10/27/21 74.3 kg (163 lb 12.8 oz)   09/16/21 74.3 kg (163 lb 11.2 oz)   08/06/21 74.8 kg (164 lb 12.8 oz)   07/09/21 75.6 kg (166 lb 9.6 oz)   06/17/21 74.7 kg (164 lb 9.6 oz)   05/27/21 74.7 kg (164 lb 9.6 oz)   05/21/21 72.8 kg (160 lb 7.9 oz)           Nutrition Diagnosis:   No nutrition diagnosis at this time     Nutrition Interventions:   Food and/or Nutrient Delivery: Continue current diet  Nutrition Education/Counseling: No recommendations at this time  Coordination of Nutrition Care: Continue to monitor while inpatient       Goals:     Goals: PO intake 75% or greater, within 7 days       Nutrition Monitoring and Evaluation:      Food/Nutrient Intake Outcomes: Food and nutrient intake  Physical Signs/Symptoms Outcomes: Biochemical data, GI status, Weight    Discharge Planning:    Continue current diet    Carrol Rain RD  Available via [employer]

## 2022-08-17 NOTE — PROGRESS NOTES
Problem: Self Care Deficits Care Plan (Adult)  Goal: *Acute Goals and Plan of Care (Insert Text)  Description: FUNCTIONAL STATUS PRIOR TO ADMISSION: Patient was independent and active without use of DME.     HOME SUPPORT: The patient lived with wife but did not require assist.    Occupational Therapy Goals  Initiated 8/9/2022, continue all goals 8/16/22  1. Patient will perform grooming in standing with independence within 7 day(s). 2.  Patient will perform bathing with independence within 7 day(s). 3.  Patient will perform lower body dressing with independence within 7 day(s). 4.  Patient will perform toilet transfers with independence within 7 day(s). 5.  Patient will perform all aspects of toileting with independence within 7 day(s). 6.  Patient will participate in upper extremity therapeutic exercise/activities with independence for 5 minutes within 7 day(s). 7.  Patient will utilize energy conservation techniques during functional activities with verbal cues within 7 day(s). Outcome: Progressing Towards Goal     OCCUPATIONAL THERAPY TREATMENT  Patient: Genet Sanchez (50 y.o. male)  Date: 8/17/2022  Diagnosis: Cardiac arrest [facility]) [I46.9] <principal problem not specified>      Precautions: Fall  Chart, occupational therapy assessment, plan of care, and goals were reviewed. ASSESSMENT  Patient continues with skilled OT services and is progressing towards goals. Pt's performance of ADL/IADL tasks continues to be limited at this time by impaired balance, cardiopulmonary endurance, activity tolerance, and cognition (safety awareness, attention, impulsivity). Pt received standing from EOB to use urinal and RN present. Pt's NC doffed and SpO2 83%. NC replaced and set on 3L, SpO2 recovering with seated rest break and max VC for PLB. Functional mobility to bathroom completed with close CGA for safety and assist of 2 for line management.  Toileting and standing grooming tasks completed with close CGA and VC for safety/activity pacing. With standing activity and short mobility within room, pt becoming increasingly SOB and noted SpO2 dropping to 83-85% on 3L. Pt returned to sitting, O2 recovered, and pt requesting return to supine to rest. BP elevated (130-140/100s) and RN notified of session/pt status. Will continue to follow. Current Level of Function Impacting Discharge (ADLs): CGA to min A for safety     Other factors to consider for discharge: PLOF, cognitive status          PLAN :  Patient continues to benefit from skilled intervention to address the above impairments. Continue treatment per established plan of care to address goals. Recommendation for discharge: (in order for the patient to meet his/her long term goals)  Therapy 3 hours per day 5-7 days per week    This discharge recommendation:  Has been made in collaboration with the attending provider and/or case management    IF patient discharges home will need the following DME: TBD       SUBJECTIVE:   Patient stated Yes dear.     OBJECTIVE DATA SUMMARY:   Cognitive/Behavioral Status:  Neurologic State: Alert  Orientation Level: Oriented X4  Cognition: Follows commands;Decreased attention/concentration  Perception: Appears intact  Perseveration: No perseveration noted  Safety/Judgement: Decreased awareness of need for assistance;Decreased awareness of need for safety    Functional Mobility and Transfers for ADLs:  Bed Mobility:  Sit to Supine: Stand-by assistance    Transfers:  Sit to Stand: Contact guard assistance  Functional Transfers  Bathroom Mobility: Contact guard assistance  Toilet Transfer : Contact guard assistance  Cues: Verbal cues provided (for safety)       Balance:  Sitting: Intact; Without support  Standing: Impaired; Without support  Standing - Static: Good;Fair  Standing - Dynamic : Fair    ADL Intervention:       Grooming  Grooming Assistance: Contact guard assistance  Position Performed: Standing (at sink)  Washing Face: Contact guard assistance  Washing Hands: Contact guard assistance                   Upper Body [address]: Minimum  assistance (for line mgt)    Lower Body Dressing Assistance  Socks: Stand-by assistance  Leg Crossed Method Used: No  Position Performed: Seated edge of bed  Cues: Verbal cues provided (for safety/activity pacing)    Toileting  Toileting Assistance: Contact guard assistance  Bladder Hygiene: Contact guard assistance  Bowel Hygiene: Contact guard assistance  Clothing Management: Contact guard assistance  Cues: Verbal cues provided    Cognitive Retraining  Safety/Judgement: Decreased awareness of need for assistance;Decreased awareness of need for safety    Pain:  None reported     Activity Tolerance:   Fair and observed SOB with activity    After treatment patient left in no apparent distress:   Supine in bed, Call bell within reach, Bed / chair alarm activated, and Side rails x 3, RN notified     COMMUNICATION/COLLABORATION:   The patients plan of care was discussed with: Physical therapist and Registered nurse.      Ish Pop OT  Time Calculation: 28 mins
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
## 2022-08-17 NOTE — PROGRESS NOTES
Problem: Falls - Risk of  Goal: *Absence of Falls  Description: Document Hilda Led Fall Risk and appropriate interventions in the flowsheet. Outcome: Progressing Towards Goal  Note: Fall Risk Interventions:  Mobility Interventions: Bed/chair exit alarm    Mentation Interventions: Door open when patient unattended    Medication Interventions: Assess postural VS orthostatic hypotension    Elimination Interventions: Call light in reach    History of Falls Interventions: Door open when patient unattended         Problem: Patient Education: Go to Patient Education Activity  Goal: Patient/Family Education  Outcome: Progressing Towards Goal     Problem: Pressure Injury - Risk of  Goal: *Prevention of pressure injury  Description: Document Steven Scale and appropriate interventions in the flowsheet.   Outcome: Progressing Towards Goal  Note: Pressure Injury Interventions:  Sensory Interventions: Discuss PT/OT consult with provider, Float heels    Moisture Interventions: Apply protective barrier, creams and emollients    Activity Interventions: Chair cushion    Mobility Interventions: Chair cushion    Nutrition Interventions: Offer support with meals,snacks and hydration    Friction and Shear Interventions: Minimize layers                Problem: Infection - Risk of, Central Venous Catheter-Associated Bloodstream Infection  Goal: *Absence of infection signs and symptoms  Outcome: Progressing Towards Goal

## 2022-08-18 ENCOUNTER — APPOINTMENT (OUTPATIENT)
Dept: ULTRASOUND IMAGING | Age: 62
DRG: 291 | End: 2022-08-18
Attending: INTERNAL MEDICINE
Payer: MEDICARE

## 2022-08-18 LAB
ANION GAP SERPL CALC-SCNC: 13 MMOL/L (ref 5–15)
APPEARANCE UR: CLEAR
APTT PPP: 33.4 SEC (ref 22.1–31)
BILIRUB UR QL: NEGATIVE
BNP SERPL-MCNC: ABNORMAL PG/ML
BUN SERPL-MCNC: 58 MG/DL (ref 6–20)
BUN/CREAT SERPL: 22 (ref 12–20)
CALCIUM SERPL-MCNC: 10 MG/DL (ref 8.5–10.1)
CHLORIDE SERPL-SCNC: 95 MMOL/L (ref 97–108)
CO2 SERPL-SCNC: 24 MMOL/L (ref 21–32)
COLOR UR: ABNORMAL
CREAT SERPL-MCNC: 2.58 MG/DL (ref 0.7–1.3)
CREAT UR-MCNC: <13 MG/DL
EOSINOPHIL #/AREA URNS HPF: NEGATIVE /[HPF]
ERYTHROCYTE [DISTWIDTH] IN BLOOD BY AUTOMATED COUNT: 18.9 % (ref 11.5–14.5)
GLUCOSE SERPL-MCNC: 129 MG/DL (ref 65–100)
GLUCOSE UR STRIP.AUTO-MCNC: 250 MG/DL
HCT VFR BLD AUTO: 30 % (ref 36.6–50.3)
HGB BLD-MCNC: 9.5 G/DL (ref 12.1–17)
HGB UR QL STRIP: NEGATIVE
INR PPP: 2 (ref 0.9–1.1)
KETONES UR QL STRIP.AUTO: NEGATIVE MG/DL
LEUKOCYTE ESTERASE UR QL STRIP.AUTO: NEGATIVE
MAGNESIUM SERPL-MCNC: 2.2 MG/DL (ref 1.6–2.4)
MCH RBC QN AUTO: 30 PG (ref 26–34)
MCHC RBC AUTO-ENTMCNC: 31.7 G/DL (ref 30–36.5)
MCV RBC AUTO: 94.6 FL (ref 80–99)
NITRITE UR QL STRIP.AUTO: NEGATIVE
NRBC # BLD: 0.05 K/UL (ref 0–0.01)
NRBC BLD-RTO: 0.4 PER 100 WBC
PH UR STRIP: 6 [PH] (ref 5–8)
PLATELET # BLD AUTO: 381 K/UL (ref 150–400)
PMV BLD AUTO: 11.4 FL (ref 8.9–12.9)
POTASSIUM SERPL-SCNC: 3.8 MMOL/L (ref 3.5–5.1)
PROCALCITONIN SERPL-MCNC: 0.34 NG/ML
PROT UR STRIP-MCNC: NEGATIVE MG/DL
PROT UR-MCNC: 11 MG/DL (ref 0–11.9)
PROT/CREAT UR-RTO: <0.8
PROTHROMBIN TIME: 19.6 SEC (ref 9–11.1)
RBC # BLD AUTO: 3.17 M/UL (ref 4.1–5.7)
SODIUM SERPL-SCNC: 132 MMOL/L (ref 136–145)
SP GR UR REFRACTOMETRY: 1.01 (ref 1–1.03)
THERAPEUTIC RANGE,PTTT: ABNORMAL SECS (ref 58–77)
UROBILINOGEN UR QL STRIP.AUTO: 0.2 EU/DL (ref 0.2–1)
WBC # BLD AUTO: 12.3 K/UL (ref 4.1–11.1)

## 2022-08-18 PROCEDURE — 94640 AIRWAY INHALATION TREATMENT: CPT

## 2022-08-18 PROCEDURE — 76770 US EXAM ABDO BACK WALL COMP: CPT

## 2022-08-18 PROCEDURE — 74011250636 HC RX REV CODE- 250/636: Performed by: INTERNAL MEDICINE

## 2022-08-18 PROCEDURE — 99233 SBSQ HOSP IP/OBS HIGH 50: CPT | Performed by: NURSE PRACTITIONER

## 2022-08-18 PROCEDURE — 74011250637 HC RX REV CODE- 250/637: Performed by: INTERNAL MEDICINE

## 2022-08-18 PROCEDURE — 85730 THROMBOPLASTIN TIME PARTIAL: CPT

## 2022-08-18 PROCEDURE — 65660000001 HC RM ICU INTERMED STEPDOWN

## 2022-08-18 PROCEDURE — 83735 ASSAY OF MAGNESIUM: CPT

## 2022-08-18 PROCEDURE — 36415 COLL VENOUS BLD VENIPUNCTURE: CPT

## 2022-08-18 PROCEDURE — 85610 PROTHROMBIN TIME: CPT

## 2022-08-18 PROCEDURE — 84145 PROCALCITONIN (PCT): CPT

## 2022-08-18 PROCEDURE — 80048 BASIC METABOLIC PNL TOTAL CA: CPT

## 2022-08-18 PROCEDURE — 87205 SMEAR GRAM STAIN: CPT

## 2022-08-18 PROCEDURE — 83880 ASSAY OF NATRIURETIC PEPTIDE: CPT

## 2022-08-18 PROCEDURE — 97116 GAIT TRAINING THERAPY: CPT

## 2022-08-18 PROCEDURE — 74011250637 HC RX REV CODE- 250/637: Performed by: HOSPITALIST

## 2022-08-18 PROCEDURE — 81003 URINALYSIS AUTO W/O SCOPE: CPT

## 2022-08-18 PROCEDURE — 74011250637 HC RX REV CODE- 250/637: Performed by: NURSE PRACTITIONER

## 2022-08-18 PROCEDURE — 84156 ASSAY OF PROTEIN URINE: CPT

## 2022-08-18 PROCEDURE — 74011250637 HC RX REV CODE- 250/637: Performed by: STUDENT IN AN ORGANIZED HEALTH CARE EDUCATION/TRAINING PROGRAM

## 2022-08-18 PROCEDURE — 74011000250 HC RX REV CODE- 250: Performed by: STUDENT IN AN ORGANIZED HEALTH CARE EDUCATION/TRAINING PROGRAM

## 2022-08-18 PROCEDURE — 85027 COMPLETE CBC AUTOMATED: CPT

## 2022-08-18 PROCEDURE — 74011000250 HC RX REV CODE- 250: Performed by: NURSE PRACTITIONER

## 2022-08-18 PROCEDURE — 74011000258 HC RX REV CODE- 258: Performed by: INTERNAL MEDICINE

## 2022-08-18 RX ORDER — BUMETANIDE 0.25 MG/ML
2 INJECTION INTRAMUSCULAR; INTRAVENOUS ONCE
Status: COMPLETED | OUTPATIENT
Start: 2022-08-18 | End: 2022-08-18

## 2022-08-18 RX ADMIN — IPRATROPIUM BROMIDE 0.5 MG: 0.5 SOLUTION RESPIRATORY (INHALATION) at 14:55

## 2022-08-18 RX ADMIN — ARFORMOTEROL TARTRATE 15 MCG: 15 SOLUTION RESPIRATORY (INHALATION) at 21:38

## 2022-08-18 RX ADMIN — Medication 30 MG: at 22:06

## 2022-08-18 RX ADMIN — BUDESONIDE 500 MCG: 0.5 INHALANT RESPIRATORY (INHALATION) at 21:38

## 2022-08-18 RX ADMIN — ASPIRIN 81 MG CHEWABLE TABLET 81 MG: 81 TABLET CHEWABLE at 09:58

## 2022-08-18 RX ADMIN — MILRINONE LACTATE 0.25 MCG/KG/MIN: 0.2 INJECTION, SOLUTION INTRAVENOUS at 22:18

## 2022-08-18 RX ADMIN — PIPERACILLIN AND TAZOBACTAM 3.38 G: 3; .375 INJECTION, POWDER, FOR SOLUTION INTRAVENOUS at 22:06

## 2022-08-18 RX ADMIN — BUMETANIDE 2 MG: 0.25 INJECTION, SOLUTION INTRAMUSCULAR; INTRAVENOUS at 10:14

## 2022-08-18 RX ADMIN — APIXABAN 5 MG: 5 TABLET, FILM COATED ORAL at 09:58

## 2022-08-18 RX ADMIN — CHLORHEXIDINE GLUCONATE 15 ML: 1.2 RINSE ORAL at 09:58

## 2022-08-18 RX ADMIN — MILRINONE LACTATE 0.25 MCG/KG/MIN: 0.2 INJECTION, SOLUTION INTRAVENOUS at 01:55

## 2022-08-18 RX ADMIN — METHYLPREDNISOLONE SODIUM SUCCINATE 40 MG: 40 INJECTION, POWDER, FOR SOLUTION INTRAMUSCULAR; INTRAVENOUS at 22:06

## 2022-08-18 RX ADMIN — CALCIUM CARBONATE (ANTACID) CHEW TAB 500 MG 200 MG: 500 CHEW TAB at 12:11

## 2022-08-18 RX ADMIN — IPRATROPIUM BROMIDE 0.5 MG: 0.5 SOLUTION RESPIRATORY (INHALATION) at 07:55

## 2022-08-18 RX ADMIN — SODIUM CHLORIDE, PRESERVATIVE FREE 10 ML: 5 INJECTION INTRAVENOUS at 22:07

## 2022-08-18 RX ADMIN — PIPERACILLIN AND TAZOBACTAM 3.38 G: 3; .375 INJECTION, POWDER, FOR SOLUTION INTRAVENOUS at 04:36

## 2022-08-18 RX ADMIN — SODIUM CHLORIDE, PRESERVATIVE FREE 10 ML: 5 INJECTION INTRAVENOUS at 15:49

## 2022-08-18 RX ADMIN — Medication 30 MG: at 10:14

## 2022-08-18 RX ADMIN — APIXABAN 5 MG: 5 TABLET, FILM COATED ORAL at 17:34

## 2022-08-18 RX ADMIN — IPRATROPIUM BROMIDE 0.5 MG: 0.5 SOLUTION RESPIRATORY (INHALATION) at 21:38

## 2022-08-18 RX ADMIN — IVABRADINE 5 MG: 5 TABLET, FILM COATED ORAL at 17:34

## 2022-08-18 RX ADMIN — METHYLPREDNISOLONE SODIUM SUCCINATE 40 MG: 40 INJECTION, POWDER, FOR SOLUTION INTRAMUSCULAR; INTRAVENOUS at 05:54

## 2022-08-18 RX ADMIN — CALCIUM CARBONATE (ANTACID) CHEW TAB 500 MG 200 MG: 500 CHEW TAB at 10:02

## 2022-08-18 RX ADMIN — ARFORMOTEROL TARTRATE 15 MCG: 15 SOLUTION RESPIRATORY (INHALATION) at 07:54

## 2022-08-18 RX ADMIN — EMPAGLIFLOZIN 10 MG: 10 TABLET, FILM COATED ORAL at 10:15

## 2022-08-18 RX ADMIN — FAMOTIDINE 20 MG: 20 TABLET, FILM COATED ORAL at 09:58

## 2022-08-18 RX ADMIN — METHYLPREDNISOLONE SODIUM SUCCINATE 40 MG: 40 INJECTION, POWDER, FOR SOLUTION INTRAMUSCULAR; INTRAVENOUS at 15:49

## 2022-08-18 RX ADMIN — BUDESONIDE 500 MCG: 0.5 INHALANT RESPIRATORY (INHALATION) at 07:54

## 2022-08-18 RX ADMIN — CHLORHEXIDINE GLUCONATE 15 ML: 1.2 RINSE ORAL at 22:07

## 2022-08-18 RX ADMIN — CALCIUM CARBONATE (ANTACID) CHEW TAB 500 MG 200 MG: 500 CHEW TAB at 17:34

## 2022-08-18 RX ADMIN — IPRATROPIUM BROMIDE 0.5 MG: 0.5 SOLUTION RESPIRATORY (INHALATION) at 01:52

## 2022-08-18 RX ADMIN — SODIUM CHLORIDE, PRESERVATIVE FREE 10 ML: 5 INJECTION INTRAVENOUS at 05:54

## 2022-08-18 RX ADMIN — PIPERACILLIN AND TAZOBACTAM 3.38 G: 3; .375 INJECTION, POWDER, FOR SOLUTION INTRAVENOUS at 12:10

## 2022-08-18 NOTE — PROGRESS NOTES
Problem: Mobility Impaired (Adult and Pediatric)  Goal: *Acute Goals and Plan of Care (Insert Text)  Description: FUNCTIONAL STATUS PRIOR TO ADMISSION: Patient was independent and active without use of DME.    HOME SUPPORT PRIOR TO ADMISSION: The patient lived with wife but did not require assist.  Physical Therapy Goals  Initiated 8/17/2022 - continue all goals  1. Patient will move from supine to sit and sit to supine  in bed with modified independence within 7 day(s). 2.  Patient will transfer from bed to chair and chair to bed with modified independence using the least restrictive device within 7 day(s). 3.  Patient will perform sit to stand with modified independence within 7 day(s). 4.  Patient will ambulate with modified independence for 50 feet with the least restrictive device within 7 day(s). Physical Therapy Goals  Initiated 8/10/2022  1. Patient will move from supine to sit and sit to supine  in bed with modified independence within 7 day(s). 2.  Patient will transfer from bed to chair and chair to bed with modified independence using the least restrictive device within 7 day(s). 3.  Patient will perform sit to stand with modified independence within 7 day(s). 4.  Patient will ambulate with modified independence for 50 feet with the least restrictive device within 7 day(s). Outcome: Progressing Towards Goal     PHYSICAL THERAPY TREATMENT  Patient: Nathanael Jordan (23 y.o. male)  Date: 8/18/2022  Diagnosis: Cardiac arrest [facility]) [I46.9] <principal problem not specified>      Precautions: Fall  Chart, physical therapy assessment, plan of care and goals were reviewed. ASSESSMENT  Patient continues with skilled PT services and is progressing towards goals. Pt up on arrival attempting to take IV into the bathroom. IV plugged in and pt turned off bed alarm. Pt was able to ambulate with an unsteady gait. Pt reports increase SOB with task. Pt reports SOB better with removal mask.  HR 120-130's bpm. Unable to ambulate pt with oxygen due to doctors and RN talking to pt. Pulse oximetry assessment   91% at rest on room air (if 88% or less, skip next steps)  86% while ambulating on room air      Current Level of Function Impacting Discharge (mobility/balance): min A     Other factors to consider for discharge: impulsivity, decrease balance, safety awareness, oxygen needs? PLAN :  Patient continues to benefit from skilled intervention to address the above impairments. Continue treatment per established plan of care. to address goals. Recommendation for discharge: (in order for the patient to meet his/her long term goals)  Therapy 3 hours per day 5-7 days per week if unable then HHPT with assistance     This discharge recommendation:  Has not yet been discussed the attending provider and/or case management    IF patient discharges home will need the following DME: none       SUBJECTIVE:   Patient stated It is better without this mask .     OBJECTIVE DATA SUMMARY:   Critical Behavior:  Neurologic State: Alert  Orientation Level: Oriented X4  Cognition: Follows commands  Safety/Judgement: Decreased awareness of need for assistance, Decreased awareness of need for safety  Functional Mobility Training:  Bed Mobility:                    Transfers:                                   Balance:  Standing: Impaired  Standing - Dynamic : Fair  Ambulation/Gait Training:  Distance (ft): 80 Feet (ft)     Ambulation - Level of Assistance: Minimal assistance;Contact guard assistance        Gait Abnormalities: Decreased step clearance; Path deviations        Base of Support: Center of gravity altered     Speed/Milena: Pace decreased (<100 feet/min)  Step Length: Left shortened;Right shortened                    Stairs:               Therapeutic Exercises:     Pain Rating:  none    Activity Tolerance:   observed SOB with activity    After treatment patient left in no apparent distress:   Sitting in chair and Call bell within reach unable to put chair alarm on due to interruption, notified RN    COMMUNICATION/COLLABORATION:   The patients plan of care was discussed with: Registered nurse.      Jordan Reddy PTA   Time Calculation: 17 mins
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
## 2022-08-18 NOTE — PROGRESS NOTES
2330 Bedside shift change report given to Calvin Victor (oncoming nurse) by Chichi Hodges (offgoing nurse). Report included the following information SBAR, Kardex, Intake/Output, MAR, Recent Results, and Cardiac Rhythm NSR/Sinus tachy . 0730 Bedside shift change report given to Emily (oncoming nurse) by Calvin Victor (offgoing nurse). Report included the following information SBAR, Kardex, Intake/Output, MAR, Recent Results, and Cardiac Rhythm Sinus tach . Problem: Falls - Risk of  Goal: *Absence of Falls  Description: Document Alexandra Mendez Fall Risk and appropriate interventions in the flowsheet.   Outcome: Progressing Towards Goal  Note: Fall Risk Interventions:  Mobility Interventions: Assess mobility with egress test, Bed/chair exit alarm, Communicate number of staff needed for ambulation/transfer, OT consult for ADLs, Patient to call before getting OOB, PT Consult for mobility concerns, Strengthening exercises (ROM-active/passive)    Mentation Interventions: Adequate sleep, hydration, pain control, Bed/chair exit alarm, Door open when patient unattended, Evaluate medications/consider consulting pharmacy, Increase mobility, More frequent rounding, Reorient patient, Toileting rounds, Update white board    Medication Interventions: Bed/chair exit alarm, Evaluate medications/consider consulting pharmacy, Patient to call before getting OOB, Teach patient to arise slowly    Elimination Interventions: Bed/chair exit alarm, Call light in reach, Patient to call for help with toileting needs, Stay With Me (per policy), Toileting schedule/hourly rounds, Urinal in reach    History of Falls Interventions: Bed/chair exit alarm, Consult care management for discharge planning, Door open when patient unattended, Evaluate medications/consider consulting pharmacy         Problem: Patient Education: Go to Patient Education Activity  Goal: Patient/Family Education  Outcome: Progressing Towards Goal     Problem: Pressure Injury - Risk of  Goal: *Prevention of pressure injury  Description: Document Steven Scale and appropriate interventions in the flowsheet.   Outcome: Progressing Towards Goal  Note: Pressure Injury Interventions:  Sensory Interventions: Assess changes in LOC, Assess need for specialty bed, Avoid rigorous massage over bony prominences, Check visual cues for pain, Discuss PT/OT consult with provider, Float heels, Keep linens dry and wrinkle-free, Maintain/enhance activity level, Minimize linen layers, Pressure redistribution bed/mattress (bed type)    Moisture Interventions: Absorbent underpads, Assess need for specialty bed, Limit adult briefs, Minimize layers    Activity Interventions: Assess need for specialty bed, Increase time out of bed, Pressure redistribution bed/mattress(bed type), PT/OT evaluation    Mobility Interventions: Assess need for specialty bed, Pressure redistribution bed/mattress (bed type), PT/OT evaluation    Nutrition Interventions: Document food/fluid/supplement intake    Friction and Shear Interventions: Lift sheet, Minimize layers                Problem: Patient Education: Go to Patient Education Activity  Goal: Patient/Family Education  Outcome: Progressing Towards Goal     Problem: Infection - Risk of, Central Venous Catheter-Associated Bloodstream Infection  Goal: *Absence of infection signs and symptoms  Outcome: Progressing Towards Goal     Problem: Patient Education: Go to Patient Education Activity  Goal: Patient/Family Education  Outcome: Progressing Towards Goal     Problem: Infection - Risk of, Urinary Catheter-Associated Urinary Tract Infection  Goal: *Absence of infection signs and symptoms  Outcome: Resolved/Met     Problem: Heart Failure: Discharge Outcomes  Goal: *Verbalizes understanding and describes prescribed diet  Outcome: Progressing Towards Goal  Goal: *Verbalizes understanding/describes prescribed medications  Outcome: Progressing Towards Goal  Goal: *Describes available resources and support systems  Description: (eg: Home Health, Palliative Care, Advanced Medical Directive)  Outcome: Progressing Towards Goal     Problem: Chronic Obstructive Pulmonary Disease (COPD)  Goal: *Oxygen saturation during activity within specified parameters  Outcome: Progressing Towards Goal  Goal: *Absence of hypoxia  Outcome: Progressing Towards Goal  Goal: *Optimize nutritional status  Outcome: Progressing Towards Goal     Problem: Patient Education: Go to Patient Education Activity  Goal: Patient/Family Education  Outcome: Progressing Towards Goal

## 2022-08-18 NOTE — PROGRESS NOTES
Bedside shift change report given to Magi Davis (oncoming nurse) by Yeyo Lindsay (offgoing nurse).  Report included the following information SBAR, ED Summary, Procedure Summary, Intake/Output, MAR, Recent Results, Med Rec Status, and Cardiac Rhythm ST .

## 2022-08-18 NOTE — PROGRESS NOTES
[facility] Adult  Hospitalist Group                                                                                          Hospitalist Progress Note  Lilo Taylor MD  Answering service: [phone] OR [phone] from in house phone        Date of Service:  2022  NAME:  Claudette Grief  :  1960  MRN:  [id number]      Admission Summary:   Claudette Grief is a 58 y.o. male with a pmhx of ICD placement, COPD, alcohol abuse, who presented via EMS after V fib arrest. ROSC was aceived after defibrillation x 2, patient was intubated in the field and transported to [facility] for further management. Of note, patient had recent ICD placement with continuous IV milrinone therapy at OSH about 3 weeks ago. Patient was started on levophed and amio in the ED and admitted to ICU for further management. Patient was successfully extubated on , and has remained stable on 1 to 2L NC. He developed severe delirium which was attributed to ICU delirium and alcohol withdrawal. He was started on phenobarb therapy with improvement in withdrawal symptoms. Of note, patient claims to only drink a few times per week and does not drink heavily.      On  the patient was increasingly wheezy and because of concerns of worsening the patient was transferred to ICU       Interval history / Subjective:   He has no complaints, he has no CP or shortness of breath, he remains on IV Milrinone drip      Assessment & Plan:     #Cardiac arrest 2/2 ventricular fibrillation  # Acute on Chronic CHF  #ICD in place  - Dilated cardiomyopathy with LVEF 20-25%, improved to 33%  - currently on continuous milrinone therapy  -Continue ASA/Aldactone/Jardiance  -  hold Bumex/Aldactone due to worsening cr   - Appreciate Adv heart failure he remains on IV Milrinone Drip ,pro BNP increased however so did renal function, he does not have symptoms of orthopnea or dyspnea currently     #Acute hypoxic Respiratory failure   # Hx COPD  ?Aspiration Pneumonia  - Solumedrol - weaning down   - leucocytosis improving 17 - could be due to steroids ?  -cultures, no growth so far  - Zosyn x 5 days course   --08/18  Also h/o severe COPD, appears to no longer be in exacerbation        #EDGAR on CKD: creatinine worsening   - likely sec to overdiuresis  -Albumin given 8/15  - nephrology consulted  - will monitor renal function      #Alcohol withdrawal  Encephalopathy - improved   -  improved on phenobarb therapy  -Continue CIWA protocol     History of PE   - Continue eliquis      Code status: FULL CODE  Prophylaxis: [address] discussed with: patient  Anticipated Disposition: >48h        Hospital Problems  Date Reviewed: 6/15/2022            Codes Class Noted POA    Acute on chronic systolic heart failure ([facility] [address] ICD-10-CM: S88.55  ICD-9-CM: 428.23  8/11/2022 Unknown        Cardiac arrest [facility]) ICD-10-CM: I46.9  ICD-9-CM: 427.5  8/8/2022 Unknown         Review of Systems:   A comprehensive review of systems was negative except for that written in the HPI. Vital Signs:    Last 24hrs VS reviewed since prior progress note. Most recent are:  Visit Vitals  /88 (BP 1 Location: Right upper arm)   Pulse (!) 109   Temp 97.6 °F (36.4 °C)   Resp 16   Ht 5' 8\" (1.727 m)   Wt 71.6 kg (157 lb 13.6 oz)   SpO2 95%   BMI 24.00 kg/m²         Intake/Output Summary (Last 24 hours) at 8/18/2022 1659  Last data filed at 8/18/2022 1552  Gross per 24 hour   Intake 1154.4 ml   Output 2250 ml   Net -1095.6 ml          Physical Examination:     I had a face to face encounter with this patient and independently examined them on 8/18/2022 as outlined below:          Constitutional:  No acute distress   ENT:  Oral mucosa moist, oropharynx benign. Resp:  CTA bilaterally. No wheezing/rhonchi/rales. No accessory muscle use. CV:  Regular rhythm, Tachy, no murmurs, gallops, rubs    GI:  Soft, non distended, non tender.  normoactive bowel sounds, no hepatosplenomegaly     Musculoskeletal:  No edema, warm, 2+ pulses throughout    Neurologic:  Moves all extremities. AAOx3, CN II-XII reviewed            Data Review:    Review and/or order of clinical lab test      Labs:     Recent Labs     08/18/22 0411 08/17/22 0354   WBC 12.3* 17.9*   HGB 9.5* 10.4*   HCT 30.0* 32.2*    451*       Recent Labs     08/18/22 0411 08/17/22 0354 08/16/22  0307   * 135* 134*   K 3.8 4.1 3.1*   CL 95* 97 98   CO2 24 23 28   BUN 58* 46* 44*   CREA 2.58* 2.38* 1.77*   * 138* 132*   CA 10.0 10.5* 9.1   MG 2.2 2.4 1.6       No results for input(s): ALT, AP, TBIL, TBILI, TP, ALB, GLOB, GGT, AML, LPSE in the last 72 hours. No lab exists for component: SGOT, GPT, AMYP, HLPSE  Recent Labs     08/18/22 0411 08/17/22 0354 08/16/22  0307   INR 2.0* 1.5* 1.3*   PTP 19.6* 15.1* 13.2*   APTT 33.4* 32.9* 25.4        No results for input(s): FE, TIBC, PSAT, FERR in the last 72 hours. No results found for: FOL, RBCF   No results for input(s): PH, PCO2, PO2 in the last 72 hours. No results for input(s): CPK, CKNDX, TROIQ in the last 72 hours.     No lab exists for component: CPKMB    No results found for: CHOL, CHOLX, CHLST, CHOLV, HDL, HDLP, LDL, LDLC, DLDLP, TGLX, TRIGL, TRIGP, CHHD, CHHDX  No results found for: Juan Miller  Lab Results   Component Value Date/Time    Color YELLOW/STRAW 08/08/2022 05:26 PM    Appearance CLOUDY (A) 08/08/2022 05:26 PM    Specific gravity 1.016 08/08/2022 05:26 PM    pH (UA) 6.0 08/08/2022 05:26 PM    Protein 300 (A) 08/08/2022 05:26 PM    Glucose Negative 08/08/2022 05:26 PM    Ketone Negative 08/08/2022 05:26 PM    Bilirubin Negative 08/08/2022 05:26 PM    Urobilinogen 0.2 08/08/2022 05:26 PM    Nitrites Negative 08/08/2022 05:26 PM    Leukocyte Esterase Negative 08/08/2022 05:26 PM    Epithelial cells FEW 08/08/2022 05:26 PM    Bacteria 1+ (A) 08/08/2022 05:26 PM    WBC 5-10 08/08/2022 05:26 PM    RBC  08/08/2022 05:26 PM Medications Reviewed:     Current Facility-Administered Medications   Medication Dose Route Frequency    ivabradine (CORLANOR) tablet 5 mg  5 mg Oral BID WITH MEALS    methylPREDNISolone (PF) (SOLU-MEDROL) injection 40 mg  40 mg IntraVENous Q8H    famotidine (PEPCID) tablet 20 mg  20 mg Oral DAILY    HYDROcodone-acetaminophen (NORCO) 5-325 mg per tablet 1 Tablet  1 Tablet Oral Q6H PRN    piperacillin-tazobactam (ZOSYN) 3.375 g in 0.9% sodium chloride (MBP/ADV) 100 mL MBP  3.375 g IntraVENous Q8H    apixaban (ELIQUIS) tablet 5 mg  5 mg Oral BID    empagliflozin (JARDIANCE) tablet 10 mg  10 mg Oral DAILY    spironolactone (ALDACTONE) tablet 12.5 mg  12.5 mg Oral DAILY    dextromethorphan (DELSYM) 30 mg/5 mL syrup 30 mg  30 mg Oral Q12H    calcium carbonate (TUMS) chewable tablet 200 mg [elemental]  200 mg Oral TID WITH MEALS    PHENobarbital (LUMINAL) injection 65 mg  65 mg IntraVENous Q8H PRN    [Held by provider] PHENobarbitaL (LUMINAL) tablet 64.8 mg  64.8 mg Oral TID    alteplase (CATHFLO) 1 mg in sterile water (preservative free) 1 mL injection  1 mg InterCATHeter PRN    aspirin chewable tablet 81 mg  81 mg Oral DAILY    budesonide (PULMICORT) 500 mcg/2 ml nebulizer suspension  500 mcg Nebulization BID RT    And    arformoteroL (BROVANA) neb solution 15 mcg  15 mcg Nebulization BID RT    ipratropium (ATROVENT) 0.02 % nebulizer solution 0.5 mg  0.5 mg Nebulization Q6H RT    milrinone (PRIMACOR) 20 MG/100 ML D5W infusion  0.25 mcg/kg/min (Order-Specific) IntraVENous CONTINUOUS    sodium chloride (NS) flush 5-40 mL  5-40 mL IntraVENous Q8H    sodium chloride (NS) flush 5-40 mL  5-40 mL IntraVENous PRN    acetaminophen (TYLENOL) suppository 650 mg  650 mg Rectal Q4H PRN    chlorhexidine (PERIDEX) 0.12 % mouthwash 15 mL  15 mL Oral Q12H    sodium chloride (NS) flush 5-40 mL  5-40 mL IntraVENous PRN    acetaminophen (TYLENOL) tablet 650 mg  650 mg Oral Q6H PRN    Or    acetaminophen (TYLENOL) suppository 650 mg 650 mg Rectal Q6H PRN    polyethylene glycol (MIRALAX) packet 17 g  17 g Oral DAILY PRN    ondansetron (ZOFRAN ODT) tablet 4 mg  4 mg Oral Q8H PRN    Or    ondansetron (ZOFRAN) injection 4 mg  4 mg IntraVENous Q6H PRN    albuterol-ipratropium (DUO-NEB) 2.5 MG-0.5 MG/3 ML  3 mL Nebulization Q4H PRN     ______________________________________________________________________  EXPECTED LENGTH OF STAY: 3d 19h  ACTUAL LENGTH OF STAY:          10                 Nellie Rodriguez MD

## 2022-08-18 NOTE — PROGRESS NOTES
Problem: Falls - Risk of  Goal: *Absence of Falls  Description: Document Miguel Kaur Fall Risk and appropriate interventions in the flowsheet.   Outcome: Progressing Towards Goal  Note: Fall Risk Interventions:  Mobility Interventions: Assess mobility with egress test, Bed/chair exit alarm, Communicate number of staff needed for ambulation/transfer, OT consult for ADLs, Patient to call before getting OOB, PT Consult for mobility concerns, Strengthening exercises (ROM-active/passive)    Mentation Interventions: Adequate sleep, hydration, pain control, Bed/chair exit alarm, Door open when patient unattended, Evaluate medications/consider consulting pharmacy, Increase mobility, More frequent rounding, Reorient patient, Toileting rounds, Update white board    Medication Interventions: Bed/chair exit alarm, Evaluate medications/consider consulting pharmacy, Patient to call before getting OOB, Teach patient to arise slowly    Elimination Interventions: Bed/chair exit alarm, Call light in reach, Patient to call for help with toileting needs, Stay With Me (per policy), Toileting schedule/hourly rounds, Urinal in reach    History of Falls Interventions: Bed/chair exit alarm, Consult care management for discharge planning, Door open when patient unattended, Evaluate medications/consider consulting pharmacy         Problem: Heart Failure: Discharge Outcomes  Goal: *Demonstrates ability to perform prescribed activity without shortness of breath or discomfort  Outcome: Progressing Towards Goal  Goal: *Left ventricular function assessment completed prior to or during stay, or planned for post-discharge  Outcome: Progressing Towards Goal  Goal: *ACEI prescribed if LVEF less than 40% and no contraindications or ARB prescribed  Outcome: Progressing Towards Goal  Goal: *Verbalizes understanding and describes prescribed diet  Outcome: Progressing Towards Goal  Goal: *Verbalizes understanding/describes prescribed medications  Outcome: Progressing Towards Goal  Goal: *Describes available resources and support systems  Description: (eg: Home Health, Palliative Care, Advanced Medical Directive)  Outcome: Progressing Towards Goal
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
## 2022-08-18 NOTE — NURSE NAVIGATOR
Heart Failure Nurse Navigator rounds completed. HF NN provided introduction to self and nurse navigator role. Living With Heart Failure booklet given to patient, along with weight calendar, magnet, and HF Support Group flyer. Introduced patient to Preparing for Successful Discharge - Heart Failure check list and explained that knowledge of these topics will help facilitate successful self management upon discharge. Educated using teach back method. Discussed diagnosis definition and assessed patient understanding. Reviewed importance of daily weight monitoring and low sodium diet (1500-2000mg daily). Reinforced daily weights and what weight gain to report to MD.  Encouraged activity and rest periods within symptom limitations and as ordered by physician. Advised patient that follow up appointment will be scheduled and placed on Discharge Instructions prior to discharge. Patient states he may be discharged to rehab. Patient given [facility] flyer and is agreeable to services as needed. Patient provided with contact information for HF NN and encouraged to call with questions. Will continue to follow until discharge.

## 2022-08-18 NOTE — PROGRESS NOTES
1630: Bedside shift change report given to [place] [address] (oncoming nurse) by Richie Scott (offgoing nurse). Report included the following information SBAR, Kardex, ED Summary, Procedure Summary, Intake/Output, MAR, and Recent Results. 1930: Bedside shift change report given to [facility] (oncoming nurse) by Jada Zhang (offgoing nurse). Report included the following information SBAR, Kardex, ED Summary, Procedure Summary, Intake/Output, MAR, and Recent Results. Charting and patient care of Jerald Marie by Annmarie Morales RN from 6645 to 2000 was supervised and reviewed by this RN.

## 2022-08-19 VITALS
SYSTOLIC BLOOD PRESSURE: 137 MMHG | HEART RATE: 107 BPM | DIASTOLIC BLOOD PRESSURE: 87 MMHG | HEIGHT: 68 IN | RESPIRATION RATE: 18 BRPM | TEMPERATURE: 98 F | OXYGEN SATURATION: 98 % | WEIGHT: 153 LBS | BODY MASS INDEX: 23.19 KG/M2

## 2022-08-19 LAB
ANION GAP SERPL CALC-SCNC: 11 MMOL/L (ref 5–15)
APTT PPP: 35.3 SEC (ref 22.1–31)
BNP SERPL-MCNC: ABNORMAL PG/ML
BUN SERPL-MCNC: 61 MG/DL (ref 6–20)
BUN/CREAT SERPL: 27 (ref 12–20)
CALCIUM SERPL-MCNC: 9.1 MG/DL (ref 8.5–10.1)
CHLORIDE SERPL-SCNC: 94 MMOL/L (ref 97–108)
CO2 SERPL-SCNC: 29 MMOL/L (ref 21–32)
CREAT SERPL-MCNC: 2.27 MG/DL (ref 0.7–1.3)
GLUCOSE SERPL-MCNC: 129 MG/DL (ref 65–100)
INR PPP: 2 (ref 0.9–1.1)
MAGNESIUM SERPL-MCNC: 2.2 MG/DL (ref 1.6–2.4)
POTASSIUM SERPL-SCNC: 2.9 MMOL/L (ref 3.5–5.1)
PROTHROMBIN TIME: 19.8 SEC (ref 9–11.1)
SODIUM SERPL-SCNC: 134 MMOL/L (ref 136–145)
THERAPEUTIC RANGE,PTTT: ABNORMAL SECS (ref 58–77)

## 2022-08-19 PROCEDURE — 99233 SBSQ HOSP IP/OBS HIGH 50: CPT | Performed by: NURSE PRACTITIONER

## 2022-08-19 PROCEDURE — 74011250637 HC RX REV CODE- 250/637: Performed by: INTERNAL MEDICINE

## 2022-08-19 PROCEDURE — 80048 BASIC METABOLIC PNL TOTAL CA: CPT

## 2022-08-19 PROCEDURE — 83735 ASSAY OF MAGNESIUM: CPT

## 2022-08-19 PROCEDURE — 74011000250 HC RX REV CODE- 250: Performed by: STUDENT IN AN ORGANIZED HEALTH CARE EDUCATION/TRAINING PROGRAM

## 2022-08-19 PROCEDURE — 74011250636 HC RX REV CODE- 250/636: Performed by: HOSPITALIST

## 2022-08-19 PROCEDURE — 83880 ASSAY OF NATRIURETIC PEPTIDE: CPT

## 2022-08-19 PROCEDURE — 85610 PROTHROMBIN TIME: CPT

## 2022-08-19 PROCEDURE — 85730 THROMBOPLASTIN TIME PARTIAL: CPT

## 2022-08-19 PROCEDURE — 74011250637 HC RX REV CODE- 250/637: Performed by: STUDENT IN AN ORGANIZED HEALTH CARE EDUCATION/TRAINING PROGRAM

## 2022-08-19 PROCEDURE — 74011250637 HC RX REV CODE- 250/637: Performed by: NURSE PRACTITIONER

## 2022-08-19 PROCEDURE — 74011250636 HC RX REV CODE- 250/636: Performed by: INTERNAL MEDICINE

## 2022-08-19 PROCEDURE — 94640 AIRWAY INHALATION TREATMENT: CPT

## 2022-08-19 PROCEDURE — 74011250637 HC RX REV CODE- 250/637: Performed by: HOSPITALIST

## 2022-08-19 PROCEDURE — 36415 COLL VENOUS BLD VENIPUNCTURE: CPT

## 2022-08-19 PROCEDURE — 74011000258 HC RX REV CODE- 258: Performed by: INTERNAL MEDICINE

## 2022-08-19 RX ORDER — MILRINONE LACTATE 0.2 MG/ML
0.25 INJECTION, SOLUTION INTRAVENOUS CONTINUOUS
Qty: 300 ML | Refills: 0 | Status: SHIPPED
Start: 2022-08-19

## 2022-08-19 RX ORDER — POTASSIUM CHLORIDE 750 MG/1
40 TABLET, FILM COATED, EXTENDED RELEASE ORAL
Status: COMPLETED | OUTPATIENT
Start: 2022-08-19 | End: 2022-08-19

## 2022-08-19 RX ORDER — SPIRONOLACTONE 25 MG/1
12.5 TABLET ORAL DAILY
Qty: 30 TABLET | Refills: 0 | Status: SHIPPED | OUTPATIENT
Start: 2022-08-20

## 2022-08-19 RX ADMIN — POTASSIUM CHLORIDE 40 MEQ: 750 TABLET, FILM COATED, EXTENDED RELEASE ORAL at 12:00

## 2022-08-19 RX ADMIN — ASPIRIN 81 MG CHEWABLE TABLET 81 MG: 81 TABLET CHEWABLE at 08:33

## 2022-08-19 RX ADMIN — METHYLPREDNISOLONE SODIUM SUCCINATE 40 MG: 40 INJECTION, POWDER, FOR SOLUTION INTRAMUSCULAR; INTRAVENOUS at 04:37

## 2022-08-19 RX ADMIN — Medication 30 MG: at 08:34

## 2022-08-19 RX ADMIN — CALCIUM CARBONATE (ANTACID) CHEW TAB 500 MG 200 MG: 500 CHEW TAB at 18:19

## 2022-08-19 RX ADMIN — CALCIUM CARBONATE (ANTACID) CHEW TAB 500 MG 200 MG: 500 CHEW TAB at 08:33

## 2022-08-19 RX ADMIN — CHLORHEXIDINE GLUCONATE 15 ML: 1.2 RINSE ORAL at 08:34

## 2022-08-19 RX ADMIN — APIXABAN 5 MG: 5 TABLET, FILM COATED ORAL at 08:34

## 2022-08-19 RX ADMIN — CHLORHEXIDINE GLUCONATE 15 ML: 1.2 RINSE ORAL at 21:02

## 2022-08-19 RX ADMIN — APIXABAN 5 MG: 5 TABLET, FILM COATED ORAL at 18:19

## 2022-08-19 RX ADMIN — BUDESONIDE 500 MCG: 0.5 INHALANT RESPIRATORY (INHALATION) at 07:55

## 2022-08-19 RX ADMIN — ARFORMOTEROL TARTRATE 15 MCG: 15 SOLUTION RESPIRATORY (INHALATION) at 07:55

## 2022-08-19 RX ADMIN — METHYLPREDNISOLONE SODIUM SUCCINATE 40 MG: 40 INJECTION, POWDER, FOR SOLUTION INTRAMUSCULAR; INTRAVENOUS at 18:18

## 2022-08-19 RX ADMIN — PIPERACILLIN AND TAZOBACTAM 3.38 G: 3; .375 INJECTION, POWDER, FOR SOLUTION INTRAVENOUS at 04:36

## 2022-08-19 RX ADMIN — EMPAGLIFLOZIN 10 MG: 10 TABLET, FILM COATED ORAL at 08:34

## 2022-08-19 RX ADMIN — IPRATROPIUM BROMIDE 0.5 MG: 0.5 SOLUTION RESPIRATORY (INHALATION) at 02:57

## 2022-08-19 RX ADMIN — SODIUM CHLORIDE, PRESERVATIVE FREE 10 ML: 5 INJECTION INTRAVENOUS at 14:00

## 2022-08-19 RX ADMIN — SODIUM CHLORIDE, PRESERVATIVE FREE 10 ML: 5 INJECTION INTRAVENOUS at 21:02

## 2022-08-19 RX ADMIN — SODIUM CHLORIDE, PRESERVATIVE FREE 10 ML: 5 INJECTION INTRAVENOUS at 05:23

## 2022-08-19 RX ADMIN — IPRATROPIUM BROMIDE 0.5 MG: 0.5 SOLUTION RESPIRATORY (INHALATION) at 07:55

## 2022-08-19 RX ADMIN — CALCIUM CARBONATE (ANTACID) CHEW TAB 500 MG 200 MG: 500 CHEW TAB at 12:00

## 2022-08-19 RX ADMIN — IVABRADINE 5 MG: 5 TABLET, FILM COATED ORAL at 18:19

## 2022-08-19 RX ADMIN — MILRINONE LACTATE 0.25 MCG/KG/MIN: 0.2 INJECTION, SOLUTION INTRAVENOUS at 18:18

## 2022-08-19 RX ADMIN — SPIRONOLACTONE 12.5 MG: 25 TABLET ORAL at 08:33

## 2022-08-19 RX ADMIN — IVABRADINE 5 MG: 5 TABLET, FILM COATED ORAL at 08:33

## 2022-08-19 RX ADMIN — FAMOTIDINE 20 MG: 20 TABLET, FILM COATED ORAL at 08:33

## 2022-08-19 NOTE — PROGRESS NOTES
Nephrology Progress Note  Selena Cruz  Date of Admission : 8/8/2022    CC:  Follow up for EDGAR on CKD       Assessment and Plan     EDGAR on CKD:  - suspect 2/2 overdiuresis   - U/S neg, urine eos neg  - Cr better  - would resume oral diuretics today and monitor  - on home dose of inotropes per HF service    Hypokalemia:  - repletion ordered    CKD 3b:  - baseline Cr 1.6 to 1.8  - from chronic HTN, CMP and ongoing need for diuretics     HFrEF:  - on home milrinone, ICD in place  - resume oral bumex     Vfib arrest     Aspiration PNA:  - CXR from 8/17 looked clear  - would consider stopping abx, especially if urine eos are +     Hx of PE on eliquis  COPD  EtOH withdrawal       Interval History:  Seen and examined. Feeling ok. Mild confusion. K low. Cr better. Stable UOP. No cp, sob, n/v/d reported. Current Medications: all current  Medications have been eviewed in EPIC  Review of Systems: Pertinent items are noted in HPI. Objective:  Vitals:    Vitals:    08/19/22 0756 08/19/22 0833 08/19/22 1000 08/19/22 1122   BP:    (!) 122/98   Pulse:  (!) 102 96 100   Resp:    18   Temp:    98 °F (36.7 °C)   SpO2: 95%      Weight:       Height:         Intake and Output:  No intake/output data recorded. 08/17 1901 - 08/19 0700  In: 1701.1 [P.O.:816; I.V.:885.1]  Out: 4350 [Urine:4350]    Physical Examination:    General: NAD,Conversant   Neck:  Supple, no mass  Resp:  Lungs CTA B/L, no wheezing , normal respiratory effort  CV:  RRR,  no murmur or rub, no LE edema  GI:  Soft, NT, + Bowel sounds, no hepatosplenomegaly  Neurologic:  Non focal  Psych:             AAO x 3 appropriate affect   Skin:  No Rash  :  No delgado    []    High complexity decision making was performed  []    Patient is at high-risk of decompensation with multiple organ involvement    Lab Data Personally Reviewed: I have reviewed all the pertinent labs, microbiology data and radiology studies during assessment.     Recent Labs     08/19/22  1666 08/18/22 0411 08/17/22  0354   * 132* 135*   K 2.9* 3.8 4.1   CL 94* 95* 97   CO2 29 24 23   * 129* 138*   BUN 61* 58* 46*   CREA 2.27* 2.58* 2.38*   CA 9.1 10.0 10.5*   MG 2.2 2.2 2.4   INR 2.0* 2.0* 1.5*     Recent Labs     08/18/22 0411 08/17/22 0354   WBC 12.3* 17.9*   HGB 9.5* 10.4*   HCT 30.0* 32.2*    451*     No results found for: SDES  Lab Results   Component Value Date/Time    Culture result:  08/14/2022 02:22 PM     MRSA NOT PRESENT. Apparent Staphylococus aureus (not MRSA noted). Culture result:  08/14/2022 02:22 PM     Screening of patient nares for MRSA is for surveillance purposes and, if positive, to facilitate isolation considerations in high risk settings. It is not intended for automatic decolonization interventions per se as regimens are not sufficiently effective to warrant routine use. Culture result: NO GROWTH 5 DAYS 08/14/2022 02:22 PM     Recent Results (from the past 24 hour(s))   URINALYSIS W/ RFLX MICROSCOPIC    Collection Time: 08/18/22  5:22 PM   Result Value Ref Range    Color YELLOW/STRAW      Appearance CLEAR CLEAR      Specific gravity 1.008 1.003 - 1.030      pH (UA) 6.0 5.0 - 8.0      Protein Negative NEG mg/dL    Glucose 250 (A) NEG mg/dL    Ketone Negative NEG mg/dL    Bilirubin Negative NEG      Blood Negative NEG      Urobilinogen 0.2 0.2 - 1.0 EU/dL    Nitrites Negative NEG      Leukocyte Esterase Negative NEG     EOSINOPHILS, URINE    Collection Time: 08/18/22  5:22 PM   Result Value Ref Range    Eosinophils,urine Negative     PROTEIN/CREATININE RATIO, URINE    Collection Time: 08/18/22  5:22 PM   Result Value Ref Range    Protein, urine random 11 0.0 - 11.9 mg/dL    Creatinine, urine <13.00 mg/dL    Protein/Creat.  urine Ratio <0.8    PROTHROMBIN TIME + INR    Collection Time: 08/19/22  3:49 AM   Result Value Ref Range    INR 2.0 (H) 0.9 - 1.1      Prothrombin time 19.8 (H) 9.0 - 11.1 sec   PTT    Collection Time: 08/19/22  3:49 AM   Result Value Ref Range    aPTT 35.3 (H) 22.1 - 31.0 sec    aPTT, therapeutic range     58.0 - 77.0 SECS   MAGNESIUM    Collection Time: 08/19/22  3:49 AM   Result Value Ref Range    Magnesium 2.2 1.6 - 2.4 mg/dL   NT-PRO BNP    Collection Time: 08/19/22  3:49 AM   Result Value Ref Range    NT pro-BNP >35,000 (H) <857 PG/ML   METABOLIC PANEL, BASIC    Collection Time: 08/19/22  3:49 AM   Result Value Ref Range    Sodium 134 (L) 136 - 145 mmol/L    Potassium 2.9 (L) 3.5 - 5.1 mmol/L    Chloride 94 (L) 97 - 108 mmol/L    CO2 29 21 - 32 mmol/L    Anion gap 11 5 - 15 mmol/L    Glucose 129 (H) 65 - 100 mg/dL    BUN 61 (H) 6 - 20 MG/DL    Creatinine 2.27 (H) 0.70 - 1.30 MG/DL    BUN/Creatinine ratio 27 (H) 12 - 20      GFR est AA 36 (L) >60 ml/min/1.73m2    GFR est non-AA 29 (L) >60 ml/min/1.73m2    Calcium 9.1 8.5 - 10.1 MG/DL               Neil Garcia MD  [address]  Phone - [phone]   Fax - [phone]  www. [email or web address]

## 2022-08-19 NOTE — PROGRESS NOTES
Transitions of Care Plan  RUR: 24% - high  Clinical Update: chronic milrinone; ready for d/c today  Consults: [facility]; Nephrology; Therapy  Baseline: independent without DME; resides w/ wife; home inotrope  Barriers to Discharge: medical  Disposition: home health and home infusion - [address] and Infusion Services  Estimated Discharge Date: 8/19/22    CM spoke with patient and wife at bedside today regarding discharge plan. Patient and wife both endorse that patient was open with [facility] for home in[place]rope prior to admission. Patient's wife will be able to  patient this evening at 9PM.     CM spoke with [facility] - confirmed that patient is open with them for infusion and home health services. Need new orders and neida sykes/Galina note. CM spoke with nursing team - OK for patient to be on home infusion pump while awaiting for his ride as long as he is able to manage it. [facility] RN updated CM that patient is independent with inotrope hookup/connection and self manages per conversation with wife and patient. CM advised [facility] that patient is independent with his hookup. Orders are on their way. 1304 - CM [facility] f: [phone].    1430 - CM received acceptance from [address] for home [place]. 1630 - Updated by [facility] that ETA for delivery is 8pm. [facility] RN updated of same. Wife to be here around 9:30PM for transport home. Medicare pt has received, reviewed, and signed 2nd IM letter informing them of their right to appeal the discharge. Signed copied has been placed on pt bedside chart.     Ca Robison, MPH  Care Manager [facility]  Available via [employer] Touch or  [email or web address]

## 2022-08-19 NOTE — PROGRESS NOTES
Occupational Therapy:     Chart reviewed in preparation for OT tx session. Pt received sitting on EOB, attempting to ambulate without assistance and tangled in IV pole. Pt declining participation in [place] mobility or ADLs and perseverating on needing to leave hospital to, \"deal with my family\" (pt concerned about family having stable housing, finding his wallet, repairing vehicle). Despite agitation, pt eventually redirected and assisted back to bed. Will defer and follow up as able and appropriate.      Thank you,   Nasra Vivar, OTD, OTR/L

## 2022-08-19 NOTE — PROGRESS NOTES
1630 Bedside shift change report given to Yany Kwon (oncoming nurse) by Jinny Levi (offgoing nurse). Report included the following information SBAR, Kardex, Intake/Output, MAR, and Recent Results. 2000 Bedside shift change report given to Rick Purvis (oncoming nurse) by Yany Kwon (offgoing nurse). Report included the following information SBAR, Kardex, Intake/Output, MAR, and Recent Results.

## 2022-08-19 NOTE — DISCHARGE SUMMARY
[facility] Hospitalist Discharge Summary    Patient ID:  Francesca flynn. 1960.  [id number]    Admit date: 8/8/2022 12:25 PM    Discharge date: 08/19/22     Admitting Physician: Chrissy Cortez MD  Attending Physician: King Mariam MD  Primary Care Physician: Jinny Scott MD     Discharge Physician: Suad Aguilar MD    Discharged Condition: Stable    Indication for Admission: No chief complaint on file. Reason for hospitalization:   Patient Active Problem List   Diagnosis Code    COPD (chronic obstructive pulmonary disease) ([facility] J44.9    Sepsis ([facility] A41.9    Cardiac arrest ([facility] I46.9    Acute on chronic systolic heart failure [facility]) I50.23         Hospital Course:   58 y.o. male with a pmhx of ICD placement, COPD, alcohol abuse, who presented via EMS after V fib arrest. ROSC was aceived after defibrillation x 2, patient was intubated in the field and transported to [facility] for further management. Of note, patient had recent ICD placement with continuous IV milrinone therapy at OSH about 3 weeks ago. Patient was started on levophed and amio in the ED and admitted to ICU for further management. Patient was successfully extubated on 8/9, and has remained stable on 1 to 2L NC. He developed severe delirium which was attributed to ICU delirium and alcohol withdrawal. He was started on phenobarb therapy with improvement in withdrawal symptoms and is alcohol withdrawal encephalopathy resolved entirely. He was admitted for cardiac arrest secondary to ventricular fibrillation with acute on chronic systolic congestive heart failure with. He was maintained on IV milrinone drip, with addition of Aldactone and Jardiance he was clinically euvolemic for discharge, and heart failure management was consulted and were is in conversation with his primary congestive heart failure team regarding medication adjustments.   IV milrinone supply was coordinated by case management            Discharge Exam:  Visit Vitals  BP (!) 125/97   Pulse (!) 104   Temp 97 °F (36.1 °C)   Resp 19   Ht 5' 8\" (1.727 m)   Wt 69.4 kg (153 lb)   SpO2 98%   BMI 23.26 kg/m²      General: No acute distress, speaking in full sentences, no use of accessory muscles   HEENT: Pupils equal and reactive to light and accommodation, oropharynx is clear   Neck: Supple, no lymphadenopathy, no JVD   Lungs: Clear to auscultation bilaterally   Cardiovascular: Regular rate and rhythm with normal S1 and S2   Abdomen: Soft, nontender, nondistended, normoactive bowel sounds   Extremities: No cyanosis clubbing or edema   Neuro: Nonfocal, A&O x3   Psych: Normal affect     Consults: IP CONSULT TO ADVANCED HEART FAILURE  IP CONSULT TO NEUROLOGY  IP CONSULT TO PALLIATIVE CARE - PROVIDER  IP CONSULT TO NEPHROLOGY    Code Status: Full Code    Significant Diagnostic Studies:   CMP:   Lab Results   Component Value Date/Time     (L) 08/19/2022 03:49 AM    K 2.9 (L) 08/19/2022 03:49 AM    CL 94 (L) 08/19/2022 03:49 AM    CO2 29 08/19/2022 03:49 AM    AGAP 11 08/19/2022 03:49 AM     (H) 08/19/2022 03:49 AM    BUN 61 (H) 08/19/2022 03:49 AM    CREA 2.27 (H) 08/19/2022 03:49 AM    GFRAA 36 (L) 08/19/2022 03:49 AM    GFRNA 29 (L) 08/19/2022 03:49 AM    CA 9.1 08/19/2022 03:49 AM    MG 2.2 08/19/2022 03:49 AM         CBC:  No results found for: WBC, HGB, HCT, PLT, HGBEXT, HCTEXT, PLTEXT    Lab Results   Component Value Date/Time    INR 2.0 (H) 08/19/2022 03:49 AM    INR 2.0 (H) 08/18/2022 04:11 AM    INR 1.5 (H) 08/17/2022 03:54 AM    Prothrombin time 19.8 (H) 08/19/2022 03:49 AM    Prothrombin time 19.6 (H) 08/18/2022 04:11 AM    Prothrombin time 15.1 (H) 08/17/2022 03:54 AM       ABG:  No results found for: PH, PHI, PCO2, PCO2I, PO2, PO2I, HCO3, HCO3I, FIO2, FIO2I        Lab Results   Component Value Date/Time     08/14/2022 04:16 AM    Troponin-I, Qt. 0.05 (H) 05/20/2021 10:48 AM           Radiology Reports :   [unfilled]      Discharge Medications:  Current Discharge Medication List        START taking these medications    Details   empagliflozin (JARDIANCE) 10 mg tablet Take 1 Tablet by mouth daily. Qty: 30 Tablet, Refills: 0      spironolactone (ALDACTONE) 25 mg tablet Take 0.5 Tablets by mouth daily. Qty: 30 Tablet, Refills: 0      milrinone (PRIMACOR) 20 mg/100 mL (200 mcg/mL) infusion 17.25 mcg/min by IntraVENous route continuous. Qty: 300 mL, Refills: 0           CONTINUE these medications which have NOT CHANGED    Details   aspirin 81 mg chewable tablet TAKE ONE TABLET BY MOUTH DAILY  DAYS  Qty: 30 Tablet, Refills: 2      Corlanor 7.5 mg tablet TAKE ONE TABLET BY MOUTH TWICE A DAY WITH MEALS  Qty: 60 Tablet, Refills: 2      mexiletine (MEXITIL) 150 mg capsule TAKE ONE CAPSULE BY MOUTH THREE TIMES A DAY  Qty: 90 Capsule, Refills: 1    Associated Diagnoses: Ventricular tachycardia (HCC)      fluticasone-umeclidin-vilanter (Trelegy Ellipta) 200-62.5-25 mcg inhaler Take 1 Puff by inhalation daily. Qty: 1 Each, Refills: 0      apixaban (ELIQUIS) 5 mg tablet Take 1 Tablet by mouth two (2) times a day. Indications: a clot in the lung  Qty: 60 Tablet, Refills: 0      digoxin (LANOXIN) 0.125 mg tablet Take 0.5 Tablets by mouth every other day. Qty: 30 Tablet, Refills: 0      furosemide (LASIX) 20 mg tablet Take 0.5 Tablets by mouth as needed for PRN Reason (Other) (at the discretion of [facility]). Qty: 90 Tablet, Refills: 0      magnesium oxide (MAG-OX) 400 mg tablet TAKE ONE TABLET BY MOUTH TWICE A DAY  Qty: 60 Tablet, Refills: 2      albuterol (ProAir HFA) 90 mcg/actuation inhaler Take 2 Puffs by inhalation every four (4) hours as needed.  Patient uses at most twice per week             Medications Discontinued during this hospitalization:   Medications Discontinued During This Encounter   Medication Reason    fentaNYL citrate (PF) injection 100 mcg     fentaNYL (PF) 1,500 mcg/30 mL (50 mcg/mL) infusion     fentaNYL citrate (PF) 50 mcg/mL injection     fentaNYL (PF) 1,500 mcg/30 mL (50 mcg/mL) infusion     chlorhexidine (PERIDEX) 0.12 % mouthwash 15 mL DUPLICATE ORDER    . PHARMACY TO SUBSTITUTE PER PROTOCOL (Reordered from: fluticasone-umeclidin-vilanter (Trelegy Ellipta) 200-62.5-25 mcg inhaler) Formulary Change    famotidine (PF) (PEPCID) 20 mg in 0.9% sodium chloride 10 mL injection     albumin human 5% (BUMINATE) 5 % solution     amiodarone (CORDARONE) 375 mg/250 mL D5W infusion     famotidine (PF) (PEPCID) 20 mg in 0.9% sodium chloride 10 mL injection     vasopressin (VASOSTRICT) 20 Units in 0.9% sodium chloride 100 mL infusion     NOREPINephrine (LEVOPHED) 8 mg in 5% dextrose 250mL (32 mcg/mL) infusion     PHENobarbital (LUMINAL) injection 65 mg     enoxaparin (LOVENOX) injection 40 mg     piperacillin-tazobactam (ZOSYN) 3.375 g in 0.9% sodium chloride (MBP/ADV) 100 mL MBP DOSE ADJUSTMENT    bumetanide (BUMEX) 0.25 mg/mL infusion     midodrine (PROAMATINE) tablet 5 mg     methylPREDNISolone (PF) (SOLU-MEDROL) injection 40 mg     lidocaine 4 % patch 1 Patch     famotidine (PF) (PEPCID) 20 mg in 0.9% sodium chloride 10 mL injection     sodium chloride (NS) flush 5-40 mL     famotidine (PEPCID) tablet 20 mg     dexmedeTOMidine in 0.9 % NaCl (PRECEDEX) 400 mcg/100 mL (4 mcg/mL) infusion soln     NOREPINephrine (LEVOPHED) 8 mg in 5% dextrose 250mL (32 mcg/mL) infusion     bumetanide (BUMEX) tablet 1 mg     midodrine (PROAMATINE) tablet 10 mg     methylPREDNISolone (PF) (SOLU-MEDROL) injection 40 mg     piperacillin-tazobactam (ZOSYN) 3.375 g in 0.9% sodium chloride (MBP/ADV) 100 mL MBP        Patient Instructions: Activity: as tolerated. Diet:   ADULT DIET Regular    Therapy Ordered:  No therapy plan of the specified type found. Follow-up appointments:   Follow-up Appointments   Procedures    FOLLOW UP VISIT Appointment in: Other (Specify) With his  primary Heart Failure Clinic     With his  primary Heart Failure Clinic     Standing Status: Standing     Number of Occurrences:   1     Order Specific Question:   Appointment in     Answer: Other (Specify)       Time Spent on Discharge greater than 30 minutes.     Mejia Combs MD  8/19/2022 7:02 PM

## 2022-08-19 NOTE — PROGRESS NOTES
Spiritual Care Assessment/Progress Note  [facility]      NAME: Caesar Juares      MRN: [id number]  AGE: 58 y.o.  SEX: male  Christian Affiliation: Christian   Language: English     8/19/2022     Total Time (in minutes): 48     Spiritual Assessment begun in [facility] [place] [facility] CV SERVICES UNIT through conversation with:         [x]Patient        [] Family    [] Friend(s)        Reason for Consult: Initial/Spiritual assessment, patient floor     Spiritual beliefs: (Please include comment if needed)     [x] Identifies with a angelina tradition:    Quaker      [] Supported by a angelina community:            [] Claims no spiritual orientation:           [] Seeking spiritual identity:                [] Adheres to an individual form of spirituality:           [] Not able to assess:                           Identified resources for coping:      [x] Prayer                               [] Music                  [] Guided Imagery     [x] Family/friends                 [] Pet visits     [] Devotional reading                         [] Unknown     [] Other:                                               Interventions offered during this visit: (See comments for more details)    Patient Interventions: Affirmation of emotions/emotional suffering, Affirmation of angelina, Catharsis/review of pertinent events in supportive environment, Initial/Spiritual assessment, patient floor, Life review/legacy, Normalization of emotional/spiritual concerns, Prayer (assurance of), Christian beliefs/image of God discussed     Family/Friend(s): Other (comment) (Note left at bedside)     Plan of Care:     [] Support spiritual and/or cultural needs    [] Support AMD and/or advance care planning process      [] Support grieving process   [] Coordinate Rites and/or Rituals    [] Coordination with community clergy   [] No spiritual needs identified at this time   [] Detailed Plan of Care below (See Comments)  [] Make referral to Music Therapy  [] Make referral to Pet Therapy     [] Make referral to Addiction services  [] Make referral to [facility]  [] Make referral to Spiritual Care Partner  [] No future visits requested        [x] Contact Spiritual Care for further referrals     Comments:   Spiritual care assessment with Mr. Moira Gilmore on [id number]-[id number]. Pt lying in bed, connected to IV's, awake, alert and gave a friendly welcome to . He shared much life review, that he has been  since 1991, has 3 sons, and shared about their lives. He considers himself of the [id number], as he grew up in and occasionally attends a [employer]. He expressed a good outlook of his condition and understands that he will have to take life real easy from now on. He has hopes that his 28 y.o. son that is coming home will be helpful. He expressed no needs except to be remembered in prayer. He expressed much gratitude for the visit.      Chaplain Shayne Reyes M.Div.  Pedro Gonzalez ([id number])
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
## 2022-08-19 NOTE — NURSE NAVIGATOR
Follow up scheduled with Dr. Lucendia Brunner on 8/24/22 at 2 pm.  Information on After Visit Summary.

## 2022-08-19 NOTE — PROGRESS NOTES
08/18/22 2000   Roshni Fall Risk   Mobility 1.0   Mobility Interventions Assess mobility with egress test   Mentation 0   Mentation Interventions Adequate sleep, hydration, pain control   Medication 1   Medication Interventions Evaluate medications/consider consulting pharmacy   Elimination 0   Elimination Interventions Bed/chair exit alarm   Prior Fall History 1   History of Falls Interventions Bed/chair exit alarm   Total Score 3   Standard Fall Precautions Yes   High Fall Risk Yes     Pt is high fall risk, please coordinate with PT for six minute walk test.

## 2022-08-20 LAB
BACTERIA SPEC CULT: NORMAL
SERVICE CMNT-IMP: NORMAL

## 2022-08-20 NOTE — ROUTINE PROCESS
1830: I have reviewed discharge instructions with the patient. The patient verbalized understanding. Pt waiting for ride. 1930: Bedside and Verbal shift change report given to GUICHO Rod (oncoming nurse) by Marie Duron RN (offgoing nurse). Report included the following information SBAR, Kardex, MAR, Cardiac Rhythm SR-ST, and Quality Measures.

## 2022-08-20 NOTE — PROGRESS NOTES
1930: Bedside and Verbal shift change report given to GUICHO Rod (oncoming nurse) by Fortino Soto RN (offgoing nurse). Report included the following information SBAR, Kardex, Procedure Summary, Intake/Output, MAR, Recent Results, and Cardiac Rhythm SR-ST .      2200: Patient left room with wife for discharge prior to me pulling out IV. Called patient and patient met me in ED. Peripheral IV removed at this time. 2200: I have reviewed discharge instructions with the patient. The patient verbalized understanding.

## 2022-08-22 ENCOUNTER — TELEPHONE (OUTPATIENT)
Dept: CASE MANAGEMENT | Age: 62
End: 2022-08-22

## 2022-08-22 NOTE — TELEPHONE ENCOUNTER
HEART FAILURE NURSE NAVIGATOR POST DISCHARGE FOLLOW UP PHONE CALL     [facility] NN contacted patient by telephone to perform post hospital discharge follow up call. Mr. Enrique Kincaid states he has not picked up his new medications yet. Stressed the importance of getting his new prescriptions as soon as possible. Reinforced importance of daily weights and dietary restrictions, following low sodium diet. Confirmed knowledge of scheduled follow up appointment: Mr. Enrique Kincaid was not aware of the follow up appointment on 8/24/22. He took down information and he will call Dr. Missy Estrella office. Reviewed the importance of seeing the cardiologist after hospitalization for heart failure.     Home health nurse is not scheduled until 8/25/22

## 2023-04-21 NOTE — TELEPHONE ENCOUNTER
Received refill request for mexiletine 150 mg po caps. Refill authorized.     Future Appointments   Date Time Provider Debbi Davis   6/29/2022  8:30 AM Leatha Hester NP [facility] BS AMB [Dear  ___] : Dear  [unfilled], [Courtesy Letter:] : I had the pleasure of seeing your patient, [unfilled], in my office today. [Please see my note below.] : Please see my note below. [Consult Closing:] : Thank you very much for allowing me to participate in the care of this patient.  If you have any questions, please do not hesitate to contact me. [Sincerely,] : Sincerely, [FreeTextEntry2] : \par  [FreeTextEntry3] : YeouChing Hsu, MD \par Division of Pediatric Endocrinology \par [facility] \par  of Pediatrics \par [employer] [facility] [employer] [facility]\par

## 2023-07-14 ENCOUNTER — TELEPHONE (OUTPATIENT)
Age: 63
End: 2023-07-14

## 2023-07-14 NOTE — TELEPHONE ENCOUNTER
Attempted to schedule new patient for his LVAD visit in August, 2023, but there was no answer. Left a message.

## 2023-07-20 ENCOUNTER — TELEPHONE (OUTPATIENT)
Age: 63
End: 2023-07-20

## 2023-07-20 NOTE — TELEPHONE ENCOUNTER
Attempted to schedule new patient from [facility] for his August LVAD appointment with Dr. Sofie Solis. Left a message. This is the 3rd attempt to schedule patient since a week ago.

## 2023-08-10 ENCOUNTER — TELEPHONE (OUTPATIENT)
Age: 63
End: 2023-08-10

## 2023-08-10 NOTE — TELEPHONE ENCOUNTER
Called for Enrollment and network information for our facility.  Pt. Medicare ID: [id number]   Pt is enrolled and our provider and facility are both showing as in-network
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
## 2023-08-23 ENCOUNTER — TELEPHONE (OUTPATIENT)
Age: 63
End: 2023-08-23

## 2023-08-23 NOTE — TELEPHONE ENCOUNTER
Contacted pt. via phone and left VM with contact info. Purpose of call was to inquire abt. Pt. needs and introduce social work services prior to upcoming visit. Left call back information.

## 2023-08-28 ENCOUNTER — TELEPHONE (OUTPATIENT)
Age: 63
End: 2023-08-28

## 2023-08-28 PROBLEM — I42.8 NON-ISCHEMIC CARDIOMYOPATHY (HCC): Status: ACTIVE | Noted: 2023-08-28

## 2023-08-28 PROBLEM — I50.22 CHRONIC SYSTOLIC HEART FAILURE (HCC): Status: ACTIVE | Noted: 2022-08-11

## 2023-08-28 PROBLEM — Z79.01 CHRONIC ANTICOAGULATION: Status: ACTIVE | Noted: 2023-08-28

## 2023-08-28 PROBLEM — Z95.810 S/P ICD (INTERNAL CARDIAC DEFIBRILLATOR) PROCEDURE: Status: ACTIVE | Noted: 2023-08-28

## 2023-08-28 NOTE — PROGRESS NOTES
[address] in [place], [place]  Mechanical Circulatory Support Clinic Note    Patient name: José Luis Sanders  Patient : 1960  Patient MRN: [id number]  Date of service: 23    Primary care physician: Angelo Wiseman MD  Electrophysiologist: Lia Banegas MD  LVAD cardiologist: MARJORIE    CHIEF COMPLAINT:  New referral for heart failure, LVAD, anticoagulation management    ASSESSMENT:  José Luis Sanders is a 61 y.o. male with history of chronic systolic heart failure due to non ischemic cardiomyopathy, s/p HM3 LVAD implantation (2023) as destination therapy, stage D, on optimal GDMT limited by hypovolemia  Not a transplant candidate due to advanced COPD, was declined at [facility] for LVAD and transplant. Next annual LVAD evaluation in 2024    PLAN:  Heart Failure/Cardiomyopathy: NYHA class IV, due to advanced lung disease  Continue current medical therapy for heart failure:  Beta-blocker: Continue Toprol XL 50 mg daily  ACE/ARB/ARNi: Continue Entresto 49-51 mg BID  MRA: Unable to tolerate due to hypovolemia  SGLT2 inhibitor: Unable to tolerate due to hypovolemia  Diuretic: Does not require diuretics  Reinforced low salt diet  Reinforced fluid restriction to 6 x 8oz glasses per day    LVAD/Anticoagulation:  Continue current device speed of 5400 RPM  No signs infection or stroke. Recurrent bleeding with reduced INR target to 2-2.5  Continue dressing changes three times weekly  Chronic anticoagulation with coumadin, will be followed by LVAD RN. Not on aspirin due to recurrent GI bleed  LVAD/anticoagulation labs: CBC, CMP, Mg, LDH, PT/INR  Check Vitamin D, Iron/Ferritin, Uric acid    Arrhythmia/ICD:  ICD interrogation every 3 months per routine   Follow-up with EP cardiologist    Hypertension:  Doppler MAP 82 without AM medications, within target range.  I asked him not to start Amlodipine which was initiated on discharge from [facility] yesterday but patient

## 2023-08-28 NOTE — TELEPHONE ENCOUNTER
Telephone Call RE:  Appointment reminder     Outcome:     [x] Patient confirmed appointment   [] Patient rescheduled appointment for    [] Unable to reach  [] Left message              [] Other:       Pt is hospitalized at [facility] but expects to be discharged today. I told him to call us if he's unable to make the appt tomorrow morning, but he feels confident that he will be here.

## 2023-08-28 NOTE — PATIENT INSTRUCTIONS
Medication changes:    STOP amlodipine     Testing Ordered: An order for Echo has been placed to be done October 2023. You will be receiving an automated call from Coordination of Care to schedule this test. If you are unavailable to receive the call or would like to contact coordination of care yourself you may contact [phone] to schedule. You will need to contact coordination of care yourself if you miss their calls as they will only make 3 attempts to reach you. Lab work has been drawn today. You will be contacted with any abnormal results requiring changes to your current plan of care. Other Recommendations: Follow up with pulmonary associates as soon as possible     Continue to change drive line exit site dressing 3 times a week using sterile technique,     Ensure you are drinking an adequate amount of water with a goal of 6-8 eight ounce glasses (1.5-2 liters) of fluid daily. Your urine should be clear and light yellow straw colored. Please bring a dressing change kit to your next appointment. We will plan to have your caregiver change your dressing at this appointment. Please bring ALL your equipment with you to your next appointment. You can utilize a rolling suitcase to help you with this     Ensure your back up/emergency bag stays with you at all times when out of the house. Follow up in 1 month  with [address]      For further patient and caregiver resources as well as access to online LVAD support groups visit [email or web address]       Please bring your daily sheets and medications to your next appointment. Please monitor your weights daily upon waking and after using the bathroom. Keep a written records of your weights and bring to your next appointment. If you have a weight gain of 3 or more pounds overnight OR 5 or more pounds in one week please contact our office.        Thank you for allowing us the privilege of being a part of your

## 2023-08-29 ENCOUNTER — TELEPHONE (OUTPATIENT)
Age: 63
End: 2023-08-29

## 2023-08-29 ENCOUNTER — OFFICE VISIT (OUTPATIENT)
Age: 63
End: 2023-08-29

## 2023-08-29 VITALS
OXYGEN SATURATION: 97 % | BODY MASS INDEX: 26.83 KG/M2 | TEMPERATURE: 98.7 F | HEART RATE: 65 BPM | RESPIRATION RATE: 20 BRPM | WEIGHT: 177 LBS | HEIGHT: 68 IN | SYSTOLIC BLOOD PRESSURE: 82 MMHG

## 2023-08-29 DIAGNOSIS — E55.9 VITAMIN D DEFICIENCY: ICD-10-CM

## 2023-08-29 DIAGNOSIS — R06.02 SHORTNESS OF BREATH: ICD-10-CM

## 2023-08-29 DIAGNOSIS — D50.8 OTHER IRON DEFICIENCY ANEMIA: ICD-10-CM

## 2023-08-29 DIAGNOSIS — M10.9 GOUT OF KNEE, UNSPECIFIED CAUSE, UNSPECIFIED CHRONICITY, UNSPECIFIED LATERALITY: ICD-10-CM

## 2023-08-29 DIAGNOSIS — Z95.811 LVAD (LEFT VENTRICULAR ASSIST DEVICE) PRESENT (HCC): ICD-10-CM

## 2023-08-29 DIAGNOSIS — I50.22 CHRONIC SYSTOLIC HEART FAILURE (HCC): Primary | ICD-10-CM

## 2023-08-29 DIAGNOSIS — Z79.01 CHRONIC ANTICOAGULATION: ICD-10-CM

## 2023-08-29 DIAGNOSIS — I50.22 CHRONIC SYSTOLIC HEART FAILURE (HCC): ICD-10-CM

## 2023-08-29 DIAGNOSIS — E83.42 HYPOMAGNESEMIA: ICD-10-CM

## 2023-08-29 DIAGNOSIS — I42.8 NON-ISCHEMIC CARDIOMYOPATHY (HCC): ICD-10-CM

## 2023-08-29 DIAGNOSIS — Z95.810 S/P ICD (INTERNAL CARDIAC DEFIBRILLATOR) PROCEDURE: ICD-10-CM

## 2023-08-29 RX ORDER — METOPROLOL SUCCINATE 50 MG/1
50 TABLET, EXTENDED RELEASE ORAL DAILY
COMMUNITY

## 2023-08-29 RX ORDER — WARFARIN SODIUM 2 MG/1
2 TABLET ORAL
COMMUNITY

## 2023-08-29 RX ORDER — AMLODIPINE BESYLATE 5 MG/1
5 TABLET ORAL DAILY
COMMUNITY
End: 2023-08-29 | Stop reason: ALTCHOICE

## 2023-08-29 RX ORDER — SACUBITRIL AND VALSARTAN 49; 51 MG/1; MG/1
1 TABLET, FILM COATED ORAL 2 TIMES DAILY
COMMUNITY

## 2023-08-29 RX ORDER — PANTOPRAZOLE SODIUM 40 MG/1
40 TABLET, DELAYED RELEASE ORAL DAILY
COMMUNITY

## 2023-08-29 RX ORDER — ENOXAPARIN SODIUM 100 MG/ML
80 INJECTION SUBCUTANEOUS 2 TIMES DAILY
COMMUNITY

## 2023-08-29 RX ORDER — WARFARIN SODIUM 3 MG/1
3 TABLET ORAL
COMMUNITY

## 2023-08-29 ASSESSMENT — PATIENT HEALTH QUESTIONNAIRE - PHQ9
1. LITTLE INTEREST OR PLEASURE IN DOING THINGS: 0
SUM OF ALL RESPONSES TO PHQ9 QUESTIONS 1 & 2: 0
2. FEELING DOWN, DEPRESSED OR HOPELESS: 0
SUM OF ALL RESPONSES TO PHQ QUESTIONS 1-9: 0

## 2023-08-29 NOTE — PROGRESS NOTES
[address] in [address]      LVAD  LVAD Type[de-identified] Left Ventricular Assist Device (LVAD)  Pump Speed (rpm): 5400  Pump Flow (lpm): 3.8  MAP (mmHg): 82  Set Low Speed (rpm): 5400  Pump Pulse Index (PI): 3.9  Pump Power (Mei): 4.2  Backup Controller Present: No  Driveline Dressing: Clean, Dry and Intact  Outpatient: Yes  MAP in Therapeutic Range (Outpatient): Yes       Evan Nagy was seen today in clinic for a visit with Dr. Clarisa Holstein as a new LVAD patient. Patient denied s/s of driveline infection or driveline trauma (including  drops). Driveline inspected for integrity. Above reported to provider. LVAD interrogation completed in clinic, results reported to provider. See flow sheet for details. 6 low flows noted on controller last 6 alarms review. All orders entered per VORB. All provider instructions placed in AVS and reviewed with patient. The following education was provided:      -Bringing back up bag with you at all times when out of the house. -Bringing equipment to clinic for check off.   -Reviewed general clinic policies including lab reminders, need to have any procedure done at LVAD capable hospital, anti coag management only through [facility], day time and after hours contact numbers, need for PCP. Reviewed questions. Patient and spouse verbalized understanding, denied questions at end of visit.

## 2023-08-29 NOTE — TELEPHONE ENCOUNTER
Called and spoke with [facility] to request follow up for patient. Notified   patient with significant wheezing and shortness of breath and wheezing in clinic and need for first available appointment for follow up.  Staff stated they have an opening tomorrow at 10am.

## 2023-08-29 NOTE — TELEPHONE ENCOUNTER
Called patient and notified of available appointment with PAR tomorrow at 10 am and inquiring if patient would like to book for this appointment. Patient confirmed that he would like to take this appointment. Returned call to [facility] and notified patient would like to confirm for tomorrow 08/30 at 10am at [facility] location, staff verbalized understanding, stated they will book pt in this appt.

## 2023-08-30 ENCOUNTER — ANTI-COAG VISIT (OUTPATIENT)
Age: 63
End: 2023-08-30

## 2023-08-30 ENCOUNTER — TELEPHONE (OUTPATIENT)
Age: 63
End: 2023-08-30

## 2023-08-30 DIAGNOSIS — N18.30 ANEMIA IN STAGE 3 CHRONIC KIDNEY DISEASE, UNSPECIFIED WHETHER STAGE 3A OR 3B CKD (HCC): ICD-10-CM

## 2023-08-30 DIAGNOSIS — I50.9 HEART FAILURE, NYHA CLASS 4 (HCC): ICD-10-CM

## 2023-08-30 DIAGNOSIS — N18.30 STAGE 3 CHRONIC KIDNEY DISEASE, UNSPECIFIED WHETHER STAGE 3A OR 3B CKD (HCC): ICD-10-CM

## 2023-08-30 DIAGNOSIS — D63.1 ANEMIA IN STAGE 3 CHRONIC KIDNEY DISEASE, UNSPECIFIED WHETHER STAGE 3A OR 3B CKD (HCC): ICD-10-CM

## 2023-08-30 DIAGNOSIS — D50.8 OTHER IRON DEFICIENCY ANEMIA: ICD-10-CM

## 2023-08-30 DIAGNOSIS — I50.9 HEART FAILURE, NYHA CLASS 3 (HCC): ICD-10-CM

## 2023-08-30 LAB
25(OH)D3+25(OH)D2 SERPL-MCNC: 25.3 NG/ML (ref 30–100)
ALBUMIN SERPL-MCNC: 4.3 G/DL (ref 3.9–4.9)
ALBUMIN/GLOB SERPL: 1.3 {RATIO} (ref 1.2–2.2)
ALP SERPL-CCNC: 94 IU/L (ref 44–121)
ALT SERPL-CCNC: 16 IU/L (ref 0–44)
AST SERPL-CCNC: 22 IU/L (ref 0–40)
BASOPHILS # BLD AUTO: 0.2 X10E3/UL (ref 0–0.2)
BASOPHILS NFR BLD AUTO: 1 %
BILIRUB SERPL-MCNC: 0.4 MG/DL (ref 0–1.2)
BUN SERPL-MCNC: 29 MG/DL (ref 8–27)
BUN/CREAT SERPL: 15 (ref 10–24)
CALCIUM SERPL-MCNC: 9 MG/DL (ref 8.6–10.2)
CHLORIDE SERPL-SCNC: 100 MMOL/L (ref 96–106)
CO2 SERPL-SCNC: 20 MMOL/L (ref 20–29)
CREAT SERPL-MCNC: 1.99 MG/DL (ref 0.76–1.27)
DIGOXIN SERPL-MCNC: <0.4 NG/ML (ref 0.5–0.9)
EGFRCR SERPLBLD CKD-EPI 2021: 37 ML/MIN/1.73
EOSINOPHIL # BLD AUTO: 0.7 X10E3/UL (ref 0–0.4)
EOSINOPHIL NFR BLD AUTO: 4 %
ERYTHROCYTE [DISTWIDTH] IN BLOOD BY AUTOMATED COUNT: 15.4 % (ref 11.6–15.4)
FERRITIN SERPL-MCNC: 39 NG/ML (ref 30–400)
GLOBULIN SER CALC-MCNC: 3.2 G/DL (ref 1.5–4.5)
GLUCOSE SERPL-MCNC: 96 MG/DL (ref 70–99)
HCT VFR BLD AUTO: 30.2 % (ref 37.5–51)
HGB BLD-MCNC: 9.3 G/DL (ref 13–17.7)
IMM GRANULOCYTES # BLD AUTO: 0.1 X10E3/UL (ref 0–0.1)
IMM GRANULOCYTES NFR BLD AUTO: 1 %
INR PPP: 1.3 (ref 0.9–1.2)
IRON SATN MFR SERPL: 6 % (ref 15–55)
IRON SERPL-MCNC: 30 UG/DL (ref 38–169)
LDH SERPL L TO P-CCNC: 262 IU/L (ref 121–224)
LYMPHOCYTES # BLD AUTO: 1.5 X10E3/UL (ref 0.7–3.1)
LYMPHOCYTES NFR BLD AUTO: 9 %
MAGNESIUM SERPL-MCNC: 2 MG/DL (ref 1.6–2.3)
MCH RBC QN AUTO: 25.4 PG (ref 26.6–33)
MCHC RBC AUTO-ENTMCNC: 30.8 G/DL (ref 31.5–35.7)
MCV RBC AUTO: 83 FL (ref 79–97)
MONOCYTES # BLD AUTO: 1.9 X10E3/UL (ref 0.1–0.9)
MONOCYTES NFR BLD AUTO: 12 %
NEUTROPHILS # BLD AUTO: 12.1 X10E3/UL (ref 1.4–7)
NEUTROPHILS NFR BLD AUTO: 73 %
NT-PROBNP SERPL-MCNC: 506 PG/ML (ref 0–210)
PLATELET # BLD AUTO: 389 X10E3/UL (ref 150–450)
POTASSIUM SERPL-SCNC: 4.7 MMOL/L (ref 3.5–5.2)
PROT SERPL-MCNC: 7.5 G/DL (ref 6–8.5)
PROTHROMBIN TIME: 13.7 SEC (ref 9.1–12)
RBC # BLD AUTO: 3.66 X10E6/UL (ref 4.14–5.8)
SODIUM SERPL-SCNC: 138 MMOL/L (ref 134–144)
TIBC SERPL-MCNC: 472 UG/DL (ref 250–450)
UIBC SERPL-MCNC: 442 UG/DL (ref 111–343)
URATE SERPL-MCNC: 6.8 MG/DL (ref 3.8–8.4)
WBC # BLD AUTO: 16.5 X10E3/UL (ref 3.4–10.8)

## 2023-08-30 RX ORDER — MELATONIN
2000 DAILY
Qty: 180 TABLET | Refills: 1 | Status: SHIPPED | OUTPATIENT
Start: 2023-08-30

## 2023-08-30 NOTE — TELEPHONE ENCOUNTER
Reviewed provider instructions for iron infusions and vitamin D and placed orders VORB     Called and spoke with patient regarding infusion and vitmain D. Patient stated that he feels confused by dosing of vitamin D in units and not mg and he is unsure which one to buy. Told patient that we can send the script to the pharmacy so that the pharmacist can fill and/or assist patient in picking out the right vitamin D OTC. He verbalized understanding. Notified patient of pending iron infusions and that he will be contacted to schedule these. He verbalized understanding.        Requested Prescriptions     Signed Prescriptions Disp Refills    vitamin D3 (CHOLECALCIFEROL) 25 MCG (1000 UT) TABS tablet 180 tablet 1     Sig: Take 2 tablets by mouth daily     Authorizing Provider: Mariah Lucero     Ordering User: Annemarie Nichols

## 2023-08-30 NOTE — TELEPHONE ENCOUNTER
----- Message from Sergio Adams MD sent at 8/30/2023  9:12 AM EDT -----  [facility] from yesterday: has Vitamin D deficiency. He should start taking Vitamin D 2000 units daily over the counter. Has iron deficiency anemia. Lets set him up for IV iron infusion. If insurance covers will do Injectafer 750 mg x 2, 6 weeks apart. Otherwise Venofer. Let him know uric acid is normal, do not think his knee pain is gout.  INR still low, he needs to still be on Lovenox

## 2023-08-31 ENCOUNTER — TELEPHONE (OUTPATIENT)
Age: 63
End: 2023-08-31

## 2023-08-31 NOTE — TELEPHONE ENCOUNTER
Contacted patient via phone for INR reminder.     Telephone Call RE:  INR Reminder      Outcome:     [] Patient verbalizes understanding    [x] Unable to reach   [x] Left message              []     Left a message for patient in reference to INR reminder for tomorrow    Vinny Ol[place], [place]

## 2023-09-01 ENCOUNTER — ANTI-COAG VISIT (OUTPATIENT)
Age: 63
End: 2023-09-01

## 2023-09-01 PROBLEM — I50.9 HEART FAILURE, NYHA CLASS 3 (HCC): Status: ACTIVE | Noted: 2023-09-01

## 2023-09-01 PROBLEM — D63.1 ANEMIA IN STAGE 3 CHRONIC KIDNEY DISEASE (HCC): Status: ACTIVE | Noted: 2023-09-01

## 2023-09-01 PROBLEM — D50.9 IRON DEFICIENCY ANEMIA: Status: ACTIVE | Noted: 2023-09-01

## 2023-09-01 PROBLEM — N18.30 STAGE 3 CHRONIC KIDNEY DISEASE (HCC): Status: ACTIVE | Noted: 2023-09-01

## 2023-09-01 PROBLEM — N18.30 CKD (CHRONIC KIDNEY DISEASE), STAGE III (HCC): Status: ACTIVE | Noted: 2023-09-01

## 2023-09-01 PROBLEM — I50.9 HEART FAILURE, NYHA CLASS 4 (HCC): Status: ACTIVE | Noted: 2023-09-01

## 2023-09-01 PROBLEM — N18.30 ANEMIA IN STAGE 3 CHRONIC KIDNEY DISEASE (HCC): Status: ACTIVE | Noted: 2023-09-01

## 2023-09-01 LAB — INR BLD: 2.7

## 2023-09-01 RX ORDER — SODIUM CHLORIDE 9 MG/ML
5-250 INJECTION, SOLUTION INTRAVENOUS PRN
Status: CANCELLED | OUTPATIENT
Start: 2023-09-01

## 2023-09-01 RX ORDER — ALBUTEROL SULFATE 90 UG/1
4 AEROSOL, METERED RESPIRATORY (INHALATION) PRN
Status: CANCELLED | OUTPATIENT
Start: 2023-09-01

## 2023-09-01 RX ORDER — ONDANSETRON 2 MG/ML
8 INJECTION INTRAMUSCULAR; INTRAVENOUS
Status: CANCELLED | OUTPATIENT
Start: 2023-09-01

## 2023-09-01 RX ORDER — FAMOTIDINE 10 MG/ML
20 INJECTION, SOLUTION INTRAVENOUS
Status: CANCELLED | OUTPATIENT
Start: 2023-09-01

## 2023-09-01 RX ORDER — SODIUM CHLORIDE 0.9 % (FLUSH) 0.9 %
5-40 SYRINGE (ML) INJECTION PRN
Status: CANCELLED | OUTPATIENT
Start: 2023-09-01

## 2023-09-01 RX ORDER — ACETAMINOPHEN 325 MG/1
650 TABLET ORAL
Status: CANCELLED | OUTPATIENT
Start: 2023-09-01

## 2023-09-01 RX ORDER — HEPARIN SODIUM (PORCINE) LOCK FLUSH IV SOLN 100 UNIT/ML 100 UNIT/ML
500 SOLUTION INTRAVENOUS PRN
Status: CANCELLED | OUTPATIENT
Start: 2023-09-01

## 2023-09-01 RX ORDER — DIPHENHYDRAMINE HYDROCHLORIDE 50 MG/ML
50 INJECTION INTRAMUSCULAR; INTRAVENOUS
Status: CANCELLED | OUTPATIENT
Start: 2023-09-01

## 2023-09-01 RX ORDER — EPINEPHRINE 1 MG/ML
0.3 INJECTION, SOLUTION, CONCENTRATE INTRAVENOUS PRN
Status: CANCELLED | OUTPATIENT
Start: 2023-09-01

## 2023-09-01 RX ORDER — SODIUM CHLORIDE 9 MG/ML
INJECTION, SOLUTION INTRAVENOUS CONTINUOUS
Status: CANCELLED | OUTPATIENT
Start: 2023-09-01

## 2023-09-06 ENCOUNTER — TELEPHONE (OUTPATIENT)
Age: 63
End: 2023-09-06

## 2023-09-06 NOTE — TELEPHONE ENCOUNTER
Called regarding INR check due yesterday. Left message requesting patient return call regarding INR.      Called and spoke [facility] who states the last result they have for patient is from 09/01

## 2023-09-08 ENCOUNTER — TELEPHONE (OUTPATIENT)
Age: 63
End: 2023-09-08

## 2023-09-08 ENCOUNTER — ANTI-COAG VISIT (OUTPATIENT)
Age: 63
End: 2023-09-08

## 2023-09-08 DIAGNOSIS — Z79.01 CHRONIC ANTICOAGULATION: Primary | ICD-10-CM

## 2023-09-08 LAB — INR BLD: 1.9

## 2023-09-08 NOTE — PROGRESS NOTES
[address] [facility] in [place], [place]      INR result reviewed with Dr. Sona Bekc  who made the following recommendations (Kellie Horton): continue 3mg daily  and recheck 09/12. Patient notified of provider instructions via voicemail, requested call back to confirm.     Amanda Fisher RN

## 2023-09-08 NOTE — TELEPHONE ENCOUNTER
Telephone Call RE:  Lab Reminder      Outcome:     [] Patient verbalizes understanding    [] Unable to reach   [x] Left message -Overdue INR- Requested patient call with result               []       Hamlet Lundberg RN

## 2023-09-11 NOTE — TELEPHONE ENCOUNTER
Spoke with staff who stated patient's last appt 08/30 and will fax visit note to [facility]

## 2023-09-12 ENCOUNTER — ANTI-COAG VISIT (OUTPATIENT)
Age: 63
End: 2023-09-12
Payer: MEDICARE

## 2023-09-12 DIAGNOSIS — Z79.01 CHRONIC ANTICOAGULATION: Primary | ICD-10-CM

## 2023-09-12 LAB — INR BLD: 2.6

## 2023-09-12 PROCEDURE — 93793 ANTICOAG MGMT PT WARFARIN: CPT | Performed by: NURSE PRACTITIONER

## 2023-09-12 NOTE — PROGRESS NOTES
[address] [facility] in [place], [place]      INR result reviewed with Malaika Cisneros  who made the following recommendations (Nikki Heart): no changes and recheck 1 week. Patient notified and verbalized understanding. They had no further questions.  (See anticoag tracker)     Tess Ortiz RN

## 2023-09-18 ENCOUNTER — TELEPHONE (OUTPATIENT)
Age: 63
End: 2023-09-18

## 2023-09-18 NOTE — TELEPHONE ENCOUNTER
Contacted patient via phone for INR reminder. Telephone Call RE:  INR Reminder      Outcome:     [x] Patient verbalizes understanding    [] Unable to reach   [] Left message              [x]     Spoke with patient in reference to INR reminder for tomorrow. Patient agrees to conduct INR and self report.     Mayela Ornelas MA

## 2023-09-19 RX ORDER — EPINEPHRINE 1 MG/ML
0.3 INJECTION, SOLUTION, CONCENTRATE INTRAVENOUS PRN
Status: CANCELLED | OUTPATIENT
Start: 2023-09-19

## 2023-09-19 RX ORDER — ALBUTEROL SULFATE 90 UG/1
4 AEROSOL, METERED RESPIRATORY (INHALATION) PRN
Status: CANCELLED | OUTPATIENT
Start: 2023-09-19

## 2023-09-19 RX ORDER — SODIUM CHLORIDE 9 MG/ML
5-250 INJECTION, SOLUTION INTRAVENOUS PRN
Status: CANCELLED | OUTPATIENT
Start: 2023-09-19

## 2023-09-19 RX ORDER — SODIUM CHLORIDE 9 MG/ML
INJECTION, SOLUTION INTRAVENOUS CONTINUOUS
Status: CANCELLED | OUTPATIENT
Start: 2023-09-19

## 2023-09-19 RX ORDER — FAMOTIDINE 10 MG/ML
20 INJECTION, SOLUTION INTRAVENOUS
Status: CANCELLED | OUTPATIENT
Start: 2023-09-19

## 2023-09-19 RX ORDER — ACETAMINOPHEN 325 MG/1
650 TABLET ORAL
Status: CANCELLED | OUTPATIENT
Start: 2023-09-19

## 2023-09-19 RX ORDER — HEPARIN SODIUM (PORCINE) LOCK FLUSH IV SOLN 100 UNIT/ML 100 UNIT/ML
500 SOLUTION INTRAVENOUS PRN
Status: CANCELLED | OUTPATIENT
Start: 2023-09-19

## 2023-09-19 RX ORDER — SODIUM CHLORIDE 0.9 % (FLUSH) 0.9 %
5-40 SYRINGE (ML) INJECTION PRN
Status: CANCELLED | OUTPATIENT
Start: 2023-09-19

## 2023-09-19 RX ORDER — ONDANSETRON 2 MG/ML
8 INJECTION INTRAMUSCULAR; INTRAVENOUS
Status: CANCELLED | OUTPATIENT
Start: 2023-09-19

## 2023-09-19 RX ORDER — DIPHENHYDRAMINE HYDROCHLORIDE 50 MG/ML
50 INJECTION INTRAMUSCULAR; INTRAVENOUS
Status: CANCELLED | OUTPATIENT
Start: 2023-09-19

## 2023-09-20 ENCOUNTER — ANTI-COAG VISIT (OUTPATIENT)
Age: 63
End: 2023-09-20
Payer: MEDICARE

## 2023-09-20 DIAGNOSIS — I50.22 CHRONIC SYSTOLIC HEART FAILURE (HCC): ICD-10-CM

## 2023-09-20 DIAGNOSIS — E83.42 HYPOMAGNESEMIA: ICD-10-CM

## 2023-09-20 DIAGNOSIS — Z79.01 CHRONIC ANTICOAGULATION: Primary | ICD-10-CM

## 2023-09-20 DIAGNOSIS — I50.21 ACUTE SYSTOLIC (CONGESTIVE) HEART FAILURE (HCC): ICD-10-CM

## 2023-09-20 DIAGNOSIS — R06.02 SHORTNESS OF BREATH: ICD-10-CM

## 2023-09-20 DIAGNOSIS — Z79.01 CHRONIC ANTICOAGULATION: ICD-10-CM

## 2023-09-20 LAB — INR BLD: 2.2

## 2023-09-20 PROCEDURE — 93793 ANTICOAG MGMT PT WARFARIN: CPT | Performed by: NURSE PRACTITIONER

## 2023-09-20 RX ORDER — WARFARIN SODIUM 3 MG/1
TABLET ORAL
Qty: 55 TABLET | Refills: 1 | Status: SHIPPED | OUTPATIENT
Start: 2023-09-20 | End: 2023-09-21 | Stop reason: SDUPTHER

## 2023-09-20 RX ORDER — METOPROLOL SUCCINATE 50 MG/1
50 TABLET, EXTENDED RELEASE ORAL DAILY
Qty: 30 TABLET | Refills: 0 | Status: SHIPPED | OUTPATIENT
Start: 2023-09-20 | End: 2023-09-21 | Stop reason: SDUPTHER

## 2023-09-20 RX ORDER — PANTOPRAZOLE SODIUM 40 MG/1
40 TABLET, DELAYED RELEASE ORAL DAILY
Qty: 30 TABLET | Refills: 0 | Status: SHIPPED | OUTPATIENT
Start: 2023-09-20 | End: 2023-09-21 | Stop reason: SDUPTHER

## 2023-09-20 NOTE — PROGRESS NOTES
Called and spoke with patient who stated he is out of metoprolol, Protonix and 3mg warfarin. States that he is transferring to [facility], states they should be calling [facility] to coordinate transfer of scripts. Informed pt will send refills of meds pt is out of to local pharmacy. He verbalized understanding       Reviewed with provider and refills sent to [facility] on meds patient is out of per VORB.      Requested Prescriptions     Signed Prescriptions Disp Refills    metoprolol succinate (TOPROL XL) 50 MG extended release tablet 30 tablet 0     Sig: Take 1 tablet by mouth daily    pantoprazole (PROTONIX) 40 MG tablet 30 tablet 0     Sig: Take 1 tablet by mouth daily    warfarin (COUMADIN) 3 MG tablet 55 tablet 1     Sig: Every Monday, Wednesday, Friday, Saturday

## 2023-09-20 NOTE — PROGRESS NOTES
[address] [facility] in [place], [place]      INR result reviewed with Coreen Lefort, NP who made the following recommendations (Buck Irizarry): no change to dosing  and recheck 1 week. Patient notified and verbalized understanding. They had no further questions.  (See anticoag tracker)     Ivonne Archuleta RN

## 2023-09-21 ENCOUNTER — TELEPHONE (OUTPATIENT)
Age: 63
End: 2023-09-21

## 2023-09-21 DIAGNOSIS — I50.22 CHRONIC SYSTOLIC HEART FAILURE (HCC): ICD-10-CM

## 2023-09-21 DIAGNOSIS — I50.21 ACUTE SYSTOLIC (CONGESTIVE) HEART FAILURE (HCC): ICD-10-CM

## 2023-09-21 RX ORDER — WARFARIN SODIUM 2 MG/1
TABLET ORAL
Qty: 40 TABLET | Refills: 0 | Status: SHIPPED | OUTPATIENT
Start: 2023-09-21

## 2023-09-21 RX ORDER — PANTOPRAZOLE SODIUM 40 MG/1
40 TABLET, DELAYED RELEASE ORAL DAILY
Qty: 90 TABLET | Refills: 1 | Status: SHIPPED | OUTPATIENT
Start: 2023-09-21

## 2023-09-21 RX ORDER — WARFARIN SODIUM 3 MG/1
TABLET ORAL
Qty: 55 TABLET | Refills: 1 | Status: SHIPPED | OUTPATIENT
Start: 2023-09-21

## 2023-09-21 RX ORDER — MAGNESIUM OXIDE 400 MG/1
1 TABLET ORAL 2 TIMES DAILY
Qty: 180 TABLET | Refills: 1 | Status: SHIPPED | OUTPATIENT
Start: 2023-09-21

## 2023-09-21 RX ORDER — MELATONIN
2000 DAILY
Qty: 180 TABLET | Refills: 1 | Status: SHIPPED | OUTPATIENT
Start: 2023-09-21

## 2023-09-21 RX ORDER — METOPROLOL SUCCINATE 50 MG/1
50 TABLET, EXTENDED RELEASE ORAL DAILY
Qty: 90 TABLET | Refills: 1 | Status: SHIPPED | OUTPATIENT
Start: 2023-09-21

## 2023-09-21 RX ORDER — SACUBITRIL AND VALSARTAN 49; 51 MG/1; MG/1
1 TABLET, FILM COATED ORAL 2 TIMES DAILY
Qty: 180 TABLET | Refills: 1 | Status: SHIPPED | OUTPATIENT
Start: 2023-09-21

## 2023-09-21 NOTE — TELEPHONE ENCOUNTER
Called and spoke to [facility] pharmacy regarding patient's scripts. They stated that patient needs the below listed scripts sent to them. Stated patient requested \"compliance packaging\" (stated similar to bubble packing meds will come in a pouch). Requested warfarin be left out of packaging so that patient can make dose adjustments. Staff stated they will make note to package warfarin separately. Requested Prescriptions     Signed Prescriptions Disp Refills    metoprolol succinate (TOPROL XL) 50 MG extended release tablet 90 tablet 1     Sig: Take 1 tablet by mouth daily     Authorizing Provider: Everardo Nguyen     Ordering User: FILI PELLETIER    sacubitril-valsartan (ENTRESTO) 49-51 MG per tablet 180 tablet 1     Sig: Take 1 tablet by mouth 2 times daily     Authorizing Provider: Everardo Nguyen     Ordering User: FILI PELLETIER    magnesium oxide (MAG-OX) 400 MG tablet 180 tablet 1     Sig: Take 1 tablet by mouth 2 times daily     Authorizing Provider: Everardo Nguyen     Ordering User: FILI PELLETIER    pantoprazole (PROTONIX) 40 MG tablet 90 tablet 1     Sig: Take 1 tablet by mouth daily     Authorizing Provider: Everardo Nguyen     Ordering User: FILI Lake    warfarin (COUMADIN) 3 MG tablet 55 tablet 1     Sig: Take one tablet my mouth every Monday, Wednesday, Friday, Saturday     Authorizing Provider: Everardo Nguyen     Ordering User: Tess Ortiz    warfarin (COUMADIN) 2 MG tablet 40 tablet 0     Sig: Take one tablet by mouth every Tuesday and Thursday     Authorizing Provider: Leann Villalpando User: Tess Ortiz    vitamin D3 (CHOLECALCIFEROL) 25 MCG (1000 UT) TABS tablet 180 tablet 1     Sig: Take 2 tablets by mouth daily     Authorizing Provider: Everardo Nguyen     Ordering User: Tess Ortiz         Spoke with patient and notified of scripts sent to pharmacy. Mentioned that Trelegy was not sent as this has to come through pulmonology.  He stated he has 13

## 2023-09-26 ENCOUNTER — TELEPHONE (OUTPATIENT)
Age: 63
End: 2023-09-26

## 2023-09-26 NOTE — TELEPHONE ENCOUNTER
Contacted patient via phone for lab reminder. Telephone Call RE:  Lab Reminder      Outcome:     [] Patient verbalizes understanding    [x] Unable to reach   [x] Left message              [x] Labs faxed to desired lab.       Tarun Beyer, [address]

## 2023-09-27 ENCOUNTER — TELEPHONE (OUTPATIENT)
Age: 63
End: 2023-09-27

## 2023-09-28 ENCOUNTER — TELEPHONE (OUTPATIENT)
Age: 63
End: 2023-09-28

## 2023-09-28 NOTE — TELEPHONE ENCOUNTER
Called pt to reschedule his no-show appt but did not reach him. Left voicemail asking him to call us back.

## 2023-09-29 ENCOUNTER — TELEPHONE (OUTPATIENT)
Age: 63
End: 2023-09-29

## 2023-09-29 DIAGNOSIS — I50.21 ACUTE SYSTOLIC (CONGESTIVE) HEART FAILURE (HCC): ICD-10-CM

## 2023-09-29 DIAGNOSIS — I50.22 CHRONIC SYSTOLIC HEART FAILURE (HCC): ICD-10-CM

## 2023-09-29 RX ORDER — METOPROLOL SUCCINATE 50 MG/1
50 TABLET, EXTENDED RELEASE ORAL DAILY
Qty: 7 TABLET | Refills: 0 | Status: SHIPPED | OUTPATIENT
Start: 2023-09-29

## 2023-09-29 RX ORDER — MAGNESIUM OXIDE 400 MG/1
400 TABLET ORAL 2 TIMES DAILY
Qty: 14 TABLET | Refills: 0 | Status: SHIPPED | OUTPATIENT
Start: 2023-09-29

## 2023-09-29 RX ORDER — PANTOPRAZOLE SODIUM 40 MG/1
40 TABLET, DELAYED RELEASE ORAL DAILY
Qty: 7 TABLET | Refills: 0 | Status: SHIPPED | OUTPATIENT
Start: 2023-09-29

## 2023-09-29 NOTE — TELEPHONE ENCOUNTER
Received call from patient's wife stating that patient is completely out of Metoprolol, Protonix, Entresto and Magnesium. Scripts were previously sent to [facility] as well as mail order pharmacy. Patient's wife stated that the pharmacy told here they did not have any thing for her to . Reviewed with Nadege Choudhary who ordered short supply VORB. 1 week supply sent to [facility] pharmacy. Spoke with [facility] who stated that they received scripts and will fill for patient. Returned call to patient and left voicemail notifing that he can  medications later tonight.

## 2023-10-02 LAB
BASOPHILS # BLD AUTO: 0.1 X10E3/UL (ref 0–0.2)
BASOPHILS NFR BLD AUTO: 1 %
EOSINOPHIL # BLD AUTO: 0 X10E3/UL (ref 0–0.4)
EOSINOPHIL NFR BLD AUTO: 0 %
ERYTHROCYTE [DISTWIDTH] IN BLOOD BY AUTOMATED COUNT: 15.2 % (ref 11.6–15.4)
HCT VFR BLD AUTO: 24.9 % (ref 37.5–51)
HGB BLD-MCNC: 7.7 G/DL (ref 13–17.7)
IMM GRANULOCYTES # BLD AUTO: 0.1 X10E3/UL (ref 0–0.1)
IMM GRANULOCYTES NFR BLD AUTO: 1 %
LYMPHOCYTES # BLD AUTO: 2.2 X10E3/UL (ref 0.7–3.1)
LYMPHOCYTES NFR BLD AUTO: 13 %
MCH RBC QN AUTO: 25.1 PG (ref 26.6–33)
MCHC RBC AUTO-ENTMCNC: 30.9 G/DL (ref 31.5–35.7)
MCV RBC AUTO: 81 FL (ref 79–97)
MONOCYTES # BLD AUTO: 1.7 X10E3/UL (ref 0.1–0.9)
MONOCYTES NFR BLD AUTO: 10 %
NEUTROPHILS # BLD AUTO: 12.7 X10E3/UL (ref 1.4–7)
NEUTROPHILS NFR BLD AUTO: 75 %
PLATELET # BLD AUTO: 451 X10E3/UL (ref 150–450)
RBC # BLD AUTO: 3.07 X10E6/UL (ref 4.14–5.8)
WBC # BLD AUTO: 16.7 X10E3/UL (ref 3.4–10.8)

## 2023-10-03 ENCOUNTER — TELEPHONE (OUTPATIENT)
Age: 63
End: 2023-10-03

## 2023-10-03 ENCOUNTER — ANTI-COAG VISIT (OUTPATIENT)
Age: 63
End: 2023-10-03
Payer: MEDICARE

## 2023-10-03 DIAGNOSIS — Z79.01 CHRONIC ANTICOAGULATION: Primary | ICD-10-CM

## 2023-10-03 DIAGNOSIS — I50.22 CHRONIC SYSTOLIC HEART FAILURE (HCC): Primary | ICD-10-CM

## 2023-10-03 LAB
ALBUMIN SERPL-MCNC: 4.5 G/DL (ref 3.9–4.9)
ALBUMIN/GLOB SERPL: 1.5 {RATIO} (ref 1.2–2.2)
ALP SERPL-CCNC: 90 IU/L (ref 44–121)
ALT SERPL-CCNC: 19 IU/L (ref 0–44)
AST SERPL-CCNC: 25 IU/L (ref 0–40)
BILIRUB SERPL-MCNC: <0.2 MG/DL (ref 0–1.2)
BUN SERPL-MCNC: 37 MG/DL (ref 8–27)
BUN/CREAT SERPL: 17 (ref 10–24)
CALCIUM SERPL-MCNC: 9.1 MG/DL (ref 8.6–10.2)
CHLORIDE SERPL-SCNC: 105 MMOL/L (ref 96–106)
CO2 SERPL-SCNC: 18 MMOL/L (ref 20–29)
CREAT SERPL-MCNC: 2.18 MG/DL (ref 0.76–1.27)
EGFRCR SERPLBLD CKD-EPI 2021: 33 ML/MIN/1.73
GLOBULIN SER CALC-MCNC: 3.1 G/DL (ref 1.5–4.5)
GLUCOSE SERPL-MCNC: 91 MG/DL (ref 70–99)
INR PPP: 2.7 (ref 0.9–1.2)
LDH SERPL L TO P-CCNC: 223 IU/L (ref 121–224)
MAGNESIUM SERPL-MCNC: 2.1 MG/DL (ref 1.6–2.3)
NT-PROBNP SERPL-MCNC: 466 PG/ML (ref 0–210)
POTASSIUM SERPL-SCNC: 4.9 MMOL/L (ref 3.5–5.2)
PROT SERPL-MCNC: 7.6 G/DL (ref 6–8.5)
PROTHROMBIN TIME: 27.5 SEC (ref 9.1–12)
SODIUM SERPL-SCNC: 142 MMOL/L (ref 134–144)

## 2023-10-03 PROCEDURE — 93793 ANTICOAG MGMT PT WARFARIN: CPT | Performed by: NURSE PRACTITIONER

## 2023-10-03 NOTE — PROGRESS NOTES
[address] [facility] in [place], [place]      INR result reviewed with Mulu Sanchez NP who made the following recommendations (Barbara Hutchison): decrease Wednesday dose to 2mg and recheck friday. Patient notified and verbalized understanding. They had no further questions.  (See anticoag tracker)     Yanet Ramsey RN

## 2023-10-03 NOTE — TELEPHONE ENCOUNTER
Reviewed with provider who ordered VORB STAT CBC. Spoke to patient who stated he can come to lab and have these completed tomorrow morning.

## 2023-10-03 NOTE — TELEPHONE ENCOUNTER
Spoke to patient regarding s/s of bleeding due to hemaglobin drop of 9.3-->7.7. Patient denied any bleeding, dark tarry stools. Denies nose bleeds. Does note some dizzy ness but has not had any alarms.

## 2023-10-04 DIAGNOSIS — I50.22 CHRONIC SYSTOLIC HEART FAILURE (HCC): ICD-10-CM

## 2023-10-05 ENCOUNTER — TELEPHONE (OUTPATIENT)
Age: 63
End: 2023-10-05

## 2023-10-05 DIAGNOSIS — N18.30 ANEMIA IN STAGE 3 CHRONIC KIDNEY DISEASE, UNSPECIFIED WHETHER STAGE 3A OR 3B CKD (HCC): Primary | ICD-10-CM

## 2023-10-05 DIAGNOSIS — I50.22 CHRONIC SYSTOLIC HEART FAILURE (HCC): ICD-10-CM

## 2023-10-05 DIAGNOSIS — D63.1 ANEMIA IN STAGE 3 CHRONIC KIDNEY DISEASE, UNSPECIFIED WHETHER STAGE 3A OR 3B CKD (HCC): Primary | ICD-10-CM

## 2023-10-05 LAB
BASOPHILS # BLD AUTO: 0.1 X10E3/UL (ref 0–0.2)
BASOPHILS NFR BLD AUTO: 0 %
EOSINOPHIL # BLD AUTO: 0 X10E3/UL (ref 0–0.4)
EOSINOPHIL NFR BLD AUTO: 0 %
ERYTHROCYTE [DISTWIDTH] IN BLOOD BY AUTOMATED COUNT: 14.9 % (ref 11.6–15.4)
HCT VFR BLD AUTO: 23.9 % (ref 37.5–51)
HGB BLD-MCNC: 7.5 G/DL (ref 13–17.7)
IMM GRANULOCYTES # BLD AUTO: 0.1 X10E3/UL (ref 0–0.1)
IMM GRANULOCYTES NFR BLD AUTO: 1 %
LYMPHOCYTES # BLD AUTO: 1.5 X10E3/UL (ref 0.7–3.1)
LYMPHOCYTES NFR BLD AUTO: 10 %
MCH RBC QN AUTO: 25.3 PG (ref 26.6–33)
MCHC RBC AUTO-ENTMCNC: 31.4 G/DL (ref 31.5–35.7)
MCV RBC AUTO: 81 FL (ref 79–97)
MONOCYTES # BLD AUTO: 1.3 X10E3/UL (ref 0.1–0.9)
MONOCYTES NFR BLD AUTO: 9 %
NEUTROPHILS # BLD AUTO: 11.6 X10E3/UL (ref 1.4–7)
NEUTROPHILS NFR BLD AUTO: 80 %
PLATELET # BLD AUTO: 392 X10E3/UL (ref 150–450)
RBC # BLD AUTO: 2.97 X10E6/UL (ref 4.14–5.8)
WBC # BLD AUTO: 14.5 X10E3/UL (ref 3.4–10.8)

## 2023-10-05 NOTE — TELEPHONE ENCOUNTER
Received hbg recheck back showing trend of 9.9--> 7.7 --> 7.5. Reviewed with Dorcas Garibay who stated VORB heme occult stool recheck, CBC recheck Monday. Spoke with patient who denied any stool changes, bleeding, dizzyiness or other new symptoms. Educated patient of heme occult testing and cbc recheck Monday. Educated to notify [facility] provider ASAP for signs of bleeding, melena, increased dizzyness or LVAD alarms. He verbalized understanding. Also reminded to check INR tomorrow.

## 2023-10-05 NOTE — TELEPHONE ENCOUNTER
----- Message from Maribel Mckeon RN sent at 10/5/2023 11:42 AM EDT -----    ----- Message -----  From: TAIWO Rivas - ROSSANA  Sent: 10/5/2023  10:00 AM EDT  To: Jesus Sanches RN    Please call Jose A Allen to discuss their abnormal lab results. Is he bleeding?

## 2023-10-06 ENCOUNTER — TELEPHONE (OUTPATIENT)
Age: 63
End: 2023-10-06

## 2023-10-06 DIAGNOSIS — D63.1 ANEMIA IN STAGE 3 CHRONIC KIDNEY DISEASE, UNSPECIFIED WHETHER STAGE 3A OR 3B CKD (HCC): ICD-10-CM

## 2023-10-06 DIAGNOSIS — I50.22 CHRONIC SYSTOLIC HEART FAILURE (HCC): ICD-10-CM

## 2023-10-06 DIAGNOSIS — N18.30 ANEMIA IN STAGE 3 CHRONIC KIDNEY DISEASE, UNSPECIFIED WHETHER STAGE 3A OR 3B CKD (HCC): ICD-10-CM

## 2023-10-06 NOTE — TELEPHONE ENCOUNTER
Telephone Call RE:  Appointment reminder     Outcome:     [] Patient confirmed appointment   [] Patient rescheduled appointment for    [x] Unable to reach  [x] Left message              [] Other:

## 2023-10-09 DIAGNOSIS — I50.22 CHRONIC SYSTOLIC HEART FAILURE (HCC): ICD-10-CM

## 2023-10-09 NOTE — PATIENT INSTRUCTIONS
Medication changes:    None at this time     Testing Ordered:    None today     Other Recommendations:     Provider has requested that you present to the Emergency Room for further evaluation     Continue to change drive line exit site dressing 3 times a week using sterile technique,     Ensure you are drinking an adequate amount of water with a goal of 6-8 eight ounce glasses (1.5-2 liters) of fluid daily. Your urine should be clear and light yellow straw colored. Monthly LVAD Education Tip:    [employer]                      Follow up in at post hospital discharge with [address]      For further patient and caregiver resources as well as access to online LVAD support groups visit [email or web address]       Please bring your daily sheets and medications to your next appointment. Please monitor your weights daily upon waking and after using the bathroom. Keep a written records of your weights and bring to your next appointment. If you have a weight gain of 3 or more pounds overnight OR 5 or more pounds in one week please contact our office. Thank you for allowing us the privilege of being a part of your healthcare team! Please do not hesitate to contact our office at [phone] with any questions or concerns.

## 2023-10-10 ENCOUNTER — ANTI-COAG VISIT (OUTPATIENT)
Age: 63
End: 2023-10-10
Payer: MEDICARE

## 2023-10-10 ENCOUNTER — TELEPHONE (OUTPATIENT)
Age: 63
End: 2023-10-10

## 2023-10-10 DIAGNOSIS — Z79.01 CHRONIC ANTICOAGULATION: Primary | ICD-10-CM

## 2023-10-10 LAB — INR BLD: 1.3

## 2023-10-10 PROCEDURE — 93793 ANTICOAG MGMT PT WARFARIN: CPT | Performed by: NURSE PRACTITIONER

## 2023-10-10 NOTE — TELEPHONE ENCOUNTER
Received call from patient stating that he checked INR yesterday and it was 1.0. Patient stated that they have received dressing change kits from [employer] but have not been contacted about INR meter yet. Patient's wife stated that he has not been to the lab to get his bloodwork done because yesterday he \"got dizzy everytime he stood up\" states they are going coming to [facility] for iron infusions tomorrow and will complete labwork as soon as possible. Patient denied stool changes, bleeding or dark or tarry stools. Denies any other bleeding incidents. Reviewed above, specifically dizzyness in setting of low hemoglobin with ADILENE Maurer who amendeded coumadin instructions (see anticoag tracker) and stated patient should have repeat CBC completed. Called and discussed with patient (see anticoag encounter) He verbalized understanding.

## 2023-10-10 NOTE — TELEPHONE ENCOUNTER
Patient called stating that his medications have not come from mail order pharmacy. States they were supposed to come today but have not arrived. Asked patient if he had reached out to pharmacy to check the status of his order. He stated he had not called them but would do so now.

## 2023-10-10 NOTE — TELEPHONE ENCOUNTER
Received call from [facility] with [facility] services requesting call back regarding patient's INR readings from today and yesterday (1.0 and 1.3). Returned call to Kelli Mahoney'KALIE''  with [facility] who requested updated prescription and fax number to send results (patient has been calling results to [facility]). Discussed with Selena that patient has pending prescription for INR monitoring with [employer] and should transition to them soon. She stated that updated [facility] script should not interfere with patient's approval for [employer]. [facility] prescription for home INR monitoring received via email and given to provider for completion.

## 2023-10-10 NOTE — PROGRESS NOTES
[address] in [facility], [facility]      INR result reviewed with Marcy Blankenship NP who made the following recommendations (Raul Banuelos): 4mg tonight and recheck friday. Patient's girlfriend notified and verbalized understanding. They had no further questions. (See anticoag tracker)     Reviewed need to have CBC completed ASAP. She confirmed understanding.       Tatianna Berkowitz RN

## 2023-10-11 ENCOUNTER — HOSPITAL ENCOUNTER (OUTPATIENT)
Facility: HOSPITAL | Age: 63
Setting detail: INFUSION SERIES
End: 2023-10-11
Payer: MEDICARE

## 2023-10-11 ENCOUNTER — HOSPITAL ENCOUNTER (OUTPATIENT)
Facility: HOSPITAL | Age: 63
Discharge: HOME | End: 2023-10-13
Attending: INTERNAL MEDICINE
Payer: MEDICARE

## 2023-10-11 ENCOUNTER — TELEPHONE (OUTPATIENT)
Age: 63
End: 2023-10-11

## 2023-10-11 VITALS — WEIGHT: 176.37 LBS | HEIGHT: 68 IN | BODY MASS INDEX: 26.73 KG/M2

## 2023-10-11 VITALS
HEART RATE: 89 BPM | SYSTOLIC BLOOD PRESSURE: 90 MMHG | DIASTOLIC BLOOD PRESSURE: 62 MMHG | OXYGEN SATURATION: 98 % | TEMPERATURE: 98.8 F | RESPIRATION RATE: 18 BRPM

## 2023-10-11 DIAGNOSIS — N18.30 STAGE 3 CHRONIC KIDNEY DISEASE, UNSPECIFIED WHETHER STAGE 3A OR 3B CKD (HCC): Primary | ICD-10-CM

## 2023-10-11 DIAGNOSIS — D63.1 ANEMIA IN STAGE 3 CHRONIC KIDNEY DISEASE, UNSPECIFIED WHETHER STAGE 3A OR 3B CKD (HCC): ICD-10-CM

## 2023-10-11 DIAGNOSIS — I50.22 CHRONIC SYSTOLIC HEART FAILURE (HCC): ICD-10-CM

## 2023-10-11 DIAGNOSIS — D50.8 OTHER IRON DEFICIENCY ANEMIA: ICD-10-CM

## 2023-10-11 DIAGNOSIS — Z95.811 LVAD (LEFT VENTRICULAR ASSIST DEVICE) PRESENT (HCC): ICD-10-CM

## 2023-10-11 DIAGNOSIS — N18.30 ANEMIA IN STAGE 3 CHRONIC KIDNEY DISEASE, UNSPECIFIED WHETHER STAGE 3A OR 3B CKD (HCC): ICD-10-CM

## 2023-10-11 DIAGNOSIS — I50.9 HEART FAILURE, NYHA CLASS 4 (HCC): ICD-10-CM

## 2023-10-11 LAB
BASOPHILS # BLD AUTO: 0.2 X10E3/UL (ref 0–0.2)
BASOPHILS NFR BLD AUTO: 1 %
ECHO BSA: 1.95 M2
ECHO LA DIAMETER INDEX: 1.24 CM/M2
ECHO LA DIAMETER: 2.4 CM
ECHO LV FRACTIONAL SHORTENING: 11 % (ref 28–44)
ECHO LV INTERNAL DIMENSION DIASTOLE INDEX: 2.38 CM/M2
ECHO LV INTERNAL DIMENSION DIASTOLIC: 4.6 CM (ref 4.2–5.9)
ECHO LV INTERNAL DIMENSION SYSTOLIC INDEX: 2.12 CM/M2
ECHO LV INTERNAL DIMENSION SYSTOLIC: 4.1 CM
ECHO LV IVSD: 0.5 CM (ref 0.6–1)
ECHO LV MASS 2D: 90.5 G (ref 88–224)
ECHO LV MASS INDEX 2D: 46.9 G/M2 (ref 49–115)
ECHO LV POSTERIOR WALL DIASTOLIC: 0.8 CM (ref 0.6–1)
ECHO LV RELATIVE WALL THICKNESS RATIO: 0.35
ECHO RV TAPSE: 1.6 CM (ref 1.7–?)
ECHO TV REGURGITANT MAX VELOCITY: 2.18 M/S
ECHO TV REGURGITANT PEAK GRADIENT: 19 MMHG
EOSINOPHIL # BLD AUTO: 1.2 X10E3/UL (ref 0–0.4)
EOSINOPHIL NFR BLD AUTO: 9 %
ERYTHROCYTE [DISTWIDTH] IN BLOOD BY AUTOMATED COUNT: 14.7 % (ref 11.6–15.4)
HCT VFR BLD AUTO: 22.9 % (ref 37.5–51)
HGB BLD-MCNC: 7 G/DL (ref 13–17.7)
IMM GRANULOCYTES # BLD AUTO: 0.1 X10E3/UL (ref 0–0.1)
IMM GRANULOCYTES NFR BLD AUTO: 1 %
LYMPHOCYTES # BLD AUTO: 1.2 X10E3/UL (ref 0.7–3.1)
LYMPHOCYTES NFR BLD AUTO: 9 %
MCH RBC QN AUTO: 24 PG (ref 26.6–33)
MCHC RBC AUTO-ENTMCNC: 30.6 G/DL (ref 31.5–35.7)
MCV RBC AUTO: 78 FL (ref 79–97)
MONOCYTES # BLD AUTO: 1.5 X10E3/UL (ref 0.1–0.9)
MONOCYTES NFR BLD AUTO: 11 %
NEUTROPHILS # BLD AUTO: 9.2 X10E3/UL (ref 1.4–7)
NEUTROPHILS NFR BLD AUTO: 69 %
PLATELET # BLD AUTO: 363 X10E3/UL (ref 150–450)
RBC # BLD AUTO: 2.92 X10E6/UL (ref 4.14–5.8)
WBC # BLD AUTO: 13.2 X10E3/UL (ref 3.4–10.8)

## 2023-10-11 PROCEDURE — 96374 THER/PROPH/DIAG INJ IV PUSH: CPT

## 2023-10-11 PROCEDURE — 6360000002 HC RX W HCPCS: Performed by: INTERNAL MEDICINE

## 2023-10-11 PROCEDURE — 93306 TTE W/DOPPLER COMPLETE: CPT

## 2023-10-11 PROCEDURE — 2580000003 HC RX 258: Performed by: INTERNAL MEDICINE

## 2023-10-11 PROCEDURE — 93306 TTE W/DOPPLER COMPLETE: CPT | Performed by: INTERNAL MEDICINE

## 2023-10-11 RX ORDER — ONDANSETRON 2 MG/ML
8 INJECTION INTRAMUSCULAR; INTRAVENOUS
Status: DISCONTINUED | OUTPATIENT
Start: 2023-10-11 | End: 2023-10-12

## 2023-10-11 RX ORDER — ONDANSETRON 2 MG/ML
8 INJECTION INTRAMUSCULAR; INTRAVENOUS
OUTPATIENT
Start: 2023-11-08

## 2023-10-11 RX ORDER — PREDNISONE 10 MG/1
TABLET ORAL
Status: ON HOLD | COMMUNITY
Start: 2023-09-27 | End: 2023-10-16 | Stop reason: HOSPADM

## 2023-10-11 RX ORDER — ALBUTEROL SULFATE 90 UG/1
4 AEROSOL, METERED RESPIRATORY (INHALATION) PRN
Status: DISCONTINUED | OUTPATIENT
Start: 2023-10-11 | End: 2023-10-27 | Stop reason: HOSPADM

## 2023-10-11 RX ORDER — DIPHENHYDRAMINE HYDROCHLORIDE 50 MG/ML
50 INJECTION INTRAMUSCULAR; INTRAVENOUS
OUTPATIENT
Start: 2023-11-08

## 2023-10-11 RX ORDER — DIPHENHYDRAMINE HYDROCHLORIDE 50 MG/ML
50 INJECTION INTRAMUSCULAR; INTRAVENOUS
Status: DISCONTINUED | OUTPATIENT
Start: 2023-10-11 | End: 2023-10-12 | Stop reason: HOSPADM

## 2023-10-11 RX ORDER — SODIUM CHLORIDE 9 MG/ML
INJECTION, SOLUTION INTRAVENOUS CONTINUOUS
Status: DISCONTINUED | OUTPATIENT
Start: 2023-10-11 | End: 2023-10-27 | Stop reason: HOSPADM

## 2023-10-11 RX ORDER — EPINEPHRINE 1 MG/ML
0.3 INJECTION, SOLUTION, CONCENTRATE INTRAVENOUS PRN
OUTPATIENT
Start: 2023-11-08

## 2023-10-11 RX ORDER — SODIUM CHLORIDE 0.9 % (FLUSH) 0.9 %
5-40 SYRINGE (ML) INJECTION PRN
OUTPATIENT
Start: 2023-11-08

## 2023-10-11 RX ORDER — SODIUM CHLORIDE 9 MG/ML
5-250 INJECTION, SOLUTION INTRAVENOUS PRN
OUTPATIENT
Start: 2023-11-08

## 2023-10-11 RX ORDER — ACETAMINOPHEN 325 MG/1
650 TABLET ORAL
OUTPATIENT
Start: 2023-11-08

## 2023-10-11 RX ORDER — ALBUTEROL SULFATE 90 UG/1
4 AEROSOL, METERED RESPIRATORY (INHALATION) PRN
OUTPATIENT
Start: 2023-11-08

## 2023-10-11 RX ORDER — HEPARIN 100 UNIT/ML
500 SYRINGE INTRAVENOUS PRN
Status: DISCONTINUED | OUTPATIENT
Start: 2023-10-11 | End: 2023-10-12 | Stop reason: HOSPADM

## 2023-10-11 RX ORDER — EPINEPHRINE 1 MG/ML
0.3 INJECTION, SOLUTION, CONCENTRATE INTRAVENOUS PRN
Status: DISCONTINUED | OUTPATIENT
Start: 2023-10-11 | End: 2023-10-27 | Stop reason: HOSPADM

## 2023-10-11 RX ORDER — ACETAMINOPHEN 325 MG/1
650 TABLET ORAL
Status: DISCONTINUED | OUTPATIENT
Start: 2023-10-11 | End: 2023-10-12 | Stop reason: HOSPADM

## 2023-10-11 RX ORDER — SODIUM CHLORIDE 9 MG/ML
5-250 INJECTION, SOLUTION INTRAVENOUS PRN
Status: DISCONTINUED | OUTPATIENT
Start: 2023-10-11 | End: 2023-10-12 | Stop reason: HOSPADM

## 2023-10-11 RX ORDER — HEPARIN 100 UNIT/ML
500 SYRINGE INTRAVENOUS PRN
OUTPATIENT
Start: 2023-11-08

## 2023-10-11 RX ORDER — SODIUM CHLORIDE 9 MG/ML
INJECTION, SOLUTION INTRAVENOUS CONTINUOUS
OUTPATIENT
Start: 2023-11-08

## 2023-10-11 RX ORDER — SODIUM CHLORIDE 0.9 % (FLUSH) 0.9 %
5-40 SYRINGE (ML) INJECTION PRN
Status: DISCONTINUED | OUTPATIENT
Start: 2023-10-11 | End: 2023-10-12 | Stop reason: HOSPADM

## 2023-10-11 RX ADMIN — SODIUM CHLORIDE, PRESERVATIVE FREE 10 ML: 5 INJECTION INTRAVENOUS at 14:25

## 2023-10-11 RX ADMIN — SODIUM CHLORIDE 25 ML/HR: 9 INJECTION, SOLUTION INTRAVENOUS at 14:27

## 2023-10-11 RX ADMIN — IRON SUCROSE 200 MG: 20 INJECTION, SOLUTION INTRAVENOUS at 14:48

## 2023-10-11 ASSESSMENT — PAIN SCALES - GENERAL
PAINLEVEL_OUTOF10: 0
PAINLEVEL_OUTOF10: 0

## 2023-10-11 NOTE — TELEPHONE ENCOUNTER
Patient in [facility]OB this morning for echo. States that he did not receive medications yesterday and that the pharmacy told him they may come today or Monday. States he is out of several medications including inhaler. Patient has significant wheezing with activity and states he has had to use rescue inhaler to support breathing. Called and spoke with [address] [place] [address] who provided tracking number #[id number]. Per Tracking number shipment is on the truck and will arrive before 5pm. Tracking number and update provided to patient and patient's SO in Echo room. They verbalized understanding.

## 2023-10-12 ENCOUNTER — TELEPHONE (OUTPATIENT)
Age: 63
End: 2023-10-12

## 2023-10-12 ENCOUNTER — OFFICE VISIT (OUTPATIENT)
Age: 63
End: 2023-10-12
Payer: MEDICARE

## 2023-10-12 ENCOUNTER — HOSPITAL ENCOUNTER (INPATIENT)
Facility: HOSPITAL | Age: 63
LOS: 4 days | Discharge: HOME | DRG: 812 | End: 2023-10-16
Attending: STUDENT IN AN ORGANIZED HEALTH CARE EDUCATION/TRAINING PROGRAM | Admitting: HOSPITALIST
Payer: MEDICARE

## 2023-10-12 ENCOUNTER — APPOINTMENT (OUTPATIENT)
Facility: HOSPITAL | Age: 63
DRG: 812 | End: 2023-10-12
Payer: MEDICARE

## 2023-10-12 VITALS
SYSTOLIC BLOOD PRESSURE: 90 MMHG | HEART RATE: 104 BPM | BODY MASS INDEX: 28.16 KG/M2 | TEMPERATURE: 98.8 F | OXYGEN SATURATION: 100 % | WEIGHT: 179.4 LBS | RESPIRATION RATE: 16 BRPM | HEIGHT: 67 IN

## 2023-10-12 DIAGNOSIS — Z95.811 LVAD (LEFT VENTRICULAR ASSIST DEVICE) PRESENT (HCC): ICD-10-CM

## 2023-10-12 DIAGNOSIS — Z95.811 LVAD (LEFT VENTRICULAR ASSIST DEVICE) PRESENT (HCC): Primary | ICD-10-CM

## 2023-10-12 DIAGNOSIS — D64.9 ANEMIA, UNSPECIFIED TYPE: Primary | ICD-10-CM

## 2023-10-12 DIAGNOSIS — R42 DIZZINESS: ICD-10-CM

## 2023-10-12 LAB
ALBUMIN SERPL-MCNC: 3.8 G/DL (ref 3.5–5)
ALBUMIN/GLOB SERPL: 0.9 (ref 1.1–2.2)
ALP SERPL-CCNC: 107 U/L (ref 45–117)
ALT SERPL-CCNC: 31 U/L (ref 12–78)
ANION GAP SERPL CALC-SCNC: 6 MMOL/L (ref 5–15)
APPEARANCE UR: CLEAR
APTT PPP: 31.1 SEC (ref 22.1–31)
APTT PPP: 33.4 SEC (ref 22.1–31)
AST SERPL-CCNC: 32 U/L (ref 15–37)
BACTERIA URNS QL MICRO: NEGATIVE /HPF
BASOPHILS # BLD: 0.1 K/UL (ref 0–0.1)
BASOPHILS # BLD: 0.2 K/UL (ref 0–0.1)
BASOPHILS NFR BLD: 1 % (ref 0–1)
BASOPHILS NFR BLD: 1 % (ref 0–1)
BILIRUB SERPL-MCNC: 0.3 MG/DL (ref 0.2–1)
BILIRUB UR QL: NEGATIVE
BUN SERPL-MCNC: 31 MG/DL (ref 6–20)
BUN/CREAT SERPL: 12 (ref 12–20)
CALCIUM SERPL-MCNC: 9.5 MG/DL (ref 8.5–10.1)
CHLORIDE SERPL-SCNC: 105 MMOL/L (ref 97–108)
CO2 SERPL-SCNC: 26 MMOL/L (ref 21–32)
COLOR UR: NORMAL
COMMENT:: NORMAL
CREAT SERPL-MCNC: 2.65 MG/DL (ref 0.7–1.3)
DIFFERENTIAL METHOD BLD: ABNORMAL
DIFFERENTIAL METHOD BLD: ABNORMAL
EKG DIAGNOSIS: NORMAL
EKG Q-T INTERVAL: 292 MS
EKG QRS DURATION: 56 MS
EKG QTC CALCULATION (BAZETT): 400 MS
EKG R AXIS: -82 DEGREES
EKG T AXIS: -22 DEGREES
EKG VENTRICULAR RATE: 113 BPM
EOSINOPHIL # BLD: 1.1 K/UL (ref 0–0.4)
EOSINOPHIL # BLD: 1.1 K/UL (ref 0–0.4)
EOSINOPHIL NFR BLD: 7 % (ref 0–7)
EOSINOPHIL NFR BLD: 8 % (ref 0–7)
EPITH CASTS URNS QL MICRO: NORMAL /LPF
ERYTHROCYTE [DISTWIDTH] IN BLOOD BY AUTOMATED COUNT: 15.8 % (ref 11.5–14.5)
ERYTHROCYTE [DISTWIDTH] IN BLOOD BY AUTOMATED COUNT: 16 % (ref 11.5–14.5)
ERYTHROCYTE [DISTWIDTH] IN BLOOD BY AUTOMATED COUNT: 16 % (ref 11.5–14.5)
GLOBULIN SER CALC-MCNC: 4.1 G/DL (ref 2–4)
GLUCOSE SERPL-MCNC: 103 MG/DL (ref 65–100)
GLUCOSE UR STRIP.AUTO-MCNC: NEGATIVE MG/DL
HAPTOGLOB SERPL-MCNC: 207 MG/DL (ref 30–200)
HCT VFR BLD AUTO: 20.2 % (ref 36.6–50.3)
HCT VFR BLD AUTO: 22.2 % (ref 36.6–50.3)
HCT VFR BLD AUTO: 23.3 % (ref 36.6–50.3)
HGB BLD-MCNC: 6 G/DL (ref 12.1–17)
HGB BLD-MCNC: 6.6 G/DL (ref 12.1–17)
HGB BLD-MCNC: 7.2 G/DL (ref 12.1–17)
HGB UR QL STRIP: NEGATIVE
HISTORY CHECK: NORMAL
HYALINE CASTS URNS QL MICRO: NORMAL /LPF (ref 0–5)
IMM GRANULOCYTES # BLD AUTO: 0 K/UL (ref 0–0.04)
IMM GRANULOCYTES # BLD AUTO: 0.2 K/UL (ref 0–0.04)
IMM GRANULOCYTES NFR BLD AUTO: 0 % (ref 0–0.5)
IMM GRANULOCYTES NFR BLD AUTO: 1 % (ref 0–0.5)
INR PPP: 1.3 (ref 0.9–1.1)
INR PPP: 1.3 (ref 0.9–1.1)
IRON SATN MFR SERPL: 86 % (ref 20–50)
IRON SERPL-MCNC: 451 UG/DL (ref 35–150)
KETONES UR QL STRIP.AUTO: NEGATIVE MG/DL
LACTATE SERPL-SCNC: 1.2 MMOL/L (ref 0.4–2)
LDH SERPL L TO P-CCNC: 256 U/L (ref 85–241)
LEUKOCYTE ESTERASE UR QL STRIP.AUTO: NEGATIVE
LYMPHOCYTES # BLD: 1 K/UL (ref 0.8–3.5)
LYMPHOCYTES # BLD: 1.3 K/UL (ref 0.8–3.5)
LYMPHOCYTES NFR BLD: 10 % (ref 12–49)
LYMPHOCYTES NFR BLD: 6 % (ref 12–49)
MCH RBC QN AUTO: 23.8 PG (ref 26–34)
MCH RBC QN AUTO: 23.9 PG (ref 26–34)
MCH RBC QN AUTO: 25 PG (ref 26–34)
MCHC RBC AUTO-ENTMCNC: 29.7 G/DL (ref 30–36.5)
MCHC RBC AUTO-ENTMCNC: 29.7 G/DL (ref 30–36.5)
MCHC RBC AUTO-ENTMCNC: 30.9 G/DL (ref 30–36.5)
MCV RBC AUTO: 80.1 FL (ref 80–99)
MCV RBC AUTO: 80.5 FL (ref 80–99)
MCV RBC AUTO: 80.9 FL (ref 80–99)
MONOCYTES # BLD: 1.5 K/UL (ref 0–1)
MONOCYTES # BLD: 1.8 K/UL (ref 0–1)
MONOCYTES NFR BLD: 11 % (ref 5–13)
MONOCYTES NFR BLD: 11 % (ref 5–13)
NEUTS SEG # BLD: 11.8 K/UL (ref 1.8–8)
NEUTS SEG # BLD: 9.3 K/UL (ref 1.8–8)
NEUTS SEG NFR BLD: 70 % (ref 32–75)
NEUTS SEG NFR BLD: 74 % (ref 32–75)
NITRITE UR QL STRIP.AUTO: NEGATIVE
NRBC # BLD: 0 K/UL (ref 0–0.01)
NRBC BLD-RTO: 0 PER 100 WBC
PH UR STRIP: 5.5 (ref 5–8)
PLATELET # BLD AUTO: 324 K/UL (ref 150–400)
PLATELET # BLD AUTO: 328 K/UL (ref 150–400)
PLATELET # BLD AUTO: 360 K/UL (ref 150–400)
PMV BLD AUTO: 10.7 FL (ref 8.9–12.9)
PMV BLD AUTO: 10.8 FL (ref 8.9–12.9)
PMV BLD AUTO: 10.9 FL (ref 8.9–12.9)
POTASSIUM SERPL-SCNC: 4.5 MMOL/L (ref 3.5–5.1)
PROT SERPL-MCNC: 7.9 G/DL (ref 6.4–8.2)
PROT UR STRIP-MCNC: NEGATIVE MG/DL
PROTHROMBIN TIME: 13.7 SEC (ref 9–11.1)
PROTHROMBIN TIME: 13.7 SEC (ref 9–11.1)
RBC # BLD AUTO: 2.51 M/UL (ref 4.1–5.7)
RBC # BLD AUTO: 2.77 M/UL (ref 4.1–5.7)
RBC # BLD AUTO: 2.88 M/UL (ref 4.1–5.7)
RBC #/AREA URNS HPF: NORMAL /HPF (ref 0–5)
RBC MORPH BLD: ABNORMAL
SODIUM SERPL-SCNC: 137 MMOL/L (ref 136–145)
SP GR UR REFRACTOMETRY: 1.02 (ref 1–1.03)
SPECIMEN HOLD: NORMAL
SPECIMEN HOLD: NORMAL
THERAPEUTIC RANGE: ABNORMAL SECS (ref 58–77)
THERAPEUTIC RANGE: ABNORMAL SECS (ref 58–77)
TIBC SERPL-MCNC: 525 UG/DL (ref 250–450)
UFH PPP CHRO-ACNC: 0.31 IU/ML
UFH PPP CHRO-ACNC: <0.1 IU/ML
UROBILINOGEN UR QL STRIP.AUTO: 0.2 EU/DL (ref 0.2–1)
WBC # BLD AUTO: 13.3 K/UL (ref 4.1–11.1)
WBC # BLD AUTO: 14.2 K/UL (ref 4.1–11.1)
WBC # BLD AUTO: 16.1 K/UL (ref 4.1–11.1)
WBC URNS QL MICRO: NORMAL /HPF (ref 0–4)

## 2023-10-12 PROCEDURE — 99215 OFFICE O/P EST HI 40 MIN: CPT | Performed by: NURSE PRACTITIONER

## 2023-10-12 PROCEDURE — P9016 RBC LEUKOCYTES REDUCED: HCPCS

## 2023-10-12 PROCEDURE — 83010 ASSAY OF HAPTOGLOBIN QUANT: CPT

## 2023-10-12 PROCEDURE — 85025 COMPLETE CBC W/AUTO DIFF WBC: CPT

## 2023-10-12 PROCEDURE — 83615 LACTATE (LD) (LDH) ENZYME: CPT

## 2023-10-12 PROCEDURE — 2060000000 HC ICU INTERMEDIATE R&B

## 2023-10-12 PROCEDURE — 85730 THROMBOPLASTIN TIME PARTIAL: CPT

## 2023-10-12 PROCEDURE — 2580000003 HC RX 258: Performed by: HOSPITALIST

## 2023-10-12 PROCEDURE — 71045 X-RAY EXAM CHEST 1 VIEW: CPT

## 2023-10-12 PROCEDURE — 86901 BLOOD TYPING SEROLOGIC RH(D): CPT

## 2023-10-12 PROCEDURE — 86900 BLOOD TYPING SEROLOGIC ABO: CPT

## 2023-10-12 PROCEDURE — 87040 BLOOD CULTURE FOR BACTERIA: CPT

## 2023-10-12 PROCEDURE — 83605 ASSAY OF LACTIC ACID: CPT

## 2023-10-12 PROCEDURE — 86850 RBC ANTIBODY SCREEN: CPT

## 2023-10-12 PROCEDURE — 6360000002 HC RX W HCPCS: Performed by: HOSPITALIST

## 2023-10-12 PROCEDURE — 85610 PROTHROMBIN TIME: CPT

## 2023-10-12 PROCEDURE — 86923 COMPATIBILITY TEST ELECTRIC: CPT

## 2023-10-12 PROCEDURE — 30233N1 TRANSFUSION OF NONAUTOLOGOUS RED BLOOD CELLS INTO PERIPHERAL VEIN, PERCUTANEOUS APPROACH: ICD-10-PCS | Performed by: INTERNAL MEDICINE

## 2023-10-12 PROCEDURE — 85027 COMPLETE CBC AUTOMATED: CPT

## 2023-10-12 PROCEDURE — 94640 AIRWAY INHALATION TREATMENT: CPT

## 2023-10-12 PROCEDURE — 6370000000 HC RX 637 (ALT 250 FOR IP): Performed by: HOSPITALIST

## 2023-10-12 PROCEDURE — 93010 ELECTROCARDIOGRAM REPORT: CPT | Performed by: INTERNAL MEDICINE

## 2023-10-12 PROCEDURE — C9113 INJ PANTOPRAZOLE SODIUM, VIA: HCPCS | Performed by: HOSPITALIST

## 2023-10-12 PROCEDURE — 85520 HEPARIN ASSAY: CPT

## 2023-10-12 PROCEDURE — 81001 URINALYSIS AUTO W/SCOPE: CPT

## 2023-10-12 PROCEDURE — 80053 COMPREHEN METABOLIC PANEL: CPT

## 2023-10-12 PROCEDURE — 99285 EMERGENCY DEPT VISIT HI MDM: CPT

## 2023-10-12 PROCEDURE — 36415 COLL VENOUS BLD VENIPUNCTURE: CPT

## 2023-10-12 PROCEDURE — 83540 ASSAY OF IRON: CPT

## 2023-10-12 PROCEDURE — 93005 ELECTROCARDIOGRAM TRACING: CPT | Performed by: NURSE PRACTITIONER

## 2023-10-12 PROCEDURE — 83550 IRON BINDING TEST: CPT

## 2023-10-12 PROCEDURE — 93750 INTERROGATION VAD IN PERSON: CPT | Performed by: NURSE PRACTITIONER

## 2023-10-12 RX ORDER — SODIUM CHLORIDE 9 MG/ML
INJECTION, SOLUTION INTRAVENOUS PRN
Status: DISCONTINUED | OUTPATIENT
Start: 2023-10-12 | End: 2023-10-16 | Stop reason: HOSPADM

## 2023-10-12 RX ORDER — HEPARIN SODIUM 1000 [USP'U]/ML
2000 INJECTION, SOLUTION INTRAVENOUS; SUBCUTANEOUS PRN
Status: DISCONTINUED | OUTPATIENT
Start: 2023-10-12 | End: 2023-10-16 | Stop reason: HOSPADM

## 2023-10-12 RX ORDER — SODIUM CHLORIDE 0.9 % (FLUSH) 0.9 %
5-40 SYRINGE (ML) INJECTION PRN
Status: DISCONTINUED | OUTPATIENT
Start: 2023-10-12 | End: 2023-10-16 | Stop reason: HOSPADM

## 2023-10-12 RX ORDER — HEPARIN SODIUM 10000 [USP'U]/100ML
5-30 INJECTION, SOLUTION INTRAVENOUS CONTINUOUS
Status: DISPENSED | OUTPATIENT
Start: 2023-10-12 | End: 2023-10-16

## 2023-10-12 RX ORDER — MAGNESIUM OXIDE 400 MG/1
400 TABLET ORAL 2 TIMES DAILY
Status: DISCONTINUED | OUTPATIENT
Start: 2023-10-12 | End: 2023-10-12 | Stop reason: SDUPTHER

## 2023-10-12 RX ORDER — HEPARIN SODIUM 1000 [USP'U]/ML
4000 INJECTION, SOLUTION INTRAVENOUS; SUBCUTANEOUS PRN
Status: DISCONTINUED | OUTPATIENT
Start: 2023-10-12 | End: 2023-10-16 | Stop reason: HOSPADM

## 2023-10-12 RX ORDER — VITAMIN B COMPLEX
2000 TABLET ORAL DAILY
Status: DISCONTINUED | OUTPATIENT
Start: 2023-10-13 | End: 2023-10-16 | Stop reason: HOSPADM

## 2023-10-12 RX ORDER — SODIUM CHLORIDE 9 MG/ML
INJECTION, SOLUTION INTRAVENOUS PRN
Status: DISCONTINUED | OUTPATIENT
Start: 2023-10-12 | End: 2023-10-13

## 2023-10-12 RX ORDER — HEPARIN SODIUM 1000 [USP'U]/ML
4000 INJECTION, SOLUTION INTRAVENOUS; SUBCUTANEOUS ONCE
Status: COMPLETED | OUTPATIENT
Start: 2023-10-12 | End: 2023-10-12

## 2023-10-12 RX ORDER — ACETAMINOPHEN 325 MG/1
650 TABLET ORAL EVERY 6 HOURS PRN
Status: DISCONTINUED | OUTPATIENT
Start: 2023-10-12 | End: 2023-10-16 | Stop reason: HOSPADM

## 2023-10-12 RX ORDER — IPRATROPIUM BROMIDE AND ALBUTEROL SULFATE 2.5; .5 MG/3ML; MG/3ML
1 SOLUTION RESPIRATORY (INHALATION) EVERY 4 HOURS PRN
Status: DISCONTINUED | OUTPATIENT
Start: 2023-10-12 | End: 2023-10-16 | Stop reason: HOSPADM

## 2023-10-12 RX ORDER — SODIUM CHLORIDE 0.9 % (FLUSH) 0.9 %
5-40 SYRINGE (ML) INJECTION EVERY 12 HOURS SCHEDULED
Status: DISCONTINUED | OUTPATIENT
Start: 2023-10-12 | End: 2023-10-16 | Stop reason: HOSPADM

## 2023-10-12 RX ORDER — ONDANSETRON 2 MG/ML
4 INJECTION INTRAMUSCULAR; INTRAVENOUS EVERY 6 HOURS PRN
Status: DISCONTINUED | OUTPATIENT
Start: 2023-10-12 | End: 2023-10-16 | Stop reason: HOSPADM

## 2023-10-12 RX ORDER — ARFORMOTEROL TARTRATE 15 UG/2ML
15 SOLUTION RESPIRATORY (INHALATION)
Status: DISCONTINUED | OUTPATIENT
Start: 2023-10-12 | End: 2023-10-13

## 2023-10-12 RX ORDER — METOPROLOL SUCCINATE 50 MG/1
50 TABLET, EXTENDED RELEASE ORAL DAILY
Status: DISCONTINUED | OUTPATIENT
Start: 2023-10-13 | End: 2023-10-16 | Stop reason: HOSPADM

## 2023-10-12 RX ORDER — ONDANSETRON 4 MG/1
4 TABLET, ORALLY DISINTEGRATING ORAL EVERY 8 HOURS PRN
Status: DISCONTINUED | OUTPATIENT
Start: 2023-10-12 | End: 2023-10-16 | Stop reason: HOSPADM

## 2023-10-12 RX ORDER — BUDESONIDE 0.5 MG/2ML
0.5 INHALANT ORAL
Status: DISCONTINUED | OUTPATIENT
Start: 2023-10-12 | End: 2023-10-13

## 2023-10-12 RX ORDER — LANOLIN ALCOHOL/MO/W.PET/CERES
400 CREAM (GRAM) TOPICAL 2 TIMES DAILY
Status: DISCONTINUED | OUTPATIENT
Start: 2023-10-12 | End: 2023-10-16 | Stop reason: HOSPADM

## 2023-10-12 RX ORDER — ACETAMINOPHEN 650 MG/1
650 SUPPOSITORY RECTAL EVERY 6 HOURS PRN
Status: DISCONTINUED | OUTPATIENT
Start: 2023-10-12 | End: 2023-10-16 | Stop reason: HOSPADM

## 2023-10-12 RX ORDER — IPRATROPIUM BROMIDE AND ALBUTEROL SULFATE 2.5; .5 MG/3ML; MG/3ML
1 SOLUTION RESPIRATORY (INHALATION)
Status: DISCONTINUED | OUTPATIENT
Start: 2023-10-12 | End: 2023-10-13

## 2023-10-12 RX ORDER — HYDRALAZINE HYDROCHLORIDE 20 MG/ML
10 INJECTION INTRAMUSCULAR; INTRAVENOUS EVERY 4 HOURS PRN
Status: DISCONTINUED | OUTPATIENT
Start: 2023-10-12 | End: 2023-10-16 | Stop reason: HOSPADM

## 2023-10-12 RX ADMIN — SACUBITRIL AND VALSARTAN 1 TABLET: 49; 51 TABLET, FILM COATED ORAL at 21:54

## 2023-10-12 RX ADMIN — MAGNESIUM OXIDE TAB 400 MG (241.3 MG ELEMENTAL MG) 400 MG: 400 (241.3 MG) TAB at 21:54

## 2023-10-12 RX ADMIN — SODIUM CHLORIDE, PRESERVATIVE FREE 40 MG: 5 INJECTION INTRAVENOUS at 14:22

## 2023-10-12 RX ADMIN — MAGNESIUM OXIDE TAB 400 MG (241.3 MG ELEMENTAL MG) 400 MG: 400 (241.3 MG) TAB at 14:24

## 2023-10-12 RX ADMIN — IPRATROPIUM BROMIDE AND ALBUTEROL SULFATE 1 DOSE: .5; 3 SOLUTION RESPIRATORY (INHALATION) at 16:10

## 2023-10-12 RX ADMIN — HEPARIN SODIUM 12 UNITS/KG/HR: 10000 INJECTION, SOLUTION INTRAVENOUS at 15:29

## 2023-10-12 RX ADMIN — HEPARIN SODIUM 4000 UNITS: 1000 INJECTION INTRAVENOUS; SUBCUTANEOUS at 15:27

## 2023-10-12 RX ADMIN — SODIUM CHLORIDE, PRESERVATIVE FREE 10 ML: 5 INJECTION INTRAVENOUS at 21:55

## 2023-10-12 ASSESSMENT — PAIN - FUNCTIONAL ASSESSMENT: PAIN_FUNCTIONAL_ASSESSMENT: 0-10

## 2023-10-12 ASSESSMENT — ENCOUNTER SYMPTOMS
VOMITING: 0
NAUSEA: 0
WHEEZING: 1
DIARRHEA: 1
BLOOD IN STOOL: 0
ABDOMINAL DISTENTION: 0
COUGH: 1
SHORTNESS OF BREATH: 1
ABDOMINAL PAIN: 0
SHORTNESS OF BREATH: 1

## 2023-10-12 ASSESSMENT — PATIENT HEALTH QUESTIONNAIRE - PHQ9
SUM OF ALL RESPONSES TO PHQ QUESTIONS 1-9: 0
SUM OF ALL RESPONSES TO PHQ9 QUESTIONS 1 & 2: 0
1. LITTLE INTEREST OR PLEASURE IN DOING THINGS: 0
2. FEELING DOWN, DEPRESSED OR HOPELESS: 0
SUM OF ALL RESPONSES TO PHQ QUESTIONS 1-9: 0

## 2023-10-12 ASSESSMENT — PAIN SCALES - GENERAL
PAINLEVEL_OUTOF10: 0
PAINLEVEL_OUTOF10: 0
PAINLEVEL_OUTOF10: 7

## 2023-10-12 NOTE — PROGRESS NOTES
[address] in [facility]  Mechanical Circulatory Support Clinic Note    Patient name: Lucina Merritt  Patient : 1960  Patient MRN: [id number]  Date of service: 10/12/23    Primary care physician: Alisha Kraus MD  Electrophysiologist: Cristina Neumann MD  LVAD cardiologist: MARJORIE    Chief Complaint   Patient presents with    Shortness of Breath     W/ exertion    Dizziness     When walking    Congestive Heart Failure    Follow-up        ASSESSMENT:  Lucina Merritt is a 61 y.o. male with history of chronic systolic heart failure due to non ischemic cardiomyopathy, s/p HM3 LVAD implantation (2023) as destination therapy, stage D, on optimal GDMT limited by hypovolemia  Not a transplant candidate due to advanced COPD, was declined at [facility] for LVAD and transplant. Next annual LVAD evaluation in 2024    PLAN:  Acute blood loss anemia, with sub therapeutic INR  Hgb trending down off coumadin  Will admit for GI evaluation  Start heparin as bridge to procedures    Heart Failure/Cardiomyopathy: NYHA class IV, due to advanced lung disease  Continue current medical therapy for heart failure:  Beta-blocker: Continue Toprol XL 50 mg daily  ACE/ARB/ARNi: Continue Entresto 49-51 mg BID  MRA: Unable to tolerate due to hypovolemia  SGLT2 inhibitor: Unable to tolerate due to hypovolemia  Diuretic: Does not require diuretics  Reinforced low salt diet  Reinforced fluid restriction to 6 x 8oz glasses per day    LVAD/Anticoagulation:  Continue current device speed of 5400 RPM  No signs infection or stroke.    Recurrent bleeding with reduced INR target to 2-2.5- may need to reduce INR goal further pending GI evaluation   Continue dressing changes three times weekly  Chronic anticoagulation with coumadin- hold coumadin and bridge with heparin   Not on aspirin due to recurrent GI bleed  LVAD/anticoagulation labs: while admitted  S/p Iron infusion x
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
## 2023-10-12 NOTE — PROGRESS NOTES
Being admitted for severe symptomatic anemia, HB 6.6  Patient gave consent for blood transfusion     I have discussed with the patient the rationale for blood component transfusion; its benefits in treating or preventing fatigue, organ damage, or death; and its risk which includes mild transfusion reactions, rare risk of blood borne infection, or more serious but rare reactions. I have discussed the alternatives to transfusion, including the risk and consequences of not receiving transfusion. The patient had an opportunity to ask questions and had agreed to proceed with transfusion of blood components.
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
## 2023-10-12 NOTE — PROGRESS NOTES
1930  Verbal bedside report given to MICHAELA Lopez and Alan James, RN oncoming nurse by Melani Nguyen. Julissa Ellington, RN off-going nurse. Report included current pt status and condition, recent results, hx of present illness, heart rate and rhythm (NSR), and respiratory status. 1900  Changed drive line dressing, loose edges, pet hair embedded in adhesive, grey discolouration of dressing edges, adhesive worn and loose. Discussed need for assiduous adherence to sterile technique and drive line dressing change schedule. Pt says he is not sure when the dressing was last changed but he thinks that he probably averages dressing changes every 5 days. High and urgent need for education regarding paramount importance of general personal hygiene and LVAD specific antiseptic hygiene requirements. 1800  Patient arrived on CVSU via stretcher. Patient oriented to room and call bell, vital signs obtained. Opportunity for questions and clarification was provided. Assessment completed, rate and rhythm documented. Pt arrived with 4 batteries, 4 clips, controller on pt, backup in bag.    1700  Verbal report received by CEDRIC Ellington RN from [facility], [address] [place] [address]  on pt incoming from ED for progression of care. Report consisted of SBAR, Kardex, ED Summary and Cardiac Rhythm.

## 2023-10-12 NOTE — TELEPHONE ENCOUNTER
ANGELA RN received call from [address] [place] [address] with alert lab value: Spoke with Brayan at [facility]:    Hgb - 7.0    Patient is in [facility] [place] ED at this time; ANGELA NP ADILENE Maurer aware.

## 2023-10-12 NOTE — PROGRESS NOTES
We received the consultation request for this patient, but he is an established patient of [address] [place] [address]. I have forwarded the request to them to see.       TAIWO Norton NP

## 2023-10-12 NOTE — ED NOTES
TRANSFER - OUT REPORT:    Verbal report given to Gris on Azul Bui  being transferred to CVSU for routine progression of patient care       Report consisted of patient's Situation, Background, Assessment and   Recommendations(SBAR). Information from the following report(s) Nurse Handoff Report, Index, ED Encounter Summary, ED SBAR, and Adult Overview was reviewed with the receiving nurse. [place] Assessment:    Presents to emergency department  because of falls (Syncope, seizure, or loss of consciousness): No  Age > 70: No  Altered Mental Status, Intoxication with alcohol or substance confusion (Disorientation, impaired judgment, poor safety awaremess, or inability to follow instructions): No  Impaired Mobility: Ambulates or transfers with assistive devices or assistance; Unable to ambulate or transer.: Yes             Lines:   Peripheral IV 10/12/23 Left Antecubital (Active)   Site Assessment Clean, dry & intact 10/12/23 1024   Line Status Blood return noted;Specimen collected; Flushed 10/12/23 1024   Line Care Connections checked and tightened 10/12/23 1024   Phlebitis Assessment No symptoms 10/12/23 1024   Infiltration Assessment 0 10/12/23 1024   Alcohol Cap Used Yes 10/12/23 1024   Dressing Status New dressing applied;Clean, dry & intact 10/12/23 1024   Dressing Type Transparent 10/12/23 1024   Dressing Intervention New 10/12/23 1024       Peripheral IV 10/12/23 Posterior;Right Forearm (Active)   Site Assessment Clean, dry & intact 10/12/23 1420   Line Status Blood return noted; Flushed;Normal saline locked;Specimen collected 10/12/23 1420   Line Care Connections checked and tightened 10/12/23 1420   Phlebitis Assessment No symptoms 10/12/23 1420   Infiltration Assessment 0 10/12/23 1420   Dressing Status Clean, dry & intact; New dressing applied 10/12/23 1420   Dressing Type Transparent 10/12/23 1420   Dressing Intervention New 10/12/23 1420        Opportunity for questions and clarification was

## 2023-10-12 NOTE — PROGRESS NOTES
[address] in [address]      LVAD  LVAD Type[de-identified] Left Ventricular Assist Device (LVAD)  Pump Speed (rpm): 5400  Pump Flow (lpm): 3.5  MAP (mmHg): 90 (o2 100% no palpable pulse)  Set Low Speed (rpm): 5400  Pump Pulse Index (PI): 3  Pump Power (Mei): 4.1  Battery Life Checked: Yes  Backup Controller Present: Yes  Driveline Dressing: Clean, Dry and Intact  Outpatient: Yes  MAP in Therapeutic Range (Outpatient): Yes       Ana Clement was seen today in clinic for a visit with Christian Castrejon NP. Patient denied s/s of driveline infection or driveline trauma (including  drops). Driveline inspected for integrity. Patient notes one \"quick\" low flow alarm a few days ago. Reports diarrhea since iron infusion yesterday. Hemoglobin drop reviewed with Christian Castrejon who recommended VORB patient be taken to the ER for evaluation. Patient noted he did get med shipment yesterday however several were not sent with shipment. Above reported to provider. LVAD interrogation completed in clinic, results reported to provider. See flow sheet for details. All orders entered per VORB. All provider instructions placed in AVS and reviewed with patient. Educated the patient on importance of notifying [facility] promptly for any LVAD alarms and if unable to obtain medications for any length of time. He verbalized understanding. reviewed questions. Patient verbalized understanding, denied questions at end of visit. Patient escorted to ER waiting room and signed in.

## 2023-10-12 NOTE — ED TRIAGE NOTES
TRIAGE NOTE:  Patient arrives from the LVAD office with low hemoglobin, 7.0. Patient denies any bloody stool, + dizziness, + wheezing with movement.

## 2023-10-12 NOTE — ED NOTES
10:09 AM  I have evaluated the patient as the Provider in Rapid Medical Evaluation (RME). I have reviewed his vital signs and the triage nurse assessment. I have talked with the patient and any available family and advised that I am the provider in triage and have ordered the appropriate study to initiate their work up based on the clinical presentation during my assessment. I have advised that the patient will be accommodated in the Main ED as soon as possible. I have also requested to contact the triage nurse or myself immediately if the patient experiences any changes in their condition during this brief waiting period. 51-year-old male with past medical history of asthma, cardiac arrest, CHF, CKD presents with low hemoglobin. Patient had his labs done yesterday at [facility].  Patient reports to feeling dizziness for the last 3-4 days and intermittent wheezing. No other symptoms at this time. Denies blood in stool.     UVALDO Campbell PA-C  10/12/23 1012

## 2023-10-13 ENCOUNTER — ANESTHESIA EVENT (OUTPATIENT)
Facility: HOSPITAL | Age: 63
End: 2023-10-13
Payer: MEDICARE

## 2023-10-13 ENCOUNTER — ANESTHESIA (OUTPATIENT)
Facility: HOSPITAL | Age: 63
End: 2023-10-13
Payer: MEDICARE

## 2023-10-13 PROBLEM — Z95.811 LVAD (LEFT VENTRICULAR ASSIST DEVICE) PRESENT (HCC): Status: ACTIVE | Noted: 2023-10-13

## 2023-10-13 PROBLEM — K92.2 UPPER GI BLEED: Status: ACTIVE | Noted: 2023-10-13

## 2023-10-13 LAB
ALBUMIN SERPL-MCNC: 3.3 G/DL (ref 3.5–5)
ALBUMIN/GLOB SERPL: 0.9 (ref 1.1–2.2)
ALP SERPL-CCNC: 93 U/L (ref 45–117)
ALT SERPL-CCNC: 26 U/L (ref 12–78)
ANION GAP SERPL CALC-SCNC: 3 MMOL/L (ref 5–15)
AST SERPL-CCNC: 28 U/L (ref 15–37)
BILIRUB DIRECT SERPL-MCNC: 0.1 MG/DL (ref 0–0.2)
BILIRUB SERPL-MCNC: 0.5 MG/DL (ref 0.2–1)
BUN SERPL-MCNC: 28 MG/DL (ref 6–20)
BUN/CREAT SERPL: 13 (ref 12–20)
CALCIUM SERPL-MCNC: 9.1 MG/DL (ref 8.5–10.1)
CHLORIDE SERPL-SCNC: 105 MMOL/L (ref 97–108)
CO2 SERPL-SCNC: 25 MMOL/L (ref 21–32)
CREAT SERPL-MCNC: 2.24 MG/DL (ref 0.7–1.3)
ERYTHROCYTE [DISTWIDTH] IN BLOOD BY AUTOMATED COUNT: 16.2 % (ref 11.5–14.5)
FERRITIN SERPL-MCNC: 164 NG/ML (ref 26–388)
GLOBULIN SER CALC-MCNC: 3.7 G/DL (ref 2–4)
GLUCOSE SERPL-MCNC: 92 MG/DL (ref 65–100)
HCT VFR BLD AUTO: 22.1 % (ref 36.6–50.3)
HCT VFR BLD AUTO: 26.4 % (ref 36.6–50.3)
HGB BLD-MCNC: 6.9 G/DL (ref 12.1–17)
HGB BLD-MCNC: 8.6 G/DL (ref 12.1–17)
HISTORY CHECK: NORMAL
INR PPP: 1.4 (ref 0.9–1.1)
IRON SATN MFR SERPL: 21 % (ref 20–50)
IRON SERPL-MCNC: 96 UG/DL (ref 35–150)
LDH SERPL L TO P-CCNC: 229 U/L (ref 85–241)
MAGNESIUM SERPL-MCNC: 2.1 MG/DL (ref 1.6–2.4)
MCH RBC QN AUTO: 25.1 PG (ref 26–34)
MCHC RBC AUTO-ENTMCNC: 31.2 G/DL (ref 30–36.5)
MCV RBC AUTO: 80.4 FL (ref 80–99)
NRBC # BLD: 0.02 K/UL (ref 0–0.01)
NRBC BLD-RTO: 0.2 PER 100 WBC
PLATELET # BLD AUTO: 312 K/UL (ref 150–400)
PMV BLD AUTO: 10.7 FL (ref 8.9–12.9)
POTASSIUM SERPL-SCNC: 4.2 MMOL/L (ref 3.5–5.1)
PROT SERPL-MCNC: 7 G/DL (ref 6.4–8.2)
PROTHROMBIN TIME: 14.2 SEC (ref 9–11.1)
RBC # BLD AUTO: 2.75 M/UL (ref 4.1–5.7)
SODIUM SERPL-SCNC: 133 MMOL/L (ref 136–145)
TIBC SERPL-MCNC: 450 UG/DL (ref 250–450)
UFH PPP CHRO-ACNC: 0.27 IU/ML
UFH PPP CHRO-ACNC: 0.29 IU/ML
WBC # BLD AUTO: 12.1 K/UL (ref 4.1–11.1)

## 2023-10-13 PROCEDURE — 0W3P8ZZ CONTROL BLEEDING IN GASTROINTESTINAL TRACT, VIA NATURAL OR ARTIFICIAL OPENING ENDOSCOPIC: ICD-10-PCS | Performed by: INTERNAL MEDICINE

## 2023-10-13 PROCEDURE — 80048 BASIC METABOLIC PNL TOTAL CA: CPT

## 2023-10-13 PROCEDURE — 36415 COLL VENOUS BLD VENIPUNCTURE: CPT

## 2023-10-13 PROCEDURE — P9016 RBC LEUKOCYTES REDUCED: HCPCS

## 2023-10-13 PROCEDURE — 99233 SBSQ HOSP IP/OBS HIGH 50: CPT | Performed by: INTERNAL MEDICINE

## 2023-10-13 PROCEDURE — 3600007512: Performed by: INTERNAL MEDICINE

## 2023-10-13 PROCEDURE — 2580000003 HC RX 258: Performed by: HOSPITALIST

## 2023-10-13 PROCEDURE — 36430 TRANSFUSION BLD/BLD COMPNT: CPT

## 2023-10-13 PROCEDURE — 7100000011 HC PHASE II RECOVERY - ADDTL 15 MIN: Performed by: INTERNAL MEDICINE

## 2023-10-13 PROCEDURE — 3700000000 HC ANESTHESIA ATTENDED CARE: Performed by: INTERNAL MEDICINE

## 2023-10-13 PROCEDURE — 2500000003 HC RX 250 WO HCPCS: Performed by: NURSE ANESTHETIST, CERTIFIED REGISTERED

## 2023-10-13 PROCEDURE — 2709999900 HC NON-CHARGEABLE SUPPLY: Performed by: INTERNAL MEDICINE

## 2023-10-13 PROCEDURE — 85018 HEMOGLOBIN: CPT

## 2023-10-13 PROCEDURE — C9113 INJ PANTOPRAZOLE SODIUM, VIA: HCPCS | Performed by: HOSPITALIST

## 2023-10-13 PROCEDURE — 3700000001 HC ADD 15 MINUTES (ANESTHESIA): Performed by: INTERNAL MEDICINE

## 2023-10-13 PROCEDURE — 85520 HEPARIN ASSAY: CPT

## 2023-10-13 PROCEDURE — 85027 COMPLETE CBC AUTOMATED: CPT

## 2023-10-13 PROCEDURE — 6360000002 HC RX W HCPCS: Performed by: NURSE ANESTHETIST, CERTIFIED REGISTERED

## 2023-10-13 PROCEDURE — 85610 PROTHROMBIN TIME: CPT

## 2023-10-13 PROCEDURE — A4216 STERILE WATER/SALINE, 10 ML: HCPCS | Performed by: HOSPITALIST

## 2023-10-13 PROCEDURE — 7100000010 HC PHASE II RECOVERY - FIRST 15 MIN: Performed by: INTERNAL MEDICINE

## 2023-10-13 PROCEDURE — 2060000000 HC ICU INTERMEDIATE R&B

## 2023-10-13 PROCEDURE — 83615 LACTATE (LD) (LDH) ENZYME: CPT

## 2023-10-13 PROCEDURE — 82728 ASSAY OF FERRITIN: CPT

## 2023-10-13 PROCEDURE — 83540 ASSAY OF IRON: CPT

## 2023-10-13 PROCEDURE — 6360000002 HC RX W HCPCS: Performed by: HOSPITALIST

## 2023-10-13 PROCEDURE — 6370000000 HC RX 637 (ALT 250 FOR IP): Performed by: HOSPITALIST

## 2023-10-13 PROCEDURE — 83735 ASSAY OF MAGNESIUM: CPT

## 2023-10-13 PROCEDURE — 93750 INTERROGATION VAD IN PERSON: CPT | Performed by: INTERNAL MEDICINE

## 2023-10-13 PROCEDURE — 80076 HEPATIC FUNCTION PANEL: CPT

## 2023-10-13 PROCEDURE — 83550 IRON BINDING TEST: CPT

## 2023-10-13 PROCEDURE — 3600007502: Performed by: INTERNAL MEDICINE

## 2023-10-13 PROCEDURE — 85014 HEMATOCRIT: CPT

## 2023-10-13 PROCEDURE — APPNB30 APP NON BILLABLE TIME 0-30 MINS: Performed by: NURSE PRACTITIONER

## 2023-10-13 PROCEDURE — 2720000010 HC SURG SUPPLY STERILE: Performed by: INTERNAL MEDICINE

## 2023-10-13 RX ORDER — SODIUM CHLORIDE 9 MG/ML
25 INJECTION, SOLUTION INTRAVENOUS PRN
Status: DISCONTINUED | OUTPATIENT
Start: 2023-10-13 | End: 2023-10-13 | Stop reason: HOSPADM

## 2023-10-13 RX ORDER — SODIUM CHLORIDE 9 MG/ML
INJECTION, SOLUTION INTRAVENOUS PRN
Status: DISCONTINUED | OUTPATIENT
Start: 2023-10-13 | End: 2023-10-13

## 2023-10-13 RX ORDER — SODIUM CHLORIDE 0.9 % (FLUSH) 0.9 %
5-40 SYRINGE (ML) INJECTION EVERY 12 HOURS SCHEDULED
Status: DISCONTINUED | OUTPATIENT
Start: 2023-10-13 | End: 2023-10-13 | Stop reason: HOSPADM

## 2023-10-13 RX ORDER — SODIUM CHLORIDE 9 MG/ML
INJECTION, SOLUTION INTRAVENOUS CONTINUOUS
Status: DISCONTINUED | OUTPATIENT
Start: 2023-10-13 | End: 2023-10-13

## 2023-10-13 RX ORDER — LIDOCAINE HYDROCHLORIDE 20 MG/ML
INJECTION, SOLUTION EPIDURAL; INFILTRATION; INTRACAUDAL; PERINEURAL PRN
Status: DISCONTINUED | OUTPATIENT
Start: 2023-10-13 | End: 2023-10-13 | Stop reason: SDUPTHER

## 2023-10-13 RX ORDER — PHENYLEPHRINE HCL IN 0.9% NACL 0.4MG/10ML
SYRINGE (ML) INTRAVENOUS PRN
Status: DISCONTINUED | OUTPATIENT
Start: 2023-10-13 | End: 2023-10-13 | Stop reason: SDUPTHER

## 2023-10-13 RX ORDER — ARFORMOTEROL TARTRATE 15 UG/2ML
15 SOLUTION RESPIRATORY (INHALATION)
Status: DISCONTINUED | OUTPATIENT
Start: 2023-10-13 | End: 2023-10-15

## 2023-10-13 RX ORDER — SODIUM CHLORIDE 0.9 % (FLUSH) 0.9 %
5-40 SYRINGE (ML) INJECTION PRN
Status: DISCONTINUED | OUTPATIENT
Start: 2023-10-13 | End: 2023-10-13 | Stop reason: HOSPADM

## 2023-10-13 RX ORDER — BUDESONIDE 0.5 MG/2ML
0.5 INHALANT ORAL
Status: DISCONTINUED | OUTPATIENT
Start: 2023-10-13 | End: 2023-10-15

## 2023-10-13 RX ORDER — IPRATROPIUM BROMIDE AND ALBUTEROL SULFATE 2.5; .5 MG/3ML; MG/3ML
1 SOLUTION RESPIRATORY (INHALATION)
Status: DISCONTINUED | OUTPATIENT
Start: 2023-10-13 | End: 2023-10-15

## 2023-10-13 RX ADMIN — PROPOFOL 20 MG: 10 INJECTION, EMULSION INTRAVENOUS at 13:00

## 2023-10-13 RX ADMIN — MAGNESIUM OXIDE TAB 400 MG (241.3 MG ELEMENTAL MG) 400 MG: 400 (241.3 MG) TAB at 22:34

## 2023-10-13 RX ADMIN — PROPOFOL 20 MG: 10 INJECTION, EMULSION INTRAVENOUS at 13:01

## 2023-10-13 RX ADMIN — PROPOFOL 20 MG: 10 INJECTION, EMULSION INTRAVENOUS at 12:56

## 2023-10-13 RX ADMIN — Medication 100 MCG: at 12:57

## 2023-10-13 RX ADMIN — PROPOFOL 20 MG: 10 INJECTION, EMULSION INTRAVENOUS at 13:04

## 2023-10-13 RX ADMIN — Medication 100 MCG: at 13:12

## 2023-10-13 RX ADMIN — PROPOFOL 20 MG: 10 INJECTION, EMULSION INTRAVENOUS at 13:12

## 2023-10-13 RX ADMIN — SODIUM CHLORIDE, PRESERVATIVE FREE 40 MG: 5 INJECTION INTRAVENOUS at 16:35

## 2023-10-13 RX ADMIN — PROPOFOL 20 MG: 10 INJECTION, EMULSION INTRAVENOUS at 13:06

## 2023-10-13 RX ADMIN — PROPOFOL 20 MG: 10 INJECTION, EMULSION INTRAVENOUS at 13:14

## 2023-10-13 RX ADMIN — MAGNESIUM OXIDE TAB 400 MG (241.3 MG ELEMENTAL MG) 400 MG: 400 (241.3 MG) TAB at 09:44

## 2023-10-13 RX ADMIN — PROPOFOL 20 MG: 10 INJECTION, EMULSION INTRAVENOUS at 13:02

## 2023-10-13 RX ADMIN — PROPOFOL 20 MG: 10 INJECTION, EMULSION INTRAVENOUS at 13:03

## 2023-10-13 RX ADMIN — HEPARIN SODIUM 2000 UNITS: 1000 INJECTION INTRAVENOUS; SUBCUTANEOUS at 04:51

## 2023-10-13 RX ADMIN — Medication 100 MCG: at 13:15

## 2023-10-13 RX ADMIN — SODIUM CHLORIDE, PRESERVATIVE FREE 10 ML: 5 INJECTION INTRAVENOUS at 09:44

## 2023-10-13 RX ADMIN — METOPROLOL SUCCINATE 50 MG: 50 TABLET, EXTENDED RELEASE ORAL at 09:44

## 2023-10-13 RX ADMIN — PROPOFOL 20 MG: 10 INJECTION, EMULSION INTRAVENOUS at 13:10

## 2023-10-13 RX ADMIN — PROPOFOL 40 MG: 10 INJECTION, EMULSION INTRAVENOUS at 12:58

## 2023-10-13 RX ADMIN — PROPOFOL 20 MG: 10 INJECTION, EMULSION INTRAVENOUS at 13:09

## 2023-10-13 RX ADMIN — PROPOFOL 20 MG: 10 INJECTION, EMULSION INTRAVENOUS at 13:08

## 2023-10-13 RX ADMIN — SACUBITRIL AND VALSARTAN 1 TABLET: 49; 51 TABLET, FILM COATED ORAL at 09:44

## 2023-10-13 RX ADMIN — PROPOFOL 60 MG: 10 INJECTION, EMULSION INTRAVENOUS at 12:54

## 2023-10-13 RX ADMIN — Medication 100 MCG: at 13:18

## 2023-10-13 RX ADMIN — Medication 100 MCG: at 13:09

## 2023-10-13 RX ADMIN — PROPOFOL 20 MG: 10 INJECTION, EMULSION INTRAVENOUS at 12:57

## 2023-10-13 RX ADMIN — Medication 100 MCG: at 13:21

## 2023-10-13 RX ADMIN — HEPARIN SODIUM 14 UNITS/KG/HR: 10000 INJECTION, SOLUTION INTRAVENOUS at 22:34

## 2023-10-13 RX ADMIN — SODIUM CHLORIDE, PRESERVATIVE FREE 10 ML: 5 INJECTION INTRAVENOUS at 22:35

## 2023-10-13 RX ADMIN — Medication 200 MCG: at 12:54

## 2023-10-13 RX ADMIN — PROPOFOL 20 MG: 10 INJECTION, EMULSION INTRAVENOUS at 13:07

## 2023-10-13 RX ADMIN — Medication 100 MCG: at 13:06

## 2023-10-13 RX ADMIN — PROPOFOL 20 MG: 10 INJECTION, EMULSION INTRAVENOUS at 13:20

## 2023-10-13 RX ADMIN — LIDOCAINE HYDROCHLORIDE 100 MG: 20 INJECTION, SOLUTION EPIDURAL; INFILTRATION; INTRACAUDAL; PERINEURAL at 12:54

## 2023-10-13 RX ADMIN — PROPOFOL 20 MG: 10 INJECTION, EMULSION INTRAVENOUS at 13:16

## 2023-10-13 RX ADMIN — PROPOFOL 40 MG: 10 INJECTION, EMULSION INTRAVENOUS at 12:55

## 2023-10-13 RX ADMIN — Medication 100 MCG: at 13:03

## 2023-10-13 RX ADMIN — PROPOFOL 20 MG: 10 INJECTION, EMULSION INTRAVENOUS at 12:59

## 2023-10-13 RX ADMIN — Medication 100 MCG: at 13:00

## 2023-10-13 RX ADMIN — Medication 2000 UNITS: at 09:44

## 2023-10-13 RX ADMIN — PROPOFOL 20 MG: 10 INJECTION, EMULSION INTRAVENOUS at 13:18

## 2023-10-13 RX ADMIN — SODIUM CHLORIDE, PRESERVATIVE FREE 40 MG: 5 INJECTION INTRAVENOUS at 02:24

## 2023-10-13 RX ADMIN — PROPOFOL 20 MG: 10 INJECTION, EMULSION INTRAVENOUS at 13:05

## 2023-10-13 ASSESSMENT — ENCOUNTER SYMPTOMS
DIARRHEA: 1
SHORTNESS OF BREATH: 1
VOMITING: 0
WHEEZING: 1
NAUSEA: 0
ABDOMINAL DISTENTION: 0
BLOOD IN STOOL: 0
COUGH: 1

## 2023-10-13 NOTE — PROGRESS NOTES
[address] in [facility] [place]        Procedure: Push Enteroscopy Procedure      Pre Procedure: 1457  Speed: 5400  Flow: 3.8  MAP: 80  PI: 4.8  Power: 4      Fixed Speed:5000  Low Speed: 5400     Post Procedure 4131  Speed:5400  Flow: 4.2   MAP: 70   PI: 2.3   Power:3.9     VAD Coordinator managed LVAD equipment during procedure.

## 2023-10-13 NOTE — ANESTHESIA POSTPROCEDURE EVALUATION
Department of Anesthesiology  Postprocedure Note    Patient: Evan Nagy  MRN: [id number]  YOB: 1960  Date of evaluation: 10/13/2023      Procedure Summary     Date: 10/13/23 Room / Location: [address] ENDO 06 / [address] ENDOSCOPY    Anesthesia Start: 2526 Anesthesia Stop: 3213    Procedure: EGD ESOPHAGOGASTRODUODENOSCOPY (LVAD) (Upper GI Region) Diagnosis:       Anemia, unspecified type      (Anemia, unspecified type [D64.9])    Surgeons: Marin Hogue MD Responsible Provider: Darrell Anthony MD    Anesthesia Type: MAC ASA Status: 4          Anesthesia Type: MAC    Kelvin Phase I: Kelvin Score: 9    Kelvin Phase II:        Anesthesia Post Evaluation    Patient location during evaluation: PACU  Patient participation: complete - patient participated  Level of consciousness: awake  Pain score: 0  Airway patency: patent  Nausea & Vomiting: no nausea  Complications: no  Cardiovascular status: blood pressure returned to baseline and hemodynamically stable  Respiratory status: acceptable  Hydration status: stable  Pain management: adequate and satisfactory to patient

## 2023-10-13 NOTE — PROGRESS NOTES
Per family pt with hx of yeast in urine - has old urinary stent in place.  Respiratory cx from 11/24 and 11/25 growing yeast .   - cont diflucan for now 200 qd x 10 days  - Today is 10th dose, ID recs continuing until 12/7.    Verified patient name and date of birth, scheduled procedure, and informed consent. Reviewed general discharge instructions and  information. Assessed patient. Awake, alert, and oriented per baseline. Vital signs stable (see vital sign flowsheet). Respiratory status within defined limits, abdomen soft and non tender. Skin with in defined limits. Initial RN admission and assessment performed and documented in Endoscopy navigator. Patient evaluated by anesthesia in pre-procedure holding. All procedural vital signs, airway assessment, and level of consciousness information monitored and recorded by anesthesia staff on the anesthesia record. Report received from CRNA post procedure. Patient transported to recovery area by RN. Endoscope was pre-cleaned at bedside immediately following procedure by Tulio Roy. Endoscopy recovery  Patient returned to baseline, vital signs stable (see vital sign flowsheet). Patient offered liquids and tolerated well. Respiratory status within defined limits. Abdomen soft not tender. Skin with in defined limits. Responsible party driving patient home was given the opportunity to ask questions. Patient discharged with documented belongings.

## 2023-10-13 NOTE — PROGRESS NOTES
Charting and patient care of Evan Nagy by Isela Luong RN from 1561 to 0800 was supervised and reviewed by this RN.

## 2023-10-13 NOTE — PROGRESS NOTES
10/12/23  1022 10/13/23  0235    133*   K 4.5 4.2    105   CO2 26 25   BUN 31* 28*   MG  --  2.1     Recent Labs     10/12/23  1022 10/13/23  0235   ALT 31 26   GLOB 4.1* 3.7     Recent Labs     10/12/23  1022 10/12/23  1417 10/13/23  0235   INR 1.3* 1.3* 1.4*   APTT 33.4* 31.1*  --       Recent Labs     10/12/23  1022 10/13/23  0233   TIBC 525* 450      No results found for: \"FOL\", \"RBCF\"   No results for input(s): \"PH\", \"PCO2\", \"PO2\" in the last 72 hours. No results for input(s): \"CPK\" in the last 72 hours.     Invalid input(s): \"CPKMB\", \"CKNDX\", \"TROIQ\"  No results found for: \"CHOL\", \"CHOLX\", \"CHLST\", \"CHOLV\", \"HDL\", \"HDLC\", \"LDL\", \"LDLC\", \"TGLX\", \"TRIGL\"  No results found for: \"GLUCPOC\"  [unfilled]      Medications Reviewed:     Current Facility-Administered Medications   Medication Dose Route Frequency    0.9 % sodium chloride infusion   IntraVENous PRN    budesonide (PULMICORT) nebulizer suspension 500 mcg  0.5 mg Nebulization BID RT    And    ipratropium 0.5 mg-albuterol 2.5 mg (DUONEB) nebulizer solution 1 Dose  1 Dose Inhalation BID RT    And    arformoterol tartrate (BROVANA) nebulizer solution 15 mcg  15 mcg Nebulization BID RT    0.9 % sodium chloride infusion   IntraVENous PRN    sodium chloride flush 0.9 % injection 5-40 mL  5-40 mL IntraVENous 2 times per day    sodium chloride flush 0.9 % injection 5-40 mL  5-40 mL IntraVENous PRN    0.9 % sodium chloride infusion   IntraVENous PRN    ondansetron (ZOFRAN-ODT) disintegrating tablet 4 mg  4 mg Oral Q8H PRN    Or    ondansetron (ZOFRAN) injection 4 mg  4 mg IntraVENous Q6H PRN    acetaminophen (TYLENOL) tablet 650 mg  650 mg Oral Q6H PRN    Or    acetaminophen (TYLENOL) suppository 650 mg  650 mg Rectal Q6H PRN    pantoprazole (PROTONIX) 40 mg in sodium chloride (PF) 0.9 % 10 mL injection  40 mg IntraVENous Q12H    metoprolol succinate (TOPROL XL) extended release tablet 50 mg  50 mg Oral Daily    [Held by provider] sacubitril-valsartan (ENTRESTO) 49-51 MG per tablet 1 tablet  1 tablet Oral BID    Vitamin D (CHOLECALCIFEROL) tablet 2,000 Units  2,000 Units Oral Daily    ipratropium 0.5 mg-albuterol 2.5 mg (DUONEB) nebulizer solution 1 Dose  1 Dose Inhalation Q4H PRN    heparin (porcine) injection 4,000 Units  4,000 Units IntraVENous PRN    heparin (porcine) injection 2,000 Units  2,000 Units IntraVENous PRN    heparin 25,000 units in dextrose 5% 250 mL (premix) infusion  5-30 Units/kg/hr IntraVENous Continuous    magnesium oxide (MAG-OX) tablet 400 mg  400 mg Oral BID    hydrALAZINE (APRESOLINE) injection 10 mg  10 mg IntraVENous Q4H PRN     Facility-Administered Medications Ordered in Other Encounters   Medication Dose Route Frequency    alteplase (CATHFLO) 2 mg in sterile water 2 mL injection  2 mg IntraCATHeter PRN    0.9 % sodium chloride infusion   IntraVENous Continuous    EPINEPHrine PF 1 MG/ML injection (Anaphylaxis) 0.3 mg  0.3 mg IntraMUSCular PRN    albuterol sulfate HFA (PROVENTIL;VENTOLIN;PROAIR) 108 (90 Base) MCG/ACT inhaler 4 puff  4 puff Inhalation PRN     ______________________________________________________________________  EXPECTED LENGTH OF STAY: 5  ACTUAL LENGTH OF STAY:          1                 Aniket Robertson MD

## 2023-10-13 NOTE — OP NOTE
[address], [address]  [phone]           Push Enteroscopy Procedure Note    Merline Piccolo  1960  [id number]    Indication: Recurrent anemia, h/o AVMs (s/p LVAD)    : Ira Purdy MD    Staff: Circulator: Bi Yepez RN  Endoscopy Technician: Lydia Schaumann     Referring Provider: Arpita Lopez MD    Anesthesia/Sedation:  MAC anesthesia      Procedure Details     After infomed consent was obtained for the procedure, with all risks and benefits of procedure explained the patient was taken to the endoscopy suite and placed in the left lateral decubitus position. Following sequential administration of sedation as per above, the endoscope was inserted into the mouth and advanced under direct vision to mid-jejunum. A careful inspection was made as the gastroscope was withdrawn, including a retroflexed view of the proximal stomach; findings and interventions are described below. Findings:   Esophagus:normal  Stomach: 1 angioectasia(s) 3 mm in size located in   body  Duodenum/jejunum: normal - no obvious blood or AVMs    Therapies:  Argon plasma Coagulation of gastric AVM    Specimens: * No specimens in log *            EBL: Minimal    Complications:  None; patient tolerated the procedure well. Impression:      -Single small, 3 mm, non-bleeding AVM found in the mid gastric body - treated with APC  -Otherwise negative exam - no AVMs, blood or other source of bleeding. Recommendations:  -Return patient to hospital floor for ongoing care  -Resume normal medications  -Okay to start heparin drip today and resume coumadin in am if no obvious bleeding. -Advance diet as tolerated  -Will sign off. Please call back if any further questions/concerns.        Ira Purdy MD  10/13/2023  1:25 PM
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
## 2023-10-13 NOTE — PLAN OF CARE
2000:  Bedside shift change report given to Huma Sellers RN (oncoming nurse) by Inez Jj RN (offgoing nurse). Report included the following information Nurse Handoff Report, Index, Adult Overview, Intake/Output, MAR, and Recent Results. 0730: Bedside shift change report given to [address], RN (oncoming nurse) by Huma Sellers RN (offgoing nurse). Report included the following information Nurse Handoff Report, Index, Adult Overview, Intake/Output, MAR, and Recent Results.      Problem: Discharge Planning  Goal: Discharge to home or other facility with appropriate resources  Outcome: Progressing  Flowsheets (Taken 10/12/2023 2059)  Discharge to home or other facility with appropriate resources: Identify barriers to discharge with patient and caregiver     Problem: Safety - Adult  Goal: Free from fall injury  Outcome: Progressing     Problem: Pain  Goal: Verbalizes/displays adequate comfort level or baseline comfort level  Outcome: Progressing  Flowsheets (Taken 10/12/2023 2059)  Verbalizes/displays adequate comfort level or baseline comfort level: Encourage patient to monitor pain and request assistance

## 2023-10-13 NOTE — PROGRESS NOTES
0730: Bedside shift change report given to Georgi Christina RN (oncoming nurse) by Martín Staples RN (offgoing nurse). Report included the following information Nurse Handoff Report, MAR, and Recent Results. 4046: Heparin drip turned off per order. 0935: Patient refusing bed alarm. Patient educated on importance of calling for assistance when needed. 1200: Patient down to Endoscopy. 1400: Patient returned from Endoscopy. 1639: Heparin drip restarted per order, see MAR.    1930: Bedside shift change report given to Piedad Lin and Raquel Martins, Keke (oncoming nurse) by Georgi Christina RN (offgoing nurse). Report included the following information Nurse Handoff Report, MAR, and Recent Results.

## 2023-10-14 LAB
ABO + RH BLD: NORMAL
ALBUMIN SERPL-MCNC: 3 G/DL (ref 3.5–5)
ALBUMIN/GLOB SERPL: 0.9 (ref 1.1–2.2)
ALP SERPL-CCNC: 99 U/L (ref 45–117)
ALT SERPL-CCNC: 24 U/L (ref 12–78)
ANION GAP SERPL CALC-SCNC: 5 MMOL/L (ref 5–15)
AST SERPL-CCNC: 24 U/L (ref 15–37)
BILIRUB DIRECT SERPL-MCNC: <0.1 MG/DL (ref 0–0.2)
BILIRUB SERPL-MCNC: 0.3 MG/DL (ref 0.2–1)
BLD PROD TYP BPU: NORMAL
BLD PROD TYP BPU: NORMAL
BLOOD BANK DISPENSE STATUS: NORMAL
BLOOD BANK DISPENSE STATUS: NORMAL
BLOOD GROUP ANTIBODIES SERPL: NORMAL
BPU ID: NORMAL
BPU ID: NORMAL
BUN SERPL-MCNC: 26 MG/DL (ref 6–20)
BUN/CREAT SERPL: 13 (ref 12–20)
CALCIUM SERPL-MCNC: 8.4 MG/DL (ref 8.5–10.1)
CHLORIDE SERPL-SCNC: 108 MMOL/L (ref 97–108)
CO2 SERPL-SCNC: 25 MMOL/L (ref 21–32)
CREAT SERPL-MCNC: 2.02 MG/DL (ref 0.7–1.3)
CROSSMATCH RESULT: NORMAL
CROSSMATCH RESULT: NORMAL
ERYTHROCYTE [DISTWIDTH] IN BLOOD BY AUTOMATED COUNT: 16.7 % (ref 11.5–14.5)
GLOBULIN SER CALC-MCNC: 3.4 G/DL (ref 2–4)
GLUCOSE SERPL-MCNC: 118 MG/DL (ref 65–100)
HCT VFR BLD AUTO: 29.5 % (ref 36.6–50.3)
HGB BLD-MCNC: 9.4 G/DL (ref 12.1–17)
INR PPP: 1.3 (ref 0.9–1.1)
LDH SERPL L TO P-CCNC: 250 U/L (ref 85–241)
MAGNESIUM SERPL-MCNC: 2.1 MG/DL (ref 1.6–2.4)
MCH RBC QN AUTO: 26.8 PG (ref 26–34)
MCHC RBC AUTO-ENTMCNC: 31.9 G/DL (ref 30–36.5)
MCV RBC AUTO: 84 FL (ref 80–99)
NRBC # BLD: 0 K/UL (ref 0–0.01)
NRBC BLD-RTO: 0 PER 100 WBC
PLATELET # BLD AUTO: 286 K/UL (ref 150–400)
PMV BLD AUTO: 10.7 FL (ref 8.9–12.9)
POTASSIUM SERPL-SCNC: 4.2 MMOL/L (ref 3.5–5.1)
PROT SERPL-MCNC: 6.4 G/DL (ref 6.4–8.2)
PROTHROMBIN TIME: 13.2 SEC (ref 9–11.1)
RBC # BLD AUTO: 3.51 M/UL (ref 4.1–5.7)
SODIUM SERPL-SCNC: 138 MMOL/L (ref 136–145)
SPECIMEN EXP DATE BLD: NORMAL
UFH PPP CHRO-ACNC: 0.44 IU/ML
UFH PPP CHRO-ACNC: 0.55 IU/ML
UNIT DIVISION: 0
UNIT DIVISION: 0
WBC # BLD AUTO: 12.3 K/UL (ref 4.1–11.1)

## 2023-10-14 PROCEDURE — A4216 STERILE WATER/SALINE, 10 ML: HCPCS | Performed by: HOSPITALIST

## 2023-10-14 PROCEDURE — 36415 COLL VENOUS BLD VENIPUNCTURE: CPT

## 2023-10-14 PROCEDURE — 85520 HEPARIN ASSAY: CPT

## 2023-10-14 PROCEDURE — 2580000003 HC RX 258: Performed by: HOSPITALIST

## 2023-10-14 PROCEDURE — 85027 COMPLETE CBC AUTOMATED: CPT

## 2023-10-14 PROCEDURE — 80048 BASIC METABOLIC PNL TOTAL CA: CPT

## 2023-10-14 PROCEDURE — C9113 INJ PANTOPRAZOLE SODIUM, VIA: HCPCS | Performed by: HOSPITALIST

## 2023-10-14 PROCEDURE — 80076 HEPATIC FUNCTION PANEL: CPT

## 2023-10-14 PROCEDURE — 83735 ASSAY OF MAGNESIUM: CPT

## 2023-10-14 PROCEDURE — 6370000000 HC RX 637 (ALT 250 FOR IP): Performed by: HOSPITALIST

## 2023-10-14 PROCEDURE — 93750 INTERROGATION VAD IN PERSON: CPT | Performed by: INTERNAL MEDICINE

## 2023-10-14 PROCEDURE — 85610 PROTHROMBIN TIME: CPT

## 2023-10-14 PROCEDURE — 6370000000 HC RX 637 (ALT 250 FOR IP): Performed by: INTERNAL MEDICINE

## 2023-10-14 PROCEDURE — 83615 LACTATE (LD) (LDH) ENZYME: CPT

## 2023-10-14 PROCEDURE — 6360000002 HC RX W HCPCS: Performed by: HOSPITALIST

## 2023-10-14 PROCEDURE — 2060000000 HC ICU INTERMEDIATE R&B

## 2023-10-14 PROCEDURE — 99232 SBSQ HOSP IP/OBS MODERATE 35: CPT | Performed by: INTERNAL MEDICINE

## 2023-10-14 RX ORDER — WARFARIN SODIUM 2 MG/1
2 TABLET ORAL
Status: COMPLETED | OUTPATIENT
Start: 2023-10-14 | End: 2023-10-14

## 2023-10-14 RX ADMIN — SODIUM CHLORIDE, PRESERVATIVE FREE 10 ML: 5 INJECTION INTRAVENOUS at 08:20

## 2023-10-14 RX ADMIN — HEPARIN SODIUM 16 UNITS/KG/HR: 10000 INJECTION, SOLUTION INTRAVENOUS at 16:49

## 2023-10-14 RX ADMIN — MAGNESIUM OXIDE TAB 400 MG (241.3 MG ELEMENTAL MG) 400 MG: 400 (241.3 MG) TAB at 08:20

## 2023-10-14 RX ADMIN — WARFARIN SODIUM 2 MG: 2 TABLET ORAL at 14:27

## 2023-10-14 RX ADMIN — Medication 2000 UNITS: at 08:20

## 2023-10-14 RX ADMIN — HEPARIN SODIUM 2000 UNITS: 1000 INJECTION INTRAVENOUS; SUBCUTANEOUS at 00:36

## 2023-10-14 RX ADMIN — MAGNESIUM OXIDE TAB 400 MG (241.3 MG ELEMENTAL MG) 400 MG: 400 (241.3 MG) TAB at 21:52

## 2023-10-14 RX ADMIN — SODIUM CHLORIDE, PRESERVATIVE FREE 40 MG: 5 INJECTION INTRAVENOUS at 00:22

## 2023-10-14 RX ADMIN — WARFARIN SODIUM 2 MG: 2 TABLET ORAL at 17:57

## 2023-10-14 RX ADMIN — SODIUM CHLORIDE, PRESERVATIVE FREE 40 MG: 5 INJECTION INTRAVENOUS at 12:24

## 2023-10-14 RX ADMIN — SODIUM CHLORIDE, PRESERVATIVE FREE 10 ML: 5 INJECTION INTRAVENOUS at 21:52

## 2023-10-14 RX ADMIN — METOPROLOL SUCCINATE 50 MG: 50 TABLET, EXTENDED RELEASE ORAL at 08:20

## 2023-10-14 NOTE — PROGRESS NOTES
Hospitalist Progress Note  Alejandrina Talavera MD  Answering service:         Date of Service:  10/14/2023  NAME:  Duran Lipscomb  :  1960  MRN:  [id number]      Admission Summary:   Duran Lipscomb is a 61 y.o. male with PMH significant for advanced heart failure/cardiomyopathy status post LVAD placed 2023 is sent to the ED from Dr. Duque Mon heart failure team for severe anemia. Patient was receiving IV iron in the outpatient setting, he continued to experience dizziness, shortness of breath hemoglobin down to 7 yesterday, 6.6 today. He has history of AVM and ablation. He is on warfarin. He denied nausea or vomiting, hematemesis, melena or hematochezia. Patient has COPD and endorses intermittent wheezing. He is not on home oxygen. He uses albuterol inhaler as needed. He is former smoker, quitted 4 days ago. Drinks alcohol occasionally. Other significant labs in the ED include WBC 16.1, creatinine 2.65 with baseline creatinine ranging from 1.3-2   CXR completed, report pending       Interval history / Subjective:   No bleeding per rectum now        Assessment & Plan:      Acute severe symptomatic anemia, suspect GI loss given history of AVM in the setting of LVAD, warfarin use  -Transfuse to keep hemoglobin peripheral above 8  -Continue Protonix  -GI consulted: EGD today:Single small, 3 mm, non-bleeding AVM found in the mid gastric body - treated with APC  -will resume heparin drip   Monitor cbc stable, restart coumadin today dose per AHF   Diet advance   -     Advanced cardiomyopathy status post LVAD placed 2023  -Being closely followed by advanced heart failure team.  Patient on warfarin, INR subtherapeutic, advanced heart failure team advised to continue heparin to bridge for procedures  -Continue Toprol, Entresto.   Patient stated to be unable to tolerate MRA due to hypovolemia SGLT2

## 2023-10-14 NOTE — PROGRESS NOTES
0730: Bedside shift change report given to Saint Mallick, RN (oncoming nurse) by Rush Delacruz RN (offgoing nurse). Report included the following information Nurse Handoff Report, MAR, and Recent Results. 8179: Patient refusing bed alarm. Patient educated on importance of calling for assistance when needed. 1345: Anti-Xa drawn. 1500: Anti-Xa therapeutic, no rate change. See MAR.    1930: Bedside shift change report given to Rush Delacruz RN (oncoming nurse) by Saint Mallick, RN (offgoing nurse). Report included the following information Nurse Handoff Report, MAR, and Recent Results.

## 2023-10-14 NOTE — PLAN OF CARE
1930: Bedside and Verbal shift change report given to [address] [place] [address] (oncoming nurse) by Priscilla Daniel (offgoing nurse). Report included the following information Nurse Handoff Report, Index, Adult Overview, Intake/Output, MAR, and Cardiac Rhythm NSR .         0730: Bedside and Verbal shift change report given to Priscilla Daniel (oncoming nurse) by [address] (offgoing nurse). Report included the following information Nurse Handoff Report, Index, Adult Overview, Intake/Output, MAR, and Cardiac Rhythm NSR.      Care Plan:   Problem: Discharge Planning  Goal: Discharge to home or other facility with appropriate resources  Outcome: Progressing  Flowsheets (Taken 10/13/2023 2000)  Discharge to home or other facility with appropriate resources: Identify barriers to discharge with patient and caregiver     Problem: Safety - Adult  Goal: Free from fall injury  Outcome: Progressing     Problem: Pain  Goal: Verbalizes/displays adequate comfort level or baseline comfort level  Outcome: Progressing     Problem: ABCDS Injury Assessment  Goal: Absence of physical injury  Outcome: Progressing
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
## 2023-10-15 LAB
ALBUMIN SERPL-MCNC: 3.3 G/DL (ref 3.5–5)
ALBUMIN/GLOB SERPL: 0.9 (ref 1.1–2.2)
ALP SERPL-CCNC: 96 U/L (ref 45–117)
ALT SERPL-CCNC: 25 U/L (ref 12–78)
ANION GAP SERPL CALC-SCNC: 4 MMOL/L (ref 5–15)
AST SERPL-CCNC: 20 U/L (ref 15–37)
BILIRUB DIRECT SERPL-MCNC: 0.1 MG/DL (ref 0–0.2)
BILIRUB SERPL-MCNC: 0.3 MG/DL (ref 0.2–1)
BUN SERPL-MCNC: 24 MG/DL (ref 6–20)
BUN/CREAT SERPL: 11 (ref 12–20)
CALCIUM SERPL-MCNC: 8.4 MG/DL (ref 8.5–10.1)
CHLORIDE SERPL-SCNC: 106 MMOL/L (ref 97–108)
CO2 SERPL-SCNC: 26 MMOL/L (ref 21–32)
CREAT SERPL-MCNC: 2.11 MG/DL (ref 0.7–1.3)
ERYTHROCYTE [DISTWIDTH] IN BLOOD BY AUTOMATED COUNT: 17 % (ref 11.5–14.5)
GLOBULIN SER CALC-MCNC: 3.6 G/DL (ref 2–4)
GLUCOSE SERPL-MCNC: 110 MG/DL (ref 65–100)
HCT VFR BLD AUTO: 31.2 % (ref 36.6–50.3)
HGB BLD-MCNC: 9.6 G/DL (ref 12.1–17)
INR PPP: 1.2 (ref 0.9–1.1)
LDH SERPL L TO P-CCNC: 234 U/L (ref 85–241)
MAGNESIUM SERPL-MCNC: 1.8 MG/DL (ref 1.6–2.4)
MCH RBC QN AUTO: 26.2 PG (ref 26–34)
MCHC RBC AUTO-ENTMCNC: 30.8 G/DL (ref 30–36.5)
MCV RBC AUTO: 85 FL (ref 80–99)
NRBC # BLD: 0 K/UL (ref 0–0.01)
NRBC BLD-RTO: 0 PER 100 WBC
PLATELET # BLD AUTO: 310 K/UL (ref 150–400)
PMV BLD AUTO: 10.7 FL (ref 8.9–12.9)
POTASSIUM SERPL-SCNC: 4.2 MMOL/L (ref 3.5–5.1)
PROT SERPL-MCNC: 6.9 G/DL (ref 6.4–8.2)
PROTHROMBIN TIME: 12.5 SEC (ref 9–11.1)
RBC # BLD AUTO: 3.67 M/UL (ref 4.1–5.7)
SODIUM SERPL-SCNC: 136 MMOL/L (ref 136–145)
UFH PPP CHRO-ACNC: 0.5 IU/ML
WBC # BLD AUTO: 11.4 K/UL (ref 4.1–11.1)

## 2023-10-15 PROCEDURE — 93750 INTERROGATION VAD IN PERSON: CPT | Performed by: INTERNAL MEDICINE

## 2023-10-15 PROCEDURE — 80048 BASIC METABOLIC PNL TOTAL CA: CPT

## 2023-10-15 PROCEDURE — 36415 COLL VENOUS BLD VENIPUNCTURE: CPT

## 2023-10-15 PROCEDURE — 99231 SBSQ HOSP IP/OBS SF/LOW 25: CPT | Performed by: INTERNAL MEDICINE

## 2023-10-15 PROCEDURE — 83735 ASSAY OF MAGNESIUM: CPT

## 2023-10-15 PROCEDURE — 6360000002 HC RX W HCPCS: Performed by: HOSPITALIST

## 2023-10-15 PROCEDURE — 80076 HEPATIC FUNCTION PANEL: CPT

## 2023-10-15 PROCEDURE — 85027 COMPLETE CBC AUTOMATED: CPT

## 2023-10-15 PROCEDURE — 6370000000 HC RX 637 (ALT 250 FOR IP): Performed by: HOSPITALIST

## 2023-10-15 PROCEDURE — 2060000000 HC ICU INTERMEDIATE R&B

## 2023-10-15 PROCEDURE — 83615 LACTATE (LD) (LDH) ENZYME: CPT

## 2023-10-15 PROCEDURE — 85610 PROTHROMBIN TIME: CPT

## 2023-10-15 PROCEDURE — A4216 STERILE WATER/SALINE, 10 ML: HCPCS | Performed by: HOSPITALIST

## 2023-10-15 PROCEDURE — 85520 HEPARIN ASSAY: CPT

## 2023-10-15 PROCEDURE — C9113 INJ PANTOPRAZOLE SODIUM, VIA: HCPCS | Performed by: HOSPITALIST

## 2023-10-15 PROCEDURE — 2580000003 HC RX 258: Performed by: HOSPITALIST

## 2023-10-15 RX ADMIN — MAGNESIUM OXIDE TAB 400 MG (241.3 MG ELEMENTAL MG) 400 MG: 400 (241.3 MG) TAB at 09:12

## 2023-10-15 RX ADMIN — SODIUM CHLORIDE, PRESERVATIVE FREE 10 ML: 5 INJECTION INTRAVENOUS at 20:55

## 2023-10-15 RX ADMIN — Medication 2000 UNITS: at 09:12

## 2023-10-15 RX ADMIN — MAGNESIUM OXIDE TAB 400 MG (241.3 MG ELEMENTAL MG) 400 MG: 400 (241.3 MG) TAB at 20:54

## 2023-10-15 RX ADMIN — METOPROLOL SUCCINATE 50 MG: 50 TABLET, EXTENDED RELEASE ORAL at 09:12

## 2023-10-15 RX ADMIN — SODIUM CHLORIDE, PRESERVATIVE FREE 10 ML: 5 INJECTION INTRAVENOUS at 09:12

## 2023-10-15 RX ADMIN — SODIUM CHLORIDE, PRESERVATIVE FREE 40 MG: 5 INJECTION INTRAVENOUS at 01:40

## 2023-10-15 RX ADMIN — SODIUM CHLORIDE, PRESERVATIVE FREE 40 MG: 5 INJECTION INTRAVENOUS at 23:49

## 2023-10-15 RX ADMIN — SODIUM CHLORIDE, PRESERVATIVE FREE 40 MG: 5 INJECTION INTRAVENOUS at 12:00

## 2023-10-15 RX ADMIN — HEPARIN SODIUM 16 UNITS/KG/HR: 10000 INJECTION, SOLUTION INTRAVENOUS at 12:06

## 2023-10-15 NOTE — PROGRESS NOTES
Hospitalist Progress Note  Rina Lockwood MD  Answering service: [phone]        Date of Service:  10/15/2023  NAME:  Yaz Chavez  :  1960  MRN:  [id number]      Admission Summary:   Yaz Chavez is a 61 y.o. male with PMH significant for advanced heart failure/cardiomyopathy status post LVAD placed 2023 is sent to the ED from Dr. Jeanette Velazquez heart failure team for severe anemia. Patient was receiving IV iron in the outpatient setting, he continued to experience dizziness, shortness of breath hemoglobin down to 7 yesterday, 6.6 today. He has history of AVM and ablation. He is on warfarin. He denied nausea or vomiting, hematemesis, melena or hematochezia. Patient has COPD and endorses intermittent wheezing. He is not on home oxygen. He uses albuterol inhaler as needed. He is former smoker, quitted 4 days ago. Drinks alcohol occasionally. Other significant labs in the ED include WBC 16.1, creatinine 2.65 with baseline creatinine ranging from 1.3-2   CXR completed, report pending       Interval history / Subjective:   No bleeding per rectum now        Assessment & Plan:      Acute severe symptomatic anemia, suspect GI loss given history of AVM in the setting of LVAD, warfarin use  -Transfuse to keep hemoglobin peripheral above 8  -Continue Protonix  -GI consulted: EGD today:Single small, 3 mm, non-bleeding AVM found in the mid gastric body - treated with APC  -will resume heparin drip   Monitor cbc stable, restart coumadin 10/14  dose per AHF   Diet advance   -     Advanced cardiomyopathy status post LVAD placed 2023  -Being closely followed by advanced heart failure team.  Patient on warfarin, INR subtherapeutic, advanced heart failure team advised to continue heparin to bridge for procedures  -Continue Toprol, Entresto.   Patient stated to be unable to tolerate MRA due to hypovolemia

## 2023-10-15 NOTE — PROGRESS NOTES
0730: Bedside shift change report given to Martine Lott RN (oncoming nurse) by David Scott RN (offgoing nurse). Report included the following information Nurse Handoff Report, MAR, and Recent Results. 0900: Patient refusing bed alarm. Patient educated on importance of calling for assistance when needed. 1400: Bedside shift change report given to [facility] RN (oncoming nurse) by Martine Lott RN (offgoing nurse). Report included the following information Nurse Handoff Report, MAR, and Recent Results.

## 2023-10-15 NOTE — PROGRESS NOTES
[address] in [place], [place]  Inpatient Progress Note      Patient name: José Luis aSnders  Patient : 1960  Patient MRN: [id number]  Consulting MD: Gennaro Perez MD  Date of service: 10/15/23    REASON FOR REFERRAL:  Management of chronic systolic heart failure     ASSESSMENT:  José Luis Sanders is a 61 y.o. male with history of chronic systolic heart failure due to non ischemic cardiomyopathy, s/p HM3 LVAD implantation ([facility] 2023) as destination therapy, stage D, who remains NYHA class IV despite LVAD support and optimal GDMT (limited by hypovolemia)  Patient now has recovered LVEF to 55%; but not a candidate for explant due to severe COPD  Not a candidate for heart transplant due to advanced COPD -  declined by [facility] for both [facility]DT and transplant and for the same reasons declined for [facility]DT by [facility]   Found to have acute on chronic anemia (Hg of 7, down form 9.3 in august) at outpatient appointment on 10/12 so was sent to the ED for admission to further w/o source of bleeding/acute anemia > EGD today showed angioectasia in the stomach, treated with APC  Plan to resume anticoagulation with coumadin and either discharge once INR at goal; no aspirin or discharge with lovenox bridge if approved by GI service  Patient needs 6MW prior to discharge to determine requirement for oxygen therapy     RECOMMENDATIONS:  Continue current device speed of 5400 RPM  Continue dressing changes three times weekly  Continue current medical therapy for heart failure  Continue Toprol XL 50 mg daily. Unable to tolerate ACEi/ARB/ARNi due to renal dysfunction and hypovolemia  Unable to tolerate MRA due to renal dysfunction and hypovolemia. Unable to tolerate SGLT2i due to hypovolemia.   No diuretics due to IVVD  Reinforced low salt diet (2 g)  Goal fluid intake (once able to resume a diet) 48-64 oz per day  Not on allopurinol or uloric, check uric acid level  Increase coumadin 3mg disintegrating tablet 4 mg, 4 mg, Oral, Q8H PRN **OR** ondansetron (ZOFRAN) injection 4 mg, 4 mg, IntraVENous, Q6H PRN, Elliott VMAMADEO SMITH MD    acetaminophen (TYLENOL) tablet 650 mg, 650 mg, Oral, Q6H PRN **OR** acetaminophen (TYLENOL) suppository 650 mg, 650 mg, Rectal, Q6H PRN, Elliott GEOCATHERINE SMITH MD    pantoprazole (PROTONIX) 40 mg in sodium chloride (PF) 0.9 % 10 mL injection, 40 mg, IntraVENous, Q12H, Elliott GCTIKBERE SMITH MD, 40 mg at 10/15/23 0140    metoprolol succinate (TOPROL XL) extended release tablet 50 mg, 50 mg, Oral, Daily, Elliott RVXFOAQ MD LUIS, 50 mg at 10/15/23 0912    Vitamin D (CHOLECALCIFEROL) tablet 2,000 Units, 2,000 Units, Oral, Daily, Lorelei Jones MD, 2,000 Units at 10/15/23 0912    ipratropium 0.5 mg-albuterol 2.5 mg (DUONEB) nebulizer solution 1 Dose, 1 Dose, Inhalation, Q4H PRN, Elliott QVMOAAO MD LUIS    heparin (porcine) injection 4,000 Units, 4,000 Units, IntraVENous, PRN, Elliott UFWQCIM MD LUIS    heparin (porcine) injection 2,000 Units, 2,000 Units, IntraVENous, PRN, Angel Gallagher MD, 2,000 Units at 10/14/23 0036    heparin 25,000 units in dextrose 5% 250 mL (premix) infusion, 5-30 Units/kg/hr, IntraVENous, Continuous, IVONNE Gallagher MD, Last Rate: 13 mL/hr at 10/14/23 2152, 16 Units/kg/hr at 10/14/23 2152    magnesium oxide (MAG-OX) tablet 400 mg, 400 mg, Oral, BID, Elliott BZPRINCESS SMITH MD, 400 mg at 10/15/23 0912    hydrALAZINE (APRESOLINE) injection 10 mg, 10 mg, IntraVENous, Q4H PRN, Geni Garibay, TAIWO - NP    Facility-Administered Medications Ordered in Other Encounters:     alteplase (CATHFLO) 2 mg in sterile water 2 mL injection, 2 mg, IntraCATHeter, PRN, Preston AVERY MD    0.9 % sodium chloride infusion, , IntraVENous, Continuous, Farshad Gillespie MD    EPINEPHrine PF 1 MG/ML injection (Anaphylaxis) 0.3 mg, 0.3 mg, IntraMUSCular, PRN, Preston AVERY MD    albuterol sulfate HFA (PROVENTIL;VENTOLIN;PROAIR) 108 (90 Base) MCG/ACT inhaler 4 puff, 4 puff, Inhalation, DARÍO, Felicita Hudson MD    PATIENT CARE TEAM:  Patient Care Team:  Michael Holland MD as PCP - Evin Bermeo MD as PCP - Empaneled Provider     Thank you for allowing me to participate in this patient's care.     Noemi Verdugo MD   [address] [place] [address] Drive  [address] [place] [address], [address]  Phone: [phone]

## 2023-10-15 NOTE — PLAN OF CARE
1930: Bedside and Verbal shift change report given to [address] [place] [address] (oncoming nurse) by Vanessa Sheehan (offgoing nurse). Report included the following information Nurse Handoff Report, Index, Adult Overview, Intake/Output, MAR, and Cardiac Rhythm NSR .        0730: Bedside and Verbal shift change report given to Vanessa Sheehan (oncoming nurse) by [facility] (offgoing nurse). Report included the following information Nurse Handoff Report, Index, Adult Overview, Intake/Output, MAR, and Cardiac Rhythm NSR.       Care Plan:  Problem: Discharge Planning  Goal: Discharge to home or other facility with appropriate resources  Outcome: Progressing  Flowsheets (Taken 10/14/2023 2000)  Discharge to home or other facility with appropriate resources: Identify barriers to discharge with patient and caregiver     Problem: Safety - Adult  Goal: Free from fall injury  Outcome: Progressing     Problem: Pain  Goal: Verbalizes/displays adequate comfort level or baseline comfort level  Outcome: Progressing  Flowsheets  Taken 10/15/2023 0338  Verbalizes/displays adequate comfort level or baseline comfort level: Encourage patient to monitor pain and request assistance  Taken 10/14/2023 2300  Verbalizes/displays adequate comfort level or baseline comfort level: Encourage patient to monitor pain and request assistance  Taken 10/14/2023 2000  Verbalizes/displays adequate comfort level or baseline comfort level: Encourage patient to monitor pain and request assistance     Problem: ABCDS Injury Assessment  Goal: Absence of physical injury  Outcome: Progressing

## 2023-10-16 ENCOUNTER — TELEPHONE (OUTPATIENT)
Age: 63
End: 2023-10-16

## 2023-10-16 VITALS
HEIGHT: 67 IN | OXYGEN SATURATION: 96 % | TEMPERATURE: 98 F | SYSTOLIC BLOOD PRESSURE: 104 MMHG | WEIGHT: 174.6 LBS | HEART RATE: 62 BPM | BODY MASS INDEX: 27.4 KG/M2 | RESPIRATION RATE: 19 BRPM | DIASTOLIC BLOOD PRESSURE: 85 MMHG

## 2023-10-16 LAB
ALBUMIN SERPL-MCNC: 3.2 G/DL (ref 3.5–5)
ALBUMIN/GLOB SERPL: 0.9 (ref 1.1–2.2)
ALP SERPL-CCNC: 95 U/L (ref 45–117)
ALT SERPL-CCNC: 23 U/L (ref 12–78)
ANION GAP SERPL CALC-SCNC: 5 MMOL/L (ref 5–15)
AST SERPL-CCNC: 22 U/L (ref 15–37)
BILIRUB DIRECT SERPL-MCNC: <0.1 MG/DL (ref 0–0.2)
BILIRUB SERPL-MCNC: 0.2 MG/DL (ref 0.2–1)
BUN SERPL-MCNC: 27 MG/DL (ref 6–20)
BUN/CREAT SERPL: 13 (ref 12–20)
CALCIUM SERPL-MCNC: 8.6 MG/DL (ref 8.5–10.1)
CHLORIDE SERPL-SCNC: 109 MMOL/L (ref 97–108)
CO2 SERPL-SCNC: 26 MMOL/L (ref 21–32)
CREAT SERPL-MCNC: 2.01 MG/DL (ref 0.7–1.3)
ERYTHROCYTE [DISTWIDTH] IN BLOOD BY AUTOMATED COUNT: 17.5 % (ref 11.5–14.5)
GLOBULIN SER CALC-MCNC: 3.5 G/DL (ref 2–4)
GLUCOSE SERPL-MCNC: 113 MG/DL (ref 65–100)
HCT VFR BLD AUTO: 30 % (ref 36.6–50.3)
HGB BLD-MCNC: 9.5 G/DL (ref 12.1–17)
INR PPP: 1.2 (ref 0.9–1.1)
LDH SERPL L TO P-CCNC: 258 U/L (ref 85–241)
MCH RBC QN AUTO: 26.8 PG (ref 26–34)
MCHC RBC AUTO-ENTMCNC: 31.7 G/DL (ref 30–36.5)
MCV RBC AUTO: 84.7 FL (ref 80–99)
NRBC # BLD: 0 K/UL (ref 0–0.01)
NRBC BLD-RTO: 0 PER 100 WBC
PLATELET # BLD AUTO: 303 K/UL (ref 150–400)
PMV BLD AUTO: 10.8 FL (ref 8.9–12.9)
POTASSIUM SERPL-SCNC: 4 MMOL/L (ref 3.5–5.1)
PROT SERPL-MCNC: 6.7 G/DL (ref 6.4–8.2)
PROTHROMBIN TIME: 12.1 SEC (ref 9–11.1)
RBC # BLD AUTO: 3.54 M/UL (ref 4.1–5.7)
SODIUM SERPL-SCNC: 140 MMOL/L (ref 136–145)
UFH PPP CHRO-ACNC: 0.41 IU/ML
WBC # BLD AUTO: 9.9 K/UL (ref 4.1–11.1)

## 2023-10-16 PROCEDURE — 85520 HEPARIN ASSAY: CPT

## 2023-10-16 PROCEDURE — 2580000003 HC RX 258: Performed by: NURSE PRACTITIONER

## 2023-10-16 PROCEDURE — 85027 COMPLETE CBC AUTOMATED: CPT

## 2023-10-16 PROCEDURE — 83615 LACTATE (LD) (LDH) ENZYME: CPT

## 2023-10-16 PROCEDURE — 6360000002 HC RX W HCPCS: Performed by: HOSPITALIST

## 2023-10-16 PROCEDURE — 99233 SBSQ HOSP IP/OBS HIGH 50: CPT | Performed by: INTERNAL MEDICINE

## 2023-10-16 PROCEDURE — 80076 HEPATIC FUNCTION PANEL: CPT

## 2023-10-16 PROCEDURE — 6370000000 HC RX 637 (ALT 250 FOR IP): Performed by: HOSPITALIST

## 2023-10-16 PROCEDURE — 80048 BASIC METABOLIC PNL TOTAL CA: CPT

## 2023-10-16 PROCEDURE — 93750 INTERROGATION VAD IN PERSON: CPT | Performed by: INTERNAL MEDICINE

## 2023-10-16 PROCEDURE — 36415 COLL VENOUS BLD VENIPUNCTURE: CPT

## 2023-10-16 PROCEDURE — APPNB30 APP NON BILLABLE TIME 0-30 MINS: Performed by: NURSE PRACTITIONER

## 2023-10-16 PROCEDURE — 94760 N-INVAS EAR/PLS OXIMETRY 1: CPT

## 2023-10-16 PROCEDURE — 6360000002 HC RX W HCPCS: Performed by: NURSE PRACTITIONER

## 2023-10-16 PROCEDURE — 85610 PROTHROMBIN TIME: CPT

## 2023-10-16 PROCEDURE — 2580000003 HC RX 258: Performed by: HOSPITALIST

## 2023-10-16 RX ORDER — PANTOPRAZOLE SODIUM 40 MG/1
40 TABLET, DELAYED RELEASE ORAL
Status: DISCONTINUED | OUTPATIENT
Start: 2023-10-16 | End: 2023-10-16 | Stop reason: HOSPADM

## 2023-10-16 RX ORDER — WARFARIN SODIUM 4 MG/1
4 TABLET ORAL
Status: DISCONTINUED | OUTPATIENT
Start: 2023-10-16 | End: 2023-10-16 | Stop reason: HOSPADM

## 2023-10-16 RX ORDER — ENOXAPARIN SODIUM 100 MG/ML
1 INJECTION SUBCUTANEOUS ONCE
Status: COMPLETED | OUTPATIENT
Start: 2023-10-16 | End: 2023-10-16

## 2023-10-16 RX ORDER — ENOXAPARIN SODIUM 100 MG/ML
80 INJECTION SUBCUTANEOUS 2 TIMES DAILY
Qty: 10 EACH | Refills: 0 | Status: SHIPPED | OUTPATIENT
Start: 2023-10-16

## 2023-10-16 RX ADMIN — HEPARIN SODIUM 16 UNITS/KG/HR: 10000 INJECTION, SOLUTION INTRAVENOUS at 07:12

## 2023-10-16 RX ADMIN — PANTOPRAZOLE SODIUM 40 MG: 40 TABLET, DELAYED RELEASE ORAL at 16:39

## 2023-10-16 RX ADMIN — METOPROLOL SUCCINATE 50 MG: 50 TABLET, EXTENDED RELEASE ORAL at 09:21

## 2023-10-16 RX ADMIN — ENOXAPARIN SODIUM 80 MG: 100 INJECTION SUBCUTANEOUS at 16:39

## 2023-10-16 RX ADMIN — SODIUM CHLORIDE, PRESERVATIVE FREE 10 ML: 5 INJECTION INTRAVENOUS at 09:22

## 2023-10-16 RX ADMIN — Medication 2000 UNITS: at 09:21

## 2023-10-16 RX ADMIN — MAGNESIUM OXIDE TAB 400 MG (241.3 MG ELEMENTAL MG) 400 MG: 400 (241.3 MG) TAB at 09:21

## 2023-10-16 RX ADMIN — IRON SUCROSE 200 MG: 20 INJECTION, SOLUTION INTRAVENOUS at 13:41

## 2023-10-16 ASSESSMENT — ENCOUNTER SYMPTOMS
SHORTNESS OF BREATH: 0
ABDOMINAL DISTENTION: 0
DIARRHEA: 0
NAUSEA: 0
BLOOD IN STOOL: 0
WHEEZING: 0
VOMITING: 0
COUGH: 0

## 2023-10-16 ASSESSMENT — PAIN SCALES - GENERAL
PAINLEVEL_OUTOF10: 0

## 2023-10-16 NOTE — PROGRESS NOTES
[address] in [place], [place]    Met with patient at bedside to verify serial numbers on equipment. While speaking with patient he stated that he thought that a nurse saw a suture in his drive line. Drive line dressing changed using sterile technique. Small amount of dried drainage. No suture observed. Educated patient on importance of wearing anchor both to protect line in case of driveline pulls and to prevent excessive wear on driveline. He verbalized understanding, he stated the anchors he had previously were causing skin irritation. Placed different brand anchor on patient and instructed to notify nurse if skin irritation occurs.

## 2023-10-16 NOTE — TELEPHONE ENCOUNTER
Attempted to call patient's wife at work number to inquire about dressing change education. No answer and no option to leave voicemail. Will follow up with patient and spouse.

## 2023-10-16 NOTE — DISCHARGE INSTRUCTIONS
Discharge Instructions       PATIENT ID: Steve Santos  MRN: [id number]   YOB: 1960    DATE OF ADMISSION: [unfilled]    DATE OF DISCHARGE: 10/16/2023    PRIMARY CARE PROVIDER: @PCP@     ATTENDING PHYSICIAN: [unfilled]  DISCHARGING PROVIDER: Sita Simms MD    To contact this individual call [phone] and ask the  to page. If unavailable ask to be transferred the Adult Hospitalist Department. DISCHARGE DIAGNOSES GIB, LVAD     CONSULTATIONS: [unfilled]    PROCEDURES/SURGERIES: Procedure(s):  EGD ESOPHAGOGASTRODUODENOSCOPY (LVAD)    PENDING TEST RESULTS:   At the time of discharge the following test results are still pending: none     FOLLOW UP APPOINTMENTS:   [unfilled]     ADDITIONAL CARE RECOMMENDATIONS:   Resume your coumadin per the directions from your cardiologist, should have Daily PT/INR checking until your INR reach your target goal 2-2.5, can stop sc lovenox when INR> 2. Follow up advanced heart failure team  3. Daily weight  , call your cardiologist if gain weight > 5lbs in 2 days     DIET: cardiac diet  Oral Nutritional Supplements: Boost Glucose Control Once daily     ACTIVITY: activity as tolerated          Radiology      DISCHARGE MEDICATIONS:   See Medication Reconciliation Form    It is important that you take the medication exactly as they are prescribed. Keep your medication in the bottles provided by the pharmacist and keep a list of the medication names, dosages, and times to be taken in your wallet. Do not take other medications without consulting your doctor. NOTIFY YOUR PHYSICIAN FOR ANY OF THE FOLLOWING:   Fever over 101 degrees for 24 hours. Chest pain, shortness of breath, fever, chills, nausea, vomiting, diarrhea, change in mentation, falling, weakness, bleeding. Severe pain or pain not relieved by medications. Or, any other signs or symptoms that you may have questions about.       DISPOSITION:    Home With:   OT  PT  [facility]  RN       SNF/Inpatient

## 2023-10-16 NOTE — PROGRESS NOTES
1630  Pt self-administered lovenox shot    1330  LVAD coordinator changing dressing. 0920  Pt waiting for breakfast.    0730  Verbal bedside report received from Joseph De La Cruz RN off-going nurse by Oliver Erwin. RACHEL Lowry oncoming nurse. Report included current pt status and condition, recent results, hx of present illness, heart rate and rhythm (NSR w/1st degree AVB), and respiratory status. Pt awake and alert.

## 2023-10-16 NOTE — PROGRESS NOTES
Clinical Pharmacy Note: IV to PO Automatic Conversion  Please note: Melody Sotelo medication(s) (pantoprazole) has/have been changed from IV to PO based on the following critiera:    Patient is tolerating oral medications  Patient is tolerating a diet more advanced than clear liquids  Patient is not requiring vasopressors    This IV to PO conversion is based on the [employer] approved automatic conversion policy for eligible patients. Please call with questions.

## 2023-10-16 NOTE — DISCHARGE SUMMARY
cardiomyopathy status post LVAD placed March 2023  -Being closely followed by advanced heart failure team.  Patient on warfarin, INR subtherapeutic, advanced heart failure team advised to continue heparin to bridge for procedures  -Continue Toprol, Entresto. Patient stated to be unable to tolerate MRA due to hypovolemia SGLT2 inhibitor due to hypoglycemia. -LVAD management per advanced heart failure team  -INR not goal yet (target 22.5). [facility] pt to go home with sc lovenox, and pt having home INR checking daily , used lovenox at home in the past, had 4 left, will give extra       History of COPD with scattered wheezing without clinical evidence of exacerbation: Bronchodilators as needed             PENDING TEST RESULTS:   At the time of discharge the following test results are still pending: none     [facility] APPOINTMENTS:    [facility]     ADDITIONAL CARE RECOMMENDATIONS:   Resume your coumadin per the directions from your cardiologist, should have Daily PT/INR checking until your INR reach your target goal 2-2.5, can stop sc lovenox when INR> 2. Follow up advanced heart failure team  3.  Daily weight      DIET: cardiac diet      ACTIVITY: activity as tolerated          DISCHARGE MEDICATIONS:     Medication List        CONTINUE taking these medications      albuterol sulfate  (90 Base) MCG/ACT inhaler  Commonly known as: PROVENTIL;VENTOLIN;PROAIR     enoxaparin 80 MG/0.8ML  Commonly known as: LOVENOX  Inject 0.8 mLs into the skin 2 times daily     magnesium oxide 400 MG tablet  Commonly known as: MAG-OX  Take 1 tablet by mouth 2 times daily     metoprolol succinate 50 MG extended release tablet  Commonly known as: TOPROL XL  Take 1 tablet by mouth daily     MULTI-VITAMIN DAILY PO     pantoprazole 40 MG tablet  Commonly known as: PROTONIX  Take 1 tablet by mouth daily     Trelegy Ellipta 200-62.5-25 MCG/ACT Aepb inhaler  Generic drug: fluticasone-umeclidin-vilant     vitamin D3 25 MCG (1000 UT) Tabs tablet  Commonly known as: CHOLECALCIFEROL  Take 2 tablets by mouth daily     * warfarin 3 MG tablet  Commonly known as: COUMADIN  Take as directed. If you are unsure how to take this medication, talk to your nurse or doctor. Original instructions: Take one tablet my mouth every Monday, Wednesday, Friday, Saturday     * warfarin 2 MG tablet  Commonly known as: COUMADIN  Take as directed. If you are unsure how to take this medication, talk to your nurse or doctor. Original instructions: Take one tablet by mouth every Tuesday and Thursday           * This list has 2 medication(s) that are the same as other medications prescribed for you. Read the directions carefully, and ask your doctor or other care provider to review them with you. STOP taking these medications      Entresto 49-51 MG per tablet  Generic drug: sacubitril-valsartan     predniSONE 10 MG tablet  Commonly known as: Deanna Rubio               Where to Get Your Medications        These medications were sent to [facility] [id number] [address]      Phone: [phone]   enoxaparin 80 MG/0.8ML           NOTIFY YOUR PHYSICIAN FOR ANY OF THE FOLLOWING:   Fever over 101 degrees for 24 hours. Chest pain, shortness of breath, fever, chills, nausea, vomiting, diarrhea, change in mentation, falling, weakness, bleeding. Severe pain or pain not relieved by medications. Or, any other signs or symptoms that you may have questions about.     DISPOSITION:    Home With:   OT  PT  HH  RN       Long term SNF/Inpatient Rehab    Independent/assisted living    Hospice   X Other:       PATIENT CONDITION AT DISCHARGE:     Functional status    Poor     Deconditioned    X Independent      Cognition   XX Lucid     Forgetful     Dementia      Catheters/lines (plus indication)    Oliveros     PICC     PEG     None      Code status    X Full code     DNR

## 2023-10-16 NOTE — PLAN OF CARE
Problem: Discharge Planning  Goal: Discharge to home or other facility with appropriate resources  Outcome: Progressing     Problem: Safety - Adult  Goal: Free from fall injury  10/15/2023 2050 by Ventura Patel RN  Outcome: Progressing  10/15/2023 1123 by Rangel Noel RN  Outcome: Progressing     Problem: Pain  Goal: Verbalizes/displays adequate comfort level or baseline comfort level  Outcome: Progressing     Problem: ABCDS Injury Assessment  Goal: Absence of physical injury  Outcome: Progressing

## 2023-10-16 NOTE — PLAN OF CARE
Problem: Discharge Planning  Goal: Discharge to home or other facility with appropriate resources  Outcome: Completed     Problem: Safety - Adult  Goal: Free from fall injury  Outcome: Completed     Problem: Pain  Goal: Verbalizes/displays adequate comfort level or baseline comfort level  Outcome: Completed     Problem: ABCDS Injury Assessment  Goal: Absence of physical injury  Outcome: Completed

## 2023-10-16 NOTE — PROGRESS NOTES
Pharmacist Note - Warfarin Dosing  Consult provided for this 63 y.o.male to manage warfarin for LVAD    INR Goal: 2 - 2.5 - confirmed with Dr. King Patrick regimen/ tablet size: 2 mg on Tue/Thur, 3 mg all other days per 10/10 anticoag visit     - patient's INR was 1.2-1.3 on 10/10, 10/12, and 10/13 despite compliance with this regimen     - instructed to take one time dose of 4 mg on 10/10 then resume    Drugs that may increase INR: None  Drugs that may decrease INR: None  Other current anticoagulants/ drugs that may increase bleeding risk: Heparin  Risk factors: Decompensated Heart Failure  Daily INR ordered through: yes    Recent Labs     10/14/23  0651 10/15/23  0156 10/16/23  0218   HGB 9.4* 9.6* 9.5*   INR 1.3* 1.2* 1.2*     Date                    INR                  Dose  10/14 @ 1427       1.3       2 mg   10/14 @ 1757         2 mg   10/15                     1.2       ---  10/16                     1.2       4 mg                                                                               Assessment/ Plan: Will order warfarin 4 mg PO x 1 dose. Pharmacy will continue to monitor daily and adjust therapy as indicated. Please contact the pharmacist at x  or  for outpatient recommendations if needed.

## 2023-10-17 ENCOUNTER — TELEPHONE (OUTPATIENT)
Age: 63
End: 2023-10-17

## 2023-10-17 LAB
BACTERIA SPEC CULT: NORMAL
BACTERIA SPEC CULT: NORMAL
SERVICE CMNT-IMP: NORMAL
SERVICE CMNT-IMP: NORMAL

## 2023-10-17 NOTE — TELEPHONE ENCOUNTER
[address] in [address] [facility], [facility]       Date of call: 10/17/23  Date of discharge: 10/16/23  Discharge disposition: Home or [address] and left message for patient requesting call back to review med list form discharge and coordinate dressing change check off.      10/19: Second call to patient made, left voicemail requesting call back      Alem Osman RN  [address] [facility]

## 2023-10-18 ENCOUNTER — ANTI-COAG VISIT (OUTPATIENT)
Age: 63
End: 2023-10-18
Payer: MEDICARE

## 2023-10-18 DIAGNOSIS — Z79.01 CHRONIC ANTICOAGULATION: Primary | ICD-10-CM

## 2023-10-18 LAB — INR BLD: 1.4

## 2023-10-18 PROCEDURE — 93793 ANTICOAG MGMT PT WARFARIN: CPT | Performed by: NURSE PRACTITIONER

## 2023-10-18 NOTE — PROGRESS NOTES
[address] [facility] in [place], [place]      INR result reviewed with Angelique Palacios NP who made the following recommendations (Roseline Press): 4mg for two days  and recheck friday. Patient notified via voicemail, requested call back to confirm. (See anticoag tracker)     10/19: second voicemail left patient patient requesting call back regarding instructions. 10/20: third voicemail left patient patient requesting call back regarding instructions.       Marcy Cohen RN

## 2023-10-18 NOTE — PROGRESS NOTES
Physician Progress Note      PATIENT:               Seda Maxwell  CSN #:                  [id number]  :                       1960  ADMIT DATE:       10/12/2023 12:11 PM  DISCH DATE:        10/16/2023 8:05 PM  RESPONDING  PROVIDER #: Andre Petty MD          QUERY TEXT:    Patient admitted with Acute Blood Loss Anemia, noted to have \"suspect GI loss   given history of AVM\" documented in H&P. If possible, please document in   progress notes and discharge summary the cause of the GI bleeding: The medical record reflects the following:  Risk Factors: pmhx AVM, Warfarin use    Clinical Indicators: Hgb 6.6-->6.0->6.9,  OP note - Recurrent anemia, h/o AVMs (s/p LVAD)  Stomach: 1 Angio ectasia(s) 3 mm in size located in body  Duodenum/jejunum: normal - no obvious blood or AVMs  Single small, 3 mm, non-bleeding AVM found in the mid gastric body - treated   with APC  Otherwise negative exam - no AVMs, blog due to hemorrhoids. ]]od or other   source of bleeding. Per DS - Acute severe symptomatic anemia, suspect GI loss given history of AVM   in the setting of  LVAD, warfarin use    Treatment: Therapies: Argon plasma Coagulation of gastric AVM  -Continue Protonix  -GI consulted: EGD  -Transfuse to keep hemoglobin peripheral above 8  - restart coumadin 10/14    Thank you,  Jennifer Bosch, RN/CDI  [employer] or [employer] # [phone]. Options provided:  -- GI bleeding due to ##please specify, please specify. -- GI bleeding due to  -- Other - I will add my own diagnosis  -- Disagree - Not applicable / Not valid  -- Disagree - Clinically unable to determine / Unknown  -- Refer to Clinical Documentation Reviewer    PROVIDER RESPONSE TEXT:    This patient has GI bleeding due to unknown etiology, on top of coumadin    Query created by: Jennifer Bosch on 10/18/2023 7:25 AM      Electronically signed by:   Andre Petty MD 10/18/2023 5:56 PM

## 2023-10-19 ENCOUNTER — TELEPHONE (OUTPATIENT)
Age: 63
End: 2023-10-19

## 2023-10-19 NOTE — TELEPHONE ENCOUNTER
Contacted patient via phone for INR reminder.     Telephone Call RE:  INR Reminder      Outcome:     [] Patient verbalizes understanding    [x] Unable to reach   [x] Left message              []     Left a message for patient in reference to INR reminder for tomorrow    Mendez [place]

## 2023-10-24 ENCOUNTER — TELEPHONE (OUTPATIENT)
Age: 63
End: 2023-10-24

## 2023-10-24 DIAGNOSIS — Z79.899 ENCOUNTER FOR MONITORING DIURETIC THERAPY: ICD-10-CM

## 2023-10-24 DIAGNOSIS — Z51.81 ENCOUNTER FOR MONITORING DIURETIC THERAPY: ICD-10-CM

## 2023-10-24 DIAGNOSIS — Z79.01 CHRONIC ANTICOAGULATION: Primary | ICD-10-CM

## 2023-10-24 DIAGNOSIS — I50.22 CHRONIC SYSTOLIC HEART FAILURE (HCC): ICD-10-CM

## 2023-10-24 DIAGNOSIS — R06.02 SHORTNESS OF BREATH: ICD-10-CM

## 2023-10-24 DIAGNOSIS — D63.1 ANEMIA IN STAGE 3 CHRONIC KIDNEY DISEASE, UNSPECIFIED WHETHER STAGE 3A OR 3B CKD (HCC): ICD-10-CM

## 2023-10-24 DIAGNOSIS — N18.30 ANEMIA IN STAGE 3 CHRONIC KIDNEY DISEASE, UNSPECIFIED WHETHER STAGE 3A OR 3B CKD (HCC): ICD-10-CM

## 2023-10-24 NOTE — TELEPHONE ENCOUNTER
Spoke with patient's wife Praful Sheehan who stated patient is unable to come to appointment today because it's too short notice to order a lyft ride through insurance. Confirms patient is able to do this however needs a few days notice and was not aware of appointment. States patient is out of INR strips so is unable to test at home. Confirms patient can go to the lab tomorrow to have INR performed. Lab orders entered per [address]       Requested patient return call to clinic as soon as possible to discuss outstanding needs. Ashley confirms she will request patient call.

## 2023-10-25 DIAGNOSIS — N18.30 ANEMIA IN STAGE 3 CHRONIC KIDNEY DISEASE, UNSPECIFIED WHETHER STAGE 3A OR 3B CKD (HCC): ICD-10-CM

## 2023-10-25 DIAGNOSIS — D63.1 ANEMIA IN STAGE 3 CHRONIC KIDNEY DISEASE, UNSPECIFIED WHETHER STAGE 3A OR 3B CKD (HCC): ICD-10-CM

## 2023-10-25 DIAGNOSIS — Z79.01 CHRONIC ANTICOAGULATION: ICD-10-CM

## 2023-10-26 ENCOUNTER — TELEPHONE (OUTPATIENT)
Age: 63
End: 2023-10-26

## 2023-10-26 NOTE — TELEPHONE ENCOUNTER
Left message with patient requesting call back as soon as possible to coordinate labwork and clinic appointment.

## 2023-10-26 NOTE — TELEPHONE ENCOUNTER
Called and spoke with nurse with [facility] who stated that patient has not sent in any INR since 10/17. Informed nurse that patient's wife reported patient is out of testing strips and requested they initiate shipment of strips for patient. Nurse stated they will reach out to patient to address shipment of strips.

## 2023-10-27 NOTE — TELEPHONE ENCOUNTER
Discussed inability to contact patient at [employer]. Letter to be sent to patient's home regarding attempt to contact.

## 2023-10-30 ENCOUNTER — TELEPHONE (OUTPATIENT)
Age: 63
End: 2023-10-30

## 2023-10-30 ENCOUNTER — ANTI-COAG VISIT (OUTPATIENT)
Age: 63
End: 2023-10-30
Payer: MEDICARE

## 2023-10-30 DIAGNOSIS — Z79.01 CHRONIC ANTICOAGULATION: Primary | ICD-10-CM

## 2023-10-30 LAB
INR BLD: 3.1
INR BLD: 3.2
INR BLD: 3.4

## 2023-10-30 PROCEDURE — 93793 ANTICOAG MGMT PT WARFARIN: CPT | Performed by: INTERNAL MEDICINE

## 2023-10-30 NOTE — PROGRESS NOTES
[address] in [address] [place] [address]      INR result reviewed with Dr. Yossi Zayas MD  who made the following recommendations (Tone Edouard): 1mg for two days and recheck wednesday. Patient's wife notified and verbalized understanding. Also left patient voicemail with instructions. They had no further questions.  (See anticoag tracker)     Moon Ramírez RN

## 2023-10-30 NOTE — TELEPHONE ENCOUNTER
Received voicemail from patient's wife stating that [employer] rep came to patient's house over the weekend for  home INR meter training. Stated that rep was upset as patient's step son had smoked in the house. She also stated that she heard patient wheezing while walking, \"freaked out\" and left after telling them that patient was inappropriate for services. Received second voicemail from patient's wife stating that patient was having low flows this morning \"for about 5 hours\"  on and off states patient is now sleeping. Returned call to patient's cell phone with no answer, left message requesting callback asa regarding alarms. Called patient's wife at work. She states she did not hear alarms but patient reported them to her. States that once the patient went to sleep the alarms appeared to stop. She stated that patient took 2mg of coumadin two days over the weekend. Requested that patient call office as soon as possible to address alarms. She stated she would call patient and ask him to return call to office.

## 2023-10-30 NOTE — TELEPHONE ENCOUNTER
Dicussed with Dr. Jael Asencio who requested patient come in for LVAD interrogation. Left message with patient with coumadin instructions and requesting call back ASAP to assess alarms. Called and spoke with patient's girlfriend Darren Guerin at her work number. She stated she is unable to bring patient to clinic tomorrow as she has to be at work at [employer]. Notified of coumadin instructions and need to speak with patient asap regarding alarms. She stated she was able to talk to patient earlier. Requested she call patient again and request he call [facility], she verbalized understanding. Notified of visit on Thursday and requested she have patient set up medicare ride today for this. She stated she will request patient set up this ride.

## 2023-10-31 ENCOUNTER — TELEPHONE (OUTPATIENT)
Age: 63
End: 2023-10-31

## 2023-10-31 NOTE — TELEPHONE ENCOUNTER
Contacted patient via phone for INR reminder.     Telephone Call RE:  INR Reminder      Outcome:     [] Patient verbalizes understanding    [x] Unable to reach   [x] Left message              []     Left a message for patient in reference to INR reminder for tomorrow    Theola Cranker, [facility]

## 2023-11-01 ENCOUNTER — ANTI-COAG VISIT (OUTPATIENT)
Age: 63
End: 2023-11-01

## 2023-11-01 DIAGNOSIS — Z79.01 CHRONIC ANTICOAGULATION: Primary | ICD-10-CM

## 2023-11-01 LAB — INR BLD: 3.2

## 2023-11-01 NOTE — PROGRESS NOTES
[address] in [place], [place]      INR result reviewed with Rhea Baca NP who made the following recommendations (Nils Walker): hold tonight and recheck Friday. Patient notified of provider instructions via voicemail- requested call back to confirm. Also placed call to wife's work who stated they believe patient's wife isn't working today.        Eliana Gallardo RN

## 2023-11-02 ENCOUNTER — TELEPHONE (OUTPATIENT)
Age: 63
End: 2023-11-02

## 2023-11-02 DIAGNOSIS — R06.02 SHORTNESS OF BREATH: ICD-10-CM

## 2023-11-02 DIAGNOSIS — Z95.811 LVAD (LEFT VENTRICULAR ASSIST DEVICE) PRESENT (HCC): ICD-10-CM

## 2023-11-02 DIAGNOSIS — Z79.01 CHRONIC ANTICOAGULATION: Primary | ICD-10-CM

## 2023-11-02 DIAGNOSIS — I50.22 CHRONIC SYSTOLIC HEART FAILURE (HCC): ICD-10-CM

## 2023-11-02 NOTE — TELEPHONE ENCOUNTER
Per Dr. Jose L Sethi patient contacted after hours pager last night to report he does not have transportation to appointment this morning and therefore is unable to present. Patient stated he does not have Internet to conduct MyChart virtual visit. Reviewed with Willard Libman, NP and Coreen Lefort, NP who recommended appointment be rescheduled to another time so patient may be evaluated in person and patient to present for overdue labs ASAP. Attempted to contact patient. Message left. Contacted patient's significant other, Ashley (PHI release auth verified). Informed her of need to reschedule and to have labs done ASAP. She stated will take patient for lab work tomorrow morning and requested orders be sent to [facility]. She stated appointment must be prior to 10:00am. Inquired about medicaid transportation. She confirmed will be able to request and stated okay to choose a time after 10:00am. Appointment scheduled 11/14/23 at 1100 with Dr. Jose L Sethi. Patient's wife verbalized understanding. She stated he has been having some issues with his depression lately and is frustrated with all the appointments, labs, etc. Inquired if patient had spoken with PCP. She stated he does not have a PCP and is not willing to establish. Per wife that will only add to his frustration. She stated she will notify of rescheduling of appointment and again confirmed patient will present to lab tomorrow for draw. Informed her patient to present to ED with any worsening symptoms/concerns. She verbalized understanding and had no further questions. Lab orders faxed. Willard Libman, NP updated.      Favio Carmen RN  VAD Coordinator

## 2023-11-03 DIAGNOSIS — Z79.01 CHRONIC ANTICOAGULATION: ICD-10-CM

## 2023-11-03 DIAGNOSIS — I50.22 CHRONIC SYSTOLIC HEART FAILURE (HCC): ICD-10-CM

## 2023-11-03 DIAGNOSIS — Z95.811 LVAD (LEFT VENTRICULAR ASSIST DEVICE) PRESENT (HCC): ICD-10-CM

## 2023-11-03 DIAGNOSIS — R06.02 SHORTNESS OF BREATH: ICD-10-CM

## 2023-11-03 LAB
BASOPHILS # BLD AUTO: 0.2 X10E3/UL (ref 0–0.2)
BASOPHILS NFR BLD AUTO: 2 %
EOSINOPHIL # BLD AUTO: 0.3 X10E3/UL (ref 0–0.4)
EOSINOPHIL NFR BLD AUTO: 4 %
ERYTHROCYTE [DISTWIDTH] IN BLOOD BY AUTOMATED COUNT: 17.9 % (ref 11.6–15.4)
HCT VFR BLD AUTO: 37.3 % (ref 37.5–51)
HGB BLD-MCNC: 11.8 G/DL (ref 13–17.7)
IMM GRANULOCYTES # BLD AUTO: 0.1 X10E3/UL (ref 0–0.1)
IMM GRANULOCYTES NFR BLD AUTO: 1 %
LYMPHOCYTES # BLD AUTO: 1.9 X10E3/UL (ref 0.7–3.1)
LYMPHOCYTES NFR BLD AUTO: 23 %
MCH RBC QN AUTO: 26.9 PG (ref 26.6–33)
MCHC RBC AUTO-ENTMCNC: 31.6 G/DL (ref 31.5–35.7)
MCV RBC AUTO: 85 FL (ref 79–97)
MONOCYTES # BLD AUTO: 0.9 X10E3/UL (ref 0.1–0.9)
MONOCYTES NFR BLD AUTO: 11 %
NEUTROPHILS # BLD AUTO: 5 X10E3/UL (ref 1.4–7)
NEUTROPHILS NFR BLD AUTO: 59 %
PLATELET # BLD AUTO: 340 X10E3/UL (ref 150–450)
RBC # BLD AUTO: 4.38 X10E6/UL (ref 4.14–5.8)
WBC # BLD AUTO: 8.4 X10E3/UL (ref 3.4–10.8)

## 2023-11-03 NOTE — PROGRESS NOTES
Late entry: Spoke with patient's wife 11/2/23 at 5048 who stated message received and will go for labs 11/3/23. She requested orders be faxed to [address] [place] [address]. Lab orders faxed. She had no further questions.  Jesu Mendiola RN

## 2023-11-04 LAB
ALBUMIN SERPL-MCNC: 4.7 G/DL (ref 3.9–4.9)
ALBUMIN/GLOB SERPL: 1.5 {RATIO} (ref 1.2–2.2)
ALP SERPL-CCNC: 100 IU/L (ref 44–121)
ALT SERPL-CCNC: 21 IU/L (ref 0–44)
AST SERPL-CCNC: 26 IU/L (ref 0–40)
BILIRUB SERPL-MCNC: <0.2 MG/DL (ref 0–1.2)
BUN SERPL-MCNC: 25 MG/DL (ref 8–27)
BUN/CREAT SERPL: 13 (ref 10–24)
CALCIUM SERPL-MCNC: 9.8 MG/DL (ref 8.6–10.2)
CHLORIDE SERPL-SCNC: 102 MMOL/L (ref 96–106)
CO2 SERPL-SCNC: 24 MMOL/L (ref 20–29)
CREAT SERPL-MCNC: 1.97 MG/DL (ref 0.76–1.27)
EGFRCR SERPLBLD CKD-EPI 2021: 37 ML/MIN/1.73
GLOBULIN SER CALC-MCNC: 3.1 G/DL (ref 1.5–4.5)
GLUCOSE SERPL-MCNC: 90 MG/DL (ref 70–99)
INR PPP: 2.1 (ref 0.9–1.2)
LDH SERPL L TO P-CCNC: 267 IU/L (ref 121–224)
MAGNESIUM SERPL-MCNC: 2 MG/DL (ref 1.6–2.3)
NT-PROBNP SERPL-MCNC: 821 PG/ML (ref 0–210)
POTASSIUM SERPL-SCNC: 4.2 MMOL/L (ref 3.5–5.2)
PROT SERPL-MCNC: 7.8 G/DL (ref 6–8.5)
PROTHROMBIN TIME: 21.8 SEC (ref 9.1–12)
SODIUM SERPL-SCNC: 142 MMOL/L (ref 134–144)

## 2023-11-06 ENCOUNTER — TELEPHONE (OUTPATIENT)
Age: 63
End: 2023-11-06

## 2023-11-06 ENCOUNTER — ANTI-COAG VISIT (OUTPATIENT)
Age: 63
End: 2023-11-06
Payer: MEDICARE

## 2023-11-06 DIAGNOSIS — Z79.01 CHRONIC ANTICOAGULATION: Primary | ICD-10-CM

## 2023-11-06 LAB — INR BLD: 2.3

## 2023-11-06 PROCEDURE — 93793 ANTICOAG MGMT PT WARFARIN: CPT | Performed by: NURSE PRACTITIONER

## 2023-11-06 NOTE — TELEPHONE ENCOUNTER
Called and was keturah to reach pt. Schedule time tomorrow morning to do the [facility] application for transportation with pt.

## 2023-11-06 NOTE — PROGRESS NOTES
[address] [facility] in [place], [place]      INR result reviewed with Brett Guidry NP who made the following recommendations (Ophelia Dawson): change maintenance plan to 3mg every Sunday and Friday, 2 mg all other days and recheck 1 week. Patient notified of provider instructions via voicemail, requested call back to confirm.      11/8: second call to patient to notify of directions, left voicemail requesting call back     (See anticoag tracker)     Felicita Dang RN

## 2023-11-07 ENCOUNTER — TELEPHONE (OUTPATIENT)
Age: 63
End: 2023-11-07

## 2023-11-07 NOTE — TELEPHONE ENCOUNTER
Called pt. For scheduled transportation service application via phone. Pt. Left VM with reminder to CB.

## 2023-11-09 ENCOUNTER — TELEPHONE (OUTPATIENT)
Age: 63
End: 2023-11-09

## 2023-11-09 NOTE — PROGRESS NOTES
Patient returned call, confirmed he received coumadin instructions.  Stated he will set up transportation for appointment next week

## 2023-11-10 ENCOUNTER — TELEPHONE (OUTPATIENT)
Age: 63
End: 2023-11-10

## 2023-11-10 NOTE — TELEPHONE ENCOUNTER
Contacted patient via phone for INR reminder. Telephone Call RE:  INR Reminder      Outcome:     [] Patient verbalizes understanding    [x] Unable to reach   [x] Left message              []     INR reminder for Monday.      Lauri Aguilar, [facility]

## 2023-11-13 ENCOUNTER — ANTI-COAG VISIT (OUTPATIENT)
Age: 63
End: 2023-11-13
Payer: MEDICARE

## 2023-11-13 DIAGNOSIS — Z79.01 CHRONIC ANTICOAGULATION: Primary | ICD-10-CM

## 2023-11-13 LAB — INR BLD: 1.8

## 2023-11-13 PROCEDURE — 93793 ANTICOAG MGMT PT WARFARIN: CPT | Performed by: NURSE PRACTITIONER

## 2023-11-13 NOTE — PROGRESS NOTES
[address] in [facility], [facility]      INR result reviewed with Dave Morrow who made the following recommendations (Nikki Heart): 3mg tonight and recheck wednesday. Patient notified and verbalized understanding. They had no further questions. (See anticoag tracker)     Patient requesting a shower bag. States he has not used one before and his wife usually \"holds controller outside [employer] while he showers. Educated patient we will provider shower bag at tomorrow's visit and teach him how to use it. Requested he write down the serial number of all equipment and bring with him to clinic, he verbalized understanding.       Tess Ortiz RN

## 2023-11-14 ENCOUNTER — OFFICE VISIT (OUTPATIENT)
Age: 63
End: 2023-11-14
Payer: MEDICARE

## 2023-11-14 VITALS
TEMPERATURE: 97.5 F | RESPIRATION RATE: 20 BRPM | OXYGEN SATURATION: 98 % | HEART RATE: 98 BPM | SYSTOLIC BLOOD PRESSURE: 112 MMHG | BODY MASS INDEX: 29.35 KG/M2 | HEIGHT: 67 IN | WEIGHT: 187 LBS

## 2023-11-14 DIAGNOSIS — D63.1 ANEMIA IN STAGE 3 CHRONIC KIDNEY DISEASE, UNSPECIFIED WHETHER STAGE 3A OR 3B CKD (HCC): ICD-10-CM

## 2023-11-14 DIAGNOSIS — N18.30 ANEMIA IN STAGE 3 CHRONIC KIDNEY DISEASE, UNSPECIFIED WHETHER STAGE 3A OR 3B CKD (HCC): ICD-10-CM

## 2023-11-14 DIAGNOSIS — I42.8 NON-ISCHEMIC CARDIOMYOPATHY (HCC): ICD-10-CM

## 2023-11-14 DIAGNOSIS — Z95.811 LVAD (LEFT VENTRICULAR ASSIST DEVICE) PRESENT (HCC): Primary | ICD-10-CM

## 2023-11-14 DIAGNOSIS — Z13.220 SCREENING FOR HYPERLIPIDEMIA: ICD-10-CM

## 2023-11-14 PROCEDURE — 93750 INTERROGATION VAD IN PERSON: CPT | Performed by: INTERNAL MEDICINE

## 2023-11-14 PROCEDURE — G0009 ADMIN PNEUMOCOCCAL VACCINE: HCPCS | Performed by: INTERNAL MEDICINE

## 2023-11-14 PROCEDURE — 99215 OFFICE O/P EST HI 40 MIN: CPT | Performed by: INTERNAL MEDICINE

## 2023-11-14 PROCEDURE — 90677 PCV20 VACCINE IM: CPT | Performed by: INTERNAL MEDICINE

## 2023-11-14 PROCEDURE — 90473 IMMUNE ADMIN ORAL/NASAL: CPT | Performed by: INTERNAL MEDICINE

## 2023-11-14 RX ORDER — AMLODIPINE BESYLATE 2.5 MG/1
2.5 TABLET ORAL 2 TIMES DAILY
Qty: 30 TABLET | Refills: 3 | Status: SHIPPED | OUTPATIENT
Start: 2023-11-14

## 2023-11-14 ASSESSMENT — PATIENT HEALTH QUESTIONNAIRE - PHQ9
SUM OF ALL RESPONSES TO PHQ QUESTIONS 1-9: 0
SUM OF ALL RESPONSES TO PHQ9 QUESTIONS 1 & 2: 0
SUM OF ALL RESPONSES TO PHQ QUESTIONS 1-9: 0
2. FEELING DOWN, DEPRESSED OR HOPELESS: 0
1. LITTLE INTEREST OR PLEASURE IN DOING THINGS: 0

## 2023-11-14 NOTE — PROGRESS NOTES
[address] in [address] Note    Patient name: Lucina Merritt  Patient : 1960  Patient MRN: [id number]  Date of service: 23    Primary care physician: Alisha Kraus MD  LVAD cardiologist: Lindsay Skaggs MD    CHIEF COMPLAINT:  Postoperative care    ASSESSMENT:  Lucina Merritt is a 61 y.o. male with history of chronic systolic heart failure due to non ischemic cardiomyopathy, s/p HM3 LVAD implantation ([facility] 2023) as destination therapy, stage D, remains NYHA class IV despite LVAD support due to severe COPD; currently with recovered LVEF to 55%; optimal GDMT limited by hypovolemia  Patient is not a candidate for heart transplant due to advanced COPD -  declined by [facility] for both LVAD-DT and transplant and for the same reasons declined for LVAD-DT by [facility]   Found to have acute anemia recently with hgb down to 7 > EGD showed angioectasia in the stomach, treated with APC; coumadin resumed  Patient presents with recurrent low flow alarm in clinic which is a combination of hypertension, dehydration and perhaps too high device speed for the narrow LV now that EF recovered to normal > will start with hydration and optimization of blood pressure; his speed may need to be reduced if still low flows and INR level improves to therapeutic    RECOMMENDATIONS:  Continue current device speed of 5400 RPM  Continue dressing changes two times weekly   consult transpotation, explore barriers to compliance  Patient to bring caregiver to next visit to demonstrate dressing changes and education level about device and controller exchange  Start norvasc 2.5 twice daily  Medical therapy for heart failure limited by bronchospasm and IVVD  Continue Toprol XL 50 mg daily.   Unable to tolerate ACEi/ARB/ARNi due to renal dysfunction and hypovolemia  Unable to tolerate MRA due to renal dysfunction and

## 2023-11-14 NOTE — PROGRESS NOTES
[address] in [address]      LVAD  LVAD Type[de-identified] Left Ventricular Assist Device (LVAD)  Pump Speed (rpm): 5400  Pump Flow (lpm): 2.7  MAP (mmHg): 112 (O2 98% PALPABLE PULSE)  Set Low Speed (rpm): 5000  Pump Pulse Index (PI): 9.4  Pump Power (Mei): 4.4  Battery Life Checked: Yes  Backup Controller Present: Yes  Driveline Dressing: Clean, Dry and Intact  Outpatient: Yes  MAP in Therapeutic Range (Outpatient): No       José Luis Sanders was seen today in clinic for a visit with Dr. Lubna Prater MD     Patient denied s/s of driveline infection or driveline trauma (including  drops). Driveline inspected for integrity. Above reported to provider. Patient having many intermittent low flows in clinic with high Pis. Per provider likely due to dehydration and hypertension. Patient drank two large cups of water in clinic and was educated to increase hydration at home and call for weight gain of 3lb overnight or 5lb in 1 week. Per provider patient to return in 1 week for nurse visit for alarm check and bp check. Instructed to start amlodipine today. Discussed with patient who verbalized understanding. Educated patient on use of shower bag. Educated on importance of using bag as equipment not waterproof. He verbalized understanding. Reviewed  education including green arrows and what to do if green arrows not illuminated, patient able to teach back to call 911 and VAD center in this scenario. Educated patient that pump off is a life threatening scenario, he verbalized understanding. LVAD interrogation completed in clinic, results reported to provider. See flow sheet for details. All orders entered per [facility]. All provider instructions placed in AVS and reviewed with patient. Educated the patient on medication changes, increased hydration and weight monitoring, expected follow up. reviewed questions.  Patient verbalized

## 2023-11-14 NOTE — PROGRESS NOTES
Jolene Oseguera is a 61 y.o. male with a history of NICM with Heartmate 3 implant date of 05/27/2023. Patient was seen in clinic for routine follow up at which time it was noted he does not have a shower bag. Patient stated he was given one at [facility] but not longer has this. Shower bag replaced to ensure safe operation of device per  guidelines.

## 2023-11-14 NOTE — PATIENT INSTRUCTIONS
bring your daily sheets and medications to your next appointment. Please monitor your weights daily upon waking and after using the bathroom. Keep a written records of your weights and bring to your next appointment. If you have a weight gain of 3 or more pounds overnight OR 5 or more pounds in one week please contact our office. Thank you for allowing us the privilege of being a part of your healthcare team! Please do not hesitate to contact our office at [phone] with any questions or concerns.

## 2023-11-15 ENCOUNTER — ANTI-COAG VISIT (OUTPATIENT)
Age: 63
End: 2023-11-15
Payer: MEDICARE

## 2023-11-15 ENCOUNTER — TELEPHONE (OUTPATIENT)
Age: 63
End: 2023-11-15

## 2023-11-15 DIAGNOSIS — Z79.01 CHRONIC ANTICOAGULATION: Primary | ICD-10-CM

## 2023-11-15 LAB
CHOLEST SERPL-MCNC: 193 MG/DL
HDLC SERPL-MCNC: 41 MG/DL
HDLC SERPL: 4.7 (ref 0–5)
INR BLD: 2.1
IRON SATN MFR SERPL: 13 % (ref 20–50)
IRON SERPL-MCNC: 59 UG/DL (ref 35–150)
LDLC SERPL CALC-MCNC: ABNORMAL MG/DL (ref 0–100)
LDLC SERPL DIRECT ASSAY-MCNC: 100 MG/DL (ref 0–100)
TIBC SERPL-MCNC: 470 UG/DL (ref 250–450)
TRIGL SERPL-MCNC: 414 MG/DL
VLDLC SERPL CALC-MCNC: ABNORMAL MG/DL

## 2023-11-15 PROCEDURE — 93793 ANTICOAG MGMT PT WARFARIN: CPT | Performed by: NURSE PRACTITIONER

## 2023-11-15 NOTE — TELEPHONE ENCOUNTER
Called patient regarding alarms. He confirmed that he started Norvasc last night. States alarms are decreased in frequency but still occurring. Estimates he has had 4 brief low flow alarms this morning.

## 2023-11-15 NOTE — TELEPHONE ENCOUNTER
Discussed with Cande Garland who stated Brayton Luriemeka patient okay to return in 1 week for MAP check as long as alarms do not increase in frequency or patient does not develop new symptoms. Left message for patient with provider instructions to notify [facility] if alarms increase in frequency or duration. Requested call back to confirm.

## 2023-11-15 NOTE — PROGRESS NOTES
[address] in [facility], [address] [place] [address]      INR result reviewed with Gio Rodriguez who made the following recommendations (Ghada Fairfield): no changes and recheck 1 week. Patient notified and verbalized understanding. They had no further questions.  (See anticoag tracker)     Santo Becerra RN

## 2023-11-16 ENCOUNTER — TELEPHONE (OUTPATIENT)
Age: 63
End: 2023-11-16

## 2023-11-20 ENCOUNTER — ANTI-COAG VISIT (OUTPATIENT)
Age: 63
End: 2023-11-20

## 2023-11-20 LAB — INR BLD: 1.4

## 2023-11-20 NOTE — PROGRESS NOTES
[address] [facility] in [place], [place]      INR result reviewed with Korin Ramirez NP who made the following recommendations (Vesta Bienenstock): 3mg x 2 days and recheck Wednesday. Patient notified via voicemail, requested call back to confirm.  (See anticoag tracker)     Ambrosio Hatch RN

## 2023-11-22 ENCOUNTER — ANTI-COAG VISIT (OUTPATIENT)
Age: 63
End: 2023-11-22

## 2023-11-22 ENCOUNTER — TELEPHONE (OUTPATIENT)
Age: 63
End: 2023-11-22

## 2023-11-22 ENCOUNTER — NURSE ONLY (OUTPATIENT)
Age: 63
End: 2023-11-22
Payer: MEDICARE

## 2023-11-22 VITALS
HEART RATE: 73 BPM | OXYGEN SATURATION: 96 % | RESPIRATION RATE: 18 BRPM | BODY MASS INDEX: 28.43 KG/M2 | SYSTOLIC BLOOD PRESSURE: 86 MMHG | HEIGHT: 68 IN

## 2023-11-22 DIAGNOSIS — Z79.01 CHRONIC ANTICOAGULATION: ICD-10-CM

## 2023-11-22 DIAGNOSIS — Z95.811 LVAD (LEFT VENTRICULAR ASSIST DEVICE) PRESENT (HCC): Primary | ICD-10-CM

## 2023-11-22 DIAGNOSIS — I50.22 CHRONIC SYSTOLIC HEART FAILURE (HCC): ICD-10-CM

## 2023-11-22 LAB
INR BLD: 2.6
POC INR: 2.6
PROTHROMBIN TIME, POC: NORMAL

## 2023-11-22 PROCEDURE — 85610 PROTHROMBIN TIME: CPT | Performed by: NURSE PRACTITIONER

## 2023-11-22 PROCEDURE — 93750 INTERROGATION VAD IN PERSON: CPT | Performed by: NURSE PRACTITIONER

## 2023-11-22 NOTE — PROGRESS NOTES
[facility]  [facility] in [place], [place]      1427: INR result reviewed with YUMI Quiles NP who made the following recommendations (VORB): Continue  current coumadin regimen (3 mg every Sun, Fri; 2 mg all other days) and recheck INR 11/27/23. (See anticoag tracker). Attempted to contact patient to notify. Message left. Will await return call.     Hortensia Avery RN

## 2023-11-22 NOTE — TELEPHONE ENCOUNTER
0762: Attempted to contact patient and significant other to request appointment scheduled at 10:00 be moved to 11:00a or later this afternoon due to coordinator availability and to request patient bring dressing kit to appointment. Messages left. Will await return call. 12: Spoke with patient who confirmed will come at 11:00am and bring dressing kit. He had no further questions.      Josiah Villarreal RN  VAD Coordinator

## 2023-11-24 ENCOUNTER — TELEPHONE (OUTPATIENT)
Age: 63
End: 2023-11-24

## 2023-11-24 RX ORDER — OMEGA-3 FATTY ACIDS/FISH OIL 300-1000MG
1 CAPSULE ORAL 2 TIMES DAILY
Qty: 60 CAPSULE | Refills: 2 | Status: SHIPPED | OUTPATIENT
Start: 2023-11-24

## 2023-11-24 NOTE — TELEPHONE ENCOUNTER
From: Lindsay Skaggs MD  Sent: 11/23/2023   2:10 PM EST  Omega 3 acids 1000mg twice daily  Venofer 200mg IV x 2 doses    Orders placed per Dr. Indira Nelson. Patient notified. Informed him he will be contacted for infusion scheduling. He verbalized understanding and had no further questions.      Dyan Hinkle RN  VAD Coordinator

## 2023-11-24 NOTE — TELEPHONE ENCOUNTER
Contacted patient via phone for INR reminder. Telephone Call RE:  INR Reminder      Outcome:     [x] Patient verbalizes understanding    [] Unable to reach   [] Left message              [x]     Spoke with patient in reference to INR reminder for Monday. Patient agrees to conduct INR and self report.     Phong Rodriguez [place]

## 2023-11-27 ENCOUNTER — ANTI-COAG VISIT (OUTPATIENT)
Age: 63
End: 2023-11-27
Payer: MEDICARE

## 2023-11-27 DIAGNOSIS — Z79.01 CHRONIC ANTICOAGULATION: Primary | ICD-10-CM

## 2023-11-27 LAB — INR BLD: 1.4

## 2023-11-27 PROCEDURE — 93793 ANTICOAG MGMT PT WARFARIN: CPT | Performed by: INTERNAL MEDICINE

## 2023-11-27 NOTE — PROGRESS NOTES
[address] in [address] [place] [address]      INR result reviewed with Dr. Moody Cockayne, MD  who made the following recommendations (Miguel Gould): 3mg daily and recheck thursday. Patient notified and verbalized understanding. They had no further questions.  (See anticoag tracker)     Elma Libman, RN

## 2023-11-28 ENCOUNTER — TELEPHONE (OUTPATIENT)
Age: 63
End: 2023-11-28

## 2023-11-29 NOTE — TELEPHONE ENCOUNTER
Called and spoke to patient in regards to plan for patient to meet with [facility] for home INR meter training scheduled for 11/29. Spoke to [employer] who stated they do not have  available for 11/29. Stated they can schedule with  12/6 at [address]  and spoke with patient, notified we will plan to complete this training in clinic 12/6 at 1pm. He verbalized understanding.

## 2023-12-05 ENCOUNTER — ANTI-COAG VISIT (OUTPATIENT)
Age: 63
End: 2023-12-05
Payer: MEDICARE

## 2023-12-05 DIAGNOSIS — Z79.01 CHRONIC ANTICOAGULATION: Primary | ICD-10-CM

## 2023-12-05 LAB — INR BLD: 4.6

## 2023-12-05 PROCEDURE — 93793 ANTICOAG MGMT PT WARFARIN: CPT | Performed by: NURSE PRACTITIONER

## 2023-12-05 NOTE — PROGRESS NOTES
[address] in [place], [place]      INR result reviewed with Sravanthi rBock NP who made the following recommendations (Nathalia Code): hold tonight, 1 mg tomorrow and recheck thursday. Patient notified and verbalized understanding. They had no further questions. (See anticoag tracker)     Patient stated that he has 1mg and 3mg coumadin tablets. Confirmed he will take 1mg, tracker updated. Patient stated that he was not aware of nurse visit planned for tomorrow, not sure if his wife is able to bring him. States his alarms are \"quite\" today but states he had several LVAD alarms over the weekend. Requested patient call his wife and arrange transportation to nurse visit tomorrow to evaluate BP and low flows. Educated patient on importance of evaluating BP and lvad alarms. He verbalized understanding, stated he will call his wife (who is at work now) and return call to [facility] to notify if he has transportation to appt.      Itz Kc RN

## 2023-12-06 ENCOUNTER — NURSE ONLY (OUTPATIENT)
Age: 63
End: 2023-12-06

## 2023-12-06 ENCOUNTER — TELEPHONE (OUTPATIENT)
Age: 63
End: 2023-12-06

## 2023-12-06 VITALS — HEART RATE: 84 BPM | SYSTOLIC BLOOD PRESSURE: 98 MMHG | OXYGEN SATURATION: 94 % | RESPIRATION RATE: 20 BRPM

## 2023-12-06 DIAGNOSIS — Z79.01 CHRONIC ANTICOAGULATION: Primary | ICD-10-CM

## 2023-12-06 DIAGNOSIS — Z51.81 ENCOUNTER FOR MONITORING DIURETIC THERAPY: ICD-10-CM

## 2023-12-06 DIAGNOSIS — R06.02 SHORTNESS OF BREATH: ICD-10-CM

## 2023-12-06 DIAGNOSIS — Z95.811 LVAD (LEFT VENTRICULAR ASSIST DEVICE) PRESENT (HCC): ICD-10-CM

## 2023-12-06 DIAGNOSIS — Z79.899 ENCOUNTER FOR MONITORING DIURETIC THERAPY: ICD-10-CM

## 2023-12-06 DIAGNOSIS — I50.22 CHRONIC SYSTOLIC HEART FAILURE (HCC): ICD-10-CM

## 2023-12-06 RX ORDER — AMLODIPINE BESYLATE 2.5 MG/1
5 TABLET ORAL 2 TIMES DAILY
Qty: 30 TABLET | Refills: 3 | Status: SHIPPED | OUTPATIENT
Start: 2023-12-06

## 2023-12-06 NOTE — PROGRESS NOTES
[address] in [facility], [address] [place] [address]  LVAD Outpatient Nurse Visit    Chief Complaint   Patient presents with    Other     Nurse Visit- Alarm and BP check          BP (!) 98/0 Comment: No palpable radial pulse  Pulse 84   Resp 20   SpO2 94%      LVAD  LVAD Type[de-identified] Left Ventricular Assist Device (LVAD)  Pump Speed (rpm): 5200  Pump Flow (lpm): 2.9  MAP (mmHg): 98 (no palpable radial pulse)  Set Low Speed (rpm): 4800  Pump Pulse Index (PI): 7.5  Pump Power (Mei): 4  Battery Life Checked: Yes  Backup Controller Present: Yes  Driveline Dressing: Clean, Dry and Intact  Outpatient: Yes  MAP in Therapeutic Range (Outpatient): No       Rosalva Washington is a 61 y.o. male with a history of NICM with Heartmate 3  implant date of 05/27/2023. Patient was seen in clinic for follow up of BP and LVAD alarms. LVAD interrogation completed. Notable for several low flow alarms, patient thinks they are somewhat improved. Medications reviewed. Reviewed all information with Richard Alegria who recommended V.O.R.B increase norvasc to 5mg BID and have patient increase fluid intake return in two weeks for recheck with next provider visit. All instructions placed in after visit summary and reviewed with patient. Education provided on medication changes, and increasing fluid intake. Educate patient and wife on INR goal, frequency of labwork and paging system. Discussed use of emergency pager and demonstrated how to use this. Time given to ask questions. Patient verbalized understanding and had no further questions.

## 2023-12-06 NOTE — PATIENT INSTRUCTIONS
and caregiver resources as well as access to online LVAD support groups visit [email or web address]       Please bring your daily sheets and medications to your next appointment. Please monitor your weights daily upon waking and after using the bathroom. Keep a written records of your weights and bring to your next appointment. If you have a weight gain of 3 or more pounds overnight OR 5 or more pounds in one week please contact our office. Thank you for allowing us the privilege of being a part of your healthcare team! Please do not hesitate to contact our office at [phone] with any questions or concerns.

## 2023-12-07 ENCOUNTER — ANTI-COAG VISIT (OUTPATIENT)
Age: 63
End: 2023-12-07
Payer: MEDICARE

## 2023-12-07 DIAGNOSIS — Z79.01 CHRONIC ANTICOAGULATION: Primary | ICD-10-CM

## 2023-12-07 LAB
ALBUMIN SERPL-MCNC: 4.2 G/DL (ref 3.9–4.9)
ALBUMIN/GLOB SERPL: 1.4 {RATIO} (ref 1.2–2.2)
ALP SERPL-CCNC: 101 IU/L (ref 44–121)
ALT SERPL-CCNC: 22 IU/L (ref 0–44)
AST SERPL-CCNC: 33 IU/L (ref 0–40)
BASOPHILS # BLD AUTO: 0.1 X10E3/UL (ref 0–0.2)
BASOPHILS NFR BLD AUTO: 1 %
BILIRUB SERPL-MCNC: 0.3 MG/DL (ref 0–1.2)
BUN SERPL-MCNC: 27 MG/DL (ref 8–27)
BUN/CREAT SERPL: 15 (ref 10–24)
CALCIUM SERPL-MCNC: 9.1 MG/DL (ref 8.6–10.2)
CHLORIDE SERPL-SCNC: 103 MMOL/L (ref 96–106)
CO2 SERPL-SCNC: 23 MMOL/L (ref 20–29)
CREAT SERPL-MCNC: 1.76 MG/DL (ref 0.76–1.27)
EGFRCR SERPLBLD CKD-EPI 2021: 43 ML/MIN/1.73
EOSINOPHIL # BLD AUTO: 0.1 X10E3/UL (ref 0–0.4)
EOSINOPHIL NFR BLD AUTO: 1 %
ERYTHROCYTE [DISTWIDTH] IN BLOOD BY AUTOMATED COUNT: 17.2 % (ref 11.6–15.4)
GLOBULIN SER CALC-MCNC: 3 G/DL (ref 1.5–4.5)
GLUCOSE SERPL-MCNC: 116 MG/DL (ref 70–99)
HCT VFR BLD AUTO: 36.8 % (ref 37.5–51)
HGB BLD-MCNC: 12 G/DL (ref 13–17.7)
IMM GRANULOCYTES # BLD AUTO: 0 X10E3/UL (ref 0–0.1)
IMM GRANULOCYTES NFR BLD AUTO: 0 %
INR BLD: 3.7
LDH SERPL L TO P-CCNC: 261 IU/L (ref 121–224)
LYMPHOCYTES # BLD AUTO: 1.3 X10E3/UL (ref 0.7–3.1)
LYMPHOCYTES NFR BLD AUTO: 13 %
MAGNESIUM SERPL-MCNC: 1.8 MG/DL (ref 1.6–2.3)
MCH RBC QN AUTO: 28.2 PG (ref 26.6–33)
MCHC RBC AUTO-ENTMCNC: 32.6 G/DL (ref 31.5–35.7)
MCV RBC AUTO: 86 FL (ref 79–97)
MONOCYTES # BLD AUTO: 0.9 X10E3/UL (ref 0.1–0.9)
MONOCYTES NFR BLD AUTO: 10 %
NEUTROPHILS # BLD AUTO: 7.2 X10E3/UL (ref 1.4–7)
NEUTROPHILS NFR BLD AUTO: 75 %
PLATELET # BLD AUTO: 305 X10E3/UL (ref 150–450)
POTASSIUM SERPL-SCNC: 4.6 MMOL/L (ref 3.5–5.2)
PROT SERPL-MCNC: 7.2 G/DL (ref 6–8.5)
RBC # BLD AUTO: 4.26 X10E6/UL (ref 4.14–5.8)
SODIUM SERPL-SCNC: 142 MMOL/L (ref 134–144)
WBC # BLD AUTO: 9.7 X10E3/UL (ref 3.4–10.8)

## 2023-12-07 PROCEDURE — 93793 ANTICOAG MGMT PT WARFARIN: CPT | Performed by: NURSE PRACTITIONER

## 2023-12-07 NOTE — PROGRESS NOTES
[address] in [place], [place]      INR result reviewed with Venessa Mathew NP who made the following recommendations (Lauren Mathews): 1mg tonight and recheck Monday. Patient notified and verbalized understanding. They had no further questions.  (See anticoag tracker)     Chastity Schmidt RN

## 2023-12-08 LAB — NT-PROBNP SERPL-MCNC: 434 PG/ML (ref 0–210)

## 2023-12-11 ENCOUNTER — ANTI-COAG VISIT (OUTPATIENT)
Age: 63
End: 2023-12-11
Payer: MEDICARE

## 2023-12-11 DIAGNOSIS — Z79.01 CHRONIC ANTICOAGULATION: Primary | ICD-10-CM

## 2023-12-11 LAB — INR BLD: 3.2

## 2023-12-11 PROCEDURE — 93793 ANTICOAG MGMT PT WARFARIN: CPT | Performed by: NURSE PRACTITIONER

## 2023-12-11 NOTE — PROGRESS NOTES
[address] in [place], [place]      INR result reviewed with Felipa Olszewski, NP who made the following recommendations (Bhavana Boyce): hold tonight and recheck Friday . Patient notified of provider instructions via voicemail, requested call back to confirm.    (See anticoag tracker)     12/12- second call placed to patient regarding coumadin instructions- requested call back to confirm      12/13: Patient returned call, stated he received instructions, made coumadin adjustments and will send in INR tomorrow     Guzman Giles RN

## 2023-12-26 ENCOUNTER — ANTI-COAG VISIT (OUTPATIENT)
Age: 63
End: 2023-12-26

## 2023-12-26 ENCOUNTER — NURSE ONLY (OUTPATIENT)
Age: 63
End: 2023-12-26

## 2023-12-26 DIAGNOSIS — Z79.01 CHRONIC ANTICOAGULATION: ICD-10-CM

## 2023-12-26 DIAGNOSIS — R68.89 SUSPECTED SOFT TISSUE INFECTION: ICD-10-CM

## 2023-12-26 DIAGNOSIS — L08.9 SOFT TISSUE INFECTION: ICD-10-CM

## 2023-12-26 DIAGNOSIS — I50.22 CHRONIC SYSTOLIC HEART FAILURE (HCC): ICD-10-CM

## 2023-12-26 DIAGNOSIS — T81.42XA INFECTION FOLLOWING A PROCEDURE, DEEP INCISIONAL SURGICAL SITE, INITIAL ENCOUNTER: ICD-10-CM

## 2023-12-26 DIAGNOSIS — Z95.811 LVAD (LEFT VENTRICULAR ASSIST DEVICE) PRESENT (HCC): Primary | ICD-10-CM

## 2023-12-26 LAB — INR BLD: 1.8

## 2023-12-26 RX ORDER — CEPHALEXIN 500 MG/1
500 CAPSULE ORAL 4 TIMES DAILY
Qty: 28 CAPSULE | Refills: 0 | Status: SHIPPED | OUTPATIENT
Start: 2023-12-26 | End: 2023-12-26 | Stop reason: SDUPTHER

## 2023-12-26 RX ORDER — CEPHALEXIN 500 MG/1
500 CAPSULE ORAL 4 TIMES DAILY
Qty: 28 CAPSULE | Refills: 0 | Status: SHIPPED | OUTPATIENT
Start: 2023-12-26 | End: 2024-01-02

## 2023-12-26 NOTE — PATIENT INSTRUCTIONS
Medication changes:    START keflex 4 times daily for 7 days       Testing Ordered:    A driveline culture has been collected today. You will be contacted with any abnormal results requiring changes to your current plan of care. An order for CT of the chest and abdomen has been placed to be done as soon as possible. You will be receiving an automated call from Coordination of Care to schedule this test. If you are unavailable to receive the call or would like to contact coordination of care yourself you may contact [phone] to schedule. You will need to contact coordination of care yourself if you miss their calls as they will only make 3 attempts to reach you. Other Recommendations:     Call as soon as possible for any controller drops, driveline pulls, or pain at driveline site    Continue to change drive line exit site dressing 3 times a week using sterile technique,     Ensure you are drinking an adequate amount of water with a goal of 6-8 eight ounce glasses (1.5-2 liters) of fluid daily. Your urine should be clear and light yellow straw colored. Monthly LVAD Education Tip:    Contacting the VAD Pager    There may be times when you need to reach the VAD team outside of normal clinic hours. A member of the VAD team is available for urgent and emergent needs after hours and any time the clinic is closed. It is important for you to understand when and how to contact the VAD team to ensure you receive the care you need. Remember, the VAD pager is reserved for urgent and emergent needs related to your LVAD/Heart Failure and should only be utilized in appropriate situations. If you are experiencing a true emergency of any nature, you should ALWAYS CALL 911 FIRST! How to contact the VAD Pager:  Dial the pager number ([phone]) on your phone. When prompted enter in your callback number followed by the # sign. A member of the LVAD team will return your call.      When to contact the VAD

## 2023-12-26 NOTE — PROGRESS NOTES
[address] in [place], [place]  LVAD Outpatient Nurse Visit    Chief Complaint   Patient presents with    Other     Nurse visit- INR check          There were no vitals taken for this visit. LVAD  LVAD Type[de-identified] Left Ventricular Assist Device (LVAD)  Driveline Dressing: Changed per order     Ayan Haywood is a 61 y.o. male with history of chronic systolic heart failure due to non ischemic cardiomyopathy, s/p HM3 LVAD implantation ([facility] 5/27/2023). Patient was seen in clinic for INR check. Patient's wife stated she is worried about driveline as patient dropped his consolidated bag about two weeks ago. States since then she reports swelling at driveline site. States patient had 3 days of pain that self resolved. Denies noticing any drainage from site. Driveline dressing change completed using sterile technique. Driveline site with small amount of thick brown mucous-like drainage at insertion site. About 2 inches of erythema and swelling noted above drive line. Reviewed all information with Francis Muhammad who recommended V.O.R.B obtain wound culture, CT chest abdomen and 7 day course of keflex, daily dressing changes . All instructions placed in after visit summary and reviewed with patient. Education provided on s/s of drive line infection, need to call immediately for any controller drops, or pain at driveline site. Time given to ask questions. Patient verbalized understanding and had no further questions.

## 2023-12-28 LAB — SPECIMEN STATUS REPORT: NORMAL

## 2023-12-29 ENCOUNTER — TELEPHONE (OUTPATIENT)
Age: 63
End: 2023-12-29

## 2023-12-29 RX ORDER — SULFAMETHOXAZOLE AND TRIMETHOPRIM 400; 80 MG/1; MG/1
2 TABLET ORAL 2 TIMES DAILY
Qty: 40 TABLET | Refills: 0 | Status: SHIPPED | OUTPATIENT
Start: 2023-12-29 | End: 2024-01-08

## 2023-12-29 NOTE — TELEPHONE ENCOUNTER
Reviewed positive drive line culture results with Reggie Vera who stated VORB have patient finish 7 day course of antibiotics (will finish Monday) and come back to clinic for re culture and reassessment of driveline. Patient scheduled for nurse visit and CT scan Wednesday 01/03. Called and spoke with patient. Notified of positive wound culture. Patient confirmed he started antibiotics and will finish course. Patient verbalized understanding of scheduled CT and nurse visit.

## 2023-12-29 NOTE — TELEPHONE ENCOUNTER
Additional culture results received showing culture growing staph, enterobacter and group b strep. Reviewed with Bailey Thapa who stated VORB have patient stop Keflex, start bactrim (400-80) two tablets BID for 10 days. Called and notified patient of change in antibiotics. Instructed to stop keflex and start bactrim. He verbalized understanding.

## 2023-12-30 LAB
BACTERIA SPEC AEROBE CULT: ABNORMAL
BACTERIA SPEC ANAEROBE CULT: ABNORMAL

## 2024-01-01 LAB
BACTERIA SPEC AEROBE CULT: ABNORMAL
BACTERIA SPEC ANAEROBE CULT: ABNORMAL

## 2024-01-02 ENCOUNTER — TELEPHONE (OUTPATIENT)
Age: 64
End: 2024-01-02

## 2024-01-02 NOTE — TELEPHONE ENCOUNTER
Voicemail received from patient's wife requesting clarification regarding tomorrow's appointments.  Reviewed CT scan ordered tomorrow at 12. Instructed patient's wife to have patient present to outpatient registration for CT scan and then to clinic for nurse visit.  She verbalized understanding.

## 2024-01-03 ENCOUNTER — ANTI-COAG VISIT (OUTPATIENT)
Age: 64
End: 2024-01-03

## 2024-01-03 ENCOUNTER — NURSE ONLY (OUTPATIENT)
Age: 64
End: 2024-01-03
Payer: MEDICARE

## 2024-01-03 ENCOUNTER — HOSPITAL ENCOUNTER (OUTPATIENT)
Facility: HOSPITAL | Age: 64
Discharge: HOME | End: 2024-01-06
Payer: MEDICARE

## 2024-01-03 DIAGNOSIS — R06.02 SHORTNESS OF BREATH: ICD-10-CM

## 2024-01-03 DIAGNOSIS — Z51.81 ENCOUNTER FOR MONITORING DIURETIC THERAPY: ICD-10-CM

## 2024-01-03 DIAGNOSIS — R68.89 SUSPECTED SOFT TISSUE INFECTION: ICD-10-CM

## 2024-01-03 DIAGNOSIS — Z79.01 CHRONIC ANTICOAGULATION: Primary | ICD-10-CM

## 2024-01-03 DIAGNOSIS — T81.42XA INFECTION FOLLOWING A PROCEDURE, DEEP INCISIONAL SURGICAL SITE, INITIAL ENCOUNTER: ICD-10-CM

## 2024-01-03 DIAGNOSIS — L08.9 SOFT TISSUE INFECTION: ICD-10-CM

## 2024-01-03 DIAGNOSIS — I50.22 CHRONIC SYSTOLIC HEART FAILURE (HCC): ICD-10-CM

## 2024-01-03 DIAGNOSIS — Z95.811 LVAD (LEFT VENTRICULAR ASSIST DEVICE) PRESENT (HCC): ICD-10-CM

## 2024-01-03 DIAGNOSIS — Z79.899 ENCOUNTER FOR MONITORING DIURETIC THERAPY: ICD-10-CM

## 2024-01-03 LAB
INR BLD: 2.6
POC INR: 2.6
PROTHROMBIN TIME, POC: NORMAL

## 2024-01-03 PROCEDURE — 99211 OFF/OP EST MAY X REQ PHY/QHP: CPT | Performed by: NURSE PRACTITIONER

## 2024-01-03 PROCEDURE — 36415 COLL VENOUS BLD VENIPUNCTURE: CPT | Performed by: NURSE PRACTITIONER

## 2024-01-03 PROCEDURE — 99000 SPECIMEN HANDLING OFFICE-LAB: CPT | Performed by: NURSE PRACTITIONER

## 2024-01-03 PROCEDURE — 74150 CT ABDOMEN W/O CONTRAST: CPT

## 2024-01-03 PROCEDURE — 71250 CT THORAX DX C-: CPT

## 2024-01-03 PROCEDURE — 85610 PROTHROMBIN TIME: CPT | Performed by: NURSE PRACTITIONER

## 2024-01-03 NOTE — PROGRESS NOTES
[facility]   in [place], [place]      INR result reviewed with YUMI Quiles NP  who made the following recommendations (VORB): 1mg tonight and recheck tomorrow. Patient notified at clinic visit and verbalized understanding. They had no further questions. (See anticoag tracker)     Funmi Barth RN         Has had 2 episodes of spotting. In  when she ran out of patch. In August, spotting after IC. Has had some vaginal odor. Nuswab (23) +BV. Tx'd with metrogel. Pap/HPV 23) n/n    Stenotic os, given cytotec for today's procedure. Took 2 tabs PO last night, 2 tabs vaginally this AM.  Did have some cramping.    -> SIS done, shows thin endometrium, submucosal fibroid. Small endocervical polyp x2 (11mm and 8mm). - EMB deferred  -discussed  cervical polyps typically benign. Discussed expectant management vs removal of cervical polyps. For surgical removal, would recommend OR/D&C/hysteroscopy, kiersten given her stenotic os. Declines for now. Will monitor. OK to continue her MHT as endometrium appears thin, does not appear to be the source of her spotting. Sonohysterography procedure    Yani Lua is a ,  61 y.o. female Black /  No LMP recorded. Patient is postmenopausal.     She presents for a sonohysterography. The indications for this procedure were reviewed with the patient. The procedure was explained in detail and all questions were answered. Procedure: The patient was placed in the lithotomy position. A graves speculum was introduced into the vagina and the cervix was visualized. The cytotec tablets were seen and removed with a swab. The cervix was prepped with zephiran. solution. A Cook's Hysterography catheter could not be introduced. A flexible os finder was used to gently dilate the cervix. The cook catheter was then introduced into the uterine cavity and the speculum was removed. Sterile sonohysterography with 3D Reconstruction was performed. The endometrial cavity was distended with sterile saline. ~4cc used. Excellent distension achieved. The findings are as follows: normal endometrium with submucosal fibroid and endocervical polyp x2 (11 and 8 mm). The patient tolerated the procedure well without complication, and was discharged to home.

## 2024-01-03 NOTE — PATIENT INSTRUCTIONS
Medication changes:    Coumadin changes per tracker     No other changes at this time- we will notify you of antibiotic changes based on the results of your culture     Testing Ordered:    Lab work and wound culture  has been collected today. You will be contacted with any abnormal results requiring changes to your current plan of care.      Other Recommendations:     We will add soap and saline dressing change kits to your supply order. Please order these from [employer] and start using them when they arrive. Change your drive line dressing 3 times per week.     Continue to change drive line exit site dressing 3 times a week using sterile technique,     Ensure you are drinking an adequate amount of water with a goal of 6-8 eight ounce glasses (1.5-2 liters) of fluid daily. Your urine should be clear and light yellow straw colored.       Monthly LVAD Education Tip:    LVAD TERMS AND FUNCTIONS     DRIVE LINE Exits out of your body at the “drive-line” site and is typically anchored to your stomach or upper thigh.     The all white cable that connects your  and pump inside your heart.     TIPS:  Never pull on your drive-line or let your power leads get twisted around it.       Controls and monitors your LVAD system. Uses lights, sounds, and on-screen messages to communicate your LVADs operating status.     TIPS:  Complete a self-test daily [in AM] by pressing and holding down the “battery” button x 3 seconds.      POWER LEADS White and black power leads - Each supply power to your LVAD from either the wall or batteries.     TIPS:  White power lead provides pump settings-Disconnect/Reconnect FIRST    NEVER disconnect both at the same time.      MOBILE POWER UNIT Plugs into an AC outlet [wall] to provide your LVAD power.     TIPS:  Use while napping and sleeping.       BATTERY You always need TWO batteries at a time to provide power to your LVAD.     Each battery inserts into a battery clip

## 2024-01-03 NOTE — PROGRESS NOTES
[facility]  [facility] in [place], [place]  LVAD Outpatient Nurse Visit    Chief Complaint   Patient presents with    Other     Nurse visit- driveline culture, INR check, collect labs          There were no vitals taken for this visit.     LVAD  Driveline Dressing: Changed per order     Sanford Joiner is a 63 y.o. male with history of chronic systolic heart failure due to non ischemic cardiomyopathy, s/p HM3 LVAD implantation ([facility] 5/27/2023).     Patient was seen in clinic for collection of labs and driveline culture. Dressing change completed using sterile technique.  Drive line noted to have small dark grey/green drainage, redness and swelling surrounding drivleine site extending about one inch out. Patient states site is tender.     Educated patient and wife on how to use soap and saline dressing kits. Provided with dressing change change handout for their reference. Demonstrated dressing change with soap and saline while wife observed. She verbalized understanding. Requested they call and order kits from [employer].  Both verbalized understanding.     Reviewed all information with YUMI Quiles who recommended V.O.R.B send drive line culture, full set of labwork, patient to continue bactrim for now and changes will be recommended based on results of culture. All instructions placed in after visit summary and reviewed with patient. Educated patient and wife that RN will call Thursday or Friday with updated instructions regarding antibiotics. Educated to change driveline dressing using soap and saline kits. Time given to ask questions. Patient verbalized understanding and had no further questions.

## 2024-01-04 ENCOUNTER — TELEPHONE (OUTPATIENT)
Age: 64
End: 2024-01-04

## 2024-01-04 DIAGNOSIS — I50.22 CHRONIC SYSTOLIC HEART FAILURE (HCC): Primary | ICD-10-CM

## 2024-01-04 DIAGNOSIS — I50.22 CHRONIC SYSTOLIC HEART FAILURE (HCC): ICD-10-CM

## 2024-01-04 DIAGNOSIS — Z79.01 CHRONIC ANTICOAGULATION: ICD-10-CM

## 2024-01-04 LAB
ALBUMIN SERPL-MCNC: 5.1 G/DL (ref 3.9–4.9)
ALBUMIN/GLOB SERPL: 1.4 {RATIO} (ref 1.2–2.2)
ALP SERPL-CCNC: 112 IU/L (ref 44–121)
ALT SERPL-CCNC: 22 IU/L (ref 0–44)
AST SERPL-CCNC: 29 IU/L (ref 0–40)
BASOPHILS # BLD AUTO: 0.2 X10E3/UL (ref 0–0.2)
BASOPHILS NFR BLD AUTO: 2 %
BILIRUB SERPL-MCNC: <0.2 MG/DL (ref 0–1.2)
BUN SERPL-MCNC: 45 MG/DL (ref 8–27)
BUN/CREAT SERPL: 15 (ref 10–24)
CALCIUM SERPL-MCNC: 10.2 MG/DL (ref 8.6–10.2)
CHLORIDE SERPL-SCNC: 100 MMOL/L (ref 96–106)
CO2 SERPL-SCNC: 20 MMOL/L (ref 20–29)
CREAT SERPL-MCNC: 3.03 MG/DL (ref 0.76–1.27)
EGFRCR SERPLBLD CKD-EPI 2021: 22 ML/MIN/1.73
EOSINOPHIL # BLD AUTO: 0.3 X10E3/UL (ref 0–0.4)
EOSINOPHIL NFR BLD AUTO: 3 %
ERYTHROCYTE [DISTWIDTH] IN BLOOD BY AUTOMATED COUNT: 16.6 % (ref 11.6–15.4)
GLOBULIN SER CALC-MCNC: 3.6 G/DL (ref 1.5–4.5)
GLUCOSE SERPL-MCNC: 94 MG/DL (ref 70–99)
HCT VFR BLD AUTO: 38.3 % (ref 37.5–51)
HGB BLD-MCNC: 12.2 G/DL (ref 13–17.7)
IMM GRANULOCYTES # BLD AUTO: 0.1 X10E3/UL (ref 0–0.1)
IMM GRANULOCYTES NFR BLD AUTO: 1 %
LDH SERPL L TO P-CCNC: 269 IU/L (ref 121–224)
LYMPHOCYTES # BLD AUTO: 1.2 X10E3/UL (ref 0.7–3.1)
LYMPHOCYTES NFR BLD AUTO: 12 %
MAGNESIUM SERPL-MCNC: 2.6 MG/DL (ref 1.6–2.3)
MCH RBC QN AUTO: 27.5 PG (ref 26.6–33)
MCHC RBC AUTO-ENTMCNC: 31.9 G/DL (ref 31.5–35.7)
MCV RBC AUTO: 86 FL (ref 79–97)
MONOCYTES # BLD AUTO: 1.7 X10E3/UL (ref 0.1–0.9)
MONOCYTES NFR BLD AUTO: 17 %
NEUTROPHILS # BLD AUTO: 6.6 X10E3/UL (ref 1.4–7)
NEUTROPHILS NFR BLD AUTO: 65 %
NT-PROBNP SERPL-MCNC: 548 PG/ML (ref 0–210)
PLATELET # BLD AUTO: 355 X10E3/UL (ref 150–450)
POTASSIUM SERPL-SCNC: 5.8 MMOL/L (ref 3.5–5.2)
PROT SERPL-MCNC: 8.7 G/DL (ref 6–8.5)
RBC # BLD AUTO: 4.44 X10E6/UL (ref 4.14–5.8)
SODIUM SERPL-SCNC: 139 MMOL/L (ref 134–144)
WBC # BLD AUTO: 10 X10E3/UL (ref 3.4–10.8)

## 2024-01-04 RX ORDER — MAGNESIUM OXIDE 400 MG/1
1 TABLET ORAL DAILY
Qty: 180 TABLET | Refills: 1
Start: 2024-01-04

## 2024-01-04 RX ORDER — SULFAMETHOXAZOLE AND TRIMETHOPRIM 400; 80 MG/1; MG/1
1 TABLET ORAL 2 TIMES DAILY
Qty: 20 TABLET | Refills: 0
Start: 2024-01-04 | End: 2024-01-14

## 2024-01-04 NOTE — TELEPHONE ENCOUNTER
Requested Prescriptions     Signed Prescriptions Disp Refills    magnesium oxide (MAG-OX) 400 MG tablet 180 tablet 1     Sig: Take 1 tablet by mouth daily     Authorizing Provider: TAWANA HIGHTOWER     Ordering User: FILI PELLETIER    sulfamethoxazole-trimethoprim (BACTRIM) 400-80 MG per tablet 20 tablet 0     Sig: Take 1 tablet by mouth 2 times daily for 10 days     Authorizing Provider: TAWANA HIGHTOWER     Ordering User: FILI PELLETIER     Medications Discontinued During This Encounter   Medication Reason    Multiple Vitamin (MULTI-VITAMIN DAILY PO) Side effects       Reviewed recent labwork with RUBEN Hightower who ordered VORB have patient stop multivitamin, decrease mag to one tablet daily, decrease bactrim to 1 tablet twice daily and return to labs for STAT recheck of BMP.    Called and spoke with patient. Reviewed provider instructions regarding medication dose changes. Patient verbalized understanding, read back medication changes for confirmation.  States that he does not have a ride to the lab as his wife will not be off work until late however he can present to lab first thing tomorrow. Requested patient present as soon as possible. He verbalized understanding.     RUBEN Hightower updated patient patient states he is unable to go to lab today but will present as soon as possible.

## 2024-01-04 NOTE — TELEPHONE ENCOUNTER
----- Message from TAIWO Jimenez NP sent at 1/4/2024 10:12 AM EST -----  We need to get a STAT repeat BMP to check on that creatinine and potassium.  Have him decrease his Mg to daily and stop his multi vitamin for now.  Decrease his bactrim to 1 tablet BID for the rest of the ~4 days that he has left.

## 2024-01-05 ENCOUNTER — TELEPHONE (OUTPATIENT)
Age: 64
End: 2024-01-05

## 2024-01-05 NOTE — TELEPHONE ENCOUNTER
Recent labwork and culture results reviewed at [facility]. Per Dr. Jose Miguel GATICA patient should present to [facility] emergency room.     Called and left voicemail with patient advising of provider reccomdation to present to emergency room.     Placed call to patient's wife's work ([employer])- requested to speak to Ashley. Staff stated Ashley was on break and then line disconnected. Called again and spoke to patient's wife Ashley. Reviewed provider instructions to have patient present to the ER. Patient's wife stated patient received voicemail and stated to her on the phone that he doesn't wish to come into the emergency room at this time. Reviewed with Ashley increased creatine and potassium should be addressed as soon as possible. She verbalized understanding and states she will bring patient to the ER as soon as able.      YUMI Quiles and ADILENE Maurer both updated on the above.

## 2024-01-07 LAB
BACTERIA SPEC AEROBE CULT: ABNORMAL
BACTERIA SPEC AEROBE CULT: ABNORMAL

## 2024-01-08 ENCOUNTER — ANTI-COAG VISIT (OUTPATIENT)
Age: 64
End: 2024-01-08

## 2024-01-08 ENCOUNTER — TELEPHONE (OUTPATIENT)
Age: 64
End: 2024-01-08

## 2024-01-08 ENCOUNTER — APPOINTMENT (OUTPATIENT)
Facility: HOSPITAL | Age: 64
DRG: 315 | End: 2024-01-08
Payer: MEDICARE

## 2024-01-08 ENCOUNTER — HOSPITAL ENCOUNTER (INPATIENT)
Facility: HOSPITAL | Age: 64
LOS: 8 days | Discharge: HOME | DRG: 315 | End: 2024-01-16
Attending: STUDENT IN AN ORGANIZED HEALTH CARE EDUCATION/TRAINING PROGRAM | Admitting: INTERNAL MEDICINE
Payer: MEDICARE

## 2024-01-08 DIAGNOSIS — Z95.811 LVAD (LEFT VENTRICULAR ASSIST DEVICE) PRESENT (HCC): ICD-10-CM

## 2024-01-08 DIAGNOSIS — N17.9 ACUTE KIDNEY INJURY SUPERIMPOSED ON CKD (HCC): Primary | ICD-10-CM

## 2024-01-08 DIAGNOSIS — R06.02 SHORTNESS OF BREATH: ICD-10-CM

## 2024-01-08 DIAGNOSIS — N18.9 ACUTE KIDNEY INJURY SUPERIMPOSED ON CKD (HCC): Primary | ICD-10-CM

## 2024-01-08 DIAGNOSIS — K92.1 MELENA: ICD-10-CM

## 2024-01-08 PROBLEM — T82.7XXA INFECTION ASSOCIATED WITH DRIVELINE OF LEFT VENTRICULAR ASSIST DEVICE (LVAD) (HCC): Status: ACTIVE | Noted: 2024-01-08

## 2024-01-08 LAB
ABO + RH BLD: NORMAL
ALBUMIN SERPL-MCNC: 3.7 G/DL (ref 3.5–5)
ALBUMIN/GLOB SERPL: 0.8 (ref 1.1–2.2)
ALP SERPL-CCNC: 108 U/L (ref 45–117)
ALT SERPL-CCNC: 36 U/L (ref 12–78)
ANION GAP SERPL CALC-SCNC: 7 MMOL/L (ref 5–15)
APTT PPP: 41.2 SEC (ref 22.1–31)
AST SERPL-CCNC: 28 U/L (ref 15–37)
BACTERIA SPEC AEROBE CULT: ABNORMAL
BACTERIA SPEC AEROBE CULT: ABNORMAL
BACTERIA SPEC ANAEROBE CULT: ABNORMAL
BASOPHILS # BLD: 0.1 K/UL (ref 0–0.1)
BASOPHILS NFR BLD: 1 % (ref 0–1)
BILIRUB SERPL-MCNC: 0.3 MG/DL (ref 0.2–1)
BLOOD GROUP ANTIBODIES SERPL: NORMAL
BUN SERPL-MCNC: 45 MG/DL (ref 6–20)
BUN/CREAT SERPL: 13 (ref 12–20)
CALCIUM SERPL-MCNC: 8.8 MG/DL (ref 8.5–10.1)
CHLORIDE SERPL-SCNC: 104 MMOL/L (ref 97–108)
CO2 SERPL-SCNC: 23 MMOL/L (ref 21–32)
COMMENT:: NORMAL
COMMENT:: NORMAL
CREAT SERPL-MCNC: 3.59 MG/DL (ref 0.7–1.3)
DIFFERENTIAL METHOD BLD: ABNORMAL
EOSINOPHIL # BLD: 0.4 K/UL (ref 0–0.4)
EOSINOPHIL NFR BLD: 5 % (ref 0–7)
ERYTHROCYTE [DISTWIDTH] IN BLOOD BY AUTOMATED COUNT: 17.9 % (ref 11.5–14.5)
FERRITIN SERPL-MCNC: 62 NG/ML (ref 26–388)
FLUAV RNA SPEC QL NAA+PROBE: NOT DETECTED
FLUBV RNA SPEC QL NAA+PROBE: NOT DETECTED
FOLATE SERPL-MCNC: 16.6 NG/ML (ref 5–21)
GLOBULIN SER CALC-MCNC: 4.7 G/DL (ref 2–4)
GLUCOSE SERPL-MCNC: 100 MG/DL (ref 65–100)
HCT VFR BLD AUTO: 34.3 % (ref 36.6–50.3)
HGB BLD-MCNC: 11.3 G/DL (ref 12.1–17)
IMM GRANULOCYTES # BLD AUTO: 0.1 K/UL (ref 0–0.04)
IMM GRANULOCYTES NFR BLD AUTO: 1 % (ref 0–0.5)
INR BLD: 2.6
INR PPP: 2.1 (ref 0.9–1.1)
IRON SATN MFR SERPL: 15 % (ref 20–50)
IRON SERPL-MCNC: 69 UG/DL (ref 35–150)
LACTATE SERPL-SCNC: 0.9 MMOL/L (ref 0.4–2)
LDH SERPL L TO P-CCNC: 298 U/L (ref 85–241)
LYMPHOCYTES # BLD: 0.9 K/UL (ref 0.8–3.5)
LYMPHOCYTES NFR BLD: 10 % (ref 12–49)
MCH RBC QN AUTO: 28.8 PG (ref 26–34)
MCHC RBC AUTO-ENTMCNC: 32.9 G/DL (ref 30–36.5)
MCV RBC AUTO: 87.5 FL (ref 80–99)
MONOCYTES # BLD: 0.7 K/UL (ref 0–1)
MONOCYTES NFR BLD: 8 % (ref 5–13)
NEUTS SEG # BLD: 6.7 K/UL (ref 1.8–8)
NEUTS SEG NFR BLD: 75 % (ref 32–75)
NRBC # BLD: 0.02 K/UL (ref 0–0.01)
NRBC BLD-RTO: 0.2 PER 100 WBC
PLATELET # BLD AUTO: 220 K/UL (ref 150–400)
PMV BLD AUTO: 11 FL (ref 8.9–12.9)
POTASSIUM SERPL-SCNC: 4.2 MMOL/L (ref 3.5–5.1)
PROT SERPL-MCNC: 8.4 G/DL (ref 6.4–8.2)
PROTHROMBIN TIME: 20.9 SEC (ref 9–11.1)
RBC # BLD AUTO: 3.92 M/UL (ref 4.1–5.7)
SARS-COV-2 RNA RESP QL NAA+PROBE: NOT DETECTED
SODIUM SERPL-SCNC: 134 MMOL/L (ref 136–145)
SPECIMEN EXP DATE BLD: NORMAL
SPECIMEN HOLD: NORMAL
SPECIMEN HOLD: NORMAL
T4 FREE SERPL-MCNC: 1 NG/DL (ref 0.8–1.5)
THERAPEUTIC RANGE: ABNORMAL SECS (ref 58–77)
TIBC SERPL-MCNC: 463 UG/DL (ref 250–450)
TSH SERPL DL<=0.05 MIU/L-ACNC: 4.36 UIU/ML (ref 0.36–3.74)
VIT B12 SERPL-MCNC: 570 PG/ML (ref 193–986)
WBC # BLD AUTO: 8.8 K/UL (ref 4.1–11.1)

## 2024-01-08 PROCEDURE — 87040 BLOOD CULTURE FOR BACTERIA: CPT

## 2024-01-08 PROCEDURE — 80053 COMPREHEN METABOLIC PANEL: CPT

## 2024-01-08 PROCEDURE — 93005 ELECTROCARDIOGRAM TRACING: CPT | Performed by: STUDENT IN AN ORGANIZED HEALTH CARE EDUCATION/TRAINING PROGRAM

## 2024-01-08 PROCEDURE — 99223 1ST HOSP IP/OBS HIGH 75: CPT | Performed by: INTERNAL MEDICINE

## 2024-01-08 PROCEDURE — 6370000000 HC RX 637 (ALT 250 FOR IP): Performed by: INTERNAL MEDICINE

## 2024-01-08 PROCEDURE — 85730 THROMBOPLASTIN TIME PARTIAL: CPT

## 2024-01-08 PROCEDURE — 86900 BLOOD TYPING SEROLOGIC ABO: CPT

## 2024-01-08 PROCEDURE — 93750 INTERROGATION VAD IN PERSON: CPT | Performed by: INTERNAL MEDICINE

## 2024-01-08 PROCEDURE — 86850 RBC ANTIBODY SCREEN: CPT

## 2024-01-08 PROCEDURE — 99221 1ST HOSP IP/OBS SF/LOW 40: CPT | Performed by: INTERNAL MEDICINE

## 2024-01-08 PROCEDURE — 84443 ASSAY THYROID STIM HORMONE: CPT

## 2024-01-08 PROCEDURE — 36415 COLL VENOUS BLD VENIPUNCTURE: CPT

## 2024-01-08 PROCEDURE — 71045 X-RAY EXAM CHEST 1 VIEW: CPT

## 2024-01-08 PROCEDURE — 83615 LACTATE (LD) (LDH) ENZYME: CPT

## 2024-01-08 PROCEDURE — 82607 VITAMIN B-12: CPT

## 2024-01-08 PROCEDURE — 82746 ASSAY OF FOLIC ACID SERUM: CPT

## 2024-01-08 PROCEDURE — 84439 ASSAY OF FREE THYROXINE: CPT

## 2024-01-08 PROCEDURE — 87636 SARSCOV2 & INF A&B AMP PRB: CPT

## 2024-01-08 PROCEDURE — 83540 ASSAY OF IRON: CPT

## 2024-01-08 PROCEDURE — 82728 ASSAY OF FERRITIN: CPT

## 2024-01-08 PROCEDURE — 83550 IRON BINDING TEST: CPT

## 2024-01-08 PROCEDURE — 99285 EMERGENCY DEPT VISIT HI MDM: CPT

## 2024-01-08 PROCEDURE — 2580000003 HC RX 258: Performed by: INTERNAL MEDICINE

## 2024-01-08 PROCEDURE — 85610 PROTHROMBIN TIME: CPT

## 2024-01-08 PROCEDURE — 86901 BLOOD TYPING SEROLOGIC RH(D): CPT

## 2024-01-08 PROCEDURE — 2060000000 HC ICU INTERMEDIATE R&B

## 2024-01-08 PROCEDURE — 85025 COMPLETE CBC W/AUTO DIFF WBC: CPT

## 2024-01-08 PROCEDURE — 2500000003 HC RX 250 WO HCPCS: Performed by: INTERNAL MEDICINE

## 2024-01-08 PROCEDURE — 83605 ASSAY OF LACTIC ACID: CPT

## 2024-01-08 RX ORDER — ONDANSETRON 4 MG/1
4 TABLET, ORALLY DISINTEGRATING ORAL EVERY 8 HOURS PRN
Status: DISCONTINUED | OUTPATIENT
Start: 2024-01-08 | End: 2024-01-16 | Stop reason: HOSPADM

## 2024-01-08 RX ORDER — METOPROLOL SUCCINATE 50 MG/1
50 TABLET, EXTENDED RELEASE ORAL DAILY
Status: DISCONTINUED | OUTPATIENT
Start: 2024-01-08 | End: 2024-01-10

## 2024-01-08 RX ORDER — SODIUM CHLORIDE 9 MG/ML
INJECTION, SOLUTION INTRAVENOUS PRN
Status: DISCONTINUED | OUTPATIENT
Start: 2024-01-08 | End: 2024-01-16 | Stop reason: HOSPADM

## 2024-01-08 RX ORDER — WARFARIN SODIUM 3 MG/1
3 TABLET ORAL
COMMUNITY

## 2024-01-08 RX ORDER — WARFARIN SODIUM 1 MG/1
2 TABLET ORAL
COMMUNITY

## 2024-01-08 RX ORDER — ONDANSETRON 2 MG/ML
4 INJECTION INTRAMUSCULAR; INTRAVENOUS EVERY 6 HOURS PRN
Status: DISCONTINUED | OUTPATIENT
Start: 2024-01-08 | End: 2024-01-16 | Stop reason: HOSPADM

## 2024-01-08 RX ORDER — VITAMIN B COMPLEX
2000 TABLET ORAL DAILY
Status: DISCONTINUED | OUTPATIENT
Start: 2024-01-08 | End: 2024-01-16 | Stop reason: HOSPADM

## 2024-01-08 RX ORDER — SODIUM CHLORIDE 0.9 % (FLUSH) 0.9 %
5-40 SYRINGE (ML) INJECTION EVERY 12 HOURS SCHEDULED
Status: DISCONTINUED | OUTPATIENT
Start: 2024-01-08 | End: 2024-01-16 | Stop reason: HOSPADM

## 2024-01-08 RX ORDER — AMLODIPINE BESYLATE 5 MG/1
5 TABLET ORAL 2 TIMES DAILY
Status: DISCONTINUED | OUTPATIENT
Start: 2024-01-08 | End: 2024-01-16 | Stop reason: HOSPADM

## 2024-01-08 RX ORDER — 0.9 % SODIUM CHLORIDE 0.9 %
500 INTRAVENOUS SOLUTION INTRAVENOUS ONCE
Status: COMPLETED | OUTPATIENT
Start: 2024-01-08 | End: 2024-01-08

## 2024-01-08 RX ORDER — SODIUM CHLORIDE 0.9 % (FLUSH) 0.9 %
5-40 SYRINGE (ML) INJECTION PRN
Status: DISCONTINUED | OUTPATIENT
Start: 2024-01-08 | End: 2024-01-16 | Stop reason: HOSPADM

## 2024-01-08 RX ORDER — WARFARIN SODIUM 2 MG/1
2 TABLET ORAL
Status: COMPLETED | OUTPATIENT
Start: 2024-01-08 | End: 2024-01-08

## 2024-01-08 RX ORDER — CALCIUM CARBONATE 300MG(750)
1 TABLET,CHEWABLE ORAL 2 TIMES DAILY
COMMUNITY

## 2024-01-08 RX ORDER — POLYETHYLENE GLYCOL 3350 17 G/17G
17 POWDER, FOR SOLUTION ORAL DAILY PRN
Status: DISCONTINUED | OUTPATIENT
Start: 2024-01-08 | End: 2024-01-16 | Stop reason: HOSPADM

## 2024-01-08 RX ORDER — ALBUTEROL SULFATE 2.5 MG/3ML
2.5 SOLUTION RESPIRATORY (INHALATION) EVERY 4 HOURS PRN
Status: DISCONTINUED | OUTPATIENT
Start: 2024-01-08 | End: 2024-01-16 | Stop reason: HOSPADM

## 2024-01-08 RX ORDER — SULFAMETHOXAZOLE AND TRIMETHOPRIM 400; 80 MG/1; MG/1
2 TABLET ORAL 2 TIMES DAILY
Status: ON HOLD | COMMUNITY
Start: 2023-12-31 | End: 2024-01-16 | Stop reason: HOSPADM

## 2024-01-08 RX ORDER — ACETAMINOPHEN 325 MG/1
650 TABLET ORAL EVERY 6 HOURS PRN
Status: DISCONTINUED | OUTPATIENT
Start: 2024-01-08 | End: 2024-01-16 | Stop reason: HOSPADM

## 2024-01-08 RX ORDER — ACETAMINOPHEN 650 MG/1
650 SUPPOSITORY RECTAL EVERY 6 HOURS PRN
Status: DISCONTINUED | OUTPATIENT
Start: 2024-01-08 | End: 2024-01-16 | Stop reason: HOSPADM

## 2024-01-08 RX ADMIN — AMLODIPINE BESYLATE 5 MG: 5 TABLET ORAL at 20:50

## 2024-01-08 RX ADMIN — WARFARIN SODIUM 2 MG: 2 TABLET ORAL at 17:49

## 2024-01-08 RX ADMIN — Medication 2000 UNITS: at 18:48

## 2024-01-08 RX ADMIN — DOXYCYCLINE 100 MG: 100 INJECTION, POWDER, LYOPHILIZED, FOR SOLUTION INTRAVENOUS at 15:55

## 2024-01-08 RX ADMIN — ACETAMINOPHEN 650 MG: 325 TABLET ORAL at 18:47

## 2024-01-08 RX ADMIN — SODIUM CHLORIDE, PRESERVATIVE FREE 10 ML: 5 INJECTION INTRAVENOUS at 20:51

## 2024-01-08 RX ADMIN — SODIUM CHLORIDE 500 ML: 9 INJECTION, SOLUTION INTRAVENOUS at 15:24

## 2024-01-08 ASSESSMENT — PAIN - FUNCTIONAL ASSESSMENT: PAIN_FUNCTIONAL_ASSESSMENT: NONE - DENIES PAIN

## 2024-01-08 ASSESSMENT — PAIN SCALES - GENERAL: PAINLEVEL_OUTOF10: 6

## 2024-01-08 ASSESSMENT — PAIN DESCRIPTION - LOCATION: LOCATION: SHOULDER

## 2024-01-08 NOTE — ED NOTES
Emergency Department LVAD Documentation   The ED nurse is responsible for assessment & documentation of the LVAD when assuming care & every 4 hours. Required documentation includes: Vital signs, LVAD Hum, doppler blood pressure, device parameters (Flow, RPM, Power, Pulsatility Index), driveline and dressing assessment, and back up equipment at bedside. LVAD provider should be notified upon patient arrival to the emergency department.       Date: 1/8/2024  Time of assessment: 1330    LVAD Hum auscultated/present over the left upper quadrant: YES    Pulse 82   Temp 98 °F (36.7 °C) (Temporal)   Resp 20   Ht 1.727 m (5' 8\")   Wt 83 kg (182 lb 15.7 oz)   SpO2 94%   BMI 27.82 kg/m²        Doppler Blood Pressure (MAP): 84    Goal MAP 70-90, unless otherwise noted. Notify provider if outside of defined limits.     LVAD dressing assessment: clean/dry    LVAD driveline intact: Yes    Device Parameters  Flow: 3.1  RPM: 5200  Power: 6.2  Pulsatility Index (PI): 3.8    Backup  at bedside:  4 batteries on  at bedside     Contact CVICU/CVSU for LVAD tower       Notify the Ventricular Assist Device (VAD) coordinator for power change of greater than 2 from baseline or PI less than 3.  Pager number: [phone]

## 2024-01-08 NOTE — CONSENT
Informed Consent for Blood Component Transfusion Note    I have discussed with the patient the rationale for blood component transfusion; its benefits in treating or preventing fatigue, organ damage, or death; and its risk which includes mild transfusion reactions, rare risk of blood borne infection, or more serious but rare reactions. I have discussed the alternatives to transfusion, including the risk and consequences of not receiving transfusion. The patient had an opportunity to ask questions and had agreed to proceed with transfusion of blood components.    Electronically signed by Donna Hamlin MD on 1/8/24 at 4:12 PM EST

## 2024-01-08 NOTE — TELEPHONE ENCOUNTER
Received call from patient's wife stating that patient is on the way to the emergency room. States he has been having dark stools over the weekend.  (Patient was told to come to ER Friday for K of 5.8, Creatine of 3.0, driveline infection)     Requested serial number from [facility] and MPU from patient's wife. She provided the serial number from [facility] however states that the MPU is probably \"in the attic\" states \"he hasn't used that in 6 months\" and states patient refuses to use it due to he does not like to get the cord tangled on things.  She confirmed they do have  an MPU but it is packed away.     Dr. Gillespie and RUBEN Garibay notified of patient enroute to ER.
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
## 2024-01-08 NOTE — PROGRESS NOTES
[facility]  [facility] in [place], [place]    Reviewed with YUMI Quiles NP   per provider VORB pt to take 1mg tonight, however patient is currently in the emergency room and will be admitted per providers. Instructions not provided due to pending admission. Provider made aware.     Funmi Barth RN

## 2024-01-08 NOTE — ED NOTES
TRANSFER - OUT REPORT:    Verbal report given to Freda RAMOS on Sanford Joiner  being transferred to [facility] for routine progression of patient care       Report consisted of patient's Situation, Background, Assessment and   Recommendations(SBAR).     Information from the following report(s) ED Encounter Summary, ED SBAR, MAR, and Recent Results was reviewed with the receiving nurse.    Kinder Fall Assessment:                           Lines:   Peripheral IV 01/08/24 Left Antecubital (Active)        Opportunity for questions and clarification was provided.      Patient transported with:  Monitor and Registered Nurse and LVAD tow

## 2024-01-08 NOTE — CONSULTS
[facility]  [facility] in [place], [place]  Inpatient Progress Note      Patient name: Sanford Joiner  Patient : 1960  Patient MRN: [id number]  Consulting MD: Donna Hamlin MD  Date of service: 24    REASON FOR REFERRAL:  LVAD management    ASSESSMENT:  Sanford Joiner is a 63 y.o. male admitted with driveline infection and possible upper GI bleed, reporting 3-4 days of dark stools.   History of chronic systolic heart failure due to non ischemic cardiomyopathy, s/p HM3 LVAD implantation ([facility] 2023) as destination therapy, stage D, remains NYHA class IV despite LVAD support due to severe COPD; currently with recovered LVEF to 55% and small LV cavity with resulting low flow alarms; optimal GDMT limited by hypovolemia  Patient is not a candidate for heart transplant due to advanced COPD -  declined by [facility] for both LVAD-DT and transplant and for the same reasons declined for LVAD-DT by [facility]     RECOMMENDATIONS:  Left Ventricular Assist Device  Continue current device speed at 5200 rpm, may need to decrease speed further for ongoing low low alarms  Continue dressing changes three times weekly  Driveline infection with Enterobacter and MSSA. DAWOOD to Bactrim. Will discontinue.  Doxycycline started.   Consult ID for driveline infection  Possible GI bleed, noted dark stools.  Monitor H/H. If notable drop in H/H will consult GI for evaluation.     Medical Therapy for Heart Failure  Beta-blocker: Continue Toprol XL 50 mg daily  ACEi/ARB/ARNI: Unable to tolerate due to renal dysfunction and hypovolemia  MRA:Unable to tolerate due to renal dysfunction and hypovolemia  SGLT2i: Unable to tolerate due to hypovolemia    Volume Management  Does not require diuretics  Liberalize fluid intake    Anticoagulation and Antiplatelet Therapy  Anticoagulation with Warfarin, goal INR 2-2.5  Off aspirin for recurrent bleeding    Antiarrhythmic and Device Therapy  ICD interrogation

## 2024-01-08 NOTE — ED TRIAGE NOTES
Patient arrives from home as referral due to labs drawn last Wednesday. Patient reports high potassium and concerns over a bleed. Patient has LVAD. Wife also reports that the LVAD had a low flow alert three times last night. Patient is currently on antibiotics. Patient reports increased shortness of breath and fatigue. Patient reports dark stools since this weekend.

## 2024-01-09 ENCOUNTER — APPOINTMENT (OUTPATIENT)
Facility: HOSPITAL | Age: 64
DRG: 315 | End: 2024-01-09
Payer: MEDICARE

## 2024-01-09 PROBLEM — N18.9 ACUTE KIDNEY INJURY SUPERIMPOSED ON CKD (HCC): Status: ACTIVE | Noted: 2024-01-09

## 2024-01-09 PROBLEM — N17.9 ACUTE KIDNEY INJURY SUPERIMPOSED ON CKD (HCC): Status: ACTIVE | Noted: 2024-01-09

## 2024-01-09 LAB
ALBUMIN SERPL-MCNC: 3.8 G/DL (ref 3.5–5)
ALBUMIN/GLOB SERPL: 1.1 (ref 1.1–2.2)
ALP SERPL-CCNC: 97 U/L (ref 45–117)
ALT SERPL-CCNC: 36 U/L (ref 12–78)
ANION GAP SERPL CALC-SCNC: 5 MMOL/L (ref 5–15)
AST SERPL-CCNC: 29 U/L (ref 15–37)
BASOPHILS # BLD: 0.1 K/UL (ref 0–0.1)
BASOPHILS NFR BLD: 2 % (ref 0–1)
BILIRUB SERPL-MCNC: 0.3 MG/DL (ref 0.2–1)
BUN SERPL-MCNC: 44 MG/DL (ref 6–20)
BUN/CREAT SERPL: 14 (ref 12–20)
CALCIUM SERPL-MCNC: 8.3 MG/DL (ref 8.5–10.1)
CHLORIDE SERPL-SCNC: 106 MMOL/L (ref 97–108)
CO2 SERPL-SCNC: 23 MMOL/L (ref 21–32)
CREAT SERPL-MCNC: 3.18 MG/DL (ref 0.7–1.3)
DIFFERENTIAL METHOD BLD: ABNORMAL
EOSINOPHIL # BLD: 0.4 K/UL (ref 0–0.4)
EOSINOPHIL NFR BLD: 8 % (ref 0–7)
ERYTHROCYTE [DISTWIDTH] IN BLOOD BY AUTOMATED COUNT: 17.6 % (ref 11.5–14.5)
GLOBULIN SER CALC-MCNC: 3.5 G/DL (ref 2–4)
GLUCOSE SERPL-MCNC: 119 MG/DL (ref 65–100)
HCT VFR BLD AUTO: 31.8 % (ref 36.6–50.3)
HGB BLD-MCNC: 10.3 G/DL (ref 12.1–17)
IMM GRANULOCYTES # BLD AUTO: 0.1 K/UL (ref 0–0.04)
IMM GRANULOCYTES NFR BLD AUTO: 1 % (ref 0–0.5)
INR PPP: 1.9 (ref 0.9–1.1)
LYMPHOCYTES # BLD: 1 K/UL (ref 0.8–3.5)
LYMPHOCYTES NFR BLD: 17 % (ref 12–49)
MCH RBC QN AUTO: 28.9 PG (ref 26–34)
MCHC RBC AUTO-ENTMCNC: 32.4 G/DL (ref 30–36.5)
MCV RBC AUTO: 89.1 FL (ref 80–99)
MONOCYTES # BLD: 0.8 K/UL (ref 0–1)
MONOCYTES NFR BLD: 13 % (ref 5–13)
NEUTS SEG # BLD: 3.5 K/UL (ref 1.8–8)
NEUTS SEG NFR BLD: 59 % (ref 32–75)
NRBC # BLD: 0 K/UL (ref 0–0.01)
NRBC BLD-RTO: 0 PER 100 WBC
PHOSPHATE SERPL-MCNC: 3 MG/DL (ref 2.6–4.7)
PLATELET # BLD AUTO: 160 K/UL (ref 150–400)
PMV BLD AUTO: 10.9 FL (ref 8.9–12.9)
POTASSIUM SERPL-SCNC: 4.6 MMOL/L (ref 3.5–5.1)
PROT SERPL-MCNC: 7.3 G/DL (ref 6.4–8.2)
PROTHROMBIN TIME: 18.9 SEC (ref 9–11.1)
RBC # BLD AUTO: 3.57 M/UL (ref 4.1–5.7)
SODIUM SERPL-SCNC: 134 MMOL/L (ref 136–145)
WBC # BLD AUTO: 5.9 K/UL (ref 4.1–11.1)

## 2024-01-09 PROCEDURE — 2060000000 HC ICU INTERMEDIATE R&B

## 2024-01-09 PROCEDURE — APPSS30 APP SPLIT SHARED TIME 16-30 MINUTES: Performed by: NURSE PRACTITIONER

## 2024-01-09 PROCEDURE — 6370000000 HC RX 637 (ALT 250 FOR IP): Performed by: INTERNAL MEDICINE

## 2024-01-09 PROCEDURE — 85025 COMPLETE CBC W/AUTO DIFF WBC: CPT

## 2024-01-09 PROCEDURE — 94640 AIRWAY INHALATION TREATMENT: CPT

## 2024-01-09 PROCEDURE — 97530 THERAPEUTIC ACTIVITIES: CPT

## 2024-01-09 PROCEDURE — 2580000003 HC RX 258: Performed by: INTERNAL MEDICINE

## 2024-01-09 PROCEDURE — 76770 US EXAM ABDO BACK WALL COMP: CPT

## 2024-01-09 PROCEDURE — 2500000003 HC RX 250 WO HCPCS: Performed by: INTERNAL MEDICINE

## 2024-01-09 PROCEDURE — 99233 SBSQ HOSP IP/OBS HIGH 50: CPT | Performed by: INTERNAL MEDICINE

## 2024-01-09 PROCEDURE — 85610 PROTHROMBIN TIME: CPT

## 2024-01-09 PROCEDURE — 80053 COMPREHEN METABOLIC PANEL: CPT

## 2024-01-09 PROCEDURE — 36415 COLL VENOUS BLD VENIPUNCTURE: CPT

## 2024-01-09 PROCEDURE — 97161 PT EVAL LOW COMPLEX 20 MIN: CPT

## 2024-01-09 PROCEDURE — 84100 ASSAY OF PHOSPHORUS: CPT

## 2024-01-09 PROCEDURE — 93750 INTERROGATION VAD IN PERSON: CPT | Performed by: INTERNAL MEDICINE

## 2024-01-09 RX ORDER — HYDRALAZINE HYDROCHLORIDE 20 MG/ML
10 INJECTION INTRAMUSCULAR; INTRAVENOUS EVERY 4 HOURS PRN
Status: DISCONTINUED | OUTPATIENT
Start: 2024-01-09 | End: 2024-01-16 | Stop reason: HOSPADM

## 2024-01-09 RX ADMIN — DOXYCYCLINE 100 MG: 100 INJECTION, POWDER, LYOPHILIZED, FOR SOLUTION INTRAVENOUS at 15:07

## 2024-01-09 RX ADMIN — SODIUM CHLORIDE, PRESERVATIVE FREE 10 ML: 5 INJECTION INTRAVENOUS at 09:15

## 2024-01-09 RX ADMIN — Medication 2000 UNITS: at 09:14

## 2024-01-09 RX ADMIN — SODIUM CHLORIDE, PRESERVATIVE FREE 10 ML: 5 INJECTION INTRAVENOUS at 23:00

## 2024-01-09 RX ADMIN — DOXYCYCLINE 100 MG: 100 INJECTION, POWDER, LYOPHILIZED, FOR SOLUTION INTRAVENOUS at 04:17

## 2024-01-09 RX ADMIN — AMLODIPINE BESYLATE 5 MG: 5 TABLET ORAL at 09:14

## 2024-01-09 RX ADMIN — METOPROLOL SUCCINATE 50 MG: 50 TABLET, EXTENDED RELEASE ORAL at 09:14

## 2024-01-09 RX ADMIN — WARFARIN SODIUM 3 MG: 2 TABLET ORAL at 16:44

## 2024-01-09 RX ADMIN — AMLODIPINE BESYLATE 5 MG: 5 TABLET ORAL at 23:07

## 2024-01-09 NOTE — CARE COORDINATION
Care Management Initial Assessment       RUR: 18% - moderate  Readmission? No  1st IM letter given? Yes - 1/8/24  1st  letter given: No    Chart reviewed. Attempted to speak with patient re initial assessment. Patient asleep and did not awake to knock on the door. [facility]. Admitted to [facility] for driveline infection - anticipate patient will need long term IV antibiotics at discharge. Patient with previous home infusion services through [facility] - able to accept patient on managed Medicare; however, unable to assess driveline or perform dressing changes.    At baseline - patient resides with wife at address on file. Wife employed at [employer]. Patient is independent with his LVAD management and ambulation.    CM will continue to follow.       01/09/24 1049   Service Assessment   Patient Orientation Alert and Oriented   Cognition Alert   History Provided By Patient   Primary Caregiver Self   Support Systems Spouse/Significant Other   Patient's Healthcare Decision Maker is: Legal Next of Kin   PCP Verified by CM Yes   Last Visit to PCP Within last year   Prior Functional Level Independent in ADLs/IADLs   Current Functional Level Independent in ADLs/IADLs   Can patient return to prior living arrangement Yes   Ability to make needs known: Good   Family able to assist with home care needs: Yes   Financial Resources Medicare;Medicaid   Community Resources None   Social/Functional History   Lives With Spouse   Type of Home House   Home Equipment None   Receives Help From Family   ADL Assistance Independent   Homemaking Assistance Independent   Homemaking Responsibilities No   Ambulation Assistance Independent   Transfer Assistance Independent   Active  No   Patient's  Info wife   Mode of Transportation Car   Occupation On disability   Discharge Planning   Type of Residence House   Living

## 2024-01-09 NOTE — PLAN OF CARE
Problem: Discharge Planning  Goal: Discharge to home or other facility with appropriate resources  1/9/2024 0647 by Madina Foss RN  Outcome: Progressing  Flowsheets (Taken 1/8/2024 2000)  Discharge to home or other facility with appropriate resources: Identify barriers to discharge with patient and caregiver  1/8/2024 1820 by Freda Lara RN  Outcome: Progressing  1/8/2024 1819 by Freda Lara RN  Outcome: Progressing     Problem: Safety - Adult  Goal: Free from fall injury  1/9/2024 0647 by Madina Foss RN  Outcome: Progressing  1/8/2024 1820 by Freda Lara RN  Outcome: Progressing  1/8/2024 1819 by Freda Lara RN  Outcome: Progressing     Problem: Skin/Tissue Integrity  Goal: Absence of new skin breakdown  Description: 1.  Monitor for areas of redness and/or skin breakdown  2.  Assess vascular access sites hourly  3.  Every 4-6 hours minimum:  Change oxygen saturation probe site  4.  Every 4-6 hours:  If on nasal continuous positive airway pressure, respiratory therapy assess nares and determine need for appliance change or resting period.  1/9/2024 0647 by Madina Foss RN  Outcome: Progressing  1/8/2024 1820 by Freda Lara RN  Outcome: Progressing
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
## 2024-01-10 LAB
ALBUMIN SERPL-MCNC: 3.4 G/DL (ref 3.5–5)
ALBUMIN/GLOB SERPL: 0.9 (ref 1.1–2.2)
ALP SERPL-CCNC: 88 U/L (ref 45–117)
ALT SERPL-CCNC: 36 U/L (ref 12–78)
ANION GAP SERPL CALC-SCNC: 8 MMOL/L (ref 5–15)
AST SERPL-CCNC: 19 U/L (ref 15–37)
BACTERIA SPEC CULT: NORMAL
BACTERIA SPEC CULT: NORMAL
BASOPHILS # BLD: 0.1 K/UL (ref 0–0.1)
BASOPHILS NFR BLD: 2 % (ref 0–1)
BILIRUB SERPL-MCNC: 0.3 MG/DL (ref 0.2–1)
BUN SERPL-MCNC: 38 MG/DL (ref 6–20)
BUN/CREAT SERPL: 16 (ref 12–20)
CALCIUM SERPL-MCNC: 8.6 MG/DL (ref 8.5–10.1)
CHLORIDE SERPL-SCNC: 108 MMOL/L (ref 97–108)
CO2 SERPL-SCNC: 21 MMOL/L (ref 21–32)
CREAT SERPL-MCNC: 2.33 MG/DL (ref 0.7–1.3)
DIFFERENTIAL METHOD BLD: ABNORMAL
EOSINOPHIL # BLD: 0.3 K/UL (ref 0–0.4)
EOSINOPHIL NFR BLD: 5 % (ref 0–7)
ERYTHROCYTE [DISTWIDTH] IN BLOOD BY AUTOMATED COUNT: 17.8 % (ref 11.5–14.5)
GLOBULIN SER CALC-MCNC: 3.7 G/DL (ref 2–4)
GLUCOSE SERPL-MCNC: 106 MG/DL (ref 65–100)
HCT VFR BLD AUTO: 31.2 % (ref 36.6–50.3)
HEMOCCULT STL QL: POSITIVE
HGB BLD-MCNC: 10.3 G/DL (ref 12.1–17)
IMM GRANULOCYTES # BLD AUTO: 0.1 K/UL (ref 0–0.04)
IMM GRANULOCYTES NFR BLD AUTO: 1 % (ref 0–0.5)
INR PPP: 1.7 (ref 0.9–1.1)
LYMPHOCYTES # BLD: 1.3 K/UL (ref 0.8–3.5)
LYMPHOCYTES NFR BLD: 19 % (ref 12–49)
MCH RBC QN AUTO: 28.9 PG (ref 26–34)
MCHC RBC AUTO-ENTMCNC: 33 G/DL (ref 30–36.5)
MCV RBC AUTO: 87.4 FL (ref 80–99)
MONOCYTES # BLD: 0.9 K/UL (ref 0–1)
MONOCYTES NFR BLD: 13 % (ref 5–13)
NEUTS SEG # BLD: 4.2 K/UL (ref 1.8–8)
NEUTS SEG NFR BLD: 60 % (ref 32–75)
NRBC # BLD: 0 K/UL (ref 0–0.01)
NRBC BLD-RTO: 0 PER 100 WBC
PHOSPHATE SERPL-MCNC: 2.8 MG/DL (ref 2.6–4.7)
PLATELET # BLD AUTO: 164 K/UL (ref 150–400)
PMV BLD AUTO: 10.7 FL (ref 8.9–12.9)
POTASSIUM SERPL-SCNC: 4.7 MMOL/L (ref 3.5–5.1)
PROT SERPL-MCNC: 7.1 G/DL (ref 6.4–8.2)
PROTHROMBIN TIME: 17.2 SEC (ref 9–11.1)
RBC # BLD AUTO: 3.57 M/UL (ref 4.1–5.7)
SERVICE CMNT-IMP: NORMAL
SODIUM SERPL-SCNC: 137 MMOL/L (ref 136–145)
WBC # BLD AUTO: 6.9 K/UL (ref 4.1–11.1)

## 2024-01-10 PROCEDURE — 99233 SBSQ HOSP IP/OBS HIGH 50: CPT | Performed by: INTERNAL MEDICINE

## 2024-01-10 PROCEDURE — 2580000003 HC RX 258: Performed by: INTERNAL MEDICINE

## 2024-01-10 PROCEDURE — 85610 PROTHROMBIN TIME: CPT

## 2024-01-10 PROCEDURE — 6370000000 HC RX 637 (ALT 250 FOR IP): Performed by: FAMILY MEDICINE

## 2024-01-10 PROCEDURE — 2060000000 HC ICU INTERMEDIATE R&B

## 2024-01-10 PROCEDURE — 6370000000 HC RX 637 (ALT 250 FOR IP): Performed by: NURSE PRACTITIONER

## 2024-01-10 PROCEDURE — 2500000003 HC RX 250 WO HCPCS: Performed by: INTERNAL MEDICINE

## 2024-01-10 PROCEDURE — 85025 COMPLETE CBC W/AUTO DIFF WBC: CPT

## 2024-01-10 PROCEDURE — 6370000000 HC RX 637 (ALT 250 FOR IP): Performed by: INTERNAL MEDICINE

## 2024-01-10 PROCEDURE — 93750 INTERROGATION VAD IN PERSON: CPT | Performed by: INTERNAL MEDICINE

## 2024-01-10 PROCEDURE — 82272 OCCULT BLD FECES 1-3 TESTS: CPT

## 2024-01-10 PROCEDURE — 36415 COLL VENOUS BLD VENIPUNCTURE: CPT

## 2024-01-10 PROCEDURE — APPSS30 APP SPLIT SHARED TIME 16-30 MINUTES: Performed by: NURSE PRACTITIONER

## 2024-01-10 PROCEDURE — 80053 COMPREHEN METABOLIC PANEL: CPT

## 2024-01-10 PROCEDURE — 84100 ASSAY OF PHOSPHORUS: CPT

## 2024-01-10 RX ORDER — PANTOPRAZOLE SODIUM 40 MG/1
40 TABLET, DELAYED RELEASE ORAL
Status: DISCONTINUED | OUTPATIENT
Start: 2024-01-11 | End: 2024-01-16 | Stop reason: HOSPADM

## 2024-01-10 RX ORDER — METOPROLOL SUCCINATE 50 MG/1
50 TABLET, EXTENDED RELEASE ORAL 2 TIMES DAILY
Status: DISCONTINUED | OUTPATIENT
Start: 2024-01-10 | End: 2024-01-16 | Stop reason: HOSPADM

## 2024-01-10 RX ORDER — PANTOPRAZOLE SODIUM 40 MG/1
40 TABLET, DELAYED RELEASE ORAL
Status: DISCONTINUED | OUTPATIENT
Start: 2024-01-10 | End: 2024-01-10

## 2024-01-10 RX ADMIN — DOXYCYCLINE 100 MG: 100 INJECTION, POWDER, LYOPHILIZED, FOR SOLUTION INTRAVENOUS at 14:45

## 2024-01-10 RX ADMIN — AMLODIPINE BESYLATE 5 MG: 5 TABLET ORAL at 08:52

## 2024-01-10 RX ADMIN — DOXYCYCLINE 100 MG: 100 INJECTION, POWDER, LYOPHILIZED, FOR SOLUTION INTRAVENOUS at 04:22

## 2024-01-10 RX ADMIN — AMLODIPINE BESYLATE 5 MG: 5 TABLET ORAL at 21:52

## 2024-01-10 RX ADMIN — WARFARIN SODIUM 3 MG: 2 TABLET ORAL at 18:33

## 2024-01-10 RX ADMIN — PANTOPRAZOLE SODIUM 40 MG: 40 TABLET, DELAYED RELEASE ORAL at 16:00

## 2024-01-10 RX ADMIN — METOPROLOL SUCCINATE 50 MG: 50 TABLET, EXTENDED RELEASE ORAL at 21:52

## 2024-01-10 RX ADMIN — SODIUM CHLORIDE, PRESERVATIVE FREE 10 ML: 5 INJECTION INTRAVENOUS at 08:52

## 2024-01-10 RX ADMIN — METOPROLOL SUCCINATE 50 MG: 50 TABLET, EXTENDED RELEASE ORAL at 08:52

## 2024-01-10 RX ADMIN — Medication 2000 UNITS: at 08:52

## 2024-01-10 RX ADMIN — SODIUM CHLORIDE, PRESERVATIVE FREE 10 ML: 5 INJECTION INTRAVENOUS at 21:52

## 2024-01-10 ASSESSMENT — PAIN SCALES - GENERAL
PAINLEVEL_OUTOF10: 0
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
## 2024-01-10 NOTE — CARE COORDINATION
Transition of Care Plan:    RUR: 18% - moderate  Prior Level of Functioning: independent; LVAD  Disposition: home vs home health  Follow up appointments: [facility]  DME needed: none  Transportation at discharge: wife vs Medicaid  Caregiver Contact: wife - Ashley - [phone]  Discharge Caregiver contacted prior to discharge? no  Care Conference needed? no  Barriers to discharge: medical    Chart reviewed. Patient's driveline infection currently being treated with doxycycline. No skilled therapy needs for discharge. CM will monitor for possible HH skilled nursing needs.    CM will continue to follow.    Jabari Mckeon, MPH  Care Manager l [facility]  Available via [employer] or

## 2024-01-11 ENCOUNTER — ANESTHESIA (OUTPATIENT)
Facility: HOSPITAL | Age: 64
End: 2024-01-11
Payer: MEDICARE

## 2024-01-11 ENCOUNTER — ANESTHESIA EVENT (OUTPATIENT)
Facility: HOSPITAL | Age: 64
End: 2024-01-11
Payer: MEDICARE

## 2024-01-11 LAB
ALBUMIN SERPL-MCNC: 3.5 G/DL (ref 3.5–5)
ALBUMIN/GLOB SERPL: 0.9 (ref 1.1–2.2)
ALP SERPL-CCNC: 84 U/L (ref 45–117)
ALT SERPL-CCNC: 30 U/L (ref 12–78)
ANION GAP SERPL CALC-SCNC: 8 MMOL/L (ref 5–15)
AST SERPL-CCNC: 19 U/L (ref 15–37)
BASOPHILS # BLD: 0.1 K/UL (ref 0–0.1)
BASOPHILS NFR BLD: 2 % (ref 0–1)
BILIRUB SERPL-MCNC: 0.3 MG/DL (ref 0.2–1)
BUN SERPL-MCNC: 36 MG/DL (ref 6–20)
BUN/CREAT SERPL: 17 (ref 12–20)
CALCIUM SERPL-MCNC: 8.8 MG/DL (ref 8.5–10.1)
CHLORIDE SERPL-SCNC: 109 MMOL/L (ref 97–108)
CO2 SERPL-SCNC: 21 MMOL/L (ref 21–32)
CREAT SERPL-MCNC: 2.06 MG/DL (ref 0.7–1.3)
DIFFERENTIAL METHOD BLD: ABNORMAL
EKG ATRIAL RATE: 81 BPM
EKG DIAGNOSIS: NORMAL
EKG P-R INTERVAL: 148 MS
EKG Q-T INTERVAL: 382 MS
EKG QRS DURATION: 100 MS
EKG QTC CALCULATION (BAZETT): 443 MS
EKG R AXIS: 242 DEGREES
EKG T AXIS: 135 DEGREES
EKG VENTRICULAR RATE: 81 BPM
EOSINOPHIL # BLD: 0.2 K/UL (ref 0–0.4)
EOSINOPHIL NFR BLD: 3 % (ref 0–7)
ERYTHROCYTE [DISTWIDTH] IN BLOOD BY AUTOMATED COUNT: 17.8 % (ref 11.5–14.5)
ERYTHROCYTE [DISTWIDTH] IN BLOOD BY AUTOMATED COUNT: 17.9 % (ref 11.5–14.5)
GLOBULIN SER CALC-MCNC: 3.7 G/DL (ref 2–4)
GLUCOSE SERPL-MCNC: 109 MG/DL (ref 65–100)
HCT VFR BLD AUTO: 28.4 % (ref 36.6–50.3)
HCT VFR BLD AUTO: 30.8 % (ref 36.6–50.3)
HGB BLD-MCNC: 9.3 G/DL (ref 12.1–17)
HGB BLD-MCNC: 9.8 G/DL (ref 12.1–17)
IMM GRANULOCYTES # BLD AUTO: 0.1 K/UL (ref 0–0.04)
IMM GRANULOCYTES NFR BLD AUTO: 2 % (ref 0–0.5)
INR PPP: 1.5 (ref 0.9–1.1)
INR PPP: 1.5 (ref 0.9–1.1)
LYMPHOCYTES # BLD: 1.8 K/UL (ref 0.8–3.5)
LYMPHOCYTES NFR BLD: 24 % (ref 12–49)
MCH RBC QN AUTO: 28.6 PG (ref 26–34)
MCH RBC QN AUTO: 29.2 PG (ref 26–34)
MCHC RBC AUTO-ENTMCNC: 31.8 G/DL (ref 30–36.5)
MCHC RBC AUTO-ENTMCNC: 32.7 G/DL (ref 30–36.5)
MCV RBC AUTO: 89 FL (ref 80–99)
MCV RBC AUTO: 89.8 FL (ref 80–99)
MONOCYTES # BLD: 0.8 K/UL (ref 0–1)
MONOCYTES NFR BLD: 11 % (ref 5–13)
NEUTS SEG # BLD: 4.4 K/UL (ref 1.8–8)
NEUTS SEG NFR BLD: 58 % (ref 32–75)
NRBC # BLD: 0 K/UL (ref 0–0.01)
NRBC # BLD: 0 K/UL (ref 0–0.01)
NRBC BLD-RTO: 0 PER 100 WBC
NRBC BLD-RTO: 0 PER 100 WBC
PHOSPHATE SERPL-MCNC: 3.4 MG/DL (ref 2.6–4.7)
PLATELET # BLD AUTO: 178 K/UL (ref 150–400)
PLATELET # BLD AUTO: 189 K/UL (ref 150–400)
PMV BLD AUTO: 10.8 FL (ref 8.9–12.9)
PMV BLD AUTO: 11.3 FL (ref 8.9–12.9)
POTASSIUM SERPL-SCNC: 4.2 MMOL/L (ref 3.5–5.1)
PROT SERPL-MCNC: 7.2 G/DL (ref 6.4–8.2)
PROTHROMBIN TIME: 15.5 SEC (ref 9–11.1)
PROTHROMBIN TIME: 15.6 SEC (ref 9–11.1)
RBC # BLD AUTO: 3.19 M/UL (ref 4.1–5.7)
RBC # BLD AUTO: 3.43 M/UL (ref 4.1–5.7)
SODIUM SERPL-SCNC: 138 MMOL/L (ref 136–145)
UFH PPP CHRO-ACNC: 0.26 IU/ML
WBC # BLD AUTO: 7.1 K/UL (ref 4.1–11.1)
WBC # BLD AUTO: 7.4 K/UL (ref 4.1–11.1)

## 2024-01-11 PROCEDURE — 3600007502: Performed by: INTERNAL MEDICINE

## 2024-01-11 PROCEDURE — 2500000003 HC RX 250 WO HCPCS: Performed by: INTERNAL MEDICINE

## 2024-01-11 PROCEDURE — 93010 ELECTROCARDIOGRAM REPORT: CPT | Performed by: SPECIALIST

## 2024-01-11 PROCEDURE — 3600007512: Performed by: INTERNAL MEDICINE

## 2024-01-11 PROCEDURE — 80053 COMPREHEN METABOLIC PANEL: CPT

## 2024-01-11 PROCEDURE — P9045 ALBUMIN (HUMAN), 5%, 250 ML: HCPCS | Performed by: ANESTHESIOLOGY

## 2024-01-11 PROCEDURE — 85520 HEPARIN ASSAY: CPT

## 2024-01-11 PROCEDURE — 6360000002 HC RX W HCPCS: Performed by: ANESTHESIOLOGY

## 2024-01-11 PROCEDURE — 7100000010 HC PHASE II RECOVERY - FIRST 15 MIN: Performed by: INTERNAL MEDICINE

## 2024-01-11 PROCEDURE — 0DJ08ZZ INSPECTION OF UPPER INTESTINAL TRACT, VIA NATURAL OR ARTIFICIAL OPENING ENDOSCOPIC: ICD-10-PCS | Performed by: INTERNAL MEDICINE

## 2024-01-11 PROCEDURE — 6370000000 HC RX 637 (ALT 250 FOR IP): Performed by: INTERNAL MEDICINE

## 2024-01-11 PROCEDURE — APPSS30 APP SPLIT SHARED TIME 16-30 MINUTES: Performed by: NURSE PRACTITIONER

## 2024-01-11 PROCEDURE — 3700000000 HC ANESTHESIA ATTENDED CARE: Performed by: INTERNAL MEDICINE

## 2024-01-11 PROCEDURE — 99233 SBSQ HOSP IP/OBS HIGH 50: CPT | Performed by: INTERNAL MEDICINE

## 2024-01-11 PROCEDURE — 3700000001 HC ADD 15 MINUTES (ANESTHESIA): Performed by: INTERNAL MEDICINE

## 2024-01-11 PROCEDURE — 85025 COMPLETE CBC W/AUTO DIFF WBC: CPT

## 2024-01-11 PROCEDURE — 2580000003 HC RX 258: Performed by: INTERNAL MEDICINE

## 2024-01-11 PROCEDURE — 6370000000 HC RX 637 (ALT 250 FOR IP): Performed by: NURSE PRACTITIONER

## 2024-01-11 PROCEDURE — 6370000000 HC RX 637 (ALT 250 FOR IP)

## 2024-01-11 PROCEDURE — 7100000011 HC PHASE II RECOVERY - ADDTL 15 MIN: Performed by: INTERNAL MEDICINE

## 2024-01-11 PROCEDURE — 6360000002 HC RX W HCPCS: Performed by: NURSE PRACTITIONER

## 2024-01-11 PROCEDURE — 93750 INTERROGATION VAD IN PERSON: CPT | Performed by: INTERNAL MEDICINE

## 2024-01-11 PROCEDURE — 85610 PROTHROMBIN TIME: CPT

## 2024-01-11 PROCEDURE — 6370000000 HC RX 637 (ALT 250 FOR IP): Performed by: HOSPITALIST

## 2024-01-11 PROCEDURE — 2060000000 HC ICU INTERMEDIATE R&B

## 2024-01-11 PROCEDURE — 84100 ASSAY OF PHOSPHORUS: CPT

## 2024-01-11 PROCEDURE — 36415 COLL VENOUS BLD VENIPUNCTURE: CPT

## 2024-01-11 PROCEDURE — 85027 COMPLETE CBC AUTOMATED: CPT

## 2024-01-11 PROCEDURE — 2500000003 HC RX 250 WO HCPCS: Performed by: ANESTHESIOLOGY

## 2024-01-11 RX ORDER — SODIUM CHLORIDE 9 MG/ML
INJECTION, SOLUTION INTRAVENOUS CONTINUOUS
Status: DISCONTINUED | OUTPATIENT
Start: 2024-01-11 | End: 2024-01-16 | Stop reason: HOSPADM

## 2024-01-11 RX ORDER — HEPARIN SODIUM 1000 [USP'U]/ML
4000 INJECTION, SOLUTION INTRAVENOUS; SUBCUTANEOUS PRN
Status: DISCONTINUED | OUTPATIENT
Start: 2024-01-11 | End: 2024-01-13

## 2024-01-11 RX ORDER — DEXMEDETOMIDINE HYDROCHLORIDE 100 UG/ML
INJECTION, SOLUTION INTRAVENOUS PRN
Status: DISCONTINUED | OUTPATIENT
Start: 2024-01-11 | End: 2024-01-11 | Stop reason: SDUPTHER

## 2024-01-11 RX ORDER — SODIUM CHLORIDE 0.9 % (FLUSH) 0.9 %
5-40 SYRINGE (ML) INJECTION PRN
Status: DISCONTINUED | OUTPATIENT
Start: 2024-01-11 | End: 2024-01-11 | Stop reason: HOSPADM

## 2024-01-11 RX ORDER — SODIUM CHLORIDE 0.9 % (FLUSH) 0.9 %
5-40 SYRINGE (ML) INJECTION EVERY 12 HOURS SCHEDULED
Status: DISCONTINUED | OUTPATIENT
Start: 2024-01-11 | End: 2024-01-11 | Stop reason: HOSPADM

## 2024-01-11 RX ORDER — FLUCONAZOLE 100 MG/1
100 TABLET ORAL DAILY
Status: DISCONTINUED | OUTPATIENT
Start: 2024-01-12 | End: 2024-01-16 | Stop reason: HOSPADM

## 2024-01-11 RX ORDER — SODIUM CHLORIDE 9 MG/ML
25 INJECTION, SOLUTION INTRAVENOUS PRN
Status: DISCONTINUED | OUTPATIENT
Start: 2024-01-11 | End: 2024-01-11 | Stop reason: HOSPADM

## 2024-01-11 RX ORDER — LIDOCAINE HYDROCHLORIDE 20 MG/ML
INJECTION, SOLUTION EPIDURAL; INFILTRATION; INTRACAUDAL; PERINEURAL PRN
Status: DISCONTINUED | OUTPATIENT
Start: 2024-01-11 | End: 2024-01-11 | Stop reason: SDUPTHER

## 2024-01-11 RX ORDER — HEPARIN SODIUM 10000 [USP'U]/100ML
5-30 INJECTION, SOLUTION INTRAVENOUS CONTINUOUS
Status: DISCONTINUED | OUTPATIENT
Start: 2024-01-11 | End: 2024-01-11

## 2024-01-11 RX ORDER — PHENYLEPHRINE HCL IN 0.9% NACL 0.4MG/10ML
SYRINGE (ML) INTRAVENOUS PRN
Status: DISCONTINUED | OUTPATIENT
Start: 2024-01-11 | End: 2024-01-11 | Stop reason: SDUPTHER

## 2024-01-11 RX ORDER — ALBUMIN, HUMAN INJ 5% 5 %
SOLUTION INTRAVENOUS PRN
Status: DISCONTINUED | OUTPATIENT
Start: 2024-01-11 | End: 2024-01-11 | Stop reason: SDUPTHER

## 2024-01-11 RX ORDER — HEPARIN SODIUM 10000 [USP'U]/100ML
5-30 INJECTION, SOLUTION INTRAVENOUS CONTINUOUS
Status: DISCONTINUED | OUTPATIENT
Start: 2024-01-11 | End: 2024-01-13

## 2024-01-11 RX ORDER — HEPARIN SODIUM 1000 [USP'U]/ML
2000 INJECTION, SOLUTION INTRAVENOUS; SUBCUTANEOUS PRN
Status: DISCONTINUED | OUTPATIENT
Start: 2024-01-11 | End: 2024-01-13

## 2024-01-11 RX ORDER — FLUCONAZOLE 100 MG/1
200 TABLET ORAL ONCE
Status: COMPLETED | OUTPATIENT
Start: 2024-01-11 | End: 2024-01-11

## 2024-01-11 RX ORDER — WARFARIN SODIUM 4 MG/1
4 TABLET ORAL
Status: COMPLETED | OUTPATIENT
Start: 2024-01-11 | End: 2024-01-11

## 2024-01-11 RX ORDER — HYDRALAZINE HYDROCHLORIDE 10 MG/1
10 TABLET, FILM COATED ORAL EVERY 8 HOURS SCHEDULED
Status: DISCONTINUED | OUTPATIENT
Start: 2024-01-11 | End: 2024-01-13

## 2024-01-11 RX ADMIN — Medication 120 MCG: at 09:51

## 2024-01-11 RX ADMIN — DOXYCYCLINE 100 MG: 100 INJECTION, POWDER, LYOPHILIZED, FOR SOLUTION INTRAVENOUS at 16:03

## 2024-01-11 RX ADMIN — FLUCONAZOLE 200 MG: 100 TABLET ORAL at 14:07

## 2024-01-11 RX ADMIN — DOXYCYCLINE 100 MG: 100 INJECTION, POWDER, LYOPHILIZED, FOR SOLUTION INTRAVENOUS at 04:16

## 2024-01-11 RX ADMIN — WARFARIN SODIUM 4 MG: 4 TABLET ORAL at 16:03

## 2024-01-11 RX ADMIN — AMLODIPINE BESYLATE 5 MG: 5 TABLET ORAL at 20:54

## 2024-01-11 RX ADMIN — PROPOFOL 40 MG: 10 INJECTION, EMULSION INTRAVENOUS at 09:52

## 2024-01-11 RX ADMIN — DEXMEDETOMIDINE 12 MCG: 100 INJECTION, SOLUTION INTRAVENOUS at 09:46

## 2024-01-11 RX ADMIN — ALBUMIN (HUMAN) 12.5 G: 12.5 INJECTION, SOLUTION INTRAVENOUS at 09:35

## 2024-01-11 RX ADMIN — Medication 2000 UNITS: at 10:56

## 2024-01-11 RX ADMIN — ALBUMIN (HUMAN) 12.5 G: 12.5 INJECTION, SOLUTION INTRAVENOUS at 09:42

## 2024-01-11 RX ADMIN — PANTOPRAZOLE SODIUM 40 MG: 40 TABLET, DELAYED RELEASE ORAL at 16:03

## 2024-01-11 RX ADMIN — Medication 80 MCG: at 09:48

## 2024-01-11 RX ADMIN — SODIUM CHLORIDE, PRESERVATIVE FREE 10 ML: 5 INJECTION INTRAVENOUS at 10:56

## 2024-01-11 RX ADMIN — METOPROLOL SUCCINATE 50 MG: 50 TABLET, EXTENDED RELEASE ORAL at 10:56

## 2024-01-11 RX ADMIN — HEPARIN SODIUM 2000 UNITS: 1000 INJECTION INTRAVENOUS; SUBCUTANEOUS at 21:31

## 2024-01-11 RX ADMIN — HYDRALAZINE HYDROCHLORIDE 10 MG: 10 TABLET, FILM COATED ORAL at 14:07

## 2024-01-11 RX ADMIN — DEXMEDETOMIDINE 8 MCG: 100 INJECTION, SOLUTION INTRAVENOUS at 09:52

## 2024-01-11 RX ADMIN — LIDOCAINE HYDROCHLORIDE 100 MG: 20 INJECTION, SOLUTION EPIDURAL; INFILTRATION; INTRACAUDAL; PERINEURAL at 09:45

## 2024-01-11 RX ADMIN — METOPROLOL SUCCINATE 50 MG: 50 TABLET, EXTENDED RELEASE ORAL at 20:54

## 2024-01-11 RX ADMIN — PROPOFOL 40 MG: 10 INJECTION, EMULSION INTRAVENOUS at 09:55

## 2024-01-11 RX ADMIN — PROPOFOL 40 MG: 10 INJECTION, EMULSION INTRAVENOUS at 09:49

## 2024-01-11 RX ADMIN — AMLODIPINE BESYLATE 5 MG: 5 TABLET ORAL at 10:56

## 2024-01-11 RX ADMIN — PROPOFOL 40 MG: 10 INJECTION, EMULSION INTRAVENOUS at 09:46

## 2024-01-11 RX ADMIN — SODIUM CHLORIDE, PRESERVATIVE FREE 10 ML: 5 INJECTION INTRAVENOUS at 20:55

## 2024-01-11 RX ADMIN — HYDRALAZINE HYDROCHLORIDE 10 MG: 10 TABLET, FILM COATED ORAL at 20:54

## 2024-01-11 RX ADMIN — HEPARIN SODIUM 12 UNITS/KG/HR: 10000 INJECTION, SOLUTION INTRAVENOUS at 14:30

## 2024-01-11 ASSESSMENT — PAIN SCALES - GENERAL
PAINLEVEL_OUTOF10: 0

## 2024-01-11 ASSESSMENT — PAIN - FUNCTIONAL ASSESSMENT: PAIN_FUNCTIONAL_ASSESSMENT: 0-10

## 2024-01-11 NOTE — ANESTHESIA PRE PROCEDURE
intravenous.      Anesthetic plan and risks discussed with patient.      Plan discussed with CRNA.                    Ronnie Herrera MD   1/11/2024

## 2024-01-11 NOTE — ANESTHESIA POSTPROCEDURE EVALUATION
Department of Anesthesiology  Postprocedure Note    Patient: Sanford Joiner  MRN: [id number]  YOB: 1960  Date of evaluation: 1/11/2024    Procedure Summary       Date: 01/11/24 Room / Location: [facility] ENDO 04 / [facility]COPY    Anesthesia Start: 0944 Anesthesia Stop: 1004    Procedure: EGD ESOPHAGOGASTRODUODENOSCOPY (Upper GI Region) Diagnosis:       Anemia, unspecified type      (Anemia, unspecified type [D64.9])    Surgeons: Praful Buck MD Responsible Provider: Ronnie Herrera MD    Anesthesia Type: MAC ASA Status: 4            Anesthesia Type: MAC    Kelvin Phase I: Kelvin Score: 10    Kelvin Phase II: Kelvin Score: 10    Anesthesia Post Evaluation  Post-Anesthesia Evaluation and Assessment    Patient: Sanford Joiner MRN: [id number]  SSN: [id number]    YOB: 1960  Age: 63 y.o.  Sex: male      I have evaluated the patient and they are stable and ready for discharge from the PACU.     Cardiovascular Function/Vital Signs  Visit Vitals  /78   Pulse 51   Temp 98.1 °F (36.7 °C) (Temporal)   Resp 16   Ht 1.727 m (5' 8\")   Wt 81.2 kg (179 lb 0.2 oz)   SpO2 97%   BMI 27.22 kg/m²       Patient is status post Monitor Anesthesia Care anesthesia for Procedure(s):  EGD ESOPHAGOGASTRODUODENOSCOPY.    Nausea/Vomiting: None    Postoperative hydration reviewed and adequate.    Pain:      Managed    Neurological Status:       At baseline    Mental Status, Level of Consciousness: Alert and  oriented to person, place, and time    Pulmonary Status:       Adequate oxygenation and airway patent    Complications related to anesthesia: None    Post-anesthesia assessment completed. No concerns    Signed By: Ronnie Herrera MD     January 11, 2024                No notable events documented.
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
## 2024-01-11 NOTE — PLAN OF CARE
0005 low flow, asymptomatic    0045 low flow, asymptomatic    0100 low flow, asymptomatic    0105 low flow, asymptomatic    0205 low flow, asymptomatic    0225 low flow, asymptomatic    0355 low flow, asymptomatic    0400 low flow, asymptomatic  Problem: Discharge Planning  Goal: Discharge to home or other facility with appropriate resources  Outcome: Progressing  Flowsheets (Taken 1/10/2024 2000)  Discharge to home or other facility with appropriate resources: Identify barriers to discharge with patient and caregiver     Problem: Safety - Adult  Goal: Free from fall injury  Outcome: Progressing     Problem: Skin/Tissue Integrity  Goal: Absence of new skin breakdown  Description: 1.  Monitor for areas of redness and/or skin breakdown  2.  Assess vascular access sites hourly  3.  Every 4-6 hours minimum:  Change oxygen saturation probe site  4.  Every 4-6 hours:  If on nasal continuous positive airway pressure, respiratory therapy assess nares and determine need for appliance change or resting period.  Outcome: Progressing     Problem: Chronic Conditions and Co-morbidities  Goal: Patient's chronic conditions and co-morbidity symptoms are monitored and maintained or improved  Outcome: Progressing

## 2024-01-11 NOTE — OP NOTE
[facility]  Praful Buck M.D.  [address] [id number] [phone]               Esophagogastroduodenoscopy (EGD) Procedure Note    NAME: Sanford Joiner  :  1960  MRN:  [id number]    Indications:  Melena      : Praful Buck MD    Referring Provider:  Ranjan Porter MD    Staff: Circulator: Elvia Griffin RN; Stormy Navarrete RN  Endoscopy Technician: Jair Carrion    Prosthetic devices, grafts, tissues, transplant, or devices implanted: none    Medicine:  MAC anesthesia      Procedure Details:  After informed consent was obtained with all risks and benefits of the procedure explained and preprocedure exam completed, the patient was placed in the left lateral decubitus position.  Universal protocol for patient identification was performed and documented in the nursing notes.  Throughout the procedure, the patient's blood pressure was monitored at least every five minutes; pulse, and oxygen saturations were monitored continuously.  All vital signs were documented in the nursing notes.  The endoscope was inserted into the mouth and advanced under direct vision to second portion of the duodenum.  A careful inspection was made as the gastroscope was withdrawn, including a retroflexed view of the proximal stomach; findings and interventions are described below.      Findings:   Esophagus: small white spots throughout the esophagus c/w candidal esophagitis  Stomach: 3 cm hiatal hernia, rest of the stomach was normal  Duodenum: normal    Interventions:    none    Specimens:   * No specimens in log *-with current INR     EBL: None          Complications:     No immediate complications        Impression:  -See post-procedure diagnoses. No bleeding.    Recommendations:  -Continue to monitor clinical course  -Resume diet  -Serial CBCs, transfuse as needed  -If continued active bleeding with progressive anemia, next consider capsule

## 2024-01-12 LAB
ALBUMIN SERPL-MCNC: 3.6 G/DL (ref 3.5–5)
ALBUMIN/GLOB SERPL: 1 (ref 1.1–2.2)
ALP SERPL-CCNC: 70 U/L (ref 45–117)
ALT SERPL-CCNC: 27 U/L (ref 12–78)
ANION GAP SERPL CALC-SCNC: 9 MMOL/L (ref 5–15)
AST SERPL-CCNC: 23 U/L (ref 15–37)
BILIRUB SERPL-MCNC: 0.2 MG/DL (ref 0.2–1)
BUN SERPL-MCNC: 36 MG/DL (ref 6–20)
BUN/CREAT SERPL: 20 (ref 12–20)
CALCIUM SERPL-MCNC: 8.6 MG/DL (ref 8.5–10.1)
CHLORIDE SERPL-SCNC: 110 MMOL/L (ref 97–108)
CO2 SERPL-SCNC: 21 MMOL/L (ref 21–32)
COMMENT:: NORMAL
CREAT SERPL-MCNC: 1.78 MG/DL (ref 0.7–1.3)
ERYTHROCYTE [DISTWIDTH] IN BLOOD BY AUTOMATED COUNT: 18.1 % (ref 11.5–14.5)
ERYTHROCYTE [DISTWIDTH] IN BLOOD BY AUTOMATED COUNT: 18.1 % (ref 11.5–14.5)
GLOBULIN SER CALC-MCNC: 3.6 G/DL (ref 2–4)
GLUCOSE SERPL-MCNC: 108 MG/DL (ref 65–100)
HCT VFR BLD AUTO: 26.3 % (ref 36.6–50.3)
HCT VFR BLD AUTO: 28.9 % (ref 36.6–50.3)
HGB BLD-MCNC: 8.4 G/DL (ref 12.1–17)
HGB BLD-MCNC: 9.3 G/DL (ref 12.1–17)
INR PPP: 1.7 (ref 0.9–1.1)
MAGNESIUM SERPL-MCNC: 1.7 MG/DL (ref 1.6–2.4)
MCH RBC QN AUTO: 28.9 PG (ref 26–34)
MCH RBC QN AUTO: 29.1 PG (ref 26–34)
MCHC RBC AUTO-ENTMCNC: 31.9 G/DL (ref 30–36.5)
MCHC RBC AUTO-ENTMCNC: 32.2 G/DL (ref 30–36.5)
MCV RBC AUTO: 90.3 FL (ref 80–99)
MCV RBC AUTO: 90.4 FL (ref 80–99)
NRBC # BLD: 0 K/UL (ref 0–0.01)
NRBC # BLD: 0 K/UL (ref 0–0.01)
NRBC BLD-RTO: 0 PER 100 WBC
NRBC BLD-RTO: 0 PER 100 WBC
PLATELET # BLD AUTO: 189 K/UL (ref 150–400)
PLATELET # BLD AUTO: 220 K/UL (ref 150–400)
PMV BLD AUTO: 11.1 FL (ref 8.9–12.9)
PMV BLD AUTO: 11.3 FL (ref 8.9–12.9)
POTASSIUM SERPL-SCNC: 4.4 MMOL/L (ref 3.5–5.1)
PROT SERPL-MCNC: 7.2 G/DL (ref 6.4–8.2)
PROTHROMBIN TIME: 17.3 SEC (ref 9–11.1)
RBC # BLD AUTO: 2.91 M/UL (ref 4.1–5.7)
RBC # BLD AUTO: 3.2 M/UL (ref 4.1–5.7)
SODIUM SERPL-SCNC: 140 MMOL/L (ref 136–145)
SPECIMEN HOLD: NORMAL
UFH PPP CHRO-ACNC: 0.43 IU/ML
UFH PPP CHRO-ACNC: 0.45 IU/ML
WBC # BLD AUTO: 7.4 K/UL (ref 4.1–11.1)
WBC # BLD AUTO: 8.4 K/UL (ref 4.1–11.1)

## 2024-01-12 PROCEDURE — 36415 COLL VENOUS BLD VENIPUNCTURE: CPT

## 2024-01-12 PROCEDURE — 80053 COMPREHEN METABOLIC PANEL: CPT

## 2024-01-12 PROCEDURE — 6370000000 HC RX 637 (ALT 250 FOR IP): Performed by: NURSE PRACTITIONER

## 2024-01-12 PROCEDURE — 83735 ASSAY OF MAGNESIUM: CPT

## 2024-01-12 PROCEDURE — 6370000000 HC RX 637 (ALT 250 FOR IP)

## 2024-01-12 PROCEDURE — APPNB30 APP NON BILLABLE TIME 0-30 MINS: Performed by: NURSE PRACTITIONER

## 2024-01-12 PROCEDURE — 2060000000 HC ICU INTERMEDIATE R&B

## 2024-01-12 PROCEDURE — 85520 HEPARIN ASSAY: CPT

## 2024-01-12 PROCEDURE — 2580000003 HC RX 258: Performed by: INTERNAL MEDICINE

## 2024-01-12 PROCEDURE — 85610 PROTHROMBIN TIME: CPT

## 2024-01-12 PROCEDURE — 6370000000 HC RX 637 (ALT 250 FOR IP): Performed by: INTERNAL MEDICINE

## 2024-01-12 PROCEDURE — 99232 SBSQ HOSP IP/OBS MODERATE 35: CPT | Performed by: INTERNAL MEDICINE

## 2024-01-12 PROCEDURE — 93750 INTERROGATION VAD IN PERSON: CPT | Performed by: INTERNAL MEDICINE

## 2024-01-12 PROCEDURE — 85027 COMPLETE CBC AUTOMATED: CPT

## 2024-01-12 PROCEDURE — 2500000003 HC RX 250 WO HCPCS: Performed by: INTERNAL MEDICINE

## 2024-01-12 RX ORDER — WARFARIN SODIUM 2 MG/1
2 TABLET ORAL
Status: COMPLETED | OUTPATIENT
Start: 2024-01-12 | End: 2024-01-12

## 2024-01-12 RX ADMIN — AMLODIPINE BESYLATE 5 MG: 5 TABLET ORAL at 08:55

## 2024-01-12 RX ADMIN — PANTOPRAZOLE SODIUM 40 MG: 40 TABLET, DELAYED RELEASE ORAL at 15:08

## 2024-01-12 RX ADMIN — METOPROLOL SUCCINATE 50 MG: 50 TABLET, EXTENDED RELEASE ORAL at 21:13

## 2024-01-12 RX ADMIN — HYDRALAZINE HYDROCHLORIDE 10 MG: 10 TABLET, FILM COATED ORAL at 07:21

## 2024-01-12 RX ADMIN — HYDRALAZINE HYDROCHLORIDE 10 MG: 10 TABLET, FILM COATED ORAL at 21:13

## 2024-01-12 RX ADMIN — DOXYCYCLINE 100 MG: 100 INJECTION, POWDER, LYOPHILIZED, FOR SOLUTION INTRAVENOUS at 15:08

## 2024-01-12 RX ADMIN — AMLODIPINE BESYLATE 5 MG: 5 TABLET ORAL at 21:13

## 2024-01-12 RX ADMIN — DOXYCYCLINE 100 MG: 100 INJECTION, POWDER, LYOPHILIZED, FOR SOLUTION INTRAVENOUS at 04:20

## 2024-01-12 RX ADMIN — SODIUM CHLORIDE, PRESERVATIVE FREE 10 ML: 5 INJECTION INTRAVENOUS at 21:14

## 2024-01-12 RX ADMIN — HYDRALAZINE HYDROCHLORIDE 10 MG: 10 TABLET, FILM COATED ORAL at 15:08

## 2024-01-12 RX ADMIN — PANTOPRAZOLE SODIUM 40 MG: 40 TABLET, DELAYED RELEASE ORAL at 07:21

## 2024-01-12 RX ADMIN — Medication 2000 UNITS: at 08:55

## 2024-01-12 RX ADMIN — POLYETHYLENE GLYCOL-3350 AND ELECTROLYTES 2000 ML: 236; 6.74; 5.86; 2.97; 22.74 POWDER, FOR SOLUTION ORAL at 21:18

## 2024-01-12 RX ADMIN — WARFARIN SODIUM 2 MG: 2 TABLET ORAL at 18:20

## 2024-01-12 RX ADMIN — FLUCONAZOLE 100 MG: 100 TABLET ORAL at 08:55

## 2024-01-12 RX ADMIN — METOPROLOL SUCCINATE 50 MG: 50 TABLET, EXTENDED RELEASE ORAL at 08:55

## 2024-01-12 ASSESSMENT — PAIN SCALES - GENERAL
PAINLEVEL_OUTOF10: 0

## 2024-01-12 NOTE — PLAN OF CARE
Problem: Discharge Planning  Goal: Discharge to home or other facility with appropriate resources  Outcome: Progressing  Flowsheets (Taken 1/11/2024 1955)  Discharge to home or other facility with appropriate resources: Identify barriers to discharge with patient and caregiver     Problem: Safety - Adult  Goal: Free from fall injury  Outcome: Progressing     Problem: Skin/Tissue Integrity  Goal: Absence of new skin breakdown  Description: 1.  Monitor for areas of redness and/or skin breakdown  2.  Assess vascular access sites hourly  3.  Every 4-6 hours minimum:  Change oxygen saturation probe site  4.  Every 4-6 hours:  If on nasal continuous positive airway pressure, respiratory therapy assess nares and determine need for appliance change or resting period.  Outcome: Progressing     Problem: Chronic Conditions and Co-morbidities  Goal: Patient's chronic conditions and co-morbidity symptoms are monitored and maintained or improved  Outcome: Progressing  Flowsheets (Taken 1/11/2024 1955)  Care Plan - Patient's Chronic Conditions and Co-Morbidity Symptoms are Monitored and Maintained or Improved: Monitor and assess patient's chronic conditions and comorbid symptoms for stability, deterioration, or improvement

## 2024-01-13 LAB
ALBUMIN SERPL-MCNC: 3.7 G/DL (ref 3.5–5)
ALBUMIN/GLOB SERPL: 1 (ref 1.1–2.2)
ALP SERPL-CCNC: 74 U/L (ref 45–117)
ALT SERPL-CCNC: 28 U/L (ref 12–78)
ANION GAP SERPL CALC-SCNC: 8 MMOL/L (ref 5–15)
AST SERPL-CCNC: 19 U/L (ref 15–37)
BACTERIA SPEC CULT: NORMAL
BACTERIA SPEC CULT: NORMAL
BILIRUB SERPL-MCNC: 0.4 MG/DL (ref 0.2–1)
BUN SERPL-MCNC: 29 MG/DL (ref 6–20)
BUN/CREAT SERPL: 16 (ref 12–20)
CALCIUM SERPL-MCNC: 9.2 MG/DL (ref 8.5–10.1)
CHLORIDE SERPL-SCNC: 111 MMOL/L (ref 97–108)
CO2 SERPL-SCNC: 21 MMOL/L (ref 21–32)
CREAT SERPL-MCNC: 1.83 MG/DL (ref 0.7–1.3)
ERYTHROCYTE [DISTWIDTH] IN BLOOD BY AUTOMATED COUNT: 18.2 % (ref 11.5–14.5)
GLOBULIN SER CALC-MCNC: 3.8 G/DL (ref 2–4)
GLUCOSE SERPL-MCNC: 125 MG/DL (ref 65–100)
HCT VFR BLD AUTO: 30.8 % (ref 36.6–50.3)
HGB BLD-MCNC: 9.9 G/DL (ref 12.1–17)
INR PPP: 1.8 (ref 0.9–1.1)
MCH RBC QN AUTO: 28.8 PG (ref 26–34)
MCHC RBC AUTO-ENTMCNC: 32.1 G/DL (ref 30–36.5)
MCV RBC AUTO: 89.5 FL (ref 80–99)
NRBC # BLD: 0 K/UL (ref 0–0.01)
NRBC BLD-RTO: 0 PER 100 WBC
NT PRO BNP: 393 PG/ML
PLATELET # BLD AUTO: 229 K/UL (ref 150–400)
PMV BLD AUTO: 10.7 FL (ref 8.9–12.9)
POTASSIUM SERPL-SCNC: 3.8 MMOL/L (ref 3.5–5.1)
PROT SERPL-MCNC: 7.5 G/DL (ref 6.4–8.2)
PROTHROMBIN TIME: 17.9 SEC (ref 9–11.1)
RBC # BLD AUTO: 3.44 M/UL (ref 4.1–5.7)
SERVICE CMNT-IMP: NORMAL
SERVICE CMNT-IMP: NORMAL
SODIUM SERPL-SCNC: 140 MMOL/L (ref 136–145)
UFH PPP CHRO-ACNC: 0.48 IU/ML
WBC # BLD AUTO: 8.4 K/UL (ref 4.1–11.1)

## 2024-01-13 PROCEDURE — 85520 HEPARIN ASSAY: CPT

## 2024-01-13 PROCEDURE — 85027 COMPLETE CBC AUTOMATED: CPT

## 2024-01-13 PROCEDURE — 6370000000 HC RX 637 (ALT 250 FOR IP): Performed by: HOSPITALIST

## 2024-01-13 PROCEDURE — 6370000000 HC RX 637 (ALT 250 FOR IP): Performed by: INTERNAL MEDICINE

## 2024-01-13 PROCEDURE — 36415 COLL VENOUS BLD VENIPUNCTURE: CPT

## 2024-01-13 PROCEDURE — 2060000000 HC ICU INTERMEDIATE R&B

## 2024-01-13 PROCEDURE — 6370000000 HC RX 637 (ALT 250 FOR IP): Performed by: NURSE PRACTITIONER

## 2024-01-13 PROCEDURE — 83880 ASSAY OF NATRIURETIC PEPTIDE: CPT

## 2024-01-13 PROCEDURE — 85610 PROTHROMBIN TIME: CPT

## 2024-01-13 PROCEDURE — 99233 SBSQ HOSP IP/OBS HIGH 50: CPT | Performed by: INTERNAL MEDICINE

## 2024-01-13 PROCEDURE — 6370000000 HC RX 637 (ALT 250 FOR IP)

## 2024-01-13 PROCEDURE — 2500000003 HC RX 250 WO HCPCS: Performed by: INTERNAL MEDICINE

## 2024-01-13 PROCEDURE — 93750 INTERROGATION VAD IN PERSON: CPT | Performed by: INTERNAL MEDICINE

## 2024-01-13 PROCEDURE — 2580000003 HC RX 258: Performed by: INTERNAL MEDICINE

## 2024-01-13 PROCEDURE — 80053 COMPREHEN METABOLIC PANEL: CPT

## 2024-01-13 RX ORDER — WARFARIN SODIUM 2 MG/1
2 TABLET ORAL
Status: COMPLETED | OUTPATIENT
Start: 2024-01-13 | End: 2024-01-13

## 2024-01-13 RX ORDER — HYDRALAZINE HYDROCHLORIDE 25 MG/1
25 TABLET, FILM COATED ORAL EVERY 8 HOURS SCHEDULED
Status: DISCONTINUED | OUTPATIENT
Start: 2024-01-13 | End: 2024-01-16 | Stop reason: HOSPADM

## 2024-01-13 RX ADMIN — Medication 2000 UNITS: at 12:13

## 2024-01-13 RX ADMIN — DOXYCYCLINE 100 MG: 100 INJECTION, POWDER, LYOPHILIZED, FOR SOLUTION INTRAVENOUS at 15:10

## 2024-01-13 RX ADMIN — METOPROLOL SUCCINATE 50 MG: 50 TABLET, EXTENDED RELEASE ORAL at 23:49

## 2024-01-13 RX ADMIN — AMLODIPINE BESYLATE 5 MG: 5 TABLET ORAL at 21:15

## 2024-01-13 RX ADMIN — AMLODIPINE BESYLATE 5 MG: 5 TABLET ORAL at 12:13

## 2024-01-13 RX ADMIN — HYDRALAZINE HYDROCHLORIDE 25 MG: 25 TABLET ORAL at 12:13

## 2024-01-13 RX ADMIN — PANTOPRAZOLE SODIUM 40 MG: 40 TABLET, DELAYED RELEASE ORAL at 15:07

## 2024-01-13 RX ADMIN — PANTOPRAZOLE SODIUM 40 MG: 40 TABLET, DELAYED RELEASE ORAL at 07:11

## 2024-01-13 RX ADMIN — WARFARIN SODIUM 2 MG: 2 TABLET ORAL at 17:57

## 2024-01-13 RX ADMIN — HYDRALAZINE HYDROCHLORIDE 25 MG: 25 TABLET ORAL at 21:15

## 2024-01-13 RX ADMIN — SODIUM CHLORIDE, PRESERVATIVE FREE 10 ML: 5 INJECTION INTRAVENOUS at 21:16

## 2024-01-13 RX ADMIN — SODIUM CHLORIDE, PRESERVATIVE FREE 10 ML: 5 INJECTION INTRAVENOUS at 08:26

## 2024-01-13 RX ADMIN — DOXYCYCLINE 100 MG: 100 INJECTION, POWDER, LYOPHILIZED, FOR SOLUTION INTRAVENOUS at 01:41

## 2024-01-13 RX ADMIN — FLUCONAZOLE 100 MG: 100 TABLET ORAL at 12:13

## 2024-01-13 RX ADMIN — HYDRALAZINE HYDROCHLORIDE 10 MG: 10 TABLET, FILM COATED ORAL at 07:11

## 2024-01-13 RX ADMIN — METOPROLOL SUCCINATE 50 MG: 50 TABLET, EXTENDED RELEASE ORAL at 12:13

## 2024-01-13 ASSESSMENT — PAIN SCALES - GENERAL
PAINLEVEL_OUTOF10: 0
PAINLEVEL_OUTOF10: 0

## 2024-01-14 LAB
ALBUMIN SERPL-MCNC: 3.7 G/DL (ref 3.5–5)
ALBUMIN/GLOB SERPL: 1 (ref 1.1–2.2)
ALP SERPL-CCNC: 78 U/L (ref 45–117)
ALT SERPL-CCNC: 26 U/L (ref 12–78)
ANION GAP SERPL CALC-SCNC: 6 MMOL/L (ref 5–15)
AST SERPL-CCNC: 32 U/L (ref 15–37)
BILIRUB SERPL-MCNC: 0.4 MG/DL (ref 0.2–1)
BUN SERPL-MCNC: 23 MG/DL (ref 6–20)
BUN/CREAT SERPL: 13 (ref 12–20)
CALCIUM SERPL-MCNC: 8.2 MG/DL (ref 8.5–10.1)
CHLORIDE SERPL-SCNC: 113 MMOL/L (ref 97–108)
CO2 SERPL-SCNC: 21 MMOL/L (ref 21–32)
CREAT SERPL-MCNC: 1.81 MG/DL (ref 0.7–1.3)
ERYTHROCYTE [DISTWIDTH] IN BLOOD BY AUTOMATED COUNT: 18.2 % (ref 11.5–14.5)
GLOBULIN SER CALC-MCNC: 3.6 G/DL (ref 2–4)
GLUCOSE SERPL-MCNC: 114 MG/DL (ref 65–100)
HCT VFR BLD AUTO: 31.3 % (ref 36.6–50.3)
HGB BLD-MCNC: 10.1 G/DL (ref 12.1–17)
INR PPP: 2 (ref 0.9–1.1)
MCH RBC QN AUTO: 29.4 PG (ref 26–34)
MCHC RBC AUTO-ENTMCNC: 32.3 G/DL (ref 30–36.5)
MCV RBC AUTO: 91.3 FL (ref 80–99)
NRBC # BLD: 0 K/UL (ref 0–0.01)
NRBC BLD-RTO: 0 PER 100 WBC
NT PRO BNP: 451 PG/ML
PLATELET # BLD AUTO: 251 K/UL (ref 150–400)
PMV BLD AUTO: 10.8 FL (ref 8.9–12.9)
POTASSIUM SERPL-SCNC: 4.9 MMOL/L (ref 3.5–5.1)
PROT SERPL-MCNC: 7.3 G/DL (ref 6.4–8.2)
PROTHROMBIN TIME: 19.7 SEC (ref 9–11.1)
RBC # BLD AUTO: 3.43 M/UL (ref 4.1–5.7)
SODIUM SERPL-SCNC: 140 MMOL/L (ref 136–145)
WBC # BLD AUTO: 7.8 K/UL (ref 4.1–11.1)

## 2024-01-14 PROCEDURE — 6370000000 HC RX 637 (ALT 250 FOR IP): Performed by: INTERNAL MEDICINE

## 2024-01-14 PROCEDURE — 36415 COLL VENOUS BLD VENIPUNCTURE: CPT

## 2024-01-14 PROCEDURE — 99232 SBSQ HOSP IP/OBS MODERATE 35: CPT | Performed by: INTERNAL MEDICINE

## 2024-01-14 PROCEDURE — 6370000000 HC RX 637 (ALT 250 FOR IP)

## 2024-01-14 PROCEDURE — 6370000000 HC RX 637 (ALT 250 FOR IP): Performed by: NURSE PRACTITIONER

## 2024-01-14 PROCEDURE — 85610 PROTHROMBIN TIME: CPT

## 2024-01-14 PROCEDURE — 2580000003 HC RX 258: Performed by: INTERNAL MEDICINE

## 2024-01-14 PROCEDURE — 2060000000 HC ICU INTERMEDIATE R&B

## 2024-01-14 PROCEDURE — 6370000000 HC RX 637 (ALT 250 FOR IP): Performed by: HOSPITALIST

## 2024-01-14 PROCEDURE — 80053 COMPREHEN METABOLIC PANEL: CPT

## 2024-01-14 PROCEDURE — 93750 INTERROGATION VAD IN PERSON: CPT | Performed by: INTERNAL MEDICINE

## 2024-01-14 PROCEDURE — 2500000003 HC RX 250 WO HCPCS: Performed by: INTERNAL MEDICINE

## 2024-01-14 PROCEDURE — 85027 COMPLETE CBC AUTOMATED: CPT

## 2024-01-14 PROCEDURE — 83880 ASSAY OF NATRIURETIC PEPTIDE: CPT

## 2024-01-14 RX ORDER — WARFARIN SODIUM 2 MG/1
2 TABLET ORAL
Status: COMPLETED | OUTPATIENT
Start: 2024-01-14 | End: 2024-01-14

## 2024-01-14 RX ADMIN — HYDRALAZINE HYDROCHLORIDE 25 MG: 25 TABLET ORAL at 13:41

## 2024-01-14 RX ADMIN — PANTOPRAZOLE SODIUM 40 MG: 40 TABLET, DELAYED RELEASE ORAL at 15:26

## 2024-01-14 RX ADMIN — HYDRALAZINE HYDROCHLORIDE 25 MG: 25 TABLET ORAL at 22:41

## 2024-01-14 RX ADMIN — PANTOPRAZOLE SODIUM 40 MG: 40 TABLET, DELAYED RELEASE ORAL at 08:13

## 2024-01-14 RX ADMIN — SODIUM CHLORIDE, PRESERVATIVE FREE 10 ML: 5 INJECTION INTRAVENOUS at 19:22

## 2024-01-14 RX ADMIN — Medication 2000 UNITS: at 08:45

## 2024-01-14 RX ADMIN — WARFARIN SODIUM 2 MG: 2 TABLET ORAL at 18:30

## 2024-01-14 RX ADMIN — DOXYCYCLINE 100 MG: 100 INJECTION, POWDER, LYOPHILIZED, FOR SOLUTION INTRAVENOUS at 05:21

## 2024-01-14 RX ADMIN — AMLODIPINE BESYLATE 5 MG: 5 TABLET ORAL at 22:41

## 2024-01-14 RX ADMIN — HYDRALAZINE HYDROCHLORIDE 25 MG: 25 TABLET ORAL at 05:21

## 2024-01-14 RX ADMIN — METOPROLOL SUCCINATE 50 MG: 50 TABLET, EXTENDED RELEASE ORAL at 08:45

## 2024-01-14 RX ADMIN — DOXYCYCLINE 100 MG: 100 INJECTION, POWDER, LYOPHILIZED, FOR SOLUTION INTRAVENOUS at 15:28

## 2024-01-14 RX ADMIN — AMLODIPINE BESYLATE 5 MG: 5 TABLET ORAL at 08:45

## 2024-01-14 RX ADMIN — SODIUM CHLORIDE, PRESERVATIVE FREE 10 ML: 5 INJECTION INTRAVENOUS at 08:45

## 2024-01-14 RX ADMIN — FLUCONAZOLE 100 MG: 100 TABLET ORAL at 08:45

## 2024-01-14 RX ADMIN — METOPROLOL SUCCINATE 50 MG: 50 TABLET, EXTENDED RELEASE ORAL at 22:41

## 2024-01-14 ASSESSMENT — PAIN SCALES - GENERAL: PAINLEVEL_OUTOF10: 0

## 2024-01-15 LAB
ALBUMIN SERPL-MCNC: 3.6 G/DL (ref 3.5–5)
ALBUMIN/GLOB SERPL: 1 (ref 1.1–2.2)
ALP SERPL-CCNC: 70 U/L (ref 45–117)
ALT SERPL-CCNC: 24 U/L (ref 12–78)
ANION GAP SERPL CALC-SCNC: 4 MMOL/L (ref 5–15)
AST SERPL-CCNC: 16 U/L (ref 15–37)
BILIRUB SERPL-MCNC: 0.3 MG/DL (ref 0.2–1)
BUN SERPL-MCNC: 28 MG/DL (ref 6–20)
BUN/CREAT SERPL: 15 (ref 12–20)
CALCIUM SERPL-MCNC: 8.2 MG/DL (ref 8.5–10.1)
CHLORIDE SERPL-SCNC: 114 MMOL/L (ref 97–108)
CO2 SERPL-SCNC: 22 MMOL/L (ref 21–32)
CREAT SERPL-MCNC: 1.81 MG/DL (ref 0.7–1.3)
ERYTHROCYTE [DISTWIDTH] IN BLOOD BY AUTOMATED COUNT: 18 % (ref 11.5–14.5)
GLOBULIN SER CALC-MCNC: 3.7 G/DL (ref 2–4)
GLUCOSE SERPL-MCNC: 97 MG/DL (ref 65–100)
HCT VFR BLD AUTO: 28.4 % (ref 36.6–50.3)
HGB BLD-MCNC: 9.2 G/DL (ref 12.1–17)
INR PPP: 2.2 (ref 0.9–1.1)
MCH RBC QN AUTO: 29.3 PG (ref 26–34)
MCHC RBC AUTO-ENTMCNC: 32.4 G/DL (ref 30–36.5)
MCV RBC AUTO: 90.4 FL (ref 80–99)
NRBC # BLD: 0 K/UL (ref 0–0.01)
NRBC BLD-RTO: 0 PER 100 WBC
NT PRO BNP: 554 PG/ML
PLATELET # BLD AUTO: 252 K/UL (ref 150–400)
PMV BLD AUTO: 11.1 FL (ref 8.9–12.9)
POTASSIUM SERPL-SCNC: 4 MMOL/L (ref 3.5–5.1)
PROT SERPL-MCNC: 7.3 G/DL (ref 6.4–8.2)
PROTHROMBIN TIME: 21.8 SEC (ref 9–11.1)
RBC # BLD AUTO: 3.14 M/UL (ref 4.1–5.7)
SODIUM SERPL-SCNC: 140 MMOL/L (ref 136–145)
WBC # BLD AUTO: 8.4 K/UL (ref 4.1–11.1)

## 2024-01-15 PROCEDURE — 85027 COMPLETE CBC AUTOMATED: CPT

## 2024-01-15 PROCEDURE — 6370000000 HC RX 637 (ALT 250 FOR IP)

## 2024-01-15 PROCEDURE — 85610 PROTHROMBIN TIME: CPT

## 2024-01-15 PROCEDURE — 2580000003 HC RX 258: Performed by: INTERNAL MEDICINE

## 2024-01-15 PROCEDURE — 2500000003 HC RX 250 WO HCPCS: Performed by: INTERNAL MEDICINE

## 2024-01-15 PROCEDURE — 6370000000 HC RX 637 (ALT 250 FOR IP): Performed by: INTERNAL MEDICINE

## 2024-01-15 PROCEDURE — APPNB60 APP NON BILLABLE TIME 46-60 MINS: Performed by: NURSE PRACTITIONER

## 2024-01-15 PROCEDURE — 80053 COMPREHEN METABOLIC PANEL: CPT

## 2024-01-15 PROCEDURE — 0DJ07ZZ INSPECTION OF UPPER INTESTINAL TRACT, VIA NATURAL OR ARTIFICIAL OPENING: ICD-10-PCS | Performed by: INTERNAL MEDICINE

## 2024-01-15 PROCEDURE — 6370000000 HC RX 637 (ALT 250 FOR IP): Performed by: NURSE PRACTITIONER

## 2024-01-15 PROCEDURE — 83880 ASSAY OF NATRIURETIC PEPTIDE: CPT

## 2024-01-15 PROCEDURE — 93750 INTERROGATION VAD IN PERSON: CPT | Performed by: NURSE PRACTITIONER

## 2024-01-15 PROCEDURE — 36415 COLL VENOUS BLD VENIPUNCTURE: CPT

## 2024-01-15 PROCEDURE — 2060000000 HC ICU INTERMEDIATE R&B

## 2024-01-15 RX ORDER — SODIUM CHLORIDE 0.9 % (FLUSH) 0.9 %
5-40 SYRINGE (ML) INJECTION PRN
Status: CANCELLED | OUTPATIENT
Start: 2024-01-22

## 2024-01-15 RX ORDER — ALBUTEROL SULFATE 90 UG/1
4 AEROSOL, METERED RESPIRATORY (INHALATION) PRN
OUTPATIENT
Start: 2024-01-22

## 2024-01-15 RX ORDER — SODIUM CHLORIDE 9 MG/ML
5-250 INJECTION, SOLUTION INTRAVENOUS PRN
Status: CANCELLED | OUTPATIENT
Start: 2024-01-22

## 2024-01-15 RX ORDER — EPINEPHRINE 1 MG/ML
0.3 INJECTION, SOLUTION INTRAMUSCULAR; SUBCUTANEOUS PRN
OUTPATIENT
Start: 2024-01-22

## 2024-01-15 RX ORDER — ONDANSETRON 2 MG/ML
8 INJECTION INTRAMUSCULAR; INTRAVENOUS
OUTPATIENT
Start: 2024-01-22

## 2024-01-15 RX ORDER — DIPHENHYDRAMINE HYDROCHLORIDE 50 MG/ML
50 INJECTION INTRAMUSCULAR; INTRAVENOUS
OUTPATIENT
Start: 2024-01-22

## 2024-01-15 RX ORDER — HEPARIN 100 UNIT/ML
500 SYRINGE INTRAVENOUS PRN
Status: CANCELLED | OUTPATIENT
Start: 2024-01-22

## 2024-01-15 RX ORDER — SODIUM CHLORIDE 9 MG/ML
INJECTION, SOLUTION INTRAVENOUS CONTINUOUS
OUTPATIENT
Start: 2024-01-22

## 2024-01-15 RX ORDER — WARFARIN SODIUM 1 MG/1
1 TABLET ORAL
Status: COMPLETED | OUTPATIENT
Start: 2024-01-15 | End: 2024-01-15

## 2024-01-15 RX ORDER — ACETAMINOPHEN 325 MG/1
650 TABLET ORAL
OUTPATIENT
Start: 2024-01-22

## 2024-01-15 RX ADMIN — PANTOPRAZOLE SODIUM 40 MG: 40 TABLET, DELAYED RELEASE ORAL at 06:56

## 2024-01-15 RX ADMIN — HYDRALAZINE HYDROCHLORIDE 25 MG: 25 TABLET ORAL at 06:55

## 2024-01-15 RX ADMIN — SODIUM CHLORIDE, PRESERVATIVE FREE 10 ML: 5 INJECTION INTRAVENOUS at 09:05

## 2024-01-15 RX ADMIN — METOPROLOL SUCCINATE 50 MG: 50 TABLET, EXTENDED RELEASE ORAL at 20:21

## 2024-01-15 RX ADMIN — AMLODIPINE BESYLATE 5 MG: 5 TABLET ORAL at 09:05

## 2024-01-15 RX ADMIN — WARFARIN SODIUM 1 MG: 1 TABLET ORAL at 17:30

## 2024-01-15 RX ADMIN — ACETAMINOPHEN 650 MG: 325 TABLET ORAL at 14:59

## 2024-01-15 RX ADMIN — DOXYCYCLINE 100 MG: 100 INJECTION, POWDER, LYOPHILIZED, FOR SOLUTION INTRAVENOUS at 02:14

## 2024-01-15 RX ADMIN — METOPROLOL SUCCINATE 50 MG: 50 TABLET, EXTENDED RELEASE ORAL at 09:05

## 2024-01-15 RX ADMIN — HYDRALAZINE HYDROCHLORIDE 25 MG: 25 TABLET ORAL at 20:21

## 2024-01-15 RX ADMIN — AMLODIPINE BESYLATE 5 MG: 5 TABLET ORAL at 20:21

## 2024-01-15 RX ADMIN — FLUCONAZOLE 100 MG: 100 TABLET ORAL at 09:05

## 2024-01-15 RX ADMIN — HYDRALAZINE HYDROCHLORIDE 25 MG: 25 TABLET ORAL at 14:59

## 2024-01-15 RX ADMIN — Medication 2000 UNITS: at 09:05

## 2024-01-15 RX ADMIN — SODIUM CHLORIDE, PRESERVATIVE FREE 10 ML: 5 INJECTION INTRAVENOUS at 20:21

## 2024-01-15 RX ADMIN — PANTOPRAZOLE SODIUM 40 MG: 40 TABLET, DELAYED RELEASE ORAL at 14:58

## 2024-01-15 ASSESSMENT — ENCOUNTER SYMPTOMS
SHORTNESS OF BREATH: 0
NAUSEA: 0
ABDOMINAL DISTENTION: 0
COUGH: 0
VOMITING: 0
BLOOD IN STOOL: 0

## 2024-01-15 ASSESSMENT — PAIN SCALES - GENERAL
PAINLEVEL_OUTOF10: 0
PAINLEVEL_OUTOF10: 3
PAINLEVEL_OUTOF10: 0
PAINLEVEL_OUTOF10: 0

## 2024-01-15 ASSESSMENT — PAIN DESCRIPTION - LOCATION: LOCATION: KNEE

## 2024-01-15 NOTE — OP NOTE
[facility] [address] [address]  [phone]        M2A Capsule Endoscopy Procedure    NAME:  Sanford Joiner   :   1960   MRN:   [id number]       Date/Time:  1/15/2024   Procedure Type: M2A Capsule  Indications:  melena, anemia      Pre-operative Diagnosis: see indication above    Post-operative Diagnosis:  See findings below    : Barbra Tracy MD    Surgical Assistant: None    Implants:  None    Referring Provider: Ranjan Porter MD    Anethesia/Sedation: none      Procedure Details   PLEASE REFER TO THE SCANNED REPORT FOR MORE DETAILS    Findings:   First gastric image = 00:00:18  First duodenal image = 00:19:46  First ileocecal valve image= 03:06:34  First cecal image= 03:06:59  Small Bowel Passage time: 2 h 47 m    Normal exam of small bowel within limitations of capsule endoscopy   No blood, bleeding, ulcers, mass, AVM's, polyps or any other lesions seen                               Impression:            See findings     Recommendation:  Normal exam  Monitor H/H  Will follow as needed   To follow up with our GI clinic 1 month after discharge      Barbra Tracy MD  1/15/2024  2:26 PM

## 2024-01-15 NOTE — PLAN OF CARE
Problem: Safety - Adult  Goal: Free from fall injury  Outcome: Progressing     Problem: Skin/Tissue Integrity  Goal: Absence of new skin breakdown  Description: 1.  Monitor for areas of redness and/or skin breakdown  2.  Assess vascular access sites hourly  3.  Every 4-6 hours minimum:  Change oxygen saturation probe site  4.  Every 4-6 hours:  If on nasal continuous positive airway pressure, respiratory therapy assess nares and determine need for appliance change or resting period.  Outcome: Progressing     Problem: Chronic Conditions and Co-morbidities  Goal: Patient's chronic conditions and co-morbidity symptoms are monitored and maintained or improved  Outcome: Progressing

## 2024-01-15 NOTE — CARE COORDINATION
Transition of Care Plan:    RUR: 19% - moderate  Prior Level of Functioning: independent with LVAD  Disposition: home  Follow up appointments: [facility]  DME needed: none  Transportation at discharge: Medicaid vs Wife  IM/IMM Medicare/ letter given: needs 2nd IMM  Caregiver Contact: wife - Ashley Swan - [phone]   Discharge Caregiver contacted prior to discharge? no  Care Conference needed? no  Barriers to discharge: medical - capsule study results    Chart reviewed. Capsule study pending - possible discharge pending capsule study results. Patient remains at his independent baseline. Patient on doxycycline for infection.    CM will continue to follow. Patient may need Roundtrip vs Medicaid transportation for discharge. Wife works at [employer].    Jabari Mckeon, MPH  Care Manager l [facility]  Available via [facility] or  [employer]

## 2024-01-16 VITALS
WEIGHT: 179.23 LBS | TEMPERATURE: 97.6 F | OXYGEN SATURATION: 97 % | BODY MASS INDEX: 27.16 KG/M2 | RESPIRATION RATE: 18 BRPM | HEART RATE: 59 BPM | HEIGHT: 68 IN | SYSTOLIC BLOOD PRESSURE: 104 MMHG | DIASTOLIC BLOOD PRESSURE: 78 MMHG

## 2024-01-16 PROBLEM — N18.9 ACUTE KIDNEY INJURY SUPERIMPOSED ON CKD (HCC): Status: RESOLVED | Noted: 2024-01-09 | Resolved: 2024-01-16

## 2024-01-16 PROBLEM — N17.9 ACUTE KIDNEY INJURY SUPERIMPOSED ON CKD (HCC): Status: RESOLVED | Noted: 2024-01-09 | Resolved: 2024-01-16

## 2024-01-16 PROBLEM — T82.7XXA INFECTION ASSOCIATED WITH DRIVELINE OF LEFT VENTRICULAR ASSIST DEVICE (LVAD) (HCC): Status: RESOLVED | Noted: 2024-01-08 | Resolved: 2024-01-16

## 2024-01-16 LAB
ALBUMIN SERPL-MCNC: 3.7 G/DL (ref 3.5–5)
ALBUMIN/GLOB SERPL: 0.9 (ref 1.1–2.2)
ALP SERPL-CCNC: 72 U/L (ref 45–117)
ALT SERPL-CCNC: 24 U/L (ref 12–78)
ANION GAP SERPL CALC-SCNC: 8 MMOL/L (ref 5–15)
AST SERPL-CCNC: 16 U/L (ref 15–37)
BILIRUB SERPL-MCNC: 0.4 MG/DL (ref 0.2–1)
BUN SERPL-MCNC: 26 MG/DL (ref 6–20)
BUN/CREAT SERPL: 15 (ref 12–20)
CALCIUM SERPL-MCNC: 9.2 MG/DL (ref 8.5–10.1)
CHLORIDE SERPL-SCNC: 109 MMOL/L (ref 97–108)
CO2 SERPL-SCNC: 22 MMOL/L (ref 21–32)
CREAT SERPL-MCNC: 1.72 MG/DL (ref 0.7–1.3)
ERYTHROCYTE [DISTWIDTH] IN BLOOD BY AUTOMATED COUNT: 17.8 % (ref 11.5–14.5)
GLOBULIN SER CALC-MCNC: 4.1 G/DL (ref 2–4)
GLUCOSE SERPL-MCNC: 114 MG/DL (ref 65–100)
HCT VFR BLD AUTO: 31.2 % (ref 36.6–50.3)
HGB BLD-MCNC: 10 G/DL (ref 12.1–17)
INR PPP: 2.4 (ref 0.9–1.1)
MCH RBC QN AUTO: 28.8 PG (ref 26–34)
MCHC RBC AUTO-ENTMCNC: 32.1 G/DL (ref 30–36.5)
MCV RBC AUTO: 89.9 FL (ref 80–99)
NRBC # BLD: 0 K/UL (ref 0–0.01)
NRBC BLD-RTO: 0 PER 100 WBC
NT PRO BNP: 533 PG/ML
PLATELET # BLD AUTO: 295 K/UL (ref 150–400)
PMV BLD AUTO: 11.8 FL (ref 8.9–12.9)
POTASSIUM SERPL-SCNC: 3.8 MMOL/L (ref 3.5–5.1)
PROT SERPL-MCNC: 7.8 G/DL (ref 6.4–8.2)
PROTHROMBIN TIME: 24 SEC (ref 9–11.1)
RBC # BLD AUTO: 3.47 M/UL (ref 4.1–5.7)
SODIUM SERPL-SCNC: 139 MMOL/L (ref 136–145)
WBC # BLD AUTO: 6.9 K/UL (ref 4.1–11.1)

## 2024-01-16 PROCEDURE — 2580000003 HC RX 258: Performed by: INTERNAL MEDICINE

## 2024-01-16 PROCEDURE — 6370000000 HC RX 637 (ALT 250 FOR IP): Performed by: INTERNAL MEDICINE

## 2024-01-16 PROCEDURE — 6370000000 HC RX 637 (ALT 250 FOR IP): Performed by: NURSE PRACTITIONER

## 2024-01-16 PROCEDURE — 83880 ASSAY OF NATRIURETIC PEPTIDE: CPT

## 2024-01-16 PROCEDURE — 6370000000 HC RX 637 (ALT 250 FOR IP)

## 2024-01-16 PROCEDURE — 85610 PROTHROMBIN TIME: CPT

## 2024-01-16 PROCEDURE — 80053 COMPREHEN METABOLIC PANEL: CPT

## 2024-01-16 PROCEDURE — 93750 INTERROGATION VAD IN PERSON: CPT | Performed by: NURSE PRACTITIONER

## 2024-01-16 PROCEDURE — APPNB45 APP NON BILLABLE 31-45 MINUTES: Performed by: NURSE PRACTITIONER

## 2024-01-16 RX ORDER — HYDRALAZINE HYDROCHLORIDE 25 MG/1
25 TABLET, FILM COATED ORAL EVERY 8 HOURS SCHEDULED
Qty: 90 TABLET | Refills: 3 | Status: SHIPPED | OUTPATIENT
Start: 2024-01-16

## 2024-01-16 RX ORDER — FLUCONAZOLE 100 MG/1
100 TABLET ORAL DAILY
Qty: 9 TABLET | Refills: 0 | Status: SHIPPED | OUTPATIENT
Start: 2024-01-17 | End: 2024-01-26

## 2024-01-16 RX ORDER — PANTOPRAZOLE SODIUM 40 MG/1
40 TABLET, DELAYED RELEASE ORAL
Qty: 60 TABLET | Refills: 0 | Status: SHIPPED | OUTPATIENT
Start: 2024-01-16

## 2024-01-16 RX ORDER — WARFARIN SODIUM 1 MG/1
0.5 TABLET ORAL
Status: DISCONTINUED | OUTPATIENT
Start: 2024-01-16 | End: 2024-01-16 | Stop reason: HOSPADM

## 2024-01-16 RX ADMIN — Medication 2000 UNITS: at 08:42

## 2024-01-16 RX ADMIN — METOPROLOL SUCCINATE 50 MG: 50 TABLET, EXTENDED RELEASE ORAL at 08:42

## 2024-01-16 RX ADMIN — FLUCONAZOLE 100 MG: 100 TABLET ORAL at 08:42

## 2024-01-16 RX ADMIN — AMLODIPINE BESYLATE 5 MG: 5 TABLET ORAL at 08:42

## 2024-01-16 RX ADMIN — HYDRALAZINE HYDROCHLORIDE 25 MG: 25 TABLET ORAL at 06:12

## 2024-01-16 RX ADMIN — PANTOPRAZOLE SODIUM 40 MG: 40 TABLET, DELAYED RELEASE ORAL at 06:12

## 2024-01-16 RX ADMIN — SODIUM CHLORIDE, PRESERVATIVE FREE 10 ML: 5 INJECTION INTRAVENOUS at 08:43

## 2024-01-16 ASSESSMENT — PAIN SCALES - GENERAL
PAINLEVEL_OUTOF10: 0
PAINLEVEL_OUTOF10: 0

## 2024-01-16 ASSESSMENT — ENCOUNTER SYMPTOMS
ABDOMINAL DISTENTION: 0
VOMITING: 0
NAUSEA: 0
COUGH: 0
BLOOD IN STOOL: 0
SHORTNESS OF BREATH: 0

## 2024-01-16 NOTE — PROGRESS NOTES
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    Interval history / Subjective:     Patient has no acute complaint.  Denies any pain.     Assessment & Plan:     LVAD driveline infection  -Outpatient wound cultures  which shows MSSA, Enterobacter and beta-hemolytic strep, wound culture on 1/3 shows Enterobacter and MSSA  -Blood cultures on admission so far negative  -Was started on Bactrim as outpatient per AHF team  -Now on Bactrim on hold due to DAWOOD, continue doxycycline, ID following    Chronic HFrEF  -Patient with known ischemic cardiomyopathy s/p LVAD implantation in   -Patient does not require diuretics  -GDMT's include Toprol, unable to tolerate ACE/ARB/ARNI, MRA and SGLT2 due to renal dysfunction hypovolemia  -On anticoagulation with Coumadin    DAWOOD  -Patient presented with serum creatinine of 3.59, overall creatinine improving  -Bactrim has been discontinued which likely have contributed to patient's DAWOOD  -Renal ultrasound negative for hydronephrosis  -Nephrology following    Hypertension  -Continue amlodipine and Toprol    Anemia  -Anemia of chronic disease and iron deficiency  -Hemoglobin stable, monitor H&H    COPD/asthma  -No acute exacerbation  -Breathing treatment.     Code status: Full  Prophylaxis: Coumadin  Care Plan discussed with: Patient  Anticipated Disposition: 24 to 48 hours  Central Line:   None             Review of Systems:   Pertinent items are noted in HPI.         Vital Signs:    Last 24hrs VS reviewed since prior progress note. Most recent are:  Vitals:    01/10/24 1200   Pulse: 67   Resp:    Temp:    SpO2:          Intake/Output Summary (Last 24 hours) at 1/10/2024 
                                                                                                Hospitalist Progress Note  Gareth Hou MD  Answering service: [phone]        Date of Service:  2024  NAME:  Sanford Joiner  :  1960  MRN:  [id number]      Admission Summary:     Patient presented with LVAD driveline infection as well as DAWOOD.    Interval history / Subjective:   Follow up drive line infection  S/p EGD   Comfortably laying in bed  Hb dropped again today     Assessment & Plan:     LVAD driveline infection  -Outpatient wound cultures  which shows MSSA, Enterobacter and beta-hemolytic strep, wound culture on 1/3 shows Enterobacter and MSSA  -Blood cultures on admission so far negative  -Was started on Bactrim as outpatient per AHF team  -Now on Bactrim on hold due to DAWOOD, continue doxycycline, ID following    Anemia  -Anemia of chronic disease and iron deficiency  -s/p EGD  :  Esophagus: small white spots throughout the esophagus c/w candidal esophagitis  Stomach: 3 cm hiatal hernia, rest of the stomach was normal  Duodenum: normal  -Fluconazole 14 days  -Appreciate discussion with GI/adv heart failure, Gi to arrange for capsule study    Chronic HFrEF  -Patient with known ischemic cardiomyopathy s/p LVAD implantation in   -Patient does not require diuretics  -GDMT's include Toprol, unable to tolerate ACE/ARB/ARNI, MRA and SGLT2 due to renal dysfunction hypovolemia  -On anticoagulation with Coumadin  -Appreciate discussion with Adv heart failure team, restart heparin gtt to bridge    DAWOOD over CKD stage III--DAWOOD continues to improve  -Patient presented with serum creatinine of 3.59, overall creatinine improving  -Bactrim has been discontinued which likely have contributed to patient's DAWOOD  -Renal ultrasound negative for hydronephrosis  -Nephrology following    Hypertension: Continue amlodipine and Toprol    COPD/asthma  -No acute exacerbation  -Breathing treatment.     Code 
                                                                                                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    Interval history / Subjective:   Follow up drive line infection  S/p EGD   Comfortably laying in bed  Still has watery stools not dark      Assessment & Plan:     LVAD driveline infection  -Outpatient wound cultures  which shows MSSA, Enterobacter and beta-hemolytic strep, wound culture on 1/3 shows Enterobacter and MSSA  -Blood cultures on admission so far negative  -Was started on Bactrim as outpatient per AHF team  -Now  Bactrim on hold due to DAWOOD, continue doxycycline last dose tomorrow per ID , ID following repeat cultures  no growth    Anemia  -Anemia of chronic disease and iron deficiency  -s/p EGD  :  Esophagus: small white spots throughout the esophagus c/w candidal esophagitis  Stomach: 3 cm hiatal hernia, rest of the stomach was normal  Duodenum: normal  -Fluconazole 14 days  -Appreciate discussion with GI/adv heart failure, ok to discharge tomorrow    Chronic HFrEF  -Patient with known ischemic cardiomyopathy s/p LVAD implantation in   -Patient does not require diuretics  -GDMT's include Toprol, unable to tolerate ACE/ARB/ARNI, MRA and SGLT2 due to renal dysfunction hypovolemia  -On anticoagulation with Coumadin  -Appreciate discussion with Adv heart failure team, restart heparin gtt to bridge    DAWOOD over CKD stage III--DAWOOD continues to improve  -Patient presented with serum creatinine of 3.59, overall creatinine improving slowly   -Bactrim has been discontinued which likely have contributed to patient's DAWOOD  -Renal ultrasound negative for hydronephrosis  -Nephrology following    Hypertension: Continue amlodipine and Toprol 
                                                                                                Hospitalist Progress Note  Gareth Hou MD  Answering service: [phone]        Date of Service:  2024  NAME:  Sanford Joiner  :  1960  MRN:  [id number]      Admission Summary:     Patient presented with LVAD driveline infection as well as DAWOOD.    Interval history / Subjective:   Follow up drive line infection  S/p EGD   Comfortably laying in bed  INR continues to be therapeutic  No new issues     Assessment & Plan:     LVAD driveline infection  -Outpatient wound cultures  which shows MSSA, Enterobacter and beta-hemolytic strep, wound culture on 1/3 shows Enterobacter and MSSA  -Blood cultures on admission so far negative  -Was started on Bactrim as outpatient per AHF team  -Now  Bactrim on hold due to DAWOOD, continue doxycycline last dose tomorrow per ID , ID following repeat cultures  no growth    Anemia  -Anemia of chronic disease and iron deficiency  -s/p EGD  :  Esophagus: small white spots throughout the esophagus c/w candidal esophagitis  Stomach: 3 cm hiatal hernia, rest of the stomach was normal  Duodenum: normal  -Fluconazole 14 days  -Appreciate GI/adv heart failure    Chronic HFrEF  -Patient with known ischemic cardiomyopathy s/p LVAD implantation in   -Patient does not require diuretics  -GDMT's include Toprol, unable to tolerate ACE/ARB/ARNI, MRA and SGLT2 due to renal dysfunction hypovolemia  -On anticoagulation with Coumadin  -Appreciate discussion with Adv heart failure team, restart heparin gtt to bridge    DAWOOD over CKD stage III--DAWOOD continues to improve  -Patient presented with serum creatinine of 3.59, overall creatinine improving slowly   -Bactrim has been discontinued which likely have contributed to patient's DAWOOD  -Renal ultrasound negative for hydronephrosis  -Nephrology following    Hypertension: Continue amlodipine and Toprol Hydralazine dose increased by 
                                                                                                Hospitalist Progress Note  Jj Tay MD  Answering service: [phone]        Date of Service:  2024  NAME:  Sanford Joiner  :  1960  MRN:  [id number]      Admission Summary:     Patient presented with LVAD driveline infection as well as DAWOOD.    Interval history / Subjective:   Follow up drive line infection  S/p EGD   Comfortably laying in bed  Drank golytely so watery stool      Assessment & Plan:     LVAD driveline infection  -Outpatient wound cultures  which shows MSSA, Enterobacter and beta-hemolytic strep, wound culture on 1/3 shows Enterobacter and MSSA  -Blood cultures on admission so far negative  -Was started on Bactrim as outpatient per AHF team  -Now on Bactrim on hold due to DAWOOD, continue doxycycline, ID following    Anemia  -Anemia of chronic disease and iron deficiency  -s/p EGD  :  Esophagus: small white spots throughout the esophagus c/w candidal esophagitis  Stomach: 3 cm hiatal hernia, rest of the stomach was normal  Duodenum: normal  -Fluconazole 14 days  -Appreciate discussion with GI/adv heart failure, Gi to arrange for capsule study last HB 9.9 was 9.3 yesterday . INR 1.8     Chronic HFrEF  -Patient with known ischemic cardiomyopathy s/p LVAD implantation in   -Patient does not require diuretics  -GDMT's include Toprol, unable to tolerate ACE/ARB/ARNI, MRA and SGLT2 due to renal dysfunction hypovolemia  -On anticoagulation with Coumadin  -Appreciate discussion with Adv heart failure team, restart heparin gtt to bridge    DAWOOD over CKD stage III--DAWOOD continues to improve  -Patient presented with serum creatinine of 3.59, overall creatinine improving slowly   -Bactrim has been discontinued which likely have contributed to patient's DAWOOD  -Renal ultrasound negative for hydronephrosis  -Nephrology following    Hypertension: Continue amlodipine and 
    [address], [place] [address]  Phone: [phone]   [phone]    [email or web address]     Nephrology Progress Note    Patient Name : Sanford Joiner      : 1960     MRN : [id number]  Date of Admission : 2024  Date of Servive : 24    CC:  Follow up for DAWOOD on CKD       Assessment and Plan   DAWOOD on CKD:  - suspect 2/2 bactrim  - renal U/S neg  - Cr stable at baseline  - daily labs and I/os while here     Hyperkalemia:  - secondary to bactrim, now resolved     CKD 3b:  - Cr 1.6 to 1.8  - not f/b nephrology as an outpt     HFrEF:  - HM3 23     Driveline infection:  - cx + for MSSA and E. cloacae  - abx per ID    GI bleed:  - monitor H&H  - EGD no active bleed, did show candida esophagitis    Candida esophagitis:  - on fluconazole     Chronic anemia  Hx of PE  COPD     Interval History:  Seen and examined.  Cr better.  Resting in bed on RA.  Voiding ok.  No cp, sob, n/v/d    Review of Systems: Pertinent items are noted in HPI.    Current Medications:   Current Facility-Administered Medications   Medication Dose Route Frequency    warfarin (COUMADIN) tablet 2 mg  2 mg Oral Once    0.9 % sodium chloride infusion   IntraVENous Continuous    fluconazole (DIFLUCAN) tablet 100 mg  100 mg Oral Daily    heparin (porcine) injection 4,000 Units  4,000 Units IntraVENous PRN    heparin (porcine) injection 2,000 Units  2,000 Units IntraVENous PRN    hydrALAZINE (APRESOLINE) tablet 10 mg  10 mg Oral 3 times per day    heparin 25,000 units in dextrose 5% 250 mL (premix) infusion  5-30 Units/kg/hr IntraVENous Continuous    metoprolol succinate (TOPROL XL) extended release tablet 50 mg  50 mg Oral BID    pantoprazole (PROTONIX) tablet 40 mg  40 mg Oral BID AC    hydrALAZINE (APRESOLINE) injection 10 mg  10 mg IntraVENous Q4H PRN    sodium chloride flush 0.9 % injection 5-40 mL  5-40 mL IntraVENous 2 times per day    sodium chloride flush 0.9 
    [address], [place] [address]  Phone: [phone]   [phone]    [email or web address]     Nephrology Progress Note    Patient Name : Sanford Joiner      : 1960     MRN : [id number]  Date of Admission : 2024  Date of Servive : 01/10/24    CC:  Follow up for DAWOOD on CKD       Assessment and Plan   DAWOOD on CKD:  - suspect 2/2 bactrim  - renal U/S neg  - Cr improving  - cont present care  - daily labs and I/Os     Hyperkalemia:  - secondary to bactrim, now resolved     Hyeprmag:  - check level in AM     CKD 3b:  - Cr 1.6 to 1.8  - not f/b nephrology as an outpt     HFrEF:  - HM3 23     Driveline infection:  - cx + for MSSA and E. cloacae  - abx per ID    GI bleed:  - monitor H&H  - GI to see     Chronic anemia  Hx of PE  COPD     Interval History:  Seen and examined.  Cr better.  Voiding ok.  Having dark stools.  Hgb stable, MAPs stable.  No cp, sob, n/v/d    Review of Systems: Pertinent items are noted in HPI.    Current Medications:   Current Facility-Administered Medications   Medication Dose Route Frequency    warfarin (COUMADIN) tablet 3 mg  3 mg Oral Once    hydrALAZINE (APRESOLINE) injection 10 mg  10 mg IntraVENous Q4H PRN    sodium chloride flush 0.9 % injection 5-40 mL  5-40 mL IntraVENous 2 times per day    sodium chloride flush 0.9 % injection 5-40 mL  5-40 mL IntraVENous PRN    0.9 % sodium chloride infusion   IntraVENous PRN    ondansetron (ZOFRAN-ODT) disintegrating tablet 4 mg  4 mg Oral Q8H PRN    Or    ondansetron (ZOFRAN) injection 4 mg  4 mg IntraVENous Q6H PRN    polyethylene glycol (GLYCOLAX) packet 17 g  17 g Oral Daily PRN    acetaminophen (TYLENOL) tablet 650 mg  650 mg Oral Q6H PRN    Or    acetaminophen (TYLENOL) suppository 650 mg  650 mg Rectal Q6H PRN    doxycycline (VIBRAMYCIN) 100 mg in sodium chloride 0.9 % 100 mL IVPB (mini-bag)  100 mg IntraVENous Q12H    warfarin placeholder: dosing by pharmacy   
    [address], [place] [address]  Phone: [phone]   [phone]    [email or web address]     Nephrology Progress Note    Patient Name : Sanford Joiner      : 1960     MRN : [id number]  Date of Admission : 2024  Date of Servive : 24    CC:  Follow up for DAWOOD on CKD       Assessment and Plan   DAWOOD on CKD:  - suspect 2/2 bactrim  - renal U/S neg  - Cr stable at baseline  - no changes at this time  - daily labs while here     Hyperkalemia:  - secondary to bactrim, now resolved     CKD 3b:  - Cr 1.6 to 1.8  - not f/b nephrology as an outpt     HFrEF:  - HM3 23     Driveline infection:  - cx + for MSSA and E. cloacae  - abx per ID    GI bleed:  - monitor H&H  - EGD no active bleed, did show candida esophagitis  - pill cam neg for active bleeding    Candida esophagitis:  - on fluconazole     Chronic anemia  Hx of PE  COPD     Interval History:  Seen and examined.  Cr stable.  Hgb stable.  Resting in bed on RA.  Voiding ok.  No cp, sob, n/v/d    Review of Systems: Pertinent items are noted in HPI.    Current Medications:   Current Facility-Administered Medications   Medication Dose Route Frequency    hydrALAZINE (APRESOLINE) tablet 25 mg  25 mg Oral 3 times per day    0.9 % sodium chloride infusion   IntraVENous Continuous    fluconazole (DIFLUCAN) tablet 100 mg  100 mg Oral Daily    metoprolol succinate (TOPROL XL) extended release tablet 50 mg  50 mg Oral BID    pantoprazole (PROTONIX) tablet 40 mg  40 mg Oral BID AC    hydrALAZINE (APRESOLINE) injection 10 mg  10 mg IntraVENous Q4H PRN    sodium chloride flush 0.9 % injection 5-40 mL  5-40 mL IntraVENous 2 times per day    sodium chloride flush 0.9 % injection 5-40 mL  5-40 mL IntraVENous PRN    0.9 % sodium chloride infusion   IntraVENous PRN    ondansetron (ZOFRAN-ODT) disintegrating tablet 4 mg  4 mg Oral Q8H PRN    Or    ondansetron (ZOFRAN) injection 4 mg  4 mg IntraVENous 
0730: Bedside shift change report given to RACHEL Szymanski (oncoming nurse) by RACHEL Painter (offgoing nurse). Report included the following information Nurse Handoff Report, MAR, and Recent Results.     1930: Bedside shift change report given to RACHEL Matta (oncoming nurse) by RACHEL Szymanski (offgoing nurse). Report included the following information Nurse Handoff Report, MAR, and Recent Results.     
0730: Bedside shift change report given to RACHEL Szymanski (oncoming nurse) by RACHEL Taylor (offgoing nurse). Report included the following information Nurse Handoff Report, MAR, and Recent Results.     0830: Heparin drip turned off per order. Patient refusing bed alarm. Patient educated on importance of calling for assistance when needed.    0815: Capsule study started.     1155: Patient's . Unable to give oral morning medications due to capsule study. Dr. Gillespie notified, no new orders received.    1215: Patient advanced to GI lite diet. Morning medications given.     1300: MAP rechecked for 100.     1615: Patient removed capsule study abdominal band.     1930: Bedside shift change report given to RACHEL Taylor (oncoming nurse) by RACHEL Szymanski (offgoing nurse). Report included the following information Nurse Handoff Report, MAR, and Recent Results.     
0730: Bedside shift change report given to RACHEL Szymanski (oncoming nurse) by RACHEL Taylor (offgoing nurse). Report included the following information Nurse Handoff Report, MAR, and Recent Results.     0835: Patient refusing bed alarm. Patient educated on importance of calling for assistance when needed.    1930: Bedside shift change report given to RACHEL Taylor (oncoming nurse) by RACHEL Szymanski (offgoing nurse). Report included the following information Nurse Handoff Report, MAR, and Recent Results.     
0850: low flow, asymptomatic. . No meds given this AM yet. Pt going down to endo for EGD with LVAD coordinator.     1409: MD Gillespie @ bedside, adjusted speed to 5000/4600. RN to enter orders.     1435: low flow, asymptomatic. MAP 86    1502: low flow, asymptomatic.    1550: Low flow, asymptomatic - x2    1552: low flow, asymptomatic. MAP 88.    1602: Since LVAD speeds were decrease, pt flow consistently 2.5-2.9. Occasionally in the 3s, but very minimal. 4 low flow events since speed change.    1613: Verbal orders to adjust speed back to 5200/4800. RN to adjust & enter orders.  
1144: Discharge instructions, medication education, and follow-up appointments given to patient. RN provided opportunity for questions, concerns and/or clarifications. Pt denies any questions or concerns at this time. Pt verbalized understanding of all discharge instructions. Pt discharge with all belongings including phone, LVAD equipment, phone, and other belongings.       
1800: patient upset that he can't be discontinued from heparin drip while getting Golytle prep. Explained the importance of staying on heparin drip and that nursing will help him get to the bathroom. Patient still upset and threw his  on the bed.   
1845  Dual skin assessment completed with CEDRIC Lowry RN. See note.     1545  Patient is in bed in CVSU. Vitals are stable. Four batteries and clips at bedside. Extra controller at bedside.     1530  Verbal phone report received from the ED nurse RACHEL Bond given to RAISA Lara RN. Report included vital signs & SBAR. Patient will be going to room 453.     Care Plan:    Problem: Discharge Planning  Goal: Discharge to home or other facility with appropriate resources  1/8/2024 1820 by Freda Lara RN  Outcome: Progressing  1/8/2024 1819 by Freda Lara RN  Outcome: Progressing     Problem: Safety - Adult  Goal: Free from fall injury  1/8/2024 1820 by Freda Lara RN  Outcome: Progressing  1/8/2024 1819 by Freda Lara RN  Outcome: Progressing     Problem: Skin/Tissue Integrity  Goal: Absence of new skin breakdown  Description: 1.  Monitor for areas of redness and/or skin breakdown  2.  Assess vascular access sites hourly  3.  Every 4-6 hours minimum:  Change oxygen saturation probe site  4.  Every 4-6 hours:  If on nasal continuous positive airway pressure, respiratory therapy assess nares and determine need for appliance change or resting period.  Outcome: Progressing     
1930  Preceptee, RAISA Lara RN, also received report.  Charting and pt care for patient Sanford Joiner performed by preceptee from 1630 to 1930. Documentation and care supervised and reviewed.    
1930  Verbal bedside report given to RACHEL Cline by RAISA Lara RN. Report included updated vitals and SBAR. Patient is in bed awake with wife and respirations are even and unlabored.     1830  LVAD dressing change. Green/grey drainage noted. Pink around the edges of site. Education about anchor importance implemented.     1100  Lani at bedside.     0830  Self test completed.     0730  Verbal bedside report received from RACHEL Matta given to RAISA Lara RN. Report included vital signs, SBAR, and plan for the day. Patient rhythm NSR. Patient is in bed awake and respirations are even and unlabored. Call bell is within reach.     LVAD Equipment:  - bag  - 4 batteries  - 4 clips  - 1 controller      Care Plan:    Problem: Discharge Planning  Goal: Discharge to home or other facility with appropriate resources  1/10/2024 0910 by Freda Lara RN  Outcome: Progressing  1/10/2024 0206 by Paxton Ford RN  Outcome: Progressing     Problem: Safety - Adult  Goal: Free from fall injury  1/10/2024 0910 by Freda Lara RN  Outcome: Progressing  1/10/2024 0206 by Paxton Ford RN  Outcome: Progressing     Problem: Skin/Tissue Integrity  Goal: Absence of new skin breakdown  Description: 1.  Monitor for areas of redness and/or skin breakdown  2.  Assess vascular access sites hourly  3.  Every 4-6 hours minimum:  Change oxygen saturation probe site  4.  Every 4-6 hours:  If on nasal continuous positive airway pressure, respiratory therapy assess nares and determine need for appliance change or resting period.  1/10/2024 0910 by Freda Lara RN  Outcome: Progressing  1/10/2024 0206 by Paxton Ford RN  Outcome: Progressing  
1930: Bedside and Verbal shift change report given to Madina RAMOS (oncoming nurse) by Freda RAMOS (offgoing nurse). Report included the following information Nurse Handoff Report, Index, Adult Overview, Intake/Output, MAR, and Cardiac Rhythm NSR .     2200: LVAD dressing change complete, CHG bath complete.    0347: low flow event     0639: Low flow and PI event    0730:Bedside and Verbal shift change report given to Nessa RAMOS (oncoming nurse) by Madina RAMOS (offgoing nurse). Report included the following information Nurse Handoff Report, Index, Adult Overview, Intake/Output, MAR, and Cardiac Rhythm NSR.   
2000: Receive report from RACHEL Chen.   2118: Patient agrees at this time to start golytely bowel prep after explaining to patient that he can use a BSC while also still remaining connected to IV medication.  2358: In to do patient vital at this time patient still with quiet a lot of bowel prep still left and patient states that he drank six 8oz cups of the bowel prep and that he is clear. This RN unable to assess because PCT assessed patient doing that time.   0700: Overnight patient with short occurrences of low flows. Patient remains asymptomatic however MAPs was elevated at this time.  0730: Bedside and Verbal shift change report given to RACHEL Szymanski (oncoming nurse) by RACHEL Taylor (offgoing nurse). Report included the following information Nurse Handoff Report, Index, Intake/Output, MAR, Recent Results, Med Rec Status, and Cardiac Rhythm SB .    
4 Eyes Skin Assessment     NAME:  Sanford Joiner  YOB: 1960  MEDICAL RECORD NUMBER:  [id number]    The patient is being assessed for  Admission    I agree that at least one RN has performed a thorough Head to Toe Skin Assessment on the patient. ALL assessment sites listed below have been assessed.      Areas assessed by both nurses:    Head, Face, Ears, Shoulders, Back, Chest, Arms, Elbows, Hands, Sacrum. Buttock, Coccyx, Ischium, and Legs. Feet and Heels        Does the Patient have a Wound? No noted wound(s)       Pierre Prevention initiated by RN: Yes  Wound Care Orders initiated by RN: No    Pressure Injury (Stage 3,4, Unstageable, DTI, NWPT, and Complex wounds) if present, place Wound referral order by RN under : No    New Ostomies, if present place, Ostomy referral order under : No     Nurse 1 eSignature: Electronically signed by Freda Lara RN on 1/8/24 at 6:40 PM EST    **SHARE this note so that the co-signing nurse can place an eSignature**    Nurse 2 eSignature: Electronically signed by Yenny Lowry RN on 1/8/24 at 6:43 PM EST    
900: TRANSFER - IN REPORT:    Verbal report received from Mayela Joiner  being received from [facility] for ordered procedure      Report consisted of patient's Situation, Background, Assessment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 recurrent GI bleeds)    Home regimen/ tablet size: 3 mg F/Sun and 2 mg all other days    Drugs that may increase INR: Fluconazole (1/11-1/25), doxycycline (1/8-1/15)  Drugs that may decrease INR: None  Other current anticoagulants/ drugs that may increase bleeding risk: Heparin  Risk factors: None  Daily INR ordered through: 1/15    Recent Labs     01/13/24  0338 01/14/24  0530 01/15/24  0403   HGB 9.9* 10.1* 9.2*   INR 1.8* 2.0* 2.2*     Date               INR                  Dose  1/8  2.1  2 mg   1/9                  1.9                   3 mg  1/10  1.7  3 mg   1/11  1.5  4 mg (starting fluconazole)  1/12                 1.7                  2 mg  1/13                 1.8                  2 mg  1/14  2.0  2 mg    1/15  2.2  1 mg                                                                              Assessment/ Plan:  Will order warfarin 1 mg PO x 1 dose.   Lower dose given the new DDI with fluconazole (tx through 1/25/24).   Therapeutic goal lowered to 1.8-2.2 for recurrent GIB.    Pharmacy will continue to monitor daily and adjust therapy as indicated.  Please contact the pharmacist at [phone] for outpatient recommendations if needed.         
Pharmacist Note - Warfarin Dosing  Consult provided for this 63 y.o.male to manage warfarin for LVAD  - s/p HM3 LVAD implantation ([facility] 5/27/2023)     INR Goal: 2 - 2.5    Home regimen/ tablet size: 3 mg F/Sun and 2 mg all other days    Drugs that may increase INR: Doxycycline  Drugs that may decrease INR: None  Other current anticoagulants/ drugs that may increase bleeding risk: None  Risk factors: None  Daily INR ordered through: 1/15    Recent Labs     01/08/24  1329 01/09/24  0425 01/10/24  0415   HGB 11.3* 10.3* 10.3*   INR 2.1* 1.9* 1.7*     Date               INR                  Dose  1/8  2.1  2 mg   1/9                  1.9                   3 mg  1/10  1.7  3 mg                                                                                Assessment/ Plan:  Will order warfarin 3 mg PO x 1 dose.    Pharmacy will continue to monitor daily and adjust therapy as indicated.  Please contact the pharmacist at [phone] for outpatient recommendations if needed.           
Pharmacist Note - Warfarin Dosing  Consult provided for this 63 y.o.male to manage warfarin for LVAD  - s/p HM3 LVAD implantation ([facility] 5/27/2023)     INR Goal: 1.8 -2.2 (goal lowered due to recurrent GI bleeds)    Home regimen/ tablet size: 3 mg F/Sun and 2 mg all other days    Drugs that may increase INR: Fluconazole (1/11), doxycycline (1/8)  Drugs that may decrease INR: None  Other current anticoagulants/ drugs that may increase bleeding risk: Heparin  Risk factors: None  Daily INR ordered through: 1/15    Recent Labs     01/11/24  0429 01/11/24  1427 01/12/24  0437   HGB 9.8* 9.3* 8.4*   INR 1.5* 1.5* 1.7*     Date               INR                  Dose  1/8  2.1  2 mg   1/9                  1.9                   3 mg  1/10  1.7  3 mg   1/11  1.5  4 mg (starting fluconazole)  1/12                 1.7                  2 mg                                                                               Assessment/ Plan:  Will order warfarin 2 mg PO x 1 dose. Therapeutic goal lowered to 1.8-2.2.    Pharmacy will continue to monitor daily and adjust therapy as indicated.  Please contact the pharmacist at [phone] for outpatient recommendations if needed.               
Pharmacist Note - Warfarin Dosing  Consult provided for this 63 y.o.male to manage warfarin for LVAD  - s/p HM3 LVAD implantation ([facility] 5/27/2023)     INR Goal: 2 - 2.5    Home regimen/ tablet size: 3 mg F/Sun and 2 mg all other days    Drugs that may increase INR: Fluconazole (1/11), doxycycline (1/8)  Drugs that may decrease INR: None  Other current anticoagulants/ drugs that may increase bleeding risk: None  Risk factors: None  Daily INR ordered through: 1/15    Recent Labs     01/09/24  0425 01/10/24  0415 01/11/24  0429   HGB 10.3* 10.3* 9.8*   INR 1.9* 1.7* 1.5*     Date               INR                  Dose  1/8  2.1  2 mg   1/9                  1.9                   3 mg  1/10  1.7  3 mg   1/11  1.5  4 mg (starting fluconazole)                                                                               Assessment/ Plan:  Will order warfarin 4 mg PO x 1 dose. Drug-drug interactions noted however INR continues to fall even with 1 mg higher than home dose.     Pharmacy will continue to monitor daily and adjust therapy as indicated.  Please contact the pharmacist at [phone] for outpatient recommendations if needed.             
Pharmacist Note - Warfarin Dosing  Consult provided for this 63 y.o.male to manage warfarin for LVAD  - s/p HM3 LVAD implantation ([facility] 5/27/2023)     INR Goal: 1.8 -2.2 (goal lowered due to recurrent GI bleeds)    Home regimen/ tablet size: 3 mg F/Sun and 2 mg all other days    Drugs that may increase INR: Fluconazole (1/11-1/25), doxycycline (1/8-1/15)  Drugs that may decrease INR: None  Other current anticoagulants/ drugs that may increase bleeding risk: None  Risk factors: None  Daily INR ordered through: 2/5    Recent Labs     01/14/24  0530 01/15/24  0403 01/15/24  0600 01/16/24  0841   HGB 10.1* 9.2* 10.0*  --    INR 2.0* 2.2*  --  2.4*     Date               INR                  Dose  1/8  2.1  2 mg   1/9                  1.9                   3 mg  1/10  1.7  3 mg   1/11  1.5  4 mg (starting fluconazole)  1/12                 1.7                  2 mg  1/13                 1.8                  2 mg  1/14  2.0  2 mg    1/15  2.2  1 mg  1/16  2.4  0.5 mg                                                                              Assessment/ Plan:  Will order warfarin 0.5 mg PO x 1 dose.   Lower dose given the new DDI with fluconazole (tx through 1/25/24).     Pharmacy will continue to monitor daily and adjust therapy as indicated.  Please contact the pharmacist at [phone] for outpatient recommendations if needed.           
Pharmacist Note - Warfarin Dosing  Consult provided for this 63 y.o.male to manage warfarin for LVAD  - s/p HM3 LVAD implantation ([facility] 5/27/2023)     INR Goal: 1.8 -2.2 (goal lowered due to recurrent GI bleeds)    Home regimen/ tablet size: 3 mg F/Sun and 2 mg all other days    Drugs that may increase INR: Fluconazole (1/11), doxycycline (1/8)  Drugs that may decrease INR: None  Other current anticoagulants/ drugs that may increase bleeding risk: Heparin  Risk factors: None  Daily INR ordered through: 1/15    Recent Labs     01/11/24  1427 01/12/24  0437 01/12/24  1041 01/13/24  0338   HGB 9.3* 8.4* 9.3* 9.9*   INR 1.5* 1.7*  --  1.8*     Date               INR                  Dose  1/8  2.1  2 mg   1/9                  1.9                   3 mg  1/10  1.7  3 mg   1/11  1.5  4 mg (starting fluconazole)  1/12                 1.7                  2 mg  1/13                 1.8                  2 mg                                                                              Assessment/ Plan:  Will order warfarin 2 mg PO x 1 dose. Therapeutic goal lowered to 1.8-2.2.    Pharmacy will continue to monitor daily and adjust therapy as indicated.  Please contact the pharmacist at [phone] for outpatient recommendations if needed.     
Pharmacist Note - Warfarin Dosing  Consult provided for this 63 y.o.male to manage warfarin for LVAD  - s/p HM3 LVAD implantation ([facility] 5/27/2023)     INR Goal: 1.8 -2.2 (goal lowered due to recurrent GI bleeds)    Home regimen/ tablet size: 3 mg F/Sun and 2 mg all other days    Drugs that may increase INR: Fluconazole (1/11), doxycycline (1/8)  Drugs that may decrease INR: None  Other current anticoagulants/ drugs that may increase bleeding risk: Heparin  Risk factors: None  Daily INR ordered through: 1/15    Recent Labs     01/12/24  0437 01/12/24  1041 01/13/24  0338 01/14/24  0530   HGB 8.4* 9.3* 9.9* 10.1*   INR 1.7*  --  1.8* 2.0*     Date               INR                  Dose  1/8  2.1  2 mg   1/9                  1.9                   3 mg  1/10  1.7  3 mg   1/11  1.5  4 mg (starting fluconazole)  1/12                 1.7                  2 mg  1/13                 1.8                  2 mg  1/14  2  2 mg                                                                                Assessment/ Plan:  Will order warfarin 2 mg PO x 1 dose. The typical Sunday dose is 3 mg. I will use a lower dose today given the new DDI with fluconazole (tx through 1/25/24). Therapeutic goal lowered to 1.8-2.2 for recurrent GIB.    Pharmacy will continue to monitor daily and adjust therapy as indicated.  Please contact the pharmacist at [phone] for outpatient recommendations if needed.       
Pharmacy renal dose protocol: Fluconazole  Indication:  esophageal candidiasis  Current regimen:  100 mg x1 now followed by 100 qd x14 days    Recent Labs     24  0425 01/10/24  0415 24  042   WBC 5.9 6.9 7.4   CREATININE 3.18* 2.33* 2.06*   BUN 44* 38* 36*     Est CrCl: 36 ml/min  Temp (24hrs), Av.1 °F (36.7 °C), Min:97.5 °F (36.4 °C), Max:98.6 °F (37 °C)    Plan: Change to 200 mg  now followed by 100 mg po qd x14 days for indication and CrCl less than 50 ml/min          
Pt swallowed capsule without incident.  Pt given instructions, states understanding.  
Spiritual Care Assessment/Progress Note  [facility]    Name: Sanford Joiner MRN: [id number]    Age: 63 y.o.     Sex: male   Language: English     Date: 1/12/2024            Total Time Calculated: 13 min              Spiritual Assessment begun in [facility] 4 CV SERVICES UNIT  Service Provided For:: Patient  Referral/Consult From:: Rounding  Encounter Overview/Reason : Initial Encounter    Spiritual beliefs:      [x] Involved in a tommy tradition/spiritual practice:      [] Supported by a tommy community:      [] Claims no spiritual orientation:      [] Seeking spiritual identity:           [x] Adheres to an individual form of spirituality:      [] Not able to assess:                Identified resources for coping and support system:   Support System: Spouse, Family members       [] Prayer                  [] Devotional reading               [] Music                  [] Guided Imagery     [] Pet visits                                        [] Other: (COMMENT)     Specific area/focus of visit   Encounter:    Crisis:    Spiritual/Emotional needs: Type: Spiritual Support  Ritual, Rites and Sacraments:    Grief, Loss, and Adjustments:    Ethics/Mediation:    Behavioral Health:    Palliative Care:    Advance Care Planning:      Plan/Referrals: Continue Support (comment) (Continuing support would be helpful.)    Narrative:     This was part of the rounds of the  in . The patient was awake. He was lying on his bed, and seemed like a gracious man. He was  and his children, all grown up lived in the vicinity of [place]. He was originally from [place] but decided to relocate to [place] because the former was not a good place to raise a family. He was a  by profession, and thought that his illness was probably an effect of his previous profession. He was baptized a Yazidi but stop following a Jain. However, he expressed strong belief in God. The  asked the patient how we expressed his 
This was a follow up visit to a patient in [facility]. The patient was alone and looked happy to see the  again. He was excited to tell the  that he was supposed to be discharged that day. Then the nurse practitioner came in to confirm that he was being discharged. He felt grateful of the good medical care he received from the hospital.     The  used active listening during the visit. The  encouraged the patient to ritualize his devotion to his bible that he kept inside his LVAD bag. The patient actually realized to do more about it. He was very grateful for the visit of the  and his encouragement and support.    No more need for future visit.    Chaplain Obei Balderasin [phone]  
(ZOFRAN) injection 4 mg  4 mg IntraVENous Q6H PRN    polyethylene glycol (GLYCOLAX) packet 17 g  17 g Oral Daily PRN    acetaminophen (TYLENOL) tablet 650 mg  650 mg Oral Q6H PRN    Or    acetaminophen (TYLENOL) suppository 650 mg  650 mg Rectal Q6H PRN    doxycycline (VIBRAMYCIN) 100 mg in sodium chloride 0.9 % 100 mL IVPB (mini-bag)  100 mg IntraVENous Q12H    warfarin placeholder: dosing by pharmacy   Other RX Placeholder    amLODIPine (NORVASC) tablet 5 mg  5 mg Oral BID    fluticasone-umeclidin-vilant (TRELEGY ELLIPTA) 200-62.5-25 MCG/ACT inhaler 1 puff - PATIENT SUPPLIED MEDICATION (Patient Supplied)  1 puff Inhalation Daily    Vitamin D (CHOLECALCIFEROL) tablet 2,000 Units  2,000 Units Oral Daily    albuterol (PROVENTIL) (2.5 MG/3ML) 0.083% nebulizer solution 2.5 mg  2.5 mg Nebulization Q4H PRN      Allergies   Allergen Reactions    Ciprofloxacin      Other reaction(s): Unknown (comments)    Bactrim [Sulfamethoxazole-Trimethoprim] Other (See Comments)     DAWOOD       Objective:  Vitals:    Vitals:    01/11/24 0958 01/11/24 1000 01/11/24 1003 01/11/24 1005   BP:  104/78 104/78    Pulse: 65 58 51 57   Resp: 16 18 16    Temp: 98.2 °F (36.8 °C)      TempSrc:       SpO2: 100% 100% 100% 100%   Weight:       Height:         Intake and Output:  01/11 0701 - 01/11 1900  In: 500   Out: 500 [Urine:500]  01/09 1901 - 01/11 0700  In: 1635 [P.O.:1040]  Out: 1550 [Urine:1550]    Physical Examination:  General: NAD,Conversant   Neck:  Supple, no mass  Resp:  Exp wheezing b/l, on RA  CV:  RRR,  no murmur or rub, no LE edema  GI:  Soft, NT, + BS, no HS megaly  Neurologic:  Non focal  Psych:             AAO x 3 appropriate affect   Skin:  No Rash  :  No smith    []    High complexity decision making was performed  []    Patient is at high-risk of decompensation with multiple organ involvement    Lab Data Personally Reviewed: I have reviewed all the pertinent labs, microbiology data and radiology studies during 
   251       Recent Labs     01/12/24 0437 01/13/24 0338 01/14/24  0530    140 140   K 4.4 3.8 4.9   * 111* 113*   CO2 21 21 21   BUN 36* 29* 23*   MG 1.7  --   --        Recent Labs     01/12/24 0437 01/13/24 0338 01/14/24  0530   ALT 27 28 26   GLOB 3.6 3.8 3.6       Recent Labs     01/12/24 0437 01/13/24 0338 01/14/24  0530   INR 1.7* 1.8* 2.0*        No results for input(s): \"TIBC\", \"FERR\" in the last 72 hours.    Invalid input(s): \"FE\", \"PSAT\"     No results found for: \"FOL\", \"RBCF\"   No results for input(s): \"PH\", \"PCO2\", \"PO2\" in the last 72 hours.  No results for input(s): \"CPK\" in the last 72 hours.    Invalid input(s): \"CPKMB\", \"CKNDX\", \"TROIQ\"  Lab Results   Component Value Date/Time    CHOL 193 11/14/2023 11:27 AM    HDL 41 11/14/2023 11:27 AM     No results found for: \"GLUCPOC\"  [unfilled]      Medications Reviewed:     Current Facility-Administered Medications   Medication Dose Route Frequency    hydrALAZINE (APRESOLINE) tablet 25 mg  25 mg Oral 3 times per day    0.9 % sodium chloride infusion   IntraVENous Continuous    fluconazole (DIFLUCAN) tablet 100 mg  100 mg Oral Daily    metoprolol succinate (TOPROL XL) extended release tablet 50 mg  50 mg Oral BID    pantoprazole (PROTONIX) tablet 40 mg  40 mg Oral BID AC    hydrALAZINE (APRESOLINE) injection 10 mg  10 mg IntraVENous Q4H PRN    sodium chloride flush 0.9 % injection 5-40 mL  5-40 mL IntraVENous 2 times per day    sodium chloride flush 0.9 % injection 5-40 mL  5-40 mL IntraVENous PRN    0.9 % sodium chloride infusion   IntraVENous PRN    ondansetron (ZOFRAN-ODT) disintegrating tablet 4 mg  4 mg Oral Q8H PRN    Or    ondansetron (ZOFRAN) injection 4 mg  4 mg IntraVENous Q6H PRN    polyethylene glycol (GLYCOLAX) packet 17 g  17 g Oral Daily PRN    acetaminophen (TYLENOL) tablet 650 mg  650 mg Oral Q6H PRN    Or    acetaminophen (TYLENOL) suppository 650 mg  650 mg Rectal Q6H PRN    doxycycline (VIBRAMYCIN) 100 mg in sodium 
111* 113* 114*   CO2 21 21 22   GLUCOSE 125* 114* 97   BUN 29* 23* 28*   CREATININE 1.83* 1.81* 1.81*   CALCIUM 9.2 8.2* 8.2*         Recent Labs     01/13/24  0338 01/14/24  0530 01/15/24  0403   WBC 8.4 7.8 8.4   RBC 3.44* 3.43* 3.14*   HGB 9.9* 10.1* 9.2*   HCT 30.8* 31.3* 28.4*   MCV 89.5 91.3 90.4   MCH 28.8 29.4 29.3   MCHC 32.1 32.3 32.4   RDW 18.2* 18.2* 18.0*    251 252   MPV 10.7 10.8 11.1       Recent Labs     01/13/24  0338 01/14/24  0530 01/15/24  0403   GLOB 3.8 3.6 3.7       Recent Labs     01/13/24 0338 01/14/24  0530 01/15/24  0403   INR 1.8* 2.0* 2.2*        No results for input(s): \"CPK\", \"CKMB\", \"TROPONINI\" in the last 72 hours.    Invalid input(s): \"B-NP\"  Invalid input(s): \"PHI\", \"PCO2I\", \"PO2I\", \"FIO2I\"     Ventilator:       Microbiology:  No results found for: \"SDES\"  No components found for: \"CULT\"      I have reviewed the flowsheets.  Chart and Pertinent Notes have been reviewed.   No change in PMH ,family and social history from Consult note.      Rod Bro MD  [facility]     
11:27 AM     No results found for: \"GLUCPOC\"  [unfilled]      Medications Reviewed:     Current Facility-Administered Medications   Medication Dose Route Frequency    sodium chloride flush 0.9 % injection 5-40 mL  5-40 mL IntraVENous 2 times per day    sodium chloride flush 0.9 % injection 5-40 mL  5-40 mL IntraVENous PRN    0.9 % sodium chloride infusion   IntraVENous PRN    ondansetron (ZOFRAN-ODT) disintegrating tablet 4 mg  4 mg Oral Q8H PRN    Or    ondansetron (ZOFRAN) injection 4 mg  4 mg IntraVENous Q6H PRN    polyethylene glycol (GLYCOLAX) packet 17 g  17 g Oral Daily PRN    acetaminophen (TYLENOL) tablet 650 mg  650 mg Oral Q6H PRN    Or    acetaminophen (TYLENOL) suppository 650 mg  650 mg Rectal Q6H PRN    doxycycline (VIBRAMYCIN) 100 mg in sodium chloride 0.9 % 100 mL IVPB (mini-bag)  100 mg IntraVENous Q12H    warfarin placeholder: dosing by pharmacy   Other RX Placeholder    amLODIPine (NORVASC) tablet 5 mg  5 mg Oral BID    fluticasone-umeclidin-vilant (TRELEGY ELLIPTA) 200-62.5-25 MCG/ACT inhaler 1 puff - PATIENT SUPPLIED MEDICATION (Patient Supplied)  1 puff Inhalation Daily    metoprolol succinate (TOPROL XL) extended release tablet 50 mg  50 mg Oral Daily    Vitamin D (CHOLECALCIFEROL) tablet 2,000 Units  2,000 Units Oral Daily    albuterol (PROVENTIL) (2.5 MG/3ML) 0.083% nebulizer solution 2.5 mg  2.5 mg Nebulization Q4H PRN     ______________________________________________________________________  EXPECTED LENGTH OF STAY: 15  ACTUAL LENGTH OF STAY:          1                 Antony Kam MD   
alert x 3, awake, no acute distress,   HEENT: PEERL, EOMI, moist mucus membrane, TM clear  Neck: supple, no JVD, no meningeal signs  Chest: Clear to auscultation bilaterally   CVS: S1 S2 heard, Capillary refill less than 2 seconds  Abd: soft/ non tender, non distended, BS physiological,   Ext: no clubbing, no cyanosis, no edema, brisk 2+ DP pulses  Neuro/Psych: pleasant mood and affect, CN 2-12 grossly intact, sensory grossly within normal limit, Strength 5/5 in all extremities, DTR 1+ x 4  Skin: warm            Data Review:    Review and/or order of clinical lab test    I have independently reviewed and interpreted patient's lab and all other diagnostic data    Notes reviewed from all clinical/nonclinical/nursing services involved in patient's clinical care. Care coordination discussions were held with appropriate clinical/nonclinical/ nursing providers based on care coordination needs.     Labs:     Recent Labs     01/10/24  0415 01/11/24  0429   WBC 6.9 7.4   HGB 10.3* 9.8*   HCT 31.2* 30.8*    189       Recent Labs     01/09/24  0425 01/10/24  0415 01/11/24  0429   * 137 138   K 4.6 4.7 4.2    108 109*   CO2 23 21 21   BUN 44* 38* 36*   PHOS 3.0 2.8 3.4       Recent Labs     01/09/24  0425 01/10/24  0415 01/11/24  0429   ALT 36 36 30   GLOB 3.5 3.7 3.7       Recent Labs     01/09/24  0425 01/10/24  0415 01/11/24  0429   INR 1.9* 1.7* 1.5*        No results for input(s): \"TIBC\", \"FERR\" in the last 72 hours.    Invalid input(s): \"FE\", \"PSAT\"     No results found for: \"FOL\", \"RBCF\"   No results for input(s): \"PH\", \"PCO2\", \"PO2\" in the last 72 hours.  No results for input(s): \"CPK\" in the last 72 hours.    Invalid input(s): \"CPKMB\", \"CKNDX\", \"TROIQ\"  Lab Results   Component Value Date/Time    CHOL 193 11/14/2023 11:27 AM    HDL 41 11/14/2023 11:27 AM     No results found for: \"GLUCPOC\"  [unfilled]      Medications Reviewed:     Current Facility-Administered Medications   Medication Dose Route 
ventricular cavity size by 3-D volumetric assessment indexed to body surface area. Moderate septal hypertrophy by 1D dimension. Normal LV mass by 3-D volumetric assessment indexed to body surface area. Moderate left ventricular systolic dysfunction. Moderate global hypokinesis with slight regional variation. 3-D LVEF 33% while patient receiving dobutamine infusion of 5mcg/kg/min during the scan.  2. Normal right ventricular size with mild right ventricular systolic dysfunction. Mild global hypokinesis. 3-D RVEF 46% while patient receiving dobutamine infusion of 5mcg/kg/min during the scan.   3. No significant valvular disease.  4. On EGE and LGE study, there there is focal areas of mild myocardial enhancement of the base to mid inferolateral wall, basal inferior wall and patchy areas of the septum. The appearance of the contrast enhancement in the form of a very mild focal areas suggest focal myocardial fibrosis likely from replacement fibrosis due to hypertrophy or possibly old healed myocarditis. There is no features of recent or old myocardial infarction. There is no features of infiltrative sarcoidosis or cardiac amyloidosis. All myocardial walls are viable.  5. Normal pleura and pericardium. There is no significant effusions.  6. 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 Sinotubular junction is normal at  37 mm. Ascending aorta is dilated at 44 x 43 mm. Aortic arch and descending  thoracic aorta are of normal size at 23 mm.     [facility]- 5/17/21 no significant CAD      [facility] 5/10/2023:   Speed 5200 RPM RA 7, PA 35/17 (23), PCW 11, PA saturation 74.6%,  FDC 3.9. Final speed 5400 RPM     [facility] 5/17/2021: PA 26/17/21, RA 10, PCWP 13, CI 1.76     PFT 5/21/21  FEV1/FVC 0.38  FEV1 0.99L, 46%  FVC 2.62L, 91%  Lung age 80 years     6 Min Walk Report 11/3/2021 9/16/2021   (PRE) HR 97 111   (PRE) O2 Sat 95 94   (POST)  125   (POST) O2 Sat 90 89   Distance in Meters 377.59        REVIEW OF SYSTEMS:  Review of 
Praful Buck MD, Stopped at 01/11/24 1056    fluconazole (DIFLUCAN) tablet 100 mg, 100 mg, Oral, Daily, Praful Buck MD, 100 mg at 01/12/24 0855    metoprolol succinate (TOPROL XL) extended release tablet 50 mg, 50 mg, Oral, BID, Geni Garibay APRN - NP, 50 mg at 01/12/24 2113    pantoprazole (PROTONIX) tablet 40 mg, 40 mg, Oral, BID AC, Radha Chavez PA, 40 mg at 01/13/24 0711    hydrALAZINE (APRESOLINE) injection 10 mg, 10 mg, IntraVENous, Q4H PRN, Geni Garibay APRN - NP    sodium chloride flush 0.9 % injection 5-40 mL, 5-40 mL, IntraVENous, 2 times per day, Donna Hamlin MD, 10 mL at 01/13/24 0826    sodium chloride flush 0.9 % injection 5-40 mL, 5-40 mL, IntraVENous, PRN, Donna Hamlin MD    0.9 % sodium chloride infusion, , IntraVENous, PRN, Donna Hamlin MD    ondansetron (ZOFRAN-ODT) disintegrating tablet 4 mg, 4 mg, Oral, Q8H PRN **OR** ondansetron (ZOFRAN) injection 4 mg, 4 mg, IntraVENous, Q6H PRN, Donna Hamlin MD    polyethylene glycol (GLYCOLAX) packet 17 g, 17 g, Oral, Daily PRN, Donna Hamlin MD    acetaminophen (TYLENOL) tablet 650 mg, 650 mg, Oral, Q6H PRN, 650 mg at 01/08/24 1847 **OR** acetaminophen (TYLENOL) suppository 650 mg, 650 mg, Rectal, Q6H PRN, Donna Hamlin MD    doxycycline (VIBRAMYCIN) 100 mg in sodium chloride 0.9 % 100 mL IVPB (mini-bag), 100 mg, IntraVENous, Q12H, Donna Hamlin MD, Stopped at 01/13/24 0326    warfarin placeholder: dosing by pharmacy, , Other, RX Placeholder, Donna Hamlin MD    amLODIPine (NORVASC) tablet 5 mg, 5 mg, Oral, BID, Donna Hamlin MD, 5 mg at 01/12/24 2113    fluticasone-umeclidin-vilant (TRELEGY ELLIPTA) 200-62.5-25 MCG/ACT inhaler 1 puff - PATIENT SUPPLIED MEDICATION (Patient Supplied), 1 puff, Inhalation, Daily, Donna Hamlin MD, 1 puff at 01/12/24 0856    Vitamin D (CHOLECALCIFEROL) tablet 2,000 Units, 2,000 Units, Oral, Daily, Donna Hamlin MD, 2,000 Units at 01/12/24 0855    albuterol (PROVENTIL) (2.5 MG/3ML)

## 2024-01-16 NOTE — DISCHARGE INSTRUCTIONS
Discharge Instructions       PATIENT ID: Sanford Joiner  MRN: [id number]   YOB: 1960    DATE OF ADMISSION: [unfilled]    DATE OF DISCHARGE: 1/16/2024    PRIMARY CARE PROVIDER: @PCP@     ATTENDING PHYSICIAN: [unfilled]  DISCHARGING PROVIDER: Gareth Hou MD    To contact this individual call [phone] and ask the  to page.   If unavailable ask to be transferred the Adult Hospitalist Department.    DISCHARGE DIAGNOSES Gi bleed    CONSULTATIONS: [unfilled]    PROCEDURES/SURGERIES: Procedure(s):  EGD ESOPHAGOGASTRODUODENOSCOPY    PENDING TEST RESULTS:   At the time of discharge the following test results are still pending: none    FOLLOW UP APPOINTMENTS:   PCP  Cardiology  GI    ADDITIONAL CARE RECOMMENDATIONS:   PT/INR in 2 days    DIET: cardiac diet    ACTIVITY: activity as tolerated    DISCHARGE MEDICATIONS:   See Medication Reconciliation Form    It is important that you take the medication exactly as they are prescribed.   Keep your medication in the bottles provided by the pharmacist and keep a list of the medication names, dosages, and times to be taken in your wallet.   Do not take other medications without consulting your doctor.       NOTIFY YOUR PHYSICIAN FOR ANY OF THE FOLLOWING:   Fever over 101 degrees for 24 hours.   Chest pain, shortness of breath, fever, chills, nausea, vomiting, diarrhea, change in mentation, falling, weakness, bleeding. Severe pain or pain not relieved by medications.  Or, any other signs or symptoms that you may have questions about.      DISPOSITION:   x Home With:   OT  PT  HH  RN       SNF/Inpatient Rehab/LTAC    Independent/assisted living    Hospice    Other:     CDMP Checked:   Yes x     PROBLEM LIST Updated:  Yes x       Signed:   Gareth Hou MD  1/16/2024  10:44 AM

## 2024-01-16 NOTE — CARE COORDINATION
igned         Transition of Care Plan:     RUR: 19% - moderate  Prior Level of Functioning: independent with LVAD  Disposition: home  Follow up appointments: FC  DME needed: none  Transportation at discharge: Medicaid vs Wife  IM/IMM Medicare/ letter given: needs 2nd IMM  Caregiver Contact: wife - Ashley Swan - [phone]   Discharge Caregiver contacted prior to discharge? no  Care Conference needed? no  Barriers to discharge: medical - capsule study results     Chart reviewed. Capsule study pending - possible discharge pending capsule study results. Patient remains at his independent baseline. Patient on doxycycline for infection.     CM will continue to follow. Patient may need Roundtrip vs Medicaid transportation for discharge. Wife works at [employer].              Update-  Discharging home today. Patient is agreeable to this disposition. Wife is here to transport now.  Medicare pt has received, reviewed, and signed 2nd IM letter informing them of their right to appeal the discharge.  Signed copy has been placed on pt bedside chart.  SMARTEE discharge completed and form on chart.

## 2024-01-16 NOTE — PLAN OF CARE
Problem: Discharge Planning  Goal: Discharge to home or other facility with appropriate resources  Outcome: Progressing  Flowsheets (Taken 1/15/2024 2021)  Discharge to home or other facility with appropriate resources: Identify barriers to discharge with patient and caregiver     Problem: Safety - Adult  Goal: Free from fall injury  1/15/2024 2143 by Marlyn Denton RN  Outcome: Progressing  1/15/2024 1050 by Matt Yanez RN  Outcome: Progressing     Problem: Skin/Tissue Integrity  Goal: Absence of new skin breakdown  Description: 1.  Monitor for areas of redness and/or skin breakdown  2.  Assess vascular access sites hourly  3.  Every 4-6 hours minimum:  Change oxygen saturation probe site  4.  Every 4-6 hours:  If on nasal continuous positive airway pressure, respiratory therapy assess nares and determine need for appliance change or resting period.  1/15/2024 2143 by Marlyn Denton RN  Outcome: Progressing  1/15/2024 1050 by Matt Yanez RN  Outcome: Progressing     Problem: Chronic Conditions and Co-morbidities  Goal: Patient's chronic conditions and co-morbidity symptoms are monitored and maintained or improved  1/15/2024 2143 by Marlyn Denton RN  Outcome: Progressing  Flowsheets (Taken 1/15/2024 2021)  Care Plan - Patient's Chronic Conditions and Co-Morbidity Symptoms are Monitored and Maintained or Improved: Monitor and assess patient's chronic conditions and comorbid symptoms for stability, deterioration, or improvement  1/15/2024 1050 by Matt Yanez RN  Outcome: Progressing

## 2024-01-16 NOTE — DISCHARGE SUMMARY
Discharge Summary       PATIENT ID: Sanford Joiner  MRN: [id number]   YOB: 1960    DATE OF ADMISSION: 1/8/2024  1:12 PM    DATE OF DISCHARGE: 1/16/2024   PRIMARY CARE PROVIDER: Ranjan Porter MD     ATTENDING PHYSICIAN: Dr Gareth Hou  DISCHARGING PROVIDER: Gareth Hou MD    To contact this individual call [phone] and ask the  to page.  If unavailable ask to be transferred the Adult Hospitalist Department.    CONSULTATIONS: IP CONSULT TO ADVANCED HEART FAILURE  IP CONSULT TO INFECTIOUS DISEASES  IP CONSULT TO ADVANCED HEART FAILURE  IP CONSULT TO NEPHROLOGY  IP CONSULT TO GI  IP CONSULT TO PHARMACY    PROCEDURES/SURGERIES: Procedure(s):  EGD ESOPHAGOGASTRODUODENOSCOPY    ADMITTING DIAGNOSES & HOSPITAL COURSE:   LVAD driveline infection  -Outpatient wound cultures 12/26 which shows MSSA, Enterobacter and beta-hemolytic strep, wound culture on 1/3 shows Enterobacter and MSSA  -Blood cultures on admission so far negative  -Was started on Bactrim as outpatient per AHF team  -Now  Bactrim on hold due to DAWOOD, continue doxycycline last dose tomorrow per ID , ID following repeat cultures 1/8 no growth     Anemia  -Anemia of chronic disease and iron deficiency  -s/p EGD  1/11:  Esophagus: small white spots throughout the esophagus c/w candidal esophagitis  Stomach: 3 cm hiatal hernia, rest of the stomach was normal  Duodenum: normal  -Fluconazole 14 days  -Appreciate GI/adv heart failure     Chronic HFrEF  -Patient with known ischemic cardiomyopathy s/p LVAD implantation in 2023  -Patient does not require diuretics  -GDMT's include Toprol, unable to tolerate ACE/ARB/ARNI, MRA and SGLT2 due to renal dysfunction hypovolemia  -On anticoagulation with Coumadin  -Appreciate discussion with Adv heart failure team, ok to discharge     DAWOOD over CKD stage III--DAWOOD continues to improve  -Patient presented with serum creatinine of 3.59, overall creatinine improving slowly   -Bactrim has been

## 2024-01-17 ENCOUNTER — ANTI-COAG VISIT (OUTPATIENT)
Age: 64
End: 2024-01-17

## 2024-01-17 ENCOUNTER — TELEPHONE (OUTPATIENT)
Age: 64
End: 2024-01-17

## 2024-01-17 DIAGNOSIS — Z79.01 CHRONIC ANTICOAGULATION: Primary | ICD-10-CM

## 2024-01-17 LAB — INR BLD: 1.8

## 2024-01-17 NOTE — TELEPHONE ENCOUNTER
ADVANCED HEART FAILURE CENTER  [facility] in [place], [place]  LVAD PATIENT DISCHARGE FOLLOW UP CALL       Date of call: 01/17/24  Date of discharge: 1/16/24  Discharge disposition: Home or Self Care      Call placed to patient. Left message reminding of follow up next Tuesday 01/23/2024 at 0900. Also requested patient send in INR.     Patient returned call but stated he is coming out of pulmonology office- patient is very wheezing while walking. Requested patient wait until he arrives home, check INR and return call to discuss discharge need. HE verbalized understanding.     Patient returned call and left voicemail stating INR is 1.8 today.     MEDICATIONS:   Med rec completed based off of discharge AVS? Yes  Discrepancies noted: None   Do you have all of your prescriptions and are they filled? Yes  Do you have a copy of your discharge instructions? Yes    FOLLOW UP:     Future Appointments   Date Time Provider Department Center   1/23/2024  9:00 AM Billy Ames MD [facility] BS AMB     TRANSPORTATION:   Patient utilizes for transportation: Family/caregiver  and Medicare rides   Do you have any transportation barriers at this time?  No    SUPPLIES:   Do you have all LVAD supplies?  Yes      Discussed above with patient. He verbalized understanding.  States he is out of INR home test strips. Informed patient coordinator will reach out to [facility] to have a rep contact him regarding supplies. Informed we will have him go to the lab Friday if needed. He verbalized understanding.      Funmi Barth RN  LVAD coordinator   [facility]

## 2024-01-17 NOTE — PROGRESS NOTES
ADVANCED HEART FAILURE CENTER  [facility] in [place], [place]    INR goal changed to 1.8-2.2 per Dr. Gillespie VORB     INR result reviewed with Dr. Jose Miguel MD  who made the following recommendations (VORB): no changes and recheck inr Friday. Patient notified and verbalized understanding. They had no further questions. (See anticoag tracker)     Funmi Barth RN

## 2024-01-18 DIAGNOSIS — Z79.01 CHRONIC ANTICOAGULATION: Primary | ICD-10-CM

## 2024-01-19 ENCOUNTER — ANTI-COAG VISIT (OUTPATIENT)
Age: 64
End: 2024-01-19

## 2024-01-19 DIAGNOSIS — Z79.01 CHRONIC ANTICOAGULATION: ICD-10-CM

## 2024-01-19 DIAGNOSIS — Z79.01 CHRONIC ANTICOAGULATION: Primary | ICD-10-CM

## 2024-01-19 LAB — INR BLD: 1.8

## 2024-01-19 NOTE — PROGRESS NOTES
ADVANCED HEART FAILURE CENTER  [facility] in [place], [place]      INR result reviewed with YUMI Quiles NP  who made the following recommendations (VORB): no changes and recheck 1 week. Patient notified and verbalized understanding. They had no further questions. (See anticoag tracker)     Funmi Barth RN

## 2024-01-22 ENCOUNTER — HOSPITAL ENCOUNTER (OUTPATIENT)
Facility: HOSPITAL | Age: 64
Setting detail: INFUSION SERIES
End: 2024-01-22

## 2024-01-23 ENCOUNTER — TELEPHONE (OUTPATIENT)
Age: 64
End: 2024-01-23

## 2024-01-23 NOTE — TELEPHONE ENCOUNTER
Called pt. After No-show and offered pt. Transportation scheduling assistance. Pt. Wants to discuss with his wife. Educated pt. About help with medicaid ride scheduling or using other local ride services for no or low cost. Pt. Verbalized some interest but still wants to talk to his wife.  SW will tentatively add pt. To list for check-ins prior to future visits.

## 2024-01-23 NOTE — TELEPHONE ENCOUNTER
Patient called regarding missed appointment today. Informed patient will review next available appointment and return a call.     Returned call to patient and notified patient was scheduled for next available LVAD follow up Feb 1st at am. He verbalized understanding. Declined assistance setting up transport for this.

## 2024-01-24 ENCOUNTER — TELEPHONE (OUTPATIENT)
Age: 64
End: 2024-01-24

## 2024-01-24 DIAGNOSIS — Z79.899 ENCOUNTER FOR MONITORING DIURETIC THERAPY: ICD-10-CM

## 2024-01-24 DIAGNOSIS — I50.22 CHRONIC SYSTOLIC HEART FAILURE (HCC): ICD-10-CM

## 2024-01-24 DIAGNOSIS — Z79.01 CHRONIC ANTICOAGULATION: ICD-10-CM

## 2024-01-24 DIAGNOSIS — Z51.81 ENCOUNTER FOR MONITORING DIURETIC THERAPY: ICD-10-CM

## 2024-01-24 DIAGNOSIS — R06.02 SHORTNESS OF BREATH: ICD-10-CM

## 2024-01-24 DIAGNOSIS — Z95.811 LVAD (LEFT VENTRICULAR ASSIST DEVICE) PRESENT (HCC): ICD-10-CM

## 2024-01-24 DIAGNOSIS — Z79.01 CHRONIC ANTICOAGULATION: Primary | ICD-10-CM

## 2024-01-24 RX ORDER — ALBUTEROL SULFATE 90 UG/1
4 AEROSOL, METERED RESPIRATORY (INHALATION) PRN
OUTPATIENT
Start: 2024-01-31

## 2024-01-24 RX ORDER — ACETAMINOPHEN 325 MG/1
650 TABLET ORAL
OUTPATIENT
Start: 2024-01-31

## 2024-01-24 RX ORDER — EPINEPHRINE 1 MG/ML
0.3 INJECTION, SOLUTION INTRAMUSCULAR; SUBCUTANEOUS PRN
OUTPATIENT
Start: 2024-01-31

## 2024-01-24 RX ORDER — DIPHENHYDRAMINE HYDROCHLORIDE 50 MG/ML
50 INJECTION INTRAMUSCULAR; INTRAVENOUS
OUTPATIENT
Start: 2024-01-31

## 2024-01-24 RX ORDER — ONDANSETRON 2 MG/ML
8 INJECTION INTRAMUSCULAR; INTRAVENOUS
OUTPATIENT
Start: 2024-01-31

## 2024-01-24 RX ORDER — SODIUM CHLORIDE 9 MG/ML
INJECTION, SOLUTION INTRAVENOUS CONTINUOUS
OUTPATIENT
Start: 2024-01-31

## 2024-01-24 NOTE — TELEPHONE ENCOUNTER
Call placed to [facility] requesting nurse call back regarding patient's nucala  injection.

## 2024-01-24 NOTE — TELEPHONE ENCOUNTER
Received call back from Nurse with PAR. Notified that patient will be set up for octriotide injections with [facility], inquired if they send patients to infusion for this. PAR nurse stated that she believes this is generally only given in office. Stated she will ask and if this is ever given at infusion she will return call to [facility]

## 2024-01-25 ENCOUNTER — ANTI-COAG VISIT (OUTPATIENT)
Age: 64
End: 2024-01-25

## 2024-01-25 DIAGNOSIS — Z79.01 CHRONIC ANTICOAGULATION: Primary | ICD-10-CM

## 2024-01-25 LAB — INR BLD: 2.6

## 2024-01-25 NOTE — PROGRESS NOTES
ADVANCED HEART FAILURE CENTER  [facility] in [place], [place]    Patient submitted two INR results- one for 1 and one for 2.6- called patient to clarify results. Adjustments made based on value of 2.6.     INR result reviewed with ADILENE Maurer NP who made the following recommendations (VORB): hold tonight and recheck 1 week. Patient notified via voicemail, requested call back to confirm.  (See anticoag tracker)     Second call placed to patient regarding INR results and correct value. Left voicemail requesting call back to confirm. YUMI Quiles and ADILENE Maurer notified.      Third call placed to patient regarding INR results, overdue labwork  and correct value. Left voicemail requesting call back to confirm.       Funmi Barth RN

## 2024-01-29 ENCOUNTER — TELEPHONE (OUTPATIENT)
Age: 64
End: 2024-01-29

## 2024-01-29 NOTE — TELEPHONE ENCOUNTER
Pt. Called and stated he was not aware of his visit Thursday. PRS transferred pt. To SW. SW explained pt. Has an 11am with Nessa HERNANDEZ On February 1st. Pt. Verbalized understanding. SW relayed details of trip. Booked a w/c van in the event pt. Chooses to bring his w/c. T. Will consider it and stated SW can leave this in the trip info. He likely will not bring his wife. Pt. Would possibly like to meet with MSW and will let coordinators know if he chooses to do so.

## 2024-01-29 NOTE — TELEPHONE ENCOUNTER
SW called pt. Friday 1/26 and Monday 1/29 for more details about his transportation needs. Unable to reach pt. Left 2VMs. Called pt. Transportation to book and can change details when I hear back from pt. Scheduled pt. Trip for 2/01 at 11am   Will call for return. Trip ID: [id number]

## 2024-01-31 ENCOUNTER — HOSPITAL ENCOUNTER (OUTPATIENT)
Facility: HOSPITAL | Age: 64
Setting detail: INFUSION SERIES
Discharge: HOME | End: 2024-01-31
Payer: MEDICARE

## 2024-01-31 VITALS
OXYGEN SATURATION: 97 % | HEART RATE: 98 BPM | TEMPERATURE: 98.9 F | SYSTOLIC BLOOD PRESSURE: 100 MMHG | RESPIRATION RATE: 18 BRPM

## 2024-01-31 DIAGNOSIS — K92.2 UPPER GI BLEED: Primary | ICD-10-CM

## 2024-01-31 DIAGNOSIS — Z95.811 LVAD (LEFT VENTRICULAR ASSIST DEVICE) PRESENT (HCC): ICD-10-CM

## 2024-01-31 DIAGNOSIS — I50.22 CHRONIC SYSTOLIC HEART FAILURE (HCC): ICD-10-CM

## 2024-01-31 DIAGNOSIS — D64.9 ANEMIA, UNSPECIFIED TYPE: ICD-10-CM

## 2024-01-31 DIAGNOSIS — N18.30 STAGE 3 CHRONIC KIDNEY DISEASE, UNSPECIFIED WHETHER STAGE 3A OR 3B CKD (HCC): ICD-10-CM

## 2024-01-31 LAB — TSH SERPL DL<=0.05 MIU/L-ACNC: 1.77 UIU/ML (ref 0.36–3.74)

## 2024-01-31 PROCEDURE — 96374 THER/PROPH/DIAG INJ IV PUSH: CPT

## 2024-01-31 PROCEDURE — 36415 COLL VENOUS BLD VENIPUNCTURE: CPT

## 2024-01-31 PROCEDURE — 2580000003 HC RX 258: Performed by: INTERNAL MEDICINE

## 2024-01-31 PROCEDURE — 84443 ASSAY THYROID STIM HORMONE: CPT

## 2024-01-31 PROCEDURE — 96372 THER/PROPH/DIAG INJ SC/IM: CPT

## 2024-01-31 PROCEDURE — 6360000002 HC RX W HCPCS: Performed by: INTERNAL MEDICINE

## 2024-01-31 PROCEDURE — 96365 THER/PROPH/DIAG IV INF INIT: CPT

## 2024-01-31 PROCEDURE — 6360000002 HC RX W HCPCS: Performed by: NURSE PRACTITIONER

## 2024-01-31 RX ORDER — HEPARIN 100 UNIT/ML
500 SYRINGE INTRAVENOUS PRN
OUTPATIENT
Start: 2024-02-07

## 2024-01-31 RX ORDER — ACETAMINOPHEN 325 MG/1
650 TABLET ORAL
OUTPATIENT
Start: 2024-02-07

## 2024-01-31 RX ORDER — SODIUM CHLORIDE 9 MG/ML
INJECTION, SOLUTION INTRAVENOUS CONTINUOUS
OUTPATIENT
Start: 2024-02-07

## 2024-01-31 RX ORDER — ALBUTEROL SULFATE 90 UG/1
4 AEROSOL, METERED RESPIRATORY (INHALATION) PRN
OUTPATIENT
Start: 2024-02-07

## 2024-01-31 RX ORDER — ALBUTEROL SULFATE 90 UG/1
4 AEROSOL, METERED RESPIRATORY (INHALATION) PRN
OUTPATIENT
Start: 2024-02-25

## 2024-01-31 RX ORDER — ONDANSETRON 2 MG/ML
8 INJECTION INTRAMUSCULAR; INTRAVENOUS
OUTPATIENT
Start: 2024-02-25

## 2024-01-31 RX ORDER — EPINEPHRINE 1 MG/ML
0.3 INJECTION, SOLUTION INTRAMUSCULAR; SUBCUTANEOUS PRN
OUTPATIENT
Start: 2024-02-07

## 2024-01-31 RX ORDER — SODIUM CHLORIDE 9 MG/ML
5-250 INJECTION, SOLUTION INTRAVENOUS PRN
OUTPATIENT
Start: 2024-02-07

## 2024-01-31 RX ORDER — AMLODIPINE BESYLATE 2.5 MG/1
5 TABLET ORAL 2 TIMES DAILY
Qty: 240 TABLET | Refills: 2 | Status: SHIPPED | OUTPATIENT
Start: 2024-01-31

## 2024-01-31 RX ORDER — DIPHENHYDRAMINE HYDROCHLORIDE 50 MG/ML
50 INJECTION INTRAMUSCULAR; INTRAVENOUS
OUTPATIENT
Start: 2024-02-07

## 2024-01-31 RX ORDER — HEPARIN 100 UNIT/ML
500 SYRINGE INTRAVENOUS PRN
Status: DISCONTINUED | OUTPATIENT
Start: 2024-01-31 | End: 2024-02-01 | Stop reason: HOSPADM

## 2024-01-31 RX ORDER — SODIUM CHLORIDE 0.9 % (FLUSH) 0.9 %
5-40 SYRINGE (ML) INJECTION PRN
Status: DISCONTINUED | OUTPATIENT
Start: 2024-01-31 | End: 2024-02-01 | Stop reason: HOSPADM

## 2024-01-31 RX ORDER — DIPHENHYDRAMINE HYDROCHLORIDE 50 MG/ML
50 INJECTION INTRAMUSCULAR; INTRAVENOUS
OUTPATIENT
Start: 2024-02-25

## 2024-01-31 RX ORDER — SODIUM CHLORIDE 9 MG/ML
5-250 INJECTION, SOLUTION INTRAVENOUS PRN
Status: DISCONTINUED | OUTPATIENT
Start: 2024-01-31 | End: 2024-02-01 | Stop reason: HOSPADM

## 2024-01-31 RX ORDER — ACETAMINOPHEN 325 MG/1
650 TABLET ORAL
OUTPATIENT
Start: 2024-02-25

## 2024-01-31 RX ORDER — SODIUM CHLORIDE 0.9 % (FLUSH) 0.9 %
5-40 SYRINGE (ML) INJECTION PRN
OUTPATIENT
Start: 2024-02-07

## 2024-01-31 RX ORDER — ONDANSETRON 2 MG/ML
8 INJECTION INTRAMUSCULAR; INTRAVENOUS
OUTPATIENT
Start: 2024-02-07

## 2024-01-31 RX ORDER — SODIUM CHLORIDE 9 MG/ML
INJECTION, SOLUTION INTRAVENOUS CONTINUOUS
OUTPATIENT
Start: 2024-02-25

## 2024-01-31 RX ORDER — EPINEPHRINE 1 MG/ML
0.3 INJECTION, SOLUTION INTRAMUSCULAR; SUBCUTANEOUS PRN
OUTPATIENT
Start: 2024-02-25

## 2024-01-31 RX ADMIN — SODIUM CHLORIDE 25 ML/HR: 9 INJECTION, SOLUTION INTRAVENOUS at 15:23

## 2024-01-31 RX ADMIN — SODIUM CHLORIDE 200 MG: 9 INJECTION, SOLUTION INTRAVENOUS at 15:23

## 2024-01-31 RX ADMIN — OCTREOTIDE ACETATE 20 MG: KIT at 15:02

## 2024-01-31 ASSESSMENT — PAIN SCALES - GENERAL: PAINLEVEL_OUTOF10: 0

## 2024-01-31 NOTE — TELEPHONE ENCOUNTER
Patients wife called stating that patient is having red itchy eyes and wondering if patient is safe to take over the counter benadryl. States she thinks this is an allergic reaction, but not sure to what. States that patient has not started any new medications in the last several days. Reviewed with ADILENE Maurer who stated okay to take OTC benadryl. Reviewed with patient's wife who verbalized understanding. Patient's wife requested refill of amlodipine.       Requested Prescriptions     Signed Prescriptions Disp Refills    amLODIPine (NORVASC) 2.5 MG tablet 240 tablet 2     Sig: Take 2 tablets by mouth 2 times daily     Authorizing Provider: JOSE CHEUNG     Ordering User: FILI PELLETIER

## 2024-01-31 NOTE — PROGRESS NOTES
[facility] Peds/Adult Note                       Date: 2024    Name: Sanford Joiner    MRN: [id number]         : 1960    1400 Patient arrives for Sandostatin & Venofer  without acute problems. Please see [facility] for complete assessment and education provided.    Vital signs stable throughout and prior to discharge. Patient tolerated procedure well and was discharged without incident. Patient is aware of no further [facility] appointments and to follow up with referring provider for any questions or concerns.      Mr. Joiner's vitals were reviewed prior to and after treatment.   Patient Vitals for the past 12 hrs:   Temp Pulse Resp BP SpO2   24 1430 -- -- -- (!) 100/0 --   24 1400 98.9 °F (37.2 °C) 98 18 (!) 102/0 97 %         Lab results were obtained and reviewed.  Recent Results (from the past 12 hour(s))   TSH without Reflex    Collection Time: 24  2:15 PM   Result Value Ref Range    TSH, 3RD GENERATION 1.77 0.36 - 3.74 uIU/mL       Medications given:   Medications Administered         0.9 % sodium chloride infusion Admin Date  2024 Action  New Bag Dose  25 mL/hr Rate  25 mL/hr Route  IntraVENous Administered By  Felicity Trevino RN        iron sucrose (VENOFER) 200 mg in sodium chloride 0.9 % 100 mL IVPB Admin Date  2024 Action  New Bag Dose  200 mg Rate  480 mL/hr Route  IntraVENous Administered By  Felicity Trevino RN        octreotide ACETATE (SANDOSTATIN LAR) injection 20 mg Admin Date  2024 Action  Given Dose  20 mg Rate   Route  IntraMUSCular Administered By  Felicity Trevino, RACHEL            Mr. Joiner tolerated the infusion, and had no complaints.    [facility]. [facility]. Discharge Instructions provided to patient, patient verbalized understanding but denied the request for a copy of d/c instructions.     Future Appointments   Date Time Provider Department Center   2024 11:00 AM  Nessa Maurer, APRN - NP [facility] BS AMB       CALIXTO LOVE RN  January 31, 2024  4:10 PM

## 2024-01-31 NOTE — PATIENT INSTRUCTIONS
Medication changes:    NEW COUMADIN DOSING-Hold tonight,  2mg daily, check next Tuesday     Testing Ordered:    An order for echocardiogram has been placed to be done in 1 month. We will call to schedule this before your next visit.      Lab work has been drawn today. You will be contacted with any abnormal results requiring changes to your current plan of care.      Other Recommendations:     Continue to change drive line exit site dressing 3 times a week using sterile technique,     Ensure you are drinking an adequate amount of water with a goal of 6-8 eight ounce glasses (1.5-2 liters) of fluid daily. Your urine should be clear and light yellow straw colored.       Monthly LVAD Education Tip:    LVAD TERMS AND FUNCTIONS     DRIVE LINE Exits out of your body at the “drive-line” site and is typically anchored to your stomach or upper thigh.     The all white cable that connects your  and pump inside your heart.     TIPS:  Never pull on your drive-line or let your power leads get twisted around it.       Controls and monitors your LVAD system. Uses lights, sounds, and on-screen messages to communicate your LVADs operating status.     TIPS:  Complete a self-test daily [in AM] by pressing and holding down the “battery” button x 3 seconds.      POWER LEADS White and black power leads - Each supply power to your LVAD from either the wall or batteries.     TIPS:  White power lead provides pump settings-Disconnect/Reconnect FIRST    NEVER disconnect both at the same time.      MOBILE POWER UNIT Plugs into an AC outlet [wall] to provide your LVAD power.     TIPS:  Use while napping and sleeping.       BATTERY You always need TWO batteries at a time to provide power to your LVAD.     Each battery inserts into a battery clip that connects the power lead to the .     TIPS:  Use when you are active, mobile, or outdoors.      BATTERY CLIP Clips hold your batteries. A power lead

## 2024-02-01 ENCOUNTER — TELEPHONE (OUTPATIENT)
Age: 64
End: 2024-02-01

## 2024-02-01 ENCOUNTER — OFFICE VISIT (OUTPATIENT)
Age: 64
End: 2024-02-01

## 2024-02-01 ENCOUNTER — ANTI-COAG VISIT (OUTPATIENT)
Age: 64
End: 2024-02-01

## 2024-02-01 VITALS
BODY MASS INDEX: 27.89 KG/M2 | HEIGHT: 68 IN | OXYGEN SATURATION: 99 % | SYSTOLIC BLOOD PRESSURE: 82 MMHG | HEART RATE: 84 BPM | RESPIRATION RATE: 18 BRPM | WEIGHT: 184 LBS

## 2024-02-01 DIAGNOSIS — Z79.899 ENCOUNTER FOR MONITORING DIURETIC THERAPY: ICD-10-CM

## 2024-02-01 DIAGNOSIS — R06.02 SHORTNESS OF BREATH: ICD-10-CM

## 2024-02-01 DIAGNOSIS — Z01.89 ROUTINE LAB DRAW: ICD-10-CM

## 2024-02-01 DIAGNOSIS — Z79.01 CHRONIC ANTICOAGULATION: ICD-10-CM

## 2024-02-01 DIAGNOSIS — Z95.811 LVAD (LEFT VENTRICULAR ASSIST DEVICE) PRESENT (HCC): ICD-10-CM

## 2024-02-01 DIAGNOSIS — I50.22 CHRONIC SYSTOLIC HEART FAILURE (HCC): Primary | ICD-10-CM

## 2024-02-01 DIAGNOSIS — L08.9 SOFT TISSUE INFECTION: ICD-10-CM

## 2024-02-01 DIAGNOSIS — Z51.81 ENCOUNTER FOR MONITORING DIURETIC THERAPY: ICD-10-CM

## 2024-02-01 LAB
POC INR: 3.2
PROTHROMBIN TIME, POC: NORMAL

## 2024-02-01 ASSESSMENT — ENCOUNTER SYMPTOMS
COUGH: 0
BLOOD IN STOOL: 0
ABDOMINAL DISTENTION: 0
SHORTNESS OF BREATH: 0
VOMITING: 0
NAUSEA: 0

## 2024-02-01 NOTE — TELEPHONE ENCOUNTER
Called patient to remind him staff is available to bring him upstairs via wheelchair if necessary as he is coming via medicare ride. Provided number to office and requested patient call if he does not feel he can walk up to clinic. He verbalized understanding and states he will call if he needs assistance

## 2024-02-01 NOTE — PROGRESS NOTES
ADVANCED HEART FAILURE CENTER  [facility] in [place], [place]      INR result reviewed with ADILENE Maurer NP who made the following recommendations (VORB): new maintenance plan of 2mg daily and recheck 02/06. Patient notified and verbalized understanding. They had no further questions. (See anticoag tracker)     Funmi Barth RN

## 2024-02-01 NOTE — PROGRESS NOTES
ADVANCED HEART FAILURE CENTER  [facility] in [place], [place]    BP (!) 82/0 (Site: Right Upper Arm, Position: Sitting, Cuff Size: Medium Adult) Comment: MAP  Pulse 84   Resp 18   Ht 1.727 m (5' 8\")   Wt 83.5 kg (184 lb)   SpO2 99%   BMI 27.98 kg/m²     LVAD  LVAD Type:: Left Ventricular Assist Device (LVAD)  Pump Speed (rpm): 5200  Pump Flow (lpm): 3.3  MAP (mmHg): 82  Set Low Speed (rpm): 5200  Pump Pulse Index (PI): 4.4  Pump Power (Mei): 3.8  Battery Life Checked: Yes  Backup Controller Present: Yes  Driveline Dressing: Changed per order  Outpatient: Yes  MAP in Therapeutic Range (Outpatient): Yes       Sanford LEMUS Joiner was seen today in clinic for a visit with ADILENE Maurer NP    Patient drive line trauma (including  drops). States he has a small amount of drainage from drive line but denies any pain or tenderness. States he has not been able to get anchor to stick, dressing not adhered to skin when assessed. Denies LVAD alarms at home. Driveline inspected for integrity.  Above reported to provider.     LVAD interrogation completed in clinic, results reported to provider. See flow sheet for details.     Dressing changed using sterile technique. Gauze with quarter size amount of green/grey drainage. Culture collected per order. Small area if redness and swelling around line.      All orders entered per VORB. All provider instructions placed in AVS and reviewed with patient. Educated the patient on coumadin changes, testing ordered,  expected follow up. reviewed questions. Patient verbalized understanding, denied questions at end of visit.    
Reported on 2/1/2024), Disp: , Rfl:     PATIENT CARE TEAM:  Patient Care Team:  Ranjan Porter MD as PCP - General  Ranjan Porter MD as PCP - Empaneled Provider     Thank you for allowing me to participate in this patient's care.    TAIWO Vilchis - NP  Advanced Heart Failure Center  [facility]  [address], [address]  Phone: [phone]    On this date 2/1/24 , I have spent a total time of  50 minutes personally reviewing new vitals, test results, notes from recent visits, face to face encounter/physical exam of patient with counseling, writing orders, performing medical decision making, and documenting.

## 2024-02-02 ENCOUNTER — TELEPHONE (OUTPATIENT)
Age: 64
End: 2024-02-02

## 2024-02-02 DIAGNOSIS — I50.22 CHRONIC SYSTOLIC HEART FAILURE (HCC): Primary | ICD-10-CM

## 2024-02-02 LAB
ALBUMIN SERPL-MCNC: 4.6 G/DL (ref 3.9–4.9)
ALBUMIN/GLOB SERPL: 1.8 {RATIO} (ref 1.2–2.2)
ALP SERPL-CCNC: 76 IU/L (ref 44–121)
ALT SERPL-CCNC: 21 IU/L (ref 0–44)
AST SERPL-CCNC: 30 IU/L (ref 0–40)
BASOPHILS # BLD AUTO: 0.1 X10E3/UL (ref 0–0.2)
BASOPHILS NFR BLD AUTO: 1 %
BILIRUB SERPL-MCNC: 0.3 MG/DL (ref 0–1.2)
BUN SERPL-MCNC: 26 MG/DL (ref 8–27)
BUN/CREAT SERPL: 14 (ref 10–24)
CALCIUM SERPL-MCNC: 9.1 MG/DL (ref 8.6–10.2)
CHLORIDE SERPL-SCNC: 103 MMOL/L (ref 96–106)
CO2 SERPL-SCNC: 20 MMOL/L (ref 20–29)
CREAT SERPL-MCNC: 1.84 MG/DL (ref 0.76–1.27)
EGFRCR SERPLBLD CKD-EPI 2021: 40 ML/MIN/1.73
EOSINOPHIL # BLD AUTO: 0.1 X10E3/UL (ref 0–0.4)
EOSINOPHIL NFR BLD AUTO: 1 %
ERYTHROCYTE [DISTWIDTH] IN BLOOD BY AUTOMATED COUNT: 15.6 % (ref 11.6–15.4)
GLOBULIN SER CALC-MCNC: 2.5 G/DL (ref 1.5–4.5)
GLUCOSE SERPL-MCNC: 86 MG/DL (ref 70–99)
HCT VFR BLD AUTO: 24.9 % (ref 37.5–51)
HGB BLD-MCNC: 7.7 G/DL (ref 13–17.7)
IMM GRANULOCYTES # BLD AUTO: 0.1 X10E3/UL (ref 0–0.1)
IMM GRANULOCYTES NFR BLD AUTO: 1 %
LDH SERPL L TO P-CCNC: 229 IU/L (ref 121–224)
LYMPHOCYTES # BLD AUTO: 0.9 X10E3/UL (ref 0.7–3.1)
LYMPHOCYTES NFR BLD AUTO: 9 %
MAGNESIUM SERPL-MCNC: 1.7 MG/DL (ref 1.6–2.3)
MCH RBC QN AUTO: 27.9 PG (ref 26.6–33)
MCHC RBC AUTO-ENTMCNC: 30.9 G/DL (ref 31.5–35.7)
MCV RBC AUTO: 90 FL (ref 79–97)
MONOCYTES # BLD AUTO: 1.1 X10E3/UL (ref 0.1–0.9)
MONOCYTES NFR BLD AUTO: 11 %
NEUTROPHILS # BLD AUTO: 7.7 X10E3/UL (ref 1.4–7)
NEUTROPHILS NFR BLD AUTO: 77 %
NT-PROBNP SERPL-MCNC: 631 PG/ML (ref 0–210)
PLATELET # BLD AUTO: 370 X10E3/UL (ref 150–450)
POTASSIUM SERPL-SCNC: 4.2 MMOL/L (ref 3.5–5.2)
PROT SERPL-MCNC: 7.1 G/DL (ref 6–8.5)
RBC # BLD AUTO: 2.76 X10E6/UL (ref 4.14–5.8)
SODIUM SERPL-SCNC: 140 MMOL/L (ref 134–144)
WBC # BLD AUTO: 9.9 X10E3/UL (ref 3.4–10.8)

## 2024-02-02 NOTE — TELEPHONE ENCOUNTER
Dr. Gillespie notified of drop in hemoglobin. Per provider  if patient is symptomatic patient should present to the ER, if patient does not have symptoms then send patient for recheck on Monday.     Called and spoke with patient. Patient denied melena, bloody stools, vomiting blood, nose bleeds or other bleeding event. Denies increased VAD alarms, dizziness.  Educated patient on provider instructions to present to lab Monday for recheck of hbg.  He verbalized understanding. Educated to call [facility] immediately for s/s of bleeding including dark bloody or tarry stools, vomiting blood or coffee grounds or other bleeding events. He verbilized understanding.     Informed Dr. Gillespie patient denies bleeding symptoms. CBC recheck ordered per VORB Dr. Gillespie.

## 2024-02-02 NOTE — TELEPHONE ENCOUNTER
Called pateint regarding recent labwork- drop in hbg 10---> 7.7. Left patient voicemail notifying of drop in hbg and requesting he call to discuss if he is having any symptoms.

## 2024-02-04 LAB
BACTERIA SPEC AEROBE CULT: ABNORMAL
BACTERIA SPEC AEROBE CULT: ABNORMAL
INR BLD: 3.2

## 2024-02-05 ENCOUNTER — TELEPHONE (OUTPATIENT)
Age: 64
End: 2024-02-05

## 2024-02-05 ENCOUNTER — ANTI-COAG VISIT (OUTPATIENT)
Age: 64
End: 2024-02-05

## 2024-02-05 NOTE — TELEPHONE ENCOUNTER
Telephone Call RE:  Lab Reminder      Outcome:     [] Patient verbalizes understanding    [] Unable to reach   [x] Left message              []       Julian Gómez

## 2024-02-05 NOTE — TELEPHONE ENCOUNTER
Called and spoke with outpatient infusion who stated patient is scheduled for next octreotide infusion 02/28 at 2:30pm.     Called and spoke with coordination of care who confirmed patient will be scheduled for echo at [facility] 02/28 at 1:15, 12:45 arrival.     Called and left message for patient notifying of both echo and infusion appointment date and time. Also left message requesting patient call back to clarify time of INR check as value that patient sent in is the same as last week's value and was reported on Sunday.

## 2024-02-06 ENCOUNTER — ANTI-COAG VISIT (OUTPATIENT)
Age: 64
End: 2024-02-06
Payer: MEDICARE

## 2024-02-06 ENCOUNTER — TELEPHONE (OUTPATIENT)
Age: 64
End: 2024-02-06

## 2024-02-06 DIAGNOSIS — I50.22 CHRONIC SYSTOLIC HEART FAILURE (HCC): ICD-10-CM

## 2024-02-06 DIAGNOSIS — Z79.01 CHRONIC ANTICOAGULATION: Primary | ICD-10-CM

## 2024-02-06 DIAGNOSIS — L08.9 SOFT TISSUE INFECTION: ICD-10-CM

## 2024-02-06 DIAGNOSIS — I50.22 CHRONIC SYSTOLIC HEART FAILURE (HCC): Primary | ICD-10-CM

## 2024-02-06 LAB
BACTERIA SPEC AEROBE CULT: ABNORMAL
BACTERIA SPEC AEROBE CULT: ABNORMAL
BACTERIA SPEC ANAEROBE CULT: ABNORMAL
INR BLD: 1.6

## 2024-02-06 PROCEDURE — 93793 ANTICOAG MGMT PT WARFARIN: CPT | Performed by: NURSE PRACTITIONER

## 2024-02-06 RX ORDER — DOXYCYCLINE HYCLATE 100 MG
100 TABLET ORAL 2 TIMES DAILY
Qty: 28 TABLET | Refills: 0 | Status: SHIPPED | OUTPATIENT
Start: 2024-02-06 | End: 2024-02-20

## 2024-02-06 RX ORDER — COLCHICINE 0.6 MG/1
0.6 TABLET ORAL DAILY
Qty: 2 TABLET | Refills: 0 | Status: SHIPPED | OUTPATIENT
Start: 2024-02-06

## 2024-02-06 NOTE — TELEPHONE ENCOUNTER
Patient called stating that he has been unable to go to the lab as he is having foot pain. States he feels that he is having a gout flare.  requesting medication for gout flare.     Discussed patient with YUMI Quiles and ADILENE Maurer.  Discussed low hbg last week, inability to go to the lab, reported gout flare, positive driveline culture. Per YUMI Quiles VORB start patient on 2 week course of doxy 100 BID with driveline re-culture at 1 week, 2 doses of colchicine 0.6mg once daily for two days, referral to infectious disease, have patient go to the lab asap and complete hbg and uric acid.     Called and reviewed provider instructions with patient. Reviewed starting doxy BID, patient confirmed he can come to clinic 02/14 for nurse visit for drive line culture. Discussed doses of colchicine with patient, educated that this can cause diarrhea in some patients, he verbalized understanding. Educated to call [facility] for melena. Patient verbalized understanding, stated his stool has been green \"like the color of pea soup.\" Educated to proceed to lab as soon as possible for CBC recheck, he verbalized understanding.

## 2024-02-06 NOTE — PROGRESS NOTES
ADVANCED HEART FAILURE CENTER  [facility] in [place], [place]      INR result reviewed with YUMI Quiles NP  who made the following recommendations (VORB): 3mg tonight and recheck Thursday. Patient notified and verbalized understanding. They had no further questions. (See anticoag tracker)     Patient confirmed value of 3.2 that he reported on 02/04 was the incorrect date      Funmi Barth RN

## 2024-02-07 ENCOUNTER — TELEPHONE (OUTPATIENT)
Age: 64
End: 2024-02-07

## 2024-02-07 RX ORDER — GENTAMICIN SULFATE 1 MG/G
OINTMENT TOPICAL
Qty: 30 G | Refills: 1 | Status: SHIPPED | OUTPATIENT
Start: 2024-02-07

## 2024-02-07 NOTE — TELEPHONE ENCOUNTER
Reviewed current wound culture results (additional growth including Enterobacter) with YUMI Quiles who ordered VORB gentamicin ointment topically to driveline site one daily with daily dressing changes.     Attempted to call patient to discuss this however call was dropped several times, was not answers and did not go to voicemail. Will reattempt calling patient at a different time.

## 2024-02-08 NOTE — TELEPHONE ENCOUNTER
Called and spoke to patient to notify of gentramycin prescription. Instructed patient to apply to this to the driveline exit site daily with daily dressing changes. Patient verbalized understanding. States his foot is better after taking colchine, states pain is only in his big toe now. Requested patient complete labwork ASAP, he verbalized  understanding.

## 2024-02-10 DIAGNOSIS — I50.22 CHRONIC SYSTOLIC HEART FAILURE (HCC): ICD-10-CM

## 2024-02-12 ENCOUNTER — TELEPHONE (OUTPATIENT)
Age: 64
End: 2024-02-12

## 2024-02-12 DIAGNOSIS — Z79.01 CHRONIC ANTICOAGULATION: ICD-10-CM

## 2024-02-12 DIAGNOSIS — I50.22 CHRONIC SYSTOLIC HEART FAILURE (HCC): Primary | ICD-10-CM

## 2024-02-12 RX ORDER — DOXYCYCLINE HYCLATE 100 MG
100 TABLET ORAL 2 TIMES DAILY
Qty: 180 TABLET | Refills: 1 | Status: SHIPPED | OUTPATIENT
Start: 2024-02-12

## 2024-02-12 NOTE — TELEPHONE ENCOUNTER
Reviewed patient with with YUMI Quiles who recommended VORB continuing doxycycline indefinitely until further recs from infectious disease. Updated script sent per provider.       Called and spoke with [facility] infectious disease who confirmed patient is scheduled March 14 with their practice.

## 2024-02-13 NOTE — TELEPHONE ENCOUNTER
Reviewed with YUMI Quiles who stated okay to cancel appointment tomorrow, continue doxy until ID follow up.       Called and spoke with patient and informed he will need to continue doxycycline until otherwise advised and will cancel pt appt tomorrow. He verbalized understanding.  He confirmed he has ID appointment 3/14. Educated patient get labwork done ASAP- He staetd he will complete this tomorrow.

## 2024-02-14 DIAGNOSIS — Z79.01 CHRONIC ANTICOAGULATION: ICD-10-CM

## 2024-02-14 LAB
ERYTHROCYTE [DISTWIDTH] IN BLOOD BY AUTOMATED COUNT: 16.5 % (ref 11.6–15.4)
HCT VFR BLD AUTO: 25.9 % (ref 37.5–51)
HGB BLD-MCNC: 7.9 G/DL (ref 13–17.7)
INR PPP: 2.8 (ref 0.9–1.2)
MCH RBC QN AUTO: 27.3 PG (ref 26.6–33)
MCHC RBC AUTO-ENTMCNC: 30.5 G/DL (ref 31.5–35.7)
MCV RBC AUTO: 90 FL (ref 79–97)
PLATELET # BLD AUTO: 526 X10E3/UL (ref 150–450)
PROTHROMBIN TIME: 26.8 SEC (ref 9.1–12)
RBC # BLD AUTO: 2.89 X10E6/UL (ref 4.14–5.8)
WBC # BLD AUTO: 9.9 X10E3/UL (ref 3.4–10.8)

## 2024-02-15 ENCOUNTER — ANTI-COAG VISIT (OUTPATIENT)
Age: 64
End: 2024-02-15

## 2024-02-15 DIAGNOSIS — Z79.01 CHRONIC ANTICOAGULATION: ICD-10-CM

## 2024-02-15 DIAGNOSIS — I50.22 CHRONIC SYSTOLIC HEART FAILURE (HCC): ICD-10-CM

## 2024-02-15 DIAGNOSIS — D50.8 OTHER IRON DEFICIENCY ANEMIA: Primary | ICD-10-CM

## 2024-02-15 NOTE — PROGRESS NOTES
ADVANCED HEART FAILURE CENTER  [facility] in [place], [place]      INR result reviewed with ADILENE Maurer NP who made the following recommendations (VORB): hold tonight, resume 2mg daily tomorrow and recheck 1 week.   (See anticoag tracker)     Reviewed recent hgb with ADILENE Maurer who stated VORB no plan of care changes at this time. Recheck iron panel with next set of labs.      Patient notified of provider instructions via voicemail, requested call back to confirm. Requested patient call as soon as possible for any bleeding symptoms.      Second call placed to patient. Notified of provider instructions to hold coumadin tonight, resume 2mg daily tomorrow. Patient verbalized understanding. Reviewed low hbg, patient confirms he has had no bleeding symptoms. Informed patient that we will recheck iron after next infusion per provider request. Educated to call ASAP for any bleeding event or symptoms, especially in setting of already low hbg, patient verbalized understanding,  stated \"yeah I got no buffer\" patient then stated if he starts having GI bleeding symptoms he will come to the ER as he knows this is likely what he will be advised to do. Educated he would likely be advised to come to ER for GI bleed symptoms however he should still contact [facility] in the event he feels ER visit is necessary. He verbalized understanding.     Funmi Barth RN

## 2024-02-16 RX ORDER — PANTOPRAZOLE SODIUM 40 MG/1
40 TABLET, DELAYED RELEASE ORAL DAILY
Qty: 30 TABLET | Refills: 10 | OUTPATIENT
Start: 2024-02-16

## 2024-02-19 RX ORDER — MAGNESIUM OXIDE 400 MG/1
1 TABLET ORAL 2 TIMES DAILY
Qty: 60 TABLET | Refills: 10 | OUTPATIENT
Start: 2024-02-19

## 2024-02-19 NOTE — TELEPHONE ENCOUNTER
Requested Prescriptions     Refused Prescriptions Disp Refills    magnesium oxide (MAG-OX) 400 MG tablet [Pharmacy Med Name: MAGNESIUM OX 400MG  MG Tablet] 60 tablet 10     Sig: TAKE 1 TABLET BY MOUTH 2 TIMES DAILY     Refused By: FILI PELLETIER     Reason for Refusal: Duplicate Request     Does not use this pharmacy

## 2024-02-21 ENCOUNTER — TELEPHONE (OUTPATIENT)
Age: 64
End: 2024-02-21

## 2024-02-22 ENCOUNTER — ANTI-COAG VISIT (OUTPATIENT)
Age: 64
End: 2024-02-22

## 2024-02-22 LAB — INR BLD: 2.1

## 2024-02-22 NOTE — PROGRESS NOTES
ADVANCED HEART FAILURE CENTER  [facility] in [place], [place]      INR result reviewed with YUMI Quiles NP  who made the following recommendations (VORB): Continue current coumadin dosing and recheck INR in 1 week. Patient notified and verbalized understanding. They had no further questions. (See anticoag tracker).     Hortensia Avery RN

## 2024-02-28 ENCOUNTER — HOSPITAL ENCOUNTER (OUTPATIENT)
Facility: HOSPITAL | Age: 64
Discharge: HOME | End: 2024-03-01
Payer: MEDICARE

## 2024-02-28 ENCOUNTER — HOSPITAL ENCOUNTER (OUTPATIENT)
Facility: HOSPITAL | Age: 64
Setting detail: INFUSION SERIES
Discharge: HOME | End: 2024-02-28
Payer: MEDICARE

## 2024-02-28 ENCOUNTER — TELEPHONE (OUTPATIENT)
Age: 64
End: 2024-02-28

## 2024-02-28 VITALS — WEIGHT: 182.98 LBS | HEIGHT: 68 IN | BODY MASS INDEX: 27.73 KG/M2

## 2024-02-28 VITALS — HEART RATE: 62 BPM | RESPIRATION RATE: 20 BRPM | OXYGEN SATURATION: 98 % | TEMPERATURE: 97.5 F

## 2024-02-28 DIAGNOSIS — N18.30 STAGE 3 CHRONIC KIDNEY DISEASE, UNSPECIFIED WHETHER STAGE 3A OR 3B CKD (HCC): ICD-10-CM

## 2024-02-28 DIAGNOSIS — I50.22 CHRONIC SYSTOLIC HEART FAILURE (HCC): ICD-10-CM

## 2024-02-28 DIAGNOSIS — K92.2 UPPER GI BLEED: ICD-10-CM

## 2024-02-28 DIAGNOSIS — Z95.811 LVAD (LEFT VENTRICULAR ASSIST DEVICE) PRESENT (HCC): Primary | ICD-10-CM

## 2024-02-28 DIAGNOSIS — D64.9 ANEMIA, UNSPECIFIED TYPE: ICD-10-CM

## 2024-02-28 DIAGNOSIS — Z95.811 LVAD (LEFT VENTRICULAR ASSIST DEVICE) PRESENT (HCC): ICD-10-CM

## 2024-02-28 DIAGNOSIS — D50.8 OTHER IRON DEFICIENCY ANEMIA: ICD-10-CM

## 2024-02-28 LAB
BASOPHILS # BLD: 0.2 K/UL (ref 0–0.1)
BASOPHILS NFR BLD: 2 % (ref 0–1)
DIFFERENTIAL METHOD BLD: ABNORMAL
EOSINOPHIL # BLD: 0.2 K/UL (ref 0–0.4)
EOSINOPHIL NFR BLD: 2 % (ref 0–7)
ERYTHROCYTE [DISTWIDTH] IN BLOOD BY AUTOMATED COUNT: 16.5 % (ref 11.5–14.5)
FERRITIN SERPL-MCNC: 24 NG/ML (ref 26–388)
HCT VFR BLD AUTO: 27.8 % (ref 36.6–50.3)
HGB BLD-MCNC: 8.4 G/DL (ref 12.1–17)
IMM GRANULOCYTES # BLD AUTO: 0 K/UL (ref 0–0.04)
IMM GRANULOCYTES NFR BLD AUTO: 0 % (ref 0–0.5)
IRON SATN MFR SERPL: 6 % (ref 20–50)
IRON SERPL-MCNC: 24 UG/DL (ref 35–150)
LYMPHOCYTES # BLD: 1.1 K/UL (ref 0.8–3.5)
LYMPHOCYTES NFR BLD: 12 % (ref 12–49)
MCH RBC QN AUTO: 26.1 PG (ref 26–34)
MCHC RBC AUTO-ENTMCNC: 30.2 G/DL (ref 30–36.5)
MCV RBC AUTO: 86.3 FL (ref 80–99)
MONOCYTES # BLD: 0.9 K/UL (ref 0–1)
MONOCYTES NFR BLD: 10 % (ref 5–13)
NEUTS SEG # BLD: 6.7 K/UL (ref 1.8–8)
NEUTS SEG NFR BLD: 74 % (ref 32–75)
NRBC # BLD: 0 K/UL (ref 0–0.01)
NRBC BLD-RTO: 0 PER 100 WBC
PLATELET # BLD AUTO: 357 K/UL (ref 150–400)
PMV BLD AUTO: 11.6 FL (ref 8.9–12.9)
RBC # BLD AUTO: 3.22 M/UL (ref 4.1–5.7)
TIBC SERPL-MCNC: 398 UG/DL (ref 250–450)
TSH SERPL DL<=0.05 MIU/L-ACNC: 0.92 UIU/ML (ref 0.36–3.74)
WBC # BLD AUTO: 9.1 K/UL (ref 4.1–11.1)

## 2024-02-28 PROCEDURE — 84443 ASSAY THYROID STIM HORMONE: CPT

## 2024-02-28 PROCEDURE — 6360000002 HC RX W HCPCS: Performed by: INTERNAL MEDICINE

## 2024-02-28 PROCEDURE — 83540 ASSAY OF IRON: CPT

## 2024-02-28 PROCEDURE — 96372 THER/PROPH/DIAG INJ SC/IM: CPT

## 2024-02-28 PROCEDURE — 82728 ASSAY OF FERRITIN: CPT

## 2024-02-28 PROCEDURE — 93306 TTE W/DOPPLER COMPLETE: CPT

## 2024-02-28 PROCEDURE — 85025 COMPLETE CBC W/AUTO DIFF WBC: CPT

## 2024-02-28 PROCEDURE — 96365 THER/PROPH/DIAG IV INF INIT: CPT

## 2024-02-28 PROCEDURE — 2580000003 HC RX 258: Performed by: INTERNAL MEDICINE

## 2024-02-28 PROCEDURE — 83550 IRON BINDING TEST: CPT

## 2024-02-28 PROCEDURE — 96374 THER/PROPH/DIAG INJ IV PUSH: CPT

## 2024-02-28 PROCEDURE — 6360000002 HC RX W HCPCS: Performed by: NURSE PRACTITIONER

## 2024-02-28 PROCEDURE — 36415 COLL VENOUS BLD VENIPUNCTURE: CPT

## 2024-02-28 RX ORDER — SODIUM CHLORIDE 9 MG/ML
INJECTION, SOLUTION INTRAVENOUS CONTINUOUS
OUTPATIENT
Start: 2024-03-27

## 2024-02-28 RX ORDER — DIPHENHYDRAMINE HYDROCHLORIDE 50 MG/ML
50 INJECTION INTRAMUSCULAR; INTRAVENOUS
OUTPATIENT
Start: 2024-03-27

## 2024-02-28 RX ORDER — SODIUM CHLORIDE 0.9 % (FLUSH) 0.9 %
5-40 SYRINGE (ML) INJECTION PRN
OUTPATIENT
Start: 2024-02-28

## 2024-02-28 RX ORDER — EPINEPHRINE 1 MG/ML
0.3 INJECTION, SOLUTION INTRAMUSCULAR; SUBCUTANEOUS PRN
OUTPATIENT
Start: 2024-02-28

## 2024-02-28 RX ORDER — ACETAMINOPHEN 325 MG/1
650 TABLET ORAL
OUTPATIENT
Start: 2024-02-28

## 2024-02-28 RX ORDER — ACETAMINOPHEN 325 MG/1
650 TABLET ORAL
OUTPATIENT
Start: 2024-03-27

## 2024-02-28 RX ORDER — HEPARIN 100 UNIT/ML
500 SYRINGE INTRAVENOUS PRN
Status: DISCONTINUED | OUTPATIENT
Start: 2024-02-28 | End: 2024-02-29 | Stop reason: HOSPADM

## 2024-02-28 RX ORDER — ALBUTEROL SULFATE 90 UG/1
4 AEROSOL, METERED RESPIRATORY (INHALATION) PRN
OUTPATIENT
Start: 2024-03-27

## 2024-02-28 RX ORDER — ONDANSETRON 2 MG/ML
8 INJECTION INTRAMUSCULAR; INTRAVENOUS
OUTPATIENT
Start: 2024-02-28

## 2024-02-28 RX ORDER — HEPARIN 100 UNIT/ML
500 SYRINGE INTRAVENOUS PRN
OUTPATIENT
Start: 2024-02-28

## 2024-02-28 RX ORDER — ONDANSETRON 2 MG/ML
8 INJECTION INTRAMUSCULAR; INTRAVENOUS
OUTPATIENT
Start: 2024-03-27

## 2024-02-28 RX ORDER — SODIUM CHLORIDE 9 MG/ML
5-250 INJECTION, SOLUTION INTRAVENOUS PRN
OUTPATIENT
Start: 2024-02-28

## 2024-02-28 RX ORDER — SODIUM CHLORIDE 0.9 % (FLUSH) 0.9 %
5-40 SYRINGE (ML) INJECTION PRN
Status: DISCONTINUED | OUTPATIENT
Start: 2024-02-28 | End: 2024-02-29 | Stop reason: HOSPADM

## 2024-02-28 RX ORDER — EPINEPHRINE 1 MG/ML
0.3 INJECTION, SOLUTION INTRAMUSCULAR; SUBCUTANEOUS PRN
OUTPATIENT
Start: 2024-03-27

## 2024-02-28 RX ORDER — SODIUM CHLORIDE 9 MG/ML
5-250 INJECTION, SOLUTION INTRAVENOUS PRN
Status: DISCONTINUED | OUTPATIENT
Start: 2024-02-28 | End: 2024-02-29 | Stop reason: HOSPADM

## 2024-02-28 RX ORDER — ALBUTEROL SULFATE 90 UG/1
4 AEROSOL, METERED RESPIRATORY (INHALATION) PRN
OUTPATIENT
Start: 2024-02-28

## 2024-02-28 RX ORDER — DIPHENHYDRAMINE HYDROCHLORIDE 50 MG/ML
50 INJECTION INTRAMUSCULAR; INTRAVENOUS
OUTPATIENT
Start: 2024-02-28

## 2024-02-28 RX ORDER — SODIUM CHLORIDE 9 MG/ML
INJECTION, SOLUTION INTRAVENOUS CONTINUOUS
OUTPATIENT
Start: 2024-02-28

## 2024-02-28 RX ADMIN — SODIUM CHLORIDE 200 MG: 9 INJECTION, SOLUTION INTRAVENOUS at 15:42

## 2024-02-28 RX ADMIN — OCTREOTIDE ACETATE 20 MG: KIT at 14:53

## 2024-02-28 NOTE — TELEPHONE ENCOUNTER
Telephone Call RE:  Lab Reminder      Outcome:     [x] Patient verbalizes understanding    [] Unable to reach   [] Left message              []   Pt thinks has appt today and will have INR checked then, but I don't see him on the schedule.  He said that he'd check with Funmi.     Julian Gómez

## 2024-02-28 NOTE — PROGRESS NOTES
[facility]                       Date: 2024    Name: Sanford Joiner    MRN: [id number]         : 1960      Pt admit to [facility] for Sandostatin/Venofer ambulatory in stable condition. Assessment completed and documented in flowsheets. No acute concerns at this time.  Please review pending lab results in CC.    Mr. Joiner's vitals were reviewed prior to and after treatment. See flowsheet for MAPs since patient has LVAD.   Patient Vitals for the past 12 hrs:   Temp Pulse Resp SpO2   24 1607 -- -- 20 --   24 1450 97.5 °F (36.4 °C) 62 18 98 %       Medications given:   Medications Administered         iron sucrose (VENOFER) 200 mg in sodium chloride 0.9 % 100 mL IVPB Admin Date  2024 Action  New Bag Dose  200 mg Rate  480 mL/hr Route  IntraVENous Administered By  Lani Yanez RN        octreotide ACETATE (SANDOSTATIN LAR) injection 20 mg Admin Date  2024 Action  Given Dose  20 mg Rate   Route  IntraMUSCular Administered By  Lani Yanez RN            PIV positive blood return noted, flushed and removed prior to discharge.    Mr. Joiner tolerated the infusion, and had no complaints.    [facility] and is aware of future appointments.     Future Appointments   Date Time Provider Department Center   3/7/2024  1:00 PM Nahomi Quiles, APRN - NP [facility]   3/27/2024  3:00 PM C2 [facility]       Lani Yanez RN  2024  4:12 PM

## 2024-02-29 ENCOUNTER — ANTI-COAG VISIT (OUTPATIENT)
Age: 64
End: 2024-02-29

## 2024-02-29 ENCOUNTER — TELEPHONE (OUTPATIENT)
Age: 64
End: 2024-02-29

## 2024-02-29 DIAGNOSIS — I50.22 CHRONIC SYSTOLIC HEART FAILURE (HCC): ICD-10-CM

## 2024-02-29 DIAGNOSIS — Z95.811 LVAD (LEFT VENTRICULAR ASSIST DEVICE) PRESENT (HCC): Primary | ICD-10-CM

## 2024-02-29 DIAGNOSIS — Z79.01 CHRONIC ANTICOAGULATION: ICD-10-CM

## 2024-02-29 DIAGNOSIS — R06.02 SHORTNESS OF BREATH: ICD-10-CM

## 2024-02-29 DIAGNOSIS — D50.8 OTHER IRON DEFICIENCY ANEMIA: ICD-10-CM

## 2024-02-29 LAB
ECHO AO ROOT DIAM: 4.6 CM
ECHO AO ROOT INDEX: 2.35 CM/M2
ECHO BSA: 1.99 M2
ECHO LA DIAMETER INDEX: 1.53 CM/M2
ECHO LA DIAMETER: 3 CM
ECHO LA TO AORTIC ROOT RATIO: 0.65
ECHO LV FRACTIONAL SHORTENING: 11 % (ref 28–44)
ECHO LV INTERNAL DIMENSION DIASTOLE INDEX: 2.24 CM/M2
ECHO LV INTERNAL DIMENSION DIASTOLIC: 4.4 CM (ref 4.2–5.9)
ECHO LV INTERNAL DIMENSION SYSTOLIC INDEX: 1.99 CM/M2
ECHO LV INTERNAL DIMENSION SYSTOLIC: 3.9 CM
ECHO TV REGURGITANT MAX VELOCITY: 2.05 M/S
ECHO TV REGURGITANT PEAK GRADIENT: 17 MMHG
INR BLD: 1.6

## 2024-02-29 NOTE — PROGRESS NOTES
ADVANCED HEART FAILURE CENTER  [facility] in [place], [place]      INR result reviewed with RUBEN Garibay NP who made the following recommendations (VORB): Take 2.5mg coumadin tonight and Sunday, 2mg all there days and recheck INR with full set of labs 3/6/24. Patient notified and verbalized understanding. They had no further questions. (See anticoag tracker)     CASEY MONTEMAYOR RN

## 2024-02-29 NOTE — TELEPHONE ENCOUNTER
----- Message -----  From: Nessa Maurer APRN - NP  Sent: 2/29/2024   3:15 PM EST    Please call Sanford Joiner to discuss their abnormal lab results. He needs more iron infusions     Of note patient had iron infusion completed 2/28/24. Reviewed with ADILENE Maurer NP who recommended VORB hold off on iron infusions and repeat Iron studies in 6 weeks. Orders placed. Patient notified. He verbalized understanding and had no further questions. CASEY MONTEMAYOR RN

## 2024-03-01 ENCOUNTER — TELEPHONE (OUTPATIENT)
Age: 64
End: 2024-03-01

## 2024-03-05 ENCOUNTER — TELEPHONE (OUTPATIENT)
Age: 64
End: 2024-03-05

## 2024-03-05 NOTE — TELEPHONE ENCOUNTER
Called and left message with patient inquiring if he has scheduled transportation for his clinic visit this week. Requested call back to discuss.

## 2024-03-05 NOTE — TELEPHONE ENCOUNTER
Patient returned call to PSR and stated he does have transportation set up for his upcoming appointment

## 2024-03-07 ENCOUNTER — ANTI-COAG VISIT (OUTPATIENT)
Age: 64
End: 2024-03-07
Payer: MEDICARE

## 2024-03-07 ENCOUNTER — TELEPHONE (OUTPATIENT)
Age: 64
End: 2024-03-07

## 2024-03-07 DIAGNOSIS — Z79.01 CHRONIC ANTICOAGULATION: Primary | ICD-10-CM

## 2024-03-07 DIAGNOSIS — Z79.01 CHRONIC ANTICOAGULATION: ICD-10-CM

## 2024-03-07 DIAGNOSIS — I50.22 CHRONIC SYSTOLIC HEART FAILURE (HCC): ICD-10-CM

## 2024-03-07 DIAGNOSIS — Z95.811 LVAD (LEFT VENTRICULAR ASSIST DEVICE) PRESENT (HCC): ICD-10-CM

## 2024-03-07 DIAGNOSIS — R06.02 SHORTNESS OF BREATH: ICD-10-CM

## 2024-03-07 LAB — INR BLD: 3.5

## 2024-03-07 PROCEDURE — 93793 ANTICOAG MGMT PT WARFARIN: CPT | Performed by: NURSE PRACTITIONER

## 2024-03-07 NOTE — TELEPHONE ENCOUNTER
Patient called stating he needs to cancel appt today due to personal conflicts. States cancellation is not a medical issues and denies need [facility] could address. Informed patient we will cancel his visit today per his request and call him to reschedule, he verbalized understanding.

## 2024-03-07 NOTE — PROGRESS NOTES
ADVANCED HEART FAILURE CENTER  [facility] in [place], [place]      INR result reviewed with YUMI Quiles NP  who made the following recommendations (VORB): hold tonight and recheck tomorrow. Patient notified and verbalized understanding. They had no further questions. (See anticoag tracker)     Funmi Barth RN

## 2024-03-08 NOTE — TELEPHONE ENCOUNTER
Call placed to patient. Left voicemail notifying of appointment available 03/21 at 10am. Requested patient return call to notify if this appointment works for him. Also reminded to send INR results to us.

## 2024-03-11 NOTE — TELEPHONE ENCOUNTER
Called placed to patient. Patient states he received messages from last week. Confirmed that appointment 03/21 is acceptable to patient. Patient states that he will schedule transportation for this appointment.     Asked patient if he has rechecked INR. Patient states he has not rechecked INR however he will do it today.

## 2024-03-12 ENCOUNTER — ANTI-COAG VISIT (OUTPATIENT)
Age: 64
End: 2024-03-12
Payer: MEDICARE

## 2024-03-12 ENCOUNTER — TELEPHONE (OUTPATIENT)
Age: 64
End: 2024-03-12

## 2024-03-12 LAB — INR BLD: 5.7

## 2024-03-12 PROCEDURE — 93793 ANTICOAG MGMT PT WARFARIN: CPT | Performed by: NURSE PRACTITIONER

## 2024-03-12 NOTE — PROGRESS NOTES
ADVANCED HEART FAILURE CENTER  [facility] in [place], [place]    INR result reviewed with ADILENE Maurer NP who made the following recommendations (VORB): Hold coumadin tonight and recheck INR tomorrow with home meter. Patient also to present to lab for full set as previously planned. Attempted to contact patient to notify. Message left. Will await return call. (See anticoag tracker).    1510: Return call received from patient. Informed of recommendations. He verbalized understanding and had no further questions.     CASEY MONTEMAYOR RN

## 2024-03-13 ENCOUNTER — TELEPHONE (OUTPATIENT)
Age: 64
End: 2024-03-13

## 2024-03-13 NOTE — TELEPHONE ENCOUNTER
0936: Letter received via fax to YUMI Quiles NP stating services could not be processed. Date of service provided along with claim ID, but no specific services listed. Per letters claim could not be processed as there was no indication of urgent or emergent services, but did not describe services being denied. Date coincides with Anticoagulation monitoring encounter in EMR.     Contacted [facility] and spoke with Jackelin who stated provider listed not in network and claim was therefore denied. She requested Tax ID and stated provider's current address listed on letter ([address], [place] [address]) is not covered under tax ID. Provided [facility] address ([address], [place] [address]). Jackelin stated that address is also not listed under covered addresses and only covered address on file is [address], [place] [address]. She stated provider relations will need to be contacted so contract can be updated with correct addresses and tax ID can be verified. She had no further questions. Reviewed with Practice Admin who stated will reach out to resolve issue. Letter provided.     CASEY MONTEMAYOR RN  VAD Coordinator

## 2024-03-14 ENCOUNTER — ANTI-COAG VISIT (OUTPATIENT)
Age: 64
End: 2024-03-14

## 2024-03-14 DIAGNOSIS — Z79.01 CHRONIC ANTICOAGULATION: Primary | ICD-10-CM

## 2024-03-14 LAB — INR BLD: 2.5

## 2024-03-14 NOTE — PROGRESS NOTES
1458: call placed to patient to verify coumadin dosing taken last night (patient was due to check INR yesterday). Left voicemail requesting call back from patient.     1546: Reviewed with RUBEN Garibay, provider notified unsure of dosing patient took last night. RUBEN Garibay, NP  made the following recommendations (VORB): 2mg tonight, 1 mg tomorrow and recheck inr Monday.    1654: placed call to patient with coumadin instructions. Patient states that he also help coumadin last night. Anticoag tracker updated. Reviewed updated anticoag tracker with RUBEN Garibay who stated VORB patient should follow same coumadin instructions of 2mg tonight, 1 mg tomorrow and recheck Monday. Reviewed with patient who verbalized understanding.     Requested patient present to [facility] to have labwork done as soon as possible. He verbalized understanding.      Funmi Barth RN

## 2024-03-15 ENCOUNTER — TELEPHONE (OUTPATIENT)
Age: 64
End: 2024-03-15

## 2024-03-15 DIAGNOSIS — I50.22 CHRONIC SYSTOLIC HEART FAILURE (HCC): ICD-10-CM

## 2024-03-15 DIAGNOSIS — I50.21 ACUTE SYSTOLIC (CONGESTIVE) HEART FAILURE (HCC): ICD-10-CM

## 2024-03-15 NOTE — TELEPHONE ENCOUNTER
Telephone Call RE:  Lab Reminder      Outcome:     [x] Patient verbalizes understanding    [] Unable to reach   [] Left message              []       Julian Gómez

## 2024-03-18 ENCOUNTER — TELEPHONE (OUTPATIENT)
Age: 64
End: 2024-03-18

## 2024-03-18 RX ORDER — METOPROLOL SUCCINATE 50 MG/1
50 TABLET, EXTENDED RELEASE ORAL 2 TIMES DAILY
Qty: 60 TABLET | Refills: 2 | Status: ON HOLD | OUTPATIENT
Start: 2024-03-18

## 2024-03-18 NOTE — TELEPHONE ENCOUNTER
Received a call from Ashley, stating patient has been short of breath, tired, no appetite, has not gotten labs because he cannot get out of bed, has not been able to walk, and is refusing to come to hospital since Thursday.  She plans to bring him to hospital today as soon as she gets off of work, in case you would like to call her, work number is [phone], ext \"[phone]\" until 6:30.

## 2024-03-18 NOTE — TELEPHONE ENCOUNTER
Requested Prescriptions     Signed Prescriptions Disp Refills    metoprolol succinate (TOPROL XL) 50 MG extended release tablet 60 tablet 2     Sig: Take 1 tablet by mouth in the morning and at bedtime     Authorizing Provider: OZIEL NAVARRO     Ordering User: CASEY MONTEMAYOR

## 2024-03-18 NOTE — TELEPHONE ENCOUNTER
Call placed to patient regarding family concerns and need to come to hospital.  Left voicemail with patient requesting call back to discuss symptoms. Reviewed with RUBEN Garibay who confirmed provider aware patient's wife will try to bring him to the ER today.

## 2024-03-19 ENCOUNTER — APPOINTMENT (OUTPATIENT)
Facility: HOSPITAL | Age: 64
DRG: 871 | End: 2024-03-19
Payer: MEDICARE

## 2024-03-19 ENCOUNTER — HOSPITAL ENCOUNTER (INPATIENT)
Facility: HOSPITAL | Age: 64
LOS: 21 days | Discharge: HOME HEALTH | DRG: 871 | End: 2024-04-09
Attending: EMERGENCY MEDICINE | Admitting: STUDENT IN AN ORGANIZED HEALTH CARE EDUCATION/TRAINING PROGRAM
Payer: MEDICARE

## 2024-03-19 ENCOUNTER — TELEPHONE (OUTPATIENT)
Age: 64
End: 2024-03-19

## 2024-03-19 DIAGNOSIS — Z95.811 LVAD (LEFT VENTRICULAR ASSIST DEVICE) PRESENT (HCC): ICD-10-CM

## 2024-03-19 DIAGNOSIS — J96.21 ACUTE ON CHRONIC RESPIRATORY FAILURE WITH HYPOXIA (HCC): Primary | ICD-10-CM

## 2024-03-19 DIAGNOSIS — R79.89 ELEVATED TROPONIN: ICD-10-CM

## 2024-03-19 LAB
ACCESSION NUMBER, LLC1M: ABNORMAL
ACINETOBACTER CALCOAC BAUMANNII COMPLEX BY PCR: NOT DETECTED
ALBUMIN SERPL-MCNC: 2.3 G/DL (ref 3.5–5)
ALBUMIN/GLOB SERPL: 0.4 (ref 1.1–2.2)
ALP SERPL-CCNC: 144 U/L (ref 45–117)
ALT SERPL-CCNC: 44 U/L (ref 12–78)
ANION GAP SERPL CALC-SCNC: 8 MMOL/L (ref 5–15)
ARTERIAL PATENCY WRIST A: POSITIVE
AST SERPL-CCNC: 86 U/L (ref 15–37)
B FRAGILIS DNA BLD POS QL NAA+NON-PROBE: NOT DETECTED
B PERT DNA SPEC QL NAA+PROBE: NOT DETECTED
BASE EXCESS BLD CALC-SCNC: 2 MMOL/L
BASOPHILS # BLD: 0 K/UL (ref 0–0.1)
BASOPHILS NFR BLD: 0 % (ref 0–1)
BDY SITE: ABNORMAL
BILIRUB SERPL-MCNC: 0.5 MG/DL (ref 0.2–1)
BIOFIRE TEST COMMENT: ABNORMAL
BORDETELLA PARAPERTUSSIS BY PCR: NOT DETECTED
BUN SERPL-MCNC: 36 MG/DL (ref 6–20)
BUN/CREAT SERPL: 16 (ref 12–20)
C ALBICANS DNA BLD POS QL NAA+NON-PROBE: NOT DETECTED
C AURIS DNA BLD POS QL NAA+NON-PROBE: NOT DETECTED
C GATTII+NEOFOR DNA BLD POS QL NAA+N-PRB: NOT DETECTED
C GLABRATA DNA BLD POS QL NAA+NON-PROBE: NOT DETECTED
C KRUSEI DNA BLD POS QL NAA+NON-PROBE: NOT DETECTED
C PARAP DNA BLD POS QL NAA+NON-PROBE: NOT DETECTED
C PNEUM DNA SPEC QL NAA+PROBE: NOT DETECTED
C TROPICLS DNA BLD POS QL NAA+NON-PROBE: NOT DETECTED
CALCIUM SERPL-MCNC: 8.4 MG/DL (ref 8.5–10.1)
CHLORIDE SERPL-SCNC: 99 MMOL/L (ref 97–108)
CO2 SERPL-SCNC: 27 MMOL/L (ref 21–32)
COMMENT:: NORMAL
CREAT SERPL-MCNC: 2.3 MG/DL (ref 0.7–1.3)
DIFFERENTIAL METHOD BLD: ABNORMAL
E CLOAC COMP DNA BLD POS NAA+NON-PROBE: NOT DETECTED
E COLI DNA BLD POS QL NAA+NON-PROBE: NOT DETECTED
E FAECALIS DNA BLD POS QL NAA+NON-PROBE: NOT DETECTED
E FAECIUM DNA BLD POS QL NAA+NON-PROBE: NOT DETECTED
EKG DIAGNOSIS: NORMAL
EKG Q-T INTERVAL: 328 MS
EKG QRS DURATION: 52 MS
EKG QTC CALCULATION (BAZETT): 455 MS
EKG R AXIS: 256 DEGREES
EKG T AXIS: 90 DEGREES
EKG VENTRICULAR RATE: 116 BPM
ENTEROBACTERALES DNA BLD POS NAA+N-PRB: NOT DETECTED
EOSINOPHIL # BLD: 0 K/UL (ref 0–0.4)
EOSINOPHIL NFR BLD: 0 % (ref 0–7)
ERYTHROCYTE [DISTWIDTH] IN BLOOD BY AUTOMATED COUNT: 19.5 % (ref 11.5–14.5)
FLUAV RNA SPEC QL NAA+PROBE: NOT DETECTED
FLUAV SUBTYP SPEC NAA+PROBE: NOT DETECTED
FLUBV RNA SPEC QL NAA+PROBE: NOT DETECTED
FLUBV RNA SPEC QL NAA+PROBE: NOT DETECTED
GAS FLOW.O2 O2 DELIVERY SYS: ABNORMAL
GLOBULIN SER CALC-MCNC: 5.4 G/DL (ref 2–4)
GLUCOSE SERPL-MCNC: 117 MG/DL (ref 65–100)
GP B STREP DNA BLD POS QL NAA+NON-PROBE: NOT DETECTED
HADV DNA SPEC QL NAA+PROBE: NOT DETECTED
HAEM INFLU DNA BLD POS QL NAA+NON-PROBE: NOT DETECTED
HCO3 BLD-SCNC: 24.9 MMOL/L (ref 22–26)
HCOV 229E RNA SPEC QL NAA+PROBE: NOT DETECTED
HCOV HKU1 RNA SPEC QL NAA+PROBE: NOT DETECTED
HCOV NL63 RNA SPEC QL NAA+PROBE: NOT DETECTED
HCOV OC43 RNA SPEC QL NAA+PROBE: NOT DETECTED
HCT VFR BLD AUTO: 27.5 % (ref 36.6–50.3)
HGB BLD-MCNC: 8.5 G/DL (ref 12.1–17)
HMPV RNA SPEC QL NAA+PROBE: NOT DETECTED
HPIV1 RNA SPEC QL NAA+PROBE: NOT DETECTED
HPIV2 RNA SPEC QL NAA+PROBE: NOT DETECTED
HPIV3 RNA SPEC QL NAA+PROBE: NOT DETECTED
HPIV4 RNA SPEC QL NAA+PROBE: NOT DETECTED
IMM GRANULOCYTES # BLD AUTO: 0 K/UL
IMM GRANULOCYTES NFR BLD AUTO: 0 %
INR PPP: 7 (ref 0.9–1.1)
INR PPP: 9.2 (ref 0.9–1.1)
IPAP/PIP/HIGH PEEP: 12
K OXYTOCA DNA BLD POS QL NAA+NON-PROBE: NOT DETECTED
KLEBSIELLA SP DNA BLD POS QL NAA+NON-PRB: NOT DETECTED
KLEBSIELLA SP DNA BLD POS QL NAA+NON-PRB: NOT DETECTED
L MONOCYTOG DNA BLD POS QL NAA+NON-PROBE: NOT DETECTED
LACTATE SERPL-SCNC: 1.3 MMOL/L (ref 0.4–2)
LACTATE SERPL-SCNC: 1.4 MMOL/L (ref 0.4–2)
LYMPHOCYTES # BLD: 1.7 K/UL (ref 0.8–3.5)
LYMPHOCYTES NFR BLD: 6 % (ref 12–49)
M PNEUMO DNA SPEC QL NAA+PROBE: NOT DETECTED
MCH RBC QN AUTO: 24.6 PG (ref 26–34)
MCHC RBC AUTO-ENTMCNC: 30.9 G/DL (ref 30–36.5)
MCV RBC AUTO: 79.7 FL (ref 80–99)
MECA+MECC+MREJ ISLT/SPM QL: NOT DETECTED
METAMYELOCYTES NFR BLD MANUAL: 1 %
MONOCYTES # BLD: 0.8 K/UL (ref 0–1)
MONOCYTES NFR BLD: 3 % (ref 5–13)
MYELOCYTES NFR BLD MANUAL: 1 %
N MEN DNA BLD POS QL NAA+NON-PROBE: NOT DETECTED
NEUTS SEG # BLD: 24.6 K/UL (ref 1.8–8)
NEUTS SEG NFR BLD: 89 % (ref 32–75)
NRBC # BLD: 0 K/UL (ref 0–0.01)
NRBC BLD-RTO: 0 PER 100 WBC
O2/TOTAL GAS SETTING VFR VENT: 50 %
P AERUGINOSA DNA BLD POS NAA+NON-PROBE: NOT DETECTED
PCO2 BLD: 32.9 MMHG (ref 35–45)
PEEP RESPIRATORY: 6 CMH2O
PH BLD: 7.49 (ref 7.35–7.45)
PLATELET # BLD AUTO: 431 K/UL (ref 150–400)
PLATELET COMMENT: ABNORMAL
PMV BLD AUTO: 12.3 FL (ref 8.9–12.9)
PO2 BLD: 208 MMHG (ref 80–100)
POTASSIUM SERPL-SCNC: 4.1 MMOL/L (ref 3.5–5.1)
PROT SERPL-MCNC: 7.7 G/DL (ref 6.4–8.2)
PROTEUS SP DNA BLD POS QL NAA+NON-PROBE: NOT DETECTED
PROTHROMBIN TIME: 64.9 SEC (ref 9–11.1)
PROTHROMBIN TIME: 83.7 SEC (ref 9–11.1)
RBC # BLD AUTO: 3.45 M/UL (ref 4.1–5.7)
RBC MORPH BLD: ABNORMAL
RESISTANT GENE TARGETS: ABNORMAL
RSV RNA SPEC QL NAA+PROBE: NOT DETECTED
RV+EV RNA SPEC QL NAA+PROBE: NOT DETECTED
S AUREUS DNA BLD POS QL NAA+NON-PROBE: DETECTED
S AUREUS+CONS DNA BLD POS NAA+NON-PROBE: DETECTED
S EPIDERMIDIS DNA BLD POS QL NAA+NON-PRB: NOT DETECTED
S LUGDUNENSIS DNA BLD POS QL NAA+NON-PRB: NOT DETECTED
S MALTOPHILIA DNA BLD POS QL NAA+NON-PRB: NOT DETECTED
S MARCESCENS DNA BLD POS NAA+NON-PROBE: NOT DETECTED
S PNEUM DNA BLD POS QL NAA+NON-PROBE: NOT DETECTED
S PYO DNA BLD POS QL NAA+NON-PROBE: NOT DETECTED
SALMONELLA DNA BLD POS QL NAA+NON-PROBE: NOT DETECTED
SAO2 % BLD: 99.8 % (ref 92–97)
SARS-COV-2 RNA RESP QL NAA+PROBE: NOT DETECTED
SARS-COV-2 RNA RESP QL NAA+PROBE: NOT DETECTED
SODIUM SERPL-SCNC: 134 MMOL/L (ref 136–145)
SPECIMEN HOLD: NORMAL
SPECIMEN TYPE: ABNORMAL
STREPTOCOCCUS DNA BLD POS NAA+NON-PROBE: NOT DETECTED
TROPONIN I SERPL HS-MCNC: 4481 NG/L (ref 0–76)
VENTILATION MODE VENT: ABNORMAL
WBC # BLD AUTO: 27.6 K/UL (ref 4.1–11.1)

## 2024-03-19 PROCEDURE — 6360000002 HC RX W HCPCS: Performed by: HOSPITALIST

## 2024-03-19 PROCEDURE — 6370000000 HC RX 637 (ALT 250 FOR IP): Performed by: STUDENT IN AN ORGANIZED HEALTH CARE EDUCATION/TRAINING PROGRAM

## 2024-03-19 PROCEDURE — 87205 SMEAR GRAM STAIN: CPT

## 2024-03-19 PROCEDURE — 87154 CUL TYP ID BLD PTHGN 6+ TRGT: CPT

## 2024-03-19 PROCEDURE — 5A09457 ASSISTANCE WITH RESPIRATORY VENTILATION, 24-96 CONSECUTIVE HOURS, CONTINUOUS POSITIVE AIRWAY PRESSURE: ICD-10-PCS | Performed by: STUDENT IN AN ORGANIZED HEALTH CARE EDUCATION/TRAINING PROGRAM

## 2024-03-19 PROCEDURE — 6360000002 HC RX W HCPCS: Performed by: STUDENT IN AN ORGANIZED HEALTH CARE EDUCATION/TRAINING PROGRAM

## 2024-03-19 PROCEDURE — 99233 SBSQ HOSP IP/OBS HIGH 50: CPT | Performed by: INTERNAL MEDICINE

## 2024-03-19 PROCEDURE — 71045 X-RAY EXAM CHEST 1 VIEW: CPT

## 2024-03-19 PROCEDURE — 2500000003 HC RX 250 WO HCPCS: Performed by: STUDENT IN AN ORGANIZED HEALTH CARE EDUCATION/TRAINING PROGRAM

## 2024-03-19 PROCEDURE — 2580000003 HC RX 258: Performed by: EMERGENCY MEDICINE

## 2024-03-19 PROCEDURE — 94762 N-INVAS EAR/PLS OXIMTRY CONT: CPT

## 2024-03-19 PROCEDURE — 87147 CULTURE TYPE IMMUNOLOGIC: CPT

## 2024-03-19 PROCEDURE — 87636 SARSCOV2 & INF A&B AMP PRB: CPT

## 2024-03-19 PROCEDURE — 2580000003 HC RX 258: Performed by: HOSPITALIST

## 2024-03-19 PROCEDURE — 36600 WITHDRAWAL OF ARTERIAL BLOOD: CPT

## 2024-03-19 PROCEDURE — 80053 COMPREHEN METABOLIC PANEL: CPT

## 2024-03-19 PROCEDURE — 85025 COMPLETE CBC W/AUTO DIFF WBC: CPT

## 2024-03-19 PROCEDURE — 84484 ASSAY OF TROPONIN QUANT: CPT

## 2024-03-19 PROCEDURE — 2580000003 HC RX 258: Performed by: STUDENT IN AN ORGANIZED HEALTH CARE EDUCATION/TRAINING PROGRAM

## 2024-03-19 PROCEDURE — 96361 HYDRATE IV INFUSION ADD-ON: CPT

## 2024-03-19 PROCEDURE — 6370000000 HC RX 637 (ALT 250 FOR IP): Performed by: NURSE PRACTITIONER

## 2024-03-19 PROCEDURE — 99291 CRITICAL CARE FIRST HOUR: CPT

## 2024-03-19 PROCEDURE — 87040 BLOOD CULTURE FOR BACTERIA: CPT

## 2024-03-19 PROCEDURE — 2060000000 HC ICU INTERMEDIATE R&B

## 2024-03-19 PROCEDURE — 94660 CPAP INITIATION&MGMT: CPT

## 2024-03-19 PROCEDURE — 96374 THER/PROPH/DIAG INJ IV PUSH: CPT

## 2024-03-19 PROCEDURE — 83605 ASSAY OF LACTIC ACID: CPT

## 2024-03-19 PROCEDURE — 36415 COLL VENOUS BLD VENIPUNCTURE: CPT

## 2024-03-19 PROCEDURE — 85610 PROTHROMBIN TIME: CPT

## 2024-03-19 PROCEDURE — 6370000000 HC RX 637 (ALT 250 FOR IP): Performed by: EMERGENCY MEDICINE

## 2024-03-19 PROCEDURE — 0202U NFCT DS 22 TRGT SARS-COV-2: CPT

## 2024-03-19 PROCEDURE — 93005 ELECTROCARDIOGRAM TRACING: CPT | Performed by: EMERGENCY MEDICINE

## 2024-03-19 PROCEDURE — APPNB180 APP NON BILLABLE TIME > 60 MINS: Performed by: NURSE PRACTITIONER

## 2024-03-19 PROCEDURE — 6360000002 HC RX W HCPCS: Performed by: EMERGENCY MEDICINE

## 2024-03-19 PROCEDURE — 93750 INTERROGATION VAD IN PERSON: CPT | Performed by: NURSE PRACTITIONER

## 2024-03-19 PROCEDURE — 82803 BLOOD GASES ANY COMBINATION: CPT

## 2024-03-19 PROCEDURE — 94640 AIRWAY INHALATION TREATMENT: CPT

## 2024-03-19 PROCEDURE — 87070 CULTURE OTHR SPECIMN AEROBIC: CPT

## 2024-03-19 PROCEDURE — 87077 CULTURE AEROBIC IDENTIFY: CPT

## 2024-03-19 PROCEDURE — 87186 SC STD MICRODIL/AGAR DIL: CPT

## 2024-03-19 RX ORDER — ACETAMINOPHEN 325 MG/1
650 TABLET ORAL EVERY 6 HOURS PRN
Status: DISCONTINUED | OUTPATIENT
Start: 2024-03-19 | End: 2024-04-09 | Stop reason: HOSPADM

## 2024-03-19 RX ORDER — POLYETHYLENE GLYCOL 3350 17 G/17G
17 POWDER, FOR SOLUTION ORAL DAILY PRN
Status: DISCONTINUED | OUTPATIENT
Start: 2024-03-19 | End: 2024-04-09 | Stop reason: HOSPADM

## 2024-03-19 RX ORDER — AMLODIPINE BESYLATE 5 MG/1
5 TABLET ORAL 2 TIMES DAILY
Status: DISCONTINUED | OUTPATIENT
Start: 2024-03-19 | End: 2024-04-09 | Stop reason: HOSPADM

## 2024-03-19 RX ORDER — POTASSIUM CHLORIDE 750 MG/1
40 TABLET, FILM COATED, EXTENDED RELEASE ORAL PRN
Status: DISCONTINUED | OUTPATIENT
Start: 2024-03-19 | End: 2024-04-09 | Stop reason: HOSPADM

## 2024-03-19 RX ORDER — MAGNESIUM SULFATE IN WATER 40 MG/ML
2000 INJECTION, SOLUTION INTRAVENOUS ONCE
Status: DISCONTINUED | OUTPATIENT
Start: 2024-03-19 | End: 2024-04-09 | Stop reason: HOSPADM

## 2024-03-19 RX ORDER — ACETAMINOPHEN 650 MG/1
650 SUPPOSITORY RECTAL EVERY 6 HOURS PRN
Status: DISCONTINUED | OUTPATIENT
Start: 2024-03-19 | End: 2024-04-09 | Stop reason: HOSPADM

## 2024-03-19 RX ORDER — SODIUM CHLORIDE 9 MG/ML
INJECTION, SOLUTION INTRAVENOUS PRN
Status: DISCONTINUED | OUTPATIENT
Start: 2024-03-19 | End: 2024-04-09 | Stop reason: HOSPADM

## 2024-03-19 RX ORDER — ONDANSETRON 2 MG/ML
4 INJECTION INTRAMUSCULAR; INTRAVENOUS EVERY 6 HOURS PRN
Status: DISCONTINUED | OUTPATIENT
Start: 2024-03-19 | End: 2024-04-09 | Stop reason: HOSPADM

## 2024-03-19 RX ORDER — METOPROLOL SUCCINATE 50 MG/1
50 TABLET, EXTENDED RELEASE ORAL 2 TIMES DAILY
Status: DISCONTINUED | OUTPATIENT
Start: 2024-03-19 | End: 2024-04-09 | Stop reason: HOSPADM

## 2024-03-19 RX ORDER — 0.9 % SODIUM CHLORIDE 0.9 %
250 INTRAVENOUS SOLUTION INTRAVENOUS ONCE
Status: COMPLETED | OUTPATIENT
Start: 2024-03-19 | End: 2024-03-19

## 2024-03-19 RX ORDER — IPRATROPIUM BROMIDE AND ALBUTEROL SULFATE 2.5; .5 MG/3ML; MG/3ML
1 SOLUTION RESPIRATORY (INHALATION)
Status: DISCONTINUED | OUTPATIENT
Start: 2024-03-19 | End: 2024-03-20

## 2024-03-19 RX ORDER — ONDANSETRON 4 MG/1
4 TABLET, ORALLY DISINTEGRATING ORAL EVERY 8 HOURS PRN
Status: DISCONTINUED | OUTPATIENT
Start: 2024-03-19 | End: 2024-04-09 | Stop reason: HOSPADM

## 2024-03-19 RX ORDER — POTASSIUM CHLORIDE 7.45 MG/ML
10 INJECTION INTRAVENOUS PRN
Status: DISCONTINUED | OUTPATIENT
Start: 2024-03-19 | End: 2024-04-09 | Stop reason: HOSPADM

## 2024-03-19 RX ORDER — MAGNESIUM SULFATE IN WATER 40 MG/ML
2000 INJECTION, SOLUTION INTRAVENOUS PRN
Status: DISCONTINUED | OUTPATIENT
Start: 2024-03-19 | End: 2024-04-09 | Stop reason: HOSPADM

## 2024-03-19 RX ORDER — PANTOPRAZOLE SODIUM 40 MG/1
40 TABLET, DELAYED RELEASE ORAL
Status: DISCONTINUED | OUTPATIENT
Start: 2024-03-19 | End: 2024-04-09 | Stop reason: HOSPADM

## 2024-03-19 RX ORDER — SODIUM CHLORIDE 0.9 % (FLUSH) 0.9 %
5-40 SYRINGE (ML) INJECTION EVERY 12 HOURS SCHEDULED
Status: DISCONTINUED | OUTPATIENT
Start: 2024-03-19 | End: 2024-04-09 | Stop reason: HOSPADM

## 2024-03-19 RX ORDER — HYDRALAZINE HYDROCHLORIDE 20 MG/ML
10 INJECTION INTRAMUSCULAR; INTRAVENOUS EVERY 4 HOURS PRN
Status: DISCONTINUED | OUTPATIENT
Start: 2024-03-19 | End: 2024-04-09 | Stop reason: HOSPADM

## 2024-03-19 RX ORDER — IPRATROPIUM BROMIDE AND ALBUTEROL SULFATE 2.5; .5 MG/3ML; MG/3ML
1 SOLUTION RESPIRATORY (INHALATION)
Status: COMPLETED | OUTPATIENT
Start: 2024-03-19 | End: 2024-03-19

## 2024-03-19 RX ORDER — SODIUM CHLORIDE 0.9 % (FLUSH) 0.9 %
5-40 SYRINGE (ML) INJECTION PRN
Status: DISCONTINUED | OUTPATIENT
Start: 2024-03-19 | End: 2024-04-09 | Stop reason: HOSPADM

## 2024-03-19 RX ORDER — HYDRALAZINE HYDROCHLORIDE 25 MG/1
25 TABLET, FILM COATED ORAL EVERY 8 HOURS SCHEDULED
Status: DISCONTINUED | OUTPATIENT
Start: 2024-03-19 | End: 2024-04-03

## 2024-03-19 RX ADMIN — IPRATROPIUM BROMIDE AND ALBUTEROL SULFATE 1 DOSE: .5; 3 SOLUTION RESPIRATORY (INHALATION) at 15:16

## 2024-03-19 RX ADMIN — IPRATROPIUM BROMIDE AND ALBUTEROL SULFATE 1 DOSE: .5; 3 SOLUTION RESPIRATORY (INHALATION) at 21:45

## 2024-03-19 RX ADMIN — SODIUM CHLORIDE 250 ML: 9 INJECTION, SOLUTION INTRAVENOUS at 02:44

## 2024-03-19 RX ADMIN — SODIUM CHLORIDE 100 MG: 900 INJECTION INTRAVENOUS at 16:15

## 2024-03-19 RX ADMIN — HYDRALAZINE HYDROCHLORIDE 25 MG: 25 TABLET ORAL at 14:10

## 2024-03-19 RX ADMIN — METOPROLOL SUCCINATE 50 MG: 50 TABLET, EXTENDED RELEASE ORAL at 09:27

## 2024-03-19 RX ADMIN — IPRATROPIUM BROMIDE AND ALBUTEROL SULFATE 1 DOSE: .5; 3 SOLUTION RESPIRATORY (INHALATION) at 02:59

## 2024-03-19 RX ADMIN — HYDRALAZINE HYDROCHLORIDE 25 MG: 25 TABLET ORAL at 21:43

## 2024-03-19 RX ADMIN — ARFORMOTEROL TARTRATE: 15 SOLUTION RESPIRATORY (INHALATION) at 07:54

## 2024-03-19 RX ADMIN — METOPROLOL SUCCINATE 50 MG: 50 TABLET, EXTENDED RELEASE ORAL at 21:43

## 2024-03-19 RX ADMIN — IPRATROPIUM BROMIDE AND ALBUTEROL SULFATE 1 DOSE: .5; 3 SOLUTION RESPIRATORY (INHALATION) at 07:54

## 2024-03-19 RX ADMIN — Medication 2.5 MG: at 14:56

## 2024-03-19 RX ADMIN — IPRATROPIUM BROMIDE 0.5 MG: 0.5 SOLUTION RESPIRATORY (INHALATION) at 07:54

## 2024-03-19 RX ADMIN — PANTOPRAZOLE SODIUM 40 MG: 40 TABLET, DELAYED RELEASE ORAL at 09:27

## 2024-03-19 RX ADMIN — ACETAMINOPHEN 650 MG: 325 TABLET ORAL at 09:27

## 2024-03-19 RX ADMIN — AMLODIPINE BESYLATE 5 MG: 5 TABLET ORAL at 09:30

## 2024-03-19 RX ADMIN — AMLODIPINE BESYLATE 5 MG: 5 TABLET ORAL at 21:43

## 2024-03-19 RX ADMIN — ARFORMOTEROL TARTRATE: 15 SOLUTION RESPIRATORY (INHALATION) at 21:45

## 2024-03-19 RX ADMIN — CEFEPIME 2000 MG: 2 INJECTION, POWDER, FOR SOLUTION INTRAVENOUS at 02:44

## 2024-03-19 RX ADMIN — SODIUM CHLORIDE, PRESERVATIVE FREE 10 ML: 5 INJECTION INTRAVENOUS at 09:34

## 2024-03-19 RX ADMIN — WATER 40 MG: 1 INJECTION INTRAMUSCULAR; INTRAVENOUS; SUBCUTANEOUS at 21:43

## 2024-03-19 RX ADMIN — PANTOPRAZOLE SODIUM 40 MG: 40 TABLET, DELAYED RELEASE ORAL at 16:16

## 2024-03-19 RX ADMIN — IPRATROPIUM BROMIDE AND ALBUTEROL SULFATE 1 DOSE: .5; 3 SOLUTION RESPIRATORY (INHALATION) at 11:54

## 2024-03-19 NOTE — PROGRESS NOTES
Occupational Therapy   03.19.2024    Orders acknowledged and chart reviewed in prep for OT evaluation. Noted patient currently on CPAP and with an INR of 7 (therapeutic range 1.7-5). Also noted patient troponin 4481. Will defer and follow up as able and medically appropriate. Thank you.     Claudia Monroe MS, OTR/L

## 2024-03-19 NOTE — PROGRESS NOTES
[facility]                                                                                          Hospitalist Progress Note  Gareth Hou MD  Office Phone: [phone]        Date of Service:  3/19/2024  NAME:  Sanford Joiner  :  1960  MRN:  [id number]       Admission Summary:   Sanford Joiner is a 64 y.o. 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  Patient states he had been in his usual state of health until about 24 hours ago when he developed progressive dyspnea.  Initially had dyspneic symptoms with minimal exertion and further progression to dyspnea occurring at rest.  He has had what he describes as a mild nonproductive cough.  Denies any ongoing tobacco use.  Denies any recent travel or sick contacts.  The patient denies any fever, chills, chest or abdominal pain, nausea, vomiting, congestion, recent illness, palpitations, or dysuria.     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

## 2024-03-19 NOTE — PROGRESS NOTES
Pharmacist Note - Warfarin Dosing  Consult provided for this 64 y.o.male to manage warfarin for LVAD.    INR Goal: 2 - 3    Home regimen: 2 mg PO daily (supratherapeutic at last AC visit; dose was being adjusted)    Drugs that may increase INR: None  Drugs that may decrease INR: None  Other medications that may increase bleeding risk: None  Risk factors: None  Daily INR ordered through: 4/30    Recent Labs     03/19/24  0056   HGB 8.5*   INR 7.0*     Date               INR                  Dose  3/19  7  Hold                                                                                 Assessment/ Plan:  Will hold warfarin x 1 dose.    Pharmacy will continue to monitor daily and adjust therapy as indicated.  Please contact the pharmacist at [phone] for outpatient recommendations if needed.

## 2024-03-19 NOTE — CARE COORDINATION
Care Management Initial Assessment       RUR: 24%  Readmission? No  1st IM letter given? Yes - 03/19/24    Patient admitted with COPD exacerbation.    Attempted to meet with patient in room to complete initial assessment. Patient is currently on bi-pap. Explained IM letter and verbal consent obtained. Left with patient's belongings. He gave permission for CM to call his wife.     Called patient's wife Ashley Swan ([phone]). Left voicemail for return call.        03/19/24 3215   Service Assessment   Patient Orientation Unable to Assess

## 2024-03-19 NOTE — H&P
input(s): \"PH\", \"PCO2\", \"PO2\", \"HCO3\", \"FIO2\" in the last 72 hours.    24 Hour Results:  Recent Results (from the past 24 hour(s))   EKG 12 Lead    Collection Time: 03/19/24 12:42 AM   Result Value Ref Range    Ventricular Rate 116 BPM    QRS Duration 52 ms    Q-T Interval 328 ms    QTc Calculation (Bazett) 455 ms    R Axis 256 degrees    T Axis 90 degrees    Diagnosis       ** Suspect arm lead reversal, interpretation assumes no reversal  Atrial fibrillation with rapid ventricular response with premature   ventricular or aberrantly conducted complexes  Low voltage QRS  Inferior infarct , age undetermined  Anterolateral infarct (cited on or before 08-JAN-2024)  Abnormal ECG  When compared with ECG of 08-JAN-2024 13:08,  Atrial fibrillation has replaced Sinus rhythm  Incomplete right bundle branch block is no longer present  Inferior infarct is now present  Questionable change in initial forces of Anteroseptal leads     CBC with Auto Differential    Collection Time: 03/19/24 12:56 AM   Result Value Ref Range    WBC 27.6 (H) 4.1 - 11.1 K/uL    RBC 3.45 (L) 4.10 - 5.70 M/uL    Hemoglobin 8.5 (L) 12.1 - 17.0 g/dL    Hematocrit 27.5 (L) 36.6 - 50.3 %    MCV 79.7 (L) 80.0 - 99.0 FL    MCH 24.6 (L) 26.0 - 34.0 PG    MCHC 30.9 30.0 - 36.5 g/dL    RDW 19.5 (H) 11.5 - 14.5 %    Platelets 431 (H) 150 - 400 K/uL    MPV 12.3 8.9 - 12.9 FL    Nucleated RBCs 0.0 0  WBC    nRBC 0.00 0.00 - 0.01 K/uL    Neutrophils % 89 (H) 32 - 75 %    Lymphocytes % 6 (L) 12 - 49 %    Monocytes % 3 (L) 5 - 13 %    Eosinophils % 0 0 - 7 %    Basophils % 0 0 - 1 %    Metamyelocytes 1 (H) 0 %    Myelocytes 1 (H) 0 %    Immature Granulocytes 0 %    Neutrophils Absolute 24.6 (H) 1.8 - 8.0 K/UL    Lymphocytes Absolute 1.7 0.8 - 3.5 K/UL    Monocytes Absolute 0.8 0.0 - 1.0 K/UL    Eosinophils Absolute 0.0 0.0 - 0.4 K/UL    Basophils Absolute 0.0 0.0 - 0.1 K/UL    Absolute Immature Granulocyte 0.0 K/UL    Differential Type MANUAL      Platelet  participated in the decision-making and personally managed or directed the management of the following life and organ supporting interventions that required my frequent assessment to treat or prevent imminent deterioration.     I personally spent 65 minutes of critical care time.  This is time spent at this critically ill patient's bedside actively involved in patient care as well as the coordination of care and discussions with the patient's family.  This does not include any procedural time which has been billed separately.         Signed By: Aidee Gonzales MD     March 19, 2024

## 2024-03-19 NOTE — PROGRESS NOTES
0800: Received report from off-going RN. Assumed care.    0930: Patient is Alert and Oriented x3 c/o chills. Wife at bedside; update given. Patient soiled personal clothing; provided care. Patient currently febrile with a temp of 102.7; PRN Tylenol given for relief. Oral care provided.    1135: Blood drawn per order. Patient states, he feels better. External Catheter placed.    1345: Received critical results from Lab. Patient's PT/INR 83.7/ 9.2. Perfect Served Dr. Hou and Nessa Maurer, ROSSANA. Currently there is no signs of bleeding.
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
## 2024-03-19 NOTE — PROGRESS NOTES
Physical Therapy 3/19/2024    Orders received and chart reviewed up to date. Pt with increase in INR (7 to 9), therapeutic range is 2-3 for LVAD. Will defer PT at this time and continue to follow.     Thank you.  Rosenda Stevenson, PT, DPT

## 2024-03-19 NOTE — PROGRESS NOTES
ADVANCED HEART FAILURE CENTER  [facility] in [place], [place]  Inpatient Progress Note      Patient name: Sanford Joiner  Patient : 1960  Patient MRN: [id number]  Date of service: 24    Chief Complaint   Patient presents with    Shortness of Breath    Dizziness    Follow-Up from Hospital     LVAD        INTERVAL HISTORY:  24 Sanford Joiner presents to ED for worsening MOORE that started a few days ago. He was hypoxic on arrival and placed on Bipap. He denies sick contacts, he does have a chronic drive line infection on suppressive PO antibiotics. He denies VAD alarms, changes in medications. Drive line site is unchanged.     ASSESSMENT:  Sanford Joiner is a 64 y.o. male admitted with driveline infection and possible upper GI bleed, reporting 3-4 days of dark stools.   History of chronic systolic heart failure due to non ischemic cardiomyopathy, s/p HM3 LVAD implantation ([facility] 2023) as destination therapy, stage D, remains NYHA class IV despite LVAD support due to severe COPD; currently with recovered LVEF to 55% and small LV cavity with resulting low flow alarms; optimal GDMT limited by hypovolemia  Patient is not a candidate for heart transplant due to advanced COPD -  declined by [facility] for both LVAD-DT and transplant and for the same reasons declined for LVAD-DT by [facility]     RECOMMENDATIONS:  Leukocytosis  Suspected COPD exacerbation  Continue IV antibiotics per hospitalist  Pan culture pending     Respiratory Distress  ABG ok this am  Wean bipap as tolerated  Management per hospitalist     Left Ventricular Assist Device/Driveline Infection  Continue current device speed at 5200 rpm  Low flow alarm threshold lowered on 1/15/24  No low flow alarms noted   Continue dressing changes three times weekly  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    Had first dose of

## 2024-03-19 NOTE — CONSULTS
Pulmonary, Critical Care, and Sleep Medicine~Consult Note    Name: Sanford Joiner MRN: [id number]   : 1960 Hospital: [facility]   Date: 3/19/2024 5:08 PM Admission: 3/19/2024     Impression Plan   Sepsis; source unclear.  Acute respiratory alkalosis from sepsis.  Positive BC (1/2 bottle G+cocci): contaminant vs infection?  Acute exacerbation of asthma/ copd; missed his dose of Nucala last week.  Cardiomyopathy; s/p LVAD HM3 - 3/2023.  Hx of chronic drive line infection.  Elevated INR: warfarin on hold. Agree with abx. Add Vanco?  Consider ID consult.  CXR clear. Consider non contrast ct chest.  UA, UC if possible.  Follow blood cultures.  Agree with steroids and bronchodilators.  Can come off BiPAP as resp alkalotic from sepsis.  Low threshold to Tx to ICU if clinical worsening.    Thank you for the consult.  D/w Dr Hou.     Pulmonary Consultation:    64-year-old male with past medical history significant for bronchial asthma/COPD overlap syndrome, cardiomyopathy status post LVAD placement 3/27/2023 who presented to [facility] with 4-day history of increasing shortness of breath, dry cough and chest tightness with wheezing.  He noted his oxygen levels to be low and hence decided to come to the emergency room.  At the time of my evaluation he is on BiPAP 12/5 with 30% FiO2.  His last ABG showed acute respiratory alkalosis with no evidence of hypoxemia.  He denies any fever or chills he denies chest pain.    Data reviewed-  WBC 27.6, hemoglobin 8.5, platelet 431.  ABG pH 7.49/pCO2 32/pO2 208.  Creatinine 2.30 up from 1.84.  .  Troponin 4481 which is very high.  Lactic acid 1.3.  Blood cultures gram-positive cocci 1 out of 2 bottles.  Respiratory virus panel negative.  COVID 19 negative.  Chest x-ray shows no evidence of infiltrate.    Office records:    60 pack year smoking hx.  Allergic to cats.  2023: Skin allergy test positive. IgE: 89. Eosinophil: 1000

## 2024-03-20 ENCOUNTER — APPOINTMENT (OUTPATIENT)
Facility: HOSPITAL | Age: 64
DRG: 871 | End: 2024-03-20
Payer: MEDICARE

## 2024-03-20 ENCOUNTER — TELEPHONE (OUTPATIENT)
Age: 64
End: 2024-03-20

## 2024-03-20 LAB
ANION GAP SERPL CALC-SCNC: 10 MMOL/L (ref 5–15)
ANION GAP SERPL CALC-SCNC: 7 MMOL/L (ref 5–15)
BASOPHILS # BLD: 0.1 K/UL (ref 0–0.1)
BASOPHILS NFR BLD: 0 % (ref 0–1)
BUN SERPL-MCNC: 48 MG/DL (ref 6–20)
BUN SERPL-MCNC: 59 MG/DL (ref 6–20)
BUN/CREAT SERPL: 22 (ref 12–20)
BUN/CREAT SERPL: 26 (ref 12–20)
CALCIUM SERPL-MCNC: 7.9 MG/DL (ref 8.5–10.1)
CALCIUM SERPL-MCNC: 7.9 MG/DL (ref 8.5–10.1)
CHLORIDE SERPL-SCNC: 102 MMOL/L (ref 97–108)
CHLORIDE SERPL-SCNC: 103 MMOL/L (ref 97–108)
CO2 SERPL-SCNC: 25 MMOL/L (ref 21–32)
CO2 SERPL-SCNC: 26 MMOL/L (ref 21–32)
CREAT SERPL-MCNC: 2.22 MG/DL (ref 0.7–1.3)
CREAT SERPL-MCNC: 2.23 MG/DL (ref 0.7–1.3)
DIFFERENTIAL METHOD BLD: ABNORMAL
EOSINOPHIL # BLD: 0 K/UL (ref 0–0.4)
EOSINOPHIL NFR BLD: 0 % (ref 0–7)
ERYTHROCYTE [DISTWIDTH] IN BLOOD BY AUTOMATED COUNT: 19.6 % (ref 11.5–14.5)
GLUCOSE SERPL-MCNC: 113 MG/DL (ref 65–100)
GLUCOSE SERPL-MCNC: 200 MG/DL (ref 65–100)
HCT VFR BLD AUTO: 24.3 % (ref 36.6–50.3)
HGB BLD-MCNC: 7.7 G/DL (ref 12.1–17)
IMM GRANULOCYTES # BLD AUTO: 0.3 K/UL (ref 0–0.04)
IMM GRANULOCYTES NFR BLD AUTO: 1 % (ref 0–0.5)
INR PPP: 2.4 (ref 0.9–1.1)
LYMPHOCYTES # BLD: 0.8 K/UL (ref 0.8–3.5)
LYMPHOCYTES NFR BLD: 4 % (ref 12–49)
MCH RBC QN AUTO: 25 PG (ref 26–34)
MCHC RBC AUTO-ENTMCNC: 31.7 G/DL (ref 30–36.5)
MCV RBC AUTO: 78.9 FL (ref 80–99)
MONOCYTES # BLD: 0.9 K/UL (ref 0–1)
MONOCYTES NFR BLD: 5 % (ref 5–13)
NEUTS SEG # BLD: 17.5 K/UL (ref 1.8–8)
NEUTS SEG NFR BLD: 90 % (ref 32–75)
NRBC # BLD: 0 K/UL (ref 0–0.01)
NRBC BLD-RTO: 0 PER 100 WBC
PLATELET # BLD AUTO: 335 K/UL (ref 150–400)
PMV BLD AUTO: 12.3 FL (ref 8.9–12.9)
POTASSIUM SERPL-SCNC: 4.4 MMOL/L (ref 3.5–5.1)
POTASSIUM SERPL-SCNC: 4.5 MMOL/L (ref 3.5–5.1)
PROTHROMBIN TIME: 23.6 SEC (ref 9–11.1)
RBC # BLD AUTO: 3.08 M/UL (ref 4.1–5.7)
SODIUM SERPL-SCNC: 136 MMOL/L (ref 136–145)
SODIUM SERPL-SCNC: 137 MMOL/L (ref 136–145)
WBC # BLD AUTO: 19.6 K/UL (ref 4.1–11.1)

## 2024-03-20 PROCEDURE — 71250 CT THORAX DX C-: CPT

## 2024-03-20 PROCEDURE — 93750 INTERROGATION VAD IN PERSON: CPT | Performed by: NURSE PRACTITIONER

## 2024-03-20 PROCEDURE — 2580000003 HC RX 258: Performed by: STUDENT IN AN ORGANIZED HEALTH CARE EDUCATION/TRAINING PROGRAM

## 2024-03-20 PROCEDURE — 6360000002 HC RX W HCPCS: Performed by: STUDENT IN AN ORGANIZED HEALTH CARE EDUCATION/TRAINING PROGRAM

## 2024-03-20 PROCEDURE — 80048 BASIC METABOLIC PNL TOTAL CA: CPT

## 2024-03-20 PROCEDURE — 85025 COMPLETE CBC W/AUTO DIFF WBC: CPT

## 2024-03-20 PROCEDURE — 97530 THERAPEUTIC ACTIVITIES: CPT

## 2024-03-20 PROCEDURE — 99222 1ST HOSP IP/OBS MODERATE 55: CPT | Performed by: INTERNAL MEDICINE

## 2024-03-20 PROCEDURE — 2580000003 HC RX 258: Performed by: HOSPITALIST

## 2024-03-20 PROCEDURE — 85610 PROTHROMBIN TIME: CPT

## 2024-03-20 PROCEDURE — 36415 COLL VENOUS BLD VENIPUNCTURE: CPT

## 2024-03-20 PROCEDURE — 2060000000 HC ICU INTERMEDIATE R&B

## 2024-03-20 PROCEDURE — 97165 OT EVAL LOW COMPLEX 30 MIN: CPT

## 2024-03-20 PROCEDURE — 87040 BLOOD CULTURE FOR BACTERIA: CPT

## 2024-03-20 PROCEDURE — 94760 N-INVAS EAR/PLS OXIMETRY 1: CPT

## 2024-03-20 PROCEDURE — 6360000002 HC RX W HCPCS: Performed by: HOSPITALIST

## 2024-03-20 PROCEDURE — 97162 PT EVAL MOD COMPLEX 30 MIN: CPT

## 2024-03-20 PROCEDURE — 6370000000 HC RX 637 (ALT 250 FOR IP): Performed by: STUDENT IN AN ORGANIZED HEALTH CARE EDUCATION/TRAINING PROGRAM

## 2024-03-20 PROCEDURE — 2700000000 HC OXYGEN THERAPY PER DAY

## 2024-03-20 PROCEDURE — APPNB60 APP NON BILLABLE TIME 46-60 MINS: Performed by: NURSE PRACTITIONER

## 2024-03-20 PROCEDURE — 99233 SBSQ HOSP IP/OBS HIGH 50: CPT | Performed by: INTERNAL MEDICINE

## 2024-03-20 RX ORDER — WARFARIN SODIUM 1 MG/1
0.5 TABLET ORAL
Status: COMPLETED | OUTPATIENT
Start: 2024-03-20 | End: 2024-03-20

## 2024-03-20 RX ORDER — IPRATROPIUM BROMIDE AND ALBUTEROL SULFATE 2.5; .5 MG/3ML; MG/3ML
1 SOLUTION RESPIRATORY (INHALATION) EVERY 4 HOURS PRN
Status: DISCONTINUED | OUTPATIENT
Start: 2024-03-20 | End: 2024-04-09 | Stop reason: HOSPADM

## 2024-03-20 RX ADMIN — VANCOMYCIN HYDROCHLORIDE 2000 MG: 10 INJECTION, POWDER, LYOPHILIZED, FOR SOLUTION INTRAVENOUS at 01:05

## 2024-03-20 RX ADMIN — HYDRALAZINE HYDROCHLORIDE 25 MG: 25 TABLET ORAL at 21:38

## 2024-03-20 RX ADMIN — PANTOPRAZOLE SODIUM 40 MG: 40 TABLET, DELAYED RELEASE ORAL at 07:19

## 2024-03-20 RX ADMIN — CEFEPIME 2000 MG: 2 INJECTION, POWDER, FOR SOLUTION INTRAVENOUS at 15:58

## 2024-03-20 RX ADMIN — HYDRALAZINE HYDROCHLORIDE 25 MG: 25 TABLET ORAL at 13:01

## 2024-03-20 RX ADMIN — SODIUM CHLORIDE, PRESERVATIVE FREE 10 ML: 5 INJECTION INTRAVENOUS at 21:37

## 2024-03-20 RX ADMIN — PANTOPRAZOLE SODIUM 40 MG: 40 TABLET, DELAYED RELEASE ORAL at 15:54

## 2024-03-20 RX ADMIN — WATER 40 MG: 1 INJECTION INTRAMUSCULAR; INTRAVENOUS; SUBCUTANEOUS at 15:54

## 2024-03-20 RX ADMIN — WARFARIN SODIUM 0.5 MG: 1 TABLET ORAL at 16:51

## 2024-03-20 RX ADMIN — SODIUM CHLORIDE, PRESERVATIVE FREE 10 ML: 5 INJECTION INTRAVENOUS at 09:37

## 2024-03-20 RX ADMIN — AMLODIPINE BESYLATE 5 MG: 5 TABLET ORAL at 09:36

## 2024-03-20 RX ADMIN — METOPROLOL SUCCINATE 50 MG: 50 TABLET, EXTENDED RELEASE ORAL at 09:36

## 2024-03-20 RX ADMIN — SODIUM CHLORIDE: 9 INJECTION, SOLUTION INTRAVENOUS at 01:04

## 2024-03-20 RX ADMIN — AMLODIPINE BESYLATE 5 MG: 5 TABLET ORAL at 21:37

## 2024-03-20 RX ADMIN — HYDRALAZINE HYDROCHLORIDE 25 MG: 25 TABLET ORAL at 07:19

## 2024-03-20 RX ADMIN — WATER 2000 MG: 1 INJECTION INTRAMUSCULAR; INTRAVENOUS; SUBCUTANEOUS at 04:15

## 2024-03-20 RX ADMIN — WATER 40 MG: 1 INJECTION INTRAMUSCULAR; INTRAVENOUS; SUBCUTANEOUS at 09:38

## 2024-03-20 RX ADMIN — WATER 40 MG: 1 INJECTION INTRAMUSCULAR; INTRAVENOUS; SUBCUTANEOUS at 03:50

## 2024-03-20 RX ADMIN — METOPROLOL SUCCINATE 50 MG: 50 TABLET, EXTENDED RELEASE ORAL at 21:37

## 2024-03-20 NOTE — CARE COORDINATION
Care Management Initial Assessment       RUR:25%  Readmission?   1st IM letter given? 3/19/24  1st  letter given: N/A    Transition of Care Plan:    RUR: 25%  Prior Level of Functioning: independent  Disposition: TBD  If SNF or IPR: Date FOC offered: TBD  Date FOC received:   Accepting facility:   Date authorization started with reference number:   Date authorization received and expires:   Follow up appointments:   DME needed: TBD  Transportation at discharge: Family versus TBD  IM/IMM Medicare/ letter given: Yes 3/19/24  Is patient a  and connected with [facility]? No   If yes, was [place] transfer form completed and VA notified?   Caregiver Contact: spouse Ashley Swan [phone]  Discharge Caregiver contacted prior to discharge?   Care Conference needed?   Barriers to discharge:     Reason for Admission:   Sepsis         Exacerbation of asthma/COPD    Consults:  ID, cardiology, pulmonology                  Plan for utilizing home health: TBD         PCP: First and Last name:  Ranjan Porter MD     Current Advanced Directive/Advance Care Plan: Full Code     Healthcare Decision Maker:   Click here to complete HealthCare Decision Makers including selection of the Healthcare Decision Maker Relationship (ie \"Primary\")             Primary Decision Maker: Ashley Swan - Spouse - [phone]                  CM met with patient. Patient lives with his wife and son age 32.  Patient lives in a  one story house. Local family support includes wife's family. Patient was ambulatory prior to admission. Patient confirmed PCP, health insurance, and prescription coverage.   Previous home health : Yes with name of provider not remembered.  Previous IPR/ SNF rehab : none  DME :wheelchair     03/20/24 1421   Service Assessment   Patient Orientation Alert and Oriented   Cognition Alert   History Provided By Patient   Primary Caregiver Self   Accompanied By/Relationship None   Support Systems Spouse/Significant

## 2024-03-20 NOTE — TELEPHONE ENCOUNTER
1540:Spoke with patient's nurse CEDRIC Lowry who stated patient had dressing change last night, requested nursing notify coordinator when performing dressing change so site can be assessed per ADILENE Maurer request. CEDRIC Lowry verbilized understanding, stated nursing will upload photo to chart if coordinator unable to come to bedside at time of dressing change

## 2024-03-20 NOTE — TELEPHONE ENCOUNTER
Received page from patient's wife. Returned call to patient's wife who states that she wanted to notify [facility] that patient was admitted and verify that call was receive last night. Informed patient's wife [facility]  did receive message from last night. Informed her that [facility] provider RUBEN Garibay was made aware of call last night and that inpatient provider is aware patient is in the ER today. She verbalized understanding.

## 2024-03-20 NOTE — PROGRESS NOTES
Pharmacist Note - Warfarin Dosing  Consult provided for this 64 y.o.male to manage warfarin for LVAD.    INR Goal: 2 - 3    Home regimen: 2 mg PO daily (supratherapeutic at last AC visit; dose was being adjusted)    Drugs that may increase INR: None  Drugs that may decrease INR: None  Other medications that may increase bleeding risk: None  Risk factors: None  Daily INR ordered through: 4/30    Recent Labs     03/19/24  0056 03/19/24  1202 03/20/24  0024   HGB 8.5*  --  7.7*   INR 7.0* 9.2* 2.4*     Date               INR                  Dose  3/19  7  Hold  - vitamin K 2.5 mg PO x 1  3/20  2.4  0.5 mg                                                                               Assessment/ Plan:  Will  order warfarin 0.5 mg x 1 dose.    Pharmacy will continue to monitor daily and adjust therapy as indicated.  Please contact the pharmacist at [phone] for outpatient recommendations if needed.

## 2024-03-20 NOTE — PLAN OF CARE
2315: Patient transferred in from ED. Patient hooked up to tower and batteries placed on . Emergency equipment with patient. Safety precautions in placed and call bell in reach. Patient on 4L NC, VSS.    0015: LVAD dressing change done at this time. Small amount of brown drainage on dressing.    Problem: Chronic Conditions and Co-morbidities  Goal: Patient's chronic conditions and co-morbidity symptoms are monitored and maintained or improved  Flowsheets (Taken 3/20/2024 1507)  Care Plan - Patient's Chronic Conditions and Co-Morbidity Symptoms are Monitored and Maintained or Improved:   Monitor and assess patient's chronic conditions and comorbid symptoms for stability, deterioration, or improvement   Collaborate with multidisciplinary team to address chronic and comorbid conditions and prevent exacerbation or deterioration

## 2024-03-20 NOTE — PROGRESS NOTES
1930  Verbal bedside report given to ELOY Bray RN oncoming nurse by CEDRIC Lowry RN off-going nurse.  Report included current pt status and condition, recent results, hx of present illness, heart rate and rhythm (NSR), and respiratory status.       1545  Took pt down for CT.  Wife at bedside when we returned.    1000  PT/OT at bedside, pt alert and cooperative.    LVAD backup bag checked, 4 batteries, 4 clips, extra controller at bedside along with 2 batteries belonging to [employer].    0759  Verbal bedside report received from ELOY James RN off-going nurse by CEDRIC Lowry RN oncoming nurse.  Report included current pt status and condition, recent results, hx of present illness, heart rate and rhythm (NSR), and respiratory status.  Pt awake in bed, nervous, less oriented than usual.

## 2024-03-20 NOTE — PROGRESS NOTES
ADVANCED HEART FAILURE CENTER  [facility] in [place], [place]  Inpatient Progress Note      Patient name: Snaford Joiner  Patient : 1960  Patient MRN: [id number]  Date of service: 24    Reason for Referral  LVAD management      INTERVAL HISTORY:  NAEO  Weaned off bipap to NC  VSS  Labs reviewed- creatinine trending down, WBC trending down. Trop elevated  INR improved after Vit K  Looks better today     ASSESSMENT:  Sanford Joiner is a 64 y.o. male admitted with driveline infection and possible upper GI bleed, reporting 3-4 days of dark stools.   History of chronic systolic heart failure due to non ischemic cardiomyopathy, s/p HM3 LVAD implantation ([facility] 2023) as destination therapy, stage D, remains NYHA class IV despite LVAD support due to severe COPD; currently with recovered LVEF to 55% and small LV cavity with resulting low flow alarms; optimal GDMT limited by hypovolemia  Patient is not a candidate for heart transplant due to advanced COPD -  declined by [facility] for both LVAD-DT and transplant and for the same reasons declined for LVAD-DT by [facility]     RECOMMENDATIONS:  Leukocytosis  Suspected COPD exacerbation  Continue IV antibiotics per hospitalist  Pan culture pending   Recommend UA    Respiratory Distress  Improving  Management per hospitalist   Chest ct ordered per pulmonary    Left Ventricular Assist Device/Driveline Infection  Continue current device speed at 5200 rpm  Low flow alarm threshold lowered on 1/15/24  No low flow alarms noted   Continue dressing changes three times weekly  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    Had first dose of octreotide- will repeat monthly- Qtc 455 on 3/19    Medical Therapy for Heart Failure  Beta-blocker: continue Toprol XL 50 mg BID  ACEi/ARB/ARNI: Unable to tolerate due to renal dysfunction and hypovolemia  MRA:Unable to

## 2024-03-20 NOTE — PROGRESS NOTES
Pulmonary, Critical Care, and Sleep Medicine~Progress Note    Name: Sanford Joiner MRN: [id number]   : 1960 Hospital: [facility]   Date: 3/20/2024 1:06 PM Admission: 3/19/2024     Impression Plan   Sepsis; source unclear.  Acute respiratory alkalosis from sepsis.  Positive BC (1/2 bottle G+cocci): contaminant vs infection?  Acute exacerbation of asthma/ copd; missed his dose of Nucala last week.  Cardiomyopathy; s/p LVAD HM3 - 3/2023.  Hx of chronic drive line infection.  Elevated INR: warfarin on hold. Agree with abx.  Consider ID consult.  UA, UC if possible.  Follow blood cultures.  Agree with steroids and bronchodilators.  Can come off BiPAP as resp alkalotic from sepsis.  Spoke to CV surgery NP  CT chest today      Pulmonary Consultation:    3/20  Per reports doing better today     3/19  64-year-old male with past medical history significant for bronchial asthma/COPD overlap syndrome, cardiomyopathy status post LVAD placement 3/27/2023 who presented to [facility] with 4-day history of increasing shortness of breath, dry cough and chest tightness with wheezing.  He noted his oxygen levels to be low and hence decided to come to the emergency room.  At the time of my evaluation he is on BiPAP 12/5 with 30% FiO2.  His last ABG showed acute respiratory alkalosis with no evidence of hypoxemia.  He denies any fever or chills he denies chest pain.    Data reviewed-  WBC 27.6, hemoglobin 8.5, platelet 431.  ABG pH 7.49/pCO2 32/pO2 208.  Creatinine 2.30 up from 1.84.  .  Troponin 4481 which is very high.  Lactic acid 1.3.  Blood cultures gram-positive cocci 1 out of 2 bottles.  Respiratory virus panel negative.  COVID 19 negative.  Chest x-ray shows no evidence of infiltrate.    Office records:    60 pack year smoking hx.  Allergic to cats.  2023: Skin allergy test positive. IgE: 89. Eosinophil: 1000 (11%).  2020: PFT: FEV1 1.01 L (31%). FVC 3.37 L (78%). Positive

## 2024-03-20 NOTE — PROGRESS NOTES
Pharmacist Note - Vancomycin Dosing    Consult provided for this 64 y.o. male for indication of sepsis in a patient with previous drive line infections.  Antibiotic regimen(s): Vancomycin and Cefepime  Patient on vancomycin PTA? NO     Recent Labs     24  0056 24  0024   WBC 27.6* 19.6*   CREATININE 2.30* 2.22*   BUN 36* 48*     Frequency of BMP: daily  Height: 172 cm  Weight: 79.8 kg  Est CrCl: ~30-35 ml/min  Temp (24hrs), Av.4 °F (37.4 °C), Min:97.6 °F (36.4 °C), Max:102.7 °F (39.3 °C)    Cultures:  3/19 Blood:  staph aureus  3/20 Blood:  pending  3/20 Drive Line culture:  pending  Biofire:  MSSA (but other possible infection sources pending)    MRSA Swab ordered (if applicable)? YES    The plan below is expected to result in a target range of Trough 10-15 mcg/mL    Therapy will be initiated with a loading dose of 2000 mg IV x 1. A maintenance dose will not be ordered at this time secondary to acute renal insufficiency.  Recommend dosing by levels or ordering a maintenance dose 3/20/2024 if markers of renal function improve.  Pharmacy to follow patient daily and order levels / make dose adjustments as appropriate.

## 2024-03-20 NOTE — PLAN OF CARE
Problem: Physical Therapy - Adult  Goal: By Discharge: Performs mobility at highest level of function for planned discharge setting.  See evaluation for individualized goals.  Description: FUNCTIONAL STATUS PRIOR TO ADMISSION: Patient was independent without use of DME. LVAD 2023 at [facility]    HOME SUPPORT PRIOR TO ADMISSION: The patient lived with spouse and son.    Physical Therapy Goals  Initiated 3/20/2024  1.  Patient will move from supine to sit and sit to supine, scoot up and down, and roll side to side in bed with modified independence within 7 day(s).    2.  Patient will perform sit to stand with independence within 7 day(s).  3.  Patient will transfer from bed to chair and chair to bed with independence using the least restrictive device within 7 day(s).  4.  Patient will ambulate with modified independence for 150 feet with the least restrictive device within 7 day(s).   Outcome: Progressing     PHYSICAL THERAPY EVALUATION    Patient: Sanford Joiner (64 y.o. male)  Date: 3/20/2024  Primary Diagnosis: COPD (chronic obstructive pulmonary disease) ([facility]) [J44.9]  Elevated troponin [R79.89]  Acute on chronic respiratory failure with hypoxia ([facility]) [J96.21]       Precautions: Restrictions/Precautions: Fall Risk (LVAD)                    ASSESSMENT :   DEFICITS/IMPAIRMENTS:   The patient is limited by decreased functional mobility, independence in ADLs, high-level IADLs, strength, activity tolerance, endurance, attention/concentration, coordination, balance, posture, fine-motor control. Patient is normally independent in mobility and managing LVAD. Today, he reports new dizziness with any mobility and new tremor B UEs. Patient agreeable to assessment of orthostatic vitals for which patient's vitals were stable throughout with positional changes but with impaired tolerance of minimal activity in setting of steady dizziness in sitting and standing. With standing, patient with initial R knee flexion for which

## 2024-03-20 NOTE — PROGRESS NOTES
[facility]                                                                                          Hospitalist Progress Note  Gareth Hou MD  Office Phone: [phone]        Date of Service:  3/20/2024  NAME:  Sanford Joiner  :  1960  MRN:  [id number]       Admission Summary:   Sanford Joiner is a 64 y.o. 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  Patient states he had been in his usual state of health until about 24 hours ago when he developed progressive dyspnea.  Initially had dyspneic symptoms with minimal exertion and further progression to dyspnea occurring at rest.  He has had what he describes as a mild nonproductive cough.  Denies any ongoing tobacco use.  Denies any recent travel or sick contacts.  The patient denies any fever, chills, chest or abdominal pain, nausea, vomiting, congestion, recent illness, palpitations, or dysuria.     Remarkable vitals on ER Presentation: Respiratory rate to 112  Labs Remarkable for: SpO2 to mid 80s on room air  ER Images: Chest x-ray showed no acute process  ER Rx: Cefepime, DuoNeb, 250 cc normal saline bolus       Interval history / Subjective:   Follow up respiratory failure  Fever  Now on 5l NC (yesterday on bipap)  Feels and looks better than yesterday   Claims he feels SOB with duonebs  Also complains of tremors     Assessment & Plan:     Acute Hypoxic respiratory failure 2/2 COPD exacerbation  Fever  History of chronic drive line infection on suppressive doxy  -respiratory viral panel negative  -blood cultures 3/19 22 staph aureus  -repeat blood culture 3/20 2 positive  -CT chest 3/20 new fluid collection along cannula  -solumedrol will hold in  \"GLUCPOC\"  [unfilled]    Notes reviewed from all clinical/nonclinical/nursing services involved in patient's clinical care. Care coordination discussions were held with appropriate clinical/nonclinical/ nursing providers based on care coordination needs.         Patients current active Medications were reviewed, considered, added and adjusted based on the clinical condition today.      Home Medications were reconciled to the best of my ability given all available resources at the time of admission. Route is PO if not otherwise noted.      Admission Status:[id number]:::1}      Medications Reviewed:     Current Facility-Administered Medications   Medication Dose Route Frequency    Vancomycin dosing per pharmacy   Other RX Placeholder    ipratropium 0.5 mg-albuterol 2.5 mg (DUONEB) nebulizer solution 1 Dose  1 Dose Inhalation Q4H PRN    [START ON 3/21/2024] vancomycin level 3/21 @ 0600  1 each Other Once    [Held by provider] doxycycline (VIBRAMYCIN) 100 mg in sodium chloride 0.9 % 100 mL IVPB (mini-bag)  100 mg IntraVENous Q12H    methylPREDNISolone sodium succ (SOLU-MEDROL) 125 mg in sterile water 2 mL injection  125 mg IntraVENous Once    magnesium sulfate 2000 mg in 50 mL IVPB premix  2,000 mg IntraVENous Once    amLODIPine (NORVASC) tablet 5 mg  5 mg Oral BID    hydrALAZINE (APRESOLINE) tablet 25 mg  25 mg Oral 3 times per day    metoprolol succinate (TOPROL XL) extended release tablet 50 mg  50 mg Oral BID    pantoprazole (PROTONIX) tablet 40 mg  40 mg Oral BID AC    sodium chloride flush 0.9 % injection 5-40 mL  5-40 mL IntraVENous 2 times per day    sodium chloride flush 0.9 % injection 5-40 mL  5-40 mL IntraVENous PRN    0.9 % sodium chloride infusion   IntraVENous PRN    potassium chloride (KLOR-CON) extended release tablet 40 mEq  40 mEq Oral PRN    Or    potassium bicarb-citric acid (EFFER-K) effervescent tablet 40 mEq  40 mEq Oral PRN    Or    potassium chloride 10 mEq/100 mL IVPB (Peripheral Line)  10 mEq

## 2024-03-20 NOTE — PROGRESS NOTES
Physician Progress Note      PATIENT:               JAIDEN SOUTH  CSN #:                  [id number]  :                       1960  ADMIT DATE:       3/19/2024 12:29 AM  DISCH DATE:  RESPONDING  PROVIDER #:        Gareth Hou MD          QUERY TEXT:    Patient admitted with COPD and noted increased troponin level.  If possible,   please document in the progress notes and discharge summary if you are   evaluating and/or treating any of the following:      The medical record reflects the following:  Risk Factors: elevated BNP  Clinical Indicators:  24 00:56  Troponin, High Sensitivity: 4,481 ()    3/19 EKG  Probably Sinus tachycardia with occasional premature ventricular complexes  Low voltage QRS  Inferior infarct , age undetermined  Anterolateral infarct (cited on or before 2024)  Abnormal ECG  When compared with ECG of 2024 13:08,  Atrial fibrillation has replaced Sinus rhythm  Incomplete right bundle branch block is no longer present  Inferior infarct is now present  Questionable change in initial forces of Anteroseptal leads      Treatment: lab monitoring, norvasc, apresoline      Thank you  Vanesa Sanchez RN, CDI, CCDS, CRCR  Certified  Clinical Documentation   O: [phone]  [email or web address]  I can also be reached by perfect serve  Options provided:  -- NSTEMI  -- Type 2 MI  -- Demand Ischemia with MI  -- Demand Ischemia only, no MI  -- Other - I will add my own diagnosis  -- Disagree - Not applicable / Not valid  -- Disagree - Clinically unable to determine / Unknown  -- Refer to Clinical Documentation Reviewer    PROVIDER RESPONSE TEXT:    This patient has a Type 2 MI.    Query created by: Vanesa Sanchez on 3/20/2024 11:57 AM      Electronically signed by:  Gareth Hou MD 3/20/2024 7:36 PM
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
## 2024-03-20 NOTE — PROGRESS NOTES
Physician Progress Note      PATIENT:               JAIDEN SOUTH  [facility] #:                  [id number]  :                       1960  ADMIT DATE:       3/19/2024 12:29 AM  DISCH DATE:  RESPONDING  PROVIDER #:        Gareth Hou MD          QUERY TEXT:    Patient admitted with COPD. Noted documentation of sepsis by pulmonary. In   order to support the diagnosis of sepsis, please include additional clinical   indicators in your documentation.  Or please document if the diagnosis of   sepsis has been ruled out after further study    The medical record reflects the following:  Risk Factors: COPD  Clinical Indicators:  3/19 pulmonary consult  1. Sepsis; source unclear.  2. Acute respiratory alkalosis from sepsis.  3. Positive BC ( bottle G+cocci): contaminant vs infection?    Treatment: maxipime IV, vibramycin IV, vancomycin IV  Thank you  Vanesa Sanchez RN, CDI, CCDS, CRCR  Certified  Clinical Documentation   O: [phone]  [email or web address]  I can also be reached by perfect serve  Options provided:  -- Sepsis present as evidenced by, Please document evidence.  -- Sepsis was ruled out after study  -- Other - I will add my own diagnosis  -- Disagree - Not applicable / Not valid  -- Disagree - Clinically unable to determine / Unknown  -- Refer to Clinical Documentation Reviewer    PROVIDER RESPONSE TEXT:    Sepsis is present as evidenced by Fever, tachycardia, tachypnea, respiratory   failure, blood culture positive    Query created by: Vanesa Sanchez on 3/20/2024 11:51 AM      Electronically signed by:  Gareth Hou MD 3/20/2024 5:34 PM

## 2024-03-20 NOTE — PROGRESS NOTES
Referral source:   Sanford Joiner at [facility] in [facility] 4 CV SERVICES UNIT.  attended rounds in the CCU as part of the Interdisciplinary team where the patient's ongoing care was discussed. I reviewed the medical record as part of this encounter.     Outcome: Interdisciplinary team are aware of  availability and were encouraged to request Spiritual Health support as needed.      The  on-call can be reached at ([phone]).     Rev. Mariana Heck MDiv, [facility]  Staff
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
## 2024-03-21 ENCOUNTER — APPOINTMENT (OUTPATIENT)
Facility: HOSPITAL | Age: 64
DRG: 871 | End: 2024-03-21
Payer: MEDICARE

## 2024-03-21 LAB
ALBUMIN SERPL-MCNC: 2.2 G/DL (ref 3.5–5)
ALBUMIN/GLOB SERPL: 0.4 (ref 1.1–2.2)
ALP SERPL-CCNC: 114 U/L (ref 45–117)
ALT SERPL-CCNC: 42 U/L (ref 12–78)
ANION GAP SERPL CALC-SCNC: 12 MMOL/L (ref 5–15)
AST SERPL-CCNC: 53 U/L (ref 15–37)
BACTERIA SPEC CULT: ABNORMAL
BACTERIA SPEC CULT: NORMAL
BACTERIA SPEC CULT: NORMAL
BASOPHILS # BLD: 0 K/UL (ref 0–0.1)
BASOPHILS NFR BLD: 0 % (ref 0–1)
BILIRUB DIRECT SERPL-MCNC: 0.1 MG/DL (ref 0–0.2)
BILIRUB SERPL-MCNC: 0.3 MG/DL (ref 0.2–1)
BUN SERPL-MCNC: 62 MG/DL (ref 6–20)
BUN/CREAT SERPL: 27 (ref 12–20)
CALCIUM SERPL-MCNC: 8.2 MG/DL (ref 8.5–10.1)
CHLORIDE SERPL-SCNC: 102 MMOL/L (ref 97–108)
CO2 SERPL-SCNC: 24 MMOL/L (ref 21–32)
COMMENT:: NORMAL
CREAT SERPL-MCNC: 2.29 MG/DL (ref 0.7–1.3)
DIFFERENTIAL METHOD BLD: ABNORMAL
EOSINOPHIL # BLD: 0 K/UL (ref 0–0.4)
EOSINOPHIL NFR BLD: 0 % (ref 0–7)
ERYTHROCYTE [DISTWIDTH] IN BLOOD BY AUTOMATED COUNT: 19.3 % (ref 11.5–14.5)
FERRITIN SERPL-MCNC: 422 NG/ML (ref 26–388)
FOLATE SERPL-MCNC: 15.5 NG/ML (ref 5–21)
GLOBULIN SER CALC-MCNC: 5 G/DL (ref 2–4)
GLUCOSE SERPL-MCNC: 157 MG/DL (ref 65–100)
HCT VFR BLD AUTO: 20.6 % (ref 36.6–50.3)
HCT VFR BLD AUTO: 22.9 % (ref 36.6–50.3)
HEMOCCULT STL QL: NEGATIVE
HGB BLD-MCNC: 6.4 G/DL (ref 12.1–17)
HGB BLD-MCNC: 7.1 G/DL (ref 12.1–17)
HISTORY CHECK: NORMAL
IMM GRANULOCYTES # BLD AUTO: 0.4 K/UL (ref 0–0.04)
IMM GRANULOCYTES NFR BLD AUTO: 2 % (ref 0–0.5)
INR PPP: 1.5 (ref 0.9–1.1)
IRON SATN MFR SERPL: 24 % (ref 20–50)
IRON SERPL-MCNC: 48 UG/DL (ref 35–150)
LYMPHOCYTES # BLD: 0.9 K/UL (ref 0.8–3.5)
LYMPHOCYTES NFR BLD: 5 % (ref 12–49)
MCH RBC QN AUTO: 25 PG (ref 26–34)
MCHC RBC AUTO-ENTMCNC: 31 G/DL (ref 30–36.5)
MCV RBC AUTO: 80.6 FL (ref 80–99)
MONOCYTES # BLD: 0.7 K/UL (ref 0–1)
MONOCYTES NFR BLD: 4 % (ref 5–13)
NEUTS SEG # BLD: 16.7 K/UL (ref 1.8–8)
NEUTS SEG NFR BLD: 89 % (ref 32–75)
NRBC # BLD: 0.02 K/UL (ref 0–0.01)
NRBC BLD-RTO: 0.1 PER 100 WBC
PLATELET # BLD AUTO: 340 K/UL (ref 150–400)
PMV BLD AUTO: 12.7 FL (ref 8.9–12.9)
POTASSIUM SERPL-SCNC: 4.4 MMOL/L (ref 3.5–5.1)
PROT SERPL-MCNC: 7.2 G/DL (ref 6.4–8.2)
PROTHROMBIN TIME: 15.5 SEC (ref 9–11.1)
RBC # BLD AUTO: 2.84 M/UL (ref 4.1–5.7)
RBC MORPH BLD: ABNORMAL
SERVICE CMNT-IMP: ABNORMAL
SERVICE CMNT-IMP: NORMAL
SODIUM SERPL-SCNC: 138 MMOL/L (ref 136–145)
SPECIMEN HOLD: NORMAL
TIBC SERPL-MCNC: 196 UG/DL (ref 250–450)
TROPONIN I SERPL HS-MCNC: 1313 NG/L (ref 0–76)
VANCOMYCIN SERPL-MCNC: 13.5 UG/ML
VIT B12 SERPL-MCNC: 1140 PG/ML (ref 193–986)
WBC # BLD AUTO: 18.7 K/UL (ref 4.1–11.1)

## 2024-03-21 PROCEDURE — 6360000002 HC RX W HCPCS: Performed by: HOSPITALIST

## 2024-03-21 PROCEDURE — 2580000003 HC RX 258: Performed by: STUDENT IN AN ORGANIZED HEALTH CARE EDUCATION/TRAINING PROGRAM

## 2024-03-21 PROCEDURE — 86900 BLOOD TYPING SEROLOGIC ABO: CPT

## 2024-03-21 PROCEDURE — 86850 RBC ANTIBODY SCREEN: CPT

## 2024-03-21 PROCEDURE — 85025 COMPLETE CBC W/AUTO DIFF WBC: CPT

## 2024-03-21 PROCEDURE — 80202 ASSAY OF VANCOMYCIN: CPT

## 2024-03-21 PROCEDURE — 82272 OCCULT BLD FECES 1-3 TESTS: CPT

## 2024-03-21 PROCEDURE — 6360000002 HC RX W HCPCS: Performed by: INTERNAL MEDICINE

## 2024-03-21 PROCEDURE — 2580000003 HC RX 258: Performed by: INTERNAL MEDICINE

## 2024-03-21 PROCEDURE — 99233 SBSQ HOSP IP/OBS HIGH 50: CPT | Performed by: INTERNAL MEDICINE

## 2024-03-21 PROCEDURE — 80076 HEPATIC FUNCTION PANEL: CPT

## 2024-03-21 PROCEDURE — 83550 IRON BINDING TEST: CPT

## 2024-03-21 PROCEDURE — 85018 HEMOGLOBIN: CPT

## 2024-03-21 PROCEDURE — 80048 BASIC METABOLIC PNL TOTAL CA: CPT

## 2024-03-21 PROCEDURE — 93750 INTERROGATION VAD IN PERSON: CPT | Performed by: NURSE PRACTITIONER

## 2024-03-21 PROCEDURE — 74150 CT ABDOMEN W/O CONTRAST: CPT

## 2024-03-21 PROCEDURE — 2580000003 HC RX 258: Performed by: HOSPITALIST

## 2024-03-21 PROCEDURE — 99232 SBSQ HOSP IP/OBS MODERATE 35: CPT | Performed by: INTERNAL MEDICINE

## 2024-03-21 PROCEDURE — 82607 VITAMIN B-12: CPT

## 2024-03-21 PROCEDURE — 2060000000 HC ICU INTERMEDIATE R&B

## 2024-03-21 PROCEDURE — 86901 BLOOD TYPING SEROLOGIC RH(D): CPT

## 2024-03-21 PROCEDURE — 82746 ASSAY OF FOLIC ACID SERUM: CPT

## 2024-03-21 PROCEDURE — 97535 SELF CARE MNGMENT TRAINING: CPT

## 2024-03-21 PROCEDURE — 36415 COLL VENOUS BLD VENIPUNCTURE: CPT

## 2024-03-21 PROCEDURE — 82728 ASSAY OF FERRITIN: CPT

## 2024-03-21 PROCEDURE — 36430 TRANSFUSION BLD/BLD COMPNT: CPT

## 2024-03-21 PROCEDURE — APPNB60 APP NON BILLABLE TIME 46-60 MINS: Performed by: NURSE PRACTITIONER

## 2024-03-21 PROCEDURE — 84484 ASSAY OF TROPONIN QUANT: CPT

## 2024-03-21 PROCEDURE — 30233N1 TRANSFUSION OF NONAUTOLOGOUS RED BLOOD CELLS INTO PERIPHERAL VEIN, PERCUTANEOUS APPROACH: ICD-10-PCS | Performed by: STUDENT IN AN ORGANIZED HEALTH CARE EDUCATION/TRAINING PROGRAM

## 2024-03-21 PROCEDURE — 85610 PROTHROMBIN TIME: CPT

## 2024-03-21 PROCEDURE — 86923 COMPATIBILITY TEST ELECTRIC: CPT

## 2024-03-21 PROCEDURE — 6370000000 HC RX 637 (ALT 250 FOR IP): Performed by: STUDENT IN AN ORGANIZED HEALTH CARE EDUCATION/TRAINING PROGRAM

## 2024-03-21 PROCEDURE — 83540 ASSAY OF IRON: CPT

## 2024-03-21 PROCEDURE — P9016 RBC LEUKOCYTES REDUCED: HCPCS

## 2024-03-21 PROCEDURE — 85014 HEMATOCRIT: CPT

## 2024-03-21 PROCEDURE — 6370000000 HC RX 637 (ALT 250 FOR IP): Performed by: INTERNAL MEDICINE

## 2024-03-21 PROCEDURE — 87040 BLOOD CULTURE FOR BACTERIA: CPT

## 2024-03-21 RX ORDER — SODIUM CHLORIDE 9 MG/ML
INJECTION, SOLUTION INTRAVENOUS PRN
Status: DISCONTINUED | OUTPATIENT
Start: 2024-03-21 | End: 2024-04-09 | Stop reason: HOSPADM

## 2024-03-21 RX ORDER — WARFARIN SODIUM 1 MG/1
0.5 TABLET ORAL
Status: COMPLETED | OUTPATIENT
Start: 2024-03-21 | End: 2024-03-21

## 2024-03-21 RX ORDER — PANTOPRAZOLE SODIUM 40 MG/1
TABLET, DELAYED RELEASE ORAL
Qty: 30 TABLET | Refills: 2 | Status: SHIPPED | OUTPATIENT
Start: 2024-03-21

## 2024-03-21 RX ADMIN — HYDRALAZINE HYDROCHLORIDE 25 MG: 25 TABLET ORAL at 21:18

## 2024-03-21 RX ADMIN — SODIUM CHLORIDE, PRESERVATIVE FREE 10 ML: 5 INJECTION INTRAVENOUS at 21:19

## 2024-03-21 RX ADMIN — ACETAMINOPHEN 650 MG: 325 TABLET ORAL at 15:15

## 2024-03-21 RX ADMIN — SODIUM CHLORIDE, PRESERVATIVE FREE 10 ML: 5 INJECTION INTRAVENOUS at 08:44

## 2024-03-21 RX ADMIN — AMLODIPINE BESYLATE 5 MG: 5 TABLET ORAL at 08:44

## 2024-03-21 RX ADMIN — CEFEPIME 2000 MG: 2 INJECTION, POWDER, FOR SOLUTION INTRAVENOUS at 03:38

## 2024-03-21 RX ADMIN — WATER 2000 MG: 1 INJECTION INTRAMUSCULAR; INTRAVENOUS; SUBCUTANEOUS at 10:52

## 2024-03-21 RX ADMIN — HYDRALAZINE HYDROCHLORIDE 25 MG: 25 TABLET ORAL at 14:30

## 2024-03-21 RX ADMIN — METOPROLOL SUCCINATE 50 MG: 50 TABLET, EXTENDED RELEASE ORAL at 08:44

## 2024-03-21 RX ADMIN — WARFARIN SODIUM 0.5 MG: 1 TABLET ORAL at 19:20

## 2024-03-21 RX ADMIN — AMLODIPINE BESYLATE 5 MG: 5 TABLET ORAL at 21:18

## 2024-03-21 RX ADMIN — PANTOPRAZOLE SODIUM 40 MG: 40 TABLET, DELAYED RELEASE ORAL at 15:15

## 2024-03-21 RX ADMIN — WATER 2000 MG: 1 INJECTION INTRAMUSCULAR; INTRAVENOUS; SUBCUTANEOUS at 19:20

## 2024-03-21 RX ADMIN — PANTOPRAZOLE SODIUM 40 MG: 40 TABLET, DELAYED RELEASE ORAL at 06:35

## 2024-03-21 ASSESSMENT — PAIN SCALES - GENERAL
PAINLEVEL_OUTOF10: 0
PAINLEVEL_OUTOF10: 2
PAINLEVEL_OUTOF10: 4
PAINLEVEL_OUTOF10: 0

## 2024-03-21 ASSESSMENT — PAIN DESCRIPTION - LOCATION: LOCATION: HEAD

## 2024-03-21 NOTE — PROGRESS NOTES
Pharmacist Note - Warfarin Dosing  Consult provided for this 64 y.o.male to manage warfarin for LVAD.    INR Goal: 1.8-2.2  (due to recurrent bleeding)    Home regimen: 2 mg PO daily   -supra-therapeutic at last AC visit; dose was being adjusted    Drugs that may increase INR: None  Drugs that may decrease INR: None  Other medications that may increase bleeding risk: None  Risk factors: None  Daily INR ordered through: 4/30    Recent Labs     03/19/24  1202 03/20/24  0024 03/21/24  0332 03/21/24  1053   HGB  --  7.7* 7.1* 6.4*   INR 9.2* 2.4* 1.5*  --      Date               INR                  Dose  3/19  7/9.2  Hold - vitamin K 2.5 mg PO x 1  3/20  2.4  0.5 mg  3/21  1.5  0.5 mg                                                                               Assessment/ Plan:  Expect to continue to see effects from Vit K for several days. Will order warfarin 0.5 mg x 1 dose.     Pharmacy will continue to monitor daily and adjust therapy as indicated.  Please contact the pharmacist at [phone] for outpatient recommendations if needed.

## 2024-03-21 NOTE — PROGRESS NOTES
1930  Verbal bedside report given to RUBEN Silverio RN oncoming nurse by CEDRIC Lowry RN off-going nurse.  Report included current pt status and condition, recent results, hx of present illness, heart rate and rhythm (NSR), and respiratory status.       1615  RACHEL Choudhary attempted to establish IV access with vein finder.  Successful x1, transfusion re-instated.  Attempted backup IV, not successful.    1600  RACHEL Heard attempted to establish IV access, unsuccessful.  Will obtain vein finder.    1546  Transfusion begun, IV infiltrated, 2nd IV infiltrated, transfusion paused.  Attempted IV establishment x2, unsuccessful, will request help/    1230  Pt hgb resulted as 6.4, informed MD, orders for transfusion will be forthcoming.    0825  Pt awake and alert in bed.  Called pharmacy regarding trelegy approval.    2962  Verbal bedside report received from ELOY Bray RN off-going nurse by CEDRIC Lowry RN oncoming nurse.  Report included current pt status and condition, recent results, hx of present illness, heart rate and rhythm (NSR), and respiratory status.  Pt awake and alert in bed.
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
## 2024-03-21 NOTE — TELEPHONE ENCOUNTER
Requested Prescriptions     Signed Prescriptions Disp Refills    pantoprazole (PROTONIX) 40 MG tablet 30 tablet 2     Sig: TAKE 1 TABLET BY MOUTH DAILY *EMERGENCY REFILL*     Authorizing Provider: OZIEL NAVARRO     Ordering User: CASEY MONTEMAYOR

## 2024-03-21 NOTE — PROGRESS NOTES
Physical Therapy    Reviewed chart and attempted to treat pt x2. Pt declining both times. Pt reports receiving bad news of scheduled removal of LVAD. Unable to encourage mobility. Will defer and continue to follow.

## 2024-03-21 NOTE — CONSULTS
Palliative Medicine  Patient Name: Sanford Joiner  YOB: 1960  MRN: [id number]  Age: 64 y.o.  Gender: male    Date of Initial Consult: 3/21/24  Date of Service: 3/22/2024  Time: 10:57 AM  Provider: Angie Bocanegra MD  Hospital Day: 4  Admit Date: 3/19/2024  Referring Provider: Dr Hou        Reasons for Consultation:  Goals of Care    HISTORY OF PRESENT ILLNESS (HPI):   Sanford Joiner \"Rhys\" is a 64 y.o. 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 +staph bacteremia. CT chest 3/20 showing new fluid collection around canula. Treating COPD exacerbation- initially requiring BIPAP, improved. Team discussing next steps, possible removal of LVAD.    Psychosocial: Significant other, \"wife\" since 1991 is Ashley- they are not legally . Completed AMD in past.  Pt has 3 adult sons- Sanford Ballesteros, Juan Ramon and LORAINE. Former . Grew up in [place], loved to swim [place] and fish. As he got older, would not get in- but still loved to fish, halie bass.       PALLIATIVE DIAGNOSES:    Shortness of breath   Fatigue   Palliative care encounter, goals of care       ASSESSMENT AND PLAN:   Meet w/ pt who is engaging in conversation- our team met him after V fib arrest in 2022. He understands that he may need to have the  LVAD removed and/or have long term ongoing antibiotics.   Baseline:   Pt had LVAD placed almost a year ago. He was hopeful that it would help his breathing and ability to do activity- but overall he feels that he is still very limited. Has severe lung disease.   Gets short of breath with walking around his home and can no longer do things he enjoys - such as fishing because he cannot walk from his truck to the river.   Advance care planning:   AMD on file from 2022. Ashley is mPOA.   Does admit  Depression:          LAB AND IMAGING FINDINGS:   Objective data reviewed:  labs, images, records, medication use, vitals, and chart     FINAL COMMENTS   Thank you for allowing Palliative Medicine to participate in the care of Sanford Joiner.    Only check if applicable and billing time based rather than MDM  [x] The total encounter time on this service date was _74 ___ minutes which was spent performing a face-to-face encounter and personally completing the provider-level activities documented in the note. This includes time spent prior to the visit and after the visit in direct care of the patient. This time does not include time spent in any separately reportable services.    Electronically signed by   Angie Bocanegra MD  Palliative Care Team  on 3/22/2024 at 10:57 AM

## 2024-03-21 NOTE — PROGRESS NOTES
Spiritual Care Assessment/Progress Note  [facility]    Name: Sanford Joiner MRN: [id number]    Age: 64 y.o.     Sex: male   Language: English     Date: 3/21/2024            Total Time Calculated: 30 min              Spiritual Assessment begun in [facility] 4 CV SERVICES UNIT  Service Provided For:: Patient  Referral/Consult From:: Palliative Care  Encounter Overview/Reason : Palliative Care    Spiritual beliefs:      [x] Involved in a tommy tradition/spiritual practice: Lutheran      [] Supported by a tommy community:      [] Claims no spiritual orientation:      [] Seeking spiritual identity:           [] Adheres to an individual form of spirituality:      [] Not able to assess:                Identified resources for coping and support system:   Support System: Spouse, Children, Family members, Palliative Care       [] Prayer                  [] Devotional reading               [] Music                  [] Guided Imagery     [] Pet visits                                        [] Other: (COMMENT)     Specific area/focus of visit   Encounter:    Crisis:    Spiritual/Emotional needs: Type: Spiritual Support  Ritual, Rites and Sacraments:    Grief, Loss, and Adjustments: Type: Adjustment to illness  Ethics/Mediation:    Behavioral Health:    Palliative Care: Type: Palliative Care, Initial/Spiritual Assessment  Advance Care Planning:      I visited Sanford Joiner for a palliative initial spiritual assessment.   He lives with his wife Ashley. One of their sons lives with them. He spoke of his illness and the support he receives from his wife.   He is a person of Marlyn tommy and derives a sense of hope and support from his tommy.   Chaplain Adarsh, Agueda, MS, BCC

## 2024-03-21 NOTE — PLAN OF CARE
Problem: Occupational Therapy - Adult  Goal: By Discharge: Performs self-care activities at highest level of function for planned discharge setting.  See evaluation for individualized goals.  Description: FUNCTIONAL STATUS PRIOR TO ADMISSION:  Patient was active and independent up until a few weeks ago until he started feeling weak. LVAD implant 3/2023    HOME SUPPORT: Patient lived with son/spouse. Can assist PRN.    Occupational Therapy Goals:  Initiated 3/20/2024  1.  Patient will perform standing grooming with Supervision within 7 day(s).  2.  Patient will perform LE dressing with Minimal Assist within 7 day(s).  3.  Patient will perform UE dressing with Contact Guard Assist within 7 day(s).  4.  Patient will perform toilet transfers with Contact Guard Assist  within 7 day(s).  5.  Patient will perform all aspects of toileting with Contact Guard Assist within 7 day(s).  6.  Patient will participate in upper extremity therapeutic exercise/activities with Supervision for 10 minutes within 7 day(s).    7.  Patient will utilize energy conservation techniques during functional activities with verbal cues within 7 day(s).    Outcome: Progressing   OCCUPATIONAL THERAPY TREATMENT  Patient: Sanford Joiner (64 y.o. male)  Date: 3/21/2024  Primary Diagnosis: COPD (chronic obstructive pulmonary disease) ([facility]) [J44.9]  Elevated troponin [R79.89]  Acute on chronic respiratory failure with hypoxia ([facility]) [J96.21]       Precautions: Fall Risk (LVAD)                Chart, occupational therapy assessment, plan of care, and goals were reviewed.      ASSESSMENT  Patient continues to benefit from skilled OT services and is progressing towards goals. Patient received reclined in bed, amenable to session with encouragement, patient declined OOB transfers at this time 2/2 multiple tests planned for today. Educated on benefits of OOB activity, patient agreeable tomorrow. Completed bedlevel grooming with supervision overall. Will

## 2024-03-21 NOTE — PROGRESS NOTES
Pulmonary, Critical Care, and Sleep Medicine~Progress Note    Name: Sanford Joiner MRN: [id number]   : 1960 Hospital: [facility]   Date: 3/21/2024 9:29 AM Admission: 3/19/2024     Impression Plan   Sepsis; source unclear.  Acute respiratory alkalosis from sepsis.  Positive BC (1/2 bottle G+cocci): contaminant vs infection?  Acute exacerbation of asthma/ copd; missed his dose of Nucala last week.  Cardiomyopathy; s/p LVAD HM3 - 3/2023.  Hx of chronic drive line infection.  Elevated INR: warfarin on hold. On abx.  Not sure what to make of the new prominent fluid collection around the power source wire to the LVAD. Defer to CV surgrey   Agree with steroids and bronchodilators.  Can come off BiPAP as resp alkalotic from sepsis.  Spoke to CV surgery NP  Patient can start his home trelegy   O2 titration above 90%      Pulmonary Consultation:    3/21  Doing ok, does not like nebs  CT scan   IMPRESSION:     1. New prominent fluid collection surrounding the power source wire to the LVAD  as described above.  2. New fluid collection along the course of the cannula that extends from the  LVAD to the aorta. The fluid collection is along the horizontal portion of the  cannula along the superior aspect and anterior aspect extending to the inferior  aspect.  3. The lungs demonstrate no pneumonia or pulmonary edema. There are moderate  changes of emphysema. The unit was telephoned.    3/20  Per reports doing better today       3/19  64-year-old male with past medical history significant for bronchial asthma/COPD overlap syndrome, cardiomyopathy status post LVAD placement 3/27/2023 who presented to [facility] with 4-day history of increasing shortness of breath, dry cough and chest tightness with wheezing.  He noted his oxygen levels to be low and hence decided to come to the emergency room.  At the time of my evaluation he is on BiPAP 12/5 with 30% FiO2.  His last ABG showed acute respiratory

## 2024-03-21 NOTE — PROGRESS NOTES
[facility] [place] [facility]                                                                                          Hospitalist Progress Note  Sushma Madala, MD  Office Phone: [phone]        Date of Service:  3/21/2024  NAME:  Sanford Joiner  :  1960  MRN:  [id number]       Admission Summary:   Sanford Joiner is a 64 y.o. 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  Patient states he had been in his usual state of health until about 24 hours ago when he developed progressive dyspnea.  Initially had dyspneic symptoms with minimal exertion and further progression to dyspnea occurring at rest.  He has had what he describes as a mild nonproductive cough.  Denies any ongoing tobacco use.  Denies any recent travel or sick contacts.  The patient denies any fever, chills, chest or abdominal pain, nausea, vomiting, congestion, recent illness, palpitations, or dysuria.     Remarkable vitals on ER Presentation: Respiratory rate to 112  Labs Remarkable for: SpO2 to mid 80s on room air  ER Images: Chest x-ray showed no acute process  ER Rx: Cefepime, DuoNeb, 250 cc normal saline bolus       Interval history / Subjective:   Follow up respiratory failure  Patient is seen and examined at bedside this AM. He c/w sob at rest ( air hunger), on 4l NC. Explained CT chest findings of fluid collection around LVAD wire  Discussed with nursing, cm , AHF NP      Assessment & Plan:     MSSA Bacteremia   History of chronic drive line infection on suppressive doxy  -respiratory viral panel negative  -blood cultures 3/19 22  MSSA  - wound cx with staph aureus   -repeat blood culture 3/20 22 positive  -CT chest 3/20: New prominent fluid

## 2024-03-21 NOTE — CONSULTS
Palliative Medicine      Consult received, discussed w/ Dr Jose. Will wait for CT surgery evaluation regarding fluid collections and plan before seeing.

## 2024-03-22 ENCOUNTER — APPOINTMENT (OUTPATIENT)
Facility: HOSPITAL | Age: 64
DRG: 871 | End: 2024-03-22
Payer: MEDICARE

## 2024-03-22 PROBLEM — R53.81 DEBILITY: Status: ACTIVE | Noted: 2024-03-22

## 2024-03-22 PROBLEM — Z51.5 PALLIATIVE CARE ENCOUNTER: Status: ACTIVE | Noted: 2024-03-22

## 2024-03-22 PROBLEM — R06.02 SHORTNESS OF BREATH: Status: ACTIVE | Noted: 2024-03-22

## 2024-03-22 LAB
ABO + RH BLD: NORMAL
ALBUMIN SERPL-MCNC: 2.4 G/DL (ref 3.5–5)
ALBUMIN/GLOB SERPL: 0.5 (ref 1.1–2.2)
ALP SERPL-CCNC: 114 U/L (ref 45–117)
ALT SERPL-CCNC: 46 U/L (ref 12–78)
ANION GAP SERPL CALC-SCNC: 10 MMOL/L (ref 5–15)
ANION GAP SERPL CALC-SCNC: 13 MMOL/L (ref 5–15)
ARTERIAL PATENCY WRIST A: POSITIVE
AST SERPL-CCNC: 54 U/L (ref 15–37)
BASE EXCESS BLD CALC-SCNC: 0.4 MMOL/L
BASOPHILS # BLD: 0 K/UL (ref 0–0.1)
BASOPHILS # BLD: 0 K/UL (ref 0–0.1)
BASOPHILS NFR BLD: 0 % (ref 0–1)
BASOPHILS NFR BLD: 0 % (ref 0–1)
BDY SITE: ABNORMAL
BILIRUB DIRECT SERPL-MCNC: 0.2 MG/DL (ref 0–0.2)
BILIRUB SERPL-MCNC: 0.2 MG/DL (ref 0.2–1)
BLD PROD TYP BPU: NORMAL
BLOOD BANK BLOOD PRODUCT EXPIRATION DATE: NORMAL
BLOOD BANK DISPENSE STATUS: NORMAL
BLOOD BANK ISBT PRODUCT BLOOD TYPE: 5100
BLOOD BANK PRODUCT CODE: NORMAL
BLOOD BANK UNIT TYPE AND RH: NORMAL
BLOOD GROUP ANTIBODIES SERPL: NORMAL
BPU ID: NORMAL
BUN SERPL-MCNC: 50 MG/DL (ref 6–20)
BUN SERPL-MCNC: 60 MG/DL (ref 6–20)
BUN/CREAT SERPL: 24 (ref 12–20)
BUN/CREAT SERPL: 27 (ref 12–20)
CALCIUM SERPL-MCNC: 8.1 MG/DL (ref 8.5–10.1)
CALCIUM SERPL-MCNC: 8.5 MG/DL (ref 8.5–10.1)
CHLORIDE SERPL-SCNC: 104 MMOL/L (ref 97–108)
CHLORIDE SERPL-SCNC: 104 MMOL/L (ref 97–108)
CO2 SERPL-SCNC: 21 MMOL/L (ref 21–32)
CO2 SERPL-SCNC: 23 MMOL/L (ref 21–32)
COMMENT:: NORMAL
CREAT SERPL-MCNC: 2.05 MG/DL (ref 0.7–1.3)
CREAT SERPL-MCNC: 2.24 MG/DL (ref 0.7–1.3)
CROSSMATCH RESULT: NORMAL
DIFFERENTIAL METHOD BLD: ABNORMAL
DIFFERENTIAL METHOD BLD: ABNORMAL
ECHO BSA: 1.92 M2
EOSINOPHIL # BLD: 0 K/UL (ref 0–0.4)
EOSINOPHIL # BLD: 0 K/UL (ref 0–0.4)
EOSINOPHIL NFR BLD: 0 % (ref 0–7)
EOSINOPHIL NFR BLD: 0 % (ref 0–7)
ERYTHROCYTE [DISTWIDTH] IN BLOOD BY AUTOMATED COUNT: 18.6 % (ref 11.5–14.5)
ERYTHROCYTE [DISTWIDTH] IN BLOOD BY AUTOMATED COUNT: 19.4 % (ref 11.5–14.5)
GAS FLOW.O2 O2 DELIVERY SYS: ABNORMAL
GLOBULIN SER CALC-MCNC: 4.7 G/DL (ref 2–4)
GLUCOSE BLD STRIP.AUTO-MCNC: 98 MG/DL (ref 65–117)
GLUCOSE SERPL-MCNC: 130 MG/DL (ref 65–100)
GLUCOSE SERPL-MCNC: 91 MG/DL (ref 65–100)
HCO3 BLD-SCNC: 24.2 MMOL/L (ref 22–26)
HCT VFR BLD AUTO: 27.7 % (ref 36.6–50.3)
HCT VFR BLD AUTO: 29.5 % (ref 36.6–50.3)
HGB BLD-MCNC: 8.5 G/DL (ref 12.1–17)
HGB BLD-MCNC: 9.1 G/DL (ref 12.1–17)
IMM GRANULOCYTES # BLD AUTO: 0 K/UL
IMM GRANULOCYTES # BLD AUTO: 0 K/UL
IMM GRANULOCYTES NFR BLD AUTO: 0 %
IMM GRANULOCYTES NFR BLD AUTO: 0 %
INR PPP: 1.7 (ref 0.9–1.1)
LACTATE SERPL-SCNC: 1.9 MMOL/L (ref 0.4–2)
LYMPHOCYTES # BLD: 1 K/UL (ref 0.8–3.5)
LYMPHOCYTES # BLD: 1.5 K/UL (ref 0.8–3.5)
LYMPHOCYTES NFR BLD: 3 % (ref 12–49)
LYMPHOCYTES NFR BLD: 7 % (ref 12–49)
MCH RBC QN AUTO: 25.3 PG (ref 26–34)
MCH RBC QN AUTO: 25.4 PG (ref 26–34)
MCHC RBC AUTO-ENTMCNC: 30.7 G/DL (ref 30–36.5)
MCHC RBC AUTO-ENTMCNC: 30.8 G/DL (ref 30–36.5)
MCV RBC AUTO: 82.2 FL (ref 80–99)
MCV RBC AUTO: 82.7 FL (ref 80–99)
METAMYELOCYTES NFR BLD MANUAL: 2 %
MONOCYTES # BLD: 0 K/UL (ref 0–1)
MONOCYTES # BLD: 1.1 K/UL (ref 0–1)
MONOCYTES NFR BLD: 0 % (ref 5–13)
MONOCYTES NFR BLD: 5 % (ref 5–13)
MYELOCYTES NFR BLD MANUAL: 1 %
MYELOCYTES NFR BLD MANUAL: 1 %
NEUTS BAND NFR BLD MANUAL: 1 % (ref 0–6)
NEUTS SEG # BLD: 18.5 K/UL (ref 1.8–8)
NEUTS SEG # BLD: 30.7 K/UL (ref 1.8–8)
NEUTS SEG NFR BLD: 86 % (ref 32–75)
NEUTS SEG NFR BLD: 94 % (ref 32–75)
NRBC # BLD: 0.02 K/UL (ref 0–0.01)
NRBC # BLD: 0.04 K/UL (ref 0–0.01)
NRBC BLD-RTO: 0.1 PER 100 WBC
NRBC BLD-RTO: 0.1 PER 100 WBC
O2/TOTAL GAS SETTING VFR VENT: 6 %
PCO2 BLD: 35.1 MMHG (ref 35–45)
PH BLD: 7.45 (ref 7.35–7.45)
PLATELET # BLD AUTO: 357 K/UL (ref 150–400)
PLATELET # BLD AUTO: 380 K/UL (ref 150–400)
PLATELET COMMENT: ABNORMAL
PMV BLD AUTO: 11.8 FL (ref 8.9–12.9)
PMV BLD AUTO: 12.2 FL (ref 8.9–12.9)
PO2 BLD: 101 MMHG (ref 80–100)
POTASSIUM SERPL-SCNC: 4.2 MMOL/L (ref 3.5–5.1)
POTASSIUM SERPL-SCNC: 4.4 MMOL/L (ref 3.5–5.1)
PROCALCITONIN SERPL-MCNC: 2.99 NG/ML
PROCALCITONIN SERPL-MCNC: 4.58 NG/ML
PROT SERPL-MCNC: 7.1 G/DL (ref 6.4–8.2)
PROTHROMBIN TIME: 17.6 SEC (ref 9–11.1)
RBC # BLD AUTO: 3.35 M/UL (ref 4.1–5.7)
RBC # BLD AUTO: 3.59 M/UL (ref 4.1–5.7)
RBC MORPH BLD: ABNORMAL
RBC MORPH BLD: ABNORMAL
SAO2 % BLD: 98.1 % (ref 92–97)
SERVICE CMNT-IMP: NORMAL
SODIUM SERPL-SCNC: 137 MMOL/L (ref 136–145)
SODIUM SERPL-SCNC: 138 MMOL/L (ref 136–145)
SPECIMEN EXP DATE BLD: NORMAL
SPECIMEN HOLD: NORMAL
SPECIMEN TYPE: ABNORMAL
UNIT DIVISION: 0
UNIT ISSUE DATE/TIME: NORMAL
WBC # BLD AUTO: 21.3 K/UL (ref 4.1–11.1)
WBC # BLD AUTO: 32.7 K/UL (ref 4.1–11.1)
WBC MORPH BLD: ABNORMAL

## 2024-03-22 PROCEDURE — 94729 DIFFUSING CAPACITY: CPT

## 2024-03-22 PROCEDURE — 36415 COLL VENOUS BLD VENIPUNCTURE: CPT

## 2024-03-22 PROCEDURE — 83605 ASSAY OF LACTIC ACID: CPT

## 2024-03-22 PROCEDURE — 6360000002 HC RX W HCPCS: Performed by: INTERNAL MEDICINE

## 2024-03-22 PROCEDURE — 97535 SELF CARE MNGMENT TRAINING: CPT

## 2024-03-22 PROCEDURE — 99232 SBSQ HOSP IP/OBS MODERATE 35: CPT | Performed by: INTERNAL MEDICINE

## 2024-03-22 PROCEDURE — 99233 SBSQ HOSP IP/OBS HIGH 50: CPT | Performed by: INTERNAL MEDICINE

## 2024-03-22 PROCEDURE — 82803 BLOOD GASES ANY COMBINATION: CPT

## 2024-03-22 PROCEDURE — 93750 INTERROGATION VAD IN PERSON: CPT | Performed by: INTERNAL MEDICINE

## 2024-03-22 PROCEDURE — 36600 WITHDRAWAL OF ARTERIAL BLOOD: CPT

## 2024-03-22 PROCEDURE — 6370000000 HC RX 637 (ALT 250 FOR IP): Performed by: STUDENT IN AN ORGANIZED HEALTH CARE EDUCATION/TRAINING PROGRAM

## 2024-03-22 PROCEDURE — 2580000003 HC RX 258: Performed by: INTERNAL MEDICINE

## 2024-03-22 PROCEDURE — 80076 HEPATIC FUNCTION PANEL: CPT

## 2024-03-22 PROCEDURE — 85025 COMPLETE CBC W/AUTO DIFF WBC: CPT

## 2024-03-22 PROCEDURE — 6370000000 HC RX 637 (ALT 250 FOR IP): Performed by: INTERNAL MEDICINE

## 2024-03-22 PROCEDURE — 85610 PROTHROMBIN TIME: CPT

## 2024-03-22 PROCEDURE — 93308 TTE F-UP OR LMTD: CPT

## 2024-03-22 PROCEDURE — 70450 CT HEAD/BRAIN W/O DYE: CPT

## 2024-03-22 PROCEDURE — 94726 PLETHYSMOGRAPHY LUNG VOLUMES: CPT

## 2024-03-22 PROCEDURE — APPSS45 APP SPLIT SHARED TIME 31-45 MINUTES: Performed by: NURSE PRACTITIONER

## 2024-03-22 PROCEDURE — 80048 BASIC METABOLIC PNL TOTAL CA: CPT

## 2024-03-22 PROCEDURE — 97530 THERAPEUTIC ACTIVITIES: CPT

## 2024-03-22 PROCEDURE — 99223 1ST HOSP IP/OBS HIGH 75: CPT | Performed by: PHYSICAL MEDICINE & REHABILITATION

## 2024-03-22 PROCEDURE — 87040 BLOOD CULTURE FOR BACTERIA: CPT

## 2024-03-22 PROCEDURE — 2060000000 HC ICU INTERMEDIATE R&B

## 2024-03-22 PROCEDURE — 82962 GLUCOSE BLOOD TEST: CPT

## 2024-03-22 PROCEDURE — 94010 BREATHING CAPACITY TEST: CPT

## 2024-03-22 PROCEDURE — 2580000003 HC RX 258: Performed by: STUDENT IN AN ORGANIZED HEALTH CARE EDUCATION/TRAINING PROGRAM

## 2024-03-22 PROCEDURE — 71045 X-RAY EXAM CHEST 1 VIEW: CPT

## 2024-03-22 PROCEDURE — 94060 EVALUATION OF WHEEZING: CPT

## 2024-03-22 PROCEDURE — 84145 PROCALCITONIN (PCT): CPT

## 2024-03-22 RX ORDER — WARFARIN SODIUM 1 MG/1
0.5 TABLET ORAL
Status: COMPLETED | OUTPATIENT
Start: 2024-03-22 | End: 2024-03-22

## 2024-03-22 RX ADMIN — WATER 2000 MG: 1 INJECTION INTRAMUSCULAR; INTRAVENOUS; SUBCUTANEOUS at 04:49

## 2024-03-22 RX ADMIN — WATER 2000 MG: 1 INJECTION INTRAMUSCULAR; INTRAVENOUS; SUBCUTANEOUS at 18:37

## 2024-03-22 RX ADMIN — WARFARIN SODIUM 0.5 MG: 1 TABLET ORAL at 18:37

## 2024-03-22 RX ADMIN — HYDRALAZINE HYDROCHLORIDE 25 MG: 25 TABLET ORAL at 04:49

## 2024-03-22 RX ADMIN — ACETAMINOPHEN 650 MG: 325 TABLET ORAL at 18:37

## 2024-03-22 RX ADMIN — AMLODIPINE BESYLATE 5 MG: 5 TABLET ORAL at 08:56

## 2024-03-22 RX ADMIN — WATER 2000 MG: 1 INJECTION INTRAMUSCULAR; INTRAVENOUS; SUBCUTANEOUS at 11:43

## 2024-03-22 RX ADMIN — PANTOPRAZOLE SODIUM 40 MG: 40 TABLET, DELAYED RELEASE ORAL at 08:28

## 2024-03-22 RX ADMIN — HYDRALAZINE HYDROCHLORIDE 25 MG: 25 TABLET ORAL at 20:49

## 2024-03-22 RX ADMIN — SODIUM CHLORIDE, PRESERVATIVE FREE 10 ML: 5 INJECTION INTRAVENOUS at 21:00

## 2024-03-22 RX ADMIN — METOPROLOL SUCCINATE 50 MG: 50 TABLET, EXTENDED RELEASE ORAL at 20:49

## 2024-03-22 RX ADMIN — AMLODIPINE BESYLATE 5 MG: 5 TABLET ORAL at 20:49

## 2024-03-22 RX ADMIN — METOPROLOL SUCCINATE 50 MG: 50 TABLET, EXTENDED RELEASE ORAL at 08:57

## 2024-03-22 ASSESSMENT — PAIN SCALES - GENERAL
PAINLEVEL_OUTOF10: 0

## 2024-03-22 NOTE — PROGRESS NOTES
1600: patient is SOB and grunting with more tremors then his baseline. Patient A&Ox4 but not cooperating with nurses. MD notified. Labs drawn. ABGs drawn. Vitals taken. . And temp 98.6 axillary. NP Lani at bedside.     1830: patient now confused, A&Ox1 and voided the bed. MD made aware and head CT ordered.     1900: patient in CT with 2 RNs and monitor    1920: patient back in room and on the monitor.

## 2024-03-22 NOTE — PLAN OF CARE
Problem: Occupational Therapy - Adult  Goal: By Discharge: Performs self-care activities at highest level of function for planned discharge setting.  See evaluation for individualized goals.  Description: FUNCTIONAL STATUS PRIOR TO ADMISSION:  Patient was active and independent up until a few weeks ago until he started feeling weak. LVAD implant 3/2023    HOME SUPPORT: Patient lived with son/spouse. Can assist PRN.    Occupational Therapy Goals:  Initiated 3/20/2024  1.  Patient will perform standing grooming with Supervision within 7 day(s).  2.  Patient will perform LE dressing with Minimal Assist within 7 day(s).  3.  Patient will perform UE dressing with Contact Guard Assist within 7 day(s).  4.  Patient will perform toilet transfers with Contact Guard Assist  within 7 day(s).  5.  Patient will perform all aspects of toileting with Contact Guard Assist within 7 day(s).  6.  Patient will participate in upper extremity therapeutic exercise/activities with Supervision for 10 minutes within 7 day(s).    7.  Patient will utilize energy conservation techniques during functional activities with verbal cues within 7 day(s).    Outcome: Progressing   OCCUPATIONAL THERAPY TREATMENT  Patient: Sanford Joiner (64 y.o. male)  Date: 3/22/2024  Primary Diagnosis: COPD (chronic obstructive pulmonary disease) ([facility]) [J44.9]  Elevated troponin [R79.89]  Acute on chronic respiratory failure with hypoxia ([facility]) [J96.21]       Precautions: Fall Risk (LVAD)                Chart, occupational therapy assessment, plan of care, and goals were reviewed.      ASSESSMENT  Patient continues to benefit from skilled OT services and is progressing towards goals. Patient received reclined in bed, amenable to session. NC noted to be out of patient's nose, Spo2 95%, kept NC doffed throughout session (RN made aware). Transferred to EOB with supervision before donning socks at EOB. Completed transfer to standing and steps to bedside chair. Left

## 2024-03-22 NOTE — PROGRESS NOTES
[facility] [facility]                                                                                          Hospitalist Progress Note  Sushma Madala, MD  Office Phone: [phone]        Date of Service:  3/22/2024  NAME:  Sanford Joiner  :  1960  MRN:  [id number]       Admission Summary:   Sanford Joiner is a 64 y.o. 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  Patient states he had been in his usual state of health until about 24 hours ago when he developed progressive dyspnea.  Initially had dyspneic symptoms with minimal exertion and further progression to dyspnea occurring at rest.  He has had what he describes as a mild nonproductive cough.  Denies any ongoing tobacco use.  Denies any recent travel or sick contacts.  The patient denies any fever, chills, chest or abdominal pain, nausea, vomiting, congestion, recent illness, palpitations, or dysuria.     Remarkable vitals on ER Presentation: Respiratory rate to 112  Labs Remarkable for: SpO2 to mid 80s on room air  ER Images: Chest x-ray showed no acute process  ER Rx: Cefepime, DuoNeb, 250 cc normal saline bolus       Interval history / Subjective:   Follow up respiratory failure  Patient is seen and examined at bedside this AM. He feels ok , still sob. Explained CT abd findings of fluid collection. Plan for palliative meeting today   Discussed with nursing, cm , AHF Dr. Gillespie, palliative Dr Bocanegra      Assessment & Plan:     MSSA Bacteremia   History of chronic drive line infection on suppressive doxy  -respiratory viral panel negative  -blood cultures 3/19 2/2  MSSA  - wound cx with staph aureus   -repeat blood culture 3/20 2/2 positive for MSSA  - rpt

## 2024-03-22 NOTE — PROGRESS NOTES
Infectious Disease Progress Note       Subjective:      The patient was personally evaluated and examined by me at bedside.  The patient has been discussed with the cardiologist and there are concerns about the status of the driveline of LVAD.  The patient has no headache, no sweats, no chills and  no fevers. He denies pain or other discomfort.  The patient has no complaints of shortness of breath as he rests in bed and denies cough, sputum, chest pain or chest pressure.  He states that he is eating well without nausea, vomiting, diarrhea or constipation.    Patient is without fever with adequate oxygen saturations on nasal cannula oxygen set at 5 L  There is continued leukocytosis with left shift of 89% PMN with 2% bandemia.  Blood cultures are positive for MSSA in 2/2 bottles which is concerning for patient with LVAD  Radiologic studies are usually worsening and intervention may be needed to fully eradicate this infection.  The findings have been discussed with the patient by the cardiology team and patient verbalized that he will discuss options with his wife and make a final decision about appropriate course to take.  He verbalized concern about removing LVAD as he states he waited a long time to get the device and is concerned about what might happen to his heart with the device removed.      Objective:    Vitals:   Patient Vitals for the past 24 hrs:   Temp Pulse Resp SpO2   03/22/24 0553 -- 56 -- --   03/22/24 0449 97.8 °F (36.6 °C) 70 18 94 %   03/22/24 0400 -- 73 -- --   03/22/24 0154 -- 72 -- --   03/21/24 2303 97.6 °F (36.4 °C) 67 18 94 %   03/21/24 2149 -- 63 -- --   03/21/24 2118 -- 57 -- --   03/21/24 2000 -- 65 -- --   03/21/24 1800 -- 65 -- --   03/21/24 1634 98 °F (36.7 °C) 65 18 95 %   03/21/24 1600 -- 66 19 95 %   03/21/24 1546 97.8 °F (36.6 °C) 63 16 97 %   03/21/24 1400 -- 59 -- --   03/21/24 1200 -- 66 18 90 %   03/21/24 1148 98 °F (36.7 °C) 65 19 99 %   03/21/24 1000 -- 64 -- --   03/21/24  0825 -- 71 21 95 %       Physical Exam:    Gen: No apparent distress.  Sitting up in bed with improved shortness of breath and without pain or fever.  HEENT:  Normocephalic, atraumatic, no scleral icterus, no thrush  CV: S1,2 heard regularly, LVAD hum,no lower extremity edema    Lungs: Clear to auscultation bilaterally, no wheezing  Abdomen: soft, non tender, non distended,  no CVA tenderness   Genitourinary:  Deferred  Skin: no rash   Psych: appropriate affect, good eye contact, pleasant and appropriate  Neuro: alert, oriented, moves all extremities, verbal  Musculoskeletal:  No joint edema, erythema or tenderness noted   Lines:     Medications:    Current Facility-Administered Medications:     fluticasone-umeclidin-vilant (TRELEGY ELLIPTA) 200-62.5-25 MCG/ACT inhaler 1 puff (Patient Supplied), 1 puff, Inhalation, Daily, Madala, Sushma, MD, 1 puff at 03/21/24 1024    ceFAZolin (ANCEF) 2,000 mg in sterile water 20 mL IV syringe, 2,000 mg, IntraVENous, Q8H, Madala, Sushma, MD, 2,000 mg at 03/22/24 0449    0.9 % sodium chloride infusion, , IntraVENous, PRN, Nessa Maurer APRN - NP    ipratropium 0.5 mg-albuterol 2.5 mg (DUONEB) nebulizer solution 1 Dose, 1 Dose, Inhalation, Q4H PRN, Gareth Hou MD    methylPREDNISolone sodium succ (SOLU-MEDROL) 125 mg in sterile water 2 mL injection, 125 mg, IntraVENous, Once, Aidee Gonzales MD    magnesium sulfate 2000 mg in 50 mL IVPB premix, 2,000 mg, IntraVENous, Once, Aidee Gonzales MD    amLODIPine (NORVASC) tablet 5 mg, 5 mg, Oral, BID, Aidee Gonzales MD, 5 mg at 03/21/24 2118    hydrALAZINE (APRESOLINE) tablet 25 mg, 25 mg, Oral, 3 times per day, Aidee Gonzales MD, 25 mg at 03/22/24 0449    metoprolol succinate (TOPROL XL) extended release tablet 50 mg, 50 mg, Oral, BID, Aidee Gonzales MD, 50 mg at 03/21/24 0802    pantoprazole (PROTONIX) tablet 40 mg, 40 mg, Oral, BID AC, Aidee Gonzales MD, 40 mg at 03/21/24 1515    sodium chloride flush 0.9 %

## 2024-03-22 NOTE — PROGRESS NOTES
Pharmacist Note - Warfarin Dosing  Consult provided for this 64 y.o.male to manage warfarin for LVAD.    INR Goal: 1.8-2.2  (due to recurrent bleeding)    Home regimen: 2 mg PO daily   -supra-therapeutic at last AC visit; dose was being adjusted    Drugs that may increase INR: None  Drugs that may decrease INR: None  Other medications that may increase bleeding risk: None  Risk factors: None  Daily INR ordered through: 4/30    Recent Labs     03/20/24  0024 03/21/24  0332 03/21/24  1053 03/22/24  0020   HGB 7.7* 7.1* 6.4* 8.5*   INR 2.4* 1.5*  --  1.7*     Date               INR                  Dose  3/19  7/9.2  Hold - vitamin K 2.5 mg PO x 1  3/20  2.4  0.5 mg  3/21  1.5  0.5 mg  3/22  1.7  0.5 mg                                                                                Assessment/ Plan:  Will order warfarin 0.5 mg x 1 dose today.     Pharmacy will continue to monitor daily and adjust therapy as indicated.  Please contact the pharmacist at [phone] for outpatient recommendations if needed.

## 2024-03-22 NOTE — PROGRESS NOTES
Pulmonary, Critical Care, and Sleep Medicine~Progress Note    Name: Sanford Joiner MRN: [id number]   : 1960 Hospital: [facility]   Date: 3/22/2024 2:00 PM Admission: 3/19/2024     Impression Plan   Sepsis; source unclear.  Acute respiratory alkalosis from sepsis.  Positive BC (1/2 bottle G+cocci): contaminant vs infection?  Acute exacerbation of asthma/ copd; missed his dose of Nucala last week.  Cardiomyopathy; s/p LVAD HM3 - 3/2023.  Hx of chronic drive line infection.  Elevated INR: warfarin on hold. On abx.  Not sure what to make of the new prominent fluid collection around the power source wire to the LVAD. Defer to CV surgrey   Spoke to CV surgery NP  Patient can start his home trelegy   O2 titration above 90%   PFTs were redone today     Pulmonary Consultation:    3/22  Still feels quite dyspneic   Feeling benefit from trelegy switch     3/21  Doing ok, does not like nebs  CT scan   IMPRESSION:     1. New prominent fluid collection surrounding the power source wire to the LVAD  as described above.  2. New fluid collection along the course of the cannula that extends from the  LVAD to the aorta. The fluid collection is along the horizontal portion of the  cannula along the superior aspect and anterior aspect extending to the inferior  aspect.  3. The lungs demonstrate no pneumonia or pulmonary edema. There are moderate  changes of emphysema. The unit was telephoned.    3/20  Per reports doing better today       3/19  64-year-old male with past medical history significant for bronchial asthma/COPD overlap syndrome, cardiomyopathy status post LVAD placement 3/27/2023 who presented to [facility] with 4-day history of increasing shortness of breath, dry cough and chest tightness with wheezing.  He noted his oxygen levels to be low and hence decided to come to the emergency room.  At the time of my evaluation he is on BiPAP 12/5 with 30% FiO2.  His last ABG showed acute

## 2024-03-22 NOTE — PLAN OF CARE
Problem: Physical Therapy - Adult  Goal: By Discharge: Performs mobility at highest level of function for planned discharge setting.  See evaluation for individualized goals.  Description: FUNCTIONAL STATUS PRIOR TO ADMISSION: Patient was independent without use of DME. LVAD [facility]23 at [facility]    HOME SUPPORT PRIOR TO ADMISSION: The patient lived with spouse and son.    Physical Therapy Goals  Initiated 3/20/2024  1.  Patient will move from supine to sit and sit to supine, scoot up and down, and roll side to side in bed with modified independence within 7 day(s).    2.  Patient will perform sit to stand with independence within 7 day(s).  3.  Patient will transfer from bed to chair and chair to bed with independence using the least restrictive device within 7 day(s).  4.  Patient will ambulate with modified independence for 150 feet with the least restrictive device within 7 day(s).   Outcome: Progressing   PHYSICAL THERAPY TREATMENT    Patient: Sanford Joiner (64 y.o. male)  Date: 3/22/2024  Diagnosis: COPD (chronic obstructive pulmonary disease) ([facility]) [J44.9]  Elevated troponin [R79.89]  Acute on chronic respiratory failure with hypoxia ([facility]) [J96.21] COPD (chronic obstructive pulmonary disease) ([facility])      Precautions: Fall Risk (LVAD)                      ASSESSMENT:  Patient continues to benefit from skilled PT services and is progressing towards goals. Patient received supine in bed, agreeable to transfer to chair. With bed mobility, sitting EOB to milagro socks, and stand pivot transfer to bed, patient with increased SOB/wheezing/difficulty with conversation. Self initiated pursed lip breathing and declined further activity. Recommend HHPT pending progress with ability to ambulate household distances.          PLAN:  Patient continues to benefit from skilled intervention to address the above impairments.  Continue treatment per established plan of care.    Recommendation for discharge: (in order for the  patient to meet his/her long term goals): Therapy 2 days/week in the home and also see \"other factors to consider\" below for additional discharge concerns/needs    Other factors to consider for discharge: high risk for falls and not safe to be alone    IF patient discharges home will need the following DME: continuing to assess with progress       SUBJECTIVE:   Patient stated, \"Yessss.\" When asked if this was his baseline level of SOB    OBJECTIVE DATA SUMMARY:     Functional Mobility Training:  Bed Mobility:  Bed Mobility Training  Rolling: Supervision  Supine to Sit: Supervision  Scooting: Supervision  Transfers:  Transfer Training  Sit to Stand: Stand-by assistance  Stand to Sit: Stand-by assistance  Stand Pivot Transfers: Stand-by assistance  Balance:  Balance  Sitting: Intact  Standing: Impaired  Standing - Static: Fair  Standing - Dynamic: Fair      Activity Tolerance:   Fair , requires frequent rest breaks, observed shortness of breath on exertion, and SpO2 stable on room air    After treatment:   Patient left in no apparent distress sitting up in chair, Call bell within reach, and Bed/ chair alarm activated      COMMUNICATION/EDUCATION:   The patient's plan of care was discussed with: occupational therapist and registered nurse           Cheyenne Denise, PT, DPT  Minutes: 10

## 2024-03-22 NOTE — PROGRESS NOTES
[facility]  [facility] in [place], [place]  Inpatient Progress Note      Patient name: Sanford Joiner  Patient : 1960  Patient MRN: [id number]  Date of service: 24    Reason for Referral  LVAD management          ASSESSMENT:  Sanford Joiner is a 64 y.o. male admitted with driveline infection and possible upper GI bleed, reporting 3-4 days of dark stools.   History of chronic systolic heart failure due to non ischemic cardiomyopathy, s/p HM3 LVAD implantation ([facility] 2023) as destination therapy, stage D, remains NYHA class IV despite LVAD support due to severe COPD; currently with recovered LVEF to 55% and small LV cavity with resulting low flow alarms; optimal GDMT limited by hypovolemia  Patient is not a candidate for heart transplant due to advanced COPD -  declined by [facility] for both LVAD-DT and transplant and for the same reasons declined for LVAD-DT by [facility]   Will discuss patient at [facility] 3/22/24 to determine risk of pump explant and alternatives of washout or palliative antibiotics.  CT chest shows 2 fluid collections surrounding the driveline and outflow graft concerning for infection      INTERVAL HISTORY:  -MAPs 70s-80s; 3lnc  -labs WBC up to 21.3; procal 4.58; Hg up to 8.5; INR 1.7; creat steady at 2.24  -weight not comparable; I/O inc, but neg  -Sanford Joiner is feeling so so.  He was SOB once he sat up.  Has had some dizziness while up.  No pain at this time.         RECOMMENDATIONS:  Leukocytosis- slightly worsened today.  BC remain positive   Suspected COPD exacerbation  CT shows 2 fluid collections around drive line and outflow graft that are concerning for infection   Continue IV antibiotics per hospitalist  Pan culture- Blood cx staph aureus, wound cx with staph aureus    COPD   Appreciate pulmonary recommendations    Left Ventricular Assist Device/Driveline Infection  Continue current device speed at 5200 rpm  Low flow alarm threshold  factors:  HTN  Tobacco abuse, remote  Severe COPD  CKD stage 3    LIFE GOALS:  Lifestyle goals reviewed with the patient.    IMAGING STUDIES REVIEWED:  Echo 10/11/23    Left Ventricle: Low normal left ventricular systolic function with a visually estimated EF of 50 - 55%. Left ventricle size is normal. Normal wall motion. LVAD is present.    Limited windows, effects interpretation.     Echo 4/17/23 Speed 5400 RPM. LVIDd 2.8 cm. RV dilated and RV Function reduced. Trace AI, AV opens every beat.  Echo 8/9/22 LVEF 10-15%, mild MR, moderate to severe TR  Echo 6/15/22 LVEF 30-35%, LVIDd 5.8cm, mild MR, mod TR  Echo 10/27/21 LVEF 38%, Mod AI, TAPSE 2.78cm  Echo 5/13/21 LVEF 20-25%, Trace MR, no AI, Trace TR, LVIDd 5.97cm, TAPSE 2.1cm     Cardiac MRI (9/14/21)  1. Normal left ventricular cavity size by 3-D volumetric assessment indexed to body surface area. Moderate septal hypertrophy by 1D dimension. Normal LV mass by 3-D volumetric assessment indexed to body surface area. Moderate left ventricular systolic dysfunction. Moderate global hypokinesis with slight regional variation. 3-D LVEF 33% while patient receiving dobutamine infusion of 5mcg/kg/min during the scan.  2. Normal right ventricular size with mild right ventricular systolic dysfunction. Mild global hypokinesis. 3-D RVEF 46% while patient receiving dobutamine infusion of 5mcg/kg/min during the scan.   3. No significant valvular disease.  4. On EGE and LGE study, there there is focal areas of mild myocardial enhancement of the base to mid inferolateral wall, basal inferior wall and patchy areas of the septum. The appearance of the contrast enhancement in the form of a very mild focal areas suggest focal myocardial fibrosis likely from replacement fibrosis due to hypertrophy or possibly old healed myocarditis. There is no features of recent or old myocardial infarction. There is no features of infiltrative sarcoidosis or cardiac amyloidosis. All myocardial walls

## 2024-03-22 NOTE — CARE COORDINATION
Transition of Care Plan:    RUR: 24% - high  Prior Level of Functioning: independent  Disposition: home health  Follow up appointments: [facility]  DME needed: TBD  Transportation at discharge: family  IM/IMM Medicare/ letter given: needs 2nd IMM  Caregiver Contact: spouse - Ashley Swan - p: [phone]  Discharge Caregiver contacted prior to discharge? planned  Care Conference needed? yes  Barriers to discharge: medical    Chart reviewed. LVAD patient (implanted at [facility]) last year. COPD. Treatment team meeting today to discuss options related to infection - washout vs pump removal vs palliative antibiotics. Therapy currently recommending Home Health.     CM will continue to follow for disposition needs.     Jabari Mckeon, MPH  Care Manager l [facility]  Available via [employer]

## 2024-03-22 NOTE — PLAN OF CARE
Problem: Safety - Adult  Goal: Free from fall injury  Outcome: Progressing     Problem: ABCDS Injury Assessment  Goal: Absence of physical injury  Outcome: Progressing     Problem: Pain  Goal: Verbalizes/displays adequate comfort level or baseline comfort level  Outcome: Progressing  Flowsheets (Taken 3/22/2024 7041)  Verbalizes/displays adequate comfort level or baseline comfort level:   Encourage patient to monitor pain and request assistance   Assess pain using appropriate pain scale   Administer analgesics based on type and severity of pain and evaluate response

## 2024-03-23 LAB
ALBUMIN SERPL-MCNC: 2.5 G/DL (ref 3.5–5)
ALBUMIN/GLOB SERPL: 0.6 (ref 1.1–2.2)
ALP SERPL-CCNC: 99 U/L (ref 45–117)
ALT SERPL-CCNC: 26 U/L (ref 12–78)
ANION GAP SERPL CALC-SCNC: 10 MMOL/L (ref 5–15)
AST SERPL-CCNC: 42 U/L (ref 15–37)
BACTERIA SPEC CULT: ABNORMAL
BASOPHILS # BLD: 0 K/UL (ref 0–0.1)
BASOPHILS NFR BLD: 0 % (ref 0–1)
BILIRUB DIRECT SERPL-MCNC: 0.2 MG/DL (ref 0–0.2)
BILIRUB SERPL-MCNC: 0.4 MG/DL (ref 0.2–1)
BUN SERPL-MCNC: 53 MG/DL (ref 6–20)
BUN/CREAT SERPL: 26 (ref 12–20)
CALCIUM SERPL-MCNC: 8.1 MG/DL (ref 8.5–10.1)
CHLORIDE SERPL-SCNC: 107 MMOL/L (ref 97–108)
CO2 SERPL-SCNC: 23 MMOL/L (ref 21–32)
COMMENT:: NORMAL
COMMENT:: NORMAL
CREAT SERPL-MCNC: 2.03 MG/DL (ref 0.7–1.3)
DIFFERENTIAL METHOD BLD: ABNORMAL
EOSINOPHIL # BLD: 0 K/UL (ref 0–0.4)
EOSINOPHIL NFR BLD: 0 % (ref 0–7)
ERYTHROCYTE [DISTWIDTH] IN BLOOD BY AUTOMATED COUNT: 18.9 % (ref 11.5–14.5)
GLOBULIN SER CALC-MCNC: 4.4 G/DL (ref 2–4)
GLUCOSE SERPL-MCNC: 131 MG/DL (ref 65–100)
GRAM STN SPEC: ABNORMAL
HCT VFR BLD AUTO: 25.9 % (ref 36.6–50.3)
HGB BLD-MCNC: 8.3 G/DL (ref 12.1–17)
IMM GRANULOCYTES # BLD AUTO: 0.7 K/UL (ref 0–0.04)
IMM GRANULOCYTES NFR BLD AUTO: 2 % (ref 0–0.5)
INR PPP: 2.2 (ref 0.9–1.1)
LYMPHOCYTES # BLD: 0.7 K/UL (ref 0.8–3.5)
LYMPHOCYTES NFR BLD: 2 % (ref 12–49)
MCH RBC QN AUTO: 25.8 PG (ref 26–34)
MCHC RBC AUTO-ENTMCNC: 32 G/DL (ref 30–36.5)
MCV RBC AUTO: 80.4 FL (ref 80–99)
MONOCYTES # BLD: 1.1 K/UL (ref 0–1)
MONOCYTES NFR BLD: 3 % (ref 5–13)
NEUTS SEG # BLD: 33.3 K/UL (ref 1.8–8)
NEUTS SEG NFR BLD: 93 % (ref 32–75)
NRBC # BLD: 0 K/UL (ref 0–0.01)
NRBC BLD-RTO: 0 PER 100 WBC
PLATELET # BLD AUTO: 247 K/UL (ref 150–400)
PMV BLD AUTO: 12 FL (ref 8.9–12.9)
POTASSIUM SERPL-SCNC: 4.2 MMOL/L (ref 3.5–5.1)
PROT SERPL-MCNC: 6.9 G/DL (ref 6.4–8.2)
PROTHROMBIN TIME: 21.9 SEC (ref 9–11.1)
RBC # BLD AUTO: 3.22 M/UL (ref 4.1–5.7)
RBC MORPH BLD: ABNORMAL
SERVICE CMNT-IMP: ABNORMAL
SODIUM SERPL-SCNC: 140 MMOL/L (ref 136–145)
SPECIMEN HOLD: NORMAL
SPECIMEN HOLD: NORMAL
WBC # BLD AUTO: 35.8 K/UL (ref 4.1–11.1)

## 2024-03-23 PROCEDURE — 94640 AIRWAY INHALATION TREATMENT: CPT

## 2024-03-23 PROCEDURE — 85610 PROTHROMBIN TIME: CPT

## 2024-03-23 PROCEDURE — 99231 SBSQ HOSP IP/OBS SF/LOW 25: CPT | Performed by: NURSE PRACTITIONER

## 2024-03-23 PROCEDURE — 6360000002 HC RX W HCPCS: Performed by: INTERNAL MEDICINE

## 2024-03-23 PROCEDURE — 99232 SBSQ HOSP IP/OBS MODERATE 35: CPT | Performed by: INTERNAL MEDICINE

## 2024-03-23 PROCEDURE — 36415 COLL VENOUS BLD VENIPUNCTURE: CPT

## 2024-03-23 PROCEDURE — 85025 COMPLETE CBC W/AUTO DIFF WBC: CPT

## 2024-03-23 PROCEDURE — 6370000000 HC RX 637 (ALT 250 FOR IP): Performed by: STUDENT IN AN ORGANIZED HEALTH CARE EDUCATION/TRAINING PROGRAM

## 2024-03-23 PROCEDURE — 2580000003 HC RX 258: Performed by: INTERNAL MEDICINE

## 2024-03-23 PROCEDURE — 76937 US GUIDE VASCULAR ACCESS: CPT

## 2024-03-23 PROCEDURE — 2580000003 HC RX 258: Performed by: STUDENT IN AN ORGANIZED HEALTH CARE EDUCATION/TRAINING PROGRAM

## 2024-03-23 PROCEDURE — 80076 HEPATIC FUNCTION PANEL: CPT

## 2024-03-23 PROCEDURE — 93750 INTERROGATION VAD IN PERSON: CPT | Performed by: NURSE PRACTITIONER

## 2024-03-23 PROCEDURE — 80048 BASIC METABOLIC PNL TOTAL CA: CPT

## 2024-03-23 PROCEDURE — 2060000000 HC ICU INTERMEDIATE R&B

## 2024-03-23 RX ADMIN — WATER 2000 MG: 1 INJECTION INTRAMUSCULAR; INTRAVENOUS; SUBCUTANEOUS at 03:30

## 2024-03-23 RX ADMIN — AMLODIPINE BESYLATE 5 MG: 5 TABLET ORAL at 20:09

## 2024-03-23 RX ADMIN — ACETAMINOPHEN 650 MG: 325 TABLET ORAL at 10:21

## 2024-03-23 RX ADMIN — PANTOPRAZOLE SODIUM 40 MG: 40 TABLET, DELAYED RELEASE ORAL at 15:24

## 2024-03-23 RX ADMIN — HYDRALAZINE HYDROCHLORIDE 25 MG: 25 TABLET ORAL at 15:24

## 2024-03-23 RX ADMIN — WATER 2000 MG: 1 INJECTION INTRAMUSCULAR; INTRAVENOUS; SUBCUTANEOUS at 19:12

## 2024-03-23 RX ADMIN — SODIUM CHLORIDE, PRESERVATIVE FREE 10 ML: 5 INJECTION INTRAVENOUS at 09:11

## 2024-03-23 RX ADMIN — METOPROLOL SUCCINATE 50 MG: 50 TABLET, EXTENDED RELEASE ORAL at 20:09

## 2024-03-23 RX ADMIN — WATER 2000 MG: 1 INJECTION INTRAMUSCULAR; INTRAVENOUS; SUBCUTANEOUS at 12:17

## 2024-03-23 RX ADMIN — AMLODIPINE BESYLATE 5 MG: 5 TABLET ORAL at 09:11

## 2024-03-23 RX ADMIN — HYDRALAZINE HYDROCHLORIDE 25 MG: 25 TABLET ORAL at 06:35

## 2024-03-23 RX ADMIN — METOPROLOL SUCCINATE 50 MG: 50 TABLET, EXTENDED RELEASE ORAL at 09:11

## 2024-03-23 RX ADMIN — PANTOPRAZOLE SODIUM 40 MG: 40 TABLET, DELAYED RELEASE ORAL at 06:35

## 2024-03-23 RX ADMIN — SODIUM CHLORIDE, PRESERVATIVE FREE 10 ML: 5 INJECTION INTRAVENOUS at 20:10

## 2024-03-23 RX ADMIN — HYDRALAZINE HYDROCHLORIDE 25 MG: 25 TABLET ORAL at 20:09

## 2024-03-23 ASSESSMENT — PAIN SCALES - GENERAL
PAINLEVEL_OUTOF10: 0
PAINLEVEL_OUTOF10: 3
PAINLEVEL_OUTOF10: 0

## 2024-03-23 ASSESSMENT — PAIN DESCRIPTION - DESCRIPTORS: DESCRIPTORS: ACHING

## 2024-03-23 ASSESSMENT — PAIN DESCRIPTION - LOCATION: LOCATION: HEAD

## 2024-03-23 ASSESSMENT — PAIN DESCRIPTION - ORIENTATION: ORIENTATION: ANTERIOR

## 2024-03-23 NOTE — PROGRESS NOTES
[facility]                                                                                          Hospitalist Progress Note  Sushma Madala, MD  Office Phone: [phone]        Date of Service:  3/23/2024  NAME:  Sanford Joiner  :  1960  MRN:  [id number]       Admission Summary:   Sanford Joiner is a 64 y.o. 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  Patient states he had been in his usual state of health until about 24 hours ago when he developed progressive dyspnea.  Initially had dyspneic symptoms with minimal exertion and further progression to dyspnea occurring at rest.  He has had what he describes as a mild nonproductive cough.  Denies any ongoing tobacco use.  Denies any recent travel or sick contacts.  The patient denies any fever, chills, chest or abdominal pain, nausea, vomiting, congestion, recent illness, palpitations, or dysuria.     Remarkable vitals on ER Presentation: Respiratory rate to 112  Labs Remarkable for: SpO2 to mid 80s on room air  ER Images: Chest x-ray showed no acute process  ER Rx: Cefepime, DuoNeb, 250 cc normal saline bolus       Interval history / Subjective:   Follow up respiratory failure  Patient is seen and examined at bedside this AM.   Patient was confused last night , he is alert and oriented x 2-3 now. Re explained that CT showed fluid all around power source of LVAD and wbc worsening   Discussed with nursing    Addendum:  Spoke with wife Ashley at bedside at 2pm - explained CT chest and abd findings of fluid collection surrounding the power source wire to the LVAD and  cannula that extends from the LVAD to the aorta. WBC worsening 35 - need multidisciplinary  disease  Hx GI bleed   - hb trended low 6.4, s/p 1U PRBC 3/21   - no signs of active bleeding   - iron profile ok. fobt ordered   - will monitor hb, transfuse <7      Hypertension  -c/w Amlodipine, hydralazine, metoprolol     CKD stage III/IV  -cr Stable.  Monitor daily BMP     GERD  -Protonix daily    Palliative care on board     Full code- high risk for decompensation      CRITICAL CARE ATTESTATION:  I had a face to face encounter with the patient, reviewed and interpreted patient data including clinical events, labs, images, vital signs, I/O's, and examined patient.  I have discussed the case and the plan and management of the patient's care with the consulting services, the bedside nurses and necessary ancillary providers.       NOTE OF PERSONAL INVOLVEMENT IN CARE   This patient has a high probability of imminent, clinically significant deterioration, which requires the highest level of preparedness to intervene urgently. I participated in the decision-making and personally managed or directed the management of the following life and organ supporting interventions that required my frequent assessment to treat or prevent imminent deterioration.     I personally spent 35 minutes of critical care time.  This is time spent at this critically ill patient's bedside actively involved in patient care as well as the coordination of care and discussions with the patient's family.  This does not include any procedural time which has been billed separately.      Code status: FULL CODE  Prophylaxis: coumadin  Care Plan discussed with: patient, RN, CM  Anticipated Disposition: TBD. >48hrs   Inpatient  Cardiac monitoring: Telemetry  Central Line:            Social Determinants of Health     Tobacco Use: Medium Risk (2/1/2024)    Patient History     Smoking Tobacco Use: Former     Smokeless Tobacco Use: Never     Passive Exposure: Not on file   Alcohol Use: Not on file   Financial Resource Strain: Not on file   Food
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
## 2024-03-23 NOTE — PLAN OF CARE
0730: Bedside and Verbal shift change report given to RACHEL Bond (oncoming nurse) by RACHEL Alvarez (offgoing nurse). Report included the following information Nurse Handoff Report, Index, Intake/Output, MAR, and Recent Results.     0900: Patient pulled out IV. Multiple RNs attempted with no success, Rapid RN and critical transport unavailable to place IV. PICC team called and updated.     1210: PICC team at bedside placing IV    1930: Bedside and Verbal shift change report given to RACHEL Alvarez (oncoming nurse) by RACHEL Bond (offgoing nurse). Report included the following information Nurse Handoff Report, Index, Intake/Output, MAR, and Recent Results.     Care Plans:   Problem: Safety - Adult  Goal: Free from fall injury  Outcome: Progressing     Problem: Discharge Planning  Goal: Discharge to home or other facility with appropriate resources  Outcome: Progressing     Problem: Chronic Conditions and Co-morbidities  Goal: Patient's chronic conditions and co-morbidity symptoms are monitored and maintained or improved  Outcome: Progressing     Problem: ABCDS Injury Assessment  Goal: Absence of physical injury  Outcome: Progressing     Problem: Pain  Goal: Verbalizes/displays adequate comfort level or baseline comfort level  Outcome: Progressing

## 2024-03-23 NOTE — PROGRESS NOTES
Infectious Disease Progress Note       Subjective:      The patient was personally evaluated and examined by me at bedside today.  The patient looks much improved today and is sitting up in bed speaking to me.  He is on nasal cannula oxygen with adequate saturations without tachypnea or distress.  He denies headache, fever, sweats or chills.  There is no chest pain, cough, sputum, or chest pressure.  The patient states that he is eating well this morning and has no nausea, vomiting, diarrhea or constipation.    The patient is afebrile this morning and continues to have leukocytosis with left shift likely due to Solu-Medrol.  Blood cultures drawn 03/22/2024 remain with NGTD.  Patient states that he is feeling better and is eager for his wife's visit today.  Patient was discussed with the cardiac nurse describes no new issues today and who also was pleased with the patient's improvement since yesterday.            Objective:    Vitals:   Patient Vitals for the past 24 hrs:   Temp Pulse Resp SpO2   03/23/24 1800 -- 74 -- --   03/23/24 1520 97.5 °F (36.4 °C) 71 18 97 %   03/23/24 1400 -- 69 -- --   03/23/24 1200 -- 63 -- --   03/23/24 1136 -- 63 16 --   03/23/24 1105 98 °F (36.7 °C) 67 18 98 %   03/23/24 1000 -- 65 -- --   03/23/24 0859 -- 70 18 99 %   03/23/24 0833 -- 68 20 95 %   03/23/24 0707 97.8 °F (36.6 °C) 70 18 95 %   03/23/24 0600 -- 64 -- --   03/23/24 0350 -- 67 -- --   03/23/24 0330 97.8 °F (36.6 °C) 66 18 95 %   03/23/24 0150 -- 68 -- --   03/22/24 2330 97.5 °F (36.4 °C) 69 18 96 %   03/22/24 2147 -- 69 -- --   03/22/24 2049 -- 86 18 --   03/22/24 1958 -- 85 -- --   03/22/24 1933 98.7 °F (37.1 °C) 80 25 --       Physical Exam:    Gen: No apparent distress, awake, alert, interactive  HEENT:  Normocephalic, atraumatic, no scleral icterus, no thrush  CV: S1,2 heard regularly, no lower extremity edema ,LVAD hum heard   Lungs: Clear to auscultation bilaterally, minimally decreased at both bases  Abdomen:  11.1 K/uL    RBC 3.22 (L) 4.10 - 5.70 M/uL    Hemoglobin 8.3 (L) 12.1 - 17.0 g/dL    Hematocrit 25.9 (L) 36.6 - 50.3 %    MCV 80.4 80.0 - 99.0 FL    MCH 25.8 (L) 26.0 - 34.0 PG    MCHC 32.0 30.0 - 36.5 g/dL    RDW 18.9 (H) 11.5 - 14.5 %    Platelets 247 150 - 400 K/uL    MPV 12.0 8.9 - 12.9 FL    Nucleated RBCs 0.0 0  WBC    nRBC 0.00 0.00 - 0.01 K/uL    Neutrophils % 93 (H) 32 - 75 %    Lymphocytes % 2 (L) 12 - 49 %    Monocytes % 3 (L) 5 - 13 %    Eosinophils % 0 0 - 7 %    Basophils % 0 0 - 1 %    Immature Granulocytes 2 (H) 0.0 - 0.5 %    Neutrophils Absolute 33.3 (H) 1.8 - 8.0 K/UL    Lymphocytes Absolute 0.7 (L) 0.8 - 3.5 K/UL    Monocytes Absolute 1.1 (H) 0.0 - 1.0 K/UL    Eosinophils Absolute 0.0 0.0 - 0.4 K/UL    Basophils Absolute 0.0 0.0 - 0.1 K/UL    Absolute Immature Granulocyte 0.7 (H) 0.00 - 0.04 K/UL    Differential Type SMEAR SCANNED      RBC Comment TARGET CELLS     Basic Metabolic Panel    Collection Time: 03/23/24  1:39 AM   Result Value Ref Range    Sodium 140 136 - 145 mmol/L    Potassium 4.2 3.5 - 5.1 mmol/L    Chloride 107 97 - 108 mmol/L    CO2 23 21 - 32 mmol/L    Anion Gap 10 5 - 15 mmol/L    Glucose 131 (H) 65 - 100 mg/dL    BUN 53 (H) 6 - 20 MG/DL    Creatinine 2.03 (H) 0.70 - 1.30 MG/DL    Bun/Cre Ratio 26 (H) 12 - 20      Est, Glom Filt Rate 36 (L) >60 ml/min/1.73m2    Calcium 8.1 (L) 8.5 - 10.1 MG/DL   Extra Tubes Hold    Collection Time: 03/23/24  1:39 AM   Result Value Ref Range    Specimen HOld 1red     Comment:        Add-on orders for these samples will be processed based on acceptable specimen integrity and analyte stability, which may vary by analyte.   Extra Tubes Hold    Collection Time: 03/23/24  2:37 AM   Result Value Ref Range    Specimen HOld 1sst 1lav     Comment:        Add-on orders for these samples will be processed based on acceptable specimen integrity and analyte stability, which may vary by analyte.           Micro:     Blood: MSSA 4/4 bottles 03/20/2024

## 2024-03-23 NOTE — PROCEDURES
Vascular Access Team - peripheral IV catheter insertion note     A 20 gauge, 25 mm (1 inch) PIV was inserted into the right ventral forearm using ultrasound guidance. The IV flushed easily and had brisk blood return. The patient tolerated the procedure well.   Husam Givens RN

## 2024-03-23 NOTE — PROGRESS NOTES
Infectious Disease Progress Note       Subjective:      Patient was personally evaluated and examined by me at bedside.  The patient was tachypneic when I walked in the overall with this nasal cannula oxygen cannula displaced.  He seemed quite agitated and disorganized when I personally him appear to be in distress.  I replaced the nasal cannula O2 and asked nursing team to evaluate the patient.  The patient has been afebrile ,but seems to be quite tremulous and diaphoretic when I saw him.  He was verbal but was not organized enough to answer my questions.  The patient had no reported abnormality with nausea vomiting or constipation.    The worrisome findings on the CT evaluation of chest and abdomen suggest possible need for removal of LVAD versus conservative antibiotic treatment in an effort to control the infection.  The patient has significant leukocytosis with dramatic left shift of WBC 32.7 and differential of 94% PMN.  In setting of patient's COPD and general condition on a particularly good candidate for surgical intervention.  Will continue antibiotic at this time and monitor carefully.      Objective:    Vitals:   Patient Vitals for the past 24 hrs:   Temp Pulse Resp SpO2   03/23/24 0859 -- 70 18 99 %   03/23/24 0833 -- 68 20 95 %   03/23/24 0707 97.8 °F (36.6 °C) 70 18 95 %   03/23/24 0600 -- 64 -- --   03/23/24 0350 -- 67 -- --   03/23/24 0330 97.8 °F (36.6 °C) 66 18 95 %   03/23/24 0150 -- 68 -- --   03/22/24 2330 97.5 °F (36.4 °C) 69 18 96 %   03/22/24 2147 -- 69 -- --   03/22/24 2049 -- 86 18 --   03/22/24 1958 -- 85 -- --   03/22/24 1933 98.7 °F (37.1 °C) 80 25 --   03/22/24 1800 -- 88 -- --   03/22/24 1641 98.6 °F (37 °C) 97 20 95 %   03/22/24 1551 98.1 °F (36.7 °C) 90 22 --   03/22/24 1400 -- 70 -- --   03/22/24 1207 98.1 °F (36.7 °C) 69 18 --   03/22/24 1200 -- 61 -- --   03/22/24 1000 -- 66 -- --       Physical Exam:      Gen: Patient distressed and confused when I see him at bedside with

## 2024-03-23 NOTE — PROGRESS NOTES
0300: patient now A&Ox3. More alert and interactive. Follows commands. No longer incontinent and is verbalizing need to urinate. LVAD dressing change done. Note brown drainage at site. MAP 74, afebrile.

## 2024-03-23 NOTE — PROGRESS NOTES
Pharmacist Note - Warfarin Dosing  Consult provided for this 64 y.o.male to manage warfarin for LVAD.    INR Goal: 1.8-2.2  (due to recurrent bleeding)    Home regimen: 2 mg PO daily   -supra-therapeutic at last AC visit; dose was being adjusted    Drugs that may increase INR: None  Drugs that may decrease INR: None  Other medications that may increase bleeding risk: None  Risk factors: None  Daily INR ordered through: 4/30    Recent Labs     03/21/24  0332 03/21/24  1053 03/22/24  0020 03/22/24  1643 03/23/24  0139   HGB 7.1*   < > 8.5* 9.1* 8.3*   INR 1.5*  --  1.7*  --  2.2*    < > = values in this interval not displayed.     Date               INR                  Dose  3/19  7/9.2  Hold - vitamin K 2.5 mg PO x 1  3/20  2.4  0.5 mg  3/21  1.5  0.5 mg  3/22  1.7  0.5 mg   3/23  2.2  hold                                                                                Assessment/ Plan:  Will hold warfarin x 1 dose today given increase in INR and now at upper end of goal range.    Pharmacy will continue to monitor daily and adjust therapy as indicated.  Please contact the pharmacist at [phone] for outpatient recommendations if needed.

## 2024-03-24 LAB
ALBUMIN SERPL-MCNC: 2.4 G/DL (ref 3.5–5)
ALBUMIN/GLOB SERPL: 0.5 (ref 1.1–2.2)
ALP SERPL-CCNC: 97 U/L (ref 45–117)
ALT SERPL-CCNC: 19 U/L (ref 12–78)
ANION GAP SERPL CALC-SCNC: 7 MMOL/L (ref 5–15)
AST SERPL-CCNC: 43 U/L (ref 15–37)
BASOPHILS # BLD: 0 K/UL (ref 0–0.1)
BASOPHILS NFR BLD: 0 % (ref 0–1)
BILIRUB DIRECT SERPL-MCNC: 0.2 MG/DL (ref 0–0.2)
BILIRUB SERPL-MCNC: 0.4 MG/DL (ref 0.2–1)
BUN SERPL-MCNC: 37 MG/DL (ref 6–20)
BUN/CREAT SERPL: 22 (ref 12–20)
CALCIUM SERPL-MCNC: 8.1 MG/DL (ref 8.5–10.1)
CHLORIDE SERPL-SCNC: 106 MMOL/L (ref 97–108)
CO2 SERPL-SCNC: 27 MMOL/L (ref 21–32)
CREAT SERPL-MCNC: 1.69 MG/DL (ref 0.7–1.3)
DIFFERENTIAL METHOD BLD: ABNORMAL
EOSINOPHIL # BLD: 0 K/UL (ref 0–0.4)
EOSINOPHIL NFR BLD: 0 % (ref 0–7)
ERYTHROCYTE [DISTWIDTH] IN BLOOD BY AUTOMATED COUNT: 19.3 % (ref 11.5–14.5)
GLOBULIN SER CALC-MCNC: 4.5 G/DL (ref 2–4)
GLUCOSE SERPL-MCNC: 115 MG/DL (ref 65–100)
HCT VFR BLD AUTO: 27.5 % (ref 36.6–50.3)
HGB BLD-MCNC: 8.6 G/DL (ref 12.1–17)
IMM GRANULOCYTES # BLD AUTO: 0 K/UL
IMM GRANULOCYTES NFR BLD AUTO: 0 %
INR PPP: 2.2 (ref 0.9–1.1)
LACTATE SERPL-SCNC: 1.1 MMOL/L (ref 0.4–2)
LYMPHOCYTES # BLD: 0.9 K/UL (ref 0.8–3.5)
LYMPHOCYTES NFR BLD: 4 % (ref 12–49)
MCH RBC QN AUTO: 25.1 PG (ref 26–34)
MCHC RBC AUTO-ENTMCNC: 31.3 G/DL (ref 30–36.5)
MCV RBC AUTO: 80.2 FL (ref 80–99)
METAMYELOCYTES NFR BLD MANUAL: 1 %
MONOCYTES # BLD: 0.9 K/UL (ref 0–1)
MONOCYTES NFR BLD: 4 % (ref 5–13)
MYELOCYTES NFR BLD MANUAL: 1 %
NEUTS BAND NFR BLD MANUAL: 1 % (ref 0–6)
NEUTS SEG # BLD: 19.8 K/UL (ref 1.8–8)
NEUTS SEG NFR BLD: 89 % (ref 32–75)
NRBC # BLD: 0 K/UL (ref 0–0.01)
NRBC BLD-RTO: 0 PER 100 WBC
PLATELET # BLD AUTO: 273 K/UL (ref 150–400)
PMV BLD AUTO: 12.4 FL (ref 8.9–12.9)
POTASSIUM SERPL-SCNC: 3.7 MMOL/L (ref 3.5–5.1)
PROCALCITONIN SERPL-MCNC: 34.52 NG/ML
PROT SERPL-MCNC: 6.9 G/DL (ref 6.4–8.2)
PROTHROMBIN TIME: 22.2 SEC (ref 9–11.1)
RBC # BLD AUTO: 3.43 M/UL (ref 4.1–5.7)
RBC MORPH BLD: ABNORMAL
RBC MORPH BLD: ABNORMAL
SODIUM SERPL-SCNC: 140 MMOL/L (ref 136–145)
WBC # BLD AUTO: 22 K/UL (ref 4.1–11.1)

## 2024-03-24 PROCEDURE — 2580000003 HC RX 258: Performed by: INTERNAL MEDICINE

## 2024-03-24 PROCEDURE — 99231 SBSQ HOSP IP/OBS SF/LOW 25: CPT | Performed by: NURSE PRACTITIONER

## 2024-03-24 PROCEDURE — 2060000000 HC ICU INTERMEDIATE R&B

## 2024-03-24 PROCEDURE — 2580000003 HC RX 258: Performed by: STUDENT IN AN ORGANIZED HEALTH CARE EDUCATION/TRAINING PROGRAM

## 2024-03-24 PROCEDURE — 6370000000 HC RX 637 (ALT 250 FOR IP): Performed by: INTERNAL MEDICINE

## 2024-03-24 PROCEDURE — 36415 COLL VENOUS BLD VENIPUNCTURE: CPT

## 2024-03-24 PROCEDURE — 83605 ASSAY OF LACTIC ACID: CPT

## 2024-03-24 PROCEDURE — 85610 PROTHROMBIN TIME: CPT

## 2024-03-24 PROCEDURE — 84145 PROCALCITONIN (PCT): CPT

## 2024-03-24 PROCEDURE — 80048 BASIC METABOLIC PNL TOTAL CA: CPT

## 2024-03-24 PROCEDURE — 6360000002 HC RX W HCPCS: Performed by: INTERNAL MEDICINE

## 2024-03-24 PROCEDURE — 85025 COMPLETE CBC W/AUTO DIFF WBC: CPT

## 2024-03-24 PROCEDURE — 93750 INTERROGATION VAD IN PERSON: CPT | Performed by: NURSE PRACTITIONER

## 2024-03-24 PROCEDURE — 99232 SBSQ HOSP IP/OBS MODERATE 35: CPT | Performed by: INTERNAL MEDICINE

## 2024-03-24 PROCEDURE — 6370000000 HC RX 637 (ALT 250 FOR IP): Performed by: STUDENT IN AN ORGANIZED HEALTH CARE EDUCATION/TRAINING PROGRAM

## 2024-03-24 PROCEDURE — 80076 HEPATIC FUNCTION PANEL: CPT

## 2024-03-24 RX ORDER — WARFARIN SODIUM 1 MG/1
0.5 TABLET ORAL
Status: COMPLETED | OUTPATIENT
Start: 2024-03-24 | End: 2024-03-24

## 2024-03-24 RX ADMIN — METOPROLOL SUCCINATE 50 MG: 50 TABLET, EXTENDED RELEASE ORAL at 08:49

## 2024-03-24 RX ADMIN — HYDRALAZINE HYDROCHLORIDE 25 MG: 25 TABLET ORAL at 23:24

## 2024-03-24 RX ADMIN — AMLODIPINE BESYLATE 5 MG: 5 TABLET ORAL at 20:40

## 2024-03-24 RX ADMIN — HYDRALAZINE HYDROCHLORIDE 25 MG: 25 TABLET ORAL at 15:11

## 2024-03-24 RX ADMIN — WATER 2000 MG: 1 INJECTION INTRAMUSCULAR; INTRAVENOUS; SUBCUTANEOUS at 11:22

## 2024-03-24 RX ADMIN — METOPROLOL SUCCINATE 50 MG: 50 TABLET, EXTENDED RELEASE ORAL at 20:40

## 2024-03-24 RX ADMIN — SODIUM CHLORIDE, PRESERVATIVE FREE 10 ML: 5 INJECTION INTRAVENOUS at 20:46

## 2024-03-24 RX ADMIN — SODIUM CHLORIDE, PRESERVATIVE FREE 10 ML: 5 INJECTION INTRAVENOUS at 08:50

## 2024-03-24 RX ADMIN — WATER 2000 MG: 1 INJECTION INTRAMUSCULAR; INTRAVENOUS; SUBCUTANEOUS at 18:30

## 2024-03-24 RX ADMIN — WARFARIN SODIUM 0.5 MG: 1 TABLET ORAL at 18:28

## 2024-03-24 RX ADMIN — WATER 2000 MG: 1 INJECTION INTRAMUSCULAR; INTRAVENOUS; SUBCUTANEOUS at 03:04

## 2024-03-24 RX ADMIN — PANTOPRAZOLE SODIUM 40 MG: 40 TABLET, DELAYED RELEASE ORAL at 15:11

## 2024-03-24 RX ADMIN — PANTOPRAZOLE SODIUM 40 MG: 40 TABLET, DELAYED RELEASE ORAL at 06:32

## 2024-03-24 RX ADMIN — AMLODIPINE BESYLATE 5 MG: 5 TABLET ORAL at 08:49

## 2024-03-24 RX ADMIN — HYDRALAZINE HYDROCHLORIDE 25 MG: 25 TABLET ORAL at 06:32

## 2024-03-24 ASSESSMENT — PAIN SCALES - GENERAL
PAINLEVEL_OUTOF10: 0

## 2024-03-24 NOTE — PLAN OF CARE
Problem: Safety - Adult  Goal: Free from fall injury  3/24/2024 0059 by Antonio Silverio RN  Outcome: Progressing  3/23/2024 1300 by Ronda Alicea RN  Outcome: Progressing     Problem: Pain  Goal: Verbalizes/displays adequate comfort level or baseline comfort level  3/24/2024 0059 by Antonio Silverio RN  Outcome: Progressing  Flowsheets (Taken 3/24/2024 0059)  Verbalizes/displays adequate comfort level or baseline comfort level:   Encourage patient to monitor pain and request assistance   Assess pain using appropriate pain scale   Administer analgesics based on type and severity of pain and evaluate response  3/23/2024 1300 by Ronda Alicea RN  Outcome: Progressing  Flowsheets (Taken 3/23/2024 0859)  Verbalizes/displays adequate comfort level or baseline comfort level: Encourage patient to monitor pain and request assistance

## 2024-03-24 NOTE — PROGRESS NOTES
Infectious Disease Progress Note       Subjective:      The patient was personally evaluated and examined by me at bedside today.  The patient is relaxing in bed and is very much awake alert oriented and cooperative.  He denies headache, fever, sweats or chills.  He is not requiring nasal cannula oxygen today and is maintaining his oxygen saturations at an appropriate level without supplemental oxygen at this time.  He denies chest pain, chest pressure or any chest discomfort.  States that he is eating well without nausea, vomiting, diarrhea or constipation.    The patient was discussed at bedside with his cardiac care nurse and we discussed his recent swab LVAD exit culture.  Patient remains afebrile with decreased leukocytosis, thrombocytosis and left shift today.  Blood cultures are currently negative with NGTD at greater than 24 hours.            Objective:    Vitals:   Patient Vitals for the past 24 hrs:   Temp Pulse Resp SpO2   03/24/24 1200 -- 77 -- --   03/24/24 1122 98.4 °F (36.9 °C) 76 18 93 %   03/24/24 1000 -- 80 -- --   03/24/24 0840 -- 64 18 --   03/24/24 0820 -- 58 12 --   03/24/24 0710 98 °F (36.7 °C) 70 18 95 %   03/24/24 0600 -- 73 -- --   03/24/24 0400 -- 68 -- --   03/24/24 0335 98.4 °F (36.9 °C) 66 18 95 %   03/24/24 0200 -- 69 -- --   03/24/24 0002 -- 68 -- --   03/23/24 2340 97.7 °F (36.5 °C) 70 18 94 %   03/23/24 2200 -- 74 -- --   03/23/24 2001 -- 82 -- --   03/23/24 1909 97.9 °F (36.6 °C) 76 16 93 %   03/23/24 1800 -- 74 -- --   03/23/24 1520 97.5 °F (36.4 °C) 71 18 97 %   03/23/24 1400 -- 69 -- --       Physical Exam:    Gen: No apparent distress, back to baseline, awake, interactive, cooperative  HEENT:  Normocephalic, atraumatic, no scleral icterus, no thrush  CV: S1,2 heard regularly, no lower extremity edema    Lungs: Clear to auscultation bilaterally, no wheezing  Abdomen: soft, non tender, non distended, no organomegaly appreciated  Genitourinary:  deferred  Skin: no rash  01/03/24 MSSA, E.cloacae   doxy S                            02/01/24 MSSA,E.cloacae    doxy I                             03/19/24 MSSA,Strep B,few E.cloacae  doxy R                                   Pathology:      Imaging:    CXR 03/22/24   No acute intrathoracic process identified    Assessment / Plan          1.  Bacteremia MSSA currently on cefazolin 2 every 8 hours        Patient currently afebrile without leukocytosis        Blood cultures pending for test of cure        Clinically improved today     2.  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        External culture material does not always represent organism affecting line     3.   Appears that patient has been on chronic doxycycline 100 twice daily since 02/12         Gentamicin ointment has been topically applied to driveline exit site daily         Organisms sensitive to po trimethoprim/sulfamethoxazole/Cipro ( neither ideal)          Both with undesired side effects  nephrotoxic/ QT issues., hematologic           As soon as blood cultures cleared will consider ceftriaxone IV every 24 H              Thank you for the opportunity to participate in the care of this patient.   Please contact with questions or concerns.      Prema June MD

## 2024-03-24 NOTE — PROGRESS NOTES
[facility]  [facility] in [place], [place]  Inpatient Progress Note      Patient name: Sanford Joiner  Patient : 1960  Patient MRN: [id number]  Date of service: 24    Reason for Referral  LVAD management          ASSESSMENT:  Sanford Joiner is a 64 y.o. male admitted with driveline infection and possible upper GI bleed, reporting 3-4 days of dark stools.   History of chronic systolic heart failure due to non ischemic cardiomyopathy, s/p HM3 LVAD implantation ([facility] 2023) as destination therapy, stage D, remains NYHA class IV despite LVAD support due to severe COPD; currently with recovered LVEF to 55% and small LV cavity with resulting low flow alarms; optimal GDMT limited by hypovolemia  Patient is not a candidate for heart transplant due to advanced COPD -  declined by [facility] for both LVAD-DT and transplant and for the same reasons declined for LVAD-DT by [facility]   Team discussed patient at [facility] 3/22/24 to determine risk of pump explant and alternatives of washout or palliative antibiotics.  CT chest shows 2 fluid collections surrounding the driveline and outflow graft concerning for infection.  No complete decisions/recommendations made.   PFTs not yet completed at that time.  Will continue with abx and revisit as a team in a few days.       INTERVAL HISTORY:  -MAPs 80s; currently on room air during assessment   -labs WBC down to 22; procal up to 34.5;  INR 2.2; creat down to 1.69; lactic 1.1  -weight down 2lb; I/O even  -Sanford Joiner is feeling so so.  His breathing is steady right now laying flat and not moving.  He denies additional chills, but appears to be shivering slightly during exam.   No pain or palpitations.          RECOMMENDATIONS:  Leukocytosis- WBC better today, but procal worse.  BC remain positive from 3/21.  NGTD from 3/22  3/22 cultures NGTD for 24 hours.   Suspected COPD exacerbation  CT shows 2 fluid collections around drive line and    Lab Results   Component Value Date/Time    TSH 0.37 05/15/2021 02:37 AM       ALLERGY:  Allergies   Allergen Reactions    Ciprofloxacin      Other reaction(s): Unknown (comments)    Bactrim [Sulfamethoxazole-Trimethoprim] Other (See Comments)     DAWOOD        CURRENT MEDICATIONS:    Current Facility-Administered Medications:     HOLD warfarin 3/23 for large change in INR, , Other, RX Placeholder, Madala, Sushma, MD    fluticasone-umeclidin-vilant (TRELEGY ELLIPTA) 200-62.5-25 MCG/ACT inhaler 1 puff (Patient Supplied), 1 puff, Inhalation, Daily, Madala, Sushma, MD, 1 puff at 03/24/24 0820    ceFAZolin (ANCEF) 2,000 mg in sterile water 20 mL IV syringe, 2,000 mg, IntraVENous, Q8H, Madala, Sushma, MD, 2,000 mg at 03/24/24 1122    0.9 % sodium chloride infusion, , IntraVENous, PRN, Nessa Maurer APRN - NP    ipratropium 0.5 mg-albuterol 2.5 mg (DUONEB) nebulizer solution 1 Dose, 1 Dose, Inhalation, Q4H PRN, Gareth Hou MD    methylPREDNISolone sodium succ (SOLU-MEDROL) 125 mg in sterile water 2 mL injection, 125 mg, IntraVENous, Once, Aiede Gonzales MD    magnesium sulfate 2000 mg in 50 mL IVPB premix, 2,000 mg, IntraVENous, Once, Aidee Gonzales MD    amLODIPine (NORVASC) tablet 5 mg, 5 mg, Oral, BID, Aidee Gonzales MD, 5 mg at 03/24/24 0849    hydrALAZINE (APRESOLINE) tablet 25 mg, 25 mg, Oral, 3 times per day, Aidee Gonzales MD, 25 mg at 03/24/24 0632    metoprolol succinate (TOPROL XL) extended release tablet 50 mg, 50 mg, Oral, BID, Aidee Gonzales MD, 50 mg at 03/24/24 0849    pantoprazole (PROTONIX) tablet 40 mg, 40 mg, Oral, BID AC, Aidee Gonzales MD, 40 mg at 03/24/24 0632    sodium chloride flush 0.9 % injection 5-40 mL, 5-40 mL, IntraVENous, 2 times per day, Aidee Gonzales MD, 10 mL at 03/24/24 0850    sodium chloride flush 0.9 % injection 5-40 mL, 5-40 mL, IntraVENous, PRN, Aidee Gonzales MD    0.9 % sodium chloride infusion, , IntraVENous, PRN, Aidee Gonzales MD,

## 2024-03-24 NOTE — PROGRESS NOTES
[facility]                                                                                          Hospitalist Progress Note  Sushma Madala, MD  Office Phone: [phone]        Date of Service:  3/24/2024  NAME:  Sanford Joiner  :  1960  MRN:  [id number]       Admission Summary:   Sanford Joiner is a 64 y.o. 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  Patient states he had been in his usual state of health until about 24 hours ago when he developed progressive dyspnea.  Initially had dyspneic symptoms with minimal exertion and further progression to dyspnea occurring at rest.  He has had what he describes as a mild nonproductive cough.  Denies any ongoing tobacco use.  Denies any recent travel or sick contacts.  The patient denies any fever, chills, chest or abdominal pain, nausea, vomiting, congestion, recent illness, palpitations, or dysuria.     Remarkable vitals on ER Presentation: Respiratory rate to 112  Labs Remarkable for: SpO2 to mid 80s on room air  ER Images: Chest x-ray showed no acute process  ER Rx: Cefepime, DuoNeb, 250 cc normal saline bolus       Interval history / Subjective:   Follow up respiratory failure  Patient is seen and examined at bedside this AM.   Patient was confused last night , he is alert and oriented x 3. He does feel good but has no specific symptoms. Wbc improved   Discussed with nursing, ID Dr. June - wound cx report discussed, abx will be adjusted per ID.      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

## 2024-03-24 NOTE — PLAN OF CARE
0730: Bedside and Verbal shift change report given to RACHEL Bond (oncoming nurse) by RACHEL Alvarez (offgoing nurse). Report included the following information Nurse Handoff Report, Index, Intake/Output, MAR, and Recent Results.     1930: Bedside and Verbal shift change report given to RACHEL Morales (oncoming nurse) by RACHEL Bond (offgoing nurse). Report included the following information Nurse Handoff Report, Index, Intake/Output, MAR, and Recent Results.     Care Plans:   Problem: Safety - Adult  Goal: Free from fall injury  3/24/2024 0751 by Ronda Alicea RN  Outcome: Progressing  3/24/2024 0059 by Antonio Silverio RN  Outcome: Progressing     Problem: Discharge Planning  Goal: Discharge to home or other facility with appropriate resources  Outcome: Progressing     Problem: Chronic Conditions and Co-morbidities  Goal: Patient's chronic conditions and co-morbidity symptoms are monitored and maintained or improved  Outcome: Progressing     Problem: ABCDS Injury Assessment  Goal: Absence of physical injury  Outcome: Progressing     Problem: Pain  Goal: Verbalizes/displays adequate comfort level or baseline comfort level  3/24/2024 0751 by Ronda Alicea RN  Outcome: Progressing  3/24/2024 0059 by Antonio Silverio RN  Outcome: Progressing  Flowsheets (Taken 3/24/2024 0059)  Verbalizes/displays adequate comfort level or baseline comfort level:   Encourage patient to monitor pain and request assistance   Assess pain using appropriate pain scale   Administer analgesics based on type and severity of pain and evaluate response

## 2024-03-24 NOTE — PROGRESS NOTES
Pharmacist Note - Warfarin Dosing  Consult provided for this 64 y.o.male to manage warfarin for LVAD.    INR Goal: 1.8-2.2  (due to recurrent bleeding)    Home regimen: 2 mg PO daily   -supra-therapeutic at last AC visit; dose was being adjusted    Drugs that may increase INR: None  Drugs that may decrease INR: None  Other medications that may increase bleeding risk: None  Risk factors: None  Daily INR ordered through: 4/30    Recent Labs     03/22/24  0020 03/22/24  1643 03/23/24  0139 03/24/24  0013   HGB 8.5* 9.1* 8.3* 8.6*   INR 1.7*  --  2.2* 2.2*     Date               INR                  Dose  3/19  7/9.2  Hold - vitamin K 2.5 mg PO x 1  3/20  2.4  0.5 mg  3/21  1.5  0.5 mg  3/22  1.7  0.5 mg   3/23  2.2  hold   3/24  2.2  0.5 mg                                                                                Assessment/ Plan:  Will order warfarin 0.5 mg x 1 dose today.    Pharmacy will continue to monitor daily and adjust therapy as indicated.  Please contact the pharmacist at [phone] for outpatient recommendations if needed.

## 2024-03-25 LAB
ALBUMIN SERPL-MCNC: 2.2 G/DL (ref 3.5–5)
ALBUMIN/GLOB SERPL: 0.5 (ref 1.1–2.2)
ALP SERPL-CCNC: 97 U/L (ref 45–117)
ALT SERPL-CCNC: 11 U/L (ref 12–78)
ANION GAP SERPL CALC-SCNC: 6 MMOL/L (ref 5–15)
AST SERPL-CCNC: 30 U/L (ref 15–37)
BASOPHILS # BLD: 0 K/UL (ref 0–0.1)
BASOPHILS NFR BLD: 0 % (ref 0–1)
BILIRUB DIRECT SERPL-MCNC: <0.1 MG/DL (ref 0–0.2)
BILIRUB SERPL-MCNC: 0.3 MG/DL (ref 0.2–1)
BUN SERPL-MCNC: 25 MG/DL (ref 6–20)
BUN/CREAT SERPL: 16 (ref 12–20)
CALCIUM SERPL-MCNC: 7.9 MG/DL (ref 8.5–10.1)
CHLORIDE SERPL-SCNC: 107 MMOL/L (ref 97–108)
CO2 SERPL-SCNC: 28 MMOL/L (ref 21–32)
CREAT SERPL-MCNC: 1.6 MG/DL (ref 0.7–1.3)
DIFFERENTIAL METHOD BLD: ABNORMAL
EOSINOPHIL # BLD: 0 K/UL (ref 0–0.4)
EOSINOPHIL NFR BLD: 0 % (ref 0–7)
ERYTHROCYTE [DISTWIDTH] IN BLOOD BY AUTOMATED COUNT: 20.6 % (ref 11.5–14.5)
GLOBULIN SER CALC-MCNC: 4.4 G/DL (ref 2–4)
GLUCOSE SERPL-MCNC: 146 MG/DL (ref 65–100)
HCT VFR BLD AUTO: 39.5 % (ref 36.6–50.3)
HGB BLD-MCNC: 11.7 G/DL (ref 12.1–17)
IMM GRANULOCYTES # BLD AUTO: 0 K/UL
IMM GRANULOCYTES NFR BLD AUTO: 0 %
INR PPP: 2.3 (ref 0.9–1.1)
LACTATE SERPL-SCNC: 1.4 MMOL/L (ref 0.4–2)
LYMPHOCYTES # BLD: 2.7 K/UL (ref 0.8–3.5)
LYMPHOCYTES NFR BLD: 13 % (ref 12–49)
MCH RBC QN AUTO: 24.7 PG (ref 26–34)
MCHC RBC AUTO-ENTMCNC: 29.6 G/DL (ref 30–36.5)
MCV RBC AUTO: 83.3 FL (ref 80–99)
MONOCYTES # BLD: 0.8 K/UL (ref 0–1)
MONOCYTES NFR BLD: 4 % (ref 5–13)
NEUTS BAND NFR BLD MANUAL: 2 % (ref 0–6)
NEUTS SEG # BLD: 17.6 K/UL (ref 1.8–8)
NEUTS SEG NFR BLD: 81 % (ref 32–75)
NRBC # BLD: 0 K/UL (ref 0–0.01)
NRBC BLD-RTO: 0 PER 100 WBC
PLATELET # BLD AUTO: 289 K/UL (ref 150–400)
PMV BLD AUTO: 11.6 FL (ref 8.9–12.9)
POTASSIUM SERPL-SCNC: 3.8 MMOL/L (ref 3.5–5.1)
PROCALCITONIN SERPL-MCNC: 18.17 NG/ML
PROT SERPL-MCNC: 6.6 G/DL (ref 6.4–8.2)
PROTHROMBIN TIME: 22.8 SEC (ref 9–11.1)
RBC # BLD AUTO: 4.74 M/UL (ref 4.1–5.7)
RBC MORPH BLD: ABNORMAL
SODIUM SERPL-SCNC: 141 MMOL/L (ref 136–145)
WBC # BLD AUTO: 21.1 K/UL (ref 4.1–11.1)

## 2024-03-25 PROCEDURE — 2580000003 HC RX 258: Performed by: INTERNAL MEDICINE

## 2024-03-25 PROCEDURE — 2060000000 HC ICU INTERMEDIATE R&B

## 2024-03-25 PROCEDURE — 80048 BASIC METABOLIC PNL TOTAL CA: CPT

## 2024-03-25 PROCEDURE — 93750 INTERROGATION VAD IN PERSON: CPT | Performed by: INTERNAL MEDICINE

## 2024-03-25 PROCEDURE — 80076 HEPATIC FUNCTION PANEL: CPT

## 2024-03-25 PROCEDURE — 2700000000 HC OXYGEN THERAPY PER DAY

## 2024-03-25 PROCEDURE — 99233 SBSQ HOSP IP/OBS HIGH 50: CPT | Performed by: PHYSICAL MEDICINE & REHABILITATION

## 2024-03-25 PROCEDURE — 84145 PROCALCITONIN (PCT): CPT

## 2024-03-25 PROCEDURE — 6360000002 HC RX W HCPCS: Performed by: INTERNAL MEDICINE

## 2024-03-25 PROCEDURE — 85025 COMPLETE CBC W/AUTO DIFF WBC: CPT

## 2024-03-25 PROCEDURE — 99233 SBSQ HOSP IP/OBS HIGH 50: CPT | Performed by: INTERNAL MEDICINE

## 2024-03-25 PROCEDURE — 6370000000 HC RX 637 (ALT 250 FOR IP): Performed by: INTERNAL MEDICINE

## 2024-03-25 PROCEDURE — 2580000003 HC RX 258: Performed by: STUDENT IN AN ORGANIZED HEALTH CARE EDUCATION/TRAINING PROGRAM

## 2024-03-25 PROCEDURE — 85610 PROTHROMBIN TIME: CPT

## 2024-03-25 PROCEDURE — 6370000000 HC RX 637 (ALT 250 FOR IP): Performed by: STUDENT IN AN ORGANIZED HEALTH CARE EDUCATION/TRAINING PROGRAM

## 2024-03-25 PROCEDURE — 83605 ASSAY OF LACTIC ACID: CPT

## 2024-03-25 PROCEDURE — 94760 N-INVAS EAR/PLS OXIMETRY 1: CPT

## 2024-03-25 PROCEDURE — 36415 COLL VENOUS BLD VENIPUNCTURE: CPT

## 2024-03-25 RX ORDER — LACTOBACILLUS RHAMNOSUS GG 10B CELL
1 CAPSULE ORAL
Status: DISCONTINUED | OUTPATIENT
Start: 2024-03-25 | End: 2024-04-09 | Stop reason: HOSPADM

## 2024-03-25 RX ADMIN — AMLODIPINE BESYLATE 5 MG: 5 TABLET ORAL at 20:17

## 2024-03-25 RX ADMIN — METOPROLOL SUCCINATE 50 MG: 50 TABLET, EXTENDED RELEASE ORAL at 07:57

## 2024-03-25 RX ADMIN — SODIUM CHLORIDE, PRESERVATIVE FREE 10 ML: 5 INJECTION INTRAVENOUS at 07:57

## 2024-03-25 RX ADMIN — HYDRALAZINE HYDROCHLORIDE 25 MG: 25 TABLET ORAL at 07:11

## 2024-03-25 RX ADMIN — HYDRALAZINE HYDROCHLORIDE 25 MG: 25 TABLET ORAL at 15:19

## 2024-03-25 RX ADMIN — SODIUM CHLORIDE, PRESERVATIVE FREE 10 ML: 5 INJECTION INTRAVENOUS at 20:18

## 2024-03-25 RX ADMIN — WATER 2000 MG: 1 INJECTION INTRAMUSCULAR; INTRAVENOUS; SUBCUTANEOUS at 11:14

## 2024-03-25 RX ADMIN — WATER 2000 MG: 1 INJECTION INTRAMUSCULAR; INTRAVENOUS; SUBCUTANEOUS at 19:18

## 2024-03-25 RX ADMIN — AMLODIPINE BESYLATE 5 MG: 5 TABLET ORAL at 07:57

## 2024-03-25 RX ADMIN — HYDRALAZINE HYDROCHLORIDE 25 MG: 25 TABLET ORAL at 23:36

## 2024-03-25 RX ADMIN — PANTOPRAZOLE SODIUM 40 MG: 40 TABLET, DELAYED RELEASE ORAL at 07:11

## 2024-03-25 RX ADMIN — WATER 2000 MG: 1 INJECTION INTRAMUSCULAR; INTRAVENOUS; SUBCUTANEOUS at 03:47

## 2024-03-25 RX ADMIN — METOPROLOL SUCCINATE 50 MG: 50 TABLET, EXTENDED RELEASE ORAL at 20:17

## 2024-03-25 RX ADMIN — Medication 1 CAPSULE: at 12:14

## 2024-03-25 RX ADMIN — PANTOPRAZOLE SODIUM 40 MG: 40 TABLET, DELAYED RELEASE ORAL at 15:19

## 2024-03-25 ASSESSMENT — PAIN SCALES - GENERAL
PAINLEVEL_OUTOF10: 0

## 2024-03-25 NOTE — PROGRESS NOTES
Palliative Medicine  Patient Name: Sanford Joiner  YOB: 1960  MRN: [id number]  Age: 64 y.o.  Gender: male    Date of Initial Consult: 3/21/24  Date of Service: 3/25/2024  Time: 2:42 PM  Provider: Angie Bocanegra MD  Hospital Day: 7  Admit Date: 3/19/2024  Referring Provider: Dr Hou        Reasons for Consultation:  Goals of Care    HISTORY OF PRESENT ILLNESS (HPI):   Sanford Joiner \"Rhys\" is a 64 y.o. 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 +staph bacteremia. CT chest 3/20 showing new fluid collection around canula. Treating COPD exacerbation- initially requiring BIPAP, improved. Team discussing next steps, possible removal of LVAD but too high surgical risk given resp status.     Will need palliative IV and then PO abx.     Psychosocial: Significant other, \"wife\" since 1991 is Ashley- they are not legally . Completed AMD in past.  Pt has 3 adult sons- Sanford Jr, Juan Ramon and DJ. Former . Grew up in [place], loved to swim in [place] and fish. As he got older, would not get in- but still loved to fish, halie bass.       PALLIATIVE DIAGNOSES:    Shortness of breath   Fatigue   Palliative care encounter, goals of care       ASSESSMENT AND PLAN:   Since I last saw pt, deemed not a surgical candidate - too high risk and thus plan is for palliative IV--> PO abx per ID to manage LVAD pump infection. Partner Ashley was present this morning, but now at work (is a baker at [employer], and pt asks me not to call her right now). She was present during conversations with other specialties.   Pt states that he was told that life might be limited to 6 weeks- he cannot remember which team told him this. Life is limited, but I am uncertain how limited- but it is important that he talk w/ Ashley and

## 2024-03-25 NOTE — PLAN OF CARE
Problem: Safety - Adult  Goal: Free from fall injury  3/25/2024 1955 by Andrew Garcia RN  Outcome: Progressing  3/25/2024 0747 by Ronda Alicea RN  Outcome: Progressing     Problem: Chronic Conditions and Co-morbidities  Goal: Patient's chronic conditions and co-morbidity symptoms are monitored and maintained or improved  3/25/2024 1955 by Andrew Garcia RN  Outcome: Progressing  Flowsheets (Taken 3/25/2024 0752 by Ronda Alicea RN)  Care Plan - Patient's Chronic Conditions and Co-Morbidity Symptoms are Monitored and Maintained or Improved: Monitor and assess patient's chronic conditions and comorbid symptoms for stability, deterioration, or improvement  3/25/2024 0747 by Ronda Alicea RN  Outcome: Progressing     Problem: ABCDS Injury Assessment  Goal: Absence of physical injury  3/25/2024 1955 by Andrew Garcia RN  Outcome: Progressing  3/25/2024 0747 by Ronda Alicea RN  Outcome: Progressing     Problem: Pain  Goal: Verbalizes/displays adequate comfort level or baseline comfort level  3/25/2024 1955 by Andrew Garcia RN  Outcome: Progressing  Flowsheets (Taken 3/25/2024 0752 by Ronda Alicea RN)  Verbalizes/displays adequate comfort level or baseline comfort level: Encourage patient to monitor pain and request assistance  3/25/2024 0747 by Ronda Alciea RN  Outcome: Progressing     Problem: Skin/Tissue Integrity  Goal: Absence of new skin breakdown  Description: 1.  Monitor for areas of redness and/or skin breakdown  2.  Assess vascular access sites hourly  3.  Every 4-6 hours minimum:  Change oxygen saturation probe site  4.  Every 4-6 hours:  If on nasal continuous positive airway pressure, respiratory therapy assess nares and determine need for appliance change or resting period.  3/25/2024 1955 by Andrew Garcia RN  Outcome: Progressing  3/25/2024 0747 by Ronda Alicea RN  Outcome: Progressing

## 2024-03-25 NOTE — PROGRESS NOTES
Pulmonary, Critical Care, and Sleep Medicine~Progress Note    Name: Sanford Joiner MRN: [id number]   : 1960 Hospital: [facility]   Date: 3/25/2024 12:00 PM Admission: 3/19/2024     Impression Plan   Sepsis; source unclear.  Acute respiratory alkalosis from sepsis.  Positive BC (1/2 bottle G+cocci): contaminant vs infection?  Acute exacerbation of asthma/ copd; missed his dose of Nucala last week.  Cardiomyopathy; s/p LVAD HM3 - 3/2023.  Hx of chronic drive line infection.  Elevated INR: warfarin on hold. On abx.  Not sure what to make of the new prominent fluid collection around the power source wire to the LVAD. Defer to CV surgrey   On his home trelegy   O2 titration above 90%   PFTs were redone on Friday, but not resulted yet   Family at bedside      Pulmonary Consultation:    3/25  He feels a little better today     3/22  Still feels quite dyspneic   Feeling benefit from trelegy switch     3/21  Doing ok, does not like nebs  CT scan   IMPRESSION:     1. New prominent fluid collection surrounding the power source wire to the LVAD  as described above.  2. New fluid collection along the course of the cannula that extends from the  LVAD to the aorta. The fluid collection is along the horizontal portion of the  cannula along the superior aspect and anterior aspect extending to the inferior  aspect.  3. The lungs demonstrate no pneumonia or pulmonary edema. There are moderate  changes of emphysema. The unit was telephoned.    3/20  Per reports doing better today       3/19  64-year-old male with past medical history significant for bronchial asthma/COPD overlap syndrome, cardiomyopathy status post LVAD placement 3/27/2023 who presented to [facility] with 4-day history of increasing shortness of breath, dry cough and chest tightness with wheezing.  He noted his oxygen levels to be low and hence decided to come to the emergency room.  At the time of my evaluation he is on BiPAP  chloride infusion   IntraVENous PRN    ipratropium 0.5 mg-albuterol 2.5 mg (DUONEB) nebulizer solution 1 Dose  1 Dose Inhalation Q4H PRN    methylPREDNISolone sodium succ (SOLU-MEDROL) 125 mg in sterile water 2 mL injection  125 mg IntraVENous Once    magnesium sulfate 2000 mg in 50 mL IVPB premix  2,000 mg IntraVENous Once    amLODIPine (NORVASC) tablet 5 mg  5 mg Oral BID    hydrALAZINE (APRESOLINE) tablet 25 mg  25 mg Oral 3 times per day    metoprolol succinate (TOPROL XL) extended release tablet 50 mg  50 mg Oral BID    pantoprazole (PROTONIX) tablet 40 mg  40 mg Oral BID AC    sodium chloride flush 0.9 % injection 5-40 mL  5-40 mL IntraVENous 2 times per day    sodium chloride flush 0.9 % injection 5-40 mL  5-40 mL IntraVENous PRN    0.9 % sodium chloride infusion   IntraVENous PRN    potassium chloride (KLOR-CON) extended release tablet 40 mEq  40 mEq Oral PRN    Or    potassium bicarb-citric acid (EFFER-K) effervescent tablet 40 mEq  40 mEq Oral PRN    Or    potassium chloride 10 mEq/100 mL IVPB (Peripheral Line)  10 mEq IntraVENous PRN    magnesium sulfate 2000 mg in 50 mL IVPB premix  2,000 mg IntraVENous PRN    ondansetron (ZOFRAN-ODT) disintegrating tablet 4 mg  4 mg Oral Q8H PRN    Or    ondansetron (ZOFRAN) injection 4 mg  4 mg IntraVENous Q6H PRN    polyethylene glycol (GLYCOLAX) packet 17 g  17 g Oral Daily PRN    acetaminophen (TYLENOL) tablet 650 mg  650 mg Oral Q6H PRN    Or    acetaminophen (TYLENOL) suppository 650 mg  650 mg Rectal Q6H PRN    warfarin - pharmacy to dose   Other RX Placeholder    hydrALAZINE (APRESOLINE) injection 10 mg  10 mg IntraVENous Q4H PRN    [Held by provider] methylPREDNISolone sodium succ (SOLU-MEDROL) 40 mg in sterile water 1 mL injection  40 mg IntraVENous Q6H       Labs:  ABG Invalid input(s): \"ABG\"     CBC Recent Labs     03/23/24  0139 03/24/24  0013 03/25/24  0502   WBC 35.8* 22.0* 21.1*   HGB 8.3* 8.6* 11.7*   HCT 25.9* 27.5* 39.5    273 289   MCV 80.4

## 2024-03-25 NOTE — PROGRESS NOTES
Pharmacist Note - Warfarin Dosing  Consult provided for this 64 y.o.male to manage warfarin for LVAD.    INR Goal: 1.8-2.2  (due to recurrent bleeding)    Home regimen: 2 mg PO daily   -supra-therapeutic at last AC visit; dose was being adjusted    Drugs that may increase INR: None  Drugs that may decrease INR: None  Other medications that may increase bleeding risk: None  Risk factors: None  Daily INR ordered through: 4/30    Recent Labs     03/23/24  0139 03/24/24  0013 03/25/24  0345 03/25/24  0502   HGB 8.3* 8.6*  --  11.7*   INR 2.2* 2.2* 2.3*  --      Date               INR                  Dose  3/19  7/9.2  Hold - vitamin K 2.5 mg PO x 1  3/20  2.4  0.5 mg  3/21  1.5  0.5 mg  3/22  1.7  0.5 mg   3/23  2.2  hold   3/24  2.2  0.5 mg   3/25  2.3  hold                                                                                Assessment/ Plan:  Will hold warfarin today for INR 2.3    Pharmacy will continue to monitor daily and adjust therapy as indicated.  Please contact the pharmacist at [phone] for outpatient recommendations if needed.

## 2024-03-25 NOTE — PROGRESS NOTES
[facility]                                                                                          Hospitalist Progress Note  Sushma Madala, MD  Office Phone: [phone]        Date of Service:  3/25/2024  NAME:  Sanford Joiner  :  1960  MRN:  [id number]       Admission Summary:   Sanford Joiner is a 64 y.o. 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  Patient states he had been in his usual state of health until about 24 hours ago when he developed progressive dyspnea.  Initially had dyspneic symptoms with minimal exertion and further progression to dyspnea occurring at rest.  He has had what he describes as a mild nonproductive cough.  Denies any ongoing tobacco use.  Denies any recent travel or sick contacts.  The patient denies any fever, chills, chest or abdominal pain, nausea, vomiting, congestion, recent illness, palpitations, or dysuria.     Remarkable vitals on ER Presentation: Respiratory rate to 112  Labs Remarkable for: SpO2 to mid 80s on room air  ER Images: Chest x-ray showed no acute process  ER Rx: Cefepime, DuoNeb, 250 cc normal saline bolus       Interval history / Subjective:   Follow up respiratory failure  Patient is seen and examined at bedside this AM. Wife present. He feels better. Sob improving. 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  -respiratory viral panel negative  -blood cultures 3/19 2/2  MSSA  - wound cx with MSSA, Enterobacter cloacae  -repeat blood culture 3/20 2/2 positive for MSSA  -  mg-albuterol 2.5 mg (DUONEB) nebulizer solution 1 Dose  1 Dose Inhalation Q4H PRN    methylPREDNISolone sodium succ (SOLU-MEDROL) 125 mg in sterile water 2 mL injection  125 mg IntraVENous Once    magnesium sulfate 2000 mg in 50 mL IVPB premix  2,000 mg IntraVENous Once    amLODIPine (NORVASC) tablet 5 mg  5 mg Oral BID    hydrALAZINE (APRESOLINE) tablet 25 mg  25 mg Oral 3 times per day    metoprolol succinate (TOPROL XL) extended release tablet 50 mg  50 mg Oral BID    pantoprazole (PROTONIX) tablet 40 mg  40 mg Oral BID AC    sodium chloride flush 0.9 % injection 5-40 mL  5-40 mL IntraVENous 2 times per day    sodium chloride flush 0.9 % injection 5-40 mL  5-40 mL IntraVENous PRN    0.9 % sodium chloride infusion   IntraVENous PRN    potassium chloride (KLOR-CON) extended release tablet 40 mEq  40 mEq Oral PRN    Or    potassium bicarb-citric acid (EFFER-K) effervescent tablet 40 mEq  40 mEq Oral PRN    Or    potassium chloride 10 mEq/100 mL IVPB (Peripheral Line)  10 mEq IntraVENous PRN    magnesium sulfate 2000 mg in 50 mL IVPB premix  2,000 mg IntraVENous PRN    ondansetron (ZOFRAN-ODT) disintegrating tablet 4 mg  4 mg Oral Q8H PRN    Or    ondansetron (ZOFRAN) injection 4 mg  4 mg IntraVENous Q6H PRN    polyethylene glycol (GLYCOLAX) packet 17 g  17 g Oral Daily PRN    acetaminophen (TYLENOL) tablet 650 mg  650 mg Oral Q6H PRN    Or    acetaminophen (TYLENOL) suppository 650 mg  650 mg Rectal Q6H PRN    warfarin - pharmacy to dose   Other RX Placeholder    hydrALAZINE (APRESOLINE) injection 10 mg  10 mg IntraVENous Q4H PRN    [Held by provider] methylPREDNISolone sodium succ (SOLU-MEDROL) 40 mg in sterile water 1 mL injection  40 mg IntraVENous Q6H     ______________________________________________________________________  EXPECTED LENGTH OF STAY: 6  ACTUAL LENGTH OF STAY:          6                 Sushma Madala, MD

## 2024-03-25 NOTE — PLAN OF CARE
0730: Bedside and Verbal shift change report given to RACHEL Bond (oncoming nurse) by RACHEL Morales (offgoing nurse). Report included the following information Nurse Handoff Report, Index, Intake/Output, MAR, and Recent Results.     1044: MD Rebeca updated about patient having a loose green stool with undigested food. Per MD monitor for now.     1050: MD Jose Miguel updated about brown drainage from drive line and patient having PI events. Per MD PI events are normal for patient.     1930: Bedside and Verbal shift change report given to RACHEL Morales (oncoming nurse) by RACHEL Bond (offgoing nurse). Report included the following information Nurse Handoff Report, Index, Intake/Output, MAR, and Recent Results.       Care Plans:   Problem: Safety - Adult  Goal: Free from fall injury  3/25/2024 0747 by Ronda Alicea RN  Outcome: Progressing  3/24/2024 2247 by Andrew Garcia RN  Outcome: Progressing     Problem: Discharge Planning  Goal: Discharge to home or other facility with appropriate resources  3/25/2024 0747 by Ronda Alicea RN  Outcome: Progressing  3/24/2024 2247 by Andrew Garcia RN  Outcome: Progressing  Flowsheets (Taken 3/24/2024 2040)  Discharge to home or other facility with appropriate resources: Identify barriers to discharge with patient and caregiver     Problem: Chronic Conditions and Co-morbidities  Goal: Patient's chronic conditions and co-morbidity symptoms are monitored and maintained or improved  3/25/2024 0747 by Ronda Alicea RN  Outcome: Progressing  3/24/2024 2247 by Andrew Garcia RN  Outcome: Progressing  Flowsheets (Taken 3/24/2024 2040)  Care Plan - Patient's Chronic Conditions and Co-Morbidity Symptoms are Monitored and Maintained or Improved: Monitor and assess patient's chronic conditions and comorbid symptoms for stability, deterioration, or improvement     Problem: ABCDS Injury Assessment  Goal: Absence of physical injury  3/25/2024 0747 by Ronda Alicea RN  Outcome: Progressing  3/24/2024 2247 by
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
## 2024-03-25 NOTE — PLAN OF CARE
Problem: Safety - Adult  Goal: Free from fall injury  Outcome: Progressing     Problem: Chronic Conditions and Co-morbidities  Goal: Patient's chronic conditions and co-morbidity symptoms are monitored and maintained or improved  Outcome: Progressing  Flowsheets (Taken 3/24/2024 2040)  Care Plan - Patient's Chronic Conditions and Co-Morbidity Symptoms are Monitored and Maintained or Improved: Monitor and assess patient's chronic conditions and comorbid symptoms for stability, deterioration, or improvement     Problem: ABCDS Injury Assessment  Goal: Absence of physical injury  Outcome: Progressing     Problem: Pain  Goal: Verbalizes/displays adequate comfort level or baseline comfort level  Outcome: Progressing

## 2024-03-25 NOTE — PROGRESS NOTES
Physical Therapy  3/25/2024    Chart reviewed. Patient resting in bed, politely but adamantly declined OOB and stated he had been up in the chair. Stated the longest he has walked this admission is to the bathroom within his room. Inquired if he felt strong enough to navigate his home environment at d/c and he said yes. Will follow up tomorrow. Thank you.    Cheyenne Denise, PT, DPT

## 2024-03-26 LAB
ALBUMIN SERPL-MCNC: 2.2 G/DL (ref 3.5–5)
ALBUMIN/GLOB SERPL: 0.4 (ref 1.1–2.2)
ALP SERPL-CCNC: 113 U/L (ref 45–117)
ALT SERPL-CCNC: 16 U/L (ref 12–78)
ANION GAP SERPL CALC-SCNC: 6 MMOL/L (ref 5–15)
AST SERPL-CCNC: 32 U/L (ref 15–37)
BACTERIA SPEC CULT: ABNORMAL
BACTERIA SPEC CULT: ABNORMAL
BASOPHILS # BLD: 0 K/UL (ref 0–0.1)
BASOPHILS NFR BLD: 0 % (ref 0–1)
BILIRUB DIRECT SERPL-MCNC: 0.1 MG/DL (ref 0–0.2)
BILIRUB SERPL-MCNC: 0.3 MG/DL (ref 0.2–1)
BUN SERPL-MCNC: 22 MG/DL (ref 6–20)
BUN/CREAT SERPL: 13 (ref 12–20)
CALCIUM SERPL-MCNC: 8.1 MG/DL (ref 8.5–10.1)
CHLORIDE SERPL-SCNC: 107 MMOL/L (ref 97–108)
CO2 SERPL-SCNC: 24 MMOL/L (ref 21–32)
CREAT SERPL-MCNC: 1.71 MG/DL (ref 0.7–1.3)
DIFFERENTIAL METHOD BLD: ABNORMAL
EOSINOPHIL # BLD: 0 K/UL (ref 0–0.4)
EOSINOPHIL NFR BLD: 0 % (ref 0–7)
ERYTHROCYTE [DISTWIDTH] IN BLOOD BY AUTOMATED COUNT: 20.5 % (ref 11.5–14.5)
GLOBULIN SER CALC-MCNC: 5.2 G/DL (ref 2–4)
GLUCOSE SERPL-MCNC: 149 MG/DL (ref 65–100)
HCT VFR BLD AUTO: 30.4 % (ref 36.6–50.3)
HGB BLD-MCNC: 9.3 G/DL (ref 12.1–17)
IMM GRANULOCYTES # BLD AUTO: 0 K/UL
IMM GRANULOCYTES NFR BLD AUTO: 0 %
INR PPP: 2.1 (ref 0.9–1.1)
LACTATE SERPL-SCNC: 1.6 MMOL/L (ref 0.4–2)
LYMPHOCYTES # BLD: 1.2 K/UL (ref 0.8–3.5)
LYMPHOCYTES NFR BLD: 6 % (ref 12–49)
MCH RBC QN AUTO: 25.2 PG (ref 26–34)
MCHC RBC AUTO-ENTMCNC: 30.6 G/DL (ref 30–36.5)
MCV RBC AUTO: 82.4 FL (ref 80–99)
MONOCYTES # BLD: 1.2 K/UL (ref 0–1)
MONOCYTES NFR BLD: 6 % (ref 5–13)
MYELOCYTES NFR BLD MANUAL: 2 %
NEUTS SEG # BLD: 16.5 K/UL (ref 1.8–8)
NEUTS SEG NFR BLD: 86 % (ref 32–75)
NRBC # BLD: 0 K/UL (ref 0–0.01)
NRBC BLD-RTO: 0 PER 100 WBC
PLATELET # BLD AUTO: 273 K/UL (ref 150–400)
PMV BLD AUTO: 11.7 FL (ref 8.9–12.9)
POTASSIUM SERPL-SCNC: 3.7 MMOL/L (ref 3.5–5.1)
PROCALCITONIN SERPL-MCNC: 7.47 NG/ML
PROT SERPL-MCNC: 7.4 G/DL (ref 6.4–8.2)
PROTHROMBIN TIME: 20.9 SEC (ref 9–11.1)
RBC # BLD AUTO: 3.69 M/UL (ref 4.1–5.7)
RBC MORPH BLD: ABNORMAL
SERVICE CMNT-IMP: ABNORMAL
SERVICE CMNT-IMP: ABNORMAL
SODIUM SERPL-SCNC: 137 MMOL/L (ref 136–145)
WBC # BLD AUTO: 19.2 K/UL (ref 4.1–11.1)

## 2024-03-26 PROCEDURE — 6360000002 HC RX W HCPCS: Performed by: INTERNAL MEDICINE

## 2024-03-26 PROCEDURE — 6370000000 HC RX 637 (ALT 250 FOR IP): Performed by: INTERNAL MEDICINE

## 2024-03-26 PROCEDURE — 2580000003 HC RX 258: Performed by: INTERNAL MEDICINE

## 2024-03-26 PROCEDURE — 99232 SBSQ HOSP IP/OBS MODERATE 35: CPT | Performed by: PHYSICAL MEDICINE & REHABILITATION

## 2024-03-26 PROCEDURE — 85610 PROTHROMBIN TIME: CPT

## 2024-03-26 PROCEDURE — 84145 PROCALCITONIN (PCT): CPT

## 2024-03-26 PROCEDURE — 2060000000 HC ICU INTERMEDIATE R&B

## 2024-03-26 PROCEDURE — 99233 SBSQ HOSP IP/OBS HIGH 50: CPT | Performed by: INTERNAL MEDICINE

## 2024-03-26 PROCEDURE — 87186 SC STD MICRODIL/AGAR DIL: CPT

## 2024-03-26 PROCEDURE — 80076 HEPATIC FUNCTION PANEL: CPT

## 2024-03-26 PROCEDURE — 80048 BASIC METABOLIC PNL TOTAL CA: CPT

## 2024-03-26 PROCEDURE — 87205 SMEAR GRAM STAIN: CPT

## 2024-03-26 PROCEDURE — APPSS30 APP SPLIT SHARED TIME 16-30 MINUTES: Performed by: NURSE PRACTITIONER

## 2024-03-26 PROCEDURE — 83605 ASSAY OF LACTIC ACID: CPT

## 2024-03-26 PROCEDURE — 2580000003 HC RX 258: Performed by: STUDENT IN AN ORGANIZED HEALTH CARE EDUCATION/TRAINING PROGRAM

## 2024-03-26 PROCEDURE — 87077 CULTURE AEROBIC IDENTIFY: CPT

## 2024-03-26 PROCEDURE — 87070 CULTURE OTHR SPECIMN AEROBIC: CPT

## 2024-03-26 PROCEDURE — 93750 INTERROGATION VAD IN PERSON: CPT | Performed by: INTERNAL MEDICINE

## 2024-03-26 PROCEDURE — 36415 COLL VENOUS BLD VENIPUNCTURE: CPT

## 2024-03-26 PROCEDURE — 87147 CULTURE TYPE IMMUNOLOGIC: CPT

## 2024-03-26 PROCEDURE — 6370000000 HC RX 637 (ALT 250 FOR IP): Performed by: STUDENT IN AN ORGANIZED HEALTH CARE EDUCATION/TRAINING PROGRAM

## 2024-03-26 PROCEDURE — 85025 COMPLETE CBC W/AUTO DIFF WBC: CPT

## 2024-03-26 RX ORDER — WARFARIN SODIUM 1 MG/1
0.5 TABLET ORAL
Status: COMPLETED | OUTPATIENT
Start: 2024-03-26 | End: 2024-03-26

## 2024-03-26 RX ADMIN — WARFARIN SODIUM 0.5 MG: 1 TABLET ORAL at 19:12

## 2024-03-26 RX ADMIN — METOPROLOL SUCCINATE 50 MG: 50 TABLET, EXTENDED RELEASE ORAL at 08:46

## 2024-03-26 RX ADMIN — HYDRALAZINE HYDROCHLORIDE 25 MG: 25 TABLET ORAL at 16:03

## 2024-03-26 RX ADMIN — HYDRALAZINE HYDROCHLORIDE 25 MG: 25 TABLET ORAL at 07:11

## 2024-03-26 RX ADMIN — WATER 2000 MG: 1 INJECTION INTRAMUSCULAR; INTRAVENOUS; SUBCUTANEOUS at 12:16

## 2024-03-26 RX ADMIN — AMLODIPINE BESYLATE 5 MG: 5 TABLET ORAL at 08:46

## 2024-03-26 RX ADMIN — Medication 1 CAPSULE: at 08:46

## 2024-03-26 RX ADMIN — PANTOPRAZOLE SODIUM 40 MG: 40 TABLET, DELAYED RELEASE ORAL at 16:03

## 2024-03-26 RX ADMIN — METOPROLOL SUCCINATE 50 MG: 50 TABLET, EXTENDED RELEASE ORAL at 20:46

## 2024-03-26 RX ADMIN — WATER 2000 MG: 1 INJECTION INTRAMUSCULAR; INTRAVENOUS; SUBCUTANEOUS at 19:12

## 2024-03-26 RX ADMIN — HYDRALAZINE HYDROCHLORIDE 25 MG: 25 TABLET ORAL at 20:46

## 2024-03-26 RX ADMIN — SODIUM CHLORIDE, PRESERVATIVE FREE 10 ML: 5 INJECTION INTRAVENOUS at 08:47

## 2024-03-26 RX ADMIN — PANTOPRAZOLE SODIUM 40 MG: 40 TABLET, DELAYED RELEASE ORAL at 07:11

## 2024-03-26 RX ADMIN — WATER 2000 MG: 1 INJECTION INTRAMUSCULAR; INTRAVENOUS; SUBCUTANEOUS at 03:18

## 2024-03-26 RX ADMIN — AMLODIPINE BESYLATE 5 MG: 5 TABLET ORAL at 20:46

## 2024-03-26 RX ADMIN — SODIUM CHLORIDE, PRESERVATIVE FREE 10 ML: 5 INJECTION INTRAVENOUS at 20:47

## 2024-03-26 ASSESSMENT — PAIN SCALES - GENERAL
PAINLEVEL_OUTOF10: 0

## 2024-03-26 NOTE — PROGRESS NOTES
Palliative Medicine  Patient Name: Sanford Joiner  YOB: 1960  MRN: [id number]  Age: 64 y.o.  Gender: male    Date of Initial Consult: 3/21/24  Date of Service: 3/26/2024  Time: 12:13 PM  Provider: Angie Bocanegra MD  Hospital Day: 8  Admit Date: 3/19/2024  Referring Provider: Dr Hou        Reasons for Consultation:  Goals of Care    HISTORY OF PRESENT ILLNESS (HPI):   Sanford Joiner \"Rhys\" is a 64 y.o. 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 +staph bacteremia. CT chest 3/20 showing new fluid collection around canula. Treating COPD exacerbation- initially requiring BIPAP, improved. Team discussing next steps, possible removal of LVAD but too high surgical risk given resp status.     Will need palliative IV and then PO abx.     Psychosocial: Significant other, \"wife\" since 1991 is Crystal- they are not legally . Completed AMD in past.  Pt has 3 adult sons- Sanford Ballesteros, Juan Ramon and DJ. Former . Grew up in [place], loved [place] swim in the [place] and fish. As he got older, would not get in- but still loved to fish, halie bass.       PALLIATIVE DIAGNOSES:    Shortness of breath   Fatigue   Palliative care encounter, goals of care       ASSESSMENT AND PLAN:   Pt feels okay this morning, eager to get home when possible. He is glad that he misunderstood yesterday that he needs 6 weeks of IV abx, not that life limited to 6 weeks.   See ACP note.  Goals clear for full restorative measures.   Available again as needed.   Please call with any palliative questions or concerns.  Palliative Care Team is available via perfect serve or via phone.    Referrals to:   [] Outpatient Palliative Care  [] Home Based Palliative Care  [] Home Based Primary Care  [] Hospice       ADVANCE CARE PLANNING:   [] The  79.2 kg (174 lb 9.7 oz)   10/11/23 80 kg (176 lb 5.9 oz)   10/12/23 81.4 kg (179 lb 6.4 oz)   08/29/23 80.3 kg (177 lb)   11/03/21 79.5 kg (175 lb 3.2 oz)   09/16/21 74.3 kg (163 lb 11.2 oz)   08/06/21 74.8 kg (164 lb 12.8 oz)   07/09/21 75.6 kg (166 lb 9.6 oz)        Current Diet: ADULT DIET; Regular       PSYCHOSOCIAL/SPIRITUAL SCREENING:   Palliative IDT has assessed this patient for cultural preferences / practices and a referral made as appropriate to needs (Cultural Services, Patient Advocacy, Ethics, etc.)    Spiritual Affiliation: Evangelical    Any spiritual / Anglican concerns:  [] Yes /  [x] No   If \"Yes\" to discuss with pastoral care during IDT     Does caregiver feel burdened by caring for their loved one:   [] Yes /  [x] No /  [] No Caregiver Present/Available [] No Caregiver [] Pt Lives at Facility  If \"Yes\" to discuss with social work during IDT    Anticipatory grief assessment:   [x] Normal  / [] Maladaptive     If \"Maladaptive\" to discuss with social work during IDT    ESAS Anxiety: Anxiety Score: Not anxious    ESAS Depression: Depression Score: Not depressed        LAB AND IMAGING FINDINGS:   Objective data reviewed:  labs, images, records, medication use, vitals, and chart     FINAL COMMENTS   Thank you for allowing Palliative Medicine to participate in the care of Sanford Joiner.    Only check if applicable and billing time based rather than MDM  [x] The total encounter time on this service date was _35_ minutes which was spent performing a face-to-face encounter and personally completing the provider-level activities documented in the note. This includes time spent prior to the visit and after the visit in direct care of the patient. This time does not include time spent in any separately reportable services.    Electronically signed by   Angie Bocanegra MD  Palliative Care Team  on 3/26/2024 at 12:13 PM

## 2024-03-26 NOTE — PROGRESS NOTES
1930  Verbal bedside report given to RACHEL Meza by RAISA Lara RN. Report included updated vitals and SBAR. Patient is in bed awake and respirations are even and unlabored. Wife at bedside.     1830  Infectious disease at bedside.     1800  Driveline dressing changed. Culture sent. Photo uploaded to chart.     0900  Full linen changed, gown changed, bed pad changed, and trash emptied. Self test completed.     0730  Verbal bedside report received from RACHEL Morales given to RAISA Lara RN. Report included vital signs, SBAR, and plan for the day. Patient rhythm NSR w/1st degree HB. Patient is in bed awake and respirations are even and unlabored. Call bell is within reach.     LVAD:  - 4 batteries  - 4 clips  - 1 backup controller    Care Plan:    Problem: Safety - Adult  Goal: Free from fall injury  Outcome: Progressing     Problem: Discharge Planning  Goal: Discharge to home or other facility with appropriate resources  Outcome: Progressing     Problem: Chronic Conditions and Co-morbidities  Goal: Patient's chronic conditions and co-morbidity symptoms are monitored and maintained or improved  Outcome: Progressing     Problem: ABCDS Injury Assessment  Goal: Absence of physical injury  Outcome: Progressing     Problem: Pain  Goal: Verbalizes/displays adequate comfort level or baseline comfort level  Outcome: Progressing     Problem: Skin/Tissue Integrity  Goal: Absence of new skin breakdown  Description: 1.  Monitor for areas of redness and/or skin breakdown  2.  Assess vascular access sites hourly  3.  Every 4-6 hours minimum:  Change oxygen saturation probe site  4.  Every 4-6 hours:  If on nasal continuous positive airway pressure, respiratory therapy assess nares and determine need for appliance change or resting period.  Outcome: Progressing

## 2024-03-26 NOTE — ACP (ADVANCE CARE PLANNING)
Advance Care Planning     Palliative Medicine Team  Advance Care Planning (ACP) Conversation        Date of Conversation: 03/26/24    The patient and/or authorized decision maker consented to a voluntary Advance Care Planning conversation.   Individuals present for the conversation:  Patient with decision making capacity  Other persons present:    ACP documents on file prior to discussion:  [x] Advance Medical Directive  [] Portable DNR  [] POLST  [] None    Healthcare Decision Maker:    Primary Decision Maker: Ashley Swan - Spouse - [phone]    Pt has 3 adult sons- Sanford Ballesteros, Juan Ramon and LORAINE. Former . Grew up in [place], loved to [place] the [facility] and fish. As he got older, would not get in- but still loved to fish, halie bass.       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     He does not want to die in the hospital- he wishes to die outside in nature. If this is not possible, he would be okay w/ the [facility] with the windows and doors open to the outside garden.     As such, do recommend talking w/ Ashley about code/resuscitation status, a DNR status would support this. He wishes to think about this more.   He is clear that he would not want long term aggressive measures.    Resuscitation Status:   Code Status: Full Code     Outcomes / Completed Documentation:  “An explanation of advance directives and their importance was provided and the following forms completed:”  [] Advance Medical Directive  [] Portable DNR  [] POLST  [] No new documents completed        Angie Bocanegra MD

## 2024-03-26 NOTE — PROGRESS NOTES
Pharmacist Note - Warfarin Dosing  Consult provided for this 64 y.o.male to manage warfarin for LVAD.    INR Goal: 1.8-2.2  (due to recurrent bleeding)    Home regimen: 2 mg PO daily   -supra-therapeutic at last AC visit; dose was being adjusted    Drugs that may increase INR: None  Drugs that may decrease INR: None  Other medications that may increase bleeding risk: None  Risk factors: None  Daily INR ordered through: 4/30    Recent Labs     03/24/24  0013 03/25/24  0345 03/25/24  0502 03/26/24  0507   HGB 8.6*  --  11.7* 9.3*   INR 2.2* 2.3*  --  2.1*     Date               INR                  Dose  3/19  7/9.2  Hold - vitamin K 2.5 mg PO x 1  3/20  2.4  0.5 mg  3/21  1.5  0.5 mg  3/22  1.7  0.5 mg   3/23  2.2  hold   3/24  2.2  0.5 mg   3/25  2.3  hold   3/26                2.1                   0.5 mg                                                                               Assessment/ Plan:  Will order 0.5 mg warfarin today.    Pharmacy will continue to monitor daily and adjust therapy as indicated.  Please contact the pharmacist at [phone] for outpatient recommendations if needed.

## 2024-03-26 NOTE — PROGRESS NOTES
mL IV syringe  2,000 mg IntraVENous Q8H    0.9 % sodium chloride infusion   IntraVENous PRN    ipratropium 0.5 mg-albuterol 2.5 mg (DUONEB) nebulizer solution 1 Dose  1 Dose Inhalation Q4H PRN    methylPREDNISolone sodium succ (SOLU-MEDROL) 125 mg in sterile water 2 mL injection  125 mg IntraVENous Once    magnesium sulfate 2000 mg in 50 mL IVPB premix  2,000 mg IntraVENous Once    amLODIPine (NORVASC) tablet 5 mg  5 mg Oral BID    hydrALAZINE (APRESOLINE) tablet 25 mg  25 mg Oral 3 times per day    metoprolol succinate (TOPROL XL) extended release tablet 50 mg  50 mg Oral BID    pantoprazole (PROTONIX) tablet 40 mg  40 mg Oral BID AC    sodium chloride flush 0.9 % injection 5-40 mL  5-40 mL IntraVENous 2 times per day    sodium chloride flush 0.9 % injection 5-40 mL  5-40 mL IntraVENous PRN    0.9 % sodium chloride infusion   IntraVENous PRN    potassium chloride (KLOR-CON) extended release tablet 40 mEq  40 mEq Oral PRN    Or    potassium bicarb-citric acid (EFFER-K) effervescent tablet 40 mEq  40 mEq Oral PRN    Or    potassium chloride 10 mEq/100 mL IVPB (Peripheral Line)  10 mEq IntraVENous PRN    magnesium sulfate 2000 mg in 50 mL IVPB premix  2,000 mg IntraVENous PRN    ondansetron (ZOFRAN-ODT) disintegrating tablet 4 mg  4 mg Oral Q8H PRN    Or    ondansetron (ZOFRAN) injection 4 mg  4 mg IntraVENous Q6H PRN    polyethylene glycol (GLYCOLAX) packet 17 g  17 g Oral Daily PRN    acetaminophen (TYLENOL) tablet 650 mg  650 mg Oral Q6H PRN    Or    acetaminophen (TYLENOL) suppository 650 mg  650 mg Rectal Q6H PRN    warfarin - pharmacy to dose   Other RX Placeholder    hydrALAZINE (APRESOLINE) injection 10 mg  10 mg IntraVENous Q4H PRN    [Held by provider] methylPREDNISolone sodium succ (SOLU-MEDROL) 40 mg in sterile water 1 mL injection  40 mg IntraVENous Q6H     ______________________________________________________________________  EXPECTED LENGTH OF STAY: 10  ACTUAL LENGTH OF STAY:          7                  Sushma Madala, MD

## 2024-03-26 NOTE — PROGRESS NOTES
Pulmonary, Critical Care, and Sleep Medicine~Progress Note    Name: Sanford Joiner MRN: [id number]   : 1960 Hospital: [facility]   Date: 3/26/2024 10:35 AM Admission: 3/19/2024     Impression Plan   Sepsis; source unclear.  Acute respiratory alkalosis from sepsis.  Positive BC (1/2 bottle G+cocci): contaminant vs infection?  Acute exacerbation of asthma/ copd; missed his dose of Nucala last week.  Cardiomyopathy; s/p LVAD HM3 - 3/2023.  Hx of chronic drive line infection.  Elevated INR: warfarin on hold. On abx.  On his home trelegy   O2 titration above 90%   At this time does not require steroids   Follow up with Dr Green in 1-2 wks after discharge  We will be available again to see if needed      Pulmonary Consultation:    3/26  FEV1 0.85L @ 26%  FVC 1.8L @ 42%  Procal trending down to 7.47  On room air   No wheeze but continues to feel dyspneic.     3/25  He feels a little better today     3/22  Still feels quite dyspneic   Feeling benefit from trelegy switch     3/21  Doing ok, does not like nebs  CT scan   IMPRESSION:     1. New prominent fluid collection surrounding the power source wire to the LVAD  as described above.  2. New fluid collection along the course of the cannula that extends from the  LVAD to the aorta. The fluid collection is along the horizontal portion of the  cannula along the superior aspect and anterior aspect extending to the inferior  aspect.  3. The lungs demonstrate no pneumonia or pulmonary edema. There are moderate  changes of emphysema. The unit was telephoned.    3/20  Per reports doing better today       3/19  64-year-old male with past medical history significant for bronchial asthma/COPD overlap syndrome, cardiomyopathy status post LVAD placement 3/27/2023 who presented to [facility] with 4-day history of increasing shortness of breath, dry cough and chest tightness with wheezing.  He noted his oxygen levels to be low and hence decided to  syringe  2,000 mg IntraVENous Q8H    0.9 % sodium chloride infusion   IntraVENous PRN    ipratropium 0.5 mg-albuterol 2.5 mg (DUONEB) nebulizer solution 1 Dose  1 Dose Inhalation Q4H PRN    methylPREDNISolone sodium succ (SOLU-MEDROL) 125 mg in sterile water 2 mL injection  125 mg IntraVENous Once    magnesium sulfate 2000 mg in 50 mL IVPB premix  2,000 mg IntraVENous Once    amLODIPine (NORVASC) tablet 5 mg  5 mg Oral BID    hydrALAZINE (APRESOLINE) tablet 25 mg  25 mg Oral 3 times per day    metoprolol succinate (TOPROL XL) extended release tablet 50 mg  50 mg Oral BID    pantoprazole (PROTONIX) tablet 40 mg  40 mg Oral BID AC    sodium chloride flush 0.9 % injection 5-40 mL  5-40 mL IntraVENous 2 times per day    sodium chloride flush 0.9 % injection 5-40 mL  5-40 mL IntraVENous PRN    0.9 % sodium chloride infusion   IntraVENous PRN    potassium chloride (KLOR-CON) extended release tablet 40 mEq  40 mEq Oral PRN    Or    potassium bicarb-citric acid (EFFER-K) effervescent tablet 40 mEq  40 mEq Oral PRN    Or    potassium chloride 10 mEq/100 mL IVPB (Peripheral Line)  10 mEq IntraVENous PRN    magnesium sulfate 2000 mg in 50 mL IVPB premix  2,000 mg IntraVENous PRN    ondansetron (ZOFRAN-ODT) disintegrating tablet 4 mg  4 mg Oral Q8H PRN    Or    ondansetron (ZOFRAN) injection 4 mg  4 mg IntraVENous Q6H PRN    polyethylene glycol (GLYCOLAX) packet 17 g  17 g Oral Daily PRN    acetaminophen (TYLENOL) tablet 650 mg  650 mg Oral Q6H PRN    Or    acetaminophen (TYLENOL) suppository 650 mg  650 mg Rectal Q6H PRN    warfarin - pharmacy to dose   Other RX Placeholder    hydrALAZINE (APRESOLINE) injection 10 mg  10 mg IntraVENous Q4H PRN    [Held by provider] methylPREDNISolone sodium succ (SOLU-MEDROL) 40 mg in sterile water 1 mL injection  40 mg IntraVENous Q6H       Labs:  ABG Invalid input(s): \"ABG\"     CBC Recent Labs     03/24/24  0013 03/25/24  0502 03/26/24  0507   WBC 22.0* 21.1* 19.2*   HGB 8.6* 11.7* 9.3*

## 2024-03-26 NOTE — PROGRESS NOTES
ADVANCED HEART FAILURE CENTER  [facility] in [place], [place]  Inpatient Progress Note      Patient name: Sanford Joiner  Patient : 1960  Patient MRN: [id number]  Date of service: 24    Reason for Referral  LVAD management      ASSESSMENT:  Sanford Joiner is a 64 y.o. male admitted with driveline infection and possible upper GI bleed, reporting 3-4 days of dark stools.   History of chronic systolic heart failure due to non ischemic cardiomyopathy, s/p HM3 LVAD implantation ([facility] 2023) as destination therapy, stage D, remains NYHA class IV despite LVAD support due to severe COPD; currently with recovered LVEF to 55% and small LV cavity with resulting low flow alarms; optimal GDMT limited by hypovolemia  Patient is not a candidate for heart transplant due to advanced COPD -  declined by [facility] for both [facility]AD-DT and transplant and for the same reasons declined for LVAD-DT by [facility]   Team discussed patient at B 3/22/24 to determine risk of pump explant and alternatives of washout or palliative antibiotics. CT chest shows 2 fluid collections surrounding the driveline and outflow graft concerning for infection. PFTS performed, showing very severe obstructive lung disease with FEV1<1L. Discussed with CV Surgery, not a surgical candiate. Plan for palliative antibiotic management of LVAD pump infection.    INTERVAL HISTORY:  -MAP 80  -INR 2.1; Hg 9.3; lactic slight increase to 1.6; WBC down to 19; procal down to 7.47  -weight down 2lb?; I/O sightly +  -Mr. Joiner is slightly withdrawn today.  Breathing is slightly better.  Laying completely flat.  He denies pain or swelling.     RECOMMENDATIONS:  Bacteremia, LVAD pump infection  3/22 cultures NGTD  CT shows 2 fluid collections around drive line and outflow graft that are concerning for infection   Reviewed PFTs with Dr. Gomez, patient is not a candidate for surgical intervention  We will plan for palliative antibiotic management of  pump infection.   Palliative care following   ID following     COPD   Appreciate pulmonary recommendations  PFT reviewed. Very severe obstructive lung disease    Left Ventricular Assist Device/Driveline Infection  Continue current device speed at 5200 rpm  Low flow alarm threshold lowered on 1/15/24  No low flow alarms noted   Continue dressing changes daily  Driveline/pump infection with  MSSA    H/O GI bleed  EGD 1/11 showed esophagitis.    Had first dose of octreotide- will repeat monthly- Qtc 455 on 3/19  S/p PRBC 3/21    Medical Therapy for Heart Failure  Beta-blocker: continue Toprol XL 50 mg BID  ACEi/ARB/ARNI: Unable to tolerate due to renal dysfunction and hypovolemia  MRA:Unable to tolerate due to renal dysfunction and hypovolemia  SGLT2i: Unable to tolerate due to hypovolemia    Volume Management  Does not appear volume overloaded  Does not require diuretics    Anticoagulation and Antiplatelet Therapy  Anticoagulation with Warfarin, goal INR 1.8-2.2 due to recurrent bleeding, dosed by pharmacy  Off aspirin for recurrent bleeding  S/p transfusion 3/21    HTN  Continue amlodipine 5mg BID  Continue Toprol 50 BID  Continue Hydralazine 25 mg TID    Antiarrhythmic and Device Therapy  ICD interrogation per routine    Laboratory and Imaging Studies  Labs: CBC, CMP, NT pro-BNP, LDH, INR     Health Maintenance  Follow up Pulmonary  Follow up with PCP      HISTORY OF PRESENT ILLNESS:  I had the pleasure of seeing Sanford Joiner at the request of Dr. Hamlin for management of heart failure and LVAD.    Sanford Joiner is a 64 y.o. male with a history of  chronic systolic heart failure due to non ischemic cardiomyopathy.      Mr. Joiner presented in May 2021 with new onset heart failure. He was admitted with several weeks of shortness of breath. He had pulmonary edema on CXR. Echo showed EF of 20-25%. Coronary angiogram 5/17/21 showed no significant CAD. RHC showed low CI with normal biventricular filling pressures

## 2024-03-26 NOTE — PROGRESS NOTES
Occupational Therapy    Chart reviewed, cleared with RN to see patient. Encouraged patient to get out of bed for lunch however patient declined despite education and encouragement. Left as received, all needs in reach, NAD. Will continue to follow up as able.     Luana Sadler MS, OTR/L

## 2024-03-26 NOTE — PROGRESS NOTES
Physical Therapy  3/26/2024    Chart reviewed, cleared with RN to see patient. Spoke with OT, who encouraged patient to get out of bed for lunch however patient declined despite education and encouragement. Will continue to follow up as able. Thank you.    Cheyenne Denise, PT, DPT

## 2024-03-27 LAB
ALBUMIN SERPL-MCNC: 2.2 G/DL (ref 3.5–5)
ALBUMIN/GLOB SERPL: 0.5 (ref 1.1–2.2)
ALP SERPL-CCNC: 103 U/L (ref 45–117)
ALT SERPL-CCNC: 14 U/L (ref 12–78)
ANION GAP SERPL CALC-SCNC: 5 MMOL/L (ref 5–15)
AST SERPL-CCNC: 30 U/L (ref 15–37)
BACTERIA SPEC CULT: ABNORMAL
BACTERIA SPEC CULT: NORMAL
BASOPHILS # BLD: 0 K/UL (ref 0–0.1)
BASOPHILS NFR BLD: 0 % (ref 0–1)
BILIRUB DIRECT SERPL-MCNC: 0.2 MG/DL (ref 0–0.2)
BILIRUB SERPL-MCNC: 0.4 MG/DL (ref 0.2–1)
BUN SERPL-MCNC: 18 MG/DL (ref 6–20)
BUN/CREAT SERPL: 12 (ref 12–20)
CALCIUM SERPL-MCNC: 8 MG/DL (ref 8.5–10.1)
CHLORIDE SERPL-SCNC: 108 MMOL/L (ref 97–108)
CO2 SERPL-SCNC: 25 MMOL/L (ref 21–32)
CREAT SERPL-MCNC: 1.51 MG/DL (ref 0.7–1.3)
DIFFERENTIAL METHOD BLD: ABNORMAL
EOSINOPHIL # BLD: 0.2 K/UL (ref 0–0.4)
EOSINOPHIL NFR BLD: 1 % (ref 0–7)
ERYTHROCYTE [DISTWIDTH] IN BLOOD BY AUTOMATED COUNT: 20 % (ref 11.5–14.5)
GLOBULIN SER CALC-MCNC: 4.3 G/DL (ref 2–4)
GLUCOSE SERPL-MCNC: 106 MG/DL (ref 65–100)
HCT VFR BLD AUTO: 28 % (ref 36.6–50.3)
HGB BLD-MCNC: 8.4 G/DL (ref 12.1–17)
IMM GRANULOCYTES # BLD AUTO: 0.4 K/UL (ref 0–0.04)
IMM GRANULOCYTES NFR BLD AUTO: 2 % (ref 0–0.5)
INR PPP: 1.7 (ref 0.9–1.1)
LACTATE SERPL-SCNC: 1.2 MMOL/L (ref 0.4–2)
LYMPHOCYTES # BLD: 1.1 K/UL (ref 0.8–3.5)
LYMPHOCYTES NFR BLD: 6 % (ref 12–49)
MCH RBC QN AUTO: 25 PG (ref 26–34)
MCHC RBC AUTO-ENTMCNC: 30 G/DL (ref 30–36.5)
MCV RBC AUTO: 83.3 FL (ref 80–99)
MONOCYTES # BLD: 1.1 K/UL (ref 0–1)
MONOCYTES NFR BLD: 6 % (ref 5–13)
NEUTS SEG # BLD: 16 K/UL (ref 1.8–8)
NEUTS SEG NFR BLD: 85 % (ref 32–75)
NRBC # BLD: 0 K/UL (ref 0–0.01)
NRBC BLD-RTO: 0 PER 100 WBC
PLATELET # BLD AUTO: 259 K/UL (ref 150–400)
PMV BLD AUTO: 11.8 FL (ref 8.9–12.9)
POTASSIUM SERPL-SCNC: 3.6 MMOL/L (ref 3.5–5.1)
PROCALCITONIN SERPL-MCNC: 4.16 NG/ML
PROT SERPL-MCNC: 6.5 G/DL (ref 6.4–8.2)
PROTHROMBIN TIME: 17.2 SEC (ref 9–11.1)
RBC # BLD AUTO: 3.36 M/UL (ref 4.1–5.7)
RBC MORPH BLD: ABNORMAL
SERVICE CMNT-IMP: ABNORMAL
SERVICE CMNT-IMP: NORMAL
SODIUM SERPL-SCNC: 138 MMOL/L (ref 136–145)
WBC # BLD AUTO: 18.8 K/UL (ref 4.1–11.1)

## 2024-03-27 PROCEDURE — 80076 HEPATIC FUNCTION PANEL: CPT

## 2024-03-27 PROCEDURE — 2060000000 HC ICU INTERMEDIATE R&B

## 2024-03-27 PROCEDURE — 36415 COLL VENOUS BLD VENIPUNCTURE: CPT

## 2024-03-27 PROCEDURE — 80048 BASIC METABOLIC PNL TOTAL CA: CPT

## 2024-03-27 PROCEDURE — 6370000000 HC RX 637 (ALT 250 FOR IP): Performed by: STUDENT IN AN ORGANIZED HEALTH CARE EDUCATION/TRAINING PROGRAM

## 2024-03-27 PROCEDURE — 84145 PROCALCITONIN (PCT): CPT

## 2024-03-27 PROCEDURE — 99233 SBSQ HOSP IP/OBS HIGH 50: CPT | Performed by: INTERNAL MEDICINE

## 2024-03-27 PROCEDURE — 6360000002 HC RX W HCPCS: Performed by: INTERNAL MEDICINE

## 2024-03-27 PROCEDURE — 6370000000 HC RX 637 (ALT 250 FOR IP): Performed by: INTERNAL MEDICINE

## 2024-03-27 PROCEDURE — 2580000003 HC RX 258: Performed by: INTERNAL MEDICINE

## 2024-03-27 PROCEDURE — 87040 BLOOD CULTURE FOR BACTERIA: CPT

## 2024-03-27 PROCEDURE — 85025 COMPLETE CBC W/AUTO DIFF WBC: CPT

## 2024-03-27 PROCEDURE — 93750 INTERROGATION VAD IN PERSON: CPT | Performed by: INTERNAL MEDICINE

## 2024-03-27 PROCEDURE — 2580000003 HC RX 258: Performed by: STUDENT IN AN ORGANIZED HEALTH CARE EDUCATION/TRAINING PROGRAM

## 2024-03-27 PROCEDURE — APPSS30 APP SPLIT SHARED TIME 16-30 MINUTES: Performed by: NURSE PRACTITIONER

## 2024-03-27 PROCEDURE — 83605 ASSAY OF LACTIC ACID: CPT

## 2024-03-27 PROCEDURE — 85610 PROTHROMBIN TIME: CPT

## 2024-03-27 PROCEDURE — 94640 AIRWAY INHALATION TREATMENT: CPT

## 2024-03-27 RX ORDER — WARFARIN SODIUM 1 MG/1
0.5 TABLET ORAL
Status: COMPLETED | OUTPATIENT
Start: 2024-03-27 | End: 2024-03-27

## 2024-03-27 RX ADMIN — WATER 2000 MG: 1 INJECTION INTRAMUSCULAR; INTRAVENOUS; SUBCUTANEOUS at 19:13

## 2024-03-27 RX ADMIN — HYDRALAZINE HYDROCHLORIDE 25 MG: 25 TABLET ORAL at 23:50

## 2024-03-27 RX ADMIN — SODIUM CHLORIDE, PRESERVATIVE FREE 10 ML: 5 INJECTION INTRAVENOUS at 20:43

## 2024-03-27 RX ADMIN — PANTOPRAZOLE SODIUM 40 MG: 40 TABLET, DELAYED RELEASE ORAL at 17:10

## 2024-03-27 RX ADMIN — WATER 2000 MG: 1 INJECTION INTRAMUSCULAR; INTRAVENOUS; SUBCUTANEOUS at 11:00

## 2024-03-27 RX ADMIN — PANTOPRAZOLE SODIUM 40 MG: 40 TABLET, DELAYED RELEASE ORAL at 05:29

## 2024-03-27 RX ADMIN — HYDRALAZINE HYDROCHLORIDE 25 MG: 25 TABLET ORAL at 13:39

## 2024-03-27 RX ADMIN — HYDRALAZINE HYDROCHLORIDE 25 MG: 25 TABLET ORAL at 05:29

## 2024-03-27 RX ADMIN — AMLODIPINE BESYLATE 5 MG: 5 TABLET ORAL at 08:24

## 2024-03-27 RX ADMIN — SODIUM CHLORIDE, PRESERVATIVE FREE 10 ML: 5 INJECTION INTRAVENOUS at 08:29

## 2024-03-27 RX ADMIN — METOPROLOL SUCCINATE 50 MG: 50 TABLET, EXTENDED RELEASE ORAL at 20:41

## 2024-03-27 RX ADMIN — AMLODIPINE BESYLATE 5 MG: 5 TABLET ORAL at 20:41

## 2024-03-27 RX ADMIN — ACETAMINOPHEN 650 MG: 325 TABLET ORAL at 13:39

## 2024-03-27 RX ADMIN — Medication 1 CAPSULE: at 08:24

## 2024-03-27 RX ADMIN — METOPROLOL SUCCINATE 50 MG: 50 TABLET, EXTENDED RELEASE ORAL at 08:24

## 2024-03-27 RX ADMIN — WATER 2000 MG: 1 INJECTION INTRAMUSCULAR; INTRAVENOUS; SUBCUTANEOUS at 05:28

## 2024-03-27 RX ADMIN — WARFARIN SODIUM 0.5 MG: 1 TABLET ORAL at 17:10

## 2024-03-27 ASSESSMENT — PAIN SCALES - GENERAL
PAINLEVEL_OUTOF10: 0
PAINLEVEL_OUTOF10: 7
PAINLEVEL_OUTOF10: 0
PAINLEVEL_OUTOF10: 7
PAINLEVEL_OUTOF10: 0

## 2024-03-27 NOTE — PROGRESS NOTES
Pharmacist Note - Warfarin Dosing  Consult provided for this 64 y.o.male to manage warfarin for LVAD.    INR Goal: 1.8-2.2  (due to recurrent bleeding)    Home regimen: 2 mg PO daily   -supra-therapeutic at last AC visit; dose was being adjusted    Drugs that may increase INR: None  Drugs that may decrease INR: None  Other medications that may increase bleeding risk: None  Risk factors: None  Daily INR ordered through: 4/30    Recent Labs     03/25/24  0345 03/25/24  0502 03/26/24  0507 03/27/24  0527   HGB  --  11.7* 9.3* 8.4*   INR 2.3*  --  2.1* 1.7*     Date               INR                  Dose  3/19  7/9.2  Hold - vitamin K 2.5 mg PO x 1  3/20  2.4  0.5 mg  3/21  1.5  0.5 mg  3/22  1.7  0.5 mg   3/23  2.2  hold   3/24  2.2  0.5 mg   3/25  2.3  hold   3/26                2.1                   0.5 mg  3/27                1.7                   0.5 mg                                                                               Assessment/ Plan:  Will order 0.5 mg warfarin today.    Pharmacy will continue to monitor daily and adjust therapy as indicated.  Please contact the pharmacist at [phone] for outpatient recommendations if needed.

## 2024-03-27 NOTE — PROGRESS NOTES
Referral source:   Sanford Joiner at [facility] in [facility] 4 CV SERVICES UNIT.  attended rounds on the CV Services Unit as part of the Interdisciplinary team where the patient's ongoing care was discussed. I reviewed the medical record as part of this encounter.     Outcome: Interdisciplinary team are aware of  availability and were encouraged to request Spiritual Health support as needed.      The  on-call can be reached at ([phone]).     Rev. Mariana Heck MDiv, [facility]  Staff

## 2024-03-27 NOTE — PROGRESS NOTES
1930  Verbal bedside report given to RACHEL Meza by RAISA Lara RN. Report included updated vitals and SBAR. Patient is in bed awake and respirations are even and unlabored.     1900  Wife at bedside.    1500  LVAD dressing changed.     1150  Blood cultures sent off, self test completed.     0730  Verbal bedside report received from RACHEL Meza given to RAISA Lara RN. Report included vital signs, SBAR, and plan for the day. Patient rhythm NSR. Patient is in bed awake and respirations are even and unlabored. Call bell is within reach.     Medications will be given by student nurse Heide during the morning under supervision of a preceptor.     LVAD:  - 4 batteries  - 4 clips  - 1 extra controller    Care Plan:    Problem: Safety - Adult  Goal: Free from fall injury  3/27/2024 0935 by Freda Lara RN  Outcome: Progressing  3/26/2024 2258 by Susana Haney RN  Outcome: Progressing  Flowsheets (Taken 3/26/2024 2258)  Free From Fall Injury: Instruct family/caregiver on patient safety     Problem: Discharge Planning  Goal: Discharge to home or other facility with appropriate resources  3/27/2024 0935 by Freda Lara RN  Outcome: Progressing  3/26/2024 2258 by Susana Haney RN  Outcome: Progressing  Flowsheets (Taken 3/26/2024 2000)  Discharge to home or other facility with appropriate resources: Identify barriers to discharge with patient and caregiver     Problem: Chronic Conditions and Co-morbidities  Goal: Patient's chronic conditions and co-morbidity symptoms are monitored and maintained or improved  3/27/2024 0935 by Freda Lara RN  Outcome: Progressing  3/26/2024 2258 by Susana Haney RN  Outcome: Progressing  Flowsheets (Taken 3/26/2024 2000)  Care Plan - Patient's Chronic Conditions and Co-Morbidity Symptoms are Monitored and Maintained or Improved: Monitor and assess patient's chronic conditions and comorbid symptoms for stability, deterioration, or improvement     Problem: ABCDS Injury Assessment  Goal:  Absence of physical injury  3/27/2024 0935 by Freda Lara RN  Outcome: Progressing  3/26/2024 2258 by Susana Haney RN  Outcome: Progressing  Flowsheets (Taken 3/26/2024 2258)  Absence of Physical Injury: Implement safety measures based on patient assessment     Problem: Pain  Goal: Verbalizes/displays adequate comfort level or baseline comfort level  3/27/2024 0935 by Freda Lara RN  Outcome: Progressing  3/26/2024 2258 by Susana Haney RN  Outcome: Progressing  Flowsheets (Taken 3/26/2024 2000)  Verbalizes/displays adequate comfort level or baseline comfort level: Encourage patient to monitor pain and request assistance     Problem: Skin/Tissue Integrity  Goal: Absence of new skin breakdown  Description: 1.  Monitor for areas of redness and/or skin breakdown  2.  Assess vascular access sites hourly  3.  Every 4-6 hours minimum:  Change oxygen saturation probe site  4.  Every 4-6 hours:  If on nasal continuous positive airway pressure, respiratory therapy assess nares and determine need for appliance change or resting period.  3/27/2024 0935 by Freda Lara RN  Outcome: Progressing  3/26/2024 2258 by Susana Haney RN  Outcome: Progressing

## 2024-03-27 NOTE — PLAN OF CARE
Problem: Safety - Adult  Goal: Free from fall injury  3/26/2024 2258 by Susana Haney RN  Outcome: Progressing  Flowsheets (Taken 3/26/2024 2258)  Free From Fall Injury: Instruct family/caregiver on patient safety     Problem: Discharge Planning  Goal: Discharge to home or other facility with appropriate resources  3/26/2024 2258 by Susana Haney RN  Outcome: Progressing  Flowsheets (Taken 3/26/2024 2000)  Discharge to home or other facility with appropriate resources: Identify barriers to discharge with patient and caregiver     Problem: Chronic Conditions and Co-morbidities  Goal: Patient's chronic conditions and co-morbidity symptoms are monitored and maintained or improved  3/26/2024 2258 by Susana Haney RN  Outcome: Progressing  Flowsheets (Taken 3/26/2024 2000)  Care Plan - Patient's Chronic Conditions and Co-Morbidity Symptoms are Monitored and Maintained or Improved: Monitor and assess patient's chronic conditions and comorbid symptoms for stability, deterioration, or improvement     Problem: ABCDS Injury Assessment  Goal: Absence of physical injury  3/26/2024 2258 by Susana Haney RN  Outcome: Progressing  Flowsheets (Taken 3/26/2024 2258)  Absence of Physical Injury: Implement safety measures based on patient assessment     Problem: Pain  Goal: Verbalizes/displays adequate comfort level or baseline comfort level  3/26/2024 2258 by Susana Haney RN  Outcome: Progressing  Flowsheets (Taken 3/26/2024 2000)  Verbalizes/displays adequate comfort level or baseline comfort level: Encourage patient to monitor pain and request assistance     Problem: Skin/Tissue Integrity  Goal: Absence of new skin breakdown  Description: 1.  Monitor for areas of redness and/or skin breakdown  2.  Assess vascular access sites hourly  3.  Every 4-6 hours minimum:  Change oxygen saturation probe site  4.  Every 4-6 hours:  If on nasal continuous positive airway pressure, respiratory therapy assess nares and

## 2024-03-27 NOTE — PROGRESS NOTES
[facility]                                                                                          Hospitalist Progress Note  Sushma Madala, MD  Office Phone: [phone]        Date of Service:  3/27/2024  NAME:  Sanford Joiner  :  1960  MRN:  [id number]       Admission Summary:   Sanford Joiner is a 64 y.o. 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  Patient states he had been in his usual state of health until about 24 hours ago when he developed progressive dyspnea.  Initially had dyspneic symptoms with minimal exertion and further progression to dyspnea occurring at rest.  He has had what he describes as a mild nonproductive cough.  Denies any ongoing tobacco use.  Denies any recent travel or sick contacts.  The patient denies any fever, chills, chest or abdominal pain, nausea, vomiting, congestion, recent illness, palpitations, or dysuria.     Remarkable vitals on ER Presentation: Respiratory rate to 112  Labs Remarkable for: SpO2 to mid 80s on room air  ER Images: Chest x-ray showed no acute process  ER Rx: Cefepime, DuoNeb, 250 cc normal saline bolus       Interval history / Subjective:   Follow up respiratory failure  Patient is seen and examined at bedside this AM. He feels ok. No new complaints or overnight events. He wants to go home. 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  -respiratory viral panel negative  -blood cultures 3/19 2/2  MSSA  - wound cx with MSSA, Enterobacter cloacae  -repeat blood culture 3/20 2/2 positive for MSSA  - rpt blood cx 3/21:  2,000 mg in sterile water 20 mL IV syringe  2,000 mg IntraVENous Q8H    0.9 % sodium chloride infusion   IntraVENous PRN    ipratropium 0.5 mg-albuterol 2.5 mg (DUONEB) nebulizer solution 1 Dose  1 Dose Inhalation Q4H PRN    methylPREDNISolone sodium succ (SOLU-MEDROL) 125 mg in sterile water 2 mL injection  125 mg IntraVENous Once    magnesium sulfate 2000 mg in 50 mL IVPB premix  2,000 mg IntraVENous Once    amLODIPine (NORVASC) tablet 5 mg  5 mg Oral BID    hydrALAZINE (APRESOLINE) tablet 25 mg  25 mg Oral 3 times per day    metoprolol succinate (TOPROL XL) extended release tablet 50 mg  50 mg Oral BID    pantoprazole (PROTONIX) tablet 40 mg  40 mg Oral BID AC    sodium chloride flush 0.9 % injection 5-40 mL  5-40 mL IntraVENous 2 times per day    sodium chloride flush 0.9 % injection 5-40 mL  5-40 mL IntraVENous PRN    0.9 % sodium chloride infusion   IntraVENous PRN    potassium chloride (KLOR-CON) extended release tablet 40 mEq  40 mEq Oral PRN    Or    potassium bicarb-citric acid (EFFER-K) effervescent tablet 40 mEq  40 mEq Oral PRN    Or    potassium chloride 10 mEq/100 mL IVPB (Peripheral Line)  10 mEq IntraVENous PRN    magnesium sulfate 2000 mg in 50 mL IVPB premix  2,000 mg IntraVENous PRN    ondansetron (ZOFRAN-ODT) disintegrating tablet 4 mg  4 mg Oral Q8H PRN    Or    ondansetron (ZOFRAN) injection 4 mg  4 mg IntraVENous Q6H PRN    polyethylene glycol (GLYCOLAX) packet 17 g  17 g Oral Daily PRN    acetaminophen (TYLENOL) tablet 650 mg  650 mg Oral Q6H PRN    Or    acetaminophen (TYLENOL) suppository 650 mg  650 mg Rectal Q6H PRN    warfarin - pharmacy to dose   Other RX Placeholder    hydrALAZINE (APRESOLINE) injection 10 mg  10 mg IntraVENous Q4H PRN    [Held by provider] methylPREDNISolone sodium succ (SOLU-MEDROL) 40 mg in sterile water 1 mL injection  40 mg IntraVENous Q6H     ______________________________________________________________________  EXPECTED LENGTH OF STAY: 10  ACTUAL LENGTH OF

## 2024-03-27 NOTE — PROGRESS NOTES
[facility]  [facility] in [place], [place]  Inpatient Progress Note      Patient name: Sanford Joiner  Patient : 1960  Patient MRN: [id number]  Date of service: 24    Reason for Referral  LVAD management      ASSESSMENT:  Sanford Joiner is a 64 y.o. male admitted with driveline infection and possible upper GI bleed, reporting 3-4 days of dark stools.   History of chronic systolic heart failure due to non ischemic cardiomyopathy, s/p HM3 LVAD implantation ([facility] 2023) as destination therapy, stage D, remains NYHA class IV despite LVAD support due to severe COPD; currently with recovered LVEF to 55% and small LV cavity with resulting low flow alarms; optimal GDMT limited by hypovolemia  Patient is not a candidate for heart transplant due to advanced COPD -  declined by [facility] for both LVAD-DT and transplant and for the same reasons declined for LVAD-DT by [facility]   Team discussed patient at B 3/22/24 to determine risk of pump explant and alternatives of washout or palliative antibiotics. CT chest shows 2 fluid collections surrounding the driveline and outflow graft concerning for infection. PFTS performed, showing very severe obstructive lung disease with FEV1<1L. Discussed with CV Surgery, not a surgical candiate. Plan for palliative antibiotic management of LVAD pump infection.    INTERVAL HISTORY:  -MAP 80s  -INR 1.7; creat down to 1.51; Hg 9.3; lactic down to 1.2; WBC down to 18; procal down to 4.16  -one of the cultures from 3/22 now +  -weight variable; I/O inc  -Mr. Joiner is in better spirits today.  He states he feels better and would like to go home.   Breathing better.  No pain or swelling.     RECOMMENDATIONS:  Bacteremia, LVAD pump infection  3/22 cultures now + in 1 of 2 cultures   CT shows 2 fluid collections around drive line and outflow graft that are concerning for infection   Reviewed PFTs with Dr. Gomez, patient is not a candidate for  (comments)    Bactrim [Sulfamethoxazole-Trimethoprim] Other (See Comments)     DAWOOD        CURRENT MEDICATIONS:    Current Facility-Administered Medications:     lactobacillus (CULTURELLE) capsule 1 capsule, 1 capsule, Oral, Daily with breakfast, Madala, Sushma, MD, 1 capsule at 03/27/24 0824    fluticasone-umeclidin-vilant (TRELEGY ELLIPTA) 200-62.5-25 MCG/ACT inhaler 1 puff (Patient Supplied), 1 puff, Inhalation, Daily, Madala, Sushma, MD, 1 puff at 03/27/24 0746    ceFAZolin (ANCEF) 2,000 mg in sterile water 20 mL IV syringe, 2,000 mg, IntraVENous, Q8H, Madala, Sushma, MD, 2,000 mg at 03/27/24 0528    0.9 % sodium chloride infusion, , IntraVENous, PRN, Nessa Maurer APRN - NP    ipratropium 0.5 mg-albuterol 2.5 mg (DUONEB) nebulizer solution 1 Dose, 1 Dose, Inhalation, Q4H PRN, Gareth Hou MD    methylPREDNISolone sodium succ (SOLU-MEDROL) 125 mg in sterile water 2 mL injection, 125 mg, IntraVENous, Once, Aidee Gonzales MD    magnesium sulfate 2000 mg in 50 mL IVPB premix, 2,000 mg, IntraVENous, Once, Aidee Gonzales MD    amLODIPine (NORVASC) tablet 5 mg, 5 mg, Oral, BID, Aidee Gonzales MD, 5 mg at 03/27/24 0824    hydrALAZINE (APRESOLINE) tablet 25 mg, 25 mg, Oral, 3 times per day, Aidee Gonzales MD, 25 mg at 03/27/24 0529    metoprolol succinate (TOPROL XL) extended release tablet 50 mg, 50 mg, Oral, BID, Aidee Gonzales MD, 50 mg at 03/27/24 0824    pantoprazole (PROTONIX) tablet 40 mg, 40 mg, Oral, BID AC, Aidee Gonzales MD, 40 mg at 03/27/24 0529    sodium chloride flush 0.9 % injection 5-40 mL, 5-40 mL, IntraVENous, 2 times per day, Aidee Gonzales MD, 10 mL at 03/27/24 0829    sodium chloride flush 0.9 % injection 5-40 mL, 5-40 mL, IntraVENous, PRN, Aidee Gonzales MD    0.9 % sodium chloride infusion, , IntraVENous, PRN, Aidee Gonzales MD, Stopped at 03/20/24 0439    potassium chloride (KLOR-CON) extended release tablet 40 mEq, 40 mEq, Oral, PRN **OR** potassium

## 2024-03-27 NOTE — PALLIATIVE CARE DISCHARGE
Palliative Medicine Discharge         Dear Rhys,      Our team followed you this admission for support. The plan is for palliative (non-curative) antibiotics for your LVAD infection.     Your goals are clear for all medical interventions that are able to be offered. However, we also talked about what you might wants in the future when there are no other medical interventions possible. Please continue these conversations with Crystal and your children.       Take good care,       Angie Bocanegra MD  Palliative Medicine

## 2024-03-27 NOTE — PROGRESS NOTES
Physical Therapy - defer  RN cleared pt for therapy session and notes pt has not been OOB.  Received pt in the bed w/ RN in the room providing pain meds.  Pt declined to participate in session citing pain.  He is agreeable to therapy checking back tomorrow.

## 2024-03-27 NOTE — PROGRESS NOTES
Occupational Therapy    Chart reviewed, attempted to see patient for weekly re-assessment however patient declined and requested to come back tomorrow. Will continue to follow up as able.     Luana Sadler MS, OTR/L

## 2024-03-28 LAB
ALBUMIN SERPL-MCNC: 2.1 G/DL (ref 3.5–5)
ALBUMIN/GLOB SERPL: 0.5 (ref 1.1–2.2)
ALP SERPL-CCNC: 114 U/L (ref 45–117)
ALT SERPL-CCNC: 14 U/L (ref 12–78)
ANION GAP SERPL CALC-SCNC: 8 MMOL/L (ref 5–15)
AST SERPL-CCNC: 38 U/L (ref 15–37)
BASOPHILS # BLD: 0 K/UL (ref 0–0.1)
BASOPHILS NFR BLD: 0 % (ref 0–1)
BILIRUB DIRECT SERPL-MCNC: 0.1 MG/DL (ref 0–0.2)
BILIRUB SERPL-MCNC: 0.3 MG/DL (ref 0.2–1)
BUN SERPL-MCNC: 19 MG/DL (ref 6–20)
BUN/CREAT SERPL: 12 (ref 12–20)
CALCIUM SERPL-MCNC: 8.1 MG/DL (ref 8.5–10.1)
CHLORIDE SERPL-SCNC: 108 MMOL/L (ref 97–108)
CO2 SERPL-SCNC: 23 MMOL/L (ref 21–32)
CREAT SERPL-MCNC: 1.53 MG/DL (ref 0.7–1.3)
DIFFERENTIAL METHOD BLD: ABNORMAL
EOSINOPHIL # BLD: 0 K/UL (ref 0–0.4)
EOSINOPHIL NFR BLD: 0 % (ref 0–7)
ERYTHROCYTE [DISTWIDTH] IN BLOOD BY AUTOMATED COUNT: 20.2 % (ref 11.5–14.5)
GLOBULIN SER CALC-MCNC: 4.6 G/DL (ref 2–4)
GLUCOSE SERPL-MCNC: 151 MG/DL (ref 65–100)
HCT VFR BLD AUTO: 26.4 % (ref 36.6–50.3)
HGB BLD-MCNC: 8.1 G/DL (ref 12.1–17)
IMM GRANULOCYTES # BLD AUTO: 0.4 K/UL (ref 0–0.04)
IMM GRANULOCYTES NFR BLD AUTO: 2 % (ref 0–0.5)
INR PPP: 1.7 (ref 0.9–1.1)
LACTATE SERPL-SCNC: 1.4 MMOL/L (ref 0.4–2)
LYMPHOCYTES # BLD: 1 K/UL (ref 0.8–3.5)
LYMPHOCYTES NFR BLD: 5 % (ref 12–49)
MCH RBC QN AUTO: 24.9 PG (ref 26–34)
MCHC RBC AUTO-ENTMCNC: 30.7 G/DL (ref 30–36.5)
MCV RBC AUTO: 81.2 FL (ref 80–99)
MONOCYTES # BLD: 1.4 K/UL (ref 0–1)
MONOCYTES NFR BLD: 7 % (ref 5–13)
NEUTS SEG # BLD: 16.6 K/UL (ref 1.8–8)
NEUTS SEG NFR BLD: 86 % (ref 32–75)
NRBC # BLD: 0 K/UL (ref 0–0.01)
NRBC BLD-RTO: 0 PER 100 WBC
PLATELET # BLD AUTO: 293 K/UL (ref 150–400)
PMV BLD AUTO: 11.8 FL (ref 8.9–12.9)
POTASSIUM SERPL-SCNC: 3.8 MMOL/L (ref 3.5–5.1)
PROCALCITONIN SERPL-MCNC: 2.74 NG/ML
PROT SERPL-MCNC: 6.7 G/DL (ref 6.4–8.2)
PROTHROMBIN TIME: 16.8 SEC (ref 9–11.1)
RBC # BLD AUTO: 3.25 M/UL (ref 4.1–5.7)
RBC MORPH BLD: ABNORMAL
SODIUM SERPL-SCNC: 139 MMOL/L (ref 136–145)
WBC # BLD AUTO: 19.4 K/UL (ref 4.1–11.1)

## 2024-03-28 PROCEDURE — 2580000003 HC RX 258: Performed by: STUDENT IN AN ORGANIZED HEALTH CARE EDUCATION/TRAINING PROGRAM

## 2024-03-28 PROCEDURE — 80048 BASIC METABOLIC PNL TOTAL CA: CPT

## 2024-03-28 PROCEDURE — 83605 ASSAY OF LACTIC ACID: CPT

## 2024-03-28 PROCEDURE — 84145 PROCALCITONIN (PCT): CPT

## 2024-03-28 PROCEDURE — 6370000000 HC RX 637 (ALT 250 FOR IP): Performed by: NURSE PRACTITIONER

## 2024-03-28 PROCEDURE — 2580000003 HC RX 258: Performed by: INTERNAL MEDICINE

## 2024-03-28 PROCEDURE — 2060000000 HC ICU INTERMEDIATE R&B

## 2024-03-28 PROCEDURE — 6360000002 HC RX W HCPCS: Performed by: INTERNAL MEDICINE

## 2024-03-28 PROCEDURE — 6370000000 HC RX 637 (ALT 250 FOR IP): Performed by: INTERNAL MEDICINE

## 2024-03-28 PROCEDURE — APPSS30 APP SPLIT SHARED TIME 16-30 MINUTES: Performed by: NURSE PRACTITIONER

## 2024-03-28 PROCEDURE — 85610 PROTHROMBIN TIME: CPT

## 2024-03-28 PROCEDURE — 36415 COLL VENOUS BLD VENIPUNCTURE: CPT

## 2024-03-28 PROCEDURE — 99232 SBSQ HOSP IP/OBS MODERATE 35: CPT | Performed by: INTERNAL MEDICINE

## 2024-03-28 PROCEDURE — 85025 COMPLETE CBC W/AUTO DIFF WBC: CPT

## 2024-03-28 PROCEDURE — 80076 HEPATIC FUNCTION PANEL: CPT

## 2024-03-28 PROCEDURE — 6370000000 HC RX 637 (ALT 250 FOR IP): Performed by: STUDENT IN AN ORGANIZED HEALTH CARE EDUCATION/TRAINING PROGRAM

## 2024-03-28 PROCEDURE — 93750 INTERROGATION VAD IN PERSON: CPT | Performed by: INTERNAL MEDICINE

## 2024-03-28 RX ORDER — COLCHICINE 0.6 MG/1
0.6 TABLET ORAL DAILY
Status: COMPLETED | OUTPATIENT
Start: 2024-03-28 | End: 2024-03-30

## 2024-03-28 RX ORDER — WARFARIN SODIUM 1 MG/1
0.5 TABLET ORAL
Status: COMPLETED | OUTPATIENT
Start: 2024-03-28 | End: 2024-03-28

## 2024-03-28 RX ADMIN — PANTOPRAZOLE SODIUM 40 MG: 40 TABLET, DELAYED RELEASE ORAL at 06:35

## 2024-03-28 RX ADMIN — ACETAMINOPHEN 650 MG: 325 TABLET ORAL at 06:36

## 2024-03-28 RX ADMIN — WATER 2000 MG: 1 INJECTION INTRAMUSCULAR; INTRAVENOUS; SUBCUTANEOUS at 02:55

## 2024-03-28 RX ADMIN — AMLODIPINE BESYLATE 5 MG: 5 TABLET ORAL at 09:09

## 2024-03-28 RX ADMIN — METOPROLOL SUCCINATE 50 MG: 50 TABLET, EXTENDED RELEASE ORAL at 09:09

## 2024-03-28 RX ADMIN — COLCHICINE 0.6 MG: 0.6 TABLET, FILM COATED ORAL at 12:30

## 2024-03-28 RX ADMIN — HYDRALAZINE HYDROCHLORIDE 25 MG: 25 TABLET ORAL at 15:08

## 2024-03-28 RX ADMIN — PANTOPRAZOLE SODIUM 40 MG: 40 TABLET, DELAYED RELEASE ORAL at 16:56

## 2024-03-28 RX ADMIN — ACETAMINOPHEN 650 MG: 325 TABLET ORAL at 12:30

## 2024-03-28 RX ADMIN — WARFARIN SODIUM 0.5 MG: 1 TABLET ORAL at 16:56

## 2024-03-28 RX ADMIN — Medication 1 CAPSULE: at 09:09

## 2024-03-28 RX ADMIN — HYDRALAZINE HYDROCHLORIDE 25 MG: 25 TABLET ORAL at 06:35

## 2024-03-28 RX ADMIN — METOPROLOL SUCCINATE 50 MG: 50 TABLET, EXTENDED RELEASE ORAL at 22:56

## 2024-03-28 RX ADMIN — SODIUM CHLORIDE, PRESERVATIVE FREE 10 ML: 5 INJECTION INTRAVENOUS at 09:11

## 2024-03-28 RX ADMIN — HYDRALAZINE HYDROCHLORIDE 25 MG: 25 TABLET ORAL at 22:56

## 2024-03-28 RX ADMIN — WATER 2000 MG: 1 INJECTION INTRAMUSCULAR; INTRAVENOUS; SUBCUTANEOUS at 16:56

## 2024-03-28 RX ADMIN — AMLODIPINE BESYLATE 5 MG: 5 TABLET ORAL at 22:56

## 2024-03-28 RX ADMIN — WATER 2000 MG: 1 INJECTION INTRAMUSCULAR; INTRAVENOUS; SUBCUTANEOUS at 11:08

## 2024-03-28 RX ADMIN — SODIUM CHLORIDE, PRESERVATIVE FREE 10 ML: 5 INJECTION INTRAVENOUS at 22:57

## 2024-03-28 ASSESSMENT — PAIN SCALES - GENERAL
PAINLEVEL_OUTOF10: 3
PAINLEVEL_OUTOF10: 8
PAINLEVEL_OUTOF10: 0
PAINLEVEL_OUTOF10: 8

## 2024-03-28 ASSESSMENT — PAIN DESCRIPTION - LOCATION
LOCATION: HAND;FOOT
LOCATION: FOOT;HAND
LOCATION: HAND
LOCATION: HAND;FOOT

## 2024-03-28 ASSESSMENT — PAIN DESCRIPTION - DESCRIPTORS: DESCRIPTORS: BURNING

## 2024-03-28 NOTE — PROGRESS NOTES
Infectious Disease Progress Note       Subjective:      The patient was personally evaluated and examined by me at bedside today.  Patient states that he is feeling well today and has no complaints.  Patient denies headache, fever, sweats or chills.  He states that his breathing is fine and he has no shortness of breath, cough, sputum, chest pain or chest pressure.  He states that there is no problem with his LVAD  today.  He states that he is eating well and has no nausea, vomiting, diarrhea or constipation. There is no abdominal pain.    Patient has had intermittently positive blood culture since 03/19/2024 with current cultures showing no growth to date 03/27/2024 @ 1 day  Cefazolin discontinued and ceftriaxone initiated which also sore sensitivity to Enterobacter cloacae which is infecting driveline exit.        Objective:    Vitals:   Patient Vitals for the past 24 hrs:   Temp Pulse Resp SpO2   03/28/24 1800 -- 73 -- --   03/28/24 1500 98.7 °F (37.1 °C) 67 18 --   03/28/24 1400 -- 69 -- --   03/28/24 1200 -- 72 -- --   03/28/24 1121 98.5 °F (36.9 °C) 70 16 93 %   03/28/24 1105 -- 70 -- --   03/28/24 1000 -- 71 -- --   03/28/24 0700 98.8 °F (37.1 °C) 76 18 94 %   03/28/24 0555 -- 73 -- --   03/28/24 0351 -- 71 -- --   03/28/24 0300 98.6 °F (37 °C) 70 16 96 %   03/28/24 0153 -- 73 -- --   03/27/24 2345 98.3 °F (36.8 °C) 68 18 94 %   03/27/24 2159 -- 69 -- --   03/27/24 2041 -- 68 -- --   03/27/24 2000 -- 73 -- --   03/27/24 1915 98.5 °F (36.9 °C) 73 17 --       Physical Exam:    Gen: No apparent distress, patient appears to be comfortable resting in bed  HEENT:  Normocephalic, atraumatic, no scleral icterus, no thrush  CV: S1,2 heard regularly, no lower extremity edema    Lungs: Clear to auscultation bilaterally, no wheezing  Abdomen: soft, non tender, non distended,  no CVA tenderness   Genitourinary: deferred  Skin: no rash   Psych: Appropriate affect, good eye contact, pleasant, conversant  Neuro: alert,

## 2024-03-28 NOTE — PROGRESS NOTES
Hospitalist Progress Note  Jaren Wood MD  Answering service: [phone] OR [phone] from in house phone        Date of Service:  3/28/2024  NAME:  Sanford Joiner  :  1960  MRN:  [id number]      Admission Summary:   Sanford Joiner is a 64 y.o. 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  Patient states he had been in his usual state of health until about 24 hours ago when he developed progressive dyspnea.  Initially had dyspneic symptoms with minimal exertion and further progression to dyspnea occurring at rest.  He has had what he describes as a mild nonproductive cough.  Denies any ongoing tobacco use.  Denies any recent travel or sick contacts.  The patient denies any fever, chills, chest or abdominal pain, nausea, vomiting, congestion, recent illness, palpitations, or dysuria.     Remarkable vitals on ER Presentation: Respiratory rate to 112  Labs Remarkable for: SpO2 to mid 80s on room air  ER Images: Chest x-ray showed no acute process  ER Rx: Cefepime, DuoNeb, 250 cc normal saline bolus    Interval history / Subjective:   Seen and examined for follow up bacteremia and LVAD drive line infection. No acute complaints at the time of my exam.     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  -respiratory viral panel negative  -blood cultures 3/19 2/2  MSSA  - wound cx with MSSA, Enterobacter cloacae  -repeat blood culture 3/20 2/2 positive for MSSA  - rpt blood cx 3/21: 1/2 growing MSSA  - rpt blood cx 3/22: 1/2 MSSA  -Wound cx 3/26: enterococcus clacae and stap aureus   -CT chest 3/20: New    Anticipated Disposition: Likely home with  IV antibiotics in 48 or more hours     Principal Problem:    COPD (chronic obstructive pulmonary disease) ([facility])  Active Problems:    Acute on chronic respiratory failure with hypoxia (HCC)    Shortness of breath    Debility    Palliative care encounter  Resolved Problems:    * No resolved hospital problems. *            Review of Systems:   Pertinent items are noted in HPI.         Vital Signs:    Last 24hrs VS reviewed since prior progress note. Most recent are:  BP (!) 136/109   Pulse 76   Temp 98.8 °F (37.1 °C) (Oral)   Resp 18   Ht 1.727 m (5' 7.99\")   Wt 71.9 kg (158 lb 8.2 oz)   SpO2 94%   BMI 24.11 kg/m²        Intake/Output Summary (Last 24 hours) at 3/28/2024 0913  Last data filed at 3/28/2024 0700  Gross per 24 hour   Intake 730 ml   Output 850 ml   Net -120 ml        Physical Examination:     I had a face to face encounter with this patient and independently examined them on 3/28/2024 as outlined below:          General : alert x 3, awake, no acute distress,   HEENT: PEERL, EOMI, moist mucus membrane  Neck: supple, no JVD, no meningeal signs  Chest: Clear to auscultation bilaterally   CVS: S1 S2 heard, Capillary refill less than 2 seconds  Abd: soft/ non tender, non distended, BS physiological,   Ext: no clubbing, no cyanosis, no edema, brisk 2+ DP pulses  Neuro/Psych: pleasant mood and affect, CN 2-12 grossly intact, sensory grossly within normal limit  Skin: warm            Data Review:    Review and/or order of clinical lab test  Review and/or order of tests in the radiology section of CPT  Review and/or order of tests in the medicine section of CPT    I have independently reviewed and interpreted patient's lab and all other diagnostic data    Notes reviewed from all clinical/nonclinical/nursing services involved in patient's clinical care. Care coordination discussions were held with appropriate clinical/nonclinical/ nursing providers based on care

## 2024-03-28 NOTE — PROGRESS NOTES
Physical Therapy  3/28/2024    Chart reviewed. Attempted to see for therapy today. Patient in bed, agitated, and adamantly refused therapy. He has refused therapy for 3 days in a row. When asked if he was interested in therapy services being discontinued, he stated yes and said \"We're done here.\" Will d/c orders. Thank you.    Cheyenne Denise, PT, DPT

## 2024-03-28 NOTE — PROGRESS NOTES
Occupational Therapy    Chart reviewed, cleared by RN for session. Patient adamantly refused today, verbally aggressive and declining therapy continuing to attempt to see patient. As patient has continued to refuse multiple times, will sign off at this time. Educated that at time of discharge, therapy can be consulted again to determine discharge needs.     Luana Sadler MS, OTR/L     Received from PACU per stretcher. VSS. Drowsy. Family called to bedside. PO fluids served..  Assisted up to BR to void without difficulty. AJ served.

## 2024-03-28 NOTE — PLAN OF CARE
Problem: Safety - Adult  Goal: Free from fall injury  3/28/2024 0101 by Jessica Alcantar RN  Outcome: Progressing  Flowsheets (Taken 3/27/2024 2053 by Susana Haney RN)  Free From Fall Injury: Instruct family/caregiver on patient safety  3/27/2024 2053 by Susana Haney RN  Outcome: Progressing  Flowsheets (Taken 3/27/2024 2053)  Free From Fall Injury: Instruct family/caregiver on patient safety     Problem: Discharge Planning  Goal: Discharge to home or other facility with appropriate resources  3/28/2024 0101 by Jessica Alcantar RN  Outcome: Progressing  Flowsheets (Taken 3/27/2024 2000 by Susana Haney RN)  Discharge to home or other facility with appropriate resources: Identify barriers to discharge with patient and caregiver  3/27/2024 2053 by Susana Haney RN  Outcome: Progressing  Flowsheets (Taken 3/27/2024 2000)  Discharge to home or other facility with appropriate resources: Identify barriers to discharge with patient and caregiver     Problem: Chronic Conditions and Co-morbidities  Goal: Patient's chronic conditions and co-morbidity symptoms are monitored and maintained or improved  3/28/2024 0101 by Jessica Alcantar RN  Outcome: Progressing  Flowsheets (Taken 3/27/2024 2000 by Susana Haney RN)  Care Plan - Patient's Chronic Conditions and Co-Morbidity Symptoms are Monitored and Maintained or Improved: Monitor and assess patient's chronic conditions and comorbid symptoms for stability, deterioration, or improvement  3/27/2024 2053 by Susana Haney RN  Outcome: Progressing  Flowsheets (Taken 3/27/2024 2000)  Care Plan - Patient's Chronic Conditions and Co-Morbidity Symptoms are Monitored and Maintained or Improved: Monitor and assess patient's chronic conditions and comorbid symptoms for stability, deterioration, or improvement     Problem: ABCDS Injury Assessment  Goal: Absence of physical injury  3/27/2024 2053 by Susana Haney RN  Outcome: Progressing  Flowsheets (Taken

## 2024-03-28 NOTE — CARE COORDINATION
Transition of Care Plan:     RUR: 24% - high  Prior Level of Functioning: independent  Disposition: home health  Follow up appointments: [facility]  DME needed: TBD  Transportation at discharge: family  IM/IMM Medicare/ letter given: needs 2nd IMM  Caregiver Contact: spouse - Ashley Swan - p: [phone]  Discharge Caregiver contacted prior to discharge? planned  Care Conference needed? yes  Barriers to discharge: medical    CM spoke w patient at bedside in [facility] to follow up on his current status and review Home Health choice.  Patient reports he is doing better and agreeable to HH.  He agreed for CM to send a blast referral to see who is currently accepted [employer] Medicare.     HH referrals in [facility].    KATJA Leyva CCM  Care Management

## 2024-03-28 NOTE — CARE COORDINATION
03/28/24 1557   Condition of Participation: Discharge Planning   The Plan for Transition of Care is related to the following treatment goals: Discharge planning   The Patient and/or Patient Representative was provided with a Choice of Provider? Patient   The Patient and/Or Patient Representative agree with the Discharge Plan? Yes   Freedom of Choice list was provided with basic dialogue that supports the patient's individualized plan of care/goals, treatment preferences, and shares the quality data associated with the providers?  Yes     KATJA Leyva [facility]  Care Management

## 2024-03-28 NOTE — PROGRESS NOTES
Pharmacist Note - Warfarin Dosing  Consult provided for this 64 y.o.male to manage warfarin for LVAD.    INR Goal: 1.8-2.2  (due to recurrent bleeding)    Home regimen: 2 mg PO daily   -supra-therapeutic at last AC visit; dose was being adjusted    Drugs that may increase INR: None  Drugs that may decrease INR: None  Other medications that may increase bleeding risk: None  Risk factors: None  Daily INR ordered through: 4/30    Recent Labs     03/26/24  0507 03/27/24  0527 03/28/24  0246   HGB 9.3* 8.4* 8.1*   INR 2.1* 1.7* 1.7*     Date               INR                  Dose  3/19  7/9.2  Hold - vitamin K 2.5 mg PO x 1  3/20  2.4  0.5 mg  3/21  1.5  0.5 mg  3/22  1.7  0.5 mg   3/23  2.2  hold   3/24  2.2  0.5 mg   3/25  2.3  hold   3/26                2.1                   0.5 mg  3/27                1.7                   0.5 mg  3/28                1.7                   0.5 mg                                                                               Assessment/ Plan:  Will order 0.5 mg warfarin today.    Pharmacy will continue to monitor daily and adjust therapy as indicated.  Please contact the pharmacist at [phone] for outpatient recommendations if needed.

## 2024-03-28 NOTE — PROGRESS NOTES
ADVANCED HEART FAILURE CENTER  [facility] in [place], [place]  Inpatient Progress Note      Patient name: Sanford Joiner  Patient : 1960  Patient MRN: [id number]  Date of service: 24    Reason for Referral  LVAD management      ASSESSMENT:  Sanford Joiner is a 64 y.o. male admitted with driveline infection and possible upper GI bleed, reporting 3-4 days of dark stools.   History of chronic systolic heart failure due to non ischemic cardiomyopathy, s/p HM3 LVAD implantation ([facility] 2023) as destination therapy, stage D, remains NYHA class IV despite LVAD support due to severe COPD; currently with recovered LVEF to 55% and small LV cavity with resulting low flow alarms; optimal GDMT limited by hypovolemia  Patient is not a candidate for heart transplant due to advanced COPD -  declined by [facility] for both LVAD-DT and transplant and for the same reasons declined for LVAD-DT by [facility]   Team discussed patient at [facility] 3/22/24 to determine risk of pump explant and alternatives of washout or palliative antibiotics. CT chest shows 2 fluid collections surrounding the driveline and outflow graft concerning for infection. PFTS performed, showing very severe obstructive lung disease with FEV1<1L. Discussed with CV Surgery, not a surgical candiate. Plan for palliative antibiotic management of LVAD pump infection.    INTERVAL HISTORY:  -MAP 70s-80s  -INR 1.7; creat steady; Hg 8.1?; lactic up to 1.4; WBC steady at 19; procal down to 2.74  -weight variable; I/O inc  -Mr. Joiner is okay.  He states he feels better and would like to go home.   Breathing better.  No pain or swelling.   He became agitated when discussing waiting for negative cultures to place PICC line.     RECOMMENDATIONS:  Bacteremia, LVAD pump infection  3/22 cultures now + in 1 of 2 cultures   CT shows 2 fluid collections around drive line and outflow graft that are concerning for infection   Reviewed PFTs with Dr. Gomez, patient

## 2024-03-28 NOTE — PLAN OF CARE
Problem: Safety - Adult  Goal: Free from fall injury  3/27/2024 2053 by Susana Haney RN  Outcome: Progressing  Flowsheets (Taken 3/27/2024 2053)  Free From Fall Injury: Instruct family/caregiver on patient safety     Problem: Discharge Planning  Goal: Discharge to home or other facility with appropriate resources  3/27/2024 2053 by Susana Haney RN  Outcome: Progressing  Flowsheets (Taken 3/27/2024 2000)  Discharge to home or other facility with appropriate resources: Identify barriers to discharge with patient and caregiver     Problem: Chronic Conditions and Co-morbidities  Goal: Patient's chronic conditions and co-morbidity symptoms are monitored and maintained or improved  3/27/2024 2053 by Susana Haney RN  Outcome: Progressing  Flowsheets (Taken 3/27/2024 2000)  Care Plan - Patient's Chronic Conditions and Co-Morbidity Symptoms are Monitored and Maintained or Improved: Monitor and assess patient's chronic conditions and comorbid symptoms for stability, deterioration, or improvement     Problem: ABCDS Injury Assessment  Goal: Absence of physical injury  3/27/2024 2053 by Susana Haney RN  Outcome: Progressing  Flowsheets (Taken 3/27/2024 2053)  Absence of Physical Injury: Implement safety measures based on patient assessment     Problem: Pain  Goal: Verbalizes/displays adequate comfort level or baseline comfort level  3/27/2024 2053 by Susana Haney RN  Outcome: Progressing  Flowsheets (Taken 3/27/2024 2000)  Verbalizes/displays adequate comfort level or baseline comfort level: Encourage patient to monitor pain and request assistance     Problem: Skin/Tissue Integrity  Goal: Absence of new skin breakdown  Description: 1.  Monitor for areas of redness and/or skin breakdown  2.  Assess vascular access sites hourly  3.  Every 4-6 hours minimum:  Change oxygen saturation probe site  4.  Every 4-6 hours:  If on nasal continuous positive airway pressure, respiratory therapy assess nares and

## 2024-03-29 ENCOUNTER — HOME HEALTH ADMISSION (OUTPATIENT)
Dept: HOME HEALTH SERVICES | Facility: HOME HEALTH | Age: 64
End: 2024-03-29
Payer: MEDICARE

## 2024-03-29 LAB
ALBUMIN SERPL-MCNC: 2.1 G/DL (ref 3.5–5)
ALBUMIN/GLOB SERPL: 0.4 (ref 1.1–2.2)
ALP SERPL-CCNC: 110 U/L (ref 45–117)
ALT SERPL-CCNC: 12 U/L (ref 12–78)
ANION GAP SERPL CALC-SCNC: 6 MMOL/L (ref 5–15)
AST SERPL-CCNC: 30 U/L (ref 15–37)
BASOPHILS # BLD: 0.2 K/UL (ref 0–0.1)
BASOPHILS NFR BLD: 1 % (ref 0–1)
BILIRUB DIRECT SERPL-MCNC: <0.1 MG/DL (ref 0–0.2)
BILIRUB SERPL-MCNC: 0.2 MG/DL (ref 0.2–1)
BUN SERPL-MCNC: 19 MG/DL (ref 6–20)
BUN/CREAT SERPL: 13 (ref 12–20)
CALCIUM SERPL-MCNC: 8 MG/DL (ref 8.5–10.1)
CHLORIDE SERPL-SCNC: 109 MMOL/L (ref 97–108)
CO2 SERPL-SCNC: 24 MMOL/L (ref 21–32)
CREAT SERPL-MCNC: 1.47 MG/DL (ref 0.7–1.3)
DIFFERENTIAL METHOD BLD: ABNORMAL
EOSINOPHIL # BLD: 0 K/UL (ref 0–0.4)
EOSINOPHIL NFR BLD: 0 % (ref 0–7)
ERYTHROCYTE [DISTWIDTH] IN BLOOD BY AUTOMATED COUNT: 20.7 % (ref 11.5–14.5)
GLOBULIN SER CALC-MCNC: 4.7 G/DL (ref 2–4)
GLUCOSE SERPL-MCNC: 130 MG/DL (ref 65–100)
HCT VFR BLD AUTO: 26 % (ref 36.6–50.3)
HGB BLD-MCNC: 7.8 G/DL (ref 12.1–17)
IMM GRANULOCYTES # BLD AUTO: 0.2 K/UL (ref 0–0.04)
IMM GRANULOCYTES NFR BLD AUTO: 1 % (ref 0–0.5)
INR PPP: 1.5 (ref 0.9–1.1)
LACTATE SERPL-SCNC: 1.3 MMOL/L (ref 0.4–2)
LYMPHOCYTES # BLD: 1.1 K/UL (ref 0.8–3.5)
LYMPHOCYTES NFR BLD: 6 % (ref 12–49)
MCH RBC QN AUTO: 25.1 PG (ref 26–34)
MCHC RBC AUTO-ENTMCNC: 30 G/DL (ref 30–36.5)
MCV RBC AUTO: 83.6 FL (ref 80–99)
MONOCYTES # BLD: 1.2 K/UL (ref 0–1)
MONOCYTES NFR BLD: 7 % (ref 5–13)
NEUTS SEG # BLD: 15.1 K/UL (ref 1.8–8)
NEUTS SEG NFR BLD: 85 % (ref 32–75)
NRBC # BLD: 0 K/UL (ref 0–0.01)
NRBC BLD-RTO: 0 PER 100 WBC
PLATELET # BLD AUTO: 278 K/UL (ref 150–400)
PMV BLD AUTO: 11.7 FL (ref 8.9–12.9)
POTASSIUM SERPL-SCNC: ABNORMAL MMOL/L (ref 3.5–5.1)
PROCALCITONIN SERPL-MCNC: 1.68 NG/ML
PROT SERPL-MCNC: 6.8 G/DL (ref 6.4–8.2)
PROTHROMBIN TIME: 15.1 SEC (ref 9–11.1)
RBC # BLD AUTO: 3.11 M/UL (ref 4.1–5.7)
RBC MORPH BLD: ABNORMAL
SODIUM SERPL-SCNC: 139 MMOL/L (ref 136–145)
WBC # BLD AUTO: 17.8 K/UL (ref 4.1–11.1)

## 2024-03-29 PROCEDURE — 6370000000 HC RX 637 (ALT 250 FOR IP): Performed by: NURSE PRACTITIONER

## 2024-03-29 PROCEDURE — 93750 INTERROGATION VAD IN PERSON: CPT | Performed by: INTERNAL MEDICINE

## 2024-03-29 PROCEDURE — 94640 AIRWAY INHALATION TREATMENT: CPT

## 2024-03-29 PROCEDURE — 6370000000 HC RX 637 (ALT 250 FOR IP): Performed by: STUDENT IN AN ORGANIZED HEALTH CARE EDUCATION/TRAINING PROGRAM

## 2024-03-29 PROCEDURE — 2580000003 HC RX 258: Performed by: INTERNAL MEDICINE

## 2024-03-29 PROCEDURE — 84145 PROCALCITONIN (PCT): CPT

## 2024-03-29 PROCEDURE — 2060000000 HC ICU INTERMEDIATE R&B

## 2024-03-29 PROCEDURE — 80048 BASIC METABOLIC PNL TOTAL CA: CPT

## 2024-03-29 PROCEDURE — 99232 SBSQ HOSP IP/OBS MODERATE 35: CPT | Performed by: INTERNAL MEDICINE

## 2024-03-29 PROCEDURE — 6370000000 HC RX 637 (ALT 250 FOR IP): Performed by: INTERNAL MEDICINE

## 2024-03-29 PROCEDURE — 36415 COLL VENOUS BLD VENIPUNCTURE: CPT

## 2024-03-29 PROCEDURE — 85610 PROTHROMBIN TIME: CPT

## 2024-03-29 PROCEDURE — 6360000002 HC RX W HCPCS: Performed by: INTERNAL MEDICINE

## 2024-03-29 PROCEDURE — 80076 HEPATIC FUNCTION PANEL: CPT

## 2024-03-29 PROCEDURE — 2580000003 HC RX 258: Performed by: STUDENT IN AN ORGANIZED HEALTH CARE EDUCATION/TRAINING PROGRAM

## 2024-03-29 PROCEDURE — 85025 COMPLETE CBC W/AUTO DIFF WBC: CPT

## 2024-03-29 PROCEDURE — 83605 ASSAY OF LACTIC ACID: CPT

## 2024-03-29 RX ORDER — WARFARIN SODIUM 1 MG/1
1 TABLET ORAL
Status: COMPLETED | OUTPATIENT
Start: 2024-03-29 | End: 2024-03-29

## 2024-03-29 RX ADMIN — METOPROLOL SUCCINATE 50 MG: 50 TABLET, EXTENDED RELEASE ORAL at 09:20

## 2024-03-29 RX ADMIN — SODIUM CHLORIDE, PRESERVATIVE FREE 10 ML: 5 INJECTION INTRAVENOUS at 20:59

## 2024-03-29 RX ADMIN — Medication 1 CAPSULE: at 09:20

## 2024-03-29 RX ADMIN — PANTOPRAZOLE SODIUM 40 MG: 40 TABLET, DELAYED RELEASE ORAL at 16:45

## 2024-03-29 RX ADMIN — HYDRALAZINE HYDROCHLORIDE 25 MG: 25 TABLET ORAL at 16:46

## 2024-03-29 RX ADMIN — COLCHICINE 0.6 MG: 0.6 TABLET, FILM COATED ORAL at 09:20

## 2024-03-29 RX ADMIN — HYDRALAZINE HYDROCHLORIDE 25 MG: 25 TABLET ORAL at 06:08

## 2024-03-29 RX ADMIN — HYDRALAZINE HYDROCHLORIDE 25 MG: 25 TABLET ORAL at 22:54

## 2024-03-29 RX ADMIN — AMLODIPINE BESYLATE 5 MG: 5 TABLET ORAL at 20:59

## 2024-03-29 RX ADMIN — METOPROLOL SUCCINATE 50 MG: 50 TABLET, EXTENDED RELEASE ORAL at 20:59

## 2024-03-29 RX ADMIN — SODIUM CHLORIDE, PRESERVATIVE FREE 10 ML: 5 INJECTION INTRAVENOUS at 09:20

## 2024-03-29 RX ADMIN — PANTOPRAZOLE SODIUM 40 MG: 40 TABLET, DELAYED RELEASE ORAL at 06:08

## 2024-03-29 RX ADMIN — WATER 2000 MG: 1 INJECTION INTRAMUSCULAR; INTRAVENOUS; SUBCUTANEOUS at 16:46

## 2024-03-29 RX ADMIN — AMLODIPINE BESYLATE 5 MG: 5 TABLET ORAL at 09:20

## 2024-03-29 RX ADMIN — WARFARIN SODIUM 1 MG: 1 TABLET ORAL at 16:45

## 2024-03-29 NOTE — PROGRESS NOTES
Hospitalist Progress Note  Jaren Wood MD  Answering service: [phone] OR [phone] from in house phone        Date of Service:  3/29/2024  NAME:  Sanford Joiner  :  1960  MRN:  [id number]      Admission Summary:   Sanford Joiner is a 64 y.o. 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  Patient states he had been in his usual state of health until about 24 hours ago when he developed progressive dyspnea.  Initially had dyspneic symptoms with minimal exertion and further progression to dyspnea occurring at rest.  He has had what he describes as a mild nonproductive cough.  Denies any ongoing tobacco use.  Denies any recent travel or sick contacts.  The patient denies any fever, chills, chest or abdominal pain, nausea, vomiting, congestion, recent illness, palpitations, or dysuria.     Remarkable vitals on ER Presentation: Respiratory rate to 112  Labs Remarkable for: SpO2 to mid 80s on room air  ER Images: Chest x-ray showed no acute process  ER Rx: Cefepime, DuoNeb, 250 cc normal saline bolus    Interval history / Subjective:   Seen and examined for follow up bacteremia and LVAD drive line infection. Resting comfortably. Discussed need for cultures to be negative 48 hours for PICC placement and possible discharge. No pain.      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  Monitor daily BMP     GERD  -Protonix daily     Palliative care on board      Code status: full  Prophylaxis: coumadin   Care Plan discussed with: patient, josephine, subspecialist   Anticipated Disposition: Likely home with  IV antibiotics in 48 or more hours     Principal Problem:    COPD (chronic obstructive pulmonary disease) ([facility])  Active Problems:    Acute on chronic respiratory failure with hypoxia (HCC)    Shortness of breath    Debility    Palliative care encounter  Resolved Problems:    * No resolved hospital problems. *            Review of Systems:   Pertinent items are noted in HPI.         Vital Signs:    Last 24hrs VS reviewed since prior progress note. Most recent are:  BP (!) 136/109   Pulse 80   Temp 98.6 °F (37 °C) (Oral)   Resp 23   Ht 1.727 m (5' 7.99\")   Wt 72 kg (158 lb 11.7 oz)   SpO2 96%   BMI 24.14 kg/m²        Intake/Output Summary (Last 24 hours) at 3/29/2024 0835  Last data filed at 3/29/2024 0615  Gross per 24 hour   Intake --   Output 1100 ml   Net -1100 ml          Physical Examination:     I had a face to face encounter with this patient and independently examined them on 3/29/2024 as outlined below:          General : alert x 3, awake, no acute distress,   HEENT: PEERL, EOMI, moist mucus membrane  Neck: supple, no JVD, no meningeal signs  Chest: Clear to auscultation bilaterally   CVS: S1 S2 heard, Capillary refill less than 2 seconds  Abd: soft/ non tender, non distended, BS physiological,   Ext: no clubbing, no cyanosis, no edema, brisk 2+ DP pulses  Neuro/Psych: pleasant mood and affect, CN 2-12 grossly intact, sensory grossly within normal limit  Skin: warm            Data Review:    Review and/or order of clinical lab test  Review and/or order of tests in the radiology section of CPT  Review and/or order of tests in the medicine section of CPT    I have independently reviewed and interpreted patient's lab and all other diagnostic data    Notes

## 2024-03-29 NOTE — PLAN OF CARE
Problem: Safety - Adult  Goal: Free from fall injury  Outcome: Progressing  Flowsheets (Taken 3/28/2024 2313)  Free From Fall Injury: Instruct family/caregiver on patient safety     Problem: Discharge Planning  Goal: Discharge to home or other facility with appropriate resources  Outcome: Progressing  Flowsheets (Taken 3/28/2024 2000)  Discharge to home or other facility with appropriate resources: Identify barriers to discharge with patient and caregiver     Problem: Chronic Conditions and Co-morbidities  Goal: Patient's chronic conditions and co-morbidity symptoms are monitored and maintained or improved  Outcome: Progressing  Flowsheets (Taken 3/28/2024 2000)  Care Plan - Patient's Chronic Conditions and Co-Morbidity Symptoms are Monitored and Maintained or Improved: Monitor and assess patient's chronic conditions and comorbid symptoms for stability, deterioration, or improvement     Problem: ABCDS Injury Assessment  Goal: Absence of physical injury  Outcome: Progressing  Flowsheets (Taken 3/28/2024 2313)  Absence of Physical Injury: Implement safety measures based on patient assessment     Problem: Pain  Goal: Verbalizes/displays adequate comfort level or baseline comfort level  Outcome: Progressing  Flowsheets (Taken 3/28/2024 2000)  Verbalizes/displays adequate comfort level or baseline comfort level: Encourage patient to monitor pain and request assistance     Problem: Skin/Tissue Integrity  Goal: Absence of new skin breakdown  Description: 1.  Monitor for areas of redness and/or skin breakdown  2.  Assess vascular access sites hourly  3.  Every 4-6 hours minimum:  Change oxygen saturation probe site  4.  Every 4-6 hours:  If on nasal continuous positive airway pressure, respiratory therapy assess nares and determine need for appliance change or resting period.  Outcome: Progressing

## 2024-03-29 NOTE — PROGRESS NOTES
Pharmacist Note - Warfarin Dosing  Consult provided for this 64 y.o.male to manage warfarin for LVAD.    INR Goal: 1.8-2.2  (due to recurrent bleeding)    Home regimen: 2 mg PO daily   -supra-therapeutic at last AC visit; dose was being adjusted    Drugs that may increase INR: None  Drugs that may decrease INR: None  Other medications that may increase bleeding risk: None  Risk factors: None  Daily INR ordered through: 4/30    Recent Labs     03/27/24  0527 03/28/24  0246 03/29/24  0556   HGB 8.4* 8.1* 7.8*   INR 1.7* 1.7* 1.5*     Date               INR                  Dose  3/19  7/9.2  Hold - vitamin K 2.5 mg PO x 1  3/20  2.4  0.5 mg  3/21  1.5  0.5 mg  3/22  1.7  0.5 mg   3/23  2.2  hold   3/24  2.2  0.5 mg   3/25  2.3  hold   3/26                2.1                   0.5 mg  3/27                1.7                   0.5 mg  3/28                1.7                   0.5 mg  3/29                1.5                   1 mg                                                                               Assessment/ Plan:  Will order 1 mg warfarin today.    Pharmacy will continue to monitor daily and adjust therapy as indicated.  Please contact the pharmacist at [phone] for outpatient recommendations if needed.

## 2024-03-29 NOTE — PROGRESS NOTES
Infectious Disease Progress Note       Subjective:      The patient was personally evaluated and examined by me at bedside this morning and was discussed with the hospitalist who was caring for him.  The patient is awake and alert and cooperative.  The patient denies headache, fever, sweats or chills.  He appears to be in good spirits.  The patient denies shortness of breath at rest but does get short of breath when he moves about.  He denies cough, sputum, chest pressure or chest pain.  He notices no change in his LVAD.  He states that he is eating and has no nausea, no vomiting, no diarrhea no constipation.  There is no abdominal pain.    The patient is eager for discharge and agrees to go home on IV antibiotic at least as initial treatment in the setting of both MSSA bacteremia and driveline infection with MSSA, Enterobacter cloacae and possibly Streptococcus.  The patient will be initiated on ceftriaxone which should treat all of the above.  Eventually oral suppression will be attempted in an effort to avoid continued need for the antibiotic.      Objective:    Vitals:   Patient Vitals for the past 24 hrs:   Temp Pulse Resp SpO2   03/29/24 1909 99.8 °F (37.7 °C) 77 20 --   03/29/24 1800 -- 80 -- --   03/29/24 1600 -- 73 -- --   03/29/24 1500 98.2 °F (36.8 °C) 72 18 --   03/29/24 1400 -- 71 -- --   03/29/24 1200 -- 77 -- --   03/29/24 1130 98.5 °F (36.9 °C) 78 -- --   03/29/24 1000 -- 77 -- --   03/29/24 0615 98.6 °F (37 °C) 80 23 96 %   03/29/24 0600 -- 76 -- --   03/29/24 0400 98.4 °F (36.9 °C) 87 -- 94 %   03/29/24 0200 -- 79 -- --   03/28/24 2256 -- 90 -- --   03/28/24 2253 98.1 °F (36.7 °C) 86 23 95 %   03/28/24 2200 -- 84 -- --   03/28/24 2000 -- 77 -- --       Physical Exam:    Gen: No apparent distress, appears relaxed and comfortable and eager for discharge  HEENT:  Normocephalic, atraumatic, no scleral icterus, no thrush  CV: S1,2 heard regularly, no lower extremity edema,LVAD hum heard  Lungs:  Urine:     Synovial/Joint fluid:     Sputum/BAL/Pleural:     Peritoneal:                Wound at LVAD exit: 03/26/2024 no WBC, no organisms                                                Light Enterobacter cloacae complex                                                Few MSSA      Pathology:      Imaging:      Assessment / Plan          1.  Bacteremia with MSSA with recent cultures 03/27/2024 with NGTD        Ceftriaxone initiated 2 g every 24 hours with last dose 04/10/24    2.  Elevated exit wound with Enterobacter cloacae complex and MSSA       Ceftriaxone initiated as above with plan to change to oral suppressive antibiotic       There is difficulty in finding appropriate oral antibiotic in setting of patient allergy       Continued oral treatment to be given 04/11/2024       Suggest outpatient evaluation before that time to assess status of wound       Possible need to extend ceftriaxone at decreased dose after that date                Thank you for the opportunity to participate in the care of this patient.   Please contact with questions or concerns.      Prema June MD

## 2024-03-29 NOTE — CARE COORDINATION
Transition of Care Plan:     RUR: 24% - high  Prior Level of Functioning: independent  Disposition: [facility] , pending [facility] (need ID order) and home w family.  IV ABX training completed on 3/29.  Follow up appointments: [facility]  DME needed: TBD- refusing to participate in therapy  Transportation at discharge: family  IM/IMM Medicare/ letter given: needs 2nd IMM  Caregiver Contact: spouse - Ashley Swan - p: [phone]  Discharge Caregiver contacted prior to discharge? planned  Care Conference needed? yes  Barriers to discharge: ID final recommendations (w ID note), PICC, IV ABX training    [facility]  accepted and AVS updated. CM will send clinicals to [facility] for the IV ABX.  Once the ID note and PICC report are completed CM will send those too.    Update 3pm: [facility] reports $0 copay for Ceftriaxone 2 g every 24 hours .    Update 3:52pm: [facility] will be here shortly to complete the IV ABX training.  They need the ID note and can accept over the weekend.     Laisha Ivey MSW CCM  Care Management

## 2024-03-29 NOTE — PROGRESS NOTES
ADVANCED HEART FAILURE CENTER  [facility] in [place], [place]  Inpatient Progress Note      Patient name: Sanford Joiner  Patient : 1960  Patient MRN: [id number]  Date of service: 24    Reason for Referral  LVAD management      ASSESSMENT:  Sanford Joiner is a 64 y.o. male admitted with driveline infection and possible upper GI bleed, reporting 3-4 days of dark stools.   History of chronic systolic heart failure due to non ischemic cardiomyopathy, s/p HM3 LVAD implantation ([facility] 2023) as destination therapy, stage D, remains NYHA class IV despite LVAD support due to severe COPD; currently with recovered LVEF to 55% and small LV cavity with resulting low flow alarms; optimal GDMT limited by hypovolemia  Patient is not a candidate for heart transplant due to advanced COPD -  declined by [facility] for both LVAD-DT and transplant and for the same reasons declined for LVAD-DT by [facility]   Team discussed patient at [facility] 3/22/24 to determine risk of pump explant and alternatives of washout or palliative antibiotics. CT chest shows 2 fluid collections surrounding the driveline and outflow graft concerning for infection. PFTS performed, showing very severe obstructive lung disease with FEV1<1L. Discussed with CV Surgery, not a surgical candiate. Plan for palliative antibiotic management of LVAD pump infection.    INTERVAL HISTORY:  Cultures 3/27 negative to date  No new events overnight  Patient thinks he is going home today    RECOMMENDATIONS:  Bacteremia, LVAD pump infection  Pending blood cultures 3/27, negative to date  Will need final negative for PICC line placement and discharge  Planning palliative antibiotic management  Patient is not a candidate for surgical intervention  Palliative care appreciated  ID following     COPD   Appreciate pulmonary recommendations  PFT reviewed. Very severe obstructive lung disease    Left Ventricular Assist Device/Driveline Infection  Continue current

## 2024-03-29 NOTE — PROGRESS NOTES
Referral source:   Sanford Joiner at [facility] in [facility] 4 CV SERVICES UNIT.  attended rounds on the CV Services Unit as part of the Interdisciplinary team where the patient's ongoing care was discussed. I reviewed the medical record as part of this encounter.     Outcome: Interdisciplinary team are aware of  availability and were encouraged to request Spiritual Health support as needed.      The  on-call can be reached at ([phone]).     Rev. Mariana Heck MDiv, [facility]  Staff

## 2024-03-30 ENCOUNTER — APPOINTMENT (OUTPATIENT)
Facility: HOSPITAL | Age: 64
DRG: 871 | End: 2024-03-30
Payer: MEDICARE

## 2024-03-30 LAB
ABO + RH BLD: NORMAL
ANION GAP SERPL CALC-SCNC: 5 MMOL/L (ref 5–15)
BACTERIA SPEC CULT: ABNORMAL
BASOPHILS # BLD: 0.2 K/UL (ref 0–0.1)
BASOPHILS NFR BLD: 1 % (ref 0–1)
BLOOD GROUP ANTIBODIES SERPL: NORMAL
BUN SERPL-MCNC: 18 MG/DL (ref 6–20)
BUN/CREAT SERPL: 12 (ref 12–20)
CALCIUM SERPL-MCNC: 8 MG/DL (ref 8.5–10.1)
CHLORIDE SERPL-SCNC: 108 MMOL/L (ref 97–108)
CO2 SERPL-SCNC: 25 MMOL/L (ref 21–32)
CREAT SERPL-MCNC: 1.47 MG/DL (ref 0.7–1.3)
DIFFERENTIAL METHOD BLD: ABNORMAL
EOSINOPHIL # BLD: 0 K/UL (ref 0–0.4)
EOSINOPHIL NFR BLD: 0 % (ref 0–7)
ERYTHROCYTE [DISTWIDTH] IN BLOOD BY AUTOMATED COUNT: 20.4 % (ref 11.5–14.5)
GLUCOSE SERPL-MCNC: 111 MG/DL (ref 65–100)
GRAM STN SPEC: ABNORMAL
GRAM STN SPEC: ABNORMAL
HCT VFR BLD AUTO: 25.3 % (ref 36.6–50.3)
HGB BLD-MCNC: 7.5 G/DL (ref 12.1–17)
IMM GRANULOCYTES # BLD AUTO: 0.3 K/UL (ref 0–0.04)
IMM GRANULOCYTES NFR BLD AUTO: 2 % (ref 0–0.5)
INR PPP: 1.4 (ref 0.9–1.1)
LACTATE SERPL-SCNC: 1.4 MMOL/L (ref 0.4–2)
LYMPHOCYTES # BLD: 1.1 K/UL (ref 0.8–3.5)
LYMPHOCYTES NFR BLD: 7 % (ref 12–49)
MCH RBC QN AUTO: 24.6 PG (ref 26–34)
MCHC RBC AUTO-ENTMCNC: 29.6 G/DL (ref 30–36.5)
MCV RBC AUTO: 83 FL (ref 80–99)
MONOCYTES # BLD: 1.1 K/UL (ref 0–1)
MONOCYTES NFR BLD: 7 % (ref 5–13)
NEUTS SEG # BLD: 13.5 K/UL (ref 1.8–8)
NEUTS SEG NFR BLD: 83 % (ref 32–75)
NRBC # BLD: 0 K/UL (ref 0–0.01)
NRBC BLD-RTO: 0 PER 100 WBC
PLATELET # BLD AUTO: 315 K/UL (ref 150–400)
PMV BLD AUTO: 11.2 FL (ref 8.9–12.9)
POTASSIUM SERPL-SCNC: 4.1 MMOL/L (ref 3.5–5.1)
PROCALCITONIN SERPL-MCNC: 1.14 NG/ML
PROTHROMBIN TIME: 14.5 SEC (ref 9–11.1)
RBC # BLD AUTO: 3.05 M/UL (ref 4.1–5.7)
RBC MORPH BLD: ABNORMAL
SERVICE CMNT-IMP: ABNORMAL
SODIUM SERPL-SCNC: 138 MMOL/L (ref 136–145)
SPECIMEN EXP DATE BLD: NORMAL
WBC # BLD AUTO: 16.2 K/UL (ref 4.1–11.1)

## 2024-03-30 PROCEDURE — 83605 ASSAY OF LACTIC ACID: CPT

## 2024-03-30 PROCEDURE — 86901 BLOOD TYPING SEROLOGIC RH(D): CPT

## 2024-03-30 PROCEDURE — 84145 PROCALCITONIN (PCT): CPT

## 2024-03-30 PROCEDURE — 6370000000 HC RX 637 (ALT 250 FOR IP): Performed by: STUDENT IN AN ORGANIZED HEALTH CARE EDUCATION/TRAINING PROGRAM

## 2024-03-30 PROCEDURE — 86900 BLOOD TYPING SEROLOGIC ABO: CPT

## 2024-03-30 PROCEDURE — 85025 COMPLETE CBC W/AUTO DIFF WBC: CPT

## 2024-03-30 PROCEDURE — 94640 AIRWAY INHALATION TREATMENT: CPT

## 2024-03-30 PROCEDURE — 6370000000 HC RX 637 (ALT 250 FOR IP): Performed by: FAMILY MEDICINE

## 2024-03-30 PROCEDURE — 6360000002 HC RX W HCPCS: Performed by: INTERNAL MEDICINE

## 2024-03-30 PROCEDURE — 2580000003 HC RX 258: Performed by: INTERNAL MEDICINE

## 2024-03-30 PROCEDURE — 93750 INTERROGATION VAD IN PERSON: CPT | Performed by: INTERNAL MEDICINE

## 2024-03-30 PROCEDURE — 6370000000 HC RX 637 (ALT 250 FOR IP): Performed by: INTERNAL MEDICINE

## 2024-03-30 PROCEDURE — 36415 COLL VENOUS BLD VENIPUNCTURE: CPT

## 2024-03-30 PROCEDURE — 85610 PROTHROMBIN TIME: CPT

## 2024-03-30 PROCEDURE — 6370000000 HC RX 637 (ALT 250 FOR IP): Performed by: NURSE PRACTITIONER

## 2024-03-30 PROCEDURE — 71045 X-RAY EXAM CHEST 1 VIEW: CPT

## 2024-03-30 PROCEDURE — 2580000003 HC RX 258: Performed by: STUDENT IN AN ORGANIZED HEALTH CARE EDUCATION/TRAINING PROGRAM

## 2024-03-30 PROCEDURE — 99232 SBSQ HOSP IP/OBS MODERATE 35: CPT | Performed by: INTERNAL MEDICINE

## 2024-03-30 PROCEDURE — 86850 RBC ANTIBODY SCREEN: CPT

## 2024-03-30 PROCEDURE — 2060000000 HC ICU INTERMEDIATE R&B

## 2024-03-30 PROCEDURE — 80048 BASIC METABOLIC PNL TOTAL CA: CPT

## 2024-03-30 PROCEDURE — 6370000000 HC RX 637 (ALT 250 FOR IP): Performed by: HOSPITALIST

## 2024-03-30 RX ORDER — WARFARIN SODIUM 1 MG/1
0.5 TABLET ORAL
Status: COMPLETED | OUTPATIENT
Start: 2024-03-30 | End: 2024-03-30

## 2024-03-30 RX ADMIN — PANTOPRAZOLE SODIUM 40 MG: 40 TABLET, DELAYED RELEASE ORAL at 15:58

## 2024-03-30 RX ADMIN — IPRATROPIUM BROMIDE AND ALBUTEROL SULFATE 1 DOSE: .5; 3 SOLUTION RESPIRATORY (INHALATION) at 22:29

## 2024-03-30 RX ADMIN — Medication 1 CAPSULE: at 08:19

## 2024-03-30 RX ADMIN — SODIUM CHLORIDE, PRESERVATIVE FREE 10 ML: 5 INJECTION INTRAVENOUS at 08:24

## 2024-03-30 RX ADMIN — WATER 2000 MG: 1 INJECTION INTRAMUSCULAR; INTRAVENOUS; SUBCUTANEOUS at 15:58

## 2024-03-30 RX ADMIN — HYDRALAZINE HYDROCHLORIDE 25 MG: 25 TABLET ORAL at 20:46

## 2024-03-30 RX ADMIN — AMLODIPINE BESYLATE 5 MG: 5 TABLET ORAL at 11:21

## 2024-03-30 RX ADMIN — COLCHICINE 0.6 MG: 0.6 TABLET, FILM COATED ORAL at 08:20

## 2024-03-30 RX ADMIN — METOPROLOL SUCCINATE 50 MG: 50 TABLET, EXTENDED RELEASE ORAL at 20:46

## 2024-03-30 RX ADMIN — SODIUM CHLORIDE, PRESERVATIVE FREE 10 ML: 5 INJECTION INTRAVENOUS at 20:46

## 2024-03-30 RX ADMIN — AMLODIPINE BESYLATE 5 MG: 5 TABLET ORAL at 20:46

## 2024-03-30 RX ADMIN — PANTOPRAZOLE SODIUM 40 MG: 40 TABLET, DELAYED RELEASE ORAL at 06:47

## 2024-03-30 RX ADMIN — HYDRALAZINE HYDROCHLORIDE 25 MG: 25 TABLET ORAL at 15:58

## 2024-03-30 RX ADMIN — METOPROLOL SUCCINATE 50 MG: 50 TABLET, EXTENDED RELEASE ORAL at 08:20

## 2024-03-30 RX ADMIN — HYDRALAZINE HYDROCHLORIDE 25 MG: 25 TABLET ORAL at 06:47

## 2024-03-30 RX ADMIN — WARFARIN SODIUM 0.5 MG: 1 TABLET ORAL at 17:14

## 2024-03-30 ASSESSMENT — PAIN DESCRIPTION - LOCATION: LOCATION: FINGER (COMMENT WHICH ONE);FOOT

## 2024-03-30 ASSESSMENT — PAIN DESCRIPTION - ORIENTATION: ORIENTATION: LEFT

## 2024-03-30 ASSESSMENT — PAIN DESCRIPTION - DESCRIPTORS: DESCRIPTORS: ACHING

## 2024-03-30 ASSESSMENT — PAIN SCALES - GENERAL: PAINLEVEL_OUTOF10: 8

## 2024-03-30 NOTE — PROGRESS NOTES
[facility]  [facility] in [place], [place]  Inpatient Progress Note      Patient name: Sanford Joiner  Patient : 1960  Patient MRN: [id number]  Date of service: 24    Reason for Referral  LVAD management      ASSESSMENT:  Sanford Joiner is a 64 y.o. male admitted with driveline infection and possible upper GI bleed, reporting 3-4 days of dark stools.   History of chronic systolic heart failure due to non ischemic cardiomyopathy, s/p HM3 LVAD implantation ([facility] 2023) as destination therapy, stage D, remains NYHA class IV despite LVAD support due to severe COPD; currently with recovered LVEF to 55% and small LV cavity with resulting low flow alarms; optimal GDMT limited by hypovolemia  Patient is not a candidate for heart transplant due to advanced COPD -  declined by [facility] for both LVAD-DT and transplant and for the same reasons declined for LVAD-DT by [facility]   Team discussed patient at [facility] 3/22/24 to determine risk of pump explant and alternatives of washout or palliative antibiotics. CT chest shows 2 fluid collections surrounding the driveline and outflow graft concerning for infection. PFTS performed, showing very severe obstructive lung disease with FEV1<1L. Discussed with CV Surgery, not a surgical candiate. Plan for palliative antibiotic management of LVAD pump infection.    INTERVAL HISTORY:  No new events, cultures 3/27 remain negative  Patient frustrated he will spend another Easter in the hospital    RECOMMENDATIONS:  Bacteremia, LVAD pump infection  Pending blood cultures 3/27, negative to date  Will need final negative for PICC line placement and discharge  Planning palliative antibiotic management  Patient is not a candidate for surgical intervention  Palliative care appreciated  ID following     COPD   Appreciate pulmonary recommendations  PFT reviewed. Very severe obstructive lung disease    Left Ventricular Assist Device/Driveline  cefTRIAXone (ROCEPHIN) 2,000 mg in sterile water 20 mL IV syringe, 2,000 mg, IntraVENous, Q24H, Prema June MD, 2,000 mg at 03/29/24 1646    lactobacillus (CULTURELLE) capsule 1 capsule, 1 capsule, Oral, Daily with breakfast, Madala, Sushma, MD, 1 capsule at 03/30/24 0819    fluticasone-umeclidin-vilant (TRELEGY ELLIPTA) 200-62.5-25 MCG/ACT inhaler 1 puff (Patient Supplied), 1 puff, Inhalation, Daily, Madala, Sushma, MD, 1 puff at 03/30/24 0828    0.9 % sodium chloride infusion, , IntraVENous, PRN, Nessa Maurer APRN - NP    ipratropium 0.5 mg-albuterol 2.5 mg (DUONEB) nebulizer solution 1 Dose, 1 Dose, Inhalation, Q4H PRN, Gareth Hou MD    methylPREDNISolone sodium succ (SOLU-MEDROL) 125 mg in sterile water 2 mL injection, 125 mg, IntraVENous, Once, Aidee Gonzales MD    magnesium sulfate 2000 mg in 50 mL IVPB premix, 2,000 mg, IntraVENous, Once, Aidee Gonzales MD    amLODIPine (NORVASC) tablet 5 mg, 5 mg, Oral, BID, Aidee Gonzales MD, 5 mg at 03/29/24 2059    hydrALAZINE (APRESOLINE) tablet 25 mg, 25 mg, Oral, 3 times per day, Aidee Gonzales MD, 25 mg at 03/30/24 0647    metoprolol succinate (TOPROL XL) extended release tablet 50 mg, 50 mg, Oral, BID, Aidee Gonzales MD, 50 mg at 03/30/24 0820    pantoprazole (PROTONIX) tablet 40 mg, 40 mg, Oral, BID AC, Aidee Gonzales MD, 40 mg at 03/30/24 0647    sodium chloride flush 0.9 % injection 5-40 mL, 5-40 mL, IntraVENous, 2 times per day, Aidee Gonzales MD, 10 mL at 03/30/24 0824    sodium chloride flush 0.9 % injection 5-40 mL, 5-40 mL, IntraVENous, PRN, Aidee Gonzales MD    0.9 % sodium chloride infusion, , IntraVENous, PRN, Aidee Gonzales MD, Stopped at 03/20/24 0439    potassium chloride (KLOR-CON) extended release tablet 40 mEq, 40 mEq, Oral, PRN **OR** potassium bicarb-citric acid (EFFER-K) effervescent tablet 40 mEq, 40 mEq, Oral, PRN **OR** potassium chloride 10 mEq/100 mL IVPB (Peripheral Line), 10 mEq,

## 2024-03-30 NOTE — PROGRESS NOTES
Hospitalist Progress Note  Zeina Vaughan MD  Answering service: [phone] OR [phone] from in house phone        Date of Service:  3/30/2024  NAME:  Sanford Joiner  :  1960  MRN:  [id number]      Admission Summary:   Sanford Joiner is a 64 y.o. 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  Patient states he had been in his usual state of health until about 24 hours ago when he developed progressive dyspnea.  Initially had dyspneic symptoms with minimal exertion and further progression to dyspnea occurring at rest.  He has had what he describes as a mild nonproductive cough.  Denies any ongoing tobacco use.  Denies any recent travel or sick contacts.  The patient denies any fever, chills, chest or abdominal pain, nausea, vomiting, congestion, recent illness, palpitations, or dysuria.     Remarkable vitals on ER Presentation: Respiratory rate to 112  Labs Remarkable for: SpO2 to mid 80s on room air  ER Images: Chest x-ray showed no acute process  ER Rx: Cefepime, DuoNeb, 250 cc normal saline bolus    Interval history / Subjective:   Seen and examined for follow up bacteremia and LVAD drive line infection.   Patient resting comfortably discussed with Dr. 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 fobt ordered   - will monitor hb, transfuse <7      Hypertension  -c/w Amlodipine, hydralazine, metoprolol     CKD stage III/IV  -cr Stable.  Monitor daily BMP     GERD  -Protonix daily     Palliative care on board      Code status: full  Prophylaxis: coumadin   Care Plan discussed with: patient, josephine, subspecialist   Anticipated Disposition: Likely home with  IV antibiotics in 48 or more hours     Principal Problem:    COPD (chronic obstructive pulmonary disease) ([facility])  Active Problems:    Acute on chronic respiratory failure with hypoxia ([facility])    Shortness of breath    Debility    Palliative care encounter  Resolved Problems:    * No resolved hospital problems. *            Review of Systems:   Pertinent items are noted in HPI.         Vital Signs:    Last 24hrs VS reviewed since prior progress note. Most recent are:  BP (!) 136/109   Pulse 80   Temp 99 °F (37.2 °C) (Oral)   Resp 17   Ht 1.727 m (5' 7.99\")   Wt 74.6 kg (164 lb 7.4 oz)   SpO2 95%   BMI 25.01 kg/m²        Intake/Output Summary (Last 24 hours) at 3/30/2024 1035  Last data filed at 3/30/2024 0817  Gross per 24 hour   Intake 1172 ml   Output 925 ml   Net 247 ml          Physical Examination:     I had a face to face encounter with this patient and independently examined them on 3/30/2024 as outlined below:          General : alert x 3, awake, no acute distress,   HEENT: PEERL, EOMI, moist mucus membrane  Neck: supple, no JVD, no meningeal signs  Chest: Clear to auscultation bilaterally   CVS: S1 S2 heard, Capillary refill less than 2 seconds  Abd: soft/ non tender, non distended, BS physiological,   Ext: no clubbing, no cyanosis, no edema, brisk 2+ DP pulses  Neuro/Psych: pleasant mood and affect, CN 2-12 grossly intact, sensory grossly within normal limit  Skin: warm            Data Review:    Review

## 2024-03-30 NOTE — PROGRESS NOTES
Pharmacist Note - Warfarin Dosing  Consult provided for this 64 y.o.male to manage warfarin for LVAD.    INR Goal: 1.8-2.2  (due to recurrent bleeding)    Home regimen: 2 mg PO daily   -supra-therapeutic at last AC visit; dose was being adjusted    Drugs that may increase INR: None  Drugs that may decrease INR: None  Other medications that may increase bleeding risk: None  Risk factors: None  Daily INR ordered through: 4/30    Recent Labs     03/28/24  0246 03/29/24  0556 03/30/24  0303   HGB 8.1* 7.8* 7.5*   INR 1.7* 1.5* 1.4*     Date               INR                  Dose  3/19  7/9.2  Hold - vitamin K 2.5 mg PO x 1  3/20  2.4  0.5 mg  3/21  1.5  0.5 mg  3/22  1.7  0.5 mg   3/23  2.2  hold   3/24  2.2  0.5 mg   3/25  2.3  hold   3/26                2.1                   0.5 mg  3/27                1.7                   0.5 mg  3/28                1.7                   0.5 mg  3/29                1.5                   1 mg  3/30  1.4  0.5 mg                                                                                Assessment/ Plan:  Would anticipate INR will be higher tomorrow. Will order 0.5 mg warfarin today.     Pharmacy will continue to monitor daily and adjust therapy as indicated.  Please contact the pharmacist at [phone] for outpatient recommendations if needed.     Paxton Smith, PharmD  Clinical Pharmacist, Orthopedics and Med/Surg  [facility] ([id number])

## 2024-03-31 ENCOUNTER — APPOINTMENT (OUTPATIENT)
Facility: HOSPITAL | Age: 64
DRG: 871 | End: 2024-03-31
Payer: MEDICARE

## 2024-03-31 LAB
ANION GAP SERPL CALC-SCNC: 7 MMOL/L (ref 5–15)
ARTERIAL PATENCY WRIST A: POSITIVE
BASE DEFICIT BLD-SCNC: 0.6 MMOL/L
BDY SITE: ABNORMAL
BUN SERPL-MCNC: 18 MG/DL (ref 6–20)
BUN/CREAT SERPL: 13 (ref 12–20)
CALCIUM SERPL-MCNC: 8.1 MG/DL (ref 8.5–10.1)
CHLORIDE SERPL-SCNC: 107 MMOL/L (ref 97–108)
CO2 SERPL-SCNC: 22 MMOL/L (ref 21–32)
CREAT SERPL-MCNC: 1.42 MG/DL (ref 0.7–1.3)
GAS FLOW.O2 O2 DELIVERY SYS: ABNORMAL
GAS FLOW.O2 SETTING OXYMISER: 12 BPM
GLUCOSE SERPL-MCNC: 112 MG/DL (ref 65–100)
HCO3 BLD-SCNC: 23.7 MMOL/L (ref 22–26)
INR PPP: 1.4 (ref 0.9–1.1)
O2/TOTAL GAS SETTING VFR VENT: 30 %
PCO2 BLD: 36.4 MMHG (ref 35–45)
PEEP RESPIRATORY: 6 CMH2O
PH BLD: 7.42 (ref 7.35–7.45)
PO2 BLD: 104 MMHG (ref 80–100)
POTASSIUM SERPL-SCNC: 4.6 MMOL/L (ref 3.5–5.1)
PRESSURE SUPPORT SETTING VENT: 6 CMH2O
PROTHROMBIN TIME: 14.5 SEC (ref 9–11.1)
SAO2 % BLD: 98.1 % (ref 92–97)
SODIUM SERPL-SCNC: 136 MMOL/L (ref 136–145)
SPECIMEN TYPE: ABNORMAL
VENTILATION MODE VENT: ABNORMAL

## 2024-03-31 PROCEDURE — 6360000002 HC RX W HCPCS: Performed by: STUDENT IN AN ORGANIZED HEALTH CARE EDUCATION/TRAINING PROGRAM

## 2024-03-31 PROCEDURE — 36600 WITHDRAWAL OF ARTERIAL BLOOD: CPT

## 2024-03-31 PROCEDURE — 2580000003 HC RX 258: Performed by: INTERNAL MEDICINE

## 2024-03-31 PROCEDURE — 71045 X-RAY EXAM CHEST 1 VIEW: CPT

## 2024-03-31 PROCEDURE — 2580000003 HC RX 258: Performed by: STUDENT IN AN ORGANIZED HEALTH CARE EDUCATION/TRAINING PROGRAM

## 2024-03-31 PROCEDURE — 2060000000 HC ICU INTERMEDIATE R&B

## 2024-03-31 PROCEDURE — 85610 PROTHROMBIN TIME: CPT

## 2024-03-31 PROCEDURE — 82803 BLOOD GASES ANY COMBINATION: CPT

## 2024-03-31 PROCEDURE — 6370000000 HC RX 637 (ALT 250 FOR IP): Performed by: HOSPITALIST

## 2024-03-31 PROCEDURE — 6370000000 HC RX 637 (ALT 250 FOR IP): Performed by: STUDENT IN AN ORGANIZED HEALTH CARE EDUCATION/TRAINING PROGRAM

## 2024-03-31 PROCEDURE — 6360000002 HC RX W HCPCS: Performed by: INTERNAL MEDICINE

## 2024-03-31 PROCEDURE — 94640 AIRWAY INHALATION TREATMENT: CPT

## 2024-03-31 PROCEDURE — 80048 BASIC METABOLIC PNL TOTAL CA: CPT

## 2024-03-31 PROCEDURE — 36415 COLL VENOUS BLD VENIPUNCTURE: CPT

## 2024-03-31 PROCEDURE — 99232 SBSQ HOSP IP/OBS MODERATE 35: CPT | Performed by: INTERNAL MEDICINE

## 2024-03-31 PROCEDURE — 93750 INTERROGATION VAD IN PERSON: CPT | Performed by: INTERNAL MEDICINE

## 2024-03-31 PROCEDURE — 6370000000 HC RX 637 (ALT 250 FOR IP): Performed by: INTERNAL MEDICINE

## 2024-03-31 RX ORDER — WARFARIN SODIUM 1 MG/1
1.5 TABLET ORAL
Status: COMPLETED | OUTPATIENT
Start: 2024-03-31 | End: 2024-03-31

## 2024-03-31 RX ADMIN — HYDRALAZINE HYDROCHLORIDE 25 MG: 25 TABLET ORAL at 15:10

## 2024-03-31 RX ADMIN — AMLODIPINE BESYLATE 5 MG: 5 TABLET ORAL at 08:15

## 2024-03-31 RX ADMIN — PANTOPRAZOLE SODIUM 40 MG: 40 TABLET, DELAYED RELEASE ORAL at 15:10

## 2024-03-31 RX ADMIN — METOPROLOL SUCCINATE 50 MG: 50 TABLET, EXTENDED RELEASE ORAL at 08:15

## 2024-03-31 RX ADMIN — IPRATROPIUM BROMIDE AND ALBUTEROL SULFATE 1 DOSE: .5; 3 SOLUTION RESPIRATORY (INHALATION) at 08:52

## 2024-03-31 RX ADMIN — WATER 40 MG: 1 INJECTION INTRAMUSCULAR; INTRAVENOUS; SUBCUTANEOUS at 09:40

## 2024-03-31 RX ADMIN — SODIUM CHLORIDE, PRESERVATIVE FREE 10 ML: 5 INJECTION INTRAVENOUS at 20:50

## 2024-03-31 RX ADMIN — Medication 1 CAPSULE: at 08:15

## 2024-03-31 RX ADMIN — SODIUM CHLORIDE, PRESERVATIVE FREE 10 ML: 5 INJECTION INTRAVENOUS at 08:15

## 2024-03-31 RX ADMIN — WATER 40 MG: 1 INJECTION INTRAMUSCULAR; INTRAVENOUS; SUBCUTANEOUS at 17:10

## 2024-03-31 RX ADMIN — WATER 2000 MG: 1 INJECTION INTRAMUSCULAR; INTRAVENOUS; SUBCUTANEOUS at 15:10

## 2024-03-31 RX ADMIN — METOPROLOL SUCCINATE 50 MG: 50 TABLET, EXTENDED RELEASE ORAL at 20:50

## 2024-03-31 RX ADMIN — HYDRALAZINE HYDROCHLORIDE 25 MG: 25 TABLET ORAL at 06:54

## 2024-03-31 RX ADMIN — WARFARIN SODIUM 1.5 MG: 1 TABLET ORAL at 17:10

## 2024-03-31 RX ADMIN — AMLODIPINE BESYLATE 5 MG: 5 TABLET ORAL at 20:50

## 2024-03-31 RX ADMIN — IPRATROPIUM BROMIDE AND ALBUTEROL SULFATE 1 DOSE: .5; 3 SOLUTION RESPIRATORY (INHALATION) at 05:31

## 2024-03-31 RX ADMIN — PANTOPRAZOLE SODIUM 40 MG: 40 TABLET, DELAYED RELEASE ORAL at 06:54

## 2024-03-31 RX ADMIN — HYDRALAZINE HYDROCHLORIDE 25 MG: 25 TABLET ORAL at 20:50

## 2024-03-31 NOTE — PROGRESS NOTES
Intake/Output Summary (Last 24 hours) at 3/31/2024 0907  Last data filed at 3/31/2024 0322  Gross per 24 hour   Intake 650 ml   Output 975 ml   Net -325 ml          Physical Examination:     I had a face to face encounter with this patient and independently examined them on 3/31/2024 as outlined below:          General : alert x 3, awake, no acute distress,   HEENT: PEERL, EOMI, moist mucus membrane  Neck: supple, no JVD, no meningeal signs  Chest: Bilateral expiratory wheezing  CVS: S1 S2 heard, Capillary refill less than 2 seconds  Abd: soft/ non tender, non distended, BS physiological,   Ext: no clubbing, no cyanosis, no edema, brisk 2+ DP pulses  Neuro/Psych: pleasant mood and affect, CN 2-12 grossly intact, sensory grossly within normal limit  Skin: warm            Data Review:    Review and/or order of clinical lab test  Review and/or order of tests in the radiology section of CPT  Review and/or order of tests in the medicine section of CPT    I have independently reviewed and interpreted patient's lab and all other diagnostic data    Notes reviewed from all clinical/nonclinical/nursing services involved in patient's clinical care. Care coordination discussions were held with appropriate clinical/nonclinical/ nursing providers based on care coordination needs.     Labs:     Recent Labs     03/29/24  0556 03/30/24  0303   WBC 17.8* 16.2*   HGB 7.8* 7.5*   HCT 26.0* 25.3*    315       Recent Labs     03/29/24  0556 03/30/24  0303 03/31/24  0336    138 136   K HEMOLYZED,RECOLLECT REQUESTED 4.1 4.6   * 108 107   CO2 24 25 22   BUN 19 18 18       Recent Labs     03/29/24  0556   ALT 12   GLOB 4.7*       Recent Labs     03/29/24  0556 03/30/24  0303 03/31/24  0336   INR 1.5* 1.4* 1.4*        No results for input(s): \"TIBC\", \"FERR\" in the last 72 hours.    Invalid input(s): \"FE\", \"PSAT\"   No results found for: \"FOL\", \"RBCF\"   No results for input(s): \"PH\", \"PCO2\", \"PO2\" in the last 72

## 2024-03-31 NOTE — PROGRESS NOTES
Pharmacist Note - Warfarin Dosing  Consult provided for this 64 y.o.male to manage warfarin for LVAD.    INR Goal: 1.8-2.2  (due to recurrent bleeding)    Home regimen: 2 mg PO daily   -supra-therapeutic at last AC visit; dose was being adjusted    Drugs that may increase INR: None  Drugs that may decrease INR: None  Other medications that may increase bleeding risk: None  Risk factors: None  Daily INR ordered through: 4/30    Recent Labs     03/29/24  0556 03/30/24  0303 03/31/24  0336   HGB 7.8* 7.5*  --    INR 1.5* 1.4* 1.4*     Date               INR                  Dose  3/19  7/9.2  Hold - vitamin K 2.5 mg PO x 1  3/20  2.4  0.5 mg  3/21  1.5  0.5 mg  3/22  1.7  0.5 mg   3/23  2.2  hold   3/24  2.2  0.5 mg   3/25  2.3  hold   3/26                2.1                   0.5 mg  3/27                1.7                   0.5 mg  3/28                1.7                   0.5 mg  3/29                1.5                   1 mg  3/30  1.4  0.5 mg   3/30  1.4  1.5 mg                                                                                Assessment/ Plan:  Hovering below target INR goal. Will order 1.5 mg warfarin today.     Pharmacy will continue to monitor daily and adjust therapy as indicated.  Please contact the pharmacist at [phone] for outpatient recommendations if needed.     Paxton Smith, PharmD  Clinical Pharmacist, Orthopedics and Med/Surg  [facility] ([id number])

## 2024-03-31 NOTE — PROGRESS NOTES
[facility] FAILURE [facility]  [facility] in [place], [place]  Inpatient Progress Note      Patient name: Sanford Joiner  Patient : 1960  Patient MRN: [id number]  Date of service: 24    Reason for Referral  LVAD management      ASSESSMENT:  Sanford Joiner is a 64 y.o. male admitted with driveline infection and possible upper GI bleed, reporting 3-4 days of dark stools.   History of chronic systolic heart failure due to non ischemic cardiomyopathy, s/p HM3 LVAD implantation ([facility] 2023) as destination therapy, stage D, remains NYHA class IV despite LVAD support due to severe COPD; currently with recovered LVEF to 55% and small LV cavity with resulting low flow alarms; optimal GDMT limited by hypovolemia  Patient is not a candidate for heart transplant due to advanced COPD -  declined by [facility] for both LVAD-DT and transplant and for the same reasons declined for LVAD-DT by [facility]   Team discussed patient at [facility] 3/22/24 to determine risk of pump explant and alternatives of washout or palliative antibiotics. CT chest shows 2 fluid collections surrounding the driveline and outflow graft concerning for infection. PFTS performed, showing very severe obstructive lung disease with FEV1<1L. Discussed with CV Surgery, not a surgical candiate. Plan for palliative antibiotic management of LVAD pump infection.    INTERVAL HISTORY:  Wheezing and increased dyspnea at rest this morning  Noted new, visible fluid collection subxyphoid, erythematous    RECOMMENDATIONS:  Bacteremia, LVAD pump infection  Pending blood cultures 3/27, negative to date  Will need final negative for PICC line placement and discharge  Planning palliative antibiotic management  Patient is not a candidate for surgical intervention  Palliative care appreciated  ID following     COPD   PFT reviewed. Very severe obstructive lung disease  Glory Mcintosh  Restarted IV steroids today for exacerbation    Left Ventricular Assist  Facility-Administered Medications:     methylPREDNISolone sodium succ (SOLU-MEDROL) 40 mg in sterile water 1 mL injection, 40 mg, IntraVENous, Q8H, Jaren Candelario MD, 40 mg at 03/31/24 0940    cefTRIAXone (ROCEPHIN) 2,000 mg in sterile water 20 mL IV syringe, 2,000 mg, IntraVENous, Q24H, Prema June MD, 2,000 mg at 03/30/24 1558    lactobacillus (CULTURELLE) capsule 1 capsule, 1 capsule, Oral, Daily with breakfast, Madala, Sushma, MD, 1 capsule at 03/31/24 0815    fluticasone-umeclidin-vilant (TRELEGY ELLIPTA) 200-62.5-25 MCG/ACT inhaler 1 puff (Patient Supplied), 1 puff, Inhalation, Daily, Madala, Sushma, MD, 1 puff at 03/31/24 0852    0.9 % sodium chloride infusion, , IntraVENous, PRN, Nessa Maurer, APRN - NP    ipratropium 0.5 mg-albuterol 2.5 mg (DUONEB) nebulizer solution 1 Dose, 1 Dose, Inhalation, Q4H PRN, Gareth Hou MD, 1 Dose at 03/31/24 0852    magnesium sulfate 2000 mg in 50 mL IVPB premix, 2,000 mg, IntraVENous, Once, Aidee Gonzales MD    amLODIPine (NORVASC) tablet 5 mg, 5 mg, Oral, BID, Aidee Gonzales MD, 5 mg at 03/31/24 0815    hydrALAZINE (APRESOLINE) tablet 25 mg, 25 mg, Oral, 3 times per day, Aidee Gonzales MD, 25 mg at 03/31/24 0654    metoprolol succinate (TOPROL XL) extended release tablet 50 mg, 50 mg, Oral, BID, Aidee Gonzales MD, 50 mg at 03/31/24 0815    pantoprazole (PROTONIX) tablet 40 mg, 40 mg, Oral, BID AC, Aidee Gonzales MD, 40 mg at 03/31/24 0654    sodium chloride flush 0.9 % injection 5-40 mL, 5-40 mL, IntraVENous, 2 times per day, Aidee Gonzales MD, 10 mL at 03/31/24 0815    sodium chloride flush 0.9 % injection 5-40 mL, 5-40 mL, IntraVENous, PRN, Aidee Gonzales MD    0.9 % sodium chloride infusion, , IntraVENous, PRN, Aidee Gonzales MD, Stopped at 03/20/24 0439    potassium chloride (KLOR-CON) extended release tablet 40 mEq, 40 mEq, Oral, PRN **OR** potassium bicarb-citric acid (EFFER-K) effervescent tablet 40 mEq, 40 mEq,

## 2024-04-01 ENCOUNTER — APPOINTMENT (OUTPATIENT)
Facility: HOSPITAL | Age: 64
DRG: 871 | End: 2024-04-01
Payer: MEDICARE

## 2024-04-01 LAB
ANION GAP SERPL CALC-SCNC: 6 MMOL/L (ref 5–15)
BACTERIA SPEC CULT: NORMAL
BACTERIA SPEC CULT: NORMAL
BASOPHILS # BLD: 0 K/UL (ref 0–0.1)
BASOPHILS NFR BLD: 0 % (ref 0–1)
BUN SERPL-MCNC: 31 MG/DL (ref 6–20)
BUN/CREAT SERPL: 17 (ref 12–20)
CALCIUM SERPL-MCNC: 8.8 MG/DL (ref 8.5–10.1)
CHLORIDE SERPL-SCNC: 106 MMOL/L (ref 97–108)
CO2 SERPL-SCNC: 23 MMOL/L (ref 21–32)
CREAT SERPL-MCNC: 1.79 MG/DL (ref 0.7–1.3)
DIFFERENTIAL METHOD BLD: ABNORMAL
EOSINOPHIL # BLD: 0 K/UL (ref 0–0.4)
EOSINOPHIL NFR BLD: 0 % (ref 0–7)
ERYTHROCYTE [DISTWIDTH] IN BLOOD BY AUTOMATED COUNT: 19.9 % (ref 11.5–14.5)
GLUCOSE SERPL-MCNC: 163 MG/DL (ref 65–100)
HCT VFR BLD AUTO: 24.8 % (ref 36.6–50.3)
HGB BLD-MCNC: 7.2 G/DL (ref 12.1–17)
IMM GRANULOCYTES # BLD AUTO: 0.1 K/UL (ref 0–0.04)
IMM GRANULOCYTES NFR BLD AUTO: 1 % (ref 0–0.5)
INR PPP: 1.6 (ref 0.9–1.1)
LYMPHOCYTES # BLD: 0.8 K/UL (ref 0.8–3.5)
LYMPHOCYTES NFR BLD: 8 % (ref 12–49)
MCH RBC QN AUTO: 24.7 PG (ref 26–34)
MCHC RBC AUTO-ENTMCNC: 29 G/DL (ref 30–36.5)
MCV RBC AUTO: 84.9 FL (ref 80–99)
MONOCYTES # BLD: 0.2 K/UL (ref 0–1)
MONOCYTES NFR BLD: 2 % (ref 5–13)
NEUTS SEG # BLD: 9.2 K/UL (ref 1.8–8)
NEUTS SEG NFR BLD: 89 % (ref 32–75)
NRBC # BLD: 0 K/UL (ref 0–0.01)
NRBC BLD-RTO: 0 PER 100 WBC
PLATELET # BLD AUTO: 323 K/UL (ref 150–400)
PMV BLD AUTO: 10.8 FL (ref 8.9–12.9)
POTASSIUM SERPL-SCNC: 4.7 MMOL/L (ref 3.5–5.1)
PROTHROMBIN TIME: 16.1 SEC (ref 9–11.1)
RBC # BLD AUTO: 2.92 M/UL (ref 4.1–5.7)
SERVICE CMNT-IMP: NORMAL
SERVICE CMNT-IMP: NORMAL
SODIUM SERPL-SCNC: 135 MMOL/L (ref 136–145)
WBC # BLD AUTO: 10.3 K/UL (ref 4.1–11.1)

## 2024-04-01 PROCEDURE — 6370000000 HC RX 637 (ALT 250 FOR IP): Performed by: INTERNAL MEDICINE

## 2024-04-01 PROCEDURE — 94640 AIRWAY INHALATION TREATMENT: CPT

## 2024-04-01 PROCEDURE — 74176 CT ABD & PELVIS W/O CONTRAST: CPT

## 2024-04-01 PROCEDURE — 6360000002 HC RX W HCPCS: Performed by: STUDENT IN AN ORGANIZED HEALTH CARE EDUCATION/TRAINING PROGRAM

## 2024-04-01 PROCEDURE — 2580000003 HC RX 258: Performed by: INTERNAL MEDICINE

## 2024-04-01 PROCEDURE — 2060000000 HC ICU INTERMEDIATE R&B

## 2024-04-01 PROCEDURE — 99232 SBSQ HOSP IP/OBS MODERATE 35: CPT | Performed by: INTERNAL MEDICINE

## 2024-04-01 PROCEDURE — 36415 COLL VENOUS BLD VENIPUNCTURE: CPT

## 2024-04-01 PROCEDURE — 6370000000 HC RX 637 (ALT 250 FOR IP): Performed by: STUDENT IN AN ORGANIZED HEALTH CARE EDUCATION/TRAINING PROGRAM

## 2024-04-01 PROCEDURE — 85025 COMPLETE CBC W/AUTO DIFF WBC: CPT

## 2024-04-01 PROCEDURE — 6360000002 HC RX W HCPCS: Performed by: INTERNAL MEDICINE

## 2024-04-01 PROCEDURE — 71250 CT THORAX DX C-: CPT

## 2024-04-01 PROCEDURE — 2580000003 HC RX 258: Performed by: STUDENT IN AN ORGANIZED HEALTH CARE EDUCATION/TRAINING PROGRAM

## 2024-04-01 PROCEDURE — 99232 SBSQ HOSP IP/OBS MODERATE 35: CPT | Performed by: NURSE PRACTITIONER

## 2024-04-01 PROCEDURE — 80048 BASIC METABOLIC PNL TOTAL CA: CPT

## 2024-04-01 PROCEDURE — 85610 PROTHROMBIN TIME: CPT

## 2024-04-01 RX ORDER — WARFARIN SODIUM 1 MG/1
1 TABLET ORAL
Status: COMPLETED | OUTPATIENT
Start: 2024-04-01 | End: 2024-04-01

## 2024-04-01 RX ORDER — ALBUTEROL SULFATE 2.5 MG/3ML
2.5 SOLUTION RESPIRATORY (INHALATION) EVERY 4 HOURS PRN
Status: DISCONTINUED | OUTPATIENT
Start: 2024-04-01 | End: 2024-04-09 | Stop reason: HOSPADM

## 2024-04-01 RX ADMIN — HYDRALAZINE HYDROCHLORIDE 25 MG: 25 TABLET ORAL at 21:04

## 2024-04-01 RX ADMIN — HYDRALAZINE HYDROCHLORIDE 25 MG: 25 TABLET ORAL at 16:18

## 2024-04-01 RX ADMIN — Medication 1 CAPSULE: at 09:23

## 2024-04-01 RX ADMIN — HYDRALAZINE HYDROCHLORIDE 25 MG: 25 TABLET ORAL at 05:07

## 2024-04-01 RX ADMIN — SODIUM CHLORIDE, PRESERVATIVE FREE 10 ML: 5 INJECTION INTRAVENOUS at 09:24

## 2024-04-01 RX ADMIN — WATER 40 MG: 1 INJECTION INTRAMUSCULAR; INTRAVENOUS; SUBCUTANEOUS at 09:23

## 2024-04-01 RX ADMIN — METOPROLOL SUCCINATE 50 MG: 50 TABLET, EXTENDED RELEASE ORAL at 09:23

## 2024-04-01 RX ADMIN — WATER 40 MG: 1 INJECTION INTRAMUSCULAR; INTRAVENOUS; SUBCUTANEOUS at 17:30

## 2024-04-01 RX ADMIN — WATER 40 MG: 1 INJECTION INTRAMUSCULAR; INTRAVENOUS; SUBCUTANEOUS at 01:36

## 2024-04-01 RX ADMIN — PANTOPRAZOLE SODIUM 40 MG: 40 TABLET, DELAYED RELEASE ORAL at 16:16

## 2024-04-01 RX ADMIN — WATER 2000 MG: 1 INJECTION INTRAMUSCULAR; INTRAVENOUS; SUBCUTANEOUS at 16:16

## 2024-04-01 RX ADMIN — WARFARIN SODIUM 1 MG: 1 TABLET ORAL at 17:30

## 2024-04-01 RX ADMIN — PANTOPRAZOLE SODIUM 40 MG: 40 TABLET, DELAYED RELEASE ORAL at 05:07

## 2024-04-01 RX ADMIN — AMLODIPINE BESYLATE 5 MG: 5 TABLET ORAL at 09:23

## 2024-04-01 RX ADMIN — SODIUM CHLORIDE, PRESERVATIVE FREE 10 ML: 5 INJECTION INTRAVENOUS at 21:04

## 2024-04-01 RX ADMIN — AMLODIPINE BESYLATE 5 MG: 5 TABLET ORAL at 21:04

## 2024-04-01 RX ADMIN — METOPROLOL SUCCINATE 50 MG: 50 TABLET, EXTENDED RELEASE ORAL at 21:03

## 2024-04-01 NOTE — PROGRESS NOTES
This is a follow up visit to a patient in [facility]. Patient was awake and recognized me by name right away. He was a returnee patient. Patient has strong tommy in God. Inside his LVAD bag is his personal bible. Patient's wife provides the patient with constant support. Patient expresses confidence of his medical team and is satisfied with the care he is experiencing. Patient does not have any concerns. I informed the patient not to hesitate to call the 's office if he needs more spiritual care. Patient expressed gratitude for the visit. Please call the 's office for further referrals.         Obie hendrickson  [phone]

## 2024-04-01 NOTE — PROGRESS NOTES
ADVANCED HEART FAILURE CENTER  [facility] in [place], [place]  Inpatient Progress Note      Patient name: Sanford Joiner  Patient : 1960  Patient MRN: [id number]  Date of service: 24    Reason for Referral  LVAD management      ASSESSMENT:  Sanford Joiner is a 64 y.o. male admitted with driveline infection and possible upper GI bleed, reporting 3-4 days of dark stools.   History of chronic systolic heart failure due to non ischemic cardiomyopathy, s/p HM3 LVAD implantation ([facility] 2023) as destination therapy, stage D, remains NYHA class IV despite LVAD support due to severe COPD; currently with recovered LVEF to 55% and small LV cavity with resulting low flow alarms; optimal GDMT limited by hypovolemia  Patient is not a candidate for heart transplant due to advanced COPD -  declined by [facility] for both [facility]AD-DT and transplant and for the same reasons declined for LVAD-DT by [facility]   Team discussed patient at [facility] 3/22/24 to determine risk of pump explant and alternatives of washout or palliative antibiotics. CT chest shows 2 fluid collections surrounding the driveline and outflow graft concerning for infection. PFTS performed, showing very severe obstructive lung disease with FEV1<1L. Discussed with CV Surgery, not a surgical candiate. Plan for palliative antibiotic management of LVAD pump infection.    INTERVAL HISTORY:  Wheezing and increased dyspnea at rest this morning  Noted new, visible fluid collection subxyphoid, erythematous    RECOMMENDATIONS:  Bacteremia, LVAD pump infection  Pending blood cultures 3/27, negative to date   Now that BC negative x 4 days will need PICC line placement and discharge  Planning palliative antibiotic management  CT abdomen on  showed increase in size of abscess around drive line--> discussed with Dr. Gomez--> not a surgical candidate, will continue IV antibiotics   Palliative care appreciated  ID following - continue ceftriaxone 2 g Q  therapy 5/27/2023  Complicated by GI bleed, AVM treated   VF Arrest  S/p S-ICD 1/12/2023  Ascending aorta dilation 4.4cm by cMRI  Cardiac risk factors:  HTN  Tobacco abuse, remote  Severe COPD  CKD stage 3    LIFE GOALS:  Lifestyle goals reviewed with the patient.    IMAGING STUDIES REVIEWED:    ECHO 3/22/24:  difficult study;  limited view.  Unable to interpret     Echo 10/11/23    Left Ventricle: Low normal left ventricular systolic function with a visually estimated EF of 50 - 55%. Left ventricle size is normal. Normal wall motion. LVAD is present.    Limited windows, effects interpretation.     Echo 4/17/23 Speed 5400 RPM. LVIDd 2.8 cm. RV dilated and RV Function reduced. Trace AI, AV opens every beat.  Echo 8/9/22 LVEF 10-15%, mild MR, moderate to severe TR  Echo 6/15/22 LVEF 30-35%, LVIDd 5.8cm, mild MR, mod TR  Echo 10/27/21 LVEF 38%, Mod AI, TAPSE 2.78cm  Echo 5/13/21 LVEF 20-25%, Trace MR, no AI, Trace TR, LVIDd 5.97cm, TAPSE 2.1cm     Cardiac MRI (9/14/21)  1. Normal left ventricular cavity size by 3-D volumetric assessment indexed to body surface area. Moderate septal hypertrophy by 1D dimension. Normal LV mass by 3-D volumetric assessment indexed to body surface area. Moderate left ventricular systolic dysfunction. Moderate global hypokinesis with slight regional variation. 3-D LVEF 33% while patient receiving dobutamine infusion of 5mcg/kg/min during the scan.  2. Normal right ventricular size with mild right ventricular systolic dysfunction. Mild global hypokinesis. 3-D RVEF 46% while patient receiving dobutamine infusion of 5mcg/kg/min during the scan.   3. No significant valvular disease.  4. On EGE and LGE study, there there is focal areas of mild myocardial enhancement of the base to mid inferolateral wall, basal inferior wall and patchy areas of the septum. The appearance of the contrast enhancement in the form of a very mild focal areas suggest focal myocardial fibrosis likely from replacement

## 2024-04-01 NOTE — PROGRESS NOTES
IntraVENous PRN    ondansetron (ZOFRAN-ODT) disintegrating tablet 4 mg  4 mg Oral Q8H PRN    Or    ondansetron (ZOFRAN) injection 4 mg  4 mg IntraVENous Q6H PRN    polyethylene glycol (GLYCOLAX) packet 17 g  17 g Oral Daily PRN    acetaminophen (TYLENOL) tablet 650 mg  650 mg Oral Q6H PRN    Or    acetaminophen (TYLENOL) suppository 650 mg  650 mg Rectal Q6H PRN    warfarin - pharmacy to dose   Other RX Placeholder    hydrALAZINE (APRESOLINE) injection 10 mg  10 mg IntraVENous Q4H PRN     ______________________________________________________________________  EXPECTED LENGTH OF STAY: Unable to retrieve estimated LOS  ACTUAL LENGTH OF STAY:          13                 Jaren Wood MD

## 2024-04-01 NOTE — PROGRESS NOTES
Infectious Disease Progress Note       Subjective:      The patient was personally evaluated and examined by me at bedside today.  The patient states that he is feeling well and slept well last night.  The patient denies headache, fever, sweats, or chills.  He states that he had to ask for the mask to breathe because he had an episode of shortness of breath earlier this morning.  He states that he is fine now and has no difficulty with shortness of breath, cough, sputum, chest pain.  He states that there is no pain at the site of his driveline but shows me the area of his chest near the xiphoid process with there is a bulge.  He states that the bulge came up either Friday night or Saturday when he coughed and he felt some pain.  Thinks that it may be enlarging but there has been no increased pain, no erythema, and no swelling.  Patient states that he is eating well without nausea, vomiting, diarrhea or constipation.  He denies abdominal pain.  There are no skin rashes or skin defects at this time.    The patient states that he is eager for discharge home but states that he wants to make sure that everything is right before he leaves.  There have been no issues with no antibiotic and he states that he has had no nausea, skin rashes or shortness of breath associated with IV antibiotic infusions.    The plan is for the patient to have CT imaging of the driveline area to assess the etiology of this bulge.  The patient was discussed with the cardiac care nurse at bedside.  The nurse will call me when the CT is completed.            Objective:    Vitals:   Patient Vitals for the past 24 hrs:   Temp Pulse Resp SpO2   04/01/24 1400 -- 64 -- --   04/01/24 1200 -- 63 -- --   04/01/24 1135 97.9 °F (36.6 °C) 67 19 99 %   04/01/24 1000 -- 71 -- --   04/01/24 0919 -- 69 -- --   04/01/24 0745 -- -- -- 99 %   04/01/24 0712 97.7 °F (36.5 °C) 79 23 97 %   04/01/24 0600 -- 76 -- --   04/01/24 0507 97.7 °F (36.5 °C) 72 21 98 %  puff at 04/01/24 1100    0.9 % sodium chloride infusion, , IntraVENous, PRN, Nessa Maurer, APRN - NP    ipratropium 0.5 mg-albuterol 2.5 mg (DUONEB) nebulizer solution 1 Dose, 1 Dose, Inhalation, Q4H PRN, Gareth Hou MD, 1 Dose at 03/31/24 0852    magnesium sulfate 2000 mg in 50 mL IVPB premix, 2,000 mg, IntraVENous, Once, Aidee Gonzales MD    amLODIPine (NORVASC) tablet 5 mg, 5 mg, Oral, BID, Aidee Gonzales MD, 5 mg at 04/01/24 0923    hydrALAZINE (APRESOLINE) tablet 25 mg, 25 mg, Oral, 3 times per day, Aidee Gonzales MD, 25 mg at 04/01/24 0507    metoprolol succinate (TOPROL XL) extended release tablet 50 mg, 50 mg, Oral, BID, Aidee Gonzales MD, 50 mg at 04/01/24 0923    pantoprazole (PROTONIX) tablet 40 mg, 40 mg, Oral, BID AC, Aidee Gonzales MD, 40 mg at 04/01/24 0507    sodium chloride flush 0.9 % injection 5-40 mL, 5-40 mL, IntraVENous, 2 times per day, Aidee Gonzales MD, 10 mL at 04/01/24 0924    sodium chloride flush 0.9 % injection 5-40 mL, 5-40 mL, IntraVENous, PRN, Aidee Gonzales MD    0.9 % sodium chloride infusion, , IntraVENous, PRN, Aidee Gonzales MD, Stopped at 03/20/24 0439    potassium chloride (KLOR-CON) extended release tablet 40 mEq, 40 mEq, Oral, PRN **OR** potassium bicarb-citric acid (EFFER-K) effervescent tablet 40 mEq, 40 mEq, Oral, PRN **OR** potassium chloride 10 mEq/100 mL IVPB (Peripheral Line), 10 mEq, IntraVENous, PRN, Aidee Gonzales MD    magnesium sulfate 2000 mg in 50 mL IVPB premix, 2,000 mg, IntraVENous, PRN, Aidee Gonzales MD    ondansetron (ZOFRAN-ODT) disintegrating tablet 4 mg, 4 mg, Oral, Q8H PRN **OR** ondansetron (ZOFRAN) injection 4 mg, 4 mg, IntraVENous, Q6H PRN, Aidee Gonzales MD    polyethylene glycol (GLYCOLAX) packet 17 g, 17 g, Oral, Daily PRN, Aidee Gonzales MD    acetaminophen (TYLENOL) tablet 650 mg, 650 mg, Oral, Q6H PRN, 650 mg at 03/28/24 1230 **OR** acetaminophen (TYLENOL) suppository 650 mg, 650 mg,

## 2024-04-01 NOTE — CARE COORDINATION
ransition of Care Plan:     RUR: 25% - high  Prior Level of Functioning: independent  Disposition: [facility] , pending Bioscip (need ID order) and home w family.  IV ABX training completed on 3/29.  Follow up appointments: [facility]  DME needed: TBD- refusing to participate in therapy  Transportation at discharge: family  IM/IMM Medicare/ letter given: needs 2nd IMM  Caregiver Contact: spouse - Ashley Swan - p: [phone]  Discharge Caregiver contacted prior to discharge? planned  Care Conference needed? yes  Barriers to discharge: ID final recommendations (w ID note), PICC, IV ABX training     [facility] accepted and AVS updated. CM will send clinicals to [facility] for the IV ABX.  Once the ID note and PICC report are completed CM will send those too.     Update 3pm: [facility] reports $0 copay for Ceftriaxone 2 g every 24 hours .    Miracle Henry RN/CRM  [phone]

## 2024-04-01 NOTE — PROGRESS NOTES
Pharmacist Note - Warfarin Dosing  Consult provided for this 64 y.o.male to manage warfarin for LVAD.    INR Goal: 1.8-2.2  (due to recurrent bleeding)    Home regimen: 2 mg PO daily   -supra-therapeutic at last AC visit; dose was being adjusted    Drugs that may increase INR: None  Drugs that may decrease INR: None  Other medications that may increase bleeding risk: None  Risk factors: None  Daily INR ordered through: 4/30    Recent Labs     03/30/24  0303 03/31/24  0336 04/01/24  0517   HGB 7.5*  --  7.2*   INR 1.4* 1.4* 1.6*     Date               INR                  Dose  3/19  7/9.2  Hold - vitamin K 2.5 mg PO x 1  3/20  2.4  0.5 mg  3/21  1.5  0.5 mg  3/22  1.7  0.5 mg   3/23  2.2  hold   3/24  2.2  0.5 mg   3/25  2.3  hold   3/26                2.1                   0.5 mg  3/27                1.7                   0.5 mg  3/28                1.7                   0.5 mg  3/29                1.5                   1 mg  3/30  1.4  0.5 mg   3/30  1.4  1.5 mg   3/31                1.5                    1.5 mg  4/1                  1.6                    1 mg                                                                               Assessment/ Plan:   Will order 1 mg warfarin today.     Pharmacy will continue to monitor daily and adjust therapy as indicated.  Please contact the pharmacist at [phone] for outpatient recommendations if needed.

## 2024-04-01 NOTE — PROGRESS NOTES
Comprehensive Nutrition Assessment    Type and Reason for Visit: Initial (LOS)    Nutrition Recommendations/Plan:   Will modify diet order to No Added Salt  Will order chocolate Ensure High Protein daily       Malnutrition Assessment:  Malnutrition Status:  No malnutrition (04/01/24 1459)         Nutrition Assessment:    65 yo male admitted for COPD.  PMHx: CHF s/p LVAD, HTN, COPD, CKD stage III.    Seeing pt for LOS.  Spoke with pt at bedside.  He reports fair/good PO intakes since admission, enjoying the food here.  His appetite/intakes at home is similar to here.  Intakes documented as varied since admission: 1-100% meals.  Discussed ONS options with varied intakes, he is agreeable to chocolate Ensure, states he likes these.  Denies any significant weight changes PTA, states it fluctuates 5-10 lbs.  His weight is down 7 lbs since admission.      Labs reviewed.       Nutritionally Significant Medications:  Probiotic, MgSu, Solumedrol, Protonix, Coumadin      Estimated Daily Nutrient Needs:  Energy Requirements Based On: Kcal/kg  Weight Used for Energy Requirements: Current  Energy (kcal/day): 1437-3335 (25-30 kcals/kg)  Weight Used for Protein Requirements: Current  Protein (g/day): 90 (1.2gm/kg)  Method Used for Fluid Requirements: 1 ml/kcal  Fluid (ml/day):      Nutrition Related Findings:   Edema: Left lower extremity, Right lower extremity            RLE Edema: Trace  LLE Edema: Trace    Last BM: 03/31/24    Wounds:   Wound Type: Surgical Incision      Current Nutrition Therapies:  Diet: Regular  Supplements: none  Meal Intake:   Patient Vitals for the past 168 hrs:   PO Meals Eaten (%)   04/01/24 1135 26 - 50%   04/01/24 0745 0%   04/01/24 0507 0%   03/31/24 2300 0%   03/31/24 2000 0%   03/31/24 1509 1 - 25%   03/31/24 1115 1 - 25%   03/31/24 0814 0%   03/31/24 0322 0%   03/30/24 2306 0%   03/30/24 2000 76 - 100%   03/30/24 1556 51 - 75%   03/30/24 1120 51 - 75%   03/30/24 0817 0%   03/30/24 0452 0%

## 2024-04-02 PROBLEM — B95.61 BACTEREMIA DUE TO METHICILLIN SUSCEPTIBLE STAPHYLOCOCCUS AUREUS (MSSA): Status: ACTIVE | Noted: 2024-04-02

## 2024-04-02 PROBLEM — R78.81 BACTEREMIA DUE TO METHICILLIN SUSCEPTIBLE STAPHYLOCOCCUS AUREUS (MSSA): Status: ACTIVE | Noted: 2024-04-02

## 2024-04-02 LAB
ANION GAP SERPL CALC-SCNC: 8 MMOL/L (ref 5–15)
BASOPHILS # BLD: 0 K/UL (ref 0–0.1)
BASOPHILS NFR BLD: 0 % (ref 0–1)
BUN SERPL-MCNC: 45 MG/DL (ref 6–20)
BUN/CREAT SERPL: 23 (ref 12–20)
CALCIUM SERPL-MCNC: 8.6 MG/DL (ref 8.5–10.1)
CHLORIDE SERPL-SCNC: 109 MMOL/L (ref 97–108)
CO2 SERPL-SCNC: 22 MMOL/L (ref 21–32)
CREAT SERPL-MCNC: 1.99 MG/DL (ref 0.7–1.3)
DIFFERENTIAL METHOD BLD: ABNORMAL
EOSINOPHIL # BLD: 0 K/UL (ref 0–0.4)
EOSINOPHIL NFR BLD: 0 % (ref 0–7)
ERYTHROCYTE [DISTWIDTH] IN BLOOD BY AUTOMATED COUNT: 19.9 % (ref 11.5–14.5)
GLUCOSE SERPL-MCNC: 153 MG/DL (ref 65–100)
HCT VFR BLD AUTO: 24.3 % (ref 36.6–50.3)
HGB BLD-MCNC: 7.3 G/DL (ref 12.1–17)
IMM GRANULOCYTES # BLD AUTO: 0.2 K/UL (ref 0–0.04)
IMM GRANULOCYTES NFR BLD AUTO: 1 % (ref 0–0.5)
INR PPP: 1.8 (ref 0.9–1.1)
LYMPHOCYTES # BLD: 1 K/UL (ref 0.8–3.5)
LYMPHOCYTES NFR BLD: 5 % (ref 12–49)
MCH RBC QN AUTO: 25.5 PG (ref 26–34)
MCHC RBC AUTO-ENTMCNC: 30 G/DL (ref 30–36.5)
MCV RBC AUTO: 85 FL (ref 80–99)
MONOCYTES # BLD: 0.6 K/UL (ref 0–1)
MONOCYTES NFR BLD: 3 % (ref 5–13)
NEUTS SEG # BLD: 18.1 K/UL (ref 1.8–8)
NEUTS SEG NFR BLD: 91 % (ref 32–75)
NRBC # BLD: 0 K/UL (ref 0–0.01)
NRBC BLD-RTO: 0 PER 100 WBC
PLATELET # BLD AUTO: 339 K/UL (ref 150–400)
PMV BLD AUTO: 10.9 FL (ref 8.9–12.9)
POTASSIUM SERPL-SCNC: 4.5 MMOL/L (ref 3.5–5.1)
PROTHROMBIN TIME: 18.5 SEC (ref 9–11.1)
RBC # BLD AUTO: 2.86 M/UL (ref 4.1–5.7)
RBC MORPH BLD: ABNORMAL
SODIUM SERPL-SCNC: 139 MMOL/L (ref 136–145)
WBC # BLD AUTO: 19.9 K/UL (ref 4.1–11.1)

## 2024-04-02 PROCEDURE — 6360000002 HC RX W HCPCS: Performed by: INTERNAL MEDICINE

## 2024-04-02 PROCEDURE — 93750 INTERROGATION VAD IN PERSON: CPT | Performed by: INTERNAL MEDICINE

## 2024-04-02 PROCEDURE — 36415 COLL VENOUS BLD VENIPUNCTURE: CPT

## 2024-04-02 PROCEDURE — 2580000003 HC RX 258: Performed by: STUDENT IN AN ORGANIZED HEALTH CARE EDUCATION/TRAINING PROGRAM

## 2024-04-02 PROCEDURE — 2060000000 HC ICU INTERMEDIATE R&B

## 2024-04-02 PROCEDURE — 6360000002 HC RX W HCPCS: Performed by: STUDENT IN AN ORGANIZED HEALTH CARE EDUCATION/TRAINING PROGRAM

## 2024-04-02 PROCEDURE — 99232 SBSQ HOSP IP/OBS MODERATE 35: CPT | Performed by: INTERNAL MEDICINE

## 2024-04-02 PROCEDURE — 85610 PROTHROMBIN TIME: CPT

## 2024-04-02 PROCEDURE — 80048 BASIC METABOLIC PNL TOTAL CA: CPT

## 2024-04-02 PROCEDURE — 85025 COMPLETE CBC W/AUTO DIFF WBC: CPT

## 2024-04-02 PROCEDURE — 6370000000 HC RX 637 (ALT 250 FOR IP): Performed by: INTERNAL MEDICINE

## 2024-04-02 PROCEDURE — 6370000000 HC RX 637 (ALT 250 FOR IP): Performed by: STUDENT IN AN ORGANIZED HEALTH CARE EDUCATION/TRAINING PROGRAM

## 2024-04-02 PROCEDURE — 2580000003 HC RX 258: Performed by: INTERNAL MEDICINE

## 2024-04-02 RX ORDER — IPRATROPIUM BROMIDE AND ALBUTEROL SULFATE 2.5; .5 MG/3ML; MG/3ML
1 SOLUTION RESPIRATORY (INHALATION) 3 TIMES DAILY
Status: DISCONTINUED | OUTPATIENT
Start: 2024-04-02 | End: 2024-04-05

## 2024-04-02 RX ORDER — WARFARIN SODIUM 1 MG/1
0.5 TABLET ORAL
Status: COMPLETED | OUTPATIENT
Start: 2024-04-02 | End: 2024-04-02

## 2024-04-02 RX ADMIN — AMLODIPINE BESYLATE 5 MG: 5 TABLET ORAL at 21:18

## 2024-04-02 RX ADMIN — PANTOPRAZOLE SODIUM 40 MG: 40 TABLET, DELAYED RELEASE ORAL at 15:21

## 2024-04-02 RX ADMIN — WATER 40 MG: 1 INJECTION INTRAMUSCULAR; INTRAVENOUS; SUBCUTANEOUS at 04:03

## 2024-04-02 RX ADMIN — HYDRALAZINE HYDROCHLORIDE 25 MG: 25 TABLET ORAL at 21:18

## 2024-04-02 RX ADMIN — SODIUM CHLORIDE, PRESERVATIVE FREE 10 ML: 5 INJECTION INTRAVENOUS at 21:19

## 2024-04-02 RX ADMIN — WARFARIN SODIUM 0.5 MG: 1 TABLET ORAL at 17:47

## 2024-04-02 RX ADMIN — HYDRALAZINE HYDROCHLORIDE 25 MG: 25 TABLET ORAL at 07:19

## 2024-04-02 RX ADMIN — WATER 2000 MG: 1 INJECTION INTRAMUSCULAR; INTRAVENOUS; SUBCUTANEOUS at 15:24

## 2024-04-02 RX ADMIN — WATER 40 MG: 1 INJECTION INTRAMUSCULAR; INTRAVENOUS; SUBCUTANEOUS at 09:18

## 2024-04-02 RX ADMIN — METOPROLOL SUCCINATE 50 MG: 50 TABLET, EXTENDED RELEASE ORAL at 09:18

## 2024-04-02 RX ADMIN — METOPROLOL SUCCINATE 50 MG: 50 TABLET, EXTENDED RELEASE ORAL at 21:18

## 2024-04-02 RX ADMIN — WATER 40 MG: 1 INJECTION INTRAMUSCULAR; INTRAVENOUS; SUBCUTANEOUS at 17:47

## 2024-04-02 RX ADMIN — PANTOPRAZOLE SODIUM 40 MG: 40 TABLET, DELAYED RELEASE ORAL at 07:19

## 2024-04-02 RX ADMIN — HYDRALAZINE HYDROCHLORIDE 25 MG: 25 TABLET ORAL at 15:21

## 2024-04-02 RX ADMIN — Medication 1 CAPSULE: at 09:18

## 2024-04-02 RX ADMIN — AMLODIPINE BESYLATE 5 MG: 5 TABLET ORAL at 09:21

## 2024-04-02 RX ADMIN — SODIUM CHLORIDE, PRESERVATIVE FREE 10 ML: 5 INJECTION INTRAVENOUS at 09:22

## 2024-04-02 ASSESSMENT — COPD QUESTIONNAIRES
TOTAL_EXACERBATIONS_PASTYEAR: 2
GOLD_GROUP: GROUP E
GOLD_GRADE: 3

## 2024-04-02 ASSESSMENT — PULMONARY FUNCTION TESTS
FEV1 (%PREDICTED): 38
POST BRONCHODILATOR FEV1/FVC: 35

## 2024-04-02 NOTE — PLAN OF CARE
0730: Bedside and Verbal shift change report given to RACHEL Rubio (oncoming nurse) by RACHEL Painter (offgoing nurse). Report included the following information Nurse Handoff Report, Intake/Output, and Cardiac Rhythm NSR .    1930: Bedside and Verbal shift change report given to RACHEL Painter (oncoming nurse) by RACHEL Rubio (offgoing nurse). Report included the following information Nurse Handoff Report, Intake/Output, and Cardiac Rhythm NSR .       Problem: Safety - Adult  Goal: Free from fall injury  4/2/2024 1045 by Joanne Wolf RN  Outcome: Progressing  4/2/2024 0903 by Madina Foss RN  Outcome: Progressing     Problem: Discharge Planning  Goal: Discharge to home or other facility with appropriate resources  4/2/2024 1045 by Joanne Wolf RN  Outcome: Progressing  4/2/2024 0903 by Madina Foss RN  Outcome: Progressing  Flowsheets (Taken 4/1/2024 2000)  Discharge to home or other facility with appropriate resources: Identify barriers to discharge with patient and caregiver     Problem: Chronic Conditions and Co-morbidities  Goal: Patient's chronic conditions and co-morbidity symptoms are monitored and maintained or improved  4/2/2024 1045 by Joanne Wolf RN  Outcome: Progressing  4/2/2024 0903 by Madina Foss RN  Outcome: Progressing  Flowsheets (Taken 4/1/2024 2000)  Care Plan - Patient's Chronic Conditions and Co-Morbidity Symptoms are Monitored and Maintained or Improved: Monitor and assess patient's chronic conditions and comorbid symptoms for stability, deterioration, or improvement     Problem: ABCDS Injury Assessment  Goal: Absence of physical injury  4/2/2024 1045 by Joanne Wolf RN  Outcome: Progressing  4/2/2024 0903 by Madina Foss RN  Outcome: Progressing     Problem: Pain  Goal: Verbalizes/displays adequate comfort level or baseline comfort level  4/2/2024 1045 by Joanne Wolf RN  Outcome: Progressing  4/2/2024 0903 by Madina Foss RN  Outcome:  Progressing  Flowsheets  Taken 4/1/2024 2300  Verbalizes/displays adequate comfort level or baseline comfort level: Encourage patient to monitor pain and request assistance  Taken 4/1/2024 2000  Verbalizes/displays adequate comfort level or baseline comfort level: Encourage patient to monitor pain and request assistance     Problem: Skin/Tissue Integrity  Goal: Absence of new skin breakdown  Description: 1.  Monitor for areas of redness and/or skin breakdown  2.  Assess vascular access sites hourly  3.  Every 4-6 hours minimum:  Change oxygen saturation probe site  4.  Every 4-6 hours:  If on nasal continuous positive airway pressure, respiratory therapy assess nares and determine need for appliance change or resting period.  4/2/2024 1045 by Joanne Wolf, RN  Outcome: Progressing  4/2/2024 0903 by Madina Foss, RN  Outcome: Progressing

## 2024-04-02 NOTE — PROGRESS NOTES
[facility]  [facility] in [place], [place]  Inpatient Progress Note      Patient name: Sanford Joiner  Patient : 1960  Patient MRN: [id number]  Date of service: 24    Reason for Referral  LVAD management      ASSESSMENT:  Sanford Joiner is a 64 y.o. male admitted with driveline infection and possible upper GI bleed, reporting 3-4 days of dark stools.   History of chronic systolic heart failure due to non ischemic cardiomyopathy, s/p HM3 LVAD implantation ([facility] 2023) as destination therapy, stage D, remains NYHA class IV despite LVAD support due to severe COPD; currently with recovered LVEF to 55% and small LV cavity with resulting low flow alarms; optimal GDMT limited by hypovolemia  Patient is not a candidate for heart transplant due to advanced COPD -  declined by [facility] for both LVAD-DT and transplant and for the same reasons declined for LVAD-DT by [facility]   Team discussed patient at [facility] 3/22/24 to determine risk of pump explant and alternatives of washout or palliative antibiotics. CT chest shows 2 fluid collections surrounding the driveline and outflow graft concerning for infection. PFTS performed, showing very severe obstructive lung disease with FEV1<1L. Discussed with CV Surgery, not a surgical candiate. Plan for palliative antibiotic management of LVAD pump infection.    INTERVAL HISTORY:  Still with Wheezing and increased dyspnea at rest this morning, improving with Bipap  Subxiphoid abscess looks larger today    RECOMMENDATIONS:  Bacteremia, LVAD pump infection  Pending blood cultures 3/27, negative to date   Now that BC negative x 4 days will need PICC line placement and discharge  Planning palliative antibiotic management  CT abdomen on  showed increase in size of abscess around drive line--> discussed with Dr. Gomez--> not a surgical candidate, will continue IV antibiotics   Palliative care appreciated  ID following - continue ceftriaxone 2  mild myocardial enhancement of the base to mid inferolateral wall, basal inferior wall and patchy areas of the septum. The appearance of the contrast enhancement in the form of a very mild focal areas suggest focal myocardial fibrosis likely from replacement fibrosis due to hypertrophy or possibly old healed myocarditis. There is no features of recent or old myocardial infarction. There is no features of infiltrative sarcoidosis or cardiac amyloidosis. All myocardial walls are viable.  5. Normal pleura and pericardium. There is no significant effusions.  6. 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 Sinotubular junction is normal at  37 mm. Ascending aorta is dilated at 44 x 43 mm. Aortic arch and descending  thoracic aorta are of normal size at 23 mm.     [facility]- 5/17/21 no significant CAD      [facility] 5/10/2023:   Speed 5200 RPM RA 7, PA 35/17 (23), PCW 11, PA saturation 74.6%,  FDC 3.9. Final speed 5400 RPM     [facility] 5/17/2021: PA 26/17/21, RA 10, PCWP 13, CI 1.76     PFT 5/21/21  FEV1/FVC 0.38  FEV1 0.99L, 46%  FVC 2.62L, 91%  Lung age 80 years     6 Min Walk Report 11/3/2021 9/16/2021   (PRE) HR 97 111   (PRE) O2 Sat 95 94   (POST)  125   (POST) O2 Sat 90 89   Distance in Meters 377.59        Review of Systems   Constitutional:  Positive for fatigue. Negative for activity change, appetite change, chills and fever.   HENT:  Negative for congestion.    Respiratory:  Positive for shortness of breath and wheezing. Negative for cough.    Cardiovascular:  Negative for chest pain, palpitations and leg swelling.   Gastrointestinal:  Negative for abdominal distention, blood in stool, nausea and vomiting.   Genitourinary: Negative.    Musculoskeletal: Negative.    Skin: Negative.    Neurological:  Negative for dizziness and weakness.   Psychiatric/Behavioral: Negative.          PHYSICAL EXAM:  BP (!) 136/109   Pulse 88   Temp 97.2 °F (36.2 °C) (Oral)   Resp 18   Ht 1.727 m (5' 8\")   Wt 77 kg (169 lb 12.1 oz)   SpO2

## 2024-04-02 NOTE — PROGRESS NOTES
Pharmacist Note - Warfarin Dosing  Consult provided for this 64 y.o.male to manage warfarin for LVAD.    INR Goal: 1.8-2.2  (due to recurrent bleeding)    Home regimen: 2 mg PO daily   -supra-therapeutic at last AC visit; dose was being adjusted    Drugs that may increase INR: None  Drugs that may decrease INR: None  Other medications that may increase bleeding risk: None  Risk factors: None  Daily INR ordered through: 4/30    Recent Labs     03/31/24  0336 04/01/24  0517 04/02/24  0427   HGB  --  7.2* 7.3*   INR 1.4* 1.6* 1.8*     Date               INR                  Dose  3/19  7/9.2  Hold - vitamin K 2.5 mg PO x 1  3/20  2.4  0.5 mg  3/21  1.5  0.5 mg  3/22  1.7  0.5 mg   3/23  2.2  hold   3/24  2.2  0.5 mg   3/25  2.3  hold   3/26                2.1                   0.5 mg  3/27                1.7                   0.5 mg  3/28                1.7                   0.5 mg  3/29                1.5                   1 mg  3/30  1.4  0.5 mg   3/30  1.4  1.5 mg   3/31                1.5                    1.5 mg  4/1                  1.6                    1 mg  4/2                  1.8                    0.5 mg                                                                               Assessment/ Plan:   Will order 0.5 mg warfarin today.     Pharmacy will continue to monitor daily and adjust therapy as indicated.  Please contact the pharmacist at [phone] for outpatient recommendations if needed.

## 2024-04-02 NOTE — PROGRESS NOTES
Hospitalist Progress Note  Josie Ayon MD  Answering service: [phone] OR [phone] from in house phone        Date of Service:  2024  NAME:  Sanford Joiner  :  1960  MRN:  [id number]      Admission Summary:   Sanford Joiner is a 64 y.o. 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  Patient states he had been in his usual state of health until about 24 hours ago when he developed progressive dyspnea.  Initially had dyspneic symptoms with minimal exertion and further progression to dyspnea occurring at rest.  He has had what he describes as a mild nonproductive cough.  Denies any ongoing tobacco use.  Denies any recent travel or sick contacts.  The patient denies any fever, chills, chest or abdominal pain, nausea, vomiting, congestion, recent illness, palpitations, or dysuria.     Remarkable vitals on ER Presentation: Respiratory rate to 112  Labs Remarkable for: SpO2 to mid 80s on room air  ER Images: Chest x-ray showed no acute process  ER Rx: Cefepime, DuoNeb, 250 cc normal saline bolus    Interval history / Subjective:   Seen and examined, slightly more bronchospastic and congested this AM. Otherwise feels about the same. Had CT chest yesterday showing interval increase in size of abscess.     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

## 2024-04-02 NOTE — PROGRESS NOTES
1930: Bedside and Verbal shift change report given to Madina RN (oncoming nurse) by Orquidea RN (offgoing nurse). Report included the following information Nurse Handoff Report, Index, Adult Overview, Intake/Output, MAR, and Cardiac Rhythm SR/SB .     2100: refused dressing change during night shift in the morning.     2300: cardiac leads replaced.     0400: refused dressing change this morning    0730:Bedside and Verbal shift change report given to Johana RN and Nessa RN (oncoming nurse) by Madina RAMOS (offgoing nurse). Report included the following information Nurse Handoff Report, Index, Adult Overview, Intake/Output, MAR, and Cardiac Rhythm SR/SB.

## 2024-04-02 NOTE — PROGRESS NOTES
Pulmonary, Critical Care, and Sleep Medicine~Progress Note    Name: Sanford Joiner MRN: [id number]   : 1960 Hospital: [facility]   Date: 2024 3:31 PM Admission: 3/19/2024     Impression Plan   Sepsis; subxiphoid abscess  Acute respiratory alkalosis from sepsis.  Positive BC (1/2 bottle G+cocci): contaminant vs infection?  Acute exacerbation of asthma/ copd; missed his dose of Nucala, acute exacerbation with wheezing  Cardiomyopathy; s/p LVAD HM3 - 3/2023.  Hx of chronic drive line infection.  Elevated INR: warfarin   Severe anemia On O2  On abx.  On his home trelegy -he is willing to accept additional DuoNebs 3 times a day for symptom relief  O2 titration above 90%   On IV steroids  Management of underlying source of sepsis, correction of anemia, optimization of volume status would all help  If wheezing continues to be a clinical problem, Heliox can be tried in the ICU - low threshold to consult ICU if needed  NIV (CPAP adjusted for comfort) for work of breathing as needed (gas exchange does not show respiratory acidosis)  Follow up with Dr Green in 1-2 wks after discharge  Acutely ill and is at high risk for further decline.  Discussed with RN at bedside     Pulmonary Consultation:      Called to re-evaluate patient for \"PT's lung function is not improving\".  Patient has had ongoing history of wheezing and prefers home Trelegy inhaler and has been declining DuoNebs.  He is on Solu-Medrol 40 mg 3 times daily.  In the interim he has also developed severe anemia and subxiphoid abscess which is still to be drained.  Hospital weights are trending up, there is no pedal edema but abdominal distention is present.  CT scan of the chest that was done yesterday did not show acute pulmonary parenchymal infiltrate and no pleural effusions were noted, known severe emphysema was noted. .  There is expanding subxiphoid abscess and drainage plans are being made.  SpO2 is 100% on 6 L nasal oxygen

## 2024-04-02 NOTE — PROGRESS NOTES
(TOPROL XL) extended release tablet 50 mg, 50 mg, Oral, BID, Aidee Gonzales MD, 50 mg at 04/02/24 0918    pantoprazole (PROTONIX) tablet 40 mg, 40 mg, Oral, BID AC, Aidee Gonzales MD, 40 mg at 04/02/24 1521    sodium chloride flush 0.9 % injection 5-40 mL, 5-40 mL, IntraVENous, 2 times per day, Aidee Gonzales MD, 10 mL at 04/02/24 0922    sodium chloride flush 0.9 % injection 5-40 mL, 5-40 mL, IntraVENous, PRN, Aidee Gonzales MD    0.9 % sodium chloride infusion, , IntraVENous, PRN, Aidee Gonzales MD, Stopped at 03/20/24 0439    potassium chloride (KLOR-CON) extended release tablet 40 mEq, 40 mEq, Oral, PRN **OR** potassium bicarb-citric acid (EFFER-K) effervescent tablet 40 mEq, 40 mEq, Oral, PRN **OR** potassium chloride 10 mEq/100 mL IVPB (Peripheral Line), 10 mEq, IntraVENous, PRN, Aidee Gonzales MD    magnesium sulfate 2000 mg in 50 mL IVPB premix, 2,000 mg, IntraVENous, PRN, Aidee Gonzales MD    ondansetron (ZOFRAN-ODT) disintegrating tablet 4 mg, 4 mg, Oral, Q8H PRN **OR** ondansetron (ZOFRAN) injection 4 mg, 4 mg, IntraVENous, Q6H PRN, Aidee Gonzales MD    polyethylene glycol (GLYCOLAX) packet 17 g, 17 g, Oral, Daily PRN, Aidee Gonzales MD    acetaminophen (TYLENOL) tablet 650 mg, 650 mg, Oral, Q6H PRN, 650 mg at 03/28/24 1230 **OR** acetaminophen (TYLENOL) suppository 650 mg, 650 mg, Rectal, Q6H PRN, Aidee Gonzales MD    warfarin - pharmacy to dose, , Other, RX Placeholder, Aidee Gonzales MD    hydrALAZINE (APRESOLINE) injection 10 mg, 10 mg, IntraVENous, Q4H PRN, Nessa Maurer, APRN - NP      Labs:  Recent Results (from the past 24 hour(s))   Protime-INR    Collection Time: 04/02/24  4:27 AM   Result Value Ref Range    INR 1.8 (H) 0.9 - 1.1      Protime 18.5 (H) 9.0 - 11.1 sec   Basic Metabolic Panel    Collection Time: 04/02/24  4:27 AM   Result Value Ref Range    Sodium 139 136 - 145 mmol/L    Potassium 4.5 3.5 - 5.1 mmol/L    Chloride 109 (H) 97 - 108

## 2024-04-02 NOTE — PLAN OF CARE
Problem: Safety - Adult  Goal: Free from fall injury  Outcome: Progressing     Problem: Discharge Planning  Goal: Discharge to home or other facility with appropriate resources  Outcome: Progressing  Flowsheets (Taken 4/1/2024 2000)  Discharge to home or other facility with appropriate resources: Identify barriers to discharge with patient and caregiver     Problem: Chronic Conditions and Co-morbidities  Goal: Patient's chronic conditions and co-morbidity symptoms are monitored and maintained or improved  Outcome: Progressing  Flowsheets (Taken 4/1/2024 2000)  Care Plan - Patient's Chronic Conditions and Co-Morbidity Symptoms are Monitored and Maintained or Improved: Monitor and assess patient's chronic conditions and comorbid symptoms for stability, deterioration, or improvement     Problem: ABCDS Injury Assessment  Goal: Absence of physical injury  Outcome: Progressing     Problem: Pain  Goal: Verbalizes/displays adequate comfort level or baseline comfort level  Outcome: Progressing  Flowsheets  Taken 4/1/2024 2300  Verbalizes/displays adequate comfort level or baseline comfort level: Encourage patient to monitor pain and request assistance  Taken 4/1/2024 2000  Verbalizes/displays adequate comfort level or baseline comfort level: Encourage patient to monitor pain and request assistance     Problem: Skin/Tissue Integrity  Goal: Absence of new skin breakdown  Description: 1.  Monitor for areas of redness and/or skin breakdown  2.  Assess vascular access sites hourly  3.  Every 4-6 hours minimum:  Change oxygen saturation probe site  4.  Every 4-6 hours:  If on nasal continuous positive airway pressure, respiratory therapy assess nares and determine need for appliance change or resting period.  Outcome: Progressing

## 2024-04-03 PROBLEM — Z71.89 GOALS OF CARE, COUNSELING/DISCUSSION: Status: ACTIVE | Noted: 2024-04-03

## 2024-04-03 PROBLEM — Z71.1 CONCERN ABOUT END OF LIFE: Status: ACTIVE | Noted: 2024-04-03

## 2024-04-03 LAB
ABO + RH BLD: NORMAL
ANION GAP SERPL CALC-SCNC: 4 MMOL/L (ref 5–15)
BASOPHILS # BLD: 0 K/UL (ref 0–0.1)
BASOPHILS NFR BLD: 0 % (ref 0–1)
BLOOD GROUP ANTIBODIES SERPL: NORMAL
BUN SERPL-MCNC: 42 MG/DL (ref 6–20)
BUN/CREAT SERPL: 25 (ref 12–20)
CALCIUM SERPL-MCNC: 8.6 MG/DL (ref 8.5–10.1)
CHLORIDE SERPL-SCNC: 109 MMOL/L (ref 97–108)
CO2 SERPL-SCNC: 25 MMOL/L (ref 21–32)
CREAT SERPL-MCNC: 1.69 MG/DL (ref 0.7–1.3)
DIFFERENTIAL METHOD BLD: ABNORMAL
EOSINOPHIL # BLD: 0 K/UL (ref 0–0.4)
EOSINOPHIL NFR BLD: 0 % (ref 0–7)
ERYTHROCYTE [DISTWIDTH] IN BLOOD BY AUTOMATED COUNT: 19.8 % (ref 11.5–14.5)
GLUCOSE BLD STRIP.AUTO-MCNC: 140 MG/DL (ref 65–117)
GLUCOSE SERPL-MCNC: 153 MG/DL (ref 65–100)
HCT VFR BLD AUTO: 25.3 % (ref 36.6–50.3)
HGB BLD-MCNC: 7.2 G/DL (ref 12.1–17)
IMM GRANULOCYTES # BLD AUTO: 0.4 K/UL (ref 0–0.04)
IMM GRANULOCYTES NFR BLD AUTO: 2 % (ref 0–0.5)
INR PPP: 2 (ref 0.9–1.1)
LYMPHOCYTES # BLD: 0.6 K/UL (ref 0.8–3.5)
LYMPHOCYTES NFR BLD: 3 % (ref 12–49)
MAGNESIUM SERPL-MCNC: 2.3 MG/DL (ref 1.6–2.4)
MCH RBC QN AUTO: 24.5 PG (ref 26–34)
MCHC RBC AUTO-ENTMCNC: 28.5 G/DL (ref 30–36.5)
MCV RBC AUTO: 86.1 FL (ref 80–99)
MONOCYTES # BLD: 0.4 K/UL (ref 0–1)
MONOCYTES NFR BLD: 2 % (ref 5–13)
NEUTS SEG # BLD: 18.4 K/UL (ref 1.8–8)
NEUTS SEG NFR BLD: 93 % (ref 32–75)
NRBC # BLD: 0 K/UL (ref 0–0.01)
NRBC BLD-RTO: 0 PER 100 WBC
PLATELET # BLD AUTO: 356 K/UL (ref 150–400)
PLATELET COMMENT: ABNORMAL
PMV BLD AUTO: 10.8 FL (ref 8.9–12.9)
POTASSIUM SERPL-SCNC: 4.4 MMOL/L (ref 3.5–5.1)
PROTHROMBIN TIME: 20.3 SEC (ref 9–11.1)
RBC # BLD AUTO: 2.94 M/UL (ref 4.1–5.7)
RBC MORPH BLD: ABNORMAL
RBC MORPH BLD: ABNORMAL
SERVICE CMNT-IMP: ABNORMAL
SODIUM SERPL-SCNC: 138 MMOL/L (ref 136–145)
SPECIMEN EXP DATE BLD: NORMAL
WBC # BLD AUTO: 19.8 K/UL (ref 4.1–11.1)

## 2024-04-03 PROCEDURE — 6360000002 HC RX W HCPCS: Performed by: STUDENT IN AN ORGANIZED HEALTH CARE EDUCATION/TRAINING PROGRAM

## 2024-04-03 PROCEDURE — 85610 PROTHROMBIN TIME: CPT

## 2024-04-03 PROCEDURE — 85025 COMPLETE CBC W/AUTO DIFF WBC: CPT

## 2024-04-03 PROCEDURE — 86901 BLOOD TYPING SEROLOGIC RH(D): CPT

## 2024-04-03 PROCEDURE — 86900 BLOOD TYPING SEROLOGIC ABO: CPT

## 2024-04-03 PROCEDURE — 94760 N-INVAS EAR/PLS OXIMETRY 1: CPT

## 2024-04-03 PROCEDURE — 6360000002 HC RX W HCPCS: Performed by: INTERNAL MEDICINE

## 2024-04-03 PROCEDURE — 83735 ASSAY OF MAGNESIUM: CPT

## 2024-04-03 PROCEDURE — 93750 INTERROGATION VAD IN PERSON: CPT | Performed by: INTERNAL MEDICINE

## 2024-04-03 PROCEDURE — 6370000000 HC RX 637 (ALT 250 FOR IP): Performed by: NURSE PRACTITIONER

## 2024-04-03 PROCEDURE — 2580000003 HC RX 258: Performed by: STUDENT IN AN ORGANIZED HEALTH CARE EDUCATION/TRAINING PROGRAM

## 2024-04-03 PROCEDURE — 82962 GLUCOSE BLOOD TEST: CPT

## 2024-04-03 PROCEDURE — 2700000000 HC OXYGEN THERAPY PER DAY

## 2024-04-03 PROCEDURE — 99232 SBSQ HOSP IP/OBS MODERATE 35: CPT | Performed by: INTERNAL MEDICINE

## 2024-04-03 PROCEDURE — 36415 COLL VENOUS BLD VENIPUNCTURE: CPT

## 2024-04-03 PROCEDURE — APPNB30 APP NON BILLABLE TIME 0-30 MINS: Performed by: NURSE PRACTITIONER

## 2024-04-03 PROCEDURE — 80048 BASIC METABOLIC PNL TOTAL CA: CPT

## 2024-04-03 PROCEDURE — 6370000000 HC RX 637 (ALT 250 FOR IP): Performed by: STUDENT IN AN ORGANIZED HEALTH CARE EDUCATION/TRAINING PROGRAM

## 2024-04-03 PROCEDURE — 6370000000 HC RX 637 (ALT 250 FOR IP): Performed by: INTERNAL MEDICINE

## 2024-04-03 PROCEDURE — 2060000000 HC ICU INTERMEDIATE R&B

## 2024-04-03 PROCEDURE — 86850 RBC ANTIBODY SCREEN: CPT

## 2024-04-03 PROCEDURE — 99233 SBSQ HOSP IP/OBS HIGH 50: CPT | Performed by: NURSE PRACTITIONER

## 2024-04-03 PROCEDURE — 2580000003 HC RX 258: Performed by: INTERNAL MEDICINE

## 2024-04-03 RX ORDER — HYDRALAZINE HYDROCHLORIDE 50 MG/1
50 TABLET, FILM COATED ORAL EVERY 8 HOURS SCHEDULED
Status: DISCONTINUED | OUTPATIENT
Start: 2024-04-03 | End: 2024-04-09 | Stop reason: HOSPADM

## 2024-04-03 RX ORDER — WARFARIN SODIUM 1 MG/1
0.5 TABLET ORAL
Status: COMPLETED | OUTPATIENT
Start: 2024-04-03 | End: 2024-04-03

## 2024-04-03 RX ADMIN — SODIUM CHLORIDE, PRESERVATIVE FREE 10 ML: 5 INJECTION INTRAVENOUS at 10:27

## 2024-04-03 RX ADMIN — WATER 40 MG: 1 INJECTION INTRAMUSCULAR; INTRAVENOUS; SUBCUTANEOUS at 10:27

## 2024-04-03 RX ADMIN — WARFARIN SODIUM 0.5 MG: 1 TABLET ORAL at 17:36

## 2024-04-03 RX ADMIN — WATER 40 MG: 1 INJECTION INTRAMUSCULAR; INTRAVENOUS; SUBCUTANEOUS at 17:16

## 2024-04-03 RX ADMIN — METOPROLOL SUCCINATE 50 MG: 50 TABLET, EXTENDED RELEASE ORAL at 10:26

## 2024-04-03 RX ADMIN — HYDRALAZINE HYDROCHLORIDE 50 MG: 50 TABLET ORAL at 16:01

## 2024-04-03 RX ADMIN — METOPROLOL SUCCINATE 50 MG: 50 TABLET, EXTENDED RELEASE ORAL at 21:29

## 2024-04-03 RX ADMIN — HYDRALAZINE HYDROCHLORIDE 50 MG: 50 TABLET ORAL at 21:29

## 2024-04-03 RX ADMIN — PANTOPRAZOLE SODIUM 40 MG: 40 TABLET, DELAYED RELEASE ORAL at 07:07

## 2024-04-03 RX ADMIN — AMLODIPINE BESYLATE 5 MG: 5 TABLET ORAL at 10:26

## 2024-04-03 RX ADMIN — SODIUM CHLORIDE, PRESERVATIVE FREE 10 ML: 5 INJECTION INTRAVENOUS at 21:30

## 2024-04-03 RX ADMIN — AMLODIPINE BESYLATE 5 MG: 5 TABLET ORAL at 21:29

## 2024-04-03 RX ADMIN — WATER 2000 MG: 1 INJECTION INTRAMUSCULAR; INTRAVENOUS; SUBCUTANEOUS at 15:56

## 2024-04-03 RX ADMIN — Medication 1 CAPSULE: at 10:26

## 2024-04-03 RX ADMIN — PANTOPRAZOLE SODIUM 40 MG: 40 TABLET, DELAYED RELEASE ORAL at 16:01

## 2024-04-03 RX ADMIN — HYDRALAZINE HYDROCHLORIDE 25 MG: 25 TABLET ORAL at 07:07

## 2024-04-03 RX ADMIN — WATER 40 MG: 1 INJECTION INTRAMUSCULAR; INTRAVENOUS; SUBCUTANEOUS at 02:58

## 2024-04-03 NOTE — PLAN OF CARE
0730: Bedside and Verbal shift change report given to RACHEL Rubio (oncoming nurse) by RACHEL Painter (offgoing nurse). Report included the following information Nurse Handoff Report, Intake/Output, and Cardiac Rhythm NSR .     1930: Bedside and Verbal shift change report given to RACHEL Meza (oncoming nurse) by RACHEL Rubio (offgoing nurse). Report included the following information Nurse Handoff Report, Intake/Output, and Cardiac Rhythm NSR .         Problem: Safety - Adult  Goal: Free from fall injury  4/3/2024 0938 by Joanne Wolf RN  Outcome: Progressing  4/3/2024 0642 by Madina Foss RN  Outcome: Progressing     Problem: Discharge Planning  Goal: Discharge to home or other facility with appropriate resources  4/3/2024 0938 by Joanne Wolf RN  Outcome: Progressing  4/3/2024 0642 by Madina Foss RN  Outcome: Progressing  Flowsheets (Taken 4/2/2024 2000)  Discharge to home or other facility with appropriate resources: Identify barriers to discharge with patient and caregiver     Problem: Chronic Conditions and Co-morbidities  Goal: Patient's chronic conditions and co-morbidity symptoms are monitored and maintained or improved  4/3/2024 0938 by Joanne Wolf RN  Outcome: Progressing  4/3/2024 0642 by Madina Foss RN  Outcome: Progressing  Flowsheets (Taken 4/2/2024 2000)  Care Plan - Patient's Chronic Conditions and Co-Morbidity Symptoms are Monitored and Maintained or Improved: Monitor and assess patient's chronic conditions and comorbid symptoms for stability, deterioration, or improvement     Problem: ABCDS Injury Assessment  Goal: Absence of physical injury  4/3/2024 0938 by Joanne Wolf RN  Outcome: Progressing  4/3/2024 0642 by Madina Foss RN  Outcome: Progressing     Problem: Pain  Goal: Verbalizes/displays adequate comfort level or baseline comfort level  4/3/2024 0938 by Joanne Wolf RN  Outcome: Progressing  4/3/2024 0642 by Madina Foss RN  Outcome:  Progressing  Flowsheets (Taken 4/2/2024 2000)  Verbalizes/displays adequate comfort level or baseline comfort level: Encourage patient to monitor pain and request assistance     Problem: Skin/Tissue Integrity  Goal: Absence of new skin breakdown  Description: 1.  Monitor for areas of redness and/or skin breakdown  2.  Assess vascular access sites hourly  3.  Every 4-6 hours minimum:  Change oxygen saturation probe site  4.  Every 4-6 hours:  If on nasal continuous positive airway pressure, respiratory therapy assess nares and determine need for appliance change or resting period.  4/3/2024 0938 by Joanne Wolf, RN  Outcome: Progressing  4/3/2024 0642 by Madina Foss, RN  Outcome: Progressing

## 2024-04-03 NOTE — PROGRESS NOTES
Charting and patient care of Sanford Joiner by renetta Rubio RN from 0730 to 2000 was supervised and reviewed by this RN.

## 2024-04-03 NOTE — PROGRESS NOTES
Palliative Medicine  Patient Name: Sanford Joiner  YOB: 1960  MRN: [id number]  Age: 64 y.o.  Gender: male    Date of Initial Consult: 3/21/24  Date of Service: 4/3/2024  Time: 10:00 PM  Provider: TAIWO Deluna NP  Hospital Day: 16  Admit Date: 3/19/2024  Referring Provider: Dr Hou        Reasons for Consultation:  Goals of Care    HISTORY OF PRESENT ILLNESS (HPI):   Sanford Joiner \"Rhys\" is a 64 y.o. 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 +staph bacteremia. CT chest 3/20 showing new fluid collection around Baum soars wire to LVAD, subxiphoid abscess.. Treating COPD exacerbation- on and off BIPAP for symptom management, but still with significant MOORE. Not a candidate for surgical I&D, not a candidate for LVAD removal        Will need palliative IV and then PO abx.   4/1 abdominal CT-interval increase size of anterior chest wall fluid collection around driveline of LVAD    4/2-WBC 19.9.  CR 1.99, BUN 45.  6L NC, continues to struggle with wheezing and SOB.  Increasing abscess around driveline, not candidate for surgical I&D, not a candidate for pump removal.    4/3-WBC 19.8, Hgb 7.2, CR 1.69, BUN 42, INR 2.0, PT 20.3    Psychosocial: Significant other, \"wife\" since 1991 is Crystal- they are not legally . Completed AMD in past.  Pt has 3 adult sons- Sanford Ballesteros, Juan Ramon and DJ. Former . Grew up in [place], loved to swim in [place] and fish. As he got older, would not get in- but still loved to fish, halie bass.       PALLIATIVE DIAGNOSES:    Palliative care encounter  Concerned for end of life  Abscess  Shortness of breath/Severe COPD  Goals of care discussion      ASSESSMENT AND PLAN:   Chart reviewed  Pain Assessment  Severity: 0         RDOS  Heart rate per minute: less than 90  Respiratory rate per minute: 19 to 30  Restlessness:non-purposeful: None  Paradoxical breathing pattern:abdomen moves in on inspiration: Present  Grunting at end-expiration: guttural sound: None  Nasal flaring: involuntary movement of nares: None  Look of fear: None  Total : 3      Vital Signs: Blood pressure (!) 136/109, pulse 84, temperature 97.7 °F (36.5 °C), temperature source Oral, resp. rate 17, height 1.727 m (5' 8\"), weight 76 kg (167 lb 8.8 oz), SpO2 99 %.    PHYSICAL ASSESSMENT:   General: [x] Oriented x3  [] Well appearing  [] Intubated  []Ill appearing  []Other:  Mental Status: [x] Normal mental status exam  [] Drowsy  [] Confused  []Other:  Cardiovascular: [] Regular rate/rhythm  [] Arrhythmia  [] Other:  Chest: [] Effort normal  []Lungs clear  [] Respiratory distress  []Tachypnea  [x] Other: LVAD   Abdomen: [] Soft/non-tender  [] Normal appearance  [] Distended  [] Ascites  [] Other:  Neurological: [x] Normal speech  [] Normal sensation  []Deficits present:  Extremity: [x] Normal skin color/temp  [] Clubbing/cyanosis  [] No edema  [] Other:    Wt Readings from Last 15 Encounters:   04/03/24 76 kg (167 lb 8.8 oz)   02/28/24 83 kg (182 lb 15.7 oz)   02/01/24 83.5 kg (184 lb)   01/16/24 81.3 kg (179 lb 3.7 oz)   12/21/23 86.2 kg (190 lb 0.6 oz)   12/21/23 86.2 kg (190 lb)   11/14/23 84.8 kg (187 lb)   10/16/23 79.2 kg (174 lb 9.7 oz)   10/11/23 80 kg (176 lb 5.9 oz)   10/12/23 81.4 kg (179 lb 6.4 oz)   08/29/23 80.3 kg (177 lb)   11/03/21 79.5 kg (175 lb 3.2 oz)   09/16/21 74.3 kg (163 lb 11.2 oz)   08/06/21 74.8 kg (164 lb 12.8 oz)   07/09/21 75.6 kg (166 lb 9.6 oz)        Current Diet: ADULT DIET; Regular; No Added Salt (3-4 gm)  ADULT ORAL NUTRITION SUPPLEMENT; Dinner; Low Calorie/High Protein Oral Supplement  DIET ONE TIME MESSAGE;       PSYCHOSOCIAL/SPIRITUAL SCREENING:   Palliative IDT has assessed this patient for

## 2024-04-03 NOTE — PROGRESS NOTES
Pharmacist Note - Warfarin Dosing  Consult provided for this 64 y.o.male to manage warfarin for LVAD.    INR Goal: 1.8-2.2  (due to recurrent bleeding)    Home regimen: 2 mg PO daily   -supra-therapeutic at last AC visit; dose was being adjusted    Drugs that may increase INR: None  Drugs that may decrease INR: None  Other medications that may increase bleeding risk: None  Risk factors: None  Daily INR ordered through: 4/30    Recent Labs     04/01/24  0517 04/02/24  0427 04/03/24  0009   HGB 7.2* 7.3* 7.2*   INR 1.6* 1.8* 2.0*     Date               INR                  Dose  3/19  7/9.2  Hold - vitamin K 2.5 mg PO x 1  3/20  2.4  0.5 mg  3/21  1.5  0.5 mg  3/22  1.7  0.5 mg   3/23  2.2  hold   3/24  2.2  0.5 mg   3/25  2.3  hold   3/26                2.1                   0.5 mg  3/27                1.7                   0.5 mg  3/28                1.7                   0.5 mg  3/29                1.5                   1 mg  3/30  1.4  0.5 mg   3/30  1.4  1.5 mg   3/31                1.5                    1.5 mg  4/1                  1.6                    1 mg  4/2                  1.8                    0.5 mg  4/3                  2.0                    0.5 mg                                                                               Assessment/ Plan:   Will order 0.5 mg warfarin today.     Pharmacy will continue to monitor daily and adjust therapy as indicated.  Please contact the pharmacist at [phone] for outpatient recommendations if needed.

## 2024-04-03 NOTE — PROGRESS NOTES
[facility]  [facility] in [place], [place]  Inpatient Progress Note      Patient name: Sanford Joiner  Patient : 1960  Patient MRN: [id number]  Date of service: 24    Reason for Referral  LVAD management      ASSESSMENT:  Sanford Joiner is a 64 y.o. male admitted with driveline infection and possible upper GI bleed, reporting 3-4 days of dark stools.   History of chronic systolic heart failure due to non ischemic cardiomyopathy, s/p HM3 LVAD implantation ([facility] 2023) as destination therapy, stage D, remains NYHA class IV despite LVAD support due to severe COPD; currently with recovered LVEF to 55% and small LV cavity with resulting low flow alarms; optimal GDMT limited by hypovolemia  Patient is not a candidate for heart transplant due to advanced COPD -  declined by [facility] for both LVAD-DT and transplant and for the same reasons declined for LVAD-DT by [facility]   Team discussed patient at [facility] 3/22/24 to determine risk of pump explant and alternatives of washout or palliative antibiotics. CT chest shows 2 fluid collections surrounding the driveline and outflow graft concerning for infection. PFTS performed, showing very severe obstructive lung disease with FEV1<1L. Discussed with CV Surgery, not a surgical candiate. Plan for palliative antibiotic management of LVAD pump infection.    INTERVAL HISTORY:  Still with Wheezing and increased dyspnea at rest this morning, improving with Bipap  Had family meeting with pt, pt's spouse, Dr. Gillespie, and palliative care about patient's current medical condition, prognosis, and GOC.   Patient is leaning toward home hospice.    RECOMMENDATIONS:  Bacteremia, LVAD pump infection  Pending blood cultures 3/27, negative to date  Now that BC negative x 5 days will need PICC line placement and discharge  Planning palliative antibiotic management  CT abdomen on  showed increase in size of abscess around drive line--> discussed with

## 2024-04-03 NOTE — PROGRESS NOTES
Referral source:   Sanford Joiner at [facility] in [facility] 4 CV SERVICES UNIT.  attended rounds on the CV Services Unit as part of the Interdisciplinary team where the patient's ongoing care was discussed. I reviewed the medical record as part of this encounter.     Outcome: Interdisciplinary team are aware of  availability and were encouraged to request Spiritual Health support as needed.      The  on-call can be reached at ([phone]).     Rev. Mariana Heck MDiv, [facility]  Staff

## 2024-04-03 NOTE — PROGRESS NOTES
[facility]  Good Help to Those in Need  [phone]    Patient Name: Sanford Joiner  YOB: 1960  Age: 64 y.o.    [facility] RN Note:  Hospice consult noted. Chart reviewed. Plan of care discussed with patients nurse & care manager.   In to meet with patient and wife   Discussed Hospice philosophy, general plan of care, levels of care, services and on call procedures.  Family information packet provided & reviewed with wife.  We discussed what home hospice would look like if they decide they want him to go home and focus on comfort. He would go on LVAD but as he declines and nears end of life with his infection we would turn off LVAD.  Patient if open to this plan and states he would want to be DNR if goes on hospice.  His wife is understanding that his infection is worsening despite all medical efforts. She is tearful but does verbalize that she knows his time would be limited.  I will follow up with them again tomorrow to answer any further questions and see where they are in their decision of hospice vs HH.    Thank you for the opportunity to be of service to this patient.     Luana Vazquez RN  Clinical Nurse Liaison  [facility]  [phone] [place]  [phone] Office   Available on Perfect Serve

## 2024-04-03 NOTE — CARE COORDINATION
Transition of Care Plan:     RUR: 25% - high  Prior Level of Functioning: independent  Disposition: Pending [facility]  Initial plan: [facility], pending [facility] (need ID order) and home w family.  IV ABX training completed on 3/29.  Follow up appointments: AH  DME needed: TBD- refusing to participate in therapy  Transportation at discharge: family  IM/IMM Medicare/ letter given: needs 2nd IMM  Caregiver Contact: spouse - Ashley Swan - p: [phone]  Discharge Caregiver contacted prior to discharge? planned  Care Conference needed? yes  Barriers to discharge: Medical     CM rec'd a consult to coordinate hospice.  CM met w patient and his wife at bedside in CVSU to discuss hospice choice.  Patient wants to d/c home this date and wife reports she just learned of this plan 15 mins ago and she needs time to process his decision.  Family agreeable to [facility] hospice coming to the bedside to provide further education so they can make an informed decision.     Completed hospice referral in [facility] and called [facility] to let them know.    Laisha Ivey MSW CCM  Care Management

## 2024-04-03 NOTE — PROGRESS NOTES
Hospitalist Progress Note  Jaren Wood MD  Answering service: [phone] OR [phone] from in house phone        Date of Service:  4/3/2024  NAME:  Sanford Joiner  :  1960  MRN:  [id number]      Admission Summary:   Sanford Joiner is a 64 y.o. 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  Patient states he had been in his usual state of health until about 24 hours ago when he developed progressive dyspnea.  Initially had dyspneic symptoms with minimal exertion and further progression to dyspnea occurring at rest.  He has had what he describes as a mild nonproductive cough.  Denies any ongoing tobacco use.  Denies any recent travel or sick contacts.  The patient denies any fever, chills, chest or abdominal pain, nausea, vomiting, congestion, recent illness, palpitations, or dysuria.     Remarkable vitals on ER Presentation: Respiratory rate to 112  Labs Remarkable for: SpO2 to mid 80s on room air  ER Images: Chest x-ray showed no acute process  ER Rx: Cefepime, DuoNeb, 250 cc normal saline bolus    Interval history / Subjective:   Seen and examined. Resting comfortably at the time that I saw. No overnight events.      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  Enterobacter cloacae line infection  New subxyphoid fluid collection   -respiratory viral panel negative  -blood cultures 3/19 2/2  MSSA  - wound cx with MSSA, Enterobacter cloacae  - repeat blood culture 3/20 2/2 positive for MSSA  - rpt blood cx 3/21: 1/2 growing MSSA  - rpt blood cx 3/22: 1/2 MSSA  - Wound cx 3/26: enterococcus clacae and  bicarb-citric acid (EFFER-K) effervescent tablet 40 mEq  40 mEq Oral PRN    Or    potassium chloride 10 mEq/100 mL IVPB (Peripheral Line)  10 mEq IntraVENous PRN    magnesium sulfate 2000 mg in 50 mL IVPB premix  2,000 mg IntraVENous PRN    ondansetron (ZOFRAN-ODT) disintegrating tablet 4 mg  4 mg Oral Q8H PRN    Or    ondansetron (ZOFRAN) injection 4 mg  4 mg IntraVENous Q6H PRN    polyethylene glycol (GLYCOLAX) packet 17 g  17 g Oral Daily PRN    acetaminophen (TYLENOL) tablet 650 mg  650 mg Oral Q6H PRN    Or    acetaminophen (TYLENOL) suppository 650 mg  650 mg Rectal Q6H PRN    warfarin - pharmacy to dose   Other RX Placeholder    hydrALAZINE (APRESOLINE) injection 10 mg  10 mg IntraVENous Q4H PRN     ______________________________________________________________________  EXPECTED LENGTH OF STAY: Unable to retrieve estimated LOS  ACTUAL LENGTH OF STAY:          15                 Jaren Wood MD

## 2024-04-03 NOTE — PROGRESS NOTES
1930: Bedside and Verbal shift change report given to Madina RN (oncoming nurse) by Joanne RN (offgoing nurse). Report included the following information Nurse Handoff Report, Index, Adult Overview, Intake/Output, MAR, and Cardiac Rhythm SR/SB .            0730:Bedside and Verbal shift change report given to Joanne RN (oncoming nurse) by Madina RN (offgoing nurse). Report included the following information Nurse Handoff Report, Index, Adult Overview, Intake/Output, MAR, and Cardiac Rhythm SR/SB.

## 2024-04-03 NOTE — PLAN OF CARE
Problem: Safety - Adult  Goal: Free from fall injury  Outcome: Progressing     Problem: Discharge Planning  Goal: Discharge to home or other facility with appropriate resources  Outcome: Progressing  Flowsheets (Taken 4/2/2024 2000)  Discharge to home or other facility with appropriate resources: Identify barriers to discharge with patient and caregiver     Problem: Chronic Conditions and Co-morbidities  Goal: Patient's chronic conditions and co-morbidity symptoms are monitored and maintained or improved  Outcome: Progressing  Flowsheets (Taken 4/2/2024 2000)  Care Plan - Patient's Chronic Conditions and Co-Morbidity Symptoms are Monitored and Maintained or Improved: Monitor and assess patient's chronic conditions and comorbid symptoms for stability, deterioration, or improvement     Problem: ABCDS Injury Assessment  Goal: Absence of physical injury  Outcome: Progressing     Problem: Pain  Goal: Verbalizes/displays adequate comfort level or baseline comfort level  Outcome: Progressing  Flowsheets (Taken 4/2/2024 2000)  Verbalizes/displays adequate comfort level or baseline comfort level: Encourage patient to monitor pain and request assistance     Problem: Skin/Tissue Integrity  Goal: Absence of new skin breakdown  Description: 1.  Monitor for areas of redness and/or skin breakdown  2.  Assess vascular access sites hourly  3.  Every 4-6 hours minimum:  Change oxygen saturation probe site  4.  Every 4-6 hours:  If on nasal continuous positive airway pressure, respiratory therapy assess nares and determine need for appliance change or resting period.  Outcome: Progressing

## 2024-04-04 ENCOUNTER — APPOINTMENT (OUTPATIENT)
Facility: HOSPITAL | Age: 64
DRG: 871 | End: 2024-04-04
Payer: MEDICARE

## 2024-04-04 ENCOUNTER — TELEPHONE (OUTPATIENT)
Age: 64
End: 2024-04-04

## 2024-04-04 LAB
ANION GAP SERPL CALC-SCNC: 3 MMOL/L (ref 5–15)
BASOPHILS # BLD: 0 K/UL (ref 0–0.1)
BASOPHILS NFR BLD: 0 % (ref 0–1)
BUN SERPL-MCNC: 39 MG/DL (ref 6–20)
BUN/CREAT SERPL: 32 (ref 12–20)
CALCIUM SERPL-MCNC: 9 MG/DL (ref 8.5–10.1)
CHLORIDE SERPL-SCNC: 105 MMOL/L (ref 97–108)
CO2 SERPL-SCNC: 30 MMOL/L (ref 21–32)
CREAT SERPL-MCNC: 1.23 MG/DL (ref 0.7–1.3)
DIFFERENTIAL METHOD BLD: ABNORMAL
EOSINOPHIL # BLD: 0 K/UL (ref 0–0.4)
EOSINOPHIL NFR BLD: 0 % (ref 0–7)
ERYTHROCYTE [DISTWIDTH] IN BLOOD BY AUTOMATED COUNT: 20.2 % (ref 11.5–14.5)
GLUCOSE SERPL-MCNC: 126 MG/DL (ref 65–100)
HCT VFR BLD AUTO: 25.9 % (ref 36.6–50.3)
HGB BLD-MCNC: 7.8 G/DL (ref 12.1–17)
IMM GRANULOCYTES # BLD AUTO: 0.3 K/UL (ref 0–0.04)
IMM GRANULOCYTES NFR BLD AUTO: 2 % (ref 0–0.5)
INR PPP: 2.1 (ref 0.9–1.1)
LYMPHOCYTES # BLD: 0.5 K/UL (ref 0.8–3.5)
LYMPHOCYTES NFR BLD: 3 % (ref 12–49)
MCH RBC QN AUTO: 25.4 PG (ref 26–34)
MCHC RBC AUTO-ENTMCNC: 30.1 G/DL (ref 30–36.5)
MCV RBC AUTO: 84.4 FL (ref 80–99)
MONOCYTES # BLD: 0.5 K/UL (ref 0–1)
MONOCYTES NFR BLD: 3 % (ref 5–13)
NEUTS SEG # BLD: 16 K/UL (ref 1.8–8)
NEUTS SEG NFR BLD: 92 % (ref 32–75)
NRBC # BLD: 0 K/UL (ref 0–0.01)
NRBC BLD-RTO: 0 PER 100 WBC
PLATELET # BLD AUTO: 358 K/UL (ref 150–400)
PLATELET COMMENT: ABNORMAL
PMV BLD AUTO: 11 FL (ref 8.9–12.9)
POTASSIUM SERPL-SCNC: 4.6 MMOL/L (ref 3.5–5.1)
PROTHROMBIN TIME: 21.2 SEC (ref 9–11.1)
RBC # BLD AUTO: 3.07 M/UL (ref 4.1–5.7)
RBC MORPH BLD: ABNORMAL
SODIUM SERPL-SCNC: 138 MMOL/L (ref 136–145)
WBC # BLD AUTO: 17.3 K/UL (ref 4.1–11.1)

## 2024-04-04 PROCEDURE — 2580000003 HC RX 258: Performed by: INTERNAL MEDICINE

## 2024-04-04 PROCEDURE — 87070 CULTURE OTHR SPECIMN AEROBIC: CPT

## 2024-04-04 PROCEDURE — 2060000000 HC ICU INTERMEDIATE R&B

## 2024-04-04 PROCEDURE — 6360000002 HC RX W HCPCS: Performed by: INTERNAL MEDICINE

## 2024-04-04 PROCEDURE — 87205 SMEAR GRAM STAIN: CPT

## 2024-04-04 PROCEDURE — 36569 INSJ PICC 5 YR+ W/O IMAGING: CPT

## 2024-04-04 PROCEDURE — 94760 N-INVAS EAR/PLS OXIMETRY 1: CPT

## 2024-04-04 PROCEDURE — 2580000003 HC RX 258: Performed by: STUDENT IN AN ORGANIZED HEALTH CARE EDUCATION/TRAINING PROGRAM

## 2024-04-04 PROCEDURE — 10030 IMG GID FLU COLL DRG SFT TIS: CPT

## 2024-04-04 PROCEDURE — 99232 SBSQ HOSP IP/OBS MODERATE 35: CPT | Performed by: PHYSICAL MEDICINE & REHABILITATION

## 2024-04-04 PROCEDURE — 76937 US GUIDE VASCULAR ACCESS: CPT

## 2024-04-04 PROCEDURE — 85025 COMPLETE CBC W/AUTO DIFF WBC: CPT

## 2024-04-04 PROCEDURE — 94640 AIRWAY INHALATION TREATMENT: CPT

## 2024-04-04 PROCEDURE — 87186 SC STD MICRODIL/AGAR DIL: CPT

## 2024-04-04 PROCEDURE — 85610 PROTHROMBIN TIME: CPT

## 2024-04-04 PROCEDURE — 99232 SBSQ HOSP IP/OBS MODERATE 35: CPT | Performed by: INTERNAL MEDICINE

## 2024-04-04 PROCEDURE — 2709999900 HC NON-CHARGEABLE SUPPLY

## 2024-04-04 PROCEDURE — 87077 CULTURE AEROBIC IDENTIFY: CPT

## 2024-04-04 PROCEDURE — 6370000000 HC RX 637 (ALT 250 FOR IP): Performed by: INTERNAL MEDICINE

## 2024-04-04 PROCEDURE — 80048 BASIC METABOLIC PNL TOTAL CA: CPT

## 2024-04-04 PROCEDURE — 6370000000 HC RX 637 (ALT 250 FOR IP): Performed by: NURSE PRACTITIONER

## 2024-04-04 PROCEDURE — 6360000002 HC RX W HCPCS: Performed by: STUDENT IN AN ORGANIZED HEALTH CARE EDUCATION/TRAINING PROGRAM

## 2024-04-04 PROCEDURE — 0W9B30Z DRAINAGE OF LEFT PLEURAL CAVITY WITH DRAINAGE DEVICE, PERCUTANEOUS APPROACH: ICD-10-PCS | Performed by: STUDENT IN AN ORGANIZED HEALTH CARE EDUCATION/TRAINING PROGRAM

## 2024-04-04 PROCEDURE — C1751 CATH, INF, PER/CENT/MIDLINE: HCPCS

## 2024-04-04 PROCEDURE — 87147 CULTURE TYPE IMMUNOLOGIC: CPT

## 2024-04-04 PROCEDURE — 6370000000 HC RX 637 (ALT 250 FOR IP): Performed by: STUDENT IN AN ORGANIZED HEALTH CARE EDUCATION/TRAINING PROGRAM

## 2024-04-04 PROCEDURE — C1769 GUIDE WIRE: HCPCS

## 2024-04-04 PROCEDURE — 2700000000 HC OXYGEN THERAPY PER DAY

## 2024-04-04 PROCEDURE — 36415 COLL VENOUS BLD VENIPUNCTURE: CPT

## 2024-04-04 RX ORDER — WARFARIN SODIUM 1 MG/1
0.5 TABLET ORAL
Status: COMPLETED | OUTPATIENT
Start: 2024-04-04 | End: 2024-04-04

## 2024-04-04 RX ADMIN — WATER 2000 MG: 1 INJECTION INTRAMUSCULAR; INTRAVENOUS; SUBCUTANEOUS at 15:19

## 2024-04-04 RX ADMIN — HYDRALAZINE HYDROCHLORIDE 50 MG: 50 TABLET ORAL at 07:12

## 2024-04-04 RX ADMIN — SODIUM CHLORIDE, PRESERVATIVE FREE 10 ML: 5 INJECTION INTRAVENOUS at 21:00

## 2024-04-04 RX ADMIN — PANTOPRAZOLE SODIUM 40 MG: 40 TABLET, DELAYED RELEASE ORAL at 15:19

## 2024-04-04 RX ADMIN — SODIUM CHLORIDE, PRESERVATIVE FREE 10 ML: 5 INJECTION INTRAVENOUS at 08:44

## 2024-04-04 RX ADMIN — AMLODIPINE BESYLATE 5 MG: 5 TABLET ORAL at 08:43

## 2024-04-04 RX ADMIN — PANTOPRAZOLE SODIUM 40 MG: 40 TABLET, DELAYED RELEASE ORAL at 07:12

## 2024-04-04 RX ADMIN — AMLODIPINE BESYLATE 5 MG: 5 TABLET ORAL at 21:54

## 2024-04-04 RX ADMIN — HYDRALAZINE HYDROCHLORIDE 50 MG: 50 TABLET ORAL at 21:54

## 2024-04-04 RX ADMIN — ACETAMINOPHEN 650 MG: 325 TABLET ORAL at 14:34

## 2024-04-04 RX ADMIN — WARFARIN SODIUM 0.5 MG: 1 TABLET ORAL at 18:22

## 2024-04-04 RX ADMIN — WATER 40 MG: 1 INJECTION INTRAMUSCULAR; INTRAVENOUS; SUBCUTANEOUS at 01:42

## 2024-04-04 RX ADMIN — METOPROLOL SUCCINATE 50 MG: 50 TABLET, EXTENDED RELEASE ORAL at 08:43

## 2024-04-04 RX ADMIN — WATER 40 MG: 1 INJECTION INTRAMUSCULAR; INTRAVENOUS; SUBCUTANEOUS at 08:43

## 2024-04-04 RX ADMIN — Medication 1 CAPSULE: at 08:43

## 2024-04-04 RX ADMIN — WATER 40 MG: 1 INJECTION INTRAMUSCULAR; INTRAVENOUS; SUBCUTANEOUS at 18:22

## 2024-04-04 RX ADMIN — METOPROLOL SUCCINATE 50 MG: 50 TABLET, EXTENDED RELEASE ORAL at 21:54

## 2024-04-04 RX ADMIN — HYDRALAZINE HYDROCHLORIDE 50 MG: 50 TABLET ORAL at 14:34

## 2024-04-04 ASSESSMENT — PAIN DESCRIPTION - LOCATION: LOCATION: ABDOMEN

## 2024-04-04 ASSESSMENT — PAIN SCALES - GENERAL
PAINLEVEL_OUTOF10: 5
PAINLEVEL_OUTOF10: 6
PAINLEVEL_OUTOF10: 2
PAINLEVEL_OUTOF10: 0

## 2024-04-04 NOTE — PROGRESS NOTES
[facility]  Good Help to Those in Need  [phone]     Patient Name: Sanford Joiner  YOB: 1960  Age: 64 y.o.    [facility] RN Note:  Hospice liaison informed by Dr. Bocanegra that the patient has decided to go home with HH and IV antibiotics  with transition to hospice when ready. Hospice will sign off.     Thank you for the opportunity to be of service to this patient.     Luana Vazquez RN  Clinical Nurse Liaison  [facility]  [phone] [place]  [phone] Office   Available on Perfect Serve

## 2024-04-04 NOTE — PLAN OF CARE
Problem: Safety - Adult  Goal: Free from fall injury  Outcome: Progressing  Flowsheets (Taken 4/4/2024 0006)  Free From Fall Injury: Instruct family/caregiver on patient safety     Problem: Discharge Planning  Goal: Discharge to home or other facility with appropriate resources  Outcome: Progressing  Flowsheets (Taken 4/3/2024 2000)  Discharge to home or other facility with appropriate resources: Identify barriers to discharge with patient and caregiver     Problem: Chronic Conditions and Co-morbidities  Goal: Patient's chronic conditions and co-morbidity symptoms are monitored and maintained or improved  Outcome: Progressing  Flowsheets (Taken 4/3/2024 2000)  Care Plan - Patient's Chronic Conditions and Co-Morbidity Symptoms are Monitored and Maintained or Improved: Monitor and assess patient's chronic conditions and comorbid symptoms for stability, deterioration, or improvement     Problem: ABCDS Injury Assessment  Goal: Absence of physical injury  Outcome: Progressing  Flowsheets (Taken 4/4/2024 0006)  Absence of Physical Injury: Implement safety measures based on patient assessment     Problem: Pain  Goal: Verbalizes/displays adequate comfort level or baseline comfort level  Outcome: Progressing  Flowsheets (Taken 4/3/2024 2000)  Verbalizes/displays adequate comfort level or baseline comfort level: Encourage patient to monitor pain and request assistance     Problem: Skin/Tissue Integrity  Goal: Absence of new skin breakdown  Description: 1.  Monitor for areas of redness and/or skin breakdown  2.  Assess vascular access sites hourly  3.  Every 4-6 hours minimum:  Change oxygen saturation probe site  4.  Every 4-6 hours:  If on nasal continuous positive airway pressure, respiratory therapy assess nares and determine need for appliance change or resting period.  Outcome: Progressing

## 2024-04-04 NOTE — PROCEDURES
Brief Postoperative Note    Sanford Joiner  YOB: 1960  [id number]    Pre-operative Diagnosis: LVAD driveline abscess     Post-operative Diagnosis: Same    Procedure: Ultrasound guided drainage catheter placement    Anesthesia: Local    Surgeons/Assistants: Daisy Decker MD    Estimated Blood Loss: Minimal     Complications: None    Specimens: Was Obtained: 300 mL pus     Findings:   Ultrasound guided placement of an 8 Azeri drainage catheter within an abscess around a LVAD driveline. The catheter is within the abscess cavity superficial to the driveline. 300 mL pus were aspirated, with a sample sent for culture.     Electronically signed by Daisy Decker MD on 4/4/2024 at 11:52 AM

## 2024-04-04 NOTE — PROGRESS NOTES
Palliative Medicine  Patient Name: Sanford Joiner  YOB: 1960  MRN: [id number]  Age: 64 y.o.  Gender: male    Date of Initial Consult: 3/21/24  Date of Service: 4/4/2024  Time: 9:39 AM  Provider: Angie Bocanegra MD  Hospital Day: 17  Admit Date: 3/19/2024  Referring Provider: Dr Hou        Reasons for Consultation:  Goals of Care    HISTORY OF PRESENT ILLNESS (HPI):   Sanford Joiner \"Rhys\" is a 64 y.o. 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 +staph bacteremia. CT chest 3/20 showing new fluid collection around Baum soars wire to LVAD, subxiphoid abscess.. Treating COPD exacerbation- on and off BIPAP for symptom management, but still with significant MOORE. Not a candidate for surgical I&D, not a candidate for LVAD removal    Will need palliative IV and then PO abx.   4/1 abdominal CT-interval increase size of anterior chest wall fluid collection around driveline of LVAD    4/2-WBC 19.9.  CR 1.99, BUN 45.  6L NC, continues to struggle with wheezing and SOB.  Increasing abscess around driveline, not candidate for surgical I&D, not a candidate for pump removal.  4/3-WBC 19.8, Hgb 7.2, CR 1.69, BUN 42, INR 2.0, PT 20.3      Psychosocial: Significant other, \"wife\" since 1991 is Ashley- they are not legally . Completed AMD in past.  Pt has 3 adult sons- Sanford Ballesteros, Juan Ramon and DJ. Former . Grew up in [place], loved to swim in the [place] and fish. As he got older, would not get in- but still loved to fish, halie bass.       PALLIATIVE DIAGNOSES:    Palliative care encounter  Shortness of breath/Severe COPD  Goals of care discussion      ASSESSMENT AND PLAN:   Meet w/ pt and wife Ashley on phone- f/up from family meeting  PSYCHOSOCIAL/SPIRITUAL SCREENING:   Palliative IDT has assessed this patient for cultural preferences / practices and a referral made as appropriate to needs (Cultural Services, Patient Advocacy, Ethics, etc.)    Spiritual Affiliation: Sabianism    Any spiritual / Methodist concerns:  [] Yes /  [x] No   If \"Yes\" to discuss with pastoral care during IDT     Does caregiver feel burdened by caring for their loved one:   [] Yes /  [x] No /  [] No Caregiver Present/Available [] No Caregiver [] Pt Lives at Facility  If \"Yes\" to discuss with social work during IDT    Anticipatory grief assessment:   [x] Normal  / [] Maladaptive     If \"Maladaptive\" to discuss with social work during IDT    ESAS Anxiety: Anxiety Score: 4    ESAS Depression: Depression Score: 4        LAB AND IMAGING FINDINGS:   Objective data reviewed:  labs, images, records, medication use, vitals, and chart     FINAL COMMENTS   Thank you for allowing Palliative Medicine to participate in the care of Sanford Joiner.    Only check if applicable and billing time based rather than MDM  [x] The total encounter time on this service date was _35 minutes which was spent performing a face-to-face encounter and personally completing the provider-level activities documented in the note. This includes time spent prior to the visit and after the visit in direct care of the patient. This time does not include time spent in any separately reportable services.    Electronically signed by   Angie Bocanegra MD  Palliative Care Team  on 4/4/2024 at 9:39 AM

## 2024-04-04 NOTE — PROGRESS NOTES
Pharmacist Note - Warfarin Dosing  Consult provided for this 64 y.o.male to manage warfarin for LVAD.    INR Goal: 1.8-2.2  (due to recurrent bleeding)    Home regimen: 2 mg PO daily   -supra-therapeutic at last AC visit; dose was being adjusted    Drugs that may increase INR: None  Drugs that may decrease INR: None  Other medications that may increase bleeding risk: None  Risk factors: None  Daily INR ordered through: 4/30    Recent Labs     04/02/24  0427 04/03/24  0009 04/04/24  0409   HGB 7.3* 7.2* 7.8*   INR 1.8* 2.0* 2.1*     Date               INR                  Dose  3/19  7/9.2  Hold - vitamin K 2.5 mg PO x 1  3/20  2.4  0.5 mg  3/21  1.5  0.5 mg  3/22  1.7  0.5 mg   3/23  2.2  hold   3/24  2.2  0.5 mg   3/25  2.3  hold   3/26                2.1                   0.5 mg  3/27                1.7                   0.5 mg  3/28                1.7                   0.5 mg  3/29                1.5                   1 mg  3/30  1.4  0.5 mg   3/30  1.4  1.5 mg   3/31                1.5                    1.5 mg  4/1                  1.6                    1 mg  4/2                  1.8                    0.5 mg  4/3                  2.0                    0.5 mg  4/4                  2.1                    0.5 mg                                                                               Assessment/ Plan:   Will order 0.5 mg warfarin today.     Pharmacy will continue to monitor daily and adjust therapy as indicated.  Please contact the pharmacist at [phone] for outpatient recommendations if needed.

## 2024-04-04 NOTE — PROGRESS NOTES
0330: Pt called out stating that he is bleeding. Upon entering the pt's room, pt's IV is bleeding continuously. Removed IV. Applied pressure, however site still bleeding. Quik clot applied. Charge RN at bedside with primary RN.    0345: Pt cleaned up. CHG bath and complete linen change.    0400: Charge RN placed new IV in left AC. Labs drawn.

## 2024-04-04 NOTE — CARE COORDINATION
Transition of Care Plan:     RUR: 24%   Prior Level of Functioning: independent  Disposition: [facility], pending Bioscip (need ID order) and home w family.  IV ABX training completed on 3/29.  Follow up appointments: [facility]  DME needed: TBD  Transportation at discharge: family  IM/IMM Medicare/ letter given: needs 2nd IMM  Caregiver Contact: spouse - Ashley Swan - p: [phone]  Discharge Caregiver contacted prior to discharge? planned  Care Conference needed? yes  Barriers to discharge: Medical    Spoke w Dr. Gillespie and patient has decided against hospice and plan is to d/c tomorrow, 4/5 w [facility] and IV ABX w Bioscip.  Patient needs his PICC line and final ID note.    CM updated HH and Bioscip w anticipated d/c date.     KATJA Leyva CCM  Care Management

## 2024-04-04 NOTE — TELEPHONE ENCOUNTER
Received a call from patient's S.O. Ashley Swan. Ms. Swan sounded tearful and stated concerns about family meeting held yesterday to discuss plan of care with HF, Palliative and Hospice. She states she would like to have patient come home with home health with IV abx for a couple more weeks and then transition to Hospice. Ms. Swan stated she is overwhelmed and would like a call back from Dr. Gillespie.   Dr. Gillespie made aware of request for call back.    SNEHA LAL RN  VAD Coordinator

## 2024-04-04 NOTE — PROGRESS NOTES
There is no features of infiltrative sarcoidosis or cardiac amyloidosis. All myocardial walls are viable.  5. Normal pleura and pericardium. There is no significant effusions.  6. 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 Sinotubular junction is normal at  37 mm. Ascending aorta is dilated at 44 x 43 mm. Aortic arch and descending  thoracic aorta are of normal size at 23 mm.     [facility]- 5/17/21 no significant CAD      [facility] 5/10/2023:   Speed 5200 RPM RA 7, PA 35/17 (23), PCW 11, PA saturation 74.6%,  longterm 3.9. Final speed 5400 RPM     [facility] 5/17/2021: PA 26/17/21, RA 10, PCWP 13, CI 1.76     PFT 5/21/21  FEV1/FVC 0.38  FEV1 0.99L, 46%  FVC 2.62L, 91%  Lung age 80 years     6 Min Walk Report 11/3/2021 9/16/2021   (PRE) HR 97 111   (PRE) O2 Sat 95 94   (POST)  125   (POST) O2 Sat 90 89   Distance in Meters 377.59        Review of Systems   Constitutional:  Positive for fatigue. Negative for activity change, appetite change, chills and fever.   HENT:  Negative for congestion.    Respiratory:  Positive for shortness of breath and wheezing. Negative for cough.    Cardiovascular:  Negative for chest pain, palpitations and leg swelling.   Gastrointestinal:  Negative for abdominal distention, blood in stool, nausea and vomiting.   Genitourinary: Negative.    Musculoskeletal: Negative.    Skin: Negative.    Neurological:  Negative for dizziness and weakness.   Psychiatric/Behavioral: Negative.          PHYSICAL EXAM:  BP (!) 136/109   Pulse 75   Temp 98.3 °F (36.8 °C) (Oral)   Resp 19   Ht 1.727 m (5' 8\")   Wt 76 kg (167 lb 8.8 oz)   SpO2 93%   BMI 25.48 kg/m²     Physical Exam  Vitals and nursing note reviewed.   Constitutional:       General: He is not in acute distress.     Appearance: Normal appearance. He is normal weight. He is ill-appearing.   HENT:      Head: Normocephalic and atraumatic.      Mouth/Throat:      Mouth: Mucous membranes are moist.      Pharynx: Oropharynx is clear.   Eyes:      General: No

## 2024-04-04 NOTE — PROCEDURES
PICC Placement Note    UNABLE TO FULLY ADVANCE CATHETER IN EITHER UPPER EXTREMITY    3 attempts made in the RUE. Unable to advance wire to desired depth nor even advance the introducer. Suggesting stenosis or obstruction likely in the proximal brachial vein or distal axillary.    Attention moved to left upper extremity. Able to advance wire and place introducer on 1st attempt. After multiple attempts to advance the catheter, it could be advanced the last 15 cm to reach the atrio-caval junction, suggesting stenosis or obstruction in the left SVC.            PRE-PROCEDURE VERIFICATION  Correct Procedure: yes  Correct Site:  yes  Temperature: Temp: 97.9 °F (36.6 °C), Temperature Source:    Recent Labs     04/04/24  0409   BUN 39*      INR 2.1*   WBC 17.3*       Allergies: Ciprofloxacin and Bactrim [sulfamethoxazole-trimethoprim]    PROCEDURE NOTE:  Xylocaine 1% given in both upper extremities  Central Line Bundle followed: yes  Complication Related to Insertion: unable to advance guidewire on right on any attempts. On left unable to fully advance catheter. Brachial veins accessed bilaterally. Prosper Bacon RN and Daryn Givens RN,both attempted on the right. Daryn Givens RN performed the left upper extremity attempt.     RACHEL Gregg, BSN, RN, [place]-BC  Vascular Access Team      Right upper ext         Left upper ext

## 2024-04-04 NOTE — TELEPHONE ENCOUNTER
Telephone Call RE:  Appointment reminder     Outcome:     [] Patient confirmed appointment   [] Patient rescheduled appointment for    [] Unable to reach  [] Left message              [x] Other:       Just spoke with pt, and he's still in the hospital.  He will be at his 4/9/24 appt assuming he's out of the hospital.

## 2024-04-04 NOTE — PROGRESS NOTES
Infectious Disease Progress Note       Subjective:      The patient was personally evaluated and examined by me at bedside this morning.  The patient was awake and alert, cooperative and quite interactive.  He asked when he was going to have this collection drained so he could go home.  The patient denies headache, fevers, sweats or chills.  He states that he is having some shortness of breath this morning but denies sputum production,cough or chest pain.  He is eating his breakfast and states that he has no nausea, vomiting, diarrhea or constipation.  There is no abdominal pain or other pain..    The patient was discussed with the cardiac nurse who is caring for him and told me she would let me know the plans for the day so that I could further direct patient care.  The palliative care nurse told me that patient had been made hospice care which surprised me considering patient talking about the collection drainage and going home on antibiotic in the setting of being full code.  Reached out to Dr. Gillespie and told her of my concerns and she stated that she would address the issue.  The patient was further discussed with Dr. Zhu who states that  he will update me with plan of care.        Objective:    Vitals:   Patient Vitals for the past 24 hrs:   Temp Pulse Resp SpO2   04/03/24 2000 -- 86 -- --   04/03/24 1900 97.7 °F (36.5 °C) 72 17 99 %   04/03/24 1500 97.3 °F (36.3 °C) 59 22 98 %   04/03/24 1400 -- 80 -- --   04/03/24 1200 -- 61 -- --   04/03/24 1100 97.4 °F (36.3 °C) 70 16 97 %   04/03/24 1026 -- 81 -- --   04/03/24 1000 -- 66 -- --   04/03/24 0845 -- -- -- 95 %   04/03/24 0700 97.9 °F (36.6 °C) 74 14 99 %   04/03/24 0558 -- 84 -- --   04/03/24 0350 -- 80 -- --   04/03/24 0300 97.7 °F (36.5 °C) 69 20 98 %   04/03/24 0154 -- 81 -- --   04/03/24 0000 97.7 °F (36.5 °C) 75 16 100 %   04/02/24 2148 -- 73 -- --   04/02/24 2118 -- 83 -- --       Physical Exam:    Gen: No apparent distress, very positive attitude,

## 2024-04-04 NOTE — PROGRESS NOTES
1930  Verbal bedside report given to RACHEL Lundy by RAISA Lara RN. Report included updated vitals and SBAR. Patient is in bed asleep and respirations are even and unlabored.     1900  LVAD dressing changed. All trash in room removed.     1600  PICC team at bedside.     1130  Ultrasound at bedside draining abscess.     0730  Verbal bedside report received from RACHEL Meza given to RAISA Lara RN. Report included vital signs, SBAR, and plan for the day. Patient rhythm NSR. Patient is in bed asleep and respirations are even and unlabored. Call bell is within reach.     Care Plan:    Problem: Safety - Adult  Goal: Free from fall injury  4/4/2024 1049 by Freda Lara RN  Outcome: Progressing  4/4/2024 0006 by Susana Haney RN  Outcome: Progressing  Flowsheets (Taken 4/4/2024 0006)  Free From Fall Injury: Instruct family/caregiver on patient safety     Problem: Discharge Planning  Goal: Discharge to home or other facility with appropriate resources  4/4/2024 1049 by Freda Lara RN  Outcome: Progressing  4/4/2024 0006 by Susana Haney RN  Outcome: Progressing  Flowsheets (Taken 4/3/2024 2000)  Discharge to home or other facility with appropriate resources: Identify barriers to discharge with patient and caregiver     Problem: ABCDS Injury Assessment  Goal: Absence of physical injury  4/4/2024 1049 by Freda Lara RN  Outcome: Progressing  4/4/2024 0006 by Susana Haney RN  Outcome: Progressing  Flowsheets (Taken 4/4/2024 0006)  Absence of Physical Injury: Implement safety measures based on patient assessment     Problem: Pain  Goal: Verbalizes/displays adequate comfort level or baseline comfort level  4/4/2024 1049 by Freda Lara RN  Outcome: Progressing  4/4/2024 0006 by Susana Haney RN  Outcome: Progressing  Flowsheets (Taken 4/3/2024 2000)  Verbalizes/displays adequate comfort level or baseline comfort level: Encourage patient to monitor pain and request assistance     Problem: Skin/Tissue Integrity  Goal:

## 2024-04-05 ENCOUNTER — APPOINTMENT (OUTPATIENT)
Facility: HOSPITAL | Age: 64
DRG: 871 | End: 2024-04-05
Attending: INTERNAL MEDICINE
Payer: MEDICARE

## 2024-04-05 LAB
ANION GAP SERPL CALC-SCNC: 4 MMOL/L (ref 5–15)
BUN SERPL-MCNC: 40 MG/DL (ref 6–20)
BUN/CREAT SERPL: 29 (ref 12–20)
CALCIUM SERPL-MCNC: 8.8 MG/DL (ref 8.5–10.1)
CHLORIDE SERPL-SCNC: 102 MMOL/L (ref 97–108)
CO2 SERPL-SCNC: 32 MMOL/L (ref 21–32)
CREAT SERPL-MCNC: 1.38 MG/DL (ref 0.7–1.3)
GLUCOSE SERPL-MCNC: 185 MG/DL (ref 65–100)
INR PPP: 2 (ref 0.9–1.1)
POTASSIUM SERPL-SCNC: 4.2 MMOL/L (ref 3.5–5.1)
PROTHROMBIN TIME: 20.3 SEC (ref 9–11.1)
SODIUM SERPL-SCNC: 138 MMOL/L (ref 136–145)

## 2024-04-05 PROCEDURE — 94640 AIRWAY INHALATION TREATMENT: CPT

## 2024-04-05 PROCEDURE — 6360000004 HC RX CONTRAST MEDICATION

## 2024-04-05 PROCEDURE — 2500000003 HC RX 250 WO HCPCS

## 2024-04-05 PROCEDURE — B51M1ZZ FLUOROSCOPY OF RIGHT UPPER EXTREMITY VEINS USING LOW OSMOLAR CONTRAST: ICD-10-PCS | Performed by: STUDENT IN AN ORGANIZED HEALTH CARE EDUCATION/TRAINING PROGRAM

## 2024-04-05 PROCEDURE — 6370000000 HC RX 637 (ALT 250 FOR IP): Performed by: STUDENT IN AN ORGANIZED HEALTH CARE EDUCATION/TRAINING PROGRAM

## 2024-04-05 PROCEDURE — 02HV33Z INSERTION OF INFUSION DEVICE INTO SUPERIOR VENA CAVA, PERCUTANEOUS APPROACH: ICD-10-PCS | Performed by: STUDENT IN AN ORGANIZED HEALTH CARE EDUCATION/TRAINING PROGRAM

## 2024-04-05 PROCEDURE — 36415 COLL VENOUS BLD VENIPUNCTURE: CPT

## 2024-04-05 PROCEDURE — 2060000000 HC ICU INTERMEDIATE R&B

## 2024-04-05 PROCEDURE — 2580000003 HC RX 258: Performed by: STUDENT IN AN ORGANIZED HEALTH CARE EDUCATION/TRAINING PROGRAM

## 2024-04-05 PROCEDURE — 6360000002 HC RX W HCPCS: Performed by: NURSE PRACTITIONER

## 2024-04-05 PROCEDURE — 2580000003 HC RX 258: Performed by: INTERNAL MEDICINE

## 2024-04-05 PROCEDURE — 6360000002 HC RX W HCPCS: Performed by: STUDENT IN AN ORGANIZED HEALTH CARE EDUCATION/TRAINING PROGRAM

## 2024-04-05 PROCEDURE — 99232 SBSQ HOSP IP/OBS MODERATE 35: CPT | Performed by: INTERNAL MEDICINE

## 2024-04-05 PROCEDURE — 76937 US GUIDE VASCULAR ACCESS: CPT

## 2024-04-05 PROCEDURE — 6370000000 HC RX 637 (ALT 250 FOR IP): Performed by: NURSE PRACTITIONER

## 2024-04-05 PROCEDURE — 85610 PROTHROMBIN TIME: CPT

## 2024-04-05 PROCEDURE — 6360000002 HC RX W HCPCS: Performed by: INTERNAL MEDICINE

## 2024-04-05 PROCEDURE — 80048 BASIC METABOLIC PNL TOTAL CA: CPT

## 2024-04-05 PROCEDURE — 6370000000 HC RX 637 (ALT 250 FOR IP): Performed by: INTERNAL MEDICINE

## 2024-04-05 RX ORDER — IPRATROPIUM BROMIDE AND ALBUTEROL SULFATE 2.5; .5 MG/3ML; MG/3ML
1 SOLUTION RESPIRATORY (INHALATION) EVERY 4 HOURS PRN
Status: DISCONTINUED | OUTPATIENT
Start: 2024-04-05 | End: 2024-04-09 | Stop reason: HOSPADM

## 2024-04-05 RX ORDER — ALBUTEROL SULFATE 90 UG/1
2 AEROSOL, METERED RESPIRATORY (INHALATION) EVERY 4 HOURS PRN
Qty: 18 G | Refills: 3 | Status: SHIPPED | OUTPATIENT
Start: 2024-04-05

## 2024-04-05 RX ORDER — WARFARIN SODIUM 1 MG/1
0.5 TABLET ORAL
Status: COMPLETED | OUTPATIENT
Start: 2024-04-05 | End: 2024-04-05

## 2024-04-05 RX ORDER — LIDOCAINE HYDROCHLORIDE 10 MG/ML
INJECTION, SOLUTION EPIDURAL; INFILTRATION; INTRACAUDAL; PERINEURAL PRN
Status: COMPLETED | OUTPATIENT
Start: 2024-04-05 | End: 2024-04-05

## 2024-04-05 RX ADMIN — SODIUM CHLORIDE, PRESERVATIVE FREE 10 ML: 5 INJECTION INTRAVENOUS at 20:58

## 2024-04-05 RX ADMIN — HYDRALAZINE HYDROCHLORIDE 50 MG: 50 TABLET ORAL at 20:58

## 2024-04-05 RX ADMIN — IOHEXOL 10 ML: 300 INJECTION, SOLUTION INTRAVENOUS at 13:50

## 2024-04-05 RX ADMIN — LIDOCAINE HYDROCHLORIDE 2 ML: 10 INJECTION, SOLUTION EPIDURAL; INFILTRATION; INTRACAUDAL; PERINEURAL at 13:23

## 2024-04-05 RX ADMIN — Medication 1 CAPSULE: at 08:44

## 2024-04-05 RX ADMIN — METOPROLOL SUCCINATE 50 MG: 50 TABLET, EXTENDED RELEASE ORAL at 08:44

## 2024-04-05 RX ADMIN — PANTOPRAZOLE SODIUM 40 MG: 40 TABLET, DELAYED RELEASE ORAL at 15:01

## 2024-04-05 RX ADMIN — WATER 40 MG: 1 INJECTION INTRAMUSCULAR; INTRAVENOUS; SUBCUTANEOUS at 17:26

## 2024-04-05 RX ADMIN — WATER 40 MG: 1 INJECTION INTRAMUSCULAR; INTRAVENOUS; SUBCUTANEOUS at 08:43

## 2024-04-05 RX ADMIN — HYDRALAZINE HYDROCHLORIDE 50 MG: 50 TABLET ORAL at 06:57

## 2024-04-05 RX ADMIN — AMLODIPINE BESYLATE 5 MG: 5 TABLET ORAL at 20:58

## 2024-04-05 RX ADMIN — WATER 2000 MG: 1 INJECTION INTRAMUSCULAR; INTRAVENOUS; SUBCUTANEOUS at 15:00

## 2024-04-05 RX ADMIN — METOPROLOL SUCCINATE 50 MG: 50 TABLET, EXTENDED RELEASE ORAL at 20:58

## 2024-04-05 RX ADMIN — WARFARIN SODIUM 0.5 MG: 1 TABLET ORAL at 17:26

## 2024-04-05 RX ADMIN — HYDRALAZINE HYDROCHLORIDE 50 MG: 50 TABLET ORAL at 15:01

## 2024-04-05 RX ADMIN — WATER 40 MG: 1 INJECTION INTRAMUSCULAR; INTRAVENOUS; SUBCUTANEOUS at 02:26

## 2024-04-05 RX ADMIN — SODIUM CHLORIDE, PRESERVATIVE FREE 10 ML: 5 INJECTION INTRAVENOUS at 08:44

## 2024-04-05 RX ADMIN — ALBUTEROL SULFATE 2.5 MG: 2.5 SOLUTION RESPIRATORY (INHALATION) at 18:19

## 2024-04-05 RX ADMIN — PANTOPRAZOLE SODIUM 40 MG: 40 TABLET, DELAYED RELEASE ORAL at 06:57

## 2024-04-05 RX ADMIN — AMLODIPINE BESYLATE 5 MG: 5 TABLET ORAL at 08:44

## 2024-04-05 ASSESSMENT — PAIN SCALES - GENERAL
PAINLEVEL_OUTOF10: 0

## 2024-04-05 ASSESSMENT — PULMONARY FUNCTION TESTS
PIF_VALUE: 0
PIF_VALUE: 0

## 2024-04-05 NOTE — PROGRESS NOTES
[facility]  [facility] in [place], [place]  Inpatient Progress Note      Patient name: Sanford Joiner  Patient : 1960  Patient MRN: [id number]  Date of service: 24    Reason for Referral  LVAD management      ASSESSMENT:  Sanford Joiner is a 64 y.o. male admitted with driveline infection and possible upper GI bleed, reporting 3-4 days of dark stools.   History of chronic systolic heart failure due to non ischemic cardiomyopathy, s/p HM3 LVAD implantation ([facility] 2023) as destination therapy, stage D, remains NYHA class IV despite LVAD support due to severe COPD; currently with recovered LVEF to 55% and small LV cavity with resulting low flow alarms; optimal GDMT limited by hypovolemia  Patient is not a candidate for heart transplant due to advanced COPD -  declined by [facility] for both LVAD-DT and transplant and for the same reasons declined for LVAD-DT by [facility]   Team discussed patient at [facility] 3/22/24 to determine risk of pump explant and alternatives of washout or palliative antibiotics. CT chest shows 2 fluid collections surrounding the driveline and outflow graft concerning for infection. PFTS performed, showing very severe obstructive lung disease with FEV1<1L. Discussed with CV Surgery, not a surgical candiate. Plan for palliative antibiotic management of LVAD pump infection.    INTERVAL HISTORY:  Patient agitated and very inappropriate to nursing staff overnight, pinched nurse this morning and cursing at staff. Refused to be placed on LVAD tower.     RECOMMENDATIONS:  Bacteremia, LVAD pump infection  Blood cultures 3/27 negative  Drain in abscess now. Appreciate IR assistance  PICC line unable to be placed. Needs tunneled catheter in IR today  IV antibiotics per ID    COPD   PFT reviewed. Very severe obstructive lung disease  Trelegy, Duonebs, steroids  Pulmonary following, appreciate recs  Evaluate for home oxygen    Left Ventricular Assist  4 mg, 4 mg, Oral, Q8H PRN **OR** ondansetron (ZOFRAN) injection 4 mg, 4 mg, IntraVENous, Q6H PRN, Aidee Gonzales MD    polyethylene glycol (GLYCOLAX) packet 17 g, 17 g, Oral, Daily PRN, Aidee Gonzales MD    acetaminophen (TYLENOL) tablet 650 mg, 650 mg, Oral, Q6H PRN, 650 mg at 04/04/24 1434 **OR** acetaminophen (TYLENOL) suppository 650 mg, 650 mg, Rectal, Q6H PRN, Aidee Gonzales MD    warfarin - pharmacy to dose, , Other, RX Placeholder, Aidee Gonzales MD    hydrALAZINE (APRESOLINE) injection 10 mg, 10 mg, IntraVENous, Q4H PRN, Nessa Maurer APRN - NP     PATIENT CARE TEAM:  Patient Care Team:  Ranjan Porter MD as PCP - General  Ranjan Porter MD as PCP - Empaneled Provider     Thank you for allowing me to participate in this patient's care.    Tracie Gillespie MD  [facility]  [facility]  [address], [address]  Phone: [phone]

## 2024-04-05 NOTE — PLAN OF CARE
1930: Bedside and Verbal shift change report given to RACHEL Lundy (oncoming nurse) by RACHEL Barba (offgoing nurse). Report included the following information Nurse Handoff Report.        Problem: Chronic Conditions and Co-morbidities  Goal: Patient's chronic conditions and co-morbidity symptoms are monitored and maintained or improved  4/5/2024 0022 by Ta James RN  Flowsheets (Taken 4/5/2024 0022)  Care Plan - Patient's Chronic Conditions and Co-Morbidity Symptoms are Monitored and Maintained or Improved: Monitor and assess patient's chronic conditions and comorbid symptoms for stability, deterioration, or improvement  4/4/2024 1049 by Freda Lara RN  Outcome: Not Progressing     Problem: Safety - Adult  Goal: Free from fall injury  4/5/2024 0022 by Ta James RN  Flowsheets (Taken 4/5/2024 0022)  Free From Fall Injury: Instruct family/caregiver on patient safety  4/4/2024 1049 by Freda Lara RN  Outcome: Progressing     Problem: Discharge Planning  Goal: Discharge to home or other facility with appropriate resources  4/4/2024 1049 by Freda Lara RN  Outcome: Progressing     Problem: ABCDS Injury Assessment  Goal: Absence of physical injury  4/5/2024 0022 by Ta James RN  Flowsheets (Taken 4/5/2024 0022)  Absence of Physical Injury: Implement safety measures based on patient assessment  4/4/2024 1049 by Freda Lara RN  Outcome: Progressing     Problem: Pain  Goal: Verbalizes/displays adequate comfort level or baseline comfort level  4/5/2024 0022 by Ta James RN  Flowsheets (Taken 4/5/2024 0022)  Verbalizes/displays adequate comfort level or baseline comfort level:   Encourage patient to monitor pain and request assistance   Assess pain using appropriate pain scale  4/4/2024 1049 by Freda Lara RN  Outcome: Progressing     Problem: Skin/Tissue Integrity  Goal: Absence of new skin breakdown  Description: 1.  Monitor for areas of redness and/or skin breakdown  2.  Assess vascular access sites

## 2024-04-05 NOTE — PROGRESS NOTES
Infectious Disease Progress Note       Subjective:      The patient was personally evaluated and examined by me at bedside.  The patient is lying in bed after having drainage of collection below and to the right of the xiphoid process adjacent to driveline.  Per discussion with critical care nurse the patient had more than 300 cc of pink-tinged fluid removed on IR aspiration.  The patient tolerated the procedure without event and is feeling well at time I see him.     The patient denies fevers, sweats, chills or headache.  He denies other pain as well.  He states that his breathing is better today and possibly this may be related to drainage of large collection compromising his lower right lung.  The patient is not wearing oxygen today, has no tachypnea and has excellent oxygen saturation.  This is greatly improved from his status yesterday.  The patient states that he is eating well and has no nausea, vomiting, diarrhea or constipation.  He has minimal pain at the site of his I&D and  drain which contains blood-tinged fluid pending culture results.    The patient was discussed with hospitalist service caring for patient and plan is to insert PICC line /midline for outpatient IV antibiotic.  Per communication with cardiologist patient will continue on IV antibiotic for treatment of endovascular infection /driveline infection  with4 to 6 weeks of targeted antibiotic.      Objective:    Vitals:   Patient Vitals for the past 24 hrs:   Temp Pulse Resp SpO2   04/05/24 0700 -- 87 19 --   04/05/24 0542 -- (!) 49 -- --   04/05/24 0342 -- 63 -- --   04/05/24 0300 97.9 °F (36.6 °C) 59 14 93 %   04/05/24 0215 -- 91 18 92 %   04/05/24 0150 -- 87 -- --   04/04/24 2300 -- (!) 108 19 --   04/04/24 2156 -- 67 -- --   04/04/24 2000 -- 79 -- --   04/04/24 1905 97.8 °F (36.6 °C) 66 18 --   04/04/24 1800 -- 79 -- --   04/04/24 1525 97.9 °F (36.6 °C) 74 15 92 %   04/04/24 1400 -- 77 -- --   04/04/24 1202 98.5 °F (36.9 °C) 71 18 94 %  Requests NO SPECIAL REQUESTS      Gram Stain 2+ WBCS SEEN      Gram Stain RARE Gram positive cocci IN PAIRS      Culture PENDING    Protime-INR    Collection Time: 04/05/24  2:32 AM   Result Value Ref Range    INR 2.0 (H) 0.9 - 1.1      Protime 20.3 (H) 9.0 - 11.1 sec   Basic Metabolic Panel    Collection Time: 04/05/24  2:32 AM   Result Value Ref Range    Sodium 138 136 - 145 mmol/L    Potassium 4.2 3.5 - 5.1 mmol/L    Chloride 102 97 - 108 mmol/L    CO2 32 21 - 32 mmol/L    Anion Gap 4 (L) 5 - 15 mmol/L    Glucose 185 (H) 65 - 100 mg/dL    BUN 40 (H) 6 - 20 MG/DL    Creatinine 1.38 (H) 0.70 - 1.30 MG/DL    Bun/Cre Ratio 29 (H) 12 - 20      Est, Glom Filt Rate 57 (L) >60 ml/min/1.73m2    Calcium 8.8 8.5 - 10.1 MG/DL           Micro:     Blood:      Urine:     Synovial/Joint fluid:     Sputum/BAL/Pleural:     Peritoneal:                Wound: 04/04/24      Pathology:      Imaging:      Assessment / Plan          1.  Collection drainage by IR via ultrasound guidance placement of drainage catheter around LVAD driveline 300 mL of pus aspirated/sample sent for culture           2 Bacteremia with MSSA resolved      Ceftriaxone 2 g every 24 hours with last dose anticipated 05/08/24    3.  Driveline and wound cultures in the past have been positive for: MSSA,E cloacae,       Streptococcus in view of recent collection would extend treatment to 05/08/24       Per discussion with Dr. Gillespie with agreement to prolonged treatment in this       Worrisome in the setting /change to oral antibiotic therapy after IV likely for life       Patient currently with increased leukocytosis/left shift without fever                Thank you for the opportunity to participate in the care of this patient.   Please contact with questions or concerns.      Prema June MD

## 2024-04-05 NOTE — PLAN OF CARE
INFECTIOUS DISEASE discharge plan:          PICC line insertion for outpatient antibiotic        PICC line removal after last dose 05/08/24    2.   PICC care per infusion protocol    3.   Ceftriaxone Q 24 H with last dose 05/08/24    4.   Weekly labs CBC w/diff, Chem 7 with results sent to PMD                                                                                           ID Clinic          CT  Chest in 14 days to assess drain     5.   Follow up with   ID Clinic Dr Rajan  in 21 days                                  PMD  in 10 to 14 days                                  Cardiology as scheduled      For ID Questions Only    [facility]   Call  [phone]

## 2024-04-05 NOTE — PROGRESS NOTES
Hospitalist Progress Note  Abdi Wagner MD  Answering service: [phone] OR [phone] from in house phone        NAME:  Sanford Joiner  :  1960  MRN:  [id number]      Admission Summary:   Sanford Joiner is a 64 y.o. 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  Patient states he had been in his usual state of health until about 24 hours ago when he developed progressive dyspnea.  Initially had dyspneic symptoms with minimal exertion and further progression to dyspnea occurring at rest.  He has had what he describes as a mild nonproductive cough.  Denies any ongoing tobacco use.  Denies any recent travel or sick contacts.  The patient denies any fever, chills, chest or abdominal pain, nausea, vomiting, congestion, recent illness, palpitations, or dysuria.     Remarkable vitals on ER Presentation: Respiratory rate to 112  Labs Remarkable for: SpO2 to mid 80s on room air  ER Images: Chest x-ray showed no acute process  ER Rx: Cefepime, DuoNeb, 250 cc normal saline bolus    Interval history / Subjective:   Seen and examined. Resting comfortably at the time that I saw.   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

## 2024-04-05 NOTE — PROGRESS NOTES
1930  Verbal bedside report given to RACHEL Lopez by RAISA Lara RN. Report included updated vitals and SBAR. Patient is in bed awake and respirations are even and unlabored. Wife at bedside.     1900  LVAD dressing change complete.     1515  Irrigated drain with 10 mL normal saline flush with RACHEL Luu. PICC line flushed, blood return noted, alcohol caps applied.     1415  Patient back in room.     1400  Complete linen change. Room cleaned.     1355  Report called from Angio.     1245  Patient going off floor to Angio with nurses. Patient emergency bag with batteries and controller in stretcher. LVAD tower going down with stretcher.     1235  Code Lynco called.     1220  Angio holding called. Patient can come down in 15 minutes.    1005  Spoke with LVAD coordinator, patient can be in IR/Angio without LVAD nurse. Patient will be transported with LVAD nurse downstairs.     0730  Verbal bedside report received from RACHEL Lundy given to RAISA Lara RN. Report included vital signs, SBAR, and plan for the day. Patient rhythm NSR. Patient is in bed asleep and respirations are even and unlabored. Call bell is within reach.     Care Plan:    Problem: Safety - Adult  Goal: Free from fall injury  4/5/2024 1031 by Freda Lara RN  Outcome: Progressing  4/5/2024 0022 by Ta James RN  Flowsheets (Taken 4/5/2024 0022)  Free From Fall Injury: Instruct family/caregiver on patient safety     Problem: Discharge Planning  Goal: Discharge to home or other facility with appropriate resources  Outcome: Progressing     Problem: Chronic Conditions and Co-morbidities  Goal: Patient's chronic conditions and co-morbidity symptoms are monitored and maintained or improved  4/5/2024 1031 by Freda Lara RN  Outcome: Progressing  4/5/2024 0022 by Ta James RN  Flowsheets (Taken 4/5/2024 0022)  Care Plan - Patient's Chronic Conditions and Co-Morbidity Symptoms are Monitored and Maintained or Improved: Monitor and assess patient's chronic conditions  and comorbid symptoms for stability, deterioration, or improvement     Problem: ABCDS Injury Assessment  Goal: Absence of physical injury  4/5/2024 1031 by Freda Lara RN  Outcome: Progressing  4/5/2024 0022 by Ta James RN  Flowsheets (Taken 4/5/2024 0022)  Absence of Physical Injury: Implement safety measures based on patient assessment     Problem: Pain  Goal: Verbalizes/displays adequate comfort level or baseline comfort level  4/5/2024 1031 by Freda Lara RN  Outcome: Progressing  4/5/2024 0022 by Ta James RN  Flowsheets (Taken 4/5/2024 0022)  Verbalizes/displays adequate comfort level or baseline comfort level:   Encourage patient to monitor pain and request assistance   Assess pain using appropriate pain scale     Problem: Skin/Tissue Integrity  Goal: Absence of new skin breakdown  Description: 1.  Monitor for areas of redness and/or skin breakdown  2.  Assess vascular access sites hourly  3.  Every 4-6 hours minimum:  Change oxygen saturation probe site  4.  Every 4-6 hours:  If on nasal continuous positive airway pressure, respiratory therapy assess nares and determine need for appliance change or resting period.  4/5/2024 1031 by Freda Lara RN  Outcome: Progressing  4/5/2024 0022 by Ta James RN  Outcome: Progressing

## 2024-04-05 NOTE — PROGRESS NOTES
mL IVPB premix, 2,000 mg, IntraVENous, PRN, iAdee Gonzales MD    ondansetron (ZOFRAN-ODT) disintegrating tablet 4 mg, 4 mg, Oral, Q8H PRN **OR** ondansetron (ZOFRAN) injection 4 mg, 4 mg, IntraVENous, Q6H PRN, Aidee Gonzales MD    polyethylene glycol (GLYCOLAX) packet 17 g, 17 g, Oral, Daily PRN, Aidee Gonzales MD    acetaminophen (TYLENOL) tablet 650 mg, 650 mg, Oral, Q6H PRN, 650 mg at 04/04/24 1434 **OR** acetaminophen (TYLENOL) suppository 650 mg, 650 mg, Rectal, Q6H PRN, Aidee Gonzales MD    warfarin - pharmacy to dose, , Other, RX Placeholder, Aidee Gonzales MD    hydrALAZINE (APRESOLINE) injection 10 mg, 10 mg, IntraVENous, Q4H PRN, Nessa Maurer APRN - NP      Labs:  Recent Results (from the past 24 hour(s))   Protime-INR    Collection Time: 04/05/24  2:32 AM   Result Value Ref Range    INR 2.0 (H) 0.9 - 1.1      Protime 20.3 (H) 9.0 - 11.1 sec   Basic Metabolic Panel    Collection Time: 04/05/24  2:32 AM   Result Value Ref Range    Sodium 138 136 - 145 mmol/L    Potassium 4.2 3.5 - 5.1 mmol/L    Chloride 102 97 - 108 mmol/L    CO2 32 21 - 32 mmol/L    Anion Gap 4 (L) 5 - 15 mmol/L    Glucose 185 (H) 65 - 100 mg/dL    BUN 40 (H) 6 - 20 MG/DL    Creatinine 1.38 (H) 0.70 - 1.30 MG/DL    Bun/Cre Ratio 29 (H) 12 - 20      Est, Glom Filt Rate 57 (L) >60 ml/min/1.73m2    Calcium 8.8 8.5 - 10.1 MG/DL           Micro:     Blood:      Urine:     Synovial/Joint fluid:     Sputum/BAL/Pleural:     Peritoneal:      Pathology:      Imaging:      Assessment / Plan          1.  IR drainage of collection via US guidance of 300 and mL pus removed       Culture pending will be ready within 24 to 48 hours       Microbiology was called with appearance of GPC pairs suggestive of Streptococcus       Could also represent sparse Staphylococcus       No GNR seen    2.  Driveline infection being treated with ceftriaxone 2 g every 24 hours with anticipated last dose 05/08/2024    3.  Per discussion with   Bernard patient will also be sent home with nasal cannula oxygen 2 L/min for comfort in the setting of his chronic COPD and intermittent shortness of breath    4.  After IV antibiotics completed patient will be placed on oral antibiotic on a chronic basis permanently in setting of chronic driveline infection    5.  Patient instructed to come to the ED at this hospital with any fever,, pain, local erythema or trouble with drain site or drain.    6.  Recent MSSA bacteremia resolved                                Thank you for the opportunity to participate in the care of this patient.   Please contact with questions or concerns.      Prema June MD

## 2024-04-05 NOTE — PROGRESS NOTES
Pt declined the use of his BiPAP at this time, pt is currently on Room Air. No distress noted, will continue to monitor.

## 2024-04-05 NOTE — PROGRESS NOTES
COPD Navigator Note      Attempts to evaluate and educate patient were unsuccessful due to the following barriers:    X Patient is not an appropriate candidate based on current criteria        Patient currently on NIV       Patient refused education at this time       Disposition (SNF/Hospice)       Patient's condition (SOB, increased WOB, etc.)          Patient is agitated and being aggressive with nursing staff today.

## 2024-04-05 NOTE — CARE COORDINATION
Transition of Care Plan:     RUR: 23%   Prior Level of Functioning: independent  Disposition: [facility], [facility] and home w family.  IV ABX training completed on 3/29.  Follow up appointments: [facility]  DME needed: Needs O2 testing   Transportation at discharge: family  IM/IMM Medicare/ letter given: 4/5  Caregiver Contact: spouse - Ashley Swan - p: [phone]  Discharge Caregiver contacted prior to discharge? planned  Care Conference needed? yes  Barriers to discharge: O2 testing    Patient will be discharging w a drain and CM called [facility] to let them know.  Spoke w Crys and she will contact the LVAD nurse to see if they can still accept and will call back if they can't but doesn't think it will be an issue. Anticipated d/c is today pending tunneled cath for IV ABX and ID note.  [facility] will be the infusion company and need final order.    Update 4pm: CM sent the updated line order and ID note to [facility].  Also called and left a message for the liaison, Lakisha to follow up on the plan bc patient had IV ABX training last Friday and did well since he's previously done a cardiac drip.     CM met w patient and wife at bedside to discuss the d/c plan w [facility] and [facility]. Wife is hoping he qualifies for O2 and reports when he moves in bed his stats drop.  CM reviewed an important message from Medicare and wife signed.  All questions have been answered and CM family patient well.     Patient will d/c tomorrow, 4/6 after oxygen testing is complete.   CM updated [facility] and [facility].    Laisha Ivey MSW CCM  Care Management

## 2024-04-05 NOTE — PROGRESS NOTES
Pt arrives via stretcher to angio department accompanied by this RN for Central line placement procedure. All assessments completed and consent was reviewed.  Education given was regarding procedure, no sedation, post-procedure care and  management/follow-up. Opportunity for questions was provided and all questions and concerns were addressed.       1415: TRANSFER - OUT REPORT:    Verbal report given to Freda RAMOS on Sanford Joiner  being transferred to [facility] for routine progression of patient care       Report consisted of patient's Situation, Background, Assessment and   Recommendations(SBAR).     Information from the following report(s) Nurse Handoff Report and Event Log was reviewed with the receiving nurse.           Lines:   PICC Double Lumen 04/05/24 Right Basilic (Active)       Peripheral IV 04/05/24 Left Antecubital (Active)   Site Assessment Clean, dry & intact 04/05/24 0915   Line Status Blood return noted;Capped;Flushed;Normal saline locked 04/05/24 0915   Line Care Cap changed;Connections checked and tightened;Chlorhexidine wipes;Ports disinfected 04/05/24 0915   Phlebitis Assessment No symptoms 04/05/24 0915   Infiltration Assessment 0 04/05/24 0915   Alcohol Cap Used Yes 04/05/24 0915   Dressing Status New dressing applied;Clean, dry & intact 04/05/24 0915   Dressing Type Transparent 04/05/24 0915   Dressing Intervention New 04/05/24 0915        Opportunity for questions and clarification was provided.      Patient transported with:  Monitor, O2 @ 2lpm, and Registered Nurse

## 2024-04-05 NOTE — PROGRESS NOTES
2015: Patient refusing to be hooked up to the tower, currently on batteries. This RN went into room and asked if patient could go on the tower tonight, pt declined and was educated to tell this RN when batteries get low and need to be changed. Patient stated, \"It is not my job to manage my batteries when I'm here, it's y'all's.\" Advised patient that this is why he needs to go on the tower, pt responded, \"I AM NOT GOING ON THE F*CKING TOWER!!!\" Pt extremely agitated, RN left room at this time.    2145: Walked into patient's room to see his O2 on the floor, when attempted to place patient back on O2, pt refused stating he wears it \"voluntarily.\" Pt continuously complaining about it being hard to breathe, but still refusing O2 or even wearing sPO2 monitor. Educated patient that he is here for COPD and need to visualize O2, but patient refusing.     2300: Pt refused VS, still agitated. Patient educated on why it is important to obtain VS, especially MAP since given BP meds this evening, pt still refusing/agitated.    0240: Pt very agreeable to AM labs and VS. Pt compliant with wearing sPO2 monitor now, satting at 94% RA.     0700: Went into obtain patient's AM vitals and patient would not straighten arm to let RN obtain MAP. RN asked if patient could straighten arm, and patient screamed out, \"WHAT THE F*CK!\" When asked patient what was wrong, patient grabbed this RN's arm and pinched it stating that was done to him. Told patient that was not appropriate and he can not treat staff like that.

## 2024-04-06 PROBLEM — T82.9XXA LEFT VENTRICULAR ASSIST DEVICE COMPLICATION: Status: ACTIVE | Noted: 2024-04-06

## 2024-04-06 LAB
ANION GAP SERPL CALC-SCNC: 2 MMOL/L (ref 5–15)
BUN SERPL-MCNC: 40 MG/DL (ref 6–20)
BUN/CREAT SERPL: 33 (ref 12–20)
CALCIUM SERPL-MCNC: 8.4 MG/DL (ref 8.5–10.1)
CHLORIDE SERPL-SCNC: 102 MMOL/L (ref 97–108)
CO2 SERPL-SCNC: 36 MMOL/L (ref 21–32)
CREAT SERPL-MCNC: 1.21 MG/DL (ref 0.7–1.3)
GLUCOSE SERPL-MCNC: 133 MG/DL (ref 65–100)
INR PPP: 1.7 (ref 0.9–1.1)
POTASSIUM SERPL-SCNC: 4.2 MMOL/L (ref 3.5–5.1)
PROTHROMBIN TIME: 17.6 SEC (ref 9–11.1)
SODIUM SERPL-SCNC: 140 MMOL/L (ref 136–145)

## 2024-04-06 PROCEDURE — 85610 PROTHROMBIN TIME: CPT

## 2024-04-06 PROCEDURE — 6370000000 HC RX 637 (ALT 250 FOR IP): Performed by: NURSE PRACTITIONER

## 2024-04-06 PROCEDURE — 6370000000 HC RX 637 (ALT 250 FOR IP): Performed by: STUDENT IN AN ORGANIZED HEALTH CARE EDUCATION/TRAINING PROGRAM

## 2024-04-06 PROCEDURE — 6370000000 HC RX 637 (ALT 250 FOR IP): Performed by: FAMILY MEDICINE

## 2024-04-06 PROCEDURE — 6360000002 HC RX W HCPCS: Performed by: INTERNAL MEDICINE

## 2024-04-06 PROCEDURE — 2060000000 HC ICU INTERMEDIATE R&B

## 2024-04-06 PROCEDURE — 2580000003 HC RX 258: Performed by: STUDENT IN AN ORGANIZED HEALTH CARE EDUCATION/TRAINING PROGRAM

## 2024-04-06 PROCEDURE — 2580000003 HC RX 258: Performed by: INTERNAL MEDICINE

## 2024-04-06 PROCEDURE — 94640 AIRWAY INHALATION TREATMENT: CPT

## 2024-04-06 PROCEDURE — 6370000000 HC RX 637 (ALT 250 FOR IP): Performed by: INTERNAL MEDICINE

## 2024-04-06 PROCEDURE — 99233 SBSQ HOSP IP/OBS HIGH 50: CPT | Performed by: NURSE PRACTITIONER

## 2024-04-06 PROCEDURE — 80048 BASIC METABOLIC PNL TOTAL CA: CPT

## 2024-04-06 PROCEDURE — 36415 COLL VENOUS BLD VENIPUNCTURE: CPT

## 2024-04-06 PROCEDURE — 6360000002 HC RX W HCPCS: Performed by: STUDENT IN AN ORGANIZED HEALTH CARE EDUCATION/TRAINING PROGRAM

## 2024-04-06 PROCEDURE — 93750 INTERROGATION VAD IN PERSON: CPT | Performed by: NURSE PRACTITIONER

## 2024-04-06 RX ORDER — WARFARIN SODIUM 1 MG/1
1 TABLET ORAL
Status: COMPLETED | OUTPATIENT
Start: 2024-04-06 | End: 2024-04-06

## 2024-04-06 RX ADMIN — WARFARIN SODIUM 1 MG: 1 TABLET ORAL at 18:12

## 2024-04-06 RX ADMIN — PANTOPRAZOLE SODIUM 40 MG: 40 TABLET, DELAYED RELEASE ORAL at 06:21

## 2024-04-06 RX ADMIN — WATER 40 MG: 1 INJECTION INTRAMUSCULAR; INTRAVENOUS; SUBCUTANEOUS at 18:11

## 2024-04-06 RX ADMIN — METOPROLOL SUCCINATE 50 MG: 50 TABLET, EXTENDED RELEASE ORAL at 09:40

## 2024-04-06 RX ADMIN — PANTOPRAZOLE SODIUM 40 MG: 40 TABLET, DELAYED RELEASE ORAL at 15:21

## 2024-04-06 RX ADMIN — WATER 2000 MG: 1 INJECTION INTRAMUSCULAR; INTRAVENOUS; SUBCUTANEOUS at 15:21

## 2024-04-06 RX ADMIN — HYDRALAZINE HYDROCHLORIDE 50 MG: 50 TABLET ORAL at 20:35

## 2024-04-06 RX ADMIN — HYDRALAZINE HYDROCHLORIDE 50 MG: 50 TABLET ORAL at 13:50

## 2024-04-06 RX ADMIN — SODIUM CHLORIDE, PRESERVATIVE FREE 10 ML: 5 INJECTION INTRAVENOUS at 09:41

## 2024-04-06 RX ADMIN — AMLODIPINE BESYLATE 5 MG: 5 TABLET ORAL at 09:40

## 2024-04-06 RX ADMIN — Medication 1 CAPSULE: at 09:40

## 2024-04-06 RX ADMIN — SODIUM CHLORIDE, PRESERVATIVE FREE 10 ML: 5 INJECTION INTRAVENOUS at 20:36

## 2024-04-06 RX ADMIN — WATER 40 MG: 1 INJECTION INTRAMUSCULAR; INTRAVENOUS; SUBCUTANEOUS at 09:40

## 2024-04-06 RX ADMIN — METOPROLOL SUCCINATE 50 MG: 50 TABLET, EXTENDED RELEASE ORAL at 20:35

## 2024-04-06 RX ADMIN — WATER 40 MG: 1 INJECTION INTRAMUSCULAR; INTRAVENOUS; SUBCUTANEOUS at 00:55

## 2024-04-06 RX ADMIN — HYDRALAZINE HYDROCHLORIDE 50 MG: 50 TABLET ORAL at 06:21

## 2024-04-06 RX ADMIN — AMLODIPINE BESYLATE 5 MG: 5 TABLET ORAL at 20:35

## 2024-04-06 ASSESSMENT — PAIN SCALES - GENERAL
PAINLEVEL_OUTOF10: 0

## 2024-04-06 NOTE — PROGRESS NOTES
1930  Verbal bedside report given to RACHEL Lopez by RAISA Lara RN. Report included updated vitals and SBAR. Patient is in bed awake and respirations are even and unlabored.     1900  LVAD dressing changed. New photo uploaded to chart.     1100  Attempted to call IR. No answer at this time.     1000  Drain sutures removed. Site cleansed with CHG and sterile gauze and transparent dressing applied. No oozing or bleeding at the site.     0915  Uresil drain pulled out by patient when sleeping. Consulted Nessa, ROSSANA.    0730  Verbal bedside report received from RACHEL Lopez given to RAISA Lara RN. Report included vital signs, SBAR, and plan for the day. Patient rhythm NSR. Patient is in bed awake and respirations are even and unlabored. Call bell is within reach.     Care Plan:    Problem: Safety - Adult  Goal: Free from fall injury  4/6/2024 1038 by Freda Lara RN  Outcome: Progressing  4/5/2024 2130 by Jessica Alcantar RN  Outcome: Progressing  Flowsheets (Taken 4/5/2024 0022 by Ta James, RN)  Free From Fall Injury: Instruct family/caregiver on patient safety     Problem: Discharge Planning  Goal: Discharge to home or other facility with appropriate resources  4/6/2024 1038 by Freda Lara RN  Outcome: Progressing  4/5/2024 2130 by Jessica Alcantar RN  Outcome: Progressing  Flowsheets (Taken 4/3/2024 2000 by Susana Haney, RN)  Discharge to home or other facility with appropriate resources: Identify barriers to discharge with patient and caregiver     Problem: Chronic Conditions and Co-morbidities  Goal: Patient's chronic conditions and co-morbidity symptoms are monitored and maintained or improved  4/6/2024 1038 by Freda Lara RN  Outcome: Progressing  4/5/2024 2130 by Jessica Alcantar RN  Outcome: Progressing  Flowsheets (Taken 4/5/2024 0022 by Ta James, RN)  Care Plan - Patient's Chronic Conditions and Co-Morbidity Symptoms are Monitored and Maintained or Improved: Monitor and assess patient's chronic conditions

## 2024-04-06 NOTE — PROGRESS NOTES
Hospitalist Progress Note  Abdi Wagner MD  Answering service: [phone] OR [phone] from in house phone        NAME:  Sanford Joiner  :  1960  MRN:  [id number]      Admission Summary:   Sanford Joiner is a 64 y.o. 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  Patient states he had been in his usual state of health until about 24 hours ago when he developed progressive dyspnea.  Initially had dyspneic symptoms with minimal exertion and further progression to dyspnea occurring at rest.  He has had what he describes as a mild nonproductive cough.  Denies any ongoing tobacco use.  Denies any recent travel or sick contacts.  The patient denies any fever, chills, chest or abdominal pain, nausea, vomiting, congestion, recent illness, palpitations, or dysuria.     Remarkable vitals on ER Presentation: Respiratory rate to 112  Labs Remarkable for: SpO2 to mid 80s on room air  ER Images: Chest x-ray showed no acute process  ER Rx: Cefepime, DuoNeb, 250 cc normal saline bolus    Interval history / Subjective:   Seen and examined. Resting comfortably at the time that I saw.   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

## 2024-04-06 NOTE — PLAN OF CARE
Problem: Safety - Adult  Goal: Free from fall injury  4/5/2024 2130 by Jessica Alcantar RN  Outcome: Progressing  Flowsheets (Taken 4/5/2024 0022 by Ta James, RN)  Free From Fall Injury: Instruct family/caregiver on patient safety  4/5/2024 1031 by Freda Lara RN  Outcome: Progressing     Problem: Discharge Planning  Goal: Discharge to home or other facility with appropriate resources  4/5/2024 2130 by Jessica Alcantar RN  Outcome: Progressing  Flowsheets (Taken 4/3/2024 2000 by Susana Haney RN)  Discharge to home or other facility with appropriate resources: Identify barriers to discharge with patient and caregiver  4/5/2024 1031 by Freda Lara RN  Outcome: Progressing     Problem: Chronic Conditions and Co-morbidities  Goal: Patient's chronic conditions and co-morbidity symptoms are monitored and maintained or improved  4/5/2024 2130 by Jessica Alcantar RN  Outcome: Progressing  Flowsheets (Taken 4/5/2024 0022 by Ta James RN)  Care Plan - Patient's Chronic Conditions and Co-Morbidity Symptoms are Monitored and Maintained or Improved: Monitor and assess patient's chronic conditions and comorbid symptoms for stability, deterioration, or improvement  4/5/2024 1031 by Freda Lara RN  Outcome: Progressing     Problem: ABCDS Injury Assessment  Goal: Absence of physical injury  4/5/2024 1031 by Freda Lara RN  Outcome: Progressing     Problem: Pain  Goal: Verbalizes/displays adequate comfort level or baseline comfort level  4/5/2024 1031 by Freda Lara RN  Outcome: Progressing     Problem: Skin/Tissue Integrity  Goal: Absence of new skin breakdown  Description: 1.  Monitor for areas of redness and/or skin breakdown  2.  Assess vascular access sites hourly  3.  Every 4-6 hours minimum:  Change oxygen saturation probe site  4.  Every 4-6 hours:  If on nasal continuous positive airway pressure, respiratory therapy assess nares and determine need for appliance change or resting period.  4/5/2024 1031
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
## 2024-04-06 NOTE — PROGRESS NOTES
Hospitalist Progress Note  Abdi Wagner MD  Answering service: [phone] OR [phone] from in house phone        NAME:  Sanford Joiner  :  1960  MRN:  [id number]      Admission Summary:   Sanford Joiner is a 64 y.o. 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  Patient states he had been in his usual state of health until about 24 hours ago when he developed progressive dyspnea.  Initially had dyspneic symptoms with minimal exertion and further progression to dyspnea occurring at rest.  He has had what he describes as a mild nonproductive cough.  Denies any ongoing tobacco use.  Denies any recent travel or sick contacts.  The patient denies any fever, chills, chest or abdominal pain, nausea, vomiting, congestion, recent illness, palpitations, or dysuria.     Remarkable vitals on ER Presentation: Respiratory rate to 112  Labs Remarkable for: SpO2 to mid 80s on room air  ER Images: Chest x-ray showed no acute process  ER Rx: Cefepime, DuoNeb, 250 cc normal saline bolus    Interval history / Subjective:   Seen and examined. Resting comfortably at the time that I saw.   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

## 2024-04-06 NOTE — PROGRESS NOTES
Pharmacist Note - Warfarin Dosing  Consult provided for this 64 y.o.male to manage warfarin for LVAD.    INR Goal: 1.8-2.2  (due to recurrent bleeding)    Home regimen: 2 mg PO daily   -supra-therapeutic at last AC visit; dose was being adjusted    Drugs that may increase INR: None  Drugs that may decrease INR: None  Other medications that may increase bleeding risk: None  Risk factors: None  Daily INR ordered through: 4/30    Recent Labs     04/04/24  0409 04/05/24  0232 04/06/24  0342   HGB 7.8*  --   --    INR 2.1* 2.0* 1.7*     Date               INR                  Dose  3/19  7/9.2  Hold - vitamin K 2.5 mg PO x 1  3/20  2.4  0.5 mg  3/21  1.5  0.5 mg  3/22  1.7  0.5 mg   3/23  2.2  hold   3/24  2.2  0.5 mg   3/25  2.3  hold   3/26                2.1                   0.5 mg  3/27                1.7                   0.5 mg  3/28                1.7                   0.5 mg  3/29                1.5                   1 mg  3/30  1.4  0.5 mg   3/30  1.4  1.5 mg   3/31                1.5                    1.5 mg  4/1                  1.6                    1 mg  4/2                  1.8                    0.5 mg  4/3                  2.0                    0.5 mg  4/4                  2.1                    0.5 mg  4/5                  2.0                    0.5 mg  4/6                  1.7                    1 mg                                                                               Assessment/ Plan:   Will order 1 mg warfarin today.     Pharmacy will continue to monitor daily and adjust therapy as indicated.  Please contact the pharmacist at [phone] for outpatient recommendations if needed.

## 2024-04-06 NOTE — PROGRESS NOTES
[facility]  Good Help to Those in Need  [phone]    Hospice Liaison Note   Patient Name: Sanford Joiner  YOB: 1960  Age: 64 y.o.      Hospice Liaison Note:    Chart reviewed for updates in plan of care.    Hospice informed by palliative team that the patient has decided to go home with HH and IV antibiotics. Pt may transition to Hospice in the future.     [facility] has signed off.    Please call Hospice team to offer support for patient, family or staff if pt declines or if family wish to meet with [facility] prior to discharge.    Thank you for your coordination with the hospice plan of care      Joanne Omalley RN, [facility]  Hospice Nurse Liaison  [phone] [place]  [phone] Office

## 2024-04-06 NOTE — DISCHARGE INSTRUCTIONS
You were admitted and treated for an infection including abscess and staph bacteria in the bloodstream  You have been cleared for discharge home on IV antibiotics and you also will go home on IV antibiotics  - monitor oxygen levels with a finger pulse ox and adjust oxygen to keep oxygen levels 90-95%  Daily weights, resume cardiac medications per advanced heart failure team recommendations- LVAD at current settings  Follow up with cardiology as instructed    Discharge IV antibiotic plan from infectious disease  PICC line insertion for outpatient antibiotic        PICC line removal after last dose 05/08/24  2.   PICC care per infusion protocol   3.   Ceftriaxone Q 24 H with last dose 05/08/24  4.   Weekly labs CBC w/diff, Chem 7 with results sent to [facility]         CT  Chest in 14 days to assess drain    5.   Follow up with   ID Clinic Dr Rajan  in 21 days                                  PMD  in 10 to 14 days                                  Cardiology as scheduled  6.  After IV antibiotics completed patient should be placed on oral antibiotic on a chronic basis permanently in setting of chronic driveline infection per ID recs    For ID Questions Only    "[place]   Call  [phone]    Advanced heat failure team to schedule you a repeat Chest CT scan to be done in 2 weeks  To assess possible reaccumulation of fluid collection    Resume coumadin/ Warfarin, goal INR 1.8-2.2 due to recurrent bleeding   Get your INR checked per previous recommendations    oxygen

## 2024-04-06 NOTE — CARE COORDINATION
Transition of Care Plan:    RUR: 23% - high  Prior Level of Functioning: independent  Disposition: home health and home infusion  Follow up appointments: [facility]  DME needed: home oxygen  Transportation at discharge: family  IM/IMM Medicare/ letter given: 4/5/24  Caregiver Contact: wife - Ashley Swan - [phone]  Discharge Caregiver contacted prior to discharge? no  Care Conference needed? no  Barriers to discharge: home oxygen set up; IV abx set up; drain?    CM updated by [facility] RN that home oxygen order placed and IV abx order placed yesterday for discharge today. Patient's abdominal drain was found dislodged upon morning assessment of patient. [facility] RN to follow up with treatment team for plan re replacement of drain.     CM sent home oxygen order and request to [facility]. Requested portable tank to be delivered to the bedside. Requested provider signature.    Confirmed with [facility] that patient will discharge today. Patient to have IV abx administered at 1530 inpatient then will be able to discharge. Transportation may be an issue - CM to follow up.    Decision regarding drain, home oxygen set up, and confirmation of IV abx delivery remain pending for discharge.    UPDATE:  Patient to stay until Monday for IR re drain placement.    Disposition Plan:  Home Health: [facility]  Home Infusion: [facility]  Home DME: Oxygen concentrator and portable tank  Transportation: family vs Hansel Mckeon, MPH  Care Manager l [facility]  Available via [email or web address]

## 2024-04-07 LAB
ANION GAP SERPL CALC-SCNC: 4 MMOL/L (ref 5–15)
BUN SERPL-MCNC: 39 MG/DL (ref 6–20)
BUN/CREAT SERPL: 32 (ref 12–20)
CALCIUM SERPL-MCNC: 8.2 MG/DL (ref 8.5–10.1)
CHLORIDE SERPL-SCNC: 103 MMOL/L (ref 97–108)
CO2 SERPL-SCNC: 32 MMOL/L (ref 21–32)
CREAT SERPL-MCNC: 1.22 MG/DL (ref 0.7–1.3)
GLUCOSE SERPL-MCNC: 203 MG/DL (ref 65–100)
INR PPP: 1.5 (ref 0.9–1.1)
POTASSIUM SERPL-SCNC: 4.3 MMOL/L (ref 3.5–5.1)
PROTHROMBIN TIME: 15.5 SEC (ref 9–11.1)
SODIUM SERPL-SCNC: 139 MMOL/L (ref 136–145)

## 2024-04-07 PROCEDURE — 2580000003 HC RX 258: Performed by: INTERNAL MEDICINE

## 2024-04-07 PROCEDURE — 6360000002 HC RX W HCPCS: Performed by: INTERNAL MEDICINE

## 2024-04-07 PROCEDURE — 93750 INTERROGATION VAD IN PERSON: CPT | Performed by: NURSE PRACTITIONER

## 2024-04-07 PROCEDURE — 80048 BASIC METABOLIC PNL TOTAL CA: CPT

## 2024-04-07 PROCEDURE — 2060000000 HC ICU INTERMEDIATE R&B

## 2024-04-07 PROCEDURE — 6360000002 HC RX W HCPCS: Performed by: STUDENT IN AN ORGANIZED HEALTH CARE EDUCATION/TRAINING PROGRAM

## 2024-04-07 PROCEDURE — 94760 N-INVAS EAR/PLS OXIMETRY 1: CPT

## 2024-04-07 PROCEDURE — 6370000000 HC RX 637 (ALT 250 FOR IP): Performed by: FAMILY MEDICINE

## 2024-04-07 PROCEDURE — 2580000003 HC RX 258: Performed by: STUDENT IN AN ORGANIZED HEALTH CARE EDUCATION/TRAINING PROGRAM

## 2024-04-07 PROCEDURE — 36415 COLL VENOUS BLD VENIPUNCTURE: CPT

## 2024-04-07 PROCEDURE — 85610 PROTHROMBIN TIME: CPT

## 2024-04-07 PROCEDURE — 6370000000 HC RX 637 (ALT 250 FOR IP): Performed by: INTERNAL MEDICINE

## 2024-04-07 PROCEDURE — 6370000000 HC RX 637 (ALT 250 FOR IP): Performed by: NURSE PRACTITIONER

## 2024-04-07 PROCEDURE — 6360000002 HC RX W HCPCS: Performed by: NURSE PRACTITIONER

## 2024-04-07 PROCEDURE — 6370000000 HC RX 637 (ALT 250 FOR IP): Performed by: STUDENT IN AN ORGANIZED HEALTH CARE EDUCATION/TRAINING PROGRAM

## 2024-04-07 PROCEDURE — 99233 SBSQ HOSP IP/OBS HIGH 50: CPT | Performed by: NURSE PRACTITIONER

## 2024-04-07 RX ORDER — WARFARIN SODIUM 1 MG/1
1 TABLET ORAL
Status: COMPLETED | OUTPATIENT
Start: 2024-04-07 | End: 2024-04-07

## 2024-04-07 RX ORDER — ENOXAPARIN SODIUM 100 MG/ML
1 INJECTION SUBCUTANEOUS 2 TIMES DAILY
Status: DISCONTINUED | OUTPATIENT
Start: 2024-04-07 | End: 2024-04-09 | Stop reason: HOSPADM

## 2024-04-07 RX ADMIN — METOPROLOL SUCCINATE 50 MG: 50 TABLET, EXTENDED RELEASE ORAL at 20:37

## 2024-04-07 RX ADMIN — WATER 40 MG: 1 INJECTION INTRAMUSCULAR; INTRAVENOUS; SUBCUTANEOUS at 01:28

## 2024-04-07 RX ADMIN — PANTOPRAZOLE SODIUM 40 MG: 40 TABLET, DELAYED RELEASE ORAL at 17:15

## 2024-04-07 RX ADMIN — PANTOPRAZOLE SODIUM 40 MG: 40 TABLET, DELAYED RELEASE ORAL at 06:19

## 2024-04-07 RX ADMIN — HYDRALAZINE HYDROCHLORIDE 50 MG: 50 TABLET ORAL at 06:19

## 2024-04-07 RX ADMIN — SODIUM CHLORIDE, PRESERVATIVE FREE 10 ML: 5 INJECTION INTRAVENOUS at 08:36

## 2024-04-07 RX ADMIN — HYDRALAZINE HYDROCHLORIDE 50 MG: 50 TABLET ORAL at 20:37

## 2024-04-07 RX ADMIN — WATER 40 MG: 1 INJECTION INTRAMUSCULAR; INTRAVENOUS; SUBCUTANEOUS at 08:35

## 2024-04-07 RX ADMIN — WATER 40 MG: 1 INJECTION INTRAMUSCULAR; INTRAVENOUS; SUBCUTANEOUS at 17:15

## 2024-04-07 RX ADMIN — ENOXAPARIN SODIUM 70 MG: 100 INJECTION SUBCUTANEOUS at 14:28

## 2024-04-07 RX ADMIN — WARFARIN SODIUM 1 MG: 1 TABLET ORAL at 17:15

## 2024-04-07 RX ADMIN — HYDRALAZINE HYDROCHLORIDE 50 MG: 50 TABLET ORAL at 14:22

## 2024-04-07 RX ADMIN — ENOXAPARIN SODIUM 70 MG: 100 INJECTION SUBCUTANEOUS at 20:37

## 2024-04-07 RX ADMIN — METOPROLOL SUCCINATE 50 MG: 50 TABLET, EXTENDED RELEASE ORAL at 08:35

## 2024-04-07 RX ADMIN — AMLODIPINE BESYLATE 5 MG: 5 TABLET ORAL at 20:37

## 2024-04-07 RX ADMIN — AMLODIPINE BESYLATE 5 MG: 5 TABLET ORAL at 08:35

## 2024-04-07 RX ADMIN — SODIUM CHLORIDE, PRESERVATIVE FREE 10 ML: 5 INJECTION INTRAVENOUS at 20:38

## 2024-04-07 RX ADMIN — Medication 1 CAPSULE: at 08:35

## 2024-04-07 RX ADMIN — WATER 2000 MG: 1 INJECTION INTRAMUSCULAR; INTRAVENOUS; SUBCUTANEOUS at 14:21

## 2024-04-07 ASSESSMENT — PAIN SCALES - GENERAL
PAINLEVEL_OUTOF10: 0

## 2024-04-07 NOTE — PROGRESS NOTES
reviewed and interpreted patient's lab and all other diagnostic data    Notes reviewed from all clinical/nonclinical/nursing services involved in patient's clinical care. Care coordination discussions were held with appropriate clinical/nonclinical/ nursing providers based on care coordination needs.     Labs:     No results for input(s): \"WBC\", \"HGB\", \"HCT\", \"PLT\" in the last 72 hours.    Recent Labs     04/05/24 0232 04/06/24 0342 04/07/24 0347    140 139   K 4.2 4.2 4.3    102 103   CO2 32 36* 32   BUN 40* 40* 39*       No results for input(s): \"ALT\", \"TP\", \"ALB\", \"GLOB\", \"GGT\", \"AML\" in the last 72 hours.    Invalid input(s): \"SGOT\", \"GPT\", \"AP\", \"TBIL\", \"TBILI\", \"AMYP\", \"LPSE\", \"HLPSE\"    Recent Labs     04/05/24 0232 04/06/24 0342 04/07/24 0347   INR 2.0* 1.7* 1.5*        No results for input(s): \"TIBC\", \"FERR\" in the last 72 hours.    Invalid input(s): \"FE\", \"PSAT\"   No results found for: \"FOL\", \"RBCF\"   No results for input(s): \"PH\", \"PCO2\", \"PO2\" in the last 72 hours.  No results for input(s): \"CPK\" in the last 72 hours.    Invalid input(s): \"CPKMB\", \"CKNDX\", \"TROIQ\"  Lab Results   Component Value Date/Time    CHOL 193 11/14/2023 11:27 AM    HDL 41 11/14/2023 11:27 AM     No results found for: \"GLUCPOC\"        Medications Reviewed:     Current Facility-Administered Medications   Medication Dose Route Frequency    enoxaparin (LOVENOX) injection 70 mg  1 mg/kg SubCUTAneous BID    ipratropium 0.5 mg-albuterol 2.5 mg (DUONEB) nebulizer solution 1 Dose  1 Dose Inhalation Q4H PRN    hydrALAZINE (APRESOLINE) tablet 50 mg  50 mg Oral 3 times per day    albuterol (PROVENTIL) (2.5 MG/3ML) 0.083% nebulizer solution 2.5 mg  2.5 mg Nebulization Q4H PRN    methylPREDNISolone sodium succ (SOLU-MEDROL) 40 mg in sterile water 1 mL injection  40 mg IntraVENous Q8H    cefTRIAXone (ROCEPHIN) 2,000 mg in sterile water 20 mL IV syringe  2,000 mg IntraVENous Q24H    lactobacillus (CULTURELLE) capsule 1 capsule

## 2024-04-07 NOTE — PROGRESS NOTES
1930  Verbal bedside report given to ANY Alcantar RN oncoming nurse by CEDRIC Lowry RN off-going nurse.  Report included current pt status and condition, recent results, hx of present illness, heart rate and rhythm (NSR), and respiratory status.       0736  Verbal bedside report received from ANY Alcantar RN off-going nurse by CEDRIC Lowry RN oncoming nurse.  Report included current pt status and condition, recent results, hx of present illness, heart rate and rhythm (NSR), and respiratory status.  Pt awake in bed, no acute complaints.

## 2024-04-07 NOTE — PLAN OF CARE
Problem: Safety - Adult  Goal: Free from fall injury  4/6/2024 2002 by Jessica Alcantar RN  Outcome: Progressing  Flowsheets (Taken 4/5/2024 0022 by Ta James RN)  Free From Fall Injury: Instruct family/caregiver on patient safety  4/6/2024 1038 by Freda Lara RN  Outcome: Progressing     Problem: Discharge Planning  Goal: Discharge to home or other facility with appropriate resources  4/6/2024 2002 by Jessica Alcantar RN  Outcome: Progressing  Flowsheets (Taken 4/3/2024 2000 by Susana Haney RN)  Discharge to home or other facility with appropriate resources: Identify barriers to discharge with patient and caregiver  4/6/2024 1038 by Freda Lara RN  Outcome: Progressing     Problem: Chronic Conditions and Co-morbidities  Goal: Patient's chronic conditions and co-morbidity symptoms are monitored and maintained or improved  4/6/2024 2002 by Jessica Alcantar RN  Outcome: Progressing  Flowsheets (Taken 4/5/2024 0022 by Ta James RN)  Care Plan - Patient's Chronic Conditions and Co-Morbidity Symptoms are Monitored and Maintained or Improved: Monitor and assess patient's chronic conditions and comorbid symptoms for stability, deterioration, or improvement  4/6/2024 1038 by Freda Lara RN  Outcome: Progressing     Problem: ABCDS Injury Assessment  Goal: Absence of physical injury  4/6/2024 1038 by Freda Lara RN  Outcome: Progressing     Problem: Pain  Goal: Verbalizes/displays adequate comfort level or baseline comfort level  4/6/2024 1038 by Freda Lara RN  Outcome: Progressing     Problem: Skin/Tissue Integrity  Goal: Absence of new skin breakdown  Description: 1.  Monitor for areas of redness and/or skin breakdown  2.  Assess vascular access sites hourly  3.  Every 4-6 hours minimum:  Change oxygen saturation probe site  4.  Every 4-6 hours:  If on nasal continuous positive airway pressure, respiratory therapy assess nares and determine need for appliance change or resting period.  4/6/2024 1038

## 2024-04-07 NOTE — PROGRESS NOTES
Pharmacist Note - Warfarin Dosing  Consult provided for this 64 y.o.male to manage warfarin for LVAD.    INR Goal: 1.8-2.2  (due to recurrent bleeding)    Home regimen: 2 mg PO daily   -supra-therapeutic at last AC visit; dose was being adjusted    Drugs that may increase INR: None  Drugs that may decrease INR: None  Other medications that may increase bleeding risk: None  Risk factors: None  Daily INR ordered through: 4/30    Recent Labs     04/05/24  0232 04/06/24  0342 04/07/24  0347   INR 2.0* 1.7* 1.5*     Date               INR                  Dose  3/19  7/9.2  Hold - vitamin K 2.5 mg PO x 1  3/20  2.4  0.5 mg  3/21  1.5  0.5 mg  3/22  1.7  0.5 mg   3/23  2.2  hold   3/24  2.2  0.5 mg   3/25  2.3  hold   3/26                2.1                   0.5 mg  3/27                1.7                   0.5 mg  3/28                1.7                   0.5 mg  3/29                1.5                   1 mg  3/30  1.4  0.5 mg   3/30  1.4  1.5 mg   3/31                1.5                    1.5 mg  4/1                  1.6                    1 mg  4/2                  1.8                    0.5 mg  4/3                  2.0                    0.5 mg  4/4                  2.1                    0.5 mg  4/5                  2.0                    0.5 mg  4/6                  1.7                    1 mg  4/7                  1.5                    1 mg                                                                               Assessment/ Plan:   Will order 1 mg warfarin today.     Pharmacy will continue to monitor daily and adjust therapy as indicated.  Please contact the pharmacist at [phone] for outpatient recommendations if needed.
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
## 2024-04-08 ENCOUNTER — APPOINTMENT (OUTPATIENT)
Facility: HOSPITAL | Age: 64
DRG: 871 | End: 2024-04-08
Payer: MEDICARE

## 2024-04-08 LAB
ANION GAP SERPL CALC-SCNC: 0 MMOL/L (ref 5–15)
BACTERIA SPEC CULT: ABNORMAL
BUN SERPL-MCNC: 35 MG/DL (ref 6–20)
BUN/CREAT SERPL: 31 (ref 12–20)
CALCIUM SERPL-MCNC: 7.9 MG/DL (ref 8.5–10.1)
CHLORIDE SERPL-SCNC: 103 MMOL/L (ref 97–108)
CO2 SERPL-SCNC: 34 MMOL/L (ref 21–32)
CREAT SERPL-MCNC: 1.12 MG/DL (ref 0.7–1.3)
GLUCOSE SERPL-MCNC: 147 MG/DL (ref 65–100)
GRAM STN SPEC: ABNORMAL
GRAM STN SPEC: ABNORMAL
INR PPP: 1.6 (ref 0.9–1.1)
POTASSIUM SERPL-SCNC: 4.2 MMOL/L (ref 3.5–5.1)
PROTHROMBIN TIME: 16.4 SEC (ref 9–11.1)
SERVICE CMNT-IMP: ABNORMAL
SODIUM SERPL-SCNC: 137 MMOL/L (ref 136–145)

## 2024-04-08 PROCEDURE — 2580000003 HC RX 258: Performed by: STUDENT IN AN ORGANIZED HEALTH CARE EDUCATION/TRAINING PROGRAM

## 2024-04-08 PROCEDURE — 6370000000 HC RX 637 (ALT 250 FOR IP): Performed by: INTERNAL MEDICINE

## 2024-04-08 PROCEDURE — APPNB60 APP NON BILLABLE TIME 46-60 MINS: Performed by: NURSE PRACTITIONER

## 2024-04-08 PROCEDURE — 36415 COLL VENOUS BLD VENIPUNCTURE: CPT

## 2024-04-08 PROCEDURE — 6360000002 HC RX W HCPCS: Performed by: INTERNAL MEDICINE

## 2024-04-08 PROCEDURE — 71250 CT THORAX DX C-: CPT

## 2024-04-08 PROCEDURE — 6370000000 HC RX 637 (ALT 250 FOR IP): Performed by: NURSE PRACTITIONER

## 2024-04-08 PROCEDURE — 80048 BASIC METABOLIC PNL TOTAL CA: CPT

## 2024-04-08 PROCEDURE — 2060000000 HC ICU INTERMEDIATE R&B

## 2024-04-08 PROCEDURE — 6360000002 HC RX W HCPCS: Performed by: NURSE PRACTITIONER

## 2024-04-08 PROCEDURE — 93750 INTERROGATION VAD IN PERSON: CPT | Performed by: NURSE PRACTITIONER

## 2024-04-08 PROCEDURE — 99232 SBSQ HOSP IP/OBS MODERATE 35: CPT | Performed by: INTERNAL MEDICINE

## 2024-04-08 PROCEDURE — 6370000000 HC RX 637 (ALT 250 FOR IP): Performed by: FAMILY MEDICINE

## 2024-04-08 PROCEDURE — 6360000002 HC RX W HCPCS: Performed by: STUDENT IN AN ORGANIZED HEALTH CARE EDUCATION/TRAINING PROGRAM

## 2024-04-08 PROCEDURE — 85610 PROTHROMBIN TIME: CPT

## 2024-04-08 PROCEDURE — 2580000003 HC RX 258: Performed by: INTERNAL MEDICINE

## 2024-04-08 PROCEDURE — 6370000000 HC RX 637 (ALT 250 FOR IP): Performed by: STUDENT IN AN ORGANIZED HEALTH CARE EDUCATION/TRAINING PROGRAM

## 2024-04-08 PROCEDURE — 76705 ECHO EXAM OF ABDOMEN: CPT

## 2024-04-08 RX ORDER — WARFARIN SODIUM 1 MG/1
1 TABLET ORAL
Status: COMPLETED | OUTPATIENT
Start: 2024-04-08 | End: 2024-04-08

## 2024-04-08 RX ADMIN — PANTOPRAZOLE SODIUM 40 MG: 40 TABLET, DELAYED RELEASE ORAL at 14:40

## 2024-04-08 RX ADMIN — HYDRALAZINE HYDROCHLORIDE 50 MG: 50 TABLET ORAL at 20:47

## 2024-04-08 RX ADMIN — AMLODIPINE BESYLATE 5 MG: 5 TABLET ORAL at 09:15

## 2024-04-08 RX ADMIN — METOPROLOL SUCCINATE 50 MG: 50 TABLET, EXTENDED RELEASE ORAL at 20:47

## 2024-04-08 RX ADMIN — PANTOPRAZOLE SODIUM 40 MG: 40 TABLET, DELAYED RELEASE ORAL at 06:39

## 2024-04-08 RX ADMIN — AMLODIPINE BESYLATE 5 MG: 5 TABLET ORAL at 20:47

## 2024-04-08 RX ADMIN — NYSTATIN 500000 UNITS: 100000 SUSPENSION ORAL at 18:29

## 2024-04-08 RX ADMIN — Medication 1 CAPSULE: at 09:15

## 2024-04-08 RX ADMIN — WATER 40 MG: 1 INJECTION INTRAMUSCULAR; INTRAVENOUS; SUBCUTANEOUS at 09:15

## 2024-04-08 RX ADMIN — SODIUM CHLORIDE, PRESERVATIVE FREE 10 ML: 5 INJECTION INTRAVENOUS at 20:47

## 2024-04-08 RX ADMIN — ENOXAPARIN SODIUM 70 MG: 100 INJECTION SUBCUTANEOUS at 20:47

## 2024-04-08 RX ADMIN — METOPROLOL SUCCINATE 50 MG: 50 TABLET, EXTENDED RELEASE ORAL at 09:15

## 2024-04-08 RX ADMIN — WARFARIN SODIUM 1 MG: 1 TABLET ORAL at 17:43

## 2024-04-08 RX ADMIN — WATER 40 MG: 1 INJECTION INTRAMUSCULAR; INTRAVENOUS; SUBCUTANEOUS at 17:42

## 2024-04-08 RX ADMIN — ENOXAPARIN SODIUM 70 MG: 100 INJECTION SUBCUTANEOUS at 09:15

## 2024-04-08 RX ADMIN — HYDRALAZINE HYDROCHLORIDE 50 MG: 50 TABLET ORAL at 14:40

## 2024-04-08 RX ADMIN — HYDRALAZINE HYDROCHLORIDE 50 MG: 50 TABLET ORAL at 06:39

## 2024-04-08 RX ADMIN — SODIUM CHLORIDE, PRESERVATIVE FREE 10 ML: 5 INJECTION INTRAVENOUS at 09:15

## 2024-04-08 RX ADMIN — WATER 40 MG: 1 INJECTION INTRAMUSCULAR; INTRAVENOUS; SUBCUTANEOUS at 00:48

## 2024-04-08 RX ADMIN — WATER 2000 MG: 1 INJECTION INTRAMUSCULAR; INTRAVENOUS; SUBCUTANEOUS at 14:39

## 2024-04-08 ASSESSMENT — PAIN SCALES - GENERAL
PAINLEVEL_OUTOF10: 0

## 2024-04-08 ASSESSMENT — ENCOUNTER SYMPTOMS
WHEEZING: 0
NAUSEA: 0
BLOOD IN STOOL: 0
COUGH: 0
TROUBLE SWALLOWING: 1
VOMITING: 0
SHORTNESS OF BREATH: 0
ABDOMINAL DISTENTION: 0

## 2024-04-08 NOTE — PROGRESS NOTES
I visited this patient in [facility]. Patient was awake and recognized me right away. He was relaxed. Patient expressed his strong desire to go home. He has been in the hospital for a good couple of days already and so he felt lonely. I provided active listening and supportive presence. I also provided prayers for him. Patient has a strong tommy. He keeps his bible inside his LVAD bag. I reminded him of that to restore his sense of hope. I informed him that he could always call the 's office if he needed more spiritual support. For further referrals, please call the 's office.      dora hendrickson  [phone]

## 2024-04-08 NOTE — PROGRESS NOTES
3    LIFE GOALS:  Lifestyle goals reviewed with the patient.    IMAGING STUDIES REVIEWED:    ECHO 3/22/24:  difficult study;  limited view.  Unable to interpret     Echo 10/11/23    Left Ventricle: Low normal left ventricular systolic function with a visually estimated EF of 50 - 55%. Left ventricle size is normal. Normal wall motion. LVAD is present.    Limited windows, effects interpretation.     Echo 4/17/23 Speed 5400 RPM. LVIDd 2.8 cm. RV dilated and RV Function reduced. Trace AI, AV opens every beat.  Echo 8/9/22 LVEF 10-15%, mild MR, moderate to severe TR  Echo 6/15/22 LVEF 30-35%, LVIDd 5.8cm, mild MR, mod TR  Echo 10/27/21 LVEF 38%, Mod AI, TAPSE 2.78cm  Echo 5/13/21 LVEF 20-25%, Trace MR, no AI, Trace TR, LVIDd 5.97cm, TAPSE 2.1cm     Cardiac MRI (9/14/21)  1. Normal left ventricular cavity size by 3-D volumetric assessment indexed to body surface area. Moderate septal hypertrophy by 1D dimension. Normal LV mass by 3-D volumetric assessment indexed to body surface area. Moderate left ventricular systolic dysfunction. Moderate global hypokinesis with slight regional variation. 3-D LVEF 33% while patient receiving dobutamine infusion of 5mcg/kg/min during the scan.  2. Normal right ventricular size with mild right ventricular systolic dysfunction. Mild global hypokinesis. 3-D RVEF 46% while patient receiving dobutamine infusion of 5mcg/kg/min during the scan.   3. No significant valvular disease.  4. On EGE and LGE study, there there is focal areas of mild myocardial enhancement of the base to mid inferolateral wall, basal inferior wall and patchy areas of the septum. The appearance of the contrast enhancement in the form of a very mild focal areas suggest focal myocardial fibrosis likely from replacement fibrosis due to hypertrophy or possibly old healed myocarditis. There is no features of recent or old myocardial infarction. There is no features of infiltrative sarcoidosis or cardiac amyloidosis. All  methylPREDNISolone sodium succ (SOLU-MEDROL) 40 mg in sterile water 1 mL injection, 40 mg, IntraVENous, Q8H, Jaren Candelario MD, 40 mg at 04/08/24 0915    cefTRIAXone (ROCEPHIN) 2,000 mg in sterile water 20 mL IV syringe, 2,000 mg, IntraVENous, Q24H, Prema June MD, 2,000 mg at 04/07/24 1421    lactobacillus (CULTURELLE) capsule 1 capsule, 1 capsule, Oral, Daily with breakfast, Madala, Sushma, MD, 1 capsule at 04/08/24 0915    fluticasone-umeclidin-vilant (TRELEGY ELLIPTA) 200-62.5-25 MCG/ACT inhaler 1 puff (Patient Supplied), 1 puff, Inhalation, Daily, Madala, Sushma, MD, 1 puff at 04/08/24 0915    0.9 % sodium chloride infusion, , IntraVENous, PRN, Nessa Maurer, APRNOHELIA - NP    ipratropium 0.5 mg-albuterol 2.5 mg (DUONEB) nebulizer solution 1 Dose, 1 Dose, Inhalation, Q4H PRN, Gareth Hou MD, 1 Dose at 03/31/24 0852    magnesium sulfate 2000 mg in 50 mL IVPB premix, 2,000 mg, IntraVENous, Once, Aidee Gonzales MD    amLODIPine (NORVASC) tablet 5 mg, 5 mg, Oral, BID, Aidee Gonzales MD, 5 mg at 04/08/24 0915    metoprolol succinate (TOPROL XL) extended release tablet 50 mg, 50 mg, Oral, BID, Aidee Gonzales MD, 50 mg at 04/08/24 0915    pantoprazole (PROTONIX) tablet 40 mg, 40 mg, Oral, BID AC, Aidee Gonzales MD, 40 mg at 04/08/24 0639    sodium chloride flush 0.9 % injection 5-40 mL, 5-40 mL, IntraVENous, 2 times per day, Aidee Gonzales MD, 10 mL at 04/08/24 0915    sodium chloride flush 0.9 % injection 5-40 mL, 5-40 mL, IntraVENous, PRN, Aidee Gonzales MD    0.9 % sodium chloride infusion, , IntraVENous, PRN, Aidee Goznales MD, Stopped at 03/20/24 0439    potassium chloride (KLOR-CON) extended release tablet 40 mEq, 40 mEq, Oral, PRN **OR** potassium bicarb-citric acid (EFFER-K) effervescent tablet 40 mEq, 40 mEq, Oral, PRN **OR** potassium chloride 10 mEq/100 mL IVPB (Peripheral Line), 10 mEq, IntraVENous, PRN, Aidee Gonzales MD    magnesium sulfate 2000 mg in 50

## 2024-04-08 NOTE — CARE COORDINATION
Transition of Care Plan:     RUR: 23% - high  Prior Level of Functioning: independent  Disposition: [facility]. Updated ins info w [employer] for O2 tank for transport home and concentrator to be delivered.    Follow up appointments: [facility]  DME needed: home oxygen  Transportation at discharge: family  IM/IMM Medicare/ letter given: 4/5/24  Caregiver Contact: wife - Aslhey Swan - [phone]  Discharge Caregiver contacted prior to discharge? no  Care Conference needed? no  Barriers to discharge: home oxygen set up; drain    CM met w patient at bedside to discuss the most recent communication from [employer] reporting they have left a message for patient and family and are waiting for a call back to pay the co-pay.  Patient reports he would like another  and then he will review w his wife to see if they can afford it.  CM messaged [employer] in [employer] requesting they call patient again and also make me aware of the co pay in case patient doesn't get the call.    Update 4:32pm: Patient is dually enrolled in Medicare and Medicaid and CM updated [employer] w his Medicaid number, [id number] and [place] ID number [id number].  Also called and spoke w the scheduling department at [employer] [phone].  [employer] Confirms patient has zero copay now.    *[employer] has everything they need and have spoken w patient.  [employer] just needs patient to call when he's ready for d/c so they can deliver the concentrator home.  Tank already delivered to room. Patient can call [phone]* at anytime per [employer] scheduling departnent.       Disposition Plan:  Home Health: [facility]  Home Infusion: [facility]  Home DME: [employer] for Oxygen concentrator and portable tank  Transportation: family vs [facility] mar[facility] Ivey MSW [facility]  Care Management

## 2024-04-08 NOTE — PROGRESS NOTES
Pharmacist Note - Warfarin Dosing  Consult provided for this 64 y.o.male to manage warfarin for LVAD.    INR Goal: 1.8-2.2  (due to recurrent bleeding)    Home regimen: 2 mg PO daily   -supra-therapeutic at last AC visit; dose was being adjusted    Drugs that may increase INR: None  Drugs that may decrease INR: None  Other medications that may increase bleeding risk: None  Risk factors: None  Daily INR ordered through: 4/30    Recent Labs     04/06/24  0342 04/07/24  0347 04/08/24  0345   INR 1.7* 1.5* 1.6*     Date               INR                  Dose  3/19  7/9.2  Hold - vitamin K 2.5 mg PO x 1  3/20  2.4  0.5 mg  3/21  1.5  0.5 mg  3/22  1.7  0.5 mg   3/23  2.2  hold   3/24  2.2  0.5 mg   3/25  2.3  hold   3/26                2.1                   0.5 mg  3/27                1.7                   0.5 mg  3/28                1.7                   0.5 mg  3/29                1.5                   1 mg  3/30  1.4  0.5 mg   3/30  1.4  1.5 mg   3/31                1.5                    1.5 mg  4/1                  1.6                    1 mg  4/2                  1.8                    0.5 mg  4/3                  2.0                    0.5 mg  4/4                  2.1                    0.5 mg  4/5                  2.0                    0.5 mg  4/6                  1.7                    1 mg  4/7                  1.5                    1 mg  4/7                  1.6                    1 mg                                                                               Assessment/ Plan:   Will order 1 mg warfarin today.     Pharmacy will continue to monitor daily and adjust therapy as indicated.  Please contact the pharmacist at [phone] for outpatient recommendations if needed.

## 2024-04-08 NOTE — PROGRESS NOTES
1930  Verbal bedside report given to ANY Alcantar RN oncoming nurse by CEDRIC Lowry RN off-going nurse.  Report included current pt status and condition, recent results, hx of present illness, heart rate and rhythm (NSR), and respiratory status.       1900  Dressing changed using sterile protocol.    1320  IR at bedside to assess for drain placement.    1222  Called IR, pt is on list for later today.    0900  Pt resting, fatigued today, looking forward to d/c.    0800  Verbal bedside report received from ANY Alcantar RN off-going nurse by CEDRIC Lowry RN oncoming nurse.  Report included current pt status and condition, recent results, hx of present illness, heart rate and rhythm (NSR), and respiratory status.  Pt awake in bed.

## 2024-04-08 NOTE — PLAN OF CARE
2100: pt refusing LVAD dressing change for 4/7, pt is agreeable to change on 4/8     Problem: Safety - Adult  Goal: Free from fall injury  Outcome: Progressing  Flowsheets (Taken 4/5/2024 0022 by Ta James, RN)  Free From Fall Injury: Instruct family/caregiver on patient safety     Problem: Discharge Planning  Goal: Discharge to home or other facility with appropriate resources  Outcome: Progressing  Flowsheets (Taken 4/3/2024 2000 by Susana Haney, RN)  Discharge to home or other facility with appropriate resources: Identify barriers to discharge with patient and caregiver     Problem: Chronic Conditions and Co-morbidities  Goal: Patient's chronic conditions and co-morbidity symptoms are monitored and maintained or improved  Outcome: Progressing  Flowsheets (Taken 4/5/2024 0022 by Ta James, RN)  Care Plan - Patient's Chronic Conditions and Co-Morbidity Symptoms are Monitored and Maintained or Improved: Monitor and assess patient's chronic conditions and comorbid symptoms for stability, deterioration, or improvement

## 2024-04-09 VITALS
OXYGEN SATURATION: 100 % | WEIGHT: 164.46 LBS | SYSTOLIC BLOOD PRESSURE: 91 MMHG | RESPIRATION RATE: 15 BRPM | TEMPERATURE: 98 F | HEART RATE: 73 BPM | DIASTOLIC BLOOD PRESSURE: 50 MMHG | HEIGHT: 68 IN | BODY MASS INDEX: 24.93 KG/M2

## 2024-04-09 LAB
ANION GAP SERPL CALC-SCNC: 4 MMOL/L (ref 5–15)
BUN SERPL-MCNC: 39 MG/DL (ref 6–20)
BUN/CREAT SERPL: 32 (ref 12–20)
CALCIUM SERPL-MCNC: 8.4 MG/DL (ref 8.5–10.1)
CHLORIDE SERPL-SCNC: 102 MMOL/L (ref 97–108)
CO2 SERPL-SCNC: 32 MMOL/L (ref 21–32)
CREAT SERPL-MCNC: 1.23 MG/DL (ref 0.7–1.3)
GLUCOSE SERPL-MCNC: 162 MG/DL (ref 65–100)
INR PPP: 1.6 (ref 0.9–1.1)
POTASSIUM SERPL-SCNC: 4.4 MMOL/L (ref 3.5–5.1)
PROTHROMBIN TIME: 16.3 SEC (ref 9–11.1)
SODIUM SERPL-SCNC: 138 MMOL/L (ref 136–145)

## 2024-04-09 PROCEDURE — 6370000000 HC RX 637 (ALT 250 FOR IP): Performed by: STUDENT IN AN ORGANIZED HEALTH CARE EDUCATION/TRAINING PROGRAM

## 2024-04-09 PROCEDURE — 6360000002 HC RX W HCPCS: Performed by: STUDENT IN AN ORGANIZED HEALTH CARE EDUCATION/TRAINING PROGRAM

## 2024-04-09 PROCEDURE — 2580000003 HC RX 258: Performed by: STUDENT IN AN ORGANIZED HEALTH CARE EDUCATION/TRAINING PROGRAM

## 2024-04-09 PROCEDURE — 6370000000 HC RX 637 (ALT 250 FOR IP): Performed by: INTERNAL MEDICINE

## 2024-04-09 PROCEDURE — 6360000002 HC RX W HCPCS: Performed by: NURSE PRACTITIONER

## 2024-04-09 PROCEDURE — 80048 BASIC METABOLIC PNL TOTAL CA: CPT

## 2024-04-09 PROCEDURE — 6370000000 HC RX 637 (ALT 250 FOR IP): Performed by: FAMILY MEDICINE

## 2024-04-09 PROCEDURE — 36415 COLL VENOUS BLD VENIPUNCTURE: CPT

## 2024-04-09 PROCEDURE — 99232 SBSQ HOSP IP/OBS MODERATE 35: CPT | Performed by: NURSE PRACTITIONER

## 2024-04-09 PROCEDURE — 93750 INTERROGATION VAD IN PERSON: CPT | Performed by: NURSE PRACTITIONER

## 2024-04-09 PROCEDURE — 94640 AIRWAY INHALATION TREATMENT: CPT

## 2024-04-09 PROCEDURE — 85610 PROTHROMBIN TIME: CPT

## 2024-04-09 PROCEDURE — 6370000000 HC RX 637 (ALT 250 FOR IP): Performed by: NURSE PRACTITIONER

## 2024-04-09 RX ORDER — CEFTRIAXONE 500 MG/1
2000 INJECTION, POWDER, FOR SOLUTION INTRAMUSCULAR; INTRAVENOUS EVERY 24 HOURS
Qty: 116 EACH | Refills: 0 | Status: SHIPPED
Start: 2024-04-09 | End: 2024-05-08

## 2024-04-09 RX ORDER — LACTOBACILLUS RHAMNOSUS GG 10B CELL
1 CAPSULE ORAL
Qty: 30 CAPSULE | Refills: 2 | Status: SHIPPED | OUTPATIENT
Start: 2024-04-10

## 2024-04-09 RX ORDER — HYDRALAZINE HYDROCHLORIDE 50 MG/1
50 TABLET, FILM COATED ORAL EVERY 8 HOURS SCHEDULED
Qty: 90 TABLET | Refills: 3 | Status: SHIPPED | OUTPATIENT
Start: 2024-04-09

## 2024-04-09 RX ORDER — WARFARIN SODIUM 1 MG/1
1.5 TABLET ORAL
Status: DISCONTINUED | OUTPATIENT
Start: 2024-04-09 | End: 2024-04-09 | Stop reason: HOSPADM

## 2024-04-09 RX ORDER — PREDNISONE 10 MG/1
TABLET ORAL
Qty: 12 TABLET | Refills: 0 | Status: SHIPPED | OUTPATIENT
Start: 2024-04-09 | End: 2024-04-15

## 2024-04-09 RX ADMIN — WATER 40 MG: 1 INJECTION INTRAMUSCULAR; INTRAVENOUS; SUBCUTANEOUS at 08:55

## 2024-04-09 RX ADMIN — ENOXAPARIN SODIUM 70 MG: 100 INJECTION SUBCUTANEOUS at 08:55

## 2024-04-09 RX ADMIN — AMLODIPINE BESYLATE 5 MG: 5 TABLET ORAL at 08:54

## 2024-04-09 RX ADMIN — PANTOPRAZOLE SODIUM 40 MG: 40 TABLET, DELAYED RELEASE ORAL at 07:01

## 2024-04-09 RX ADMIN — SODIUM CHLORIDE, PRESERVATIVE FREE 10 ML: 5 INJECTION INTRAVENOUS at 08:58

## 2024-04-09 RX ADMIN — NYSTATIN 500000 UNITS: 100000 SUSPENSION ORAL at 08:55

## 2024-04-09 RX ADMIN — HYDRALAZINE HYDROCHLORIDE 50 MG: 50 TABLET ORAL at 07:01

## 2024-04-09 RX ADMIN — WATER 40 MG: 1 INJECTION INTRAMUSCULAR; INTRAVENOUS; SUBCUTANEOUS at 01:08

## 2024-04-09 RX ADMIN — METOPROLOL SUCCINATE 50 MG: 50 TABLET, EXTENDED RELEASE ORAL at 08:55

## 2024-04-09 RX ADMIN — Medication 1 CAPSULE: at 08:54

## 2024-04-09 ASSESSMENT — ENCOUNTER SYMPTOMS
NAUSEA: 0
TROUBLE SWALLOWING: 1
VOMITING: 0
BLOOD IN STOOL: 0
SHORTNESS OF BREATH: 0
WHEEZING: 0
ABDOMINAL DISTENTION: 0

## 2024-04-09 ASSESSMENT — PAIN SCALES - GENERAL
PAINLEVEL_OUTOF10: 0
PAINLEVEL_OUTOF10: 0

## 2024-04-09 NOTE — PROGRESS NOTES
ADVANCED HEART FAILURE CENTER  [facility] in [place], [place]  Inpatient Progress Note      Patient name: Sanford Joiner  Patient : 1960  Patient MRN: [id number]  Date of service: 24    Reason for Referral  LVAD management      ASSESSMENT:  Sanford Joiner is a 64 y.o. male admitted with driveline infection and possible upper GI bleed, reporting 3-4 days of dark stools.   History of chronic systolic heart failure due to non ischemic cardiomyopathy, s/p HM3 LVAD implantation ([facility] 2023) as destination therapy, stage D, remains NYHA class IV despite LVAD support due to severe COPD; currently with recovered LVEF to 55% and small LV cavity with resulting low flow alarms; optimal GDMT limited by hypovolemia  Patient is not a candidate for heart transplant due to advanced COPD -  declined by [facility] for both LVAD-DT and transplant and for the same reasons declined for LVAD-DT by [facility]   Team discussed patient at [facility] 3/22/24 to determine risk of pump explant and alternatives of washout or palliative antibiotics. CT chest shows 2 fluid collections surrounding the driveline and outflow graft concerning for infection. PFTS performed, showing very severe obstructive lung disease with FEV1<1L. Discussed with CV Surgery, not a surgical candiate. Plan for palliative antibiotic management of LVAD pump infection.    INTERVAL HISTORY:  NAEO  VSS  Labs reviewed- INR stable 1.6  States he feels ok today     RECOMMENDATIONS:  Bacteremia, LVAD pump infection  Blood cultures 3/27 negative  IR unable to replace drain due to size of abscess   PICC line intact  IV antibiotics per ID- plan on ceftriaxone until 24    COPD   PFT reviewed. Very severe obstructive lung disease  Trepanchito Duonealfredo, steroids  Pulmonary following, appreciate recs  Home oxygen set up     Left Ventricular Assist Device/Driveline Infection  Continue current device speed at 5200 rpm  Low flow alarm threshold lowered on

## 2024-04-09 NOTE — PROGRESS NOTES
Charting and patient care of Sanford Joiner by renetta Rubio RN from 5530 to 7885 was supervised and reviewed by this RN.

## 2024-04-09 NOTE — CARE COORDINATION
KEVIN    Patient has an O2 tank for transport home and before he leaves the hospital he needs to call [facility] so they can meet him at home to deliver the concentrator. [facility] ph:[phone] .    KATJA Leyva CCM  Care Management

## 2024-04-09 NOTE — PROGRESS NOTES
Infectious Disease Progress Note       Subjective:      The patient was personally evaluated and examined by me at bedside.  The patient appears to be relaxed and comfortable without pain.  He is not using oxygen at this time and seems to be breathing easily.  He is afebrile without leukocytosis and with excellent oxygen saturation on room air.  He denies overnight fevers, sweats or chills.  He has no headache.  Unfortunately he pulled out the drain in his sleep and replacement is awaited.  He does not complain of shortness of breath, cough, sputum but does complain of some dysphagia which is likely due to his oral thrush.  He states that he is eating well without nausea, vomiting, diarrhea or constipation.  There is no abdominal pain or discomfort.    Discharge plans are in place and the placement will be discharged for outpatient antibiotic as soon as drain is replaced.  Upon evaluation of the wound there does not seem to be any soaking of the dressing.  At the time of original drainage that was over 300 mls removed and it will be interesting to see a large IR was able to remove during replacement of the drain.        Objective:    Vitals:   Patient Vitals for the past 24 hrs:   Temp Pulse Resp SpO2   04/09/24 0854 -- 73 -- --   04/09/24 0729 -- 78 15 100 %   04/09/24 0700 98 °F (36.7 °C) 92 16 97 %   04/09/24 0551 -- 60 -- --   04/09/24 0354 -- 73 -- --   04/09/24 0300 97.8 °F (36.6 °C) 68 17 98 %   04/09/24 0155 -- 78 -- --   04/08/24 2332 98 °F (36.7 °C) 90 18 95 %   04/08/24 2159 -- 77 -- --   04/08/24 2047 -- 77 -- --   04/08/24 1955 -- 84 -- --   04/08/24 1800 -- 91 -- --   04/08/24 1600 -- 76 15 96 %   04/08/24 1510 98 °F (36.7 °C) 85 18 98 %   04/08/24 1400 -- 72 -- --   04/08/24 1216 98 °F (36.7 °C) 71 18 96 %       Physical Exam:    Gen: No apparent distress, awake, alert, relaxed and interactive  HEENT:  Normocephalic, atraumatic, no scleral icterus, has thrush  CV: S1,2 heard regularly, no lower  prevent dysphagia,, esophagitis     4.  Patient with less shortness of breath and eager for discharge to home        PICC line management per home health                Thank you for the opportunity to participate in the care of this patient.   Please contact with questions or concerns.      Prema June MD

## 2024-04-09 NOTE — CARE COORDINATION
Transition of Care Plan:    RUR: 19% - moderate  Prior Level of Functioning: independent; LVAD  Disposition: home health; home infusion; home oxygen; wife to transport  Follow up appointments: [facility]  DME needed: home oxygen  Transportation at discharge: family  IM/IMM Medicare/ letter given: 4/8/24  Caregiver Contact: wife - Ashley Swan - [phone]  Discharge Caregiver contacted prior to discharge? no  Care Conference needed? no  Barriers to discharge: none    CM updated by Attending MD that patient is medically stable for discharge today. No further plans to place abdominal drain by IR. Treatment team will monitor as outpatient. Patient to discharge with home health, home infusion, and home oxygen.    Home Health: CM called [facility] and spoke with intake. Confirmed [facility] has IV abx orders and LVAD home health orders. CM advised patient will discharge today and [facility] will see patient tomorrow.    Home Infusion: CM called and spoke with [facility] infusion. Confirmed IV abx were delivered to patient's home on Friday. Patient has been educated x 2 on IV abx administration. Clear for discharge.    Home Oxygen: Patient has portable oxygen tank at the bedside. [employer] home DME agency to confirm delivery of home concentrator this AM.    CM will continue to follow.    Disposition Plan:  [facility] Health[facility]  Home Infusion: [facility]  Home DME: Lisa Mckeon, MPH  Care Manager [facility]  Available via [employer]

## 2024-04-09 NOTE — PLAN OF CARE
Problem: Safety - Adult  Goal: Free from fall injury  Outcome: Progressing  Flowsheets (Taken 4/5/2024 0022 by Ta James, RN)  Free From Fall Injury: Instruct family/caregiver on patient safety     Problem: Discharge Planning  Goal: Discharge to home or other facility with appropriate resources  Outcome: Progressing  Flowsheets (Taken 4/3/2024 2000 by Susana Haney, RN)  Discharge to home or other facility with appropriate resources: Identify barriers to discharge with patient and caregiver     Problem: Chronic Conditions and Co-morbidities  Goal: Patient's chronic conditions and co-morbidity symptoms are monitored and maintained or improved  Outcome: Progressing  Flowsheets (Taken 4/5/2024 0022 by Ta James, RN)  Care Plan - Patient's Chronic Conditions and Co-Morbidity Symptoms are Monitored and Maintained or Improved: Monitor and assess patient's chronic conditions and comorbid symptoms for stability, deterioration, or improvement

## 2024-04-09 NOTE — PLAN OF CARE
0730: Bedside and Verbal shift change report given to RACHEL Rubio (oncoming nurse) by RACHEL Lopez (offgoing nurse). Report included the following information Nurse Handoff Report, Intake/Output, and Cardiac Rhythm NSR .     1055: discharge instructions reviewed w/ pt and wife at bedside. All questions were answered and pt verbalized understanding of the teaching. Pt discharged w/ home oxygen, backup LVAD equipment, cell phone, , and .     Problem: Safety - Adult  Goal: Free from fall injury  4/9/2024 1044 by Joanne Wolf RN  Outcome: Completed  4/8/2024 2355 by Jessica Alcantar RN  Outcome: Progressing  Flowsheets (Taken 4/5/2024 0022 by Ta James RN)  Free From Fall Injury: Instruct family/caregiver on patient safety     Problem: Discharge Planning  Goal: Discharge to home or other facility with appropriate resources  4/9/2024 1044 by Joanne Wolf RN  Outcome: Completed  4/8/2024 2355 by Jessica Alcantar RN  Outcome: Progressing  Flowsheets (Taken 4/3/2024 2000 by Susana Haney RN)  Discharge to home or other facility with appropriate resources: Identify barriers to discharge with patient and caregiver     Problem: Chronic Conditions and Co-morbidities  Goal: Patient's chronic conditions and co-morbidity symptoms are monitored and maintained or improved  4/9/2024 1044 by Joanne Wolf RN  Outcome: Completed  4/8/2024 2355 by Jessica Alcantar RN  Outcome: Progressing  Flowsheets (Taken 4/5/2024 0022 by Ta James RN)  Care Plan - Patient's Chronic Conditions and Co-Morbidity Symptoms are Monitored and Maintained or Improved: Monitor and assess patient's chronic conditions and comorbid symptoms for stability, deterioration, or improvement     Problem: ABCDS Injury Assessment  Goal: Absence of physical injury  Outcome: Completed     Problem: Pain  Goal: Verbalizes/displays adequate comfort level or baseline comfort level  Outcome: Completed     Problem: Skin/Tissue Integrity  Goal:

## 2024-04-09 NOTE — PROGRESS NOTES
BACTERIAL SUSCEPTIBILITY PANEL MAHI      ciprofloxacin <=0.5 ug/mL Sensitive      clindamycin 0.25 ug/mL Sensitive      DAPTOmycin 0.5 ug/mL Sensitive      doxycycline <=0.5 ug/mL Sensitive      erythromycin <=0.25 ug/mL Sensitive      gentamicin <=0.5 ug/mL Sensitive      levofloxacin 0.25 ug/mL Sensitive      linezolid 2 ug/mL Sensitive      moxifloxacin <=0.25 ug/mL Sensitive      oxacillin 1 ug/mL Sensitive      rifampin <=0.5 ug/mL Sensitive      tetracycline <=1 ug/mL Sensitive      trimethoprim-sulfamethoxazole <=10 ug/mL Sensitive      vancomycin 1 ug/mL Sensitive                           Culture, Blood 1 [[id number]] Collected: 03/27/24 1156    Order Status: Completed Specimen: Blood Updated: 04/01/24 1247     Special Requests --        NO SPECIAL REQUESTS  RIGHT  FOREARM       Culture NO GROWTH 5 DAYS       Culture, Blood 2 [[id number]] Collected: 03/27/24 1156    Order Status: Completed Specimen: Blood Updated: 04/01/24 1246     Special Requests --        NO SPECIAL REQUESTS  RIGHT  Antecubital       Culture NO GROWTH 5 DAYS       Culture, Wound [[id number]]  (Abnormal)  (Susceptibility) Collected: 03/26/24 1808    Order Status: Completed Specimen: Incision Updated: 03/30/24 0841     Special Requests NO SPECIAL REQUESTS        Gram Stain NO WBC'S SEEN         NO ORGANISMS SEEN        Culture       LIGHT ENTEROBACTER CLOACAE COMPLEX            FEW Staphylococcus aureus               FEW MIXED SKIN DONA ISOLATED          Susceptibility        Enterobacter cloacae complex      BACTERIAL SUSCEPTIBILITY PANEL MAHI      amikacin 16 ug/mL Sensitive      ceFAZolin >=64 ug/mL Resistant      cefepime <=1 ug/mL Sensitive      cefTAZidime 2 ug/mL Sensitive      cefTRIAXone <=1 ug/mL Sensitive      ciprofloxacin <=0.25 ug/mL Sensitive      gentamicin 4 ug/mL Sensitive      levofloxacin 0.5 ug/mL Sensitive      tobramycin 2 ug/mL Sensitive      trimethoprim-sulfamethoxazole <=20 ug/mL Sensitive                      premix  2,000 mg IntraVENous PRN    ondansetron (ZOFRAN-ODT) disintegrating tablet 4 mg  4 mg Oral Q8H PRN    Or    ondansetron (ZOFRAN) injection 4 mg  4 mg IntraVENous Q6H PRN    polyethylene glycol (GLYCOLAX) packet 17 g  17 g Oral Daily PRN    acetaminophen (TYLENOL) tablet 650 mg  650 mg Oral Q6H PRN    Or    acetaminophen (TYLENOL) suppository 650 mg  650 mg Rectal Q6H PRN    warfarin - pharmacy to dose   Other RX Placeholder    hydrALAZINE (APRESOLINE) injection 10 mg  10 mg IntraVENous Q4H PRN     ______________________________________________________________________  EXPECTED LENGTH OF STAY: Unable to retrieve estimated LOS  ACTUAL LENGTH OF STAY:          20                 Abdi Wagner MD

## 2024-04-09 NOTE — PROGRESS NOTES
Pharmacist Note - Warfarin Dosing  Consult provided for this 64 y.o.male to manage warfarin for LVAD.    INR Goal: 1.8-2.2  (due to recurrent bleeding)    Home regimen: 2 mg PO daily   -supra-therapeutic at last AC visit; dose was being adjusted    Drugs that may increase INR: None  Drugs that may decrease INR: None  Other medications that may increase bleeding risk: Enoxaparin  Risk factors: None  Daily INR ordered through: 4/30    Recent Labs     04/07/24  0347 04/08/24  0345 04/09/24  0149   INR 1.5* 1.6* 1.6*     Date               INR                  Dose  3/19  7/9.2  Hold - vitamin K 2.5 mg PO x 1  3/20  2.4  0.5 mg  3/21  1.5  0.5 mg  3/22  1.7  0.5 mg   3/23  2.2  hold   3/24  2.2  0.5 mg   3/25  2.3  hold   3/26                2.1                   0.5 mg  3/27                1.7                   0.5 mg  3/28                1.7                   0.5 mg  3/29                1.5                   1 mg  3/30  1.4  0.5 mg   3/30  1.4  1.5 mg   3/31                1.5                    1.5 mg  4/1                  1.6                    1 mg  4/2                  1.8                    0.5 mg  4/3                  2.0                    0.5 mg  4/4                  2.1                    0.5 mg  4/5                  2.0                    0.5 mg  4/6                  1.7                    1 mg  4/7                  1.5                    1 mg  4/8                  1.6                    1 mg  4/9                  1.6                    1.5 mg                                                                               Assessment/ Plan:   Will order 1.5 mg warfarin today.     Pharmacy will continue to monitor daily and adjust therapy as indicated.  Please contact the pharmacist at [phone] for outpatient recommendations if needed.

## 2024-04-10 ENCOUNTER — TELEPHONE (OUTPATIENT)
Age: 64
End: 2024-04-10

## 2024-04-10 ENCOUNTER — HOME CARE VISIT (OUTPATIENT)
Facility: HOME HEALTH | Age: 64
End: 2024-04-10
Payer: MEDICARE

## 2024-04-10 VITALS
TEMPERATURE: 98.4 F | WEIGHT: 164 LBS | RESPIRATION RATE: 24 BRPM | BODY MASS INDEX: 24.94 KG/M2 | OXYGEN SATURATION: 94 %

## 2024-04-10 PROCEDURE — G0151 HHCP-SERV OF PT,EA 15 MIN: HCPCS

## 2024-04-10 PROCEDURE — G0299 HHS/HOSPICE OF RN EA 15 MIN: HCPCS

## 2024-04-10 RX ORDER — GENTAMICIN SULFATE 1 MG/G
OINTMENT TOPICAL
Qty: 30 G | Refills: 1 | Status: SHIPPED | OUTPATIENT
Start: 2024-04-10

## 2024-04-10 RX ORDER — WARFARIN SODIUM 1 MG/1
1 TABLET ORAL DAILY
Qty: 30 TABLET | Refills: 1 | Status: SHIPPED | OUTPATIENT
Start: 2024-04-10

## 2024-04-10 ASSESSMENT — ENCOUNTER SYMPTOMS
TROUBLE SWALLOWING: 1
HEMOPTYSIS: 0
DYSPNEA ACTIVITY LEVEL: WHILE SPEAKING
DYSPNEA ACTIVITY LEVEL: AFTER AMBULATING LESS THAN 10 FT

## 2024-04-10 NOTE — DISCHARGE SUMMARY
Discharge Summary       PATIENT ID: Sanford Joiner  MRN: [id number]   YOB: 1960    DATE OF ADMISSION: 3/19/2024 12:29 AM    DATE OF DISCHARGE: 4/9/24   PRIMARY CARE PROVIDER: Ranjan Porter MD     ATTENDING PHYSICIAN: ANNY  DISCHARGING PROVIDER: Abdi Wagner MD      CONSULTATIONS: IP CONSULT TO PHARMACY  IP CONSULT TO ADVANCED HEART FAILURE  IP CONSULT TO CASE MANAGEMENT  IP CONSULT TO PULMONOLOGY  IP CONSULT TO PHARMACY  IP CONSULT TO INFECTIOUS DISEASES  IP CONSULT HOME HEALTH  IP CONSULT TO PALLIATIVE CARE  IP CONSULT TO HOSPICE  IP CONSULT TO CASE MANAGEMENT  IP CONSULT TO INTERVENTIONAL RADIOLOGY  IP CONSULT TO CASE MANAGEMENT  IP CONSULT TO CASE MANAGEMENT  IP CONSULT TO INTERVENTIONAL RADIOLOGY  IP CONSULT HOME HEALTH    PROCEDURES/SURGERIES: * No surgery found *    ADMISSION SUMMARY AND HOSPITAL COURSE:     Sanford Joiner is a 64 y.o. 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  Patient states he had been in his usual state of health until about 24 hours ago when he developed progressive dyspnea.  Initially had dyspneic symptoms with minimal exertion and further progression to dyspnea occurring at rest.  He has had what he describes as a mild nonproductive cough.  Denies any ongoing tobacco use.  Denies any recent travel or sick contacts.  The patient denies any fever, chills, chest or abdominal pain, nausea, vomiting, congestion, recent illness, palpitations, or dysuria.     Remarkable vitals on ER Presentation: Respiratory rate to 112  Labs Remarkable for: SpO2 to mid 80s on room air  ER Images: Chest x-ray showed no acute process  ER Rx: Cefepime, DuoNeb, 250 cc normal saline bolus    DISCHARGE DIAGNOSES / PLAN:

## 2024-04-10 NOTE — TELEPHONE ENCOUNTER
ADVANCED HEART FAILURE CENTER  [facility] in [place], [place]  LVAD PATIENT DISCHARGE FOLLOW UP CALL       Date of call: 04/10/24  Date of discharge: 4/9/24  Discharge disposition: Home Health Care Svc    Called and spoke with patient's SO [facility] and home health nurse MADYSON James who states they are currently located at patient's home.     MEDICATIONS:     Med rec completed based off of discharge AVS? Yes  Discrepancies noted: None   Do you have all of your prescriptions and are they filled? No - needs coumadin 1mg tablets and gentamycin ointment, prescription sent per ADILENE GATICA.     Spoke with MADYSON James who stated that she did not obtain INR for patient today. Reviewed with ADILENE Navarro who stated [facility] instruct patient to take 1mg daily until recheck Friday. Called and spoke with patient and reviewed provider instructions for 1mg of coumadin daily, patient verbalized understanding. MADYSON James also notified.     Anticoag calendar updated to reflect doses patient received inpatient per pharmacy note from 04/09    Requested Prescriptions     Signed Prescriptions Disp Refills    warfarin (COUMADIN) 1 MG tablet 30 tablet 1     Sig: Take 1 tablet by mouth daily May take more as directed by [facility].     Authorizing Provider: OZIEL NAVARRO     Ordering User: FILI PELLETIER    gentamicin (GARAMYCIN) 0.1 % ointment 30 g 1     Sig: Apply topically to driveline exit site every day with dressing change     Authorizing Provider: OZIEL NAVARRO     Ordering User: FILI PELLETIER         Do you have a copy of your discharge instructions? Yes    FOLLOW UP:     Future Appointments   Date Time Provider Department Center   4/18/2024  9:00 AM Nahomi Quiles APRN - NP [facility] AMB   5/30/2024  9:00 AM Farshad Gillespie MD [facility] AMB     Pulmonology: Dr. Gonsalez 04/17/24 @ 11:15  PCP: family to schedule     Infectious disease- Per Dr. June's note patient to follow up with Dr. Rajan. Call placed to

## 2024-04-11 VITALS
HEIGHT: 68 IN | OXYGEN SATURATION: 94 % | BODY MASS INDEX: 24.86 KG/M2 | RESPIRATION RATE: 24 BRPM | TEMPERATURE: 98.4 F | WEIGHT: 164 LBS

## 2024-04-11 DIAGNOSIS — I50.22 CHRONIC SYSTOLIC HEART FAILURE (HCC): ICD-10-CM

## 2024-04-11 DIAGNOSIS — Z95.811 LVAD (LEFT VENTRICULAR ASSIST DEVICE) PRESENT (HCC): ICD-10-CM

## 2024-04-11 DIAGNOSIS — D50.8 OTHER IRON DEFICIENCY ANEMIA: ICD-10-CM

## 2024-04-11 NOTE — HOME HEALTH
sitting and resting. Living environment also has multiple safety hazards, including cluttered, walkways and floors, poor, lighting, and inadequate space to navigate with the wheelchair. Patient would benefit from use of a front wheeled walker for support and safe for transfers. His wife reports that he was offered one in the hospital and they turned it down because patient did not , see the point in one. Patient is a good physical therapy. Candidate has a good prognosis for returning to level of function, depending on his willingness to participate.    Functional Mobility:  Bed Mobility (rolling/scooting): Independent  Transfers (supine to chair and return to supine): Maximum Assistance (requires assistance with more than 50% of the effort).  Patient uses device: needs AD for safety but does not own and/or use  Gait:  dependent - WC unable to weheel self due to space constraints and SOB  Safety concerns with mobility include: cluttered living space, lacking equipment  DME: wheelchair and 3:1 commode    Diagnosis: sepsis, LVAD, chronic stage three systolic heart failure, status post LVAD   Past medical history: severe COPD, chronic systolic heart failure due to non-ischemic, cardiomyopathy, hypertension, pacemaker,    Subjective (statement from pt/cg that is relative to why you are there): I don't feel like doing anything. I don't see how doing any kind of calisthenics is going to help me breathe any better.  My legs were numb after sitting 2 hours in my wheelchair.    Caregiver: spouse.  Caregiver assists with: Medications, Meals, Bathing, ADL, IADL, Wound Care, Transportation and Housekeeping Caregiver unable to assist with: n/a. Caregiver is available 24 hours/day Caregiver is  present at this visit and did participate with clinician.    Medications reconciled and all medications reconciled by Nanci James RN during combined visit.  Nurse provided all education on medications.       Patient at risk for falls

## 2024-04-12 ENCOUNTER — HOME CARE VISIT (OUTPATIENT)
Dept: HOME HEALTH SERVICES | Facility: HOME HEALTH | Age: 64
End: 2024-04-12
Payer: MEDICARE

## 2024-04-12 ENCOUNTER — TELEPHONE (OUTPATIENT)
Age: 64
End: 2024-04-12

## 2024-04-12 ENCOUNTER — ANTI-COAG VISIT (OUTPATIENT)
Age: 64
End: 2024-04-12
Payer: MEDICARE

## 2024-04-12 ENCOUNTER — HOME CARE VISIT (OUTPATIENT)
Facility: HOME HEALTH | Age: 64
End: 2024-04-12
Payer: MEDICARE

## 2024-04-12 VITALS — OXYGEN SATURATION: 98 % | TEMPERATURE: 98 F | RESPIRATION RATE: 24 BRPM

## 2024-04-12 DIAGNOSIS — Z79.01 CHRONIC ANTICOAGULATION: Primary | ICD-10-CM

## 2024-04-12 LAB — INR BLD: 1.2

## 2024-04-12 PROCEDURE — G0299 HHS/HOSPICE OF RN EA 15 MIN: HCPCS

## 2024-04-12 PROCEDURE — 93793 ANTICOAG MGMT PT WARFARIN: CPT | Performed by: NURSE PRACTITIONER

## 2024-04-12 RX ORDER — ENOXAPARIN SODIUM 100 MG/ML
1 INJECTION SUBCUTANEOUS 2 TIMES DAILY
Qty: 12 EACH | Refills: 0 | Status: SHIPPED | OUTPATIENT
Start: 2024-04-12

## 2024-04-12 ASSESSMENT — ENCOUNTER SYMPTOMS
HEMOPTYSIS: 0
DYSPNEA ACTIVITY LEVEL: WHILE SPEAKING
TROUBLE SWALLOWING: 1

## 2024-04-12 NOTE — TELEPHONE ENCOUNTER
Discussed patient with YUMI Quiles, discussed report from patient's home health nurse regarding physical debility. Discussed that patient may have difficulty taking medicare transport by himself due to being wheelchair bound, his SO crystal works every day except Wednesday and importance of SO being present for plan of care discussions.     Per YUMI Quiles she is able to see patient Wednesday 04/17 at 2:30 for hospital discharge appointment so that patient's SO can be present.     1149: called and left message with patient notifying of availability of appointment on Wednesday at 2:30. Requested patient call back to confirm if he can come to this appointment.

## 2024-04-12 NOTE — HOME HEALTH
Subjective:  I texted you and told you I didn't want you to come today.  I have family in town.  (no text was recieved, he pulled out his phone and he texted [employer] and not the therapist)    PT Visit missed due to client refused.  Farshad Gillespie MD notified.

## 2024-04-12 NOTE — HOME HEALTH
therapy education; purpose, dose, and frequency, risks of co-administration with other medications such as ASA, NSAID, and herbals, bleeding prevention strategies such as the use of a soft toothbrush, gentle flossing, use of electric razor, on the potential for increased bleeding from falls and lacerations, to notify medical providers of anticoagulant therapy, consider carrying an ID card and signs and symptoms of abnormal bleeding such as unexplained bruising, dizziness/lightheadedness, red or tarry looking stool, blood in urine, blood in vomit.    Oxygen safety education provided: proper storage of portable tanks, keep concentrator away from heat sources, place \"Oxygen in Use:\" sign on front door or window. NO SMOKING in home.    Patient at risk for falls: Yes:   Recommended requesting PT/OT orders due to fall risk: Yes  Patient response to recommended requesting of PT/OT orders: agreed- PT performed evaluation following SN SOC visit.    Interdisciplinary communication with: Stormy Currie PT for the purpose of OASIS collaboration and Yvonne Choudhary RN for purpose of care coordination with COPE program.    Written Teaching Material Utilized: [facility] admission booklet and hospital discharge instructions    Clinician reviewed orientation to home health booklet with patient/caregiver including agency phone number, agency complaint process, state hotline number, as well as [employer]'s quality hotline number. Emergency preparedness reviewed with patient/caregiver in home health booklet. Consent forms signed.    Patient/caregiver instructed on plan of care and are agreeable to plan of care at this time.      Plan of care and admission to home health status called to attending physician. The following practitioner has agreed to sign the ongoing POC: Dr Farshad Gillespie and was notified of the following: visit frequency of SN 2W9, 5 prn  requesting additional services as follows: COPE program and medication concerns as

## 2024-04-12 NOTE — TELEPHONE ENCOUNTER
1300: received call from keiko physical therapist with [facility]. Stated she attempted to see patient today, states patient refused PT today. Stated that she has ordered patient a rolling walker to assist with mobility. RUBEN Garibay notified.

## 2024-04-12 NOTE — PROGRESS NOTES
Geni Garibay APRN - NP  You2 hours ago (1:25 PM)       Let's stick with the plan's 2mg through the weekend and recheck Monday.  Bridge him with lovenox 1mg/kg BID, please, if he's able/willing.     You  Geni Garibay APRN - NP5 hours ago (9:59 AM)     MP  Hi, anticoag chart updated with inpatient doses     ADVANCED HEART FAILURE CENTER  [facility] in [place], [place]      INR result reviewed with RUBEN Garibay NP who made the following recommendations (VORB): 2mg daily and recheck Monday, start lovenox 1mg/kg BID.     Patient notified of provider instructions via voicemail, requested call back to confirm. Patient's home health nurse MADYSON James also notified who confirmed she received message.   (See anticoag tracker)     Funmi Barth RN

## 2024-04-13 NOTE — HOME HEALTH
to 4/17/24 when CG/spouse is off work and able to assist pt with transport.     Written Teaching Material Utilized: [facility] admission booklet    Interdisciplinary communication with: RAISA Currie PT and Yvonne Choudhary RN (COPE program)    Discharge planning as follows: Pt may be discharged when IV antibiotic and PICC line has been discontinued or pt transitions into hospice.    Specific plan for next visit: Cardiopulmonary assessment, medication review, repeat INR testing, weekly lab draw and PICC site dressing change.

## 2024-04-15 ENCOUNTER — ANTI-COAG VISIT (OUTPATIENT)
Age: 64
End: 2024-04-15
Payer: MEDICARE

## 2024-04-15 ENCOUNTER — TELEPHONE (OUTPATIENT)
Age: 64
End: 2024-04-15

## 2024-04-15 ENCOUNTER — HOME CARE VISIT (OUTPATIENT)
Facility: HOME HEALTH | Age: 64
End: 2024-04-15
Payer: MEDICARE

## 2024-04-15 DIAGNOSIS — Z79.01 CHRONIC ANTICOAGULATION: Primary | ICD-10-CM

## 2024-04-15 LAB — INR BLD: 1.3

## 2024-04-15 PROCEDURE — G0299 HHS/HOSPICE OF RN EA 15 MIN: HCPCS

## 2024-04-15 PROCEDURE — 93793 ANTICOAG MGMT PT WARFARIN: CPT | Performed by: NURSE PRACTITIONER

## 2024-04-15 NOTE — TELEPHONE ENCOUNTER
Returned call to patient's SO Crystal, left message requesting call back to discuss medicaid status.

## 2024-04-15 NOTE — TELEPHONE ENCOUNTER
Patient discussed with ADILENE Maurer. Discussed that per Dr. June's hospital notes patient should follow up with Dr. Rajan for ID as outpatient. Discussed with ADILENE Maurer that per last call patient does not have an appt with Dr. Rajan. ADILENE Maurer requested office of Dr. Rajan be notified of patient and that he should have follow up per Dr. June inpatient notes.    Called [facility] and spoke with staff Daisy. Reviewed patient was seen by Dr. June inpatient and per her notes he should have follow up with Dr. Rajan outpatient.  Daisy stated that patient does not have a current appointment with Dr Rajan. States that she will discuss patient with Dr. Rajan and schedule for follow up if indicated by ID physician.

## 2024-04-15 NOTE — TELEPHONE ENCOUNTER
Please call Ashley BAIG.  She left a vm stating that they received a letter over the weekend stating patient's medicaid has lapsed.

## 2024-04-15 NOTE — PROGRESS NOTES
Received message from patient's home health nurse MADYSON James reporting INR of 1.3. Reported he was unable to fill lovenox script on Friday as [employer] has to order it, reports wife will  today.   Reports MAP of 68 today. Reviewed with ADILENE Maurer, see orders below.      ADVANCED HEART FAILURE CENTER  [facility] in [place], [place]      INR result reviewed with ADILENE Maurer, ROSSANA who made the following recommendations (VORB): 4mg for two days  and recheck Wednesday. Patient notified of provider instructions via voicemail, requested call back to confirm. Patient's home health nurse MADYSON James also notified.  (See anticoag tracker)     Funmi Barth RN

## 2024-04-15 NOTE — TELEPHONE ENCOUNTER
----- Message from Funmi Barth RN sent at 4/12/2024  3:51 PM EDT -----  Call ID regarding follow up

## 2024-04-15 NOTE — TELEPHONE ENCOUNTER
Message receive from patient's home health nurse MADYSON James stating that patient's wife does not want to move appointment to 04/17. Stated that patient's wife has a conflicting appointment and thinks she will be able to come to most of his appointment on 04/18 as it is early in the morning. YUMI Quiles notified and 04/17 appointment canceled.

## 2024-04-16 ENCOUNTER — TELEPHONE (OUTPATIENT)
Age: 64
End: 2024-04-16

## 2024-04-16 LAB
ALBUMIN SERPL-MCNC: 3.6 G/DL (ref 3.9–4.9)
ALBUMIN/GLOB SERPL: 1.2 {RATIO} (ref 1.2–2.2)
ALP SERPL-CCNC: 99 IU/L (ref 44–121)
ALT SERPL-CCNC: 43 IU/L (ref 0–44)
AST SERPL-CCNC: 24 IU/L (ref 0–40)
BILIRUB SERPL-MCNC: 0.3 MG/DL (ref 0–1.2)
BUN SERPL-MCNC: 40 MG/DL (ref 8–27)
BUN/CREAT SERPL: 30 (ref 10–24)
CALCIUM SERPL-MCNC: 8.3 MG/DL (ref 8.6–10.2)
CHLORIDE SERPL-SCNC: 99 MMOL/L (ref 96–106)
CO2 SERPL-SCNC: 22 MMOL/L (ref 20–29)
CREAT SERPL-MCNC: 1.34 MG/DL (ref 0.76–1.27)
EGFRCR SERPLBLD CKD-EPI 2021: 59 ML/MIN/1.73
GLOBULIN SER CALC-MCNC: 3 G/DL (ref 1.5–4.5)
GLUCOSE SERPL-MCNC: 63 MG/DL (ref 70–99)
INR PPP: 1.3 (ref 0.9–1.2)
LDH SERPL L TO P-CCNC: 371 IU/L (ref 121–224)
NT-PROBNP SERPL-MCNC: 1454 PG/ML (ref 0–210)
POTASSIUM SERPL-SCNC: 4.4 MMOL/L (ref 3.5–5.2)
PROT SERPL-MCNC: 6.6 G/DL (ref 6–8.5)
PROTHROMBIN TIME: 13.4 SEC (ref 9.1–12)
SODIUM SERPL-SCNC: 139 MMOL/L (ref 134–144)

## 2024-04-16 NOTE — TELEPHONE ENCOUNTER
Message received from YUMI Quiles requesting patient call to assess fluid symptoms. Call received from patient's SO twin (see anticoag call).     Twin stated patient's shortness of breath has \"not been bad.\" States patient has not been up and walking much, has been mostly comfortable at rest and since he has not been walking a lot shortness of breath has been okay. States patient did need to use oxygen yesterday due to house being \"hot and stuffy\" (high was 90 degrees yesterday). States he wore O2 for a few hours and then took it off and felt \"okay.\"     Twin denied patient having lower extremity edema or abdominal bloating. States patient has been eating great.     Reviewed with YUMI Quiles who stated no changes to medications at this time. Reviewed with Twin no changes at this time, she verbalized understanding.

## 2024-04-16 NOTE — TELEPHONE ENCOUNTER
----- Message from TAIWO Fortune NP sent at 4/16/2024 12:08 PM EDT -----  Regarding: labs  Pro-BNP is up a little.  Can you call him and sew how his SOB is?  If he is very SOB or if he has swelling will you tell him to take lasix 20 mg today and tomorrow since I am not seeing him unitl Thursday now.  IF he is not feeling volume overloaded I will further assess Thursday when I see him.  Also glucose is 63, is he eating?

## 2024-04-16 NOTE — TELEPHONE ENCOUNTER
Call placed to pt and left Vm for call back to make follow up appt. Also sent [employer] message.

## 2024-04-16 NOTE — PROGRESS NOTES
Received call from patient's wife stating that patient took 2mg of coumadin last night instead of 4mg (instructions were for 4mg last night and 4mg tonight). States that she did give patient lovenox last night but he has not gotten a dose today.     Reviewed with YUMI Quiles who stated VORB patient should take 4mg tonight and recheck INR tomorrow as previously instructed, pt should continue lovenox. Reviewed with patient's AISHA murcia who verbalized understanding.

## 2024-04-17 ENCOUNTER — HOME CARE VISIT (OUTPATIENT)
Dept: HOME HEALTH SERVICES | Facility: HOME HEALTH | Age: 64
End: 2024-04-17
Payer: MEDICARE

## 2024-04-17 LAB
BASOPHILS # BLD AUTO: 0.1 X10E3/UL (ref 0–0.2)
BASOPHILS NFR BLD AUTO: 0 %
EOSINOPHIL # BLD AUTO: 0.1 X10E3/UL (ref 0–0.4)
EOSINOPHIL NFR BLD AUTO: 1 %
ERYTHROCYTE [DISTWIDTH] IN BLOOD BY AUTOMATED COUNT: 22.1 % (ref 11.6–15.4)
HCT VFR BLD AUTO: 32.3 % (ref 37.5–51)
HGB BLD-MCNC: 9.9 G/DL (ref 13–17.7)
IMM GRANULOCYTES # BLD AUTO: 0.5 X10E3/UL (ref 0–0.1)
IMM GRANULOCYTES NFR BLD AUTO: 2 %
LYMPHOCYTES # BLD AUTO: 2 X10E3/UL (ref 0.7–3.1)
LYMPHOCYTES NFR BLD AUTO: 10 %
MCH RBC QN AUTO: 25.9 PG (ref 26.6–33)
MCHC RBC AUTO-ENTMCNC: 30.7 G/DL (ref 31.5–35.7)
MCV RBC AUTO: 85 FL (ref 79–97)
MONOCYTES # BLD AUTO: 2.2 X10E3/UL (ref 0.1–0.9)
MONOCYTES NFR BLD AUTO: 11 %
MORPHOLOGY BLD-IMP: ABNORMAL
NEUTROPHILS # BLD AUTO: 14.8 X10E3/UL (ref 1.4–7)
NEUTROPHILS NFR BLD AUTO: 76 %
PLATELET # BLD AUTO: 212 X10E3/UL (ref 150–450)
RBC # BLD AUTO: 3.82 X10E6/UL (ref 4.14–5.8)
WBC # BLD AUTO: 19.7 X10E3/UL (ref 3.4–10.8)

## 2024-04-17 NOTE — PATIENT INSTRUCTIONS
Medication changes:    No medication changes at this time, you can stop lovenox injections.  Continue taking 2mg of coumadin daily and check your INR Monday.     Testing Ordered:    None additional at this time    Other Recommendations:     Our office will follow up regarding your octreotide injections     Please call Dr. Rajan's office (infectious disease) at [phone] to schedule follow up     Please notify [facility] if you do not wish to continue physical therapy with them    Please notify our office if the fluid collection in your chest becomes more uncomfortable, per our provider we may discuss placement of a drain if this becomes more painful.     Continue to change drive line exit site dressing 3 times a week using sterile technique,     Ensure you are drinking an adequate amount of water with a goal of 6-8 eight ounce glasses (1.5-2 liters) of fluid daily. Your urine should be clear and light yellow straw colored.       Monthly LVAD Education Tip:    Travel Tips and Preparation:  Traveling with your device is generally easy and safe.     Here are some things to know or consider:  First, consult with your doctor.  It’s important to notify your care team about your plans, so they can:  Help you connect with a doctor at your destination, just in case you need care  Help you plan for the right level of exercise or activity while you’re away  Create a travel and emergency action plan for long-distance trips  Talk with you about travel safety rules for equipment  Help you think through how to manage any possible issues  Provide any other relevant information  Your VAD team will send records to the LVAD center nearest to your destination in case of emergency. The contact information for that center will be provided to you.     What to bring with you  Pack any medications. Bring about a week’s more than you expect to need for the trip. If traveling by plane, pack your medication in your carry-on so

## 2024-04-18 ENCOUNTER — ANTI-COAG VISIT (OUTPATIENT)
Age: 64
End: 2024-04-18

## 2024-04-18 ENCOUNTER — TELEPHONE (OUTPATIENT)
Age: 64
End: 2024-04-18

## 2024-04-18 ENCOUNTER — OFFICE VISIT (OUTPATIENT)
Age: 64
End: 2024-04-18
Payer: MEDICARE

## 2024-04-18 ENCOUNTER — HOME CARE VISIT (OUTPATIENT)
Dept: HOME HEALTH SERVICES | Facility: HOME HEALTH | Age: 64
End: 2024-04-18
Payer: MEDICARE

## 2024-04-18 VITALS
SYSTOLIC BLOOD PRESSURE: 64 MMHG | BODY MASS INDEX: 21.59 KG/M2 | HEIGHT: 68 IN | OXYGEN SATURATION: 100 % | RESPIRATION RATE: 20 BRPM | HEART RATE: 60 BPM

## 2024-04-18 VITALS — OXYGEN SATURATION: 97 % | BODY MASS INDEX: 21.59 KG/M2 | TEMPERATURE: 98.2 F | WEIGHT: 142 LBS

## 2024-04-18 DIAGNOSIS — L08.9 SOFT TISSUE INFECTION: ICD-10-CM

## 2024-04-18 DIAGNOSIS — Z95.811 LVAD (LEFT VENTRICULAR ASSIST DEVICE) PRESENT (HCC): Primary | ICD-10-CM

## 2024-04-18 DIAGNOSIS — Z79.01 CHRONIC ANTICOAGULATION: ICD-10-CM

## 2024-04-18 LAB
INR BLD: 2.2
POC INR: 2.2
PROTHROMBIN TIME, POC: NORMAL

## 2024-04-18 PROCEDURE — 93750 INTERROGATION VAD IN PERSON: CPT | Performed by: NURSE PRACTITIONER

## 2024-04-18 PROCEDURE — 85610 PROTHROMBIN TIME: CPT | Performed by: NURSE PRACTITIONER

## 2024-04-18 PROCEDURE — 99215 OFFICE O/P EST HI 40 MIN: CPT | Performed by: NURSE PRACTITIONER

## 2024-04-18 RX ORDER — PANTOPRAZOLE SODIUM 40 MG/1
TABLET, DELAYED RELEASE ORAL
Qty: 30 TABLET | Refills: 3 | Status: SHIPPED | OUTPATIENT
Start: 2024-04-18

## 2024-04-18 ASSESSMENT — ENCOUNTER SYMPTOMS
COUGH: 0
SHORTNESS OF BREATH: 0
TROUBLE SWALLOWING: 1
TROUBLE SWALLOWING: 0
HEMOPTYSIS: 0
DYSPNEA ACTIVITY LEVEL: AFTER AMBULATING LESS THAN 10 FT
NAUSEA: 0
BLOOD IN STOOL: 0
VOMITING: 0
ABDOMINAL DISTENTION: 0
WHEEZING: 0

## 2024-04-18 NOTE — CASE COMMUNICATION
Visit missed due to:  Patient does not answer phone or return phone calls.  This PTA has left several detailed messages to return phone call to schedule home health PT visits with no patient cooperation.

## 2024-04-18 NOTE — PROGRESS NOTES
ADVANCED HEART FAILURE CENTER  [facility] in [place], [place]  LVAD Coordinator Clinic Visit Note  Chief Complaint   Patient presents with    Numbness     legs    Follow-Up from Hospital     VAD       BP (!) 64/0 (Site: Left Upper Arm, Position: Sitting, Cuff Size: Medium Adult) Comment: MAP  Pulse 60   Resp 20   Ht 1.727 m (5' 8\")   SpO2 100%   BMI 21.59 kg/m²     LVAD  LVAD Type:: Left Ventricular Assist Device (LVAD)  Pump Speed (rpm): 5200  Pump Flow (lpm): 3.1  MAP (mmHg): (!) 64  Set Low Speed (rpm): 5200  Pump Pulse Index (PI): 5.1  Pump Power (Mei): 3.7  Battery Life Checked: Yes  Backup Controller Present: No  Driveline Dressing: Clean, Dry and Intact  Outpatient: Yes  MAP in Therapeutic Range (Outpatient): No         Sanford Joiner is a 64 y.o. male with a history of NICM with Heartmate 3 implant date of 03/27/23. LVAD interrogation completed in clinic, results reported to provider, Notable for one low flow.     Patient denied  driveline trauma (including  drops). States his sternal abscess is worse than before, YUMI Quiles assessed in clinic. See flow sheet for details. Driveline inspected for integrity.  Above reported to provider.     Per provider patient to notify [facility] for pain or discomfort with abscess at which time palliative drainage might be considered. Patient educated to notify [facility] for worsening pain or discomfort, he verbalized understanding (Post visit provider requested outpatient drainage of abscess, see telephone call).      All orders entered per VORB. All provider instructions placed in AVS and reviewed with patient. LVAD education provided on traveling with LVAD. Educated patient on coumadin dosing, expected follow up.  Time given to ask questions. Patient verbalized understanding and had no further questions.     Funmi Barth RN  VAD Coordinator

## 2024-04-18 NOTE — PROGRESS NOTES
ADVANCED HEART FAILURE CENTER  [facility] in [place], [place]  Inpatient Progress Note      Patient name: Sanford Joiner  Patient : 1960  Patient MRN: [id number]  Date of service: 24    Reason for Referral  LVAD management      ASSESSMENT:  Sanford Joiner is a 64 y.o. male admitted with driveline infection and possible upper GI bleed, reporting 3-4 days of dark stools.   History of chronic systolic heart failure due to non ischemic cardiomyopathy, s/p HM3 LVAD implantation ([facility] 3/27/2023) as destination therapy, stage D, remains NYHA class IV despite LVAD support due to severe COPD; currently with recovered LVEF to 55% and small LV cavity with resulting low flow alarms; optimal GDMT limited by hypovolemia  Patient is not a candidate for heart transplant due to advanced COPD -  declined by [facility] for both LVAD-DT and transplant and for the same reasons declined for LVAD-DT by [facility]   Team discussed patient at [facility] 3/22/24 to determine risk of pump explant and alternatives of washout or palliative antibiotics. CT chest shows 2 fluid collections surrounding the driveline and outflow graft concerning for infection. PFTS performed, showing very severe obstructive lung disease with FEV1<1L. Discussed with CV Surgery, not a surgical candiate. Plan for palliative antibiotic management of LVAD pump infection.    CURRENT VISIT 24  Mr. Joiner presents to clinic for post discharge f/u and VAD f/u. He feel \"ok\" since hospital discharge.  His is tired frequently.  His breathing has actually been a little better.  He denies wheezing or SOB at rest.  He does get quite dyspneic with any type of movement.  He keeps sedentary and is using wheelchair today.  He denies chest pain, palpitations,  dizziness.  His abscess at his LVAD pump site has worsened again and the skin is swollen, red, and warm.  He denies any discomfort at the site    RECOMMENDATIONS:  Bacteremia, LVAD pump infection  Blood

## 2024-04-18 NOTE — TELEPHONE ENCOUNTER
I have been unable to reach patient after multiple attempts. I see he is scheduled with [facility] ID on 5/2

## 2024-04-18 NOTE — PROGRESS NOTES
ADVANCED HEART FAILURE CENTER  [facility] in [place], [place]      INR result reviewed with YUMI Quiles NP  who made the following recommendations (VORB): no change to regular dosing, Continue 2mg daily, stop lovenox  and recheck 1 week. Patient notified at clinic visit and verbalized understanding. They had no further questions. (See anticoag tracker)     Funmi Barth RN

## 2024-04-18 NOTE — TELEPHONE ENCOUNTER
Patient was discussed with YUMI Quiles after conclusion of clinic visit. Per YUMI Quiles, after discussion with pulmonologist regarding etiology of lung disease, patient should have IR drainage of sternal abscess performed ASAP due to worsening of abscess on IV abx. (Originally patient was instructed that abscess may be drained to relieve pain or discomfort only, provider would now like patient to have this done with goal of infection control). Order for ultrasound guided abscess drainage placed per YUMI Quiles VORB.     Placed call to IR who stated they do not perform drainage of this nature, stated these procedures are done by ultrasound.     Placed call to ultrasound and spoke with Guera who stated patient can be seen between 8am and 9am tomorrow 04/19.     1547: call placed to patient's wife Ashley (at work) and discussed provider would like patient to have abscess drained to prevent further spread of infection. Reviewed danger of infection progression and preference to do this sooner rather than later. Reviewed ultrasound able to do this for patient tomorrow at 0800. Ashley stated she is able to take patient but unsure if patient will be agreeable to going. Stated she will bring patient \"if she is able to talk him into it.\"     1552: call placed to patient, no answer and no option to leave voicemail.      1600: Placed call to patient's home health nurse MADYSON James. Discussed updates from patient visit today. Discussed ultrasound able to drain patient's abscess tomorrow at 0800, MADYSON James stated she will call patient and educate on importance of having this done.     1630: YUMI Quiles updated on above discussions. YUMI Quiles requested VORB ultrasound also place drain for patient. Ultrasound department closed, discussed with YUMI Quiles will contact and update department tomorrow.

## 2024-04-18 NOTE — TELEPHONE ENCOUNTER
0841: call placed to PAR per YUMI Quiles request. Spoke with staff and requested patient's pulmonologist call YUMI Quiles directly (per her request) do discuss this patient. Staff stated they will reach out to pulmonologist and request he call YUMI Quiles.

## 2024-04-19 ENCOUNTER — HOME CARE VISIT (OUTPATIENT)
Facility: HOME HEALTH | Age: 64
End: 2024-04-19
Payer: MEDICARE

## 2024-04-19 ENCOUNTER — ANTI-COAG VISIT (OUTPATIENT)
Age: 64
End: 2024-04-19

## 2024-04-19 ENCOUNTER — HOME CARE VISIT (OUTPATIENT)
Dept: HOME HEALTH SERVICES | Facility: HOME HEALTH | Age: 64
End: 2024-04-19
Payer: MEDICARE

## 2024-04-19 VITALS — OXYGEN SATURATION: 96 % | TEMPERATURE: 99 F | RESPIRATION RATE: 20 BRPM

## 2024-04-19 DIAGNOSIS — Z79.01 CHRONIC ANTICOAGULATION: Primary | ICD-10-CM

## 2024-04-19 LAB — INR BLD: 2.6

## 2024-04-19 PROCEDURE — G0299 HHS/HOSPICE OF RN EA 15 MIN: HCPCS

## 2024-04-19 ASSESSMENT — ENCOUNTER SYMPTOMS
HEMOPTYSIS: 0
DYSPNEA ACTIVITY LEVEL: AFTER AMBULATING LESS THAN 10 FT

## 2024-04-19 NOTE — PROGRESS NOTES
ADVANCED HEART FAILURE CENTER  [facility] in [place], [place]      INR result reviewed with YUMI Quiles NP  who made the following recommendations (VORB): 1mg tonight and recheck Tuesday. Patient's home health C. Walker notified who stated she was with patient and confirmed patient received instructions. (See anticoag tracker)     Funmi Barth RN

## 2024-04-19 NOTE — HOME HEALTH
Patient is being discharged from physical therapy due to non-compliance.  He is not returning phone calls for scheduling and has also refused visit when therapist was present.

## 2024-04-19 NOTE — HOME HEALTH
Subjective: \"I'm doing ok- i haven't used the oxygen because I don't need it.\"  Falls since last visit: 0  Caregiver involvement changes: Ashley Swan - spouse  Home health supplies by type and quantity ordered/delivered this visit include: LVAD supplies, back up batteries and , PICC line supplies and saline and heparin flushes     Clinician asked if patient has had any physician contact since last home care visit and patient states: Yes, pt had appointment at [facility] on 4/18/24.  Clinician asked if patient has any new or changed medications and patient states:  No  If Yes, were medications reconciled? Yes  Was the certifying physician notified of changes in medications? N/A     Clinical assessment (what this visit means for the patient overall and need for ongoing skilled care) and progress or lack of progress towards SPECIFIC goals: Pt alert, assisted by step-son to walk from bedroom to living room then seated on sofa.  Pt's VS within POC parameters- O2 saturation 97% on room air at rest- pt  claims he has not needed despite need for recovery after any exertion.  Pt assisted to stand on scale- weight today 142 lbs.SN performed INR check via fingerstick- result 1.3- reported to [facility]. SN performed LVAD driveline dressing change- scant gray drainage noted on old dressing- see Wound Addendum for details.. Pt reports he has been receiving  IV Rocephin 2000 mg q 24 hrs as ordered. Verbal order received from [facility] for pt to take Coumadin 4mg daily along with Lovenox bridge. CG/spouse will  Lovenox today. Pt scheduled to follow up at  [facility] on 4/18/24.     Written Teaching Material Utilized: [facility] admission booklet     Interdisciplinary communication with: Yvonne Choudhary RN (COPE program)    Discharge planning as follows: Pt may be discharged when IV antibiotic and PICC line has been discontinued or pt transitions into hospice.     Specific plan for next visit: Cardiopulmonary assessment, medication

## 2024-04-19 NOTE — TELEPHONE ENCOUNTER
0745: call placed to Ultrasound department however no answer. Order for ultrasound aspiration/drainage with provider request for drain placement faxed to ultrasound department.      0841: Received message from patient's home health nurse MADYSON James stating that patient will not present to ultrasound this morning. She stated patient told her it was too physically taxing to leave the house.     0850: called and spoke with Guera with ultrasound department and notified patient will not present for procedure today. She stated patient can be scheduled next week. Discussed will reach out to patient and ask him to call their department to schedule .     1130: message received from patient's home health nurse stating she is with patient and patient is requesting assistance scheduling with ultrasound.  MADYSON James stated that patient is stating he can have drainage done Wednesday.      1135: called ultrasound department and reviewed patient is requesting to be scheduled Wednesday. Staff stated that patient will be scheduled Wednesday at 1pm. They confirmed they received updated order with request for drain placement.      1143: discussed with MADYSON James new appointment scheduled for patient with ultrasound 04/24 at 1pm. MADYSON James confirmed she was with patient and notified patient of new appointment.     YUMI Quiles updated of new appt scheduled 04/24 for patient.

## 2024-04-20 LAB — MAGNESIUM SERPL-MCNC: 1.8 MG/DL (ref 1.6–2.3)

## 2024-04-22 ENCOUNTER — ANTI-COAG VISIT (OUTPATIENT)
Age: 64
End: 2024-04-22

## 2024-04-22 ENCOUNTER — TELEPHONE (OUTPATIENT)
Age: 64
End: 2024-04-22

## 2024-04-22 ENCOUNTER — HOME CARE VISIT (OUTPATIENT)
Facility: HOME HEALTH | Age: 64
End: 2024-04-22
Payer: MEDICARE

## 2024-04-22 DIAGNOSIS — Z79.01 CHRONIC ANTICOAGULATION: Primary | ICD-10-CM

## 2024-04-22 LAB — INR BLD: 2.5

## 2024-04-22 PROCEDURE — G0299 HHS/HOSPICE OF RN EA 15 MIN: HCPCS

## 2024-04-22 RX ORDER — BUMETANIDE 1 MG/1
1 TABLET ORAL PRN
Qty: 10 TABLET | Refills: 1 | Status: SHIPPED | OUTPATIENT
Start: 2024-04-22

## 2024-04-22 NOTE — PROGRESS NOTES
ADVANCED HEART FAILURE CENTER  [facility] in [place], [place]      INR result reviewed with ADILENE Maurer NP who made the following recommendations (VORB): 1mg for two days and recheck 1 week. Patient notified of provider instructions via voicemail, requested call back to confirm. Patient's home health nurse MADYSON James also notified. (See anticoag tracker)     Funmi Barth RN

## 2024-04-22 NOTE — PROCEDURES
[address]  [address]                         PULMONARY FUNCTION TEST      PATIENT NAME: JAIDEN SOUTH             : 1960  MED REC NO: [id number]                       ROOM: [facility]  ACCOUNT NO: [id number]                       ADMIT DATE: 2024  PROVIDER: Sunshine Green MD    DATE OF SERVICE:  2024    REQUESTING PHYSICIAN:  Not available.    DIAGNOSIS:  Shortness of breath.    ATS criteria for reproducibility and acceptability was met.    Spirometry:  FEV1/FVC ratio is 47, which is reduced.  FEV1 is 0.85 L, which is 26% predicted, which is very severely reduced.  FVC is 1.80 L, which is 42% predicted, which is also reduced.    Flow volume loop is consistent with obstruction.    INTERPRETATION:  Very severe obstructive airway disease.  FVC is reduced too.  Concomitant restrictive lung disease is not ruled out.  Consider lung volume assessment.  Clinical correlation advised.        SUNSHINE GREEN MD      MNA/AQS  D:  2024 20:47:58  T:  2024 00:46:12  JOB #:  [id number]

## 2024-04-22 NOTE — TELEPHONE ENCOUNTER
Nahomi Quiles, APRN - NP  You8 minutes ago (4:38 PM)       Let order some bumex for him.  Lets start at 1mg daily since he has not required diuretics recently an follow up with him in 2 days.       Reviewed with YUMI Quiles who ordered bumex 1mg daily PRN for weight gain of 3lb overnight or 5lb in 1 week or increased shortness of breath. Provider stated VORB patient should take 1mg daily for two days then check in with weights.    1647: call placed to patient. Left voicemail reviewing instructions to take 1mg of bumex daily for two days and monitor weights.  Educated patient to stop bumex and call [facility] for lightheadedness, dizziness, or LVAD alarms.      1654: call placed to patient's wife at work. Reviewed provider instructions for patient to take 1mg of Bumex daily for two days and check in with [facility] on weights and symptoms.  Educated to stop bumex and call [facility] for lightheadedness, dizziness, or LVAD alarms.  Ashley  verbalized understanding. Ashley also stated that her job will allow her to take 9-12 weeks of paid leave (paid at 70%) which she plans to utilize to he more available to help patient    Patient's home health nurse MADYSON James also notified.

## 2024-04-22 NOTE — TELEPHONE ENCOUNTER
1447: message received from patient's home health nurse reporting weight of 151lb today, increased from 142lb on 04/15 (9lb in 1 week). Reports increased work of breathing, patient waiting on trelegy refill from pulmonology .

## 2024-04-23 ENCOUNTER — TELEPHONE (OUTPATIENT)
Age: 64
End: 2024-04-23

## 2024-04-23 DIAGNOSIS — I50.22 CHRONIC SYSTOLIC HEART FAILURE (HCC): Primary | ICD-10-CM

## 2024-04-23 LAB
ALBUMIN SERPL-MCNC: 3.2 G/DL (ref 3.9–4.9)
ALBUMIN/GLOB SERPL: 1 {RATIO} (ref 1.2–2.2)
ALP SERPL-CCNC: 91 IU/L (ref 44–121)
ALT SERPL-CCNC: 31 IU/L (ref 0–44)
AST SERPL-CCNC: 29 IU/L (ref 0–40)
BASOPHILS # BLD AUTO: 0 X10E3/UL (ref 0–0.2)
BASOPHILS NFR BLD AUTO: 1 %
BILIRUB SERPL-MCNC: <0.2 MG/DL (ref 0–1.2)
BUN SERPL-MCNC: 15 MG/DL (ref 8–27)
BUN/CREAT SERPL: 15 (ref 10–24)
CALCIUM SERPL-MCNC: 8 MG/DL (ref 8.6–10.2)
CHLORIDE SERPL-SCNC: 100 MMOL/L (ref 96–106)
CO2 SERPL-SCNC: 26 MMOL/L (ref 20–29)
CREAT SERPL-MCNC: 0.98 MG/DL (ref 0.76–1.27)
EGFRCR SERPLBLD CKD-EPI 2021: 86 ML/MIN/1.73
EOSINOPHIL # BLD AUTO: 0.3 X10E3/UL (ref 0–0.4)
EOSINOPHIL NFR BLD AUTO: 4 %
ERYTHROCYTE [DISTWIDTH] IN BLOOD BY AUTOMATED COUNT: 21.5 % (ref 11.6–15.4)
GLOBULIN SER CALC-MCNC: 3.1 G/DL (ref 1.5–4.5)
GLUCOSE SERPL-MCNC: 78 MG/DL (ref 70–99)
HCT VFR BLD AUTO: 23.9 % (ref 37.5–51)
HGB BLD-MCNC: 7.4 G/DL (ref 13–17.7)
IMM GRANULOCYTES # BLD AUTO: 0 X10E3/UL (ref 0–0.1)
IMM GRANULOCYTES NFR BLD AUTO: 0 %
INR PPP: 2.1 (ref 0.9–1.2)
LDH SERPL L TO P-CCNC: 269 IU/L (ref 121–224)
LYMPHOCYTES # BLD AUTO: 0.9 X10E3/UL (ref 0.7–3.1)
LYMPHOCYTES NFR BLD AUTO: 10 %
MCH RBC QN AUTO: 26.2 PG (ref 26.6–33)
MCHC RBC AUTO-ENTMCNC: 31 G/DL (ref 31.5–35.7)
MCV RBC AUTO: 85 FL (ref 79–97)
MONOCYTES # BLD AUTO: 0.7 X10E3/UL (ref 0.1–0.9)
MONOCYTES NFR BLD AUTO: 8 %
NEUTROPHILS # BLD AUTO: 6.7 X10E3/UL (ref 1.4–7)
NEUTROPHILS NFR BLD AUTO: 77 %
PLATELET # BLD AUTO: 175 X10E3/UL (ref 150–450)
POTASSIUM SERPL-SCNC: 4.4 MMOL/L (ref 3.5–5.2)
PROT SERPL-MCNC: 6.3 G/DL (ref 6–8.5)
PROTHROMBIN TIME: 21.9 SEC (ref 9.1–12)
RBC # BLD AUTO: 2.82 X10E6/UL (ref 4.14–5.8)
SODIUM SERPL-SCNC: 139 MMOL/L (ref 134–144)
WBC # BLD AUTO: 8.7 X10E3/UL (ref 3.4–10.8)

## 2024-04-23 NOTE — TELEPHONE ENCOUNTER
----- Message from Farshad Gillespie MD sent at 4/23/2024  8:43 AM EDT -----  Hemoglobin down to 7.4, otherwise labs ok this week. Can you check on any bleeding symptoms

## 2024-04-23 NOTE — TELEPHONE ENCOUNTER
Patient's wife twin called, reviewed with Twin patient's hbg decrease. Twin denied patient having any black or tarry stools, or merline bleeding in stools. States stools are dark but they are always this way. Twin stated stools are normal for patient. Denied LVAD alarms.     Reviewed with ADILENE Maurer who ordered VORB recheck CBC 04/24.     1558: left message with patient notifying of CBC order to be done tomorrow. Requested patient complete labwork tomorrow.        04/24/24 1038: left second voicemail reminding patient he needs to have cbc rechecked today. Requested call back to confirm

## 2024-04-23 NOTE — TELEPHONE ENCOUNTER
1512: patient's wife returned call. Confirmed patient took bumex today as instructed. States patient did not notice a significant change in shortness of breath. Reminded patient should take another dose tomorrow per provider instructions, Crystal verbalized understanding

## 2024-04-24 ENCOUNTER — HOSPITAL ENCOUNTER (OUTPATIENT)
Facility: HOSPITAL | Age: 64
Discharge: HOME | End: 2024-04-27
Payer: MEDICARE

## 2024-04-24 VITALS
OXYGEN SATURATION: 96 % | TEMPERATURE: 97.8 F | WEIGHT: 151 LBS | RESPIRATION RATE: 24 BRPM | BODY MASS INDEX: 22.96 KG/M2

## 2024-04-24 DIAGNOSIS — I50.22 CHRONIC SYSTOLIC HEART FAILURE (HCC): ICD-10-CM

## 2024-04-24 DIAGNOSIS — L08.9 SOFT TISSUE INFECTION: ICD-10-CM

## 2024-04-24 LAB
APPEARANCE FLD: ABNORMAL
COLOR FLD: ABNORMAL
LYMPHOCYTES NFR FLD: 5 %
NEUTROPHILS NFR FLD: 95 %
NT-PROBNP SERPL-MCNC: 1302 PG/ML (ref 0–210)
NUC CELL # FLD: ABNORMAL /CU MM
RBC # FLD: >100 /CU MM
SPECIMEN SOURCE FLD: ABNORMAL

## 2024-04-24 PROCEDURE — 87075 CULTR BACTERIA EXCEPT BLOOD: CPT

## 2024-04-24 PROCEDURE — 87070 CULTURE OTHR SPECIMN AEROBIC: CPT

## 2024-04-24 PROCEDURE — 10030 IMG GID FLU COLL DRG SFT TIS: CPT

## 2024-04-24 PROCEDURE — 89050 BODY FLUID CELL COUNT: CPT

## 2024-04-24 PROCEDURE — 87205 SMEAR GRAM STAIN: CPT

## 2024-04-24 ASSESSMENT — ENCOUNTER SYMPTOMS
DYSPNEA ACTIVITY LEVEL: WHILE SPEAKING
HEMOPTYSIS: 0

## 2024-04-24 NOTE — TELEPHONE ENCOUNTER
1019: called and spoke with Elena with [facility] who confirmed patient's last Octreotide injection was 02/28/24.     Reviewed with ADILENE Maurer who ordered VORB octreotide 20mg IM monthly to be administered by home health.     Called and spoke with patient's pharmacy who stated drug is not covered under either patient's medicare or medicaide plans.    3

## 2024-04-25 LAB
ERYTHROCYTE [DISTWIDTH] IN BLOOD BY AUTOMATED COUNT: 21.7 % (ref 11.6–15.4)
HCT VFR BLD AUTO: 26 % (ref 37.5–51)
HGB BLD-MCNC: 7.7 G/DL (ref 13–17.7)
MCH RBC QN AUTO: 25.8 PG (ref 26.6–33)
MCHC RBC AUTO-ENTMCNC: 29.6 G/DL (ref 31.5–35.7)
MCV RBC AUTO: 87 FL (ref 79–97)
PLATELET # BLD AUTO: 336 X10E3/UL (ref 150–450)
RBC # BLD AUTO: 2.98 X10E6/UL (ref 4.14–5.8)
WBC # BLD AUTO: 6.7 X10E3/UL (ref 3.4–10.8)

## 2024-04-25 NOTE — HOME HEALTH
Subjective: \"I ran out of my Trelegy inhaler a couple of days ago- waiting on a refill today.\"  Falls since last visit: 0  Caregiver involvement changes: Ashley Swan - spouse  Home health supplies by type and quantity ordered/delivered this visit include: LVAD supplies, back up batteries and , PICC line supplies and saline and heparin flushes     Clinician asked if patient has had any physician contact since last home care visit and patient states: No  Clinician asked if patient has any new or changed medications and patient states:  No  If Yes, were medications reconciled? Yes  Was the certifying physician notified of changes in medications? N/A      Clinical assessment (what this visit means for the patient overall and need for ongoing skilled care) and progress or lack of progress towards SPECIFIC goals: Pt alert, sitting  on sofa in living room. Pt's VSS, map 70,  O2 sat level 96% on room air at rest. Pt reports he still has not used supplimental O2 despite SOB with mild exertion. SN performed INR check via fingerstick- result 2.5- reported to [facility]. Pt declined SN changing LVAD driveline dressing - stating his wife would do it this evening along with helping him administer IV Rocephin 2000 mg q 24 hrs as ordered.  INR to be rechecked on 4/26/24. SN margo blood from PICC line for CBC with diff, CMP, LDH, PT/INR and NT ProBNP. SN transported lab sample to [facility] at [place] for processing. SN confirmed pt's appointment for sternal abscess drainage on 4/24/24 at 1pm.     Written Teaching Material Utilized: [facility] admission booklet      Interdisciplinary communication with:  [facility] regarding pt's INR results, weight, map and confirmation of US guided drainage of sternal abscess on 4/24/24.    Discharge planning as follows: Pt may be discharged when IV antibiotic and PICC line has been discontinued..      Specific plan for next visit: Cardiopulmonary assessment, medication review, repeat INR

## 2024-04-25 NOTE — TELEPHONE ENCOUNTER
Farshad Gillespie MD  4/25/2024  9:30 AM EDT Back to Top      Hemoglobin stable. I wonder if that 9.9 was erroneous as the rest are always around the 7s       Repeat hbg (7.7) reviewed by Dr. Gillespie. Stable per Dr. Gillespie.  No new orders received at this time

## 2024-04-26 ENCOUNTER — HOME CARE VISIT (OUTPATIENT)
Facility: HOME HEALTH | Age: 64
End: 2024-04-26
Payer: MEDICARE

## 2024-04-26 ENCOUNTER — TELEPHONE (OUTPATIENT)
Age: 64
End: 2024-04-26

## 2024-04-26 ASSESSMENT — ENCOUNTER SYMPTOMS: HEMOPTYSIS: 0

## 2024-04-26 NOTE — TELEPHONE ENCOUNTER
1152: called to request updated pulm note from PAR. Staff stated last appointment was March 6th. Stated that patient cancelled appointment on 04/24 and is not scheduled for provider appointment at this time. Staff stated that patient is scheduled for Nucala injection 05/15/2024.     RUBEN Garibay notified via chart routing.

## 2024-04-26 NOTE — TELEPHONE ENCOUNTER
1232: message received from  home health nurse sarina James stating that patient notified her that he has been having frequent diarrhea since drain placement on Wednesday.      1528: call placed to patient, left voicemail requesting call back to discuss symptoms. Call placed to [phone] number as well however no answer and no option to leave voicemail. RUBEN Garibay notified of hh report of diarrhea and unable to contact patient.      Per RUBEN Garibay patient should contact [facility] if unable to stay hydrated. Per provider no immodium ordered as unclear source of diarrhea.     Geni Garibay APRN - ROSSANA  You21 minutes ago (4:21 PM)       If he gets too dehydrated, he'll have to get IVF.   Hopefully it's not c diff.  If he can't hydrate enough, or if symptoms don't improve, he'll need to be seen.  In case it is c diff, I don't want to suggest immodium.       You  Geni Garibay APRN - NP28 minutes ago (4:14 PM)     MP  Not that I am aware we changed, He is still on IV abx     Geni Garibay APRN - ROSSANA  You51 minutes ago (3:52 PM)       A drain placement shouldn't have caused diarrhea.  Have any of his medicines recently changed?   Like an antibiotic change?     You  Geni Garibay APRN - NP1 hour ago (3:33 PM)     MP  FYI, having diarrhea per hh, I tried calling him to check in but have not gotten a call back       1643: call placed to patient, left voicemail requesting patient contact provider if diarrhea worsens, he is unable to remain hydrated or experiences alarms or dizziness. Instructed not to take immodium per provider.     Notified Patient's home health nurse SARINA James of above instructions from provider.

## 2024-04-28 LAB
BACTERIA SPEC CULT: NORMAL
BACTERIA SPEC CULT: NORMAL
GRAM STN SPEC: NORMAL
GRAM STN SPEC: NORMAL
SERVICE CMNT-IMP: NORMAL
SERVICE CMNT-IMP: NORMAL

## 2024-04-29 ENCOUNTER — ANTI-COAG VISIT (OUTPATIENT)
Age: 64
End: 2024-04-29
Payer: MEDICARE

## 2024-04-29 ENCOUNTER — TELEPHONE (OUTPATIENT)
Age: 64
End: 2024-04-29

## 2024-04-29 ENCOUNTER — HOME CARE VISIT (OUTPATIENT)
Facility: HOME HEALTH | Age: 64
End: 2024-04-29
Payer: MEDICARE

## 2024-04-29 VITALS
OXYGEN SATURATION: 97 % | BODY MASS INDEX: 23.42 KG/M2 | RESPIRATION RATE: 24 BRPM | TEMPERATURE: 98.3 F | WEIGHT: 154 LBS

## 2024-04-29 DIAGNOSIS — Z79.01 CHRONIC ANTICOAGULATION: Primary | ICD-10-CM

## 2024-04-29 LAB — INR BLD: 2.7

## 2024-04-29 PROCEDURE — 93793 ANTICOAG MGMT PT WARFARIN: CPT | Performed by: NURSE PRACTITIONER

## 2024-04-29 PROCEDURE — G0299 HHS/HOSPICE OF RN EA 15 MIN: HCPCS

## 2024-04-29 NOTE — TELEPHONE ENCOUNTER
When can patient come back to have a follow up appointment?  Please review and return call at [phone]

## 2024-04-29 NOTE — TELEPHONE ENCOUNTER
0837: call placed to [employer]ina requesting clarification on coverage for octriotide. [employer] representative stated request must be made through [employer]       0933:Connected to Homer who initiated prior auth for patient's octreotide. Patient does have prior auth approval- see below:      ( - [place] OCTREOTIDE INJECTION, DEPOT    Z95.811 (ICD-10-CM) - LVAD (left ventricular assist device) present (HCC)    K92.2 (ICD-10-CM) - Upper GI bleed  DOSAGE: 20MG  FREQUENCY: EVERY 4 WKS  DETERMINATION: APPROVED  AUTH #: [id number]  REF #: FAX  VALIDITY PERIOD: 01/25/2024 - 01/24/2025  # OF VISITS/UNITS APPROVED: 14 VISITS / 280 UNITS)     Homer representative stated that new prior auth must be initiated as prior approval was approved under a hospital department and insurance requires a new prior auth under individual provider. Prior auth requested under ADILENE Maurer, per representative drug will be requested through [facility] specialty pharmacy. Reference number [id number] Representative requested [facility] staff fax clinic record of GI bleed to [phone]. Clinic notes faxed to provided fax number.

## 2024-04-29 NOTE — TELEPHONE ENCOUNTER
Message received from patient's home health nurse reporting MAP of 60. Reports purulent mucous drainage from sternal drain, reports she dumped 250mls from drainage bag, weight of 151lb. Reports patient denied any ongoing diarrhea since Saturday.  Reported ongoing grey/brown purulent drainage from driveline which is 2-3 days old. Stated patient has denied fever or chills, states patient never took bumex which was prescribed last week.     Reviewed with Dr. Gillespie who stated VORB patient should decrease amlodipine to 5mg daily, do not take any bumex, have home health recheck BP on Wednesday.  Confirmed that patient's wife should be completing dressing change daily.      Reviewed provider instructions with MADYSON James who stated she is at patient's home and confirmed patient received instructions. Confirmed she will visit patient Wednesday to recheck BP  Requested patient call [facility] to discuss changes as well, MADYSON James stated she will request patient return calls to [facility].  MADYSON James also confirmed patient called Dr. Rajan's office to arrange follow up.

## 2024-04-29 NOTE — PROGRESS NOTES
ADVANCED HEART FAILURE CENTER  [facility] in [place], [place]      INR result reviewed with RUBEN Garibay NP who made the following recommendations (VORB): hold tonight, 1mg tomorrow and Thursday and recheck INR Friday. Patient notified of provider instructions via voicemail, requested call back to confirm     04/30: patient's wife called, confirmed she received instructions      (See anticoag tracker)     Funim Barth RN

## 2024-04-30 ENCOUNTER — TELEPHONE (OUTPATIENT)
Age: 64
End: 2024-04-30

## 2024-04-30 LAB
ALBUMIN SERPL-MCNC: 3.1 G/DL (ref 3.9–4.9)
ALBUMIN/GLOB SERPL: 0.9 {RATIO} (ref 1.2–2.2)
ALP SERPL-CCNC: 120 IU/L (ref 44–121)
ALT SERPL-CCNC: 27 IU/L (ref 0–44)
AST SERPL-CCNC: 19 IU/L (ref 0–40)
BASOPHILS # BLD AUTO: 0.1 X10E3/UL (ref 0–0.2)
BASOPHILS NFR BLD AUTO: 1 %
BILIRUB SERPL-MCNC: <0.2 MG/DL (ref 0–1.2)
BUN SERPL-MCNC: 14 MG/DL (ref 8–27)
BUN/CREAT SERPL: 13 (ref 10–24)
CALCIUM SERPL-MCNC: 8.2 MG/DL (ref 8.6–10.2)
CHLORIDE SERPL-SCNC: 101 MMOL/L (ref 96–106)
CO2 SERPL-SCNC: 22 MMOL/L (ref 20–29)
CREAT SERPL-MCNC: 1.09 MG/DL (ref 0.76–1.27)
EGFRCR SERPLBLD CKD-EPI 2021: 76 ML/MIN/1.73
EOSINOPHIL # BLD AUTO: 0.5 X10E3/UL (ref 0–0.4)
EOSINOPHIL NFR BLD AUTO: 7 %
ERYTHROCYTE [DISTWIDTH] IN BLOOD BY AUTOMATED COUNT: 21.5 % (ref 11.6–15.4)
GLOBULIN SER CALC-MCNC: 3.4 G/DL (ref 1.5–4.5)
GLUCOSE SERPL-MCNC: 80 MG/DL (ref 70–99)
HCT VFR BLD AUTO: 24.1 % (ref 37.5–51)
HGB BLD-MCNC: 7.2 G/DL (ref 13–17.7)
IMM GRANULOCYTES # BLD AUTO: 0.1 X10E3/UL (ref 0–0.1)
IMM GRANULOCYTES NFR BLD AUTO: 2 %
INR PPP: 2 (ref 0.9–1.2)
LDH SERPL L TO P-CCNC: 254 IU/L (ref 121–224)
LYMPHOCYTES # BLD AUTO: 1.1 X10E3/UL (ref 0.7–3.1)
LYMPHOCYTES NFR BLD AUTO: 15 %
MCH RBC QN AUTO: 25.5 PG (ref 26.6–33)
MCHC RBC AUTO-ENTMCNC: 29.9 G/DL (ref 31.5–35.7)
MCV RBC AUTO: 86 FL (ref 79–97)
MONOCYTES # BLD AUTO: 0.9 X10E3/UL (ref 0.1–0.9)
MONOCYTES NFR BLD AUTO: 12 %
NEUTROPHILS # BLD AUTO: 4.4 X10E3/UL (ref 1.4–7)
NEUTROPHILS NFR BLD AUTO: 63 %
NT-PROBNP SERPL-MCNC: 3869 PG/ML (ref 0–210)
PLATELET # BLD AUTO: 609 X10E3/UL (ref 150–450)
POTASSIUM SERPL-SCNC: 4.8 MMOL/L (ref 3.5–5.2)
PROT SERPL-MCNC: 6.5 G/DL (ref 6–8.5)
PROTHROMBIN TIME: 21 SEC (ref 9.1–12)
RBC # BLD AUTO: 2.82 X10E6/UL (ref 4.14–5.8)
SODIUM SERPL-SCNC: 140 MMOL/L (ref 134–144)
WBC # BLD AUTO: 7.1 X10E3/UL (ref 3.4–10.8)

## 2024-04-30 ASSESSMENT — ENCOUNTER SYMPTOMS
HEMOPTYSIS: 0
DIARRHEA: 1
DYSPNEA ACTIVITY LEVEL: AFTER AMBULATING LESS THAN 10 FT

## 2024-04-30 NOTE — TELEPHONE ENCOUNTER
1438: received call from patient's wife stating she received leave of absence paperwork from her employer and asking if she can bring it to clinic. Ashley asked when she should begin her leave, based on patient's life expectancy. Discussed with Ashley that we are unable to give her a time frame regarding patient's life expectancy, although patient does have a very severe infection and antibiotics are unlikely to be curative. Ashley asked \"which do you think will get him first, the COPD or the infection.\" Discussed with Ashley that this question would need to answered by a provider, she verbalized understanding.     Ashley stated she is \"anxious all the time\" about patient dying but she has not called hospice because \"he's not dying right now.\" Discussed with her that hospice is available to provide resources, support and education should she choose to call them. She verbalized understanding.      Ashley stated she will bring paperwork for [facility] to fill out tomorrow (Wednesday). Discussed with her will review paperwork with a provider and will request that a [facility] provider call her to discuss specific questions regarding patient's life expectancy. She verbalized understanding.

## 2024-04-30 NOTE — TELEPHONE ENCOUNTER
Reviewed below message from Dr. Gillespie. Patient's home health nurse MADYSON James notified and confirmed she will see patient tomorrow.

## 2024-04-30 NOTE — TELEPHONE ENCOUNTER
Requested Prescriptions     Signed Prescriptions Disp Refills    octreotide ACETATE (SANDOSTATIN LAR) 20 MG injection 1 kit 0     Sig: Inject 20 mg into the muscle every 30 days     Authorizing Provider: OZIEL NAVARRO     Ordering User: FILI PELLETIER

## 2024-04-30 NOTE — TELEPHONE ENCOUNTER
----- Message from Farshad Gillespie MD sent at 4/30/2024  8:27 AM EDT -----  His proBNP is up quite a bit, may just be related to worsening infection. Is HH able to return tomorrow. Can we make sure to get his weight and any HF symptoms from them to see if he needs more diuretics. Thanks

## 2024-04-30 NOTE — HOME HEALTH
on Friday 5/3/24. SN contacted [employer] to order more LVAD dressing change kits and checked status on INR testing supplies which were ordered last week. (to be sent in 1-3 weeks) SN transported lab sample to [facility] at [place] for processing.      Written Teaching Material Utilized: [facility] admission booklet       Interdisciplinary communication with:  [facility] regarding pt's INR results, weight, map and drainage from sternal abscess site.    Discharge planning as follows: Pt may be discharged when IV antibiotic and PICC line has been discontinued..      Specific plan for next visit: Cardiopulmonary assessment, medication review, repeat INR testing, LVAD driveline dressing change.

## 2024-05-01 ENCOUNTER — ANTI-COAG VISIT (OUTPATIENT)
Age: 64
End: 2024-05-01
Payer: MEDICARE

## 2024-05-01 ENCOUNTER — TELEPHONE (OUTPATIENT)
Age: 64
End: 2024-05-01

## 2024-05-01 ENCOUNTER — HOME CARE VISIT (OUTPATIENT)
Facility: HOME HEALTH | Age: 64
End: 2024-05-01
Payer: MEDICARE

## 2024-05-01 VITALS
WEIGHT: 153 LBS | OXYGEN SATURATION: 97 % | RESPIRATION RATE: 28 BRPM | BODY MASS INDEX: 23.26 KG/M2 | TEMPERATURE: 98.6 F

## 2024-05-01 DIAGNOSIS — Z79.01 CHRONIC ANTICOAGULATION: Primary | ICD-10-CM

## 2024-05-01 LAB — INR BLD: 1.9

## 2024-05-01 PROCEDURE — 93793 ANTICOAG MGMT PT WARFARIN: CPT | Performed by: NURSE PRACTITIONER

## 2024-05-01 PROCEDURE — G0299 HHS/HOSPICE OF RN EA 15 MIN: HCPCS

## 2024-05-01 RX ORDER — WARFARIN SODIUM 1 MG/1
2 TABLET ORAL DAILY
Qty: 180 TABLET | Refills: 1 | Status: SHIPPED | OUTPATIENT
Start: 2024-05-01

## 2024-05-01 ASSESSMENT — ENCOUNTER SYMPTOMS: HEMOPTYSIS: 0

## 2024-05-01 NOTE — TELEPHONE ENCOUNTER
Message received from patient's home health nurse MADYSON James reporting MAP of 68, weight of 153lb today, decreased drainage from abscess drain. States that patient denies LVAD alarms, shortness of breath above baseline, edema, fever or chills.     Reviewed with Dr. Gillespie who stated VORB discontinue Amlodipine, hh to perform drainage dressing change twice weekly with visits.      Reviewed with MADYSON James who confirmed she received instructions and informed patient of changes at hh visit.  MADYSON James also notified of sooner appt requested from Dr. Rajan's office, she verbalized understanding.     Medications Discontinued During This Encounter   Medication Reason    amLODIPine (NORVASC) 2.5 MG tablet     warfarin (COUMADIN) 1 MG tablet REORDER      Requested Prescriptions     Signed Prescriptions Disp Refills    warfarin (COUMADIN) 1 MG tablet 180 tablet 1     Sig: Take 2 tablets by mouth daily May take more as directed by [facility].     Authorizing Provider: JEN GILLESPIE     Ordering User: FILI PELLETIER

## 2024-05-01 NOTE — PROGRESS NOTES
ADVANCED HEART FAILURE CENTER  [facility] in [place], [place]      INR result reviewed with RUBEN Garibay NP who made the following recommendations (VORB): 1mg Thursday and Saturday  and recheck Monday. Patient notified and verbalized understanding. They had no further questions. Patient's hh nurse MADYSON James also notified (See anticoag tracker)     Funmi Barth RN

## 2024-05-01 NOTE — TELEPHONE ENCOUNTER
Reviewed with Dr. Gillespie that patient was scheduled with Dr. Rajan for infectious disease follow up on May 29th. Patient's antibiotics are scheduled to end on 05/08 per notes. Dr. Gillespie requested he be seen earlier for close follow up of pump pocket infection.      1536: call placed to office of Dr. Rajan, spoke with nurse Ronda. Discussed concerns that patient may not tolerate being off IV antibiotics for 3 weeks between end of abx and appointment with them, reviewed patient has infection around his LVAD and currently has a drain for abscess drainage.  Ronda verbalized understanding, states she will discuss with Dr. Rajna and return call to [facility].      1540: call placed to [facility]. Patient previously had appointment scheduled with their practice in May which no longer appears under care everywhere. Message left with their practice requesting call back to confirm if patient still has appointment with their practice.

## 2024-05-01 NOTE — TELEPHONE ENCOUNTER
The nurse Funmi called from [facility] team and voiced concern with patients appt on 05/29/2024 as patients abx end on 05/09/2024, she is worried with how acute his condition is and him being without abx until his appt. Please advise. Her number is [phone] advised her that we have no sooner appts.

## 2024-05-02 ASSESSMENT — ENCOUNTER SYMPTOMS: DYSPNEA ACTIVITY LEVEL: AFTER AMBULATING LESS THAN 10 FT

## 2024-05-02 NOTE — TELEPHONE ENCOUNTER
Attempted to contact patient to get him scheduled for next week at 8 AM (special appt for provider request only)phone call, or VV per providers request. Pt did not answer, left a vm to call back, if patient calls back, patient need to have appt sooner on 05/08/2024 at 8 am per provider either phone call or vv

## 2024-05-03 ENCOUNTER — HOME CARE VISIT (OUTPATIENT)
Facility: HOME HEALTH | Age: 64
End: 2024-05-03
Payer: MEDICARE

## 2024-05-03 ENCOUNTER — TELEPHONE (OUTPATIENT)
Age: 64
End: 2024-05-03

## 2024-05-03 ENCOUNTER — ANTI-COAG VISIT (OUTPATIENT)
Age: 64
End: 2024-05-03

## 2024-05-03 VITALS
OXYGEN SATURATION: 97 % | WEIGHT: 152 LBS | RESPIRATION RATE: 24 BRPM | BODY MASS INDEX: 23.11 KG/M2 | TEMPERATURE: 98.1 F

## 2024-05-03 DIAGNOSIS — Z79.01 CHRONIC ANTICOAGULATION: Primary | ICD-10-CM

## 2024-05-03 LAB — INR BLD: 1.8

## 2024-05-03 PROCEDURE — G0299 HHS/HOSPICE OF RN EA 15 MIN: HCPCS

## 2024-05-03 NOTE — HOME HEALTH
Utilized: [facility] admission booklet      Interdisciplinary communication with:  [facility] regarding pt's INR results, weight, map and drainage from sternal abscess site.  Discharge planning as follows: Pt may be discharged when IV antibiotic and PICC line has been discontinued..    Specific plan for next visit: Cardiopulmonary assessment, medication review, repeat INR testing, LVAD driveline dressing change.

## 2024-05-03 NOTE — TELEPHONE ENCOUNTER
Maci with [facility] called with concerns about patients IV abx end therapy and an appt with us.     I advised her that I had spoken with the vascular team about the same thing.    I told her I had tried to contact the patient but he didn't answer, she said well I'm standing in his home now, ill let you talk to him. I spoke with Mr. Joiner and advised him that Dr. Rajan wanted to see him sooner than his original date on the 29th, he vocalized understanding. The only soonest appt we had was to squeeze the patient in as an 8 am VV or telephone call per Dr. Rajan, patient said he doesn't have internet I advised him that was fine we could do a phone call. Patient thanked me for the call and I advised home health I would send a message to the provider as she just wants to make sure the 9th she is clear to pull PICC and if not that we let her know.

## 2024-05-03 NOTE — TELEPHONE ENCOUNTER
1318: message received from patient's home health nurse MADYSON James reporting weight of 152lb, map of 70. Dr. Gillespie notified, no additional orders at this time

## 2024-05-03 NOTE — TELEPHONE ENCOUNTER
1152: voicemail received from Claudine with [facility] who stated that patient did have appointment 05/02 with [facility] for which he was sent and automatic confirmation request. Claudine stated patient canceled this appointment through their automated system. She stated patient also cancelled appointment on 03/14.     1154: call placed to Dr. Rajan's office to follow up on request for sooner appointment. No answer after phone ringing for >10 minutes, will reattempt to contact at a later time.      1405: message received from patient's home health nurse sarina James stating that patient and herself spoke to the nurse for Dr. Rajan who scheduled patient for a phone visit with Dr. Rajan 05/08 to discuss switch to oral antibiotics. SARINA James stated that patient's drain put out 100mls of purulent drainage.

## 2024-05-03 NOTE — PROGRESS NOTES
ADVANCED HEART FAILURE CENTER  [facility] in [place], [place]      INR result reviewed with RUBEN Garibay NP who made the following recommendations (VORB):patient to follow prior instructions to take 1mg coumadin Saturday, recheck INR Monday.   Patient notified of provider instructions via voicemail, requested call back to confirm. Patient's home health nurse MADYSON James also notified.     (See anticoag tracker)     Funmi Barth RN

## 2024-05-06 ENCOUNTER — HOME CARE VISIT (OUTPATIENT)
Facility: HOME HEALTH | Age: 64
End: 2024-05-06
Payer: MEDICARE

## 2024-05-06 ENCOUNTER — ANTI-COAG VISIT (OUTPATIENT)
Age: 64
End: 2024-05-06
Payer: MEDICARE

## 2024-05-06 ENCOUNTER — TELEPHONE (OUTPATIENT)
Age: 64
End: 2024-05-06

## 2024-05-06 VITALS
OXYGEN SATURATION: 100 % | WEIGHT: 153 LBS | BODY MASS INDEX: 23.26 KG/M2 | RESPIRATION RATE: 24 BRPM | TEMPERATURE: 98.5 F

## 2024-05-06 DIAGNOSIS — L08.9 SOFT TISSUE INFECTION: Primary | ICD-10-CM

## 2024-05-06 DIAGNOSIS — Z79.01 CHRONIC ANTICOAGULATION: Primary | ICD-10-CM

## 2024-05-06 LAB — INR BLD: 1.9

## 2024-05-06 PROCEDURE — 93793 ANTICOAG MGMT PT WARFARIN: CPT | Performed by: NURSE PRACTITIONER

## 2024-05-06 PROCEDURE — G0299 HHS/HOSPICE OF RN EA 15 MIN: HCPCS

## 2024-05-06 NOTE — PROGRESS NOTES
ADVANCED HEART FAILURE CENTER  [facility] in [place], [place]      INR result reviewed with YUMI Quiles NP  who made the following recommendations (VORB):1mg Tuesday and Thursday and recheck INR Friday. Patient notified and verbalized understanding. They had no further questions.  Patient's home health nurse MADYSON James  also notified . (See anticoag tracker)     Funmi Barth RN

## 2024-05-06 NOTE — TELEPHONE ENCOUNTER
Discussed [place] paperwork with YUMI Quiles. Discussed that patient's wife had several questions about patient's life expectancy, when she should take leave and when patient should enter hospice, (see telephone call from last week). Discussed planned follow up with Dr. Rajan this Wednesday via phone.  YUMI Quiles stated she would call patient directly to discuss concerns regarding trajectory of disease, hospice, ect.     YUMI Quiles called and spoke to patient. YUMI Quiles stated patient spoke with her and stated that he has not had sternal abscess drainage, per YUMI Quiles patient stated he is feeling well and does not wish to enter hospice services at this time. Discussed with YUMI Quiles that home health nurse MADYSON James has been reporting purulent foul drainage 100-250 ml at a time from patient's sternal abscess drain, reviewed reports of low blood pressures last week.      YUMI Quiles ordered VORB CT chest abdomen pelvis WITH contrast to evaluate abscess. She also stated that we will need new [facility] paperwork as patient does not wish to enter hospice and his wife stated this on the paper work.     1340 : called and spoke with patient. Reviewed above orders from provider. Patient verbalized understanding, stated okay for RN to schedule CT scan for him.     1349: CT scan scheduled at [facility] ([address] [place] [address]) for 05/08 at 1pm.      1641: called and spoke with patient, reviewed scheduled CT scan. Provided time and address of imaging, provided pre testing instructions, he verbalized understanding.

## 2024-05-07 LAB
ALBUMIN SERPL-MCNC: 3.4 G/DL (ref 3.9–4.9)
ALBUMIN/GLOB SERPL: 1.1 {RATIO} (ref 1.2–2.2)
ALP SERPL-CCNC: 100 IU/L (ref 44–121)
ALT SERPL-CCNC: 12 IU/L (ref 0–44)
AST SERPL-CCNC: 16 IU/L (ref 0–40)
BASOPHILS # BLD AUTO: 0.2 X10E3/UL (ref 0–0.2)
BASOPHILS NFR BLD AUTO: 2 %
BILIRUB SERPL-MCNC: <0.2 MG/DL (ref 0–1.2)
BUN SERPL-MCNC: 15 MG/DL (ref 8–27)
BUN/CREAT SERPL: 12 (ref 10–24)
CALCIUM SERPL-MCNC: 8.9 MG/DL (ref 8.6–10.2)
CHLORIDE SERPL-SCNC: 105 MMOL/L (ref 96–106)
CO2 SERPL-SCNC: 20 MMOL/L (ref 20–29)
CREAT SERPL-MCNC: 1.23 MG/DL (ref 0.76–1.27)
EGFRCR SERPLBLD CKD-EPI 2021: 66 ML/MIN/1.73
EOSINOPHIL # BLD AUTO: 0.3 X10E3/UL (ref 0–0.4)
EOSINOPHIL NFR BLD AUTO: 2 %
ERYTHROCYTE [DISTWIDTH] IN BLOOD BY AUTOMATED COUNT: 20.4 % (ref 11.6–15.4)
GLOBULIN SER CALC-MCNC: 3 G/DL (ref 1.5–4.5)
GLUCOSE SERPL-MCNC: 98 MG/DL (ref 70–99)
HCT VFR BLD AUTO: 25.5 % (ref 37.5–51)
HGB BLD-MCNC: 7.8 G/DL (ref 13–17.7)
IMM GRANULOCYTES # BLD AUTO: 0.2 X10E3/UL (ref 0–0.1)
IMM GRANULOCYTES NFR BLD AUTO: 2 %
INR PPP: 1.5 (ref 0.9–1.2)
LDH SERPL L TO P-CCNC: 245 IU/L (ref 121–224)
LYMPHOCYTES # BLD AUTO: 1.4 X10E3/UL (ref 0.7–3.1)
LYMPHOCYTES NFR BLD AUTO: 11 %
MCH RBC QN AUTO: 26 PG (ref 26.6–33)
MCHC RBC AUTO-ENTMCNC: 30.6 G/DL (ref 31.5–35.7)
MCV RBC AUTO: 85 FL (ref 79–97)
MONOCYTES # BLD AUTO: 1.5 X10E3/UL (ref 0.1–0.9)
MONOCYTES NFR BLD AUTO: 12 %
NEUTROPHILS # BLD AUTO: 8.6 X10E3/UL (ref 1.4–7)
NEUTROPHILS NFR BLD AUTO: 71 %
PLATELET # BLD AUTO: 524 X10E3/UL (ref 150–450)
POTASSIUM SERPL-SCNC: 4.7 MMOL/L (ref 3.5–5.2)
PROT SERPL-MCNC: 6.4 G/DL (ref 6–8.5)
PROTHROMBIN TIME: 15.7 SEC (ref 9.1–12)
RBC # BLD AUTO: 3 X10E6/UL (ref 4.14–5.8)
SODIUM SERPL-SCNC: 141 MMOL/L (ref 134–144)
WBC # BLD AUTO: 12.1 X10E3/UL (ref 3.4–10.8)

## 2024-05-08 ENCOUNTER — TELEMEDICINE (OUTPATIENT)
Age: 64
End: 2024-05-08
Payer: MEDICARE

## 2024-05-08 ENCOUNTER — TELEPHONE (OUTPATIENT)
Age: 64
End: 2024-05-08

## 2024-05-08 DIAGNOSIS — R78.81 BACTEREMIA DUE TO METHICILLIN SUSCEPTIBLE STAPHYLOCOCCUS AUREUS (MSSA): Primary | ICD-10-CM

## 2024-05-08 DIAGNOSIS — B95.61 BACTEREMIA DUE TO METHICILLIN SUSCEPTIBLE STAPHYLOCOCCUS AUREUS (MSSA): Primary | ICD-10-CM

## 2024-05-08 LAB — NT-PROBNP SERPL-MCNC: 2810 PG/ML (ref 0–210)

## 2024-05-08 PROCEDURE — 1111F DSCHRG MED/CURRENT MED MERGE: CPT | Performed by: INTERNAL MEDICINE

## 2024-05-08 PROCEDURE — 99214 OFFICE O/P EST MOD 30 MIN: CPT | Performed by: INTERNAL MEDICINE

## 2024-05-08 RX ORDER — CEPHALEXIN 500 MG/1
500 CAPSULE ORAL 2 TIMES DAILY
Qty: 180 CAPSULE | Refills: 3 | Status: SHIPPED | OUTPATIENT
Start: 2024-05-08 | End: 2025-05-03

## 2024-05-08 RX ORDER — CEFTRIAXONE 2 G/1
INJECTION, POWDER, FOR SOLUTION INTRAMUSCULAR; INTRAVENOUS
COMMUNITY
Start: 2024-05-02

## 2024-05-08 ASSESSMENT — ENCOUNTER SYMPTOMS
HEMOPTYSIS: 0
DYSPNEA ACTIVITY LEVEL: AT REST

## 2024-05-08 NOTE — TELEPHONE ENCOUNTER
Called and spoke to Nishant the  nurse and advised her per provider pull PICC after last dose tomorrow.

## 2024-05-08 NOTE — HOME HEALTH
Subjective: \"I need some more LVAD dressing kits\"  Falls since last visit: 0  Caregiver involvement changes: Ashley Swan - spouse  Home health supplies by type and quantity ordered/delivered this visit include: LVAD supplies, back up batteries and , PICC line supplies and saline and heparin flushes      Clinician asked if patient has had any physician contact since last home care visit and patient states: Yes- Pt had sternal abscess drain [facility]  Clinician asked if patient has any new or changed medications and patient states:  No  If Yes, were medications reconciled? Yes  Was the certifying physician notified of changes in medications? N/A        Clinical assessment (what this visit means for the patient overall and need for ongoing skilled care) and progress or lack of progress towards SPECIFIC goals: Pt alert, sitting  on sofa in living room. Pt's VSS, map 90,  O2 sat level 97% on room air at rest. Pt reports he still has not used supplimental O2 despite SOB with mild exertion. SN performed INR check via fingerstick- result 1.9- reported to [facility]. 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 SN also performed sterile RUE PICC site dressing change after drawing  blood from PICC line for CBC with diff, CMP, LDH, PT/INR and NT ProBNP. SN drained pt's sternal abscess drainage pouch- collected approximately 250ml tan, purulent drainage. Pt reports he was instructed to irrigate pouch daily with 10ml saline to maintain patency. Pt remains afebrile and denies chills, body aches or other symptoms of sepsis. Pt continues to receive Rocephin 2000 mg IV push via PICC line q 24 hrs. Last dose of IV antibiotic scheduled for 5/8/24. Pt scheduled to have phone visit with Dr Rajan at 8am on 5/8 to discuss follow up with oral antibiotic. SN will confirm order to discontinue pt's PICC line

## 2024-05-08 NOTE — TELEPHONE ENCOUNTER
1259: Call placed to [employer] regarding patient's wife's fmla paperwork. Requested new blank form per YUMI Quiles request. New blank form sent my [employer] via via email.

## 2024-05-08 NOTE — PROGRESS NOTES
Sanford Joiner, was evaluated through a synchronous (real-time) audio-video encounter. The patient (or guardian if applicable) is aware that this is a billable service, which includes applicable co-pays. This Virtual Visit was conducted with patient's (and/or legal guardian's) consent. Patient identification was verified, and a caregiver was present when appropriate. Note - patient unable to perform video visit.  Telephone encounter performed.  The patient was located at Home: [address] [address]  Provider was located at Facility (Appt Dept): [address]  [place] [address]  Confirm you are appropriately licensed, registered, or certified to deliver care in the state where the patient is located as indicated above. If you are not or unsure, please re-schedule the visit: Yes, I confirm.     Sanford Joiner (:  1960) is a Established patient, presenting virtually for evaluation of the following:    Assessment and Plan: (developed based on review of pertinent history, physical exam, labs, studies, and medications)  Bacteremia due to methicillin susceptible Staphylococcus aureus (MSSA)  -     cephALEXin (KEFLEX) 500 MG capsule; Take 1 capsule by mouth 2 times daily, Disp-180 capsule, R-3Normal     Remove PICC line and stop Ceftriaxone.    Start Keflex 500mg PO BID suppressive antibiotic for as long as driveline remains.  Reviewed potential adverse effects of antibiotics.    Advised that this may help mitigate recurrence of infection, although without antibiotic suppressive therapy, infection will almost assuredly recur.    Follow pending repeat CT chest to see if drain can be removed.    Driveline dressing local care as you are, defer.    No follow-ups on file.    Subjective:     Known LVAD and recently hospitalized @ [facility] for MSSA bacteremia and driveline with MSSA and Enterobacter cloacae on wound culture with 14cm abscess in chest wall s/p drainage with 300cc output with

## 2024-05-09 ENCOUNTER — HOME CARE VISIT (OUTPATIENT)
Facility: HOME HEALTH | Age: 64
End: 2024-05-09
Payer: MEDICARE

## 2024-05-09 ENCOUNTER — TELEPHONE (OUTPATIENT)
Age: 64
End: 2024-05-09

## 2024-05-09 ENCOUNTER — ANTI-COAG VISIT (OUTPATIENT)
Age: 64
End: 2024-05-09

## 2024-05-09 VITALS
BODY MASS INDEX: 23.57 KG/M2 | TEMPERATURE: 98.7 F | OXYGEN SATURATION: 97 % | WEIGHT: 155 LBS | RESPIRATION RATE: 24 BRPM

## 2024-05-09 DIAGNOSIS — Z79.01 CHRONIC ANTICOAGULATION: Primary | ICD-10-CM

## 2024-05-09 LAB — INR BLD: 1.8

## 2024-05-09 PROCEDURE — G0299 HHS/HOSPICE OF RN EA 15 MIN: HCPCS

## 2024-05-09 ASSESSMENT — ENCOUNTER SYMPTOMS
DYSPNEA ACTIVITY LEVEL: AFTER AMBULATING LESS THAN 10 FT
HEMOPTYSIS: 0

## 2024-05-09 NOTE — TELEPHONE ENCOUNTER
Blank FLMA form provided to YUMI Quiles for completion. YUMI Quiles returned forms to LVAD coordinator, stated she called patient's wife to discuss plan of care and clarify if patient's wife wanted to take continuous or intermittent leave. YUMI Quiles stated patient's wife became audibly upset on the phone and hung up on her, therefore unable to complete provider section of form at this time.

## 2024-05-09 NOTE — TELEPHONE ENCOUNTER
1417:  notified by patient's home health nurse MADYSON James patients' BP today 80, weight up from 155 lb from 153 lb. Reports about 100mls of drainage from sternal drain.     MADYSON James reports she pulled patient's PICC line per order from Dr. Rajan.  Also reports patient has not yet receive octreotide form pharmacy. YUMI Quiles notified.     1453: call placed to [facility] pharmacy regarding octreotide shipment. They transferred call  to [facility] pharmacy department who stated they do not have prescription, prior auth or approval in their system for this medication. Reviewed prior-auth approval received from [employer] reference #[id number]. Staff stated they are not able to see prior auth records in their system and this is handled by the prior auth department. They stated that approval for this medication will need to come through the drug plan and not the medical benefits. Call was then transferred to the prior auth department who stated they they do have patient's approved prior auth and stated that staff will need to reach out to [facility] to have this shipped. Discussed that [facility] transferred original call to [facility] pharmacy and then to prior auth. Representative then transferred call to [facility] representative Albino who requested the number to patient's insurance member services. Provided number to rep. Albino stated that [facility] pharmacy will call patient's insurance to verify details for payment. Stated they will then call patient for approval. Stated no other need from their office on our end. Clarified that medication should be shipped directly to patient not the physicians office. Albino verbalized understanding.

## 2024-05-09 NOTE — PROGRESS NOTES
ADVANCED HEART FAILURE CENTER  [facility] in [place], [place]      INR result reviewed with YUMI Quiles NP  who made the following recommendations (VORB): continue alternating 1mg and 2 mg and recheck INR 05/15. Patient notified and verbalized understanding. They had no further questions. Patient's home health nurse MADYSON James also notified.     Also reviewed order for octreotide to be shipped to patient. Instructed patient that should this arrive he should store according to package directions and  hold for hh to administer, he verbalized understanding.   (See anticoag tracker)     Funmi Barth RN

## 2024-05-09 NOTE — TELEPHONE ENCOUNTER
Spoke with Ashley Swan regarding her request for FLMA.  I inquired to how much care she will be giving Mr. Joiner to determine if she needs continuous vs intermittent leave, since she was at work at the time I spoke with her. She said she helps him with all his ADLs and takes care of him completely. I spoke about giving her continuous leave for 3 months then we will evaluate how Mr. Joiner is doing (lung function after re-starting Nucala with pulmonologist and from an infectious standpoint after repeat CT after antibiotics and IR drain).  Ashley asked that she take the leave starting June 1st or July 1st.  I explained that I can not predict how he will be at that time so I can not give her leave at that time as I can not say he will need more care at that time verses now. Also, since she says she is helping with all ADLs now it might be helpful for her to take the leave now. She said I was making her very upset and hung up with phone on me.

## 2024-05-10 ENCOUNTER — TELEPHONE (OUTPATIENT)
Age: 64
End: 2024-05-10

## 2024-05-10 NOTE — TELEPHONE ENCOUNTER
Fax received from [facility] pharmacy stating that they are authorizing a one time emergency refill of coumadin 3mg one tablet every Monday, Wednesday Friday and Saturday.  Patient's current dose of coumadin is 2mg daily.     1434: called and spoke with [facility] pharmacy staff who stated patient received some shipments of medications in April of 2024. Stated they can see some of patient's scripts are at [facility] and that they have been trying to contact patient to transfer [facility] prescriptions to [facility]. Informed staff that coumadin dosing listed on emergency refill fax is inaccurate and that patient should not receive this medication.     1438: called and spoke with patient who confirmed he has been receiving medications from [facility]. Patient stated that he does want to fill everything locally at [facility], confirmed he does not want anything coming from [facility].      Patient stated that he received a call from [employer] notifying him that his insurance is no longer active, stated that he was told [facility] needs to talk to [employer] and was instructed to call social security to check the status of his benefits and medicaid. Patient stated he was on hold with social security at that time. Informed patient that  RN will call [facility] to verify status of medicare coverage. He verbalized understanding.     1442: Called and spoke with [facility] pharmacy. Informed staff that patient wants to cancel services with them, also informed that coumadin prescription for 3mg tablets is inaccurate and should not be sent to patient. Staff stated this was shipped to patient on May 3rd. Stated that they will cancel this prescription and call patient to confirm he wants to close his account with them and transferred scripts back to [facility].     1450: Called and spoke with [employer] keep[employer] provider line, representative confirmed patient has active coverage with them with no termination date. Representative stated they are

## 2024-05-11 NOTE — HOME HEALTH
Subjective: \"I'll be glad to get rid of this IV\"  Falls since last visit: 0  Caregiver involvement changes: Ashley Swan - spouse and pt's son assist pt as needed when available  Home health supplies by type and quantity ordered/delivered this visit include: Pt received more INR test strips and lancets.  Clinician asked if patient has any new or changed medications and patient states:  Pt ordered to begin oral antibiotic (Cephalexin 500mg bid) on 5/9/24.  If Yes, were medications reconciled? Yes  Was the certifying physician notified of changes in medications? [facility] notified that pt's IV antibiotic course completed and Cephalexin 500mg bid ordered by Dr Rajan.         Clinical assessment (what this visit means for the patient overall and need for ongoing skilled care) and progress or lack of progress towards SPECIFIC goals: Pt alert, sitting  on sofa in living room. Pt's VSS, map 70,  O2 sat level 97% on room air at rest. Pt reports he still not used supplimental O2 despite SOB with mild exertion. SN performed INR check via fingerstick- result 1.8- reported to [facility]. 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 SN removed RUE PICC line as ordered by Dr Rajan. Sterile dressing applied and pt advised to keep dressing in place for 24 hours post catheter removal. SN drained pt's sternal abscess drainage pouch- collected approximately 100ml tan, purulent drainage. SN also changed dressing around sternal abscess drain after cleansing site with chlorohexadine. Pt continues to irrigate pouch daily with 10ml saline to maintain patency. Pt remains afebrile and denies chills, body aches or other symptoms of sepsis. SN peformed sterile LVAD driveline dressing change - see Wound Addendum for details.    Written Teaching Material Utilized: [facility] admission booklet       Interdisciplinary communication with:  [facility] regarding pt's INR results, weight, map and drainage from

## 2024-05-13 ENCOUNTER — HOME CARE VISIT (OUTPATIENT)
Facility: HOME HEALTH | Age: 64
End: 2024-05-13
Payer: MEDICARE

## 2024-05-13 ENCOUNTER — ANTI-COAG VISIT (OUTPATIENT)
Age: 64
End: 2024-05-13
Payer: MEDICARE

## 2024-05-13 VITALS
BODY MASS INDEX: 22.93 KG/M2 | TEMPERATURE: 98.4 F | WEIGHT: 150.8 LBS | OXYGEN SATURATION: 100 % | RESPIRATION RATE: 20 BRPM

## 2024-05-13 DIAGNOSIS — Z79.01 CHRONIC ANTICOAGULATION: Primary | ICD-10-CM

## 2024-05-13 LAB — INR BLD: 1.3

## 2024-05-13 PROCEDURE — 93793 ANTICOAG MGMT PT WARFARIN: CPT | Performed by: NURSE PRACTITIONER

## 2024-05-13 PROCEDURE — G0299 HHS/HOSPICE OF RN EA 15 MIN: HCPCS

## 2024-05-13 ASSESSMENT — ENCOUNTER SYMPTOMS
DYSPNEA ACTIVITY LEVEL: AFTER AMBULATING LESS THAN 10 FT
HEMOPTYSIS: 0

## 2024-05-13 NOTE — PROGRESS NOTES
ADVANCED HEART FAILURE CENTER  [facility] in [place], [place]      INR result reviewed with ADILENE Maurer NP who made the following recommendations (VORB): 2mg daily and recheck Thursday 05/16. Patient notified and verbalized understanding. They had no further questions. Patient's home health nurse MADYSON James also notified. (See anticoag tracker)     Funmi Barth RN

## 2024-05-13 NOTE — HOME HEALTH
Subjective: \"I'm doing ok- I think my breathing is getting better\"  Falls since last visit: 0  Caregiver involvement changes: Ashley Swan - spouse and pt's son assist pt as needed when available  Home health supplies by type and quantity ordered/delivered this visit include: N/A    Clinician asked if patient has any new or changed medications and patient states:   If Yes, were medications reconciled? Yes  Was the certifying physician notified of changes in medications? N/A           Clinical assessment (what this visit means for the patient overall and need for ongoing skilled care) and progress or lack of progress towards SPECIFIC goals: Pt alert, sitting  on sofa in living room. Pt's VSS, map 78,  O2 sat level 100% on room air at rest. Pt reports he still not used supplimental O2 despite SOB with mild exertion. SN performed INR check via fingerstick- result 1.3- reported to [facility]. 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  SN drained pt's sternal abscess drainage pouch- collected approximately 120ml tan, purulent drainage. SN also changed dressing around sternal abscess drain after cleansing site with chlorohexadine. Pt continues to irrigate pouch daily with 10ml saline to maintain patency. Pt remains afebrile and denies chills, body aches or other symptoms of sepsis. SN peformed sterile LVAD driveline dressing change - see Wound Addendum for details. SN then margo weekly labs via venipuncture: CBC with diff, CMP. LDH. INR and ProBNP. SN transported labs to [facility] for processing.    Written Teaching Material Utilized: [facility] admission booklet       Interdisciplinary communication with:  [facility] regarding pt's INR results, weight, map and drainage from sternal abscess site      Discharge planning as follows: Pt may be discharged from [facility] when ordered by MD or pt request.     Specific plan for next visit: Cardiopulmonary assessment, medication

## 2024-05-14 LAB
ALBUMIN SERPL-MCNC: 3.8 G/DL (ref 3.9–4.9)
ALBUMIN/GLOB SERPL: 1 {RATIO} (ref 1.2–2.2)
ALP SERPL-CCNC: 97 IU/L (ref 44–121)
ALT SERPL-CCNC: 15 IU/L (ref 0–44)
AST SERPL-CCNC: 20 IU/L (ref 0–40)
BASOPHILS # BLD AUTO: 0.3 X10E3/UL (ref 0–0.2)
BASOPHILS NFR BLD AUTO: 3 %
BILIRUB SERPL-MCNC: <0.2 MG/DL (ref 0–1.2)
BUN SERPL-MCNC: 19 MG/DL (ref 8–27)
BUN/CREAT SERPL: 17 (ref 10–24)
CALCIUM SERPL-MCNC: 9.8 MG/DL (ref 8.6–10.2)
CHLORIDE SERPL-SCNC: 102 MMOL/L (ref 96–106)
CO2 SERPL-SCNC: 23 MMOL/L (ref 20–29)
CREAT SERPL-MCNC: 1.13 MG/DL (ref 0.76–1.27)
EGFRCR SERPLBLD CKD-EPI 2021: 73 ML/MIN/1.73
EOSINOPHIL # BLD AUTO: 0.2 X10E3/UL (ref 0–0.4)
EOSINOPHIL NFR BLD AUTO: 2 %
ERYTHROCYTE [DISTWIDTH] IN BLOOD BY AUTOMATED COUNT: 19.5 % (ref 11.6–15.4)
GLOBULIN SER CALC-MCNC: 3.8 G/DL (ref 1.5–4.5)
GLUCOSE SERPL-MCNC: 88 MG/DL (ref 70–99)
HCT VFR BLD AUTO: 28.6 % (ref 37.5–51)
HGB BLD-MCNC: 8.8 G/DL (ref 13–17.7)
IMM GRANULOCYTES # BLD AUTO: 0.2 X10E3/UL (ref 0–0.1)
IMM GRANULOCYTES NFR BLD AUTO: 1 %
INR PPP: 1.1 (ref 0.9–1.2)
LDH SERPL L TO P-CCNC: 227 IU/L (ref 121–224)
LYMPHOCYTES # BLD AUTO: 1.8 X10E3/UL (ref 0.7–3.1)
LYMPHOCYTES NFR BLD AUTO: 14 %
MCH RBC QN AUTO: 26 PG (ref 26.6–33)
MCHC RBC AUTO-ENTMCNC: 30.8 G/DL (ref 31.5–35.7)
MCV RBC AUTO: 85 FL (ref 79–97)
MONOCYTES # BLD AUTO: 1.6 X10E3/UL (ref 0.1–0.9)
MONOCYTES NFR BLD AUTO: 12 %
NEUTROPHILS # BLD AUTO: 8.6 X10E3/UL (ref 1.4–7)
NEUTROPHILS NFR BLD AUTO: 68 %
NT-PROBNP SERPL-MCNC: 1215 PG/ML (ref 0–210)
PLATELET # BLD AUTO: 471 X10E3/UL (ref 150–450)
POTASSIUM SERPL-SCNC: 4.5 MMOL/L (ref 3.5–5.2)
PROT SERPL-MCNC: 7.6 G/DL (ref 6–8.5)
PROTHROMBIN TIME: 12 SEC (ref 9.1–12)
RBC # BLD AUTO: 3.38 X10E6/UL (ref 4.14–5.8)
SODIUM SERPL-SCNC: 139 MMOL/L (ref 134–144)
WBC # BLD AUTO: 12.7 X10E3/UL (ref 3.4–10.8)

## 2024-05-15 ENCOUNTER — TELEPHONE (OUTPATIENT)
Age: 64
End: 2024-05-15

## 2024-05-15 NOTE — TELEPHONE ENCOUNTER
1201: received call from patient's SO Ashley requesting update on status of octreotide order. Ashley stated that patient will have Nucala injection today. Ashley stated that at this time she does not want [facility] to complete or submit her FMLA paperwork. Stated that she will request to use FMLA leave \"when you think he's actually dying.\"     YUMI Quiles updated that per patient SO no plans for FLMA paperwork completion at this time.      1418: call placed to [facility] specialty pharmacy regarding status of octreotide order. Call was then transferred to [facility] pharmacy rep Ronda. Ronda stated that she does have a prior auth approval for this medication however the claim cannot be processed because the medication was approved under patient's part B plan which does not cover medications. Ronda clarified several times that medication prior auth was approved under Medicare Part B but payment cannot be made and medication cannot be dispensed because part B does not cover medications. She stated this medication has to be given as a \"buy and bill\" through the provider's office or infusion center.        Reviewed with ADILENE Maurer NP who stated VORB octreotide 20mg IM monthly to be administered through infusion center.    1524: placed call to patient. Notified that he will need to have octreotide injections administered through the infusion center and he should expect a call from them for scheduling. He verbalized understanding. States that he has not rescheduled his CT scan. Patient stated okay for staff to call and schedule this for him, requested it be scheduled next Wednesday 05/22 when his wife is able to take him.     Patient stated that he received a call back from  and confirmed he does have active Medicaid coverage. Stated there was an administrative issue with his plan but it is currently active. Stated that he did not receive his Nucala injection today. Stated that his pulmonary practice had to send

## 2024-05-15 NOTE — TELEPHONE ENCOUNTER
1201: received call from patient's SO Ashley requesting update on status of octreotide order. Ashley stated that patient will have Nucala injection today. Ashley stated that at this time she does not want [facility] to complete or submit her FMLA paperwork. Stated that she will request to use FMLA leave \"when you think he's actually dying.\"     YUMI Quiles updated that per patient SO no plans for FLMA paperwork completion at this time.      1418: call placed to [facility] specialty pharmacy regarding status of octreotide order. Call was then transferred to [facility] pharmacy rep Ronda. Ronda stated that she does have a prior auth approval for this medication however the claim cannot be processed because the medication was approved under patient's part B plan which does not cover medications. Ronda clarified several times that medication prior auth was approved under Medicare Part B but payment cannot be made and medication cannot be dispensed because part B does not cover medications. She stated this medication has to be given as a \"buy and bill\" through the provider's office or infusion center.

## 2024-05-16 ENCOUNTER — HOME CARE VISIT (OUTPATIENT)
Facility: HOME HEALTH | Age: 64
End: 2024-05-16
Payer: MEDICARE

## 2024-05-16 ENCOUNTER — ANTI-COAG VISIT (OUTPATIENT)
Age: 64
End: 2024-05-16

## 2024-05-16 VITALS
WEIGHT: 150.8 LBS | OXYGEN SATURATION: 99 % | RESPIRATION RATE: 20 BRPM | BODY MASS INDEX: 22.93 KG/M2 | TEMPERATURE: 98.1 F

## 2024-05-16 DIAGNOSIS — Z79.01 CHRONIC ANTICOAGULATION: Primary | ICD-10-CM

## 2024-05-16 LAB — INR BLD: 1.3

## 2024-05-16 PROCEDURE — G0299 HHS/HOSPICE OF RN EA 15 MIN: HCPCS

## 2024-05-16 ASSESSMENT — ENCOUNTER SYMPTOMS: DYSPNEA ACTIVITY LEVEL: AT REST

## 2024-05-16 NOTE — PROGRESS NOTES
ADVANCED HEART FAILURE CENTER  [facility] in [place], [place]      INR result reviewed with ADILENE Maurer NP who made the following recommendations (VORB): 4mg tonight, 3mg tomorrow night  and recheck Monday. Patient's home health nurse MADYSON James notified who confirmed patient received instructions. (See anticoag tracker)     Funmi Barth RN

## 2024-05-17 ENCOUNTER — TELEPHONE (OUTPATIENT)
Age: 64
End: 2024-05-17

## 2024-05-17 NOTE — TELEPHONE ENCOUNTER
Phyllis called from [facility] to see where pt's med (Sandstatin lar depot 20 mg) will be administered so that she can route the preauth to the right place.  She can be reached at [phone].

## 2024-05-17 NOTE — HOME HEALTH
Subjective: \"I'm doing ok- I think my breathing is better- I can walk to the bathroom by myself\"  Falls since last visit: 0  Caregiver involvement changes: Ashley Swan - spouse and pt's son assist pt as needed when available  Home health supplies by type and quantity ordered/delivered this visit include: No  Clinician asked if patient has any new or changed medications and patient states: No   If Yes, were medications reconciled? Yes  Was the certifying physician notified of changes in medications? N/A        Clinical assessment (what this visit means for the patient overall and need for ongoing skilled care) and progress or lack of progress towards SPECIFIC goals: Pt alert, sitting  on sofa in living room. Pt's VSS, map 70,  O2 sat level 99% on room air at rest. Pt  still not using supplimental O2 despite SOB with mild exertion. SN performed INR check via fingerstick- result 1.3- reported to [facility]. 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  SN drained pt's sternal abscess drainage pouch- collected approximately 150ml tan, purulent drainage. SN also changed dressing around sternal abscess drain after cleansing site with chlorohexadine. Pt continues to irrigate pouch daily with 10ml saline to maintain patency. Pt remains afebrile and denies chills, body aches or other symptoms of sepsis. SN peformed sterile LVAD driveline dressing change - see Wound Addendum for details.    Written Teaching Material Utilized: [facility] admission booklet        Interdisciplinary communication with:  [facility] regarding pt's INR results, weight, map and drainage from sternal abscess site      Discharge planning as follows: Pt may be discharged from [facility] when ordered by MD or pt request.     Specific plan for next visit: Cardiopulmonary assessment, medication review, repeat INR testing, LVAD driveline dressing change and sternal drain site dressing change, weekly lab draw via

## 2024-05-17 NOTE — TELEPHONE ENCOUNTER
1427: [facility] to [facility], spoke with representative, informed [facility] received call from [facility] and call is being returned.  Informed that [facility] was told this medication can only be dispensed as a \"buy and bill\" which our office is not doing. Requested [facility] is discontinue patient's script for this as he will receive it through the infusion center. (Order from ADILENE Maurer 05/15).     Call transferred to pharmacy, spoke with pharmacist Stacy who confirmed she will discontinue prescription for patient.

## 2024-05-20 ENCOUNTER — TELEPHONE (OUTPATIENT)
Age: 64
End: 2024-05-20

## 2024-05-20 ENCOUNTER — HOME CARE VISIT (OUTPATIENT)
Facility: HOME HEALTH | Age: 64
End: 2024-05-20
Payer: MEDICARE

## 2024-05-20 ENCOUNTER — ANTI-COAG VISIT (OUTPATIENT)
Age: 64
End: 2024-05-20
Payer: MEDICARE

## 2024-05-20 ENCOUNTER — HOME CARE VISIT (OUTPATIENT)
Dept: HOME HEALTH SERVICES | Facility: HOME HEALTH | Age: 64
End: 2024-05-20
Payer: MEDICARE

## 2024-05-20 DIAGNOSIS — Z79.01 CHRONIC ANTICOAGULATION: Primary | ICD-10-CM

## 2024-05-20 LAB — INR BLD: 2.1

## 2024-05-20 PROCEDURE — G0299 HHS/HOSPICE OF RN EA 15 MIN: HCPCS

## 2024-05-20 PROCEDURE — 93793 ANTICOAG MGMT PT WARFARIN: CPT | Performed by: NURSE PRACTITIONER

## 2024-05-20 NOTE — TELEPHONE ENCOUNTER
1341: message received from patient's home health nurse MADYSON James stated that patient's sternal drain stitch came out however drain is still in place. Stated drain has minor leaking with saline flushes but is otherwise draining fine.  Stated that she reinforced drain and instructed patient's wife on reinforcing drain and changing dressing.     Discussed with YUMI Quiles who stated no new orders at this time, home health and patient's wife should reinforce drain and notify [facility] if drain comes out.     Reviewed with MADYSON James, notified that per YUMI Quiles no new orders, home health and patient's wife should reinforce drain and notify [facility] if drain comes out, MADYSON James confirmed understanding.

## 2024-05-20 NOTE — PROGRESS NOTES
ADVANCED HEART FAILURE CENTER  [facility] in [place], [place]      INR result reviewed with YUMI Quiles NP  who made the following recommendations (VORB): no changes and recheck Thursday 05/23. Patient notified and verbalized understanding. They had no further questions. Patient's home health nurse MADYSON James also notified.  (See anticoag tracker)     Funmi Barth RN

## 2024-05-21 VITALS
RESPIRATION RATE: 20 BRPM | TEMPERATURE: 98.1 F | OXYGEN SATURATION: 95 % | WEIGHT: 150.8 LBS | BODY MASS INDEX: 22.93 KG/M2

## 2024-05-21 LAB
BASOPHILS # BLD AUTO: 0.3 X10E3/UL (ref 0–0.2)
BASOPHILS NFR BLD AUTO: 3 %
EOSINOPHIL # BLD AUTO: 0.8 X10E3/UL (ref 0–0.4)
EOSINOPHIL NFR BLD AUTO: 7 %
ERYTHROCYTE [DISTWIDTH] IN BLOOD BY AUTOMATED COUNT: 18.6 % (ref 11.6–15.4)
HCT VFR BLD AUTO: 28.8 % (ref 37.5–51)
HGB BLD-MCNC: 8.8 G/DL (ref 13–17.7)
IMM GRANULOCYTES # BLD AUTO: 0.1 X10E3/UL (ref 0–0.1)
IMM GRANULOCYTES NFR BLD AUTO: 1 %
INR PPP: 1.7 (ref 0.9–1.2)
LYMPHOCYTES # BLD AUTO: 1.2 X10E3/UL (ref 0.7–3.1)
LYMPHOCYTES NFR BLD AUTO: 10 %
MCH RBC QN AUTO: 26.2 PG (ref 26.6–33)
MCHC RBC AUTO-ENTMCNC: 30.6 G/DL (ref 31.5–35.7)
MCV RBC AUTO: 86 FL (ref 79–97)
MONOCYTES # BLD AUTO: 1 X10E3/UL (ref 0.1–0.9)
MONOCYTES NFR BLD AUTO: 9 %
NEUTROPHILS # BLD AUTO: 8.1 X10E3/UL (ref 1.4–7)
NEUTROPHILS NFR BLD AUTO: 70 %
PLATELET # BLD AUTO: 500 X10E3/UL (ref 150–450)
PROTHROMBIN TIME: 18.1 SEC (ref 9.1–12)
RBC # BLD AUTO: 3.36 X10E6/UL (ref 4.14–5.8)
WBC # BLD AUTO: 11.5 X10E3/UL (ref 3.4–10.8)

## 2024-05-21 ASSESSMENT — ENCOUNTER SYMPTOMS
HEMOPTYSIS: 0
DYSPNEA ACTIVITY LEVEL: AFTER AMBULATING LESS THAN 10 FT

## 2024-05-21 NOTE — HOME HEALTH
Subjective: \"\"  Falls since last visit: 0  Caregiver involvement changes: Ashley Swan - spouse and pt's son assist pt as needed when available  Home health supplies by type and quantity ordered/delivered this visit include: N/A    Clinician asked if patient has any new or changed medications and patient states:   If Yes, were medications reconciled? Yes  Was the certifying physician notified of changes in medications? N/A          Clinical assessment (what this visit means for the patient overall and need for ongoing skilled care) and progress or lack of progress towards SPECIFIC goals: Pt alert, sitting  on sofa in living room. CG/spouse present at [facility]. Pt's VSS, map 78,  O2 sat level 95% on room air at rest. Pt reports he still not using supplimental O2 despite SOB with mild exertion. SN performed INR check via fingerstick- result 2.1- reported to [facility]. 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  SN drained pt's sternal abscess drainage pouch- collected approximately 150ml tan, purulent drainage. SN noted soiled dressing around  sternal abscess drain.SN removed soiled dressing and noted suture on catheter loose. SN cleansed site with chlorohexadine and dressed with split gauze and covered with Tegaderm dressing. Pt remains afebrile and denies chills, body aches or other symptoms of sepsis. SN peformed sterile LVAD driveline dressing change - see Wound Addendum for details. SN then margo weekly labs via venipuncture: CBC with diff, CMP. LDH. INR and ProBNP. SN transported labs to [facility] at [place] for processing. Pt is scheduled for chest/abdominal CT scan to check status of sternal abscess.    Written Teaching Material Utilized: [facility] admission booklet       Interdisciplinary communication with:  [facility] regarding pt's INR results, weight, map and drainage from sternal abscess site       Discharge planning as follows: Pt may be discharged from [facility] when

## 2024-05-22 ENCOUNTER — TELEPHONE (OUTPATIENT)
Age: 64
End: 2024-05-22

## 2024-05-22 ENCOUNTER — HOSPITAL ENCOUNTER (OUTPATIENT)
Facility: HOSPITAL | Age: 64
Discharge: HOME | End: 2024-05-25
Attending: NURSE PRACTITIONER
Payer: MEDICARE

## 2024-05-22 DIAGNOSIS — Z95.811 LVAD (LEFT VENTRICULAR ASSIST DEVICE) PRESENT (HCC): ICD-10-CM

## 2024-05-22 DIAGNOSIS — I50.22 CHRONIC SYSTOLIC HEART FAILURE (HCC): ICD-10-CM

## 2024-05-22 DIAGNOSIS — L08.9 SOFT TISSUE INFECTION: Primary | ICD-10-CM

## 2024-05-22 DIAGNOSIS — L08.9 SOFT TISSUE INFECTION: ICD-10-CM

## 2024-05-22 LAB
LDH SERPL L TO P-CCNC: 255 IU/L (ref 121–224)
NT-PROBNP SERPL-MCNC: 1210 PG/ML (ref 0–210)

## 2024-05-22 PROCEDURE — 6360000004 HC RX CONTRAST MEDICATION: Performed by: NURSE PRACTITIONER

## 2024-05-22 PROCEDURE — 71260 CT THORAX DX C+: CPT

## 2024-05-22 RX ADMIN — IOPAMIDOL 100 ML: 755 INJECTION, SOLUTION INTRAVENOUS at 10:15

## 2024-05-22 NOTE — TELEPHONE ENCOUNTER
----- Message from TAIWO Fortune - NP sent at 5/22/2024  1:29 PM EDT -----  Regarding: CT  No residual fluid collection around drive line site, drain can be removed.    Reviewed with YUMI Quiles who recommended reaching out to home health to inquire if they are able to remove as suture is out. Per YUMI Quiles NP VORB if home health unable to remove should contact IR to schedule removal. Spoke with ANY Walters RN with [facility] who stated will check with admin regarding ability to remove drain and notify VAD coordinator once answer received. She had no further questions.     CASEY MONTEMAYOR RN  VAD Coordinator

## 2024-05-23 ENCOUNTER — TELEPHONE (OUTPATIENT)
Age: 64
End: 2024-05-23

## 2024-05-23 ENCOUNTER — ANTI-COAG VISIT (OUTPATIENT)
Age: 64
End: 2024-05-23

## 2024-05-23 DIAGNOSIS — Z79.01 LONG TERM (CURRENT) USE OF ANTICOAGULANTS: Primary | ICD-10-CM

## 2024-05-23 LAB
ALBUMIN SERPL-MCNC: 3.9 G/DL (ref 3.9–4.9)
ALBUMIN/GLOB SERPL: 1.1 {RATIO} (ref 1.2–2.2)
ALP SERPL-CCNC: 104 IU/L (ref 44–121)
ALT SERPL-CCNC: 17 IU/L (ref 0–44)
AST SERPL-CCNC: 20 IU/L (ref 0–40)
BILIRUB SERPL-MCNC: 0.3 MG/DL (ref 0–1.2)
BUN SERPL-MCNC: 15 MG/DL (ref 8–27)
BUN/CREAT SERPL: 11 (ref 10–24)
CALCIUM SERPL-MCNC: 9.9 MG/DL (ref 8.6–10.2)
CHLORIDE SERPL-SCNC: 99 MMOL/L (ref 96–106)
CO2 SERPL-SCNC: 20 MMOL/L (ref 20–29)
CREAT SERPL-MCNC: 1.33 MG/DL (ref 0.76–1.27)
EGFRCR SERPLBLD CKD-EPI 2021: 60 ML/MIN/1.73
GLOBULIN SER CALC-MCNC: 3.6 G/DL (ref 1.5–4.5)
GLUCOSE SERPL-MCNC: 103 MG/DL (ref 70–99)
INR BLD: 2.3
POTASSIUM SERPL-SCNC: 4.5 MMOL/L (ref 3.5–5.2)
PROT SERPL-MCNC: 7.5 G/DL (ref 6–8.5)
SODIUM SERPL-SCNC: ABNORMAL MMOL/L

## 2024-05-23 RX ORDER — BUMETANIDE 1 MG/1
1 TABLET ORAL PRN
Qty: 10 TABLET | Refills: 1 | Status: SHIPPED | OUTPATIENT
Start: 2024-05-23

## 2024-05-23 NOTE — TELEPHONE ENCOUNTER
Requested Prescriptions     Signed Prescriptions Disp Refills    bumetanide (BUMEX) 1 MG tablet 10 tablet 1     Sig: Take 1 tablet by mouth as needed (weight gain of 3lb overnight or 5lb in 1 week or shortness of breath)     Authorizing Provider: JOSE CHEUNG     Ordering User: CASEY MONTEMAYOR

## 2024-05-23 NOTE — TELEPHONE ENCOUNTER
Telephone Call RE:  Appointment reminder     Outcome:     [] Patient confirmed appointment   [] Patient rescheduled appointment for    [] Unable to reach  [x] Left message              [] Other:       First attempt to confirm pt appt, left voicemail.

## 2024-05-23 NOTE — PROGRESS NOTES
ADVANCED HEART FAILURE CENTER  [facility] in [place], [place]      INR result reviewed with YUMI Quiles NP  who made the following recommendations (VORB): no change to coumadin dosing and recheck INR in 1 week on 5/30/24. Patient notified and verbalized understanding. He had no further questions. (See anticoag tracker)     SNEHA LAL RN  VAD Coordinator

## 2024-05-24 ENCOUNTER — HOME CARE VISIT (OUTPATIENT)
Facility: HOME HEALTH | Age: 64
End: 2024-05-24
Payer: MEDICARE

## 2024-05-24 PROCEDURE — G0299 HHS/HOSPICE OF RN EA 15 MIN: HCPCS

## 2024-05-24 NOTE — PROGRESS NOTES
ADVANCED HEART FAILURE CENTER  [facility] in [place], [place]  Mechanical Circulatory Support Clinic Note    Patient name: Sanford Joiner Sr.  Patient : 1960  Patient MRN: [id number]  Date of service: 24    Primary care physician: Ranjan Porter MD  LVAD cardiologist: MARJORIE    CHIEF COMPLAINT:  Follow up for heart failure, LVAD, anticoagulation management    ASSESSMENT:  Sanford Joiner Sr. is a 64 y.o. male with history chronic systolic heart failure due to non ischemic cardiomyopathy, s/p HM3 LVAD implantation (2023) as destination therapy, stage D, on optimal GDMT limited by hypovolemia  Not a transplant candidate due to advanced obstructive lung disease, was declined at [facility] for LVAD and transplant.     PLAN:  Heart Failure/Cardiomyopathy: NYHA class IV (due to lung disease)  Continue current medical therapy for heart failure:  Beta-blocker: continue Toprol XL 50 mg daily  ACE/ARB/ARNi: Unable to tolerate due to hypovolemia  Hydralazine/Nitrate: continue Hydralazine 50 mg TID  MRA: Unable to tolerate due to hypovolemia  SGLT2 inhibitor: Unable to tolerate due to hypovolemia  Diuretic: Bumex 1 mg PRN, has not required recently. Weight stable  Reinforced low salt diet  Reinforced fluid restriction to 6 x 8oz glasses per day    LVAD/Anticoagulation:  Continue current device speed of 5200 RPM  No current signs of bleeding or stroke.   Dressing changes daily  Chronic anticoagulation with coumadin, Target INR 1.8-2.2. Subtherapeutic at 1.4 yesterday.   Warfarin 4 mg tonight and then continue 2 mg daily. Repeat INR on 6/3  History of GI bleed, not on aspirin  Routine LVAD/anticoagulation labs , reviewed    LVAD pump infection:  MSSA and Enterobacter  On suppressive antibiotics with Keflex 500 mg BID  CT  reviewed, abscess resolved  Plan for drain removal today in IR  Will repeat CT Chest/Abdomen with contrast in 4 weeks or sooner if symptoms arise    History of GI

## 2024-05-24 NOTE — TELEPHONE ENCOUNTER
1335: Spoke with IR who stated drain can be removed by their team. Per IR should place order \"IR Visit Level 1 Minimal\" and comment chest drain removal. They stated they can be contacted for scheduling.     1517: Spoke with ANY Walters RN with [facility] who stated per their educated can remove drain in the home. She stated she will see patient for routine visit tomorrow and will plan to remove drain at that time. Requested she notify VAD coordinator of any issues and will schedule through IR if unable to remove by [facility]. She verbalized understanding and had no further questions.     CASEY MONTEMAYOR RN  VAD Coordinator

## 2024-05-24 NOTE — TELEPHONE ENCOUNTER
1354: Spoke with ANY Walters RN with [facility] during visit with patient. She reported unable to remove drain. Patient requesting appointment with IR Thursday for removal. Informed her will reach out to IR and contact patient with appointment. She had no further questions.    1415: Contacted IR and spoke with Rosetta. Drain removal scheduled for 5/30/24 at 1100. She stated patient does not need to be NPO and removal should take only a few minutes. Patient's wife notified. She verbalized understanding and stated patient will need assistance to IR and to the lyft to return home as she will only be able to drop him off for annual visit at 0900 prior to leaving for work. Informed her VAD coordinator will assist patient. She verbalized understanding and had no further questions.    CASEY MONTEMAYOR RN  VAD Coordinator

## 2024-05-27 VITALS
OXYGEN SATURATION: 98 % | WEIGHT: 150 LBS | BODY MASS INDEX: 22.81 KG/M2 | TEMPERATURE: 98.2 F | RESPIRATION RATE: 20 BRPM

## 2024-05-27 NOTE — HOME HEALTH
Subjective:  I'm doing good today  Falls since last visit: 0  Caregiver involvement changes: Ashley Swan - spouse and pt's son assist pt as needed when available  Home health supplies by type and quantity ordered/delivered this visit include: N/A    Clinician asked if patient has any new or changed medications and patient states: No med changes from previous visit.  If Yes, were medications reconciled? Yes  Was the certifying physician notified of changes in medications? N/A          Clinical assessment (what this visit means for the patient overall and need for ongoing skilled care) and progress or lack of progress towards SPECIFIC goals: Pt alert, sitting  on sofa in living room. CG/spouse present at [facility]. Pt's VSS WNL. Pt reports he still not using supplimental O2 despite SOB with mild exertion. SN performed sterile LVAD driveline dressing change- noting  scant amount grayish-brown drainage on old dressing.  SN drained pt's sternal abscess drainage pouch- collected approximately 150ml tan, purulent drainage. SN noted soiled dressing around  sternal abscess drain.SN removed soiled dressing and noted suture on catheter loose.  SN attempted to pull drain per Md order , met resistance , Md made aware , Md schedule pt to have line pulled by IR next thursday, pt and Cg in agreement. SN cleansed site with chlorohexadine and dressed with split gauze and covered with Tegaderm dressing. Pt remains afebrile and denies chills, body aches or other symptoms of sepsis. SN peformed sterile LVAD driveline dressing change - see Wound Addendum for details.    Written Teaching Material Utilized: [facility] admission booklet       Interdisciplinary communication with:  [facility] regarding pt's INR results, weight, map and drainage from sternal abscess site       Discharge planning as follows: Pt may be discharged from [facility] when ordered by MD or pt request.      Specific plan for next visit: Cardiopulmonary assessment, medication review,

## 2024-05-29 ENCOUNTER — HOME CARE VISIT (OUTPATIENT)
Facility: HOME HEALTH | Age: 64
End: 2024-05-29
Payer: MEDICARE

## 2024-05-29 ENCOUNTER — ANTI-COAG VISIT (OUTPATIENT)
Age: 64
End: 2024-05-29
Payer: MEDICARE

## 2024-05-29 DIAGNOSIS — Z79.01 CHRONIC ANTICOAGULATION: Primary | ICD-10-CM

## 2024-05-29 LAB — INR BLD: 1.7

## 2024-05-29 PROCEDURE — G0299 HHS/HOSPICE OF RN EA 15 MIN: HCPCS

## 2024-05-29 PROCEDURE — 93793 ANTICOAG MGMT PT WARFARIN: CPT | Performed by: NURSE PRACTITIONER

## 2024-05-29 NOTE — PROGRESS NOTES
ADVANCED HEART FAILURE CENTER  [facility] in [place], [place]      INR result reviewed with ADILENE Maurer NP who made the following recommendations (VORB): no changes and recheck 1 week. Patient notified and verbalized understanding. They had no further questions. Patient's home health nurse MADYSON James also notified.  (See anticoag tracker)     Reminded to bring all LVAD equipment to annual visit tomorrow and call for assistance once he arrives, he verbalized understanding.      Funmi Barth RN

## 2024-05-29 NOTE — PATIENT INSTRUCTIONS
Medication changes:    Coumadin: take 4mg coumadin tonight, recheck INR on Monday     Please take this to your pharmacy to notify them of the change in medications.     Testing Ordered:    An order for CT OF THE CHEST AND ABDOMEN has been placed to be done IN 1 MONTH. You will be receiving an automated call from [facility] to schedule this test. If you are unavailable to receive the call or would like to contact coordination of care yourself you may contact [phone] to schedule. You will need to contact coordination of care yourself if you miss their calls as they will only make 3 attempts to reach you.      Lab work has been drawn today. You will be contacted with any abnormal results requiring changes to your current plan of care.         EKG completed in clinic today.     Intermacs survey completed in clinic today.     Annual equipment check completed today.     LVAD annual education review completed today.       Other Recommendations:     Please follow up with your dentist for routine evaluation. You will need to take preventative antibiotics prior to all dental work. Please notify our office any time you have a dental visit scheduled.     Continue to change drive line exit site dressing 3 times a week using sterile technique,     Ensure you are drinking an adequate amount of water with a goal of 6-8 eight ounce glasses (1.5-2 liters) of fluid daily. Your urine should be clear and light yellow straw colored.         Follow up in 1 month with [facility]      For further patient and caregiver resources as well as access to online LVAD support groups visit [email or web address]       Please bring your daily sheets and medications to your next appointment.      Please monitor your weights daily upon waking and after using the bathroom. Keep a written records of your weights and bring to your next appointment. If you have a weight gain of 3 or more pounds overnight OR 5 or more

## 2024-05-30 ENCOUNTER — HOSPITAL ENCOUNTER (OUTPATIENT)
Facility: HOSPITAL | Age: 64
Discharge: HOME | End: 2024-05-30
Attending: STUDENT IN AN ORGANIZED HEALTH CARE EDUCATION/TRAINING PROGRAM | Admitting: STUDENT IN AN ORGANIZED HEALTH CARE EDUCATION/TRAINING PROGRAM
Payer: MEDICARE

## 2024-05-30 ENCOUNTER — TELEPHONE (OUTPATIENT)
Age: 64
End: 2024-05-30

## 2024-05-30 ENCOUNTER — ANTI-COAG VISIT (OUTPATIENT)
Age: 64
End: 2024-05-30

## 2024-05-30 ENCOUNTER — OFFICE VISIT (OUTPATIENT)
Age: 64
End: 2024-05-30

## 2024-05-30 VITALS
RESPIRATION RATE: 18 BRPM | OXYGEN SATURATION: 100 % | HEIGHT: 68 IN | SYSTOLIC BLOOD PRESSURE: 70 MMHG | TEMPERATURE: 98.2 F | BODY MASS INDEX: 23.64 KG/M2 | WEIGHT: 156 LBS | HEART RATE: 86 BPM

## 2024-05-30 VITALS
BODY MASS INDEX: 23.42 KG/M2 | TEMPERATURE: 98.9 F | OXYGEN SATURATION: 100 % | WEIGHT: 154 LBS | RESPIRATION RATE: 24 BRPM

## 2024-05-30 DIAGNOSIS — K92.2 UPPER GI BLEED: ICD-10-CM

## 2024-05-30 DIAGNOSIS — I42.8 NON-ISCHEMIC CARDIOMYOPATHY (HCC): ICD-10-CM

## 2024-05-30 DIAGNOSIS — Z51.81 ENCOUNTER FOR MONITORING DIURETIC THERAPY: ICD-10-CM

## 2024-05-30 DIAGNOSIS — Z13.220 SCREENING FOR HYPERLIPIDEMIA: ICD-10-CM

## 2024-05-30 DIAGNOSIS — E83.42 HYPOMAGNESEMIA: ICD-10-CM

## 2024-05-30 DIAGNOSIS — L08.9 SOFT TISSUE INFECTION: ICD-10-CM

## 2024-05-30 DIAGNOSIS — R06.02 SHORTNESS OF BREATH: ICD-10-CM

## 2024-05-30 DIAGNOSIS — I10 ESSENTIAL HYPERTENSION: ICD-10-CM

## 2024-05-30 DIAGNOSIS — N18.30 ANEMIA IN STAGE 3 CHRONIC KIDNEY DISEASE, UNSPECIFIED WHETHER STAGE 3A OR 3B CKD (HCC): ICD-10-CM

## 2024-05-30 DIAGNOSIS — I50.22 CHRONIC SYSTOLIC HEART FAILURE (HCC): Primary | ICD-10-CM

## 2024-05-30 DIAGNOSIS — Z79.01 CHRONIC ANTICOAGULATION: ICD-10-CM

## 2024-05-30 DIAGNOSIS — Z79.899 ENCOUNTER FOR MONITORING DIURETIC THERAPY: ICD-10-CM

## 2024-05-30 DIAGNOSIS — D63.1 ANEMIA IN STAGE 3 CHRONIC KIDNEY DISEASE, UNSPECIFIED WHETHER STAGE 3A OR 3B CKD (HCC): ICD-10-CM

## 2024-05-30 DIAGNOSIS — E55.9 VITAMIN D DEFICIENCY: ICD-10-CM

## 2024-05-30 DIAGNOSIS — Z95.811 LVAD (LEFT VENTRICULAR ASSIST DEVICE) PRESENT (HCC): ICD-10-CM

## 2024-05-30 DIAGNOSIS — Z95.810 S/P ICD (INTERNAL CARDIAC DEFIBRILLATOR) PROCEDURE: ICD-10-CM

## 2024-05-30 LAB
ALBUMIN SERPL-MCNC: 3.9 G/DL (ref 3.9–4.9)
ALBUMIN/GLOB SERPL: 1.2 {RATIO} (ref 1.2–2.2)
ALP SERPL-CCNC: 104 IU/L (ref 44–121)
ALT SERPL-CCNC: 16 IU/L (ref 0–44)
AST SERPL-CCNC: 23 IU/L (ref 0–40)
BASOPHILS # BLD AUTO: 0.2 X10E3/UL (ref 0–0.2)
BASOPHILS NFR BLD AUTO: 2 %
BILIRUB SERPL-MCNC: <0.2 MG/DL (ref 0–1.2)
BUN SERPL-MCNC: 15 MG/DL (ref 8–27)
BUN/CREAT SERPL: 12 (ref 10–24)
CALCIUM SERPL-MCNC: 9.6 MG/DL (ref 8.6–10.2)
CHLORIDE SERPL-SCNC: 99 MMOL/L (ref 96–106)
CO2 SERPL-SCNC: 23 MMOL/L (ref 20–29)
CREAT SERPL-MCNC: 1.23 MG/DL (ref 0.76–1.27)
EGFRCR SERPLBLD CKD-EPI 2021: 66 ML/MIN/1.73
EOSINOPHIL # BLD AUTO: 1.3 X10E3/UL (ref 0–0.4)
EOSINOPHIL NFR BLD AUTO: 12 %
ERYTHROCYTE [DISTWIDTH] IN BLOOD BY AUTOMATED COUNT: 16.9 % (ref 11.6–15.4)
GLOBULIN SER CALC-MCNC: 3.3 G/DL (ref 1.5–4.5)
GLUCOSE SERPL-MCNC: 81 MG/DL (ref 70–99)
HCT VFR BLD AUTO: 27.9 % (ref 37.5–51)
HGB BLD-MCNC: 8.7 G/DL (ref 13–17.7)
IMM GRANULOCYTES # BLD AUTO: 0.1 X10E3/UL (ref 0–0.1)
IMM GRANULOCYTES NFR BLD AUTO: 1 %
INR BLD: 1.4
INR PPP: 1.4 (ref 0.9–1.2)
LDH SERPL L TO P-CCNC: 275 IU/L (ref 121–224)
LYMPHOCYTES # BLD AUTO: 1.3 X10E3/UL (ref 0.7–3.1)
LYMPHOCYTES NFR BLD AUTO: 12 %
MCH RBC QN AUTO: 26.4 PG (ref 26.6–33)
MCHC RBC AUTO-ENTMCNC: 31.2 G/DL (ref 31.5–35.7)
MCV RBC AUTO: 85 FL (ref 79–97)
MONOCYTES # BLD AUTO: 1.2 X10E3/UL (ref 0.1–0.9)
MONOCYTES NFR BLD AUTO: 11 %
NEUTROPHILS # BLD AUTO: 6.6 X10E3/UL (ref 1.4–7)
NEUTROPHILS NFR BLD AUTO: 62 %
NT-PROBNP SERPL-MCNC: 873 PG/ML (ref 0–210)
PLATELET # BLD AUTO: 566 X10E3/UL (ref 150–450)
POTASSIUM SERPL-SCNC: 4.7 MMOL/L (ref 3.5–5.2)
PROT SERPL-MCNC: 7.2 G/DL (ref 6–8.5)
PROTHROMBIN TIME: 14.8 SEC (ref 9.1–12)
RBC # BLD AUTO: 3.29 X10E6/UL (ref 4.14–5.8)
SODIUM SERPL-SCNC: 137 MMOL/L (ref 134–144)
WBC # BLD AUTO: 10.6 X10E3/UL (ref 3.4–10.8)

## 2024-05-30 PROCEDURE — 99212 OFFICE O/P EST SF 10 MIN: CPT

## 2024-05-30 RX ORDER — ENOXAPARIN SODIUM 100 MG/ML
1 INJECTION SUBCUTANEOUS 2 TIMES DAILY PRN
Qty: 12 EACH | Refills: 0
Start: 2024-05-30 | End: 2024-05-30 | Stop reason: ALTCHOICE

## 2024-05-30 RX ORDER — OMEGA-3 FATTY ACIDS/FISH OIL 300-1000MG
1 CAPSULE ORAL DAILY
Qty: 90 CAPSULE | Refills: 1
Start: 2024-05-30

## 2024-05-30 RX ORDER — METOPROLOL SUCCINATE 50 MG/1
50 TABLET, EXTENDED RELEASE ORAL DAILY
Qty: 90 TABLET | Refills: 1
Start: 2024-05-30

## 2024-05-30 ASSESSMENT — PATIENT HEALTH QUESTIONNAIRE - PHQ9
SUM OF ALL RESPONSES TO PHQ QUESTIONS 1-9: 0
1. LITTLE INTEREST OR PLEASURE IN DOING THINGS: NOT AT ALL
SUM OF ALL RESPONSES TO PHQ QUESTIONS 1-9: 0
SUM OF ALL RESPONSES TO PHQ QUESTIONS 1-9: 0
2. FEELING DOWN, DEPRESSED OR HOPELESS: NOT AT ALL
SUM OF ALL RESPONSES TO PHQ QUESTIONS 1-9: 0
SUM OF ALL RESPONSES TO PHQ9 QUESTIONS 1 & 2: 0

## 2024-05-30 ASSESSMENT — ENCOUNTER SYMPTOMS
DYSPNEA ACTIVITY LEVEL: AT REST
HEMOPTYSIS: 0

## 2024-05-30 NOTE — TELEPHONE ENCOUNTER
1416: CT scan scheduled with coordination of care for 06/26 at 10 am.      1427: called and spoke with patient. Notified of CT scan scheduled 06/26 at 10am at [facility] with 0930 arrival time. Patient verbalized understanding. Confirmed he had drain removed earlier today.     1431: [facility]. Notified staff that patient reported that his pulmonologist needed a new prior auth for his nucala injection because of admin issue with his Medicaid. Discussed that [facility] would like to notify [facility] that patient had this issue as unsure if it will effect his octreotide as well. Staff verbalized understanding, stated that patient's auth for octreotide injection is currently pending.

## 2024-05-30 NOTE — HOME HEALTH
Subjective: \"I'm doing ok- boy this drain site is itchy!'  Falls since last visit: 0  Caregiver involvement changes: Ashley Swan - spouse and pt's son assist pt as needed when available  Home health supplies by type and quantity ordered/delivered this visit include: N/A     Clinician asked if patient has any new or changed medications and patient states: No  If Yes, were medications reconciled? Yes  Was the certifying physician notified of changes in medications? N/A          Clinical assessment (what this visit means for the patient overall and need for ongoing skilled care) and progress or lack of progress towards SPECIFIC goals: Pt alert, sitting  on sofa in living room. CG/spouse present at [facility]. Pt's VSS, map 70,  O2 sat level 100% on room air at rest. Pt reports he still not using supplimental O2 despite SOB with mild exertion. SN performed INR check via fingerstick- result 1.7- reported to [facility]. 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  SN drained pt's sternal abscess drainage pouch- collected approximately 130ml tan, purulent drainage. SN noted soiled dressing around  sternal abscess drain.SN removed soiled dressing and noted suture on catheter loose. SN applied gentle tug on drain catheter- resistence met- no other manipulation attempted. SN cleansed site with chlorohexadine and dressed with split gauze and covered with Tegaderm dressing. Pt remains afebrile and denies chills, body aches or other symptoms of sepsis. SN peformed sterile LVAD driveline dressing change - see Wound Addendum for details. SN then margo weekly labs via venipuncture: CBC with diff, CMP. LDH. INR and ProBNP. SN transported labs to [facility] at [place] for processing. Pt is scheduled to see Dr Gillespie at [facility] tomorrow at 9 am, and IR at 11am for drain catheter removal.    Written Teaching Material Utilized: [facility] admission booklet       Interdisciplinary communication with:

## 2024-05-30 NOTE — PROGRESS NOTES
ADVANCED HEART FAILURE CENTER  [facility] in [place], [place]      INR result reviewed with Dr. Jose Miguel MD  who made the following recommendations (VORB): 4mg tonight and recheck 06/03. Patient notified at visit and verbalized understanding. They had no further questions. (See anticoag tracker)     Funmi Barth RN

## 2024-05-30 NOTE — PROGRESS NOTES
ADVANCED HEART FAILURE CENTER  [facility] in [place], [place]  LVAD Coordinator Annual Visit Note    Chief Complaint   Patient presents with    Annual Exam     LVAD       BP (!) 70/0 (Site: Right Upper Arm, Position: Sitting, Cuff Size: Medium Adult)   Pulse 86   Temp 98.2 °F (36.8 °C) (Oral)   Resp 18   Ht 1.727 m (5' 7.99\")   Wt 70.8 kg (156 lb)   SpO2 100%   BMI 23.73 kg/m²     LVAD  LVAD Type:: Left Ventricular Assist Device (LVAD)  Pump Speed (rpm): 5200  Pump Flow (lpm): 3.7  MAP (mmHg): 70  Set Low Speed (rpm): 5200  Pump Pulse Index (PI): 3.4  Pump Power (Mei): 3.8  Battery Life Checked: Yes  Backup Controller Present: Yes  Driveline Dressing: Clean, Dry and Intact  Outpatient: Yes  MAP in Therapeutic Range (Outpatient): Yes       Sanford Joiner Sr. is a 64 y.o. male with a history of NICM with Heartmate 3 implant date of 03/27/24. LVAD interrogation completed in clinic, results reported to provider, Notable for PI events.     Patient denied  driveline trauma (including  drops). Denies LVAD alarms at home. See flow sheet for details. Driveline inspected for integrity. Reports sternal abscess drainage as become \"bloody\" since hh nurse attempted to remove drain last week. Drainage bag noted to contain estimated 50ml of pink tinged, tan purulent drainage. No merline bleeding noted. Patient denied pain. States bag was emptied yesterday. Above reported to provider.       Completed annual periodic maintenance on patients own LVAD Mobile Power Unit and Universal Battery Charger. Mobile Power Unit AA batteries were replaced per  guidelines. Please see further documentation in equipment log.     Annual labs, EKG ordered per Dr. Jose Miguel GATICA.  Intermacs survey completed in clinic. Per Dr. Jose Miguel GATICA deffer 6mw and echo at this time.  Dr. Gillespie ordered VORB patient should proceed with drain removal today, have repeat CT chest abdomen with contrast in 1 month prior to

## 2024-05-31 ENCOUNTER — HOME CARE VISIT (OUTPATIENT)
Facility: HOME HEALTH | Age: 64
End: 2024-05-31
Payer: MEDICARE

## 2024-05-31 ENCOUNTER — ANTI-COAG VISIT (OUTPATIENT)
Age: 64
End: 2024-05-31

## 2024-05-31 VITALS
BODY MASS INDEX: 23.88 KG/M2 | WEIGHT: 157 LBS | RESPIRATION RATE: 24 BRPM | TEMPERATURE: 98.6 F | OXYGEN SATURATION: 99 %

## 2024-05-31 DIAGNOSIS — Z79.01 LONG TERM (CURRENT) USE OF ANTICOAGULANTS: Primary | ICD-10-CM

## 2024-05-31 LAB
25(OH)D3+25(OH)D2 SERPL-MCNC: 40.1 NG/ML (ref 30–100)
CHOLEST SERPL-MCNC: 167 MG/DL (ref 100–199)
FERRITIN SERPL-MCNC: 140 NG/ML (ref 30–400)
HBA1C MFR BLD: 5.3 % (ref 4.8–5.6)
HDLC SERPL-MCNC: 27 MG/DL
INR BLD: 1.7
IRON SATN MFR SERPL: 9 % (ref 15–55)
IRON SERPL-MCNC: 25 UG/DL (ref 38–169)
LDLC SERPL CALC-MCNC: 110 MG/DL (ref 0–99)
MAGNESIUM SERPL-MCNC: 2.1 MG/DL (ref 1.6–2.3)
T4 FREE SERPL-MCNC: 1.22 NG/DL (ref 0.82–1.77)
TIBC SERPL-MCNC: 273 UG/DL (ref 250–450)
TRIGL SERPL-MCNC: 169 MG/DL (ref 0–149)
TSH SERPL DL<=0.005 MIU/L-ACNC: 4.56 UIU/ML (ref 0.45–4.5)
UIBC SERPL-MCNC: 248 UG/DL (ref 111–343)
URATE SERPL-MCNC: 9.7 MG/DL (ref 3.8–8.4)
VLDLC SERPL CALC-MCNC: 30 MG/DL (ref 5–40)

## 2024-05-31 PROCEDURE — G0299 HHS/HOSPICE OF RN EA 15 MIN: HCPCS

## 2024-05-31 NOTE — PROGRESS NOTES
ADVANCED HEART FAILURE CENTER  [facility] in [place], [place]      INR result reviewed with RUBEN Garibay NP who made the following recommendations (VORB): increase coumadin today (5/31/24) to 4 mg and recheck INR on Tuesday 6/4/24. Patient notified and verbalized understanding. He had no further questions. (See anticoag tracker)     SNEHA LAL RN  VAD Coordinator

## 2024-06-03 ENCOUNTER — HOME CARE VISIT (OUTPATIENT)
Facility: HOME HEALTH | Age: 64
End: 2024-06-03
Payer: MEDICARE

## 2024-06-03 ENCOUNTER — ANTI-COAG VISIT (OUTPATIENT)
Age: 64
End: 2024-06-03
Payer: MEDICARE

## 2024-06-03 VITALS
BODY MASS INDEX: 23.24 KG/M2 | WEIGHT: 152.8 LBS | OXYGEN SATURATION: 98 % | RESPIRATION RATE: 24 BRPM | TEMPERATURE: 99.1 F

## 2024-06-03 DIAGNOSIS — Z79.01 CHRONIC ANTICOAGULATION: Primary | ICD-10-CM

## 2024-06-03 LAB — INR BLD: 2.2

## 2024-06-03 PROCEDURE — G0299 HHS/HOSPICE OF RN EA 15 MIN: HCPCS

## 2024-06-03 PROCEDURE — 93793 ANTICOAG MGMT PT WARFARIN: CPT | Performed by: NURSE PRACTITIONER

## 2024-06-03 RX ORDER — ALLOPURINOL 100 MG/1
50 TABLET ORAL DAILY
Qty: 90 TABLET | Refills: 1 | Status: SHIPPED | OUTPATIENT
Start: 2024-06-03

## 2024-06-03 RX ORDER — PRAVASTATIN SODIUM 40 MG
40 TABLET ORAL EVERY EVENING
Qty: 30 TABLET | Refills: 2 | Status: SHIPPED | OUTPATIENT
Start: 2024-06-03

## 2024-06-03 NOTE — PROGRESS NOTES
Increase Thursday to 4mg and recheck next Monday.      Reviewing his recent labs:  cholesterol is a little high.  I would like to start him on 40mg of pravastatin daily.    Iron is low.  Let's see if he can get two iron infusions, please.    Uric acid is high.  I would like him to start allopurinol 50mg daily.    I've signed/sent the orders for the statin and allopurinol.       ADVANCED HEART FAILURE CENTER  [facility] in [place], [place]      INR result reviewed with RUBEN Garibay NP who made the following recommendations (VORB): 4mg Thursday and recheck 1 week. Patient notified and verbalized understanding. They had no further questions. Patient's home health nurse MADYSON James also notified.   (See anticoag tracker)     Patient notified of provider order for allopurinol and pravastatin.  Patient verbalized understanding. RUBEN Garibay ordered VORB iron infusions x2 . Patient notified and verbalized understanding. Patient's home health nurse MADYSON James also notified.     Funmi Barth RN

## 2024-06-03 NOTE — PROGRESS NOTES
Reviewing his recent labs:  cholesterol is a little high.  I would like to start him on 40mg of pravastatin daily.    Iron is low.  Let's see if he can get two iron infusions, please.    Uric acid is high.  I would like him to start allopurinol 50mg daily.

## 2024-06-04 LAB
ALBUMIN SERPL-MCNC: 4.1 G/DL (ref 3.9–4.9)
ALBUMIN/GLOB SERPL: 1.2 {RATIO} (ref 1.2–2.2)
ALP SERPL-CCNC: 107 IU/L (ref 44–121)
ALT SERPL-CCNC: 19 IU/L (ref 0–32)
AST SERPL-CCNC: 23 IU/L (ref 0–40)
BASOPHILS # BLD AUTO: 0.2 X10E3/UL (ref 0–0.2)
BASOPHILS NFR BLD AUTO: 2 %
BILIRUB SERPL-MCNC: <0.2 MG/DL (ref 0–1.2)
BUN SERPL-MCNC: 16 MG/DL (ref 8–27)
BUN/CREAT SERPL: 14 (ref 12–28)
CALCIUM SERPL-MCNC: 9.9 MG/DL (ref 8.7–10.3)
CHLORIDE SERPL-SCNC: 101 MMOL/L (ref 96–106)
CO2 SERPL-SCNC: 22 MMOL/L (ref 20–29)
CREAT SERPL-MCNC: 1.17 MG/DL (ref 0.57–1)
EGFRCR SERPLBLD CKD-EPI 2021: 52 ML/MIN/1.73
EOSINOPHIL # BLD AUTO: 0.9 X10E3/UL (ref 0–0.4)
EOSINOPHIL NFR BLD AUTO: 10 %
ERYTHROCYTE [DISTWIDTH] IN BLOOD BY AUTOMATED COUNT: 15.8 % (ref 11.7–15.4)
GLOBULIN SER CALC-MCNC: 3.5 G/DL (ref 1.5–4.5)
GLUCOSE SERPL-MCNC: 84 MG/DL (ref 70–99)
HCT VFR BLD AUTO: 28.2 % (ref 34–46.6)
HGB BLD-MCNC: 8.5 G/DL (ref 11.1–15.9)
IMM GRANULOCYTES # BLD AUTO: 0 X10E3/UL (ref 0–0.1)
IMM GRANULOCYTES NFR BLD AUTO: 0 %
INR PPP: 1.7 (ref 0.9–1.2)
LDH SERPL L TO P-CCNC: 237 IU/L (ref 119–226)
LYMPHOCYTES # BLD AUTO: 1.1 X10E3/UL (ref 0.7–3.1)
LYMPHOCYTES NFR BLD AUTO: 12 %
MCH RBC QN AUTO: 25.3 PG (ref 26.6–33)
MCHC RBC AUTO-ENTMCNC: 30.1 G/DL (ref 31.5–35.7)
MCV RBC AUTO: 84 FL (ref 79–97)
MONOCYTES # BLD AUTO: 1.1 X10E3/UL (ref 0.1–0.9)
MONOCYTES NFR BLD AUTO: 12 %
NEUTROPHILS # BLD AUTO: 5.9 X10E3/UL (ref 1.4–7)
NEUTROPHILS NFR BLD AUTO: 64 %
NT-PROBNP SERPL-MCNC: 1083 PG/ML (ref 0–287)
PLATELET # BLD AUTO: 491 X10E3/UL (ref 150–450)
POTASSIUM SERPL-SCNC: 4.7 MMOL/L (ref 3.5–5.2)
PROT SERPL-MCNC: 7.6 G/DL (ref 6–8.5)
PROTHROMBIN TIME: 18.1 SEC (ref 9.1–12)
RBC # BLD AUTO: 3.36 X10E6/UL (ref 3.77–5.28)
SODIUM SERPL-SCNC: 140 MMOL/L (ref 134–144)
WBC # BLD AUTO: 9.2 X10E3/UL (ref 3.4–10.8)

## 2024-06-04 ASSESSMENT — ENCOUNTER SYMPTOMS
DYSPNEA ACTIVITY LEVEL: AFTER AMBULATING LESS THAN 10 FT
HEMOPTYSIS: 0

## 2024-06-05 DIAGNOSIS — I50.22 CHRONIC SYSTOLIC HEART FAILURE (HCC): Primary | ICD-10-CM

## 2024-06-05 DIAGNOSIS — E61.1 IRON DEFICIENCY: ICD-10-CM

## 2024-06-05 DIAGNOSIS — N18.31 STAGE 3A CHRONIC KIDNEY DISEASE (HCC): ICD-10-CM

## 2024-06-05 DIAGNOSIS — D50.0 IRON DEFICIENCY ANEMIA DUE TO CHRONIC BLOOD LOSS: ICD-10-CM

## 2024-06-05 RX ORDER — ACETAMINOPHEN 325 MG/1
650 TABLET ORAL
OUTPATIENT
Start: 2024-06-17

## 2024-06-05 RX ORDER — SODIUM CHLORIDE 0.9 % (FLUSH) 0.9 %
5-40 SYRINGE (ML) INJECTION PRN
OUTPATIENT
Start: 2024-06-17

## 2024-06-05 RX ORDER — FAMOTIDINE 10 MG/ML
20 INJECTION, SOLUTION INTRAVENOUS
OUTPATIENT
Start: 2024-06-17

## 2024-06-05 RX ORDER — SODIUM CHLORIDE 9 MG/ML
5-250 INJECTION, SOLUTION INTRAVENOUS PRN
OUTPATIENT
Start: 2024-06-17

## 2024-06-05 RX ORDER — EPINEPHRINE 1 MG/ML
0.3 INJECTION, SOLUTION, CONCENTRATE INTRAVENOUS PRN
OUTPATIENT
Start: 2024-06-17

## 2024-06-05 RX ORDER — ALBUTEROL SULFATE 90 UG/1
4 AEROSOL, METERED RESPIRATORY (INHALATION) PRN
OUTPATIENT
Start: 2024-06-17

## 2024-06-05 RX ORDER — SODIUM CHLORIDE 9 MG/ML
INJECTION, SOLUTION INTRAVENOUS CONTINUOUS
OUTPATIENT
Start: 2024-06-17

## 2024-06-05 RX ORDER — DIPHENHYDRAMINE HYDROCHLORIDE 50 MG/ML
50 INJECTION INTRAMUSCULAR; INTRAVENOUS
OUTPATIENT
Start: 2024-06-17

## 2024-06-05 RX ORDER — ONDANSETRON 2 MG/ML
8 INJECTION INTRAMUSCULAR; INTRAVENOUS
OUTPATIENT
Start: 2024-06-17

## 2024-06-06 ENCOUNTER — ANTI-COAG VISIT (OUTPATIENT)
Age: 64
End: 2024-06-06

## 2024-06-06 ENCOUNTER — TELEPHONE (OUTPATIENT)
Age: 64
End: 2024-06-06

## 2024-06-06 ENCOUNTER — HOME CARE VISIT (OUTPATIENT)
Facility: HOME HEALTH | Age: 64
End: 2024-06-06
Payer: MEDICARE

## 2024-06-06 VITALS
BODY MASS INDEX: 23.88 KG/M2 | RESPIRATION RATE: 24 BRPM | WEIGHT: 157 LBS | OXYGEN SATURATION: 98 % | TEMPERATURE: 98.7 F

## 2024-06-06 DIAGNOSIS — Z79.01 CHRONIC ANTICOAGULATION: Primary | ICD-10-CM

## 2024-06-06 LAB — INR BLD: 2.1

## 2024-06-06 PROCEDURE — G0299 HHS/HOSPICE OF RN EA 15 MIN: HCPCS

## 2024-06-06 NOTE — PROGRESS NOTES
ADVANCED HEART FAILURE CENTER  [facility] in [place], [place]      INR result reviewed with RUBEN Garibay NP who made the following recommendations (VORB): no changes, regular dose of 2mg tonight and recheck 1 week. Patient notified and verbalized understanding. Patient's home health nurse MADYSON James also notified.  They had no further questions. (See anticoag tracker)     Funmi Barth RN

## 2024-06-06 NOTE — TELEPHONE ENCOUNTER
Received message from patient's home health nurse MADYSON James reporting some thin brown fluid seeping from old sternal drain site, continued brown drainage from driveline. Reports no swelling of previous sternal abscess site. YUMI Quiles notified.

## 2024-06-07 ENCOUNTER — TELEPHONE (OUTPATIENT)
Age: 64
End: 2024-06-07

## 2024-06-07 NOTE — TELEPHONE ENCOUNTER
1159: call placed to [facility] to inquire about patient's nucala injections. Spoke with staff at call center who stated per patient's chart notes patient has a high out of pocket cost for injections under medicare only. Staff stated per notes patient's medicaid was not active at one point and this is why it was not submitted under medicaid coverage. Requested call back from nurse with pulmonology to clarify as patient told [facility] staff medicaid issue had been resolved. Staff verbalized understanding, stated will request RN return call to coordinators.      1450: Call placed to [facility] to inquire about patient's infusion appointments. [facility] staff confirmed patient has injectofer scheduled for 06/17. Staff stated unable to provide injectofer on 06/12 appointment due to scheduling issues.

## 2024-06-10 ENCOUNTER — ANTI-COAG VISIT (OUTPATIENT)
Age: 64
End: 2024-06-10
Payer: MEDICARE

## 2024-06-10 ENCOUNTER — TELEPHONE (OUTPATIENT)
Age: 64
End: 2024-06-10

## 2024-06-10 ENCOUNTER — HOME CARE VISIT (OUTPATIENT)
Facility: HOME HEALTH | Age: 64
End: 2024-06-10
Payer: MEDICARE

## 2024-06-10 VITALS
WEIGHT: 157 LBS | RESPIRATION RATE: 24 BRPM | OXYGEN SATURATION: 97 % | TEMPERATURE: 98.1 F | BODY MASS INDEX: 23.88 KG/M2

## 2024-06-10 DIAGNOSIS — Z79.01 CHRONIC ANTICOAGULATION: Primary | ICD-10-CM

## 2024-06-10 LAB — INR BLD: 2.2

## 2024-06-10 PROCEDURE — G0299 HHS/HOSPICE OF RN EA 15 MIN: HCPCS

## 2024-06-10 PROCEDURE — 93793 ANTICOAG MGMT PT WARFARIN: CPT | Performed by: INTERNAL MEDICINE

## 2024-06-10 NOTE — PROGRESS NOTES
ADVANCED HEART FAILURE CENTER  [facility] in [place], [place]      INR result reviewed with Dr. Jose Miguel MD  who made the following recommendations (VORB): no changes and recheck 1 week. Patient notified of provider instructions via voicemail, requested call back to confirm     Patient's home health nurse MADYSON James notified      06/11: called and spoke with patient's wife who confirmed they received message      (See anticoag tracker)     Funmi Barth RN

## 2024-06-10 NOTE — HOME HEALTH
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 Pt no longer receiving IV antibiotic therapy. RUE PICC line has been discontinued. Pt now receiving oral antibiotic. Pt had a suction drain placed in sternal abscess site which was removed on  5/24/24. CG/SN continue to perform LVAD driveline dressing changes 3 x weekly and sternal abscess site dressing change 3 x weekly and prn.    Dr Farshad Gillespie approved of the following: the need for continued Skilled Nursing home health services  2W8, 1W1, 5 prn and plan of care for LVAD driveline infection, cutaneous abscess of chest wall, severe COPD symtoms for: additional assessment and: wound care services to LVAD driveline exit site and chest wall abscess site.    Subjective: \"I'm feeling better, my breathing is better and I'm getting around a little better.\"    Clinical assessment (what this visit means for the patient overall and need for ongoing skilled care) and progress or lack of progress towards SPECIFIC goals: SN performed cardiopulmonary assessment- pt's VS within POC parameters, CHF stoplight zone remains green. Pt SOB with minimal exertion although mildly improved during past certification period. Pt is still not using supplimental oxygen even during periods of increased SOB, although O2 tanks remain in home and stored in upright position. SN performed LVAD driveline dressing change - brownish gray drainage noted on old dressing. SN also changed dressing over chest wall abscess site- soiled with similar drainage- site cleansed with chlorohexadine and covered with dry gauze and Tegaderm dressing. Pt's mobility improving moderately- pt now able to ambulate within home with less WOB and effort. Pt remains fall risk. Pt scheduled for CT scan of chest and

## 2024-06-10 NOTE — TELEPHONE ENCOUNTER
6899: received return call from Marlyn with PAR regarding patient's nucala injection. Marlyn stated per her review patient's Medicaid is not showing active coverage as of today. Stated that patient's nuclala is approved under his  medicaid however copay is very high. Marlyn stated she requested proof of income from patient so that she can submit patient for patient assistance however she has not received this.     8249: Call placed to patient, left voicemail requesting call back to discuss insurance status

## 2024-06-10 NOTE — TELEPHONE ENCOUNTER
----- Message from Farshad Gillespie MD sent at 6/10/2024  2:21 PM EDT -----  Labs: has iron deficiency anemia, elevated uric acid, elevated LDL and triglycerides.    Can we set him up for iron infusion. Injectafer preferred, If not covered then Venofer x 5 doses  Have him increase Allopurinol to 100 mg daily  Transition Pravastatin to Rosuvastatin 10 mg daily

## 2024-06-10 NOTE — TELEPHONE ENCOUNTER
Message received from patient's home chi nurse MADYSON James noting grey/green drainage from patient's driveline. MADYSON James reported drainage from old sternal drain site is decreased, reports no swelling or tenderness.     Discussed with Dr. Gillespie who stated [facility] home health to collect culture of driveline drainage. Discussed with MADYSON James who confirmed order and will collect culture of driveline drainage.     Labwork reviewed with Dr. Gillespie who ordered VORB increase Allopurinol to 100 mg daily  Transition Pravastatin to Rosuvastatin 10 mg daily and injectofer infusions. Notified Dr. Gillespie that patient is scheduled for infusions to start 06/17.      1536: Call placed to patient.  Left voicemail for patient with provider instructions to increase Allopurinol to 100 mg daily transition Pravastatin to Rosuvastatin 10 mg daily.     After calling patient RN reviewed labwork and noted that medication changes (start allopurinol and prevastatin) were made on 06/03 based off of labwork from 05/30, and no recheck of these values since med changes. Message sent to Dr. Gillespie to clarify her med changes based off of this.        Farshad Gillespie MD Pasti, Megan, RN  OK. Then dont need to change the Allopurinol or the pravastatin          Previous Messages       ----- Message -----  From: Funmi Barth, RN  Sent: 6/10/2024   3:43 PM EDT  To: Farshad Gillespie MD    He is set up for iron, getting first one 06/17. We started him on 50 of allopurinol and pravastatin on 06/03- do you want him to increase still or recheck labs?       06/11: reviewed with Dr. Gillespie who stated VORB patient should continue on previous dose of 50mg Allopurinol daily and pravastatin 40mg nightly.     1003: placed call to patient and spoke with his SO crystal. Reviewed provider instructions to continue previous dose of 50mg Allopurinol daily and pravastatin 40mg nightly, disregard instructions to increase and change meds. Ashley verbalized understanding.

## 2024-06-11 LAB
ALBUMIN SERPL-MCNC: 4 G/DL (ref 3.9–4.9)
ALBUMIN/GLOB SERPL: 1.1 {RATIO}
ALP SERPL-CCNC: 91 IU/L (ref 44–121)
ALT SERPL-CCNC: 14 IU/L (ref 0–44)
AST SERPL-CCNC: 18 IU/L (ref 0–40)
BASOPHILS # BLD AUTO: 0.2 X10E3/UL (ref 0–0.2)
BASOPHILS NFR BLD AUTO: 2 %
BILIRUB SERPL-MCNC: 0.2 MG/DL (ref 0–1.2)
BUN SERPL-MCNC: 18 MG/DL (ref 8–27)
BUN/CREAT SERPL: 11 (ref 10–24)
CALCIUM SERPL-MCNC: 9.9 MG/DL (ref 8.6–10.2)
CHLORIDE SERPL-SCNC: 102 MMOL/L (ref 96–106)
CO2 SERPL-SCNC: 20 MMOL/L (ref 20–29)
CREAT SERPL-MCNC: 1.57 MG/DL (ref 0.76–1.27)
EGFRCR SERPLBLD CKD-EPI 2021: 49 ML/MIN/1.73
EOSINOPHIL # BLD AUTO: 0.8 X10E3/UL (ref 0–0.4)
EOSINOPHIL NFR BLD AUTO: 8 %
ERYTHROCYTE [DISTWIDTH] IN BLOOD BY AUTOMATED COUNT: 14.7 % (ref 11.6–15.4)
GLOBULIN SER CALC-MCNC: 3.5 G/DL (ref 1.5–4.5)
GLUCOSE SERPL-MCNC: 91 MG/DL (ref 70–99)
HCT VFR BLD AUTO: 29.1 % (ref 37.5–51)
HGB BLD-MCNC: 8.6 G/DL (ref 13–17.7)
IMM GRANULOCYTES # BLD AUTO: 0 X10E3/UL (ref 0–0.1)
IMM GRANULOCYTES NFR BLD AUTO: 0 %
INR PPP: 1.7 (ref 0.9–1.2)
LDH SERPL L TO P-CCNC: 217 IU/L (ref 121–224)
LYMPHOCYTES # BLD AUTO: 1.1 X10E3/UL (ref 0.7–3.1)
LYMPHOCYTES NFR BLD AUTO: 10 %
MCH RBC QN AUTO: 24.9 PG (ref 26.6–33)
MCHC RBC AUTO-ENTMCNC: 29.6 G/DL (ref 31.5–35.7)
MCV RBC AUTO: 84 FL (ref 79–97)
MONOCYTES # BLD AUTO: 1.1 X10E3/UL (ref 0.1–0.9)
MONOCYTES NFR BLD AUTO: 10 %
NEUTROPHILS # BLD AUTO: 7.5 X10E3/UL (ref 1.4–7)
NEUTROPHILS NFR BLD AUTO: 70 %
NT-PROBNP SERPL-MCNC: 1073 PG/ML (ref 0–210)
PLATELET # BLD AUTO: 480 X10E3/UL (ref 150–450)
POTASSIUM SERPL-SCNC: 4.5 MMOL/L (ref 3.5–5.2)
PROT SERPL-MCNC: 7.5 G/DL (ref 6–8.5)
PROTHROMBIN TIME: 17.3 SEC (ref 9.1–12)
RBC # BLD AUTO: 3.45 X10E6/UL (ref 4.14–5.8)
SODIUM SERPL-SCNC: 140 MMOL/L (ref 134–144)
WBC # BLD AUTO: 10.6 X10E3/UL (ref 3.4–10.8)

## 2024-06-11 NOTE — TELEPHONE ENCOUNTER
1003: called and spoke with patient's wife who stated patient received a letter stating medicaid was inactive as of June 1st. She stated they filled out a whole new application for medicaid and plan to submit this asap. Also stated patient requested income verification from social security and will submit this to PAR as soon as it arrives.

## 2024-06-12 ENCOUNTER — HOSPITAL ENCOUNTER (OUTPATIENT)
Facility: HOSPITAL | Age: 64
Setting detail: INFUSION SERIES
Discharge: HOME | End: 2024-06-12
Payer: MEDICARE

## 2024-06-12 VITALS — TEMPERATURE: 97.9 F | OXYGEN SATURATION: 100 % | RESPIRATION RATE: 20 BRPM

## 2024-06-12 DIAGNOSIS — K92.2 UPPER GI BLEED: Primary | ICD-10-CM

## 2024-06-12 DIAGNOSIS — Z95.811 LVAD (LEFT VENTRICULAR ASSIST DEVICE) PRESENT (HCC): ICD-10-CM

## 2024-06-12 LAB — TSH SERPL DL<=0.05 MIU/L-ACNC: 1.95 UIU/ML (ref 0.36–3.74)

## 2024-06-12 PROCEDURE — 36415 COLL VENOUS BLD VENIPUNCTURE: CPT

## 2024-06-12 PROCEDURE — 96372 THER/PROPH/DIAG INJ SC/IM: CPT

## 2024-06-12 PROCEDURE — 84443 ASSAY THYROID STIM HORMONE: CPT

## 2024-06-12 PROCEDURE — 6360000002 HC RX W HCPCS: Performed by: NURSE PRACTITIONER

## 2024-06-12 RX ORDER — ONDANSETRON 2 MG/ML
8 INJECTION INTRAMUSCULAR; INTRAVENOUS
OUTPATIENT
Start: 2024-06-19

## 2024-06-12 RX ORDER — EPINEPHRINE 1 MG/ML
0.3 INJECTION, SOLUTION INTRAMUSCULAR; SUBCUTANEOUS PRN
OUTPATIENT
Start: 2024-06-19

## 2024-06-12 RX ORDER — ALBUTEROL SULFATE 90 UG/1
4 AEROSOL, METERED RESPIRATORY (INHALATION) PRN
OUTPATIENT
Start: 2024-06-19

## 2024-06-12 RX ORDER — ACETAMINOPHEN 325 MG/1
650 TABLET ORAL
OUTPATIENT
Start: 2024-06-19

## 2024-06-12 RX ORDER — DIPHENHYDRAMINE HYDROCHLORIDE 50 MG/ML
50 INJECTION INTRAMUSCULAR; INTRAVENOUS
OUTPATIENT
Start: 2024-06-19

## 2024-06-12 RX ORDER — SODIUM CHLORIDE 9 MG/ML
INJECTION, SOLUTION INTRAVENOUS CONTINUOUS
OUTPATIENT
Start: 2024-06-19

## 2024-06-12 RX ADMIN — OCTREOTIDE ACETATE 20 MG: KIT at 15:14

## 2024-06-12 NOTE — PLAN OF CARE
[facility]    Date: 2024    Name: Sanford Joiner Sr.    MRN: [id number]         : 1960    Mr. Joiner arrived ambulatory and in no distress for Sandostatin 20 mg.      Assessment was completed and documented in flowsheets. No acute concerns at this time. Labs drawn peripherally.    Mr. Joiner's vital signs for this visit. MAP= 72  Vitals:    24 1504   Resp: 20   Temp: 97.9 °F (36.6 °C)   SpO2: 100%     Lab results were obtained and reviewed. Criteria Met for Injection.   No results found for this or any previous visit (from the past 12 hour(s)).    Medications given:   Medications Administered         octreotide ACETATE (SANDOSTATIN LAR) injection 20 mg Admin Date  2024 Action  Given Dose  20 mg Route  IntraMUSCular Administered By  Ras Acosta RN            Given in the R glute.     Mr. Joiner tolerated the injection, and had no complaints.    [facility] and is aware of future appointments.     Future Appointments   Date Time Provider Department Center   2024 To Be Determined Nanci James RN [facility] HOME [facility]   2024 11:00 AM [facility]   2024 To Be Determined Nanci James RN [facility] HOME [facility]   2024 To Be Determined Nanci James RN [facility] HOME [facility]   2024 To Be Determined Nanci James RN [facility] HOME [facility]   2024 11:30 AM [facility] CT ER 3 [facility]   2024 To Be Determined Nanci James RN [facility] HOME [facility]   2024  9:00 AM Geni Garibay APRN - NP [facility]   7/10/2024  2:30 PM [facility]   2024 11:00 AM [facility] MED INF CHAIR 4 [facility]       RAS ACOSTA RN  2024  4:07 PM    Problem: Chronic Conditions and Co-morbidities  Goal: Patient's chronic conditions and co-morbidity symptoms are monitored and maintained or improved  Outcome: Progressing     Problem: Safety - Adult  Goal:

## 2024-06-13 ENCOUNTER — ANTI-COAG VISIT (OUTPATIENT)
Age: 64
End: 2024-06-13

## 2024-06-13 ENCOUNTER — HOME CARE VISIT (OUTPATIENT)
Facility: HOME HEALTH | Age: 64
End: 2024-06-13
Payer: MEDICARE

## 2024-06-13 DIAGNOSIS — Z79.01 CHRONIC ANTICOAGULATION: Primary | ICD-10-CM

## 2024-06-13 LAB — INR BLD: 1.8

## 2024-06-13 PROCEDURE — G0299 HHS/HOSPICE OF RN EA 15 MIN: HCPCS

## 2024-06-13 ASSESSMENT — ENCOUNTER SYMPTOMS
HEMOPTYSIS: 0
DYSPNEA ACTIVITY LEVEL: AFTER AMBULATING MORE THAN 20 FT

## 2024-06-13 NOTE — HOME HEALTH
Page to ADRIÁN LANDAVERDE for sign out.    Subjective: \"I'm getting around the house easier- Ashley and I even sat outside the other evening\"  Falls since last visit: 0  Caregiver involvement changes: Ashley Swan - spouse and pt's son assist pt as needed when available  Home health supplies by type and quantity ordered/delivered this visit include: N/A      Clinician asked if patient has any new or changed medications and patient states: No  If Yes, were medications reconciled? Yes  Was the certifying physician notified of changes in medications? N/A           Clinical assessment (what this visit means for the patient overall and need for ongoing skilled care) and progress or lack of progress towards SPECIFIC goals: Pt alert, sitting  on sofa in living room. Pt's VSS, map 68,  O2 sat level 97% on room air at rest.  SN performed INR check via fingerstick- result 2.2- reported to [facility]. 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  SN noted soiled dressing around  sternal abscess drain.SN removed soiled dressing and cleansed old drain site with chlorohexadine swab and redressed with dry gauze and Tegaderm dressing.  Pt remains afebrile and denies chills, body aches or other symptoms of sepsis. SN peformed sterile LVAD driveline dressing change - see Wound Addendum for details. SN instructed pt on Allupurinol, purpose, dosing and possible side effects. SN then margo weekly labs via venipuncture: CBC with diff, CMP. LDH. INR and ProBNP. SN transported labs to [facility] at [place] for processing. Pt is scheduled for chest/abdominal CT scan on 6/26/24 to check driveline for fluid accumulation.     Written Teaching Material Utilized: [facility] admission booklet        Interdisciplinary communication with:  [facility] regarding pt's INR results, weight, map and drainage from sternal abscess site         Discharge planning as follows: Pt may be discharged from [facility] when ordered by MD or pt request.

## 2024-06-13 NOTE — PROGRESS NOTES
ADVANCED HEART FAILURE CENTER  [facility] in [place], [place]      INR result reviewed with YUMI Quiles NP  who made the following recommendations (VORB): no changes and recheck 1 week. Patient notified and verbalized understanding. They had no further questions. Patient's home health nurse MADYSON James also notified. (See anticoag tracker)     Funmi Barth RN

## 2024-06-14 ENCOUNTER — TELEPHONE (OUTPATIENT)
Age: 64
End: 2024-06-14

## 2024-06-14 VITALS
TEMPERATURE: 97.8 F | WEIGHT: 157 LBS | OXYGEN SATURATION: 97 % | BODY MASS INDEX: 23.88 KG/M2 | RESPIRATION RATE: 24 BRPM

## 2024-06-14 LAB
MICROORGANISM/AGENT SPEC: ABNORMAL
MICROORGANISM/AGENT SPEC: ABNORMAL

## 2024-06-14 ASSESSMENT — ENCOUNTER SYMPTOMS
DYSPNEA ACTIVITY LEVEL: WHILE SPEAKING
HEMOPTYSIS: 0

## 2024-06-14 NOTE — TELEPHONE ENCOUNTER
----- Message from Farshad Gillespie MD sent at 6/14/2024  8:13 AM EDT -----  Can you let patient know his driveline cultures is positive. Can you send results to ID. Thanks  ----- Message -----  From: Kassie Boone Incoming Amb Ref Lab Orders To [facility]  Sent: 6/11/2024   8:36 AM EDT  To: Farshad Gillespie MD

## 2024-06-14 NOTE — TELEPHONE ENCOUNTER
Funmi from the [facility] Heart failure clinic called requesting a call back to discuss a positive culture. Her call back number is [phone]

## 2024-06-14 NOTE — TELEPHONE ENCOUNTER
Nicole culture reviewed with Dr. Gillespie who requested VORB culture be sent to Dr. Rajan.     1109: call placed to office of Dr. Rjaan. Spoke with PSR staff bill who stated nurse is assisting with an emergency but will return call. Notified Bill that our office will be forwarding positive culture for this patient. Bill verbalized understanding and stated they will notify nurse and request she return call.  Culture faxed to office Dr. Rajan.     1243: received return call from Dr. Rajan. Confirmed patient with two identifiers.  Dr. Rajan reviewed patient's culture and stated enterobacter would be covered by bactrim or cipro PO however patient has allergies to both of these. Dr. Rajan stated patient should continue on Keflex at this time.  Discussed with Dr. Gillespie and YUMI Quiles who stated she will reach out to Dr. Rajan if further reccommended are needed.      1426: call placed to patient. Notified of positive driveline culture and that [facility] providers and Dr. Rajan were notified. Educated patient to continue taking his Keflex BID and that he will be notified if changes to this are recommended, he verbalized understanding.

## 2024-06-15 NOTE — HOME HEALTH
Subjective: \"I'm feeling ok - no new problems. I'm still trying to get the Medicaid straight for Nucala shots.\"  Falls since last visit: 0  Caregiver involvement changes: Ashley Swan - spouse and pt's son assist pt as needed when available  Home health supplies by type and quantity ordered/delivered this visit include: N/A      Clinician asked if patient has any new or changed medications and patient states: No  If Yes, were medications reconciled? Yes  Was the certifying physician notified of changes in medications? N/A           Clinical assessment (what this visit means for the patient overall and need for ongoing skilled care) and progress or lack of progress towards SPECIFIC goals: Pt alert awaiting for nurse arrival. Pt's VSS, map 82, O2 sat level 97% on room air at rest.  SN performed INR check via fingerstick- result 1.8- reported to [facility]. 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  SN noted slightly soiled dressing at sternal abscess site. SN removed soiled dressing and cleansed old drain site with chlorohexadine swab and redressed with dry gauze and Tegaderm dressing.  Pt remains afebrile and denies chills, body aches or other symptoms of sepsis. SN peformed sterile LVAD driveline dressing change - see Wound Addendum for details.   Pt is scheduled for chest/abdominal CT scan on 6/26/24 to check driveline for fluid accumulation.      Written Teaching Material Utilized: [facility] admission booklet         Interdisciplinary communication with:  [facility] regarding pt's INR results, weight, map and drainage from sternal abscess site          Discharge planning as follows: Pt may be discharged from [facility] when ordered by MD or pt request.

## 2024-06-18 ENCOUNTER — TELEPHONE (OUTPATIENT)
Age: 64
End: 2024-06-18

## 2024-06-18 ENCOUNTER — ANTI-COAG VISIT (OUTPATIENT)
Age: 64
End: 2024-06-18

## 2024-06-18 ENCOUNTER — HOME CARE VISIT (OUTPATIENT)
Facility: HOME HEALTH | Age: 64
End: 2024-06-18
Payer: MEDICARE

## 2024-06-18 DIAGNOSIS — Z79.01 CHRONIC ANTICOAGULATION: Primary | ICD-10-CM

## 2024-06-18 LAB — INR BLD: 2.4

## 2024-06-18 PROCEDURE — G0299 HHS/HOSPICE OF RN EA 15 MIN: HCPCS

## 2024-06-18 NOTE — PROGRESS NOTES
ADVANCED HEART FAILURE CENTER  [facility] in [place], [place]      INR result reviewed with YUMI Quiles NP  who made the following recommendations (VORB): 1mg tonight and recheck 1 week. Patient notified and verbalized understanding. They had no further questions. Patient's home health nurse MADYSON James so notified. (See anticoag tracker)     Funmi Barth RN

## 2024-06-18 NOTE — TELEPHONE ENCOUNTER
Message received from patient's home health nurse reporting sternal abscess site is slightly increased in size and more firm. No current drainage from sternal site. Reports grey/brown fluid from driveline.     Reviewed with YUMI Quiles who stated VORB no changes to plan of care at this time. YUMI Quiles stated she received recent positive culture with Dr. Rajan and no current plan to change antibiotics at this time.     Reviewed with patient's home health nurse MADYSON James. She verbalized understanding.

## 2024-06-19 VITALS
TEMPERATURE: 97.7 F | BODY MASS INDEX: 23.21 KG/M2 | OXYGEN SATURATION: 99 % | RESPIRATION RATE: 24 BRPM | WEIGHT: 152.6 LBS

## 2024-06-19 LAB
ALBUMIN SERPL-MCNC: 4.3 G/DL (ref 3.9–4.9)
ALP SERPL-CCNC: 93 IU/L (ref 44–121)
ALT SERPL-CCNC: 9 IU/L (ref 0–44)
AST SERPL-CCNC: 17 IU/L (ref 0–40)
BASOPHILS # BLD AUTO: 0.2 X10E3/UL (ref 0–0.2)
BASOPHILS NFR BLD AUTO: 3 %
BILIRUB SERPL-MCNC: <0.2 MG/DL (ref 0–1.2)
BUN SERPL-MCNC: 20 MG/DL (ref 8–27)
BUN/CREAT SERPL: 13 (ref 10–24)
CALCIUM SERPL-MCNC: 9.4 MG/DL (ref 8.6–10.2)
CHLORIDE SERPL-SCNC: 101 MMOL/L (ref 96–106)
CO2 SERPL-SCNC: 20 MMOL/L (ref 20–29)
CREAT SERPL-MCNC: 1.54 MG/DL (ref 0.76–1.27)
EGFRCR SERPLBLD CKD-EPI 2021: 50 ML/MIN/1.73
EOSINOPHIL # BLD AUTO: 0.8 X10E3/UL (ref 0–0.4)
EOSINOPHIL NFR BLD AUTO: 8 %
ERYTHROCYTE [DISTWIDTH] IN BLOOD BY AUTOMATED COUNT: 14.2 % (ref 11.6–15.4)
GLOBULIN SER CALC-MCNC: 3.2 G/DL (ref 1.5–4.5)
GLUCOSE SERPL-MCNC: 123 MG/DL (ref 70–99)
HCT VFR BLD AUTO: 31.6 % (ref 37.5–51)
HGB BLD-MCNC: 9.5 G/DL (ref 13–17.7)
IMM GRANULOCYTES # BLD AUTO: 0 X10E3/UL (ref 0–0.1)
IMM GRANULOCYTES NFR BLD AUTO: 0 %
INR PPP: 1.9 (ref 0.9–1.2)
LDH SERPL L TO P-CCNC: 235 IU/L (ref 121–224)
LYMPHOCYTES # BLD AUTO: 1.4 X10E3/UL (ref 0.7–3.1)
LYMPHOCYTES NFR BLD AUTO: 15 %
MCH RBC QN AUTO: 25.5 PG (ref 26.6–33)
MCHC RBC AUTO-ENTMCNC: 30.1 G/DL (ref 31.5–35.7)
MCV RBC AUTO: 85 FL (ref 79–97)
MONOCYTES # BLD AUTO: 0.8 X10E3/UL (ref 0.1–0.9)
MONOCYTES NFR BLD AUTO: 9 %
NEUTROPHILS # BLD AUTO: 6 X10E3/UL (ref 1.4–7)
NEUTROPHILS NFR BLD AUTO: 65 %
NT-PROBNP SERPL-MCNC: 892 PG/ML (ref 0–210)
PLATELET # BLD AUTO: 433 X10E3/UL (ref 150–450)
POTASSIUM SERPL-SCNC: 4.1 MMOL/L (ref 3.5–5.2)
PROT SERPL-MCNC: 7.5 G/DL (ref 6–8.5)
PROTHROMBIN TIME: 19.9 SEC (ref 9.1–12)
RBC # BLD AUTO: 3.72 X10E6/UL (ref 4.14–5.8)
SODIUM SERPL-SCNC: 139 MMOL/L (ref 134–144)
WBC # BLD AUTO: 9.3 X10E3/UL (ref 3.4–10.8)

## 2024-06-20 ASSESSMENT — ENCOUNTER SYMPTOMS
HEMOPTYSIS: 0
DYSPNEA ACTIVITY LEVEL: WHILE SPEAKING

## 2024-06-20 NOTE — HOME HEALTH
Subjective: \"I had to reschedule my iron infusion to next Monday\"  Falls since last visit: 0  Caregiver involvement changes: Ashley Swan - spouse and pt's son assist pt as needed when available  Home health supplies by type and quantity ordered/delivered this visit include: N/A       Clinician asked if patient has any new or changed medications and patient states: No  If Yes, were medications reconciled? Yes  Was the certifying physician notified of changes in medications? N/A           Clinical assessment (what this visit means for the patient overall and need for ongoing skilled care) and progress or lack of progress towards SPECIFIC goals: Pt alert, sitting  on sofa in living room. Pt's VSS, map 78,  O2 sat level 97% on room air at rest.  SN performed INR check via fingerstick- result 2.6- reported to [facility]. 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  SN noted no drainage from sternal abscess site- however area slightly bulging but soft to palpation.  Pt remains afebrile and denies chills, body aches or other symptoms of sepsis. SN peformed sterile LVAD driveline dressing change - see Wound Addendum for details. SN instructed pt on Allupurinol, purpose, dosing and possible side effects. SN then margo weekly labs via venipuncture: CBC with diff, CMP. LDH. INR and ProBNP. SN transported labs to [facility] at [place] for processing. Pt is scheduled for chest/abdominal CT scan on 6/26/24 to check driveline for fluid accumulation.      Written Teaching Material Utilized: [facility] admission booklet         Interdisciplinary communication with:  [facility] regarding pt's INR results, weight, map and drainage from sternal abscess site         Discharge planning as follows: Pt may be discharged from [facility] when ordered by MD or pt request.

## 2024-06-21 ENCOUNTER — HOME CARE VISIT (OUTPATIENT)
Facility: HOME HEALTH | Age: 64
End: 2024-06-21
Payer: MEDICARE

## 2024-06-21 ENCOUNTER — ANTI-COAG VISIT (OUTPATIENT)
Age: 64
End: 2024-06-21

## 2024-06-21 ENCOUNTER — TELEPHONE (OUTPATIENT)
Age: 64
End: 2024-06-21

## 2024-06-21 VITALS
BODY MASS INDEX: 24.03 KG/M2 | WEIGHT: 158 LBS | TEMPERATURE: 98.7 F | RESPIRATION RATE: 32 BRPM | OXYGEN SATURATION: 97 %

## 2024-06-21 DIAGNOSIS — Z79.01 CHRONIC ANTICOAGULATION: Primary | ICD-10-CM

## 2024-06-21 LAB — INR BLD: 2.2

## 2024-06-21 PROCEDURE — G0299 HHS/HOSPICE OF RN EA 15 MIN: HCPCS

## 2024-06-21 NOTE — TELEPHONE ENCOUNTER
Message received from patient's home health nurse MADYSON James noting redness and swelling around patient driveline.      Reports MAP of 70 and weight of 158lb, up 6lb from 06/18. Reports patient stated shortness of breath slightly worse but has been out of triligy for two days.  No fever, chills or peripheral edema reported by home health.     Reviewed above with Dr. Gillespie who stated VORB patient should take 1/2 bumex tablet once, trend weights through he weekend and check in with weight on Monday. Per provider no changes to plan of care at this time regarding driveline infection, patient should call if he develops fever, chills or worsening shortness of breath.     1631: placed call to patient, reviewed provider instructions to take 1/2 bumex tablet once, trend weights through he weekend and check in with weight on Monday. Educated patient should call if he develops fever, chills or worsening shortness of breath. Patient verbalized understanding, patient's home health nurse MADYSON James updated.

## 2024-06-21 NOTE — PROGRESS NOTES
ADVANCED HEART FAILURE CENTER  [facility] in [place], [place]      INR result reviewed with Dr. Jose Miguel MD  who made the following recommendations (VORB): no changes and recheck 1 week. Patient notified and verbalized understanding. They had no further questions. Patient's home health nurse MADYSON James also notified.   (See anticoag tracker)     Funmi Barth RN

## 2024-06-22 ASSESSMENT — ENCOUNTER SYMPTOMS
DYSPNEA ACTIVITY LEVEL: WHILE SPEAKING
HEMOPTYSIS: 0

## 2024-06-24 ENCOUNTER — HOSPITAL ENCOUNTER (OUTPATIENT)
Facility: HOSPITAL | Age: 64
Setting detail: INFUSION SERIES
Discharge: HOME | End: 2024-06-24
Payer: MEDICARE

## 2024-06-24 ENCOUNTER — TELEPHONE (OUTPATIENT)
Age: 64
End: 2024-06-24

## 2024-06-24 VITALS — TEMPERATURE: 98.1 F | HEART RATE: 95 BPM | RESPIRATION RATE: 20 BRPM | OXYGEN SATURATION: 98 %

## 2024-06-24 DIAGNOSIS — I50.22 CHRONIC HFREF (HEART FAILURE WITH REDUCED EJECTION FRACTION) (HCC): ICD-10-CM

## 2024-06-24 DIAGNOSIS — N18.31 STAGE 3A CHRONIC KIDNEY DISEASE (HCC): ICD-10-CM

## 2024-06-24 DIAGNOSIS — E61.1 IRON DEFICIENCY: Primary | ICD-10-CM

## 2024-06-24 DIAGNOSIS — D50.0 IRON DEFICIENCY ANEMIA DUE TO CHRONIC BLOOD LOSS: ICD-10-CM

## 2024-06-24 PROCEDURE — 2580000003 HC RX 258: Performed by: NURSE PRACTITIONER

## 2024-06-24 PROCEDURE — 96365 THER/PROPH/DIAG IV INF INIT: CPT

## 2024-06-24 PROCEDURE — 6360000002 HC RX W HCPCS: Performed by: NURSE PRACTITIONER

## 2024-06-24 RX ORDER — ALBUTEROL SULFATE 90 UG/1
4 AEROSOL, METERED RESPIRATORY (INHALATION) PRN
OUTPATIENT
Start: 2024-07-01

## 2024-06-24 RX ORDER — SODIUM CHLORIDE 9 MG/ML
INJECTION, SOLUTION INTRAVENOUS CONTINUOUS
OUTPATIENT
Start: 2024-07-01

## 2024-06-24 RX ORDER — SODIUM CHLORIDE 9 MG/ML
5-250 INJECTION, SOLUTION INTRAVENOUS PRN
Status: DISCONTINUED | OUTPATIENT
Start: 2024-06-24 | End: 2024-06-25 | Stop reason: HOSPADM

## 2024-06-24 RX ORDER — SODIUM CHLORIDE 9 MG/ML
5-250 INJECTION, SOLUTION INTRAVENOUS PRN
OUTPATIENT
Start: 2024-07-01

## 2024-06-24 RX ORDER — EPINEPHRINE 1 MG/ML
0.3 INJECTION, SOLUTION INTRAMUSCULAR; SUBCUTANEOUS PRN
OUTPATIENT
Start: 2024-07-01

## 2024-06-24 RX ORDER — SODIUM CHLORIDE 0.9 % (FLUSH) 0.9 %
5-40 SYRINGE (ML) INJECTION PRN
Status: DISCONTINUED | OUTPATIENT
Start: 2024-06-24 | End: 2024-06-25 | Stop reason: HOSPADM

## 2024-06-24 RX ORDER — ACETAMINOPHEN 325 MG/1
650 TABLET ORAL
OUTPATIENT
Start: 2024-07-01

## 2024-06-24 RX ORDER — DIPHENHYDRAMINE HYDROCHLORIDE 50 MG/ML
50 INJECTION INTRAMUSCULAR; INTRAVENOUS
OUTPATIENT
Start: 2024-07-01

## 2024-06-24 RX ORDER — SODIUM CHLORIDE 0.9 % (FLUSH) 0.9 %
5-40 SYRINGE (ML) INJECTION PRN
OUTPATIENT
Start: 2024-07-01

## 2024-06-24 RX ORDER — ONDANSETRON 2 MG/ML
8 INJECTION INTRAMUSCULAR; INTRAVENOUS
OUTPATIENT
Start: 2024-07-01

## 2024-06-24 RX ADMIN — SODIUM CHLORIDE 25 ML/HR: 9 INJECTION, SOLUTION INTRAVENOUS at 15:31

## 2024-06-24 RX ADMIN — FERRIC CARBOXYMALTOSE INJECTION 750 MG: 50 INJECTION, SOLUTION INTRAVENOUS at 15:32

## 2024-06-24 ASSESSMENT — PAIN SCALES - GENERAL: PAINLEVEL_OUTOF10: 0

## 2024-06-24 NOTE — ADDENDUM NOTE
Encounter addended by: Paz James RN on: 6/24/2024 4:25 PM   Actions taken: MAR administration accepted, LDA properties accepted, Clinical Note Signed, Charge Capture section accepted

## 2024-06-24 NOTE — TELEPHONE ENCOUNTER
1446: placed call to patient. Left voicemail requesting call back to discuss weights over the weekend.      06/25: message received from patient's  nurse MADYSON James reporting weight of 158lb today, unchanged from 06/20. Reports patient did not take bumex as directed and patient is denying any shortness of breath or peripheral edema.        Nessa Maurer APRN - NP  You16 minutes ago (3:59 PM)       Ok, he can take the bumex if his weight goes up     You  Nessa Maurer APRN - NP17 minutes ago (3:59 PM)     MP  Yes the same as 06/20.    Weights were  06/18: 152lb  06/20:  158lb  06/25: 158lb     Nessa Maurer APRN - ROSSANA  You23 minutes ago (3:52 PM)       His current weight is the same as last week correct?  I would have him watch for now, if he gains another 2 pounds then he needs to do the bumex     You  Nessa Maurer APRN - NP39 minutes ago (3:37 PM)     MP  Had a 6lb in 2 day weight gain last week, Gillespie wanted him to take 1/2 of a bumex, he did not, per fidencio no swelling no shortness of breath today, Do you want him to take the bumex or just monitor?     Reviewed with ADILENE Maurer who stated VORB no additional bumex at this time, patient to take 1/2 bumex dose if he experiences additional weight gain, shortness of breath or extremity edema.     1553: called and spoke with patient. Educated no additional bumex at this time, patient to take 1/2 bumex dose if he experiences additional weight gain, shortness of breath or extremity edema. He verbalized understanding. Patients' home health nurse MADYSON James also notified.

## 2024-06-24 NOTE — PROGRESS NOTES
[facility] Short Note                       Date: 2024    Name: Sanford Joiner Sr.    MRN: [id number]         : 1960      3:30 Pt admit to [facility] for INJECTAFER ambulatory in stable condition. Assessment completed. No new concerns voiced.        Mr. Joiner's vitals were reviewed prior to  treatment.   Patient Vitals for the past 12 hrs:   Temp Pulse Resp SpO2   24 1450 98.1 °F (36.7 °C) 95 20 98 %   MAP 76        Medications given: via left ac PIV #24  Medications Administered         0.9 % sodium chloride infusion Admin Date  2024 Action  New Bag Dose  25 mL/hr Rate  25 mL/hr Route  IntraVENous Administered By  Paz James RN        ferric carboxymaltose (INJECTAFER) 750 mg in sodium chloride 0.9 % 250 mL IVPB Admin Date  2024 Action  New Bag Dose  750 mg Rate  870 mL/hr Route  IntraVENous Administered By  Paz James RN        ferric carboxymaltose (INJECTAFER) 750 mg in sodium chloride 0.9 % 250 mL IVPB Admin Date  2024 Action  Rate/Dose Verify Dose   Rate  870 mL/hr Route  IntraVENous Administered By  Paz James RN              Mr. Joiner tolerated the infusion, and had no complaints.    He remained 20 mins post infusion for observation.  States he has had this infusion before with no difficulties.    [facility].     Future Appointments   Date Time Provider Department Center   2024  2:30 PM Nanci James RN [facility] HOME HEAL   2024 11:30 AM [facility] CT ER 3 [facility]   2024 To Be Determined Nanci James RN [facility] HOME HEAL   2024  9:00 AM Geni Garibay, APRN - NP [facility]   7/10/2024  2:30 PM [facility] FASTTRACK 2 [facility]   2024 11:00 AM [facility] MED INF CHAIR 4 [facility]       Paz James RN  2024  4:23 PM

## 2024-06-25 ENCOUNTER — ANTI-COAG VISIT (OUTPATIENT)
Age: 64
End: 2024-06-25
Payer: MEDICARE

## 2024-06-25 ENCOUNTER — HOME CARE VISIT (OUTPATIENT)
Facility: HOME HEALTH | Age: 64
End: 2024-06-25
Payer: MEDICARE

## 2024-06-25 VITALS
TEMPERATURE: 98.6 F | BODY MASS INDEX: 24.03 KG/M2 | WEIGHT: 158 LBS | RESPIRATION RATE: 20 BRPM | OXYGEN SATURATION: 96 %

## 2024-06-25 DIAGNOSIS — Z79.01 CHRONIC ANTICOAGULATION: Primary | ICD-10-CM

## 2024-06-25 LAB — INR BLD: 2.4

## 2024-06-25 PROCEDURE — 93793 ANTICOAG MGMT PT WARFARIN: CPT | Performed by: NURSE PRACTITIONER

## 2024-06-25 PROCEDURE — G0299 HHS/HOSPICE OF RN EA 15 MIN: HCPCS

## 2024-06-25 ASSESSMENT — ENCOUNTER SYMPTOMS: HEMOPTYSIS: 0

## 2024-06-25 NOTE — PROGRESS NOTES
ADVANCED HEART FAILURE CENTER  [facility] in [place], [place]      INR result reviewed with ADILENE Maurer NP who made the following recommendations (VORB): 1mg tonight and recheck 1 week. Patient notified and verbalized understanding. They had no further questions. [facility]'s home health nurse MADYSON James also notified. (See anticoag tracker)     06/26: Venous labwork returned result of INR 1.7. discussed with ADILENE Maurer who stated VORB no changes to coumadin dosing recommendations      Funmi Barth RN

## 2024-06-26 ENCOUNTER — TELEPHONE (OUTPATIENT)
Age: 64
End: 2024-06-26

## 2024-06-26 LAB
ALBUMIN SERPL-MCNC: 3.8 G/DL (ref 3.9–4.9)
ALP SERPL-CCNC: 83 IU/L (ref 44–121)
ALT SERPL-CCNC: 9 IU/L (ref 0–44)
AST SERPL-CCNC: 16 IU/L (ref 0–40)
BASOPHILS # BLD AUTO: 0.2 X10E3/UL (ref 0–0.2)
BASOPHILS NFR BLD AUTO: 2 %
BILIRUB SERPL-MCNC: <0.2 MG/DL (ref 0–1.2)
BUN SERPL-MCNC: 19 MG/DL (ref 8–27)
BUN/CREAT SERPL: 16 (ref 10–24)
CALCIUM SERPL-MCNC: 9.4 MG/DL (ref 8.6–10.2)
CHLORIDE SERPL-SCNC: 103 MMOL/L (ref 96–106)
CO2 SERPL-SCNC: 22 MMOL/L (ref 20–29)
CREAT SERPL-MCNC: 1.19 MG/DL (ref 0.76–1.27)
EGFRCR SERPLBLD CKD-EPI 2021: 68 ML/MIN/1.73
EOSINOPHIL # BLD AUTO: 0.7 X10E3/UL (ref 0–0.4)
EOSINOPHIL NFR BLD AUTO: 7 %
ERYTHROCYTE [DISTWIDTH] IN BLOOD BY AUTOMATED COUNT: 14 % (ref 11.6–15.4)
GLOBULIN SER CALC-MCNC: 3.1 G/DL (ref 1.5–4.5)
GLUCOSE SERPL-MCNC: 100 MG/DL (ref 70–99)
HCT VFR BLD AUTO: 28.6 % (ref 37.5–51)
HGB BLD-MCNC: 8.7 G/DL (ref 13–17.7)
IMM GRANULOCYTES # BLD AUTO: 0 X10E3/UL (ref 0–0.1)
IMM GRANULOCYTES NFR BLD AUTO: 0 %
INR PPP: 1.7 (ref 0.9–1.2)
LDH SERPL L TO P-CCNC: 219 IU/L (ref 121–224)
LYMPHOCYTES # BLD AUTO: 1.1 X10E3/UL (ref 0.7–3.1)
LYMPHOCYTES NFR BLD AUTO: 11 %
MAGNESIUM SERPL-MCNC: 1.8 MG/DL (ref 1.6–2.3)
MCH RBC QN AUTO: 25.5 PG (ref 26.6–33)
MCHC RBC AUTO-ENTMCNC: 30.4 G/DL (ref 31.5–35.7)
MCV RBC AUTO: 84 FL (ref 79–97)
MONOCYTES # BLD AUTO: 1 X10E3/UL (ref 0.1–0.9)
MONOCYTES NFR BLD AUTO: 10 %
NEUTROPHILS # BLD AUTO: 6.8 X10E3/UL (ref 1.4–7)
NEUTROPHILS NFR BLD AUTO: 70 %
NT-PROBNP SERPL-MCNC: 1802 PG/ML (ref 0–210)
PLATELET # BLD AUTO: 409 X10E3/UL (ref 150–450)
POTASSIUM SERPL-SCNC: 4.1 MMOL/L (ref 3.5–5.2)
PROT SERPL-MCNC: 6.9 G/DL (ref 6–8.5)
PROTHROMBIN TIME: 18.2 SEC (ref 9.1–12)
RBC # BLD AUTO: 3.41 X10E6/UL (ref 4.14–5.8)
SODIUM SERPL-SCNC: 140 MMOL/L (ref 134–144)
WBC # BLD AUTO: 9.8 X10E3/UL (ref 3.4–10.8)

## 2024-06-26 ASSESSMENT — ENCOUNTER SYMPTOMS: DYSPNEA ACTIVITY LEVEL: AFTER AMBULATING LESS THAN 10 FT

## 2024-06-27 NOTE — HOME HEALTH
Subjective: \"I had my iron infusion yesterday\"  Falls since last visit: 0  Caregiver involvement changes: Ashley Swan - spouse and pt's son assist pt as needed when available  Home health supplies by type and quantity ordered/delivered this visit include: N/A       Clinician asked if patient has any new or changed medications and patient states: No  If Yes, were medications reconciled? Yes  Was the certifying physician notified of changes in medications? N/A           Clinical assessment (what this visit means for the patient overall and need for ongoing skilled care) and progress or lack of progress towards SPECIFIC goals: Pt alert, sitting  on sofa in living room. Pt's VSS, map 82,  O2 sat level 96% on room air at rest.  SN performed INR check via fingerstick- result 2.4- reported to [facility]. 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  SN noted no drainage from sternal abscess site- however area slightly bulging but soft to palpation.  Pt remains afebrile and denies chills, body aches or other symptoms of sepsis. SN peformed sterile LVAD driveline dressing change - see Wound Addendum for details. SN instructed pt on Allupurinol, purpose, dosing and possible side effects. SN then margo weekly labs via venipuncture: CBC with diff, CMP. LDH. INR and ProBNP. SN transported labs to [facility] at [facility] for processing. Pt is scheduled for chest/abdominal CT scan on 6/26/24 to check driveline for fluid accumulation.      Written Teaching Material Utilized: [facility] admission booklet         Interdisciplinary communication with:  [facility] regarding pt's INR results, weight, map and drainage from sternal abscess site         Discharge planning as follows: Pt may be discharged from [facility] when ordered by MD or pt request.

## 2024-06-28 ENCOUNTER — TELEPHONE (OUTPATIENT)
Age: 64
End: 2024-06-28

## 2024-06-28 ENCOUNTER — HOME CARE VISIT (OUTPATIENT)
Facility: HOME HEALTH | Age: 64
End: 2024-06-28
Payer: MEDICARE

## 2024-06-28 NOTE — TELEPHONE ENCOUNTER
1556: Fax received from [facility] [employer] stating further information required for approval of ordered CT scan. Per document, \"Unable to assess the reason for ordering CT abdomen within short timeframe.\" Document stated office to call [phone] to provide further details. Contacted [facility] and spoke with nurse reviewer, Yary. Informed her patient with LVAD driveline fluid collection with infection previously s/p drain. CT required to closely monitor to ensure no re-accumulation of fluid. Per Yary procedure approved from 6/2024-9/21/2024 with approval #[id number]. She had no further questions.    1619: Contacted coordination of care to notify. Approval number provided. They verbalized understanding and confirmed will reach out to the patient to reschedule.    CASEY MONTEMAYOR RN  VAD Coordinator

## 2024-07-01 ENCOUNTER — ANTI-COAG VISIT (OUTPATIENT)
Age: 64
End: 2024-07-01
Payer: MEDICARE

## 2024-07-01 ENCOUNTER — HOME CARE VISIT (OUTPATIENT)
Facility: HOME HEALTH | Age: 64
End: 2024-07-01
Payer: MEDICARE

## 2024-07-01 DIAGNOSIS — Z79.01 CHRONIC ANTICOAGULATION: Primary | ICD-10-CM

## 2024-07-01 LAB — INR BLD: 2.5

## 2024-07-01 PROCEDURE — G0299 HHS/HOSPICE OF RN EA 15 MIN: HCPCS

## 2024-07-01 PROCEDURE — 93793 ANTICOAG MGMT PT WARFARIN: CPT | Performed by: NURSE PRACTITIONER

## 2024-07-01 NOTE — PROGRESS NOTES
ADVANCED HEART FAILURE CENTER  [facility] in [place], [place]      INR result reviewed with RUBEN Garibay NP who made the following recommendations (VORB): 1mg tonight and Thursday night and recheck 07/05. Patient notified of provider instructions via voicemail, requested call back to confirm. Carole's home health nurse MADYSON James also notified.      07/03: spoke with patient's wife who confirmed patient received message.  . (See anticoag tracker)     Funmi Barth RN

## 2024-07-02 ENCOUNTER — TELEPHONE (OUTPATIENT)
Age: 64
End: 2024-07-02

## 2024-07-02 VITALS
WEIGHT: 158 LBS | BODY MASS INDEX: 24.03 KG/M2 | OXYGEN SATURATION: 94 % | TEMPERATURE: 97.6 F | RESPIRATION RATE: 20 BRPM

## 2024-07-02 DIAGNOSIS — L08.9 SOFT TISSUE INFECTION: Primary | ICD-10-CM

## 2024-07-02 LAB
ALBUMIN SERPL-MCNC: 3.9 G/DL (ref 3.9–4.9)
ALP SERPL-CCNC: 92 IU/L (ref 44–121)
ALT SERPL-CCNC: 12 IU/L (ref 0–44)
AST SERPL-CCNC: 20 IU/L (ref 0–40)
BASOPHILS # BLD AUTO: 0.3 X10E3/UL (ref 0–0.2)
BASOPHILS NFR BLD AUTO: 2 %
BILIRUB SERPL-MCNC: <0.2 MG/DL (ref 0–1.2)
BUN SERPL-MCNC: 19 MG/DL (ref 8–27)
BUN/CREAT SERPL: 10 (ref 10–24)
CALCIUM SERPL-MCNC: 8.9 MG/DL (ref 8.6–10.2)
CHLORIDE SERPL-SCNC: 104 MMOL/L (ref 96–106)
CO2 SERPL-SCNC: 20 MMOL/L (ref 20–29)
CREAT SERPL-MCNC: 1.87 MG/DL (ref 0.76–1.27)
EGFRCR SERPLBLD CKD-EPI 2021: 40 ML/MIN/1.73
EOSINOPHIL # BLD AUTO: 0.6 X10E3/UL (ref 0–0.4)
EOSINOPHIL NFR BLD AUTO: 5 %
ERYTHROCYTE [DISTWIDTH] IN BLOOD BY AUTOMATED COUNT: 15.4 % (ref 11.6–15.4)
GLOBULIN SER CALC-MCNC: 3.2 G/DL (ref 1.5–4.5)
GLUCOSE SERPL-MCNC: 73 MG/DL (ref 70–99)
HCT VFR BLD AUTO: 31 % (ref 37.5–51)
HGB BLD-MCNC: 9.2 G/DL (ref 13–17.7)
IMM GRANULOCYTES # BLD AUTO: 0.1 X10E3/UL (ref 0–0.1)
IMM GRANULOCYTES NFR BLD AUTO: 1 %
INR PPP: 2 (ref 0.9–1.2)
LDH SERPL L TO P-CCNC: 228 IU/L (ref 121–224)
LYMPHOCYTES # BLD AUTO: 1.8 X10E3/UL (ref 0.7–3.1)
LYMPHOCYTES NFR BLD AUTO: 14 %
MAGNESIUM SERPL-MCNC: 1.7 MG/DL (ref 1.6–2.3)
MCH RBC QN AUTO: 25.1 PG (ref 26.6–33)
MCHC RBC AUTO-ENTMCNC: 29.7 G/DL (ref 31.5–35.7)
MCV RBC AUTO: 85 FL (ref 79–97)
MONOCYTES # BLD AUTO: 1.3 X10E3/UL (ref 0.1–0.9)
MONOCYTES NFR BLD AUTO: 10 %
NEUTROPHILS # BLD AUTO: 9.2 X10E3/UL (ref 1.4–7)
NEUTROPHILS NFR BLD AUTO: 68 %
NT-PROBNP SERPL-MCNC: 1890 PG/ML (ref 0–210)
PLATELET # BLD AUTO: 492 X10E3/UL (ref 150–450)
POTASSIUM SERPL-SCNC: 3.8 MMOL/L (ref 3.5–5.2)
PROT SERPL-MCNC: 7.1 G/DL (ref 6–8.5)
PROTHROMBIN TIME: 20.3 SEC (ref 9.1–12)
RBC # BLD AUTO: 3.67 X10E6/UL (ref 4.14–5.8)
SODIUM SERPL-SCNC: 141 MMOL/L (ref 134–144)
WBC # BLD AUTO: 13.3 X10E3/UL (ref 3.4–10.8)

## 2024-07-02 ASSESSMENT — ENCOUNTER SYMPTOMS
HEMOPTYSIS: 0
DYSPNEA ACTIVITY LEVEL: WHILE SPEAKING

## 2024-07-02 NOTE — TELEPHONE ENCOUNTER
Reviewed patient with RUBEN Garibay (including labwork) after missed appointment this morning. Reviewed reports of increasing sternal abscess per home health.     RUBEN Garibay ordered VORB patient to have STAT CT chest abdomen with contrast. Pre Dr. Gillespie and RUBEN Garibay okay for patient to receive contrast, hh to draw bmp next day after contrast administration.     1220: CT scan scheduled with coordination of care.     1331: placed call to patient. Left voicemail notifying of increased WBC and cr- and need for STAT ct asap. Notified of ct scheduled for tomorrow and requested call back as soon as possible to confirm he can make this. Patient's home health nurse MADYSON James also notified of stat ct scheduled for tomorrow and stated she will also follow up with patient.

## 2024-07-02 NOTE — TELEPHONE ENCOUNTER
----- Message from Farshad Gillespie MD sent at 7/2/2024  7:57 AM EDT -----  Labs show elevated creatinine and elevated WBC. Can we check in and let him know he needs to get the CT done ASAP

## 2024-07-03 ENCOUNTER — HOSPITAL ENCOUNTER (OUTPATIENT)
Age: 64
Discharge: HOME | End: 2024-07-06
Payer: MEDICARE

## 2024-07-03 ENCOUNTER — TELEPHONE (OUTPATIENT)
Age: 64
End: 2024-07-03

## 2024-07-03 DIAGNOSIS — L02.213 ABSCESS OF STERNAL REGION: Primary | ICD-10-CM

## 2024-07-03 DIAGNOSIS — L08.9 SOFT TISSUE INFECTION: ICD-10-CM

## 2024-07-03 PROCEDURE — 74150 CT ABDOMEN W/O CONTRAST: CPT

## 2024-07-03 NOTE — HOME HEALTH
Subjective: \"Things have been crazy around here - we had to take my step-son to the hospital.\"  Falls since last visit: 0  Caregiver involvement changes: Ashley Swan - spouse  assists pt as needed when available  Home health supplies by type and quantity ordered/delivered this visit include: N/A        Clinician asked if patient has any new or changed medications and patient states: No  If Yes, were medications reconciled? Yes  Was the certifying physician notified of changes in medications? N/A            Clinical assessment (what this visit means for the patient overall and need for ongoing skilled care) and progress or lack of progress towards SPECIFIC goals: Pt alert, sitting  on sofa in living room. Pt's VSS, map 70,  O2 sat level 94% on room air at rest.  SN performed INR check via fingerstick- result 2.5.- reported to [facility]. 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  SN noted no drainage from sternal abscess site- however area bulging but soft to palpation.  Pt remains afebrile and denies chills, body aches or other symptoms of sepsis. SN peformed sterile LVAD driveline dressing change - see Wound Addendum for details. SN then margo weekly labs via venipuncture: CBC with diff, CMP. LDH. INR and ProBNP. SN transported labs to [facility] at [place] for processing. Pt is scheduled for chest/abdominal CT scan on 7/15/24 to check driveline for fluid accumulation.      Written Teaching Material Utilized: [facility] admission booklet          Interdisciplinary communication with:  [facility] regarding pt's INR results, weight, map and drainage from sternal abscess site          Discharge planning as follows: Pt may be discharged from [facility] when ordered by MD or pt request.

## 2024-07-03 NOTE — TELEPHONE ENCOUNTER
Patient discussed with RUBEN Garibay. Verified with RUBEN Garibay patient should be scheduled in IR for abscess drainage, verified that IR should leave drain in place and discharge patient with drain.     1644: call placed to coordination of care. Drain placement scheduled for Friday 07/12 at [facility] at 0900.     1643: call placed to patient.  Spoke with patient's wife Ashley. Notified of Drain placement scheduled for Friday 07/12 at [facility] at 0900. She verbalized understanding.

## 2024-07-03 NOTE — TELEPHONE ENCOUNTER
I called Mr. Joiner to go over his CT results.   There is a new fluid collection concerning for infection.  The fluid collection is not nearly as large as it had been in the past, but it has increased since the drain was removed.      I am going to place an order for outpatient IR to place a new drain.     Patient agrees.  Opportunity for questions given.   He wanted to remind me that he needs 3 days to arrange transport.

## 2024-07-05 ENCOUNTER — TELEPHONE (OUTPATIENT)
Age: 64
End: 2024-07-05

## 2024-07-05 ENCOUNTER — HOME CARE VISIT (OUTPATIENT)
Facility: HOME HEALTH | Age: 64
End: 2024-07-05
Payer: MEDICARE

## 2024-07-05 ENCOUNTER — ANTI-COAG VISIT (OUTPATIENT)
Age: 64
End: 2024-07-05
Payer: MEDICARE

## 2024-07-05 VITALS
WEIGHT: 165 LBS | OXYGEN SATURATION: 98 % | RESPIRATION RATE: 20 BRPM | TEMPERATURE: 98.8 F | BODY MASS INDEX: 25.09 KG/M2

## 2024-07-05 DIAGNOSIS — Z79.01 CHRONIC ANTICOAGULATION: Primary | ICD-10-CM

## 2024-07-05 LAB — INR BLD: 1.6

## 2024-07-05 PROCEDURE — G0299 HHS/HOSPICE OF RN EA 15 MIN: HCPCS

## 2024-07-05 PROCEDURE — 93793 ANTICOAG MGMT PT WARFARIN: CPT | Performed by: NURSE PRACTITIONER

## 2024-07-05 ASSESSMENT — ENCOUNTER SYMPTOMS
HEMOPTYSIS: 0
DYSPNEA ACTIVITY LEVEL: AFTER AMBULATING LESS THAN 10 FT

## 2024-07-05 NOTE — PROGRESS NOTES
ADVANCED HEART FAILURE CENTER  [facility] in [place], [place]      INR result reviewed with YUMI Quiles NP  who made the following recommendations (VORB): regular coumadin dosing tonight and INR recheck to be determined after ER evaluation tonight.  Patient notified and verbalized understanding. They had no further questions. Patient's home health nurse MADYSON James also notified. (See anticoag tracker)     Funmi Barth RN

## 2024-07-05 NOTE — TELEPHONE ENCOUNTER
Message received from patient's home health nurse sarina James notifying of weight gain of 7lb, no shortness of breath or edema noted.     SARINA James reported large growth in sternal abscess, area more reddened. Reported the skin of the abscess is very taught. Reported thick purulent drainage from driveline.     Reviewed with YUMI Quiles and Dr. Gillespie who stated VORB patient should come to ER for evaluation and drainage of abscess.     Placed call to patient. Informed patient that provider has recommended that patient come to the emergency room for evaluation of his sternal abscess. He verbalized understanding, states he will need to wait until his wife gets off work this evening to drive him. Provider notified patient will present to the ER tonight.        Alcohol Abuse    Alcohol intoxication occurs when the amount of alcohol that a person has consumed impairs his or her ability to mentally and physically function. Chronic alcohol consumption can also lead to a variety of health issues including neurological disease, stomach disease, heart disease, liver disease, etc. Do not drive after drinking alcohol. Drinking enough alcohol to end up in an Emergency Room suggests you may have an alcohol abuse problem. Seek help at a drug addiction center.    SEEK IMMEDIATE MEDICAL CARE IF YOU HAVE ANY OF THE FOLLOWING SYMPTOMS: seizures, vomiting blood, blood in your stool, lightheadedness/dizziness, or becoming shaky to tremulous when you stop drinking.

## 2024-07-06 ENCOUNTER — TELEPHONE (OUTPATIENT)
Age: 64
End: 2024-07-06

## 2024-07-06 NOTE — TELEPHONE ENCOUNTER
Spoke with patients wife, he was supposed to present to ED for admission for worsening drive line abscess. He has decided to come in Monday morning instead of over the weekend.

## 2024-07-08 ENCOUNTER — APPOINTMENT (OUTPATIENT)
Facility: HOSPITAL | Age: 64
End: 2024-07-08
Payer: MEDICARE

## 2024-07-08 ENCOUNTER — HOSPITAL ENCOUNTER (INPATIENT)
Facility: HOSPITAL | Age: 64
LOS: 4 days | Discharge: HOME HEALTH | End: 2024-07-12
Attending: EMERGENCY MEDICINE | Admitting: INTERNAL MEDICINE
Payer: MEDICARE

## 2024-07-08 ENCOUNTER — HOME CARE VISIT (OUTPATIENT)
Dept: HOME HEALTH SERVICES | Facility: HOME HEALTH | Age: 64
End: 2024-07-08
Payer: MEDICARE

## 2024-07-08 DIAGNOSIS — T82.7XXA INFECTION ASSOCIATED WITH DRIVELINE OF LEFT VENTRICULAR ASSIST DEVICE (LVAD) (HCC): Primary | ICD-10-CM

## 2024-07-08 LAB
ALBUMIN SERPL-MCNC: 3.4 G/DL (ref 3.5–5)
ALBUMIN/GLOB SERPL: 0.7 (ref 1.1–2.2)
ALP SERPL-CCNC: 102 U/L (ref 45–117)
ALT SERPL-CCNC: 14 U/L (ref 12–78)
ANION GAP SERPL CALC-SCNC: 7 MMOL/L (ref 5–15)
APPEARANCE UR: CLEAR
AST SERPL-CCNC: 16 U/L (ref 15–37)
BACTERIA URNS QL MICRO: NEGATIVE /HPF
BASOPHILS # BLD: 0.2 K/UL (ref 0–0.1)
BASOPHILS NFR BLD: 2 % (ref 0–1)
BILIRUB SERPL-MCNC: 0.3 MG/DL (ref 0.2–1)
BILIRUB UR QL: NEGATIVE
BUN SERPL-MCNC: 18 MG/DL (ref 6–20)
BUN/CREAT SERPL: 13 (ref 12–20)
CALCIUM SERPL-MCNC: 8.8 MG/DL (ref 8.5–10.1)
CHLORIDE SERPL-SCNC: 105 MMOL/L (ref 97–108)
CO2 SERPL-SCNC: 27 MMOL/L (ref 21–32)
COLOR UR: ABNORMAL
COMMENT:: NORMAL
COMMENT:: NORMAL
CREAT SERPL-MCNC: 1.37 MG/DL (ref 0.7–1.3)
DIFFERENTIAL METHOD BLD: ABNORMAL
EKG DIAGNOSIS: NORMAL
EKG Q-T INTERVAL: 346 MS
EKG QRS DURATION: 162 MS
EKG QTC CALCULATION (BAZETT): 454 MS
EKG R AXIS: 103 DEGREES
EKG T AXIS: 96 DEGREES
EKG VENTRICULAR RATE: 104 BPM
EOSINOPHIL # BLD: 0.8 K/UL (ref 0–0.4)
EOSINOPHIL NFR BLD: 6 % (ref 0–7)
EPITH CASTS URNS QL MICRO: ABNORMAL /LPF
ERYTHROCYTE [DISTWIDTH] IN BLOOD BY AUTOMATED COUNT: 18.1 % (ref 11.5–14.5)
FERRITIN SERPL-MCNC: 439 NG/ML (ref 26–388)
FOLATE SERPL-MCNC: 11.3 NG/ML (ref 5–21)
GLOBULIN SER CALC-MCNC: 4.8 G/DL (ref 2–4)
GLUCOSE SERPL-MCNC: 104 MG/DL (ref 65–100)
GLUCOSE UR STRIP.AUTO-MCNC: NEGATIVE MG/DL
HCT VFR BLD AUTO: 34.9 % (ref 36.6–50.3)
HGB BLD-MCNC: 10.4 G/DL (ref 12.1–17)
HGB UR QL STRIP: NEGATIVE
HYALINE CASTS URNS QL MICRO: ABNORMAL /LPF (ref 0–5)
IMM GRANULOCYTES # BLD AUTO: 0.1 K/UL (ref 0–0.04)
IMM GRANULOCYTES NFR BLD AUTO: 0 % (ref 0–0.5)
INR PPP: 1.7 (ref 0.9–1.1)
IRON SATN MFR SERPL: 11 % (ref 20–50)
IRON SERPL-MCNC: 26 UG/DL (ref 35–150)
KETONES UR QL STRIP.AUTO: NEGATIVE MG/DL
LACTATE SERPL-SCNC: 1.2 MMOL/L (ref 0.4–2)
LACTATE SERPL-SCNC: 1.4 MMOL/L (ref 0.4–2)
LACTATE SERPL-SCNC: 2.4 MMOL/L (ref 0.4–2)
LACTATE SERPL-SCNC: 2.6 MMOL/L (ref 0.4–2)
LEUKOCYTE ESTERASE UR QL STRIP.AUTO: NEGATIVE
LYMPHOCYTES # BLD: 1.3 K/UL (ref 0.8–3.5)
LYMPHOCYTES NFR BLD: 10 % (ref 12–49)
MCH RBC QN AUTO: 25.4 PG (ref 26–34)
MCHC RBC AUTO-ENTMCNC: 29.8 G/DL (ref 30–36.5)
MCV RBC AUTO: 85.1 FL (ref 80–99)
MONOCYTES # BLD: 0.9 K/UL (ref 0–1)
MONOCYTES NFR BLD: 7 % (ref 5–13)
NEUTS SEG # BLD: 9.4 K/UL (ref 1.8–8)
NEUTS SEG NFR BLD: 75 % (ref 32–75)
NITRITE UR QL STRIP.AUTO: NEGATIVE
NRBC # BLD: 0 K/UL (ref 0–0.01)
NRBC BLD-RTO: 0 PER 100 WBC
PH UR STRIP: 6.5 (ref 5–8)
PLATELET # BLD AUTO: 435 K/UL (ref 150–400)
PMV BLD AUTO: 11.6 FL (ref 8.9–12.9)
POTASSIUM SERPL-SCNC: 3.2 MMOL/L (ref 3.5–5.1)
PROCALCITONIN SERPL-MCNC: 0.05 NG/ML
PROT SERPL-MCNC: 8.2 G/DL (ref 6.4–8.2)
PROT UR STRIP-MCNC: 30 MG/DL
PROTHROMBIN TIME: 17.2 SEC (ref 9–11.1)
RBC # BLD AUTO: 4.1 M/UL (ref 4.1–5.7)
RBC #/AREA URNS HPF: ABNORMAL /HPF (ref 0–5)
SODIUM SERPL-SCNC: 139 MMOL/L (ref 136–145)
SP GR UR REFRACTOMETRY: 1.02 (ref 1–1.03)
SPECIMEN HOLD: NORMAL
SPECIMEN HOLD: NORMAL
TIBC SERPL-MCNC: 234 UG/DL (ref 250–450)
TSH SERPL DL<=0.05 MIU/L-ACNC: 1.23 UIU/ML (ref 0.36–3.74)
URINE CULTURE IF INDICATED: ABNORMAL
UROBILINOGEN UR QL STRIP.AUTO: 0.2 EU/DL (ref 0.2–1)
VIT B12 SERPL-MCNC: 460 PG/ML (ref 193–986)
WBC # BLD AUTO: 12.6 K/UL (ref 4.1–11.1)
WBC URNS QL MICRO: ABNORMAL /HPF (ref 0–4)

## 2024-07-08 PROCEDURE — 10030 IMG GID FLU COLL DRG SFT TIS: CPT

## 2024-07-08 PROCEDURE — 71045 X-RAY EXAM CHEST 1 VIEW: CPT

## 2024-07-08 PROCEDURE — 36415 COLL VENOUS BLD VENIPUNCTURE: CPT

## 2024-07-08 PROCEDURE — 0W983ZZ DRAINAGE OF CHEST WALL, PERCUTANEOUS APPROACH: ICD-10-PCS

## 2024-07-08 PROCEDURE — 83550 IRON BINDING TEST: CPT

## 2024-07-08 PROCEDURE — 6370000000 HC RX 637 (ALT 250 FOR IP): Performed by: NURSE PRACTITIONER

## 2024-07-08 PROCEDURE — 87070 CULTURE OTHR SPECIMN AEROBIC: CPT

## 2024-07-08 PROCEDURE — 83605 ASSAY OF LACTIC ACID: CPT

## 2024-07-08 PROCEDURE — 2580000003 HC RX 258: Performed by: INTERNAL MEDICINE

## 2024-07-08 PROCEDURE — 85610 PROTHROMBIN TIME: CPT

## 2024-07-08 PROCEDURE — 87205 SMEAR GRAM STAIN: CPT

## 2024-07-08 PROCEDURE — 87040 BLOOD CULTURE FOR BACTERIA: CPT

## 2024-07-08 PROCEDURE — 81001 URINALYSIS AUTO W/SCOPE: CPT

## 2024-07-08 PROCEDURE — 99285 EMERGENCY DEPT VISIT HI MDM: CPT

## 2024-07-08 PROCEDURE — 99223 1ST HOSP IP/OBS HIGH 75: CPT | Performed by: INTERNAL MEDICINE

## 2024-07-08 PROCEDURE — 87186 SC STD MICRODIL/AGAR DIL: CPT

## 2024-07-08 PROCEDURE — 83540 ASSAY OF IRON: CPT

## 2024-07-08 PROCEDURE — 82746 ASSAY OF FOLIC ACID SERUM: CPT

## 2024-07-08 PROCEDURE — 2580000003 HC RX 258

## 2024-07-08 PROCEDURE — 82728 ASSAY OF FERRITIN: CPT

## 2024-07-08 PROCEDURE — 2060000000 HC ICU INTERMEDIATE R&B

## 2024-07-08 PROCEDURE — 80053 COMPREHEN METABOLIC PANEL: CPT

## 2024-07-08 PROCEDURE — 96365 THER/PROPH/DIAG IV INF INIT: CPT

## 2024-07-08 PROCEDURE — 85025 COMPLETE CBC W/AUTO DIFF WBC: CPT

## 2024-07-08 PROCEDURE — 99222 1ST HOSP IP/OBS MODERATE 55: CPT | Performed by: INTERNAL MEDICINE

## 2024-07-08 PROCEDURE — 82607 VITAMIN B-12: CPT

## 2024-07-08 PROCEDURE — 84443 ASSAY THYROID STIM HORMONE: CPT

## 2024-07-08 PROCEDURE — 87077 CULTURE AEROBIC IDENTIFY: CPT

## 2024-07-08 PROCEDURE — 6360000002 HC RX W HCPCS: Performed by: EMERGENCY MEDICINE

## 2024-07-08 PROCEDURE — 93005 ELECTROCARDIOGRAM TRACING: CPT | Performed by: EMERGENCY MEDICINE

## 2024-07-08 PROCEDURE — 2580000003 HC RX 258: Performed by: EMERGENCY MEDICINE

## 2024-07-08 PROCEDURE — 6370000000 HC RX 637 (ALT 250 FOR IP)

## 2024-07-08 PROCEDURE — 6370000000 HC RX 637 (ALT 250 FOR IP): Performed by: INTERNAL MEDICINE

## 2024-07-08 PROCEDURE — 6360000002 HC RX W HCPCS: Performed by: INTERNAL MEDICINE

## 2024-07-08 PROCEDURE — 93010 ELECTROCARDIOGRAM REPORT: CPT | Performed by: SPECIALIST

## 2024-07-08 PROCEDURE — 84145 PROCALCITONIN (PCT): CPT

## 2024-07-08 PROCEDURE — 87147 CULTURE TYPE IMMUNOLOGIC: CPT

## 2024-07-08 RX ORDER — ONDANSETRON 2 MG/ML
4 INJECTION INTRAMUSCULAR; INTRAVENOUS EVERY 6 HOURS PRN
Status: DISCONTINUED | OUTPATIENT
Start: 2024-07-08 | End: 2024-07-12 | Stop reason: HOSPADM

## 2024-07-08 RX ORDER — MAGNESIUM SULFATE IN WATER 40 MG/ML
2000 INJECTION, SOLUTION INTRAVENOUS PRN
Status: DISCONTINUED | OUTPATIENT
Start: 2024-07-08 | End: 2024-07-12 | Stop reason: HOSPADM

## 2024-07-08 RX ORDER — SODIUM CHLORIDE 9 MG/ML
INJECTION, SOLUTION INTRAVENOUS PRN
Status: DISCONTINUED | OUTPATIENT
Start: 2024-07-08 | End: 2024-07-12 | Stop reason: HOSPADM

## 2024-07-08 RX ORDER — 0.9 % SODIUM CHLORIDE 0.9 %
250 INTRAVENOUS SOLUTION INTRAVENOUS ONCE
Status: COMPLETED | OUTPATIENT
Start: 2024-07-08 | End: 2024-07-08

## 2024-07-08 RX ORDER — VITAMIN B COMPLEX
2000 TABLET ORAL DAILY
Status: DISCONTINUED | OUTPATIENT
Start: 2024-07-09 | End: 2024-07-12 | Stop reason: HOSPADM

## 2024-07-08 RX ORDER — FAMOTIDINE 20 MG/1
20 TABLET, FILM COATED ORAL 2 TIMES DAILY
Status: DISCONTINUED | OUTPATIENT
Start: 2024-07-08 | End: 2024-07-08

## 2024-07-08 RX ORDER — HYDRALAZINE HYDROCHLORIDE 50 MG/1
50 TABLET, FILM COATED ORAL EVERY 8 HOURS SCHEDULED
Status: DISCONTINUED | OUTPATIENT
Start: 2024-07-08 | End: 2024-07-12

## 2024-07-08 RX ORDER — ACETAMINOPHEN 650 MG/1
650 SUPPOSITORY RECTAL EVERY 6 HOURS PRN
Status: DISCONTINUED | OUTPATIENT
Start: 2024-07-08 | End: 2024-07-12 | Stop reason: HOSPADM

## 2024-07-08 RX ORDER — METOPROLOL SUCCINATE 50 MG/1
50 TABLET, EXTENDED RELEASE ORAL DAILY
Status: DISCONTINUED | OUTPATIENT
Start: 2024-07-09 | End: 2024-07-12 | Stop reason: HOSPADM

## 2024-07-08 RX ORDER — ALLOPURINOL 100 MG/1
50 TABLET ORAL DAILY
Status: DISCONTINUED | OUTPATIENT
Start: 2024-07-08 | End: 2024-07-12 | Stop reason: HOSPADM

## 2024-07-08 RX ORDER — LANOLIN ALCOHOL/MO/W.PET/CERES
400 CREAM (GRAM) TOPICAL 2 TIMES DAILY
Status: DISCONTINUED | OUTPATIENT
Start: 2024-07-08 | End: 2024-07-12 | Stop reason: HOSPADM

## 2024-07-08 RX ORDER — HYDRALAZINE HYDROCHLORIDE 20 MG/ML
10 INJECTION INTRAMUSCULAR; INTRAVENOUS EVERY 4 HOURS PRN
Status: DISCONTINUED | OUTPATIENT
Start: 2024-07-08 | End: 2024-07-12 | Stop reason: HOSPADM

## 2024-07-08 RX ORDER — SODIUM CHLORIDE 0.9 % (FLUSH) 0.9 %
5-40 SYRINGE (ML) INJECTION EVERY 12 HOURS SCHEDULED
Status: DISCONTINUED | OUTPATIENT
Start: 2024-07-08 | End: 2024-07-12 | Stop reason: HOSPADM

## 2024-07-08 RX ORDER — WARFARIN SODIUM 1 MG/1
3 TABLET ORAL
Status: COMPLETED | OUTPATIENT
Start: 2024-07-08 | End: 2024-07-08

## 2024-07-08 RX ORDER — PANTOPRAZOLE SODIUM 40 MG/1
40 TABLET, DELAYED RELEASE ORAL
Status: DISCONTINUED | OUTPATIENT
Start: 2024-07-09 | End: 2024-07-12 | Stop reason: HOSPADM

## 2024-07-08 RX ORDER — SODIUM CHLORIDE 0.9 % (FLUSH) 0.9 %
5-40 SYRINGE (ML) INJECTION PRN
Status: DISCONTINUED | OUTPATIENT
Start: 2024-07-08 | End: 2024-07-12 | Stop reason: HOSPADM

## 2024-07-08 RX ORDER — ALBUTEROL SULFATE 90 UG/1
2 AEROSOL, METERED RESPIRATORY (INHALATION) EVERY 6 HOURS PRN
Status: DISCONTINUED | OUTPATIENT
Start: 2024-07-08 | End: 2024-07-12 | Stop reason: HOSPADM

## 2024-07-08 RX ORDER — POTASSIUM CHLORIDE 29.8 MG/ML
20 INJECTION INTRAVENOUS PRN
Status: DISCONTINUED | OUTPATIENT
Start: 2024-07-08 | End: 2024-07-12 | Stop reason: HOSPADM

## 2024-07-08 RX ORDER — IPRATROPIUM BROMIDE AND ALBUTEROL SULFATE 2.5; .5 MG/3ML; MG/3ML
1 SOLUTION RESPIRATORY (INHALATION) EVERY 4 HOURS PRN
Status: DISCONTINUED | OUTPATIENT
Start: 2024-07-08 | End: 2024-07-08

## 2024-07-08 RX ORDER — HYDRALAZINE HYDROCHLORIDE 25 MG/1
25 TABLET, FILM COATED ORAL ONCE
Status: COMPLETED | OUTPATIENT
Start: 2024-07-08 | End: 2024-07-08

## 2024-07-08 RX ORDER — PRAVASTATIN SODIUM 40 MG
40 TABLET ORAL EVERY EVENING
Status: DISCONTINUED | OUTPATIENT
Start: 2024-07-08 | End: 2024-07-12 | Stop reason: HOSPADM

## 2024-07-08 RX ORDER — ACETAMINOPHEN 325 MG/1
650 TABLET ORAL EVERY 6 HOURS PRN
Status: DISCONTINUED | OUTPATIENT
Start: 2024-07-08 | End: 2024-07-12 | Stop reason: HOSPADM

## 2024-07-08 RX ADMIN — SODIUM CHLORIDE, PRESERVATIVE FREE 10 ML: 5 INJECTION INTRAVENOUS at 21:43

## 2024-07-08 RX ADMIN — WARFARIN SODIUM 3 MG: 1 TABLET ORAL at 21:39

## 2024-07-08 RX ADMIN — PRAVASTATIN SODIUM 40 MG: 40 TABLET ORAL at 19:13

## 2024-07-08 RX ADMIN — PIPERACILLIN AND TAZOBACTAM 3375 MG: 3; .375 INJECTION, POWDER, LYOPHILIZED, FOR SOLUTION INTRAVENOUS at 20:05

## 2024-07-08 RX ADMIN — PIPERACILLIN AND TAZOBACTAM 4500 MG: 4; .5 INJECTION, POWDER, LYOPHILIZED, FOR SOLUTION INTRAVENOUS at 12:05

## 2024-07-08 RX ADMIN — SODIUM CHLORIDE, PRESERVATIVE FREE 10 ML: 5 INJECTION INTRAVENOUS at 20:04

## 2024-07-08 RX ADMIN — HYDRALAZINE HYDROCHLORIDE 50 MG: 50 TABLET ORAL at 23:55

## 2024-07-08 RX ADMIN — SODIUM CHLORIDE 250 ML: 9 INJECTION, SOLUTION INTRAVENOUS at 18:01

## 2024-07-08 RX ADMIN — ACETAMINOPHEN 650 MG: 325 TABLET ORAL at 21:38

## 2024-07-08 RX ADMIN — MAGNESIUM OXIDE TAB 400 MG (241.3 MG ELEMENTAL MG) 400 MG: 400 (241.3 MG) TAB at 20:04

## 2024-07-08 RX ADMIN — HYDRALAZINE HYDROCHLORIDE 25 MG: 25 TABLET ORAL at 18:01

## 2024-07-08 ASSESSMENT — PAIN DESCRIPTION - ORIENTATION: ORIENTATION: MID;INNER

## 2024-07-08 ASSESSMENT — PAIN DESCRIPTION - DESCRIPTORS: DESCRIPTORS: ACHING

## 2024-07-08 ASSESSMENT — PAIN SCALES - GENERAL
PAINLEVEL_OUTOF10: 3
PAINLEVEL_OUTOF10: 0

## 2024-07-08 ASSESSMENT — PAIN - FUNCTIONAL ASSESSMENT: PAIN_FUNCTIONAL_ASSESSMENT: NONE - DENIES PAIN

## 2024-07-08 ASSESSMENT — PAIN DESCRIPTION - LOCATION: LOCATION: CHEST

## 2024-07-08 NOTE — ED TRIAGE NOTES
Patient has LVAD and has some drainage to the dressing going to the drive line. Denies fever and is on antibiotics

## 2024-07-08 NOTE — PROGRESS NOTES
1945  LVAD dressing changed per order, sterile procedure observed.  Drainage present.  Hard protrusion with erythema present superior to driveline.  See chart review -media for picture.    Sternal wound dressing changed, moderate serosanguinous drainage present.      1140  Pt awake and alert, Heart Failure team at bedside evaluating pt.      Backup bag present with pt LVAD equipment includes:  4 batteries  4 clips  Backup controller
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
## 2024-07-08 NOTE — PROGRESS NOTES
Pharmacist Note - Warfarin Dosing  Consult provided for this 64 y.o.male to manage warfarin for LVAD    INR Goal: 2 - 3    Home regimen/ tablet size: 2 mg PO QD    Drugs that may increase INR: None  Drugs that may decrease INR: None  Other current anticoagulants/ drugs that may increase bleeding risk: None  Risk factors: None  Daily INR ordered through: 9/29    Recent Labs     07/08/24  1048   HGB 10.4*     Date               INR                  Dose  7/5  1.6  2 mg (last anticoag clinic visit)  7/8  --  --                                                                               Assessment/ Plan:  Will order STAT INR and dose warfarin based on results.    Pharmacy will continue to monitor daily and adjust therapy as indicated.  Please contact the pharmacist at [phone] or [phone] for outpatient recommendations if needed.

## 2024-07-08 NOTE — PROGRESS NOTES
1608: Provider notified for 7 beats of Vtach. No new orders received.    1720: Provider notified for critical lactic 2.6    1745: F NP notified for  and hospitalist's order for 250 cc bolus. Order received to give 25 mg PO hydralazine x 1 in addition to bolus.    1855: This RN called [facility] pharmacy to verify meds - pharmacist verified prescriptions for hydralazine 50 mg Q8H, pravastatin 40 mg every evening, allopurinol 100 mg daily, pantoprazole 40 mg daily, keflex 500 mg BID, coumadin 1 mg daily, albuterol inhaler 2 puffs Q4H PRN, and trelegy 1 puff daily.

## 2024-07-08 NOTE — CONSULTS
ADVANCED HEART FAILURE CENTER  [facility] in [place], [place]  Inpatient Progress Note      Patient name: Sanford Joiner Sr.  Patient : 1960  Patient MRN: [id number]  Consulting MD: Romi Bernal MD  Date of service: 24    REASON FOR REFERRAL:  LVAD management    ASSESSMENT:  Sanford Joiner Sr. is a 64 y.o. male admitted with recurrent driveline abscess and infection  Chronic systolic heart failure due to non ischemic cardiomyopathy, s/p HM3 LVAD implantation (2023) as destination therapy, stage D, on optimal GDMT limited by hypovolemia  Not a transplant candidate due to advanced obstructive lung disease, was declined at [facility] for LVAD and transplant.     RECOMMENDATIONS:  Driveline/LVAD infection  Appreciate ID assistance  Abscess aspirated, sent for culture  Will have CV surgery re evaluate for surgical management of driveline infection  Limited surgical options given poor lung function  Repeat PFTs  On Zosyn    Left Ventricular Assist Device  Continue current device speed at 5200 rpm to prevent RV overdrive  Dressing changes daily    Medical Therapy for Heart Failure  Beta-blocker: Continue Toprol XL 50 mg daily  ACEi/ARB/ARNI: Unable to tolerate due to hypovolemia  Hydralazine/Nitrate: Continue Hydralazine 50 mg TID  MRA: Unable to tolerate due to hypovolemia  SGLT2i: Unable to tolerate due to hypovolemia    Volume Management  Bumex PRN  Daily standing weight    Anticoagulation and Antiplatelet Therapy  Anticoagulation with Warfarin, target INR 1.8-2.2  Pharmacy to dose Warfarin  History of GI Bleed, not on aspirin    Antiarrhythmic and Device Therapy  ICD interrogation per routine    Laboratory and Imaging Studies  Labs: CBC, CMP, LDH, PT/INR daily    Plans at or after hospital discharge  Lifevest  Schedule sleep study  Follow-up in [facility] within 7 days from discharge      HISTORY OF PRESENT ILLNESS:  I had the pleasure of seeing Sanford Joiner Sr. at the request  pharmacy, , Other, RX Placeholder, Romi Bernal MD    PATIENT CARE TEAM:  Patient Care Team:  Ranjan Porter MD as PCP - General  Ranjan Porter MD as PCP - Empaneled Provider     Thank you for allowing me to participate in this patient's care.    Tracie Gillespie MD, [facility]  Advanced Heart Failure and Pulmonary Hypertension  Advanced Heart Failure Center  [facility]  [address], [address]  Phone: [phone]

## 2024-07-08 NOTE — ED NOTES
Emergency Department LVAD Documentation   The ED nurse is responsible for assessment & documentation of the LVAD when assuming care & every 4 hours. Required documentation includes: Vital signs, LVAD Hum, doppler blood pressure, device parameters (Flow, RPM, Power, Pulsatility Index), driveline and dressing assessment, and back up equipment at bedside. LVAD provider should be notified upon patient arrival to the emergency department.       Date: 7/8/24   Time of assessment: 1100    LVAD Hum auscultated/present over the left upper quadrant: Yes    Height: 1.727 m (5' 8\"), Weight - Scale: 75.9 kg (167 lb 5.3 oz)       Doppler Blood Pressure (MAP): 96    Goal MAP 70-90, unless otherwise noted. Notify provider if outside of defined limits.     LVAD dressing assessment:  drainage at site    LVAD driveline intact: Yes    Device Parameters  Flow: 3.5  RPM:5200  Power:3.7  Pulsatility Index (PI):4.9    Backup  at bedside: equipment bag    Contact CVICU/CVSU for LVAD tower       Notify the Ventricular Assist Device (VAD) coordinator for power change of greater than 2 from baseline or PI less than 3.  Pager number: [phone]
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
## 2024-07-08 NOTE — PROGRESS NOTES
Pharmacist Note - Vancomycin Dosing    Consult provided for this 64 y.o. male for indication of sepsis.     - Presents to ED with drainage from LVAD driveline.  Followed by ID outpatient, on Keflex for suppression.    Antibiotic regimen(s): vancomycin + Zosyn  Patient on vancomycin PTA? NO     Recent Labs     24  1048   WBC 12.6*   CREATININE 1.37*   BUN 18     Frequency of BMP: daily through   Height: 172.7 cm  Weight: 75.9 kg  Est CrCl: 50-55 ml/min; UO: not documented  Temp (24hrs), Av.9 °F (36.6 °C), Min:97.9 °F (36.6 °C), Max:97.9 °F (36.6 °C)    Cultures:   blood x 2 - NGTD   wound (chest) - in process    MRSA Swab ordered (if applicable)? N/A - suspect driveline source per MD note    The plan below is expected to result in a target range of AUC/MAHI 400-600    Therapy will be initiated with a loading dose of 2000 mg IV x 1 to be followed by a maintenance dose of 1250 mg IV every 24 hours.  Pharmacy to follow patient daily and order levels / make dose adjustments as appropriate.    *Vancomycin has been dosed used Bayesian kinetics software to target an AUC/MAHI of 400-600, which provides adequate exposure for an assumed infection due to MRSA with an MAHI of 1 or less while reducing the risk of nephrotoxicity as seen with traditional trough based dosing goals.

## 2024-07-08 NOTE — CONSULTS
Infectious Disease Consult    INFECTIOUS DISEASE Attending:     I agree with the above infectious disease daily progress note in its entirety as authored by and discussed in detail with the nurse practitioner.   I have reviewed pertinent laboratory studies, microbiology cultures, radiologic reports with review of the consultations and progress notes as appropriate.   I agree with today's subjective findings, physical examination, assessment and plan of care as described above and discussed extensively with the nurse practitioner.       Zosyn pending blood and abscess drainage cultures, expect MSSA, may have Enterobacter too.    CT surgery consultation for potential need for I/D.    Will follow with you.    Impression/Plan     64 y.o. male with past medical Hx of congestive heart failure, COPD, dyslipidemia, hypertension, LVAD who was admitted for sternal wound infection, LVAD driveline infection . Infectious disease services was consulted to assist with antibiotic management of recurrent sternal wound infection.    Sternal wound abscess/Infection in setting of LVAD  Leukocytosis  Hx MSSA driveline infection, sternal wound infection with E.cloacae and MSSA  Failed outpt oral suppressive tx with Keflex  Outpt cx with E.cloacae and MSSA (6/10)  S/p aspiration of abscess(7/8). Abscess cx pending  Blood Cx (7/8) pending    Recommend debridement of sternal wound  Stop vancomycin, continue Zosyn pending abscess cx  Follow intra-op cx  Follow blood cx    Hx LVAD  Implant 3/2023    Allergies  Ciprofloxacin  bactrim         Anti-infectives:   Zosyn    Subjective:   Date of Consultation:  July 8, 2024  Date of Admission: 7/8/2024   Referring Physician: Anabell Villa    Patient is a 64 y.o. male with past medical Hx of congestive heart failure, COPD, dyslipidemia, hypertension, LVAD who was admitted for sternal wound infection, LVAD driveline infection . Infectious disease services was consulted to assist with antibiotic  0  WBC    nRBC 0.00 0.00 - 0.01 K/uL    Neutrophils % 75 32 - 75 %    Lymphocytes % 10 (L) 12 - 49 %    Monocytes % 7 5 - 13 %    Eosinophils % 6 0 - 7 %    Basophils % 2 (H) 0 - 1 %    Immature Granulocytes % 0 0.0 - 0.5 %    Neutrophils Absolute 9.4 (H) 1.8 - 8.0 K/UL    Lymphocytes Absolute 1.3 0.8 - 3.5 K/UL    Monocytes Absolute 0.9 0.0 - 1.0 K/UL    Eosinophils Absolute 0.8 (H) 0.0 - 0.4 K/UL    Basophils Absolute 0.2 (H) 0.0 - 0.1 K/UL    Immature Granulocytes Absolute 0.1 (H) 0.00 - 0.04 K/UL    Differential Type AUTOMATED     Comprehensive Metabolic Panel    Collection Time: 07/08/24 10:48 AM   Result Value Ref Range    Sodium 139 136 - 145 mmol/L    Potassium 3.2 (L) 3.5 - 5.1 mmol/L    Chloride 105 97 - 108 mmol/L    CO2 27 21 - 32 mmol/L    Anion Gap 7 5 - 15 mmol/L    Glucose 104 (H) 65 - 100 mg/dL    BUN 18 6 - 20 MG/DL    Creatinine 1.37 (H) 0.70 - 1.30 MG/DL    BUN/Creatinine Ratio 13 12 - 20      Est, Glom Filt Rate 58 (L) >60 ml/min/1.73m2    Calcium 8.8 8.5 - 10.1 MG/DL    Total Bilirubin 0.3 0.2 - 1.0 MG/DL    ALT 14 12 - 78 U/L    AST 16 15 - 37 U/L    Alk Phosphatase 102 45 - 117 U/L    Total Protein 8.2 6.4 - 8.2 g/dL    Albumin 3.4 (L) 3.5 - 5.0 g/dL    Globulin 4.8 (H) 2.0 - 4.0 g/dL    Albumin/Globulin Ratio 0.7 (L) 1.1 - 2.2     Procalcitonin    Collection Time: 07/08/24 10:48 AM   Result Value Ref Range    Procalcitonin 0.05 ng/mL   Lactic Acid    Collection Time: 07/08/24 10:48 AM   Result Value Ref Range    Lactic Acid, Plasma 2.4 (HH) 0.4 - 2.0 MMOL/L   Extra Tubes Hold    Collection Time: 07/08/24 10:48 AM   Result Value Ref Range    Specimen HOld 1BLUE     Comment:        Add-on orders for these samples will be processed based on acceptable specimen integrity and analyte stability, which may vary by analyte.   Blood Culture 1    Collection Time: 07/08/24 10:51 AM    Specimen: Blood   Result Value Ref Range    Special Requests RIGHT  Antecubital        Culture NO GROWTH <24 HRS

## 2024-07-09 ENCOUNTER — TELEPHONE (OUTPATIENT)
Age: 64
End: 2024-07-09

## 2024-07-09 LAB
ALBUMIN SERPL-MCNC: 3.3 G/DL (ref 3.5–5)
ALBUMIN/GLOB SERPL: 0.8 (ref 1.1–2.2)
ALP SERPL-CCNC: 98 U/L (ref 45–117)
ALT SERPL-CCNC: 15 U/L (ref 12–78)
ANION GAP SERPL CALC-SCNC: 8 MMOL/L (ref 5–15)
AST SERPL-CCNC: 18 U/L (ref 15–37)
BILIRUB DIRECT SERPL-MCNC: <0.1 MG/DL (ref 0–0.2)
BILIRUB SERPL-MCNC: 0.2 MG/DL (ref 0.2–1)
BUN SERPL-MCNC: 18 MG/DL (ref 6–20)
BUN/CREAT SERPL: 11 (ref 12–20)
CALCIUM SERPL-MCNC: 8.4 MG/DL (ref 8.5–10.1)
CHLORIDE SERPL-SCNC: 108 MMOL/L (ref 97–108)
CO2 SERPL-SCNC: 24 MMOL/L (ref 21–32)
CREAT SERPL-MCNC: 1.58 MG/DL (ref 0.7–1.3)
ERYTHROCYTE [DISTWIDTH] IN BLOOD BY AUTOMATED COUNT: 17.9 % (ref 11.5–14.5)
GLOBULIN SER CALC-MCNC: 4.1 G/DL (ref 2–4)
GLUCOSE SERPL-MCNC: 106 MG/DL (ref 65–100)
HCT VFR BLD AUTO: 32.4 % (ref 36.6–50.3)
HGB BLD-MCNC: 9.7 G/DL (ref 12.1–17)
INR PPP: 1.6 (ref 0.9–1.1)
LACTATE SERPL-SCNC: 2.7 MMOL/L (ref 0.4–2)
LACTATE SERPL-SCNC: 3.3 MMOL/L (ref 0.4–2)
LDH SERPL L TO P-CCNC: 238 U/L (ref 85–241)
MAGNESIUM SERPL-MCNC: 1.8 MG/DL (ref 1.6–2.4)
MCH RBC QN AUTO: 26 PG (ref 26–34)
MCHC RBC AUTO-ENTMCNC: 29.9 G/DL (ref 30–36.5)
MCV RBC AUTO: 86.9 FL (ref 80–99)
NRBC # BLD: 0 K/UL (ref 0–0.01)
NRBC BLD-RTO: 0 PER 100 WBC
PLATELET # BLD AUTO: 370 K/UL (ref 150–400)
PMV BLD AUTO: 11.5 FL (ref 8.9–12.9)
POTASSIUM SERPL-SCNC: 3.9 MMOL/L (ref 3.5–5.1)
PROT SERPL-MCNC: 7.4 G/DL (ref 6.4–8.2)
PROTHROMBIN TIME: 16.7 SEC (ref 9–11.1)
RBC # BLD AUTO: 3.73 M/UL (ref 4.1–5.7)
SODIUM SERPL-SCNC: 140 MMOL/L (ref 136–145)
WBC # BLD AUTO: 11.8 K/UL (ref 4.1–11.1)

## 2024-07-09 PROCEDURE — 80048 BASIC METABOLIC PNL TOTAL CA: CPT

## 2024-07-09 PROCEDURE — 36415 COLL VENOUS BLD VENIPUNCTURE: CPT

## 2024-07-09 PROCEDURE — 97535 SELF CARE MNGMENT TRAINING: CPT

## 2024-07-09 PROCEDURE — 6370000000 HC RX 637 (ALT 250 FOR IP): Performed by: INTERNAL MEDICINE

## 2024-07-09 PROCEDURE — 6370000000 HC RX 637 (ALT 250 FOR IP): Performed by: FAMILY MEDICINE

## 2024-07-09 PROCEDURE — APPSS45 APP SPLIT SHARED TIME 31-45 MINUTES: Performed by: NURSE PRACTITIONER

## 2024-07-09 PROCEDURE — 97161 PT EVAL LOW COMPLEX 20 MIN: CPT

## 2024-07-09 PROCEDURE — 6360000002 HC RX W HCPCS: Performed by: INTERNAL MEDICINE

## 2024-07-09 PROCEDURE — 6370000000 HC RX 637 (ALT 250 FOR IP): Performed by: NURSE PRACTITIONER

## 2024-07-09 PROCEDURE — 6360000002 HC RX W HCPCS

## 2024-07-09 PROCEDURE — 85027 COMPLETE CBC AUTOMATED: CPT

## 2024-07-09 PROCEDURE — 2580000003 HC RX 258

## 2024-07-09 PROCEDURE — 99233 SBSQ HOSP IP/OBS HIGH 50: CPT | Performed by: INTERNAL MEDICINE

## 2024-07-09 PROCEDURE — 2060000000 HC ICU INTERMEDIATE R&B

## 2024-07-09 PROCEDURE — P9045 ALBUMIN (HUMAN), 5%, 250 ML: HCPCS | Performed by: NURSE PRACTITIONER

## 2024-07-09 PROCEDURE — 83605 ASSAY OF LACTIC ACID: CPT

## 2024-07-09 PROCEDURE — 83735 ASSAY OF MAGNESIUM: CPT

## 2024-07-09 PROCEDURE — 2580000003 HC RX 258: Performed by: INTERNAL MEDICINE

## 2024-07-09 PROCEDURE — 6360000002 HC RX W HCPCS: Performed by: NURSE PRACTITIONER

## 2024-07-09 PROCEDURE — 85610 PROTHROMBIN TIME: CPT

## 2024-07-09 PROCEDURE — 97116 GAIT TRAINING THERAPY: CPT

## 2024-07-09 PROCEDURE — 80076 HEPATIC FUNCTION PANEL: CPT

## 2024-07-09 PROCEDURE — 83615 LACTATE (LD) (LDH) ENZYME: CPT

## 2024-07-09 PROCEDURE — 93750 INTERROGATION VAD IN PERSON: CPT | Performed by: INTERNAL MEDICINE

## 2024-07-09 PROCEDURE — 97165 OT EVAL LOW COMPLEX 30 MIN: CPT

## 2024-07-09 RX ORDER — WARFARIN SODIUM 2 MG/1
2 TABLET ORAL
Status: COMPLETED | OUTPATIENT
Start: 2024-07-09 | End: 2024-07-09

## 2024-07-09 RX ORDER — ALBUMIN, HUMAN INJ 5% 5 %
12.5 SOLUTION INTRAVENOUS ONCE
Status: COMPLETED | OUTPATIENT
Start: 2024-07-09 | End: 2024-07-09

## 2024-07-09 RX ADMIN — SODIUM CHLORIDE, PRESERVATIVE FREE 10 ML: 5 INJECTION INTRAVENOUS at 20:26

## 2024-07-09 RX ADMIN — SODIUM CHLORIDE, PRESERVATIVE FREE 10 ML: 5 INJECTION INTRAVENOUS at 08:17

## 2024-07-09 RX ADMIN — ALBUMIN (HUMAN) 12.5 G: 12.5 INJECTION, SOLUTION INTRAVENOUS at 02:23

## 2024-07-09 RX ADMIN — PIPERACILLIN AND TAZOBACTAM 3375 MG: 3; .375 INJECTION, POWDER, LYOPHILIZED, FOR SOLUTION INTRAVENOUS at 20:21

## 2024-07-09 RX ADMIN — METOPROLOL SUCCINATE 50 MG: 50 TABLET, EXTENDED RELEASE ORAL at 08:16

## 2024-07-09 RX ADMIN — WARFARIN SODIUM 2 MG: 2 TABLET ORAL at 17:49

## 2024-07-09 RX ADMIN — MAGNESIUM OXIDE TAB 400 MG (241.3 MG ELEMENTAL MG) 400 MG: 400 (241.3 MG) TAB at 20:25

## 2024-07-09 RX ADMIN — PIPERACILLIN AND TAZOBACTAM 3375 MG: 3; .375 INJECTION, POWDER, LYOPHILIZED, FOR SOLUTION INTRAVENOUS at 11:49

## 2024-07-09 RX ADMIN — VANCOMYCIN HYDROCHLORIDE 1750 MG: 10 INJECTION, POWDER, LYOPHILIZED, FOR SOLUTION INTRAVENOUS at 13:12

## 2024-07-09 RX ADMIN — PANTOPRAZOLE SODIUM 40 MG: 40 TABLET, DELAYED RELEASE ORAL at 05:02

## 2024-07-09 RX ADMIN — SODIUM CHLORIDE: 9 INJECTION, SOLUTION INTRAVENOUS at 11:48

## 2024-07-09 RX ADMIN — ACETAMINOPHEN 650 MG: 325 TABLET ORAL at 11:51

## 2024-07-09 RX ADMIN — PRAVASTATIN SODIUM 40 MG: 40 TABLET ORAL at 17:49

## 2024-07-09 RX ADMIN — HYDRALAZINE HYDROCHLORIDE 50 MG: 50 TABLET ORAL at 05:02

## 2024-07-09 RX ADMIN — HYDRALAZINE HYDROCHLORIDE 50 MG: 50 TABLET ORAL at 15:24

## 2024-07-09 RX ADMIN — MAGNESIUM OXIDE TAB 400 MG (241.3 MG ELEMENTAL MG) 400 MG: 400 (241.3 MG) TAB at 08:16

## 2024-07-09 RX ADMIN — PIPERACILLIN AND TAZOBACTAM 3375 MG: 3; .375 INJECTION, POWDER, LYOPHILIZED, FOR SOLUTION INTRAVENOUS at 03:45

## 2024-07-09 RX ADMIN — Medication 2000 UNITS: at 08:16

## 2024-07-09 RX ADMIN — ALLOPURINOL 50 MG: 100 TABLET ORAL at 08:16

## 2024-07-09 RX ADMIN — SODIUM CHLORIDE, PRESERVATIVE FREE 10 ML: 5 INJECTION INTRAVENOUS at 08:19

## 2024-07-09 ASSESSMENT — PAIN DESCRIPTION - ORIENTATION: ORIENTATION: RIGHT

## 2024-07-09 ASSESSMENT — PAIN SCALES - GENERAL: PAINLEVEL_OUTOF10: 3

## 2024-07-09 ASSESSMENT — PAIN DESCRIPTION - LOCATION: LOCATION: ARM

## 2024-07-09 NOTE — PROGRESS NOTES
Worried About Running Out of Food in the Last Year: Never true     Ran Out of Food in the Last Year: Never true   Transportation Needs: No Transportation Needs (7/8/2024)    PRAPARE - Transportation     Lack of Transportation (Medical): No     Lack of Transportation (Non-Medical): No    Social Connections ([facility] HRSN)   Housing Stability: Low Risk  (7/8/2024)    Housing Stability Vital Sign     Unable to Pay for Housing in the Last Year: No     Number of Places Lived in the Last Year: 1     Unstable Housing in the Last Year: No       LABORATORY RESULTS:   Failed to redirect to the Timeline version of the [facility] SmartLink.  Lab Results   Component Value Date/Time    TSH 4.560 05/30/2024 12:00 AM    TSH 0.37 05/15/2021 02:37 AM       ALLERGY:  Allergies   Allergen Reactions    Ciprofloxacin      Other reaction(s): Unknown (comments)    Bactrim [Sulfamethoxazole-Trimethoprim] Other (See Comments)     DAWOOD        CURRENT MEDICATIONS:    Current Facility-Administered Medications:     warfarin (COUMADIN) tablet 2 mg, 2 mg, Oral, Once, Antony Kam MD    Vancomycin - Pharmacy dosing, , Other, RX Placeholder, Donna Pickett APRN - NP    vancomycin (VANCOCIN) 1750 mg in sodium chloride 0.9 % 500 mL IVPB, 1,750 mg, IntraVENous, Once, Donna Pickett APRN - NP    [START ON 7/10/2024] vancomycin (VANCOCIN) 1,250 mg in sodium chloride 0.9 % 250 mL IVPB ([id number]), 1,250 mg, IntraVENous, Q24H, PickettDonna walker APRN - NP    sodium chloride flush 0.9 % injection 5-40 mL, 5-40 mL, IntraVENous, 2 times per day, Anabell Villa APRN - CNP, 10 mL at 07/09/24 0819    sodium chloride flush 0.9 % injection 5-40 mL, 5-40 mL, IntraVENous, PRN, Anabell Villa APRN - CNP    0.9 % sodium chloride infusion, , IntraVENous, PRN, Anabell Villa APRN - CNP, Last Rate: 25 mL/hr at 07/09/24 1148, New Bag at 07/09/24 1148    ondansetron (ZOFRAN) injection 4 mg, 4 mg, IntraVENous, Q6H PRN, Anabell Villa, APRN - CNP    hydrALAZINE  (APRESOLINE) injection 10 mg, 10 mg, IntraVENous, Q4H PRN, Anabell Villa APRN - CNP    potassium chloride 20 mEq/50 mL IVPB (Central Line), 20 mEq, IntraVENous, PRN, Anabell Villa APRN - CNP    magnesium sulfate 2000 mg in 50 mL IVPB premix, 2,000 mg, IntraVENous, PRN, Anabell Villa APRN - CNP    albuterol sulfate HFA (PROVENTIL;VENTOLIN;PROAIR) 108 (90 Base) MCG/ACT inhaler 2 puff, 2 puff, Inhalation, Q6H PRN, Anabell Villa APRN - CNP    allopurinol (ZYLOPRIM) tablet 50 mg, 50 mg, Oral, Daily, Romi Bernal MD, 50 mg at 07/09/24 0816    pantoprazole (PROTONIX) tablet 40 mg, 40 mg, Oral, QAM AC, Romi Bernal MD, 40 mg at 07/09/24 0502    pravastatin (PRAVACHOL) tablet 40 mg, 40 mg, Oral, QPM, Romi Bernal MD, 40 mg at 07/08/24 1913    sodium chloride flush 0.9 % injection 5-40 mL, 5-40 mL, IntraVENous, 2 times per day, Romi Bernal MD, 10 mL at 07/09/24 0817    sodium chloride flush 0.9 % injection 5-40 mL, 5-40 mL, IntraVENous, PRN, Romi Bernal MD    0.9 % sodium chloride infusion, , IntraVENous, PRN, Romi Bernal MD    acetaminophen (TYLENOL) tablet 650 mg, 650 mg, Oral, Q6H PRN, 650 mg at 07/09/24 1151 **OR** acetaminophen (TYLENOL) suppository 650 mg, 650 mg, Rectal, Q6H PRN, Romi Bernal MD    piperacillin-tazobactam (ZOSYN) 3,375 mg in sodium chloride 0.9 % 50 mL IVPB (mini-bag), 3,375 mg, IntraVENous, q8h, Romi Bernal MD, Last Rate: 12.5 mL/hr at 07/09/24 1149, 3,375 mg at 07/09/24 1149    warfarin placeholder: dosing by pharmacy, , Other, RX Placeholder, Romi Bernal MD    hydrALAZINE (APRESOLINE) tablet 50 mg, 50 mg, Oral, 3 times per day, Geni Garibay APRN - NP, 50 mg at 07/09/24 0502    magnesium oxide (MAG-OX) tablet 400 mg, 400 mg, Oral, BID, Geni Garibay APRN - NP, 400 mg at 07/09/24 0816    metoprolol succinate (TOPROL XL) extended release tablet 50 mg, 50 mg, Oral, Daily, Geni Garibay APRN - NP, 50 mg at 07/09/24

## 2024-07-09 NOTE — PLAN OF CARE
Problem: Occupational Therapy - Adult  Goal: By Discharge: Performs self-care activities at highest level of function for planned discharge setting.  See evaluation for individualized goals.  Description: FUNCTIONAL STATUS PRIOR TO ADMISSION:     ,  ,  ,  ,  ,  ,  ,  ,  ,  ,       HOME SUPPORT: Patient lived with spouse and son but didn't require assistance.    Occupational Therapy Goals:  Initiated 7/9/2024  1.  Patient will perform LVAD switch over with independence without cueing within 7 day(s).  2.  Patient will perform grooming routine in standing without LOB or fatigue with [place] within 7 day(s).  3.  Patient will utilize energy conservation techniques during functional activities with verbal cues within 7 day(s).   Outcome: Progressing   OCCUPATIONAL THERAPY EVALUATION    Patient: Sanford Joiner . (64 y.o. 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 Plan pending at this time to address suspected infection per rounds may require sternal I+D. Patient largely independent-Mod I with mobility and ADLs though requires 1 VC for completing switch over wall power<>batteries. He completes mobility in room without difficulty though does have excessive wheezing with mobility in hallway, SPO2 stable ~95% on RA,  after activity.  Based on the impairments listed above patient likely just slightly below his baseline (reports this is d/t not having trilogy in hospital) for activity tolerance and endurance. Will continue to follow along pending medical plan, at this time anticipate no OT needs at discharge.    Functional Outcome Measure:  The patient scored 24 on the [facility] outcome measure which is indicative of  breakfast returned to wall power    COMMUNICATION/EDUCATION:   The patient's plan of care was discussed with: physical therapist and registered nurse    Patient Education  Education Given To: Patient  Education Provided: Role of Therapy;Plan of Care;Energy Conservation  Education Method: Verbal  Barriers to Learning: None  Education Outcome: Verbalized understanding;Demonstrated understanding    Thank you for this referral.  Savannah Martínez OT  Minutes: 18    Occupational Therapy Evaluation Charge Determination   History Examination Decision-Making   LOW Complexity : Brief history review  LOW Complexity: 1-3 Performance deficits relating to physical, cognitive, or psychosocial skills that result in activity limitations and/or participation restrictions LOW Complexity: No comorbidities that affect functional and  no verbal  or physical assist needed to complete eval tasks   Based on the above components, the patient evaluation is determined to be of the following complexity level: Low

## 2024-07-09 NOTE — PROGRESS NOTES
0200: Lactic 3.3, up from 1.2. Pt afebrile, HR 70's. ROSSANA Lucero notified. 250 mL 5% albumin ordered, see MAR. Repeat lactic order placed for 0500.     0610: Repeat lactic resulted-- 2.7. ROSSANA Lucero notified. Continue plan of care.

## 2024-07-09 NOTE — PROGRESS NOTES
Pharmacist Note - Vancomycin Dosing    Consult provided for this 64 y.o. male for indication of LVAD driveline abscess.  -failed outpt oral suppressive tx with Keflex   Antibiotic regimen(s): vancomycin, Zosyn  Patient on vancomycin PTA? NO     Recent Labs     24  1048 24  0101   WBC 12.6* 11.8*   CREATININE 1.37* 1.58*   BUN 18 18     Frequency of BMP: qd  Height: 172.7 cm  Weight: 73.8 kg  Est CrCl: 46 ml/min; UO: n/a ml/kg/hr  Temp (24hrs), Av °F (36.7 °C), Min:97.6 °F (36.4 °C), Max:98.4 °F (36.9 °C)    Cultures:   body fluid: pend   chest wound: probable CoNS   blood x2: pend    MRSA Swab ordered (if applicable)? N/A    The plan below is expected to result in a target range of AUC/MAHI 400-600    Therapy will be initiated with a loading dose of 1750 mg IV x 1 to be followed by a maintenance dose of 1250 mg IV every 24 hours.  Pharmacy to follow patient daily and order levels / make dose adjustments as appropriate.    *Vancomycin has been dosed used Bayesian kinetics software to target an AUC/MAHI of 400-600, which provides adequate exposure for an assumed infection due to MRSA with an MAHI of 1 or less while reducing the risk of nephrotoxicity as seen with traditional trough based dosing goals.

## 2024-07-09 NOTE — CARE COORDINATION
Care Management Initial Assessment       RUR: 18% - moderate  Readmission? No  1st IM letter given? Yes - 7/9/24  1st  letter given: No    Chart reviewed. Patient admitted for LVAD driveline infection, abscess, and sternal wound dehiscence. Cardiac surgery to evaluate for possible debridement. Patient is on home oxygen. Resides at address on file with wife and son. Owns RW and manual WC - uses as needed. Open with [facility] for  services.    CM will continue to follow.       07/09/24 1416   Service Assessment   Patient Orientation Alert and Oriented   Cognition Alert   History Provided By Patient   Primary Caregiver Self   Support Systems Spouse/Significant Other   Patient's Healthcare Decision Maker is: Legal Next of Kin   PCP Verified by CM Yes   Last Visit to PCP Within last two years   Prior Functional Level Independent in ADLs/IADLs   Current Functional Level Independent in ADLs/IADLs   Can patient return to prior living arrangement Yes   Ability to make needs known: Fair   Family able to assist with home care needs: Yes   Would you like for me to discuss the discharge plan with any other family members/significant others, and if so, who? No   Financial Resources Medicare   Community Resources None   Social/Functional History   Lives With Spouse;Son   Type of Home House   Home Equipment Walker - Rolling;Wheelchair - Manual;Oxygen   Receives Help From Family   ADL Assistance Independent   Homemaking Assistance Independent   Homemaking Responsibilities No   Ambulation Assistance Independent   Transfer Assistance Independent   Active  No   Patient's  Info wife   Occupation On disability   Discharge Planning   Type of Residence House   Living Arrangements Spouse/Significant Other;Children   Current Services Prior To Admission Oxygen Therapy   Potential Assistance Needed Home Care   DME Ordered? No   Potential Assistance Purchasing Medications No   Type of Home Care Services Nursing
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
## 2024-07-09 NOTE — PROGRESS NOTES
Pharmacist Note - Warfarin Dosing  Consult provided for this 64 y.o.male to manage warfarin for LVAD    INR Goal: Other 1.8-2.2    Home regimen/ tablet size: 2 mg PO QD  [INR check on 7/1 elevated at 2.6, decrease to 1 mg on 7/1&7/4 (2mg on all other days) and recheck INR on 7/5]    Drugs that may increase INR: Allopurinol (home med)  Drugs that may decrease INR: None  Other current anticoagulants/ drugs that may increase bleeding risk: None  Risk factors: None  Daily INR ordered through: 9/29    Recent Labs     07/08/24  1048 07/08/24  1602 07/09/24  0101   HGB 10.4*  --  9.7*   INR  --  1.7* 1.6*     Date               INR                  Dose  7/8  1.7  3 mg  7/9  1.6  2 mg                                                                                Assessment/ Plan:  Will order Warfarin 2 mg x1 today    Pharmacy will continue to monitor daily and adjust therapy as indicated.  Please contact the pharmacist at [phone] or [phone] for outpatient recommendations if needed.

## 2024-07-09 NOTE — PROGRESS NOTES
Infectious Diseases Follow Up    2024      Assessment & Plan:     64 y.o. male with past medical Hx of congestive heart failure, COPD, dyslipidemia, hypertension, LVAD who was admitted for sternal wound infection, LVAD driveline infection . Infectious disease services was consulted to assist with antibiotic management of recurrent sternal wound infection.     Sternal wound abscess/Infection in setting of LVAD  Leukocytosis  Hx MSSA driveline infection, sternal wound infection with E.cloacae and MSSA  Failed outpt oral suppressive tx with Keflex  Outpt cx with E.cloacae and MSSA (6/10)  S/p aspiration of abscess(). Abscess cx CoNS  Blood Cx () NGTD     Recommend debridement of sternal wound  Continue Zosyn, adding vancomycin  Follow abscess cx  Follow blood cx     Hx LVAD  Implant 3/2023     Allergies  Ciprofloxacin  bactrim          Subjective:     No complaints.     Objective:     Vitals: Pulse 73   Temp 97.8 °F (36.6 °C) (Oral)   Resp 19   Ht 1.727 m (5' 8\")   Wt 73.8 kg (162 lb 11.2 oz)   SpO2 96%   BMI 24.74 kg/m²      Tmax:  Temp (24hrs), Av °F (36.7 °C), Min:97.6 °F (36.4 °C), Max:98.4 °F (36.9 °C)      Exam:   Patient is intubated:  no    Exam:    General:  Alert, cooperative, NAD   Eyes:  Sclera anicteric.   Mouth/Throat: Deferred   Neck: Supple   Lungs:   Clear to auscultation bilaterally, good effort   CV:  LVAD hum   Abdomen:   Soft, non-tender. bowel sounds normal. LVAD exit site   Extremities: No cyanosis or edema   Skin: Skin color, texture, turgor normal. Sternal wound   Lymph nodes: deferred   Musculoskeletal: BENAVIDES   Lines/Devices:  LVAD   Psych: Alert and oriented, normal mood affect given the setting           Labs:        Invalid input(s): \"ITNL\"   No results for input(s): \"CPK\", \"CKMB\" in the last 72 hours.    Invalid input(s): \"TROIQ\"  Recent Labs     24  1048 24  0101    140   K 3.2* 3.9    108   CO2 27 24   BUN 18 18   MG  --  1.8   WBC 12.6* 11.8*  5-40 mL, 5-40 mL, IntraVENous, 2 times per day, Romi Bernal MD, 10 mL at 07/09/24 0817    sodium chloride flush 0.9 % injection 5-40 mL, 5-40 mL, IntraVENous, PRN, Romi Bernal MD    0.9 % sodium chloride infusion, , IntraVENous, PRN, Romi Bernal MD    acetaminophen (TYLENOL) tablet 650 mg, 650 mg, Oral, Q6H PRN, 650 mg at 07/09/24 1151 **OR** acetaminophen (TYLENOL) suppository 650 mg, 650 mg, Rectal, Q6H PRN, Romi Bernal MD    piperacillin-tazobactam (ZOSYN) 3,375 mg in sodium chloride 0.9 % 50 mL IVPB (mini-bag), 3,375 mg, IntraVENous, q8h, Romi Bernal MD, Last Rate: 12.5 mL/hr at 07/09/24 1149, 3,375 mg at 07/09/24 1149    warfarin placeholder: dosing by pharmacy, , Other, RX Placeholder, Romi Bernal MD    hydrALAZINE (APRESOLINE) tablet 50 mg, 50 mg, Oral, 3 times per day, Geni Garibay APRN - NP, 50 mg at 07/09/24 0502    magnesium oxide (MAG-OX) tablet 400 mg, 400 mg, Oral, BID, Geni Garibay APRN - ROSSANA, 400 mg at 07/09/24 0816    metoprolol succinate (TOPROL XL) extended release tablet 50 mg, 50 mg, Oral, Daily, Geni Garibay APRN - ROSSANA, 50 mg at 07/09/24 0816    Vitamin D (CHOLECALCIFEROL) tablet 2,000 Units, 2,000 Units, Oral, Daily, Geni Garibay APRN - NP, 2,000 Units at 07/09/24 0816      Antibiotics:    Zosyn  vancomycin      Case discussed with:    Patient, Dr. Satinder Pickett, TAIWO Multani NP

## 2024-07-09 NOTE — PROGRESS NOTES
Pharmacist Note - Warfarin Dosing  Consult provided for this 64 y.o.male to manage warfarin for LVAD    INR Goal: Other 1.8-2.2    Home regimen/ tablet size: 2 mg PO QD    Drugs that may increase INR: None  Drugs that may decrease INR: None  Other current anticoagulants/ drugs that may increase bleeding risk: None  Risk factors: None  Daily INR ordered through: 9/29    Recent Labs     07/08/24  1048 07/08/24  1602   HGB 10.4*  --    INR  --  1.7*     Date               INR                  Dose  7/5  1.6  2 mg (last anticoag clinic visit)  7/8  1.7  3 mg                                                                               Assessment/ Plan:  Will order Warfarin 3 mg x once for 7/8.    Pharmacy will continue to monitor daily and adjust therapy as indicated.  Please contact the pharmacist at [phone] or [phone] for outpatient recommendations if needed.

## 2024-07-09 NOTE — PROGRESS NOTES
Referral source:   Sanford Joiner Sr. at [facility] in [facility] 4 CV SERVICES UNIT.  attended rounds on the CV Services Unit as part of the Interdisciplinary team where the patient's ongoing care was discussed. I reviewed the medical record as part of this encounter.     Outcome: Interdisciplinary team are aware of  availability and were encouraged to request Spiritual Health support as needed.      The  on-call can be reached at [phone]).     Rev. Mariana Heck MDiv, [facility]  Staff

## 2024-07-09 NOTE — HOME HEALTH
Pt transferred to [facility] ED on 7/8/24 for evaluation of enlarging sternal abscess and increased redness and firmness around LVAD driveline site, and increased WBC.

## 2024-07-09 NOTE — PROGRESS NOTES
Hospitalist Progress Note  Antony Kam MD  Answering service: [phone]        Date of Service:  2024  NAME:  Sanford Joiner Sr.  :  1960  MRN:  [id number]      Admission Summary:     Patient admitted with chest wall infection from LVAD site.    Interval history / Subjective:     Patient denies any chest pain.     Assessment & Plan:     Sepsis  -Patient presented with tachycardia and leukocytosis  -Source of sepsis is cellulitis and abscess of the chest wall around LVAD  -Sepsis reassessment completed, IV fluids per sepsis protocol, manage underlying etiologies, monitor hemodynamics    Chest wall infection around LVAD site  -Patient presented with drainage from LVAD driveline  -CT of the chest shows findings suggestive of cellulitis involving the driveline as well as possible abscess  -Continue Zosyn  -Cardiovascular surgery to further evaluate, ID following    Chronic systolic heart failure  -Patient with known history of nonischemic cardiomyopathy, s/p LVAD implantation 2023, s/p AICD placement  -GDMT's include Toprol, hydralazine, unable to tolerate ACE/ARB/Arni, MRA and SGLT2 due to hypovolemia  -On anticoagulation with Coumadin for LVAD  -Advanced heart failure team following    Hypertension  -Continue Toprol and hydralazine    Dyslipidemia  -Continue statin    COPD  -Stable, breathing treatment as needed    Anemia of chronic disease  -Monitor H&H    Thrombocytopenia  -Likely due to acute bone marrow depression from sepsis  -Monitor CBC     Code status: Full  Prophylaxis: Coumadin  Care Plan discussed with: Patient  Anticipated Disposition: Likely more than 48 hours  Central Line:  None             Review of Systems:   Pertinent items are noted in HPI.         Vital Signs:    Last 24hrs VS reviewed since prior progress note. Most recent are:  Vitals:    24 0700   Pulse: 81   Resp: 19   Temp:  97.8 °F (36.6 °C)   SpO2: 96%         Intake/Output Summary (Last 24 hours) at 7/9/2024 0833  Last data filed at 7/9/2024 0711  Gross per 24 hour   Intake 1457.96 ml   Output 800 ml   Net 657.96 ml        Physical Examination:     I had a face to face encounter with this patient and independently examined them on 7/9/2024 as outlined below:          General : alert x 3, awake, no acute distress,   HEENT: PEERL, EOMI, moist mucus membrane, TM clear  Neck: supple, no JVD, no meningeal signs  Chest: Clear to auscultation bilaterally   CVS: S1 S2 heard, Capillary refill less than 2 seconds  Abd: soft/ non tender, non distended, BS physiological,   Ext: no clubbing, no cyanosis, no edema, brisk 2+ DP pulses  Neuro/Psych: pleasant mood and affect, CN 2-12 grossly intact, sensory grossly within normal limit, Strength 5/5 in all extremities, DTR 1+ x 4  Skin: warm            Data Review:    Review and/or order of clinical lab test    I have independently reviewed and interpreted patient's lab and all other diagnostic data    Notes reviewed from all clinical/nonclinical/nursing services involved in patient's clinical care. Care coordination discussions were held with appropriate clinical/nonclinical/ nursing providers based on care coordination needs.     Labs:     Recent Labs     07/08/24  1048 07/09/24  0101   WBC 12.6* 11.8*   HGB 10.4* 9.7*   HCT 34.9* 32.4*   * 370     Recent Labs     07/08/24  1048 07/09/24  0101    140   K 3.2* 3.9    108   CO2 27 24   BUN 18 18   MG  --  1.8     Recent Labs     07/08/24  1048 07/09/24  0101   ALT 14 15   GLOB 4.8* 4.1*     Recent Labs     07/08/24  1602 07/09/24  0101   INR 1.7* 1.6*      Recent Labs     07/08/24  1602   TIBC 234*      No results found for: \"RBCF\"   No results for input(s): \"PH\", \"PCO2\", \"PO2\" in the last 72 hours.  No results for input(s): \"CPK\" in the last 72 hours.    Invalid input(s): \"CPKMB\", \"CKNDX\", \"TROIQ\"  Lab Results   Component Value

## 2024-07-09 NOTE — PLAN OF CARE
Problem: Physical Therapy - Adult  Goal: By Discharge: Performs mobility at highest level of function for planned discharge setting.  See evaluation for individualized goals.  Description: FUNCTIONAL STATUS PRIOR TO ADMISSION: Patient was independent and active without use of DME. 4 hospitalizations this year for HF exacerbations. Hx COPD, has home O2 that he reports primarily not using, but does use occasionally short-term after hospitalizations. Denies falls. Hx LVAD 5/2023.     HOME SUPPORT PRIOR TO ADMISSION: The patient lived with wife but did not require assistance.    Physical Therapy Goals  Initiated 7/9/2024  1.  Patient will ambulate with independence for 350 feet with the least restrictive device within 7 day(s).   2.  Patient will verbalize and demonstrate appropriate PLB techniques with ambulation, will self-initiate standing rest breaks with onset of MOORE appropriately.     Outcome: Progressing  PHYSICAL THERAPY EVALUATION    Patient: Sanford Joiner  (64 y.o. 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 Patient has hx COPD, on home trilogy and reports intermittent supplemental O2 use. Patient completed LVAD power transition from wall power <> batteries pre and post activity with supervision. Overall patient mobilizing at SBA - independent level for bed mobility, transfers, and ambulation around unit. Noted toe walking, increased trunk sway, and accelerated gait however no overt instability or LOB noted. Patient demonstrated MOORE soon after initiation of ambulation, which worsened as patient returned to room and noted    MEDIUM  Complexity : 1-2 comorbidities / personal factors will impact the outcome/ POC  LOW Complexity : 1-2 Standardized tests and measures addressing body structure, function, activity limitation and / or participation in recreation  LOW Complexity : Stable, uncomplicated  AM-PAC  LOW    Based on the above components, the patient evaluation is determined to be of the following complexity level: Low

## 2024-07-09 NOTE — CONSULTS
Infectious Disease Services Note    Not a new consult, see consult note from 7/8/24        Donna HEATHNP

## 2024-07-09 NOTE — TELEPHONE ENCOUNTER
1422: Call placed to Brenda with [facility], left voicemail requesting status update on patient's nucala injections.

## 2024-07-10 PROBLEM — D72.829 LEUKOCYTOSIS: Status: ACTIVE | Noted: 2024-07-10

## 2024-07-10 LAB
ALBUMIN SERPL-MCNC: 3.2 G/DL (ref 3.5–5)
ALBUMIN/GLOB SERPL: 0.9 (ref 1.1–2.2)
ALP SERPL-CCNC: 80 U/L (ref 45–117)
ALT SERPL-CCNC: 12 U/L (ref 12–78)
ANION GAP SERPL CALC-SCNC: 4 MMOL/L (ref 5–15)
ANION GAP SERPL CALC-SCNC: 4 MMOL/L (ref 5–15)
AST SERPL-CCNC: 20 U/L (ref 15–37)
BACTERIA SPEC CULT: NORMAL
BASOPHILS # BLD: 0.2 K/UL (ref 0–0.1)
BASOPHILS NFR BLD: 2 % (ref 0–1)
BILIRUB DIRECT SERPL-MCNC: <0.1 MG/DL (ref 0–0.2)
BILIRUB SERPL-MCNC: 0.2 MG/DL (ref 0.2–1)
BUN SERPL-MCNC: 13 MG/DL (ref 6–20)
BUN SERPL-MCNC: 14 MG/DL (ref 6–20)
BUN/CREAT SERPL: 10 (ref 12–20)
BUN/CREAT SERPL: 11 (ref 12–20)
CALCIUM SERPL-MCNC: 8.3 MG/DL (ref 8.5–10.1)
CALCIUM SERPL-MCNC: 8.6 MG/DL (ref 8.5–10.1)
CHLORIDE SERPL-SCNC: 107 MMOL/L (ref 97–108)
CHLORIDE SERPL-SCNC: 108 MMOL/L (ref 97–108)
CO2 SERPL-SCNC: 26 MMOL/L (ref 21–32)
CO2 SERPL-SCNC: 27 MMOL/L (ref 21–32)
CREAT SERPL-MCNC: 1.3 MG/DL (ref 0.7–1.3)
CREAT SERPL-MCNC: 1.33 MG/DL (ref 0.7–1.3)
DIFFERENTIAL METHOD BLD: ABNORMAL
EOSINOPHIL # BLD: 0.9 K/UL (ref 0–0.4)
EOSINOPHIL NFR BLD: 8 % (ref 0–7)
ERYTHROCYTE [DISTWIDTH] IN BLOOD BY AUTOMATED COUNT: 18 % (ref 11.5–14.5)
GLOBULIN SER CALC-MCNC: 3.5 G/DL (ref 2–4)
GLUCOSE SERPL-MCNC: 94 MG/DL (ref 65–100)
GLUCOSE SERPL-MCNC: 96 MG/DL (ref 65–100)
GRAM STN SPEC: NORMAL
GRAM STN SPEC: NORMAL
HCT VFR BLD AUTO: 29.4 % (ref 36.6–50.3)
HGB BLD-MCNC: 9.1 G/DL (ref 12.1–17)
IMM GRANULOCYTES # BLD AUTO: 0.1 K/UL (ref 0–0.04)
IMM GRANULOCYTES NFR BLD AUTO: 1 % (ref 0–0.5)
INR PPP: 1.8 (ref 0.9–1.1)
LDH SERPL L TO P-CCNC: 235 U/L (ref 85–241)
LYMPHOCYTES # BLD: 1.5 K/UL (ref 0.8–3.5)
LYMPHOCYTES NFR BLD: 14 % (ref 12–49)
MAGNESIUM SERPL-MCNC: 1.7 MG/DL (ref 1.6–2.4)
MCH RBC QN AUTO: 26.1 PG (ref 26–34)
MCHC RBC AUTO-ENTMCNC: 31 G/DL (ref 30–36.5)
MCV RBC AUTO: 84.5 FL (ref 80–99)
MONOCYTES # BLD: 1 K/UL (ref 0–1)
MONOCYTES NFR BLD: 9 % (ref 5–13)
NEUTS SEG # BLD: 7.1 K/UL (ref 1.8–8)
NEUTS SEG NFR BLD: 66 % (ref 32–75)
NRBC # BLD: 0 K/UL (ref 0–0.01)
NRBC BLD-RTO: 0 PER 100 WBC
PLATELET # BLD AUTO: 365 K/UL (ref 150–400)
PMV BLD AUTO: 11.5 FL (ref 8.9–12.9)
POTASSIUM SERPL-SCNC: 3.6 MMOL/L (ref 3.5–5.1)
POTASSIUM SERPL-SCNC: 3.8 MMOL/L (ref 3.5–5.1)
PROT SERPL-MCNC: 6.7 G/DL (ref 6.4–8.2)
PROTHROMBIN TIME: 18.5 SEC (ref 9–11.1)
RBC # BLD AUTO: 3.48 M/UL (ref 4.1–5.7)
SERVICE CMNT-IMP: NORMAL
SODIUM SERPL-SCNC: 138 MMOL/L (ref 136–145)
SODIUM SERPL-SCNC: 138 MMOL/L (ref 136–145)
WBC # BLD AUTO: 10.7 K/UL (ref 4.1–11.1)

## 2024-07-10 PROCEDURE — 6370000000 HC RX 637 (ALT 250 FOR IP): Performed by: FAMILY MEDICINE

## 2024-07-10 PROCEDURE — 94010 BREATHING CAPACITY TEST: CPT

## 2024-07-10 PROCEDURE — 83615 LACTATE (LD) (LDH) ENZYME: CPT

## 2024-07-10 PROCEDURE — 94729 DIFFUSING CAPACITY: CPT

## 2024-07-10 PROCEDURE — 99232 SBSQ HOSP IP/OBS MODERATE 35: CPT | Performed by: INTERNAL MEDICINE

## 2024-07-10 PROCEDURE — 93750 INTERROGATION VAD IN PERSON: CPT | Performed by: INTERNAL MEDICINE

## 2024-07-10 PROCEDURE — 6360000002 HC RX W HCPCS

## 2024-07-10 PROCEDURE — 80076 HEPATIC FUNCTION PANEL: CPT

## 2024-07-10 PROCEDURE — 85025 COMPLETE CBC W/AUTO DIFF WBC: CPT

## 2024-07-10 PROCEDURE — APPSS30 APP SPLIT SHARED TIME 16-30 MINUTES: Performed by: NURSE PRACTITIONER

## 2024-07-10 PROCEDURE — 85610 PROTHROMBIN TIME: CPT

## 2024-07-10 PROCEDURE — 2580000003 HC RX 258

## 2024-07-10 PROCEDURE — 83735 ASSAY OF MAGNESIUM: CPT

## 2024-07-10 PROCEDURE — 2060000000 HC ICU INTERMEDIATE R&B

## 2024-07-10 PROCEDURE — 6370000000 HC RX 637 (ALT 250 FOR IP): Performed by: INTERNAL MEDICINE

## 2024-07-10 PROCEDURE — 94726 PLETHYSMOGRAPHY LUNG VOLUMES: CPT

## 2024-07-10 PROCEDURE — 6370000000 HC RX 637 (ALT 250 FOR IP): Performed by: NURSE PRACTITIONER

## 2024-07-10 PROCEDURE — 80048 BASIC METABOLIC PNL TOTAL CA: CPT

## 2024-07-10 PROCEDURE — 36415 COLL VENOUS BLD VENIPUNCTURE: CPT

## 2024-07-10 PROCEDURE — 94060 EVALUATION OF WHEEZING: CPT

## 2024-07-10 PROCEDURE — 6360000002 HC RX W HCPCS: Performed by: INTERNAL MEDICINE

## 2024-07-10 PROCEDURE — 2580000003 HC RX 258: Performed by: INTERNAL MEDICINE

## 2024-07-10 RX ORDER — WARFARIN SODIUM 2 MG/1
2 TABLET ORAL
Status: COMPLETED | OUTPATIENT
Start: 2024-07-10 | End: 2024-07-10

## 2024-07-10 RX ADMIN — SODIUM CHLORIDE, PRESERVATIVE FREE 10 ML: 5 INJECTION INTRAVENOUS at 22:01

## 2024-07-10 RX ADMIN — PIPERACILLIN AND TAZOBACTAM 3375 MG: 3; .375 INJECTION, POWDER, LYOPHILIZED, FOR SOLUTION INTRAVENOUS at 04:16

## 2024-07-10 RX ADMIN — HYDRALAZINE HYDROCHLORIDE 50 MG: 50 TABLET ORAL at 13:00

## 2024-07-10 RX ADMIN — ALLOPURINOL 50 MG: 100 TABLET ORAL at 08:37

## 2024-07-10 RX ADMIN — PIPERACILLIN AND TAZOBACTAM 3375 MG: 3; .375 INJECTION, POWDER, LYOPHILIZED, FOR SOLUTION INTRAVENOUS at 21:57

## 2024-07-10 RX ADMIN — PANTOPRAZOLE SODIUM 40 MG: 40 TABLET, DELAYED RELEASE ORAL at 07:00

## 2024-07-10 RX ADMIN — HYDRALAZINE HYDROCHLORIDE 50 MG: 50 TABLET ORAL at 21:59

## 2024-07-10 RX ADMIN — PIPERACILLIN AND TAZOBACTAM 3375 MG: 3; .375 INJECTION, POWDER, LYOPHILIZED, FOR SOLUTION INTRAVENOUS at 12:54

## 2024-07-10 RX ADMIN — SODIUM CHLORIDE 1250 MG: 9 INJECTION, SOLUTION INTRAVENOUS at 12:59

## 2024-07-10 RX ADMIN — MAGNESIUM OXIDE TAB 400 MG (241.3 MG ELEMENTAL MG) 400 MG: 400 (241.3 MG) TAB at 08:37

## 2024-07-10 RX ADMIN — HYDRALAZINE HYDROCHLORIDE 50 MG: 50 TABLET ORAL at 00:09

## 2024-07-10 RX ADMIN — Medication 2000 UNITS: at 08:38

## 2024-07-10 RX ADMIN — SODIUM CHLORIDE: 9 INJECTION, SOLUTION INTRAVENOUS at 12:53

## 2024-07-10 RX ADMIN — SODIUM CHLORIDE, PRESERVATIVE FREE 10 ML: 5 INJECTION INTRAVENOUS at 08:38

## 2024-07-10 RX ADMIN — HYDRALAZINE HYDROCHLORIDE 50 MG: 50 TABLET ORAL at 07:00

## 2024-07-10 RX ADMIN — SODIUM CHLORIDE: 9 INJECTION, SOLUTION INTRAVENOUS at 21:56

## 2024-07-10 RX ADMIN — PRAVASTATIN SODIUM 40 MG: 40 TABLET ORAL at 17:54

## 2024-07-10 RX ADMIN — MAGNESIUM OXIDE TAB 400 MG (241.3 MG ELEMENTAL MG) 400 MG: 400 (241.3 MG) TAB at 21:59

## 2024-07-10 RX ADMIN — WARFARIN SODIUM 2 MG: 2 TABLET ORAL at 17:54

## 2024-07-10 RX ADMIN — METOPROLOL SUCCINATE 50 MG: 50 TABLET, EXTENDED RELEASE ORAL at 08:37

## 2024-07-10 NOTE — PROGRESS NOTES
Infectious Diseases Follow Up    7/10/2024    INFECTIOUS DISEASE Attending:     I agree with the above infectious disease daily progress note in its entirety as authored by and discussed in detail with the nurse practitioner.   I have reviewed pertinent laboratory studies, microbiology cultures, radiologic reports with review of the consultations and progress notes as appropriate.   I agree with today's subjective findings, physical examination, assessment and plan of care as described above and discussed extensively with the nurse practitioner.       CT surgery evaluation - input appreciated.    Vanco/Zosyn to continue pending Staph aureus sensitivities.  Plan to de-escalate tomorrow if no other growth other than MSSA.    Assessment & Plan:     64 y.o. male with past medical Hx of congestive heart failure, COPD, dyslipidemia, hypertension, LVAD who was admitted for sternal wound infection, LVAD driveline infection . Infectious disease services was consulted to assist with antibiotic management of recurrent sternal wound infection.     Sternal wound abscess/Infection in setting of LVAD  Leukocytosis - resolved  Hx MSSA driveline infection, sternal wound infection with E.cloacae and MSSA  Failed outpt oral suppressive tx with Keflex  Outpt cx with E.cloacae and MSSA (6/10)  S/p aspiration of abscess(). Abscess cx probable MSSA ID and sensitivities pending, should be available tomorrow  Blood Cx () NGTD     Recommend debridement of sternal wound  Continue Zosyn and vancomycin. Will re-eval need for vancomycin tomorrow pending wound cx ID.  Follow abscess cx  Follow blood cx     Hx LVAD  Implant 3/2023     Allergies  Ciprofloxacin  bactrim          Subjective:     No complaints.     Objective:     Vitals: Pulse 68   Temp 97.9 °F (36.6 °C) (Oral)   Resp 21   Ht 1.727 m (5' 8\")   Wt 73.8 kg (162 lb 11.2 oz)   SpO2 94%   BMI 24.74 kg/m²      Tmax:  Temp (24hrs), Av °F (36.7 °C), Min:97.7 °F (36.5 °C),

## 2024-07-10 NOTE — PROGRESS NOTES
Pharmacist Note - Warfarin Dosing  Consult provided for this 64 y.o.male to manage warfarin for LVAD    INR Goal: Other 1.8-2.2    Home regimen/ tablet size: 2 mg PO QD  [INR check on 7/1 elevated at 2.6, decrease to 1 mg on 7/1&7/4 (2mg on all other days) and recheck INR on 7/5]    Drugs that may increase INR: Allopurinol (home med)  Drugs that may decrease INR: None  Other current anticoagulants/ drugs that may increase bleeding risk: None  Risk factors: None  Daily INR ordered through: 9/29    Recent Labs     07/08/24  1048 07/08/24  1602 07/09/24  0101 07/10/24  0439   HGB 10.4*  --  9.7* 9.1*   INR  --  1.7* 1.6* 1.8*     Date               INR                  Dose  7/8  1.7  3 mg  7/9  1.6  2 mg  7/10  1.8  2 mg                                                                                 Assessment/ Plan:  Will order Warfarin 2 mg x1 today    Pharmacy will continue to monitor daily and adjust therapy as indicated.  Please contact the pharmacist at [phone] or [phone] for outpatient recommendations if needed.

## 2024-07-10 NOTE — PROGRESS NOTES
[facility]  [facility] in [place], [place]  Inpatient Progress Note      Patient name: Sanford Joiner Sr.  Patient : 1960  Patient MRN: [id number]  Consulting MD: Antony Kam MD  Date of service: 07/10/24    REASON FOR REFERRAL:  LVAD management    ASSESSMENT:  Sanford Joiner Sr. is a 64 y.o. male admitted with recurrent driveline abscess and infection  Chronic systolic heart failure due to non ischemic cardiomyopathy, s/p HM3 LVAD implantation (2023) as destination therapy, stage D, on optimal GDMT limited by hypovolemia  Not a transplant candidate due to advanced obstructive lung disease, was declined at [facility] for LVAD and transplant.       INTERVAL HISTORY:  -VSS  -labs creat down to 1.33; INR 1.6; WBC down to 10.7; Hg down to 9.1; Mg 1.7; INR 1.8  -weight same; I/O neg  -Sanford Joiner Sr. is feeling fine.  He denies SOB, cough, swelling, pain, dizziness.  He has his trelegy now and is feeling well.  Has not had PFTs yet.       RECOMMENDATIONS:  Driveline/LVAD infection  Appreciate ID assistance  Abscess aspirated, sent for culture.  Pending   Will have CV surgery re evaluate for possible surgical intervention of driveline infection  Limited surgical options given poor lung function  Repeat PFTs pending   On Zosyn and vanc    Left Ventricular Assist Device  Continue current device speed at 5200 rpm to prevent RV overdrive  Dressing changes daily    Medical Therapy for Heart Failure  Beta-blocker: Continue Toprol XL 50 mg daily  ACEi/ARB/ARNI: Unable to tolerate due to hypovolemia  Hydralazine/Nitrate: Continue Hydralazine 50 mg TID  MRA: Unable to tolerate due to hypovolemia  SGLT2i: Unable to tolerate due to hypovolemia    Volume Management  Bumex PRN  Daily standing weight    Anticoagulation and Antiplatelet Therapy  Anticoagulation with Warfarin, target INR 1.8-2.2  Pharmacy to dose Warfarin  History of GI Bleed, not on aspirin    Antiarrhythmic and Device  frequent urination  Musculoskeletal: Denies joint pain, muscle weakness  Skin and hair: positive for lower sternal abscess and driveline drainage    PHYSICAL EXAM:  Pulse 81   Temp 97.9 °F (36.6 °C) (Oral)   Resp 21   Ht 1.727 m (5' 8\")   Wt 73.8 kg (162 lb 11.2 oz)   SpO2 94%   BMI 24.74 kg/m²     Constitutional:  Chronically ill appearing man, no acute distress  Eyes:  Conjunctiva: Normal  Bilateral Lids: Normal  Ears, Nose, Mouth and Throat:  Oral Mucosa: Moist  Cyanosis: Absent  Pallor: Absent  Neck:  Jugular Venous Distension: absent  Respiratory:  Auscultation: diminished breath sounds with bilateral expiratory wheezing  Effort: Non-labored respirations  Cardiovascular:  Murmur: No Murmur  Cardiac sounds: Normal S1 and S2  Rhythm: Regular  VAD tones: Normal  Edema: absent from lower extremities  Vascular:  present pulses  Gastrointestinal:  Normoactive bowel sounds  Soft, Non-tender, Non distended  Driveline inspected. Tan fluid on driveline dressing  Sub xiphoid wound, dressed with serosanguinous fluid on dressing  Musculoskeletal:  no deformity  Extremities:  No clubbing or cyanosis  Skin:  Inspection: No rash, no ulcers  Neurologic/Psychiatric:  Orientation: Oriented to time, place and person  Mood and Affect: Appropriate    LVAD  LVAD Type:: Left Ventricular Assist Device (LVAD)  Pump Speed (rpm): 5200  Pump Flow (lpm): 3.4  MAP (mmHg): 82  Set Low Speed (rpm): 4800  Pump Pulse Index (PI): 3.5  Pump Power (Mei): 3.6  Battery Life Checked: Yes  Backup Controller Present: Yes  Power Module Test: No  Driveline Dressing: Clean, Dry and Intact    I interrogated his LVAD today. Parameters are as documented above. There were several PI events in the last 24 hours. The LVAD is functioning properly. No programing changes were made. The driveline was examined and there is no erythema, tenderness or drainage from the driveline exit site        PAST MEDICAL HISTORY:  Past Medical History:   Diagnosis Date

## 2024-07-10 NOTE — PROGRESS NOTES
Hospitalist Progress Note  Antony Kam MD  Answering service: [phone]        Date of Service:  7/10/2024  NAME:  Sanford Joiner Sr.  :  1960  MRN:  [id number]      Admission Summary:     Patient admitted with chest wall infection from LVAD site.    Interval history / Subjective:     Patient denies any chest pain.     Assessment & Plan:     Sepsis  -Patient presented with tachycardia and leukocytosis  -Source of sepsis is cellulitis and abscess of the chest wall around LVAD  -Sepsis reassessment completed, IV fluids per sepsis protocol, manage underlying etiologies, monitor hemodynamics    Chest wall infection around LVAD site  -Patient presented with drainage from LVAD driveline  -CT of the chest shows findings suggestive of cellulitis involving the driveline as well as possible abscess  -Continue Zosyn  -Cardiovascular surgery to further evaluate, ID following    Chronic systolic heart failure  -Patient with known history of nonischemic cardiomyopathy, s/p LVAD implantation 2023, s/p AICD placement  -GDMT's include Toprol, hydralazine, unable to tolerate ACE/ARB/Arni, MRA and SGLT2 due to hypovolemia  -On anticoagulation with Coumadin for LVAD  -Advanced heart failure team following    Hypertension  -Continue Toprol and hydralazine    Dyslipidemia  -Continue statin    COPD  -Stable, breathing treatment as needed    Anemia of chronic disease  -Monitor H&H    Thrombocytopenia  -Likely due to acute bone marrow depression from sepsis  -Monitor CBC     Code status: Full  Prophylaxis: Coumadin  Care Plan discussed with: Patient  Anticipated Disposition: Likely more than 48 hours.  Central Line:  None             Review of Systems:   Pertinent items are noted in HPI.         Vital Signs:    Last 24hrs VS reviewed since prior progress note. Most recent are:  Vitals:    07/10/24 1200   Pulse: 68   Resp:    Temp:     SpO2:          Intake/Output Summary (Last 24 hours) at 7/10/2024 1325  Last data filed at 7/10/2024 0446  Gross per 24 hour   Intake 324.7 ml   Output 1600 ml   Net -1275.3 ml        Physical Examination:     I had a face to face encounter with this patient and independently examined them on 7/10/2024 as outlined below:          General : alert x 3, awake, no acute distress,   HEENT: PEERL, EOMI, moist mucus membrane, TM clear  Neck: supple, no JVD, no meningeal signs  Chest: Clear to auscultation bilaterally   CVS: S1 S2 heard, Capillary refill less than 2 seconds  Abd: soft/ non tender, non distended, BS physiological,   Ext: no clubbing, no cyanosis, no edema, brisk 2+ DP pulses  Neuro/Psych: pleasant mood and affect, CN 2-12 grossly intact, sensory grossly within normal limit, Strength 5/5 in all extremities, DTR 1+ x 4  Skin: warm            Data Review:    Review and/or order of clinical lab test    I have independently reviewed and interpreted patient's lab and all other diagnostic data    Notes reviewed from all clinical/nonclinical/nursing services involved in patient's clinical care. Care coordination discussions were held with appropriate clinical/nonclinical/ nursing providers based on care coordination needs.     Labs:     Recent Labs     07/09/24  0101 07/10/24  0439   WBC 11.8* 10.7   HGB 9.7* 9.1*   HCT 32.4* 29.4*    365     Recent Labs     07/08/24  1048 07/09/24  0101 07/10/24  0439    140 138  138   K 3.2* 3.9 3.8  3.6    108 108  107   CO2 27 24 26  27   BUN 18 18 13  14   MG  --  1.8 1.7     Recent Labs     07/08/24  1048 07/09/24  0101 07/10/24  0439   ALT 14 15 12   GLOB 4.8* 4.1* 3.5     Recent Labs     07/08/24  1602 07/09/24  0101 07/10/24  0439   INR 1.7* 1.6* 1.8*      Recent Labs     07/08/24  1602   TIBC 234*      No results found for: \"RBCF\"   No results for input(s): \"PH\", \"PCO2\", \"PO2\" in the last 72 hours.  No results for input(s): \"CPK\" in the last 72

## 2024-07-11 LAB
ALBUMIN SERPL-MCNC: 3.4 G/DL (ref 3.5–5)
ALBUMIN/GLOB SERPL: 0.9 (ref 1.1–2.2)
ALP SERPL-CCNC: 94 U/L (ref 45–117)
ALT SERPL-CCNC: 18 U/L (ref 12–78)
ANION GAP SERPL CALC-SCNC: 5 MMOL/L (ref 5–15)
AST SERPL-CCNC: 19 U/L (ref 15–37)
BACTERIA SPEC CULT: ABNORMAL
BILIRUB DIRECT SERPL-MCNC: <0.1 MG/DL (ref 0–0.2)
BILIRUB SERPL-MCNC: 0.3 MG/DL (ref 0.2–1)
BUN SERPL-MCNC: 15 MG/DL (ref 6–20)
BUN/CREAT SERPL: 10 (ref 12–20)
CALCIUM SERPL-MCNC: 8.8 MG/DL (ref 8.5–10.1)
CHLORIDE SERPL-SCNC: 106 MMOL/L (ref 97–108)
CO2 SERPL-SCNC: 28 MMOL/L (ref 21–32)
CREAT SERPL-MCNC: 1.53 MG/DL (ref 0.7–1.3)
ERYTHROCYTE [DISTWIDTH] IN BLOOD BY AUTOMATED COUNT: 18.1 % (ref 11.5–14.5)
GLOBULIN SER CALC-MCNC: 3.9 G/DL (ref 2–4)
GLUCOSE SERPL-MCNC: 106 MG/DL (ref 65–100)
GRAM STN SPEC: ABNORMAL
GRAM STN SPEC: ABNORMAL
HCT VFR BLD AUTO: 31.7 % (ref 36.6–50.3)
HGB BLD-MCNC: 9.6 G/DL (ref 12.1–17)
INR PPP: 1.9 (ref 0.9–1.1)
LDH SERPL L TO P-CCNC: 228 U/L (ref 85–241)
MAGNESIUM SERPL-MCNC: 1.9 MG/DL (ref 1.6–2.4)
MCH RBC QN AUTO: 25.7 PG (ref 26–34)
MCHC RBC AUTO-ENTMCNC: 30.3 G/DL (ref 30–36.5)
MCV RBC AUTO: 84.8 FL (ref 80–99)
NRBC # BLD: 0 K/UL (ref 0–0.01)
NRBC BLD-RTO: 0 PER 100 WBC
PLATELET # BLD AUTO: 387 K/UL (ref 150–400)
PMV BLD AUTO: 11.4 FL (ref 8.9–12.9)
POTASSIUM SERPL-SCNC: 4.1 MMOL/L (ref 3.5–5.1)
PROT SERPL-MCNC: 7.3 G/DL (ref 6.4–8.2)
PROTHROMBIN TIME: 19 SEC (ref 9–11.1)
RBC # BLD AUTO: 3.74 M/UL (ref 4.1–5.7)
SERVICE CMNT-IMP: ABNORMAL
SODIUM SERPL-SCNC: 139 MMOL/L (ref 136–145)
VANCOMYCIN SERPL-MCNC: 14.4 UG/ML
WBC # BLD AUTO: 10.5 K/UL (ref 4.1–11.1)

## 2024-07-11 PROCEDURE — 80076 HEPATIC FUNCTION PANEL: CPT

## 2024-07-11 PROCEDURE — 83615 LACTATE (LD) (LDH) ENZYME: CPT

## 2024-07-11 PROCEDURE — 6370000000 HC RX 637 (ALT 250 FOR IP): Performed by: NURSE PRACTITIONER

## 2024-07-11 PROCEDURE — 6370000000 HC RX 637 (ALT 250 FOR IP): Performed by: INTERNAL MEDICINE

## 2024-07-11 PROCEDURE — 97535 SELF CARE MNGMENT TRAINING: CPT

## 2024-07-11 PROCEDURE — 2060000000 HC ICU INTERMEDIATE R&B

## 2024-07-11 PROCEDURE — 2580000003 HC RX 258

## 2024-07-11 PROCEDURE — APPSS30 APP SPLIT SHARED TIME 16-30 MINUTES: Performed by: NURSE PRACTITIONER

## 2024-07-11 PROCEDURE — 85610 PROTHROMBIN TIME: CPT

## 2024-07-11 PROCEDURE — 6360000002 HC RX W HCPCS: Performed by: INTERNAL MEDICINE

## 2024-07-11 PROCEDURE — 97116 GAIT TRAINING THERAPY: CPT

## 2024-07-11 PROCEDURE — 99232 SBSQ HOSP IP/OBS MODERATE 35: CPT | Performed by: INTERNAL MEDICINE

## 2024-07-11 PROCEDURE — 85027 COMPLETE CBC AUTOMATED: CPT

## 2024-07-11 PROCEDURE — 36415 COLL VENOUS BLD VENIPUNCTURE: CPT

## 2024-07-11 PROCEDURE — 80048 BASIC METABOLIC PNL TOTAL CA: CPT

## 2024-07-11 PROCEDURE — 80202 ASSAY OF VANCOMYCIN: CPT

## 2024-07-11 PROCEDURE — 83735 ASSAY OF MAGNESIUM: CPT

## 2024-07-11 PROCEDURE — 2580000003 HC RX 258: Performed by: INTERNAL MEDICINE

## 2024-07-11 PROCEDURE — 93750 INTERROGATION VAD IN PERSON: CPT | Performed by: INTERNAL MEDICINE

## 2024-07-11 RX ORDER — WARFARIN SODIUM 1 MG/1
2 TABLET ORAL
Status: COMPLETED | OUTPATIENT
Start: 2024-07-11 | End: 2024-07-11

## 2024-07-11 RX ADMIN — PIPERACILLIN AND TAZOBACTAM 3375 MG: 3; .375 INJECTION, POWDER, LYOPHILIZED, FOR SOLUTION INTRAVENOUS at 20:32

## 2024-07-11 RX ADMIN — METOPROLOL SUCCINATE 50 MG: 50 TABLET, EXTENDED RELEASE ORAL at 07:50

## 2024-07-11 RX ADMIN — ALLOPURINOL 50 MG: 100 TABLET ORAL at 07:50

## 2024-07-11 RX ADMIN — SODIUM CHLORIDE, PRESERVATIVE FREE 10 ML: 5 INJECTION INTRAVENOUS at 07:51

## 2024-07-11 RX ADMIN — SODIUM CHLORIDE: 9 INJECTION, SOLUTION INTRAVENOUS at 04:16

## 2024-07-11 RX ADMIN — PANTOPRAZOLE SODIUM 40 MG: 40 TABLET, DELAYED RELEASE ORAL at 07:27

## 2024-07-11 RX ADMIN — SODIUM CHLORIDE, PRESERVATIVE FREE 10 ML: 5 INJECTION INTRAVENOUS at 20:32

## 2024-07-11 RX ADMIN — MAGNESIUM OXIDE TAB 400 MG (241.3 MG ELEMENTAL MG) 400 MG: 400 (241.3 MG) TAB at 20:21

## 2024-07-11 RX ADMIN — HYDRALAZINE HYDROCHLORIDE 50 MG: 50 TABLET ORAL at 12:06

## 2024-07-11 RX ADMIN — SODIUM CHLORIDE: 9 INJECTION, SOLUTION INTRAVENOUS at 20:30

## 2024-07-11 RX ADMIN — HYDRALAZINE HYDROCHLORIDE 50 MG: 50 TABLET ORAL at 07:27

## 2024-07-11 RX ADMIN — SODIUM CHLORIDE, PRESERVATIVE FREE 10 ML: 5 INJECTION INTRAVENOUS at 20:21

## 2024-07-11 RX ADMIN — MAGNESIUM OXIDE TAB 400 MG (241.3 MG ELEMENTAL MG) 400 MG: 400 (241.3 MG) TAB at 07:50

## 2024-07-11 RX ADMIN — SODIUM CHLORIDE: 9 INJECTION, SOLUTION INTRAVENOUS at 12:07

## 2024-07-11 RX ADMIN — WARFARIN SODIUM 2 MG: 1 TABLET ORAL at 18:23

## 2024-07-11 RX ADMIN — PIPERACILLIN AND TAZOBACTAM 3375 MG: 3; .375 INJECTION, POWDER, LYOPHILIZED, FOR SOLUTION INTRAVENOUS at 04:17

## 2024-07-11 RX ADMIN — PRAVASTATIN SODIUM 40 MG: 40 TABLET ORAL at 18:23

## 2024-07-11 RX ADMIN — Medication 2000 UNITS: at 07:51

## 2024-07-11 RX ADMIN — PIPERACILLIN AND TAZOBACTAM 3375 MG: 3; .375 INJECTION, POWDER, LYOPHILIZED, FOR SOLUTION INTRAVENOUS at 12:09

## 2024-07-11 RX ADMIN — HYDRALAZINE HYDROCHLORIDE 50 MG: 50 TABLET ORAL at 20:21

## 2024-07-11 NOTE — PROGRESS NOTES
Hospitalist Progress Note  Sushma Madala, MD  Answering service: [phone]        Date of Service:  2024  NAME:  Sanford Joiner Sr.  :  1960  MRN:  [id number]      Admission Summary:     Sanford Joiner Sr. is a 64 y.o. male with past medical history significant for congestive heart failure, COPD, dyslipidemia, hypertension, LVAD presented at the emergency room with drainage from LVAD driveline.  This has been going on for few weeks and patient has been treated with antibiotics without improvement.  Patient was seen by his home health nurse today who took a picture and send it to the LVAD team and patient was advised to go to the emergency room for further evaluation.  CT of the chest, abdomen and pelvis done on 24 shows findings suggestive of cellulitis involving the driveline as well as possible abscess.  Patient denies a fever, rigors and chills.  Patient was last admitted to this hospital in 2024, he was admitted and treated for driveline infection  He presented at the emergency room with leukocytosis and elevated lactic acid level.  He received Zosyn and was referred to the hospitalist service for admission.    Interval history / Subjective:     Patient is seen and examined at bedside this AM. He feels ok. No new complaints. Discharge decreasing at LVAD site . Waiting on CT surgery eval   Discussed with nursing, cm      Assessment & Plan:     Sepsis  Recurrent Chest wall infection around LVAD site  -tachycardia and leukocytosis on poa   -Patient presented with drainage from LVAD driveline  -CT chest shows findings suggestive of cellulitis involving the driveline as well as possible abscess  -Continue Zosyn. Dced Vanco    -Cardiovascular surgery to further evaluate  - ID following    Chronic systolic heart failure  s/p LVAD implantation 2023, s/p AICD placement  -GDMT's include  providers based on care coordination needs.     Labs:     Recent Labs     07/10/24  0439 07/11/24  0429   WBC 10.7 10.5   HGB 9.1* 9.6*   HCT 29.4* 31.7*    387       Recent Labs     07/09/24  0101 07/10/24  0439 07/11/24  0429    138  138 139   K 3.9 3.8  3.6 4.1    108  107 106   CO2 24 26 27 28   BUN 18 13  14 15   MG 1.8 1.7 1.9       Recent Labs     07/09/24  0101 07/10/24  0439 07/11/24  0429   ALT 15 12 18   GLOB 4.1* 3.5 3.9       Recent Labs     07/09/24  0101 07/10/24  0439 07/11/24  0429   INR 1.6* 1.8* 1.9*        Recent Labs     07/08/24  1602   TIBC 234*        No results found for: \"RBCF\"   No results for input(s): \"PH\", \"PCO2\", \"PO2\" in the last 72 hours.  No results for input(s): \"CPK\" in the last 72 hours.    Invalid input(s): \"CPKMB\", \"CKNDX\", \"TROIQ\"  Lab Results   Component Value Date/Time    CHOL 167 05/30/2024 12:00 AM    HDL 27 05/30/2024 12:00 AM     05/30/2024 12:00 AM    LDL Not calculated due to elevated triglyceride level 11/14/2023 11:27 AM     No results found for: \"GLUCPOC\"  [unfilled]      Medications Reviewed:     Current Facility-Administered Medications   Medication Dose Route Frequency    warfarin (COUMADIN) tablet 2 mg  2 mg Oral Once    sodium chloride flush 0.9 % injection 5-40 mL  5-40 mL IntraVENous 2 times per day    sodium chloride flush 0.9 % injection 5-40 mL  5-40 mL IntraVENous PRN    0.9 % sodium chloride infusion   IntraVENous PRN    ondansetron (ZOFRAN) injection 4 mg  4 mg IntraVENous Q6H PRN    hydrALAZINE (APRESOLINE) injection 10 mg  10 mg IntraVENous Q4H PRN    potassium chloride 20 mEq/50 mL IVPB (Central Line)  20 mEq IntraVENous PRN    magnesium sulfate 2000 mg in 50 mL IVPB premix  2,000 mg IntraVENous PRN    albuterol sulfate HFA (PROVENTIL;VENTOLIN;PROAIR) 108 (90 Base) MCG/ACT inhaler 2 puff  2 puff Inhalation Q6H PRN    allopurinol (ZYLOPRIM) tablet 50 mg  50 mg Oral Daily    pantoprazole (PROTONIX) tablet 40 mg  40 mg Oral

## 2024-07-11 NOTE — PROGRESS NOTES
Infectious Diseases Follow Up    2024      Assessment & Plan:     64 y.o. male with past medical Hx of congestive heart failure, COPD, dyslipidemia, hypertension, LVAD who was admitted for sternal wound infection, LVAD driveline infection . Infectious disease services was consulted to assist with antibiotic management of recurrent sternal wound infection.     Sternal wound abscess/Infection in setting of LVAD  Leukocytosis  Hx MSSA driveline infection, sternal wound infection with E.cloacae and MSSA  Failed outpt oral suppressive tx with Keflex  Outpt cx with E.cloacae and MSSA (6/10)  S/p aspiration of abscess(). Abscess cx MSSA  Blood Cx () NGTD     Recommend debridement of sternal wound. Awaiting CTS consult  Continue Zosyn, stop vancomycin     Hx LVAD  Implant 3/2023     Allergies  Ciprofloxacin  bactrim          Subjective:     No complaints. Awaiting CTS consult.     Objective:     Vitals: Pulse 82   Temp 98 °F (36.7 °C) (Oral)   Resp 22   Ht 1.727 m (5' 8\")   Wt 73.5 kg (162 lb 0.6 oz)   SpO2 97%   BMI 24.64 kg/m²      Tmax:  Temp (24hrs), Av.1 °F (36.7 °C), Min:97.6 °F (36.4 °C), Max:98.7 °F (37.1 °C)      Exam:   Patient is intubated:  no    Exam:    General:  Alert, cooperative, NAD   Eyes:  Sclera anicteric.   Mouth/Throat: Deferred   Neck: Supple   Lungs:   Clear to auscultation bilaterally, good effort   CV:  LVAD hum   Abdomen:   Soft, non-tender. bowel sounds normal. LVAD exit site   Extremities: No cyanosis or edema   Skin: Skin color, texture, turgor normal. Sternal wound   Lymph nodes: deferred   Musculoskeletal: BENAVIDES   Lines/Devices:  LVAD   Psych: Alert and oriented, normal mood affect given the setting           Labs:        Invalid input(s): \"ITNL\"   No results for input(s): \"CPK\", \"CKMB\" in the last 72 hours.    Invalid input(s): \"TROIQ\"  Recent Labs     24  0101 07/10/24  0439 24  0429    138  138 139   K 3.9 3.8  3.6 4.1    108  107 106   CO2 24

## 2024-07-11 NOTE — PROGRESS NOTES
Pharmacist Note - Warfarin Dosing  Consult provided for this 64 y.o.male to manage warfarin for LVAD    INR Goal: Other 1.8-2.2    Home regimen/ tablet size: 2 mg PO QD  [INR check on 7/1 elevated at 2.6, decrease to 1 mg on 7/1&7/4 (2mg on all other days) and recheck INR on 7/5]    Drugs that may increase INR: Allopurinol (home med)  Drugs that may decrease INR: None  Other current anticoagulants/ drugs that may increase bleeding risk: None  Risk factors: None  Daily INR ordered through: 9/29    Recent Labs     07/09/24  0101 07/10/24  0439 07/11/24  0429   HGB 9.7* 9.1* 9.6*   INR 1.6* 1.8* 1.9*     Date               INR                  Dose  7/8  1.7  3 mg  7/9  1.6  2 mg  7/10  1.8  2 mg   7/11  1.9  2 mg                                                                                 Assessment/ Plan:  Will order Warfarin 2 mg x1 today    Pharmacy will continue to monitor daily and adjust therapy as indicated.  Please contact the pharmacist at x [phone] or [phone] for outpatient recommendations if needed.

## 2024-07-11 NOTE — PLAN OF CARE
Problem: Physical Therapy - Adult  Goal: By Discharge: Performs mobility at highest level of function for planned discharge setting.  See evaluation for individualized goals.  Description: FUNCTIONAL STATUS PRIOR TO ADMISSION: Patient was independent and active without use of DME. 4 hospitalizations this year for HF exacerbations. Hx COPD, has home O2 that he reports primarily not using, but does use occasionally short-term after hospitalizations. Denies falls. Hx LVAD 5/2023.     HOME SUPPORT PRIOR TO ADMISSION: The patient lived with wife but did not require assistance.    Physical Therapy Goals  Initiated 7/9/2024  1.  Patient will ambulate with independence for 350 feet with the least restrictive device within 7 day(s).   2.  Patient will verbalize and demonstrate appropriate PLB techniques with ambulation, will self-initiate standing rest breaks with onset of MOORE appropriately.     Outcome: Progressin    PHYSICAL THERAPY TREATMENT/DISCHARGE    Patient: Sanford Joiner  (64 y.o. male)  Date: 7/11/2024  Diagnosis: Sepsis (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) [T82.7XXA] Sepsis ([facility])      Precautions: Fall Risk (LVAD)                      ASSESSMENT:  Patient has been followed by skilled PT services and has progressed towards goals. Patient received supine in bed, agreeable to PT services with encouragement from PT, OT, and RN. Patient irritable and increasingly verbally inappropriate (using obscenities) throughout session, expressing frustration with PT/OT services being involved in his POC. Observed patient complete LVAD power transition from wall power > batteries independently, and completed bed mobility and sit <> stand transfers independently. Patient ambulated 150 feet with accelerated gait around unit with progressive MOORE and audible wheezing, but not receptive to taking standing rest breaks/ activity pacing education, telling this therapist

## 2024-07-11 NOTE — PROGRESS NOTES
Hunger Vital Sign     Worried About Running Out of Food in the Last Year: Never true     Ran Out of Food in the Last Year: Never true   Transportation Needs: No Transportation Needs (7/8/2024)    PRAPARE - Transportation     Lack of Transportation (Medical): No     Lack of Transportation (Non-Medical): No    Social Connections ([facility] HRSN)   Housing Stability: Low Risk  (7/8/2024)    Housing Stability Vital Sign     Unable to Pay for Housing in the Last Year: No     Number of Places Lived in the Last Year: 1     Unstable Housing in the Last Year: No       LABORATORY RESULTS:   Failed to redirect to the Timeline version of the [employer] SmartLink.  Lab Results   Component Value Date/Time    TSH 1.23 07/08/2024 04:02 PM    TSH 1.95 06/12/2024 03:06 PM    TSH 4.560 05/30/2024 12:00 AM    TSH 0.92 02/28/2024 03:06 PM    TSH 1.77 01/31/2024 02:15 PM    TSH 4.36 01/08/2024 01:29 PM    TSH 0.37 05/15/2021 02:37 AM       ALLERGY:  Allergies   Allergen Reactions    Ciprofloxacin      Other reaction(s): Unknown (comments)    Bactrim [Sulfamethoxazole-Trimethoprim] Other (See Comments)     DAWOOD        CURRENT MEDICATIONS:    Current Facility-Administered Medications:     warfarin (COUMADIN) tablet 2 mg, 2 mg, Oral, Once, Madala, Sushma, MD    sodium chloride flush 0.9 % injection 5-40 mL, 5-40 mL, IntraVENous, 2 times per day, Anabell Villa APRN - CNP, 10 mL at 07/11/24 0751    sodium chloride flush 0.9 % injection 5-40 mL, 5-40 mL, IntraVENous, PRN, Anabell Villa APRN - CNP    0.9 % sodium chloride infusion, , IntraVENous, PRN, Anabell Villa APRN - CNP, Last Rate: 30 mL/hr at 07/10/24 1253, New Bag at 07/10/24 1253    ondansetron (ZOFRAN) injection 4 mg, 4 mg, IntraVENous, Q6H PRN, Anabell Villa APRN - CNP    hydrALAZINE (APRESOLINE) injection 10 mg, 10 mg, IntraVENous, Q4H PRN, Daisy, Anabell, APRN - CNP    potassium chloride 20 mEq/50 mL IVPB (Central Line), 20 mEq, IntraVENous, PRN, Anabell Villa APRN -

## 2024-07-11 NOTE — PLAN OF CARE
Problem: Occupational Therapy - Adult  Goal: By Discharge: Performs self-care activities at highest level of function for planned discharge setting.  See evaluation for individualized goals.  Description: FUNCTIONAL STATUS PRIOR TO ADMISSION:     ,  ,  ,  ,  ,  ,  ,  ,  ,  ,       HOME SUPPORT: Patient lived with spouse and son but didn't require assistance.    Occupational Therapy Goals:  Initiated 7/9/2024  1.  Patient will perform LVAD switch over with independence without cueing within 7 day(s).  2.  Patient will perform grooming routine in standing without LOB or fatigue with Amelia within 7 day(s).  3.  Patient will utilize energy conservation techniques during functional activities with verbal cues within 7 day(s).   Outcome: Progressing      OCCUPATIONAL THERAPY TREATMENT/DISCHARGE  Patient: Sanford Joiner . (64 y.o. 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    ASSESSMENT  Patient continues with skilled OT services and has progressed towards goals.  Patient received at EOB working with PT, irritable and verbally abusive/inappropriate, expressing frustration with purpose and timing of therapy services. Patient demonstrated LVAD power transition from wall>battery power independently and without cues, donned socks EOB via tailor sit, and completed functional mobility around unit independently but at quick pace and with little regard for safety although no overt LOB noted. Noted significant MOORE and wheezing, however not receptive to energy conservation education (told PT to \"shut up\"), SpO2 >95% and HR 120s upon return EOB. Patient declining need for further OT services during acute hospitalization, will sign off per patient request.          PLAN

## 2024-07-11 NOTE — PLAN OF CARE
Problem: Discharge Planning  Goal: Discharge to home or other facility with appropriate resources  7/11/2024 0905 by Rafita Jose, RN  Outcome: Progressing  7/11/2024 0230 by Paxton Ford RN  Outcome: Progressing     Problem: ABCDS Injury Assessment  Goal: Absence of physical injury  7/11/2024 0905 by Rafita Jose, RN  Outcome: Progressing  7/11/2024 0230 by Paxton Ford RN  Outcome: Progressing     Problem: Safety - Adult  Goal: Free from fall injury  7/11/2024 0905 by Rafita Jose, RN  Outcome: Progressing  7/11/2024 0230 by Paxton Ford RN  Outcome: Progressing     Problem: Pain  Goal: Verbalizes/displays adequate comfort level or baseline comfort level  7/11/2024 0905 by Rafita Jose RN  Outcome: Progressing  7/11/2024 0230 by Paxton Ford RN  Outcome: Progressing     Problem: Chronic Conditions and Co-morbidities  Goal: Patient's chronic conditions and co-morbidity symptoms are monitored and maintained or improved  7/11/2024 0905 by Rafita Jose RN  Outcome: Progressing  7/11/2024 0230 by Paxton Ford RN  Outcome: Progressing

## 2024-07-12 ENCOUNTER — HOSPITAL ENCOUNTER (INPATIENT)
Facility: HOSPITAL | Age: 64
End: 2024-07-12
Payer: MEDICARE

## 2024-07-12 ENCOUNTER — TELEPHONE (OUTPATIENT)
Age: 64
End: 2024-07-12

## 2024-07-12 VITALS
WEIGHT: 160.94 LBS | HEIGHT: 68 IN | RESPIRATION RATE: 14 BRPM | TEMPERATURE: 97.7 F | HEART RATE: 66 BPM | BODY MASS INDEX: 24.39 KG/M2 | OXYGEN SATURATION: 100 %

## 2024-07-12 VITALS — HEART RATE: 60 BPM | RESPIRATION RATE: 15 BRPM | OXYGEN SATURATION: 98 %

## 2024-07-12 LAB
ALBUMIN SERPL-MCNC: 3.2 G/DL (ref 3.5–5)
ALBUMIN/GLOB SERPL: 0.8 (ref 1.1–2.2)
ALP SERPL-CCNC: 83 U/L (ref 45–117)
ALT SERPL-CCNC: 19 U/L (ref 12–78)
ANION GAP SERPL CALC-SCNC: 6 MMOL/L (ref 5–15)
AST SERPL-CCNC: 19 U/L (ref 15–37)
BILIRUB DIRECT SERPL-MCNC: <0.1 MG/DL (ref 0–0.2)
BILIRUB SERPL-MCNC: 0.2 MG/DL (ref 0.2–1)
BUN SERPL-MCNC: 21 MG/DL (ref 6–20)
BUN/CREAT SERPL: 11 (ref 12–20)
CALCIUM SERPL-MCNC: 9.1 MG/DL (ref 8.5–10.1)
CHLORIDE SERPL-SCNC: 107 MMOL/L (ref 97–108)
CO2 SERPL-SCNC: 26 MMOL/L (ref 21–32)
CREAT SERPL-MCNC: 1.86 MG/DL (ref 0.7–1.3)
ERYTHROCYTE [DISTWIDTH] IN BLOOD BY AUTOMATED COUNT: 18.1 % (ref 11.5–14.5)
GLOBULIN SER CALC-MCNC: 4.2 G/DL (ref 2–4)
GLUCOSE SERPL-MCNC: 107 MG/DL (ref 65–100)
HCT VFR BLD AUTO: 30.6 % (ref 36.6–50.3)
HGB BLD-MCNC: 9.7 G/DL (ref 12.1–17)
INR PPP: 1.8 (ref 0.9–1.1)
LDH SERPL L TO P-CCNC: 219 U/L (ref 85–241)
MAGNESIUM SERPL-MCNC: 1.9 MG/DL (ref 1.6–2.4)
MCH RBC QN AUTO: 26.4 PG (ref 26–34)
MCHC RBC AUTO-ENTMCNC: 31.7 G/DL (ref 30–36.5)
MCV RBC AUTO: 83.4 FL (ref 80–99)
NRBC # BLD: 0 K/UL (ref 0–0.01)
NRBC BLD-RTO: 0 PER 100 WBC
PLATELET # BLD AUTO: 417 K/UL (ref 150–400)
PMV BLD AUTO: 11.4 FL (ref 8.9–12.9)
POTASSIUM SERPL-SCNC: 4 MMOL/L (ref 3.5–5.1)
PROT SERPL-MCNC: 7.4 G/DL (ref 6.4–8.2)
PROTHROMBIN TIME: 18.3 SEC (ref 9–11.1)
RBC # BLD AUTO: 3.67 M/UL (ref 4.1–5.7)
SODIUM SERPL-SCNC: 139 MMOL/L (ref 136–145)
WBC # BLD AUTO: 11.2 K/UL (ref 4.1–11.1)

## 2024-07-12 PROCEDURE — 6360000002 HC RX W HCPCS: Performed by: INTERNAL MEDICINE

## 2024-07-12 PROCEDURE — 2709999900 IR GUIDE INSERT PICC LESS THAN 5 YRS

## 2024-07-12 PROCEDURE — 2580000003 HC RX 258: Performed by: INTERNAL MEDICINE

## 2024-07-12 PROCEDURE — 6360000002 HC RX W HCPCS

## 2024-07-12 PROCEDURE — 99231 SBSQ HOSP IP/OBS SF/LOW 25: CPT | Performed by: NURSE PRACTITIONER

## 2024-07-12 PROCEDURE — 83615 LACTATE (LD) (LDH) ENZYME: CPT

## 2024-07-12 PROCEDURE — 80048 BASIC METABOLIC PNL TOTAL CA: CPT

## 2024-07-12 PROCEDURE — 02HV33Z INSERTION OF INFUSION DEVICE INTO SUPERIOR VENA CAVA, PERCUTANEOUS APPROACH: ICD-10-PCS

## 2024-07-12 PROCEDURE — 80076 HEPATIC FUNCTION PANEL: CPT

## 2024-07-12 PROCEDURE — 93750 INTERROGATION VAD IN PERSON: CPT | Performed by: NURSE PRACTITIONER

## 2024-07-12 PROCEDURE — 83735 ASSAY OF MAGNESIUM: CPT

## 2024-07-12 PROCEDURE — 2580000003 HC RX 258

## 2024-07-12 PROCEDURE — 2580000003 HC RX 258: Performed by: NURSE PRACTITIONER

## 2024-07-12 PROCEDURE — 6370000000 HC RX 637 (ALT 250 FOR IP): Performed by: INTERNAL MEDICINE

## 2024-07-12 PROCEDURE — 6370000000 HC RX 637 (ALT 250 FOR IP): Performed by: NURSE PRACTITIONER

## 2024-07-12 PROCEDURE — 36415 COLL VENOUS BLD VENIPUNCTURE: CPT

## 2024-07-12 PROCEDURE — 85027 COMPLETE CBC AUTOMATED: CPT

## 2024-07-12 PROCEDURE — 85610 PROTHROMBIN TIME: CPT

## 2024-07-12 PROCEDURE — C1769 GUIDE WIRE: HCPCS

## 2024-07-12 RX ORDER — SODIUM CHLORIDE 0.9 % (FLUSH) 0.9 %
5-40 SYRINGE (ML) INJECTION EVERY 12 HOURS SCHEDULED
Status: DISCONTINUED | OUTPATIENT
Start: 2024-07-12 | End: 2024-07-12 | Stop reason: HOSPADM

## 2024-07-12 RX ORDER — SODIUM CHLORIDE 0.9 % (FLUSH) 0.9 %
5-40 SYRINGE (ML) INJECTION PRN
Status: DISCONTINUED | OUTPATIENT
Start: 2024-07-12 | End: 2024-07-12 | Stop reason: HOSPADM

## 2024-07-12 RX ORDER — WARFARIN SODIUM 1 MG/1
3 TABLET ORAL
Status: DISCONTINUED | OUTPATIENT
Start: 2024-07-12 | End: 2024-07-12 | Stop reason: HOSPADM

## 2024-07-12 RX ORDER — HYDRALAZINE HYDROCHLORIDE 50 MG/1
75 TABLET, FILM COATED ORAL EVERY 8 HOURS SCHEDULED
Qty: 90 TABLET | Refills: 3 | Status: SHIPPED | OUTPATIENT
Start: 2024-07-12

## 2024-07-12 RX ORDER — SODIUM CHLORIDE 9 MG/ML
INJECTION, SOLUTION INTRAVENOUS PRN
Status: DISCONTINUED | OUTPATIENT
Start: 2024-07-12 | End: 2024-07-12 | Stop reason: HOSPADM

## 2024-07-12 RX ORDER — CEFAZOLIN SODIUM 1 G/3ML
2000 INJECTION, POWDER, FOR SOLUTION INTRAMUSCULAR; INTRAVENOUS EVERY 8 HOURS
Qty: 222 EACH | Refills: 0 | Status: SHIPPED
Start: 2024-07-12 | End: 2024-08-18

## 2024-07-12 RX ADMIN — ALLOPURINOL 50 MG: 100 TABLET ORAL at 09:57

## 2024-07-12 RX ADMIN — PANTOPRAZOLE SODIUM 40 MG: 40 TABLET, DELAYED RELEASE ORAL at 06:04

## 2024-07-12 RX ADMIN — MAGNESIUM OXIDE TAB 400 MG (241.3 MG ELEMENTAL MG) 400 MG: 400 (241.3 MG) TAB at 09:57

## 2024-07-12 RX ADMIN — HYDRALAZINE HYDROCHLORIDE 75 MG: 50 TABLET ORAL at 16:40

## 2024-07-12 RX ADMIN — PIPERACILLIN AND TAZOBACTAM 3375 MG: 3; .375 INJECTION, POWDER, LYOPHILIZED, FOR SOLUTION INTRAVENOUS at 04:10

## 2024-07-12 RX ADMIN — HYDRALAZINE HYDROCHLORIDE 50 MG: 50 TABLET ORAL at 06:04

## 2024-07-12 RX ADMIN — SODIUM CHLORIDE, PRESERVATIVE FREE 10 ML: 5 INJECTION INTRAVENOUS at 09:58

## 2024-07-12 RX ADMIN — Medication 2000 UNITS: at 09:56

## 2024-07-12 RX ADMIN — WATER 2000 MG: 1 INJECTION INTRAMUSCULAR; INTRAVENOUS; SUBCUTANEOUS at 12:07

## 2024-07-12 RX ADMIN — METOPROLOL SUCCINATE 50 MG: 50 TABLET, EXTENDED RELEASE ORAL at 09:57

## 2024-07-12 RX ADMIN — SODIUM CHLORIDE: 9 INJECTION, SOLUTION INTRAVENOUS at 04:09

## 2024-07-12 ASSESSMENT — PULMONARY FUNCTION TESTS
PIF_VALUE: 0

## 2024-07-12 NOTE — PLAN OF CARE
INFECTIOUS DISEASE discharge plan:    Diagnosis: MSSA driveline infection    Antibiotic: cefazolin IV 2 g Q 8 H through 8/18/24, inclusive    PICC line/Midline insertion for outpatient antibiotic.     Routine PICC/Maria De Jesus/ Portacath Care including PRN catheter flow management    Weekly labs with results fax to # [phone]. Call critical lab values to [phone].   [x] CBC w/diff  [x] BUN/Creatinine      Home Health to pull PICC line/Midline after last dose or send to IR for Maria De Jesus removal    May send to IR for line evaluation or replacement PRN Phone # [phone]    Follow up with Infectious Disease. Follow up with Dr. Gomez.

## 2024-07-12 NOTE — PROGRESS NOTES
Pharmacist Note - Warfarin Dosing  Consult provided for this 64 y.o.male to manage warfarin for LVAD    INR Goal: Other 1.8-2.2    Home regimen/ tablet size: 2 mg PO QD  [INR check on 7/1 elevated at 2.6, decrease to 1 mg on 7/1&7/4 (2mg on all other days) and recheck INR on 7/5]    Drugs that may increase INR: Allopurinol (home med)  Drugs that may decrease INR: None  Other current anticoagulants/ drugs that may increase bleeding risk: None  Risk factors: None  Daily INR ordered through: 9/29    Recent Labs     07/10/24  0439 07/11/24  0429 07/12/24  0424   HGB 9.1* 9.6* 9.7*   INR 1.8* 1.9* 1.8*     Date               INR                  Dose  7/8  1.7  3 mg  7/9  1.6  2 mg  7/10  1.8  2 mg   7/11  1.9  2 mg    7/13  1.8  3 mg                                                                                Assessment/ Plan:  Will order Warfarin 3 mg x1 today    Pharmacy will continue to monitor daily and adjust therapy as indicated.  Please contact the pharmacist at x [phone] or [phone] for outpatient recommendations if needed.

## 2024-07-12 NOTE — DISCHARGE SUMMARY
Discharge Summary       PATIENT ID: Sanford Joiner Sr.  MRN: [id number]   YOB: 1960    DATE OF ADMISSION: 7/8/2024 10:37 AM    DATE OF DISCHARGE: 7/13/2024    PRIMARY CARE PROVIDER: Ranjan Porter MD     ATTENDING PHYSICIAN: Dr. Jose   DISCHARGING PROVIDER: Sushma Madala, MD    To contact this individual call [phone] and ask the  to page.  If unavailable ask to be transferred the Adult Hospitalist Department.    CONSULTATIONS: IP CONSULT TO CARDIOLOGY  IP CONSULT TO ADVANCED HEART FAILURE  IP CONSULT TO INFECTIOUS DISEASES  IP WOUND CARE NURSE CONSULT TO EVAL  IP CONSULT TO PHARMACY  IP CONSULT TO CARDIOVASCULAR SURGERY  IP CONSULT TO PHARMACY  IP WOUND CARE NURSE CONSULT TO EVAL  IP CONSULT TO INFECTIOUS DISEASES  IP CONSULT TO PHARMACY  IP CONSULT TO VASCULAR ACCESS TEAM  IP CONSULT HOME HEALTH    PROCEDURES/SURGERIES: * No surgery found *    DIAGNOSES & HOSPITAL COURSE:   Per HPI:\"Sanford Joiner Sr. is a 64 y.o. male with past medical history significant for congestive heart failure, COPD, dyslipidemia, hypertension, LVAD presented at the emergency room with drainage from LVAD driveline.  This has been going on for few weeks and patient has been treated with antibiotics without improvement.  Patient was seen by his home health nurse today who took a picture and send it to the LVAD team and patient was advised to go to the emergency room for further evaluation.  CT of the chest, abdomen and pelvis done on 07/03/24 shows findings suggestive of cellulitis involving the driveline as well as possible abscess.  Patient denies a fever, rigors and chills.  Patient was last admitted to this hospital in April 2024, he was admitted and treated for driveline infection  He presented at the emergency room with leukocytosis and elevated lactic acid level.  He received Zosyn and was referred to the hospitalist service for admission.\"      Sepsis  Recurrent Chest wall infection around LVAD  Base) MCG/ACT inhaler  Commonly known as: PROVENTIL;VENTOLIN;PROAIR  Inhale 2 puffs into the lungs every 4 hours as needed for Shortness of Breath or Wheezing     allopurinol 100 MG tablet  Commonly known as: ZYLOPRIM  Take 0.5 tablets by mouth daily     bumetanide 1 MG tablet  Commonly known as: BUMEX  Take 1 tablet by mouth as needed (weight gain of 3lb overnight or 5lb in 1 week or shortness of breath)     gentamicin 0.1 % ointment  Commonly known as: GARAMYCIN  Apply topically to driveline exit site every day with dressing change     lactobacillus capsule  Take 1 capsule by mouth daily (with breakfast)     Magnesium 400 MG Tabs     metoprolol succinate 50 MG extended release tablet  Commonly known as: TOPROL XL  Take 1 tablet by mouth daily     NUCALA SC     octreotide ACETATE 20 MG injection  Commonly known as: SANDOSTATIN LAR  Inject 20 mg into the muscle every 30 days     Omega 3 1000 MG Caps  Take 1 capsule by mouth daily     OXYGEN     pantoprazole 40 MG tablet  Commonly known as: PROTONIX  TAKE 1 TABLET BY MOUTH DAILY     pravastatin 40 MG tablet  Commonly known as: Pravachol  Take 1 tablet by mouth every evening     Trelegy Ellipta 200-62.5-25 MCG/ACT Aepb inhaler  Generic drug: fluticasone-umeclidin-vilant     vitamin D3 25 MCG (1000 UT) Tabs tablet  Commonly known as: CHOLECALCIFEROL  Take 2 tablets by mouth daily     warfarin 1 MG tablet  Commonly known as: COUMADIN  Take as directed. If you are unsure how to take this medication, talk to your nurse or doctor.  Original instructions: Take 2 tablets by mouth daily May take more as directed by [facility].            STOP taking these medications      cephALEXin 500 MG capsule  Commonly known as: KEFLEX               Where to Get Your Medications        These medications were sent to [facility] [id number] - [place], [place] - [address] - P [phone] - F [phone]  [address] [facility] [address]      Phone: [phone]   hydrALAZINE 50 MG

## 2024-07-12 NOTE — PLAN OF CARE
2230: VS obtained at this time and chart d/t do not disturb hours initiated.     0400: Pt called out stating he was awake and would like to get his AM labs, VS, and weight complete at this time. Pt refusing dressing change for sternum and DL, pt educated on the importance and stated he will allow his day shift RN to complete dressing changes.     Problem: Discharge Planning  Goal: Discharge to home or other facility with appropriate resources  Outcome: Progressing     Problem: ABCDS Injury Assessment  Goal: Absence of physical injury  Outcome: Progressing     Problem: Safety - Adult  Goal: Free from fall injury  Outcome: Progressing     Problem: Pain  Goal: Verbalizes/displays adequate comfort level or baseline comfort level  Outcome: Progressing

## 2024-07-12 NOTE — CARE COORDINATION
Transition of Care Plan:    RUR: 18% - moderate  Prior Level of Functioning: independent; open with [facility]  Disposition: Home Health - [facility]  Follow up appointments: [facility]; Cardiac Surgery  DME needed: none  Transportation at discharge: family  IM/IMM Medicare/ letter given: 7/6/24  Caregiver Contact: wife - Ashley Swan - [phone]  Discharge Caregiver contacted prior to discharge? no  Care Conference needed? no  Barriers to discharge: none    CM updated in IDR that patient is ready for discharge pending PICC with IR and IV abx set up.    CM sent return referral and spoke with intake at [facility]. Confirmed patient was open with agency and acceptance. Orders to follow.    Followed up with [facility] NP re home health order for LVAD management. IV abx order will be added.    CM sent IV abx order to [facility] for review via [employer].    Spoke with Shyam at [facility] intake. Earliest SOC is Monday for RN to see patient at home. CM updated by [employer] [facility] [employer] that patient is able to self-administer IV abx at home. Leatha advised of $0 copay for IV abx.    Clear for discharge.    Disposition Plan:  Home Health: [facility]  Home Infusion: [facility]  Transportation: family    Jabari Mckeon, MPH  Care Manager l [facility]  Available via [employer] or  [phone]

## 2024-07-12 NOTE — PROGRESS NOTES
0730: Bedside and Verbal shift change report given to RACHEL Rubio (oncoming nurse) by RACHEL Dozier (offgoing nurse). Report included the following information Nurse Handoff Report, Intake/Output, and Cardiac Rhythm sinus .       1725: discharge instructions reviewed w/ pt at bedside. All questions were answered and pt verbalized understanding of the teaching. Pt discharged with all belongings including backup batteries, emergency equipment, cell phone, , and clothing.

## 2024-07-12 NOTE — PROGRESS NOTES
Hospitalist Progress Note  Sushma Madala, MD  Answering service: [phone]        Date of Service:  2024  NAME:  Sanford Joiner Sr.  :  1960  MRN:  [id number]      Admission Summary:     Sanford Joiner Sr. is a 64 y.o. male with past medical history significant for congestive heart failure, COPD, dyslipidemia, hypertension, LVAD presented at the emergency room with drainage from LVAD driveline.  This has been going on for few weeks and patient has been treated with antibiotics without improvement.  Patient was seen by his home health nurse today who took a picture and send it to the LVAD team and patient was advised to go to the emergency room for further evaluation.  CT of the chest, abdomen and pelvis done on 24 shows findings suggestive of cellulitis involving the driveline as well as possible abscess.  Patient denies a fever, rigors and chills.  Patient was last admitted to this hospital in 2024, he was admitted and treated for driveline infection  He presented at the emergency room with leukocytosis and elevated lactic acid level.  He received Zosyn and was referred to the hospitalist service for admission.    Interval history / Subjective:     Patient is seen and examined at bedside this AM. He feels ok. No new complaints. Waiting on CTS surgery eval - he is ok with outpt f/u  Will need PICC line for IV abx   Discussed with nursing, cm      Assessment & Plan:     Sepsis  Recurrent Chest wall infection around LVAD site  -tachycardia and leukocytosis on poa   -Patient presented with drainage from LVAD driveline  -CT chest shows findings suggestive of cellulitis involving the driveline as well as possible abscess  -Continue Zosyn. Corby Padilla    -Cardiovascular surgery to further evaluate- planning for outpt   - ID following - recs 6 weeks of IV abx   - zosyn changed to Cefazolin   -

## 2024-07-12 NOTE — PROGRESS NOTES
[facility]  Critical access hospital in [place], [place]  Inpatient Progress Note      Patient name: Sanford Joiner Sr.  Patient : 1960  Patient MRN: [id number]  Consulting MD: Madala, Sushma, MD  Date of service: 24    REASON FOR REFERRAL:  LVAD management    ASSESSMENT:  Sanford Joiner Sr. is a 64 y.o. male admitted with recurrent driveline abscess and infection  Chronic systolic heart failure due to non ischemic cardiomyopathy, s/p HM3 LVAD implantation (2023) as destination therapy, stage D, on optimal GDMT limited by hypovolemia  Not a transplant candidate due to advanced obstructive lung disease, was declined at [facility] for LVAD and transplant.       INTERVAL HISTORY:  -MAP slightly up  -labs creat up to 1.86 (from vanc?); Inr 1.8; Hg up to 9.7  -weight down 2lbs; I/O inc   -Sanford Joiner Sr. is feeling fine.  He denies SOB, cough, swelling, pain, dizziness.  He would like to go home.       RECOMMENDATIONS:  Driveline/LVAD infection  Appreciate ID assistance  Abscess aspirated, sent for culture.    Will have CV surgery re evaluate for possible surgical intervention of driveline infection - will be unable to see until Monday, so discussed with ID and he will be sent out on IV abx.  Will plan for outpatient eval by Dr. Gomez.    Limited surgical options given poor lung function  Repeat PFTs remain poor.  Likely no significant change in his risk profile   On Zosyn    Left Ventricular Assist Device  Continue current device speed at 5200 rpm to prevent RV overdrive  Dressing changes daily    Medical Therapy for Heart Failure  Beta-blocker: Continue Toprol XL 50 mg daily  ACEi/ARB/ARNI: Unable to tolerate due to hypovolemia  Hydralazine/Nitrate: Increase Hydralazine to 75 mg TID  MRA: Unable to tolerate due to hypovolemia  SGLT2i: Unable to tolerate due to hypovolemia    Volume Management  Bumex PRN  Daily standing weight    Anticoagulation and Antiplatelet  0.38  FEV1 0.99L, 46%  FVC 2.62L, 91%  Lung age 80 years    REVIEW OF SYSTEMS:  General: Denies fever.  Ear, nose and throat: Denies difficulty hearing, sinus problems, nosebleeds  Cardiovascular: denies chest pain, palpitations  Respiratory: Denies cough, wheezing, sputum production, hemoptysis.  Gastrointestinal: Denies heartburn, constipation, diarrhea, abdominal pain, nausea, blood in stool  Kidney and bladder: Denies painful urination, frequent urination  Musculoskeletal: Denies joint pain, muscle weakness  Skin and hair: positive for lower sternal abscess and driveline drainage    PHYSICAL EXAM:  Pulse 71   Temp 98 °F (36.7 °C) (Oral)   Resp 14   Ht 1.727 m (5' 8\")   Wt 73 kg (160 lb 15 oz)   SpO2 98%   BMI 24.47 kg/m²     Constitutional:  Chronically ill appearing man, no acute distress  Eyes:  Conjunctiva: Normal  Bilateral Lids: Normal  Ears, Nose, Mouth and Throat:  Oral Mucosa: Moist  Cyanosis: Absent  Pallor: Absent  Neck:  Jugular Venous Distension: absent  Respiratory:  Auscultation: diminished breath sounds with bilateral expiratory wheezing  Effort: Non-labored respirations  Cardiovascular:  Murmur: No Murmur  Cardiac sounds: Normal S1 and S2  Rhythm: Regular  VAD tones: Normal  Edema: absent from lower extremities  Vascular:  present pulses  Gastrointestinal:  Normoactive bowel sounds  Soft, Non-tender, Non distended  Driveline inspected. Tan fluid on driveline dressing  Sub xiphoid wound, dressed with serosanguinous fluid on dressing  Musculoskeletal:  no deformity  Extremities:  No clubbing or cyanosis  Skin:  Inspection: No rash, no ulcers  Neurologic/Psychiatric:  Orientation: Oriented to time, place and person  Mood and Affect: Appropriate    LVAD  LVAD Type:: Left Ventricular Assist Device (LVAD)  Pump Speed (rpm): 5200  Pump Flow (lpm): 3  MAP (mmHg): 92  Set Low Speed (rpm): 4800  Pump Pulse Index (PI): 7  Pump Power (Mei): 3.7  Battery Life Checked: Yes  Backup Controller Present:

## 2024-07-12 NOTE — PROGRESS NOTES
TRANSFER - IN REPORT:    Verbal report received from RACHEL Rubio on [facility].  being received from 462 for ordered procedure      Report consisted of patient's Situation, Background, Assessment and   Recommendations(SBAR).     Information from the following report(s) Nurse Handoff Report was reviewed with the receiving nurse.    Opportunity for questions and clarification was provided.      Assessment completed upon patient's arrival to unit and care assumed.

## 2024-07-12 NOTE — PROGRESS NOTES
Picc line order noted. Pt was attempted bedside 4/4/24 and unsuccessful in advancing catheter. Pt needs IR consult for picc line placement. Geni Garibay NP made aware via perfect serve and primary RN made aware. VAT order completed.

## 2024-07-12 NOTE — DISCHARGE INSTRUCTIONS
Discharge Instructions       PATIENT ID: Sanford Joiner Sr.  MRN: [id number]   YOB: 1960    DATE OF ADMISSION: [unfilled]    DATE OF DISCHARGE: 7/12/2024    PRIMARY CARE PROVIDER: @PCP@     ATTENDING PHYSICIAN: [unfilled]  DISCHARGING PROVIDER: Sushma Madala, MD    To contact this individual call [phone] and ask the  to page.   If unavailable ask to be transferred the Adult Hospitalist Department.    DISCHARGE DIAGNOSES Chest wall infection - recurrent     CONSULTATIONS: [unfilled]    PROCEDURES/SURGERIES: * No surgery found *    PENDING TEST RESULTS:   At the time of discharge the following test results are still pending: none     FOLLOW UP APPOINTMENTS:   [unfilled]   PCP in 1-2 weeks   AHF in 1 week  Cardiac surgery in 1-2 weeks     ADDITIONAL CARE RECOMMENDATIONS:   Labs per ID   PICC care   IV antibiotics Cefazolin until 8/18 per ID recommendations     DIET: cardiac diet  Oral Nutritional Supplements:     ACTIVITY: activity as tolerated    Radiology      DISCHARGE MEDICATIONS:   See Medication Reconciliation Form    It is important that you take the medication exactly as they are prescribed.   Keep your medication in the bottles provided by the pharmacist and keep a list of the medication names, dosages, and times to be taken in your wallet.   Do not take other medications without consulting your doctor.       NOTIFY YOUR PHYSICIAN FOR ANY OF THE FOLLOWING:   Fever over 101 degrees for 24 hours.   Chest pain, shortness of breath, fever, chills, nausea, vomiting, diarrhea, change in mentation, falling, weakness, bleeding. Severe pain or pain not relieved by medications.  Or, any other signs or symptoms that you may have questions about.      DISPOSITION:  X  Home With:   OT  PT X HH  RN       SNF/Inpatient Rehab/LTAC    Independent/assisted living    Hospice    Other:     Signed:   Sushma Madala, MD  7/12/2024  3:08 PM

## 2024-07-12 NOTE — PROGRESS NOTES
Infectious Diseases Follow Up    2024      Assessment & Plan:     64 y.o. male with past medical Hx of congestive heart failure, COPD, dyslipidemia, hypertension, LVAD who was admitted for sternal wound infection, LVAD driveline infection . Infectious disease services was consulted to assist with antibiotic management of recurrent sternal wound infection.     Sternal wound abscess/Infection in setting of LVAD  Leukocytosis  Hx MSSA driveline infection, sternal wound infection with E.cloacae and MSSA  Failed outpt oral suppressive tx with Keflex  Outpt cx with E.cloacae and MSSA (6/10)  S/p aspiration of abscess(). Abscess cx MSSA  Blood Cx () NGTD     Recommend debridement of sternal wound. NP [place] reached if patient could be seen as by Dr. Gomez outpatient. D/W Dr. Rajan, stopped Zosyn, started cefazolin and continue for 6 weeks through 24, inclusive. Please arrange outpatient follow-up with Dr. Gomez to eval for debridement. Abx discharge order in place.        Hx LVAD  Implant 3/2023     Allergies  Ciprofloxacin  bactrim          Subjective:     No complaints. Discussed abx treatment plan.     Objective:     Vitals: Pulse 71   Temp 98 °F (36.7 °C) (Oral)   Resp 14   Ht 1.727 m (5' 8\")   Wt 73 kg (160 lb 15 oz)   SpO2 98%   BMI 24.47 kg/m²      Tmax:  Temp (24hrs), Av.2 °F (36.8 °C), Min:97.8 °F (36.6 °C), Max:98.5 °F (36.9 °C)      Exam:   Patient is intubated:  no    Exam:    General:  Alert, cooperative, NAD   Eyes:  Sclera anicteric.   Mouth/Throat: Deferred   Neck: Supple   Lungs:   Clear to auscultation bilaterally, good effort   CV:  LVAD hum   Abdomen:   Soft, non-tender. bowel sounds normal. LVAD exit site   Extremities: No cyanosis or edema   Skin: Skin color, texture, turgor normal. Sternal wound inproved   Lymph nodes: deferred   Musculoskeletal: BENAVIDES   Lines/Devices:  LVAD   Psych: Alert and oriented, normal mood affect given the setting           Labs:        Invalid

## 2024-07-15 ENCOUNTER — ANTI-COAG VISIT (OUTPATIENT)
Age: 64
End: 2024-07-15
Payer: MEDICARE

## 2024-07-15 ENCOUNTER — HOME CARE VISIT (OUTPATIENT)
Facility: HOME HEALTH | Age: 64
End: 2024-07-15
Payer: MEDICARE

## 2024-07-15 DIAGNOSIS — Z79.01 CHRONIC ANTICOAGULATION: Primary | ICD-10-CM

## 2024-07-15 LAB — INR BLD: 2.6

## 2024-07-15 PROCEDURE — 93793 ANTICOAG MGMT PT WARFARIN: CPT | Performed by: NURSE PRACTITIONER

## 2024-07-15 PROCEDURE — G0299 HHS/HOSPICE OF RN EA 15 MIN: HCPCS

## 2024-07-15 NOTE — PROGRESS NOTES
Anticoag tracker updated with inpatient doses per pharmcy note.    ADVANCED HEART FAILURE CENTER  [facility] in [place], [place]      INR result reviewed with ADILENE Maurer NP who made the following recommendations (VORB): no changes and recheck 1 week. Patient notified and verbalized understanding. Home health nurse MADYSON James at home with patient and also verbalized understanding. They had no further questions. (See anticoag tracker)     Patient also notified of visit on Thursday for follow up, he verbalized understanding.     Funmi Barth RN

## 2024-07-16 VITALS
TEMPERATURE: 98.8 F | OXYGEN SATURATION: 98 % | RESPIRATION RATE: 24 BRPM | BODY MASS INDEX: 24.78 KG/M2 | WEIGHT: 163 LBS

## 2024-07-16 LAB
ALBUMIN SERPL-MCNC: 4 G/DL (ref 3.9–4.9)
ALP SERPL-CCNC: 85 IU/L (ref 44–121)
ALT SERPL-CCNC: 3 IU/L (ref 0–44)
AST SERPL-CCNC: 21 IU/L (ref 0–40)
BASOPHILS # BLD AUTO: 0.2 X10E3/UL (ref 0–0.2)
BASOPHILS NFR BLD AUTO: 2 %
BILIRUB SERPL-MCNC: <0.2 MG/DL (ref 0–1.2)
BUN SERPL-MCNC: 23 MG/DL (ref 8–27)
BUN/CREAT SERPL: 13 (ref 10–24)
CALCIUM SERPL-MCNC: 9 MG/DL (ref 8.6–10.2)
CHLORIDE SERPL-SCNC: 102 MMOL/L (ref 96–106)
CO2 SERPL-SCNC: 20 MMOL/L (ref 20–29)
CREAT SERPL-MCNC: 1.72 MG/DL (ref 0.76–1.27)
EGFRCR SERPLBLD CKD-EPI 2021: 44 ML/MIN/1.73
EOSINOPHIL # BLD AUTO: 1 X10E3/UL (ref 0–0.4)
EOSINOPHIL NFR BLD AUTO: 8 %
ERYTHROCYTE [DISTWIDTH] IN BLOOD BY AUTOMATED COUNT: 16.5 % (ref 11.6–15.4)
GLOBULIN SER CALC-MCNC: 3.2 G/DL (ref 1.5–4.5)
GLUCOSE SERPL-MCNC: 79 MG/DL (ref 70–99)
HCT VFR BLD AUTO: 30 % (ref 37.5–51)
HGB BLD-MCNC: 9 G/DL (ref 13–17.7)
IMM GRANULOCYTES # BLD AUTO: 0.1 X10E3/UL (ref 0–0.1)
IMM GRANULOCYTES NFR BLD AUTO: 1 %
INR PPP: 2.1 (ref 0.9–1.2)
LDH SERPL L TO P-CCNC: 288 IU/L (ref 121–224)
LYMPHOCYTES # BLD AUTO: 1.5 X10E3/UL (ref 0.7–3.1)
LYMPHOCYTES NFR BLD AUTO: 12 %
MCH RBC QN AUTO: 25.7 PG (ref 26.6–33)
MCHC RBC AUTO-ENTMCNC: 30 G/DL (ref 31.5–35.7)
MCV RBC AUTO: 86 FL (ref 79–97)
MONOCYTES # BLD AUTO: 0.9 X10E3/UL (ref 0.1–0.9)
MONOCYTES NFR BLD AUTO: 8 %
NEUTROPHILS # BLD AUTO: 8.2 X10E3/UL (ref 1.4–7)
NEUTROPHILS NFR BLD AUTO: 69 %
PLATELET # BLD AUTO: 438 X10E3/UL (ref 150–450)
POTASSIUM SERPL-SCNC: 5.2 MMOL/L (ref 3.5–5.2)
PROT SERPL-MCNC: 7.2 G/DL (ref 6–8.5)
PROTHROMBIN TIME: 21.6 SEC (ref 9.1–12)
RBC # BLD AUTO: 3.5 X10E6/UL (ref 4.14–5.8)
SODIUM SERPL-SCNC: 141 MMOL/L (ref 134–144)
WBC # BLD AUTO: 11.8 X10E3/UL (ref 3.4–10.8)

## 2024-07-16 ASSESSMENT — ENCOUNTER SYMPTOMS
HEMOPTYSIS: 0
DYSPNEA ACTIVITY LEVEL: WHILE SPEAKING

## 2024-07-17 ENCOUNTER — TELEPHONE (OUTPATIENT)
Age: 64
End: 2024-07-17

## 2024-07-17 LAB — NT-PROBNP SERPL-MCNC: 1089 PG/ML (ref 0–210)

## 2024-07-17 NOTE — HOME HEALTH
attention.    Warfarin-  anticoagulant therapy education; purpose, dose, and frequency, risks of co-administration with other medications such as ASA, NSAID, and herbals, bleeding prevention strategies such as the use of a soft toothbrush, gentle flossing, use of electric razor, on the potential for increased bleeding from falls and lacerations, to notify medical providers of anticoagulant therapy, consider carrying an ID card and signs and symptoms of abnormal bleeding such as unexplained bruising, dizziness/lightheadedness, red or tarry looking stool, blood in urine, blood in vomit.     Oxygen safety education provided: proper storage of portable tanks, keep concentrator away from heat sources, place \"Oxygen in Use:\" sign on front door or window. NO SMOKING in home.    Patient at risk for falls: Yes:       Interdisciplinary communication with: [facility] regarding pt's map, INR sternal wound (Picture uploaded into [employer]).    Written Teaching Material Utilized: [facility] admission booklet and hospital discharge instructions     Patient/caregiver instructed on plan of care and are agreeable to plan of care at this time.       Plan of care and admission to home health status called to attending physician. The following practitioner has agreed to sign the ongoing POC: Dr Farshad Gillespie and was notified of the following: visit frequency of SN 2W3, 1W1 3prn      Discharge planning discussed with patient and caregiver. Discharge planning as follows: When pt's IV antibiotic/PICC line have been discontinued  or  when patient is no longer able to participate or progresses to SNF/Hospice.  Patient/caregiver verbalized agreement with discharge planning.      Specific plan for next visit: Cardiopulmonary assessment, re-view patient on administration of IV antibiotic and PICC line flushes  and s/s of infection and when to call MD ANDIE or 911.     Emergency Preparedness: Patient/Caregiver instructed in the following at SOC, activation of EMP

## 2024-07-17 NOTE — TELEPHONE ENCOUNTER
ADVANCED HEART FAILURE CENTER  [facility] in [place], [place]  LVAD PATIENT DISCHARGE FOLLOW UP CALL       Date of call: 07/17/24  Date of discharge: 7/12/24  Discharge disposition: Home Health Care Svc    Call placed to patient to follow up from hospital discharge. Requested call back to discuss.        FOLLOW UP:     Future Appointments   Date Time Provider Department Center   7/18/2024  9:00 AM Nessa Maurer APRN - NP [facility]   7/19/2024 To Be Determined Nanci James RN [facility] HOME HEAL   7/22/2024 To Be Determined Nanci James RN [facility] [facility] HOME HEAL   7/25/2024 To Be Determined Nanci James RN [facility] [facility] HOME HEAL   7/29/2024 11:00 AM [facility] MED INF CHAIR 4 [facility]   7/30/2024 To Be Determined Nanci James RN [facility] [facility] HOME HEAL   8/2/2024 To Be Determined Nanci James RN [employer] [facility] HOME HEAL   8/7/2024  3:00 PM [facility] FASTTRACK 3 [facility]       Funmi Barth RN  LVAD coordinator   [facility] [place]

## 2024-07-18 ENCOUNTER — OFFICE VISIT (OUTPATIENT)
Age: 64
End: 2024-07-18

## 2024-07-18 VITALS
SYSTOLIC BLOOD PRESSURE: 102 MMHG | BODY MASS INDEX: 25.7 KG/M2 | HEART RATE: 75 BPM | OXYGEN SATURATION: 99 % | TEMPERATURE: 98.4 F | WEIGHT: 169.6 LBS | HEIGHT: 68 IN | RESPIRATION RATE: 20 BRPM

## 2024-07-18 DIAGNOSIS — Z95.811 LVAD (LEFT VENTRICULAR ASSIST DEVICE) PRESENT (HCC): Primary | ICD-10-CM

## 2024-07-18 DIAGNOSIS — I50.22 CHRONIC SYSTOLIC HEART FAILURE (HCC): ICD-10-CM

## 2024-07-18 RX ORDER — ACETAMINOPHEN 325 MG/1
650 TABLET ORAL EVERY 6 HOURS PRN
Qty: 120 TABLET | Refills: 3 | Status: SHIPPED | OUTPATIENT
Start: 2024-07-18

## 2024-07-18 RX ORDER — CEFAZOLIN SODIUM 500 MG/2.2ML
2000 INJECTION, POWDER, FOR SOLUTION INTRAMUSCULAR; INTRAVENOUS EVERY 8 HOURS
Qty: 372 EACH | Refills: 0
Start: 2024-07-18 | End: 2024-08-18

## 2024-07-18 ASSESSMENT — ENCOUNTER SYMPTOMS
ABDOMINAL DISTENTION: 0
COUGH: 0
SHORTNESS OF BREATH: 1
VOMITING: 0
NAUSEA: 0

## 2024-07-18 ASSESSMENT — PATIENT HEALTH QUESTIONNAIRE - PHQ9
SUM OF ALL RESPONSES TO PHQ QUESTIONS 1-9: 0
1. LITTLE INTEREST OR PLEASURE IN DOING THINGS: NOT AT ALL
2. FEELING DOWN, DEPRESSED OR HOPELESS: NOT AT ALL
SUM OF ALL RESPONSES TO PHQ9 QUESTIONS 1 & 2: 0
SUM OF ALL RESPONSES TO PHQ QUESTIONS 1-9: 0

## 2024-07-18 NOTE — PROGRESS NOTES
ADVANCED HEART FAILURE CENTER  [facility] in [place], [place]  LVAD Coordinator Clinic Visit Note  Chief Complaint   Patient presents with    Shortness of Breath     On exertion, thinks humidity may also be affecting.    Follow-up     VAD       BP (!) 102/0 (Site: Right Upper Arm, Position: Sitting, Cuff Size: Medium Adult)   Pulse 75   Temp 98.4 °F (36.9 °C) (Oral)   Resp 20   Ht 1.727 m (5' 7.99\")   Wt 76.9 kg (169 lb 9.6 oz)   SpO2 99%   BMI 25.79 kg/m²     LVAD  LVAD Type:: Left Ventricular Assist Device (LVAD)  Pump Speed (rpm): 5200  Pump Flow (lpm): 3  MAP (mmHg): 102 (palpable radial pulse)  Set Low Speed (rpm): 5200  Pump Pulse Index (PI): 7.2  Pump Power (Mei): 3.8  Battery Life Checked: Yes  Backup Controller Present: Yes  Driveline Dressing: Breakthrough Drainage  Outpatient: Yes  MAP in Therapeutic Range (Outpatient): Yes       Sanford Joiner  is a 64 y.o. male with a history of NICM with Heartmate 3 implant date of  05/27/2023. LVAD interrogation completed in clinic, results reported to provider, Notable for one low voltage alarm, patient states he let batteries get low, one low flow alarm 07/17, patient denied other alarms. Educated to change batteries before they get low, he verbalized understanding.     Patient driveline trauma (including  drops). Not wearing driveline anchor, anchor placed and patient educated to wear anchor at all times.  Driveline inspected for integrity.      Driveline dressing and sternal abscess site dressing changed using sterile technique. Sternal abscess site cleansed with chg and covered with sterile gauze. Both sites with thick green and brown drainage. Drainage reported to ADILENE Maurer     Patient had appointment with Dr. Gomez today to evaluate sternal site, however Dr. Gomez was unable to see due to being in the OR, Per ADILENE Maurer patient to follow up next week pending schedule availability.      All orders entered 
LVEF 30-35%, LVIDd 5.8cm, mild MR, mod TR  Echo 10/27/21 LVEF 38%, Mod AI, TAPSE 2.78cm  Echo 5/13/21 LVEF 20-25%, Trace MR, no AI, Trace TR, LVIDd 5.97cm, TAPSE 2.1cm     Cardiac MRI (9/14/21)  1. Normal left ventricular cavity size by 3-D volumetric assessment indexed to body surface area. Moderate septal hypertrophy by 1D dimension. Normal LV mass by 3-D volumetric assessment indexed to body surface area. Moderate left ventricular systolic dysfunction. Moderate global hypokinesis with slight regional variation. 3-D LVEF 33% while patient receiving dobutamine infusion of 5mcg/kg/min during the scan.  2. Normal right ventricular size with mild right ventricular systolic dysfunction. Mild global hypokinesis. 3-D RVEF 46% while patient receiving dobutamine infusion of 5mcg/kg/min during the scan.   3. No significant valvular disease.  4. On EGE and LGE study, there there is focal areas of mild myocardial enhancement of the base to mid inferolateral wall, basal inferior wall and patchy areas of the septum. The appearance of the contrast enhancement in the form of a very mild focal areas suggest focal myocardial fibrosis likely from replacement fibrosis due to hypertrophy or possibly old healed myocarditis. There is no features of recent or old myocardial infarction. There is no features of infiltrative sarcoidosis or cardiac amyloidosis. All myocardial walls are viable.  5. Normal pleura and pericardium. There is no significant effusions.  6. 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 Sinotubular junction is normal at  37 mm. Ascending aorta is dilated at 44 x 43 mm. Aortic arch and descending  thoracic aorta are of normal size at 23 mm.     [facility]- 5/17/21 no significant CAD      [facility] 5/10/2023:   Speed 5200 RPM RA 7, PA 35/17 (23), PCW 11, PA saturation 74.6%,  MCC 3.9. Final speed 5400 RPM     [facility] 5/17/2021: PA 26/17/21, RA 10, PCWP 13, CI 1.76    PFT 7/10/24:   FEV1/FVC 44%  FEV1 0.95L (29%)  FVC  2.17 (50%)      PFT

## 2024-07-19 ENCOUNTER — ANTI-COAG VISIT (OUTPATIENT)
Age: 64
End: 2024-07-19

## 2024-07-19 ENCOUNTER — TELEPHONE (OUTPATIENT)
Age: 64
End: 2024-07-19

## 2024-07-19 ENCOUNTER — HOME CARE VISIT (OUTPATIENT)
Facility: HOME HEALTH | Age: 64
End: 2024-07-19
Payer: MEDICARE

## 2024-07-19 DIAGNOSIS — Z79.01 CHRONIC ANTICOAGULATION: Primary | ICD-10-CM

## 2024-07-19 LAB — INR BLD: 2.5

## 2024-07-19 PROCEDURE — G0299 HHS/HOSPICE OF RN EA 15 MIN: HCPCS

## 2024-07-19 NOTE — PROGRESS NOTES
ADVANCED HEART FAILURE CENTER  [facility] in [place], [place]      INR result reviewed with ADILENE Maurer NP who made the following recommendations (VORB): no changes and recheck 1 week. Patient notified and verbalized understanding. They had no further questions. Patient's home health nurse Archana also notified.  (See anticoag tracker)     Funmi Barth RN

## 2024-07-19 NOTE — TELEPHONE ENCOUNTER
1007: patient's wife called requesting clarification on which infusion center patient needs to go to for upcoming infusion appointment.       1009: returned call to patient and his wife. Informed patient his appointment is with [facility] at [place] and provided address to give to transportation. Patient verbalized understanding. He stated that last time he was in clinic he was told he own about $1000 on his account and inquired what that is from. Patient confirmed he has an active medicaid plan. Informed patient RN will look into bill and request LCSW review and return call to  him next week. He verbalized understanding.      07/22: discussed with LCSW MADYSON Shay. Per  she checked on patient's medicaid status and found it to be indicative. Per LCSW will plan to meet with patient at upcoming appointment 07/24 regarding addressing medicaid.    1546: placed call to patient. Informed the LCSW looked into his medicaid and found it to be inactive. Informed patient LCSW will plan to meet with him Wednesday to assist him in calling medicaid. Requested he bring all paperwork he has related to his medicaid including proof of income. He verbalized understanding.  Also informed him that staff will provide him contact information for billing department so that patient can inquire about current charges on his account. He verbalized understanding.

## 2024-07-19 NOTE — TELEPHONE ENCOUNTER
Patient discussed in medical review board. Confirmed with Dr. Gomez he is able to see patient 07/24 at 1pm to evaluate driveline and sternal abscess. Dr. Gillespie updated.     Placed call to patient. Informed of nurse visit in [facility] for patient to meet with Dr. Gomez at 1pm. He verbalized understanding

## 2024-07-21 VITALS
WEIGHT: 163 LBS | RESPIRATION RATE: 20 BRPM | BODY MASS INDEX: 24.79 KG/M2 | OXYGEN SATURATION: 90 % | TEMPERATURE: 98.4 F

## 2024-07-22 ENCOUNTER — ANTI-COAG VISIT (OUTPATIENT)
Age: 64
End: 2024-07-22

## 2024-07-22 ENCOUNTER — CLINICAL DOCUMENTATION (OUTPATIENT)
Age: 64
End: 2024-07-22

## 2024-07-22 ENCOUNTER — HOME CARE VISIT (OUTPATIENT)
Facility: HOME HEALTH | Age: 64
End: 2024-07-22
Payer: MEDICARE

## 2024-07-22 VITALS
BODY MASS INDEX: 25.09 KG/M2 | WEIGHT: 165 LBS | RESPIRATION RATE: 20 BRPM | OXYGEN SATURATION: 95 % | TEMPERATURE: 97.8 F

## 2024-07-22 DIAGNOSIS — Z79.01 CHRONIC ANTICOAGULATION: Primary | ICD-10-CM

## 2024-07-22 LAB — INR BLD: 2.5

## 2024-07-22 PROCEDURE — G0299 HHS/HOSPICE OF RN EA 15 MIN: HCPCS

## 2024-07-22 RX ORDER — WARFARIN SODIUM 1 MG/1
2 TABLET ORAL DAILY
Qty: 90 TABLET | Refills: 1 | Status: ON HOLD | OUTPATIENT
Start: 2024-07-22

## 2024-07-22 ASSESSMENT — ENCOUNTER SYMPTOMS
HEMOPTYSIS: 0
DYSPNEA ACTIVITY LEVEL: WHILE SPEAKING
HEMOPTYSIS: 0
DYSPNEA ACTIVITY LEVEL: WHILE SPEAKING

## 2024-07-22 NOTE — HOME HEALTH
Subjective: \"I'm doing ok- I didn't get to see Dr Gomez yesterday, so I'll have to go back next week\"  Falls since last visit: 0  Caregiver involvement changes: Ashley Swan - spouse  assists pt as needed when available  Home health supplies by type and quantity ordered/delivered this visit include: N/A         Clinician asked if patient has any new or changed medications and patient states: Yes, pt had appointment at [facility] on 7/18/24.  If Yes, were medications reconciled? Yes  Was the certifying physician notified of changes in medications? N/A            Clinical assessment (what this visit means for the patient overall and need for ongoing skilled care) and progress or lack of progress towards SPECIFIC goals: Pt alert, sitting  on sofa in living room. Pt's VSS, map 70,  O2 sat level 94% on room air at rest. Pt's weight today 163 lbs.  SN performed INR check via fingerstick - result 2.6.- reported to [facility]. 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    Pt remains afebrile and denies chills, body aches or other symptoms of sepsis.  SN peformed AG foam dressing change to sternal abscess site - see Wound Addendum for details.      Written Teaching Material Utilized: [facility] admission booklet           Interdisciplinary communication with:  [facility] regarding pt's INR results, weight, map and drainage from sternal abscess site          Discharge planning as follows: Pt may be discharged from [facility] when ordered by MD or pt request.

## 2024-07-22 NOTE — PROGRESS NOTES
ADVANCED HEART FAILURE CENTER  [facility] in [place], [place]      INR result reviewed with ADILENE Maurer NP who made the following recommendations (VORB): no changes  and recheck 1 week. Patient notified and verbalized understanding. They had no further questions. Patient's home health nurse MADYSON James also notified. (See anticoag tracker)     Requested Prescriptions     Signed Prescriptions Disp Refills    warfarin (COUMADIN) 1 MG tablet 90 tablet 1     Sig: Take 2 tablets by mouth daily May take more as directed by [facility].         Funmi Barth RN

## 2024-07-22 NOTE — HOME HEALTH
Subjective: \"I'm doing ok- not looking forward to surgery, if that's what they decide.\"  Falls since last visit: 0  Caregiver involvement changes: Ashley Swan - spouse  assists pt as needed when available  Home health supplies by type and quantity ordered/delivered this visit include: N/A        Clinician asked if patient has any new or changed medications and patient states: No  If Yes, were medications reconciled? Yes  Was the certifying physician notified of changes in medications? N/A            Clinical assessment (what this visit means for the patient overall and need for ongoing skilled care) and progress or lack of progress towards SPECIFIC goals: Pt alert, sitting  on sofa in living room. Pt's VSS, map 70,  O2 sat level 95% on room air at rest. Pt's weight up from 163 lbs on 7/19/24  to 165 lbs. SN notes no swelling in lower extremities but mild increase in SOB.  SN performed INR check via fingerstick- result 2.5- reported to [facility]. SN performed sterile LVAD driveline dressing change- noting grayish-green drainage on old dressing.  SN noted same drainage from sternal abscess site- -see Wound Addendum for details. Pt remains afebrile and denies chills, body aches or other symptoms of sepsis.  SN peformed sterile LVAD driveline dressing change - see Wound Addendum for details. SN margo weekly labs via venipuncture (no blood return from PICC line) - CBC with diff, CMP, LDH, Magnesium, PT/INR and Pro BNP.    Written Teaching Material Utilized: [facility] admission booklet           Interdisciplinary communication with:  [facility] regarding pt's INR results, weight, map and drainage from sternal abscess site          Discharge planning as follows: Pt may be discharged from [facility] when ordered by MD or pt request.

## 2024-07-22 NOTE — PROGRESS NOTES
RADHA met with LVAD RN this afternoon about pt insurance concern. LVAD RN explained that pt's Medicaid lapsed earlier this year. RN explained that pt has re-applied for Medicaid and believes his Medicaid is now active. RN notes that another provider's office checked pt's insurance status and found that pt only has active Medicare/Managed Medicare and no active Medicaid. Pt continues to state that he has active Medicaid.     RADHA called [place] Medicaid Provider Line, [phone] this afternoon. SW checked status of pt's [place] Medicaid for today's date (7/22/24). [place] Medicaid Provider Line found pt to be inactive with [place] Medicaid for 7/22/24.     Plan: 1) SW will reach out to pt to advise that he will need to reach back out to [place] [employer] regarding his Medicaid application and any pending tasks or documents needed.     Vane Shay LCSW  Clinical   [facility]  Advanced Heart Failure  [phone]

## 2024-07-23 LAB
ALBUMIN SERPL-MCNC: 4.3 G/DL (ref 3.9–4.9)
ALP SERPL-CCNC: 84 IU/L (ref 44–121)
ALT SERPL-CCNC: <5 IU/L (ref 0–44)
AST SERPL-CCNC: 21 IU/L (ref 0–40)
BASOPHILS # BLD AUTO: 0.2 X10E3/UL (ref 0–0.2)
BASOPHILS NFR BLD AUTO: 2 %
BILIRUB SERPL-MCNC: <0.2 MG/DL (ref 0–1.2)
BUN SERPL-MCNC: 27 MG/DL (ref 8–27)
BUN/CREAT SERPL: 20 (ref 10–24)
CALCIUM SERPL-MCNC: 9.2 MG/DL (ref 8.6–10.2)
CHLORIDE SERPL-SCNC: 101 MMOL/L (ref 96–106)
CO2 SERPL-SCNC: 21 MMOL/L (ref 20–29)
CREAT SERPL-MCNC: 1.33 MG/DL (ref 0.76–1.27)
EGFRCR SERPLBLD CKD-EPI 2021: 60 ML/MIN/1.73
EOSINOPHIL # BLD AUTO: 0.7 X10E3/UL (ref 0–0.4)
EOSINOPHIL NFR BLD AUTO: 7 %
ERYTHROCYTE [DISTWIDTH] IN BLOOD BY AUTOMATED COUNT: 16.8 % (ref 11.6–15.4)
GLOBULIN SER CALC-MCNC: 3.1 G/DL (ref 1.5–4.5)
GLUCOSE SERPL-MCNC: 87 MG/DL (ref 70–99)
HCT VFR BLD AUTO: 32.3 % (ref 37.5–51)
HGB BLD-MCNC: 10 G/DL (ref 13–17.7)
IMM GRANULOCYTES # BLD AUTO: 0.1 X10E3/UL (ref 0–0.1)
IMM GRANULOCYTES NFR BLD AUTO: 1 %
INR PPP: 1.9 (ref 0.9–1.2)
LDH SERPL L TO P-CCNC: 265 IU/L (ref 121–224)
LYMPHOCYTES # BLD AUTO: 1.4 X10E3/UL (ref 0.7–3.1)
LYMPHOCYTES NFR BLD AUTO: 13 %
MAGNESIUM SERPL-MCNC: 1.9 MG/DL (ref 1.6–2.3)
MCH RBC QN AUTO: 26.3 PG (ref 26.6–33)
MCHC RBC AUTO-ENTMCNC: 31 G/DL (ref 31.5–35.7)
MCV RBC AUTO: 85 FL (ref 79–97)
MONOCYTES # BLD AUTO: 0.9 X10E3/UL (ref 0.1–0.9)
MONOCYTES NFR BLD AUTO: 9 %
NEUTROPHILS # BLD AUTO: 7.3 X10E3/UL (ref 1.4–7)
NEUTROPHILS NFR BLD AUTO: 68 %
NT-PROBNP SERPL-MCNC: 1107 PG/ML (ref 0–210)
PLATELET # BLD AUTO: 371 X10E3/UL (ref 150–450)
POTASSIUM SERPL-SCNC: 4.3 MMOL/L (ref 3.5–5.2)
PROT SERPL-MCNC: 7.4 G/DL (ref 6–8.5)
PROTHROMBIN TIME: 20 SEC (ref 9.1–12)
RBC # BLD AUTO: 3.8 X10E6/UL (ref 4.14–5.8)
SODIUM SERPL-SCNC: 140 MMOL/L (ref 134–144)
WBC # BLD AUTO: 10.4 X10E3/UL (ref 3.4–10.8)

## 2024-07-24 ENCOUNTER — CLINICAL DOCUMENTATION (OUTPATIENT)
Age: 64
End: 2024-07-24

## 2024-07-24 ENCOUNTER — NURSE ONLY (OUTPATIENT)
Age: 64
End: 2024-07-24

## 2024-07-24 ENCOUNTER — TELEPHONE (OUTPATIENT)
Age: 64
End: 2024-07-24

## 2024-07-24 ENCOUNTER — HOSPITAL ENCOUNTER (INPATIENT)
Facility: HOSPITAL | Age: 64
LOS: 6 days | Discharge: HOME | DRG: 315 | End: 2024-07-30
Attending: INTERNAL MEDICINE | Admitting: INTERNAL MEDICINE
Payer: MEDICARE

## 2024-07-24 PROBLEM — T82.9XXA LEFT VENTRICULAR ASSIST DEVICE (LVAD) COMPLICATION, INITIAL ENCOUNTER: Status: ACTIVE | Noted: 2024-07-24

## 2024-07-24 PROCEDURE — 94640 AIRWAY INHALATION TREATMENT: CPT

## 2024-07-24 PROCEDURE — 6360000002 HC RX W HCPCS: Performed by: NURSE PRACTITIONER

## 2024-07-24 PROCEDURE — 2580000003 HC RX 258: Performed by: NURSE PRACTITIONER

## 2024-07-24 PROCEDURE — 2060000000 HC ICU INTERMEDIATE R&B

## 2024-07-24 RX ORDER — ARFORMOTEROL TARTRATE 15 UG/2ML
15 SOLUTION RESPIRATORY (INHALATION)
Status: DISCONTINUED | OUTPATIENT
Start: 2024-07-24 | End: 2024-07-26

## 2024-07-24 RX ORDER — BUDESONIDE 0.25 MG/2ML
0.25 INHALANT ORAL
Status: DISCONTINUED | OUTPATIENT
Start: 2024-07-24 | End: 2024-07-26

## 2024-07-24 RX ORDER — POTASSIUM CHLORIDE 29.8 MG/ML
20 INJECTION INTRAVENOUS PRN
Status: DISCONTINUED | OUTPATIENT
Start: 2024-07-24 | End: 2024-07-30 | Stop reason: HOSPADM

## 2024-07-24 RX ORDER — VITAMIN B COMPLEX
2000 TABLET ORAL DAILY
Status: DISCONTINUED | OUTPATIENT
Start: 2024-07-25 | End: 2024-07-30 | Stop reason: HOSPADM

## 2024-07-24 RX ORDER — ALBUTEROL SULFATE 0.83 MG/ML
2.5 SOLUTION RESPIRATORY (INHALATION) EVERY 6 HOURS PRN
Status: DISCONTINUED | OUTPATIENT
Start: 2024-07-24 | End: 2024-07-30 | Stop reason: HOSPADM

## 2024-07-24 RX ORDER — GENTAMICIN SULFATE 1 MG/G
OINTMENT TOPICAL DAILY
Status: DISCONTINUED | OUTPATIENT
Start: 2024-07-25 | End: 2024-07-30 | Stop reason: HOSPADM

## 2024-07-24 RX ORDER — DEXTROSE MONOHYDRATE 100 MG/ML
INJECTION, SOLUTION INTRAVENOUS CONTINUOUS PRN
Status: DISCONTINUED | OUTPATIENT
Start: 2024-07-24 | End: 2024-07-30 | Stop reason: HOSPADM

## 2024-07-24 RX ORDER — SODIUM CHLORIDE 0.9 % (FLUSH) 0.9 %
5-40 SYRINGE (ML) INJECTION PRN
Status: DISCONTINUED | OUTPATIENT
Start: 2024-07-24 | End: 2024-07-30 | Stop reason: HOSPADM

## 2024-07-24 RX ORDER — LACTOBACILLUS RHAMNOSUS GG 10B CELL
1 CAPSULE ORAL
Status: DISCONTINUED | OUTPATIENT
Start: 2024-07-25 | End: 2024-07-30 | Stop reason: HOSPADM

## 2024-07-24 RX ORDER — SODIUM CHLORIDE 9 MG/ML
INJECTION, SOLUTION INTRAVENOUS PRN
Status: DISCONTINUED | OUTPATIENT
Start: 2024-07-24 | End: 2024-07-30 | Stop reason: HOSPADM

## 2024-07-24 RX ORDER — ONDANSETRON 2 MG/ML
4 INJECTION INTRAMUSCULAR; INTRAVENOUS EVERY 6 HOURS PRN
Status: DISCONTINUED | OUTPATIENT
Start: 2024-07-24 | End: 2024-07-30 | Stop reason: HOSPADM

## 2024-07-24 RX ORDER — METOPROLOL SUCCINATE 50 MG/1
50 TABLET, EXTENDED RELEASE ORAL DAILY
Status: DISCONTINUED | OUTPATIENT
Start: 2024-07-25 | End: 2024-07-30 | Stop reason: HOSPADM

## 2024-07-24 RX ORDER — HYDRALAZINE HYDROCHLORIDE 20 MG/ML
10 INJECTION INTRAMUSCULAR; INTRAVENOUS EVERY 4 HOURS PRN
Status: DISCONTINUED | OUTPATIENT
Start: 2024-07-24 | End: 2024-07-30 | Stop reason: HOSPADM

## 2024-07-24 RX ORDER — LANOLIN ALCOHOL/MO/W.PET/CERES
400 CREAM (GRAM) TOPICAL 2 TIMES DAILY
Status: DISCONTINUED | OUTPATIENT
Start: 2024-07-24 | End: 2024-07-30 | Stop reason: HOSPADM

## 2024-07-24 RX ORDER — SODIUM CHLORIDE 0.9 % (FLUSH) 0.9 %
5-40 SYRINGE (ML) INJECTION EVERY 12 HOURS SCHEDULED
Status: DISCONTINUED | OUTPATIENT
Start: 2024-07-24 | End: 2024-07-30 | Stop reason: HOSPADM

## 2024-07-24 RX ORDER — MAGNESIUM SULFATE IN WATER 40 MG/ML
2000 INJECTION, SOLUTION INTRAVENOUS PRN
Status: DISCONTINUED | OUTPATIENT
Start: 2024-07-24 | End: 2024-07-30 | Stop reason: HOSPADM

## 2024-07-24 RX ORDER — PANTOPRAZOLE SODIUM 40 MG/1
40 TABLET, DELAYED RELEASE ORAL
Status: DISCONTINUED | OUTPATIENT
Start: 2024-07-25 | End: 2024-07-30 | Stop reason: HOSPADM

## 2024-07-24 RX ORDER — ACETAMINOPHEN 325 MG/1
650 TABLET ORAL EVERY 4 HOURS PRN
Status: DISCONTINUED | OUTPATIENT
Start: 2024-07-24 | End: 2024-07-30 | Stop reason: HOSPADM

## 2024-07-24 RX ORDER — ALLOPURINOL 100 MG/1
50 TABLET ORAL DAILY
Status: DISCONTINUED | OUTPATIENT
Start: 2024-07-24 | End: 2024-07-30 | Stop reason: HOSPADM

## 2024-07-24 RX ORDER — PRAVASTATIN SODIUM 40 MG
40 TABLET ORAL EVERY EVENING
Status: DISCONTINUED | OUTPATIENT
Start: 2024-07-24 | End: 2024-07-30 | Stop reason: HOSPADM

## 2024-07-24 RX ORDER — CEFAZOLIN SODIUM 500 MG/2.2ML
2000 INJECTION, POWDER, FOR SOLUTION INTRAMUSCULAR; INTRAVENOUS EVERY 8 HOURS
Status: DISCONTINUED | OUTPATIENT
Start: 2024-07-24 | End: 2024-07-24

## 2024-07-24 RX ORDER — POLYETHYLENE GLYCOL 3350 17 G/17G
17 POWDER, FOR SOLUTION ORAL DAILY PRN
Status: DISCONTINUED | OUTPATIENT
Start: 2024-07-24 | End: 2024-07-30 | Stop reason: HOSPADM

## 2024-07-24 RX ADMIN — IPRATROPIUM BROMIDE 0.5 MG: 0.5 SOLUTION RESPIRATORY (INHALATION) at 21:20

## 2024-07-24 RX ADMIN — SODIUM CHLORIDE, PRESERVATIVE FREE 10 ML: 5 INJECTION INTRAVENOUS at 21:50

## 2024-07-24 RX ADMIN — BUDESONIDE 250 MCG: 0.25 INHALANT RESPIRATORY (INHALATION) at 21:20

## 2024-07-24 RX ADMIN — WATER 2000 MG: 1 INJECTION INTRAMUSCULAR; INTRAVENOUS; SUBCUTANEOUS at 21:49

## 2024-07-24 RX ADMIN — ARFORMOTEROL TARTRATE 15 MCG: 15 SOLUTION RESPIRATORY (INHALATION) at 21:20

## 2024-07-24 NOTE — TELEPHONE ENCOUNTER
Dr. Gomez notified of last POC INR 2.5, Dr. Gomez stated patient should be kept NPO, plan to proceed with surgical debridement tomorrow.      Discussed with Dr. Gillespie who stated VORB confirmed patient should stay NPO after midnight, hold coumadin tonight. Also discussed with ADILENE Maurer who stated VORB request bed for direct admission tonight in preparation for surgery tomorrow. Dr. Gillespie and ADILENE Maurer notified of last POC INR of 2.5, venous sample of same date 1.9.     1611: call placed to [facility] Access center, spoke with Shahnaz RAMOS to request bed for direct admission.      1612:Placed call to patient. Confirmed with him that Dr. Gomez does want to proceed with Surgery tomorrow. Patient verbalized understanding. Discussed it will be very difficult to arrange a medicaid ride prior to surgery tomorrow and need for patient to present for direct admission tonight. Patient verbalized understanding, states his wife can drive him to the hospital this evening. Informed patient he should hold coumadin tonight and remain NPO after midnight. He verbalized understanding.     1650: Spoke with access center RN who stated patient will have bed assignment tonight however they are waiting on exact room assignment.     1653: Placed call to patient, left voicemail informing him access center will have a bed tonight and the access center will call him directly with a bed assignment.      1700: received call from Access center RN Shahnaz who stated patient is assigned to bed on [facility]. Shahnaz stated nursing supervisor will call patient directly with bed assignment.

## 2024-07-24 NOTE — PATIENT INSTRUCTIONS
sodium. Choose products with low Percent Daily Values for sodium.  Be aware that sodium can come in forms other than salt, including monosodium glutamate (MSG), sodium citrate, and sodium bicarbonate (baking soda). MSG is often added to Asian food. When you eat out, you can sometimes ask for food without MSG or added salt.  Buy low-sodium foods  Buy foods that are labeled \"unsalted\" (no salt added), \"sodium-free\" (less than 5 mg of sodium per serving), or \"low-sodium\" (140 mg or less of sodium per serving). Foods labeled \"reduced-sodium\" and \"light sodium\" may still have too much sodium. Be sure to read the label to see how much sodium you are getting.  Buy fresh vegetables, or frozen vegetables without added sauces. Buy low-sodium versions of canned vegetables, soups, and other canned goods.  Prepare low-sodium meals  Cut back on the amount of salt you use in cooking. This will help you adjust to the taste. Do not add salt after cooking. One teaspoon of salt has about 2,300 mg of sodium.  Take the salt shaker off the table.  Flavor your food with garlic, lemon juice, onion, vinegar, herbs, and spices. Do not use soy sauce, lite soy sauce, steak sauce, onion salt, garlic salt, celery salt, or ketchup on your food.  Use low-sodium salad dressings, sauces, and ketchup. Or make your own salad dressings and sauces without adding salt.  Use less salt (or none) when recipes call for it. You can often use half the salt a recipe calls for without losing flavor. Other foods such as rice, pasta, and grains do not need added salt.  Rinse canned vegetables, and cook them in fresh water. This removes some--but not all--of the salt.  Avoid water that is naturally high in sodium or that has been treated with water softeners, which add sodium. If you buy bottled water, read the label and choose a sodium-free brand.  Avoid high-sodium foods  Avoid eating:  Smoked, cured, salted, and canned meat, fish, and poultry.  Ham, emerson, hot

## 2024-07-24 NOTE — PROGRESS NOTES
, MADYSON Avina, [phone] for further details about his spend-down and Medicaid coverage history. Pt and significant other verbalized understanding of this information and explained that they will complete and return spend-down form.     LVAD team also advised that pt call to notify [facility] Billing Office to notify the office about his pending Medicaid spend-down.     Plan:  1) SW will reach out to MADYSON Stahl DSS, CM about pt's Medicaid spend-down case.   2) SW will continue to monitor and assist with psychosocial needs as they arise.     Vane Shay LCSW  Clinical   [facility]  [facility]  Advanced Heart Failure  [phone]

## 2024-07-24 NOTE — PROGRESS NOTES
ADVANCED HEART FAILURE CENTER  [facility] in [place], [place]  LVAD Outpatient Nurse Visit    Chief Complaint   Patient presents with    Other     Nurse visit- Patricia to Eval Driveline, social work visit for insurance       There were no vitals taken for this visit.     Sanford Joiner Sr. is a 64 y.o. male with a history of NICM with Heartmate 3 implant date of 05/27/2023.     Patient was seen in clinic  for planned evaluation of driveline and sternal abscess by Dr. Gomez and meeting with social work.        Patient presented to clinic with his wife, met with  to review Medicaid status.     Dr. Gomez was unable to see patient due to being in surgery. Spoke with Dr. Gomez over the phone and reviewed sternal abscess which is draining thick purulent drainage. Dr. Gomez stated VORB to admit patient and schedule for debridement of sternal abscess tomorrow 07/25.     Notified Dr. Gillespie of request for admission from Dr. Gomez. Dr Gillespie spoke with patient and his wife in clinic room. Dr. Gillespie notified patient that Dr. Gomez would like patient admitted for surgical intervention tomorrow. Patient refused admission this evening however stated that he can present to pre-op early tomorrow morning. Dr. Gillespie discussed with patient that he may need heparin bridge prior to surgery.  Patient verbalized understanding.      Per Dr. Gillespie VORB okay for patient to return home at this time, will plan to clarify coumadin wash out time with Dr. Gomez and notify patient today of surgical plan.      All instructions placed in after visit summary and reviewed with patient. Education provided on plan Time given to ask questions. Patient verbalized understanding and had no further questions.       Funmi Barth RN

## 2024-07-25 ENCOUNTER — ANESTHESIA EVENT (OUTPATIENT)
Facility: HOSPITAL | Age: 64
End: 2024-07-25
Payer: MEDICARE

## 2024-07-25 ENCOUNTER — ANESTHESIA (OUTPATIENT)
Facility: HOSPITAL | Age: 64
End: 2024-07-25
Payer: MEDICARE

## 2024-07-25 ENCOUNTER — TELEPHONE (OUTPATIENT)
Age: 64
End: 2024-07-25

## 2024-07-25 ENCOUNTER — HOME CARE VISIT (OUTPATIENT)
Dept: HOME HEALTH SERVICES | Facility: HOME HEALTH | Age: 64
End: 2024-07-25
Payer: MEDICARE

## 2024-07-25 LAB
ABO + RH BLD: NORMAL
ALBUMIN SERPL-MCNC: 3.6 G/DL (ref 3.5–5)
ALBUMIN/GLOB SERPL: 0.8 (ref 1.1–2.2)
ALP SERPL-CCNC: 83 U/L (ref 45–117)
ALT SERPL-CCNC: 9 U/L (ref 12–78)
ANION GAP SERPL CALC-SCNC: 7 MMOL/L (ref 5–15)
AST SERPL-CCNC: 28 U/L (ref 15–37)
BILIRUB DIRECT SERPL-MCNC: <0.1 MG/DL (ref 0–0.2)
BILIRUB SERPL-MCNC: 0.1 MG/DL (ref 0.2–1)
BLOOD GROUP ANTIBODIES SERPL: NORMAL
BUN SERPL-MCNC: 34 MG/DL (ref 6–20)
BUN/CREAT SERPL: 16 (ref 12–20)
CALCIUM SERPL-MCNC: 9.2 MG/DL (ref 8.5–10.1)
CHLORIDE SERPL-SCNC: 106 MMOL/L (ref 97–108)
CO2 SERPL-SCNC: 22 MMOL/L (ref 21–32)
CREAT SERPL-MCNC: 2.07 MG/DL (ref 0.7–1.3)
ERYTHROCYTE [DISTWIDTH] IN BLOOD BY AUTOMATED COUNT: 18.5 % (ref 11.5–14.5)
GLOBULIN SER CALC-MCNC: 4.5 G/DL (ref 2–4)
GLUCOSE BLD STRIP.AUTO-MCNC: 99 MG/DL (ref 65–117)
GLUCOSE SERPL-MCNC: 98 MG/DL (ref 65–100)
HCT VFR BLD AUTO: 30.7 % (ref 36.6–50.3)
HGB BLD-MCNC: 9.7 G/DL (ref 12.1–17)
INR PPP: 2.4 (ref 0.9–1.1)
LDH SERPL L TO P-CCNC: 335 U/L (ref 85–241)
MAGNESIUM SERPL-MCNC: 2.1 MG/DL (ref 1.6–2.4)
MCH RBC QN AUTO: 26.6 PG (ref 26–34)
MCHC RBC AUTO-ENTMCNC: 31.6 G/DL (ref 30–36.5)
MCV RBC AUTO: 84.3 FL (ref 80–99)
NRBC # BLD: 0 K/UL (ref 0–0.01)
NRBC BLD-RTO: 0 PER 100 WBC
PLATELET # BLD AUTO: 338 K/UL (ref 150–400)
PMV BLD AUTO: 11.2 FL (ref 8.9–12.9)
POTASSIUM SERPL-SCNC: 4 MMOL/L (ref 3.5–5.1)
PROT SERPL-MCNC: 8.1 G/DL (ref 6.4–8.2)
PROTHROMBIN TIME: 23.3 SEC (ref 9–11.1)
RBC # BLD AUTO: 3.64 M/UL (ref 4.1–5.7)
SERVICE CMNT-IMP: NORMAL
SODIUM SERPL-SCNC: 135 MMOL/L (ref 136–145)
SPECIMEN EXP DATE BLD: NORMAL
WBC # BLD AUTO: 9.7 K/UL (ref 4.1–11.1)

## 2024-07-25 PROCEDURE — 87147 CULTURE TYPE IMMUNOLOGIC: CPT

## 2024-07-25 PROCEDURE — 6370000000 HC RX 637 (ALT 250 FOR IP): Performed by: NURSE PRACTITIONER

## 2024-07-25 PROCEDURE — 2W14X6Z COMPRESSION OF CHEST WALL USING PRESSURE DRESSING: ICD-10-PCS | Performed by: THORACIC SURGERY (CARDIOTHORACIC VASCULAR SURGERY)

## 2024-07-25 PROCEDURE — 99222 1ST HOSP IP/OBS MODERATE 55: CPT | Performed by: INTERNAL MEDICINE

## 2024-07-25 PROCEDURE — 2700000000 HC OXYGEN THERAPY PER DAY

## 2024-07-25 PROCEDURE — 87040 BLOOD CULTURE FOR BACTERIA: CPT

## 2024-07-25 PROCEDURE — 83615 LACTATE (LD) (LDH) ENZYME: CPT

## 2024-07-25 PROCEDURE — C1713 ANCHOR/SCREW BN/BN,TIS/BN: HCPCS | Performed by: THORACIC SURGERY (CARDIOTHORACIC VASCULAR SURGERY)

## 2024-07-25 PROCEDURE — 6360000002 HC RX W HCPCS: Performed by: NURSE PRACTITIONER

## 2024-07-25 PROCEDURE — 0PD00ZZ EXTRACTION OF STERNUM, OPEN APPROACH: ICD-10-PCS | Performed by: THORACIC SURGERY (CARDIOTHORACIC VASCULAR SURGERY)

## 2024-07-25 PROCEDURE — 2580000003 HC RX 258

## 2024-07-25 PROCEDURE — 94760 N-INVAS EAR/PLS OXIMETRY 1: CPT

## 2024-07-25 PROCEDURE — 87186 SC STD MICRODIL/AGAR DIL: CPT

## 2024-07-25 PROCEDURE — 36415 COLL VENOUS BLD VENIPUNCTURE: CPT

## 2024-07-25 PROCEDURE — 10180 I&D COMPLEX PO WOUND INFCTJ: CPT | Performed by: THORACIC SURGERY (CARDIOTHORACIC VASCULAR SURGERY)

## 2024-07-25 PROCEDURE — 97165 OT EVAL LOW COMPLEX 30 MIN: CPT

## 2024-07-25 PROCEDURE — 2500000003 HC RX 250 WO HCPCS

## 2024-07-25 PROCEDURE — 80076 HEPATIC FUNCTION PANEL: CPT

## 2024-07-25 PROCEDURE — 6360000002 HC RX W HCPCS: Performed by: THORACIC SURGERY (CARDIOTHORACIC VASCULAR SURGERY)

## 2024-07-25 PROCEDURE — 2060000000 HC ICU INTERMEDIATE R&B

## 2024-07-25 PROCEDURE — 97530 THERAPEUTIC ACTIVITIES: CPT

## 2024-07-25 PROCEDURE — 6360000002 HC RX W HCPCS

## 2024-07-25 PROCEDURE — 3600000018 HC SURGERY OHS ADDTL 15MIN: Performed by: THORACIC SURGERY (CARDIOTHORACIC VASCULAR SURGERY)

## 2024-07-25 PROCEDURE — 86900 BLOOD TYPING SEROLOGIC ABO: CPT

## 2024-07-25 PROCEDURE — 87205 SMEAR GRAM STAIN: CPT

## 2024-07-25 PROCEDURE — 2580000003 HC RX 258: Performed by: NURSE PRACTITIONER

## 2024-07-25 PROCEDURE — 87077 CULTURE AEROBIC IDENTIFY: CPT

## 2024-07-25 PROCEDURE — 3700000001 HC ADD 15 MINUTES (ANESTHESIA): Performed by: THORACIC SURGERY (CARDIOTHORACIC VASCULAR SURGERY)

## 2024-07-25 PROCEDURE — 86901 BLOOD TYPING SEROLOGIC RH(D): CPT

## 2024-07-25 PROCEDURE — 3600000008 HC SURGERY OHS BASE: Performed by: THORACIC SURGERY (CARDIOTHORACIC VASCULAR SURGERY)

## 2024-07-25 PROCEDURE — 86850 RBC ANTIBODY SCREEN: CPT

## 2024-07-25 PROCEDURE — 6360000002 HC RX W HCPCS: Performed by: ANESTHESIOLOGY

## 2024-07-25 PROCEDURE — 3700000000 HC ANESTHESIA ATTENDED CARE: Performed by: THORACIC SURGERY (CARDIOTHORACIC VASCULAR SURGERY)

## 2024-07-25 PROCEDURE — 80048 BASIC METABOLIC PNL TOTAL CA: CPT

## 2024-07-25 PROCEDURE — 87102 FUNGUS ISOLATION CULTURE: CPT

## 2024-07-25 PROCEDURE — 87075 CULTR BACTERIA EXCEPT BLOOD: CPT

## 2024-07-25 PROCEDURE — APPNB60 APP NON BILLABLE TIME 46-60 MINS: Performed by: NURSE PRACTITIONER

## 2024-07-25 PROCEDURE — 83735 ASSAY OF MAGNESIUM: CPT

## 2024-07-25 PROCEDURE — 6370000000 HC RX 637 (ALT 250 FOR IP): Performed by: PHYSICIAN ASSISTANT

## 2024-07-25 PROCEDURE — 0HB5XZZ EXCISION OF CHEST SKIN, EXTERNAL APPROACH: ICD-10-PCS | Performed by: THORACIC SURGERY (CARDIOTHORACIC VASCULAR SURGERY)

## 2024-07-25 PROCEDURE — 87070 CULTURE OTHR SPECIMN AEROBIC: CPT

## 2024-07-25 PROCEDURE — 7100000000 HC PACU RECOVERY - FIRST 15 MIN: Performed by: THORACIC SURGERY (CARDIOTHORACIC VASCULAR SURGERY)

## 2024-07-25 PROCEDURE — 7100000001 HC PACU RECOVERY - ADDTL 15 MIN: Performed by: THORACIC SURGERY (CARDIOTHORACIC VASCULAR SURGERY)

## 2024-07-25 PROCEDURE — 85610 PROTHROMBIN TIME: CPT

## 2024-07-25 PROCEDURE — 93750 INTERROGATION VAD IN PERSON: CPT | Performed by: INTERNAL MEDICINE

## 2024-07-25 PROCEDURE — 2720000010 HC SURG SUPPLY STERILE: Performed by: THORACIC SURGERY (CARDIOTHORACIC VASCULAR SURGERY)

## 2024-07-25 PROCEDURE — 2709999900 HC NON-CHARGEABLE SUPPLY: Performed by: THORACIC SURGERY (CARDIOTHORACIC VASCULAR SURGERY)

## 2024-07-25 PROCEDURE — 85027 COMPLETE CBC AUTOMATED: CPT

## 2024-07-25 PROCEDURE — 82962 GLUCOSE BLOOD TEST: CPT

## 2024-07-25 PROCEDURE — 97161 PT EVAL LOW COMPLEX 20 MIN: CPT

## 2024-07-25 PROCEDURE — 10180 I&D COMPLEX PO WOUND INFCTJ: CPT | Performed by: PHYSICIAN ASSISTANT

## 2024-07-25 PROCEDURE — 6370000000 HC RX 637 (ALT 250 FOR IP): Performed by: THORACIC SURGERY (CARDIOTHORACIC VASCULAR SURGERY)

## 2024-07-25 RX ORDER — SODIUM CHLORIDE 9 MG/ML
INJECTION, SOLUTION INTRAVENOUS CONTINUOUS
Status: CANCELLED | OUTPATIENT
Start: 2024-07-25

## 2024-07-25 RX ORDER — PROPOFOL 10 MG/ML
INJECTION, EMULSION INTRAVENOUS PRN
Status: DISCONTINUED | OUTPATIENT
Start: 2024-07-25 | End: 2024-07-25 | Stop reason: SDUPTHER

## 2024-07-25 RX ORDER — NALOXONE HYDROCHLORIDE 0.4 MG/ML
INJECTION, SOLUTION INTRAMUSCULAR; INTRAVENOUS; SUBCUTANEOUS PRN
Status: DISCONTINUED | OUTPATIENT
Start: 2024-07-25 | End: 2024-07-25 | Stop reason: HOSPADM

## 2024-07-25 RX ORDER — FENTANYL CITRATE 50 UG/ML
INJECTION, SOLUTION INTRAMUSCULAR; INTRAVENOUS PRN
Status: DISCONTINUED | OUTPATIENT
Start: 2024-07-25 | End: 2024-07-25 | Stop reason: SDUPTHER

## 2024-07-25 RX ORDER — MIDAZOLAM HYDROCHLORIDE 1 MG/ML
INJECTION INTRAMUSCULAR; INTRAVENOUS PRN
Status: DISCONTINUED | OUTPATIENT
Start: 2024-07-25 | End: 2024-07-25 | Stop reason: SDUPTHER

## 2024-07-25 RX ORDER — LIDOCAINE HYDROCHLORIDE 20 MG/ML
INJECTION, SOLUTION EPIDURAL; INFILTRATION; INTRACAUDAL; PERINEURAL PRN
Status: DISCONTINUED | OUTPATIENT
Start: 2024-07-25 | End: 2024-07-25 | Stop reason: SDUPTHER

## 2024-07-25 RX ORDER — PHENYLEPHRINE HCL IN 0.9% NACL 0.4MG/10ML
SYRINGE (ML) INTRAVENOUS PRN
Status: DISCONTINUED | OUTPATIENT
Start: 2024-07-25 | End: 2024-07-25 | Stop reason: SDUPTHER

## 2024-07-25 RX ORDER — SODIUM CHLORIDE 9 MG/ML
INJECTION, SOLUTION INTRAVENOUS PRN
Status: DISCONTINUED | OUTPATIENT
Start: 2024-07-25 | End: 2024-07-25 | Stop reason: HOSPADM

## 2024-07-25 RX ORDER — ONDANSETRON 2 MG/ML
4 INJECTION INTRAMUSCULAR; INTRAVENOUS
Status: DISCONTINUED | OUTPATIENT
Start: 2024-07-25 | End: 2024-07-25 | Stop reason: HOSPADM

## 2024-07-25 RX ORDER — MIDAZOLAM HYDROCHLORIDE 2 MG/2ML
2 INJECTION, SOLUTION INTRAMUSCULAR; INTRAVENOUS
Status: CANCELLED | OUTPATIENT
Start: 2024-07-25 | End: 2024-07-26

## 2024-07-25 RX ORDER — DIPHENHYDRAMINE HYDROCHLORIDE 50 MG/ML
12.5 INJECTION INTRAMUSCULAR; INTRAVENOUS
Status: DISCONTINUED | OUTPATIENT
Start: 2024-07-25 | End: 2024-07-25 | Stop reason: HOSPADM

## 2024-07-25 RX ORDER — SODIUM CHLORIDE 0.9 % (FLUSH) 0.9 %
5-40 SYRINGE (ML) INJECTION PRN
Status: CANCELLED | OUTPATIENT
Start: 2024-07-25

## 2024-07-25 RX ORDER — SODIUM CHLORIDE, SODIUM LACTATE, POTASSIUM CHLORIDE, CALCIUM CHLORIDE 600; 310; 30; 20 MG/100ML; MG/100ML; MG/100ML; MG/100ML
INJECTION, SOLUTION INTRAVENOUS CONTINUOUS
Status: CANCELLED | OUTPATIENT
Start: 2024-07-25

## 2024-07-25 RX ORDER — HYDROMORPHONE HYDROCHLORIDE 1 MG/ML
0.5 INJECTION, SOLUTION INTRAMUSCULAR; INTRAVENOUS; SUBCUTANEOUS EVERY 5 MIN PRN
Status: DISCONTINUED | OUTPATIENT
Start: 2024-07-25 | End: 2024-07-25 | Stop reason: HOSPADM

## 2024-07-25 RX ORDER — SODIUM CHLORIDE 0.9 % (FLUSH) 0.9 %
5-40 SYRINGE (ML) INJECTION EVERY 12 HOURS SCHEDULED
Status: DISCONTINUED | OUTPATIENT
Start: 2024-07-25 | End: 2024-07-25 | Stop reason: HOSPADM

## 2024-07-25 RX ORDER — WARFARIN SODIUM 1 MG/1
1 TABLET ORAL
Status: COMPLETED | OUTPATIENT
Start: 2024-07-25 | End: 2024-07-25

## 2024-07-25 RX ORDER — SODIUM CHLORIDE 9 MG/ML
INJECTION, SOLUTION INTRAVENOUS PRN
Status: CANCELLED | OUTPATIENT
Start: 2024-07-25

## 2024-07-25 RX ORDER — CEFAZOLIN SODIUM 1 G/3ML
INJECTION, POWDER, FOR SOLUTION INTRAMUSCULAR; INTRAVENOUS PRN
Status: DISCONTINUED | OUTPATIENT
Start: 2024-07-25 | End: 2024-07-25 | Stop reason: ALTCHOICE

## 2024-07-25 RX ORDER — MIDAZOLAM HYDROCHLORIDE 2 MG/2ML
2 INJECTION, SOLUTION INTRAMUSCULAR; INTRAVENOUS
Status: DISCONTINUED | OUTPATIENT
Start: 2024-07-25 | End: 2024-07-25 | Stop reason: HOSPADM

## 2024-07-25 RX ORDER — ONDANSETRON 2 MG/ML
INJECTION INTRAMUSCULAR; INTRAVENOUS PRN
Status: DISCONTINUED | OUTPATIENT
Start: 2024-07-25 | End: 2024-07-25 | Stop reason: SDUPTHER

## 2024-07-25 RX ORDER — SODIUM CHLORIDE 0.9 % (FLUSH) 0.9 %
5-40 SYRINGE (ML) INJECTION PRN
Status: DISCONTINUED | OUTPATIENT
Start: 2024-07-25 | End: 2024-07-25 | Stop reason: HOSPADM

## 2024-07-25 RX ORDER — OXYCODONE HYDROCHLORIDE 5 MG/1
5 TABLET ORAL EVERY 4 HOURS PRN
Status: DISCONTINUED | OUTPATIENT
Start: 2024-07-25 | End: 2024-07-30 | Stop reason: HOSPADM

## 2024-07-25 RX ORDER — HYDRALAZINE HYDROCHLORIDE 20 MG/ML
10 INJECTION INTRAMUSCULAR; INTRAVENOUS
Status: DISCONTINUED | OUTPATIENT
Start: 2024-07-25 | End: 2024-07-25 | Stop reason: HOSPADM

## 2024-07-25 RX ORDER — FENTANYL CITRATE 50 UG/ML
100 INJECTION, SOLUTION INTRAMUSCULAR; INTRAVENOUS
Status: CANCELLED | OUTPATIENT
Start: 2024-07-25 | End: 2024-07-26

## 2024-07-25 RX ORDER — DEXAMETHASONE SODIUM PHOSPHATE 4 MG/ML
INJECTION, SOLUTION INTRA-ARTICULAR; INTRALESIONAL; INTRAMUSCULAR; INTRAVENOUS; SOFT TISSUE PRN
Status: DISCONTINUED | OUTPATIENT
Start: 2024-07-25 | End: 2024-07-25 | Stop reason: SDUPTHER

## 2024-07-25 RX ORDER — ONDANSETRON 2 MG/ML
4 INJECTION INTRAMUSCULAR; INTRAVENOUS ONCE
Status: CANCELLED | OUTPATIENT
Start: 2024-07-25 | End: 2024-07-25

## 2024-07-25 RX ORDER — PROCHLORPERAZINE EDISYLATE 5 MG/ML
5 INJECTION INTRAMUSCULAR; INTRAVENOUS
Status: DISCONTINUED | OUTPATIENT
Start: 2024-07-25 | End: 2024-07-25 | Stop reason: HOSPADM

## 2024-07-25 RX ORDER — ACETAMINOPHEN 325 MG/1
975 TABLET ORAL EVERY 6 HOURS
Status: DISCONTINUED | OUTPATIENT
Start: 2024-07-25 | End: 2024-07-30 | Stop reason: HOSPADM

## 2024-07-25 RX ORDER — FENTANYL CITRATE 50 UG/ML
50 INJECTION, SOLUTION INTRAMUSCULAR; INTRAVENOUS EVERY 5 MIN PRN
Status: COMPLETED | OUTPATIENT
Start: 2024-07-25 | End: 2024-07-25

## 2024-07-25 RX ORDER — ROCURONIUM BROMIDE 10 MG/ML
INJECTION, SOLUTION INTRAVENOUS PRN
Status: DISCONTINUED | OUTPATIENT
Start: 2024-07-25 | End: 2024-07-25 | Stop reason: SDUPTHER

## 2024-07-25 RX ORDER — SODIUM CHLORIDE, SODIUM LACTATE, POTASSIUM CHLORIDE, CALCIUM CHLORIDE 600; 310; 30; 20 MG/100ML; MG/100ML; MG/100ML; MG/100ML
INJECTION, SOLUTION INTRAVENOUS CONTINUOUS
Status: DISCONTINUED | OUTPATIENT
Start: 2024-07-25 | End: 2024-07-25 | Stop reason: HOSPADM

## 2024-07-25 RX ORDER — SODIUM CHLORIDE, SODIUM LACTATE, POTASSIUM CHLORIDE, CALCIUM CHLORIDE 600; 310; 30; 20 MG/100ML; MG/100ML; MG/100ML; MG/100ML
INJECTION, SOLUTION INTRAVENOUS CONTINUOUS PRN
Status: DISCONTINUED | OUTPATIENT
Start: 2024-07-25 | End: 2024-07-25 | Stop reason: SDUPTHER

## 2024-07-25 RX ORDER — SODIUM CHLORIDE 0.9 % (FLUSH) 0.9 %
5-40 SYRINGE (ML) INJECTION EVERY 12 HOURS SCHEDULED
Status: CANCELLED | OUTPATIENT
Start: 2024-07-25

## 2024-07-25 RX ORDER — SUCCINYLCHOLINE/SOD CL,ISO/PF 200MG/10ML
SYRINGE (ML) INTRAVENOUS PRN
Status: DISCONTINUED | OUTPATIENT
Start: 2024-07-25 | End: 2024-07-25 | Stop reason: SDUPTHER

## 2024-07-25 RX ADMIN — WATER 2000 MG: 1 INJECTION INTRAMUSCULAR; INTRAVENOUS; SUBCUTANEOUS at 13:23

## 2024-07-25 RX ADMIN — PROPOFOL 10 MG: 10 INJECTION, EMULSION INTRAVENOUS at 08:40

## 2024-07-25 RX ADMIN — WATER 2000 MG: 1 INJECTION INTRAMUSCULAR; INTRAVENOUS; SUBCUTANEOUS at 05:30

## 2024-07-25 RX ADMIN — ONDANSETRON 4 MG: 2 INJECTION INTRAMUSCULAR; INTRAVENOUS at 07:53

## 2024-07-25 RX ADMIN — Medication 40 MCG: at 07:55

## 2024-07-25 RX ADMIN — PHENYLEPHRINE HYDROCHLORIDE 50 MCG/MIN: 10 INJECTION INTRAVENOUS at 08:03

## 2024-07-25 RX ADMIN — FENTANYL CITRATE 25 MCG: 50 INJECTION, SOLUTION INTRAMUSCULAR; INTRAVENOUS at 08:08

## 2024-07-25 RX ADMIN — PROPOFOL 10 MG: 10 INJECTION, EMULSION INTRAVENOUS at 08:46

## 2024-07-25 RX ADMIN — Medication 80 MCG: at 08:20

## 2024-07-25 RX ADMIN — HYDRALAZINE HYDROCHLORIDE 75 MG: 50 TABLET ORAL at 05:25

## 2024-07-25 RX ADMIN — ACETAMINOPHEN 975 MG: 325 TABLET ORAL at 10:57

## 2024-07-25 RX ADMIN — Medication 120 MG: at 07:42

## 2024-07-25 RX ADMIN — MIDAZOLAM 2 MG: 1 INJECTION INTRAMUSCULAR; INTRAVENOUS at 07:28

## 2024-07-25 RX ADMIN — Medication 80 MCG: at 08:08

## 2024-07-25 RX ADMIN — Medication 2000 UNITS: at 10:57

## 2024-07-25 RX ADMIN — Medication 80 MCG: at 08:21

## 2024-07-25 RX ADMIN — LIDOCAINE HYDROCHLORIDE 80 MG: 20 INJECTION, SOLUTION EPIDURAL; INFILTRATION; INTRACAUDAL; PERINEURAL at 07:41

## 2024-07-25 RX ADMIN — Medication 40 MCG: at 07:58

## 2024-07-25 RX ADMIN — SODIUM CHLORIDE, POTASSIUM CHLORIDE, SODIUM LACTATE AND CALCIUM CHLORIDE: 600; 310; 30; 20 INJECTION, SOLUTION INTRAVENOUS at 07:28

## 2024-07-25 RX ADMIN — MAGNESIUM OXIDE TAB 400 MG (241.3 MG ELEMENTAL MG) 400 MG: 400 (241.3 MG) TAB at 10:57

## 2024-07-25 RX ADMIN — MAGNESIUM OXIDE TAB 400 MG (241.3 MG ELEMENTAL MG) 400 MG: 400 (241.3 MG) TAB at 23:54

## 2024-07-25 RX ADMIN — PROPOFOL 20 MG: 10 INJECTION, EMULSION INTRAVENOUS at 08:10

## 2024-07-25 RX ADMIN — Medication 1 CAPSULE: at 10:59

## 2024-07-25 RX ADMIN — Medication 40 MCG: at 07:41

## 2024-07-25 RX ADMIN — PROPOFOL 80 MG: 10 INJECTION, EMULSION INTRAVENOUS at 07:41

## 2024-07-25 RX ADMIN — WATER 2000 MG: 1 INJECTION INTRAMUSCULAR; INTRAVENOUS; SUBCUTANEOUS at 23:54

## 2024-07-25 RX ADMIN — FENTANYL CITRATE 50 MCG: 50 INJECTION INTRAMUSCULAR; INTRAVENOUS at 09:34

## 2024-07-25 RX ADMIN — ROCURONIUM BROMIDE 5 MG: 10 INJECTION, SOLUTION INTRAVENOUS at 07:41

## 2024-07-25 RX ADMIN — METOPROLOL SUCCINATE 50 MG: 50 TABLET, EXTENDED RELEASE ORAL at 10:57

## 2024-07-25 RX ADMIN — Medication 120 MCG: at 08:03

## 2024-07-25 RX ADMIN — FENTANYL CITRATE 25 MCG: 50 INJECTION, SOLUTION INTRAMUSCULAR; INTRAVENOUS at 08:10

## 2024-07-25 RX ADMIN — SODIUM CHLORIDE, PRESERVATIVE FREE 10 ML: 5 INJECTION INTRAVENOUS at 23:00

## 2024-07-25 RX ADMIN — PRAVASTATIN SODIUM 40 MG: 40 TABLET ORAL at 17:24

## 2024-07-25 RX ADMIN — PROPOFOL 10 MG: 10 INJECTION, EMULSION INTRAVENOUS at 08:45

## 2024-07-25 RX ADMIN — FENTANYL CITRATE 50 MCG: 50 INJECTION, SOLUTION INTRAMUSCULAR; INTRAVENOUS at 07:41

## 2024-07-25 RX ADMIN — Medication 80 MCG: at 08:24

## 2024-07-25 RX ADMIN — ALLOPURINOL 50 MG: 100 TABLET ORAL at 10:57

## 2024-07-25 RX ADMIN — FENTANYL CITRATE 50 MCG: 50 INJECTION INTRAMUSCULAR; INTRAVENOUS at 09:26

## 2024-07-25 RX ADMIN — WARFARIN SODIUM 1 MG: 1 TABLET ORAL at 17:24

## 2024-07-25 RX ADMIN — PROPOFOL 10 MG: 10 INJECTION, EMULSION INTRAVENOUS at 08:42

## 2024-07-25 RX ADMIN — FENTANYL CITRATE 50 MCG: 50 INJECTION, SOLUTION INTRAMUSCULAR; INTRAVENOUS at 08:56

## 2024-07-25 RX ADMIN — DEXAMETHASONE SODIUM PHOSPHATE 4 MG: 4 INJECTION INTRA-ARTICULAR; INTRALESIONAL; INTRAMUSCULAR; INTRAVENOUS; SOFT TISSUE at 07:53

## 2024-07-25 RX ADMIN — Medication 80 MCG: at 08:27

## 2024-07-25 RX ADMIN — PROPOFOL 30 MG: 10 INJECTION, EMULSION INTRAVENOUS at 07:50

## 2024-07-25 RX ADMIN — HYDRALAZINE HYDROCHLORIDE 75 MG: 50 TABLET ORAL at 23:54

## 2024-07-25 RX ADMIN — HYDRALAZINE HYDROCHLORIDE 75 MG: 50 TABLET ORAL at 13:23

## 2024-07-25 ASSESSMENT — PAIN DESCRIPTION - ORIENTATION
ORIENTATION: RIGHT
ORIENTATION: RIGHT

## 2024-07-25 ASSESSMENT — PAIN SCALES - GENERAL
PAINLEVEL_OUTOF10: 0
PAINLEVEL_OUTOF10: 5
PAINLEVEL_OUTOF10: 0
PAINLEVEL_OUTOF10: 8
PAINLEVEL_OUTOF10: 0
PAINLEVEL_OUTOF10: 0

## 2024-07-25 ASSESSMENT — PAIN DESCRIPTION - DESCRIPTORS
DESCRIPTORS: BURNING
DESCRIPTORS: BURNING

## 2024-07-25 ASSESSMENT — PAIN DESCRIPTION - LOCATION
LOCATION: ABDOMEN;CHEST
LOCATION: ABDOMEN

## 2024-07-25 ASSESSMENT — ENCOUNTER SYMPTOMS: SHORTNESS OF BREATH: 1

## 2024-07-25 NOTE — ANESTHESIA PROCEDURE NOTES
Arterial Line:    An arterial line was placed using surface landmarks, in the pre-op for the following indication(s): continuous blood pressure monitoring and blood sampling needed.    A 20 gauge (size) (length), Arrow (type) catheter was placed, Seldinger technique used, into the left radial artery, secured by Tegaderm.  Anesthesia type: Local  Local infiltration: Injection    Events:  patient tolerated procedure well with no complications.7/25/2024 6:59 AM  Anesthesiologist: Rafita Mata MD  Performed: Anesthesiologist   Preanesthetic Checklist  Completed: patient identified, IV checked, site marked, risks and benefits discussed, surgical/procedural consents, equipment checked, pre-op evaluation, timeout performed, anesthesia consent given, oxygen available, monitors applied/VS acknowledged and fire risk safety assessment completed and verbalized

## 2024-07-25 NOTE — WOUND CARE
Made aware of VAC placement in the OR by PASTOR Mcdaniel, following sternal and LVAD drive line debridement 7/25/24.    He reports using 1 black sponge in each wound.  Sternum = 2 x 1 x 0.7 cm  Driveline = 3 x 0.5 x 1 cm  Track pads connected to single pump via \"Y\" connector.     Discussed track pad resting on his skin with Spencer.  Will plan to change VAC on Monday along with driveline dressing at the same time - will coordinate with RN for this.     Discussed with  and filled out the rental form for home wound VAC.      JUANITA Gusman

## 2024-07-25 NOTE — BRIEF OP NOTE
BRIEF OP NOTE  Pre-Op Diagnosis: Sternal wound infection [S21.101A, L08.9]    Post-Op Diagnosis: Sternal wound infection [S21.101A, L08.9]      Procedure: Procedure(s):  STERNAL WOUND DEBRIDEMENT, WOUND VAC PLACEMENT    Surgeon:  Dr. Patricia MD    Assistant(s): Spencer Davis PA-C    Anesthesia: General      Infusions:  Precedex     Estimated Blood Loss: minimal     Specimens:   ID Type Source Tests Collected by Time Destination   A : sternal wound culture Swab Sternum CULTURE, ANAEROBIC, CULTURE, FUNGUS, GRAM STAIN, CULTURE, WOUND Ronn Gomez MD 7/25/2024 0810    B : drive line culture Swab Abdomen CULTURE, ANAEROBIC, CULTURE, FUNGUS, GRAM STAIN, CULTURE, WOUND Ronn Gomez MD 7/25/2024 0814      Drains: None    Complications: None    Findings: Abscess notable at mid sternum and a second site around drive line. Both areas debrided, irrigated, obtained hemostasis, wound VAC applied.    Wound dimensions:  Sternal wound: 2cm x 1cm x 3/4cm  Driveline wound: 3cm x 1/2cm x 1cm    Implants: * No implants in log *

## 2024-07-25 NOTE — ANESTHESIA PRE PROCEDURE
Department of Anesthesiology  Preprocedure Note       Name:  Sanford Joiner Sr.   Age:  64 y.o.  :  1960                                          MRN:  [id number]         Date:  2024      Surgeon: Surgeon(s):  Ronn Gomez MD    Procedure: Procedure(s):  STERNAL WOUND DEBRIDEMENT  NO PAC    Medications prior to admission:   Prior to Admission medications    Medication Sig Start Date End Date Taking? Authorizing Provider   warfarin (COUMADIN) 1 MG tablet Take 2 tablets by mouth daily May take more as directed by [facility]. 24   Nessa Maurer APRN - NP   ceFAZolin (ANCEF) 500 MG injection Infuse 2,000 mg intravenously in the morning and 2,000 mg at noon and 2,000 mg in the evening. 24  Nessa Maurer APRN - NP   acetaminophen (AMINOFEN) 325 MG tablet Take 2 tablets by mouth every 6 hours as needed for Pain 24   Nessa Maurer APRN - NP   hydrALAZINE (APRESOLINE) 50 MG tablet Take 1.5 tablets by mouth every 8 hours 24   Geni Garibay APRN - NP   pravastatin (PRAVACHOL) 40 MG tablet Take 1 tablet by mouth every evening 6/3/24   Geni Garibay APRN - NP   allopurinol (ZYLOPRIM) 100 MG tablet Take 0.5 tablets by mouth daily 6/3/24   Geni Garibay APRN - ROSSANA   metoprolol succinate (TOPROL XL) 50 MG extended release tablet Take 1 tablet by mouth daily 24   Farshad Gillespie MD   Omega 3 1000 MG CAPS Take 1 capsule by mouth daily 24   Farshad Gillespie MD   bumetanide (BUMEX) 1 MG tablet Take 1 tablet by mouth as needed (weight gain of 3lb overnight or 5lb in 1 week or shortness of breath) 24   Nahomi Quiles APRN - NP   octreotide ACETATE (SANDOSTATIN LAR) 20 MG injection Inject 20 mg into the muscle every 30 days 24   Nessa Maurer APRN - NP   pantoprazole (PROTONIX) 40 MG tablet TAKE 1 TABLET BY MOUTH DAILY 24   Nahomi Quiles, APRN - NP   OXYGEN Inhale 2 L/min into the lungs as needed for Shortness of

## 2024-07-25 NOTE — CARE COORDINATION
07/25/24 1219   Readmission Assessment   Number of Days since last admission? 8-30 days   Previous Disposition Home with Home Health  ([facility])   Who is being Interviewed Patient   What was the patient's/caregiver's perception as to why they think they needed to return back to the hospital? Did not realize care needs would be so extensive;Other (Comment)  (wound infection)   Did you visit your Primary Care Physician after you left the hospital, before you returned this time? No   Did you see a specialist, such as Cardiac, Pulmonary, Orthopedic Physician, etc. after you left the hospital? Yes  (cardio following, [facility])   Who advised the patient to return to the hospital? Physician   Does the patient report anything that got in the way of taking their medications? No   In our efforts to provide the best possible care to you and others like you, can you think of anything that we could have done to help you after you left the hospital the first time, so that you might not have needed to return so soon? Teaching during hospitalization regarding your illness;Teach back instructions regarding management of illness     KATJA Mayen

## 2024-07-25 NOTE — PLAN OF CARE
Problem: Physical Therapy - Adult  Goal: By Discharge: Performs mobility at highest level of function for planned discharge setting.  See evaluation for individualized goals.  Description: FUNCTIONAL STATUS PRIOR TO ADMISSION: Patient was independent and active without use of DME. Denies hx falls. LVAD in 5/2023.     HOME SUPPORT PRIOR TO ADMISSION: The patient lived with wife but did not require assistance.    Physical Therapy Goals  Initiated 7/25/2024  1.  Patient will move from supine to sit and sit to supine in bed with independence within 7 day(s).    2.  Patient will perform sit to stand with independence within 7 day(s).  3.  Patient will transfer from bed to chair and chair to bed with independence using the least restrictive device within 7 day(s).  4.  Patient will ambulate with independence for 150 feet with the least restrictive device within 7 day(s).   Outcome: Progressing   PHYSICAL THERAPY EVALUATION    Patient: Sanford Joiner  (64 y.o. male)  Date: 7/25/2024  Primary Diagnosis: Left ventricular assist device (LVAD) complication, initial encounter [T82.9XXA]  Procedure(s) (LRB):  STERNAL WOUND DEBRIDEMENT, WOUND VAC PLACEMENT (N/A) Day of Surgery   Precautions: Restrictions/Precautions: Fall Risk                      ASSESSMENT :   DEFICITS/IMPAIRMENTS:   The patient is limited by decreased functional mobility, activity tolerance, and increased risk for falls in setting of hospital admission for recurrent sternal and drive line infections, now POD #0 s/p sternal wound debridement. Clarified with CTS PA that patient has no movement precautions post-procedure. Patient received semi-fowlers in bed, alert, and agreeable to PT. Patient endorsed no pain, and completed bed mobility (rolling, supine <> sit) at supervision level. Patient declined further activity citing fatigue, returned to supine with supervision. Anticipate patient will complete sit <> stand transfers and ambulate well, anticipate  SNF/IPR.    1. Mily Merida, Guera Fuentes, Kervin Ni, Serina Jim, Jose Ramon Chi, Ty Merida.  Validity of the AM-PAC “6-Clicks” Inpatient Daily Activity and Basic Mobility Short Forms. Physical Therapy Mar 2014, 94 3) 379-391; DOI: 10.2522/ptj.74760656  2. Harris CANNON, Ayesha TORRES, Velma TORRES, Bishop TORRES. Association of AM-PAC \"6-Clicks\" Basic Mobility and Daily Activity Scores With Discharge Destination. Phys Ther. 2021 4;101(4):jyzd295. doi: 10.1093/ptj/kcyu227. PMID: 39188991.  3. Nikky TORRES, Robin PATTON, Daisy S, Quinn K, Nohemy S. Activity Measure for Post-Acute Care \"6-Clicks\" Basic Mobility Scores Predict Discharge Destination After Acute Care Hospitalization in Select Patient Groups: A Retrospective, Observational Study. Arch Rehabil Res Clin Transl. 2022 16;4(3):898004. doi: 10.1016/j.arrct..134752. PMID: 54071567; PMCID: [id number].  4. Magnolia AVERY, Heide S, Jacob W, Saadia P. AM-PAC Short Forms Manual 4.0. Revised 2020.                                                                                                                                                                                                                               Pain Ratin/10   Pain Intervention(s):   pain is at a level acceptable to the patient    Activity Tolerance:   Good and SpO2 stable on room air    After treatment:   Patient left in no apparent distress in bed, Call bell within reach, and Bed/ chair alarm activated    COMMUNICATION/EDUCATION:   The patient's plan of care was discussed with: occupational therapist and registered nurse    Patient Education  Education Given To: Patient  Education Provided: Role of Therapy;Plan of Care;Fall Prevention Strategies  Education Method: Verbal;Demonstration  Barriers to Learning: None  Education Outcome: Verbalized understanding;Demonstrated understanding;Continued education needed    Thank you for this referral.  Luana Fields PT,

## 2024-07-25 NOTE — CARE COORDINATION
07/25/24 1219   Readmission Assessment   Number of Days since last admission? 8-30 days   Previous Disposition Home with Home Health   Who is being Interviewed Patient   What was the patient's/caregiver's perception as to why they think they needed to return back to the hospital? Did not realize care needs would be so extensive;Other (Comment)  (wound infection)   Did you visit your Primary Care Physician after you left the hospital, before you returned this time? No   Did you see a specialist, such as Cardiac, Pulmonary, Orthopedic Physician, etc. after you left the hospital? Yes  (cardio following, BS )   Who advised the patient to return to the hospital? Self-referral   Does the patient report anything that got in the way of taking their medications? No   In our efforts to provide the best possible care to you and others like you, can you think of anything that we could have done to help you after you left the hospital the first time, so that you might not have needed to return so soon? Teaching during hospitalization regarding your illness;Teach back instructions regarding management of illness     KATJA Mayen

## 2024-07-25 NOTE — PERIOP NOTE
TRANSFER - OUT REPORT:    Verbal report given to April (name) on Sanford Joiner Sr.  being transferred to [facility] (unit) for routine post-op       Report consisted of patient’s Situation, Background, Assessment and   Recommendations(SBAR).     Time Pre op antibiotic given:on floor  Anesthesia Stop time: 0909    Information from the following report(s) SBAR, OR Summary, Intake/Output, MAR, and Accordion was reviewed with the receiving nurse.    Opportunity for questions and clarification was provided.     Is the patient on 02? Yes       L/Min 2       Other       Is the patient on a monitor? Yes    Is the nurse transporting with the patient? Yes    Surgical Waiting Area notified of patient's transfer from PACU? No      Per pt

## 2024-07-25 NOTE — PLAN OF CARE
Problem: Chronic Conditions and Co-morbidities  Goal: Patient's chronic conditions and co-morbidity symptoms are monitored and maintained or improved  Outcome: Progressing  Flowsheets (Taken 7/24/2024 2039)  Care Plan - Patient's Chronic Conditions and Co-Morbidity Symptoms are Monitored and Maintained or Improved: Monitor and assess patient's chronic conditions and comorbid symptoms for stability, deterioration, or improvement     Problem: Discharge Planning  Goal: Discharge to home or other facility with appropriate resources  Outcome: Progressing  Flowsheets (Taken 7/24/2024 2039)  Discharge to home or other facility with appropriate resources: Identify barriers to discharge with patient and caregiver

## 2024-07-25 NOTE — PROGRESS NOTES
Pharmacist Note - Warfarin Dosing  Consult provided for this 64 y.o.male to manage warfarin for LVAD    INR Goal: 1.8 - 2.2    Home regimen/ tablet size: 2 mg daily    Drugs that may increase INR: None  Drugs that may decrease INR: None  Other current anticoagulants/ drugs that may increase bleeding risk: None  Risk factors: None  Daily INR ordered through: 10/14/24    Recent Labs     07/22/24  1415 07/25/24  0059   HGB 10.0* 9.7*   INR 1.9* 2.4*     Date               INR                  Dose  7/24                 --                     HELD  7/25  2.4  1 mg                                                                                Assessment/ Plan:  Will order warfarin 1 mg PO x 1 dose.    Pharmacy will continue to monitor daily and adjust therapy as indicated.  Please contact the pharmacist at [phone] or [phone] for outpatient recommendations if needed.

## 2024-07-25 NOTE — H&P
History and Physical    Date of Service:  7/25/2024  Primary Care Provider: Ranjan Porter MD  Source of information: The patient and Chart review    Chief Complaint: No chief complaint on file.      History of Presenting Illness:   Sanford Joiner Sr. is a 64 y.o. male with a past medical history of chronic systolic heart failure due to nonischemic cardiomyopathy s/p LVAD, chronic driveline infections, staph bacteremia, hypertension, CKD stage II, asthma, V-fib arrest status post ICD 2023, GI bleeding.  He has been dealing with a driveline infection since approximately January of this year.  Cultures in January grew Enterobacter and MSSA.  He was then diagnosed with staph bacteremia in March 2024.  He completed 6 weeks of IV antibiotics and on 5/30 drain was removed and patient has remained on suppressive antibiotics of Keflex.  On 7/3 he underwent a CT scan of the chest which showed infection around the driveline exit with signs of cellulitis and reaccumulation of fluid collection below the xiphoid process.  The patient was brought into the ER for planned debridement of chronic driveline infection.  He is status post wound debridement and wound VAC placement on 7/25.  The hospitalist were asked to admit the patient on behalf of of advanced heart failure service.    At the time of my exam today, patient denies any headache, blurry vision, sore throat, trouble swallowing, trouble with speech, chest pain, SOB, cough, fever, chills, N/V/D, abd pain, urinary symptoms, constipation, recent travels, sick contacts, focal or generalized neurological symptoms, falls, injuries, rashes, contact with COVID-19 diagnosed patients, hematemesis, melena, hemoptysis, hematuria, rashes, denies starting any new medications and denies any other concerns or problems besides as mentioned above.         REVIEW OF SYSTEMS:  A comprehensive review of systems was negative except for that written in the History of Present  Risk  (7/24/2024)    Housing Stability Vital Sign     Unable to Pay for Housing in the Last Year: No     Number of Places Lived in the Last Year: 1     Unstable Housing in the Last Year: No   Interpersonal Safety: Not At Risk (7/24/2024)    Interpersonal Safety Domain Source: IP Abuse Screening     Physical abuse: Denies     Verbal abuse: Denies     Emotional abuse: Denies     Financial abuse: Denies     Sexual abuse: Denies   Utilities: Not At Risk (7/24/2024)    [facility] Utilities     Threatened with loss of utilities: No        Medications were reconciled to the best of my ability given all available resources at the time of admission. Route is PO if not otherwise noted.     Family and social history were personally reviewed, all pertinent and relevant details are outlined as above.    Objective:   /79   Pulse 82   Temp 98.1 °F (36.7 °C) (Oral)   Resp 18   Ht 1.727 m (5' 7.99\")   Wt 74.6 kg (164 lb 7.4 oz)   SpO2 98%   BMI 25.01 kg/m²         PHYSICAL EXAM:   General: Alert x oriented x 3, awake, no acute distress,   HEENT: PEERL, EOMI, moist mucus membranes  Neck: Supple, no JVD, no meningeal signs  Chest: Clear to auscultation bilaterally   CVS: RRR, S1 S2 heard, no murmurs/rubs/gallops  Abd: Soft, non-tender, non-distended, +bowel sounds   Ext: No clubbing, no cyanosis, no edema  Neuro/Psych: Pleasant mood and affect, CN 2-12 grossly intact, sensory grossly within normal limit, Strength 5/5 in all extremities, DTR 1+ x 4  Cap refill: Brisk, less than 3 seconds  Pulses: 2+, symmetric in all extremities  Skin: Warm, dry, without rashes or lesions    Data Review:   I have independently reviewed and interpreted patient's lab and all other diagnostic data    Abnormal Labs Reviewed   BASIC METABOLIC PANEL - Abnormal; Notable for the following components:       Result Value    Sodium 135 (*)     BUN 34 (*)     Creatinine 2.07 (*)     Est, Glom Filt Rate 35 (*)     All other components within normal limits

## 2024-07-25 NOTE — PROGRESS NOTES
ADVANCED HEART FAILURE CENTER  [facility] in [place], [place]       Procedure: STERNAL WOUND DEBRIDEMENT, WOUND VAC PLACEMENT with Dr. Gomez        Fixed Speed: 5200  Low Speed: 4800      Pre procedure:  Pre  Procedure  Post Procedure  PACU    Time  0644 0739 0855 0915   Speed  5250   5250 5250 5200   Flow  2.7    3 3.4 3.2   MAP  102 108 80 74   PI  7.4  6 4.6 5.4   Power  3.8   3.8 3.8 3.8       Post procedure:       Fixed Speed: 5200  Low Speed: 4800     VAD Coordinator managed LVAD equipment during procedure.       Funmi Barth RN  LVAD coordinator

## 2024-07-25 NOTE — PROGRESS NOTES
Patient admitted for surgical wound debridement on 7/25 with Dr. Gomez for persistent drive line infection.  Coumadin on hold tonight, NPO at midnight. Check INR in AM

## 2024-07-25 NOTE — ANESTHESIA POSTPROCEDURE EVALUATION
Department of Anesthesiology  Postprocedure Note    Patient: Sanford Joiner Sr.  MRN: [id number]  YOB: 1960  Date of evaluation: 7/25/2024    Procedure Summary       Date: 07/25/24 Room / Location: [facility] HEART OR [address] [place] [address] OPEN HEART    Anesthesia Start: 0728 Anesthesia Stop: 0909    Procedure: STERNAL WOUND DEBRIDEMENT, WOUND VAC PLACEMENT (Chest) Diagnosis:       Sternal wound infection      (Sternal wound infection [S21.101A, L08.9])    Providers: Ronn Gomez MD Responsible Provider: Arjun Neil DO    Anesthesia Type: General ASA Status: 4            Anesthesia Type: General    Kelvin Phase I: Kelvin Score: 9    Kelvin Phase II:      Anesthesia Post Evaluation    Patient location during evaluation: PACU  Patient participation: complete - patient participated  Level of consciousness: awake  Pain score: 0  Airway patency: patent  Nausea & Vomiting: no nausea  Cardiovascular status: hemodynamically stable  Respiratory status: acceptable  Hydration status: euvolemic  Comments: Seen, no complaints   Pain management: adequate    No notable events documented.

## 2024-07-25 NOTE — PROGRESS NOTES
Spiritual Care Assessment/Progress Note  [facility]    Name: Sanford Joiner Sr. MRN: [id number]    Age: 64 y.o.     Sex: male   Language: English     Date: 7/25/2024            Total Time Calculated: 20 min              Spiritual Assessment begun in [facility] CV SERVICES UNIT  Service Provided For: Patient  Referral/Consult From: Rounding  Encounter Overview/Reason: Attempted Encounter    Spiritual beliefs:      [] Involved in a tommy tradition/spiritual practice:      [] Supported by a tommy community:      [] Claims no spiritual orientation:      [] Seeking spiritual identity:           [] Adheres to an individual form of spirituality:      [x] Not able to assess:                Identified resources for coping and support system:           [] Prayer                  [] Devotional reading               [] Music                  [] Guided Imagery     [] Pet visits                                        [] Other: (COMMENT)     Specific area/focus of visit   Encounter:    Crisis:    Spiritual/Emotional needs:    Ritual, Rites and Sacraments:    Grief, Loss, and Adjustments:    Ethics/Mediation:    Behavioral Health:    Palliative Care:    Advance Care Planning:         Narrative:   Referral source:   Sanford Joiner SrKaren at [facility] in [facility] CV SERVICES UNIT. I reviewed the medical record prior to this encounter.    Spiritual Assessment: Unable to assess.    Patient was having a bedside procedure. No family present. Please contact Spiritual Care for any further referrals.    Visited by: Chaplain Mariana Heck Mdiv, [facility]   paging Service [phone])

## 2024-07-25 NOTE — H&P
[facility]  [facility] in [place], [place]  Inpatient Progress Note      Patient name: Sanford Joiner Sr.  Patient : 1960  Patient MRN: [id number]  Date of service: 24    Reason for Referral:  Management of LVAD     ASSESSMENT:  Sanford Joiner Sr. is a 64 y.o. male admitted with recurrent driveline abscess and infection  Chronic systolic heart failure due to non ischemic cardiomyopathy, s/p HM3 LVAD implantation (2023) as destination therapy, stage D, on optimal GDMT limited by hypovolemia  Not a transplant candidate due to advanced obstructive lung disease, was declined at [facility] for LVAD and transplant.   Admitted for wound debridement of chronic drive line infection, likely wound vac placement       INTERVAL HISTORY:  Sanford Joiner Sr. Admitted for wound debridement of his chronic drive line infection, he has been on IV antibiotics since his last admission. His wound site has continued to drain and there is concern for worsening infection long ter.     RECOMMENDATIONS:  Driveline/LVAD infection  S/p wound debridement and wound vac placement    Blood cx pending  Will culture site in OR  Continue IV antibiotics (Ancef) unitl 24    Left Ventricular Assist Device  Continue current device speed at 5200 rpm to prevent RV overdrive  Dressing changes daily    Medical Therapy for Heart Failure  Beta-blocker: Continue Toprol XL 50 mg daily  ACEi/ARB/ARNI: Unable to tolerate due to hypovolemia  Hydralazine/Nitrate: Continue Hydralazine 75 mg TID  MRA: Unable to tolerate due to hypovolemia  SGLT2i: Unable to tolerate due to hypovolemia    Volume Management   Bumex PRN for weight gain, increased SOB    Anticoagulation and Antiplatelet Therapy  Anticoagulation with Warfarin, target INR 1.8-2.2  Pharmacy to dose while admitted   History of GI Bleed, not on aspirin    Antiarrhythmic and Device Therapy  ICD interrogation per routine OP    Laboratory and Imaging  gentamicin (GARAMYCIN) 0.1 % ointment, , Topical, Daily, Libertad, Nessa B, APRN - NP    hydrALAZINE (APRESOLINE) tablet 75 mg, 75 mg, Oral, 3 times per day, Libertad, Nessa B, APRN - NP, 75 mg at 07/25/24 0525    lactobacillus (CULTURELLE) capsule 1 capsule, 1 capsule, Oral, Daily with breakfast, Libertad, Nessa B, APRN - NP, 1 capsule at 07/25/24 1059    magnesium oxide (MAG-OX) tablet 400 mg, 400 mg, Oral, BID, Libertad, Nessa B, APRN - NP, 400 mg at 07/25/24 1057    metoprolol succinate (TOPROL XL) extended release tablet 50 mg, 50 mg, Oral, Daily, Libertad, Nessa B, APRN - NP, 50 mg at 07/25/24 1057    pantoprazole (PROTONIX) tablet 40 mg, 40 mg, Oral, QAM AC, Libertad, Nessa B, APRN - NP    pravastatin (PRAVACHOL) tablet 40 mg, 40 mg, Oral, QPM, Libertad, Nessa B, APRN - NP    Vitamin D (CHOLECALCIFEROL) tablet 2,000 Units, 2,000 Units, Oral, Daily, Libertad, Nessa B, APRN - NP, 2,000 Units at 07/25/24 1057    sodium chloride flush 0.9 % injection 5-40 mL, 5-40 mL, IntraVENous, 2 times per day, Libertad, Nessa B, APRN - NP, 10 mL at 07/24/24 2150    sodium chloride flush 0.9 % injection 5-40 mL, 5-40 mL, IntraVENous, PRN, Libertad, Nessa B, APRN - NP    0.9 % sodium chloride infusion, , IntraVENous, PRN, Libertad, Nessa B, APRN - NP    ondansetron (ZOFRAN) injection 4 mg, 4 mg, IntraVENous, Q6H PRN, Libertad, Nessa B, APRN - NP    hydrALAZINE (APRESOLINE) injection 10 mg, 10 mg, IntraVENous, Q4H PRN, Libertad, Nessa B, APRN - NP    magnesium sulfate 2000 mg in 50 mL IVPB premix, 2,000 mg, IntraVENous, PRN, Libertad, Nessa B, APRN - NP    albuterol (PROVENTIL) (2.5 MG/3ML) 0.083% nebulizer solution 2.5 mg, 2.5 mg, Nebulization, Q6H PRN, Libertad, Nessa B, APRN - NP    glucose chewable tablet 16 g, 4 tablet, Oral, PRN, Libertad, Nessa B, APRN - NP    dextrose bolus 10% 125 mL, 125 mL, IntraVENous, PRN **OR** dextrose bolus 10% 250 mL, 250 mL, IntraVENous, PRN, Libertad, Nessa B, APRN - NP

## 2024-07-25 NOTE — PROGRESS NOTES
Occupational Therapy:     Chart reviewed in prep for OT evaluation and tx. Noted, pt currently EDGAR to cardiac surgery for sternal wound debridement. Will hold and follow up POD1 as able and medically appropriate.     Thank you,   Tasia Ballesteros, OTD, OTR/L     (4) walks frequently

## 2024-07-25 NOTE — HOME HEALTH
Pt admitted by direct admit to [facility] during evening of 7/24/24 for surgery on 7/25/24 (Sternal wound debridement and driveline site debridement) by Dr Gomez.

## 2024-07-25 NOTE — CARE COORDINATION
Care Management Initial Assessment       RUR: 24%  Readmission? Yes - 7/8-7/12  1st IM letter given? Yes - 7/25  1st  letter given: No       07/25/24 1211   Service Assessment   Patient Orientation Alert and Oriented   Cognition Alert   History Provided By Patient   Primary Caregiver Self   Support Systems Spouse/Significant Other   Patient's Healthcare Decision Maker is: Legal Next of Kin   PCP Verified by CM Yes  (Dr. Ranjan Porter)   Last Visit to PCP   (pt does not recall meeting PCP)   Prior Functional Level Independent in ADLs/IADLs   Current Functional Level Independent in ADLs/IADLs   Can patient return to prior living arrangement Yes   Ability to make needs known: Good   Family able to assist with home care needs: Yes   Financial Resources Medicare   Social/Functional History   Lives With Spouse;Son   Type of Home House   Home Layout One level   Home Access Level entry   Receives Help From Family   ADL Assistance Independent   Homemaking Assistance Independent   Ambulation Assistance Independent   Transfer Assistance Independent   Active  No   Patient's  Info wife drives   Discharge Planning   Type of Residence House   Living Arrangements Spouse/Significant Other;Children   Patient expects to be discharged to: House     CM met with pt at bedside to complete initial assessment. Pt is independent in ADLs and lives at home with his wife and her son; expects to return there upon d/c. Pt reported the heart failure SW is helping him reapply for Medicaid and he is hopeful he will have dual coverage soon. Wound care following. CM will continue to follow for KEVIN needs.    4:07 pm: CM sent referral to [facility] for wound care.    KATJA Mayen

## 2024-07-25 NOTE — PROGRESS NOTES
Physical Therapy:  07/25/24     Orders received and chart reviewed in preparation for PT evaluation. Patient with hx LVAD, now re-admitted and currently EDGAR for sternal wound debridement. Will hold and continue to follow for PT evaluation as able.     Thank you,  Luana Fields, PT, DPT

## 2024-07-25 NOTE — OR NURSING
Notified OR  and Celeste Lguo RN in room 7 that patient does not have a procedural consent for surgery. Celeste to reach out to Cardiac surgery to make aware of consent issue and that patient can not go to the OR until this is done.    MACRINA BLANCAS RN    PA from cardiac surgery here to get procedural consent.

## 2024-07-25 NOTE — PLAN OF CARE
Problem: Occupational Therapy - Adult  Goal: By Discharge: Performs self-care activities at highest level of function for planned discharge setting.  See evaluation for individualized goals.  Description: FUNCTIONAL STATUS PRIOR TO ADMISSION:  Patient was ambulatory without use of DME.    HOME SUPPORT: Patient lived with family and was independent with all ADL tasks.    Occupational Therapy Goals:  Initiated 7/25/2024  1.  Patient will perform grooming routine with Modified [place] within 7 day(s).  2.  Patient will perform upper and lower body bathing with Modified [place] within 7 day(s).  3.  Patient will perform upper and lower body dressing with Modified [place] within 7 day(s).  4.  Patient will perform toilet transfers with Modified [place]  within 7 day(s).  5.  Patient will perform all aspects of toileting with Modified [place] within 7 day(s).  6.  Patient will participate in upper extremity therapeutic exercise/activities with Modified [place] for 5 minutes within 7 day(s).    7.  Patient will utilize energy conservation techniques during functional activities with verbal cues within 7 day(s).    Outcome: Progressing   OCCUPATIONAL THERAPY EVALUATION    Patient: Sanford Joiner  (64 y.o. male)  Date: 7/25/2024  Primary Diagnosis: Left ventricular assist device (LVAD) complication, initial encounter [T82.9XXA]  Procedure(s) (LRB):  STERNAL WOUND DEBRIDEMENT, WOUND VAC PLACEMENT (N/A) Day of Surgery     Precautions: Fall Risk                  ASSESSMENT :  The patient's performance of ADL/IADL tasks is limited at this time by impaired balance, activity tolerance, and cognition (insight, safety awareness) s/p admission for sternal wound and drive line debridement with wound vac placement. Pt received supine and agreeable to participation in therapy session. He demonstrated bed mobility, sit<>stand transfers, and seated LB dressing task with supervision. He declined further  physical or verbal) with assist is necessary to enable pt to complete eval   Based on the above components, the patient evaluation is determined to be of the following complexity level: Low

## 2024-07-26 LAB
ALBUMIN SERPL-MCNC: 3.4 G/DL (ref 3.5–5)
ALBUMIN/GLOB SERPL: 0.9 (ref 1.1–2.2)
ALP SERPL-CCNC: 77 U/L (ref 45–117)
ALT SERPL-CCNC: 12 U/L (ref 12–78)
ANION GAP SERPL CALC-SCNC: 6 MMOL/L (ref 5–15)
AST SERPL-CCNC: 23 U/L (ref 15–37)
BILIRUB DIRECT SERPL-MCNC: <0.1 MG/DL (ref 0–0.2)
BILIRUB SERPL-MCNC: 0.2 MG/DL (ref 0.2–1)
BUN SERPL-MCNC: 39 MG/DL (ref 6–20)
BUN/CREAT SERPL: 24 (ref 12–20)
CALCIUM SERPL-MCNC: 9 MG/DL (ref 8.5–10.1)
CHLORIDE SERPL-SCNC: 105 MMOL/L (ref 97–108)
CO2 SERPL-SCNC: 26 MMOL/L (ref 21–32)
CREAT SERPL-MCNC: 1.65 MG/DL (ref 0.7–1.3)
ERYTHROCYTE [DISTWIDTH] IN BLOOD BY AUTOMATED COUNT: 18.4 % (ref 11.5–14.5)
GLOBULIN SER CALC-MCNC: 3.8 G/DL (ref 2–4)
GLUCOSE SERPL-MCNC: 102 MG/DL (ref 65–100)
HCT VFR BLD AUTO: 28.3 % (ref 36.6–50.3)
HGB BLD-MCNC: 8.9 G/DL (ref 12.1–17)
INR PPP: 2.4 (ref 0.9–1.1)
LDH SERPL L TO P-CCNC: 209 U/L (ref 85–241)
MAGNESIUM SERPL-MCNC: 2 MG/DL (ref 1.6–2.4)
MCH RBC QN AUTO: 26.6 PG (ref 26–34)
MCHC RBC AUTO-ENTMCNC: 31.4 G/DL (ref 30–36.5)
MCV RBC AUTO: 84.5 FL (ref 80–99)
NRBC # BLD: 0 K/UL (ref 0–0.01)
NRBC BLD-RTO: 0 PER 100 WBC
PLATELET # BLD AUTO: 347 K/UL (ref 150–400)
PMV BLD AUTO: 11.2 FL (ref 8.9–12.9)
POTASSIUM SERPL-SCNC: 5 MMOL/L (ref 3.5–5.1)
PROT SERPL-MCNC: 7.2 G/DL (ref 6.4–8.2)
PROTHROMBIN TIME: 23.9 SEC (ref 9–11.1)
RBC # BLD AUTO: 3.35 M/UL (ref 4.1–5.7)
SODIUM SERPL-SCNC: 137 MMOL/L (ref 136–145)
WBC # BLD AUTO: 9.2 K/UL (ref 4.1–11.1)

## 2024-07-26 PROCEDURE — 80048 BASIC METABOLIC PNL TOTAL CA: CPT

## 2024-07-26 PROCEDURE — 6360000002 HC RX W HCPCS: Performed by: NURSE PRACTITIONER

## 2024-07-26 PROCEDURE — 2060000000 HC ICU INTERMEDIATE R&B

## 2024-07-26 PROCEDURE — 99232 SBSQ HOSP IP/OBS MODERATE 35: CPT | Performed by: INTERNAL MEDICINE

## 2024-07-26 PROCEDURE — 83615 LACTATE (LD) (LDH) ENZYME: CPT

## 2024-07-26 PROCEDURE — 36415 COLL VENOUS BLD VENIPUNCTURE: CPT

## 2024-07-26 PROCEDURE — 94640 AIRWAY INHALATION TREATMENT: CPT

## 2024-07-26 PROCEDURE — 97116 GAIT TRAINING THERAPY: CPT

## 2024-07-26 PROCEDURE — 85027 COMPLETE CBC AUTOMATED: CPT

## 2024-07-26 PROCEDURE — 93750 INTERROGATION VAD IN PERSON: CPT | Performed by: INTERNAL MEDICINE

## 2024-07-26 PROCEDURE — 6360000002 HC RX W HCPCS: Performed by: INTERNAL MEDICINE

## 2024-07-26 PROCEDURE — 6370000000 HC RX 637 (ALT 250 FOR IP): Performed by: FAMILY MEDICINE

## 2024-07-26 PROCEDURE — 6370000000 HC RX 637 (ALT 250 FOR IP): Performed by: NURSE PRACTITIONER

## 2024-07-26 PROCEDURE — 2580000003 HC RX 258: Performed by: INTERNAL MEDICINE

## 2024-07-26 PROCEDURE — 85610 PROTHROMBIN TIME: CPT

## 2024-07-26 PROCEDURE — 83735 ASSAY OF MAGNESIUM: CPT

## 2024-07-26 PROCEDURE — 80076 HEPATIC FUNCTION PANEL: CPT

## 2024-07-26 PROCEDURE — 2580000003 HC RX 258: Performed by: NURSE PRACTITIONER

## 2024-07-26 RX ORDER — WARFARIN SODIUM 1 MG/1
1 TABLET ORAL
Status: COMPLETED | OUTPATIENT
Start: 2024-07-26 | End: 2024-07-26

## 2024-07-26 RX ADMIN — ARFORMOTEROL TARTRATE 15 MCG: 15 SOLUTION RESPIRATORY (INHALATION) at 07:50

## 2024-07-26 RX ADMIN — HYDRALAZINE HYDROCHLORIDE 75 MG: 50 TABLET ORAL at 14:58

## 2024-07-26 RX ADMIN — MAGNESIUM OXIDE TAB 400 MG (241.3 MG ELEMENTAL MG) 400 MG: 400 (241.3 MG) TAB at 21:42

## 2024-07-26 RX ADMIN — Medication 2000 UNITS: at 08:38

## 2024-07-26 RX ADMIN — IPRATROPIUM BROMIDE 0.5 MG: 0.5 SOLUTION RESPIRATORY (INHALATION) at 07:50

## 2024-07-26 RX ADMIN — BUDESONIDE 250 MCG: 0.25 INHALANT RESPIRATORY (INHALATION) at 07:50

## 2024-07-26 RX ADMIN — PRAVASTATIN SODIUM 40 MG: 40 TABLET ORAL at 18:01

## 2024-07-26 RX ADMIN — PANTOPRAZOLE SODIUM 40 MG: 40 TABLET, DELAYED RELEASE ORAL at 05:57

## 2024-07-26 RX ADMIN — Medication 1 CAPSULE: at 08:38

## 2024-07-26 RX ADMIN — ALLOPURINOL 50 MG: 100 TABLET ORAL at 08:38

## 2024-07-26 RX ADMIN — PIPERACILLIN AND TAZOBACTAM 3375 MG: 3; .375 INJECTION, POWDER, LYOPHILIZED, FOR SOLUTION INTRAVENOUS at 18:02

## 2024-07-26 RX ADMIN — METOPROLOL SUCCINATE 50 MG: 50 TABLET, EXTENDED RELEASE ORAL at 08:38

## 2024-07-26 RX ADMIN — WARFARIN SODIUM 1 MG: 1 TABLET ORAL at 18:01

## 2024-07-26 RX ADMIN — SODIUM CHLORIDE, PRESERVATIVE FREE 10 ML: 5 INJECTION INTRAVENOUS at 21:43

## 2024-07-26 RX ADMIN — HYDRALAZINE HYDROCHLORIDE 75 MG: 50 TABLET ORAL at 05:57

## 2024-07-26 RX ADMIN — WATER 2000 MG: 1 INJECTION INTRAMUSCULAR; INTRAVENOUS; SUBCUTANEOUS at 05:57

## 2024-07-26 RX ADMIN — MAGNESIUM OXIDE TAB 400 MG (241.3 MG ELEMENTAL MG) 400 MG: 400 (241.3 MG) TAB at 08:38

## 2024-07-26 RX ADMIN — HYDRALAZINE HYDROCHLORIDE 75 MG: 50 TABLET ORAL at 21:42

## 2024-07-26 RX ADMIN — PIPERACILLIN AND TAZOBACTAM 3375 MG: 3; .375 INJECTION, POWDER, LYOPHILIZED, FOR SOLUTION INTRAVENOUS at 11:01

## 2024-07-26 ASSESSMENT — PAIN SCALES - GENERAL
PAINLEVEL_OUTOF10: 0

## 2024-07-26 NOTE — CARE COORDINATION
Transition of Care Plan:    RUR: 24%  Prior Level of Functioning: Open to [facility], IV ABX, LVAD and lives w wife  Disposition: (Need wound vac auth form signed and on chart, Also need HH order for driveline dressing change and wound vac changes at the same time) Pending [facility] and return home  If SNF or IPR: Date FOC offered: NA  Date FOC received:   Accepting facility:   Date authorization started with reference number:   Date authorization received and expires:   Follow up appointments: Specialist  DME needed: New wound vac  Transportation at discharge: Wife  IM/IMM Medicare/ letter given: 7/25  Caregiver Contact: WifeAshley [phone]  Discharge Caregiver contacted prior to discharge?   Care Conference needed?   Barriers to discharge:  Medical, wound vac, HH order    Nahomi garber [facility] reports cardiac surgery needs to sign the [employer] auth form for the wound vac and CM Perfect Served PASTOR Mcdaniel.      During rounds charge nurse reports the driveline dressing change and wound vac change need to be done together and [facility] will need HH order.     KATJA Leyva CCM  Care Management
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
## 2024-07-26 NOTE — PROCEDURES
[address]  [address]                         PULMONARY FUNCTION TEST      PATIENT NAME: JAIDEN SOUTH             : 1960  MED REC NO: [id number]                       ROOM: 464  ACCOUNT NO: [id number]                       ADMIT DATE: 2024  PROVIDER: Sunshine Green MD    DATE OF SERVICE:  07/10/2024    REQUESTING PHYSICIAN:  Not available.    DIAGNOSIS:  Shortness of breath.    ATS criteria for reproducibility and acceptability was met.    Spirometry:  FEV1/FVC ratio is 44, which is reduced.  FEV1 is 0.95 L, which is 29% predicted, which is very severely reduced.  FVC is 2.17 L, which is 50% predicted, which is also reduced.    Flow volume loop is consistent with obstruction.    INTERPRETATION:  Very severe obstructive airway disease.  FVC is also reduced, which may be suggestive of concomitant restrictive lung disease.  Consider lung volume assessment and diffusion capacity.        SUNSHINE GREEN MD      MNA/AQS  D:  2024 11:50:49  T:  2024 08:18:07  JOB #:  [id number]

## 2024-07-26 NOTE — PROGRESS NOTES
Infectious Disease Progress Note     Subjective:      Afebrile, underwent I/D yesterday of 2 driveline abscesses    Objective:    Vitals:   Patient Vitals for the past 24 hrs:   Temp Pulse Resp SpO2   07/26/24 1120 98.2 °F (36.8 °C) 85 18 --   07/26/24 1000 -- 80 -- --   07/26/24 0752 -- 62 18 --   07/26/24 0735 98.2 °F (36.8 °C) 62 18 97 %   07/26/24 0600 -- 87 -- --   07/26/24 0459 98.1 °F (36.7 °C) 75 12 98 %   07/26/24 0400 -- 63 -- --   07/26/24 0200 -- 64 -- --   07/25/24 2353 98.1 °F (36.7 °C) 70 17 97 %   07/25/24 2200 -- 77 -- --   07/25/24 2015 -- 85 12 --   07/25/24 2000 -- 87 -- --   07/25/24 1905 98.3 °F (36.8 °C) 85 18 --   07/25/24 1800 -- 86 -- --   07/25/24 1511 98.2 °F (36.8 °C) 85 18 --   07/25/24 1400 -- 82 -- --   07/25/24 1200 -- 80 -- --       Physical Exam:  Gen: No apparent distress  HEENT:  Normocephalic, atraumatic  CV: LVAD hum  Lungs: no wheezing  Abdomen: soft, non tender, non distended + 2 wound vacs with serosanguinous scant output  Genitourinary: no smith catheter   Skin: driveline site dressed  Psych: good affect, good eye contact, non tearful  Neuro: alert, oriented to time,  place, and situation, moves all extremities to commands, verbal  Musculoskeletal:  No joint edema, erythema or tenderness noted     Central Line:      Medications:    Current Facility-Administered Medications:     piperacillin-tazobactam (ZOSYN) 3,375 mg in sodium chloride 0.9 % 50 mL IVPB (mini-bag), 3,375 mg, IntraVENous, Q8H, Rafita Rajan MD, Last Rate: 12.5 mL/hr at 07/26/24 1101, 3,375 mg at 07/26/24 1101    warfarin (COUMADIN) tablet 1 mg, 1 mg, Oral, Once, Abdi Wagner MD    oxyCODONE (ROXICODONE) immediate release tablet 5 mg, 5 mg, Oral, Q4H PRN, Spencer Davis PA-C    acetaminophen (TYLENOL) tablet 975 mg, 975 mg, Oral, Q6H, Spencer Davis PA-C, 975 mg at 07/25/24 1057    warfarin placeholder: dosing by pharmacy, , Other, RX Placeholder, Nessa Maurer, APRN - NP    allopurinol (ZYLOPRIM)

## 2024-07-26 NOTE — PROGRESS NOTES
Pharmacist Note - Warfarin Dosing  Consult provided for this 64 y.o.male to manage warfarin for LVAD    INR Goal: 1.8 - 2.2    Home regimen/ tablet size: 2 mg daily    Drugs that may increase INR: None  Drugs that may decrease INR: None  Other current anticoagulants/ drugs that may increase bleeding risk: None  Risk factors: None  Daily INR ordered through: 10/14/24    Recent Labs     07/25/24  0059 07/26/24  0027   HGB 9.7* 8.9*   INR 2.4* 2.4*     Date               INR                  Dose  7/24                 --                     HELD  7/25  2.4  1 mg  7/26  2.4  1 mg                                                                                 Assessment/ Plan:  Will order warfarin 1 mg PO x 1 dose.    Pharmacy will continue to monitor daily and adjust therapy as indicated.  Please contact the pharmacist at [phone] or [phone] for outpatient recommendations if needed.

## 2024-07-26 NOTE — PLAN OF CARE
Problem: Physical Therapy - Adult  Goal: By Discharge: Performs mobility at highest level of function for planned discharge setting.  See evaluation for individualized goals.  Description: FUNCTIONAL STATUS PRIOR TO ADMISSION: Patient was independent and active without use of DME. Denies hx falls. LVAD in 5/2023.     HOME SUPPORT PRIOR TO ADMISSION: The patient lived with wife but did not require assistance.    Physical Therapy Goals  Initiated 7/25/2024  1.  Patient will move from supine to sit and sit to supine in bed with independence within 7 day(s).    2.  Patient will perform sit to stand with independence within 7 day(s).  3.  Patient will transfer from bed to chair and chair to bed with independence using the least restrictive device within 7 day(s).  4.  Patient will ambulate with independence for 150 feet with the least restrictive device within 7 day(s).   Outcome: Progressing   PHYSICAL THERAPY TREATMENT/DISCHARGE    Patient: Sanford Joiner  (64 y.o. male)  Date: 7/26/2024  Diagnosis: Left ventricular assist device (LVAD) complication, initial encounter [T82.9XXA] Left ventricular assist device (LVAD) complication, initial encounter  Procedure(s) (LRB):  STERNAL WOUND DEBRIDEMENT, WOUND VAC PLACEMENT (N/A) 1 Day Post-Op  Precautions: Fall Risk                      ASSESSMENT:  Patient has been followed by skilled PT services and has progressed towards goals. Patient now presents close to functionally independent PLOF. Received supine in bed, alert, and agreeable to PT. Completed LVAD power transition wall power > batteries independently prior to activity. Overall mobilizing at supervision - independent level for bed mobility, sit <> stand transfers, and for ambulation of functional distances. Mild MOORE noted however SpO2 stable on RA, patient provided cues for activity pacing as needed in the home setting. No overt instability or LOB noted while ambulating. At this time patient is close to  functionally independent PLOF, will sign off.        PLAN:  Maximum therapeutic benefit has been met at current level of care and patient will be discharged from physical therapy at this time.    Rationale for discharge:  Goals achieved    Recommendation for discharge: (in order for the patient to meet his/her long term goals): No skilled physical therapy    Other factors to consider for discharge: poor safety awareness    IF patient discharges home will need the following DME: none     SUBJECTIVE:   Patient stated, \"I have all these wires, I don't know how I'm supposed to do anything.\"    OBJECTIVE DATA SUMMARY:   Critical Behavior:  Orientation  Overall Orientation Status: Within Normal Limits  Orientation Level: Oriented X4  Cognition  Overall Cognitive Status: Exceptions  Arousal/Alertness: Appears intact  Following Commands: Appears intact  Attention Span: Difficulty dividing attention;Difficulty attending to directions;Attends with cues to redirect  Memory: Appears intact  Safety Judgement: Decreased awareness of need for safety;Decreased awareness of need for assistance  Problem Solving: Decreased awareness of errors;Assistance required to correct errors made  Insights: Decreased awareness of deficits  Initiation: Does not require cues  Sequencing: Does not require cues  Cognition Comment: impulsive    Functional Mobility Training:  Bed Mobility:  Bed Mobility Training  Bed Mobility Training: Yes  Rolling: Independent  Supine to Sit: Independent  Sit to Supine: Independent  Scooting: Independent  Transfers:  Transfer Training  Transfer Training: Yes  Sit to Stand: Supervision  Stand to Sit: Supervision  Balance:  Balance  Sitting: Intact  Standing: Intact  Standing - Static: Good;Unsupported  Standing - Dynamic: Good;Unsupported   Ambulation/Gait Training:     Gait  Gait Training: Yes  Overall Level of Assistance: Supervision  Distance (ft): 150 Feet  Assistive Device: Gait belt  Interventions: Safety

## 2024-07-26 NOTE — PROGRESS NOTES
ADVANCED HEART FAILURE CENTER  [facility] in [place], [place]  Inpatient Progress Note      Patient name: Sanford Joiner Sr.  Patient : 1960  Patient MRN: [id number]  Date of service: 24    Reason for Referral:  Management of LVAD     ASSESSMENT:  Sanford Joiner Sr. is a 64 y.o. male admitted with recurrent driveline abscess and infection  Chronic systolic heart failure due to non ischemic cardiomyopathy, s/p HM3 LVAD implantation (2023) as destination therapy, stage D, on optimal GDMT limited by hypovolemia  Not a transplant candidate due to advanced obstructive lung disease, was declined at [facility] for LVAD and transplant.   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       INTERVAL HISTORY:  NAEO, HDS  Labs reviewed and stable  Feels well this am, no complaints    RECOMMENDATIONS:  Driveline/LVAD infection  S/p wound debridement and wound vac placement on 24   Blood cx NTD  Wound culture from OR grew GNR and MSSA  Fungal culture pending  ID consulted  On Ancef, awaiting further recommendations from ID    Left Ventricular Assist Device  Continue current device speed at 5200 rpm to prevent RV overdrive  Dressing changes daily    Medical Therapy for Heart Failure  Beta-blocker: Continue Toprol XL 50 mg daily  ACEi/ARB/ARNI: Unable to tolerate due to hypovolemia  Hydralazine/Nitrate: Continue Hydralazine 75 mg TID  MRA: Unable to tolerate due to hypovolemia  SGLT2i: Unable to tolerate due to hypovolemia    Volume Management   Bumex PRN for weight gain, increased SOB    Anticoagulation and Antiplatelet Therapy  Anticoagulation with Warfarin, target INR 1.8-2.2  Pharmacy to dose while admitted   History of GI Bleed, not on aspirin    Antiarrhythmic and Device Therapy  ICD interrogation per routine OP    Laboratory and Imaging Studies  Labs: CBC, CMP, LDH, PT/INR daily  Repeat TTE in September       HISTORY OF PRESENT  ILLNESS:  I had the pleasure of seeing Sanford Joiner Sr. at the request of Dr. Bernal for management of LVAD.    Sanford Joiner Sr. is a 64 y.o. male with a history of chronic systolic heart failure due to non ischemic cardiomyopathy.      Mr. Joiner presented in May 2021 with new onset heart failure. He was admitted with several weeks of shortness of breath. He had pulmonary edema on CXR. Echo showed EF of 20-25%. Coronary angiogram 5/17/21 showed no significant CAD. RHC showed low CI with normal biventricular filling pressures following diuresis. He was evaluated inpatient by the Advanced HF service. He was initiated on Dobutamine as a bridge to recovery. He was not initially felt to be an LVAD candidate due to the severity of lung dysfunction. He had frequent PVCs which was managed with Mexiletine.      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 EF improved to 33%. Dobutamine was weaned off.      He was readmitted in June 2022 with acute hypoxic respiratory failure due to PNA, small PE, and severe COPD exacerbation requiring IV steroids. He has been lost to follow up between November 2021 and admission. He was supported with inotrope therapy and diuresed. Inotrope weaned prior to discharge. Palliative care was recommended during admission due to advanced heart failure and COPD. Echo during admission showed EF of 30-35%.     He was then readmitted in 8/8/2022 with VF arrest. He was started back on Milrinone. His hospital course was complicated by delirium and hypotension. In the interim since the prior hospitalization he had established care with [facility] Advanced HF center and was being evaluated for LVAD. He had been formally declined for LVAD at [facility] due to advanced lung disease and non compliance. He was stabilized and discharged home on inotrope therapy.      He underwent SQ ICD implant 1/12/2023 with Dr. Posada. He  underwent HeartMate 3 LVAD at [facility] on 3/27/2023. 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 This was treated with APC. He has been prone to dehydration and is off all diuretics.      He was admitted with blood loss anemia due to GI bleed 7/10-7/17/23, hemoglobin 5.8. He received 2 units PRBCs. Capsule study was completed which was normal. INR goal was decreased to 1.8-2.2.      He was readmitted in August 2023, discharged 8/28/23, in the setting of non compliance with INR monitoring and INR of 4 with epistaxis and anemia. He received 2 units PRBCs. INR decreased to 1.5 at the time of discharge. He was discharged with Lovenox.      He was readmitted in October 2023 with anemia related to upper GI bleed. EGD showed a single, small non bleeding AVM treated with APC.      He developed low flow alarms which are suspected to be related to some LV function improvement post VAD. Speed has been decreased.      He had a driveline tug, dropped his controller. He developed pain and redness at the driveline exit. We performed a CT on 1/3/24 to evaluate and this showed no signs of infection. Driveline cultures on 1/3/2024 grew Entreobacter and MSSA. He was started on Bactrim outpatient. On Bactrim, creatinine increased from 1.76 to 3. He had ongoing redness and pain at the driveline exit 1 week after antibiotic treatment and he was instructed to return to the hospital for admission for IV antibiotics and evaluate DAWOOD. On presentation he reports 3-4 days of dark stools, increased shortness of breath and fatigue.      He was then readmitted 3/19/24 with fever, chills, cough, and sputum production. He had worsened dyspnea. He required BIPAP on admission. He was evaluated by Pulmonary and treated with steroids, antibiotics and bronchodilators. He had staph bacteremia. We repeated CT chest/Abdomen to evaluate driveline infection and he had 2 fluid collections surrounding the driveline and outflow graft.  07/24/2024    from LVAD drain line no longer present at site       PAST SURGICAL HISTORY:  Past Surgical History:   Procedure Laterality Date    CARDIAC PACEMAKER REMOVAL Right     Per pt, PM removed from R side    HARDWARE REMOVAL N/A 7/25/2024    STERNAL WOUND DEBRIDEMENT, WOUND VAC PLACEMENT performed by Ronn Gomez MD at [facility] OPEN HEART    LEFT VENTRICULAR ASSIST DEVICE  05/27/2023    PACEMAKER PLACEMENT      Subcutanous    SHOULDER ARTHROSCOPY Right     UPPER GASTROINTESTINAL ENDOSCOPY N/A 10/13/2023    EGD ESOPHAGOGASTRODUODENOSCOPY (LVAD) performed by Uzma Tinoco MD at [facility] ENDOSCOPY    UPPER GASTROINTESTINAL ENDOSCOPY N/A 01/11/2024    EGD ESOPHAGOGASTRODUODENOSCOPY performed by Praful Buck MD at [facility] ENDOSCOPY    US DRAIN SOFT TISSUE ABSCESS  4/4/2024    US DRAIN SOFT TISSUE ABSCESS 4/4/2024 Daisy Decker MD [facility] RAD US    US DRAIN SOFT TISSUE ABSCESS  4/24/2024    US DRAIN SOFT TISSUE ABSCESS 4/24/2024 Hank Villa Jr., APRN - NP [facility] RAD US    US DRAIN SOFT TISSUE ABSCESS  7/8/2024    US DRAIN SOFT TISSUE ABSCESS 7/8/2024 Pia Victor, APRN - NP [facility] RAD US       FAMILY HISTORY:  No family history on file.    SOCIAL HISTORY:  Social History     Socioeconomic History    Marital status: Single   Tobacco Use    Smoking status: Former     Current packs/day: 0.00     Types: Cigarettes     Quit date: 4/22/2021     Years since quitting: 3.2    Smokeless tobacco: Never   Substance and Sexual Activity    Alcohol use: Not Currently    Drug use: Never     Social Determinants of Health     Food Insecurity: No Food Insecurity (7/24/2024)    Hunger Vital Sign     Worried About Running Out of Food in the Last Year: Never true     Ran Out of Food in the Last Year: Never true   Transportation Needs: No Transportation Needs (7/24/2024)    PRAPARE - Transportation     Lack of Transportation (Medical): No     Lack of Transportation (Non-Medical): No   Recent Concern: Transportation Needs - Unmet

## 2024-07-26 NOTE — PLAN OF CARE
Problem: Chronic Conditions and Co-morbidities  Goal: Patient's chronic conditions and co-morbidity symptoms are monitored and maintained or improved  Outcome: Progressing     Problem: Discharge Planning  Goal: Discharge to home or other facility with appropriate resources  Outcome: Progressing     Problem: Safety - Adult  Goal: Free from fall injury  Outcome: Progressing     Problem: ABCDS Injury Assessment  Goal: Absence of physical injury  Outcome: Progressing     Problem: Pain  Goal: Verbalizes/displays adequate comfort level or baseline comfort level  Outcome: Progressing     Problem: Physical Therapy - Adult  Goal: By Discharge: Performs mobility at highest level of function for planned discharge setting.  See evaluation for individualized goals.  Description: FUNCTIONAL STATUS PRIOR TO ADMISSION: Patient was independent and active without use of DME. Denies hx falls. LVAD in 5/2023.     HOME SUPPORT PRIOR TO ADMISSION: The patient lived with wife but did not require assistance.    Physical Therapy Goals  Initiated 7/25/2024  1.  Patient will move from supine to sit and sit to supine in bed with independence within 7 day(s).    2.  Patient will perform sit to stand with independence within 7 day(s).  3.  Patient will transfer from bed to chair and chair to bed with independence using the least restrictive device within 7 day(s).  4.  Patient will ambulate with independence for 150 feet with the least restrictive device within 7 day(s).   7/25/2024 1512 by Luana Fields, PT  Outcome: Progressing     Problem: Occupational Therapy - Adult  Goal: By Discharge: Performs self-care activities at highest level of function for planned discharge setting.  See evaluation for individualized goals.  Description: FUNCTIONAL STATUS PRIOR TO ADMISSION:  Patient was ambulatory without use of DME.    HOME SUPPORT: Patient lived with family and was independent with all ADL tasks.    Occupational Therapy Goals:  Initiated  7/25/2024  1.  Patient will perform grooming routine with Modified [place] within 7 day(s).  2.  Patient will perform upper and lower body bathing with Modified [place] within 7 day(s).  3.  Patient will perform upper and lower body dressing with Modified [place] within 7 day(s).  4.  Patient will perform toilet transfers with Modified [place]  within 7 day(s).  5.  Patient will perform all aspects of toileting with Modified [place] within 7 day(s).  6.  Patient will participate in upper extremity therapeutic exercise/activities with Modified [place] for 5 minutes within 7 day(s).    7.  Patient will utilize energy conservation techniques during functional activities with verbal cues within 7 day(s).    7/25/2024 1529 by Tasia Ballesteros, ISATU  Outcome: Progressing

## 2024-07-26 NOTE — PROGRESS NOTES
Occupational Therapy:     Chart reviewed in prep for OT tx session. Attempted to see pt, however pt adamantly refused and requested for therapy to return later this PM. Will defer and follow as able and appropriate.     Thank you,   Tasia Ballesteros, OTD, OTR/L

## 2024-07-26 NOTE — WOUND CARE
Paged by RN for alarming VAC.   Pump is showing blockage.  Replaced track pads and left packing that was placed in the OR. Additional disc of black sponge used to cushion the track pad.  RN at bedside and changed LVAD dressing at the same time.     Plan to change full VAC dressing on 7/29.  PASTOR Palmer, aware of plan and track pad changed today.     JUANITA Gusman

## 2024-07-27 LAB
ALBUMIN SERPL-MCNC: 3.2 G/DL (ref 3.5–5)
ALBUMIN/GLOB SERPL: 0.9 (ref 1.1–2.2)
ALP SERPL-CCNC: 73 U/L (ref 45–117)
ALT SERPL-CCNC: 9 U/L (ref 12–78)
ANION GAP SERPL CALC-SCNC: 5 MMOL/L (ref 5–15)
AST SERPL-CCNC: 21 U/L (ref 15–37)
BACTERIA SPEC CULT: NORMAL
BACTERIA SPEC CULT: NORMAL
BILIRUB DIRECT SERPL-MCNC: <0.1 MG/DL (ref 0–0.2)
BILIRUB SERPL-MCNC: 0.2 MG/DL (ref 0.2–1)
BUN SERPL-MCNC: 32 MG/DL (ref 6–20)
BUN/CREAT SERPL: 24 (ref 12–20)
CALCIUM SERPL-MCNC: 9 MG/DL (ref 8.5–10.1)
CHLORIDE SERPL-SCNC: 106 MMOL/L (ref 97–108)
CO2 SERPL-SCNC: 29 MMOL/L (ref 21–32)
CREAT SERPL-MCNC: 1.31 MG/DL (ref 0.7–1.3)
ERYTHROCYTE [DISTWIDTH] IN BLOOD BY AUTOMATED COUNT: 18.6 % (ref 11.5–14.5)
GLOBULIN SER CALC-MCNC: 3.5 G/DL (ref 2–4)
GLUCOSE SERPL-MCNC: 95 MG/DL (ref 65–100)
HCT VFR BLD AUTO: 29.3 % (ref 36.6–50.3)
HGB BLD-MCNC: 9.1 G/DL (ref 12.1–17)
INR PPP: 2 (ref 0.9–1.1)
LDH SERPL L TO P-CCNC: 193 U/L (ref 85–241)
MAGNESIUM SERPL-MCNC: 1.9 MG/DL (ref 1.6–2.4)
MCH RBC QN AUTO: 26.8 PG (ref 26–34)
MCHC RBC AUTO-ENTMCNC: 31.1 G/DL (ref 30–36.5)
MCV RBC AUTO: 86.4 FL (ref 80–99)
NRBC # BLD: 0 K/UL (ref 0–0.01)
NRBC BLD-RTO: 0 PER 100 WBC
PLATELET # BLD AUTO: 304 K/UL (ref 150–400)
PMV BLD AUTO: 10.9 FL (ref 8.9–12.9)
POTASSIUM SERPL-SCNC: 4.5 MMOL/L (ref 3.5–5.1)
PROT SERPL-MCNC: 6.7 G/DL (ref 6.4–8.2)
PROTHROMBIN TIME: 19.8 SEC (ref 9–11.1)
RBC # BLD AUTO: 3.39 M/UL (ref 4.1–5.7)
SERVICE CMNT-IMP: NORMAL
SERVICE CMNT-IMP: NORMAL
SODIUM SERPL-SCNC: 140 MMOL/L (ref 136–145)
WBC # BLD AUTO: 10.5 K/UL (ref 4.1–11.1)

## 2024-07-27 PROCEDURE — 83615 LACTATE (LD) (LDH) ENZYME: CPT

## 2024-07-27 PROCEDURE — 6370000000 HC RX 637 (ALT 250 FOR IP): Performed by: NURSE PRACTITIONER

## 2024-07-27 PROCEDURE — 36415 COLL VENOUS BLD VENIPUNCTURE: CPT

## 2024-07-27 PROCEDURE — 99232 SBSQ HOSP IP/OBS MODERATE 35: CPT | Performed by: NURSE PRACTITIONER

## 2024-07-27 PROCEDURE — 2580000003 HC RX 258: Performed by: INTERNAL MEDICINE

## 2024-07-27 PROCEDURE — 2580000003 HC RX 258: Performed by: NURSE PRACTITIONER

## 2024-07-27 PROCEDURE — 85610 PROTHROMBIN TIME: CPT

## 2024-07-27 PROCEDURE — 94640 AIRWAY INHALATION TREATMENT: CPT

## 2024-07-27 PROCEDURE — 6360000002 HC RX W HCPCS: Performed by: INTERNAL MEDICINE

## 2024-07-27 PROCEDURE — 80048 BASIC METABOLIC PNL TOTAL CA: CPT

## 2024-07-27 PROCEDURE — 2060000000 HC ICU INTERMEDIATE R&B

## 2024-07-27 PROCEDURE — 80076 HEPATIC FUNCTION PANEL: CPT

## 2024-07-27 PROCEDURE — 6370000000 HC RX 637 (ALT 250 FOR IP): Performed by: FAMILY MEDICINE

## 2024-07-27 PROCEDURE — 85027 COMPLETE CBC AUTOMATED: CPT

## 2024-07-27 PROCEDURE — 83735 ASSAY OF MAGNESIUM: CPT

## 2024-07-27 RX ORDER — WARFARIN SODIUM 1 MG/1
1 TABLET ORAL
Status: COMPLETED | OUTPATIENT
Start: 2024-07-27 | End: 2024-07-27

## 2024-07-27 RX ADMIN — HYDRALAZINE HYDROCHLORIDE 75 MG: 50 TABLET ORAL at 14:46

## 2024-07-27 RX ADMIN — PIPERACILLIN AND TAZOBACTAM 3375 MG: 3; .375 INJECTION, POWDER, LYOPHILIZED, FOR SOLUTION INTRAVENOUS at 04:11

## 2024-07-27 RX ADMIN — Medication 2000 UNITS: at 09:43

## 2024-07-27 RX ADMIN — MAGNESIUM OXIDE TAB 400 MG (241.3 MG ELEMENTAL MG) 400 MG: 400 (241.3 MG) TAB at 23:00

## 2024-07-27 RX ADMIN — HYDRALAZINE HYDROCHLORIDE 75 MG: 50 TABLET ORAL at 07:30

## 2024-07-27 RX ADMIN — ALLOPURINOL 50 MG: 100 TABLET ORAL at 09:43

## 2024-07-27 RX ADMIN — WARFARIN SODIUM 1 MG: 1 TABLET ORAL at 17:35

## 2024-07-27 RX ADMIN — METOPROLOL SUCCINATE 50 MG: 50 TABLET, EXTENDED RELEASE ORAL at 09:43

## 2024-07-27 RX ADMIN — SODIUM CHLORIDE: 9 INJECTION, SOLUTION INTRAVENOUS at 04:09

## 2024-07-27 RX ADMIN — Medication 1 CAPSULE: at 09:43

## 2024-07-27 RX ADMIN — PIPERACILLIN AND TAZOBACTAM 3375 MG: 3; .375 INJECTION, POWDER, LYOPHILIZED, FOR SOLUTION INTRAVENOUS at 17:35

## 2024-07-27 RX ADMIN — PRAVASTATIN SODIUM 40 MG: 40 TABLET ORAL at 17:35

## 2024-07-27 RX ADMIN — HYDRALAZINE HYDROCHLORIDE 75 MG: 50 TABLET ORAL at 23:01

## 2024-07-27 RX ADMIN — SODIUM CHLORIDE, PRESERVATIVE FREE 10 ML: 5 INJECTION INTRAVENOUS at 09:44

## 2024-07-27 RX ADMIN — PANTOPRAZOLE SODIUM 40 MG: 40 TABLET, DELAYED RELEASE ORAL at 08:07

## 2024-07-27 RX ADMIN — MAGNESIUM OXIDE TAB 400 MG (241.3 MG ELEMENTAL MG) 400 MG: 400 (241.3 MG) TAB at 09:43

## 2024-07-27 RX ADMIN — PIPERACILLIN AND TAZOBACTAM 3375 MG: 3; .375 INJECTION, POWDER, LYOPHILIZED, FOR SOLUTION INTRAVENOUS at 09:41

## 2024-07-27 RX ADMIN — SODIUM CHLORIDE: 9 INJECTION, SOLUTION INTRAVENOUS at 17:34

## 2024-07-27 RX ADMIN — SODIUM CHLORIDE, PRESERVATIVE FREE 10 ML: 5 INJECTION INTRAVENOUS at 23:00

## 2024-07-27 NOTE — PROGRESS NOTES
Care plan:    Problem: Chronic Conditions and Co-morbidities  Goal: Patient's chronic conditions and co-morbidity symptoms are monitored and maintained or improved  7/27/2024 1554 by Mayela Chen RN  Outcome: Progressing  Flowsheets (Taken 7/27/2024 1554)  Care Plan - Patient's Chronic Conditions and Co-Morbidity Symptoms are Monitored and Maintained or Improved:   Monitor and assess patient's chronic conditions and comorbid symptoms for stability, deterioration, or improvement   Collaborate with multidisciplinary team to address chronic and comorbid conditions and prevent exacerbation or deterioration   Update acute care plan with appropriate goals if chronic or comorbid symptoms are exacerbated and prevent overall improvement and discharge  7/27/2024 0155 by Paxton Ford RN  Outcome: Progressing     Problem: Discharge Planning  Goal: Discharge to home or other facility with appropriate resources  7/27/2024 1554 by Mayela Chen RN  Outcome: Progressing  Flowsheets (Taken 7/27/2024 1554)  Discharge to home or other facility with appropriate resources:   Identify barriers to discharge with patient and caregiver   Arrange for needed discharge resources and transportation as appropriate   Identify discharge learning needs (meds, wound care, etc)  7/27/2024 0155 by Paxton Ford, RN  Outcome: Progressing     Problem: Safety - Adult  Goal: Free from fall injury  7/27/2024 1554 by Mayela Chen RN  Outcome: Progressing  Flowsheets (Taken 7/27/2024 1554)  Free From Fall Injury: Based on caregiver fall risk screen, instruct family/caregiver to ask for assistance with transferring infant if caregiver noted to have fall risk factors  7/27/2024 0155 by Paxton Ford, RN  Outcome: Progressing     Problem: ABCDS Injury Assessment  Goal: Absence of physical injury  7/27/2024 1554 by Mayela Chen, RN  Outcome: Progressing  Flowsheets (Taken 7/27/2024 1554)  Absence of Physical Injury: Implement safety  measures based on patient assessment  7/27/2024 0155 by Paxton Ford, RN  Outcome: Progressing

## 2024-07-27 NOTE — PROGRESS NOTES
ADVANCED HEART FAILURE CENTER  [facility] in [place], [place]  Inpatient Progress Note      Patient name: Sanford Joiner Sr.  Patient : 1960  Patient MRN: [id number]  Date of service: 24    Reason for Referral:  Management of LVAD     ASSESSMENT:  Sanford Joiner Sr. is a 64 y.o. male admitted with recurrent driveline abscess and infection  Chronic systolic heart failure due to non ischemic cardiomyopathy, s/p HM3 LVAD implantation (2023) as destination therapy, stage D, on optimal GDMT limited by hypovolemia  Not a transplant candidate due to advanced obstructive lung disease, was declined at [facility] for LVAD and transplant.   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       INTERVAL HISTORY:  ELISEO DIAMOND  Labs reviewed and stable  Feels well this am, no complaints    RECOMMENDATIONS:  Driveline/LVAD infection  S/p wound debridement and wound vac placement on 24   Blood cx NTD  Wound culture from OR grew GNR and MSSA  Fungal culture pending  ID consulted, appreciate recs  Antibiotics changed to zosyn, ID awaiting sensitivities before further adjusting    Left Ventricular Assist Device  Continue current device speed at 5200 rpm to prevent RV overdrive  Dressing changes daily    Medical Therapy for Heart Failure  Beta-blocker: Continue Toprol XL 50 mg daily  ACEi/ARB/ARNI: Unable to tolerate due to hypovolemia  Hydralazine/Nitrate: Continue Hydralazine 75 mg TID  MRA: Unable to tolerate due to hypovolemia  SGLT2i: Unable to tolerate due to hypovolemia    Volume Management   Bumex PRN for weight gain, increased SOB    Anticoagulation and Antiplatelet Therapy  Anticoagulation with Warfarin, target INR 1.8-2.2  Pharmacy to dose while admitted   History of GI Bleed, not on aspirin    Antiarrhythmic and Device Therapy  ICD interrogation per routine OP    Laboratory and Imaging Studies  Labs: CBC, CMP, LDH, PT/INR daily  Repeat TTE  mg at 07/27/24 0943    gentamicin (GARAMYCIN) 0.1 % ointment, , Topical, Daily, Libertad, Nessa B, APRN - NP    hydrALAZINE (APRESOLINE) tablet 75 mg, 75 mg, Oral, 3 times per day, Libertad, Nessa B, APRN - NP, 75 mg at 07/27/24 0730    lactobacillus (CULTURELLE) capsule 1 capsule, 1 capsule, Oral, Daily with breakfast, Libertad, Nessa B, APRN - NP, 1 capsule at 07/27/24 0943    magnesium oxide (MAG-OX) tablet 400 mg, 400 mg, Oral, BID, Libertad, Nessa B, APRN - NP, 400 mg at 07/27/24 0943    metoprolol succinate (TOPROL XL) extended release tablet 50 mg, 50 mg, Oral, Daily, Libertad, Nessa B, APRN - NP, 50 mg at 07/27/24 0943    pantoprazole (PROTONIX) tablet 40 mg, 40 mg, Oral, QAM AC, Libertad, Nessa B, APRN - NP, 40 mg at 07/27/24 0807    pravastatin (PRAVACHOL) tablet 40 mg, 40 mg, Oral, QPM, Libertad, Nessa B, APRN - NP, 40 mg at 07/26/24 1801    Vitamin D (CHOLECALCIFEROL) tablet 2,000 Units, 2,000 Units, Oral, Daily, Libertad, Nessa B, APRN - NP, 2,000 Units at 07/27/24 0943    sodium chloride flush 0.9 % injection 5-40 mL, 5-40 mL, IntraVENous, 2 times per day, Libertad, Nessa B, APRN - NP, 10 mL at 07/27/24 0944    sodium chloride flush 0.9 % injection 5-40 mL, 5-40 mL, IntraVENous, PRN, Libertad, Nessa B, APRN - NP    0.9 % sodium chloride infusion, , IntraVENous, PRN, Libertad, Nessa B, APRN - NP, Last Rate: 10 mL/hr at 07/27/24 0409, New Bag at 07/27/24 0409    ondansetron (ZOFRAN) injection 4 mg, 4 mg, IntraVENous, Q6H PRN, Libertad, Nessa B, APRN - NP    hydrALAZINE (APRESOLINE) injection 10 mg, 10 mg, IntraVENous, Q4H PRN, Libertad, Nessa B, APRN - NP    magnesium sulfate 2000 mg in 50 mL IVPB premix, 2,000 mg, IntraVENous, PRN, Libertad, Nessa B, APRN - NP    albuterol (PROVENTIL) (2.5 MG/3ML) 0.083% nebulizer solution 2.5 mg, 2.5 mg, Nebulization, Q6H PRN, Libertad, Nessa B, APRN - NP    glucose chewable tablet 16 g, 4 tablet, Oral, PRN, Libertad, Nessa B, APRN - NP    dextrose  bolus 10% 125 mL, 125 mL, IntraVENous, PRN **OR** dextrose bolus 10% 250 mL, 250 mL, IntraVENous, PRN, Libertad, Nessa B, APRN - NP    glucagon injection 1 mg, 1 mg, SubCUTAneous, PRN, Libertad, Nessa B, APRN - NP    dextrose 10 % infusion, , IntraVENous, Continuous PRN, Libertad, Nessa B, APRN - NP    potassium chloride 20 mEq/50 mL IVPB (Central Line), 20 mEq, IntraVENous, PRN, Libertad, Nessa B, APRN - NP    acetaminophen (TYLENOL) tablet 650 mg, 650 mg, Oral, Q4H PRN, Libertad, Nessa B, APRN - NP    polyethylene glycol (GLYCOLAX) packet 17 g, 17 g, Oral, Daily PRN, Libertad, Nessa B, APRN - NP     PATIENT CARE TEAM:  Patient Care Team:  Ranjan Porter MD as PCP - General  Ranjan Porter MD as PCP - Empaneled Provider     Thank you for allowing me to participate in this patient's care.    Nahomi Quiles, APRN - NP   Advanced Heart Failure Center  [facility]  [address], [address]  Phone: [phone]    I have spent a total time of 35 minutes personally reviewing new vitals, test results, notes, telemetry/EKG, face to face encounter/physical exam of patient, writing orders, performing medical decision making, and patient education.

## 2024-07-27 NOTE — PROGRESS NOTES
Hospitalist Progress Note  Abdi Wagner MD  Answering service: [phone] OR [phone] from in house phone        Date of Service:  2024  NAME:  Sanford Joiner Sr.  :  1960  MRN:  [id number]      Admission Summary:   Sanford Joiner Sr. is a 64 y.o. male with a past medical history of chronic systolic heart failure due to nonischemic cardiomyopathy s/p LVAD, chronic driveline infections, staph bacteremia, hypertension, CKD stage II, asthma, V-fib arrest status post ICD , GI bleeding.  He has been dealing with a driveline infection since approximately January of this year.  Cultures in January grew Enterobacter and MSSA.  He was then diagnosed with staph bacteremia in 2024.  He completed 6 weeks of IV antibiotics and on  drain was removed and patient has remained on suppressive antibiotics of Keflex.  On 7/3 he underwent a CT scan of the chest which showed infection around the driveline exit with signs of cellulitis and reaccumulation of fluid collection below the xiphoid process.  The patient was brought into the ER for planned debridement of chronic driveline infection.  He is status post wound debridement and wound VAC placement on .  The hospitalist were asked to admit the patient on behalf of of advanced heart failure service.        Interval history / Subjective:   Pt seen and examined; reviewed his vitals, labs, test results, and careplan  Reviewed notes from heart failure and infectious disease  Patient has no specific complaints for me today     Assessment & Plan:      Driveline/LVAD infection  - Status post wound debridement and wound VAC placement on   - Blood cultures pending  - Cultures from IntraOp pending  - Continue IV antibiotics with end date of   - Wound care consulted for antibiotic recommendations     Left ventricular assist device  - Per advanced heart  137 140   K 4.0 5.0 4.5    105 106   CO2 22 26 29   BUN 34* 39* 32*   MG 2.1 2.0 1.9       Recent Labs     07/25/24  0059 07/26/24  0027 07/27/24  0429   ALT 9* 12 9*   GLOB 4.5* 3.8 3.5       Recent Labs     07/25/24  0059 07/26/24  0027 07/27/24  0429   INR 2.4* 2.4* 2.0*        No results for input(s): \"TIBC\" in the last 72 hours.    Invalid input(s): \"FE\", \"PSAT\", \"FERR\"   No results found for: \"RBCF\"   No results for input(s): \"PH\", \"PCO2\", \"PO2\" in the last 72 hours.  No results for input(s): \"CPK\" in the last 72 hours.    Invalid input(s): \"CPKMB\", \"CKNDX\", \"TROIQ\"  Lab Results   Component Value Date/Time    CHOL 167 05/30/2024 12:00 AM    HDL 27 05/30/2024 12:00 AM     05/30/2024 12:00 AM    LDL Not calculated due to elevated triglyceride level 11/14/2023 11:27 AM     No results found for: \"GLUCPOC\"        Medications Reviewed:     Current Facility-Administered Medications   Medication Dose Route Frequency    warfarin (COUMADIN) tablet 1 mg  1 mg Oral Once    piperacillin-tazobactam (ZOSYN) 3,375 mg in sodium chloride 0.9 % 50 mL IVPB (mini-bag)  3,375 mg IntraVENous Q8H    fluticasone-umeclidin-vilant (TRELEGY ELLIPTA) 200-62.5-25 MCG/ACT inhaler 1 puff (Patient Supplied)  1 puff Inhalation Daily    oxyCODONE (ROXICODONE) immediate release tablet 5 mg  5 mg Oral Q4H PRN    acetaminophen (TYLENOL) tablet 975 mg  975 mg Oral Q6H    warfarin placeholder: dosing by pharmacy   Other RX Placeholder    allopurinol (ZYLOPRIM) tablet 50 mg  50 mg Oral Daily    gentamicin (GARAMYCIN) 0.1 % ointment   Topical Daily    hydrALAZINE (APRESOLINE) tablet 75 mg  75 mg Oral 3 times per day    lactobacillus (CULTURELLE) capsule 1 capsule  1 capsule Oral Daily with breakfast    magnesium oxide (MAG-OX) tablet 400 mg  400 mg Oral BID    metoprolol succinate (TOPROL XL) extended release tablet 50 mg  50 mg Oral Daily    pantoprazole (PROTONIX) tablet 40 mg  40 mg Oral QAM AC    pravastatin (PRAVACHOL) tablet 40 mg

## 2024-07-27 NOTE — PROGRESS NOTES
Pharmacist Note - Warfarin Dosing  Consult provided for this 64 y.o.male to manage warfarin for LVAD    INR Goal: 1.8 - 2.2    Home regimen/ tablet size: 2 mg daily    Drugs that may increase INR: None  Drugs that may decrease INR: None  Other current anticoagulants/ drugs that may increase bleeding risk: None  Risk factors: None  Daily INR ordered through: 10/14/24    Recent Labs     07/25/24  0059 07/26/24  0027 07/27/24  0429   HGB 9.7* 8.9* 9.1*   INR 2.4* 2.4* 2.0*     Date               INR                  Dose  7/24                 --                     HELD  7/25  2.4  1 mg  7/26  2.4  1 mg    7/27                2.0                   1 mg                                                                               Assessment/ Plan:  Will order warfarin 1 mg PO x 1 dose.    Pharmacy will continue to monitor daily and adjust therapy as indicated.  Please contact the pharmacist at [phone] or [phone] for outpatient recommendations if needed.

## 2024-07-27 NOTE — PROGRESS NOTES
Hospitalist Progress Note  Abdi Wagner MD  Answering service: [phone] OR [phone] from in house phone        Date of Service:  2024  NAME:  Sanford Joiner Sr.  :  1960  MRN:  [id number]      Admission Summary:   Sanford Joiner Sr. is a 64 y.o. male with a past medical history of chronic systolic heart failure due to nonischemic cardiomyopathy s/p LVAD, chronic driveline infections, staph bacteremia, hypertension, CKD stage II, asthma, V-fib arrest status post ICD , GI bleeding.  He has been dealing with a driveline infection since approximately January of this year.  Cultures in January grew Enterobacter and MSSA.  He was then diagnosed with staph bacteremia in 2024.  He completed 6 weeks of IV antibiotics and on  drain was removed and patient has remained on suppressive antibiotics of Keflex.  On 7/3 he underwent a CT scan of the chest which showed infection around the driveline exit with signs of cellulitis and reaccumulation of fluid collection below the xiphoid process.  The patient was brought into the ER for planned debridement of chronic driveline infection.  He is status post wound debridement and wound VAC placement on .  The hospitalist were asked to admit the patient on behalf of of advanced heart failure service.        Interval history / Subjective:   Pt seen and examined; reviewed his vitals, labs, test results, and careplan  Reviewed notes from heart failure and infectious disease  Patient has no specific complaints for me today     Assessment & Plan:      Driveline/LVAD infection  - Status post wound debridement and wound VAC placement on   - Blood cultures pending  - Cultures from IntraOp pending  - Continue IV antibiotics (Ancef) with end date of   - Wound care consulted for elevated sternal infection     Left ventricular assist device  - Per advanced       PROBABLE Gram negative rods SEEN ON GRAM STAIN OF THE THIO BROTH            NO GROWTH ON SOLID MEDIA THUS FAR    Culture, Blood 1 [[id number]] Collected: 07/25/24 0059    Order Status: Completed Specimen: Blood Updated: 07/26/24 0131     Special Requests --        NO SPECIAL REQUESTS  LEFT  HAND       Culture NO GROWTH 1 DAY       Culture, Blood 2 [[id number]] Collected: 07/25/24 0059    Order Status: Completed Specimen: Blood Updated: 07/26/24 0131     Special Requests --        NO SPECIAL REQUESTS  RIGHT  Antecubital       Culture NO GROWTH 1 DAY                 I have independently reviewed and interpreted patient's lab and all other diagnostic data    Notes reviewed from all clinical/nonclinical/nursing services involved in patient's clinical care. Care coordination discussions were held with appropriate clinical/nonclinical/ nursing providers based on care coordination needs.     Labs:     Recent Labs     07/25/24 0059 07/26/24 0027   WBC 9.7 9.2   HGB 9.7* 8.9*   HCT 30.7* 28.3*    347     Recent Labs     07/25/24 0059 07/26/24  0027   * 137   K 4.0 5.0    105   CO2 22 26   BUN 34* 39*   MG 2.1 2.0     Recent Labs     07/25/24 0059 07/26/24  0027   ALT 9* 12   GLOB 4.5* 3.8     Recent Labs     07/25/24 0059 07/26/24  0027   INR 2.4* 2.4*      No results for input(s): \"TIBC\" in the last 72 hours.    Invalid input(s): \"FE\", \"PSAT\", \"FERR\"   No results found for: \"RBCF\"   No results for input(s): \"PH\", \"PCO2\", \"PO2\" in the last 72 hours.  No results for input(s): \"CPK\" in the last 72 hours.    Invalid input(s): \"CPKMB\", \"CKNDX\", \"TROIQ\"  Lab Results   Component Value Date/Time    CHOL 167 05/30/2024 12:00 AM    HDL 27 05/30/2024 12:00 AM     05/30/2024 12:00 AM    LDL Not calculated due to elevated triglyceride level 11/14/2023 11:27 AM     No results found for: \"GLUCPOC\"        Medications Reviewed:     Current Facility-Administered Medications   Medication Dose Route  Frequency    piperacillin-tazobactam (ZOSYN) 3,375 mg in sodium chloride 0.9 % 50 mL IVPB (mini-bag)  3,375 mg IntraVENous Q8H    fluticasone-umeclidin-vilant (TRELEGY ELLIPTA) 200-62.5-25 MCG/ACT inhaler 1 puff (Patient Supplied)  1 puff Inhalation Daily    oxyCODONE (ROXICODONE) immediate release tablet 5 mg  5 mg Oral Q4H PRN    acetaminophen (TYLENOL) tablet 975 mg  975 mg Oral Q6H    warfarin placeholder: dosing by pharmacy   Other RX Placeholder    allopurinol (ZYLOPRIM) tablet 50 mg  50 mg Oral Daily    gentamicin (GARAMYCIN) 0.1 % ointment   Topical Daily    hydrALAZINE (APRESOLINE) tablet 75 mg  75 mg Oral 3 times per day    lactobacillus (CULTURELLE) capsule 1 capsule  1 capsule Oral Daily with breakfast    magnesium oxide (MAG-OX) tablet 400 mg  400 mg Oral BID    metoprolol succinate (TOPROL XL) extended release tablet 50 mg  50 mg Oral Daily    pantoprazole (PROTONIX) tablet 40 mg  40 mg Oral QAM AC    pravastatin (PRAVACHOL) tablet 40 mg  40 mg Oral QPM    Vitamin D (CHOLECALCIFEROL) tablet 2,000 Units  2,000 Units Oral Daily    sodium chloride flush 0.9 % injection 5-40 mL  5-40 mL IntraVENous 2 times per day    sodium chloride flush 0.9 % injection 5-40 mL  5-40 mL IntraVENous PRN    0.9 % sodium chloride infusion   IntraVENous PRN    ondansetron (ZOFRAN) injection 4 mg  4 mg IntraVENous Q6H PRN    hydrALAZINE (APRESOLINE) injection 10 mg  10 mg IntraVENous Q4H PRN    magnesium sulfate 2000 mg in 50 mL IVPB premix  2,000 mg IntraVENous PRN    albuterol (PROVENTIL) (2.5 MG/3ML) 0.083% nebulizer solution 2.5 mg  2.5 mg Nebulization Q6H PRN    glucose chewable tablet 16 g  4 tablet Oral PRN    dextrose bolus 10% 125 mL  125 mL IntraVENous PRN    Or    dextrose bolus 10% 250 mL  250 mL IntraVENous PRN    glucagon injection 1 mg  1 mg SubCUTAneous PRN    dextrose 10 % infusion   IntraVENous Continuous PRN    potassium chloride 20 mEq/50 mL IVPB (Central Line)  20 mEq IntraVENous PRN    acetaminophen  (TYLENOL) tablet 650 mg  650 mg Oral Q4H PRN    polyethylene glycol (GLYCOLAX) packet 17 g  17 g Oral Daily PRN     ______________________________________________________________________  EXPECTED LENGTH OF STAY: Unable to retrieve estimated LOS  ACTUAL LENGTH OF STAY:          2                 Abdi Wagner MD

## 2024-07-27 NOTE — PLAN OF CARE
Problem: Chronic Conditions and Co-morbidities  Goal: Patient's chronic conditions and co-morbidity symptoms are monitored and maintained or improved  Outcome: Progressing     Problem: Discharge Planning  Goal: Discharge to home or other facility with appropriate resources  Outcome: Progressing     Problem: Safety - Adult  Goal: Free from fall injury  Outcome: Progressing     Problem: ABCDS Injury Assessment  Goal: Absence of physical injury  Outcome: Progressing     Problem: Pain  Goal: Verbalizes/displays adequate comfort level or baseline comfort level  Outcome: Progressing     Problem: Physical Therapy - Adult  Goal: By Discharge: Performs mobility at highest level of function for planned discharge setting.  See evaluation for individualized goals.  Description: FUNCTIONAL STATUS PRIOR TO ADMISSION: Patient was independent and active without use of DME. Denies hx falls. LVAD in 5/2023.     HOME SUPPORT PRIOR TO ADMISSION: The patient lived with wife but did not require assistance.    Physical Therapy Goals  Initiated 7/25/2024  1.  Patient will move from supine to sit and sit to supine in bed with independence within 7 day(s).    2.  Patient will perform sit to stand with independence within 7 day(s).  3.  Patient will transfer from bed to chair and chair to bed with independence using the least restrictive device within 7 day(s).  4.  Patient will ambulate with independence for 150 feet with the least restrictive device within 7 day(s).   7/26/2024 1407 by Luana Fields, PT  Outcome: Progressing

## 2024-07-28 LAB
ALBUMIN SERPL-MCNC: 3.4 G/DL (ref 3.5–5)
ALBUMIN/GLOB SERPL: 0.9 (ref 1.1–2.2)
ALP SERPL-CCNC: 81 U/L (ref 45–117)
ALT SERPL-CCNC: 10 U/L (ref 12–78)
ANION GAP SERPL CALC-SCNC: 4 MMOL/L (ref 5–15)
AST SERPL-CCNC: 17 U/L (ref 15–37)
BACTERIA SPEC CULT: ABNORMAL
BILIRUB DIRECT SERPL-MCNC: <0.1 MG/DL (ref 0–0.2)
BILIRUB SERPL-MCNC: 0.2 MG/DL (ref 0.2–1)
BUN SERPL-MCNC: 34 MG/DL (ref 6–20)
BUN/CREAT SERPL: 21 (ref 12–20)
CALCIUM SERPL-MCNC: 9 MG/DL (ref 8.5–10.1)
CHLORIDE SERPL-SCNC: 104 MMOL/L (ref 97–108)
CO2 SERPL-SCNC: 30 MMOL/L (ref 21–32)
CREAT SERPL-MCNC: 1.62 MG/DL (ref 0.7–1.3)
ERYTHROCYTE [DISTWIDTH] IN BLOOD BY AUTOMATED COUNT: 18.7 % (ref 11.5–14.5)
GLOBULIN SER CALC-MCNC: 3.7 G/DL (ref 2–4)
GLUCOSE SERPL-MCNC: 102 MG/DL (ref 65–100)
GRAM STN SPEC: ABNORMAL
HCT VFR BLD AUTO: 30.7 % (ref 36.6–50.3)
HGB BLD-MCNC: 9.4 G/DL (ref 12.1–17)
INR PPP: 1.6 (ref 0.9–1.1)
LDH SERPL L TO P-CCNC: 203 U/L (ref 85–241)
MAGNESIUM SERPL-MCNC: 1.7 MG/DL (ref 1.6–2.4)
MCH RBC QN AUTO: 26.6 PG (ref 26–34)
MCHC RBC AUTO-ENTMCNC: 30.6 G/DL (ref 30–36.5)
MCV RBC AUTO: 87 FL (ref 80–99)
NRBC # BLD: 0 K/UL (ref 0–0.01)
NRBC BLD-RTO: 0 PER 100 WBC
NT PRO BNP: 822 PG/ML
PLATELET # BLD AUTO: 325 K/UL (ref 150–400)
PMV BLD AUTO: 11.2 FL (ref 8.9–12.9)
POTASSIUM SERPL-SCNC: 4.5 MMOL/L (ref 3.5–5.1)
PROT SERPL-MCNC: 7.1 G/DL (ref 6.4–8.2)
PROTHROMBIN TIME: 16.7 SEC (ref 9–11.1)
RBC # BLD AUTO: 3.53 M/UL (ref 4.1–5.7)
SERVICE CMNT-IMP: ABNORMAL
SERVICE CMNT-IMP: ABNORMAL
SODIUM SERPL-SCNC: 138 MMOL/L (ref 136–145)
WBC # BLD AUTO: 11.2 K/UL (ref 4.1–11.1)

## 2024-07-28 PROCEDURE — 83735 ASSAY OF MAGNESIUM: CPT

## 2024-07-28 PROCEDURE — 83615 LACTATE (LD) (LDH) ENZYME: CPT

## 2024-07-28 PROCEDURE — 2060000000 HC ICU INTERMEDIATE R&B

## 2024-07-28 PROCEDURE — 6370000000 HC RX 637 (ALT 250 FOR IP): Performed by: PHYSICIAN ASSISTANT

## 2024-07-28 PROCEDURE — 6370000000 HC RX 637 (ALT 250 FOR IP): Performed by: NURSE PRACTITIONER

## 2024-07-28 PROCEDURE — 6370000000 HC RX 637 (ALT 250 FOR IP): Performed by: FAMILY MEDICINE

## 2024-07-28 PROCEDURE — 6360000002 HC RX W HCPCS: Performed by: INTERNAL MEDICINE

## 2024-07-28 PROCEDURE — 85610 PROTHROMBIN TIME: CPT

## 2024-07-28 PROCEDURE — 80048 BASIC METABOLIC PNL TOTAL CA: CPT

## 2024-07-28 PROCEDURE — 85027 COMPLETE CBC AUTOMATED: CPT

## 2024-07-28 PROCEDURE — 36415 COLL VENOUS BLD VENIPUNCTURE: CPT

## 2024-07-28 PROCEDURE — 80076 HEPATIC FUNCTION PANEL: CPT

## 2024-07-28 PROCEDURE — 99232 SBSQ HOSP IP/OBS MODERATE 35: CPT | Performed by: NURSE PRACTITIONER

## 2024-07-28 PROCEDURE — 83880 ASSAY OF NATRIURETIC PEPTIDE: CPT

## 2024-07-28 PROCEDURE — 2580000003 HC RX 258: Performed by: INTERNAL MEDICINE

## 2024-07-28 PROCEDURE — P9045 ALBUMIN (HUMAN), 5%, 250 ML: HCPCS | Performed by: NURSE PRACTITIONER

## 2024-07-28 PROCEDURE — 2580000003 HC RX 258: Performed by: NURSE PRACTITIONER

## 2024-07-28 PROCEDURE — 6360000002 HC RX W HCPCS: Performed by: NURSE PRACTITIONER

## 2024-07-28 RX ORDER — IPRATROPIUM BROMIDE AND ALBUTEROL SULFATE 2.5; .5 MG/3ML; MG/3ML
1 SOLUTION RESPIRATORY (INHALATION) EVERY 4 HOURS PRN
Status: DISCONTINUED | OUTPATIENT
Start: 2024-07-28 | End: 2024-07-30 | Stop reason: HOSPADM

## 2024-07-28 RX ORDER — ALBUMIN, HUMAN INJ 5% 5 %
12.5 SOLUTION INTRAVENOUS ONCE
Status: COMPLETED | OUTPATIENT
Start: 2024-07-28 | End: 2024-07-28

## 2024-07-28 RX ORDER — WARFARIN SODIUM 1 MG/1
2 TABLET ORAL
Status: COMPLETED | OUTPATIENT
Start: 2024-07-28 | End: 2024-07-28

## 2024-07-28 RX ADMIN — PIPERACILLIN AND TAZOBACTAM 3375 MG: 3; .375 INJECTION, POWDER, LYOPHILIZED, FOR SOLUTION INTRAVENOUS at 02:33

## 2024-07-28 RX ADMIN — SODIUM CHLORIDE: 9 INJECTION, SOLUTION INTRAVENOUS at 02:33

## 2024-07-28 RX ADMIN — HYDRALAZINE HYDROCHLORIDE 75 MG: 50 TABLET ORAL at 07:27

## 2024-07-28 RX ADMIN — SODIUM CHLORIDE: 9 INJECTION, SOLUTION INTRAVENOUS at 11:59

## 2024-07-28 RX ADMIN — PIPERACILLIN AND TAZOBACTAM 3375 MG: 3; .375 INJECTION, POWDER, LYOPHILIZED, FOR SOLUTION INTRAVENOUS at 17:57

## 2024-07-28 RX ADMIN — HYDRALAZINE HYDROCHLORIDE 75 MG: 50 TABLET ORAL at 21:44

## 2024-07-28 RX ADMIN — SODIUM CHLORIDE, PRESERVATIVE FREE 10 ML: 5 INJECTION INTRAVENOUS at 08:45

## 2024-07-28 RX ADMIN — ALLOPURINOL 50 MG: 100 TABLET ORAL at 08:44

## 2024-07-28 RX ADMIN — SODIUM CHLORIDE, PRESERVATIVE FREE 10 ML: 5 INJECTION INTRAVENOUS at 21:44

## 2024-07-28 RX ADMIN — PANTOPRAZOLE SODIUM 40 MG: 40 TABLET, DELAYED RELEASE ORAL at 07:27

## 2024-07-28 RX ADMIN — MAGNESIUM OXIDE TAB 400 MG (241.3 MG ELEMENTAL MG) 400 MG: 400 (241.3 MG) TAB at 21:44

## 2024-07-28 RX ADMIN — HYDRALAZINE HYDROCHLORIDE 75 MG: 50 TABLET ORAL at 14:43

## 2024-07-28 RX ADMIN — METOPROLOL SUCCINATE 50 MG: 50 TABLET, EXTENDED RELEASE ORAL at 08:44

## 2024-07-28 RX ADMIN — PIPERACILLIN AND TAZOBACTAM 3375 MG: 3; .375 INJECTION, POWDER, LYOPHILIZED, FOR SOLUTION INTRAVENOUS at 12:00

## 2024-07-28 RX ADMIN — Medication 2000 UNITS: at 08:44

## 2024-07-28 RX ADMIN — ACETAMINOPHEN 975 MG: 325 TABLET ORAL at 02:31

## 2024-07-28 RX ADMIN — ALBUMIN (HUMAN) 12.5 G: 12.5 INJECTION, SOLUTION INTRAVENOUS at 16:18

## 2024-07-28 RX ADMIN — MAGNESIUM OXIDE TAB 400 MG (241.3 MG ELEMENTAL MG) 400 MG: 400 (241.3 MG) TAB at 08:44

## 2024-07-28 RX ADMIN — Medication 1 CAPSULE: at 08:44

## 2024-07-28 RX ADMIN — PRAVASTATIN SODIUM 40 MG: 40 TABLET ORAL at 17:57

## 2024-07-28 RX ADMIN — WARFARIN SODIUM 2 MG: 1 TABLET ORAL at 17:57

## 2024-07-28 ASSESSMENT — PAIN SCALES - GENERAL
PAINLEVEL_OUTOF10: 0
PAINLEVEL_OUTOF10: 0

## 2024-07-28 NOTE — PROGRESS NOTES
Hospitalist Progress Note  Abdi Wagner MD  Answering service: [phone] OR [phone] from in house phone        Date of Service:  2024  NAME:  Sanford Joiner Sr.  :  1960  MRN:  [id number]      Admission Summary:   Sanford Joiner Sr. is a 64 y.o. male with a past medical history of chronic systolic heart failure due to nonischemic cardiomyopathy s/p LVAD, chronic driveline infections, staph bacteremia, hypertension, CKD stage II, asthma, V-fib arrest status post ICD , GI bleeding.  He has been dealing with a driveline infection since approximately January of this year.  Cultures in January grew Enterobacter and MSSA.  He was then diagnosed with staph bacteremia in 2024.  He completed 6 weeks of IV antibiotics and on  drain was removed and patient has remained on suppressive antibiotics of Keflex.  On 7/3 he underwent a CT scan of the chest which showed infection around the driveline exit with signs of cellulitis and reaccumulation of fluid collection below the xiphoid process.  The patient was brought into the ER for planned debridement of chronic driveline infection.  He is status post wound debridement and wound VAC placement on .  The hospitalist were asked to admit the patient on behalf of of advanced heart failure service.        Interval history / Subjective:   Pt seen and examined; reviewed his vitals, labs, test results, and careplan  Reviewed notes from heart failure and infectious disease  Patient has no specific complaints for me today     Assessment & Plan:      Driveline/LVAD infection  - Status post wound debridement and wound VAC placement on   - Blood cultures no growth to date  - Wound cultures finalized  - Continue IV antibiotics with end date of   - Infectious disease consulted for antibiotic recommendations     Left ventricular assist device  - Per advanced  clinical/nonclinical/nursing services involved in patient's clinical care. Care coordination discussions were held with appropriate clinical/nonclinical/ nursing providers based on care coordination needs.     Labs:     Recent Labs     07/27/24 0429 07/28/24  0245   WBC 10.5 11.2*   HGB 9.1* 9.4*   HCT 29.3* 30.7*    325       Recent Labs     07/26/24 0027 07/27/24 0429 07/28/24  0245    140 138   K 5.0 4.5 4.5    106 104   CO2 26 29 30   BUN 39* 32* 34*   MG 2.0 1.9 1.7       Recent Labs     07/26/24 0027 07/27/24 0429 07/28/24  0245   ALT 12 9* 10*   GLOB 3.8 3.5 3.7       Recent Labs     07/26/24 0027 07/27/24 0429 07/28/24  0245   INR 2.4* 2.0* 1.6*        No results for input(s): \"TIBC\" in the last 72 hours.    Invalid input(s): \"FE\", \"PSAT\", \"FERR\"   No results found for: \"RBCF\"   No results for input(s): \"PH\", \"PCO2\", \"PO2\" in the last 72 hours.  No results for input(s): \"CPK\" in the last 72 hours.    Invalid input(s): \"CPKMB\", \"CKNDX\", \"TROIQ\"  Lab Results   Component Value Date/Time    CHOL 167 05/30/2024 12:00 AM    HDL 27 05/30/2024 12:00 AM     05/30/2024 12:00 AM    LDL Not calculated due to elevated triglyceride level 11/14/2023 11:27 AM     No results found for: \"GLUCPOC\"        Medications Reviewed:     Current Facility-Administered Medications   Medication Dose Route Frequency    warfarin (COUMADIN) tablet 2 mg  2 mg Oral Once    ipratropium 0.5 mg-albuterol 2.5 mg (DUONEB) nebulizer solution 1 Dose  1 Dose Inhalation Q4H PRN    albumin human 5% IV solution 12.5 g  12.5 g IntraVENous Once    piperacillin-tazobactam (ZOSYN) 3,375 mg in sodium chloride 0.9 % 50 mL IVPB (mini-bag)  3,375 mg IntraVENous Q8H    fluticasone-umeclidin-vilant (TRELEGY ELLIPTA) 200-62.5-25 MCG/ACT inhaler 1 puff (Patient Supplied)  1 puff Inhalation Daily    oxyCODONE (ROXICODONE) immediate release tablet 5 mg  5 mg Oral Q4H PRN    acetaminophen (TYLENOL) tablet 975 mg  975 mg Oral Q6H

## 2024-07-28 NOTE — CARE COORDINATION
Per hand off, Saturday  faxed [employer] form and left it along with fax confirmation on the front of the chart.

## 2024-07-28 NOTE — PROGRESS NOTES
Pharmacist Note - Warfarin Dosing  Consult provided for this 64 y.o.male to manage warfarin for LVAD    INR Goal: 1.8 - 2.2    Home regimen/ tablet size: 2 mg daily    Drugs that may increase INR: None  Drugs that may decrease INR: None  Other current anticoagulants/ drugs that may increase bleeding risk: None  Risk factors: None  Daily INR ordered through: 10/14/24    Recent Labs     07/26/24  0027 07/27/24  0429 07/28/24  0245   HGB 8.9* 9.1* 9.4*   INR 2.4* 2.0* 1.6*     Date               INR                  Dose  7/24                 --                     HELD  7/25  2.4  1 mg  7/26  2.4  1 mg    7/27                2.0                   1 mg  7/28                1.6                   2 mg                                                                               Assessment/ Plan:  Will order warfarin 2 mg PO x 1 dose.     Pharmacy will continue to monitor daily and adjust therapy as indicated.  Please contact the pharmacist at [phone] or [phone] for outpatient recommendations if needed.

## 2024-07-28 NOTE — PROGRESS NOTES
[facility]  [facility] in [place], [place]  Inpatient Progress Note      Patient name: Sanford Joiner Sr.  Patient : 1960  Patient MRN: [id number]  Date of service: 24    Reason for Referral:  Management of LVAD     ASSESSMENT:  Sanford Joiner Sr. is a 64 y.o. male admitted with recurrent driveline abscess and infection  Chronic systolic heart failure due to non ischemic cardiomyopathy, s/p HM3 LVAD implantation (2023) as destination therapy, stage D, on optimal GDMT limited by hypovolemia  Not a transplant candidate due to advanced obstructive lung disease, was declined at [facility] for LVAD and transplant.   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       INTERVAL HISTORY:  NAEO, HDS  Labs reviewed, creatinine uptrending, PBNP downtrending  Feels well this am, no complaints    RECOMMENDATIONS:  Driveline/LVAD infection  S/p wound debridement and wound vac placement on 24   Blood cx NTD  Wound culture from OR grew enterobacter and MSSA  Fungal culture pending  ID consulted, appreciate recs  Antibiotics changed to zosyn, ID awaiting sensitivities before further adjusting    Left Ventricular Assist Device  Continue current device speed at 5200 rpm to prevent RV overdrive  Dressing changes daily    Medical Therapy for Heart Failure  Beta-blocker: Continue Toprol XL 50 mg daily  ACEi/ARB/ARNI: Unable to tolerate due to hypovolemia  Hydralazine/Nitrate: Continue Hydralazine 75 mg TID  MRA: Unable to tolerate due to hypovolemia  SGLT2i: Unable to tolerate due to hypovolemia    Volume Management  Pt had a bump in creatinine today to 1.6 and LVAD interrogation shows many PI events and dropping down to the low speed limit  Will give 12.5 g of 5% albumin today for IVVD    Anticoagulation and Antiplatelet Therapy  Anticoagulation with Warfarin, target INR 1.8-2.2  Pharmacy to dose while admitted   History of GI  Vital Sign     Worried About Running Out of Food in the Last Year: Never true     Ran Out of Food in the Last Year: Never true   Transportation Needs: No Transportation Needs (7/24/2024)    PRAPARE - Transportation     Lack of Transportation (Medical): No     Lack of Transportation (Non-Medical): No   Recent Concern: Transportation Needs - Unmet Transportation Needs (7/15/2024)    OASIS : Transportation     Lack of Transportation (Medical): Yes     Lack of Transportation (Non-Medical): No     Patient Unable or Declines to Respond: No    Social Connections ([facility] HRSN)   Housing Stability: Low Risk  (7/24/2024)    Housing Stability Vital Sign     Unable to Pay for Housing in the Last Year: No     Number of Places Lived in the Last Year: 1     Unstable Housing in the Last Year: No       LABORATORY RESULTS:   Failed to redirect to the Timeline version of the [employer] SmartLink.  Lab Results   Component Value Date/Time    TSH 1.23 07/08/2024 04:02 PM    TSH 1.95 06/12/2024 03:06 PM    TSH 4.560 05/30/2024 12:00 AM    TSH 0.92 02/28/2024 03:06 PM    TSH 1.77 01/31/2024 02:15 PM    TSH 4.36 01/08/2024 01:29 PM    TSH 0.37 05/15/2021 02:37 AM       ALLERGY:  Allergies   Allergen Reactions    Bactrim [Sulfamethoxazole-Trimethoprim] Other (See Comments)     Acute Kidney Injury    Ciprofloxacin      Other reaction(s): Unknown (comments)        CURRENT MEDICATIONS:    Current Facility-Administered Medications:     warfarin (COUMADIN) tablet 2 mg, 2 mg, Oral, Once, Abdi Wagner MD    ipratropium 0.5 mg-albuterol 2.5 mg (DUONEB) nebulizer solution 1 Dose, 1 Dose, Inhalation, Q4H PRN, Nahomi Quiles, APRN - NP    albumin human 5% IV solution 12.5 g, 12.5 g, IntraVENous, Once, Nahomi Quiles, APRN - NP    piperacillin-tazobactam (ZOSYN) 3,375 mg in sodium chloride 0.9 % 50 mL IVPB (mini-bag), 3,375 mg, IntraVENous, Q8H, Rafita Rajan MD, Stopped at 07/28/24 0730    fluticasone-umeclidin-jossy (TRELEGY  ELLIPTA) 200-62.5-25 MCG/ACT inhaler 1 puff (Patient Supplied), 1 puff, Inhalation, Daily, Nahomi Quiles, APRN - NP, 1 puff at 07/28/24 0745    oxyCODONE (ROXICODONE) immediate release tablet 5 mg, 5 mg, Oral, Q4H PRN, Spencer Davis PA-C    acetaminophen (TYLENOL) tablet 975 mg, 975 mg, Oral, Q6H, Spencer Davis PA-C, 975 mg at 07/28/24 0231    warfarin placeholder: dosing by pharmacy, , Other, RX Placeholder, Libertad, Nessa B, APRN - NP    allopurinol (ZYLOPRIM) tablet 50 mg, 50 mg, Oral, Daily, Libertad, Nessa B, APRN - NP, 50 mg at 07/28/24 0844    gentamicin (GARAMYCIN) 0.1 % ointment, , Topical, Daily, Libertad, Nessa B, APRN - NP    hydrALAZINE (APRESOLINE) tablet 75 mg, 75 mg, Oral, 3 times per day, Libertad, Nessa B, APRN - NP, 75 mg at 07/28/24 0727    lactobacillus (CULTURELLE) capsule 1 capsule, 1 capsule, Oral, Daily with breakfast, Libertad, Nessa B, APRN - NP, 1 capsule at 07/28/24 0844    magnesium oxide (MAG-OX) tablet 400 mg, 400 mg, Oral, BID, Libertad, Nessa B, APRN - NP, 400 mg at 07/28/24 0844    metoprolol succinate (TOPROL XL) extended release tablet 50 mg, 50 mg, Oral, Daily, Libertad, Nessa B, APRN - NP, 50 mg at 07/28/24 0844    pantoprazole (PROTONIX) tablet 40 mg, 40 mg, Oral, QAM AC, Libertad, Nessa B, APRN - NP, 40 mg at 07/28/24 0727    pravastatin (PRAVACHOL) tablet 40 mg, 40 mg, Oral, QPM, Libertad, Nessa B, APRN - NP, 40 mg at 07/27/24 1735    Vitamin D (CHOLECALCIFEROL) tablet 2,000 Units, 2,000 Units, Oral, Daily, Libertad, Nessa B, APRN - NP, 2,000 Units at 07/28/24 0844    sodium chloride flush 0.9 % injection 5-40 mL, 5-40 mL, IntraVENous, 2 times per day, Libertad, Nessa B, APRN - NP, 10 mL at 07/28/24 0845    sodium chloride flush 0.9 % injection 5-40 mL, 5-40 mL, IntraVENous, PRN, Libertad, Nessa B, APRN - NP    0.9 % sodium chloride infusion, , IntraVENous, PRN, Libertad, Nessa B, APRN - NP, Last Rate: 10 mL/hr at 07/28/24 0233, New Bag at 07/28/24 0233

## 2024-07-28 NOTE — PLAN OF CARE
Problem: Chronic Conditions and Co-morbidities  Goal: Patient's chronic conditions and co-morbidity symptoms are monitored and maintained or improved  7/28/2024 0010 by Paxton Ford RN  Outcome: Progressing  7/27/2024 1554 by Mayela Chen RN  Outcome: Progressing  Flowsheets (Taken 7/27/2024 1554)  Care Plan - Patient's Chronic Conditions and Co-Morbidity Symptoms are Monitored and Maintained or Improved:   Monitor and assess patient's chronic conditions and comorbid symptoms for stability, deterioration, or improvement   Collaborate with multidisciplinary team to address chronic and comorbid conditions and prevent exacerbation or deterioration   Update acute care plan with appropriate goals if chronic or comorbid symptoms are exacerbated and prevent overall improvement and discharge     Problem: Discharge Planning  Goal: Discharge to home or other facility with appropriate resources  7/28/2024 0010 by Paxton Ford RN  Outcome: Progressing  7/27/2024 1554 by Mayela Chen RN  Outcome: Progressing  Flowsheets (Taken 7/27/2024 1554)  Discharge to home or other facility with appropriate resources:   Identify barriers to discharge with patient and caregiver   Arrange for needed discharge resources and transportation as appropriate   Identify discharge learning needs (meds, wound care, etc)     Problem: Safety - Adult  Goal: Free from fall injury  7/28/2024 0010 by Paxton Ford RN  Outcome: Progressing  7/27/2024 1554 by Mayela Chen RN  Outcome: Progressing  Flowsheets (Taken 7/27/2024 1554)  Free From Fall Injury: Based on caregiver fall risk screen, instruct family/caregiver to ask for assistance with transferring infant if caregiver noted to have fall risk factors     Problem: ABCDS Injury Assessment  Goal: Absence of physical injury  7/28/2024 0010 by Paxton Ford RN  Outcome: Progressing  7/27/2024 1554 by Mayela Chen RN  Outcome: Progressing  Flowsheets (Taken 7/27/2024

## 2024-07-28 NOTE — PROGRESS NOTES
Care plan:    Problem: Chronic Conditions and Co-morbidities  Goal: Patient's chronic conditions and co-morbidity symptoms are monitored and maintained or improved  7/28/2024 0902 by Mayela Chen, RN  Outcome: Progressing  Flowsheets  Taken 7/28/2024 0902  Care Plan - Patient's Chronic Conditions and Co-Morbidity Symptoms are Monitored and Maintained or Improved:   Monitor and assess patient's chronic conditions and comorbid symptoms for stability, deterioration, or improvement   Collaborate with multidisciplinary team to address chronic and comorbid conditions and prevent exacerbation or deterioration   Update acute care plan with appropriate goals if chronic or comorbid symptoms are exacerbated and prevent overall improvement and discharge  Taken 7/28/2024 0755  Care Plan - Patient's Chronic Conditions and Co-Morbidity Symptoms are Monitored and Maintained or Improved:   Monitor and assess patient's chronic conditions and comorbid symptoms for stability, deterioration, or improvement   Collaborate with multidisciplinary team to address chronic and comorbid conditions and prevent exacerbation or deterioration   Update acute care plan with appropriate goals if chronic or comorbid symptoms are exacerbated and prevent overall improvement and discharge  7/28/2024 0010 by Paxton Ford RN  Outcome: Progressing     Problem: Discharge Planning  Goal: Discharge to home or other facility with appropriate resources  7/28/2024 0902 by Mayela Chen, RN  Outcome: Progressing  Flowsheets  Taken 7/28/2024 0902  Discharge to home or other facility with appropriate resources: Identify barriers to discharge with patient and caregiver  Taken 7/28/2024 0755  Discharge to home or other facility with appropriate resources:   Identify barriers to discharge with patient and caregiver   Arrange for needed discharge resources and transportation as appropriate   Identify discharge learning needs (meds, wound care, etc)  7/28/2024  0010 by Liesen, Paxton, RN  Outcome: Progressing     Problem: Safety - Adult  Goal: Free from fall injury  7/28/2024 0902 by Mayela Chen RN  Outcome: Progressing  Flowsheets (Taken 7/28/2024 0902)  Free From Fall Injury: Based on caregiver fall risk screen, instruct family/caregiver to ask for assistance with transferring infant if caregiver noted to have fall risk factors  7/28/2024 0010 by Paxton Ford RN  Outcome: Progressing     Problem: ABCDS Injury Assessment  Goal: Absence of physical injury  7/28/2024 0902 by Mayela Chen RN  Outcome: Progressing  Flowsheets (Taken 7/28/2024 0902)  Absence of Physical Injury: Implement safety measures based on patient assessment  7/28/2024 0010 by Paxton Ford RN  Outcome: Progressing     Problem: Pain  Goal: Verbalizes/displays adequate comfort level or baseline comfort level  7/28/2024 0902 by Mayela Chen RN  Outcome: Progressing  Flowsheets (Taken 7/28/2024 0902)  Verbalizes/displays adequate comfort level or baseline comfort level:   Encourage patient to monitor pain and request assistance   Assess pain using appropriate pain scale   Administer analgesics based on type and severity of pain and evaluate response  7/28/2024 0010 by Paxton Ford RN  Outcome: Progressing

## 2024-07-29 LAB
ALBUMIN SERPL-MCNC: 3.4 G/DL (ref 3.5–5)
ALBUMIN/GLOB SERPL: 0.9 (ref 1.1–2.2)
ALP SERPL-CCNC: 75 U/L (ref 45–117)
ALT SERPL-CCNC: 12 U/L (ref 12–78)
ANION GAP SERPL CALC-SCNC: 3 MMOL/L (ref 5–15)
AST SERPL-CCNC: 18 U/L (ref 15–37)
BACTERIA SPEC CULT: NORMAL
BACTERIA SPEC CULT: NORMAL
BILIRUB DIRECT SERPL-MCNC: 0.1 MG/DL (ref 0–0.2)
BILIRUB SERPL-MCNC: 0.2 MG/DL (ref 0.2–1)
BUN SERPL-MCNC: 37 MG/DL (ref 6–20)
BUN/CREAT SERPL: 27 (ref 12–20)
CALCIUM SERPL-MCNC: 9.3 MG/DL (ref 8.5–10.1)
CHLORIDE SERPL-SCNC: 107 MMOL/L (ref 97–108)
CO2 SERPL-SCNC: 27 MMOL/L (ref 21–32)
CREAT SERPL-MCNC: 1.38 MG/DL (ref 0.7–1.3)
ERYTHROCYTE [DISTWIDTH] IN BLOOD BY AUTOMATED COUNT: 18.5 % (ref 11.5–14.5)
GLOBULIN SER CALC-MCNC: 3.8 G/DL (ref 2–4)
GLUCOSE SERPL-MCNC: 103 MG/DL (ref 65–100)
HCT VFR BLD AUTO: 30.8 % (ref 36.6–50.3)
HGB BLD-MCNC: 9.4 G/DL (ref 12.1–17)
INR PPP: 1.4 (ref 0.9–1.1)
LDH SERPL L TO P-CCNC: 210 U/L (ref 85–241)
MAGNESIUM SERPL-MCNC: 1.7 MG/DL (ref 1.6–2.4)
MCH RBC QN AUTO: 26.3 PG (ref 26–34)
MCHC RBC AUTO-ENTMCNC: 30.5 G/DL (ref 30–36.5)
MCV RBC AUTO: 86 FL (ref 80–99)
NRBC # BLD: 0 K/UL (ref 0–0.01)
NRBC BLD-RTO: 0 PER 100 WBC
NT PRO BNP: 573 PG/ML
PLATELET # BLD AUTO: 315 K/UL (ref 150–400)
PMV BLD AUTO: 11 FL (ref 8.9–12.9)
POTASSIUM SERPL-SCNC: 4.3 MMOL/L (ref 3.5–5.1)
PROT SERPL-MCNC: 7.2 G/DL (ref 6.4–8.2)
PROTHROMBIN TIME: 14.1 SEC (ref 9–11.1)
RBC # BLD AUTO: 3.58 M/UL (ref 4.1–5.7)
SERVICE CMNT-IMP: NORMAL
SERVICE CMNT-IMP: NORMAL
SODIUM SERPL-SCNC: 137 MMOL/L (ref 136–145)
WBC # BLD AUTO: 11.6 K/UL (ref 4.1–11.1)

## 2024-07-29 PROCEDURE — 85610 PROTHROMBIN TIME: CPT

## 2024-07-29 PROCEDURE — 6360000002 HC RX W HCPCS: Performed by: INTERNAL MEDICINE

## 2024-07-29 PROCEDURE — 85027 COMPLETE CBC AUTOMATED: CPT

## 2024-07-29 PROCEDURE — 36415 COLL VENOUS BLD VENIPUNCTURE: CPT

## 2024-07-29 PROCEDURE — 97605 NEG PRS WND THER DME<=50SQCM: CPT

## 2024-07-29 PROCEDURE — 6370000000 HC RX 637 (ALT 250 FOR IP): Performed by: STUDENT IN AN ORGANIZED HEALTH CARE EDUCATION/TRAINING PROGRAM

## 2024-07-29 PROCEDURE — APPNB30 APP NON BILLABLE TIME 0-30 MINS

## 2024-07-29 PROCEDURE — 83880 ASSAY OF NATRIURETIC PEPTIDE: CPT

## 2024-07-29 PROCEDURE — 2580000003 HC RX 258: Performed by: INTERNAL MEDICINE

## 2024-07-29 PROCEDURE — 6360000002 HC RX W HCPCS: Performed by: NURSE PRACTITIONER

## 2024-07-29 PROCEDURE — 80076 HEPATIC FUNCTION PANEL: CPT

## 2024-07-29 PROCEDURE — 2060000000 HC ICU INTERMEDIATE R&B

## 2024-07-29 PROCEDURE — 2580000003 HC RX 258

## 2024-07-29 PROCEDURE — 83615 LACTATE (LD) (LDH) ENZYME: CPT

## 2024-07-29 PROCEDURE — 2580000003 HC RX 258: Performed by: NURSE PRACTITIONER

## 2024-07-29 PROCEDURE — 93750 INTERROGATION VAD IN PERSON: CPT | Performed by: INTERNAL MEDICINE

## 2024-07-29 PROCEDURE — 6370000000 HC RX 637 (ALT 250 FOR IP): Performed by: NURSE PRACTITIONER

## 2024-07-29 PROCEDURE — 99232 SBSQ HOSP IP/OBS MODERATE 35: CPT | Performed by: INTERNAL MEDICINE

## 2024-07-29 PROCEDURE — 80048 BASIC METABOLIC PNL TOTAL CA: CPT

## 2024-07-29 PROCEDURE — 83735 ASSAY OF MAGNESIUM: CPT

## 2024-07-29 RX ORDER — SODIUM CHLORIDE 9 MG/ML
250 INJECTION, SOLUTION INTRAVENOUS ONCE
Status: COMPLETED | OUTPATIENT
Start: 2024-07-29 | End: 2024-07-29

## 2024-07-29 RX ORDER — WARFARIN SODIUM 2 MG/1
2 TABLET ORAL
Status: COMPLETED | OUTPATIENT
Start: 2024-07-29 | End: 2024-07-29

## 2024-07-29 RX ORDER — ENOXAPARIN SODIUM 100 MG/ML
1 INJECTION SUBCUTANEOUS 2 TIMES DAILY
Status: DISCONTINUED | OUTPATIENT
Start: 2024-07-29 | End: 2024-07-30 | Stop reason: HOSPADM

## 2024-07-29 RX ADMIN — SODIUM CHLORIDE: 9 INJECTION, SOLUTION INTRAVENOUS at 03:55

## 2024-07-29 RX ADMIN — Medication 2000 UNITS: at 08:48

## 2024-07-29 RX ADMIN — SODIUM CHLORIDE 250 ML: 9 INJECTION, SOLUTION INTRAVENOUS at 15:08

## 2024-07-29 RX ADMIN — PIPERACILLIN AND TAZOBACTAM 3375 MG: 3; .375 INJECTION, POWDER, LYOPHILIZED, FOR SOLUTION INTRAVENOUS at 10:59

## 2024-07-29 RX ADMIN — Medication 1 CAPSULE: at 08:48

## 2024-07-29 RX ADMIN — MAGNESIUM OXIDE TAB 400 MG (241.3 MG ELEMENTAL MG) 400 MG: 400 (241.3 MG) TAB at 21:32

## 2024-07-29 RX ADMIN — HYDRALAZINE HYDROCHLORIDE 75 MG: 50 TABLET ORAL at 07:15

## 2024-07-29 RX ADMIN — PIPERACILLIN AND TAZOBACTAM 3375 MG: 3; .375 INJECTION, POWDER, LYOPHILIZED, FOR SOLUTION INTRAVENOUS at 03:56

## 2024-07-29 RX ADMIN — SODIUM CHLORIDE, PRESERVATIVE FREE 10 ML: 5 INJECTION INTRAVENOUS at 21:32

## 2024-07-29 RX ADMIN — PANTOPRAZOLE SODIUM 40 MG: 40 TABLET, DELAYED RELEASE ORAL at 07:15

## 2024-07-29 RX ADMIN — ENOXAPARIN SODIUM 70 MG: 100 INJECTION SUBCUTANEOUS at 21:32

## 2024-07-29 RX ADMIN — ALLOPURINOL 50 MG: 100 TABLET ORAL at 08:48

## 2024-07-29 RX ADMIN — ENOXAPARIN SODIUM 70 MG: 100 INJECTION SUBCUTANEOUS at 10:59

## 2024-07-29 RX ADMIN — PRAVASTATIN SODIUM 40 MG: 40 TABLET ORAL at 17:48

## 2024-07-29 RX ADMIN — WARFARIN SODIUM 2 MG: 2 TABLET ORAL at 17:48

## 2024-07-29 RX ADMIN — HYDRALAZINE HYDROCHLORIDE 75 MG: 50 TABLET ORAL at 15:11

## 2024-07-29 RX ADMIN — MAGNESIUM OXIDE TAB 400 MG (241.3 MG ELEMENTAL MG) 400 MG: 400 (241.3 MG) TAB at 08:48

## 2024-07-29 RX ADMIN — HYDRALAZINE HYDROCHLORIDE 75 MG: 50 TABLET ORAL at 21:32

## 2024-07-29 RX ADMIN — METOPROLOL SUCCINATE 50 MG: 50 TABLET, EXTENDED RELEASE ORAL at 08:48

## 2024-07-29 RX ADMIN — WATER 2000 MG: 1 INJECTION INTRAMUSCULAR; INTRAVENOUS; SUBCUTANEOUS at 17:47

## 2024-07-29 ASSESSMENT — ENCOUNTER SYMPTOMS
VOMITING: 0
SHORTNESS OF BREATH: 0
COUGH: 0
ABDOMINAL DISTENTION: 0
NAUSEA: 0

## 2024-07-29 ASSESSMENT — PAIN SCALES - GENERAL
PAINLEVEL_OUTOF10: 0

## 2024-07-29 NOTE — PLAN OF CARE
Problem: Chronic Conditions and Co-morbidities  Goal: Patient's chronic conditions and co-morbidity symptoms are monitored and maintained or improved  Outcome: Progressing  Flowsheets (Taken 7/28/2024 2000)  Care Plan - Patient's Chronic Conditions and Co-Morbidity Symptoms are Monitored and Maintained or Improved: Monitor and assess patient's chronic conditions and comorbid symptoms for stability, deterioration, or improvement     Problem: Discharge Planning  Goal: Discharge to home or other facility with appropriate resources  Outcome: Progressing  Flowsheets (Taken 7/28/2024 2000)  Discharge to home or other facility with appropriate resources: Identify barriers to discharge with patient and caregiver     Problem: Safety - Adult  Goal: Free from fall injury  Outcome: Progressing     Problem: ABCDS Injury Assessment  Goal: Absence of physical injury  Outcome: Progressing     Problem: Pain  Goal: Verbalizes/displays adequate comfort level or baseline comfort level  Outcome: Progressing  Flowsheets (Taken 7/28/2024 2000)  Verbalizes/displays adequate comfort level or baseline comfort level: Encourage patient to monitor pain and request assistance

## 2024-07-29 NOTE — PLAN OF CARE
INFECTIOUS DISEASE discharge plan:    Diagnosis: LVAD driveline abscess x2    Antibiotic: Cefepime 2g IV q12hrs through 8/25/24, inclusive    PICC line insertion for outpatient antibiotic.     Routine PICC Care including PRN catheter flow management    Weekly labs with results fax to # [phone]. Call critical lab values to [phone].   [x] CBC w/diff  [x] BUN/Creatinine  [x] AST, ALT  [] CPK  [x] CRP    Home Health to pull PICC line after last dose or send to IR for Maria De Jesus removal    May send to IR for line evaluation or replacement PRN Phone # [phone]    Follow up with Dr. Rajan in 3-4 weeks, advanced heart failure outpatient.

## 2024-07-29 NOTE — PROGRESS NOTES
Referral source:   Sanford Joiner Sr. at [facility] in [facility] 4 CV SERVICES UNIT.  attended rounds on the CV Services Unit as part of the Interdisciplinary team where the patient's ongoing care was discussed. I reviewed the medical record as part of this encounter.     Outcome: Interdisciplinary team are aware of  availability and were encouraged to request Spiritual Health support as needed.      The  on-call can be reached at [phone]).     Rev. Mariana Heck MDiv, [facility]  Staff      This [facility] will continue to follow & reattempt PMV over the next week.

## 2024-07-29 NOTE — PROGRESS NOTES
Infectious Disease Progress Note     Subjective:      Feels well, underwent wound vac exchange, new one ordered and arrived at bedside    Objective:    Vitals:   Patient Vitals for the past 24 hrs:   Temp Pulse Resp SpO2   07/29/24 1543 98.2 °F (36.8 °C) 75 18 --   07/29/24 1400 -- 75 -- --   07/29/24 1200 -- 72 -- --   07/29/24 1122 98.1 °F (36.7 °C) 75 18 --   07/29/24 1000 -- 71 -- --   07/29/24 0711 97.8 °F (36.6 °C) 89 18 94 %   07/29/24 0556 -- 85 -- --   07/29/24 0400 -- 73 -- --   07/29/24 0338 98.3 °F (36.8 °C) 76 16 95 %   07/29/24 0200 -- 62 -- --   07/29/24 0002 98.2 °F (36.8 °C) 90 16 96 %   07/28/24 2200 -- 73 -- --   07/28/24 2000 -- 72 -- --   07/28/24 1915 97.7 °F (36.5 °C) 66 19 96 %   07/28/24 1800 -- 80 -- --         Physical Exam:  Gen: NAD  HEENT:  Normocephalic, atraumatic  CV: LVAD hum  Lungs: no wheezing  Abdomen: soft, non tender, non distended + 2 wound vacs with serosanguinous output  Genitourinary: no smith catheter   Skin: driveline site dressed no noted drainage on dressing  Psych: good affect, good eye contact, non tearful  Neuro: alert, oriented to time,  place, and situation, moves all extremities to commands, verbal  Musculoskeletal:  No joint edema, erythema or tenderness noted     Central Line:      Medications:    Current Facility-Administered Medications:     enoxaparin (LOVENOX) injection 70 mg, 1 mg/kg, SubCUTAneous, BID, Geni Garibay, APRN - NP, 70 mg at 07/29/24 1059    warfarin (COUMADIN) tablet 2 mg, 2 mg, Oral, Once, Noah Estevez MD    ceFEPIme (MAXIPIME) 2,000 mg in sterile water 20 mL IV syringe, 2,000 mg, IntraVENous, Once **FOLLOWED BY** [START ON 7/30/2024] ceFEPIme (MAXIPIME) 2,000 mg in sodium chloride 0.9 % 100 mL IVPB (mini-bag), 2,000 mg, IntraVENous, Q12H, Rafita Rajan MD    ipratropium 0.5 mg-albuterol 2.5 mg (DUONEB) nebulizer solution 1 Dose, 1 Dose, Inhalation, Q4H PRN, Nahomi Quiles, APRN - NP    fluticasone-umeclidin-vilant  (TRELEGY ELLIPTA) 200-62.5-25 MCG/ACT inhaler 1 puff (Patient Supplied), 1 puff, Inhalation, Daily, Nahomi Quiles, APRN - NP, 1 puff at 07/29/24 0848    oxyCODONE (ROXICODONE) immediate release tablet 5 mg, 5 mg, Oral, Q4H PRN, Spencer Davis PA-C    acetaminophen (TYLENOL) tablet 975 mg, 975 mg, Oral, Q6H, Spencer Davis PA-C, 975 mg at 07/28/24 0231    warfarin placeholder: dosing by pharmacy, , Other, RX Placeholder, Libertad, Nessa B, APRN - NP    allopurinol (ZYLOPRIM) tablet 50 mg, 50 mg, Oral, Daily, Libertad, Nessa B, APRN - NP, 50 mg at 07/29/24 0848    gentamicin (GARAMYCIN) 0.1 % ointment, , Topical, Daily, Libertad, Nessa B, APRN - NP    hydrALAZINE (APRESOLINE) tablet 75 mg, 75 mg, Oral, 3 times per day, Libertad, Nessa B, APRN - NP, 75 mg at 07/29/24 1511    lactobacillus (CULTURELLE) capsule 1 capsule, 1 capsule, Oral, Daily with breakfast, Libertad, Nessa B, APRN - NP, 1 capsule at 07/29/24 0848    magnesium oxide (MAG-OX) tablet 400 mg, 400 mg, Oral, BID, Libertad, Nessa B, APRN - NP, 400 mg at 07/29/24 0848    metoprolol succinate (TOPROL XL) extended release tablet 50 mg, 50 mg, Oral, Daily, Libertad, Nessa B, APRN - NP, 50 mg at 07/29/24 0848    pantoprazole (PROTONIX) tablet 40 mg, 40 mg, Oral, QAM AC, Libertad, Nessa B, APRN - NP, 40 mg at 07/29/24 0715    pravastatin (PRAVACHOL) tablet 40 mg, 40 mg, Oral, QPM, Libertad, Nessa B, APRN - NP, 40 mg at 07/28/24 1757    Vitamin D (CHOLECALCIFEROL) tablet 2,000 Units, 2,000 Units, Oral, Daily, Libertad, Nessa B, APRN - NP, 2,000 Units at 07/29/24 0848    sodium chloride flush 0.9 % injection 5-40 mL, 5-40 mL, IntraVENous, 2 times per day, Libertad, Nessa B, APRN - NP, 10 mL at 07/28/24 2144    sodium chloride flush 0.9 % injection 5-40 mL, 5-40 mL, IntraVENous, PRN, Libertad, Nessa B, APRN - NP    0.9 % sodium chloride infusion, , IntraVENous, PRN, Libertad, Nessa B, APRN - NP, Last Rate: 15 mL/hr at 07/29/24 0355, New Bag at  07/29/24 0355    ondansetron (ZOFRAN) injection 4 mg, 4 mg, IntraVENous, Q6H PRN, Libertad, Nessa B, APRN - NP    hydrALAZINE (APRESOLINE) injection 10 mg, 10 mg, IntraVENous, Q4H PRN, Libertad, Nessa B, APRN - NP    magnesium sulfate 2000 mg in 50 mL IVPB premix, 2,000 mg, IntraVENous, PRN, Libertad, Nessa B, APRN - NP    albuterol (PROVENTIL) (2.5 MG/3ML) 0.083% nebulizer solution 2.5 mg, 2.5 mg, Nebulization, Q6H PRN, Libertad, Nessa B, APRN - NP    glucose chewable tablet 16 g, 4 tablet, Oral, PRN, Libertad, Nessa B, APRN - NP    dextrose bolus 10% 125 mL, 125 mL, IntraVENous, PRN **OR** dextrose bolus 10% 250 mL, 250 mL, IntraVENous, PRN, Libertad, Nessa B, APRN - NP    glucagon injection 1 mg, 1 mg, SubCUTAneous, PRN, Libertad, Nessa B, APRN - NP    dextrose 10 % infusion, , IntraVENous, Continuous PRN, Libertad, Nessa B, APRN - NP    potassium chloride 20 mEq/50 mL IVPB (Central Line), 20 mEq, IntraVENous, PRN, Libertad, Nessa B, APRN - NP    acetaminophen (TYLENOL) tablet 650 mg, 650 mg, Oral, Q4H PRN, Libertad, Nessa B, APRN - NP    polyethylene glycol (GLYCOLAX) packet 17 g, 17 g, Oral, Daily PRN, Libertad, Nessa B, APRN - NP      Labs:  Recent Results (from the past 24 hour(s))   Basic Metabolic Panel    Collection Time: 07/29/24  4:07 AM   Result Value Ref Range    Sodium 137 136 - 145 mmol/L    Potassium 4.3 3.5 - 5.1 mmol/L    Chloride 107 97 - 108 mmol/L    CO2 27 21 - 32 mmol/L    Anion Gap 3 (L) 5 - 15 mmol/L    Glucose 103 (H) 65 - 100 mg/dL    BUN 37 (H) 6 - 20 MG/DL    Creatinine 1.38 (H) 0.70 - 1.30 MG/DL    BUN/Creatinine Ratio 27 (H) 12 - 20      Est, Glom Filt Rate 57 (L) >60 ml/min/1.73m2    Calcium 9.3 8.5 - 10.1 MG/DL   Protime-INR    Collection Time: 07/29/24  4:07 AM   Result Value Ref Range    INR 1.4 (H) 0.9 - 1.1      Protime 14.1 (H) 9.0 - 11.1 sec   Hepatic Function Panel    Collection Time: 07/29/24  4:07 AM   Result Value Ref Range    Total Protein 7.2 6.4 - 8.2 g/dL     history  Performing a medically appropriate exam and/or evaluation  Counseling and educating a patient/family/caregiver as noted above  Referring and communicating with other professionals (not separately reported)  Independently interpreting results (not separately reported) and communicating results to the patient/family/caregiver  Care coordination (not separately reported) as noted above  Documenting clinical information in the electronic health records (e.g. problem list, visit note) on the day of the encounter

## 2024-07-29 NOTE — CARE COORDINATION
Transition of Care Plan:     RUR: 23%  Prior Level of Functioning: Open to [facility], IV ABX, LVAD and lives w wife  Disposition: New wound vac delivered at bedside.  [facility]AN [facility]. Will return home  If SNF or IPR: Date FOC offered: NA  Date FOC received:   Accepting facility:   Date authorization started with reference number:   Date authorization received and expires:   Follow up appointments: Specialist  DME needed: New wound vac has arrived.  Transportation at discharge: Wife  IM/IMM Medicare/ letter given: 7/25  Caregiver Contact: WifeAshley [phone]  Discharge Caregiver contacted prior to discharge?   Care Conference needed?   Barriers to discharge:  Medical    Phone call to [employer] and they rec'd the auth form over the weekend but still need the H&P and operative note.  CM faxed additional clinicals to [phone].  Once approved the wound vac will be delivered to the hospital.    Also need HH orders and ID plan.  Patient has a midline from 7/12.    Update 1:30pm: Home wound vac has been delivered.     Update 4:43pm: Sent ID plan to [facility] in [facility]. Updated [facility] that HH orders and ID plan are in.     KATJA Leyva CCM  Care Management

## 2024-07-29 NOTE — PROGRESS NOTES
Pharmacist Note - Warfarin Dosing  Consult provided for this 64 y.o.male to manage warfarin for LVAD    INR Goal: 1.8 - 2.2    Home regimen/ tablet size: 2 mg daily    Drugs that may increase INR: None  Drugs that may decrease INR: None  Other current anticoagulants/ drugs that may increase bleeding risk: None  Risk factors: None  Daily INR ordered through: 10/14/24    Recent Labs     07/27/24  0429 07/28/24  0245 07/29/24  0407   HGB 9.1* 9.4* 9.4*   INR 2.0* 1.6* 1.4*     Date               INR                  Dose  7/24                 --                     HELD  7/25  2.4  1 mg  7/26  2.4  1 mg    7/27                2.0                   1 mg  7/28                1.6                   2 mg  7/29  1.4  2 mg                                                                                Assessment/ Plan:  Will order warfarin 2 mg PO x 1 dose.    Pharmacy will continue to monitor daily and adjust therapy as indicated.  Please contact the pharmacist at x [phone] for outpatient recommendations if needed.

## 2024-07-29 NOTE — PROGRESS NOTES
[facility]  Hospitalist Group                                                                                          Hospitalist Progress Note  Noah Estevez MD  Office Phone: [phone]        Date of Service:  2024  NAME:  Sanford Joiner Sr.  :  1960  MRN:  [id number]       Admission Summary:     Sanford Joiner Sr. is a 64 y.o. male with a past medical history of chronic systolic heart failure due to nonischemic cardiomyopathy s/p LVAD, chronic driveline infections, staph bacteremia, hypertension, CKD stage II, asthma, V-fib arrest status post ICD , GI bleeding.  He has been dealing with a driveline infection since approximately January of this year.  Cultures in January grew Enterobacter and MSSA.  He was then diagnosed with staph bacteremia in 2024.  He completed 6 weeks of IV antibiotics and on  drain was removed and patient has remained on suppressive antibiotics of Keflex.  On 7/3 he underwent a CT scan of the chest which showed infection around the driveline exit with signs of cellulitis and reaccumulation of fluid collection below the xiphoid process.  The patient was brought into the ER for planned debridement of chronic driveline infection.  He is status post wound debridement and wound VAC placement on .  The hospitalist were asked to admit the patient on behalf of of advanced heart failure service.        Interval history / Subjective:     Discussed with ID physician patient discharged home on cefepime 2 g every 12 hours    Patient received wound VAC for his LVAD  Discussed with case management and patient     Assessment & Plan:        Driveline/LVAD infection  - Status post wound debridement and wound VAC placement on   - Blood cultures no growth to date  - Wound cultures finalized  - Continue IV antibiotics with end date of   - Infectious disease consulted for antibiotic recommendations     Left ventricular assist device  -  07/27/24 0429 07/28/24 0245 07/29/24  0407    138 137   K 4.5 4.5 4.3    104 107   CO2 29 30 27   BUN 32* 34* 37*   MG 1.9 1.7 1.7     Recent Labs     07/27/24 0429 07/28/24  0245 07/29/24  0407   ALT 9* 10* 12   GLOB 3.5 3.7 3.8     Recent Labs     07/27/24 0429 07/28/24 0245 07/29/24  0407   INR 2.0* 1.6* 1.4*      No results for input(s): \"TIBC\" in the last 72 hours.    Invalid input(s): \"FE\", \"PSAT\", \"FERR\"   No results found for: \"RBCF\"   No results for input(s): \"PH\", \"PCO2\", \"PO2\" in the last 72 hours.  No results for input(s): \"CPK\" in the last 72 hours.    Invalid input(s): \"CPKMB\", \"CKNDX\", \"TROIQ\"  Lab Results   Component Value Date/Time    CHOL 167 05/30/2024 12:00 AM    HDL 27 05/30/2024 12:00 AM     05/30/2024 12:00 AM    LDL Not calculated due to elevated triglyceride level 11/14/2023 11:27 AM     No results found for: \"GLUCPOC\"  [unfilled]    Notes reviewed from all clinical/nonclinical/nursing services involved in patient's clinical care. Care coordination discussions were held with appropriate clinical/nonclinical/ nursing providers based on care coordination needs.         Patients current active Medications were reviewed, considered, added and adjusted based on the clinical condition today.      Home Medications were reconciled to the best of my ability given all available resources at the time of admission. Route is PO if not otherwise noted.      Admission Status:[id number]:::1}      Medications Reviewed:     Current Facility-Administered Medications   Medication Dose Route Frequency    enoxaparin (LOVENOX) injection 70 mg  1 mg/kg SubCUTAneous BID    warfarin (COUMADIN) tablet 2 mg  2 mg Oral Once    ipratropium 0.5 mg-albuterol 2.5 mg (DUONEB) nebulizer solution 1 Dose  1 Dose Inhalation Q4H PRN    piperacillin-tazobactam (ZOSYN) 3,375 mg in sodium chloride 0.9 % 50 mL IVPB (mini-bag)  3,375 mg IntraVENous Q8H    fluticasone-umeclidin-vilant (TRELEGY ELLIPTA) 200-62.5-25

## 2024-07-29 NOTE — PROGRESS NOTES
ADVANCED HEART FAILURE CENTER  [facility] in [place], [place]  Inpatient Progress Note      Patient name: Sanford Joiner Sr.  Patient : 1960  Patient MRN: [id number]  Date of service: 24    Reason for Referral:  Management of LVAD     ASSESSMENT:  Sanford Joiner Sr. is a 64 y.o. male admitted with recurrent driveline abscess and infection  Chronic systolic heart failure due to non ischemic cardiomyopathy, s/p HM3 LVAD implantation (2023) as destination therapy, stage D, on optimal GDMT limited by hypovolemia  Not a transplant candidate due to advanced obstructive lung disease, was declined at [facility] for LVAD and transplant.   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       INTERVAL HISTORY:  NAEO  Labs reviewed, creatinine 1.62 to 1.38, 30+ PI events on LVAD, will give NS bolus today, received albumin yesterday, MAPs >80s  INR 1.4 - lovenox ordered  VAC sites - no erythema, VAC information sent to [employer] by   Pending ID recs for home antibiotics   Will need injectafer resumption as outpatient  Feels well this am, no complaints    RECOMMENDATIONS:  Driveline/LVAD infection  S/p wound debridement and wound vac placement on 24   Blood cx NTD  Wound culture from OR grew enterobacter and MSSA  Fungal culture pending  ID consulted, appreciate recs  Antibiotics changed to zosyn, ID awaiting sensitivities before further adjusting    Left Ventricular Assist Device  Continue current device speed at 5200 rpm to prevent RV overdrive  Dressing changes daily    Medical Therapy for Heart Failure  Beta-blocker: Continue Toprol XL 50 mg daily with hold parameters  ACEi/ARB/ARNI: Unable to tolerate due to hypovolemia  Hydralazine/Nitrate: Continue Hydralazine 75 mg TID with hold parameters  MRA: Unable to tolerate due to hypovolemia  SGLT2i: Unable to tolerate due to hypovolemia    Volume Management  Improved creatine today  change, appetite change, chills, fatigue and fever.   HENT:  Negative for congestion.    Respiratory:  Negative for cough and shortness of breath.    Cardiovascular:  Negative for chest pain, palpitations and leg swelling.   Gastrointestinal:  Negative for abdominal distention, nausea and vomiting.   Genitourinary: Negative.    Musculoskeletal: Negative.    Skin: Negative.    Neurological:  Negative for dizziness, weakness and headaches.   Psychiatric/Behavioral: Negative.          PHYSICAL EXAM:  /79   Pulse 75   Temp 98.1 °F (36.7 °C) (Oral)   Resp 18   Ht 1.727 m (5' 7.99\")   Wt 74.1 kg (163 lb 5.8 oz)   SpO2 94%   BMI 24.84 kg/m²     Physical Exam  Vitals and nursing note reviewed.   Constitutional:       Appearance: Normal appearance. He is normal weight.   Cardiovascular:      Rate and Rhythm: Normal rate and regular rhythm.      Pulses: Normal pulses.      Heart sounds: Normal heart sounds.      Comments: + VAD hum , + Palpable pulses  Pulmonary:      Effort: Pulmonary effort is normal.      Breath sounds: No wheezing.   Abdominal:      General: Abdomen is flat. There is no distension.      Palpations: Abdomen is soft.   Musculoskeletal:         General: No swelling.   Skin:     General: Skin is warm and dry.      Comments: 2 new wound vac sites, no drainage, driveline dressing CDI   Neurological:      General: No focal deficit present.      Mental Status: He is alert and oriented to person, place, and time.   Psychiatric:         Mood and Affect: Mood normal.        LVAD  LVAD Type:: Left Ventricular Assist Device (LVAD)  Pump Speed (rpm): 5200  Pump Flow (lpm): 3.3  MAP (mmHg): 84  Set Low Speed (rpm): 4800  Pump Pulse Index (PI): 5.2  Pump Power (Mei): 3.9  Battery Life Checked: Yes  Backup Controller Present: Yes  Power Module Test: Yes  Driveline Dressing: Clean, Dry and Intact  Outpatient: No  MAP in Therapeutic Range (Outpatient): Yes     I interrogated his LVAD today. Parameters are as      Quit date: 4/22/2021     Years since quitting: 3.2    Smokeless tobacco: Never   Substance and Sexual Activity    Alcohol use: Not Currently    Drug use: Never     Social Determinants of Health     Food Insecurity: No Food Insecurity (7/24/2024)    Hunger Vital Sign     Worried About Running Out of Food in the Last Year: Never true     Ran Out of Food in the Last Year: Never true   Transportation Needs: No Transportation Needs (7/24/2024)    PRAPARE - Transportation     Lack of Transportation (Medical): No     Lack of Transportation (Non-Medical): No   Recent Concern: Transportation Needs - Unmet Transportation Needs (7/15/2024)    OASIS : Transportation     Lack of Transportation (Medical): Yes     Lack of Transportation (Non-Medical): No     Patient Unable or Declines to Respond: No    Social Connections ([facility] HRSN)   Housing Stability: Low Risk  (7/24/2024)    Housing Stability Vital Sign     Unable to Pay for Housing in the Last Year: No     Number of Places Lived in the Last Year: 1     Unstable Housing in the Last Year: No       LABORATORY RESULTS:   Failed to redirect to the Timeline version of the [employer] SmartLink.  Lab Results   Component Value Date/Time    TSH 1.23 07/08/2024 04:02 PM    TSH 1.95 06/12/2024 03:06 PM    TSH 4.560 05/30/2024 12:00 AM    TSH 0.92 02/28/2024 03:06 PM    TSH 1.77 01/31/2024 02:15 PM    TSH 4.36 01/08/2024 01:29 PM    TSH 0.37 05/15/2021 02:37 AM       ALLERGY:  Allergies   Allergen Reactions    Bactrim [Sulfamethoxazole-Trimethoprim] Other (See Comments)     Acute Kidney Injury    Ciprofloxacin      Other reaction(s): Unknown (comments)        CURRENT MEDICATIONS:    Current Facility-Administered Medications:     enoxaparin (LOVENOX) injection 70 mg, 1 mg/kg, SubCUTAneous, BID, Geni Garibay APRN - NP, 70 mg at 07/29/24 105    warfarin (COUMADIN) tablet 2 mg, 2 mg, Oral, Once, Noah Estevez MD    0.9 % sodium chloride infusion, 250 mL, IntraVENous, Once,  07/29/24 0848    sodium chloride flush 0.9 % injection 5-40 mL, 5-40 mL, IntraVENous, 2 times per day, Libertad, Nessa B, APRN - NP, 10 mL at 07/28/24 2144    sodium chloride flush 0.9 % injection 5-40 mL, 5-40 mL, IntraVENous, PRN, Libertad, Nessa B, APRN - NP    0.9 % sodium chloride infusion, , IntraVENous, PRN, Libertad, Nessa B, APRN - NP, Last Rate: 15 mL/hr at 07/29/24 0355, New Bag at 07/29/24 0355    ondansetron (ZOFRAN) injection 4 mg, 4 mg, IntraVENous, Q6H PRN, Libertad, Nessa B, APRN - NP    hydrALAZINE (APRESOLINE) injection 10 mg, 10 mg, IntraVENous, Q4H PRN, Libertad, Nessa B, APRN - NP    magnesium sulfate 2000 mg in 50 mL IVPB premix, 2,000 mg, IntraVENous, PRN, Libertad, Nessa B, APRN - NP    albuterol (PROVENTIL) (2.5 MG/3ML) 0.083% nebulizer solution 2.5 mg, 2.5 mg, Nebulization, Q6H PRN, Libertad, Nessa B, APRN - NP    glucose chewable tablet 16 g, 4 tablet, Oral, PRN, Libertad, Nessa B, APRN - NP    dextrose bolus 10% 125 mL, 125 mL, IntraVENous, PRN **OR** dextrose bolus 10% 250 mL, 250 mL, IntraVENous, PRN, Libertad, Nessa B, APRN - NP    glucagon injection 1 mg, 1 mg, SubCUTAneous, PRN, Libertad, Nessa B, APRN - NP    dextrose 10 % infusion, , IntraVENous, Continuous PRN, Libertad, Nessa B, APRN - NP    potassium chloride 20 mEq/50 mL IVPB (Central Line), 20 mEq, IntraVENous, PRN, Libertad, Nessa B, APRN - NP    acetaminophen (TYLENOL) tablet 650 mg, 650 mg, Oral, Q4H PRN, Libertad, Nessa B, APRN - NP    polyethylene glycol (GLYCOLAX) packet 17 g, 17 g, Oral, Daily PRN, Nessa Maurer, APRN - NP     PATIENT CARE TEAM:  Patient Care Team:  Ranjan Porter MD as PCP - General  Ranjan Porter MD as PCP - Empaneled Provider     Thank you for allowing me to participate in this patient's care.    TAIWO Fagan - CNP   Advanced [facility] Failure Center  [facility]  [address], [address]  Phone: [phone]    I have spent a total time of 35  minutes personally reviewing new vitals, test results, notes, telemetry/EKG, face to face encounter/physical exam of patient, writing orders, performing medical decision making, and patient education.
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
## 2024-07-30 ENCOUNTER — TELEPHONE (OUTPATIENT)
Age: 64
End: 2024-07-30

## 2024-07-30 VITALS
WEIGHT: 166.67 LBS | TEMPERATURE: 98.4 F | SYSTOLIC BLOOD PRESSURE: 116 MMHG | DIASTOLIC BLOOD PRESSURE: 79 MMHG | HEIGHT: 68 IN | BODY MASS INDEX: 25.26 KG/M2 | HEART RATE: 100 BPM | OXYGEN SATURATION: 96 % | RESPIRATION RATE: 18 BRPM

## 2024-07-30 DIAGNOSIS — Z95.811 LVAD (LEFT VENTRICULAR ASSIST DEVICE) PRESENT (HCC): Primary | ICD-10-CM

## 2024-07-30 LAB
ALBUMIN SERPL-MCNC: 3.4 G/DL (ref 3.5–5)
ALBUMIN/GLOB SERPL: 0.9 (ref 1.1–2.2)
ALP SERPL-CCNC: 88 U/L (ref 45–117)
ALT SERPL-CCNC: 20 U/L (ref 12–78)
ANION GAP SERPL CALC-SCNC: 4 MMOL/L (ref 5–15)
AST SERPL-CCNC: 24 U/L (ref 15–37)
BACTERIA SPEC CULT: NORMAL
BACTERIA SPEC CULT: NORMAL
BILIRUB DIRECT SERPL-MCNC: <0.1 MG/DL (ref 0–0.2)
BILIRUB SERPL-MCNC: 0.2 MG/DL (ref 0.2–1)
BUN SERPL-MCNC: 33 MG/DL (ref 6–20)
BUN/CREAT SERPL: 21 (ref 12–20)
CALCIUM SERPL-MCNC: 9.1 MG/DL (ref 8.5–10.1)
CHLORIDE SERPL-SCNC: 109 MMOL/L (ref 97–108)
CO2 SERPL-SCNC: 27 MMOL/L (ref 21–32)
CREAT SERPL-MCNC: 1.55 MG/DL (ref 0.7–1.3)
ERYTHROCYTE [DISTWIDTH] IN BLOOD BY AUTOMATED COUNT: 18.8 % (ref 11.5–14.5)
GLOBULIN SER CALC-MCNC: 3.6 G/DL (ref 2–4)
GLUCOSE SERPL-MCNC: 101 MG/DL (ref 65–100)
HCT VFR BLD AUTO: 29.1 % (ref 36.6–50.3)
HGB BLD-MCNC: 9.1 G/DL (ref 12.1–17)
INR PPP: 1.4 (ref 0.9–1.1)
LDH SERPL L TO P-CCNC: 202 U/L (ref 85–241)
MAGNESIUM SERPL-MCNC: 1.8 MG/DL (ref 1.6–2.4)
MCH RBC QN AUTO: 26.9 PG (ref 26–34)
MCHC RBC AUTO-ENTMCNC: 31.3 G/DL (ref 30–36.5)
MCV RBC AUTO: 86.1 FL (ref 80–99)
NRBC # BLD: 0 K/UL (ref 0–0.01)
NRBC BLD-RTO: 0 PER 100 WBC
NT PRO BNP: 629 PG/ML
PLATELET # BLD AUTO: 325 K/UL (ref 150–400)
PMV BLD AUTO: 11 FL (ref 8.9–12.9)
POTASSIUM SERPL-SCNC: 4.6 MMOL/L (ref 3.5–5.1)
PROT SERPL-MCNC: 7 G/DL (ref 6.4–8.2)
PROTHROMBIN TIME: 14 SEC (ref 9–11.1)
RBC # BLD AUTO: 3.38 M/UL (ref 4.1–5.7)
SERVICE CMNT-IMP: NORMAL
SERVICE CMNT-IMP: NORMAL
SODIUM SERPL-SCNC: 140 MMOL/L (ref 136–145)
WBC # BLD AUTO: 14.5 K/UL (ref 4.1–11.1)

## 2024-07-30 PROCEDURE — 83880 ASSAY OF NATRIURETIC PEPTIDE: CPT

## 2024-07-30 PROCEDURE — 85610 PROTHROMBIN TIME: CPT

## 2024-07-30 PROCEDURE — 85027 COMPLETE CBC AUTOMATED: CPT

## 2024-07-30 PROCEDURE — 80048 BASIC METABOLIC PNL TOTAL CA: CPT

## 2024-07-30 PROCEDURE — 36415 COLL VENOUS BLD VENIPUNCTURE: CPT

## 2024-07-30 PROCEDURE — 99238 HOSP IP/OBS DSCHRG MGMT 30/<: CPT

## 2024-07-30 PROCEDURE — 94640 AIRWAY INHALATION TREATMENT: CPT

## 2024-07-30 PROCEDURE — 6370000000 HC RX 637 (ALT 250 FOR IP): Performed by: NURSE PRACTITIONER

## 2024-07-30 PROCEDURE — 83735 ASSAY OF MAGNESIUM: CPT

## 2024-07-30 PROCEDURE — 2580000003 HC RX 258: Performed by: NURSE PRACTITIONER

## 2024-07-30 PROCEDURE — 93750 INTERROGATION VAD IN PERSON: CPT

## 2024-07-30 PROCEDURE — 97605 NEG PRS WND THER DME<=50SQCM: CPT

## 2024-07-30 PROCEDURE — 80076 HEPATIC FUNCTION PANEL: CPT

## 2024-07-30 PROCEDURE — 6360000002 HC RX W HCPCS: Performed by: INTERNAL MEDICINE

## 2024-07-30 PROCEDURE — 6360000002 HC RX W HCPCS: Performed by: NURSE PRACTITIONER

## 2024-07-30 PROCEDURE — 2580000003 HC RX 258: Performed by: INTERNAL MEDICINE

## 2024-07-30 PROCEDURE — 83615 LACTATE (LD) (LDH) ENZYME: CPT

## 2024-07-30 RX ORDER — ENOXAPARIN SODIUM 100 MG/ML
1 INJECTION SUBCUTANEOUS 2 TIMES DAILY
Qty: 8 EACH | Refills: 0 | Status: SHIPPED | OUTPATIENT
Start: 2024-07-30

## 2024-07-30 RX ORDER — WARFARIN SODIUM 2 MG/1
2 TABLET ORAL
Status: DISCONTINUED | OUTPATIENT
Start: 2024-07-30 | End: 2024-07-30 | Stop reason: HOSPADM

## 2024-07-30 RX ORDER — HYDRALAZINE HYDROCHLORIDE 25 MG/1
75 TABLET, FILM COATED ORAL EVERY 8 HOURS SCHEDULED
Qty: 90 TABLET | Refills: 3 | Status: SHIPPED | OUTPATIENT
Start: 2024-07-30

## 2024-07-30 RX ADMIN — PANTOPRAZOLE SODIUM 40 MG: 40 TABLET, DELAYED RELEASE ORAL at 07:09

## 2024-07-30 RX ADMIN — CEFEPIME 2000 MG: 2 INJECTION, POWDER, FOR SOLUTION INTRAVENOUS at 04:42

## 2024-07-30 RX ADMIN — ALLOPURINOL 50 MG: 100 TABLET ORAL at 09:18

## 2024-07-30 RX ADMIN — HYDRALAZINE HYDROCHLORIDE 75 MG: 50 TABLET ORAL at 07:09

## 2024-07-30 RX ADMIN — SODIUM CHLORIDE: 9 INJECTION, SOLUTION INTRAVENOUS at 04:40

## 2024-07-30 RX ADMIN — Medication 2000 UNITS: at 09:18

## 2024-07-30 RX ADMIN — MAGNESIUM OXIDE TAB 400 MG (241.3 MG ELEMENTAL MG) 400 MG: 400 (241.3 MG) TAB at 09:18

## 2024-07-30 RX ADMIN — Medication 1 CAPSULE: at 09:18

## 2024-07-30 RX ADMIN — METOPROLOL SUCCINATE 50 MG: 50 TABLET, EXTENDED RELEASE ORAL at 09:18

## 2024-07-30 RX ADMIN — ENOXAPARIN SODIUM 70 MG: 100 INJECTION SUBCUTANEOUS at 09:18

## 2024-07-30 RX ADMIN — IPRATROPIUM BROMIDE AND ALBUTEROL SULFATE 1 DOSE: .5; 3 SOLUTION RESPIRATORY (INHALATION) at 12:52

## 2024-07-30 ASSESSMENT — ENCOUNTER SYMPTOMS
VOMITING: 0
NAUSEA: 0
ABDOMINAL DISTENTION: 0
SHORTNESS OF BREATH: 0
COUGH: 1

## 2024-07-30 ASSESSMENT — PAIN SCALES - GENERAL: PAINLEVEL_OUTOF10: 0

## 2024-07-30 NOTE — PLAN OF CARE
Problem: Chronic Conditions and Co-morbidities  Goal: Patient's chronic conditions and co-morbidity symptoms are monitored and maintained or improved  7/29/2024 2303 by Paxton Ford RN  Outcome: Progressing  7/29/2024 0908 by Marlyn Denton RN  Outcome: Progressing  Flowsheets (Taken 7/28/2024 2000)  Care Plan - Patient's Chronic Conditions and Co-Morbidity Symptoms are Monitored and Maintained or Improved: Monitor and assess patient's chronic conditions and comorbid symptoms for stability, deterioration, or improvement     Problem: Discharge Planning  Goal: Discharge to home or other facility with appropriate resources  7/29/2024 2303 by Paxton Ford RN  Outcome: Progressing  7/29/2024 0908 by Marlyn Denton RN  Outcome: Progressing  Flowsheets (Taken 7/28/2024 2000)  Discharge to home or other facility with appropriate resources: Identify barriers to discharge with patient and caregiver     Problem: Safety - Adult  Goal: Free from fall injury  7/29/2024 2303 by Paxton Ford RN  Outcome: Progressing  7/29/2024 0908 by Marlyn Denton RN  Outcome: Progressing     Problem: ABCDS Injury Assessment  Goal: Absence of physical injury  7/29/2024 2303 by Paxton Ford RN  Outcome: Progressing  7/29/2024 0908 by Marlyn Denton RN  Outcome: Progressing     Problem: Pain  Goal: Verbalizes/displays adequate comfort level or baseline comfort level  7/29/2024 2303 by Paxton Ford RN  Outcome: Progressing  7/29/2024 0908 by Marlyn Denton RN  Outcome: Progressing  Flowsheets (Taken 7/28/2024 2000)  Verbalizes/displays adequate comfort level or baseline comfort level: Encourage patient to monitor pain and request assistance

## 2024-07-30 NOTE — TELEPHONE ENCOUNTER
Called patient and advised that he would need to give himself subcutaneous Lovenox shots 1mg/kg bid until his INR is higher than 1.8.  Reviewed with patient.  He will go  [place].   ROSSANA Hung

## 2024-07-30 NOTE — DISCHARGE SUMMARY
Discharge Summary   Please note that this dictation was completed with [employer], the computer voice recognition software.  Quite often unanticipated grammatical, syntax, homophones, and other interpretive errors are inadvertently transcribed by the computer software.  Please disregard these errors.  Please excuse any errors that have escaped final proofreading.    PATIENT ID: Sanford Joiner Sr.  MRN: [id number]   YOB: 1960    DATE OF ADMISSION: 7/24/2024  8:32 PM    DATE OF DISCHARGE: 7/30/2024  PRIMARY CARE PROVIDER: Ranjan Porter MD         ATTENDING PHYSICIAN: Noah Estevez MD  DISCHARGING PROVIDER: Noah Estevez MD       CONSULTATIONS: IP CONSULT TO CASE MANAGEMENT  IP WOUND CARE NURSE CONSULT TO EVAL  IP WOUND CARE NURSE CONSULT TO EVAL  IP CONSULT TO PHARMACY  IP CONSULT TO INFECTIOUS DISEASES  IP CONSULT TO PHARMACY  IP CONSULT HOME HEALTH  IP CONSULT HOME HEALTH    PROCEDURES/SURGERIES: Procedure(s):  STERNAL WOUND DEBRIDEMENT, WOUND VAC PLACEMENT    ADMITTING HPI from excerpted H&P   Sanford Joiner Sr. is a 64 y.o. male with a past medical history of chronic systolic heart failure due to nonischemic cardiomyopathy s/p LVAD, chronic driveline infections, staph bacteremia, hypertension, CKD stage II, asthma, V-fib arrest status post ICD 2023, GI bleeding. He has been dealing with a driveline infection since approximately January of this year. Cultures in January grew Enterobacter and MSSA. He was then diagnosed with staph bacteremia in March 2024. He completed 6 weeks of IV antibiotics and on 5/30 drain was removed and patient has remained on suppressive antibiotics of Keflex. On 7/3 he underwent a CT scan of the chest which showed infection around the driveline exit with signs of cellulitis and reaccumulation of fluid collection below the xiphoid process. The patient was brought into the ER for planned debridement of chronic driveline infection. He is status post wound debridement  morning and 2,000 mg in the evening. Do all this for 26 days. Compound per protocol.            CHANGE how you take these medications      hydrALAZINE 25 MG tablet  Commonly known as: APRESOLINE  Take 3 tablets by mouth every 8 hours  What changed: medication strength            CONTINUE taking these medications      acetaminophen 325 MG tablet  Commonly known as: Aminofen  Take 2 tablets by mouth every 6 hours as needed for Pain     albuterol sulfate  (90 Base) MCG/ACT inhaler  Commonly known as: PROVENTIL;VENTOLIN;PROAIR  Inhale 2 puffs into the lungs every 4 hours as needed for Shortness of Breath or Wheezing     allopurinol 100 MG tablet  Commonly known as: ZYLOPRIM  Take 0.5 tablets by mouth daily     bumetanide 1 MG tablet  Commonly known as: BUMEX  Take 1 tablet by mouth as needed (weight gain of 3lb overnight or 5lb in 1 week or shortness of breath)     gentamicin 0.1 % ointment  Commonly known as: GARAMYCIN  Apply topically to driveline exit site every day with dressing change     lactobacillus capsule  Take 1 capsule by mouth daily (with breakfast)     Magnesium 400 MG Tabs     metoprolol succinate 50 MG extended release tablet  Commonly known as: TOPROL XL  Take 1 tablet by mouth daily     octreotide ACETATE 20 MG injection  Commonly known as: SANDOSTATIN LAR  Inject 20 mg into the muscle every 30 days     Omega 3 1000 MG Caps  Take 1 capsule by mouth daily     OXYGEN     pantoprazole 40 MG tablet  Commonly known as: PROTONIX  TAKE 1 TABLET BY MOUTH DAILY     pravastatin 40 MG tablet  Commonly known as: Pravachol  Take 1 tablet by mouth every evening     Trelegy Ellipta 200-62.5-25 MCG/ACT Aepb inhaler  Generic drug: fluticasone-umeclidin-vilant     vitamin D3 25 MCG (1000 UT) Tabs tablet  Commonly known as: CHOLECALCIFEROL  Take 2 tablets by mouth daily     warfarin 1 MG tablet  Commonly known as: COUMADIN  Take as directed. If you are unsure how to take this medication, talk to your nurse or

## 2024-07-30 NOTE — PROGRESS NOTES
Pharmacist Note - Warfarin Dosing  Consult provided for this 64 y.o.male to manage warfarin for LVAD    INR Goal: 1.8 - 2.2    Home regimen/ tablet size: 2 mg daily    Drugs that may increase INR: None  Drugs that may decrease INR: None  Other current anticoagulants/ drugs that may increase bleeding risk: None  Risk factors: None  Daily INR ordered through: 10/14/24    Recent Labs     07/28/24  0245 07/29/24  0407 07/30/24  0115   HGB 9.4* 9.4* 9.1*   INR 1.6* 1.4* 1.4*     Date               INR                  Dose  7/24                 --                     HELD  7/25  2.4  1 mg  7/26  2.4  1 mg    7/27                2.0                   1 mg  7/28                1.6                   2 mg  7/29  1.4  2 mg  7/30  1.4  2 mg                                                                                 Assessment/ Plan:  Will order warfarin 2 mg PO x 1 dose.    Pharmacy will continue to monitor daily and adjust therapy as indicated.  Please contact the pharmacist at x [phone] for outpatient recommendations if needed.

## 2024-07-30 NOTE — WOUND CARE
Paged by April RAMOS, for loss of sternal VAC dressing due to clot under track pad.   She removed the dressing per orders and dressed wound appropriately.      Sternal wound assessed with NP who is aware of bleeding at wound margin.  I did not find success with stopping it using Puracol AG and discussed silver nitrate with the NP.  Silver nitrate was used successfully and VAC dressing applied using 1 black foam.     Currently, the 2 wounds are connected using a \"Y\" connector but home health can bridge them together and use single track pad.      He was connected to the home VAC pump.  April RAMOS, and  are aware.     Natalia Remy, JUANITA

## 2024-07-30 NOTE — PROGRESS NOTES
10/27/21 LVEF 38%, Mod AI, TAPSE 2.78cm  Echo 5/13/21 LVEF 20-25%, Trace MR, no AI, Trace TR, LVIDd 5.97cm, TAPSE 2.1cm     Cardiac MRI (9/14/21)  1. Normal left ventricular cavity size by 3-D volumetric assessment indexed to body surface area. Moderate septal hypertrophy by 1D dimension. Normal LV mass by 3-D volumetric assessment indexed to body surface area. Moderate left ventricular systolic dysfunction. Moderate global hypokinesis with slight regional variation. 3-D LVEF 33% while patient receiving dobutamine infusion of 5mcg/kg/min during the scan.  2. Normal right ventricular size with mild right ventricular systolic dysfunction. Mild global hypokinesis. 3-D RVEF 46% while patient receiving dobutamine infusion of 5mcg/kg/min during the scan.   3. No significant valvular disease.  4. On EGE and LGE study, there there is focal areas of mild myocardial enhancement of the base to mid inferolateral wall, basal inferior wall and patchy areas of the septum. The appearance of the contrast enhancement in the form of a very mild focal areas suggest focal myocardial fibrosis likely from replacement fibrosis due to hypertrophy or possibly old healed myocarditis. There is no features of recent or old myocardial infarction. There is no features of infiltrative sarcoidosis or cardiac amyloidosis. All myocardial walls are viable.  5. Normal pleura and pericardium. There is no significant effusions.  6. 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 Sinotubular junction is normal at  37 mm. Ascending aorta is dilated at 44 x 43 mm. Aortic arch and descending  thoracic aorta are of normal size at 23 mm.     [facility]- 5/17/21 no significant CAD      [facility] 5/10/2023:   Speed 5200 RPM RA 7, PA 35/17 (23), PCW 11, PA saturation 74.6%,  FPC 3.9. Final speed 5400 RPM     [facility] 5/17/2021: PA 26/17/21, RA 10, PCWP 13, CI 1.76    PFT 7/10/24:   FEV1/FVC 44%  FEV1 0.95L (29%)  FVC  2.17 (50%)      PFT 3/22/24  FEV1/FVC 47%  FEV1 0.85L; 26%  FVC 1.80  L; 42%     PFT 5/21/21  FEV1/FVC 0.38  FEV1 0.99L, 46%  FVC 2.62L, 91%  Lung age 80 years    Review of Systems   Constitutional:  Negative for activity change, appetite change, chills, fatigue and fever.   HENT:  Negative for congestion.    Respiratory:  Positive for cough. Negative for shortness of breath.    Cardiovascular:  Negative for chest pain, palpitations and leg swelling.   Gastrointestinal:  Negative for abdominal distention, nausea and vomiting.   Genitourinary: Negative.    Musculoskeletal: Negative.    Skin: Negative.    Neurological:  Negative for dizziness, weakness and headaches.   Psychiatric/Behavioral:  Negative for sleep disturbance.         PHYSICAL EXAM:  /79   Pulse 85   Temp 98.6 °F (37 °C) (Oral)   Resp 17   Ht 1.727 m (5' 7.99\")   Wt 75.6 kg (166 lb 10.7 oz)   SpO2 96%   BMI 25.35 kg/m²     Physical Exam  Vitals and nursing note reviewed.   Constitutional:       Appearance: Normal appearance. He is normal weight.   Cardiovascular:      Rate and Rhythm: Normal rate and regular rhythm.      Pulses: Normal pulses.      Heart sounds: Normal heart sounds.      Comments: + VAD hum , + Palpable pulses  Pulmonary:      Effort: Pulmonary effort is normal.      Breath sounds: No wheezing.      Comments: Dry cough  Abdominal:      General: Abdomen is flat. There is no distension.      Palpations: Abdomen is soft.   Musculoskeletal:         General: No swelling.   Skin:     General: Skin is warm and dry.      Comments: 2 new wound vac sites, driveline dressing CDI, sternal site bleeding during dressing change   Neurological:      General: No focal deficit present.      Mental Status: He is alert and oriented to person, place, and time.   Psychiatric:         Mood and Affect: Mood normal.        LVAD  LVAD Type:: Left Ventricular Assist Device (LVAD)  Pump Speed (rpm): 5200  Pump Flow (lpm): 3  MAP (mmHg): 90  Set Low Speed (rpm): 4800  Pump Pulse Index (PI): 7  Pump Power (Mei):  (LOVENOX) injection 70 mg, 1 mg/kg, SubCUTAneous, BID, Geni Garibay, APRN - NP, 70 mg at 07/29/24 2132    [COMPLETED] ceFEPIme (MAXIPIME) 2,000 mg in sterile water 20 mL IV syringe, 2,000 mg, IntraVENous, Once, 2,000 mg at 07/29/24 1747 **FOLLOWED BY** ceFEPIme (MAXIPIME) 2,000 mg in sodium chloride 0.9 % 100 mL IVPB (mini-bag), 2,000 mg, IntraVENous, Q12H, Rafita Rajan MD, Last Rate: 25 mL/hr at 07/30/24 0442, 2,000 mg at 07/30/24 0442    ipratropium 0.5 mg-albuterol 2.5 mg (DUONEB) nebulizer solution 1 Dose, 1 Dose, Inhalation, Q4H PRN, Nahomi Quiles APRN - NP    fluticasone-umeclidin-vilant (TRELEGY ELLIPTA) 200-62.5-25 MCG/ACT inhaler 1 puff (Patient Supplied), 1 puff, Inhalation, Daily, Nahomi Quiles APRN - NP, 1 puff at 07/29/24 0848    oxyCODONE (ROXICODONE) immediate release tablet 5 mg, 5 mg, Oral, Q4H PRN, Spencer Davis PA-C    acetaminophen (TYLENOL) tablet 975 mg, 975 mg, Oral, Q6H, Spencer Davis PA-C, 975 mg at 07/28/24 0231    warfarin placeholder: dosing by pharmacy, , Other, RX Placeholder, Libertad, Nessa B, APRN - NP    allopurinol (ZYLOPRIM) tablet 50 mg, 50 mg, Oral, Daily, Libertad, Nessa B, APRN - NP, 50 mg at 07/29/24 0848    gentamicin (GARAMYCIN) 0.1 % ointment, , Topical, Daily, Libertad, Nessa B, APRN - NP    hydrALAZINE (APRESOLINE) tablet 75 mg, 75 mg, Oral, 3 times per day, Libertad, Nessa B, APRN - NP, 75 mg at 07/30/24 0709    lactobacillus (CULTURELLE) capsule 1 capsule, 1 capsule, Oral, Daily with breakfast, Libertad, Nessa B, APRN - NP, 1 capsule at 07/29/24 0848    magnesium oxide (MAG-OX) tablet 400 mg, 400 mg, Oral, BID, Libertad, Nessa B, APRN - NP, 400 mg at 07/29/24 2132    metoprolol succinate (TOPROL XL) extended release tablet 50 mg, 50 mg, Oral, Daily, Libertad, Nessa B, APRN - NP, 50 mg at 07/29/24 0848    pantoprazole (PROTONIX) tablet 40 mg, 40 mg, Oral, QAM AC, Libertad, Nessa B, APRN - NP, 40 mg at 07/30/24 0709    pravastatin  participate in this patient's care.    TAIWO Fagan - CNP   [facility]  [facility]  [address], [address]  Phone: [phone]

## 2024-07-30 NOTE — CARE COORDINATION
KEVIN    Phone call to Crys at [facility] ([phone]) and they have reviewed all the orders and have what they need to resume HH services.  ID plan was sent yest to [facility] and wound vac was delivered to the hospital room yest.  AVS has been updated.     Update 12pm: Met w patient at bedside to discuss the d/c plan w [facility], [facility] and Wound vac and to review an important message from Medicare. Patient verbalized understanding and gave permission for possible discharge within 4 hours of receiving IMM. Patient asked if his IV ABX needed to go in the fridge once delivered to his home and asked if IV ABX were push bc if not he will need education.  CM messaged [employer] and will follow back up w patient on his questions. Patient will call his wife for transport home once we determine if he needs IV ABX training.    Update 1:09: Dahlia completed their training and patient can d/c w wife transporting home.     KATJA Leyva CCM  Care Management

## 2024-07-31 ENCOUNTER — ANTI-COAG VISIT (OUTPATIENT)
Age: 64
End: 2024-07-31

## 2024-07-31 ENCOUNTER — HOME CARE VISIT (OUTPATIENT)
Facility: HOME HEALTH | Age: 64
End: 2024-07-31
Payer: MEDICARE

## 2024-07-31 ENCOUNTER — TELEPHONE (OUTPATIENT)
Age: 64
End: 2024-07-31

## 2024-07-31 DIAGNOSIS — Z79.01 CHRONIC ANTICOAGULATION: Primary | ICD-10-CM

## 2024-07-31 LAB — INR BLD: 1.5

## 2024-07-31 PROCEDURE — G0299 HHS/HOSPICE OF RN EA 15 MIN: HCPCS

## 2024-07-31 NOTE — TELEPHONE ENCOUNTER
[facility]  [facility] in [place], [place]  LVAD PATIENT DISCHARGE FOLLOW UP CALL       Date of call: 07/31/24  Date of discharge: 7/30/24  Discharge disposition: Home or Self Care      Called and spoke with patient  who states they are currently located at home, also spoke with home health nurse MADYSON James on speaker.     MEDICATIONS:     Med rec completed based off of discharge AVS? Yes  Discrepancies noted: None   Patient reports he has been taking 25mg of hydralazine three times daily instead of 75mg.  nurse reports map is 100 today. Reeducated patient dose should be 75mg three times daily, he verbalized understanding.    Do you have all of your prescriptions and are they filled? Yes  Do you have a copy of your discharge instructions? Yes    Reports he started lovenox as prescribed     FOLLOW UP:     Future Appointments   Date Time Provider Department Center   8/7/2024  1:00 PM Geni Garibay APRN - NP [facility]   8/7/2024  3:00 PM [facility] 3 [facility]       TRANSPORTATION:   Patient utilizes for transportation: Family/caregiver  and Medicare rides   Do you have any transportation barriers at this time?  No      SUPPLIES:   Do you have all LVAD supplies?  Yes    Provider notified of MAP of 100, patient only taking 25mg TID hydralazine instead of 75. Per YUMI Quiles patient to take 75 of hydralazine as prescribed and home health to monitor blood pressure.     08/01 1213: call placed to domo damon. Spoke with Syeda who confirmed patient can receive regular octreotide and injectofer at appointment next Wednesday 08/07.       Funmi Barth, RN  LVAD coordinator   [facility]

## 2024-07-31 NOTE — PROGRESS NOTES
[facility]  [facility] in [place], [place]    INR tracker updated with inpatient coumadin doses per pharmacy note      INR result reviewed with YUMI Quiles NP  who made the following recommendations (VORB): 3mg tonight, 2 mg tomorrow and recheck Friday. Continue lovenox.  Patient notified and verbalized understanding. They had no further questions.  Patient's home health nurse MADYSON James also notified.  (See anticoag tracker)     Funmi Barth RN

## 2024-08-01 ENCOUNTER — TELEPHONE (OUTPATIENT)
Age: 64
End: 2024-08-01

## 2024-08-01 VITALS
RESPIRATION RATE: 24 BRPM | TEMPERATURE: 98.9 F | OXYGEN SATURATION: 98 % | WEIGHT: 165 LBS | BODY MASS INDEX: 25.09 KG/M2

## 2024-08-01 ASSESSMENT — ENCOUNTER SYMPTOMS
DYSPNEA ACTIVITY LEVEL: WHILE SPEAKING
HEMOPTYSIS: 0

## 2024-08-02 ENCOUNTER — TELEPHONE (OUTPATIENT)
Age: 64
End: 2024-08-02

## 2024-08-02 ENCOUNTER — HOME CARE VISIT (OUTPATIENT)
Facility: HOME HEALTH | Age: 64
End: 2024-08-02
Payer: MEDICARE

## 2024-08-02 ENCOUNTER — ANTI-COAG VISIT (OUTPATIENT)
Age: 64
End: 2024-08-02

## 2024-08-02 VITALS — WEIGHT: 165 LBS | RESPIRATION RATE: 24 BRPM | BODY MASS INDEX: 25.09 KG/M2 | TEMPERATURE: 99 F | OXYGEN SATURATION: 95 %

## 2024-08-02 DIAGNOSIS — Z79.01 CHRONIC ANTICOAGULATION: Primary | ICD-10-CM

## 2024-08-02 LAB — INR BLD: 1.8

## 2024-08-02 PROCEDURE — G0299 HHS/HOSPICE OF RN EA 15 MIN: HCPCS

## 2024-08-02 NOTE — TELEPHONE ENCOUNTER
1408: message received from patient's home health nurse reporting MAP of Екатерина Quiles notified.

## 2024-08-02 NOTE — PROGRESS NOTES
[facility]  [facility] in [place], [place]      INR result reviewed with YUMI Quiles NP  who made the following recommendations (VORB): 3mg tonight and recheck 08/06. Patient notified and verbalized understanding. They had no further questions. Patient's home health nurse MADYSON James also notified (See anticoag tracker)     Funmi Barth RN

## 2024-08-02 NOTE — HOME HEALTH
Reason for referral, 63 yo  male with hx of chronic systolic heart failure, post LVAD, hx of LVAD driveline MSSA infection, hx of V-fib arrest, post AICD, ascending aortic aneurysm, hypertension, severe COPD and CKD. Pt was admitted to [facility]  on 7/25/24 for surgical debridement of sternal abscess and application of wound vac. Pt's IV antibiotic was switched from Ancef to Cefepime. Pt ordered for resumption of care by  [facility] for SN - for cardiopulmonary assessment, medication education, IV antibiotic therapy, monitor anticoagulant therapy,  LUE DL PICC line care and wound vac dressing changes 3 x weekly along with weekly lab draw as ordered by Dr Rajan and [facility].      Clinical Assessment/Skilled reason for admission to home health (What this means for the patient overall and need for ongoing skilled care): SN performed resumption of care physical assessment, medication reconciliation, high risk medication education (anticoagulant and antibiotic). Pt has supplimental O2 via NC in home but has not used since admission. Pt's O2 sat 98% on room air.  LUE DL PICC site dressing clean, dry and intact- see Wound Addendum for PICC site details. LVAD driveline on right lower abdomen - dressing dry and intact. Pt also has two wound vac therapy sites (base of sternum and anterior to LVAD driveline). Each wound vac dressing site connected by Y connector to vac- drainage in cannister- moderate amount of sanguinous drainage. Pt and CG/spouse instructed to apply wet to dry gauze dressing in event of wound vac malfunction.. CG/spouse is independent with driveline dressing changes. Pt lives with spouse and her disabled son (schizophrenia). Pt remains weak with SOB with minimal exertion. Pt able to ambulate household distances but very SOB after any exertion. Pt requires moderate  assistance with all ADL's. Pt has wheelchair and walker in home but does not use for ambulation in home. Pt denies pain. SN able

## 2024-08-03 LAB
ALBUMIN SERPL-MCNC: 4.1 G/DL (ref 3.9–4.9)
ALP SERPL-CCNC: 73 IU/L (ref 44–121)
ALT SERPL-CCNC: 14 IU/L (ref 0–44)
AST SERPL-CCNC: 30 IU/L (ref 0–40)
BASOPHILS # BLD AUTO: 0.2 X10E3/UL (ref 0–0.2)
BASOPHILS NFR BLD AUTO: 2 %
BILIRUB SERPL-MCNC: <0.2 MG/DL (ref 0–1.2)
BUN SERPL-MCNC: 33 MG/DL (ref 8–27)
BUN/CREAT SERPL: 25 (ref 10–24)
CALCIUM SERPL-MCNC: 9.2 MG/DL (ref 8.6–10.2)
CHLORIDE SERPL-SCNC: 104 MMOL/L (ref 96–106)
CO2 SERPL-SCNC: 20 MMOL/L (ref 20–29)
CREAT SERPL-MCNC: 1.31 MG/DL (ref 0.76–1.27)
CRP SERPL-MCNC: 9 MG/L (ref 0–10)
EGFRCR SERPLBLD CKD-EPI 2021: 61 ML/MIN/1.73
EOSINOPHIL # BLD AUTO: 0.6 X10E3/UL (ref 0–0.4)
EOSINOPHIL NFR BLD AUTO: 5 %
ERYTHROCYTE [DISTWIDTH] IN BLOOD BY AUTOMATED COUNT: 17 % (ref 11.6–15.4)
GLOBULIN SER CALC-MCNC: 3.1 G/DL (ref 1.5–4.5)
GLUCOSE SERPL-MCNC: 63 MG/DL (ref 70–99)
HCT VFR BLD AUTO: 28.9 % (ref 37.5–51)
HGB BLD-MCNC: 8.9 G/DL (ref 13–17.7)
IMM GRANULOCYTES # BLD AUTO: 0.1 X10E3/UL (ref 0–0.1)
IMM GRANULOCYTES NFR BLD AUTO: 1 %
INR PPP: 1.8 (ref 0.9–1.2)
LDH SERPL L TO P-CCNC: 318 IU/L (ref 121–224)
LYMPHOCYTES # BLD AUTO: 1.6 X10E3/UL (ref 0.7–3.1)
LYMPHOCYTES NFR BLD AUTO: 13 %
MAGNESIUM SERPL-MCNC: 2.2 MG/DL (ref 1.6–2.3)
MCH RBC QN AUTO: 26.8 PG (ref 26.6–33)
MCHC RBC AUTO-ENTMCNC: 30.8 G/DL (ref 31.5–35.7)
MCV RBC AUTO: 87 FL (ref 79–97)
MONOCYTES # BLD AUTO: 1 X10E3/UL (ref 0.1–0.9)
MONOCYTES NFR BLD AUTO: 8 %
NEUTROPHILS # BLD AUTO: 8.5 X10E3/UL (ref 1.4–7)
NEUTROPHILS NFR BLD AUTO: 71 %
NT-PROBNP SERPL-MCNC: 959 PG/ML (ref 0–210)
PLATELET # BLD AUTO: 390 X10E3/UL (ref 150–450)
POTASSIUM SERPL-SCNC: 4.7 MMOL/L (ref 3.5–5.2)
PROT SERPL-MCNC: 7.2 G/DL (ref 6–8.5)
PROTHROMBIN TIME: 19.3 SEC (ref 9.1–12)
RBC # BLD AUTO: 3.32 X10E6/UL (ref 4.14–5.8)
SODIUM SERPL-SCNC: 142 MMOL/L (ref 134–144)
WBC # BLD AUTO: 11.9 X10E3/UL (ref 3.4–10.8)

## 2024-08-05 ENCOUNTER — HOME CARE VISIT (OUTPATIENT)
Facility: HOME HEALTH | Age: 64
End: 2024-08-05
Payer: MEDICARE

## 2024-08-05 ENCOUNTER — ANTI-COAG VISIT (OUTPATIENT)
Age: 64
End: 2024-08-05

## 2024-08-05 VITALS
TEMPERATURE: 99.2 F | OXYGEN SATURATION: 97 % | WEIGHT: 163 LBS | BODY MASS INDEX: 24.79 KG/M2 | RESPIRATION RATE: 24 BRPM

## 2024-08-05 DIAGNOSIS — Z79.01 CHRONIC ANTICOAGULATION: Primary | ICD-10-CM

## 2024-08-05 LAB — INR BLD: 2.2

## 2024-08-05 PROCEDURE — G0299 HHS/HOSPICE OF RN EA 15 MIN: HCPCS

## 2024-08-05 ASSESSMENT — ENCOUNTER SYMPTOMS
DYSPNEA ACTIVITY LEVEL: WHILE SPEAKING
HEMOPTYSIS: 0

## 2024-08-05 NOTE — PROGRESS NOTES
[facility]  [facility] in [place], [place]      INR result reviewed with RUBEN Garibay NP who made the following recommendations (VORB): no changes and recheck 1 week. Patient notified and verbalized understanding. They had no further questions. Patient's home health nurse MADYSON James also notified.  (See anticoag tracker)     Funmi Barth RN

## 2024-08-06 PROBLEM — B99.9 INFECTION: Status: ACTIVE | Noted: 2024-08-06

## 2024-08-06 NOTE — HOME HEALTH
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 Pt recently underwent surgical debridement of sternal and right lower abdominal abscess site. Wound vac applied to both surgical wounds and bridged with black foam. Pt has LUE midline through which he receives Cefepime 2000 mg IV q 12 hrs. through 8/25/24.  CG/SN continue to perform LVAD driveline dressing changes 3 x weekly at same time of wound vac dressing change.  SN also draws weekly labs via PICC line as ordered by [facility].    Dr Farshad Gillespie approved of the following: the need for continued Skilled Nursing home health services  1w1, 3W8, 5 prn and plan of care for LVAD driveline infection, cutaneous abscess of chest wall, severe COPD symtoms for: additional assessment and: wound care services to LVAD driveline exit site and wound vac dressing changes 3 x weekly.    Subjective: \"I feel ok, I'll be glad when I can get rid of this wound vac.\"     Clinical assessment (what this visit means for the patient overall and need for ongoing skilled care) and progress or lack of progress towards SPECIFIC goals: SN performed cardiopulmonary assessment- pt's VS within POC parameters with exception of map at 100. SN notified [facility] and discovered that pt has been taking hydralazine 25mg q 8 hrs instead of prescribed dose of 75mg po q 8 hrs. SN instructed pt to take 75mg po q 8 hrs- pt verbalized understanding. CHF stoplight zone remains green. Pt SOB with minimal exertion although mildly improved during past certification period. Pt is still not using supplimental oxygen even during periods of increased SOB, although O2 tanks remain in home and stored in upright position. SN performed LVAD driveline dressing change - brownish gray drainage noted on old dressing.

## 2024-08-07 ENCOUNTER — OFFICE VISIT (OUTPATIENT)
Age: 64
End: 2024-08-07

## 2024-08-07 ENCOUNTER — TELEPHONE (OUTPATIENT)
Age: 64
End: 2024-08-07

## 2024-08-07 ENCOUNTER — HOME CARE VISIT (OUTPATIENT)
Facility: HOME HEALTH | Age: 64
End: 2024-08-07
Payer: MEDICARE

## 2024-08-07 ENCOUNTER — HOSPITAL ENCOUNTER (OUTPATIENT)
Facility: HOSPITAL | Age: 64
Setting detail: INFUSION SERIES
Discharge: HOME | End: 2024-08-07
Payer: MEDICARE

## 2024-08-07 VITALS
HEART RATE: 92 BPM | TEMPERATURE: 97.5 F | RESPIRATION RATE: 22 BRPM | BODY MASS INDEX: 26.37 KG/M2 | WEIGHT: 168 LBS | SYSTOLIC BLOOD PRESSURE: 74 MMHG | OXYGEN SATURATION: 98 % | HEIGHT: 67 IN

## 2024-08-07 VITALS
TEMPERATURE: 97.9 F | HEART RATE: 87 BPM | DIASTOLIC BLOOD PRESSURE: 69 MMHG | SYSTOLIC BLOOD PRESSURE: 96 MMHG | RESPIRATION RATE: 22 BRPM

## 2024-08-07 DIAGNOSIS — I50.22 CHRONIC HFREF (HEART FAILURE WITH REDUCED EJECTION FRACTION) (HCC): ICD-10-CM

## 2024-08-07 DIAGNOSIS — D50.0 IRON DEFICIENCY ANEMIA DUE TO CHRONIC BLOOD LOSS: ICD-10-CM

## 2024-08-07 DIAGNOSIS — Z95.811 LVAD (LEFT VENTRICULAR ASSIST DEVICE) PRESENT (HCC): ICD-10-CM

## 2024-08-07 DIAGNOSIS — Z95.811 LVAD (LEFT VENTRICULAR ASSIST DEVICE) PRESENT (HCC): Primary | ICD-10-CM

## 2024-08-07 DIAGNOSIS — Z79.01 CHRONIC ANTICOAGULATION: ICD-10-CM

## 2024-08-07 DIAGNOSIS — K92.2 UPPER GI BLEED: Primary | ICD-10-CM

## 2024-08-07 DIAGNOSIS — N18.31 STAGE 3A CHRONIC KIDNEY DISEASE (HCC): ICD-10-CM

## 2024-08-07 DIAGNOSIS — I50.22 CHRONIC SYSTOLIC HEART FAILURE (HCC): ICD-10-CM

## 2024-08-07 DIAGNOSIS — E61.1 IRON DEFICIENCY: ICD-10-CM

## 2024-08-07 LAB
CK BB CFR SERPL ELPH: 0 %
CK MACRO1 CFR SERPL: 0 %
CK MACRO2 CFR SERPL: 0 %
CK MB CFR SERPL ELPH: 0 % (ref 0–3)
CK MM CFR SERPL ELPH: 100 % (ref 97–100)
CK SERPL-CCNC: 81 U/L (ref 41–331)
TSH SERPL DL<=0.05 MIU/L-ACNC: 1.08 UIU/ML (ref 0.36–3.74)

## 2024-08-07 PROCEDURE — 6360000002 HC RX W HCPCS: Performed by: NURSE PRACTITIONER

## 2024-08-07 PROCEDURE — 36415 COLL VENOUS BLD VENIPUNCTURE: CPT

## 2024-08-07 PROCEDURE — 96365 THER/PROPH/DIAG IV INF INIT: CPT

## 2024-08-07 PROCEDURE — 84443 ASSAY THYROID STIM HORMONE: CPT

## 2024-08-07 PROCEDURE — 2580000003 HC RX 258: Performed by: NURSE PRACTITIONER

## 2024-08-07 PROCEDURE — 96372 THER/PROPH/DIAG INJ SC/IM: CPT

## 2024-08-07 RX ORDER — DIPHENHYDRAMINE HYDROCHLORIDE 50 MG/ML
50 INJECTION INTRAMUSCULAR; INTRAVENOUS
OUTPATIENT
Start: 2024-09-01

## 2024-08-07 RX ORDER — SODIUM CHLORIDE 0.9 % (FLUSH) 0.9 %
5-40 SYRINGE (ML) INJECTION PRN
Status: DISCONTINUED | OUTPATIENT
Start: 2024-08-07 | End: 2024-08-08 | Stop reason: HOSPADM

## 2024-08-07 RX ORDER — SODIUM CHLORIDE 9 MG/ML
INJECTION, SOLUTION INTRAVENOUS CONTINUOUS
OUTPATIENT
Start: 2024-09-01

## 2024-08-07 RX ORDER — ALBUTEROL SULFATE 90 UG/1
4 AEROSOL, METERED RESPIRATORY (INHALATION) PRN
OUTPATIENT
Start: 2024-09-01

## 2024-08-07 RX ORDER — EPINEPHRINE 1 MG/ML
0.3 INJECTION, SOLUTION INTRAMUSCULAR; SUBCUTANEOUS PRN
OUTPATIENT
Start: 2024-09-01

## 2024-08-07 RX ORDER — SODIUM CHLORIDE 9 MG/ML
INJECTION, SOLUTION INTRAVENOUS CONTINUOUS
OUTPATIENT
Start: 2024-08-12

## 2024-08-07 RX ORDER — ACETAMINOPHEN 325 MG/1
650 TABLET ORAL
OUTPATIENT
Start: 2024-08-12

## 2024-08-07 RX ORDER — SODIUM CHLORIDE 9 MG/ML
5-250 INJECTION, SOLUTION INTRAVENOUS PRN
Status: CANCELLED | OUTPATIENT
Start: 2024-08-12

## 2024-08-07 RX ORDER — DIPHENHYDRAMINE HYDROCHLORIDE 50 MG/ML
50 INJECTION INTRAMUSCULAR; INTRAVENOUS
OUTPATIENT
Start: 2024-08-12

## 2024-08-07 RX ORDER — ALBUTEROL SULFATE 90 UG/1
4 AEROSOL, METERED RESPIRATORY (INHALATION) PRN
OUTPATIENT
Start: 2024-08-12

## 2024-08-07 RX ORDER — ONDANSETRON 2 MG/ML
8 INJECTION INTRAMUSCULAR; INTRAVENOUS
OUTPATIENT
Start: 2024-09-01

## 2024-08-07 RX ORDER — SODIUM CHLORIDE 9 MG/ML
5-250 INJECTION, SOLUTION INTRAVENOUS PRN
Status: DISCONTINUED | OUTPATIENT
Start: 2024-08-07 | End: 2024-08-08 | Stop reason: HOSPADM

## 2024-08-07 RX ORDER — EPINEPHRINE 1 MG/ML
0.3 INJECTION, SOLUTION INTRAMUSCULAR; SUBCUTANEOUS PRN
OUTPATIENT
Start: 2024-08-12

## 2024-08-07 RX ORDER — SODIUM CHLORIDE 0.9 % (FLUSH) 0.9 %
5-40 SYRINGE (ML) INJECTION PRN
OUTPATIENT
Start: 2024-08-12

## 2024-08-07 RX ORDER — ONDANSETRON 2 MG/ML
8 INJECTION INTRAMUSCULAR; INTRAVENOUS
OUTPATIENT
Start: 2024-08-12

## 2024-08-07 RX ORDER — SODIUM CHLORIDE 9 MG/ML
5-250 INJECTION, SOLUTION INTRAVENOUS PRN
OUTPATIENT
Start: 2024-08-12

## 2024-08-07 RX ORDER — ACETAMINOPHEN 325 MG/1
650 TABLET ORAL
OUTPATIENT
Start: 2024-09-01

## 2024-08-07 RX ADMIN — OCTREOTIDE ACETATE 20 MG: KIT at 15:20

## 2024-08-07 RX ADMIN — SODIUM CHLORIDE 50 ML/HR: 9 INJECTION, SOLUTION INTRAVENOUS at 16:48

## 2024-08-07 RX ADMIN — FERRIC CARBOXYMALTOSE INJECTION 750 MG: 50 INJECTION, SOLUTION INTRAVENOUS at 16:48

## 2024-08-07 ASSESSMENT — ENCOUNTER SYMPTOMS
SHORTNESS OF BREATH: 1
ABDOMINAL PAIN: 0
COUGH: 0
BLOOD IN STOOL: 0
CHEST TIGHTNESS: 0
SORE THROAT: 0
ABDOMINAL DISTENTION: 0
EYE PAIN: 0

## 2024-08-07 NOTE — PLAN OF CARE
[facility]                 Date: 2024  Name: Sanford Joiner Sr.  MRN: [id number]       : 1960    Pt admit to [facility] for Sandostatin/Injectafer - ambulatory in stable condition.   Assessment completed. Denies fever, cough, and N/V- denies covid-like symptoms.     IV placed peripherally in Right AC. Labs ordered/ drawn. See Epic Results Review for details.     Midline dressing changed per patient request. No blood return observed from purple or red port.     Sandostatin given in Left Glute.   Injectafer infused through PIV Right AC  Tolerated procedure well without issue. PIV removed post infusion. Coban and Gauze applied to site.     Patient aware of upcoming appointment. Patient declined post- monitoring time. Education provided.     Mr. Joiner's vitals were reviewed prior to treatment.   Patient Vitals for the past 12 hrs:   Temp Pulse Resp BP   24 1511 97.9 °F (36.6 °C) 92 20 95/67     Vitals:    24 1705   BP: 96/69   Pulse: 87   Resp: 22   Temp:      Medications Administered         0.9 % sodium chloride infusion Admin Date  2024 Action  New Bag Dose  50 mL/hr Rate  50 mL/hr Route  IntraVENous Administered By  Ronda Kathleen RN        ferric carboxymaltose (INJECTAFER) 750 mg in sodium chloride 0.9 % 250 mL IVPB Admin Date  2024 Action  New Bag Dose  750 mg Rate  870 mL/hr Route  IntraVENous Administered By  Ronda Kathleen RN        octreotide ACETATE (SANDOSTATIN LAR) injection 20 mg Admin Date  2024 Action  Given Dose  20 mg Rate   Route  IntraMUSCular Administered By  Ronda Kathleen RN Sarah N Hoang, RN  2024    Problem: Chronic Conditions and Co-morbidities  Goal: Patient's chronic conditions and co-morbidity symptoms are monitored and maintained or improved  Outcome: Progressing     Problem: Safety - Adult  Goal: Free from fall injury  Outcome: Progressing

## 2024-08-07 NOTE — OP NOTE
[address]  [address]                            OPERATIVE REPORT      PATIENT NAME: JAIDEN SOUTH             : 1960  MED REC NO: [id number]                       ROOM: 464  ACCOUNT NO: [id number]                       ADMIT DATE: 2024  PROVIDER: Ronn Gomez MD    DATE OF SERVICE:  2024    PREOPERATIVE DIAGNOSES:  Driveline infection.    POSTOPERATIVE DIAGNOSES:  Driveline infection.    PROCEDURES PERFORMED:  Driveline external wound debridement and placement of wound VAC (CPT code 66202 and 82056).    SURGEON:  Ronn Gomez MD    ASSISTANT:  Spencer Davis PA-C    ANESTHESIA:  General.    ESTIMATED BLOOD LOSS:  10 mL.    SPECIMENS REMOVED:  Cultures.    INTRAOPERATIVE FINDINGS:  none     COMPLICATIONS:  None.    IMPLANTS:  None.    INDICATIONS:  none    DESCRIPTION OF PROCEDURE:  The patient is a very pleasant 64-ear-old gentleman who is suffering from an extensive driveline infection near his sternum and near his exit side.  He is coming to the operating room for debridement.  The patient was brought to the operating room, underwent general anesthesia without complications.  We then made incisions over the sternal and the driveline areas debrided the abscesses in those areas and then placed a wound VAC.  Each of the areas approximately 2 cm x 2 cm x 2 cm.  I was present for the entire procedure.; ; UVALDO assistance was needed due to difficulty of procedure, dissection, and identification of pertinent anatomy throughout the case       COMPLICATIONS:  None.        RONN GOMEZ MD      SMF/AQS  D:  2024 17:03:50  T:  2024 00:56:13  JOB #:  [id number]

## 2024-08-07 NOTE — TELEPHONE ENCOUNTER
Pt left without stopping at the desk to get scheduled.  I could not reach him or his wife, but I did leave a msg on his voicemail asking him to call us back.

## 2024-08-07 NOTE — PATIENT INSTRUCTIONS
Medication changes:    None today       Testing Ordered:    None additional today     Other Recommendations:     Call Dr. Rajan's office to request follow up - [phone]     Your wound vac was removed today. Please keep this OFF. Home health nursing will continue to assess your wound. Please contact [facility] if you experience increased drainage, change in drainage, fever or chills.      A controller exchange was performed in clinic. Please contact [facility] for any LVAD alarms.     Continue to change drive line exit site dressing daily using sterile technique,     Ensure you are drinking an adequate amount of water with a goal of 6-8 eight ounce glasses (1.5-2 liters) of fluid daily. Your urine should be clear and light yellow straw colored.     Follow up in 1 month with [facility]        Please monitor your weights daily upon waking and after using the bathroom. Keep a written records of your weights and bring to your next appointment. If you have a weight gain of 3 or more pounds overnight OR 5 or more pounds in one week please contact our office.       Thank you for allowing us the privilege of being a part of your healthcare team! Please do not hesitate to contact our office at [phone] with any questions or concerns.

## 2024-08-08 NOTE — PROGRESS NOTES
ADVANCED HEART FAILURE CENTER  [facility] in [place], [place]  LVAD Coordinator Clinic Visit Note    Chief Complaint   Patient presents with    Shortness of Breath     W/ exertion    Follow-Up from Hospital     VAD       BP (!) 74/0 (Site: Right Upper Arm, Position: Sitting, Cuff Size: Medium Adult) Comment: MAP  Pulse 92   Temp 97.5 °F (36.4 °C) (Temporal)   Resp 22   Ht 1.702 m (5' 7\")   Wt 76.2 kg (168 lb)   SpO2 98%   BMI 26.31 kg/m²      LVAD  LVAD Type:: Left Ventricular Assist Device (LVAD)  Pump Speed (rpm): 5200  Pump Flow (lpm): 3.4  MAP (mmHg): 74  Set Low Speed (rpm): 5200  Pump Pulse Index (PI): 5.8  Pump Power (Mei): 3.4  Battery Life Checked: Yes  Backup Controller Present: Yes  Driveline Dressing: Changed per order  Outpatient: Yes  MAP in Therapeutic Range (Outpatient): Yes         Sanford Joiner Karen is a 64 y.o. male with a history of NICM with Heartmate 3 implant date of 05/27/2023.     LVAD interrogation completed in clinic. Notable for low voltage alarms. Patient stated he was having these alarms at home but has not had any alarms since swapping his clips however interrogation showed several low voltage alarms today (08/07). Patient confirmed his batteries were low at the time of these alarms. Provider then met with patient and reported to RN that patient experienced intermittent alarms while transferring from tower to batteries. RN placed patient on tower and collected log files. While patient transferring back to batteries patient experienced intermittent low voltage alarms, (RN witnessed gold angela illuminated for a few seconds and then self resolved) worse with manipulation of power leads. Batteries showed about half charge at this time.      Reported to RUBEN Garibay who stated VORB RN to perform  exchange.  exchange completed with MADYSON Vogel. Patient tolerated without complaint. No alarms noted after controller exchange, educated 
Logfiles downloaded and sent via secure email to [facility] [employer] (NE Garibay NP).    SNEHA LAL RN  VAD Coordinator  
Sanford LEMUS Rhys Marsh. is a 64 y.o. male with a history of NICM with Heartmate 3 implant date of 05/27/2023. Patient was seen in clinic for routine follow up at which time it was noted patient experiencing low voltage alarms with manipulation of power leads. New controller and 2 sets of clips provided to ensure safe operation of device per  guidelines. Also noted back up battery in back up  expiring,  replaced to ensure safe operation of device per  guidelines.    
Normal appearance. He is obese. He is not ill-appearing.   HENT:      Head: Normocephalic and atraumatic.      Mouth/Throat:      Mouth: Mucous membranes are moist.      Pharynx: Oropharynx is clear.   Eyes:      General: No scleral icterus.     Conjunctiva/sclera: Conjunctivae normal.   Neck:      Vascular: No JVD.   Cardiovascular:      Rate and Rhythm: Normal rate and regular rhythm.      Pulses: Normal pulses.      Heart sounds: Normal heart sounds. No murmur heard.     No friction rub. No gallop.      Comments: VAD hum    palpable pulses  Pulmonary:      Effort: Pulmonary effort is normal. No respiratory distress.      Breath sounds: Normal breath sounds.   Abdominal:      General: Bowel sounds are normal. There is no distension.      Palpations: Abdomen is soft.   Musculoskeletal:         General: No swelling or deformity.      Cervical back: Normal range of motion and neck supple.      Right lower leg: No edema.      Left lower leg: No edema.   Skin:     General: Skin is warm and dry.      Comments: Drive line site with large amount of drainage    Wound vac in place across abdomen    Neurological:      General: No focal deficit present.      Mental Status: He is alert and oriented to person, place, and time.   Psychiatric:         Mood and Affect: Mood normal.         Behavior: Behavior normal.         Thought Content: Thought content normal.         Judgment: Judgment normal.        LVAD  LVAD Type:: Left Ventricular Assist Device (LVAD)  Pump Speed (rpm): 5250  Pump Flow (lpm): 3.5  MAP (mmHg): 74  Pump Pulse Index (PI): 6  Pump Power (Mei): 3.8     I interrogated his LVAD today. Parameters are as documented above. There were many PI events and a few low voltage alarms. The LVAD is functioning properly. No programing changes were made. The driveline was examined and there is no erythema, tenderness or drainage from the driveline exit site      PAST MEDICAL HISTORY:  Past Medical History:   Diagnosis Date

## 2024-08-09 ENCOUNTER — ANTI-COAG VISIT (OUTPATIENT)
Age: 64
End: 2024-08-09

## 2024-08-09 ENCOUNTER — HOME CARE VISIT (OUTPATIENT)
Facility: HOME HEALTH | Age: 64
End: 2024-08-09
Payer: MEDICARE

## 2024-08-09 ENCOUNTER — TELEPHONE (OUTPATIENT)
Age: 64
End: 2024-08-09

## 2024-08-09 VITALS
BODY MASS INDEX: 26.31 KG/M2 | WEIGHT: 168 LBS | RESPIRATION RATE: 24 BRPM | OXYGEN SATURATION: 94 % | TEMPERATURE: 98.4 F

## 2024-08-09 DIAGNOSIS — Z79.01 CHRONIC ANTICOAGULATION: Primary | ICD-10-CM

## 2024-08-09 LAB — INR BLD: 2.6

## 2024-08-09 PROCEDURE — G0299 HHS/HOSPICE OF RN EA 15 MIN: HCPCS

## 2024-08-09 NOTE — TELEPHONE ENCOUNTER
1117: Call placed to office of Dr. Rajan. Spoke with Daisy who confirmed she scheduled patient for 08/22 at 2pm with Dr. Rajan. Stated she has been unable to reach patient to confirm. Discussed LVAD coordinator will attempt to notify patient of this appointment as well.  Daisy stated address for this appointment [facility] [address]  [phone]     1511: placed call to patient, informed of appointment with Dr. Rajan, provided address of [address] [place] [address]. Patient verbalized understanding. Patient also confirmed he can present to clinic 8/21 to meet with Qumu  to have back up  programed with low flow alarm limit software.

## 2024-08-09 NOTE — PROGRESS NOTES
ADVANCED HEART FAILURE CENTER  [facility] in [place], [place]      INR result reviewed with YUMI Quiles NP  who made the following recommendations (VORB): 1mg Friday and monday and recheck Wednesday. Patient notified and verbalized understanding. They had no further questions. Patient's home health nurse MADYSON James also notified.  (See anticoag tracker)     Funmi Barth RN

## 2024-08-10 LAB
ALBUMIN SERPL-MCNC: 4.3 G/DL (ref 3.9–4.9)
ALP SERPL-CCNC: 96 IU/L (ref 44–121)
ALT SERPL-CCNC: 10 IU/L (ref 0–44)
AST SERPL-CCNC: 24 IU/L (ref 0–40)
BASOPHILS # BLD AUTO: 0.2 X10E3/UL (ref 0–0.2)
BASOPHILS NFR BLD AUTO: 1 %
BILIRUB SERPL-MCNC: <0.2 MG/DL (ref 0–1.2)
BUN SERPL-MCNC: 27 MG/DL (ref 8–27)
BUN/CREAT SERPL: 19 (ref 10–24)
CALCIUM SERPL-MCNC: 9.6 MG/DL (ref 8.6–10.2)
CHLORIDE SERPL-SCNC: 105 MMOL/L (ref 96–106)
CO2 SERPL-SCNC: 18 MMOL/L (ref 20–29)
CREAT SERPL-MCNC: 1.39 MG/DL (ref 0.76–1.27)
CRP SERPL-MCNC: 12 MG/L (ref 0–10)
EGFRCR SERPLBLD CKD-EPI 2021: 57 ML/MIN/1.73
EOSINOPHIL # BLD AUTO: 1 X10E3/UL (ref 0–0.4)
EOSINOPHIL NFR BLD AUTO: 8 %
ERYTHROCYTE [DISTWIDTH] IN BLOOD BY AUTOMATED COUNT: 17.4 % (ref 11.6–15.4)
GLOBULIN SER CALC-MCNC: 3 G/DL (ref 1.5–4.5)
GLUCOSE SERPL-MCNC: 82 MG/DL (ref 70–99)
HCT VFR BLD AUTO: 32.7 % (ref 37.5–51)
HGB BLD-MCNC: 10.4 G/DL (ref 13–17.7)
IMM GRANULOCYTES # BLD AUTO: 0.1 X10E3/UL (ref 0–0.1)
IMM GRANULOCYTES NFR BLD AUTO: 1 %
INR PPP: 2.1 (ref 0.9–1.2)
LDH SERPL L TO P-CCNC: 315 IU/L (ref 121–224)
LYMPHOCYTES # BLD AUTO: 1.1 X10E3/UL (ref 0.7–3.1)
LYMPHOCYTES NFR BLD AUTO: 8 %
MAGNESIUM SERPL-MCNC: 1.9 MG/DL (ref 1.6–2.3)
MCH RBC QN AUTO: 26.9 PG (ref 26.6–33)
MCHC RBC AUTO-ENTMCNC: 31.8 G/DL (ref 31.5–35.7)
MCV RBC AUTO: 85 FL (ref 79–97)
MONOCYTES # BLD AUTO: 1.3 X10E3/UL (ref 0.1–0.9)
MONOCYTES NFR BLD AUTO: 10 %
NEUTROPHILS # BLD AUTO: 9.2 X10E3/UL (ref 1.4–7)
NEUTROPHILS NFR BLD AUTO: 72 %
NT-PROBNP SERPL-MCNC: 1081 PG/ML (ref 0–210)
PLATELET # BLD AUTO: 404 X10E3/UL (ref 150–450)
POTASSIUM SERPL-SCNC: 5.1 MMOL/L (ref 3.5–5.2)
PROT SERPL-MCNC: 7.3 G/DL (ref 6–8.5)
PROTHROMBIN TIME: 21.9 SEC (ref 9.1–12)
RBC # BLD AUTO: 3.86 X10E6/UL (ref 4.14–5.8)
SODIUM SERPL-SCNC: 140 MMOL/L (ref 134–144)
WBC # BLD AUTO: 12.7 X10E3/UL (ref 3.4–10.8)

## 2024-08-12 ENCOUNTER — ANTI-COAG VISIT (OUTPATIENT)
Age: 64
End: 2024-08-12

## 2024-08-12 ENCOUNTER — HOME CARE VISIT (OUTPATIENT)
Facility: HOME HEALTH | Age: 64
End: 2024-08-12
Payer: MEDICARE

## 2024-08-12 VITALS — BODY MASS INDEX: 25.84 KG/M2 | WEIGHT: 165 LBS | RESPIRATION RATE: 24 BRPM | TEMPERATURE: 98.5 F

## 2024-08-12 DIAGNOSIS — Z79.01 CHRONIC ANTICOAGULATION: Primary | ICD-10-CM

## 2024-08-12 LAB
BACTERIA SPEC CULT: NORMAL
BACTERIA SPEC CULT: NORMAL
INR BLD: 1.8
SERVICE CMNT-IMP: NORMAL
SERVICE CMNT-IMP: NORMAL

## 2024-08-12 PROCEDURE — G0299 HHS/HOSPICE OF RN EA 15 MIN: HCPCS

## 2024-08-12 ASSESSMENT — ENCOUNTER SYMPTOMS: HEMOPTYSIS: 0

## 2024-08-12 NOTE — HOME HEALTH
Subjective: \"I'm feeling fine..\"  Falls since last visit: 0  Caregiver involvement changes: Ashley Swan - spouse  assists pt as needed when available    Home health supplies by type and quantity ordered/delivered this visit include: N/A       Clinician asked if patient has any new or changed medications and patient states: No  If Yes, were medications reconciled? Yes  Was the certifying physician notified of changes in medications? N/A             Clinical assessment (what this visit means for the patient overall and need for ongoing skilled care) and progress or lack of progress towards SPECIFIC goals: Pt alert, sitting  on sofa in living room. Pt's VSS, map 70,  O2 sat level 94% on room air at rest. Pt's weight 168 lbs . SN notes no swelling in lower extremities or increase in SOB.  SN performed INR check via fingerstick- result 2.6- reported to [facility]. SN performed sterile LVAD driveline dressing change- noting grayish-green drainage on old dressing.  SN removed Ag foam dressing from sternal wound and noted same drainage -see Wound Addendum for details. SN cleansed sternal wound site and dressed with Mepilex Ag foam with border- secured with Medipore tape.  Pt remains afebrile and denies chills, body aches or other symptoms of sepsis.  SN peformed sterile LVAD driveline dressing change - see Wound Addendum for details. SN margo weekly labs via venipuncture (no blood return from PICC line) - CBC with diff, CMP, LDH, Magnesium, PT/INR,  Pro BNP AST, ALT, CRP and CPK.    Written Teaching Material Utilized: [facility] admission booklet            Interdisciplinary communication with:  [facility] regarding pt's INR results, weight, map and  sternal and lower right abdominal  abscess sites (drainage and wound sites).          Discharge planning as follows: Pt may be discharged from [facility] when ordered by MD or pt request.

## 2024-08-12 NOTE — PROGRESS NOTES
ADVANCED HEART FAILURE CENTER  [facility] in [place], [place]      INR result reviewed with ADILENE Maurer NP who made the following recommendations (VORB): no changes and recheck 1 week. Patient notified and verbalized understanding. Patient's home health nurse MADYSON James also notified. They had no further questions. (See anticoag tracker)     Patient confirmed he has had no further LVAD alarms after controller exchange      Funmi Barth RN

## 2024-08-13 ASSESSMENT — ENCOUNTER SYMPTOMS: DYSPNEA ACTIVITY LEVEL: WHILE SPEAKING

## 2024-08-14 ENCOUNTER — TELEPHONE (OUTPATIENT)
Age: 64
End: 2024-08-14

## 2024-08-14 ENCOUNTER — HOME CARE VISIT (OUTPATIENT)
Facility: HOME HEALTH | Age: 64
End: 2024-08-14
Payer: MEDICARE

## 2024-08-14 ENCOUNTER — ANTI-COAG VISIT (OUTPATIENT)
Age: 64
End: 2024-08-14

## 2024-08-14 DIAGNOSIS — Z79.01 CHRONIC ANTICOAGULATION: Primary | ICD-10-CM

## 2024-08-14 LAB
CK BB CFR SERPL ELPH: 0 %
CK MACRO1 CFR SERPL: 0 %
CK MACRO2 CFR SERPL: 0 %
CK MB CFR SERPL ELPH: 0 % (ref 0–3)
CK MM CFR SERPL ELPH: 100 % (ref 97–100)
CK SERPL-CCNC: 73 U/L (ref 41–331)
INR BLD: 1.6

## 2024-08-14 PROCEDURE — G0299 HHS/HOSPICE OF RN EA 15 MIN: HCPCS

## 2024-08-14 NOTE — TELEPHONE ENCOUNTER
1323: message received from patient's home health nurse reporting map of 76, weight of 169lb which is a 4lb weight gain from Monday. Reports no shortness of breath or weight gain. Also reported a 1 hour dizzy spell which happened Monday.     Geni Garibay APRN - NP  You8 minutes ago (4:00 PM)       Ok, great.   Please have HH provide another update Friday.     You  Geni Garibay APRN - NP23 minutes ago (3:45 PM)     MP  Friday     Geni Garibay APRN - NP  You36 minutes ago (3:33 PM)       Let's hold off.  He tends to run dry, so I have a feeling his body may diurese on its own.   When does home health go out again?     You  Geni Garibay APRN - NP1 hour ago (2:38 PM)     MP  Gained 4lb over two days, do you want him to take bumex? No swelling or shortness of breath       Reviewed with RUBEN Garibay, patient does not need to take bumex, no new orders at this time. Confirmed with home health nurse no new orders at this time, informed per provider patient does not need to take bumex,  nurse MADYSON James confirmed she will see patient Friday.

## 2024-08-14 NOTE — HOME HEALTH
Subjective: \"I'm doing fine- I think my breathing is a little better..\"  Falls since last visit: 0  Caregiver involvement changes: Ashley Swan - spouse  assists pt as needed when available  Home health supplies by type and quantity ordered/delivered this visit include: N/A         Clinician asked if patient has any new or changed medications and patient states: No  If Yes, were medications reconciled? Yes  Was the certifying physician notified of changes in medications? N/A             Clinical assessment (what this visit means for the patient overall and need for ongoing skilled care) and progress or lack of progress towards SPECIFIC goals: Pt alert, with VS within POC parameters-  map 70,O2 sat level 98% on room air at rest. Pt's weight 165 lbs . SN notes no swelling in lower extremities or increase in SOB.  SN performed INR check via fingerstick- result 1.8- reported to [facility]. Pt reports no further controller alarms since last clinic visit.  SN performed sterile LVAD driveline dressing change- noting grayish-green drainage on old dressing.  SN removed Ag foam dressing from sternal wound and noted same drainage -see Wound Addendum for details. SN cleansed sternal wound site with chlorhexadine swab and saline and dressed with Mepilex Ag foam with border.  Pt remains afebrile and denies chills, body aches or other symptoms of sepsis.  SN peformed sterile LVAD driveline dressing change - see Wound Addendum for details. SN changed LUE midline using sterile techique- see Wound Addendum for midline site details.    Written Teaching Material Utilized: [facility] admission booklet             Interdisciplinary communication with:  [facility] regarding pt's INR results, weight, map and  sternal and lower right abdominal  abscess sites (drainage and wound sites).           Discharge planning as follows: Pt may be discharged from [facility] when ordered by MD or pt request.

## 2024-08-14 NOTE — PROGRESS NOTES
ADVANCED HEART FAILURE CENTER  [facility] in [place], [place]      INR result reviewed with RBUEN Garibay NP who made the following recommendations (VORB): 3mg tonight and recheck Friday 08/16 . Patient notified and verbalized understanding. They had no further questions. MADYSON James also notified, (See anticoag tracker)     Funmi Barth RN

## 2024-08-15 VITALS
WEIGHT: 169 LBS | OXYGEN SATURATION: 99 % | RESPIRATION RATE: 24 BRPM | BODY MASS INDEX: 26.47 KG/M2 | TEMPERATURE: 99.2 F

## 2024-08-15 ASSESSMENT — ENCOUNTER SYMPTOMS
HEMOPTYSIS: 0
DYSPNEA ACTIVITY LEVEL: WHILE SPEAKING

## 2024-08-15 NOTE — HOME HEALTH
Subjective: \"I feel fine- although I felt a little dizzy the other day for about an hour.\"  Falls since last visit: 0  Caregiver involvement changes: Ashley Swan - spouse  assists pt as needed when available  Home health supplies by type and quantity ordered/delivered this visit include: N/A          Clinician asked if patient has any new or changed medications and patient states: No  If Yes, were medications reconciled? Yes  Was the certifying physician notified of changes in medications? N/A              Clinical assessment (what this visit means for the patient overall and need for ongoing skilled care) and progress or lack of progress towards SPECIFIC goals: Pt alert, with VS within POC parameters-  map 76 ,O2 sat level 99% on room air at rest. Pt's weight 160 lbs ( up 4 lbs from 8/12/24)- [facility] notified.  SN notes no swelling in lower extremities or increase in SOB.  SN performed INR check via fingerstick- result 1.6- reported to [facility]. Pt reports no further controller alarms since last clinic visit.  SN performed sterile LVAD driveline dressing change- noting grayish-green drainage on old dressing.  SN removed Ag foam dressing from sternal wound and noted same drainage -see Wound Addendum for details. SN cleansed sternal wound site with chlorhexadine swab and saline and dressed with Mepilex Ag foam with border.  Pt remains afebrile and denies chills, body aches or other symptoms of sepsis.  SN peformed sterile LVAD driveline dressing change - see Wound Addendum for details.     Written Teaching Material Utilized: [facility] admission booklet              Interdisciplinary communication with:  [facility] regarding pt's INR results, weight, map and  sternal and lower right abdominal  abscess sites (drainage and wound sites).           Discharge planning as follows: Pt may be discharged from [facility] when ordered by MD or pt request.

## 2024-08-16 ENCOUNTER — ANTI-COAG VISIT (OUTPATIENT)
Age: 64
End: 2024-08-16

## 2024-08-16 ENCOUNTER — HOME CARE VISIT (OUTPATIENT)
Facility: HOME HEALTH | Age: 64
End: 2024-08-16
Payer: MEDICARE

## 2024-08-16 ENCOUNTER — TELEPHONE (OUTPATIENT)
Age: 64
End: 2024-08-16

## 2024-08-16 VITALS — RESPIRATION RATE: 24 BRPM | OXYGEN SATURATION: 97 % | TEMPERATURE: 99 F | BODY MASS INDEX: 26.47 KG/M2 | WEIGHT: 169 LBS

## 2024-08-16 DIAGNOSIS — Z79.01 CHRONIC ANTICOAGULATION: Primary | ICD-10-CM

## 2024-08-16 LAB — INR BLD: 1.6

## 2024-08-16 PROCEDURE — G0299 HHS/HOSPICE OF RN EA 15 MIN: HCPCS

## 2024-08-16 ASSESSMENT — ENCOUNTER SYMPTOMS
DYSPNEA ACTIVITY LEVEL: WHILE SPEAKING
HEMOPTYSIS: 0

## 2024-08-16 NOTE — PROGRESS NOTES
ADVANCED HEART FAILURE CENTER  [facility] in [place], [place]      INR result reviewed with RUBEN Garibay NP who made the following recommendations (VORB): 4mg tonight and Sunday night and recheck 08/19. Patient notified and verbalized understanding. They had no further questions. MADYSON James also notified   (See anticoag tracker)     Funmi Barth RN

## 2024-08-16 NOTE — TELEPHONE ENCOUNTER
Message received from patient's home health nurse reporting weight of 169lb today, BP of 78     Reviewed with RUBEN Garibay who stated VORB no medication changes at this time, patient should take PRN bumex for weight gain.     1707: called and spoke with patient. Notified of provider instructions to continue same medications, take PRN bumex for weight gain over the weekend. He verbalized understanding,  nurse MADYSON James also notified.

## 2024-08-17 LAB
ALT SERPL-CCNC: 16 IU/L (ref 0–44)
AST SERPL-CCNC: 24 IU/L (ref 0–40)
BASOPHILS # BLD AUTO: 0.2 X10E3/UL (ref 0–0.2)
BASOPHILS NFR BLD AUTO: 2 %
BUN SERPL-MCNC: 28 MG/DL (ref 8–27)
CREAT SERPL-MCNC: 1.21 MG/DL (ref 0.76–1.27)
CRP SERPL-MCNC: 6 MG/L (ref 0–10)
EGFRCR SERPLBLD CKD-EPI 2021: 67 ML/MIN/1.73
EOSINOPHIL # BLD AUTO: 0.6 X10E3/UL (ref 0–0.4)
EOSINOPHIL NFR BLD AUTO: 8 %
ERYTHROCYTE [DISTWIDTH] IN BLOOD BY AUTOMATED COUNT: 18.7 % (ref 11.6–15.4)
HCT VFR BLD AUTO: 33.5 % (ref 37.5–51)
HGB BLD-MCNC: 10.2 G/DL (ref 13–17.7)
IMM GRANULOCYTES # BLD AUTO: 0 X10E3/UL (ref 0–0.1)
IMM GRANULOCYTES NFR BLD AUTO: 0 %
LYMPHOCYTES # BLD AUTO: 1 X10E3/UL (ref 0.7–3.1)
LYMPHOCYTES NFR BLD AUTO: 13 %
MCH RBC QN AUTO: 26.7 PG (ref 26.6–33)
MCHC RBC AUTO-ENTMCNC: 30.4 G/DL (ref 31.5–35.7)
MCV RBC AUTO: 88 FL (ref 79–97)
MONOCYTES # BLD AUTO: 0.9 X10E3/UL (ref 0.1–0.9)
MONOCYTES NFR BLD AUTO: 12 %
NEUTROPHILS # BLD AUTO: 5.1 X10E3/UL (ref 1.4–7)
NEUTROPHILS NFR BLD AUTO: 65 %
PLATELET # BLD AUTO: 364 X10E3/UL (ref 150–450)
RBC # BLD AUTO: 3.82 X10E6/UL (ref 4.14–5.8)
WBC # BLD AUTO: 7.7 X10E3/UL (ref 3.4–10.8)

## 2024-08-17 NOTE — HOME HEALTH
Subjective: \"I feel fine, no more dizzy spells.\"  Falls since last visit: 0  Caregiver involvement changes: Ashley Swan - spouse  assists pt as needed when available  Home health supplies by type and quantity ordered/delivered this visit include: N/A           Clinician asked if patient has any new or changed medications and patient states: No  If Yes, were medications reconciled? Yes  Was the certifying physician notified of changes in medications? N/A               Clinical assessment (what this visit means for the patient overall and need for ongoing skilled care) and progress or lack of progress towards SPECIFIC goals: Pt alert, with VS within POC parameters-  map 78 ,O2 sat level 97% on room air at rest. Pt's weight 169 lbs (no change since 8/14/24) SN notes no swelling in lower extremities or increase in SOB.  SN performed INR check via fingerstick- result 1.6- reported to [facility]. Pt reports no further controller alarms since last clinic visit.  SN performed sterile LVAD driveline dressing change- noting grayish-green drainage on old dressing. See Wound Addendum for site details.  SN removed Ag foam dressing from sternal wound and noted same drainage -see Wound Addendum for details. SN cleansed sternal wound site with chlorhexadine swab and saline and dressed with Mepilex Ag foam with border.  Pt remains afebrile and denies chills, body aches or other symptoms of sepsis.       Written Teaching Material Utilized: [facility] admission booklet               Interdisciplinary communication with:  [facility] regarding pt's INR results, weight, map and  sternal and lower right abdominal  abscess sites (drainage and wound sites).            Discharge planning as follows: Pt may be discharged from [facility] when ordered by MD or pt request.

## 2024-08-19 ENCOUNTER — ANTI-COAG VISIT (OUTPATIENT)
Age: 64
End: 2024-08-19
Payer: MEDICARE

## 2024-08-19 ENCOUNTER — HOME CARE VISIT (OUTPATIENT)
Facility: HOME HEALTH | Age: 64
End: 2024-08-19
Payer: MEDICARE

## 2024-08-19 DIAGNOSIS — Z79.01 CHRONIC ANTICOAGULATION: Primary | ICD-10-CM

## 2024-08-19 LAB
BACTERIA SPEC CULT: NORMAL
BACTERIA SPEC CULT: NORMAL
INR BLD: 1.6
SERVICE CMNT-IMP: NORMAL
SERVICE CMNT-IMP: NORMAL

## 2024-08-19 PROCEDURE — G0299 HHS/HOSPICE OF RN EA 15 MIN: HCPCS

## 2024-08-19 PROCEDURE — 93793 ANTICOAG MGMT PT WARFARIN: CPT | Performed by: NURSE PRACTITIONER

## 2024-08-19 NOTE — PROGRESS NOTES
ADVANCED HEART FAILURE CENTER  [facility] in [place], [place]      INR result reviewed with ADILENE Maurer NP who made the following recommendations (VORB):  new maintenance plan 4 mg every Sun, Wed; 2 mg all other days and recheck INR Friday.Patient notified and verbalized understanding. They had no further questions. Patient's home health nurse ANY Walters also notified. (See anticoag tracker)     Funmi Barth RN

## 2024-08-19 NOTE — PATIENT INSTRUCTIONS
Medication changes:          Testing Ordered:        Other Recommendations:     Continue to change drive line exit site dressing 3 times a week using sterile technique,     Ensure you are drinking an adequate amount of water with a goal of 6-8 eight ounce glasses (1.5-2 liters) of fluid daily. Your urine should be clear and light yellow straw colored.     Follow up in  with [facility]          For further patient and caregiver resources as well as access to online LVAD support groups visit [email or web address]       Please bring your daily sheets and medications to your next appointment.      Please monitor your weights daily upon waking and after using the bathroom. Keep a written records of your weights and bring to your next appointment. If you have a weight gain of 3 or more pounds overnight OR 5 or more pounds in one week please contact our office.       Thank you for allowing us the privilege of being a part of your healthcare team! Please do not hesitate to contact our office at [phone] with any questions or concerns.

## 2024-08-21 ENCOUNTER — NURSE ONLY (OUTPATIENT)
Age: 64
End: 2024-08-21

## 2024-08-21 ENCOUNTER — HOME CARE VISIT (OUTPATIENT)
Dept: HOME HEALTH SERVICES | Facility: HOME HEALTH | Age: 64
End: 2024-08-21
Payer: MEDICARE

## 2024-08-21 LAB
CK BB CFR SERPL ELPH: 0 %
CK MACRO1 CFR SERPL: 0 %
CK MACRO2 CFR SERPL: 0 %
CK MB CFR SERPL ELPH: 0 % (ref 0–3)
CK MM CFR SERPL ELPH: 100 % (ref 97–100)
CK SERPL-CCNC: 81 U/L (ref 41–331)

## 2024-08-21 NOTE — PROGRESS NOTES
ADVANCED HEART FAILURE CENTER  [facility] in [place], [place]    Patient presented to clinic for download of Low Flow 2.0 software on back up  per provider. Software download completed by Cony Lemons. Back up  self test completed after download. Time given to ask questions. Patient had no questions at this time.     CASEY MONTEMAYOR RN  VAD Coordinator

## 2024-08-22 ENCOUNTER — TELEPHONE (OUTPATIENT)
Age: 64
End: 2024-08-22

## 2024-08-22 ENCOUNTER — OFFICE VISIT (OUTPATIENT)
Age: 64
End: 2024-08-22
Payer: MEDICARE

## 2024-08-22 ENCOUNTER — TELEPHONE (OUTPATIENT)
Facility: HOSPITAL | Age: 64
End: 2024-08-22

## 2024-08-22 VITALS
HEART RATE: 74 BPM | RESPIRATION RATE: 16 BRPM | TEMPERATURE: 98.2 F | SYSTOLIC BLOOD PRESSURE: 103 MMHG | DIASTOLIC BLOOD PRESSURE: 74 MMHG

## 2024-08-22 DIAGNOSIS — T82.7XXA INFECTION ASSOCIATED WITH DRIVELINE OF LEFT VENTRICULAR ASSIST DEVICE (LVAD) (HCC): Primary | ICD-10-CM

## 2024-08-22 PROCEDURE — 3078F DIAST BP <80 MM HG: CPT | Performed by: INTERNAL MEDICINE

## 2024-08-22 PROCEDURE — 99214 OFFICE O/P EST MOD 30 MIN: CPT | Performed by: INTERNAL MEDICINE

## 2024-08-22 PROCEDURE — 3074F SYST BP LT 130 MM HG: CPT | Performed by: INTERNAL MEDICINE

## 2024-08-22 RX ORDER — CEFDINIR 300 MG/1
300 CAPSULE ORAL 2 TIMES DAILY
Qty: 60 CAPSULE | Refills: 2 | Status: SHIPPED | OUTPATIENT
Start: 2024-08-22 | End: 2024-11-20

## 2024-08-22 ASSESSMENT — PATIENT HEALTH QUESTIONNAIRE - PHQ9
SUM OF ALL RESPONSES TO PHQ QUESTIONS 1-9: 0
SUM OF ALL RESPONSES TO PHQ QUESTIONS 1-9: 0
SUM OF ALL RESPONSES TO PHQ9 QUESTIONS 1 & 2: 0
2. FEELING DOWN, DEPRESSED OR HOPELESS: NOT AT ALL
SUM OF ALL RESPONSES TO PHQ QUESTIONS 1-9: 0
SUM OF ALL RESPONSES TO PHQ QUESTIONS 1-9: 0
1. LITTLE INTEREST OR PLEASURE IN DOING THINGS: NOT AT ALL

## 2024-08-22 NOTE — PROGRESS NOTES
[facility] Progress Note  Rafita Rajan MD   Phone [phone]  Fax [phone]      8/22/2024      Assessment & Plan:     64 y.o. 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    Finish Cefepime on Sunday 8/25/24.      Midline removal 8/26/24 - will clarify with IR as this is not tunneled and may be able to be removed by home health RN at home or myself in office.    Cefdinir 300mg PO BID indefinite suppressive therapy.  Made no guarantees of efficacy although since Ceftriaxone had worked well this may as well too.    Alternatives to Cefdinir discussed with patient and not great - Bactrim likely to cause repeat DAWOOD and allergic to quinolone therapy.      Option of doxycycline possible and advised him to let me know if any worsening of infection can try it.    Duration of antibiotic therapy is lifelong.            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  Underwent I/D with Dr. Gomez on 7/25/24 to both driveline and sternal areas which are both healing albeit with mild amount of purulent drainage.  Afebrile and wounds doing quite well and healing on Cefepime.  No diarrhea and receiving all doses of antibiotics via LUE midline (was PICC line but changed to midline in IR as could not pass IV past axillary vein left side.)    Objective:     Vitals: /74 (Site: Right Upper Arm, Position: Sitting, Cuff Size: Medium Adult)   Pulse 74   Temp 98.2 °F (36.8 °C) (Temporal)   Resp 16       Physical Examination:   General:  Alert, cooperative, no distress   Head:  Normocephalic, atraumatic no thrush.   Eyes:  Conjunctivae clear   Neck: Supple   Lungs:   No distress.    Chest wall:  LVAD per below   Heart:  LVAD present   Abdomen:   Soft, non-tender, non-distended   Extremities: Moves all.  No cyanosis or edema.   Skin: Driveline

## 2024-08-22 NOTE — TELEPHONE ENCOUNTER
Left message for patient that midline can be removed in office if nurses are not able to remove it - can add on for Tuesday @ 3PM to do so.

## 2024-08-22 NOTE — TELEPHONE ENCOUNTER
1316: call placed to coordination of care to schedule patient's echo. Scheduled for 9/12 1:15pm.     1329: placed call to patient. Notified of echo scheduled 09/12. Patient confirmed this appointment time works well for him. Discussed [facility] provider appt at 11 and echo afterwards, he verbalized understanding.

## 2024-08-22 NOTE — PROGRESS NOTES
Chief Complaint   Patient presents with    Follow-Up from Hospital     1. Have you been to the ER, urgent care clinic since your last visit?  Hospitalized since your last visit?No    2. Have you seen or consulted any other health care providers outside of the [facility] since your last visit?  Include any pap smears or colon screening. No

## 2024-08-23 ENCOUNTER — HOME CARE VISIT (OUTPATIENT)
Dept: HOME HEALTH SERVICES | Facility: HOME HEALTH | Age: 64
End: 2024-08-23
Payer: MEDICARE

## 2024-08-23 ENCOUNTER — HOME CARE VISIT (OUTPATIENT)
Facility: HOME HEALTH | Age: 64
End: 2024-08-23
Payer: MEDICARE

## 2024-08-23 ENCOUNTER — ANTI-COAG VISIT (OUTPATIENT)
Age: 64
End: 2024-08-23

## 2024-08-23 DIAGNOSIS — Z79.01 LONG TERM (CURRENT) USE OF ANTICOAGULANTS: Primary | ICD-10-CM

## 2024-08-23 LAB — INR BLD: 2.5

## 2024-08-23 PROCEDURE — G0299 HHS/HOSPICE OF RN EA 15 MIN: HCPCS

## 2024-08-23 NOTE — PROGRESS NOTES
ADVANCED HEART FAILURE CENTER  [facility] in [place], [place]      INR result reviewed with YUMI Quiles NP  who made the following recommendations (VORB): decrease coumadin today (8/23/24) to 1 mg and recheck INR on Thursday 8/29/24. Patient notified and verbalized understanding. He had no further questions. (See anticoag tracker)     SNEHA LAL RN  VAD Coordinator

## 2024-08-24 VITALS
WEIGHT: 165 LBS | TEMPERATURE: 98.2 F | OXYGEN SATURATION: 98 % | TEMPERATURE: 98.2 F | RESPIRATION RATE: 20 BRPM | OXYGEN SATURATION: 98 % | BODY MASS INDEX: 26 KG/M2 | BODY MASS INDEX: 25.84 KG/M2 | WEIGHT: 166 LBS | RESPIRATION RATE: 20 BRPM

## 2024-08-24 LAB
ALT SERPL-CCNC: 16 IU/L (ref 0–44)
AST SERPL-CCNC: 27 IU/L (ref 0–40)
BASOPHILS # BLD AUTO: 0.1 X10E3/UL (ref 0–0.2)
BASOPHILS NFR BLD AUTO: 1 %
BUN SERPL-MCNC: 31 MG/DL (ref 8–27)
CREAT SERPL-MCNC: 1.21 MG/DL (ref 0.76–1.27)
EGFRCR SERPLBLD CKD-EPI 2021: 67 ML/MIN/1.73
EOSINOPHIL # BLD AUTO: 0.7 X10E3/UL (ref 0–0.4)
EOSINOPHIL NFR BLD AUTO: 7 %
ERYTHROCYTE [DISTWIDTH] IN BLOOD BY AUTOMATED COUNT: 20.4 % (ref 11.6–15.4)
HCT VFR BLD AUTO: 32.1 % (ref 37.5–51)
HGB BLD-MCNC: 10.1 G/DL (ref 13–17.7)
IMM GRANULOCYTES NFR BLD AUTO: 0 %
LYMPHOCYTES # BLD AUTO: 1.1 X10E3/UL (ref 0.7–3.1)
LYMPHOCYTES NFR BLD AUTO: 10 %
MCH RBC QN AUTO: 27.6 PG (ref 26.6–33)
MCHC RBC AUTO-ENTMCNC: 31.5 G/DL (ref 31.5–35.7)
MCV RBC AUTO: 88 FL (ref 79–97)
MONOCYTES # BLD AUTO: 1.1 X10E3/UL (ref 0.1–0.9)
MONOCYTES NFR BLD AUTO: 11 %
NEUTROPHILS # BLD AUTO: 7.6 X10E3/UL (ref 1.4–7)
NEUTROPHILS NFR BLD AUTO: 71 %
PLATELET # BLD AUTO: 300 X10E3/UL (ref 150–450)
RBC # BLD AUTO: 3.66 X10E6/UL (ref 4.14–5.8)
WBC # BLD AUTO: 10.7 X10E3/UL (ref 3.4–10.8)

## 2024-08-26 ENCOUNTER — ANTI-COAG VISIT (OUTPATIENT)
Age: 64
End: 2024-08-26
Payer: MEDICARE

## 2024-08-26 ENCOUNTER — HOME CARE VISIT (OUTPATIENT)
Facility: HOME HEALTH | Age: 64
End: 2024-08-26
Payer: MEDICARE

## 2024-08-26 VITALS — OXYGEN SATURATION: 98 % | BODY MASS INDEX: 26 KG/M2 | RESPIRATION RATE: 24 BRPM | TEMPERATURE: 98.2 F | WEIGHT: 166 LBS

## 2024-08-26 DIAGNOSIS — Z79.01 LONG TERM (CURRENT) USE OF ANTICOAGULANTS: Primary | ICD-10-CM

## 2024-08-26 LAB
CRP SERPL-MCNC: 5 MG/L (ref 0–10)
INR BLD: 1.9

## 2024-08-26 PROCEDURE — 93793 ANTICOAG MGMT PT WARFARIN: CPT | Performed by: NURSE PRACTITIONER

## 2024-08-26 PROCEDURE — G0299 HHS/HOSPICE OF RN EA 15 MIN: HCPCS

## 2024-08-26 NOTE — PROGRESS NOTES
ADVANCED HEART FAILURE CENTER  [facility] in [place], [place]      INR result reviewed with ADILENE Maurer NP who made the following recommendations (VORB): no change to coumadin dosing and recheck INR in 1 week on 9/3/24. MADYSON James RN (BS ) is at patient's home and notified of coumadin dose and INR repeat. (See anticoag tracker)     SNEHA LAL RN  VAD Coordinator

## 2024-08-28 ENCOUNTER — HOME CARE VISIT (OUTPATIENT)
Dept: HOME HEALTH SERVICES | Facility: HOME HEALTH | Age: 64
End: 2024-08-28
Payer: MEDICARE

## 2024-08-28 ASSESSMENT — ENCOUNTER SYMPTOMS
DYSPNEA ACTIVITY LEVEL: AFTER AMBULATING LESS THAN 10 FT
HEMOPTYSIS: 0

## 2024-08-28 NOTE — HOME HEALTH
Subjective: \"I'm feeling good, I think my breathing is a little better\"  Falls since last visit: 0  Caregiver involvement changes: Ashley Swan - spouse  assists pt as needed when available  Home health supplies by type and quantity ordered/delivered this visit include: N/A           Clinician asked if patient has seen MD since last SNV: No  Clinician asked if patient has any new or changed medications and patient states: No  If Yes, were medications reconciled? Yes  Was the certifying physician notified of changes in medications? N/A               Clinical assessment (what this visit means for the patient overall and need for ongoing skilled care) and progress or lack of progress towards SPECIFIC goals: Pt alert, with VS within POC parameters-  map 72 ,O2 sat level 98% on room air at rest. Pt's weight today is  166 lbs. SN reminded patient to weigh daily - pt reports he needs assistance of spouse to help him with balance and LVAD equipment during weight checks. SN reminded pt to notify [facility] of weight gain of 2 lbs overnight or 5 lbs in a week. SN notes no swelling in lower extremities or increase in SOB.  SN performed INR check via fingerstick- result 1.9- reported to [facility]. Pt reports no further controller alarms since last clinic visit.  SN performed sterile LVAD driveline dressing change- noting grayish-green purulent drainage on old dressing. See Wound Addendum for site details.  SN removed Ag foam dressing from sternal wound and noted same drainage -see Wound Addendum for details. SN cleansed sternal wound site with chlorhexadine swab and saline and dressed with Optifoam Ag foam with border.        Written Teaching Material Utilized: [facility] admission booklet               Interdisciplinary communication with:  [facility] regarding pt's INR results, weight, map and  sternal and lower right abdominal  abscess sites (drainage and wound sites).             Discharge planning as follows: Pt may be discharged from [facility]

## 2024-08-30 ENCOUNTER — HOME CARE VISIT (OUTPATIENT)
Facility: HOME HEALTH | Age: 64
End: 2024-08-30
Payer: MEDICARE

## 2024-08-30 ENCOUNTER — ANTI-COAG VISIT (OUTPATIENT)
Age: 64
End: 2024-08-30

## 2024-08-30 DIAGNOSIS — Z79.01 LONG TERM (CURRENT) USE OF ANTICOAGULANTS: Primary | ICD-10-CM

## 2024-08-30 LAB — INR BLD: 2.3

## 2024-08-30 PROCEDURE — G0299 HHS/HOSPICE OF RN EA 15 MIN: HCPCS

## 2024-08-30 NOTE — PROGRESS NOTES
ADVANCED HEART FAILURE CENTER  [facility] in [place], [place]      INR result reviewed with YUMI Quiles NP  who made the following recommendations (VORB): decrease coumadin to 1 mg today (8/30/24) and recheck INR in 1 week on 9/5/23. Patient notified and verbalized understanding. He had no further questions. (See anticoag tracker)     SNEHA LAL RN  VAD Coordinator

## 2024-08-31 VITALS
TEMPERATURE: 98.5 F | OXYGEN SATURATION: 98 % | RESPIRATION RATE: 24 BRPM | WEIGHT: 170 LBS | BODY MASS INDEX: 26.63 KG/M2

## 2024-08-31 ASSESSMENT — ENCOUNTER SYMPTOMS
DYSPNEA ACTIVITY LEVEL: WHILE SPEAKING
HEMOPTYSIS: 0

## 2024-09-02 ENCOUNTER — HOME CARE VISIT (OUTPATIENT)
Facility: HOME HEALTH | Age: 64
End: 2024-09-02
Payer: MEDICARE

## 2024-09-02 VITALS — BODY MASS INDEX: 26.94 KG/M2 | OXYGEN SATURATION: 97 % | WEIGHT: 172 LBS | TEMPERATURE: 98.1 F

## 2024-09-02 ASSESSMENT — ENCOUNTER SYMPTOMS
HEMOPTYSIS: 0
DYSPNEA ACTIVITY LEVEL: WHILE SPEAKING

## 2024-09-03 ENCOUNTER — TELEPHONE (OUTPATIENT)
Age: 64
End: 2024-09-03

## 2024-09-03 ENCOUNTER — ANTI-COAG VISIT (OUTPATIENT)
Age: 64
End: 2024-09-03
Payer: MEDICARE

## 2024-09-03 DIAGNOSIS — Z79.01 CHRONIC ANTICOAGULATION: Primary | ICD-10-CM

## 2024-09-03 LAB — INR BLD: 2

## 2024-09-03 PROCEDURE — 93793 ANTICOAG MGMT PT WARFARIN: CPT | Performed by: NURSE PRACTITIONER

## 2024-09-03 RX ORDER — PRAVASTATIN SODIUM 40 MG
40 TABLET ORAL NIGHTLY
Qty: 90 TABLET | Refills: 0 | Status: SHIPPED | OUTPATIENT
Start: 2024-09-03

## 2024-09-03 NOTE — TELEPHONE ENCOUNTER
0916: message received from patient's home health nurse reporting patient's weight as 172lb (up 2lb from Friday). Confirmed patient did take prn bumex on Friday. (Previous weights 166lb on 08/26, 170lb 08/30)     Nahomi Quiles, TAIWO - NP  You38 minutes ago (1:08 PM)       Yes take PRN Bumex today and tomorrow.     You  Nahomi Quiles, TAIWO - NP52 minutes ago (12:54 PM)     MP  Do you want him to take another bumex or hold off? He didn't lose any weight with the one from Friday, no SOB or swelling on Friday       Discussed with YUMI Quiles who stated VORB patient should take PRNB diuretic today and tomorrow.     1334: called and notified patient via voicemail of provider instructions to take PRB bumex today and tomorrow.  Requested call back to confirm.  MADYSON James also notified.

## 2024-09-03 NOTE — HOME HEALTH
Subjective: \"I'm feeling fine, no complaints\"  Falls since last visit: 0  Caregiver involvement changes: Ashley Swan - spouse  assists pt as needed when available  Home health supplies by type and quantity ordered/delivered this visit include: N/A          Clinician asked if patient has seen MD since last SNV: No  Clinician asked if patient has any new or changed medications and patient states: No  If Yes, were medications reconciled? Yes  Was the certifying physician notified of changes in medications? N/A                Clinical assessment (what this visit means for the patient overall and need for ongoing skilled care) and progress or lack of progress towards SPECIFIC goals: Pt alert, with VS within POC parameters-  map 90 ,O2 sat level 97% on room air at rest. Pt's weight today is 172  lbs.  SN discussed importance of maintaining low sodium diet to control fluid retention and CHF symptoms. SN notes no swelling in lower extremities or increase in SOB.  SN performed INR check via fingerstick- result 2.0- reported to [facility]. SN demonstrated to CG/spouse how to perform LVAD driveline dressing change using acute kit.  SN noted grayish-green purulent drainage on old dressing. See Wound Addendum for site details.  SN removed Ag foam dressing from sternal wound and noted same drainage -see Wound Addendum for details. SN cleansed sternal wound site with chlorhexadine swab and saline and dressed with Optifoam Ag foam with border.       Written Teaching Material Utilized: [facility] admission booklet               Interdisciplinary communication with:  [facility] regarding pt's INR results, weight, map and  sternal and lower right abdominal  abscess sites (drainage and wound sites).             Discharge planning as follows: Pt may be discharged from [facility] when ordered by MD or pt request.

## 2024-09-03 NOTE — TELEPHONE ENCOUNTER
Requested Prescriptions     Signed Prescriptions Disp Refills    pravastatin (PRAVACHOL) 40 MG tablet 90 tablet 0     Sig: TAKE ONE TABLET BY MOUTH EVERY EVENING     Authorizing Provider: TAWANA HIGHTOWER     Ordering User: FILI PELLETIER

## 2024-09-03 NOTE — PROGRESS NOTES
ADVANCED HEART FAILURE CENTER  [facility] in [place], [place]      INR result reviewed with YUMI Quiles NP  who made the following recommendations (VORB): no changes and recheck 09/04.Patient notified of provider instructions via voicemail, requested call back to confirm. Patient's home health nurse MADYSON James notified. (See anticoag tracker)     Funmi Barth RN

## 2024-09-04 ENCOUNTER — ANTI-COAG VISIT (OUTPATIENT)
Age: 64
End: 2024-09-04
Payer: MEDICARE

## 2024-09-04 ENCOUNTER — HOME CARE VISIT (OUTPATIENT)
Facility: HOME HEALTH | Age: 64
End: 2024-09-04
Payer: MEDICARE

## 2024-09-04 VITALS — BODY MASS INDEX: 26.47 KG/M2 | WEIGHT: 169 LBS | RESPIRATION RATE: 24 BRPM | TEMPERATURE: 98 F | OXYGEN SATURATION: 97 %

## 2024-09-04 DIAGNOSIS — Z95.811 LVAD (LEFT VENTRICULAR ASSIST DEVICE) PRESENT (HCC): ICD-10-CM

## 2024-09-04 DIAGNOSIS — I50.22 CHRONIC SYSTOLIC HEART FAILURE (HCC): ICD-10-CM

## 2024-09-04 DIAGNOSIS — Z79.01 CHRONIC ANTICOAGULATION: Primary | ICD-10-CM

## 2024-09-04 LAB — INR BLD: 2.1

## 2024-09-04 PROCEDURE — 93793 ANTICOAG MGMT PT WARFARIN: CPT | Performed by: NURSE PRACTITIONER

## 2024-09-04 ASSESSMENT — ENCOUNTER SYMPTOMS
DYSPNEA ACTIVITY LEVEL: AFTER AMBULATING LESS THAN 10 FT
HEMOPTYSIS: 0

## 2024-09-04 NOTE — PROGRESS NOTES
ADVANCED HEART FAILURE CENTER  [facility] in [place], [place]      INR result reviewed with YUMI Quiles NP  who made the following recommendations (VORB): Resume current coumadin maintenance plan and recheck INR in 1 week. MADYSON James RN with [facility] health notified, verbalized understanding, and stated will inform patient. They had no further questions. (See anticoag tracker)     CASEY MONTEMAYOR RN

## 2024-09-04 NOTE — TELEPHONE ENCOUNTER
1456: Spoke with MADYSON James RN with [facility] during visit with patient. She reported patient weight down to 169lbs today. He denied any symptoms of swelling or increased shortness of breath. Per MADYSON James RN patient baseline weight around 165lbs. Reviewed with YUMI Quiles NP who recommend patient take one more dose of bumex 1mg tomorrow, then resume PRN dosing. MADYSON James RN with [facility] notified, verbalized understanding, and stated will inform patient. Educated to notify VAD coordinator of any changes. They verbalized understanding and had no further questions.     CASEY MONTEMAYOR RN  VAD Coordinator

## 2024-09-05 NOTE — HOME HEALTH
Subjective: \"I took a Bumex tablet yesterday and today and have been peeing all day.\"  Falls since last visit: 0  Caregiver involvement changes: Ashley Swan - spouse  assists pt as needed when available  Home health supplies by type and quantity ordered/delivered this visit include: N/A           Clinician asked if patient has seen MD since last SNV: No  Clinician asked if patient has any new or changed medications and patient states: No  If Yes, were medications reconciled? Yes  Was the certifying physician notified of changes in medications? N/A                 Clinical assessment (what this visit means for the patient overall and need for ongoing skilled care) and progress or lack of progress towards SPECIFIC goals: Pt alert, with VS within POC parameters-  map 80 ,O2 sat level 98% on room air at rest. Pt's weight today is 169  lbs.  SN discussed importance of maintaining low sodium diet to control fluid retention and CHF symptoms. SN notes no swelling in lower extremities or increase in SOB.  SN performed INR check via fingerstick- result 2.1- reported to [facility]. SN performed LVAD driveline dressing change using acute kit.  SN noted grayish-green purulent drainage on old dressing. See Wound Addendum for site details.  SN removed Ag foam dressing from sternal wound and noted same drainage -see Wound Addendum for details. SN cleansed sternal wound site with chlorhexadine swab and saline and dressed with Optifoam Ag foam with border.        Written Teaching Material Utilized: [facility] admission booklet                Interdisciplinary communication with:  [facility] regarding pt's INR results, weight, map and  sternal and lower right abdominal  abscess sites (drainage and wound sites).              Discharge planning as follows: Pt may be discharged from [facility] when ordered by MD or pt request.

## 2024-09-06 ENCOUNTER — TELEPHONE (OUTPATIENT)
Age: 64
End: 2024-09-06

## 2024-09-06 ENCOUNTER — HOME CARE VISIT (OUTPATIENT)
Facility: HOME HEALTH | Age: 64
End: 2024-09-06
Payer: MEDICARE

## 2024-09-06 PROCEDURE — G0299 HHS/HOSPICE OF RN EA 15 MIN: HCPCS

## 2024-09-09 ENCOUNTER — HOME CARE VISIT (OUTPATIENT)
Facility: HOME HEALTH | Age: 64
End: 2024-09-09
Payer: MEDICARE

## 2024-09-09 VITALS
WEIGHT: 169 LBS | RESPIRATION RATE: 24 BRPM | WEIGHT: 168 LBS | BODY MASS INDEX: 26.47 KG/M2 | OXYGEN SATURATION: 96 % | BODY MASS INDEX: 26.31 KG/M2 | TEMPERATURE: 98.7 F | OXYGEN SATURATION: 98 % | TEMPERATURE: 98.4 F

## 2024-09-09 PROCEDURE — G0299 HHS/HOSPICE OF RN EA 15 MIN: HCPCS

## 2024-09-09 ASSESSMENT — ENCOUNTER SYMPTOMS
DYSPNEA ACTIVITY LEVEL: WHILE SPEAKING
HEMOPTYSIS: 0
HEMOPTYSIS: 0
DYSPNEA ACTIVITY LEVEL: AT REST

## 2024-09-10 ENCOUNTER — ANTI-COAG VISIT (OUTPATIENT)
Age: 64
End: 2024-09-10
Payer: MEDICARE

## 2024-09-10 DIAGNOSIS — Z79.01 CHRONIC ANTICOAGULATION: Primary | ICD-10-CM

## 2024-09-10 LAB
ALBUMIN SERPL-MCNC: 4.5 G/DL (ref 3.9–4.9)
ALP SERPL-CCNC: 121 IU/L (ref 44–121)
ALT SERPL-CCNC: 26 IU/L (ref 0–44)
AST SERPL-CCNC: 36 IU/L (ref 0–40)
BASOPHILS # BLD AUTO: 0.2 X10E3/UL (ref 0–0.2)
BASOPHILS NFR BLD AUTO: 1 %
BILIRUB SERPL-MCNC: 0.2 MG/DL (ref 0–1.2)
BUN SERPL-MCNC: 26 MG/DL (ref 8–27)
BUN/CREAT SERPL: 21 (ref 10–24)
CALCIUM SERPL-MCNC: 9.2 MG/DL (ref 8.6–10.2)
CHLORIDE SERPL-SCNC: 101 MMOL/L (ref 96–106)
CO2 SERPL-SCNC: 24 MMOL/L (ref 20–29)
CREAT SERPL-MCNC: 1.21 MG/DL (ref 0.76–1.27)
EGFRCR SERPLBLD CKD-EPI 2021: 67 ML/MIN/1.73
EOSINOPHIL # BLD AUTO: 0.6 X10E3/UL (ref 0–0.4)
EOSINOPHIL NFR BLD AUTO: 5 %
ERYTHROCYTE [DISTWIDTH] IN BLOOD BY AUTOMATED COUNT: 18.2 % (ref 11.6–15.4)
GLOBULIN SER CALC-MCNC: 2.9 G/DL (ref 1.5–4.5)
GLUCOSE SERPL-MCNC: 79 MG/DL (ref 70–99)
HCT VFR BLD AUTO: 37.2 % (ref 37.5–51)
HGB BLD-MCNC: 11.5 G/DL (ref 13–17.7)
IMM GRANULOCYTES # BLD AUTO: 0.1 X10E3/UL (ref 0–0.1)
IMM GRANULOCYTES NFR BLD AUTO: 1 %
INR PPP: 2.5 (ref 0.9–1.2)
LDH SERPL L TO P-CCNC: 288 IU/L (ref 121–224)
LYMPHOCYTES # BLD AUTO: 1.3 X10E3/UL (ref 0.7–3.1)
LYMPHOCYTES NFR BLD AUTO: 10 %
MAGNESIUM SERPL-MCNC: 1.8 MG/DL (ref 1.6–2.3)
MCH RBC QN AUTO: 28.1 PG (ref 26.6–33)
MCHC RBC AUTO-ENTMCNC: 30.9 G/DL (ref 31.5–35.7)
MCV RBC AUTO: 91 FL (ref 79–97)
MONOCYTES # BLD AUTO: 1.3 X10E3/UL (ref 0.1–0.9)
MONOCYTES NFR BLD AUTO: 10 %
NEUTROPHILS # BLD AUTO: 8.8 X10E3/UL (ref 1.4–7)
NEUTROPHILS NFR BLD AUTO: 73 %
NT-PROBNP SERPL-MCNC: 882 PG/ML (ref 0–210)
PLATELET # BLD AUTO: 322 X10E3/UL (ref 150–450)
POTASSIUM SERPL-SCNC: 4.1 MMOL/L (ref 3.5–5.2)
PROT SERPL-MCNC: 7.4 G/DL (ref 6–8.5)
PROTHROMBIN TIME: 26.1 SEC (ref 9.1–12)
RBC # BLD AUTO: 4.09 X10E6/UL (ref 4.14–5.8)
SODIUM SERPL-SCNC: 141 MMOL/L (ref 134–144)
WBC # BLD AUTO: 12.2 X10E3/UL (ref 3.4–10.8)

## 2024-09-10 PROCEDURE — 93793 ANTICOAG MGMT PT WARFARIN: CPT | Performed by: NURSE PRACTITIONER

## 2024-09-11 ENCOUNTER — HOME CARE VISIT (OUTPATIENT)
Facility: HOME HEALTH | Age: 64
End: 2024-09-11
Payer: MEDICARE

## 2024-09-11 PROCEDURE — G0299 HHS/HOSPICE OF RN EA 15 MIN: HCPCS

## 2024-09-12 ENCOUNTER — CLINICAL DOCUMENTATION (OUTPATIENT)
Age: 64
End: 2024-09-12

## 2024-09-12 ENCOUNTER — HOSPITAL ENCOUNTER (OUTPATIENT)
Facility: HOSPITAL | Age: 64
Discharge: HOME | End: 2024-09-14
Payer: MEDICARE

## 2024-09-12 ENCOUNTER — ANTI-COAG VISIT (OUTPATIENT)
Age: 64
End: 2024-09-12

## 2024-09-12 ENCOUNTER — OFFICE VISIT (OUTPATIENT)
Age: 64
End: 2024-09-12

## 2024-09-12 VITALS
OXYGEN SATURATION: 97 % | TEMPERATURE: 97.8 F | SYSTOLIC BLOOD PRESSURE: 78 MMHG | HEIGHT: 67 IN | WEIGHT: 175 LBS | HEART RATE: 92 BPM | RESPIRATION RATE: 20 BRPM | BODY MASS INDEX: 27.47 KG/M2

## 2024-09-12 VITALS — WEIGHT: 175.04 LBS | BODY MASS INDEX: 27.47 KG/M2 | HEIGHT: 67 IN

## 2024-09-12 DIAGNOSIS — T82.7XXA INFECTION ASSOCIATED WITH DRIVELINE OF LEFT VENTRICULAR ASSIST DEVICE (LVAD) (HCC): ICD-10-CM

## 2024-09-12 DIAGNOSIS — Z79.01 CHRONIC ANTICOAGULATION: ICD-10-CM

## 2024-09-12 DIAGNOSIS — I50.22 CHRONIC SYSTOLIC HF (HEART FAILURE) (HCC): ICD-10-CM

## 2024-09-12 DIAGNOSIS — Z95.811 LVAD (LEFT VENTRICULAR ASSIST DEVICE) PRESENT (HCC): Primary | ICD-10-CM

## 2024-09-12 DIAGNOSIS — I50.22 CHRONIC SYSTOLIC HEART FAILURE (HCC): ICD-10-CM

## 2024-09-12 LAB
DISTANCE WALKED: NORMAL
ECHO BSA: 1.94 M2
ECHO EST RA PRESSURE: 3 MMHG
POC INR: 2.7
PROTHROMBIN TIME, POC: NORMAL
SPO2: NORMAL

## 2024-09-12 PROCEDURE — 93308 TTE F-UP OR LMTD: CPT

## 2024-09-12 ASSESSMENT — ENCOUNTER SYMPTOMS
SHORTNESS OF BREATH: 1
BLOOD IN STOOL: 0
COUGH: 0
ABDOMINAL PAIN: 0
CHEST TIGHTNESS: 0
SORE THROAT: 0
EYE PAIN: 0
ABDOMINAL DISTENTION: 0

## 2024-09-12 ASSESSMENT — PATIENT HEALTH QUESTIONNAIRE - PHQ9
2. FEELING DOWN, DEPRESSED OR HOPELESS: NOT AT ALL
SUM OF ALL RESPONSES TO PHQ QUESTIONS 1-9: 0
1. LITTLE INTEREST OR PLEASURE IN DOING THINGS: NOT AT ALL
SUM OF ALL RESPONSES TO PHQ QUESTIONS 1-9: 0
SUM OF ALL RESPONSES TO PHQ9 QUESTIONS 1 & 2: 0

## 2024-09-13 ENCOUNTER — HOME CARE VISIT (OUTPATIENT)
Facility: HOME HEALTH | Age: 64
End: 2024-09-13
Payer: MEDICARE

## 2024-09-13 VITALS
BODY MASS INDEX: 26.63 KG/M2 | TEMPERATURE: 98.2 F | RESPIRATION RATE: 24 BRPM | WEIGHT: 170 LBS | OXYGEN SATURATION: 97 %

## 2024-09-13 PROCEDURE — G0299 HHS/HOSPICE OF RN EA 15 MIN: HCPCS

## 2024-09-13 ASSESSMENT — ENCOUNTER SYMPTOMS
HEMOPTYSIS: 0
DYSPNEA ACTIVITY LEVEL: AT REST

## 2024-09-15 VITALS
BODY MASS INDEX: 27.03 KG/M2 | TEMPERATURE: 98 F | OXYGEN SATURATION: 100 % | WEIGHT: 172.6 LBS | RESPIRATION RATE: 24 BRPM

## 2024-09-16 ENCOUNTER — HOME CARE VISIT (OUTPATIENT)
Facility: HOME HEALTH | Age: 64
End: 2024-09-16
Payer: MEDICARE

## 2024-09-16 ENCOUNTER — ANTI-COAG VISIT (OUTPATIENT)
Age: 64
End: 2024-09-16
Payer: MEDICARE

## 2024-09-16 DIAGNOSIS — Z79.01 CHRONIC ANTICOAGULATION: Primary | ICD-10-CM

## 2024-09-16 LAB — INR BLD: 2.2

## 2024-09-16 PROCEDURE — G0299 HHS/HOSPICE OF RN EA 15 MIN: HCPCS

## 2024-09-16 PROCEDURE — 93793 ANTICOAG MGMT PT WARFARIN: CPT | Performed by: NURSE PRACTITIONER

## 2024-09-17 VITALS
RESPIRATION RATE: 24 BRPM | OXYGEN SATURATION: 98 % | WEIGHT: 174 LBS | TEMPERATURE: 97.9 F | BODY MASS INDEX: 27.25 KG/M2

## 2024-09-17 ASSESSMENT — ENCOUNTER SYMPTOMS
HEMOPTYSIS: 0
DYSPNEA ACTIVITY LEVEL: WHILE SPEAKING

## 2024-09-18 ENCOUNTER — HOME CARE VISIT (OUTPATIENT)
Facility: HOME HEALTH | Age: 64
End: 2024-09-18
Payer: MEDICARE

## 2024-09-18 PROCEDURE — G0299 HHS/HOSPICE OF RN EA 15 MIN: HCPCS

## 2024-09-19 VITALS
BODY MASS INDEX: 26.9 KG/M2 | WEIGHT: 171.8 LBS | RESPIRATION RATE: 24 BRPM | OXYGEN SATURATION: 96 % | TEMPERATURE: 98 F

## 2024-09-19 ASSESSMENT — ENCOUNTER SYMPTOMS
HEMOPTYSIS: 0
DYSPNEA ACTIVITY LEVEL: AT REST

## 2024-09-20 ENCOUNTER — HOME CARE VISIT (OUTPATIENT)
Facility: HOME HEALTH | Age: 64
End: 2024-09-20
Payer: MEDICARE

## 2024-09-20 PROCEDURE — G0299 HHS/HOSPICE OF RN EA 15 MIN: HCPCS

## 2024-09-21 LAB
BASOPHILS # BLD AUTO: 0.2 X10E3/UL (ref 0–0.2)
BASOPHILS NFR BLD AUTO: 1 %
EOSINOPHIL # BLD AUTO: 0.5 X10E3/UL (ref 0–0.4)
EOSINOPHIL NFR BLD AUTO: 4 %
ERYTHROCYTE [DISTWIDTH] IN BLOOD BY AUTOMATED COUNT: 17.9 % (ref 11.6–15.4)
HCT VFR BLD AUTO: 37.6 % (ref 37.5–51)
HGB BLD-MCNC: 11.7 G/DL (ref 13–17.7)
IMM GRANULOCYTES # BLD AUTO: 0.1 X10E3/UL (ref 0–0.1)
IMM GRANULOCYTES NFR BLD AUTO: 1 %
LYMPHOCYTES # BLD AUTO: 1.4 X10E3/UL (ref 0.7–3.1)
LYMPHOCYTES NFR BLD AUTO: 11 %
MCH RBC QN AUTO: 28.7 PG (ref 26.6–33)
MCHC RBC AUTO-ENTMCNC: 31.1 G/DL (ref 31.5–35.7)
MCV RBC AUTO: 92 FL (ref 79–97)
MONOCYTES # BLD AUTO: 1.5 X10E3/UL (ref 0.1–0.9)
MONOCYTES NFR BLD AUTO: 12 %
NEUTROPHILS # BLD AUTO: 9.1 X10E3/UL (ref 1.4–7)
NEUTROPHILS NFR BLD AUTO: 71 %
PLATELET # BLD AUTO: 372 X10E3/UL (ref 150–450)
RBC # BLD AUTO: 4.07 X10E6/UL (ref 4.14–5.8)
WBC # BLD AUTO: 12.7 X10E3/UL (ref 3.4–10.8)

## 2024-09-22 VITALS
WEIGHT: 171.8 LBS | TEMPERATURE: 98 F | OXYGEN SATURATION: 95 % | RESPIRATION RATE: 24 BRPM | BODY MASS INDEX: 26.9 KG/M2

## 2024-09-23 ENCOUNTER — ANTI-COAG VISIT (OUTPATIENT)
Age: 64
End: 2024-09-23

## 2024-09-23 ENCOUNTER — HOME CARE VISIT (OUTPATIENT)
Facility: HOME HEALTH | Age: 64
End: 2024-09-23
Payer: MEDICARE

## 2024-09-23 DIAGNOSIS — Z79.01 CHRONIC ANTICOAGULATION: Primary | ICD-10-CM

## 2024-09-23 LAB — INR BLD: 1.6

## 2024-09-23 PROCEDURE — G0299 HHS/HOSPICE OF RN EA 15 MIN: HCPCS

## 2024-09-24 ENCOUNTER — TELEPHONE (OUTPATIENT)
Age: 64
End: 2024-09-24

## 2024-09-24 VITALS — TEMPERATURE: 98.1 F | OXYGEN SATURATION: 97 % | WEIGHT: 170.2 LBS | BODY MASS INDEX: 26.65 KG/M2

## 2024-09-24 ASSESSMENT — ENCOUNTER SYMPTOMS
HEMOPTYSIS: 0
DYSPNEA ACTIVITY LEVEL: AFTER AMBULATING LESS THAN 10 FT

## 2024-09-25 ENCOUNTER — ANTI-COAG VISIT (OUTPATIENT)
Age: 64
End: 2024-09-25

## 2024-09-25 ENCOUNTER — HOME CARE VISIT (OUTPATIENT)
Facility: HOME HEALTH | Age: 64
End: 2024-09-25
Payer: MEDICARE

## 2024-09-25 DIAGNOSIS — Z79.01 CHRONIC ANTICOAGULATION: Primary | ICD-10-CM

## 2024-09-25 LAB — INR BLD: 1.9

## 2024-09-25 PROCEDURE — G0299 HHS/HOSPICE OF RN EA 15 MIN: HCPCS

## 2024-09-27 ENCOUNTER — HOME CARE VISIT (OUTPATIENT)
Facility: HOME HEALTH | Age: 64
End: 2024-09-27
Payer: MEDICARE

## 2024-09-27 ENCOUNTER — ANTI-COAG VISIT (OUTPATIENT)
Age: 64
End: 2024-09-27

## 2024-09-27 DIAGNOSIS — Z79.01 CHRONIC ANTICOAGULATION: Primary | ICD-10-CM

## 2024-09-27 LAB — INR BLD: 2

## 2024-09-27 PROCEDURE — G0299 HHS/HOSPICE OF RN EA 15 MIN: HCPCS

## 2024-09-29 VITALS
BODY MASS INDEX: 26.96 KG/M2 | WEIGHT: 170.2 LBS | OXYGEN SATURATION: 98 % | OXYGEN SATURATION: 97 % | RESPIRATION RATE: 24 BRPM | RESPIRATION RATE: 24 BRPM | TEMPERATURE: 98.6 F | BODY MASS INDEX: 26.65 KG/M2 | TEMPERATURE: 98.1 F | WEIGHT: 172.2 LBS

## 2024-09-30 ENCOUNTER — HOME CARE VISIT (OUTPATIENT)
Facility: HOME HEALTH | Age: 64
End: 2024-09-30
Payer: MEDICARE

## 2024-09-30 ENCOUNTER — ANTI-COAG VISIT (OUTPATIENT)
Age: 64
End: 2024-09-30

## 2024-09-30 DIAGNOSIS — Z79.01 CHRONIC ANTICOAGULATION: Primary | ICD-10-CM

## 2024-09-30 LAB — INR BLD: 3.2

## 2024-09-30 PROCEDURE — G0299 HHS/HOSPICE OF RN EA 15 MIN: HCPCS

## 2024-09-30 NOTE — PROGRESS NOTES
ADVANCED HEART FAILURE CENTER  [facility] in [place], [place]      INR result reviewed with ADILENE Maurer NP who made the following recommendations (VORB): hold tonight and recheck 10/02. Patient notified and verbalized understanding. They had no further questions. Patient's home health nurse MADYSON James also notified.  (See anticoag tracker)     Funmi Barth RN

## 2024-10-01 ENCOUNTER — TELEPHONE (OUTPATIENT)
Age: 64
End: 2024-10-01

## 2024-10-01 VITALS
BODY MASS INDEX: 27.25 KG/M2 | OXYGEN SATURATION: 97 % | RESPIRATION RATE: 24 BRPM | TEMPERATURE: 98.7 F | WEIGHT: 174 LBS

## 2024-10-01 RX ORDER — HYDRALAZINE HYDROCHLORIDE 25 MG/1
75 TABLET, FILM COATED ORAL EVERY 8 HOURS SCHEDULED
Qty: 270 TABLET | Refills: 2 | Status: SHIPPED | OUTPATIENT
Start: 2024-10-01

## 2024-10-01 ASSESSMENT — ENCOUNTER SYMPTOMS
DYSPNEA ACTIVITY LEVEL: AFTER AMBULATING LESS THAN 10 FT
HEMOPTYSIS: 0

## 2024-10-01 NOTE — TELEPHONE ENCOUNTER
Contacted patient regarding LVAD pager system. Notified patient that effective October 2, 2024 the LVAD emergency pager ([phone]) will no longer be in service. Instructed patient that patients and caregivers should contact the answering service at [phone] to reach the LVAD provider for all after hours needs or patient emergencies. They verbalized understanding.

## 2024-10-01 NOTE — TELEPHONE ENCOUNTER
Requested Prescriptions     Signed Prescriptions Disp Refills    hydrALAZINE (APRESOLINE) 25 MG tablet 270 tablet 2     Sig: Take 3 tablets by mouth every 8 hours     Authorizing Provider: OZIEL NAVARRO     Ordering User: FILI PELLETIER

## 2024-10-02 ENCOUNTER — HOME CARE VISIT (OUTPATIENT)
Facility: HOME HEALTH | Age: 64
End: 2024-10-02
Payer: MEDICARE

## 2024-10-02 ENCOUNTER — ANTI-COAG VISIT (OUTPATIENT)
Age: 64
End: 2024-10-02
Payer: MEDICARE

## 2024-10-02 DIAGNOSIS — Z79.01 CHRONIC ANTICOAGULATION: Primary | ICD-10-CM

## 2024-10-02 LAB — INR BLD: 2

## 2024-10-02 PROCEDURE — 93793 ANTICOAG MGMT PT WARFARIN: CPT | Performed by: NURSE PRACTITIONER

## 2024-10-02 NOTE — HOME HEALTH
Subjective:\"I'm feeling fine- no problems\"   Falls since last visit: 0  Caregiver involvement changes: Ashley Swan - spouse  assists pt as needed when available  Home health supplies by type and quantity ordered/delivered this visit include: N/A                   Clinician asked if patient has seen MD since last SNV: No  Clinician asked if patient has any new or changed medications and patient states: No  If Yes, were medications reconciled? Yes  Was the certifying physician notified of changes in medications? N/A                       Clinical assessment (what this visit means for the patient overall and need for ongoing skilled care) and progress or lack of progress towards SPECIFIC goals: Pt alert, with VS within POC parameters-  map 90, O2 sat level 97% on room air at rest. Pt's weight today is 175 lbs (up two lbs since 9/27/24).  Pt denies pain at driveline site. SN notes no swelling in lower extremities or increase in pt's baseline level of SOB.  SN performed LVAD driveline dressing change using acute kit. SN noted grayish-green purulent drainage on old dressing. See Wound Addendum for site details. SN removed Ag foam dressing from sternal wound and noted same drainage -see Wound Addendum for details. SN cleansed sternal wound site with chlorhexadine swab and saline and dressed with Optifoam Ag foam with border. SN performed INR testing via fingerstick- result 3.2 - reported to [facility].      Written Teaching Material Utilized: [facility] admission booklet                      Interdisciplinary communication with:  [facility] regarding pt's INR results, weight, map and  sternal and lower right abdominal  abscess sites (drainage and wound sites).                      Discharge planning as follows: Pt may be discharged from [facility] when ordered by MD or pt request.

## 2024-10-02 NOTE — PROGRESS NOTES
ADVANCED HEART FAILURE CENTER  [facility] in [place], [place]      INR result reviewed with ADILENE Maurer NP who made the following recommendations (VORB): no changes and recheck 1 week. Patient notified and verbalized understanding. They had no further questions. Patient's home health nurse MADYSON James also notified. (See anticoag tracker)     Funmi Barth RN

## 2024-10-04 ENCOUNTER — HOME CARE VISIT (OUTPATIENT)
Facility: HOME HEALTH | Age: 64
End: 2024-10-04
Payer: MEDICARE

## 2024-10-04 PROCEDURE — G0299 HHS/HOSPICE OF RN EA 15 MIN: HCPCS

## 2024-10-06 ENCOUNTER — HOME CARE VISIT (OUTPATIENT)
Dept: HOME HEALTH SERVICES | Facility: HOME HEALTH | Age: 64
End: 2024-10-06
Payer: MEDICARE

## 2024-10-06 VITALS
RESPIRATION RATE: 24 BRPM | BODY MASS INDEX: 27 KG/M2 | OXYGEN SATURATION: 98 % | WEIGHT: 172.4 LBS | TEMPERATURE: 98.6 F

## 2024-10-07 ENCOUNTER — ANTI-COAG VISIT (OUTPATIENT)
Age: 64
End: 2024-10-07
Payer: MEDICARE

## 2024-10-07 DIAGNOSIS — Z79.01 CHRONIC ANTICOAGULATION: Primary | ICD-10-CM

## 2024-10-07 LAB — INR BLD: 2.4

## 2024-10-07 PROCEDURE — 93793 ANTICOAG MGMT PT WARFARIN: CPT | Performed by: NURSE PRACTITIONER

## 2024-10-07 NOTE — PROGRESS NOTES
ADVANCED HEART FAILURE CENTER  [facility] in [place], [place]      INR result reviewed with RUBEN Garibay NP who made the following recommendations (VORB): 1mg tonight and 2mg Wednesday and recheck 10/10. Patient notified of provider instructions via voicemail, requested call back to confirm. Patient's home health nurse MADYSON James also notified.      Patient left voicemail requesting call back, returned call to patient, he confirmed he received voicemail.     Funmi Barth RN

## 2024-10-09 ENCOUNTER — ANTI-COAG VISIT (OUTPATIENT)
Age: 64
End: 2024-10-09

## 2024-10-09 DIAGNOSIS — Z79.01 CHRONIC ANTICOAGULATION: Primary | ICD-10-CM

## 2024-10-09 LAB — INR BLD: 1.8

## 2024-10-09 NOTE — PROGRESS NOTES
ADVANCED HEART FAILURE CENTER  [facility] in [place], [place]      INR result reviewed with RUBEN Garibay NP who made the following recommendations (VORB): 2mg tonight and sunday and recheck 1 week. Patient notified and verbalized understanding. Patient's home health nurse MADYSON James also notified  They had no further questions. (See anticoag tracker)     Funmi Barth RN

## 2024-10-14 ENCOUNTER — TELEPHONE (OUTPATIENT)
Age: 64
End: 2024-10-14

## 2024-10-14 ENCOUNTER — ANTI-COAG VISIT (OUTPATIENT)
Age: 64
End: 2024-10-14
Payer: MEDICARE

## 2024-10-14 DIAGNOSIS — Z79.01 CHRONIC ANTICOAGULATION: Primary | ICD-10-CM

## 2024-10-14 LAB — INR BLD: 2

## 2024-10-14 PROCEDURE — 93793 ANTICOAG MGMT PT WARFARIN: CPT | Performed by: NURSE PRACTITIONER

## 2024-10-14 NOTE — PROGRESS NOTES
ADVANCED HEART FAILURE CENTER  [facility] in [place], [place]      INR result reviewed with RUBEN Garibay NP who made the following recommendations (VORB): no changes and recheck 10/17. Patient notified and verbalized understanding. They had no further questions. Patient's home health nurse MADYSON James notified (See anticoag tracker)     Funmi Barth RN

## 2024-10-14 NOTE — TELEPHONE ENCOUNTER
1256: INR received from patient reporting visit date of 10/12. Patient stated that this was wednesday's inr result. Patients stated hh nurse will but there for visit at 2pm.

## 2024-10-15 ENCOUNTER — TELEPHONE (OUTPATIENT)
Age: 64
End: 2024-10-15

## 2024-10-15 LAB
ALBUMIN SERPL-MCNC: 4.2 G/DL (ref 3.9–4.9)
ALP SERPL-CCNC: 110 IU/L (ref 44–121)
ALT SERPL-CCNC: 16 IU/L (ref 0–44)
AST SERPL-CCNC: 26 IU/L (ref 0–40)
BASOPHILS # BLD AUTO: 0.2 X10E3/UL (ref 0–0.2)
BASOPHILS NFR BLD AUTO: 2 %
BILIRUB SERPL-MCNC: 0.3 MG/DL (ref 0–1.2)
BUN SERPL-MCNC: 18 MG/DL (ref 8–27)
BUN/CREAT SERPL: 11 (ref 10–24)
CALCIUM SERPL-MCNC: 9.2 MG/DL (ref 8.6–10.2)
CHLORIDE SERPL-SCNC: 103 MMOL/L (ref 96–106)
CO2 SERPL-SCNC: 23 MMOL/L (ref 20–29)
CREAT SERPL-MCNC: 1.57 MG/DL (ref 0.76–1.27)
EGFRCR SERPLBLD CKD-EPI 2021: 49 ML/MIN/1.73
EOSINOPHIL # BLD AUTO: 0.7 X10E3/UL (ref 0–0.4)
EOSINOPHIL NFR BLD AUTO: 6 %
ERYTHROCYTE [DISTWIDTH] IN BLOOD BY AUTOMATED COUNT: 15.1 % (ref 11.6–15.4)
GLOBULIN SER CALC-MCNC: 2.8 G/DL (ref 1.5–4.5)
GLUCOSE SERPL-MCNC: 83 MG/DL (ref 70–99)
HCT VFR BLD AUTO: 38.9 % (ref 37.5–51)
HGB BLD-MCNC: 12.3 G/DL (ref 13–17.7)
IMM GRANULOCYTES # BLD AUTO: 0.1 X10E3/UL (ref 0–0.1)
IMM GRANULOCYTES NFR BLD AUTO: 1 %
INR PPP: 1.7 (ref 0.9–1.2)
LDH SERPL L TO P-CCNC: 238 IU/L (ref 121–224)
LYMPHOCYTES # BLD AUTO: 1.4 X10E3/UL (ref 0.7–3.1)
LYMPHOCYTES NFR BLD AUTO: 13 %
MAGNESIUM SERPL-MCNC: 2.2 MG/DL (ref 1.6–2.3)
MCH RBC QN AUTO: 29.6 PG (ref 26.6–33)
MCHC RBC AUTO-ENTMCNC: 31.6 G/DL (ref 31.5–35.7)
MCV RBC AUTO: 94 FL (ref 79–97)
MONOCYTES # BLD AUTO: 1.3 X10E3/UL (ref 0.1–0.9)
MONOCYTES NFR BLD AUTO: 12 %
NEUTROPHILS # BLD AUTO: 7.4 X10E3/UL (ref 1.4–7)
NEUTROPHILS NFR BLD AUTO: 66 %
NT-PROBNP SERPL-MCNC: 636 PG/ML (ref 0–210)
PLATELET # BLD AUTO: 341 X10E3/UL (ref 150–450)
POTASSIUM SERPL-SCNC: 4.9 MMOL/L (ref 3.5–5.2)
PROT SERPL-MCNC: 7 G/DL (ref 6–8.5)
PROTHROMBIN TIME: 18.2 SEC (ref 9.1–12)
RBC # BLD AUTO: 4.15 X10E6/UL (ref 4.14–5.8)
SODIUM SERPL-SCNC: 142 MMOL/L (ref 134–144)
WBC # BLD AUTO: 10.9 X10E3/UL (ref 3.4–10.8)

## 2024-10-18 ENCOUNTER — ANTI-COAG VISIT (OUTPATIENT)
Age: 64
End: 2024-10-18

## 2024-10-18 DIAGNOSIS — Z79.01 CHRONIC ANTICOAGULATION: Primary | ICD-10-CM

## 2024-10-18 LAB — INR BLD: 2.2

## 2024-10-18 NOTE — PROGRESS NOTES
ADVANCED HEART FAILURE CENTER  [facility] in [place], [place]      INR result reviewed with YUMI Quiles NP  who made the following recommendations (VORB): no changes and recheck 1 week. Patient notified and verbalized understanding. They had no further questions. Patient's home health nurse Archana also notified. (See anticoag tracker)     Funmi Barth RN

## 2024-10-22 ENCOUNTER — TELEPHONE (OUTPATIENT)
Age: 64
End: 2024-10-22

## 2024-10-22 NOTE — TELEPHONE ENCOUNTER
1625: placed call to [facility] as patient did not receive octreotide in September. No answer, will reattempt at a later time      1534: placed second call to [facility]   placed call to [facility] as patient did not receive octreotide in September. Left voicemail requesting call back regarding patient.     10/29: called and spoke with Vy with [facility], notified patient has not gotten a Sandostatin injection recently, she stated she would call patient to schedule him

## 2024-10-23 ENCOUNTER — TELEPHONE (OUTPATIENT)
Age: 64
End: 2024-10-23

## 2024-10-23 NOTE — TELEPHONE ENCOUNTER
INR received noting test date of 10/21 and result date of 10/23. Left voicemail with patient requesting call back to clarify when INR result is from.     10/24: second call placed to patient to clarify INR timing left message requesting call back     10/25: called and spoke with patient, he stated this inr result was from 10/21. New result received from  today, see anticoag tracker for details

## 2024-10-25 ENCOUNTER — ANTI-COAG VISIT (OUTPATIENT)
Age: 64
End: 2024-10-25

## 2024-10-25 DIAGNOSIS — Z79.01 CHRONIC ANTICOAGULATION: Primary | ICD-10-CM

## 2024-10-25 LAB — INR BLD: 1.3

## 2024-10-25 RX ORDER — ENOXAPARIN SODIUM 100 MG/ML
1 INJECTION SUBCUTANEOUS 2 TIMES DAILY
Qty: 12 EACH | Refills: 1 | Status: SHIPPED | OUTPATIENT
Start: 2024-10-25

## 2024-10-25 NOTE — PROGRESS NOTES
ADVANCED HEART FAILURE CENTER  [facility] in [place], [place]      INR result reviewed with RUBEN Garibay NP who made the following recommendations (VORB): 4mg tonight and Saturday night, start lovenox 1mg/kg BID and recheck 10/28. Patient notified and verbalized understanding. Patient's home health nurse MADYSON James also notified.  They had no further questions. (See anticoag tracker)     1604: called and spoke with patient's pharmacy who confirmed no prior auth needed     Funmi Barth RN

## 2024-10-28 ENCOUNTER — ANTI-COAG VISIT (OUTPATIENT)
Age: 64
End: 2024-10-28
Payer: MEDICARE

## 2024-10-28 DIAGNOSIS — Z79.01 CHRONIC ANTICOAGULATION: Primary | ICD-10-CM

## 2024-10-28 DIAGNOSIS — I50.22 CHRONIC SYSTOLIC HEART FAILURE (HCC): ICD-10-CM

## 2024-10-28 LAB — INR BLD: 1.8

## 2024-10-28 PROCEDURE — 93793 ANTICOAG MGMT PT WARFARIN: CPT | Performed by: NURSE PRACTITIONER

## 2024-10-28 NOTE — PROGRESS NOTES
ADVANCED HEART FAILURE CENTER  [facility] in [place], [place]      INR result reviewed with ADILENE Maurer NP who made the following recommendations (VORB): new maintenance plan:  4 mg every Sun, Wed, Fri; 2 mg all other days and recheck 1 week, stop lovenox. Patient notified and verbalized understanding. They had no further questions. Patient's home health nurse MADYSON James also notified. (See anticoag tracker)     Funmi Barth RN

## 2024-10-29 RX ORDER — METOPROLOL SUCCINATE 50 MG/1
50 TABLET, EXTENDED RELEASE ORAL DAILY
Qty: 90 TABLET | Refills: 1 | Status: SHIPPED | OUTPATIENT
Start: 2024-10-29

## 2024-10-29 NOTE — TELEPHONE ENCOUNTER
Requested Prescriptions     Signed Prescriptions Disp Refills    metoprolol succinate (TOPROL XL) 50 MG extended release tablet 90 tablet 1     Sig: TAKE 1 TABLET BY MOUTH DAILY     Authorizing Provider: OZIEL NAVARRO     Ordering User: FILI PELLETIER

## 2024-11-01 ENCOUNTER — TELEPHONE (OUTPATIENT)
Age: 64
End: 2024-11-01

## 2024-11-01 NOTE — TELEPHONE ENCOUNTER
Telephone Call RE:  Appointment reminder     Outcome:     [x] Patient confirmed appointment   [] Patient rescheduled appointment for    [] Unable to reach  [] Left message              [x] Other: Parking

## 2024-11-04 ENCOUNTER — ANTI-COAG VISIT (OUTPATIENT)
Age: 64
End: 2024-11-04
Payer: MEDICARE

## 2024-11-04 DIAGNOSIS — Z79.01 CHRONIC ANTICOAGULATION: Primary | ICD-10-CM

## 2024-11-04 LAB — INR BLD: 1.7

## 2024-11-04 PROCEDURE — 93793 ANTICOAG MGMT PT WARFARIN: CPT | Performed by: NURSE PRACTITIONER

## 2024-11-04 NOTE — PATIENT INSTRUCTIONS
exit site change as directed by your VAD team.  Keep the driveline dressing change and place and cover with a shower guard for all showers. Remember, the dressing should be changed immediately after a shower.  Notify the VAD team of any falls, which can be a cause of driveline trauma.     Driveline Dressing Change Reminders:  Always wash your hands before starting the dressing change.  Never put ointments, powders or lotions on or around the exit site unless you’re instructed to do so.  Follow your LVAD team’s directions on how often to change the dressing. Driveline dressing changes should be completed at least 3 times weekly, and always immediately after a shower.   Follow strict “sterile technique” every time you change the dressing or touch the exit site area.   Cleansing of site must be gentle and non-traumatic.  Immobilization of the driveline at the exit site with an anchor is vital to prevent infection.  Let your LVAD team know right away if there is any redness, swelling or drainage around the exit site, or if you’re experiencing pain or a fever. All of these are signs of possible infection.    Suspected Driveline Infection:  If you have had any trauma to the driveline or if the is any redness, swelling or drainage around the exit site, or if you’re experiencing pain or a fever notify the VAD team right away. The following steps will be taken:  You will be prescribed antibiotics to try to prevent the growth of bacteria and driveline infection.   You will need to start doing dressing changes daily.  You should stop showering and should not resume until the provider clears you to do so.  You will likely be scheduled for a dressing change in clinic so the VAD coordinator can examine the site. A culture of the driveline will likely be taken to check for infection at the site.           For further patient and caregiver resources as well as access to online LVAD support groups visit [email or web address]

## 2024-11-04 NOTE — PROGRESS NOTES
ADVANCED HEART FAILURE CENTER  [facility] in [place], [place]      INR result reviewed with RUBEN Garibay NP who made the following recommendations (VORB): 2.5mg tonight and recheck 1 week. Patient notified and verbalized understanding. They had no further questions. Patient's home health nurse MADYSON James also notified. (See anticoag tracker)     Funmi Barth RN

## 2024-11-05 ENCOUNTER — OFFICE VISIT (OUTPATIENT)
Age: 64
End: 2024-11-05

## 2024-11-05 VITALS
SYSTOLIC BLOOD PRESSURE: 108 MMHG | RESPIRATION RATE: 18 BRPM | BODY MASS INDEX: 27.37 KG/M2 | HEART RATE: 130 BPM | OXYGEN SATURATION: 97 % | HEIGHT: 67 IN | TEMPERATURE: 97.8 F | WEIGHT: 174.4 LBS

## 2024-11-05 DIAGNOSIS — N18.30 ANEMIA IN STAGE 3 CHRONIC KIDNEY DISEASE, UNSPECIFIED WHETHER STAGE 3A OR 3B CKD (HCC): ICD-10-CM

## 2024-11-05 DIAGNOSIS — I50.22 CHRONIC SYSTOLIC HEART FAILURE (HCC): ICD-10-CM

## 2024-11-05 DIAGNOSIS — D63.1 ANEMIA IN STAGE 3 CHRONIC KIDNEY DISEASE, UNSPECIFIED WHETHER STAGE 3A OR 3B CKD (HCC): ICD-10-CM

## 2024-11-05 DIAGNOSIS — E55.9 VITAMIN D DEFICIENCY: ICD-10-CM

## 2024-11-05 DIAGNOSIS — R00.0 SINUS TACHYCARDIA: Primary | ICD-10-CM

## 2024-11-05 LAB
ALBUMIN SERPL-MCNC: 4.4 G/DL (ref 3.9–4.9)
ALP SERPL-CCNC: 110 IU/L (ref 44–121)
ALT SERPL-CCNC: 52 IU/L (ref 0–44)
AST SERPL-CCNC: 39 IU/L (ref 0–40)
BASOPHILS # BLD AUTO: 0.2 X10E3/UL (ref 0–0.2)
BASOPHILS NFR BLD AUTO: 1 %
BILIRUB SERPL-MCNC: <0.2 MG/DL (ref 0–1.2)
BUN SERPL-MCNC: 19 MG/DL (ref 8–27)
BUN/CREAT SERPL: 15 (ref 10–24)
CALCIUM SERPL-MCNC: 9.5 MG/DL (ref 8.6–10.2)
CHLORIDE SERPL-SCNC: 100 MMOL/L (ref 96–106)
CO2 SERPL-SCNC: 20 MMOL/L (ref 20–29)
CREAT SERPL-MCNC: 1.26 MG/DL (ref 0.76–1.27)
EGFRCR SERPLBLD CKD-EPI 2021: 64 ML/MIN/1.73
EOSINOPHIL # BLD AUTO: 0.5 X10E3/UL (ref 0–0.4)
EOSINOPHIL NFR BLD AUTO: 4 %
ERYTHROCYTE [DISTWIDTH] IN BLOOD BY AUTOMATED COUNT: 13.7 % (ref 11.6–15.4)
GLOBULIN SER CALC-MCNC: 2.9 G/DL (ref 1.5–4.5)
GLUCOSE SERPL-MCNC: 86 MG/DL (ref 70–99)
HCT VFR BLD AUTO: 36.2 % (ref 37.5–51)
HGB BLD-MCNC: 11.3 G/DL (ref 13–17.7)
IMM GRANULOCYTES # BLD AUTO: 0.1 X10E3/UL (ref 0–0.1)
IMM GRANULOCYTES NFR BLD AUTO: 1 %
INR PPP: 1.6 (ref 0.9–1.2)
LDH SERPL L TO P-CCNC: 333 IU/L (ref 121–224)
LYMPHOCYTES # BLD AUTO: 1.4 X10E3/UL (ref 0.7–3.1)
LYMPHOCYTES NFR BLD AUTO: 11 %
MAGNESIUM SERPL-MCNC: 2.1 MG/DL (ref 1.6–2.3)
MCH RBC QN AUTO: 29.4 PG (ref 26.6–33)
MCHC RBC AUTO-ENTMCNC: 31.2 G/DL (ref 31.5–35.7)
MCV RBC AUTO: 94 FL (ref 79–97)
MONOCYTES # BLD AUTO: 1.3 X10E3/UL (ref 0.1–0.9)
MONOCYTES NFR BLD AUTO: 11 %
NEUTROPHILS # BLD AUTO: 8.8 X10E3/UL (ref 1.4–7)
NEUTROPHILS NFR BLD AUTO: 72 %
NT-PROBNP SERPL-MCNC: 628 PG/ML (ref 0–210)
PLATELET # BLD AUTO: 413 X10E3/UL (ref 150–450)
POTASSIUM SERPL-SCNC: 5 MMOL/L (ref 3.5–5.2)
PROT SERPL-MCNC: 7.3 G/DL (ref 6–8.5)
PROTHROMBIN TIME: 16.9 SEC (ref 9.1–12)
RBC # BLD AUTO: 3.84 X10E6/UL (ref 4.14–5.8)
SODIUM SERPL-SCNC: 141 MMOL/L (ref 134–144)
WBC # BLD AUTO: 12.3 X10E3/UL (ref 3.4–10.8)

## 2024-11-05 RX ORDER — METOPROLOL SUCCINATE 50 MG/1
75 TABLET, EXTENDED RELEASE ORAL DAILY
Qty: 90 TABLET | Refills: 1 | Status: SHIPPED | OUTPATIENT
Start: 2024-11-05

## 2024-11-05 ASSESSMENT — ENCOUNTER SYMPTOMS
BLOOD IN STOOL: 0
SORE THROAT: 0
ABDOMINAL DISTENTION: 0
ABDOMINAL PAIN: 0
COUGH: 1
CHEST TIGHTNESS: 0
EYE PAIN: 0
SHORTNESS OF BREATH: 1

## 2024-11-05 ASSESSMENT — PATIENT HEALTH QUESTIONNAIRE - PHQ9
SUM OF ALL RESPONSES TO PHQ9 QUESTIONS 1 & 2: 0
SUM OF ALL RESPONSES TO PHQ QUESTIONS 1-9: 0
SUM OF ALL RESPONSES TO PHQ QUESTIONS 1-9: 0
2. FEELING DOWN, DEPRESSED OR HOPELESS: NOT AT ALL
1. LITTLE INTEREST OR PLEASURE IN DOING THINGS: NOT AT ALL
SUM OF ALL RESPONSES TO PHQ QUESTIONS 1-9: 0
SUM OF ALL RESPONSES TO PHQ QUESTIONS 1-9: 0

## 2024-11-05 NOTE — PROGRESS NOTES
ADVANCED HEART FAILURE CENTER  [facility] in [place], [place]  LVAD Coordinator Clinic Visit Note  Chief Complaint   Patient presents with    Shortness of Breath     On exertion    Follow-up     VAD       BP (!) 108/0 (Site: Right Upper Arm, Position: Sitting, Cuff Size: Medium Adult) Comment: palpable radial pulse present  Pulse (!) 130   Temp 97.8 °F (36.6 °C) (Oral)   Resp 18   Ht 1.702 m (5' 7.01\")   Wt 79.1 kg (174 lb 6.4 oz)   SpO2 97%   BMI 27.31 kg/m²     LVAD  LVAD Type:: Left Ventricular Assist Device (LVAD)  Pump Speed (rpm): 5200  Pump Flow (lpm): 3.2  MAP (mmHg): 108 (palpable radial pulse present)  Pump Pulse Index (PI): 6.5  Pump Power (Mei): 3.9  Battery Life Checked: Yes  Backup Controller Present: Yes  Driveline Dressing: Old drainage  Outpatient: Yes  MAP in Therapeutic Range (Outpatient): Yes       Sanford Joiner Karen is a 64 y.o. male with a history of NICM with Heartmate 3 implant date of 03/27/2023. Alarm history reviewed. Please see VAD flow sheet for readings. Frequent PI events noted. Several low voltage alarms noted, no other adverse alarms noted. Settings reviewed, but no changes made. Patient states these occur with low batteries, educated to change batteries before they are low, he verbalized understanding.       Patient denied driveline trauma (including  drops). See flow sheet for details. Driveline inspected for integrity, old drainage noted on driveline.  Above reported to provider.      All orders entered per VORB. All provider instructions placed in AVS and reviewed with patient. LVAD education provided on ESD and driveline infection.  Time given to ask questions. Patient verbalized understanding and had no further questions.     Funmi Barth RN  VAD Coordinator      
Start date:      Quit date: 2020     Years since quittin.5    Smokeless tobacco: Never   Vaping Use    Vaping status: Never Used   Substance and Sexual Activity    Alcohol use: Not Currently    Drug use: Never     Social Determinants of Health     Food Insecurity: No Food Insecurity (2024)    Hunger Vital Sign     Worried About Running Out of Food in the Last Year: Never true     Ran Out of Food in the Last Year: Never true   Transportation Needs: Unmet Transportation Needs (2024)    OASIS : Transportation     Lack of Transportation (Medical): Yes     Lack of Transportation (Non-Medical): No     Patient Unable or Declines to Respond: No    Social Connections ([facility] HRSN)   Housing Stability: Low Risk  (2024)    Housing Stability Vital Sign     Unable to Pay for Housing in the Last Year: No     Number of Places Lived in the Last Year: 1     Unstable Housing in the Last Year: No       LABORATORY RESULTS:   Failed to redirect to the Timeline version of the [employer] SmartLink.  Lab Results   Component Value Date/Time    TSH 1.08 2024 04:04 PM    TSH 1.23 2024 04:02 PM    TSH 1.95 2024 03:06 PM    TSH 4.560 2024 12:00 AM    TSH 0.92 2024 03:06 PM    TSH 1.77 2024 02:15 PM    TSH 4.36 2024 01:29 PM    TSH 0.37 05/15/2021 02:37 AM       ALLERGY:  Allergies   Allergen Reactions    Bactrim [Sulfamethoxazole-Trimethoprim] Other (See Comments)     Acute Kidney Injury    Ciprofloxacin      Other reaction(s): Unknown (comments)        CURRENT MEDICATIONS:    Current Outpatient Medications:     metoprolol succinate (TOPROL XL) 50 MG extended release tablet, Take 1.5 tablets by mouth daily, Disp: 90 tablet, Rfl: 1    enoxaparin (LOVENOX) 80 MG/0.8ML, Inject 0.8 mLs into the skin 2 times daily, Disp: 12 each, Rfl: 1    hydrALAZINE (APRESOLINE) 25 MG tablet, Take 3 tablets by mouth every 8 hours, Disp: 270 tablet, Rfl: 2    pravastatin (PRAVACHOL) 40 MG tablet,

## 2024-11-11 ENCOUNTER — HOSPITAL ENCOUNTER (OUTPATIENT)
Facility: HOSPITAL | Age: 64
Setting detail: INFUSION SERIES
Discharge: HOME | End: 2024-11-11

## 2024-11-11 DIAGNOSIS — Z95.811 LVAD (LEFT VENTRICULAR ASSIST DEVICE) PRESENT (HCC): Primary | ICD-10-CM

## 2024-11-11 DIAGNOSIS — K92.2 UPPER GI BLEED: ICD-10-CM

## 2024-11-11 RX ORDER — OCTREOTIDE ACETATE 20 MG
20 KIT INTRAMUSCULAR ONCE
Status: DISCONTINUED | OUTPATIENT
Start: 2024-11-11 | End: 2024-11-12 | Stop reason: HOSPADM

## 2024-11-11 RX ORDER — DIPHENHYDRAMINE HYDROCHLORIDE 50 MG/ML
50 INJECTION INTRAMUSCULAR; INTRAVENOUS
OUTPATIENT
Start: 2024-11-24

## 2024-11-11 RX ORDER — EPINEPHRINE 1 MG/ML
0.3 INJECTION, SOLUTION INTRAMUSCULAR; SUBCUTANEOUS PRN
OUTPATIENT
Start: 2024-11-24

## 2024-11-11 RX ORDER — SODIUM CHLORIDE 9 MG/ML
INJECTION, SOLUTION INTRAVENOUS CONTINUOUS
OUTPATIENT
Start: 2024-11-24

## 2024-11-11 RX ORDER — HYDROCORTISONE SODIUM SUCCINATE 100 MG/2ML
100 INJECTION INTRAMUSCULAR; INTRAVENOUS
OUTPATIENT
Start: 2024-11-24

## 2024-11-11 RX ORDER — ONDANSETRON 2 MG/ML
8 INJECTION INTRAMUSCULAR; INTRAVENOUS
OUTPATIENT
Start: 2024-11-24

## 2024-11-11 RX ORDER — OCTREOTIDE ACETATE 20 MG
20 KIT INTRAMUSCULAR ONCE
OUTPATIENT
Start: 2024-11-24 | End: 2024-11-24

## 2024-11-11 RX ORDER — ACETAMINOPHEN 325 MG/1
650 TABLET ORAL
OUTPATIENT
Start: 2024-11-24

## 2024-11-11 RX ORDER — ALBUTEROL SULFATE 90 UG/1
4 INHALANT RESPIRATORY (INHALATION) PRN
OUTPATIENT
Start: 2024-11-24

## 2024-11-13 ENCOUNTER — ANTI-COAG VISIT (OUTPATIENT)
Age: 64
End: 2024-11-13

## 2024-11-13 DIAGNOSIS — Z79.01 CHRONIC ANTICOAGULATION: Primary | ICD-10-CM

## 2024-11-13 LAB — INR BLD: 1.9

## 2024-11-13 NOTE — PROGRESS NOTES
ADVANCED HEART FAILURE CENTER  [facility] in [place], [place]      INR result reviewed with ADILENE Maurer NP who made the following recommendations (VORB): no changes and recheck 1 week. Patient notified of provider instructions via voicemail, requested call back to confirm. Patient's home health nurse MADYSON James also notified     11/15: called and spoke with patient who confirmed he received provider instructions   See anticoag tracker)     Funmi Barth RN

## 2024-11-22 ENCOUNTER — ANTI-COAG VISIT (OUTPATIENT)
Age: 64
End: 2024-11-22

## 2024-11-22 DIAGNOSIS — Z95.811 LVAD (LEFT VENTRICULAR ASSIST DEVICE) PRESENT (HCC): ICD-10-CM

## 2024-11-22 DIAGNOSIS — I50.22 CHRONIC SYSTOLIC HEART FAILURE (HCC): ICD-10-CM

## 2024-11-22 DIAGNOSIS — Z79.01 CHRONIC ANTICOAGULATION: Primary | ICD-10-CM

## 2024-11-22 LAB — INR BLD: 2.1

## 2024-11-22 NOTE — PROGRESS NOTES
ADVANCED HEART FAILURE CENTER  [facility] in [place], [place]    INR result reviewed with YUMI Quiles NP  who made the following recommendations (VORB): Continue current coumadin maintenance plan and recheck INR 11/27/24. Notified MADYSON James RN with [facility] during visit at patient home. She verbalized understanding and notified patient. They had no further questions. (See anticoag tracker)     CASEY MONTEMAYOR RN

## 2024-11-26 ENCOUNTER — TELEPHONE (OUTPATIENT)
Age: 64
End: 2024-11-26

## 2024-11-26 RX ORDER — CEFDINIR 300 MG/1
300 CAPSULE ORAL 2 TIMES DAILY
Qty: 60 CAPSULE | Refills: 11 | Status: SHIPPED | OUTPATIENT
Start: 2024-11-26 | End: 2025-11-21

## 2024-11-26 NOTE — TELEPHONE ENCOUNTER
0842: voicemail received from patient stating he is out of cefdinir and requesting refill sent to pharmacy.     Refill encounter for this medication open under Dr. Rajan as of 11/23.     1009: returned call to patient, informed patient that Dr. Rajan prescribes antibiotic for patient and requested he reach out to him directly. He verbalized understanding, stated he will call and request refill of cefdinir.     1012: call placed to Dr. Rajan's office, spoke with Saleem, notified of need for refill, Saleem stated they will forward Dr. Young request and notify him that patient is completely out.

## 2024-11-27 ENCOUNTER — ANTI-COAG VISIT (OUTPATIENT)
Age: 64
End: 2024-11-27
Payer: MEDICARE

## 2024-11-27 DIAGNOSIS — Z79.01 CHRONIC ANTICOAGULATION: Primary | ICD-10-CM

## 2024-11-27 LAB — INR BLD: 1.7

## 2024-11-27 PROCEDURE — 93793 ANTICOAG MGMT PT WARFARIN: CPT | Performed by: NURSE PRACTITIONER

## 2024-11-27 NOTE — PROGRESS NOTES
ADVANCED HEART FAILURE CENTER  [facility] in [place], [place]      INR result reviewed with RUBEN Gairbay NP who made the following recommendations (VORB): 3mg tomorrow and recheck 12/03. Patient notified and verbalized understanding. They had no further questions. Patient's home health nurse MADYSON James also notified.  (See anticoag tracker)     Funmi Barth RN

## 2024-12-02 ENCOUNTER — ANTI-COAG VISIT (OUTPATIENT)
Age: 64
End: 2024-12-02
Payer: MEDICARE

## 2024-12-02 DIAGNOSIS — Z79.01 CHRONIC ANTICOAGULATION: Primary | ICD-10-CM

## 2024-12-02 LAB — INR BLD: 2.9

## 2024-12-02 PROCEDURE — 93793 ANTICOAG MGMT PT WARFARIN: CPT | Performed by: NURSE PRACTITIONER

## 2024-12-02 NOTE — PROGRESS NOTES
ADVANCED HEART FAILURE CENTER  [facility] in [place], [place]      INR result reviewed with YUMI Quiels NP  who made the following recommendations (VORB): 1mg tonight and recheck 12/5. Patient notified and verbalized understanding. They had no further questions. Patient's home health nurse MADYSON James also notified. (See anticoag tracker)     Funmi Barth RN

## 2024-12-03 LAB
BASOPHILS # BLD AUTO: 0.2 X10E3/UL (ref 0–0.2)
BASOPHILS NFR BLD AUTO: 1 %
EOSINOPHIL # BLD AUTO: 0.8 X10E3/UL (ref 0–0.4)
EOSINOPHIL NFR BLD AUTO: 7 %
ERYTHROCYTE [DISTWIDTH] IN BLOOD BY AUTOMATED COUNT: 12.9 % (ref 11.6–15.4)
HCT VFR BLD AUTO: 41.7 % (ref 37.5–51)
HGB BLD-MCNC: 13.1 G/DL (ref 13–17.7)
IMM GRANULOCYTES # BLD AUTO: 0 X10E3/UL (ref 0–0.1)
IMM GRANULOCYTES NFR BLD AUTO: 0 %
INR PPP: 2.4 (ref 0.9–1.2)
LYMPHOCYTES # BLD AUTO: 1.6 X10E3/UL (ref 0.7–3.1)
LYMPHOCYTES NFR BLD AUTO: 14 %
MCH RBC QN AUTO: 29.4 PG (ref 26.6–33)
MCHC RBC AUTO-ENTMCNC: 31.4 G/DL (ref 31.5–35.7)
MCV RBC AUTO: 94 FL (ref 79–97)
MONOCYTES # BLD AUTO: 1.1 X10E3/UL (ref 0.1–0.9)
MONOCYTES NFR BLD AUTO: 9 %
NEUTROPHILS # BLD AUTO: 8.4 X10E3/UL (ref 1.4–7)
NEUTROPHILS NFR BLD AUTO: 69 %
PLATELET # BLD AUTO: 382 X10E3/UL (ref 150–450)
PROTHROMBIN TIME: 25.1 SEC (ref 9.1–12)
RBC # BLD AUTO: 4.45 X10E6/UL (ref 4.14–5.8)
WBC # BLD AUTO: 12.1 X10E3/UL (ref 3.4–10.8)

## 2024-12-04 ENCOUNTER — TELEPHONE (OUTPATIENT)
Age: 64
End: 2024-12-04

## 2024-12-04 LAB
ALBUMIN SERPL-MCNC: 4.7 G/DL (ref 3.9–4.9)
ALP SERPL-CCNC: 117 IU/L (ref 44–121)
ALT SERPL-CCNC: 30 IU/L (ref 0–44)
AST SERPL-CCNC: 35 IU/L (ref 0–40)
BILIRUB SERPL-MCNC: <0.2 MG/DL (ref 0–1.2)
BUN SERPL-MCNC: 24 MG/DL (ref 8–27)
BUN/CREAT SERPL: 14 (ref 10–24)
CALCIUM SERPL-MCNC: 10.1 MG/DL (ref 8.6–10.2)
CHLORIDE SERPL-SCNC: 103 MMOL/L (ref 96–106)
CO2 SERPL-SCNC: 20 MMOL/L (ref 20–29)
CREAT SERPL-MCNC: 1.71 MG/DL (ref 0.76–1.27)
EGFRCR SERPLBLD CKD-EPI 2021: 44 ML/MIN/1.73
GLOBULIN SER CALC-MCNC: 3.3 G/DL (ref 1.5–4.5)
GLUCOSE SERPL-MCNC: 87 MG/DL (ref 70–99)
LDH SERPL L TO P-CCNC: 256 IU/L (ref 121–224)
MAGNESIUM SERPL-MCNC: 2.1 MG/DL (ref 1.6–2.3)
NT-PROBNP SERPL-MCNC: 464 PG/ML (ref 0–210)
POTASSIUM SERPL-SCNC: 4.5 MMOL/L (ref 3.5–5.2)
PROT SERPL-MCNC: 8 G/DL (ref 6–8.5)
SODIUM SERPL-SCNC: 144 MMOL/L (ref 134–144)

## 2024-12-04 RX ORDER — PRAVASTATIN SODIUM 40 MG
40 TABLET ORAL NIGHTLY
Qty: 90 TABLET | Refills: 0 | Status: SHIPPED | OUTPATIENT
Start: 2024-12-04

## 2024-12-04 NOTE — TELEPHONE ENCOUNTER
Requested Prescriptions     Signed Prescriptions Disp Refills    pravastatin (PRAVACHOL) 40 MG tablet 90 tablet 0     Sig: TAKE ONE TABLET BY MOUTH EVERY EVENING     Authorizing Provider: TAWANA HIGHTOWER     Ordering User: CASEY MONTEMAYOR

## 2024-12-04 NOTE — TELEPHONE ENCOUNTER
Called placed to [facility] client services, requested to add on patient's outstanding labwork from 11/6. Representative stated she will submit request to add on these orders

## 2024-12-06 ENCOUNTER — ANTI-COAG VISIT (OUTPATIENT)
Age: 64
End: 2024-12-06

## 2024-12-06 DIAGNOSIS — Z79.01 CHRONIC ANTICOAGULATION: Primary | ICD-10-CM

## 2024-12-06 DIAGNOSIS — I50.22 CHRONIC SYSTOLIC HEART FAILURE (HCC): ICD-10-CM

## 2024-12-06 DIAGNOSIS — Z95.811 LVAD (LEFT VENTRICULAR ASSIST DEVICE) PRESENT (HCC): ICD-10-CM

## 2024-12-06 LAB — INR BLD: 2.4

## 2024-12-06 NOTE — PROGRESS NOTES
ADVANCED HEART FAILURE CENTER  [facility] in [place], [place]      INR result reviewed with YUMI Quiles NP  who made the following recommendations (VORB):     Nahomi Quiles APRN - NP (12/6/24 3:12 PM)  Reduce tonight's dose to 2 mg all other doses the same, recheck in 1 week.    Patient notified and verbalized understanding. They had no further questions. (See anticoag tracker)     CASEY MONTEMAYOR RN

## 2024-12-09 ENCOUNTER — ANTI-COAG VISIT (OUTPATIENT)
Age: 64
End: 2024-12-09
Payer: MEDICARE

## 2024-12-09 DIAGNOSIS — Z79.01 CHRONIC ANTICOAGULATION: Primary | ICD-10-CM

## 2024-12-09 LAB — INR BLD: 2.5

## 2024-12-09 PROCEDURE — 93793 ANTICOAG MGMT PT WARFARIN: CPT | Performed by: NURSE PRACTITIONER

## 2024-12-09 RX ORDER — WARFARIN SODIUM 1 MG/1
TABLET ORAL
Qty: 240 TABLET | Refills: 1 | Status: SHIPPED | OUTPATIENT
Start: 2024-12-09

## 2024-12-09 RX ORDER — WARFARIN SODIUM 1 MG/1
2 TABLET ORAL DAILY
Qty: 90 TABLET | Refills: 1 | OUTPATIENT
Start: 2024-12-09

## 2024-12-09 NOTE — TELEPHONE ENCOUNTER
Requested Prescriptions     Signed Prescriptions Disp Refills    warfarin (COUMADIN) 1 MG tablet 240 tablet 1     Sig: Take 4 tablets by mouth every Sunday, Wednesday and Friday, take 2 tablets by mouth all other days.     Authorizing Provider: JEN THOMAS     Ordering User: FILI PELLETIER

## 2024-12-09 NOTE — PROGRESS NOTES
ADVANCED HEART FAILURE CENTER  [facility] in [place], [place]      INR result reviewed with RUBEN Garibay NP who made the following recommendations (VORB): 2mg Wednesday and recheck 12/13. Patient notified and verbalized understanding. They had no further questions. Patient's home health nurse MADYSON James also notified.  (See anticoag tracker)     Funmi Barth RN

## 2024-12-13 ENCOUNTER — ANTI-COAG VISIT (OUTPATIENT)
Age: 64
End: 2024-12-13

## 2024-12-13 DIAGNOSIS — Z79.01 CHRONIC ANTICOAGULATION: Primary | ICD-10-CM

## 2024-12-13 LAB — INR BLD: 2.1

## 2024-12-13 NOTE — PROGRESS NOTES
ADVANCED HEART FAILURE CENTER  [facility] in [place], [place]      INR result reviewed with MICHAELA Villa NP who made the following recommendations (VORB): no changes and recheck 12/16. Patient notified and verbalized understanding. They had no further questions. Patient's home health SUMAN. Walker also notified. (See anticoag tracker)     Funmi Barth RN

## 2024-12-16 ENCOUNTER — ANTI-COAG VISIT (OUTPATIENT)
Age: 64
End: 2024-12-16
Payer: MEDICARE

## 2024-12-16 DIAGNOSIS — Z79.01 CHRONIC ANTICOAGULATION: Primary | ICD-10-CM

## 2024-12-16 LAB — INR BLD: 2

## 2024-12-16 PROCEDURE — 93793 ANTICOAG MGMT PT WARFARIN: CPT

## 2024-12-16 NOTE — PROGRESS NOTES
ADVANCED HEART FAILURE CENTER  [facility] in [place], [place]      INR result reviewed with MICHAELA Villa NP who made the following recommendations (VORB): no changes and recheck 1 week. Patient notified and verbalized understanding. Patient's home health nurse MADYSON James also notified they had no further questions. (See anticoag tracker)     Funmi Barth RN

## 2024-12-23 ENCOUNTER — ANTI-COAG VISIT (OUTPATIENT)
Age: 64
End: 2024-12-23
Payer: MEDICARE

## 2024-12-23 DIAGNOSIS — Z79.01 CHRONIC ANTICOAGULATION: Primary | ICD-10-CM

## 2024-12-23 LAB — INR BLD: 2.5

## 2024-12-23 PROCEDURE — 93793 ANTICOAG MGMT PT WARFARIN: CPT

## 2024-12-23 NOTE — PROGRESS NOTES
ADVANCED HEART FAILURE CENTER  [facility] in [place], [place]      INR result reviewed with RUBEN Garibay NP who made the following recommendations (VORB): 1mg tonight, 3mg on Wednesday and recheck 12/27. Patient notified and verbalized understanding. They had no further questions. Patient's home health nurse MADYSON James also notified. (See anticoag tracker)     Funmi Barth RN

## 2024-12-27 ENCOUNTER — ANTI-COAG VISIT (OUTPATIENT)
Age: 64
End: 2024-12-27

## 2024-12-27 DIAGNOSIS — Z79.01 LONG TERM (CURRENT) USE OF ANTICOAGULANTS: Primary | ICD-10-CM

## 2024-12-27 LAB — INR BLD: 4.7

## 2024-12-27 NOTE — PROGRESS NOTES
ADVANCED HEART FAILURE CENTER  [facility] in [place], [place]      INR result reviewed with YUMI Quiles NP  who made the following recommendations (VORB):     Nahomi Quiles, APRN - NP  You5 minutes ago (2:45 PM)       Hold for two nights, take 2 mg on Sunday.  Recheck INR Monday.          Patient notified and verbalized understanding. He had no further questions. (See anticoag tracker)     SNEHA LAL RN  VAD Coordinator

## 2024-12-30 ENCOUNTER — ANTI-COAG VISIT (OUTPATIENT)
Age: 64
End: 2024-12-30
Payer: MEDICARE

## 2024-12-30 DIAGNOSIS — Z79.01 CHRONIC ANTICOAGULATION: Primary | ICD-10-CM

## 2024-12-30 LAB — INR BLD: 2.1

## 2024-12-30 PROCEDURE — 93793 ANTICOAG MGMT PT WARFARIN: CPT | Performed by: NURSE PRACTITIONER

## 2024-12-30 RX ORDER — HYDRALAZINE HYDROCHLORIDE 25 MG/1
TABLET, FILM COATED ORAL
Qty: 270 TABLET | Refills: 2 | Status: SHIPPED | OUTPATIENT
Start: 2024-12-30

## 2024-12-30 NOTE — TELEPHONE ENCOUNTER
Requested Prescriptions     Signed Prescriptions Disp Refills    hydrALAZINE (APRESOLINE) 25 MG tablet 270 tablet 2     Sig: TAKE THREE TABLETS BY MOUTH EVERY 8 HOURS     Authorizing Provider: OZIEL NAVARRO     Ordering User: FILI PELLETIER

## 2024-12-30 NOTE — PROGRESS NOTES
ADVANCED HEART FAILURE CENTER  [facility] in [place], [place]      INR result reviewed with YUMI Quiles NP  who made the following recommendations (VORB): 2mg daily and recheck 01/04. Patient notified and verbalized understanding. Patient's home health nurse MADYSON James also notified.  They had no further questions.(See anticoag tracker)     Funmi Barth RN

## 2025-01-03 ENCOUNTER — ANTI-COAG VISIT (OUTPATIENT)
Age: 65
End: 2025-01-03
Payer: MEDICARE

## 2025-01-03 DIAGNOSIS — I50.22 CHRONIC SYSTOLIC HEART FAILURE (HCC): ICD-10-CM

## 2025-01-03 DIAGNOSIS — Z79.01 LONG TERM (CURRENT) USE OF ANTICOAGULANTS: ICD-10-CM

## 2025-01-03 DIAGNOSIS — Z95.811 LVAD (LEFT VENTRICULAR ASSIST DEVICE) PRESENT (HCC): ICD-10-CM

## 2025-01-03 DIAGNOSIS — Z79.01 CHRONIC ANTICOAGULATION: Primary | ICD-10-CM

## 2025-01-03 LAB — INR BLD: 1.9

## 2025-01-03 PROCEDURE — 93793 ANTICOAG MGMT PT WARFARIN: CPT | Performed by: NURSE PRACTITIONER

## 2025-01-03 NOTE — PROGRESS NOTES
ADVANCED HEART FAILURE CENTER  [facility] in [place], [place]      INR result reviewed with YUMI Quiles NP  who made the following recommendations (VORB): no changes and recheck 1 week. Patient notified and verbalized understanding. Patient's home health nurse MADYSON James also notified. They had no further questions. (See anticoag tracker)     Funmi Barth RN

## 2025-01-04 LAB
ALBUMIN SERPL-MCNC: 4.7 G/DL (ref 3.9–4.9)
ALP SERPL-CCNC: 120 IU/L (ref 44–121)
ALT SERPL-CCNC: 24 IU/L (ref 0–44)
AST SERPL-CCNC: 28 IU/L (ref 0–40)
BASOPHILS # BLD AUTO: 0.2 X10E3/UL (ref 0–0.2)
BASOPHILS NFR BLD AUTO: 1 %
BILIRUB SERPL-MCNC: 0.3 MG/DL (ref 0–1.2)
BUN SERPL-MCNC: 25 MG/DL (ref 8–27)
BUN/CREAT SERPL: 14 (ref 10–24)
CALCIUM SERPL-MCNC: 10.2 MG/DL (ref 8.6–10.2)
CHLORIDE SERPL-SCNC: 100 MMOL/L (ref 96–106)
CO2 SERPL-SCNC: 23 MMOL/L (ref 20–29)
CREAT SERPL-MCNC: 1.77 MG/DL (ref 0.76–1.27)
EGFRCR SERPLBLD CKD-EPI 2021: 42 ML/MIN/1.73
EOSINOPHIL # BLD AUTO: 0.8 X10E3/UL (ref 0–0.4)
EOSINOPHIL NFR BLD AUTO: 6 %
ERYTHROCYTE [DISTWIDTH] IN BLOOD BY AUTOMATED COUNT: 13 % (ref 11.6–15.4)
GLOBULIN SER CALC-MCNC: 3.3 G/DL (ref 1.5–4.5)
GLUCOSE SERPL-MCNC: 93 MG/DL (ref 70–99)
HCT VFR BLD AUTO: 40.7 % (ref 37.5–51)
HGB BLD-MCNC: 13.1 G/DL (ref 13–17.7)
IMM GRANULOCYTES # BLD AUTO: 0.1 X10E3/UL (ref 0–0.1)
IMM GRANULOCYTES NFR BLD AUTO: 1 %
INR PPP: 1.6 (ref 0.9–1.2)
LDH SERPL L TO P-CCNC: 324 IU/L (ref 121–224)
LYMPHOCYTES # BLD AUTO: 1.5 X10E3/UL (ref 0.7–3.1)
LYMPHOCYTES NFR BLD AUTO: 11 %
MAGNESIUM SERPL-MCNC: 2.1 MG/DL (ref 1.6–2.3)
MCH RBC QN AUTO: 29.2 PG (ref 26.6–33)
MCHC RBC AUTO-ENTMCNC: 32.2 G/DL (ref 31.5–35.7)
MCV RBC AUTO: 91 FL (ref 79–97)
MONOCYTES # BLD AUTO: 1.5 X10E3/UL (ref 0.1–0.9)
MONOCYTES NFR BLD AUTO: 11 %
NEUTROPHILS # BLD AUTO: 9.7 X10E3/UL (ref 1.4–7)
NEUTROPHILS NFR BLD AUTO: 70 %
PLATELET # BLD AUTO: 367 X10E3/UL (ref 150–450)
POTASSIUM SERPL-SCNC: 4.7 MMOL/L (ref 3.5–5.2)
PROT SERPL-MCNC: 8 G/DL (ref 6–8.5)
PROTHROMBIN TIME: 17.4 SEC (ref 9.1–12)
RBC # BLD AUTO: 4.48 X10E6/UL (ref 4.14–5.8)
SODIUM SERPL-SCNC: 140 MMOL/L (ref 134–144)
WBC # BLD AUTO: 13.6 X10E3/UL (ref 3.4–10.8)

## 2025-01-06 ENCOUNTER — TELEPHONE (OUTPATIENT)
Age: 65
End: 2025-01-06

## 2025-01-06 NOTE — TELEPHONE ENCOUNTER
----- Message from TAIWO Byrne NP sent at 1/6/2025  2:12 PM EST -----  I suspect he is a little dry based on his labwork.  Everything looks slightly concentrated.   Can you please check in on him and make sure he's drinking adequate amounts and doesn't have any new concerning symptoms?    1425: Contacted patient to discuss. He denied any dizziness, lightheadedness, dark urine, shortness of breath, or VAD alarms and stated he has been feeling well. Per patient he is drinking about 6-8 coffee cups of water daily. Reviewed with RUBEN Garibay NP who recommended patient increase fluid intake and call with any new onset symptoms or VAD alarms. Patient notified and verbalized understanding. He had no further questions.    CASEY MONTEMAYOR RN  VAD Coordinator

## 2025-01-15 ENCOUNTER — TELEPHONE (OUTPATIENT)
Age: 65
End: 2025-01-15

## 2025-01-15 DIAGNOSIS — Z79.01 CHRONIC ANTICOAGULATION: Primary | ICD-10-CM

## 2025-01-15 LAB — INR BLD: 1.7

## 2025-01-15 NOTE — TELEPHONE ENCOUNTER
Nessa Maurer APRN - NP  You5 minutes ago (4:03 PM)       Ok lets still do our plan     You  Nessa Maurer APRN - NP1 hour ago (2:39 PM)     MP  Talked to patient, he said he only took 2mg every day last week .     You  Sanford Joiner Sr. [phone] hour ago (2:36 PM)     Nessa Maurer APRN - NP  You1 hour ago (2:21 PM)       4mg tomorrow       [facility]  [place], [place]      INR result reviewed with ADILENE Maurer NP who made the following recommendations (VORB): 4mg tomorrow and recheck 1 week. Patient notified and verbalized understanding. He stated he took 2mg every day last week, ADILENE Maurer notified who stated VORB no changes to recommendations. They had no further questions. (See anticoag tracker)     Funmi Barth RN

## 2025-01-20 RX ORDER — PANTOPRAZOLE SODIUM 40 MG/1
TABLET, DELAYED RELEASE ORAL
Qty: 90 TABLET | Refills: 1 | Status: ON HOLD | OUTPATIENT
Start: 2025-01-20

## 2025-01-20 NOTE — TELEPHONE ENCOUNTER
Requested Prescriptions     Signed Prescriptions Disp Refills    pantoprazole (PROTONIX) 40 MG tablet 90 tablet 1     Sig: TAKE 1 TABLET BY MOUTH DAILY     Authorizing Provider: OZIEL NAVARRO     Ordering User: FILI PELLETIER

## 2025-01-21 ENCOUNTER — TELEPHONE (OUTPATIENT)
Age: 65
End: 2025-01-21

## 2025-01-22 ENCOUNTER — TELEPHONE (OUTPATIENT)
Age: 65
End: 2025-01-22

## 2025-01-22 ENCOUNTER — APPOINTMENT (OUTPATIENT)
Facility: HOSPITAL | Age: 65
End: 2025-01-22
Payer: MEDICARE

## 2025-01-22 ENCOUNTER — HOSPITAL ENCOUNTER (INPATIENT)
Facility: HOSPITAL | Age: 65
LOS: 9 days | Discharge: HOME | End: 2025-01-31
Attending: STUDENT IN AN ORGANIZED HEALTH CARE EDUCATION/TRAINING PROGRAM | Admitting: FAMILY MEDICINE
Payer: MEDICARE

## 2025-01-22 DIAGNOSIS — T82.9XXA COMPLICATION INVOLVING LEFT VENTRICULAR ASSIST DEVICE (LVAD), INITIAL ENCOUNTER: ICD-10-CM

## 2025-01-22 DIAGNOSIS — N17.9 AKI (ACUTE KIDNEY INJURY) (HCC): ICD-10-CM

## 2025-01-22 DIAGNOSIS — R65.10 SIRS (SYSTEMIC INFLAMMATORY RESPONSE SYNDROME) (HCC): Primary | ICD-10-CM

## 2025-01-22 DIAGNOSIS — T82.9XXA LEFT VENTRICULAR ASSIST DEVICE (LVAD) COMPLICATION, INITIAL ENCOUNTER: ICD-10-CM

## 2025-01-22 LAB
ALBUMIN SERPL-MCNC: 3.2 G/DL (ref 3.5–5)
ALBUMIN/GLOB SERPL: 0.7 (ref 1.1–2.2)
ALP SERPL-CCNC: 107 U/L (ref 45–117)
ALT SERPL-CCNC: 47 U/L (ref 12–78)
ANION GAP SERPL CALC-SCNC: 8 MMOL/L (ref 2–12)
AST SERPL-CCNC: 57 U/L (ref 15–37)
BASOPHILS # BLD: 0.1 K/UL (ref 0–0.1)
BASOPHILS NFR BLD: 0.4 % (ref 0–1)
BILIRUB SERPL-MCNC: 0.3 MG/DL (ref 0.2–1)
BUN SERPL-MCNC: 28 MG/DL (ref 6–20)
BUN/CREAT SERPL: 9 (ref 12–20)
CALCIUM SERPL-MCNC: 9.7 MG/DL (ref 8.5–10.1)
CHLORIDE SERPL-SCNC: 99 MMOL/L (ref 97–108)
CO2 SERPL-SCNC: 24 MMOL/L (ref 21–32)
COMMENT:: NORMAL
CREAT SERPL-MCNC: 3.19 MG/DL (ref 0.7–1.3)
DIFFERENTIAL METHOD BLD: ABNORMAL
EOSINOPHIL # BLD: 0 K/UL (ref 0–0.4)
EOSINOPHIL NFR BLD: 0 % (ref 0–7)
ERYTHROCYTE [DISTWIDTH] IN BLOOD BY AUTOMATED COUNT: 13.5 % (ref 11.5–14.5)
ERYTHROCYTE [DISTWIDTH] IN BLOOD BY AUTOMATED COUNT: 13.5 % (ref 11.5–14.5)
GLOBULIN SER CALC-MCNC: 4.8 G/DL (ref 2–4)
GLUCOSE BLD-MCNC: 132 MG/DL (ref 74–99)
GLUCOSE SERPL-MCNC: 119 MG/DL (ref 65–100)
HCT VFR BLD AUTO: 28.9 % (ref 36.6–50.3)
HCT VFR BLD AUTO: 37.4 % (ref 36.6–50.3)
HGB BLD-MCNC: 11.9 G/DL (ref 12.1–17)
HGB BLD-MCNC: 9.2 G/DL (ref 12.1–17)
IMM GRANULOCYTES # BLD AUTO: 0.23 K/UL (ref 0–0.04)
IMM GRANULOCYTES NFR BLD AUTO: 0.9 % (ref 0–0.5)
INR PPP: 2.5 (ref 0.9–1.1)
LYMPHOCYTES # BLD: 0.35 K/UL (ref 0.8–3.5)
LYMPHOCYTES NFR BLD: 1.4 % (ref 12–49)
MAGNESIUM SERPL-MCNC: 1.7 MG/DL (ref 1.6–2.4)
MCH RBC QN AUTO: 28.3 PG (ref 26–34)
MCH RBC QN AUTO: 28.3 PG (ref 26–34)
MCHC RBC AUTO-ENTMCNC: 31.8 G/DL (ref 30–36.5)
MCHC RBC AUTO-ENTMCNC: 31.8 G/DL (ref 30–36.5)
MCV RBC AUTO: 88.8 FL (ref 80–99)
MCV RBC AUTO: 88.9 FL (ref 80–99)
MONOCYTES # BLD: 1.13 K/UL (ref 0–1)
MONOCYTES NFR BLD: 4.5 % (ref 5–13)
NEUTS SEG # BLD: 23.2 K/UL (ref 1.8–8)
NEUTS SEG NFR BLD: 92.8 % (ref 32–75)
NRBC # BLD: 0 K/UL (ref 0–0.01)
NRBC # BLD: 0 K/UL (ref 0–0.01)
NRBC BLD-RTO: 0 PER 100 WBC
NRBC BLD-RTO: 0 PER 100 WBC
PLATELET # BLD AUTO: 182 K/UL (ref 150–400)
PLATELET # BLD AUTO: 240 K/UL (ref 150–400)
PLATELET COMMENT: ABNORMAL
PMV BLD AUTO: 11.5 FL (ref 8.9–12.9)
PMV BLD AUTO: 11.7 FL (ref 8.9–12.9)
POTASSIUM SERPL-SCNC: 4 MMOL/L (ref 3.5–5.1)
PROCALCITONIN SERPL-MCNC: 167.9 NG/ML
PROT SERPL-MCNC: 8 G/DL (ref 6.4–8.2)
PROTHROMBIN TIME: 25.1 SEC (ref 9.2–11.2)
RBC # BLD AUTO: 3.25 M/UL (ref 4.1–5.7)
RBC # BLD AUTO: 4.21 M/UL (ref 4.1–5.7)
RBC MORPH BLD: ABNORMAL
SERVICE CMNT-IMP: ABNORMAL
SODIUM SERPL-SCNC: 131 MMOL/L (ref 136–145)
SPECIMEN HOLD: NORMAL
TROPONIN I SERPL HS-MCNC: 21 NG/L (ref 0–76)
WBC # BLD AUTO: 23.3 K/UL (ref 4.1–11.1)
WBC # BLD AUTO: 25 K/UL (ref 4.1–11.1)

## 2025-01-22 PROCEDURE — 6360000002 HC RX W HCPCS: Performed by: STUDENT IN AN ORGANIZED HEALTH CARE EDUCATION/TRAINING PROGRAM

## 2025-01-22 PROCEDURE — 2060000000 HC ICU INTERMEDIATE R&B

## 2025-01-22 PROCEDURE — 84145 PROCALCITONIN (PCT): CPT

## 2025-01-22 PROCEDURE — 80053 COMPREHEN METABOLIC PANEL: CPT

## 2025-01-22 PROCEDURE — APPNB45 APP NON BILLABLE 31-45 MINUTES

## 2025-01-22 PROCEDURE — 2500000003 HC RX 250 WO HCPCS: Performed by: STUDENT IN AN ORGANIZED HEALTH CARE EDUCATION/TRAINING PROGRAM

## 2025-01-22 PROCEDURE — 84484 ASSAY OF TROPONIN QUANT: CPT

## 2025-01-22 PROCEDURE — 2500000003 HC RX 250 WO HCPCS

## 2025-01-22 PROCEDURE — 87186 SC STD MICRODIL/AGAR DIL: CPT

## 2025-01-22 PROCEDURE — 71045 X-RAY EXAM CHEST 1 VIEW: CPT

## 2025-01-22 PROCEDURE — 87077 CULTURE AEROBIC IDENTIFY: CPT

## 2025-01-22 PROCEDURE — 96375 TX/PRO/DX INJ NEW DRUG ADDON: CPT

## 2025-01-22 PROCEDURE — 2500000003 HC RX 250 WO HCPCS: Performed by: FAMILY MEDICINE

## 2025-01-22 PROCEDURE — 85025 COMPLETE CBC W/AUTO DIFF WBC: CPT

## 2025-01-22 PROCEDURE — 82962 GLUCOSE BLOOD TEST: CPT

## 2025-01-22 PROCEDURE — 36415 COLL VENOUS BLD VENIPUNCTURE: CPT

## 2025-01-22 PROCEDURE — 85027 COMPLETE CBC AUTOMATED: CPT

## 2025-01-22 PROCEDURE — 87154 CUL TYP ID BLD PTHGN 6+ TRGT: CPT

## 2025-01-22 PROCEDURE — 96365 THER/PROPH/DIAG IV INF INIT: CPT

## 2025-01-22 PROCEDURE — 93005 ELECTROCARDIOGRAM TRACING: CPT | Performed by: STUDENT IN AN ORGANIZED HEALTH CARE EDUCATION/TRAINING PROGRAM

## 2025-01-22 PROCEDURE — 87040 BLOOD CULTURE FOR BACTERIA: CPT

## 2025-01-22 PROCEDURE — 85610 PROTHROMBIN TIME: CPT

## 2025-01-22 PROCEDURE — 99285 EMERGENCY DEPT VISIT HI MDM: CPT

## 2025-01-22 PROCEDURE — 83735 ASSAY OF MAGNESIUM: CPT

## 2025-01-22 PROCEDURE — 94761 N-INVAS EAR/PLS OXIMETRY MLT: CPT

## 2025-01-22 PROCEDURE — 2580000003 HC RX 258: Performed by: STUDENT IN AN ORGANIZED HEALTH CARE EDUCATION/TRAINING PROGRAM

## 2025-01-22 RX ORDER — DEXTROSE MONOHYDRATE 100 MG/ML
INJECTION, SOLUTION INTRAVENOUS CONTINUOUS PRN
Status: DISCONTINUED | OUTPATIENT
Start: 2025-01-22 | End: 2025-01-31 | Stop reason: HOSPADM

## 2025-01-22 RX ORDER — SODIUM CHLORIDE 9 MG/ML
INJECTION, SOLUTION INTRAVENOUS PRN
Status: DISCONTINUED | OUTPATIENT
Start: 2025-01-22 | End: 2025-01-22

## 2025-01-22 RX ORDER — SODIUM CHLORIDE 9 MG/ML
INJECTION, SOLUTION INTRAVENOUS PRN
Status: DISCONTINUED | OUTPATIENT
Start: 2025-01-22 | End: 2025-01-31 | Stop reason: HOSPADM

## 2025-01-22 RX ORDER — SODIUM CHLORIDE 0.9 % (FLUSH) 0.9 %
5-40 SYRINGE (ML) INJECTION PRN
Status: DISCONTINUED | OUTPATIENT
Start: 2025-01-22 | End: 2025-01-31 | Stop reason: HOSPADM

## 2025-01-22 RX ORDER — HYDRALAZINE HYDROCHLORIDE 20 MG/ML
10 INJECTION INTRAMUSCULAR; INTRAVENOUS EVERY 4 HOURS PRN
Status: DISCONTINUED | OUTPATIENT
Start: 2025-01-22 | End: 2025-01-31 | Stop reason: HOSPADM

## 2025-01-22 RX ORDER — SODIUM CHLORIDE 0.9 % (FLUSH) 0.9 %
5-40 SYRINGE (ML) INJECTION EVERY 12 HOURS SCHEDULED
Status: DISCONTINUED | OUTPATIENT
Start: 2025-01-22 | End: 2025-01-31 | Stop reason: HOSPADM

## 2025-01-22 RX ORDER — POLYETHYLENE GLYCOL 3350 17 G/17G
17 POWDER, FOR SOLUTION ORAL DAILY PRN
Status: DISCONTINUED | OUTPATIENT
Start: 2025-01-22 | End: 2025-01-31 | Stop reason: HOSPADM

## 2025-01-22 RX ORDER — ALBUTEROL SULFATE 0.83 MG/ML
2.5 SOLUTION RESPIRATORY (INHALATION) EVERY 6 HOURS PRN
Status: DISCONTINUED | OUTPATIENT
Start: 2025-01-22 | End: 2025-01-31 | Stop reason: HOSPADM

## 2025-01-22 RX ORDER — ACETAMINOPHEN 325 MG/1
650 TABLET ORAL EVERY 6 HOURS PRN
Status: DISCONTINUED | OUTPATIENT
Start: 2025-01-22 | End: 2025-01-31 | Stop reason: HOSPADM

## 2025-01-22 RX ORDER — ACETAMINOPHEN 650 MG/1
650 SUPPOSITORY RECTAL EVERY 6 HOURS PRN
Status: DISCONTINUED | OUTPATIENT
Start: 2025-01-22 | End: 2025-01-31 | Stop reason: HOSPADM

## 2025-01-22 RX ORDER — VANCOMYCIN 2 G/400ML
25 INJECTION, SOLUTION INTRAVENOUS ONCE
Status: COMPLETED | OUTPATIENT
Start: 2025-01-22 | End: 2025-01-22

## 2025-01-22 RX ORDER — 0.9 % SODIUM CHLORIDE 0.9 %
500 INTRAVENOUS SOLUTION INTRAVENOUS ONCE
Status: COMPLETED | OUTPATIENT
Start: 2025-01-22 | End: 2025-01-22

## 2025-01-22 RX ADMIN — VANCOMYCIN 2000 MG: 2 INJECTION, SOLUTION INTRAVENOUS at 17:15

## 2025-01-22 RX ADMIN — SODIUM CHLORIDE 500 ML: 9 INJECTION, SOLUTION INTRAVENOUS at 17:15

## 2025-01-22 RX ADMIN — SODIUM CHLORIDE, PRESERVATIVE FREE 10 ML: 5 INJECTION INTRAVENOUS at 22:02

## 2025-01-22 RX ADMIN — WATER 2000 MG: 1 INJECTION INTRAMUSCULAR; INTRAVENOUS; SUBCUTANEOUS at 17:14

## 2025-01-22 ASSESSMENT — PAIN SCALES - GENERAL: PAINLEVEL_OUTOF10: 0

## 2025-01-22 NOTE — TELEPHONE ENCOUNTER
Message received from patient's home health nurse MADYSON James stating she is at patient's home and patient is \"too weak to get out of bed,\" and reporting \"extreme fatigue\"  Reports MAP of 60, temp of 97.5, no LVAD alarms PI 3.3, flow 3.5. Reports yesterday he had chills and dizzy spells.     Reviewed with RUBEN Garibay who stated patient should come to [facility] for evaluation.     Reviewed with MADYSON James who confirmed she called 911 and requested EMS transport for patient to [facility].     Called and spoke with ER charge nurse, notified of expected LVAD patient arrival.  MICHAELA Villa NP notified

## 2025-01-22 NOTE — PROGRESS NOTES
Needs: Unmet Transportation Needs (7/31/2024)    OASIS : Transportation     Lack of Transportation (Medical): Yes     Lack of Transportation (Non-Medical): No     Patient Unable or Declines to Respond: No   Social Connections: Feeling Somewhat Isolated (7/31/2024)    OASIS : Social Isolation     Frequency of experiencing loneliness or isolation: Sometimes   Housing Stability: Low Risk  (7/24/2024)    Housing Stability Vital Sign     Unable to Pay for Housing in the Last Year: No     Number of Places Lived in the Last Year: 1     Unstable Housing in the Last Year: No       LABORATORY RESULTS:   Failed to redirect to the Timeline version of the [employer] SmartLink.  Lab Results   Component Value Date/Time    TSH 1.08 08/07/2024 04:04 PM    TSH 1.23 07/08/2024 04:02 PM    TSH 1.95 06/12/2024 03:06 PM    TSH 4.560 05/30/2024 12:00 AM    TSH 0.92 02/28/2024 03:06 PM    TSH 1.77 01/31/2024 02:15 PM    TSH 4.36 01/08/2024 01:29 PM    TSH 0.37 05/15/2021 02:37 AM       ALLERGY:  Allergies   Allergen Reactions    Bactrim [Sulfamethoxazole-Trimethoprim] Other (See Comments)     Acute Kidney Injury    Ciprofloxacin      Other reaction(s): Unknown (comments)        CURRENT MEDICATIONS:    Current Facility-Administered Medications:     vancomycin (VANCOCIN) 2000 mg in 400 mL IVPB, 25 mg/kg, IntraVENous, Once, Catracho Vasquez MD, Last Rate: 200 mL/hr at 01/22/25 1715, 2,000 mg at 01/22/25 1715    sodium chloride 0.9 % bolus 500 mL, 500 mL, IntraVENous, Once, Catracho Vasquez MD, Last Rate: 967.7 mL/hr at 01/22/25 1715, 500 mL at 01/22/25 1715    sodium chloride flush 0.9 % injection 5-40 mL, 5-40 mL, IntraVENous, 2 times per day, Anabell Villa APRN - CNP    sodium chloride flush 0.9 % injection 5-40 mL, 5-40 mL, IntraVENous, PRN, Anabell Villa APRN - CNP    hydrALAZINE (APRESOLINE) injection 10 mg, 10 mg, IntraVENous, Q4H PRN, Anabell Villa, APRN - CNP    albuterol sulfate HFA (PROVENTIL;VENTOLIN;PROAIR)  mg into the muscle every 30 days, Disp: 1 kit, Rfl: 0    OXYGEN, Inhale 2 L/min into the lungs as needed for Shortness of Breath. O2 via nasal cannula, Disp: , Rfl:     gentamicin (GARAMYCIN) 0.1 % ointment, Apply topically to driveline exit site every day with dressing change, Disp: 30 g, Rfl: 1    lactobacillus (CULTURELLE) capsule, Take 1 capsule by mouth daily (with breakfast), Disp: 30 capsule, Rfl: 2    albuterol sulfate HFA (PROVENTIL;VENTOLIN;PROAIR) 108 (90 Base) MCG/ACT inhaler, Inhale 2 puffs into the lungs every 4 hours as needed for Shortness of Breath or Wheezing, Disp: 18 g, Rfl: 3    Magnesium 400 MG TABS, Take 1 tablet by mouth in the morning and at bedtime, Disp: , Rfl:     vitamin D3 (CHOLECALCIFEROL) 25 MCG (1000 UT) TABS tablet, Take 2 tablets by mouth daily, Disp: 180 tablet, Rfl: 1    fluticasone-umeclidin-vilant (TRELEGY ELLIPTA) 200-62.5-25 MCG/ACT AEPB inhaler, Inhale 1 puff into the lungs daily, Disp: , Rfl:      PATIENT CARE TEAM:  Patient Care Team:  Ranjan Porter MD as PCP - General  Ranjan Porter MD as PCP - Empaneled Provider     Thank you for allowing me to participate in this patient's care.    TAIWO Fagan - CNP   Advanced Heart Failure Center  [facility]  [address] [place] [address], [address]  Phone: [phone]    On this date 1/22/2025, I have spent a total time of  40 minutes personally reviewing new vitals, test results, notes from recent visits, face to face encounter/physical exam of patient with counseling, writing orders, performing medical decision making, and documenting.

## 2025-01-22 NOTE — ED TRIAGE NOTES
Pt arrived via EMS from home with CC SOB worsening since yesterday. 88% on RA. Wears 2L/min O2 at home. Started on 3 L/min for EMS. Aox4. GCS 15.     PMH: COPD, LVAD    Denies N/V/D/C, cough, CP, fever

## 2025-01-22 NOTE — TELEPHONE ENCOUNTER
Called pt to confirm 1/23 appt but wife said that pt was too sick to arrange transportation, and he changed insurances.  I attempted to reschedule him, but the only openings I saw, other than LVAD annuals were at the end of Feb in the afternoon, which wife said that she can't do.  She needs early morning b/c she has to be to work by 10:30 a.m.  I told them that I'd share all of this with Funmi and she'd be back in touch about rescheduling and transportation.

## 2025-01-22 NOTE — ED PROVIDER NOTES
mis-transcribed.)    Catracho Vasquez MD (electronically signed)  Emergency Attending Physician / Physician Assistant / Nurse Practitioner      Perfect Serve Consult for Admission  5:32 PM    ED Room Number: ER16/16  Patient Name and age:  Sanford Joiner Sr. 65 y.o.  male  Working Diagnosis:   1. SIRS (systemic inflammatory response syndrome) (HCC)    2. Complication involving left ventricular assist device (LVAD), initial encounter        COVID-19 Suspicion: No  Sepsis present:  No  Reassessment needed: Yes  Code Status:  Full Code  Readmission: No  Isolation Requirements: no  Recommended Level of Care: step down  Department: [facility] ED - [phone]  Consulting Provider: Advanced Heart Failure    Other:      Total critical care time spent exclusive of procedures:  54 minutes.          Catracho Vasquez MD  01/22/25 [id number]

## 2025-01-23 ENCOUNTER — APPOINTMENT (OUTPATIENT)
Facility: HOSPITAL | Age: 65
End: 2025-01-23
Payer: MEDICARE

## 2025-01-23 PROBLEM — L08.9 STERNAL WOUND INFECTION: Status: ACTIVE | Noted: 2025-01-23

## 2025-01-23 PROBLEM — S21.101A STERNAL WOUND INFECTION: Status: ACTIVE | Noted: 2025-01-23

## 2025-01-23 PROBLEM — T82.7XXA: Status: ACTIVE | Noted: 2025-01-23

## 2025-01-23 PROBLEM — N17.9 AKI (ACUTE KIDNEY INJURY) (HCC): Status: ACTIVE | Noted: 2025-01-23

## 2025-01-23 PROBLEM — R65.10 SIRS (SYSTEMIC INFLAMMATORY RESPONSE SYNDROME) (HCC): Status: ACTIVE | Noted: 2025-01-23

## 2025-01-23 LAB
ACB COMPLEX DNA BLD POS QL NAA+NON-PROBE: NOT DETECTED
ACCESSION NUMBER, LLC1M: ABNORMAL
ALBUMIN SERPL-MCNC: 2.4 G/DL (ref 3.5–5)
ALBUMIN/GLOB SERPL: 0.6 (ref 1.1–2.2)
ALP SERPL-CCNC: 84 U/L (ref 45–117)
ALT SERPL-CCNC: 41 U/L (ref 12–78)
ANION GAP SERPL CALC-SCNC: 10 MMOL/L (ref 2–12)
APPEARANCE UR: CLEAR
AST SERPL-CCNC: 40 U/L (ref 15–37)
B FRAGILIS DNA BLD POS QL NAA+NON-PROBE: NOT DETECTED
BACTERIA URNS QL MICRO: NEGATIVE /HPF
BILIRUB DIRECT SERPL-MCNC: 0.1 MG/DL (ref 0–0.2)
BILIRUB SERPL-MCNC: 0.3 MG/DL (ref 0.2–1)
BILIRUB UR QL: NEGATIVE
BIOFIRE TEST COMMENT: ABNORMAL
BLACTX-M ISLT/SPM QL: NOT DETECTED
BLAIMP ISLT/SPM QL: NOT DETECTED
BLAKPC ISLT/SPM QL: NOT DETECTED
BLAOXA-48-LIKE ISLT/SPM QL: NOT DETECTED
BLAVIM ISLT/SPM QL: NOT DETECTED
BUN SERPL-MCNC: 35 MG/DL (ref 6–20)
BUN/CREAT SERPL: 11 (ref 12–20)
C ALBICANS DNA BLD POS QL NAA+NON-PROBE: NOT DETECTED
C AURIS DNA BLD POS QL NAA+NON-PROBE: NOT DETECTED
C GATTII+NEOFOR DNA BLD POS QL NAA+N-PRB: NOT DETECTED
C GLABRATA DNA BLD POS QL NAA+NON-PROBE: NOT DETECTED
C KRUSEI DNA BLD POS QL NAA+NON-PROBE: NOT DETECTED
C PARAP DNA BLD POS QL NAA+NON-PROBE: NOT DETECTED
C TROPICLS DNA BLD POS QL NAA+NON-PROBE: NOT DETECTED
CALCIUM SERPL-MCNC: 8.5 MG/DL (ref 8.5–10.1)
CHLORIDE SERPL-SCNC: 103 MMOL/L (ref 97–108)
CO2 SERPL-SCNC: 19 MMOL/L (ref 21–32)
COLISTIN RES MCR-1 ISLT/SPM QL: NOT DETECTED
COLOR UR: ABNORMAL
CREAT SERPL-MCNC: 3.15 MG/DL (ref 0.7–1.3)
E CLOAC COMP DNA BLD POS NAA+NON-PROBE: DETECTED
E COLI DNA BLD POS QL NAA+NON-PROBE: NOT DETECTED
E FAECALIS DNA BLD POS QL NAA+NON-PROBE: NOT DETECTED
E FAECIUM DNA BLD POS QL NAA+NON-PROBE: NOT DETECTED
EKG ATRIAL RATE: 102 BPM
EKG DIAGNOSIS: NORMAL
EKG P-R INTERVAL: 38 MS
EKG Q-T INTERVAL: 458 MS
EKG QRS DURATION: 158 MS
EKG QTC CALCULATION (BAZETT): 596 MS
EKG R AXIS: -32 DEGREES
EKG T AXIS: -45 DEGREES
EKG VENTRICULAR RATE: 102 BPM
ENTEROBACTERALES DNA BLD POS NAA+N-PRB: DETECTED
EPITH CASTS URNS QL MICRO: ABNORMAL /LPF
ERYTHROCYTE [DISTWIDTH] IN BLOOD BY AUTOMATED COUNT: 13.5 % (ref 11.5–14.5)
GLOBULIN SER CALC-MCNC: 4.2 G/DL (ref 2–4)
GLUCOSE SERPL-MCNC: 131 MG/DL (ref 65–100)
GLUCOSE UR STRIP.AUTO-MCNC: NEGATIVE MG/DL
GP B STREP DNA BLD POS QL NAA+NON-PROBE: NOT DETECTED
HAEM INFLU DNA BLD POS QL NAA+NON-PROBE: NOT DETECTED
HCT VFR BLD AUTO: 28.3 % (ref 36.6–50.3)
HGB BLD-MCNC: 9.1 G/DL (ref 12.1–17)
HGB UR QL STRIP: NEGATIVE
HYALINE CASTS URNS QL MICRO: ABNORMAL /LPF (ref 0–5)
INR PPP: 2.8 (ref 0.9–1.1)
K OXYTOCA DNA BLD POS QL NAA+NON-PROBE: NOT DETECTED
KETONES UR QL STRIP.AUTO: ABNORMAL MG/DL
KLEBSIELLA SP DNA BLD POS QL NAA+NON-PRB: NOT DETECTED
KLEBSIELLA SP DNA BLD POS QL NAA+NON-PRB: NOT DETECTED
L MONOCYTOG DNA BLD POS QL NAA+NON-PROBE: NOT DETECTED
LACTATE SERPL-SCNC: 1.2 MMOL/L (ref 0.4–2)
LDH SERPL L TO P-CCNC: 234 U/L (ref 85–241)
LEUKOCYTE ESTERASE UR QL STRIP.AUTO: NEGATIVE
MAGNESIUM SERPL-MCNC: 1.8 MG/DL (ref 1.6–2.4)
MCH RBC QN AUTO: 28.5 PG (ref 26–34)
MCHC RBC AUTO-ENTMCNC: 32.2 G/DL (ref 30–36.5)
MCV RBC AUTO: 88.7 FL (ref 80–99)
N MEN DNA BLD POS QL NAA+NON-PROBE: NOT DETECTED
NITRITE UR QL STRIP.AUTO: NEGATIVE
NRBC # BLD: 0 K/UL (ref 0–0.01)
NRBC BLD-RTO: 0 PER 100 WBC
P AERUGINOSA DNA BLD POS NAA+NON-PROBE: NOT DETECTED
PH UR STRIP: 5 (ref 5–8)
PLATELET # BLD AUTO: 180 K/UL (ref 150–400)
PMV BLD AUTO: 11.6 FL (ref 8.9–12.9)
POTASSIUM SERPL-SCNC: 3.9 MMOL/L (ref 3.5–5.1)
PROT SERPL-MCNC: 6.6 G/DL (ref 6.4–8.2)
PROT UR STRIP-MCNC: 30 MG/DL
PROTEUS SP DNA BLD POS QL NAA+NON-PROBE: NOT DETECTED
PROTHROMBIN TIME: 28.1 SEC (ref 9.2–11.2)
RBC # BLD AUTO: 3.19 M/UL (ref 4.1–5.7)
RBC #/AREA URNS HPF: ABNORMAL /HPF (ref 0–5)
RESISTANT GENE NDM BY PCR: NOT DETECTED
RESISTANT GENE TARGETS: ABNORMAL
S AUREUS DNA BLD POS QL NAA+NON-PROBE: NOT DETECTED
S AUREUS+CONS DNA BLD POS NAA+NON-PROBE: NOT DETECTED
S EPIDERMIDIS DNA BLD POS QL NAA+NON-PRB: NOT DETECTED
S LUGDUNENSIS DNA BLD POS QL NAA+NON-PRB: NOT DETECTED
S MALTOPHILIA DNA BLD POS QL NAA+NON-PRB: NOT DETECTED
S MARCESCENS DNA BLD POS NAA+NON-PROBE: NOT DETECTED
S PNEUM DNA BLD POS QL NAA+NON-PROBE: NOT DETECTED
S PYO DNA BLD POS QL NAA+NON-PROBE: NOT DETECTED
SALMONELLA DNA BLD POS QL NAA+NON-PROBE: NOT DETECTED
SODIUM SERPL-SCNC: 132 MMOL/L (ref 136–145)
SP GR UR REFRACTOMETRY: 1.02 (ref 1–1.03)
STREPTOCOCCUS DNA BLD POS NAA+NON-PROBE: NOT DETECTED
URINE CULTURE IF INDICATED: ABNORMAL
UROBILINOGEN UR QL STRIP.AUTO: 0.2 EU/DL (ref 0.2–1)
WBC # BLD AUTO: 21.7 K/UL (ref 4.1–11.1)
WBC URNS QL MICRO: ABNORMAL /HPF (ref 0–4)

## 2025-01-23 PROCEDURE — 93010 ELECTROCARDIOGRAM REPORT: CPT | Performed by: SPECIALIST

## 2025-01-23 PROCEDURE — 94640 AIRWAY INHALATION TREATMENT: CPT

## 2025-01-23 PROCEDURE — 87205 SMEAR GRAM STAIN: CPT

## 2025-01-23 PROCEDURE — 83735 ASSAY OF MAGNESIUM: CPT

## 2025-01-23 PROCEDURE — 6370000000 HC RX 637 (ALT 250 FOR IP): Performed by: NURSE PRACTITIONER

## 2025-01-23 PROCEDURE — 87186 SC STD MICRODIL/AGAR DIL: CPT

## 2025-01-23 PROCEDURE — 6370000000 HC RX 637 (ALT 250 FOR IP): Performed by: INTERNAL MEDICINE

## 2025-01-23 PROCEDURE — APPNB45 APP NON BILLABLE 31-45 MINUTES

## 2025-01-23 PROCEDURE — 83605 ASSAY OF LACTIC ACID: CPT

## 2025-01-23 PROCEDURE — 81001 URINALYSIS AUTO W/SCOPE: CPT

## 2025-01-23 PROCEDURE — 71250 CT THORAX DX C-: CPT

## 2025-01-23 PROCEDURE — 6360000002 HC RX W HCPCS: Performed by: INTERNAL MEDICINE

## 2025-01-23 PROCEDURE — 85027 COMPLETE CBC AUTOMATED: CPT

## 2025-01-23 PROCEDURE — 85610 PROTHROMBIN TIME: CPT

## 2025-01-23 PROCEDURE — 6370000000 HC RX 637 (ALT 250 FOR IP): Performed by: FAMILY MEDICINE

## 2025-01-23 PROCEDURE — 83615 LACTATE (LD) (LDH) ENZYME: CPT

## 2025-01-23 PROCEDURE — 87077 CULTURE AEROBIC IDENTIFY: CPT

## 2025-01-23 PROCEDURE — 6360000002 HC RX W HCPCS: Performed by: FAMILY MEDICINE

## 2025-01-23 PROCEDURE — 80076 HEPATIC FUNCTION PANEL: CPT

## 2025-01-23 PROCEDURE — 93750 INTERROGATION VAD IN PERSON: CPT | Performed by: STUDENT IN AN ORGANIZED HEALTH CARE EDUCATION/TRAINING PROGRAM

## 2025-01-23 PROCEDURE — 2060000000 HC ICU INTERMEDIATE R&B

## 2025-01-23 PROCEDURE — 2580000003 HC RX 258: Performed by: FAMILY MEDICINE

## 2025-01-23 PROCEDURE — 36415 COLL VENOUS BLD VENIPUNCTURE: CPT

## 2025-01-23 PROCEDURE — 2700000000 HC OXYGEN THERAPY PER DAY

## 2025-01-23 PROCEDURE — 2500000003 HC RX 250 WO HCPCS: Performed by: FAMILY MEDICINE

## 2025-01-23 PROCEDURE — 2500000003 HC RX 250 WO HCPCS

## 2025-01-23 PROCEDURE — 99223 1ST HOSP IP/OBS HIGH 75: CPT

## 2025-01-23 PROCEDURE — 87070 CULTURE OTHR SPECIMN AEROBIC: CPT

## 2025-01-23 PROCEDURE — 80048 BASIC METABOLIC PNL TOTAL CA: CPT

## 2025-01-23 PROCEDURE — 99232 SBSQ HOSP IP/OBS MODERATE 35: CPT | Performed by: STUDENT IN AN ORGANIZED HEALTH CARE EDUCATION/TRAINING PROGRAM

## 2025-01-23 RX ORDER — PRAVASTATIN SODIUM 40 MG
40 TABLET ORAL NIGHTLY
Status: DISCONTINUED | OUTPATIENT
Start: 2025-01-23 | End: 2025-01-31 | Stop reason: HOSPADM

## 2025-01-23 RX ORDER — BUMETANIDE 1 MG/1
1 TABLET ORAL DAILY
Status: DISCONTINUED | OUTPATIENT
Start: 2025-01-23 | End: 2025-01-31 | Stop reason: HOSPADM

## 2025-01-23 RX ORDER — LANOLIN ALCOHOL/MO/W.PET/CERES
400 CREAM (GRAM) TOPICAL 2 TIMES DAILY
Status: DISCONTINUED | OUTPATIENT
Start: 2025-01-23 | End: 2025-01-31 | Stop reason: HOSPADM

## 2025-01-23 RX ORDER — CALCIUM CARBONATE 500 MG/1
500 TABLET, CHEWABLE ORAL 3 TIMES DAILY PRN
Status: DISCONTINUED | OUTPATIENT
Start: 2025-01-23 | End: 2025-01-31 | Stop reason: HOSPADM

## 2025-01-23 RX ORDER — BUDESONIDE 0.5 MG/2ML
1 INHALANT ORAL
Status: DISCONTINUED | OUTPATIENT
Start: 2025-01-23 | End: 2025-01-31 | Stop reason: HOSPADM

## 2025-01-23 RX ORDER — HYDRALAZINE HYDROCHLORIDE 25 MG/1
25 TABLET, FILM COATED ORAL EVERY 8 HOURS SCHEDULED
Status: DISCONTINUED | OUTPATIENT
Start: 2025-01-23 | End: 2025-01-27

## 2025-01-23 RX ORDER — PANTOPRAZOLE SODIUM 40 MG/1
40 TABLET, DELAYED RELEASE ORAL
Status: DISCONTINUED | OUTPATIENT
Start: 2025-01-23 | End: 2025-01-31 | Stop reason: HOSPADM

## 2025-01-23 RX ORDER — ALLOPURINOL 100 MG/1
50 TABLET ORAL DAILY
Status: DISCONTINUED | OUTPATIENT
Start: 2025-01-23 | End: 2025-01-31 | Stop reason: HOSPADM

## 2025-01-23 RX ORDER — LACTOBACILLUS RHAMNOSUS GG 10B CELL
1 CAPSULE ORAL
Status: DISCONTINUED | OUTPATIENT
Start: 2025-01-23 | End: 2025-01-31 | Stop reason: HOSPADM

## 2025-01-23 RX ADMIN — Medication 400 MG: at 09:17

## 2025-01-23 RX ADMIN — BUDESONIDE 1000 MCG: 0.5 INHALANT RESPIRATORY (INHALATION) at 10:28

## 2025-01-23 RX ADMIN — Medication 400 MG: at 20:51

## 2025-01-23 RX ADMIN — PRAVASTATIN SODIUM 40 MG: 40 TABLET ORAL at 20:51

## 2025-01-23 RX ADMIN — SODIUM CHLORIDE, PRESERVATIVE FREE 10 ML: 5 INJECTION INTRAVENOUS at 09:18

## 2025-01-23 RX ADMIN — CEFEPIME 1000 MG: 1 INJECTION, POWDER, FOR SOLUTION INTRAMUSCULAR; INTRAVENOUS at 16:44

## 2025-01-23 RX ADMIN — ACETAMINOPHEN 650 MG: 325 TABLET ORAL at 16:43

## 2025-01-23 RX ADMIN — CEFEPIME 1000 MG: 1 INJECTION, POWDER, FOR SOLUTION INTRAMUSCULAR; INTRAVENOUS at 05:06

## 2025-01-23 RX ADMIN — SODIUM CHLORIDE, PRESERVATIVE FREE 10 ML: 5 INJECTION INTRAVENOUS at 09:19

## 2025-01-23 RX ADMIN — SODIUM CHLORIDE, PRESERVATIVE FREE 10 ML: 5 INJECTION INTRAVENOUS at 20:51

## 2025-01-23 RX ADMIN — Medication 1 CAPSULE: at 09:17

## 2025-01-23 RX ADMIN — ALLOPURINOL 50 MG: 100 TABLET ORAL at 09:18

## 2025-01-23 RX ADMIN — CALCIUM CARBONATE (ANTACID) CHEW TAB 500 MG 500 MG: 500 CHEW TAB at 03:12

## 2025-01-23 RX ADMIN — PANTOPRAZOLE SODIUM 40 MG: 40 TABLET, DELAYED RELEASE ORAL at 09:27

## 2025-01-23 ASSESSMENT — PAIN DESCRIPTION - ORIENTATION: ORIENTATION: MID

## 2025-01-23 ASSESSMENT — PAIN DESCRIPTION - LOCATION: LOCATION: HEAD

## 2025-01-23 ASSESSMENT — PAIN DESCRIPTION - DESCRIPTORS: DESCRIPTORS: ACHING

## 2025-01-23 ASSESSMENT — PAIN SCALES - GENERAL: PAINLEVEL_OUTOF10: 3

## 2025-01-23 NOTE — PROGRESS NOTES
[facility]                                                                                          Hospitalist Progress Note  Emily Yeung MD  Answering service: [phone] OR [phone] from in house phone        Date of Service:  2025  NAME:  Sanford Joiner Sr.  :  1960  MRN:  [id number]       Admission Summary:   Sanford Joiner Sr. is a 65 y.o. 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     IN the ED,  spo2 95% on 3Lnc.  Other VSS. Labs showed BuN 35, creatinine 3.15,  WBC 21.7, and hgb 9.1 down from 11.9, and INR 2.8.     Interval history / Subjective:   Patient seen and examined.   He denies any pain, or shortness of breath.   Lower sternal wound covered with a clean dressing, no active drainage or bleeding at the time of my visit.      Assessment & Plan:           Sepsis, POA  Hx chronic driveline infection, on chronic suppression therapy with cefdinir  -continue vanc and cefeipme  -follow blood cultures  -consulted ID     HFrEF s/p ICD and LVAD  -heart failure team consulted and following  -continue GDMT     COPD  -O2 prn  -DuoNebs prn       Code status: Full   Prophylaxis: Coumadin  Care Plan discussed with: patient, RN, CM;  Anticipated Disposition: TBD  Inpatient  Cardiac monitoring: Telemetry         Principal Problem:    Sepsis (HCC)  Resolved Problems:    * No resolved hospital problems. *         Social Determinants of Health     Tobacco Use: Medium Risk (2024)    Patient History     Smoking Tobacco Use: Former     Smokeless Tobacco Use: Never     Passive Exposure: Not on file   Alcohol Use: Not on file   Financial Resource Strain: Not on file   Food Insecurity: No Food Insecurity (2024)    Hunger Vital Sign     Worried About Running Out of Food in the Last Year: Never true     Ran Out of Food in the Last Year: Never true   Transportation  (mini-bag)  1,000 mg IntraVENous Q12H    warfarin placeholder: dosing by pharmacy   Oral RX Placeholder     ______________________________________________________________________  EXPECTED LENGTH OF STAY: Unable to retrieve estimated LOS  ACTUAL LENGTH OF STAY:          1                 Emily Yeung MD

## 2025-01-23 NOTE — PROGRESS NOTES
Physical Therapy  01/23/25    Orders received and chart reviewed up to this date. Cleared to see by RN. Pt received semi-reclined in bed resting and declined PT evaluation at this time despite max verbal encouragement/education. Will defer and continue to follow up as able/medically appropriate.     Thank you,  Lisa Saul, PT, DPT, NCS

## 2025-01-23 NOTE — PROGRESS NOTES
Day #1 of cefepime  Indication:  sepsis/driveline infection  Current regimen:  2g q24h  Abx regimen: vanc/cefepime  Recent Labs     25  1448 25  1946   WBC 25.0* 23.3*   CREATININE 3.19*  --    BUN 28*  --      Est CrCl: 24 ml/min;   Temp (24hrs), Av.4 °F (36.9 °C), Min:98.4 °F (36.9 °C), Max:98.4 °F (36.9 °C)    Cultures: blood cx drawn    Plan: Change to 1g q12h after initial 2gm dose

## 2025-01-23 NOTE — PROGRESS NOTES
Pharmacist Note - Warfarin Dosing  Consult provided for this 65 y.o.male to manage warfarin for LVAD    INR Goal: Other, 1.8-2.2    Home regimen/ tablet size: 4 mg Sun, Wed, Fri; 2 mg all other days of the week     Drugs that may increase INR: None  Drugs that may decrease INR: None  Other current anticoagulants/ drugs that may increase bleeding risk: None  Risk factors: Age > 65 and Decompensated Heart Failure  Daily INR ordered through: 4/15    Recent Labs     01/22/25  1448 01/22/25  1946 01/23/25  0034   HGB 11.9* 9.2* 9.1*   INR  --  2.5* 2.8*     Date               INR                  Dose  1/22  2.5  Hold   1/23  2.8  Hold                                                                                 Assessment/ Plan:  INR remains above goal. Will Hold Warfarin x 1 dose.    Pharmacy will continue to monitor daily and adjust therapy as indicated.  Please contact the pharmacist at [phone] for outpatient recommendations if needed.

## 2025-01-23 NOTE — PROGRESS NOTES
Pharmacist Note - Vancomycin Dosing    Consult provided for this 65 y.o. male for indication of chronic drive line infection.  Antibiotic regimen(s): Vancomycin + Cefepime  Patient on vancomycin PTA? NO   Pregnancy status: N/A    Recent Labs     25  1448 25  1946   WBC 25.0* 23.3*   CREATININE 3.19*  --    BUN 28*  --      Frequency of BMP: Daily   Height: 170.2 cm  Weight: 82.5 kg  Est CrCl: 24 ml/min; UO: --- ml/kg/hr  Temp (24hrs), Av.1 °F (36.7 °C), Min:97.8 °F (36.6 °C), Max:98.4 °F (36.9 °C)    Cultures:  Blood x 2-NGTD    MRSA Swab ordered (if applicable)? N/A    The plan below is expected to result in a target range of Trough 10-15 mcg/mL    Therapy will be initiated with a loading dose of 2000 mg IV x 1 then hold.  Will dose per levels for now until renal function improves.  Pharmacy to follow patient daily and order levels / make dose adjustments as appropriate.    Fish Badillo, DustinD

## 2025-01-23 NOTE — CARDIO/PULMONARY
Chart reviewed: Patient is 65 y.o. male admitted with SIRS (systemic inflammatory response syndrome) ([facility]) [R65.10]  DAWOOD (acute kidney injury) (HCC) [N17.9]  Sepsis (HCC) [A41.9]  Complication involving left ventricular assist device (LVAD), initial encounter [T82.9XXA]. Most recent EF is EF: 20%.    Patient is not a candidate for cardiac rehab due to non-compliance issues.   SHERRY GUTIERREZ RN

## 2025-01-23 NOTE — PROGRESS NOTES
4 Eyes Skin Assessment     NAME:  Sanford Joiner Sr.  YOB: 1960  MEDICAL RECORD NUMBER:  [id number]    The patient is being assessed for  Admission    I agree that at least one RN has performed a thorough Head to Toe Skin Assessment on the patient. ALL assessment sites listed below have been assessed.      Areas assessed by both nurses:    Head, Face, Ears, Arms, Elbows, Hands, and Under Medical Devices         Does the Patient have a Wound? No noted wound(s)       Pierre Prevention initiated by RN: No  Wound Care Orders initiated by RN: No    Pressure Injury (Stage 3,4, Unstageable, DTI, NWPT, and Complex wounds) if present, place Wound referral order by RN under : No    New Ostomies, if present place, Ostomy referral order under : No     Nurse 1 eSignature: Electronically signed by Saadia Price RN on 1/23/25 at 12:55 AM EST    **SHARE this note so that the co-signing nurse can place an eSignature**    Nurse 2 eSignature: Electronically signed by Ceasar Lopez RN on 1/23/25 at 1:55 AM EST

## 2025-01-23 NOTE — CONSULTS
Infectious Disease Consult    Impression/Plan     65 y.o. male with past medical Hx of HFreF s/p ICD and LVAD complicated by sternal wound infection and driveline abscess with MSSAand E.cloacae,MSSA bacteremia, on chronic cefdinir, and COPD who presented with shortness of breath, admitted for sepsis.     Enterobacter Bacteremia  Sternal and driveline wound  Sternal wire fracture  DAWOOD   Leukocytosis  Hx chronic driveline infection    Failed outpt oral suppressive therapy with cefdinir  Previous driveline abscess cx MSSA. E.cloacae. Given previous cultures, stopped vancomycin. continue renally dosed cefepime    Repeat blood cx tomorrow    Monitor renal function, previous creatinine 1.77 in 12/2024    Agree with CT chest to eval driveline and sternal wound including broken sternal wire seen on CXR    Given E.cloacae bacteremia, may need debridement for source control    Driveline wound with drainage per nursing, collect wound cx from drainage    Wound care per wound care team      D/w Dr. Rajan, pt, cardiac surgery PA                   Anti-infectives:   Vancomycin  cefepime    Subjective:   Date of Consultation:  January 23, 2025  Date of Admission: 1/22/2025   Referring Physician: Felicitas Altman    Patient is a 65 y.o. male with past medical Hx of HFreF s/p ICD and LVAD complicated by sternal wound infection and driveline abscess with MSSAand E.cloacae,MSSA bacteremia, on chronic cefdinir, and COPD who presented with shortness of breath, admitted for sepsis.     Patient has complicated Hx of sternal wound infection with repeated attempts at drainage of driveline abscess.Treated with course of ceftriaxone, cefazolin with oral suppressive Keflex.  Admitted in 7/2024 with sternal abscess and underwent debridement on 7/25/2024 with cultures growing Enterobacter cloacae, MSSA.  Completed long course of cefepime on 8/25/2024 and started on oral suppressive cefdinir.  ED workup with leukocytosis, afebrile, DAWOOD.  MARCIO yoder.

## 2025-01-23 NOTE — PROGRESS NOTES
hematuria.   Musculoskeletal:  Negative for arthralgias and myalgias.   Skin:  Negative for rash and wound.        Still having drainage form driveline site and abd wound sites, but no worse then usual.  Occ blood when they clean it    Allergic/Immunologic: Negative.    Neurological:  Negative for dizziness, weakness, light-headedness and headaches.        On occasion   Psychiatric/Behavioral:  Negative for confusion and dysphoric mood. The patient is not nervous/anxious.         PHYSICAL EXAM:  Pulse 79   Temp 98.2 °F (36.8 °C) (Oral)   Resp 21   Ht 1.702 m (5' 7\")   Wt 79.1 kg (174 lb 6.4 oz)   SpO2 96%   BMI 27.31 kg/m²     Physical Exam  Vitals reviewed.   Constitutional:       General: He is not in acute distress.     Appearance: Normal appearance. He is not ill-appearing.   HENT:      Head: Normocephalic and atraumatic.      Nose: No congestion or rhinorrhea.      Mouth/Throat:      Mouth: Mucous membranes are moist.      Pharynx: Oropharynx is clear.   Eyes:      General: No scleral icterus.     Conjunctiva/sclera: Conjunctivae normal.   Neck:      Vascular: No JVD.   Cardiovascular:      Rate and Rhythm: Normal rate and regular rhythm.      Pulses: No decreased pulses.      Heart sounds: Normal heart sounds. No murmur heard.     No friction rub. No gallop.      Comments: VAD hum    Pulmonary:      Effort: Pulmonary effort is normal. No respiratory distress.      Breath sounds: No decreased air movement. Wheezing present.   Abdominal:      General: Bowel sounds are normal. There is no distension.      Palpations: Abdomen is soft.   Musculoskeletal:         General: No swelling or deformity.      Cervical back: Normal range of motion and neck supple.      Right lower leg: No edema.      Left lower leg: No edema.   Skin:     General: Skin is warm and dry.      Capillary Refill: Capillary refill takes less than 2 seconds.      Coloration: Skin is pale. Skin is not jaundiced.      Comments: Drive line  [phone]    On this date 1/23/2025, I have spent a total time of  40 minutes personally reviewing new vitals, test results, notes from recent visits, face to face encounter/physical exam of patient with counseling, writing orders, performing medical decision making, and documenting.
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
## 2025-01-23 NOTE — PROGRESS NOTES
2145: Report received on Sanford Joiner Sr. transferring from [facility] ED to [facility] 466.    2155: Pt arrived to unit. Connected to monitor, bed alarm and VS obtained. Pt oriented to room and educated on safety measures. Pt verbalized understanding. Pt resting in at lowest position with call bell in reach.     2310: Pt ambulated to bathroom, standby assist. MOORE. x1 BM. Pt refused bed alarm when returned to bed.     0015: LVAD driveline dressing changed. Erythema noted at driveline site. Yellow/brown purulent drainage on old gauze from previous dressing, moderate amount. Scant amount of blood noted. No active drainage, pt denies pain or discomfort at site. Per pt, driveline changed by HH RN prior to calling EMS 1/22/24.     0700: Previous wound vac site (mid-lower chest) cleansed with saline and new foam dressing applied. Large amount of purulent drainage from site, mild odor. Pt denies pain or discomfort at site. Pt states site \"bleeds easily\" when changed at home. No bleeding this morning. CHG and new gown completed.     Labs and UA obtained. See results. Standing weight obtained.

## 2025-01-23 NOTE — CONSULTS
Infectious Disease Services Note    Not a new consult, see Consult note from today            Donna Pickett APRN-NP

## 2025-01-23 NOTE — PROGRESS NOTES
Pharmacist Note - Warfarin Dosing  Consult provided for this 65 y.o.male to manage warfarin for LVAD    INR Goal: Other, 1.8-2.2    Home regimen/ tablet size: 4 mg Sun, Wed, Fri; 2 mg all other days of the week     Drugs that may increase INR: None  Drugs that may decrease INR: None  Other current anticoagulants/ drugs that may increase bleeding risk: None  Risk factors: Age > 65 and Decompensated Heart Failure  Daily INR ordered through: 4/15    Recent Labs     01/22/25  1448 01/22/25  1946   HGB 11.9* 9.2*   INR  --  2.5*     Date               INR                  Dose  1/22  2.5  Hold                                                                                Assessment/ Plan:  INR above goal today, will Hold Warfarin x 1 dose.    Pharmacy will continue to monitor daily and adjust therapy as indicated.  Please contact the pharmacist at [phone] for outpatient recommendations if needed.     Fish Badillo, DustinD

## 2025-01-23 NOTE — CONSULTS
removed from R side    HARDWARE REMOVAL N/A 2024    STERNAL WOUND DEBRIDEMENT, WOUND VAC PLACEMENT performed by Ronn Gomez MD at [facility] OPEN HEART    LEFT VENTRICULAR ASSIST DEVICE  2023    PACEMAKER PLACEMENT      Subcutanous    SHOULDER ARTHROSCOPY Right     UPPER GASTROINTESTINAL ENDOSCOPY N/A 10/13/2023    EGD ESOPHAGOGASTRODUODENOSCOPY (LVAD) performed by Uzma Tinoco MD at [facility] ENDOSCOPY    UPPER GASTROINTESTINAL ENDOSCOPY N/A 2024    EGD ESOPHAGOGASTRODUODENOSCOPY performed by Praful Buck MD at [facility] ENDOSCOPY    US SOFT TISSUE ABSCESS DRAINAGE PERC  2024    US DRAIN SOFT TISSUE ABSCESS 2024 Daisy Decker MD [facility] RAD US    US SOFT TISSUE ABSCESS DRAINAGE PERC  2024    US DRAIN SOFT TISSUE ABSCESS 2024 Hank Villa Jr., APRN - NP [facility] RAD US    US SOFT TISSUE ABSCESS DRAINAGE PERC  2024    US DRAIN SOFT TISSUE ABSCESS 2024 Pia Victor APRN - NP [facility] RAD US      Social History     Tobacco Use    Smoking status: Former     Current packs/day: 0.00     Average packs/day: 1 pack/day for 14.3 years (14.3 ttl pk-yrs)     Types: Cigarettes     Start date:      Quit date: 2020     Years since quittin.7    Smokeless tobacco: Never   Substance Use Topics    Alcohol use: Not Currently      No family history on file.  Prior to Admission medications    Medication Sig Start Date End Date Taking? Authorizing Provider   pantoprazole (PROTONIX) 40 MG tablet TAKE 1 TABLET BY MOUTH DAILY 25  Yes Liberatd, Nessa B, APRN - NP   hydrALAZINE (APRESOLINE) 25 MG tablet TAKE THREE TABLETS BY MOUTH EVERY 8 HOURS 24  Yes Libertad, Nessa B, APRN - NP   warfarin (COUMADIN) 1 MG tablet Take 4 tablets by mouth every , Wednesday and Friday, take 2 tablets by mouth all other days. 24  Yes Farshad Gillespie MD   metoprolol succinate (TOPROL XL) 50 MG extended release tablet Take 1.5 tablets by mouth daily 24  Yes Geni Garibay APRN - NP  132*   K 3.9   BUN 35*   INR 2.8*     Imaging:  Xray Result (most recent):  XR CHEST PORTABLE 01/22/2025    Narrative  EXAM:  XR CHEST PORTABLE    INDICATION: shortness of breath    COMPARISON: 7/8/2024 and 3 3124    TECHNIQUE: 1506 hours portable chest AP view    FINDINGS: Status post median sternotomy. The uppermost sternal wire is broken.  It has been broken on the previous radiographs however on today's examination a  piece of the superiormost sternal wire that measures 13 mm in length as broken  off and migrated inferiorly projecting over the lower edge of the manubrium at  the level of the second sternal wire.    LVAD is in place. Subcutaneous ICD is in place.    The heart size is normal.    The lungs are clear.    Osseous structures are unremarkable.    Impression  1. Lungs are clear  2. The superiormost sternal wire is broken and a piece of wire fragment broken  wire has migrated inferiorly.    Electronically signed by DAIJA SERRANO        Assessment:     Principal Problem:    Sepsis (HCC)  Resolved Problems:    * No resolved hospital problems. 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 Repeat cultures pending.  - ID following.   - Received Vancomycin on admission but switched to cefepime 1G Q12.  - Agree with CTA. Will review images when available and discuss any potential intervention with Dr. Gomez. Given prior unsuccessful surgical debridement and wound vac therapy, may have to consider medical management alone but will review CT before making determination.   - GDMT, INR, and coumadin management per F.  - Agree with wound care consult    Sternal wire fracture:  - Uppermost sternal wire fracture with 2 cm migration of wire fragment seen on CXR. This is an incidental finding and is unrelated to current wound infections.   -

## 2025-01-23 NOTE — PROGRESS NOTES
Occupational Therapy  01/23/25    Orders received and chart reviewed up to this date. Cleared to see by RN. Pt received smei-reclined in bed resting and declined OT evaluation at this time despite max verbal encouragement/education. Will defer and continue to follow up as able/medically appropriate.     Sharlene Jiménez, OTD, OTR/L
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
## 2025-01-23 NOTE — CARE COORDINATION
Care Management Initial Assessment       RUR: 18% - moderate  Readmission? No  1st IM letter given? Yes - 1/23/25  1st  letter given: No    CM attempted to meet with patient this AM; however patient sleeping soundly in bed. Initial assessment performed via chart review. Patient with HM3 and admitted for sepsis from home. Patient is open with [facility] for  services. He will need resumption of care orders at time of discharge. Patient is independent at baseline; owns RW and oxygen at home. Resides at address on file with his wife.     CM will continue to follow for disposition needs.       01/23/25 1202   Service Assessment   Patient Orientation Alert and Oriented   Cognition Alert   History Provided By Patient   Primary Caregiver Self   Support Systems Spouse/Significant Other;Children   Patient's Healthcare Decision Maker is: Legal Next of Kin   PCP Verified by CM Yes   Last Visit to PCP Within last year   Prior Functional Level Independent in ADLs/IADLs   Current Functional Level Independent in ADLs/IADLs   Can patient return to prior living arrangement Yes   Ability to make needs known: Fair   Family able to assist with home care needs: Yes   Would you like for me to discuss the discharge plan with any other family members/significant others, and if so, who? No   Financial Resources Medicare;Medicaid   Community Resources None   Social/Functional History   Lives With Spouse   Type of Home House   Home Equipment Walker - Rolling;Cane   Receives Help From Family   Prior Level of Assist for ADLs Independent   Prior Level of Assist for Homemaking Independent   Homemaking Responsibilities No   Ambulation Assistance Independent   Prior Level of Assist for Transfers Independent   Active  No   Patient's  Info wife   Occupation Retired   Discharge Planning   Type of Residence House   Living Arrangements Spouse/Significant Other;Children   Current Services Prior To Admission Home

## 2025-01-23 NOTE — PROGRESS NOTES
1430: Attempted to take pt down to CT. When pt was connected to batteries; pt LVAD system controlled started to alarm yellow blinking light. RN reviewed alarms; noted \"low voltage\" alarm. RN ensured LVAD leads were connected correctly. All leads connected to battery. Batteries fully charged; however, RN changed to new batteries & clips.    1435: Pt LVAD system controlled still alarming   \"Low voltage\" with new batteries & clips. RN reconnected pt back to [place]. Pt LVAD stopped alarming. RN notified NP Anabell.     NP @ bedside who assessed pt. NP called [facility] LVAD coordinators - Rosmery RAMOS recommended cleaning LVAD clips & batteries with a alcohol wipe.     RN cleaned clips & batteries. Pt placed back on batteries.    1510: Pt LVAD continued to alarm \"low voltage\" when connected to batteries. Paged [facility] LVAD coordinators. RACHEL Botello to come assess pt.    1515: RACHEL Botello @ bedside, replaced clips.     1600: No LVAD alarms noted.     1829: Pt taken to CT on monitor with this RN.     1840: Pt returned to [facility].     1852: CHG bath completed & linen changed.     Care plan:      Problem: Chronic Conditions and Co-morbidities  Goal: Patient's chronic conditions and co-morbidity symptoms are monitored and maintained or improved  Outcome: Progressing  Flowsheets  Taken 1/23/2025 1852  Care Plan - Patient's Chronic Conditions and Co-Morbidity Symptoms are Monitored and Maintained or Improved:   Monitor and assess patient's chronic conditions and comorbid symptoms for stability, deterioration, or improvement   Collaborate with multidisciplinary team to address chronic and comorbid conditions and prevent exacerbation or deterioration   Update acute care plan with appropriate goals if chronic or comorbid symptoms are exacerbated and prevent overall improvement and discharge  Taken 1/23/2025 0740  Care Plan - Patient's Chronic Conditions and Co-Morbidity Symptoms are Monitored and Maintained or Improved:   Monitor and assess

## 2025-01-24 ENCOUNTER — APPOINTMENT (OUTPATIENT)
Facility: HOSPITAL | Age: 65
End: 2025-01-24
Payer: MEDICARE

## 2025-01-24 PROBLEM — N17.9 ACUTE KIDNEY INJURY SUPERIMPOSED ON CKD (HCC): Status: ACTIVE | Noted: 2025-01-24

## 2025-01-24 PROBLEM — R78.81 BACTEREMIA DUE TO ENTEROBACTER SPECIES: Status: ACTIVE | Noted: 2025-01-24

## 2025-01-24 PROBLEM — N18.9 ACUTE KIDNEY INJURY SUPERIMPOSED ON CKD (HCC): Status: ACTIVE | Noted: 2025-01-24

## 2025-01-24 PROBLEM — B96.89 BACTEREMIA DUE TO ENTEROBACTER SPECIES: Status: ACTIVE | Noted: 2025-01-24

## 2025-01-24 LAB
ALBUMIN SERPL-MCNC: 2.5 G/DL (ref 3.5–5)
ALBUMIN/GLOB SERPL: 0.6 (ref 1.1–2.2)
ALP SERPL-CCNC: 84 U/L (ref 45–117)
ALT SERPL-CCNC: 42 U/L (ref 12–78)
ANION GAP SERPL CALC-SCNC: 8 MMOL/L (ref 2–12)
AST SERPL-CCNC: 36 U/L (ref 15–37)
BILIRUB DIRECT SERPL-MCNC: <0.1 MG/DL (ref 0–0.2)
BILIRUB SERPL-MCNC: 0.3 MG/DL (ref 0.2–1)
BUN SERPL-MCNC: 39 MG/DL (ref 6–20)
BUN/CREAT SERPL: 20 (ref 12–20)
CALCIUM SERPL-MCNC: 8.9 MG/DL (ref 8.5–10.1)
CHLORIDE SERPL-SCNC: 107 MMOL/L (ref 97–108)
CO2 SERPL-SCNC: 21 MMOL/L (ref 21–32)
CREAT SERPL-MCNC: 1.95 MG/DL (ref 0.7–1.3)
ECHO BSA: 1.93 M2
ECHO LV ASSIST DEVICE BASELINE SPEED: 5200 RPM
ECHO RV INTERNAL DIMENSION: 5.3 CM
ECHO TV REGURGITANT MAX VELOCITY: 2.57 M/S
ECHO TV REGURGITANT PEAK GRADIENT: 26 MMHG
ERYTHROCYTE [DISTWIDTH] IN BLOOD BY AUTOMATED COUNT: 13.6 % (ref 11.5–14.5)
GLOBULIN SER CALC-MCNC: 4.2 G/DL (ref 2–4)
GLUCOSE SERPL-MCNC: 122 MG/DL (ref 65–100)
HCT VFR BLD AUTO: 27.1 % (ref 36.6–50.3)
HGB BLD-MCNC: 8.7 G/DL (ref 12.1–17)
INR PPP: 1.9 (ref 0.9–1.1)
LDH SERPL L TO P-CCNC: 199 U/L (ref 85–241)
MAGNESIUM SERPL-MCNC: 1.9 MG/DL (ref 1.6–2.4)
MCH RBC QN AUTO: 28.2 PG (ref 26–34)
MCHC RBC AUTO-ENTMCNC: 32.1 G/DL (ref 30–36.5)
MCV RBC AUTO: 88 FL (ref 80–99)
NRBC # BLD: 0 K/UL (ref 0–0.01)
NRBC BLD-RTO: 0 PER 100 WBC
PLATELET # BLD AUTO: 195 K/UL (ref 150–400)
PMV BLD AUTO: 11.7 FL (ref 8.9–12.9)
POTASSIUM SERPL-SCNC: 3.6 MMOL/L (ref 3.5–5.1)
PROT SERPL-MCNC: 6.7 G/DL (ref 6.4–8.2)
PROTHROMBIN TIME: 19.3 SEC (ref 9.2–11.2)
RBC # BLD AUTO: 3.08 M/UL (ref 4.1–5.7)
SODIUM SERPL-SCNC: 136 MMOL/L (ref 136–145)
VANCOMYCIN SERPL-MCNC: 10.9 UG/ML
WBC # BLD AUTO: 14.1 K/UL (ref 4.1–11.1)

## 2025-01-24 PROCEDURE — 83735 ASSAY OF MAGNESIUM: CPT

## 2025-01-24 PROCEDURE — 80048 BASIC METABOLIC PNL TOTAL CA: CPT

## 2025-01-24 PROCEDURE — 99232 SBSQ HOSP IP/OBS MODERATE 35: CPT

## 2025-01-24 PROCEDURE — 2580000003 HC RX 258: Performed by: FAMILY MEDICINE

## 2025-01-24 PROCEDURE — 36415 COLL VENOUS BLD VENIPUNCTURE: CPT

## 2025-01-24 PROCEDURE — 6370000000 HC RX 637 (ALT 250 FOR IP)

## 2025-01-24 PROCEDURE — 2500000003 HC RX 250 WO HCPCS: Performed by: FAMILY MEDICINE

## 2025-01-24 PROCEDURE — 93306 TTE W/DOPPLER COMPLETE: CPT

## 2025-01-24 PROCEDURE — 87040 BLOOD CULTURE FOR BACTERIA: CPT

## 2025-01-24 PROCEDURE — 85610 PROTHROMBIN TIME: CPT

## 2025-01-24 PROCEDURE — 87077 CULTURE AEROBIC IDENTIFY: CPT

## 2025-01-24 PROCEDURE — 93321 DOPPLER ECHO F-UP/LMTD STD: CPT | Performed by: SPECIALIST

## 2025-01-24 PROCEDURE — 93308 TTE F-UP OR LMTD: CPT | Performed by: SPECIALIST

## 2025-01-24 PROCEDURE — 6370000000 HC RX 637 (ALT 250 FOR IP): Performed by: INTERNAL MEDICINE

## 2025-01-24 PROCEDURE — 85027 COMPLETE CBC AUTOMATED: CPT

## 2025-01-24 PROCEDURE — 87070 CULTURE OTHR SPECIMN AEROBIC: CPT

## 2025-01-24 PROCEDURE — 6370000000 HC RX 637 (ALT 250 FOR IP): Performed by: FAMILY MEDICINE

## 2025-01-24 PROCEDURE — 2700000000 HC OXYGEN THERAPY PER DAY

## 2025-01-24 PROCEDURE — 87205 SMEAR GRAM STAIN: CPT

## 2025-01-24 PROCEDURE — 93325 DOPPLER ECHO COLOR FLOW MAPG: CPT | Performed by: SPECIALIST

## 2025-01-24 PROCEDURE — 94760 N-INVAS EAR/PLS OXIMETRY 1: CPT

## 2025-01-24 PROCEDURE — 2060000000 HC ICU INTERMEDIATE R&B

## 2025-01-24 PROCEDURE — 80202 ASSAY OF VANCOMYCIN: CPT

## 2025-01-24 PROCEDURE — 80076 HEPATIC FUNCTION PANEL: CPT

## 2025-01-24 PROCEDURE — 6360000002 HC RX W HCPCS: Performed by: FAMILY MEDICINE

## 2025-01-24 PROCEDURE — 87186 SC STD MICRODIL/AGAR DIL: CPT

## 2025-01-24 PROCEDURE — 83615 LACTATE (LD) (LDH) ENZYME: CPT

## 2025-01-24 PROCEDURE — 2500000003 HC RX 250 WO HCPCS

## 2025-01-24 RX ORDER — WARFARIN SODIUM 4 MG/1
4 TABLET ORAL
Status: COMPLETED | OUTPATIENT
Start: 2025-01-24 | End: 2025-01-24

## 2025-01-24 RX ADMIN — ALLOPURINOL 50 MG: 100 TABLET ORAL at 09:14

## 2025-01-24 RX ADMIN — PRAVASTATIN SODIUM 40 MG: 40 TABLET ORAL at 23:16

## 2025-01-24 RX ADMIN — SODIUM CHLORIDE, PRESERVATIVE FREE 10 ML: 5 INJECTION INTRAVENOUS at 09:14

## 2025-01-24 RX ADMIN — Medication 1 CAPSULE: at 09:14

## 2025-01-24 RX ADMIN — SODIUM CHLORIDE, PRESERVATIVE FREE 10 ML: 5 INJECTION INTRAVENOUS at 23:17

## 2025-01-24 RX ADMIN — WARFARIN SODIUM 4 MG: 4 TABLET ORAL at 17:50

## 2025-01-24 RX ADMIN — CEFEPIME 2000 MG: 2 INJECTION, POWDER, FOR SOLUTION INTRAVENOUS at 18:51

## 2025-01-24 RX ADMIN — ACETAMINOPHEN 650 MG: 325 TABLET ORAL at 05:07

## 2025-01-24 RX ADMIN — CEFEPIME 1000 MG: 1 INJECTION, POWDER, FOR SOLUTION INTRAMUSCULAR; INTRAVENOUS at 05:15

## 2025-01-24 RX ADMIN — Medication 400 MG: at 09:14

## 2025-01-24 RX ADMIN — Medication 400 MG: at 23:16

## 2025-01-24 RX ADMIN — PANTOPRAZOLE SODIUM 40 MG: 40 TABLET, DELAYED RELEASE ORAL at 05:07

## 2025-01-24 ASSESSMENT — PAIN DESCRIPTION - DESCRIPTORS: DESCRIPTORS: ACHING;DISCOMFORT

## 2025-01-24 ASSESSMENT — PAIN DESCRIPTION - ORIENTATION: ORIENTATION: LEFT

## 2025-01-24 ASSESSMENT — PAIN SCALES - GENERAL
PAINLEVEL_OUTOF10: 0
PAINLEVEL_OUTOF10: 2

## 2025-01-24 ASSESSMENT — PAIN DESCRIPTION - LOCATION: LOCATION: ARM

## 2025-01-24 NOTE — PROGRESS NOTES
Physical Therapy 1/24/2025     Chart reviewed up to date. Cleared to see by RN. Pt received semi-reclined in bed and politely declined PT evaluation at this time. Pt reported he has no concerns regarding mobility for discharge home and declined PT services this hospital admission. RN made aware. Will sign off at this time. Please re-consult if needs arise.    Thank you,  Lisa Saul PT, DPT, NCS

## 2025-01-24 NOTE — PROGRESS NOTES
Infectious Disease Progress Note    Impression/Plan     65 y.o. male with past medical Hx of HFreF s/p ICD and LVAD complicated by sternal wound infection and driveline abscess with MSSAand E.cloacae,MSSA bacteremia, on chronic cefdinir, and COPD who presented with shortness of breath, admitted for sepsis.     Enterobacter Cloacae Bacteremia  Sternal and driveline wound  Sternal wire fracture  DAWOOD on CKD  Leukocytosis  Hx chronic driveline infection with MSSA, E.cloacae  Failed outpt oral suppressive therapy with cefdinir  Repeat blood cx 1/24 pending    continue renally dosed cefepime    Follow blood cultures    Monitor renal function - improving    Follow WBC - improving    Appreciate cardiac surgery input, no surgical intervention for broken sternal wire    Given known driveline infection with E. Cloacae and now bacteremic, do not have source control. Recommend pump cannula exchange vs debridement, defer to cardiac surgery.     Will likely need oral suppressive therapy with fluoroquinolone as long as he tolerates it    Wound care per wound care team      D/w Dr. Rajan, pt, cardiac surgery NP                   Anti-infectives:   Vancomycin  cefepime    Subjective:   Creatinine improving, feels fine        Date of Consultation:  January 24, 2025  Date of Admission: 1/22/2025   Referring Physician: Felicitas Altman    Patient is a 65 y.o. male with past medical Hx of HFreF s/p ICD and LVAD complicated by sternal wound infection and driveline abscess with MSSAand E.cloacae,MSSA bacteremia, on chronic cefdinir, and COPD who presented with shortness of breath, admitted for sepsis.     Patient has complicated Hx of sternal wound infection with repeated attempts at drainage of driveline abscess.Treated with course of ceftriaxone, cefazolin with oral suppressive Keflex.  Admitted in 7/2024 with sternal abscess and underwent debridement on 7/25/2024 with cultures growing Enterobacter cloacae, MSSA.  Completed long course of  (TTE) complete (PRN contrast/bubble/strain/3D)    Collection Time: 01/24/25  9:11 AM   Result Value Ref Range    Body Surface Area 1.93 m2    RVIDd 5.3 cm    TR Peak Gradient 26 mmHg    TR Max Velocity 2.57 m/s    LVAD Baseline Speed 5,200.0 RPM        Microbiology: blood cx     Studies:  Xray Result (most recent):  XR CHEST PORTABLE 01/22/2025    Narrative  EXAM:  XR CHEST PORTABLE    INDICATION: shortness of breath    COMPARISON: 7/8/2024 and 3 3124    TECHNIQUE: 1506 hours portable chest AP view    FINDINGS: Status post median sternotomy. The uppermost sternal wire is broken.  It has been broken on the previous radiographs however on today's examination a  piece of the superiormost sternal wire that measures 13 mm in length as broken  off and migrated inferiorly projecting over the lower edge of the manubrium at  the level of the second sternal wire.    LVAD is in place. Subcutaneous ICD is in place.    The heart size is normal.    The lungs are clear.    Osseous structures are unremarkable.    Impression  1. Lungs are clear  2. The superiormost sternal wire is broken and a piece of wire fragment broken  wire has migrated inferiorly.    Electronically signed by DAIJA SERRANO          Thank you for the opportunity to participate in the care of this patient.     A total time of 35 minutes was spent on today's encounter.  Greater than 50% of the time was spent on the following:  Preparing for visit and chart review.  Obtaining and/or reviewing separately obtained history  Performing a medically appropriate exam and/or evaluation  Counseling and educating a patient/family/caregiver as noted above  Placing relevant orders  Referring and communicating with other professionals (not separately reported)  Independently interpreting results (not separately reported) and communicating results to the patient/family/caregiver  Care coordination (not separately reported) as noted above  Documenting clinical information in the

## 2025-01-24 NOTE — PROGRESS NOTES
0915: AM metop held for hypotension, notified F NP Anabell.  Orders received to hold in system.       1115: Pt placed on RA, o2 >90%.     Care plan:      Problem: Chronic Conditions and Co-morbidities  Goal: Patient's chronic conditions and co-morbidity symptoms are monitored and maintained or improved  Outcome: Progressing  Flowsheets  Taken 1/24/2025 1246  Care Plan - Patient's Chronic Conditions and Co-Morbidity Symptoms are Monitored and Maintained or Improved:   Monitor and assess patient's chronic conditions and comorbid symptoms for stability, deterioration, or improvement   Collaborate with multidisciplinary team to address chronic and comorbid conditions and prevent exacerbation or deterioration   Update acute care plan with appropriate goals if chronic or comorbid symptoms are exacerbated and prevent overall improvement and discharge  Taken 1/24/2025 0755  Care Plan - Patient's Chronic Conditions and Co-Morbidity Symptoms are Monitored and Maintained or Improved:   Monitor and assess patient's chronic conditions and comorbid symptoms for stability, deterioration, or improvement   Collaborate with multidisciplinary team to address chronic and comorbid conditions and prevent exacerbation or deterioration   Update acute care plan with appropriate goals if chronic or comorbid symptoms are exacerbated and prevent overall improvement and discharge     Problem: Discharge Planning  Goal: Discharge to home or other facility with appropriate resources  Outcome: Progressing  Flowsheets  Taken 1/24/2025 1246  Discharge to home or other facility with appropriate resources:   Identify barriers to discharge with patient and caregiver   Arrange for needed discharge resources and transportation as appropriate   Identify discharge learning needs (meds, wound care, etc)  Taken 1/24/2025 0755  Discharge to home or other facility with appropriate resources:   Identify barriers to discharge with patient and caregiver

## 2025-01-24 NOTE — PROGRESS NOTES
Pharmacist Note - Warfarin Dosing  Consult provided for this 65 y.o.male to manage warfarin for LVAD    INR Goal: Other, 1.8-2.2    Home regimen/ tablet size: 4 mg Sun, Wed, Fri; 2 mg all other days of the week     Drugs that may increase INR: None  Drugs that may decrease INR: None  Other current anticoagulants/ drugs that may increase bleeding risk: None  Risk factors: Age > 65 and Decompensated Heart Failure  Daily INR ordered through: 4/15    Recent Labs     01/22/25  1946 01/23/25  0034 01/24/25  0046   HGB 9.2* 9.1* 8.7*   INR 2.5* 2.8* 1.9*     Date               INR                  Dose  1/22  2.5  Hold   1/23  2.8  Hold  1/24  1.9  4mg                                                                                   Assessment/ Plan:  Will order home dose of warfarin 4mg PO x1 dose. INR within goal.    Pharmacy will continue to monitor daily and adjust therapy as indicated.  Please contact the pharmacist at [phone] or [phone] for outpatient recommendations if needed.

## 2025-01-24 NOTE — PROGRESS NOTES
[facility]  [place], [place]  Advanced Heart Failure Inpatient Consult Note      Patient name: Sanford Joiner Sr.  Patient : 1960  Patient MRN: [id number]  Date of service: 25    Chief Complaint   Patient presents with    Shortness of Breath         ASSESSMENT:  Sanford Joiner Sr. is a 65 y.o. male admitted with recurrent driveline abscess and infection  Chronic systolic heart failure due to non ischemic cardiomyopathy, s/p HM3 LVAD implantation (2023) as destination therapy, stage D, on optimal GDMT limited by hypovolemia  Not a transplant candidate due to advanced obstructive lung disease, was declined at [facility] for LVAD and transplant.   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   Admitted 2024 for evaluation of sepsis      INTERVAL HISTORY:  CHRYSTAL, patient feels   Blood cultures  growing enterobacter cloacae complex- currently on Cefepime  Repeat Echo - septum midline, IVC collapsing appears dry.   CT scans- no acute abnormality, non obs kidney stones  Creatine improved from 3- 1.95   Appreciate nephrology and ID recs   Patient reported to nurse that he wanted to be in hospice if the infection keeps coming back but he is amicable to try the antibiotics for now.       PLAN/RECOMMENDATIONS:  Sepsis evaluation/leukocytosis  WBC 25 -->21, Lactic 1.2  On Cefepime with + enterobacter cloacae bacteremia  ID Consult - repeat blood cultures tomorrow, cont cefepime,  wound culture  Chest xray - Lungs clear  Keep MAP > 70<90  CT scans   May need wound debridement    DAWOOD  Appreciate Nephrology recs   Hold bumex  500 cc NS slowly for resuscitation given in ER   Creatine 3.19-->3.15-->1.95  Baseline cr 1.2-1.3    Driveline/LVAD infection/  S/p wound debridement and wound vac placement on 24 (now removed)  Life long suppressive Cefdinir      Left Ventricular Assist Device  Continue current  improvement post VAD. Speed has been decreased.      He had a driveline tug, dropped his controller. He developed pain and redness at the driveline exit. We performed a CT on 1/3/24 to evaluate and this showed no signs of infection. Driveline cultures on 1/3/2024 grew Entreobacter and MSSA. He was started on Bactrim outpatient. On Bactrim, creatinine increased from 1.76 to 3. He had ongoing redness and pain at the driveline exit 1 week after antibiotic treatment and he was instructed to return to the hospital for admission for IV antibiotics and evaluate DAWOOD. On presentation he reports 3-4 days of dark stools, increased shortness of breath and fatigue.      He was then readmitted 3/19/24 with fever, chills, cough, and sputum production. He had worsened dyspnea. He required BIPAP on admission. He was evaluated by Pulmonary and treated with steroids, antibiotics and bronchodilators. He had staph bacteremia. We repeated CT chest/Abdomen to evaluate driveline infection and he had 2 fluid collections surrounding the driveline and outflow graft. Discussed management with CV surgery. PFTs repeated and showed very severe obstruction with FEV1<1L. He was felt not to be a surgical candidate for debridement or pump removal given extent of lung disease. He had drain placed into the thoracic fluid collection and was discharged with IV antibiotics.      He completed a 6 week course of IV antibiotics and repeat CT 5/22/24 showed no residual fluid collection along the driveline. Drain was removed on 5/30/2024. He remained on suppressive antibiotics with Keflex.     We made plans to repeat CT imaging 4 weeks after drain removal or sooner if new driveline drainage or abscess re accumulation. He had increase in driveline drainage and cultures repeated on 6/10/24. This grew Enterobacter and MSSA. ID reviewed results and recommended to continue Keflex. Patient had allergies to both Bactrim and Cipro which would cover Enterobacter. On
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
## 2025-01-24 NOTE — PROGRESS NOTES
Cardiac Surgery FLOOR Progress Note    Admit Date: 2025      Subjective:   Patient seen with Dr. Gomez. Pt reports feeling better than when he came in, he is wondering what his lab work showed this morning. He is afebrile, on 2L NC, SR.    Objective:     Pulse 83   Temp 97.8 °F (36.6 °C) (Oral)   Resp 29   Ht 1.702 m (5' 7\")   Wt 78.9 kg (174 lb)   SpO2 94%   BMI 27.25 kg/m²   Temp (24hrs), Av °F (36.7 °C), Min:97.8 °F (36.6 °C), Max:98.2 °F (36.8 °C)      Last 24hr Input/Output:    Intake/Output Summary (Last 24 hours) at 2025 1015  Last data filed at 2025 0918  Gross per 24 hour   Intake 1514 ml   Output 1300 ml   Net 214 ml        EKG/Rhythm: SR    Oxygen: 2L NC    CXR:  Xray Result (most recent):  XR CHEST PORTABLE 2025    Narrative  EXAM:  XR CHEST PORTABLE    INDICATION: shortness of breath    COMPARISON: 2024 and 3 3124    TECHNIQUE: 1506 hours portable chest AP view    FINDINGS: Status post median sternotomy. The uppermost sternal wire is broken.  It has been broken on the previous radiographs however on today's examination a  piece of the superiormost sternal wire that measures 13 mm in length as broken  off and migrated inferiorly projecting over the lower edge of the manubrium at  the level of the second sternal wire.    LVAD is in place. Subcutaneous ICD is in place.    The heart size is normal.    The lungs are clear.    Osseous structures are unremarkable.    Impression  1. Lungs are clear  2. The superiormost sternal wire is broken and a piece of wire fragment broken  wire has migrated inferiorly.    Electronically signed by DAIJA SERRANO       Admission Weight: Last Weight   Weight - Scale: 82.5 kg (181 lb 14.1 oz) Weight - Scale: 78.9 kg (174 lb)       EXAM:  General appearance:  Disheveled but pleasant  Head: normocephalic, without obvious abnormality; atraumatic  Eyes: conjunctivae/corneas clear; EOM's intact.   Nose: nares normal; no drainage.  Neck: no carotid

## 2025-01-24 NOTE — PROGRESS NOTES
[facility]  Hospitalist Group                                                                                          Hospitalist Progress Note  Emily Yeung MD  Answering service: [phone] OR [phone] from in house phone        Date of Service:  2025  NAME:  Sanford Joiner Sr.  :  1960  MRN:  [id number]       Admission Summary:   Sanford Joiner Sr. is a 65 y.o. 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     IN the ED,  spo2 95% on 3Lnc.  Other VSS. Labs showed BuN 35, creatinine 3.15,  WBC 21.7, and hgb 9.1 down from 11.9, and INR 2.8.     Interval history / Subjective:   Patient seen and examined.   He denies any new complaints.  A clean dressing is covering he has lower sternal wound.  Per patient the old dressing was soaked and had to be changed overnight.  Afebrile overnight.  No other complaints.     Assessment & Plan:           Sepsis, POA  Hx chronic driveline infection, on chronic suppression therapy with cefdinir;  -Admitted with purulent drainage from abdominal incision;  -Vancomycin and cefepime on arrival;  -Blood cultures collected;  -Culture : Light Staphylococcus aureus, rare gram-negative rods;  -Blood culture : Enterobacter cloacae complex in 1 out of 4 bottles;  -ID consulted: Discontinue vancomycin, continue cefepime;  -ID recommends pump cannula exchange versus debridement;   -Cardiac surgery consulted;     HFrEF s/p ICD and LVAD  -heart failure team consulted and following  -continue GDMT     COPD  -O2 prn  -DuoNebs prn       Code status: Full   Prophylaxis: Coumadin  Care Plan discussed with: patient, RN, CM;  Anticipated Disposition: TBD  Inpatient  Cardiac monitoring: Telemetry         Principal Problem:    Sepsis (HCC)  Active Problems:    SIRS (systemic inflammatory response syndrome) (HCC)    Sternal wound infection    Bacteremia associated  Results   Component Value Date/Time    CHOL 167 05/30/2024 12:00 AM    HDL 27 05/30/2024 12:00 AM     05/30/2024 12:00 AM    LDL Not calculated due to elevated triglyceride level 11/14/2023 11:27 AM     No results found for: \"GLUCPOC\"  [unfilled]    Notes reviewed from all clinical/nonclinical/nursing services involved in patient's clinical care. Care coordination discussions were held with appropriate clinical/nonclinical/ nursing providers based on care coordination needs.         Patients current active Medications were reviewed, considered, added and adjusted based on the clinical condition today.      Home Medications were reconciled to the best of my ability given all available resources at the time of admission. Route is PO if not otherwise noted.        Medications Reviewed:     Current Facility-Administered Medications   Medication Dose Route Frequency    ceFEPIme (MAXIPIME) 2,000 mg in sodium chloride 0.9 % 100 mL IVPB (mini-bag)  2,000 mg IntraVENous Q12H    [START ON 1/25/2025] fluticasone-umeclidin-vilant (TRELEGY ELLIPTA) 200-62.5-25 MCG/ACT inhaler - PATIENT SUPPLIED (Patient Supplied)   Inhalation Daily    calcium carbonate (TUMS) chewable tablet 500 mg  500 mg Oral TID PRN    pantoprazole (PROTONIX) tablet 40 mg  40 mg Oral QAM AC    [Held by provider] hydrALAZINE (APRESOLINE) tablet 25 mg  25 mg Oral 3 times per day    pravastatin (PRAVACHOL) tablet 40 mg  40 mg Oral Nightly    [Held by provider] metoprolol succinate (TOPROL XL) extended release tablet 75 mg  75 mg Oral Daily    allopurinol (ZYLOPRIM) tablet 50 mg  50 mg Oral Daily    [Held by provider] bumetanide (BUMEX) tablet 1 mg  1 mg Oral Daily    lactobacillus (CULTURELLE) capsule 1 capsule  1 capsule Oral Daily with breakfast    magnesium oxide (MAG-OX) tablet 400 mg  400 mg Oral BID    budesonide (PULMICORT) nebulizer suspension 1,000 mcg  1 mg Nebulization BID RT    sodium chloride flush 0.9 % injection 5-40 mL  5-40 mL IntraVENous 2

## 2025-01-24 NOTE — PROGRESS NOTES
1930-0730:    -Driveline wound cultured. Dressing changed per order. MSI dressing changed per order. Documented pictures below.  -Repeat paired BC drawn, see results.  -Labs drawn, see results.  -Standing weight obtained.  -MAPs within goal during PM. 0700 MAP 68. Afebrile.     LVAD Driveline Site: Erythema at luz [place]-wound site. Mild odor. Moderate amount of purulent drainage noted with small amount of serosanguinous drainage. Red/pink hypergranulation tissue-- partially detatched flap-like structure over wound opening-- cotton swab (from LVAD dressing kit) able to go underneath structure.          MSI: Erythema at luz [place]-wound site. Moderate-putrid odor. Moderate/large amount of active and old serosanguinous drainage. Small amount of purulent drainage on old foam dressing. Red/pink hypergranulation tissue -- partially detatched flap-like structure over wound opening.     MSI site has a stronger odor and larger amount of drainage (including new blood) compared to DL site. Pt denies pain at both sites.

## 2025-01-24 NOTE — PROGRESS NOTES
Occupational Therapy  01/24/25    Chart reviewed up to this date. Cleared to see by RN. Pt received semi-reclined in bed and politely declined OT evaluation at this time. Pt reported he has no concerns following discharge home and declined OT services this hospital admission. RN made aware. Will sign off.     Sharlene Jiménez, OTD, OTR/L

## 2025-01-25 LAB
ALBUMIN SERPL-MCNC: 2.8 G/DL (ref 3.5–5)
ALBUMIN/GLOB SERPL: 0.8 (ref 1.1–2.2)
ALP SERPL-CCNC: 101 U/L (ref 45–117)
ALT SERPL-CCNC: 57 U/L (ref 12–78)
ANION GAP SERPL CALC-SCNC: 8 MMOL/L (ref 2–12)
AST SERPL-CCNC: 60 U/L (ref 15–37)
BACTERIA SPEC CULT: ABNORMAL
BACTERIA SPEC CULT: ABNORMAL
BILIRUB DIRECT SERPL-MCNC: <0.1 MG/DL (ref 0–0.2)
BILIRUB SERPL-MCNC: 0.2 MG/DL (ref 0.2–1)
BUN SERPL-MCNC: 26 MG/DL (ref 6–20)
BUN/CREAT SERPL: 19 (ref 12–20)
CALCIUM SERPL-MCNC: 8.7 MG/DL (ref 8.5–10.1)
CHLORIDE SERPL-SCNC: 108 MMOL/L (ref 97–108)
CO2 SERPL-SCNC: 23 MMOL/L (ref 21–32)
CREAT SERPL-MCNC: 1.4 MG/DL (ref 0.7–1.3)
ERYTHROCYTE [DISTWIDTH] IN BLOOD BY AUTOMATED COUNT: 13.6 % (ref 11.5–14.5)
GLOBULIN SER CALC-MCNC: 3.6 G/DL (ref 2–4)
GLUCOSE SERPL-MCNC: 96 MG/DL (ref 65–100)
HCT VFR BLD AUTO: 27.9 % (ref 36.6–50.3)
HGB BLD-MCNC: 8.9 G/DL (ref 12.1–17)
INR PPP: 1.4 (ref 0.9–1.1)
LDH SERPL L TO P-CCNC: 259 U/L (ref 85–241)
MAGNESIUM SERPL-MCNC: 1.8 MG/DL (ref 1.6–2.4)
MCH RBC QN AUTO: 28.3 PG (ref 26–34)
MCHC RBC AUTO-ENTMCNC: 31.9 G/DL (ref 30–36.5)
MCV RBC AUTO: 88.6 FL (ref 80–99)
NRBC # BLD: 0 K/UL (ref 0–0.01)
NRBC BLD-RTO: 0 PER 100 WBC
PLATELET # BLD AUTO: 255 K/UL (ref 150–400)
PMV BLD AUTO: 11.7 FL (ref 8.9–12.9)
POTASSIUM SERPL-SCNC: 4 MMOL/L (ref 3.5–5.1)
PROT SERPL-MCNC: 6.4 G/DL (ref 6.4–8.2)
PROTHROMBIN TIME: 14.7 SEC (ref 9.2–11.2)
RBC # BLD AUTO: 3.15 M/UL (ref 4.1–5.7)
SERVICE CMNT-IMP: ABNORMAL
SODIUM SERPL-SCNC: 139 MMOL/L (ref 136–145)
WBC # BLD AUTO: 10.4 K/UL (ref 4.1–11.1)

## 2025-01-25 PROCEDURE — 2580000003 HC RX 258: Performed by: FAMILY MEDICINE

## 2025-01-25 PROCEDURE — 6360000002 HC RX W HCPCS: Performed by: FAMILY MEDICINE

## 2025-01-25 PROCEDURE — 6370000000 HC RX 637 (ALT 250 FOR IP): Performed by: INTERNAL MEDICINE

## 2025-01-25 PROCEDURE — 83615 LACTATE (LD) (LDH) ENZYME: CPT

## 2025-01-25 PROCEDURE — 80076 HEPATIC FUNCTION PANEL: CPT

## 2025-01-25 PROCEDURE — 2060000000 HC ICU INTERMEDIATE R&B

## 2025-01-25 PROCEDURE — 83735 ASSAY OF MAGNESIUM: CPT

## 2025-01-25 PROCEDURE — 2500000003 HC RX 250 WO HCPCS

## 2025-01-25 PROCEDURE — 6370000000 HC RX 637 (ALT 250 FOR IP): Performed by: STUDENT IN AN ORGANIZED HEALTH CARE EDUCATION/TRAINING PROGRAM

## 2025-01-25 PROCEDURE — 2500000003 HC RX 250 WO HCPCS: Performed by: FAMILY MEDICINE

## 2025-01-25 PROCEDURE — 85027 COMPLETE CBC AUTOMATED: CPT

## 2025-01-25 PROCEDURE — 99232 SBSQ HOSP IP/OBS MODERATE 35: CPT | Performed by: STUDENT IN AN ORGANIZED HEALTH CARE EDUCATION/TRAINING PROGRAM

## 2025-01-25 PROCEDURE — 93750 INTERROGATION VAD IN PERSON: CPT | Performed by: STUDENT IN AN ORGANIZED HEALTH CARE EDUCATION/TRAINING PROGRAM

## 2025-01-25 PROCEDURE — 85610 PROTHROMBIN TIME: CPT

## 2025-01-25 PROCEDURE — 80048 BASIC METABOLIC PNL TOTAL CA: CPT

## 2025-01-25 PROCEDURE — 36415 COLL VENOUS BLD VENIPUNCTURE: CPT

## 2025-01-25 RX ORDER — WARFARIN SODIUM 4 MG/1
4 TABLET ORAL
Status: COMPLETED | OUTPATIENT
Start: 2025-01-25 | End: 2025-01-25

## 2025-01-25 RX ADMIN — ALLOPURINOL 50 MG: 100 TABLET ORAL at 08:30

## 2025-01-25 RX ADMIN — METOPROLOL SUCCINATE 75 MG: 25 TABLET, FILM COATED, EXTENDED RELEASE ORAL at 10:09

## 2025-01-25 RX ADMIN — PRAVASTATIN SODIUM 40 MG: 40 TABLET ORAL at 20:31

## 2025-01-25 RX ADMIN — SODIUM CHLORIDE, PRESERVATIVE FREE 10 ML: 5 INJECTION INTRAVENOUS at 08:32

## 2025-01-25 RX ADMIN — Medication 400 MG: at 20:31

## 2025-01-25 RX ADMIN — PANTOPRAZOLE SODIUM 40 MG: 40 TABLET, DELAYED RELEASE ORAL at 05:49

## 2025-01-25 RX ADMIN — WARFARIN SODIUM 4 MG: 4 TABLET ORAL at 17:37

## 2025-01-25 RX ADMIN — Medication 1 CAPSULE: at 08:31

## 2025-01-25 RX ADMIN — CEFEPIME 2000 MG: 2 INJECTION, POWDER, FOR SOLUTION INTRAVENOUS at 05:49

## 2025-01-25 RX ADMIN — Medication 400 MG: at 08:30

## 2025-01-25 RX ADMIN — SODIUM CHLORIDE, PRESERVATIVE FREE 10 ML: 5 INJECTION INTRAVENOUS at 20:33

## 2025-01-25 RX ADMIN — CEFEPIME 2000 MG: 2 INJECTION, POWDER, FOR SOLUTION INTRAVENOUS at 17:17

## 2025-01-25 ASSESSMENT — PAIN SCALES - GENERAL: PAINLEVEL_OUTOF10: 0

## 2025-01-25 NOTE — PROGRESS NOTES
0805: Patient had 5 beats of Vtach. Asymptomatic. Stated \"I was laughing at my phone\" when RN checked on patient.    0830: AM medications given. Patient denies complaints at this time. Call bell in reach.

## 2025-01-25 NOTE — PROGRESS NOTES
Pharmacist Note - Warfarin Dosing  Consult provided for this 65 y.o.male to manage warfarin for LVAD    INR Goal: Other, 1.8-2.2    Home regimen/ tablet size: 4 mg Sun, Wed, Fri; 2 mg all other days of the week     Drugs that may increase INR: None  Drugs that may decrease INR: None  Other current anticoagulants/ drugs that may increase bleeding risk: None  Risk factors: Age > 65 and Decompensated Heart Failure  Daily INR ordered through: 4/15    Recent Labs     01/23/25  0034 01/24/25  0046 01/25/25  0054   HGB 9.1* 8.7* 8.9*   INR 2.8* 1.9* 1.4*     Date               INR                  Dose  1/22  2.5  Hold   1/23  2.8  Hold  1/24  1.9  4mg  1/25  1.4  4mg                                                                                 Assessment/ Plan:  Will order warfarin 4mg PO x1 dose for subtherapeutic INR.    Pharmacy will continue to monitor daily and adjust therapy as indicated.  Please contact the pharmacist at [phone] or [phone] for outpatient recommendations if needed.

## 2025-01-25 NOTE — PROGRESS NOTES
[facility] Adult  Hospitalist Group                                                                                          Hospitalist Progress Note  Emily Yeung MD  Answering service: [phone] OR [phone] from in house phone        Date of Service:  2025  NAME:  Sanford Joiner Sr.  :  1960  MRN:  [id number]       Admission Summary:   Sanford Joiner Sr. is a 65 y.o. 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     IN the ED,  spo2 95% on 3Lnc.  Other VSS. Labs showed BuN 35, creatinine 3.15,  WBC 21.7, and hgb 9.1 down from 11.9, and INR 2.8.     Interval history / Subjective:   Patient seen and examined.   He denies any new complaints.    Afebrile overnight.  He hopes to go home soon.     Assessment & Plan:           Sepsis, POA  Hx chronic driveline infection, on chronic suppression therapy with cefdinir;  -Admitted with purulent drainage from abdominal incision;  -Vancomycin and cefepime on arrival;  -Blood cultures collected;  -Culture : Light Staphylococcus aureus, rare gram-negative rods;  -Blood culture : Enterobacter cloacae complex in 1 out of 4 bottles;  -ID consulted: Discontinue vancomycin, continue cefepime;  -ID recommends pump cannula exchange versus debridement;   -Cardiac surgery consulted: No plans for surgical intervention;     HFrEF s/p ICD and LVAD  -heart failure team consulted and following  -continue GDMT     COPD  -O2 prn  -DuoNebs prn       Code status: Full   Prophylaxis: Coumadin  Care Plan discussed with: patient, RN, CM;  Anticipated Disposition: TBD  Inpatient  Cardiac monitoring: Telemetry         Principal Problem:    Sepsis (HCC)  Active Problems:    SIRS (systemic inflammatory response syndrome) (HCC)    Sternal wound infection    Bacteremia associated with ventricular assist device (HCC)    DAWOOD (acute kidney injury) (HCC)    Bacteremia due to

## 2025-01-25 NOTE — PROGRESS NOTES
ADVANCED HEART FAILURE CENTER  [facility] in [place], [place]  Advanced Heart Failure Inpatient Consult Note      Patient name: Sanford Joiner Sr.  Patient : 1960  Patient MRN: [id number]  Date of service: 25    Chief Complaint   Patient presents with    Shortness of Breath         ASSESSMENT:  Sanford Joiner Sr. is a 65 y.o. male admitted with recurrent driveline abscess and infection  Chronic systolic heart failure due to non ischemic cardiomyopathy, s/p HM3 LVAD implantation (2023) as destination therapy, stage D, on optimal GDMT limited by hypovolemia  Not a transplant candidate due to advanced obstructive lung disease, was declined at [facility] for LVAD and transplant.   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   Admitted 2024 for evaluation of sepsis, Enterobacter bacteremia      INTERVAL HISTORY:  NAEON, patient feels   Blood cultures  growing enterobacter cloacae complex- currently on Cefepime  Repeat Echo - septum midline, IVC collapsing appears dry.   CT scans- no acute abnormality, non obs kidney stones  Creatine improved from 3- 1.95 --1.4  Appreciate nephrology and ID recs   Patient reported to nurse that he wanted to be in hospice if the infection keeps coming back but he is amicable to try the antibiotics for now.       PLAN/RECOMMENDATIONS:  Sepsis evaluation/leukocytosis  WBC 25 -->21->10.4 lactic acidosis resolved  On Cefepime with + enterobacter cloacae bacteremia  ID Consult - repeat blood cultures NGTD, cont cefepime,  wound culture  Chest xray - Lungs clear  Keep MAP > 70<90  CT scans   May need wound debridement  Discussed at multidisciplinary rounds and patient was deemed not a candidate for pump exchange due to his complex medical history including chronic respiratory disease.  Reviewed by Dr. Gomez and not felt to need surgical debridement at this time.  Continue

## 2025-01-25 NOTE — PROGRESS NOTES
1930-0730:    -MAPs 80's-96 during PM. Afebrile.     -Driveline dressing changed per order: Swelling around site has decreased compared to yesterday-- see progress note/media in pt's chart. Purulent drainage, moderate amount, mild odor. No bloody drainage present. Pt denies pain at site.   -MSI wound site dressing changed per order: Large amount of old purulent drainage on old foam dressing. Small amount of active purulent drainage from site. Moderate odor. Swelling around site has also decreased from yesterday. Pt denies pain at site.     MSI site continues to have a stronger odor and larger amount of drainage compared to DL site. Both sites noted to have decreased erythema at per-wound site. Gown changed d/t drainage from site.

## 2025-01-26 LAB
ALBUMIN SERPL-MCNC: 2.6 G/DL (ref 3.5–5)
ALBUMIN/GLOB SERPL: 0.7 (ref 1.1–2.2)
ALP SERPL-CCNC: 97 U/L (ref 45–117)
ALT SERPL-CCNC: 80 U/L (ref 12–78)
ANION GAP SERPL CALC-SCNC: 7 MMOL/L (ref 2–12)
AST SERPL-CCNC: 72 U/L (ref 15–37)
BACTERIA SPEC CULT: ABNORMAL
BACTERIA SPEC CULT: ABNORMAL
BILIRUB DIRECT SERPL-MCNC: <0.1 MG/DL (ref 0–0.2)
BILIRUB SERPL-MCNC: 0.1 MG/DL (ref 0.2–1)
BUN SERPL-MCNC: 21 MG/DL (ref 6–20)
BUN/CREAT SERPL: 14 (ref 12–20)
CALCIUM SERPL-MCNC: 8.3 MG/DL (ref 8.5–10.1)
CHLORIDE SERPL-SCNC: 108 MMOL/L (ref 97–108)
CO2 SERPL-SCNC: 21 MMOL/L (ref 21–32)
CREAT SERPL-MCNC: 1.52 MG/DL (ref 0.7–1.3)
ERYTHROCYTE [DISTWIDTH] IN BLOOD BY AUTOMATED COUNT: 13.5 % (ref 11.5–14.5)
GLOBULIN SER CALC-MCNC: 3.8 G/DL (ref 2–4)
GLUCOSE SERPL-MCNC: 142 MG/DL (ref 65–100)
GRAM STN SPEC: ABNORMAL
GRAM STN SPEC: ABNORMAL
HCT VFR BLD AUTO: 28.5 % (ref 36.6–50.3)
HGB BLD-MCNC: 8.7 G/DL (ref 12.1–17)
INR PPP: 1.8 (ref 0.9–1.1)
LDH SERPL L TO P-CCNC: 262 U/L (ref 85–241)
MAGNESIUM SERPL-MCNC: 1.7 MG/DL (ref 1.6–2.4)
MCH RBC QN AUTO: 27.5 PG (ref 26–34)
MCHC RBC AUTO-ENTMCNC: 30.5 G/DL (ref 30–36.5)
MCV RBC AUTO: 90.2 FL (ref 80–99)
NRBC # BLD: 0 K/UL (ref 0–0.01)
NRBC BLD-RTO: 0 PER 100 WBC
PLATELET # BLD AUTO: 277 K/UL (ref 150–400)
PMV BLD AUTO: 11.8 FL (ref 8.9–12.9)
POTASSIUM SERPL-SCNC: 4.2 MMOL/L (ref 3.5–5.1)
PROT SERPL-MCNC: 6.4 G/DL (ref 6.4–8.2)
PROTHROMBIN TIME: 18.1 SEC (ref 9.2–11.2)
RBC # BLD AUTO: 3.16 M/UL (ref 4.1–5.7)
SERVICE CMNT-IMP: ABNORMAL
SODIUM SERPL-SCNC: 136 MMOL/L (ref 136–145)
WBC # BLD AUTO: 12 K/UL (ref 4.1–11.1)

## 2025-01-26 PROCEDURE — 2060000000 HC ICU INTERMEDIATE R&B

## 2025-01-26 PROCEDURE — 80076 HEPATIC FUNCTION PANEL: CPT

## 2025-01-26 PROCEDURE — 2500000003 HC RX 250 WO HCPCS

## 2025-01-26 PROCEDURE — 99232 SBSQ HOSP IP/OBS MODERATE 35: CPT | Performed by: STUDENT IN AN ORGANIZED HEALTH CARE EDUCATION/TRAINING PROGRAM

## 2025-01-26 PROCEDURE — 6370000000 HC RX 637 (ALT 250 FOR IP): Performed by: INTERNAL MEDICINE

## 2025-01-26 PROCEDURE — 2500000003 HC RX 250 WO HCPCS: Performed by: FAMILY MEDICINE

## 2025-01-26 PROCEDURE — 83735 ASSAY OF MAGNESIUM: CPT

## 2025-01-26 PROCEDURE — 80048 BASIC METABOLIC PNL TOTAL CA: CPT

## 2025-01-26 PROCEDURE — 6370000000 HC RX 637 (ALT 250 FOR IP): Performed by: FAMILY MEDICINE

## 2025-01-26 PROCEDURE — 2580000003 HC RX 258: Performed by: FAMILY MEDICINE

## 2025-01-26 PROCEDURE — 36415 COLL VENOUS BLD VENIPUNCTURE: CPT

## 2025-01-26 PROCEDURE — 6360000002 HC RX W HCPCS: Performed by: FAMILY MEDICINE

## 2025-01-26 PROCEDURE — 6370000000 HC RX 637 (ALT 250 FOR IP): Performed by: STUDENT IN AN ORGANIZED HEALTH CARE EDUCATION/TRAINING PROGRAM

## 2025-01-26 PROCEDURE — 83615 LACTATE (LD) (LDH) ENZYME: CPT

## 2025-01-26 PROCEDURE — 93750 INTERROGATION VAD IN PERSON: CPT | Performed by: STUDENT IN AN ORGANIZED HEALTH CARE EDUCATION/TRAINING PROGRAM

## 2025-01-26 PROCEDURE — 85027 COMPLETE CBC AUTOMATED: CPT

## 2025-01-26 PROCEDURE — 94640 AIRWAY INHALATION TREATMENT: CPT

## 2025-01-26 PROCEDURE — 85610 PROTHROMBIN TIME: CPT

## 2025-01-26 RX ORDER — WARFARIN SODIUM 1 MG/1
2 TABLET ORAL
Status: COMPLETED | OUTPATIENT
Start: 2025-01-26 | End: 2025-01-26

## 2025-01-26 RX ADMIN — CEFEPIME 2000 MG: 2 INJECTION, POWDER, FOR SOLUTION INTRAVENOUS at 05:13

## 2025-01-26 RX ADMIN — ACETAMINOPHEN 650 MG: 325 TABLET ORAL at 02:43

## 2025-01-26 RX ADMIN — SODIUM CHLORIDE, PRESERVATIVE FREE 10 ML: 5 INJECTION INTRAVENOUS at 22:52

## 2025-01-26 RX ADMIN — Medication 400 MG: at 08:35

## 2025-01-26 RX ADMIN — CEFEPIME 2000 MG: 2 INJECTION, POWDER, FOR SOLUTION INTRAVENOUS at 17:36

## 2025-01-26 RX ADMIN — METOPROLOL SUCCINATE 75 MG: 25 TABLET, FILM COATED, EXTENDED RELEASE ORAL at 08:35

## 2025-01-26 RX ADMIN — WARFARIN SODIUM 2 MG: 1 TABLET ORAL at 17:36

## 2025-01-26 RX ADMIN — PRAVASTATIN SODIUM 40 MG: 40 TABLET ORAL at 22:52

## 2025-01-26 RX ADMIN — Medication 1 CAPSULE: at 08:35

## 2025-01-26 RX ADMIN — ALLOPURINOL 50 MG: 100 TABLET ORAL at 08:36

## 2025-01-26 RX ADMIN — PANTOPRAZOLE SODIUM 40 MG: 40 TABLET, DELAYED RELEASE ORAL at 07:05

## 2025-01-26 RX ADMIN — SODIUM CHLORIDE, PRESERVATIVE FREE 10 ML: 5 INJECTION INTRAVENOUS at 08:37

## 2025-01-26 RX ADMIN — Medication 400 MG: at 22:52

## 2025-01-26 ASSESSMENT — PAIN SCALES - GENERAL
PAINLEVEL_OUTOF10: 6
PAINLEVEL_OUTOF10: 0

## 2025-01-26 ASSESSMENT — PAIN DESCRIPTION - ORIENTATION: ORIENTATION: RIGHT

## 2025-01-26 ASSESSMENT — PAIN DESCRIPTION - DESCRIPTORS: DESCRIPTORS: ACHING

## 2025-01-26 ASSESSMENT — PAIN DESCRIPTION - LOCATION: LOCATION: OTHER (COMMENT)

## 2025-01-26 NOTE — PROGRESS NOTES
0830: Assessment completed. Patient A&O x 4. VSS. No complaints at this time.    1600: Driveline dressing completed d/t saturated dressing using sterile technique and LVAD kit. New picture taken and placed in patient's chart. Clot observed at insertion site with removal of dressing.

## 2025-01-26 NOTE — PROGRESS NOTES
[facility] Adult  Hospitalist Group                                                                                          Hospitalist Progress Note  Emily Yeung MD  Answering service: [phone] OR [phone] from in house phone        Date of Service:  2025  NAME:  Sanford Joiner Sr.  :  1960  MRN:  [id number]       Admission Summary:   Sanford Joiner Sr. is a 65 y.o. 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     IN the ED,  spo2 95% on 3Lnc.  Other VSS. Labs showed BuN 35, creatinine 3.15,  WBC 21.7, and hgb 9.1 down from 11.9, and INR 2.8.     Interval history / Subjective:   Patient seen and examined.   He denies any new complaints.    Afebrile overnight.  He hopes to go home soon.     Assessment & Plan:           Sepsis, POA  Hx chronic driveline infection, on chronic suppression therapy with cefdinir;  -Admitted with purulent drainage from abdominal incision;  -Vancomycin and cefepime on arrival;  -Blood cultures collected;  -Culture : Light Staphylococcus aureus, rare gram-negative rods;  -Blood culture : Enterobacter cloacae complex in 1 out of 4 bottles;  -ID consulted: Discontinue vancomycin, continue cefepime;  -ID recommends pump cannula exchange versus debridement;   -Cardiac surgery consulted: No plans for surgical intervention;  -Final wound culture : Light Staphylococcus aureus, rare Enterobacter cloacae complex;  -Wound culture : Moderate probable Staphylococcus aureus, heavy stenotrophomonas maltophilia, checking for possible light Enterobacter cloacae complex;     HFrEF s/p ICD and LVAD  -heart failure team consulted and following  -continue GDMT     COPD  -O2 prn  -DuoNebs prn       Code status: Full   Prophylaxis: Coumadin  Care Plan discussed with: patient, RN, CM;  Anticipated Disposition: Home in 1 to 2 days, pending final ID

## 2025-01-26 NOTE — PROGRESS NOTES
Pharmacist Note - Warfarin Dosing  Consult provided for this 65 y.o.male to manage warfarin for LVAD    INR Goal: Other, 1.8-2.2    Home regimen/ tablet size: 4 mg Sun, Wed, Fri; 2 mg all other days of the week     Drugs that may increase INR: None  Drugs that may decrease INR: None  Other current anticoagulants/ drugs that may increase bleeding risk: None  Risk factors: Age > 65 and Decompensated Heart Failure  Daily INR ordered through: 4/15    Recent Labs     01/24/25  0046 01/25/25  0054 01/26/25  0030   HGB 8.7* 8.9* 8.7*   INR 1.9* 1.4* 1.8*     Date               INR                  Dose  1/22  2.5  Hold   1/23  2.8  Hold  1/24  1.9  4mg  1/25  1.4  4mg  1/26  1.8  2mg                                                                               Assessment/ Plan:  Will order warfarin 2mg PO x1 dose for subtherapeutic INR.    Pharmacy will continue to monitor daily and adjust therapy as indicated.  Please contact the pharmacist at [phone] for outpatient recommendations if needed.

## 2025-01-26 NOTE — PROGRESS NOTES
ADVANCED HEART FAILURE CENTER  [facility] in [place], [place]  Advanced Heart Failure Inpatient Consult Note      Patient name: Sanford Joiner Sr.  Patient : 1960  Patient MRN: [id number]  Date of service: 25    Chief Complaint   Patient presents with    Shortness of Breath         ASSESSMENT:  Sanford Joiner Sr. is a 65 y.o. male admitted with recurrent driveline abscess and infection  Chronic systolic heart failure due to non ischemic cardiomyopathy, s/p HM3 LVAD implantation (2023) as destination therapy, stage D, on optimal GDMT limited by hypovolemia  Not a transplant candidate due to advanced obstructive lung disease, was declined at [facility] for LVAD and transplant.   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   Admitted 2024 for evaluation of sepsis, Enterobacter bacteremia      INTERVAL HISTORY:  NAEON, patient feels   Blood cultures  growing enterobacter cloacae complex- currently on Cefepime  Repeat Echo - septum midline, IVC collapsing appears dry.   CT scans- no acute abnormality, non obs kidney stones  Creatine improved from 3- 1.95 --1.4--1.52  Appreciate nephrology and ID recs   Patient reported to nurse that he wanted to be in hospice if the infection keeps coming back but he is amicable to try the antibiotics for now.       PLAN/RECOMMENDATIONS:  Sepsis evaluation/leukocytosis  WBC 25 -->21->10.4-12 lactic acidosis resolved  On Cefepime with + enterobacter cloacae bacteremia  ID Consult - repeat blood cultures NGTD, cont cefepime,  wound culture  Chest xray - Lungs clear  Keep MAP > 70<90  CT scans   May need wound debridement  Discussed at multidisciplinary rounds and patient was deemed not a candidate for pump exchange due to his complex medical history including chronic respiratory disease.  Reviewed by Dr. Gomez and not felt to need surgical debridement at this time.  Continue  by [facility]. Discussed with Dr. Vasquez in the ER. LVAD with no low flow alarms but many PI events with some speed drops. Patient appears euvolemic to dry.  Exp wheezes.   Not currently in acute distress.   PROBLEM LIST:  Chronic Driveline infection: MSSA, Enterobacter cloacae  S/p IR drain 4/2024  Recurrent fluid accumulation in sub xiphoid space adjacent to driveline  Chronic Systolic heart failure  Stage D  Most likely etiology is myocarditis as evidenced by area of healing mycoarditis by cMRI and high titer of coxackie antibodies; another explanation would be PVC- or tachycardia-mediated.  Genetic testing for cardiomyopathy, VUS  Normal coronaries by [facility] (5/17/21)  S/p HeartMate 3 LVAD for destination therapy 5/27/2023  Complicated by GI bleed, AVM treated   Complicated by chronic driveline infection  Staph Bacteremia 3/2024  VF Arrest  S/p S-ICD 1/12/2023  Ascending aorta dilation 4.4cm by cMRI  Cardiac risk factors:  HTN  Tobacco abuse, remote  Eosinophilic Asthma  CKD stage 2    LIFE GOALS:  Lifestyle goals reviewed with the patient.    IMAGING STUDIES REVIEWED:    Echo 1/24/2025    Left Ventricle: Severely reduced left ventricular systolic function. Left ventricle size is normal. No EF was obtained with this study.    Right Ventricle: Not well visualized. Right ventricle is mildly dilated. Lead present in the right ventricle.  IVC collapsing       ECHO 9/12/24:  EF not well visualized;  aortic valve opens every cycle        ECHO 3/22/24:  difficult study;  limited view.  Unable to interpret      Echo 10/11/23    Left Ventricle: Low normal left ventricular systolic function with a visually estimated EF of 50 - 55%. Left ventricle size is normal. Normal wall motion. LVAD is present.    Limited windows, effects interpretation.     Echo 4/17/23 Speed 5400 RPM. LVIDd 2.8 cm. RV dilated and RV Function reduced. Trace AI, AV opens every beat.  Echo 8/9/22 LVEF 10-15%, mild MR, moderate to severe TR  Echo 6/15/22 LVEF

## 2025-01-26 NOTE — PROGRESS NOTES
0000:  RLQ driveline dressing changed per order. Old dressing saturated with purulent drainage.     Sternal wound dressing changed per order. Old dressing saturated with purulent drainage. Wound cleansed with sterile saline.

## 2025-01-27 LAB
ALBUMIN SERPL-MCNC: 2.9 G/DL (ref 3.5–5)
ALBUMIN/GLOB SERPL: 0.7 (ref 1.1–2.2)
ALP SERPL-CCNC: 110 U/L (ref 45–117)
ALT SERPL-CCNC: 105 U/L (ref 12–78)
ANION GAP SERPL CALC-SCNC: 6 MMOL/L (ref 2–12)
AST SERPL-CCNC: 77 U/L (ref 15–37)
BACTERIA SPEC CULT: NORMAL
BILIRUB DIRECT SERPL-MCNC: <0.1 MG/DL (ref 0–0.2)
BILIRUB SERPL-MCNC: 0.3 MG/DL (ref 0.2–1)
BUN SERPL-MCNC: 25 MG/DL (ref 6–20)
BUN/CREAT SERPL: 16 (ref 12–20)
CALCIUM SERPL-MCNC: 9.3 MG/DL (ref 8.5–10.1)
CHLORIDE SERPL-SCNC: 106 MMOL/L (ref 97–108)
CO2 SERPL-SCNC: 23 MMOL/L (ref 21–32)
CREAT SERPL-MCNC: 1.52 MG/DL (ref 0.7–1.3)
ERYTHROCYTE [DISTWIDTH] IN BLOOD BY AUTOMATED COUNT: 13.7 % (ref 11.5–14.5)
GLOBULIN SER CALC-MCNC: 4.4 G/DL (ref 2–4)
GLUCOSE SERPL-MCNC: 94 MG/DL (ref 65–100)
HCT VFR BLD AUTO: 31 % (ref 36.6–50.3)
HGB BLD-MCNC: 9.5 G/DL (ref 12.1–17)
INR PPP: 1.9 (ref 0.9–1.1)
LDH SERPL L TO P-CCNC: 353 U/L (ref 85–241)
MAGNESIUM SERPL-MCNC: 1.8 MG/DL (ref 1.6–2.4)
MCH RBC QN AUTO: 27.5 PG (ref 26–34)
MCHC RBC AUTO-ENTMCNC: 30.6 G/DL (ref 30–36.5)
MCV RBC AUTO: 89.9 FL (ref 80–99)
NRBC # BLD: 0 K/UL (ref 0–0.01)
NRBC BLD-RTO: 0 PER 100 WBC
PLATELET # BLD AUTO: 396 K/UL (ref 150–400)
PMV BLD AUTO: 11.6 FL (ref 8.9–12.9)
POTASSIUM SERPL-SCNC: 4.5 MMOL/L (ref 3.5–5.1)
PROT SERPL-MCNC: 7.3 G/DL (ref 6.4–8.2)
PROTHROMBIN TIME: 19.3 SEC (ref 9.2–11.2)
RBC # BLD AUTO: 3.45 M/UL (ref 4.1–5.7)
SERVICE CMNT-IMP: NORMAL
SODIUM SERPL-SCNC: 135 MMOL/L (ref 136–145)
WBC # BLD AUTO: 18.2 K/UL (ref 4.1–11.1)

## 2025-01-27 PROCEDURE — 80048 BASIC METABOLIC PNL TOTAL CA: CPT

## 2025-01-27 PROCEDURE — 2500000003 HC RX 250 WO HCPCS

## 2025-01-27 PROCEDURE — 85027 COMPLETE CBC AUTOMATED: CPT

## 2025-01-27 PROCEDURE — 85610 PROTHROMBIN TIME: CPT

## 2025-01-27 PROCEDURE — 99232 SBSQ HOSP IP/OBS MODERATE 35: CPT | Performed by: NURSE PRACTITIONER

## 2025-01-27 PROCEDURE — 6360000002 HC RX W HCPCS: Performed by: FAMILY MEDICINE

## 2025-01-27 PROCEDURE — 6370000000 HC RX 637 (ALT 250 FOR IP): Performed by: STUDENT IN AN ORGANIZED HEALTH CARE EDUCATION/TRAINING PROGRAM

## 2025-01-27 PROCEDURE — 2060000000 HC ICU INTERMEDIATE R&B

## 2025-01-27 PROCEDURE — 6370000000 HC RX 637 (ALT 250 FOR IP): Performed by: FAMILY MEDICINE

## 2025-01-27 PROCEDURE — 83615 LACTATE (LD) (LDH) ENZYME: CPT

## 2025-01-27 PROCEDURE — 87040 BLOOD CULTURE FOR BACTERIA: CPT

## 2025-01-27 PROCEDURE — 83735 ASSAY OF MAGNESIUM: CPT

## 2025-01-27 PROCEDURE — 6370000000 HC RX 637 (ALT 250 FOR IP): Performed by: NURSE PRACTITIONER

## 2025-01-27 PROCEDURE — 6370000000 HC RX 637 (ALT 250 FOR IP)

## 2025-01-27 PROCEDURE — 99232 SBSQ HOSP IP/OBS MODERATE 35: CPT

## 2025-01-27 PROCEDURE — 2500000003 HC RX 250 WO HCPCS: Performed by: FAMILY MEDICINE

## 2025-01-27 PROCEDURE — 36415 COLL VENOUS BLD VENIPUNCTURE: CPT

## 2025-01-27 PROCEDURE — 80076 HEPATIC FUNCTION PANEL: CPT

## 2025-01-27 PROCEDURE — 6370000000 HC RX 637 (ALT 250 FOR IP): Performed by: INTERNAL MEDICINE

## 2025-01-27 PROCEDURE — 2580000003 HC RX 258: Performed by: FAMILY MEDICINE

## 2025-01-27 PROCEDURE — 93750 INTERROGATION VAD IN PERSON: CPT | Performed by: NURSE PRACTITIONER

## 2025-01-27 RX ORDER — WARFARIN SODIUM 1 MG/1
2 TABLET ORAL
Status: COMPLETED | OUTPATIENT
Start: 2025-01-27 | End: 2025-01-27

## 2025-01-27 RX ORDER — HYDRALAZINE HYDROCHLORIDE 10 MG/1
10 TABLET, FILM COATED ORAL EVERY 8 HOURS SCHEDULED
Status: DISCONTINUED | OUTPATIENT
Start: 2025-01-27 | End: 2025-01-31 | Stop reason: HOSPADM

## 2025-01-27 RX ADMIN — SODIUM CHLORIDE, PRESERVATIVE FREE 10 ML: 5 INJECTION INTRAVENOUS at 21:34

## 2025-01-27 RX ADMIN — ALLOPURINOL 50 MG: 100 TABLET ORAL at 08:53

## 2025-01-27 RX ADMIN — METOPROLOL SUCCINATE 75 MG: 25 TABLET, FILM COATED, EXTENDED RELEASE ORAL at 08:53

## 2025-01-27 RX ADMIN — CEFEPIME 2000 MG: 2 INJECTION, POWDER, FOR SOLUTION INTRAVENOUS at 05:15

## 2025-01-27 RX ADMIN — HYDRALAZINE HYDROCHLORIDE 10 MG: 10 TABLET ORAL at 21:34

## 2025-01-27 RX ADMIN — Medication 400 MG: at 08:52

## 2025-01-27 RX ADMIN — Medication 400 MG: at 21:34

## 2025-01-27 RX ADMIN — ACETAMINOPHEN 650 MG: 325 TABLET ORAL at 17:40

## 2025-01-27 RX ADMIN — Medication 1 CAPSULE: at 08:52

## 2025-01-27 RX ADMIN — PANTOPRAZOLE SODIUM 40 MG: 40 TABLET, DELAYED RELEASE ORAL at 05:15

## 2025-01-27 RX ADMIN — CEFEPIME 2000 MG: 2 INJECTION, POWDER, FOR SOLUTION INTRAVENOUS at 17:35

## 2025-01-27 RX ADMIN — SODIUM CHLORIDE, PRESERVATIVE FREE 10 ML: 5 INJECTION INTRAVENOUS at 08:54

## 2025-01-27 RX ADMIN — WARFARIN SODIUM 2 MG: 1 TABLET ORAL at 17:36

## 2025-01-27 RX ADMIN — PRAVASTATIN SODIUM 40 MG: 40 TABLET ORAL at 21:34

## 2025-01-27 ASSESSMENT — ENCOUNTER SYMPTOMS
WHEEZING: 0
SORE THROAT: 0
ALLERGIC/IMMUNOLOGIC NEGATIVE: 1
COUGH: 0
SHORTNESS OF BREATH: 0
BLOOD IN STOOL: 0
ABDOMINAL PAIN: 0
EYE PAIN: 0
CHEST TIGHTNESS: 0
ABDOMINAL DISTENTION: 0

## 2025-01-27 NOTE — PROGRESS NOTES
Chart reviewed. Cardiac surgery team signing off. Please notify us if there questions or concerns. Thank you.

## 2025-01-27 NOTE — PLAN OF CARE
0730: Bedside and Verbal shift change report given to RACHEL Rubio (oncoming nurse) by RACHEL Zee (offgoing nurse). Report included the following information Nurse Handoff Report, Intake/Output, and Cardiac Rhythm sinus .     1930: Bedside and Verbal shift change report given to RACHEL Zee (oncoming nurse) by RACHEL Rubio (offgoing nurse). Report included the following information Nurse Handoff Report, Intake/Output, and Cardiac Rhythm sinus .     Problem: Chronic Conditions and Co-morbidities  Goal: Patient's chronic conditions and co-morbidity symptoms are monitored and maintained or improved  1/27/2025 0957 by Joanne Wolf RN  Outcome: Progressing  1/26/2025 2221 by Saadia Price RN  Outcome: Progressing     Problem: Discharge Planning  Goal: Discharge to home or other facility with appropriate resources  1/27/2025 0957 by Joanne Wolf RN  Outcome: Progressing  1/26/2025 2221 by Saadia Price RN  Outcome: Progressing     Problem: Pain  Goal: Verbalizes/displays adequate comfort level or baseline comfort level  1/27/2025 0957 by Joanne Wolf RN  Outcome: Progressing  1/26/2025 2221 by Saadia Price RN  Outcome: Progressing     Problem: Safety - Adult  Goal: Free from fall injury  1/27/2025 0957 by Joanne Wolf RN  Outcome: Progressing  1/26/2025 2221 by Saadia Price RN  Outcome: Progressing     Problem: ABCDS Injury Assessment  Goal: Absence of physical injury  1/27/2025 0957 by Joanne Wolf RN  Outcome: Progressing  1/26/2025 2221 by Saadia Price RN  Outcome: Progressing

## 2025-01-27 NOTE — PROGRESS NOTES
Pharmacist Note - Warfarin Dosing  Consult provided for this 65 y.o.male to manage warfarin for LVAD    INR Goal: Other, 1.8-2.2    Home regimen/ tablet size: 4 mg Sun, Wed, Fri; 2 mg all other days of the week     Drugs that may increase INR: None  Drugs that may decrease INR: None  Other current anticoagulants/ drugs that may increase bleeding risk: None  Risk factors: Age > 65 and Decompensated Heart Failure  Daily INR ordered through: 4/15    Recent Labs     01/25/25  0054 01/26/25  0030 01/27/25  0224   HGB 8.9* 8.7* 9.5*   INR 1.4* 1.8* 1.9*     Date               INR                  Dose  1/22  2.5  Hold   1/23  2.8  Hold  1/24  1.9  4mg  1/25  1.4  4mg  1/26  1.8  2mg  1/27  1.9  2mg                                                                                 Assessment/ Plan:  Will order home dose of warfarin 2mg PO x1 dose. INR within goal.    Pharmacy will continue to monitor daily and adjust therapy as indicated.  Please contact the pharmacist at [phone] for outpatient recommendations if needed.

## 2025-01-27 NOTE — PROGRESS NOTES
ADVANCED HEART FAILURE CENTER  [facility] in [place], [place]  Advanced Heart Failure Inpatient Consult Note      Patient name: Sanford Joiner Sr.  Patient : 1960  Patient MRN: [id number]  Date of service: 25    Chief Complaint   Patient presents with    Shortness of Breath         ASSESSMENT:  Sanford Joiner Sr. is a 65 y.o. male admitted with recurrent driveline abscess and infection  Chronic systolic heart failure due to non ischemic cardiomyopathy, s/p HM3 LVAD implantation (2023) as destination therapy, stage D, on optimal GDMT limited by hypovolemia  Not a transplant candidate due to advanced obstructive lung disease, was declined at [facility] for LVAD and transplant.   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  As per ID note, E. cloacae sensitive to Levaquin and ciprofloxacin  Chest xray - Lungs clear  Keep MAP > 70<90  CT scans   May need wound debridement  Discussed at multidisciplinary rounds and patient was deemed not a candidate for pump exchange due to his complex medical history including chronic respiratory disease.  Reviewed by Dr. Gomez and not felt to need surgical debridement at this time.  Continue antibiotics    DAWOOD  Appreciate Nephrology recs   Hold bumex  500 cc NS slowly for resuscitation given in ER   Creatine 3.19-->3.15-->1.95--1.4--1.52  Baseline cr 1.2-1.3    Driveline/LVAD  DO Belkys, 75 mg at 01/27/25 0853    allopurinol (ZYLOPRIM) tablet 50 mg, 50 mg, Oral, Daily, Emily Yeung MD, 50 mg at 01/27/25 0853    [Held by provider] bumetanide (BUMEX) tablet 1 mg, 1 mg, Oral, Daily, Emily Yeung MD    lactobacillus (CULTURELLE) capsule 1 capsule, 1 capsule, Oral, Daily with breakfast, Emily Yeung MD, 1 capsule at 01/27/25 0852    magnesium oxide (MAG-OX) tablet 400 mg, 400 mg, Oral, BID, Emily Yeung MD, 400 mg at 01/27/25 0852    budesonide (PULMICORT) nebulizer suspension 1,000 mcg, 1 mg, Nebulization, BID RT, Emily Yeung MD, 1,000 mcg at 01/23/25 1028    sodium chloride flush 0.9 % injection 5-40 mL, 5-40 mL, IntraVENous, 2 times per day, Anabell Villa APRN - CNP, 10 mL at 01/26/25 2252    sodium chloride flush 0.9 % injection 5-40 mL, 5-40 mL, IntraVENous, PRN, Anabell Villa APRN - CNP    hydrALAZINE (APRESOLINE) injection 10 mg, 10 mg, IntraVENous, Q4H PRN, Anabell Villa APRN - CNP    albuterol (PROVENTIL) (2.5 MG/3ML) 0.083% nebulizer solution 2.5 mg, 2.5 mg, Nebulization, Q6H PRN, Anabell Villa APRN - CNP    glucose chewable tablet 16 g, 4 tablet, Oral, PRN, Anabell Villa APRN - CNP    dextrose bolus 10% 125 mL, 125 mL, IntraVENous, PRN **OR** dextrose bolus 10% 250 mL, 250 mL, IntraVENous, PRNDaisy Kathleen, APRN - CNP    glucagon injection 1 mg, 1 mg, SubCUTAneous, PRN, Anabell Villa APRN - CNP    dextrose 10 % infusion, , IntraVENous, Continuous PRN, Anabell Villa APRN - CNP    0.9 % sodium chloride infusion, , IntraVENous, PRN, Anabell Villa, APRN - CNP    sodium chloride flush 0.9 % injection 5-40 mL, 5-40 mL, IntraVENous, 2 times per day, Felicitas Altman MD, 10 mL at 01/27/25 0854    sodium chloride flush 0.9 % injection 5-40 mL, 5-40 mL, IntraVENous, PRN, Felicitas Altman MD    0.9 % sodium chloride infusion, , IntraVENous, PRN, Felicitas Altman MD    polyethylene glycol (GLYCOLAX) packet 17 g, 17 g, Oral, Daily PRN,

## 2025-01-27 NOTE — PROGRESS NOTES
1930-0700:    MAPs 70s during PM. Afebrile.     -LVAD dressing changed per order. Copious amount of thin, sanguinous/yellow drainage. Mild-moderate odor.   -MSI dressing changed per order. Copious amount of thick, yellow drainage. Putrid odor.

## 2025-01-27 NOTE — PROGRESS NOTES
[facility] Adult  Hospitalist Group                                                                                          Hospitalist Progress Note  Emily Yeung MD  Answering service: [phone] OR [phone] from in house phone        Date of Service:  2025  NAME:  Sanford Joiner Sr.  :  1960  MRN:  [id number]       Admission Summary:   Sanford Joiner Sr. is a 65 y.o. 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     IN the ED,  spo2 95% on 3Lnc.  Other VSS. Labs showed BuN 35, creatinine 3.15,  WBC 21.7, and hgb 9.1 down from 11.9, and INR 2.8.     Interval history / Subjective:   Patient seen and examined this afternoon.  WBC is up today to 18.2.  Repeat blood cultures ordered.     Assessment & Plan:           Sepsis, POA  Hx chronic driveline infection, on chronic suppression therapy with cefdinir;  -Admitted with purulent drainage from abdominal incision;  -Vancomycin and cefepime on arrival;  -Blood cultures collected;  -Culture : Light Staphylococcus aureus, rare gram-negative rods;  -Blood culture : Enterobacter cloacae complex in 1 out of 4 bottles;  -ID consulted: Discontinue vancomycin, continue cefepime;  -ID recommends pump cannula exchange versus debridement;   -Cardiac surgery consulted: No plans for surgical intervention;  -Final wound culture : Light Staphylococcus aureus, rare Enterobacter cloacae complex;  -Wound culture : MSSA, heavy stenotrophomonas maltophilia, checking for possible light Enterobacter cloacae complex;  -Continue cefepime per ID;  -Repeat blood culture collected  due to elevated WBC despite IV antibiotics;     HFrEF s/p ICD and LVAD  -heart failure team consulted and following  -continue GDMT     COPD  -O2 prn  -DuoNebs prn       Code status: Full   Prophylaxis: Coumadin  Care Plan discussed with: patient, RN, CM;  Anticipated

## 2025-01-27 NOTE — PROGRESS NOTES
Potassium 4.5 3.5 - 5.1 mmol/L    Chloride 106 97 - 108 mmol/L    CO2 23 21 - 32 mmol/L    Anion Gap 6 2 - 12 mmol/L    Glucose 94 65 - 100 mg/dL    BUN 25 (H) 6 - 20 MG/DL    Creatinine 1.52 (H) 0.70 - 1.30 MG/DL    BUN/Creatinine Ratio 16 12 - 20      Est, Glom Filt Rate 51 (L) >60 ml/min/1.73m2    Calcium 9.3 8.5 - 10.1 MG/DL   CBC    Collection Time: 01/27/25  2:24 AM   Result Value Ref Range    WBC 18.2 (H) 4.1 - 11.1 K/uL    RBC 3.45 (L) 4.10 - 5.70 M/uL    Hemoglobin 9.5 (L) 12.1 - 17.0 g/dL    Hematocrit 31.0 (L) 36.6 - 50.3 %    MCV 89.9 80.0 - 99.0 FL    MCH 27.5 26.0 - 34.0 PG    MCHC 30.6 30.0 - 36.5 g/dL    RDW 13.7 11.5 - 14.5 %    Platelets 396 150 - 400 K/uL    MPV 11.6 8.9 - 12.9 FL    Nucleated RBCs 0.0 0  WBC    nRBC 0.00 0.00 - 0.01 K/uL   Protime-INR    Collection Time: 01/27/25  2:24 AM   Result Value Ref Range    INR 1.9 (H) 0.9 - 1.1      Protime 19.3 (H) 9.2 - 11.2 sec   Magnesium    Collection Time: 01/27/25  2:24 AM   Result Value Ref Range    Magnesium 1.8 1.6 - 2.4 mg/dL   Hepatic Function Panel    Collection Time: 01/27/25  2:24 AM   Result Value Ref Range    Total Protein 7.3 6.4 - 8.2 g/dL    Albumin 2.9 (L) 3.5 - 5.0 g/dL    Globulin 4.4 (H) 2.0 - 4.0 g/dL    Albumin/Globulin Ratio 0.7 (L) 1.1 - 2.2      Total Bilirubin 0.3 0.2 - 1.0 MG/DL    Bilirubin, Direct <0.1 0.0 - 0.2 MG/DL    Alk Phosphatase 110 45 - 117 U/L    AST 77 (H) 15 - 37 U/L     (H) 12 - 78 U/L   Lactate Dehydrogenase    Collection Time: 01/27/25  2:24 AM   Result Value Ref Range     (H) 85 - 241 U/L        Microbiology: blood cx     Studies:  Xray Result (most recent):  XR CHEST PORTABLE 01/22/2025    Narrative  EXAM:  XR CHEST PORTABLE    INDICATION: shortness of breath    COMPARISON: 7/8/2024 and 3 3124    TECHNIQUE: 1506 hours portable chest AP view    FINDINGS: Status post median sternotomy. The uppermost sternal wire is broken.  It has been broken on the previous radiographs however on  today's examination a  piece of the superiormost sternal wire that measures 13 mm in length as broken  off and migrated inferiorly projecting over the lower edge of the manubrium at  the level of the second sternal wire.    LVAD is in place. Subcutaneous ICD is in place.    The heart size is normal.    The lungs are clear.    Osseous structures are unremarkable.    Impression  1. Lungs are clear  2. The superiormost sternal wire is broken and a piece of wire fragment broken  wire has migrated inferiorly.    Electronically signed by DAIJA SERRANO          Thank you for the opportunity to participate in the care of this patient.     A total time of 35 minutes was spent on today's encounter.  Greater than 50% of the time was spent on the following:  Preparing for visit and chart review.  Obtaining and/or reviewing separately obtained history  Performing a medically appropriate exam and/or evaluation  Counseling and educating a patient/family/caregiver as noted above  Placing relevant orders  Referring and communicating with other professionals (not separately reported)  Independently interpreting results (not separately reported) and communicating results to the patient/family/caregiver  Care coordination (not separately reported) as noted above  Documenting clinical information in the electronic health records (e.g. problem list, visit note) on the day of the encounter     Signed By: TAIWO Galvez NP     January 27, 2025

## 2025-01-28 LAB
ALBUMIN SERPL-MCNC: 2.7 G/DL (ref 3.5–5)
ALBUMIN/GLOB SERPL: 0.7 (ref 1.1–2.2)
ALP SERPL-CCNC: 106 U/L (ref 45–117)
ALT SERPL-CCNC: 106 U/L (ref 12–78)
ANION GAP SERPL CALC-SCNC: 7 MMOL/L (ref 2–12)
AST SERPL-CCNC: 74 U/L (ref 15–37)
BACTERIA SPEC CULT: ABNORMAL
BILIRUB DIRECT SERPL-MCNC: <0.1 MG/DL (ref 0–0.2)
BILIRUB SERPL-MCNC: 0.2 MG/DL (ref 0.2–1)
BUN SERPL-MCNC: 26 MG/DL (ref 6–20)
BUN/CREAT SERPL: 17 (ref 12–20)
CALCIUM SERPL-MCNC: 9.2 MG/DL (ref 8.5–10.1)
CHLORIDE SERPL-SCNC: 107 MMOL/L (ref 97–108)
CO2 SERPL-SCNC: 24 MMOL/L (ref 21–32)
CREAT SERPL-MCNC: 1.49 MG/DL (ref 0.7–1.3)
ERYTHROCYTE [DISTWIDTH] IN BLOOD BY AUTOMATED COUNT: 13.7 % (ref 11.5–14.5)
GLOBULIN SER CALC-MCNC: 4.1 G/DL (ref 2–4)
GLUCOSE SERPL-MCNC: 131 MG/DL (ref 65–100)
GRAM STN SPEC: ABNORMAL
GRAM STN SPEC: ABNORMAL
HCT VFR BLD AUTO: 31.4 % (ref 36.6–50.3)
HGB BLD-MCNC: 9.8 G/DL (ref 12.1–17)
INR PPP: 1.9 (ref 0.9–1.1)
LDH SERPL L TO P-CCNC: 263 U/L (ref 85–241)
MAGNESIUM SERPL-MCNC: 1.9 MG/DL (ref 1.6–2.4)
MCH RBC QN AUTO: 28.4 PG (ref 26–34)
MCHC RBC AUTO-ENTMCNC: 31.2 G/DL (ref 30–36.5)
MCV RBC AUTO: 91 FL (ref 80–99)
NRBC # BLD: 0 K/UL (ref 0–0.01)
NRBC BLD-RTO: 0 PER 100 WBC
PHOSPHATE SERPL-MCNC: 2.8 MG/DL (ref 2.6–4.7)
PLATELET # BLD AUTO: 362 K/UL (ref 150–400)
PMV BLD AUTO: 11.5 FL (ref 8.9–12.9)
POTASSIUM SERPL-SCNC: 4 MMOL/L (ref 3.5–5.1)
PROT SERPL-MCNC: 6.8 G/DL (ref 6.4–8.2)
PROTHROMBIN TIME: 19.3 SEC (ref 9.2–11.2)
RBC # BLD AUTO: 3.45 M/UL (ref 4.1–5.7)
SERVICE CMNT-IMP: ABNORMAL
SODIUM SERPL-SCNC: 138 MMOL/L (ref 136–145)
WBC # BLD AUTO: 17.7 K/UL (ref 4.1–11.1)

## 2025-01-28 PROCEDURE — 83615 LACTATE (LD) (LDH) ENZYME: CPT

## 2025-01-28 PROCEDURE — 99232 SBSQ HOSP IP/OBS MODERATE 35: CPT | Performed by: STUDENT IN AN ORGANIZED HEALTH CARE EDUCATION/TRAINING PROGRAM

## 2025-01-28 PROCEDURE — 2500000003 HC RX 250 WO HCPCS

## 2025-01-28 PROCEDURE — 6370000000 HC RX 637 (ALT 250 FOR IP): Performed by: FAMILY MEDICINE

## 2025-01-28 PROCEDURE — 2500000003 HC RX 250 WO HCPCS: Performed by: FAMILY MEDICINE

## 2025-01-28 PROCEDURE — 6370000000 HC RX 637 (ALT 250 FOR IP): Performed by: NURSE PRACTITIONER

## 2025-01-28 PROCEDURE — 85027 COMPLETE CBC AUTOMATED: CPT

## 2025-01-28 PROCEDURE — 6360000002 HC RX W HCPCS: Performed by: FAMILY MEDICINE

## 2025-01-28 PROCEDURE — 80048 BASIC METABOLIC PNL TOTAL CA: CPT

## 2025-01-28 PROCEDURE — 93750 INTERROGATION VAD IN PERSON: CPT | Performed by: STUDENT IN AN ORGANIZED HEALTH CARE EDUCATION/TRAINING PROGRAM

## 2025-01-28 PROCEDURE — 2580000003 HC RX 258: Performed by: FAMILY MEDICINE

## 2025-01-28 PROCEDURE — 80076 HEPATIC FUNCTION PANEL: CPT

## 2025-01-28 PROCEDURE — 2060000000 HC ICU INTERMEDIATE R&B

## 2025-01-28 PROCEDURE — 6370000000 HC RX 637 (ALT 250 FOR IP): Performed by: INTERNAL MEDICINE

## 2025-01-28 PROCEDURE — 6370000000 HC RX 637 (ALT 250 FOR IP): Performed by: STUDENT IN AN ORGANIZED HEALTH CARE EDUCATION/TRAINING PROGRAM

## 2025-01-28 PROCEDURE — 99233 SBSQ HOSP IP/OBS HIGH 50: CPT

## 2025-01-28 PROCEDURE — 85610 PROTHROMBIN TIME: CPT

## 2025-01-28 PROCEDURE — 36415 COLL VENOUS BLD VENIPUNCTURE: CPT

## 2025-01-28 PROCEDURE — 84100 ASSAY OF PHOSPHORUS: CPT

## 2025-01-28 PROCEDURE — 83735 ASSAY OF MAGNESIUM: CPT

## 2025-01-28 RX ORDER — WARFARIN SODIUM 1 MG/1
2 TABLET ORAL
Status: COMPLETED | OUTPATIENT
Start: 2025-01-28 | End: 2025-01-28

## 2025-01-28 RX ADMIN — Medication 1 CAPSULE: at 08:56

## 2025-01-28 RX ADMIN — HYDRALAZINE HYDROCHLORIDE 10 MG: 10 TABLET ORAL at 16:21

## 2025-01-28 RX ADMIN — ALLOPURINOL 50 MG: 100 TABLET ORAL at 08:57

## 2025-01-28 RX ADMIN — SODIUM CHLORIDE, PRESERVATIVE FREE 10 ML: 5 INJECTION INTRAVENOUS at 20:07

## 2025-01-28 RX ADMIN — Medication 400 MG: at 20:07

## 2025-01-28 RX ADMIN — CEFEPIME 2000 MG: 2 INJECTION, POWDER, FOR SOLUTION INTRAVENOUS at 17:03

## 2025-01-28 RX ADMIN — CEFEPIME 2000 MG: 2 INJECTION, POWDER, FOR SOLUTION INTRAVENOUS at 05:26

## 2025-01-28 RX ADMIN — HYDRALAZINE HYDROCHLORIDE 10 MG: 10 TABLET ORAL at 20:07

## 2025-01-28 RX ADMIN — Medication 400 MG: at 08:56

## 2025-01-28 RX ADMIN — PRAVASTATIN SODIUM 40 MG: 40 TABLET ORAL at 20:06

## 2025-01-28 RX ADMIN — METOPROLOL SUCCINATE 75 MG: 25 TABLET, FILM COATED, EXTENDED RELEASE ORAL at 08:57

## 2025-01-28 RX ADMIN — HYDRALAZINE HYDROCHLORIDE 10 MG: 10 TABLET ORAL at 05:21

## 2025-01-28 RX ADMIN — WARFARIN SODIUM 2 MG: 1 TABLET ORAL at 17:01

## 2025-01-28 RX ADMIN — PANTOPRAZOLE SODIUM 40 MG: 40 TABLET, DELAYED RELEASE ORAL at 05:21

## 2025-01-28 ASSESSMENT — ENCOUNTER SYMPTOMS
ABDOMINAL DISTENTION: 0
CHEST TIGHTNESS: 0
COUGH: 0
WHEEZING: 0
SORE THROAT: 0
EYE PAIN: 0
BLOOD IN STOOL: 0
SHORTNESS OF BREATH: 0
ALLERGIC/IMMUNOLOGIC NEGATIVE: 1
ABDOMINAL PAIN: 0

## 2025-01-28 NOTE — PLAN OF CARE
0730: Bedside and Verbal shift change report given to RACHEL Rubio (oncoming nurse) by RACHEL Zee (offgoing nurse). Report included the following information Nurse Handoff Report, Intake/Output, and Cardiac Rhythm sinus .     1930: Bedside and Verbal shift change report given to RACHEL Zee (oncoming nurse) by RACHEL Rubio (offgoing nurse). Report included the following information Nurse Handoff Report, Intake/Output, and Cardiac Rhythm sinus .     Problem: Chronic Conditions and Co-morbidities  Goal: Patient's chronic conditions and co-morbidity symptoms are monitored and maintained or improved  1/28/2025 1114 by Joanne Wolf RN  Outcome: Progressing  1/27/2025 2238 by Saadia Price RN  Outcome: Progressing     Problem: Discharge Planning  Goal: Discharge to home or other facility with appropriate resources  1/28/2025 1114 by Joanne Wolf RN  Outcome: Progressing  1/27/2025 2238 by Saadia Price RN  Outcome: Progressing     Problem: Pain  Goal: Verbalizes/displays adequate comfort level or baseline comfort level  1/28/2025 1114 by Joanne Wolf RN  Outcome: Progressing  1/27/2025 2238 by Saadia Price RN  Outcome: Progressing     Problem: Safety - Adult  Goal: Free from fall injury  1/28/2025 1114 by Joanne Wolf RN  Outcome: Progressing  1/27/2025 2238 by Saadia Price RN  Outcome: Progressing     Problem: ABCDS Injury Assessment  Goal: Absence of physical injury  1/28/2025 1114 by Joanne Wolf RN  Outcome: Progressing  1/27/2025 2238 by Saadia Price RN  Outcome: Progressing

## 2025-01-28 NOTE — CARE COORDINATION
Transition of Care Plan:    RUR: 18%  Prior Level of Functioning: LVAD. Lives w wife and open to [facility]. Ind w mobility. O2.  Disposition: (Need ID plan, PICC line, JAN HH order from heart failure) JAN [facility].  If SNF or IPR: Date FOC offered: NA  Follow up appointments: Specialist  DME needed: NA  Transportation at discharge: Wife  IM/IMM Medicare/ letter given: 1/23  Caregiver Contact: WifeAshley [phone], [phone]  Discharge Caregiver contacted prior to discharge?   Care Conference needed?   Barriers to discharge:  Medical- repeat blood cultures    Patient discussed in [facility] IDRs and he has an elevated WBC and needs repeat blood cultures.  Patient will d/c on IV ABX and CM will need the ID plan and PICC line.  CM will continue to follow for KEVIN needs.    KATJA Leyva CCM  Care Management   Available on [employer] or [phone]

## 2025-01-28 NOTE — PROGRESS NOTES
Infectious Disease Progress Note    Impression/Plan     65 y.o. male with past medical Hx of HFreF s/p ICD and LVAD complicated by sternal wound infection and driveline abscess with MSSAand E.cloacae,MSSA bacteremia, on chronic cefdinir, and COPD who presented with shortness of breath, admitted for sepsis.     Enterobacter Cloacae Bacteremia  Sternal and driveline wound  Sternal wire fracture  DAWOOD on CKD  Leukocytosis  Hx chronic driveline infection with MSSA, E.cloacae  Failed outpt oral suppressive therapy with cefdinir  Repeat blood cx 1/24 NGTD, 1/27 NGTD  Superficial wound cx MSSA, E.cloacae, Stenotrophomonas. D/w Dr. Raajn, no need to treat Stenotrophomonas, cx superficial    continue renally dosed cefepime.     Monitor renal function - improving     Given chronic driveline infection with E.cloacae with subsequent bacteremia, without source control will be difficult to suppress with Levaquin. Needs pump cannula exchange but per cardiac surgery not surgical candidate. Dr. Rajan d/w cardiac surgery NP to d/w Dr. Jama again.      If no surgical intervention, will need 6 weeks of cefepime followed by oral suppressive therapy with Levaquin as long as he tolerates it. D/w pt extensively side effects and risk of breakthrough and recurrence of bacteremia.         Follow blood cx  follow WBC - stable    Wound care per wound care team      D/w Dr. Rajan, pt, cardiac surgery, Dr. Yeung             Anti-infectives:   S/p Vancomycin  cefepime    Subjective:   Feels fine, denies diarrhea. Drainage noted on driveline dressing        Date of Consultation:  January 28, 2025  Date of Admission: 1/22/2025   Referring Physician: Felicitas Altman    Patient is a 65 y.o. male with past medical Hx of HFreF s/p ICD and LVAD complicated by sternal wound infection and driveline abscess with MSSAand E.cloacae,MSSA bacteremia, on chronic cefdinir, and COPD who presented with shortness of breath, admitted for sepsis.     Patient has  Dehydrogenase    Collection Time: 01/28/25  1:34 AM   Result Value Ref Range     (H) 85 - 241 U/L   Phosphorus    Collection Time: 01/28/25  1:34 AM   Result Value Ref Range    Phosphorus 2.8 2.6 - 4.7 MG/DL        Microbiology: blood cx     Studies:  Xray Result (most recent):  XR CHEST PORTABLE 01/22/2025    Narrative  EXAM:  XR CHEST PORTABLE    INDICATION: shortness of breath    COMPARISON: 7/8/2024 and 3 3124    TECHNIQUE: 1506 hours portable chest AP view    FINDINGS: Status post median sternotomy. The uppermost sternal wire is broken.  It has been broken on the previous radiographs however on today's examination a  piece of the superiormost sternal wire that measures 13 mm in length as broken  off and migrated inferiorly projecting over the lower edge of the manubrium at  the level of the second sternal wire.    LVAD is in place. Subcutaneous ICD is in place.    The heart size is normal.    The lungs are clear.    Osseous structures are unremarkable.    Impression  1. Lungs are clear  2. The superiormost sternal wire is broken and a piece of wire fragment broken  wire has migrated inferiorly.    Electronically signed by DAIJA SERRANO          Thank you for the opportunity to participate in the care of this patient.     A total time of 50 minutes was spent on today's encounter.  Greater than 50% of the time was spent on the following:  Preparing for visit and chart review.  Obtaining and/or reviewing separately obtained history  Performing a medically appropriate exam and/or evaluation  Counseling and educating a patient/family/caregiver as noted above  Placing relevant orders  Referring and communicating with other professionals (not separately reported)  Independently interpreting results (not separately reported) and communicating results to the patient/family/caregiver  Care coordination (not separately reported) as noted above  Documenting clinical information in the electronic health records (e.g.

## 2025-01-28 NOTE — PROGRESS NOTES
ADVANCED HEART FAILURE CENTER  [facility] in [place], [place]  Advanced Heart Failure Inpatient Consult Note      Patient name: Sanford Joiner Sr.  Patient : 1960  Patient MRN: [id number]  Date of service: 25    Chief Complaint   Patient presents with    Shortness of Breath         ASSESSMENT:  Sanford Joiner Sr. is a 65 y.o. male admitted with recurrent driveline abscess and infection  Chronic systolic heart failure due to non ischemic cardiomyopathy, s/p HM3 LVAD implantation (2023) as destination therapy, stage D, on optimal GDMT limited by hypovolemia  Not a transplant candidate due to advanced obstructive lung disease, was declined at [facility] for LVAD and transplant.   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  As per ID note, E. cloacae sensitive to Levaquin and ciprofloxacin  Chest xray - Lungs clear  Keep MAP > 70<90  CT scans   May need wound debridement  Discussed at multidisciplinary rounds and patient was deemed not a candidate for pump exchange due to his complex medical history including chronic respiratory disease.  Reviewed by Dr. Gomez and not felt to need surgical debridement at this time.  Continue antibiotics    DAWOOD  Appreciate Nephrology recs   Hold bumex  500 cc NS slowly for resuscitation given in ER   Creatine

## 2025-01-28 NOTE — PROGRESS NOTES
Referral source:   Sanford Joiner Sr. at [facility] in [facility] 4 CV SERVICES UNIT.  attended rounds in the CCU as part of the Interdisciplinary team where the patient's ongoing care was discussed. I reviewed the medical record as part of this encounter.     Outcome: Interdisciplinary team are aware of  availability and were encouraged to request Spiritual Health support as needed.      The  on-call can be reached at [phone]).     Rev. Mariana Heck MDiv, [facility]  Staff

## 2025-01-28 NOTE — PROGRESS NOTES
tablet 40 mg  40 mg Oral QAM AC    pravastatin (PRAVACHOL) tablet 40 mg  40 mg Oral Nightly    metoprolol succinate (TOPROL XL) extended release tablet 75 mg  75 mg Oral Daily    allopurinol (ZYLOPRIM) tablet 50 mg  50 mg Oral Daily    [Held by provider] bumetanide (BUMEX) tablet 1 mg  1 mg Oral Daily    lactobacillus (CULTURELLE) capsule 1 capsule  1 capsule Oral Daily with breakfast    magnesium oxide (MAG-OX) tablet 400 mg  400 mg Oral BID    budesonide (PULMICORT) nebulizer suspension 1,000 mcg  1 mg Nebulization BID RT    sodium chloride flush 0.9 % injection 5-40 mL  5-40 mL IntraVENous 2 times per day    sodium chloride flush 0.9 % injection 5-40 mL  5-40 mL IntraVENous PRN    hydrALAZINE (APRESOLINE) injection 10 mg  10 mg IntraVENous Q4H PRN    albuterol (PROVENTIL) (2.5 MG/3ML) 0.083% nebulizer solution 2.5 mg  2.5 mg Nebulization Q6H PRN    glucose chewable tablet 16 g  4 tablet Oral PRN    dextrose bolus 10% 125 mL  125 mL IntraVENous PRN    Or    dextrose bolus 10% 250 mL  250 mL IntraVENous PRN    glucagon injection 1 mg  1 mg SubCUTAneous PRN    dextrose 10 % infusion   IntraVENous Continuous PRN    0.9 % sodium chloride infusion   IntraVENous PRN    sodium chloride flush 0.9 % injection 5-40 mL  5-40 mL IntraVENous 2 times per day    sodium chloride flush 0.9 % injection 5-40 mL  5-40 mL IntraVENous PRN    0.9 % sodium chloride infusion   IntraVENous PRN    polyethylene glycol (GLYCOLAX) packet 17 g  17 g Oral Daily PRN    acetaminophen (TYLENOL) tablet 650 mg  650 mg Oral Q6H PRN    Or    acetaminophen (TYLENOL) suppository 650 mg  650 mg Rectal Q6H PRN    warfarin placeholder: dosing by pharmacy   Oral RX Placeholder     ______________________________________________________________________  EXPECTED LENGTH OF STAY: Unable to retrieve estimated LOS  ACTUAL LENGTH OF STAY:          6                 Emily Yeung MD

## 2025-01-28 NOTE — PROGRESS NOTES
Pharmacist Note - Warfarin Dosing  Consult provided for this 65 y.o.male to manage warfarin for LVAD    INR Goal: Other, 1.8-2.2    Home regimen/ tablet size: 4 mg Sun, Wed, Fri; 2 mg all other days of the week     Drugs that may increase INR: None  Drugs that may decrease INR: None  Other current anticoagulants/ drugs that may increase bleeding risk: None  Risk factors: Age > 65 and Decompensated Heart Failure  Daily INR ordered through: 4/15    Recent Labs     01/26/25  0030 01/27/25  0224 01/28/25  0134   HGB 8.7* 9.5* 9.8*   INR 1.8* 1.9* 1.9*     Date               INR                  Dose  1/22  2.5  Hold   1/23  2.8  Hold  1/24  1.9  4mg  1/25  1.4  4mg  1/26  1.8  2mg  1/27  1.9  2mg  1/28                 1.9                  2 mg                                                                                 Assessment/ Plan:  Will order home dose of warfarin 2mg PO x1 dose. INR within goal.    Pharmacy will continue to monitor daily and adjust therapy as indicated.  Please contact the pharmacist at [phone] or [phone] for outpatient recommendations if needed.

## 2025-01-29 ENCOUNTER — APPOINTMENT (OUTPATIENT)
Facility: HOSPITAL | Age: 65
End: 2025-01-29
Payer: MEDICARE

## 2025-01-29 LAB
ALBUMIN SERPL-MCNC: 2.8 G/DL (ref 3.5–5)
ALBUMIN/GLOB SERPL: 0.7 (ref 1.1–2.2)
ALP SERPL-CCNC: 95 U/L (ref 45–117)
ALT SERPL-CCNC: 100 U/L (ref 12–78)
ANION GAP SERPL CALC-SCNC: 8 MMOL/L (ref 2–12)
AST SERPL-CCNC: 61 U/L (ref 15–37)
BACTERIA SPEC CULT: NORMAL
BACTERIA SPEC CULT: NORMAL
BILIRUB DIRECT SERPL-MCNC: <0.1 MG/DL (ref 0–0.2)
BILIRUB SERPL-MCNC: 0.2 MG/DL (ref 0.2–1)
BUN SERPL-MCNC: 24 MG/DL (ref 6–20)
BUN/CREAT SERPL: 18 (ref 12–20)
CALCIUM SERPL-MCNC: 9.7 MG/DL (ref 8.5–10.1)
CHLORIDE SERPL-SCNC: 107 MMOL/L (ref 97–108)
CO2 SERPL-SCNC: 22 MMOL/L (ref 21–32)
CREAT SERPL-MCNC: 1.31 MG/DL (ref 0.7–1.3)
ERYTHROCYTE [DISTWIDTH] IN BLOOD BY AUTOMATED COUNT: 13.9 % (ref 11.5–14.5)
GLOBULIN SER CALC-MCNC: 3.9 G/DL (ref 2–4)
GLUCOSE SERPL-MCNC: 125 MG/DL (ref 65–100)
HCT VFR BLD AUTO: 29.8 % (ref 36.6–50.3)
HGB BLD-MCNC: 9.3 G/DL (ref 12.1–17)
INR PPP: 2.1 (ref 0.9–1.1)
LDH SERPL L TO P-CCNC: 273 U/L (ref 85–241)
MAGNESIUM SERPL-MCNC: 1.9 MG/DL (ref 1.6–2.4)
MCH RBC QN AUTO: 27.7 PG (ref 26–34)
MCHC RBC AUTO-ENTMCNC: 31.2 G/DL (ref 30–36.5)
MCV RBC AUTO: 88.7 FL (ref 80–99)
NRBC # BLD: 0 K/UL (ref 0–0.01)
NRBC BLD-RTO: 0 PER 100 WBC
PHOSPHATE SERPL-MCNC: 2.8 MG/DL (ref 2.6–4.7)
PLATELET # BLD AUTO: 367 K/UL (ref 150–400)
PMV BLD AUTO: 11.4 FL (ref 8.9–12.9)
POTASSIUM SERPL-SCNC: 4 MMOL/L (ref 3.5–5.1)
PROT SERPL-MCNC: 6.7 G/DL (ref 6.4–8.2)
PROTHROMBIN TIME: 20.9 SEC (ref 9.2–11.2)
RBC # BLD AUTO: 3.36 M/UL (ref 4.1–5.7)
SERVICE CMNT-IMP: NORMAL
SERVICE CMNT-IMP: NORMAL
SODIUM SERPL-SCNC: 137 MMOL/L (ref 136–145)
WBC # BLD AUTO: 17.6 K/UL (ref 4.1–11.1)

## 2025-01-29 PROCEDURE — 85027 COMPLETE CBC AUTOMATED: CPT

## 2025-01-29 PROCEDURE — 74177 CT ABD & PELVIS W/CONTRAST: CPT

## 2025-01-29 PROCEDURE — 6370000000 HC RX 637 (ALT 250 FOR IP): Performed by: NURSE PRACTITIONER

## 2025-01-29 PROCEDURE — 84100 ASSAY OF PHOSPHORUS: CPT

## 2025-01-29 PROCEDURE — 99232 SBSQ HOSP IP/OBS MODERATE 35: CPT

## 2025-01-29 PROCEDURE — 2060000000 HC ICU INTERMEDIATE R&B

## 2025-01-29 PROCEDURE — 99232 SBSQ HOSP IP/OBS MODERATE 35: CPT | Performed by: STUDENT IN AN ORGANIZED HEALTH CARE EDUCATION/TRAINING PROGRAM

## 2025-01-29 PROCEDURE — 6370000000 HC RX 637 (ALT 250 FOR IP): Performed by: INTERNAL MEDICINE

## 2025-01-29 PROCEDURE — 6370000000 HC RX 637 (ALT 250 FOR IP): Performed by: STUDENT IN AN ORGANIZED HEALTH CARE EDUCATION/TRAINING PROGRAM

## 2025-01-29 PROCEDURE — 85610 PROTHROMBIN TIME: CPT

## 2025-01-29 PROCEDURE — 36415 COLL VENOUS BLD VENIPUNCTURE: CPT

## 2025-01-29 PROCEDURE — 2500000003 HC RX 250 WO HCPCS: Performed by: FAMILY MEDICINE

## 2025-01-29 PROCEDURE — 6360000004 HC RX CONTRAST MEDICATION: Performed by: RADIOLOGY

## 2025-01-29 PROCEDURE — 6370000000 HC RX 637 (ALT 250 FOR IP): Performed by: FAMILY MEDICINE

## 2025-01-29 PROCEDURE — 80076 HEPATIC FUNCTION PANEL: CPT

## 2025-01-29 PROCEDURE — 2580000003 HC RX 258: Performed by: FAMILY MEDICINE

## 2025-01-29 PROCEDURE — 71260 CT THORAX DX C+: CPT

## 2025-01-29 PROCEDURE — 83735 ASSAY OF MAGNESIUM: CPT

## 2025-01-29 PROCEDURE — 2500000003 HC RX 250 WO HCPCS

## 2025-01-29 PROCEDURE — 6360000002 HC RX W HCPCS: Performed by: FAMILY MEDICINE

## 2025-01-29 PROCEDURE — 80048 BASIC METABOLIC PNL TOTAL CA: CPT

## 2025-01-29 PROCEDURE — 93750 INTERROGATION VAD IN PERSON: CPT | Performed by: STUDENT IN AN ORGANIZED HEALTH CARE EDUCATION/TRAINING PROGRAM

## 2025-01-29 PROCEDURE — 83615 LACTATE (LD) (LDH) ENZYME: CPT

## 2025-01-29 RX ORDER — IOPAMIDOL 755 MG/ML
100 INJECTION, SOLUTION INTRAVASCULAR
Status: COMPLETED | OUTPATIENT
Start: 2025-01-29 | End: 2025-01-29

## 2025-01-29 RX ORDER — HYDROXYZINE HYDROCHLORIDE 25 MG/1
25 TABLET, FILM COATED ORAL 3 TIMES DAILY PRN
Status: DISCONTINUED | OUTPATIENT
Start: 2025-01-29 | End: 2025-01-29

## 2025-01-29 RX ORDER — WARFARIN SODIUM 1 MG/1
2 TABLET ORAL
Status: COMPLETED | OUTPATIENT
Start: 2025-01-29 | End: 2025-01-29

## 2025-01-29 RX ORDER — HYDROXYZINE HYDROCHLORIDE 25 MG/1
25 TABLET, FILM COATED ORAL 3 TIMES DAILY PRN
Status: DISCONTINUED | OUTPATIENT
Start: 2025-01-29 | End: 2025-01-31 | Stop reason: HOSPADM

## 2025-01-29 RX ADMIN — PRAVASTATIN SODIUM 40 MG: 40 TABLET ORAL at 20:10

## 2025-01-29 RX ADMIN — SODIUM CHLORIDE, PRESERVATIVE FREE 10 ML: 5 INJECTION INTRAVENOUS at 20:13

## 2025-01-29 RX ADMIN — HYDRALAZINE HYDROCHLORIDE 10 MG: 10 TABLET ORAL at 20:10

## 2025-01-29 RX ADMIN — HYDRALAZINE HYDROCHLORIDE 10 MG: 10 TABLET ORAL at 15:33

## 2025-01-29 RX ADMIN — HYDROXYZINE HYDROCHLORIDE 25 MG: 25 TABLET ORAL at 17:20

## 2025-01-29 RX ADMIN — Medication 400 MG: at 08:49

## 2025-01-29 RX ADMIN — SODIUM CHLORIDE, PRESERVATIVE FREE 10 ML: 5 INJECTION INTRAVENOUS at 08:50

## 2025-01-29 RX ADMIN — HYDRALAZINE HYDROCHLORIDE 10 MG: 10 TABLET ORAL at 05:21

## 2025-01-29 RX ADMIN — WARFARIN SODIUM 2 MG: 1 TABLET ORAL at 17:21

## 2025-01-29 RX ADMIN — Medication 3 MG: at 20:13

## 2025-01-29 RX ADMIN — ACETAMINOPHEN 650 MG: 325 TABLET ORAL at 08:49

## 2025-01-29 RX ADMIN — METOPROLOL SUCCINATE 75 MG: 25 TABLET, FILM COATED, EXTENDED RELEASE ORAL at 08:49

## 2025-01-29 RX ADMIN — PANTOPRAZOLE SODIUM 40 MG: 40 TABLET, DELAYED RELEASE ORAL at 05:21

## 2025-01-29 RX ADMIN — Medication 1 CAPSULE: at 08:50

## 2025-01-29 RX ADMIN — IOPAMIDOL 100 ML: 755 INJECTION, SOLUTION INTRAVENOUS at 11:47

## 2025-01-29 RX ADMIN — ALLOPURINOL 50 MG: 100 TABLET ORAL at 08:49

## 2025-01-29 RX ADMIN — CEFEPIME 2000 MG: 2 INJECTION, POWDER, FOR SOLUTION INTRAVENOUS at 05:20

## 2025-01-29 RX ADMIN — CEFEPIME 2000 MG: 2 INJECTION, POWDER, FOR SOLUTION INTRAVENOUS at 17:21

## 2025-01-29 RX ADMIN — Medication 400 MG: at 20:10

## 2025-01-29 ASSESSMENT — ENCOUNTER SYMPTOMS
ABDOMINAL DISTENTION: 0
WHEEZING: 0
COUGH: 0
ABDOMINAL PAIN: 0
ALLERGIC/IMMUNOLOGIC NEGATIVE: 1
SORE THROAT: 0
BLOOD IN STOOL: 0
EYE PAIN: 0
SHORTNESS OF BREATH: 0
CHEST TIGHTNESS: 0

## 2025-01-29 ASSESSMENT — PAIN SCALES - GENERAL
PAINLEVEL_OUTOF10: 3
PAINLEVEL_OUTOF10: 0

## 2025-01-29 ASSESSMENT — PAIN DESCRIPTION - DESCRIPTORS: DESCRIPTORS: ACHING

## 2025-01-29 ASSESSMENT — PAIN DESCRIPTION - ORIENTATION: ORIENTATION: MID

## 2025-01-29 ASSESSMENT — PAIN DESCRIPTION - LOCATION: LOCATION: BACK

## 2025-01-29 NOTE — CARE COORDINATION
Transition of Care Plan:     RUR: 18%  Prior Level of Functioning: LVAD. Lives w wife and open to [facility]. Ind w mobility. O2.  Disposition: (Need PICC or Maria De Jesus, JAN HH order from heart failure) JAN [facility]. Pending [employer]  MAJO: 1-2 days  If SNF or IPR: Date FOC offered: NA  Follow up appointments: Specialist  DME needed: NA  Transportation at discharge: Wife  IM/IMM Medicare/[facility] letter given: 1/23  Caregiver Contact: WifeAshley [phone], [phone]  Discharge Caregiver contacted prior to discharge?   Care Conference needed?   Barriers to discharge:  Medical- repeat blood cultures, CT scan, PICC line or Maria De Jesus    CM forwarded the ID plan to [facility] and [facility].  Anticipated d/c in 1-2 days.     KATJA Leyva CCM  Care Management   Available on Perfect [employer] or [phone]

## 2025-01-29 NOTE — PROGRESS NOTES
0930: Significant drainage noted on RLQ driveline. Dressing completed saturated with yellow/greyish drainage. Called LVAD coordinator to inform of increase amount of drainage & need for potential alternative dressing. LVAD coordinators recommending wound care consult. Coordinators to update F.     Wound care consult placed.     0933: Spoke with wound care, plan to come to bedside.     1025: Wound care completed with wound care @ bedside. Significant yellow drainage at both mid sternal incision & RLQ driveline. Skin around Incision site appears to have moisture damage. Updated NP Nahomi, orders received to increase driveline dressing changes to qshift.        1139: Pt taken to CT on monitor with this RN.    1157: Pt returned to 466 from CT.      Care plan:    Problem: Chronic Conditions and Co-morbidities  Goal: Patient's chronic conditions and co-morbidity symptoms are monitored and maintained or improved  Outcome: Progressing  Flowsheets (Taken 1/29/2025 1355)  Care Plan - Patient's Chronic Conditions and Co-Morbidity Symptoms are Monitored and Maintained or Improved:   Monitor and assess patient's chronic conditions and comorbid symptoms for stability, deterioration, or improvement   Collaborate with multidisciplinary team to address chronic and comorbid conditions and prevent exacerbation or deterioration   Update acute care plan with appropriate goals if chronic or comorbid symptoms are exacerbated and prevent overall improvement and discharge     Problem: Discharge Planning  Goal: Discharge to home or other facility with appropriate resources  Outcome: Progressing  Flowsheets (Taken 1/29/2025 1355)  Discharge to home or other facility with appropriate resources:   Identify barriers to discharge with patient and caregiver   Arrange for needed discharge resources and transportation as appropriate   Identify discharge learning needs (meds, wound care, etc)     Problem: Pain  Goal: Verbalizes/displays adequate

## 2025-01-29 NOTE — PROGRESS NOTES
ADVANCED HEART FAILURE CENTER  [facility] in [place], [place]  Advanced Heart Failure Inpatient Consult Note      Patient name: Sanford Joiner Sr.  Patient : 1960  Patient MRN: [id number]  Date of service: 25    Chief Complaint   Patient presents with    Shortness of Breath         ASSESSMENT:  Sanford Joiner Sr. is a 65 y.o. male admitted with recurrent driveline abscess and infection  Chronic systolic heart failure due to non ischemic cardiomyopathy, s/p HM3 LVAD implantation (2023) as destination therapy, stage D, on optimal GDMT limited by hypovolemia  Not a transplant candidate due to advanced obstructive lung disease, was declined at [facility] for LVAD and transplant.   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      PLAN/RECOMMENDATIONS:  Sepsis evaluation/leukocytosis  WBC 17.6  Given elevated white count and normalization of creatinine will do repeat CT chest abdomen and pelvis with contrast.  lactic acidosis resolved  BC + enterobacter cloacae bacteremia  ID Consult - repeat blood cultures NGTD, cont cefepime, will need oral suppressive therapy with fluoroquinolone.  As per ID note, E. cloacae sensitive to Levaquin and ciprofloxacin  Chest xray - Lungs clear  Keep MAP > 70<90  Discussed at multidisciplinary rounds and patient was deemed not a candidate for pump exchange due to his complex medical history including chronic respiratory disease.  Reviewed by Dr. Gomez and not felt to need surgical debridement at this time.  Continue

## 2025-01-29 NOTE — PROGRESS NOTES
[facility] Adult  Hospitalist Group                                                                                          Hospitalist Progress Note  Emily Yeung MD  Answering service: [phone] OR [phone] from in house phone        Date of Service:  2025  NAME:  Sanford Joiner Sr.  :  1960  MRN:  [id number]       Admission Summary:   Sanford Joiner Sr. is a 65 y.o. 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     IN the ED,  spo2 95% on 3Lnc.  Other VSS. Labs showed BuN 35, creatinine 3.15,  WBC 21.7, and hgb 9.1 down from 11.9, and INR 2.8.     Interval history / Subjective:   Patient seen and examined this morning.  Patient reported increased drainage from the LVAD site.   Creatinine stable, CT chest/abdomen/pelvis with IV contrast ordered for today to assess for any new fluid collections.  On IV cefepime per ID.  No other complaints.     Assessment & Plan:           Sepsis, POA  Hx chronic driveline infection, on chronic suppression therapy with cefdinir;  -Admitted with purulent drainage from abdominal incision;  -Vancomycin and cefepime on arrival;  -Blood cultures collected;  -Culture : Light Staphylococcus aureus, rare gram-negative rods;  -Blood culture : Enterobacter cloacae complex in 1 out of 4 bottles;  -ID consulted: Discontinue vancomycin, continue cefepime;  -ID recommends pump cannula exchange versus debridement;   -Cardiac surgery consulted: No plans for surgical intervention;  -Wound culture : Light Staphylococcus aureus, rare Enterobacter cloacae complex;  -Wound culture : MSSA, heavy stenotrophomonas maltophilia, checking for possible light Enterobacter cloacae complex;  -Blood culture : No growth;  -Continue cefepime per ID;  -Repeat blood culture collected  due to elevated WBC despite IV antibiotics: No growth;  -CT chest/abdomen/pelvis  have provided independent interpretation of the same.     Labs:     Recent Labs     01/28/25 0134 01/29/25  0531   WBC 17.7* 17.6*   HGB 9.8* 9.3*   HCT 31.4* 29.8*    367     Recent Labs     01/27/25 0224 01/28/25 0134 01/29/25  0531   * 138 137   K 4.5 4.0 4.0    107 107   CO2 23 24 22   BUN 25* 26* 24*   MG 1.8 1.9 1.9   PHOS  --  2.8 2.8     Recent Labs     01/27/25 0224 01/28/25 0134 01/29/25  0531   * 106* 100*   GLOB 4.4* 4.1* 3.9     Recent Labs     01/27/25 0224 01/28/25 0134 01/29/25  0531   INR 1.9* 1.9* 2.1*      No results for input(s): \"TIBC\" in the last 72 hours.    Invalid input(s): \"FE\", \"PSAT\", \"FERR\"   No results found for: \"RBCF\"   No results for input(s): \"PH\", \"PCO2\", \"PO2\" in the last 72 hours.  No results for input(s): \"CPK\" in the last 72 hours.    Invalid input(s): \"CPKMB\", \"CKNDX\", \"TROIQ\"  Lab Results   Component Value Date/Time    CHOL 167 05/30/2024 12:00 AM    HDL 27 05/30/2024 12:00 AM     05/30/2024 12:00 AM    LDL Not calculated due to elevated triglyceride level 11/14/2023 11:27 AM     No results found for: \"GLUCPOC\"  [unfilled]    Notes reviewed from all clinical/nonclinical/nursing services involved in patient's clinical care. Care coordination discussions were held with appropriate clinical/nonclinical/ nursing providers based on care coordination needs.         Patients current active Medications were reviewed, considered, added and adjusted based on the clinical condition today.      Home Medications were reconciled to the best of my ability given all available resources at the time of admission. Route is PO if not otherwise noted.        Medications Reviewed:     Current Facility-Administered Medications   Medication Dose Route Frequency    warfarin (COUMADIN) tablet 2 mg  2 mg Oral Once    hydrALAZINE (APRESOLINE) tablet 10 mg  10 mg Oral 3 times per day    ceFEPIme (MAXIPIME) 2,000 mg in sodium chloride 0.9 % 100 mL IVPB (mini-bag)
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
## 2025-01-29 NOTE — PROGRESS NOTES
Infectious Disease Progress Note    Impression/Plan     65 y.o. male with past medical Hx of HFreF s/p ICD and LVAD complicated by sternal wound infection and driveline abscess with MSSAand E.cloacae,MSSA bacteremia, on chronic cefdinir, and COPD who presented with shortness of breath, admitted for sepsis.     Enterobacter Cloacae Bacteremia  Sternal and driveline wound  Sternal wire fracture  DAWOOD on CKD  Leukocytosis  Hx chronic driveline infection with MSSA, E.cloacae  Failed outpt oral suppressive therapy with cefdinir  Repeat blood cx 1/24 NGTD, 1/27 NGTD  Superficial wound cx MSSA, E.cloacae, Stenotrophomonas. D/w Dr. Rajan, no need to treat Stenotrophomonas, cx superficial    Monitor renal function - improving     Given chronic driveline infection with E.cloacae with subsequent bacteremia, will be difficult to suppress with Levaquin without source control . Needs pump cannula exchange but per cardiac surgery not surgical candidate, no further surgical debridement    Continue cefepime to complete 6 weeks through 3/6/25, inclusive followed by oral suppressive therapy with Levaquin as long as he tolerates it. Abx discharge plan in place.   Needs PICC line. Was difficult access previous admission, may need Maria De Jesus catheter. D/w Dr. Yeung.      Concern for persistent infection given persistent leukocytosis. Continued drainage from driveline site. CT chest/CTAP ordered to eval for new fluid collections. If no acute findings and surgical intervention per cardiac surgery, OK to discharge as long as pt willing to assume risk with elevated WBC.     Wound care per wound care team      D/w Dr. Rajan, pt, Dr. Yeung             Anti-infectives:   S/p Vancomycin  cefepime    Subjective:   Feels fine, denies diarrhea. Driveline dressing with purulent/blood tinged drainage        Date of Consultation:  January 29, 2025  Date of Admission: 1/22/2025   Referring Physician: Felicitas Altman    Patient is a 65 y.o. male with past

## 2025-01-29 NOTE — PLAN OF CARE
INFECTIOUS DISEASE discharge plan:    Diagnosis: LVAD driveline infection    Antibiotic: cefepime IV 2 g Q 12 hours through 3/6/25, inclusive    PICC line/Midline insertion for outpatient antibiotic.     Routine PICC/Maria De Jesus/ Portacath Care including PRN catheter flow management    Weekly labs with results fax to # [phone]. Call critical lab values to [phone].   [x] CBC w/diff  [x] BUN/Creatinine  [] AST, ALT  [] CPK  [] CRP  [x] Pharm D consult for cefepime dosing per creatinine clearance    Home Health to pull PICC line/Midline after last dose or send to IR for Maria De Jesus removal    May send to IR for line evaluation or replacement PRN Phone # [phone]    Follow up with Infectious Disease

## 2025-01-29 NOTE — PROGRESS NOTES
Pharmacist Note - Warfarin Dosing  Consult provided for this 65 y.o.male to manage warfarin for LVAD    INR Goal: Other, 1.8-2.2    Home regimen/ tablet size: 4 mg Sun, Wed, Fri; 2 mg all other days of the week     Drugs that may increase INR: None  Drugs that may decrease INR: None  Other current anticoagulants/ drugs that may increase bleeding risk: None  Risk factors: Age > 65 and Decompensated Heart Failure  Daily INR ordered through: 4/15    Recent Labs     01/27/25  0224 01/28/25  0134 01/29/25  0531   HGB 9.5* 9.8* 9.3*   INR 1.9* 1.9* 2.1*     Date               INR                  Dose  1/22  2.5  Hold   1/23  2.8  Hold  1/24  1.9  4mg  1/25  1.4  4mg  1/26  1.8  2mg  1/27  1.9  2mg  1/28                 1.9                  2 mg  1/29                 2.1                  2 mg                                                                                 Assessment/ Plan:  Will order warfarin 2mg PO x1 dose. INR within goal.    Pharmacy will continue to monitor daily and adjust therapy as indicated.  Please contact the pharmacist at [phone] or [phone] for outpatient recommendations if needed.

## 2025-01-30 LAB
ALBUMIN SERPL-MCNC: 2.6 G/DL (ref 3.5–5)
ALBUMIN/GLOB SERPL: 0.6 (ref 1.1–2.2)
ALP SERPL-CCNC: 86 U/L (ref 45–117)
ALT SERPL-CCNC: 102 U/L (ref 12–78)
ANION GAP SERPL CALC-SCNC: 7 MMOL/L (ref 2–12)
AST SERPL-CCNC: 60 U/L (ref 15–37)
BILIRUB DIRECT SERPL-MCNC: <0.1 MG/DL (ref 0–0.2)
BILIRUB SERPL-MCNC: 0.2 MG/DL (ref 0.2–1)
BUN SERPL-MCNC: 21 MG/DL (ref 6–20)
BUN/CREAT SERPL: 16 (ref 12–20)
CALCIUM SERPL-MCNC: 9.2 MG/DL (ref 8.5–10.1)
CHLORIDE SERPL-SCNC: 107 MMOL/L (ref 97–108)
CO2 SERPL-SCNC: 23 MMOL/L (ref 21–32)
CREAT SERPL-MCNC: 1.35 MG/DL (ref 0.7–1.3)
ERYTHROCYTE [DISTWIDTH] IN BLOOD BY AUTOMATED COUNT: 14 % (ref 11.5–14.5)
GLOBULIN SER CALC-MCNC: 4.4 G/DL (ref 2–4)
GLUCOSE SERPL-MCNC: 142 MG/DL (ref 65–100)
HCT VFR BLD AUTO: 29.1 % (ref 36.6–50.3)
HGB BLD-MCNC: 9.1 G/DL (ref 12.1–17)
INR PPP: 2 (ref 0.9–1.1)
LDH SERPL L TO P-CCNC: 230 U/L (ref 85–241)
MAGNESIUM SERPL-MCNC: 2.1 MG/DL (ref 1.6–2.4)
MCH RBC QN AUTO: 27.7 PG (ref 26–34)
MCHC RBC AUTO-ENTMCNC: 31.3 G/DL (ref 30–36.5)
MCV RBC AUTO: 88.7 FL (ref 80–99)
NRBC # BLD: 0 K/UL (ref 0–0.01)
NRBC BLD-RTO: 0 PER 100 WBC
NT PRO BNP: 762 PG/ML
PHOSPHATE SERPL-MCNC: 3.1 MG/DL (ref 2.6–4.7)
PLATELET # BLD AUTO: 349 K/UL (ref 150–400)
PMV BLD AUTO: 11.3 FL (ref 8.9–12.9)
POTASSIUM SERPL-SCNC: 3.7 MMOL/L (ref 3.5–5.1)
PROT SERPL-MCNC: 7 G/DL (ref 6.4–8.2)
PROTHROMBIN TIME: 20.5 SEC (ref 9.2–11.2)
RBC # BLD AUTO: 3.28 M/UL (ref 4.1–5.7)
SODIUM SERPL-SCNC: 137 MMOL/L (ref 136–145)
WBC # BLD AUTO: 14.4 K/UL (ref 4.1–11.1)

## 2025-01-30 PROCEDURE — 6370000000 HC RX 637 (ALT 250 FOR IP): Performed by: FAMILY MEDICINE

## 2025-01-30 PROCEDURE — 85027 COMPLETE CBC AUTOMATED: CPT

## 2025-01-30 PROCEDURE — 83880 ASSAY OF NATRIURETIC PEPTIDE: CPT

## 2025-01-30 PROCEDURE — 6370000000 HC RX 637 (ALT 250 FOR IP): Performed by: INTERNAL MEDICINE

## 2025-01-30 PROCEDURE — 6370000000 HC RX 637 (ALT 250 FOR IP): Performed by: STUDENT IN AN ORGANIZED HEALTH CARE EDUCATION/TRAINING PROGRAM

## 2025-01-30 PROCEDURE — 80048 BASIC METABOLIC PNL TOTAL CA: CPT

## 2025-01-30 PROCEDURE — 36415 COLL VENOUS BLD VENIPUNCTURE: CPT

## 2025-01-30 PROCEDURE — 6370000000 HC RX 637 (ALT 250 FOR IP): Performed by: NURSE PRACTITIONER

## 2025-01-30 PROCEDURE — 80076 HEPATIC FUNCTION PANEL: CPT

## 2025-01-30 PROCEDURE — 83615 LACTATE (LD) (LDH) ENZYME: CPT

## 2025-01-30 PROCEDURE — 85610 PROTHROMBIN TIME: CPT

## 2025-01-30 PROCEDURE — 93750 INTERROGATION VAD IN PERSON: CPT | Performed by: STUDENT IN AN ORGANIZED HEALTH CARE EDUCATION/TRAINING PROGRAM

## 2025-01-30 PROCEDURE — 2580000003 HC RX 258: Performed by: FAMILY MEDICINE

## 2025-01-30 PROCEDURE — 99232 SBSQ HOSP IP/OBS MODERATE 35: CPT

## 2025-01-30 PROCEDURE — 2500000003 HC RX 250 WO HCPCS

## 2025-01-30 PROCEDURE — 2060000000 HC ICU INTERMEDIATE R&B

## 2025-01-30 PROCEDURE — 84100 ASSAY OF PHOSPHORUS: CPT

## 2025-01-30 PROCEDURE — 83735 ASSAY OF MAGNESIUM: CPT

## 2025-01-30 PROCEDURE — 6360000002 HC RX W HCPCS: Performed by: FAMILY MEDICINE

## 2025-01-30 PROCEDURE — 99232 SBSQ HOSP IP/OBS MODERATE 35: CPT | Performed by: STUDENT IN AN ORGANIZED HEALTH CARE EDUCATION/TRAINING PROGRAM

## 2025-01-30 PROCEDURE — 2500000003 HC RX 250 WO HCPCS: Performed by: FAMILY MEDICINE

## 2025-01-30 PROCEDURE — 05HY33Z INSERTION OF INFUSION DEVICE INTO UPPER VEIN, PERCUTANEOUS APPROACH: ICD-10-PCS | Performed by: RADIOLOGY

## 2025-01-30 RX ORDER — SODIUM CHLORIDE 9 MG/ML
INJECTION, SOLUTION INTRAVENOUS PRN
Status: DISCONTINUED | OUTPATIENT
Start: 2025-01-30 | End: 2025-01-31 | Stop reason: HOSPADM

## 2025-01-30 RX ORDER — SODIUM CHLORIDE 0.9 % (FLUSH) 0.9 %
5-40 SYRINGE (ML) INJECTION PRN
Status: DISCONTINUED | OUTPATIENT
Start: 2025-01-30 | End: 2025-01-31 | Stop reason: HOSPADM

## 2025-01-30 RX ORDER — LIDOCAINE HYDROCHLORIDE 10 MG/ML
50 INJECTION, SOLUTION EPIDURAL; INFILTRATION; INTRACAUDAL; PERINEURAL ONCE
Status: DISCONTINUED | OUTPATIENT
Start: 2025-01-30 | End: 2025-01-31 | Stop reason: HOSPADM

## 2025-01-30 RX ORDER — WARFARIN SODIUM 1 MG/1
2 TABLET ORAL
Status: COMPLETED | OUTPATIENT
Start: 2025-01-30 | End: 2025-01-30

## 2025-01-30 RX ORDER — SODIUM CHLORIDE 0.9 % (FLUSH) 0.9 %
5-40 SYRINGE (ML) INJECTION EVERY 12 HOURS SCHEDULED
Status: DISCONTINUED | OUTPATIENT
Start: 2025-01-30 | End: 2025-01-31 | Stop reason: HOSPADM

## 2025-01-30 RX ADMIN — SODIUM CHLORIDE: 9 INJECTION, SOLUTION INTRAVENOUS at 05:27

## 2025-01-30 RX ADMIN — CEFEPIME 2000 MG: 2 INJECTION, POWDER, FOR SOLUTION INTRAVENOUS at 17:06

## 2025-01-30 RX ADMIN — Medication 400 MG: at 20:42

## 2025-01-30 RX ADMIN — WARFARIN SODIUM 2 MG: 1 TABLET ORAL at 17:07

## 2025-01-30 RX ADMIN — PANTOPRAZOLE SODIUM 40 MG: 40 TABLET, DELAYED RELEASE ORAL at 05:28

## 2025-01-30 RX ADMIN — SODIUM CHLORIDE, PRESERVATIVE FREE 10 ML: 5 INJECTION INTRAVENOUS at 20:42

## 2025-01-30 RX ADMIN — CEFEPIME 2000 MG: 2 INJECTION, POWDER, FOR SOLUTION INTRAVENOUS at 05:28

## 2025-01-30 RX ADMIN — METOPROLOL SUCCINATE 75 MG: 25 TABLET, FILM COATED, EXTENDED RELEASE ORAL at 08:11

## 2025-01-30 RX ADMIN — HYDRALAZINE HYDROCHLORIDE 10 MG: 10 TABLET ORAL at 05:28

## 2025-01-30 RX ADMIN — Medication 1 CAPSULE: at 08:11

## 2025-01-30 RX ADMIN — ACETAMINOPHEN 650 MG: 325 TABLET ORAL at 08:28

## 2025-01-30 RX ADMIN — HYDRALAZINE HYDROCHLORIDE 10 MG: 10 TABLET ORAL at 20:42

## 2025-01-30 RX ADMIN — SODIUM CHLORIDE, PRESERVATIVE FREE 10 ML: 5 INJECTION INTRAVENOUS at 20:45

## 2025-01-30 RX ADMIN — HYDRALAZINE HYDROCHLORIDE 10 MG: 10 TABLET ORAL at 15:41

## 2025-01-30 RX ADMIN — Medication 400 MG: at 08:11

## 2025-01-30 RX ADMIN — SODIUM CHLORIDE, PRESERVATIVE FREE 10 ML: 5 INJECTION INTRAVENOUS at 08:12

## 2025-01-30 RX ADMIN — ALLOPURINOL 50 MG: 100 TABLET ORAL at 08:11

## 2025-01-30 ASSESSMENT — ENCOUNTER SYMPTOMS
ABDOMINAL PAIN: 0
WHEEZING: 0
ALLERGIC/IMMUNOLOGIC NEGATIVE: 1
ABDOMINAL DISTENTION: 0
EYE PAIN: 0
SHORTNESS OF BREATH: 0
COUGH: 0
BLOOD IN STOOL: 0
CHEST TIGHTNESS: 0
SORE THROAT: 0

## 2025-01-30 ASSESSMENT — PAIN SCALES - GENERAL
PAINLEVEL_OUTOF10: 0
PAINLEVEL_OUTOF10: 0
PAINLEVEL_OUTOF10: 3

## 2025-01-30 ASSESSMENT — PAIN DESCRIPTION - ORIENTATION: ORIENTATION: RIGHT;LOWER

## 2025-01-30 ASSESSMENT — PAIN DESCRIPTION - DESCRIPTORS: DESCRIPTORS: ACHING

## 2025-01-30 ASSESSMENT — PAIN DESCRIPTION - LOCATION: LOCATION: ABDOMEN

## 2025-01-30 NOTE — PROGRESS NOTES
Infectious Disease Progress Note    Impression/Plan     65 y.o. male with past medical Hx of HFreF s/p ICD and LVAD complicated by sternal wound infection and driveline abscess with MSSAand E.cloacae,MSSA bacteremia, on chronic cefdinir, and COPD who presented with shortness of breath, admitted for sepsis.     Enterobacter Cloacae Bacteremia  Sternal and driveline wound  Sternal wire fracture  DAWOOD on CKD  Leukocytosis  Hx chronic driveline infection with MSSA, E.cloacae  Failed outpt oral suppressive therapy with cefdinir  Repeat blood cx 1/24 NGTD, 1/27 NGTD  Superficial wound cx MSSA, E.cloacae, Stenotrophomonas. D/w Dr. Rajan, no need to treat Stenotrophomonas, cx superficial    Monitor renal function - improving     Given chronic driveline infection with E.cloacae with subsequent bacteremia, will be difficult to suppress with Levaquin without source control . Needs pump cannula exchange but per cardiac surgery not surgical candidate, adamant for no further surgical debridement    Continue cefepime to complete 6 weeks through 3/6/25, inclusive followed by oral suppressive therapy with Levaquin as long as he tolerates it. Abx discharge plan in place.   Needs PICC line. Was difficult access previous admission, may need Maria De Jesus catheter. D/w Dr. Yeung.      WBC trending down.  CT imaging without evidence of fluid collection. If OK to discharge per cardiac surgery, OK for Maria De Jesus vs PICC, see plan above    Wound care per wound care team      D/w Dr. Rajan, pt             Anti-infectives:   S/p Vancomycin  cefepime    Subjective:   Feels fine, denies diarrhea. Driveline dressing with purulent/blood tinged drainage        Date of Consultation:  January 30, 2025  Date of Admission: 1/22/2025   Referring Physician: Felicitas Altman    Patient is a 65 y.o. male with past medical Hx of HFreF s/p ICD and LVAD complicated by sternal wound infection and driveline abscess with MSSAand E.cloacae,MSSA bacteremia, on chronic cefdinir,

## 2025-01-30 NOTE — PROCEDURES
PROCEDURE NOTE  Date: 1/30/2025   Name: Sanford Joiner Sr.  YOB: 1960    Procedures    Unsuccessful bedside PICC placement . See notes 4/4/24.

## 2025-01-30 NOTE — PROGRESS NOTES
1025: LVAD dressing with sterile technique - moderate amount of yellow/serosanguinous drainage noted. ROSSANA Comer @ bedside assessing driveline site. MSI dressing changed, copious amounts of thick yellow drainage noted. See flowsheets for details.      Care plan:    Problem: Chronic Conditions and Co-morbidities  Goal: Patient's chronic conditions and co-morbidity symptoms are monitored and maintained or improved  Outcome: Progressing  Flowsheets  Taken 1/30/2025 1244  Care Plan - Patient's Chronic Conditions and Co-Morbidity Symptoms are Monitored and Maintained or Improved:   Monitor and assess patient's chronic conditions and comorbid symptoms for stability, deterioration, or improvement   Collaborate with multidisciplinary team to address chronic and comorbid conditions and prevent exacerbation or deterioration   Update acute care plan with appropriate goals if chronic or comorbid symptoms are exacerbated and prevent overall improvement and discharge  Taken 1/30/2025 0740  Care Plan - Patient's Chronic Conditions and Co-Morbidity Symptoms are Monitored and Maintained or Improved:   Monitor and assess patient's chronic conditions and comorbid symptoms for stability, deterioration, or improvement   Collaborate with multidisciplinary team to address chronic and comorbid conditions and prevent exacerbation or deterioration   Update acute care plan with appropriate goals if chronic or comorbid symptoms are exacerbated and prevent overall improvement and discharge     Problem: Discharge Planning  Goal: Discharge to home or other facility with appropriate resources  Outcome: Progressing  Flowsheets  Taken 1/30/2025 1244  Discharge to home or other facility with appropriate resources:   Identify barriers to discharge with patient and caregiver   Arrange for needed discharge resources and transportation as appropriate   Identify discharge learning needs (meds, wound care, etc)  Taken 1/30/2025 0740  Discharge to home or

## 2025-01-30 NOTE — PROGRESS NOTES
Pharmacist Note - Warfarin Dosing  Consult provided for this 65 y.o.male to manage warfarin for LVAD    INR Goal: Other, 1.8-2.2    Home regimen/ tablet size: 4 mg Sun, Wed, Fri; 2 mg all other days of the week     Drugs that may increase INR: None  Drugs that may decrease INR: None  Other current anticoagulants/ drugs that may increase bleeding risk: None  Risk factors: Age > 65 and Decompensated Heart Failure  Daily INR ordered through: 4/15    Recent Labs     01/28/25  0134 01/29/25  0531 01/30/25  0531   HGB 9.8* 9.3* 9.1*   INR 1.9* 2.1* 2.0*     Date               INR                  Dose  1/22  2.5  Hold   1/23  2.8  Hold  1/24  1.9  4mg  1/25  1.4  4mg  1/26  1.8  2mg  1/27  1.9  2mg  1/28                 1.9                  2 mg  1/29                 2.1                  2 mg  1/30  2.0  2 mg                                                                                   Assessment/ Plan:  Will order home dose of warfarin 2mg PO x1 dose. INR within goal.    Pharmacy will continue to monitor daily and adjust therapy as indicated.  Please contact the pharmacist at [phone] for outpatient recommendations if needed.

## 2025-01-30 NOTE — CARE COORDINATION
Transition of Care Plan:     RUR: 18%  Prior Level of Functioning: LVAD. Lives w wife and open to [facility]. Ind w mobility. O2.  Disposition: (Need Maria De Jesus, IV ABX training) JAN [facility] and AVS updated. [employer]- IV ABX end date 3/6.  MAJO: 1 day  If SNF or IPR: Date FOC offered: NA  Follow up appointments: Specialist  DME needed: NA  Transportation at discharge: Wife  IM/IMM Medicare/ letter given: 1/23  Caregiver Contact: WifeAshley [phone], [phone]  Discharge Caregiver contacted prior to discharge?   Care Conference needed?   Barriers to discharge:  Medical- Maria De Jesus     Forwarded the HH order to [facility] and AVS udpated.  Plan is Maria De Jesus tomorrow and then patient and wife will need IV ABX training.    CM called [facility] ([phone]) to see if they can help w driveline dressing changes when HH doesn't visit but he doesn't meet criteria. Patient is currently getting dressing changes BID and HH will not come out daily.    Met w patient at bedside to review above and he agrees w the plan and reports he has done IV ABX before.  All questions have been answered.    KATJA Leyva CCM  Care Management   Available on [employer] or [phone]

## 2025-01-30 NOTE — PLAN OF CARE
Problem: Chronic Conditions and Co-morbidities  Goal: Patient's chronic conditions and co-morbidity symptoms are monitored and maintained or improved  1/29/2025 2028 by Marlyn Denton RN  Outcome: Progressing  Flowsheets (Taken 1/29/2025 1958)  Care Plan - Patient's Chronic Conditions and Co-Morbidity Symptoms are Monitored and Maintained or Improved: Monitor and assess patient's chronic conditions and comorbid symptoms for stability, deterioration, or improvement  1/29/2025 1355 by Mayela Chen RN  Outcome: Progressing  Flowsheets (Taken 1/29/2025 1355)  Care Plan - Patient's Chronic Conditions and Co-Morbidity Symptoms are Monitored and Maintained or Improved:   Monitor and assess patient's chronic conditions and comorbid symptoms for stability, deterioration, or improvement   Collaborate with multidisciplinary team to address chronic and comorbid conditions and prevent exacerbation or deterioration   Update acute care plan with appropriate goals if chronic or comorbid symptoms are exacerbated and prevent overall improvement and discharge     Problem: Discharge Planning  Goal: Discharge to home or other facility with appropriate resources  1/29/2025 2028 by Marlyn Denton RN  Outcome: Progressing  Flowsheets (Taken 1/29/2025 1958)  Discharge to home or other facility with appropriate resources: Identify barriers to discharge with patient and caregiver  1/29/2025 1355 by Mayela Chen RN  Outcome: Progressing  Flowsheets (Taken 1/29/2025 1355)  Discharge to home or other facility with appropriate resources:   Identify barriers to discharge with patient and caregiver   Arrange for needed discharge resources and transportation as appropriate   Identify discharge learning needs (meds, wound care, etc)     Problem: Pain  Goal: Verbalizes/displays adequate comfort level or baseline comfort level  1/29/2025 2028 by Marlyn Denton RN  Outcome: Progressing  Flowsheets (Taken 1/29/2025
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
## 2025-01-30 NOTE — PROGRESS NOTES
[facility] Adult  Hospitalist Group                                                                                          Hospitalist Progress Note  Mary Christian MD  Answering service: [phone] OR [phone] from in house phone        Date of Service:  2025  NAME:  Sanford Joiner Sr.  :  1960  MRN:  [id number]       Admission Summary:   Sanford Joiner Sr. is a 65 y.o. 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     IN the ED,  spo2 95% on 3Lnc.  Other VSS. Labs showed BuN 35, creatinine 3.15,  WBC 21.7, and hgb 9.1 down from 11.9, and INR 2.8.     Interval history / Subjective:   Patient seen and examined this morning. Patient evaluated on morning rounds, he was receiving dressing change by bedside RN. Discussed need for twice per day dressing changes, patient in agreement.     Plan for Maria De Jesus placement tomorrow and possible DC.      Assessment & Plan:           Sepsis, POA  Hx chronic driveline infection, on chronic suppression therapy with cefdinir;  -Admitted with purulent drainage from abdominal incision;  -Vancomycin and cefepime on arrival;  -Blood cultures collected;  -Culture : Light Staphylococcus aureus, rare gram-negative rods;  -Blood culture : Enterobacter cloacae complex in 1 out of 4 bottles;  -ID recommends pump cannula exchange versus debridement;   -Cardiac surgery consulted: No plans for surgical intervention;  -Wound culture : Light Staphylococcus aureus, rare Enterobacter cloacae complex;  -Wound culture : MSSA, heavy stenotrophomonas maltophilia, checking for possible light Enterobacter cloacae complex;  -Blood culture : No growth;  -Repeat blood culture collected  due to elevated WBC despite IV antibiotics: No growth;  -CT chest/abdomen/pelvis with IV contrast ordered for  to evaluate for fluid collection/abscesses; no definite fluid  if not otherwise noted.        Medications Reviewed:     Current Facility-Administered Medications   Medication Dose Route Frequency    sodium chloride flush 0.9 % injection 5-40 mL  5-40 mL IntraVENous 2 times per day    sodium chloride flush 0.9 % injection 5-40 mL  5-40 mL IntraVENous PRN    0.9 % sodium chloride infusion   IntraVENous PRN    lidocaine PF 1 % injection 50 mg  50 mg IntraDERmal Once    melatonin tablet 3 mg  3 mg Oral Nightly PRN    hydrOXYzine HCl (ATARAX) tablet 25 mg  25 mg Oral TID PRN    hydrALAZINE (APRESOLINE) tablet 10 mg  10 mg Oral 3 times per day    ceFEPIme (MAXIPIME) 2,000 mg in sodium chloride 0.9 % 100 mL IVPB (mini-bag)  2,000 mg IntraVENous Q12H    fluticasone-umeclidin-vilant (TRELEGY ELLIPTA) 200-62.5-25 MCG/ACT inhaler - PATIENT SUPPLIED (Patient Supplied)   Inhalation Daily    calcium carbonate (TUMS) chewable tablet 500 mg  500 mg Oral TID PRN    pantoprazole (PROTONIX) tablet 40 mg  40 mg Oral QAM AC    pravastatin (PRAVACHOL) tablet 40 mg  40 mg Oral Nightly    metoprolol succinate (TOPROL XL) extended release tablet 75 mg  75 mg Oral Daily    allopurinol (ZYLOPRIM) tablet 50 mg  50 mg Oral Daily    [Held by provider] bumetanide (BUMEX) tablet 1 mg  1 mg Oral Daily    lactobacillus (CULTURELLE) capsule 1 capsule  1 capsule Oral Daily with breakfast    magnesium oxide (MAG-OX) tablet 400 mg  400 mg Oral BID    budesonide (PULMICORT) nebulizer suspension 1,000 mcg  1 mg Nebulization BID RT    sodium chloride flush 0.9 % injection 5-40 mL  5-40 mL IntraVENous 2 times per day    sodium chloride flush 0.9 % injection 5-40 mL  5-40 mL IntraVENous PRN    hydrALAZINE (APRESOLINE) injection 10 mg  10 mg IntraVENous Q4H PRN    albuterol (PROVENTIL) (2.5 MG/3ML) 0.083% nebulizer solution 2.5 mg  2.5 mg Nebulization Q6H PRN    glucose chewable tablet 16 g  4 tablet Oral PRN    dextrose bolus 10% 125 mL  125 mL IntraVENous PRN    Or    dextrose bolus 10% 250 mL  250 mL IntraVENous PRN

## 2025-01-30 NOTE — PROGRESS NOTES
ADVANCED HEART FAILURE CENTER  [facility] in [place], [place]  Advanced Heart Failure Inpatient Consult Note      Patient name: Sanford Joiner Sr.  Patient : 1960  Patient MRN: [id number]  Date of service: 25    Chief Complaint   Patient presents with    Shortness of Breath         ASSESSMENT:  Sanford Joiner Sr. is a 65 y.o. male admitted with recurrent driveline abscess and infection  Chronic systolic heart failure due to non ischemic cardiomyopathy, s/p HM3 LVAD implantation (2023) as destination therapy, stage D, on optimal GDMT limited by hypovolemia  Not a transplant candidate due to advanced obstructive lung disease, was declined at [facility] for [facility] and transplant.   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   Admitted 2024 for evaluation of sepsis, Enterobacter bacteremia      INTERVAL HISTORY:  NAEON, patient feels well, MAPs within goal  Blood cultures  growing enterobacter, repeat BC NTD  Labs reviewed: WBC downtrending today, creatinine stable (1.31)  CT negative for fluid collection.  Plan for line today PICC versus laly catheter    PLAN/RECOMMENDATIONS:  Sepsis evaluation/leukocytosis  WBC 17.6--14.4  CT chest abdomen pelvis without any fluid collection.  lactic acidosis resolved  BC + enterobacter cloacae bacteremia  ID Consult - repeat blood cultures NGTD, cont cefepime x 6 weeks end date 3/6/2025, will need oral suppressive therapy with fluoroquinolone.  As per ID note, E. cloacae sensitive to Levaquin, and after IV antibiotic conclusion will be on Levaquin as long as he tolerates.  Chest xray - Lungs clear  Keep MAP > 70<90  Discussed at multidisciplinary rounds and patient was deemed not a candidate for pump exchange due to his complex medical history including chronic respiratory disease.  Reviewed by Dr. Gmoez and not felt to need surgical debridement at this time.  Continue

## 2025-01-31 ENCOUNTER — HOSPITAL ENCOUNTER (INPATIENT)
Facility: HOSPITAL | Age: 65
End: 2025-01-31
Payer: MEDICARE

## 2025-01-31 VITALS
HEART RATE: 70 BPM | OXYGEN SATURATION: 96 % | BODY MASS INDEX: 26.64 KG/M2 | HEIGHT: 67 IN | RESPIRATION RATE: 20 BRPM | TEMPERATURE: 97.9 F | WEIGHT: 169.75 LBS

## 2025-01-31 VITALS — HEART RATE: 66 BPM | RESPIRATION RATE: 21 BRPM | TEMPERATURE: 98.4 F | OXYGEN SATURATION: 98 %

## 2025-01-31 PROBLEM — N17.9 ACUTE KIDNEY INJURY SUPERIMPOSED ON CKD (HCC): Status: RESOLVED | Noted: 2025-01-24 | Resolved: 2025-01-31

## 2025-01-31 PROBLEM — N18.9 ACUTE KIDNEY INJURY SUPERIMPOSED ON CKD (HCC): Status: RESOLVED | Noted: 2025-01-24 | Resolved: 2025-01-31

## 2025-01-31 PROBLEM — A41.9 SEPSIS (HCC): Status: RESOLVED | Noted: 2022-06-15 | Resolved: 2025-01-31

## 2025-01-31 PROBLEM — N17.9 AKI (ACUTE KIDNEY INJURY) (HCC): Status: RESOLVED | Noted: 2025-01-23 | Resolved: 2025-01-31

## 2025-01-31 PROBLEM — R65.10 SIRS (SYSTEMIC INFLAMMATORY RESPONSE SYNDROME) (HCC): Status: RESOLVED | Noted: 2025-01-23 | Resolved: 2025-01-31

## 2025-01-31 LAB
ALBUMIN SERPL-MCNC: 2.7 G/DL (ref 3.5–5)
ALBUMIN/GLOB SERPL: 0.7 (ref 1.1–2.2)
ALP SERPL-CCNC: 96 U/L (ref 45–117)
ALT SERPL-CCNC: 97 U/L (ref 12–78)
ANION GAP SERPL CALC-SCNC: 7 MMOL/L (ref 2–12)
AST SERPL-CCNC: 48 U/L (ref 15–37)
BILIRUB DIRECT SERPL-MCNC: <0.1 MG/DL (ref 0–0.2)
BILIRUB SERPL-MCNC: 0.2 MG/DL (ref 0.2–1)
BUN SERPL-MCNC: 29 MG/DL (ref 6–20)
BUN/CREAT SERPL: 18 (ref 12–20)
CALCIUM SERPL-MCNC: 9.1 MG/DL (ref 8.5–10.1)
CHLORIDE SERPL-SCNC: 105 MMOL/L (ref 97–108)
CO2 SERPL-SCNC: 24 MMOL/L (ref 21–32)
CREAT SERPL-MCNC: 1.6 MG/DL (ref 0.7–1.3)
ERYTHROCYTE [DISTWIDTH] IN BLOOD BY AUTOMATED COUNT: 13.8 % (ref 11.5–14.5)
GLOBULIN SER CALC-MCNC: 4.1 G/DL (ref 2–4)
GLUCOSE SERPL-MCNC: 98 MG/DL (ref 65–100)
HCT VFR BLD AUTO: 29.5 % (ref 36.6–50.3)
HGB BLD-MCNC: 9.3 G/DL (ref 12.1–17)
INR PPP: 1.8 (ref 0.9–1.1)
LDH SERPL L TO P-CCNC: 229 U/L (ref 85–241)
MAGNESIUM SERPL-MCNC: 1.9 MG/DL (ref 1.6–2.4)
MCH RBC QN AUTO: 28.2 PG (ref 26–34)
MCHC RBC AUTO-ENTMCNC: 31.5 G/DL (ref 30–36.5)
MCV RBC AUTO: 89.4 FL (ref 80–99)
NRBC # BLD: 0 K/UL (ref 0–0.01)
NRBC BLD-RTO: 0 PER 100 WBC
PLATELET # BLD AUTO: 370 K/UL (ref 150–400)
PMV BLD AUTO: 11.5 FL (ref 8.9–12.9)
POTASSIUM SERPL-SCNC: 4.8 MMOL/L (ref 3.5–5.1)
PROT SERPL-MCNC: 6.8 G/DL (ref 6.4–8.2)
PROTHROMBIN TIME: 18.2 SEC (ref 9.2–11.2)
RBC # BLD AUTO: 3.3 M/UL (ref 4.1–5.7)
SODIUM SERPL-SCNC: 136 MMOL/L (ref 136–145)
WBC # BLD AUTO: 16.6 K/UL (ref 4.1–11.1)

## 2025-01-31 PROCEDURE — 6370000000 HC RX 637 (ALT 250 FOR IP): Performed by: INTERNAL MEDICINE

## 2025-01-31 PROCEDURE — 6370000000 HC RX 637 (ALT 250 FOR IP): Performed by: NURSE PRACTITIONER

## 2025-01-31 PROCEDURE — 6360000004 HC RX CONTRAST MEDICATION: Performed by: RADIOLOGY

## 2025-01-31 PROCEDURE — 83615 LACTATE (LD) (LDH) ENZYME: CPT

## 2025-01-31 PROCEDURE — 83735 ASSAY OF MAGNESIUM: CPT

## 2025-01-31 PROCEDURE — 93750 INTERROGATION VAD IN PERSON: CPT | Performed by: STUDENT IN AN ORGANIZED HEALTH CARE EDUCATION/TRAINING PROGRAM

## 2025-01-31 PROCEDURE — 6370000000 HC RX 637 (ALT 250 FOR IP): Performed by: FAMILY MEDICINE

## 2025-01-31 PROCEDURE — 2580000003 HC RX 258: Performed by: STUDENT IN AN ORGANIZED HEALTH CARE EDUCATION/TRAINING PROGRAM

## 2025-01-31 PROCEDURE — 6370000000 HC RX 637 (ALT 250 FOR IP): Performed by: STUDENT IN AN ORGANIZED HEALTH CARE EDUCATION/TRAINING PROGRAM

## 2025-01-31 PROCEDURE — 6360000002 HC RX W HCPCS: Performed by: STUDENT IN AN ORGANIZED HEALTH CARE EDUCATION/TRAINING PROGRAM

## 2025-01-31 PROCEDURE — 99231 SBSQ HOSP IP/OBS SF/LOW 25: CPT | Performed by: STUDENT IN AN ORGANIZED HEALTH CARE EDUCATION/TRAINING PROGRAM

## 2025-01-31 PROCEDURE — 2500000003 HC RX 250 WO HCPCS: Performed by: NURSE PRACTITIONER

## 2025-01-31 PROCEDURE — 36415 COLL VENOUS BLD VENIPUNCTURE: CPT

## 2025-01-31 PROCEDURE — 6360000002 HC RX W HCPCS: Performed by: FAMILY MEDICINE

## 2025-01-31 PROCEDURE — C1751 CATH, INF, PER/CENT/MIDLINE: HCPCS

## 2025-01-31 PROCEDURE — 85610 PROTHROMBIN TIME: CPT

## 2025-01-31 PROCEDURE — 94640 AIRWAY INHALATION TREATMENT: CPT

## 2025-01-31 PROCEDURE — 80076 HEPATIC FUNCTION PANEL: CPT

## 2025-01-31 PROCEDURE — 2709999900 IR PICC WO SQ PORT/PUMP > 5 YEARS

## 2025-01-31 PROCEDURE — 2580000003 HC RX 258: Performed by: FAMILY MEDICINE

## 2025-01-31 PROCEDURE — 6360000002 HC RX W HCPCS: Performed by: RADIOLOGY

## 2025-01-31 PROCEDURE — 85027 COMPLETE CBC AUTOMATED: CPT

## 2025-01-31 PROCEDURE — 80048 BASIC METABOLIC PNL TOTAL CA: CPT

## 2025-01-31 RX ORDER — IOPAMIDOL 755 MG/ML
INJECTION, SOLUTION INTRAVASCULAR PRN
Status: COMPLETED | OUTPATIENT
Start: 2025-01-31 | End: 2025-01-31

## 2025-01-31 RX ORDER — HYDRALAZINE HYDROCHLORIDE 10 MG/1
10 TABLET, FILM COATED ORAL EVERY 8 HOURS SCHEDULED
Qty: 90 TABLET | Refills: 3 | Status: SHIPPED | OUTPATIENT
Start: 2025-01-31

## 2025-01-31 RX ORDER — HYDROXYZINE HYDROCHLORIDE 25 MG/1
25 TABLET, FILM COATED ORAL 3 TIMES DAILY PRN
Qty: 30 TABLET | Refills: 0 | Status: SHIPPED | OUTPATIENT
Start: 2025-01-31 | End: 2025-02-10

## 2025-01-31 RX ORDER — LIDOCAINE HYDROCHLORIDE 10 MG/ML
INJECTION, SOLUTION EPIDURAL; INFILTRATION; INTRACAUDAL; PERINEURAL PRN
Status: COMPLETED | OUTPATIENT
Start: 2025-01-31 | End: 2025-01-31

## 2025-01-31 RX ORDER — WARFARIN SODIUM 4 MG/1
4 TABLET ORAL
Status: COMPLETED | OUTPATIENT
Start: 2025-01-31 | End: 2025-01-31

## 2025-01-31 RX ADMIN — HYDROXYZINE HYDROCHLORIDE 25 MG: 25 TABLET ORAL at 00:10

## 2025-01-31 RX ADMIN — CEFEPIME 2000 MG: 2 INJECTION, POWDER, FOR SOLUTION INTRAVENOUS at 06:54

## 2025-01-31 RX ADMIN — CEFEPIME 2000 MG: 2 INJECTION, POWDER, FOR SOLUTION INTRAVENOUS at 17:43

## 2025-01-31 RX ADMIN — SODIUM CHLORIDE, PRESERVATIVE FREE 10 ML: 5 INJECTION INTRAVENOUS at 09:25

## 2025-01-31 RX ADMIN — METOPROLOL SUCCINATE 75 MG: 25 TABLET, FILM COATED, EXTENDED RELEASE ORAL at 09:22

## 2025-01-31 RX ADMIN — ALLOPURINOL 50 MG: 100 TABLET ORAL at 09:23

## 2025-01-31 RX ADMIN — PANTOPRAZOLE SODIUM 40 MG: 40 TABLET, DELAYED RELEASE ORAL at 07:00

## 2025-01-31 RX ADMIN — HYDRALAZINE HYDROCHLORIDE 10 MG: 10 TABLET ORAL at 15:59

## 2025-01-31 RX ADMIN — LIDOCAINE HYDROCHLORIDE 3 ML: 10 INJECTION, SOLUTION EPIDURAL; INFILTRATION; INTRACAUDAL; PERINEURAL at 15:26

## 2025-01-31 RX ADMIN — WARFARIN SODIUM 4 MG: 4 TABLET ORAL at 17:44

## 2025-01-31 RX ADMIN — Medication 400 MG: at 09:21

## 2025-01-31 RX ADMIN — ACETAMINOPHEN 650 MG: 325 TABLET ORAL at 18:21

## 2025-01-31 RX ADMIN — Medication 3 MG: at 00:10

## 2025-01-31 RX ADMIN — Medication 1 CAPSULE: at 13:26

## 2025-01-31 RX ADMIN — IOPAMIDOL 10 ML: 755 INJECTION, SOLUTION INTRAVENOUS at 15:31

## 2025-01-31 RX ADMIN — HYDRALAZINE HYDROCHLORIDE 10 MG: 10 TABLET ORAL at 07:00

## 2025-01-31 ASSESSMENT — PAIN SCALES - GENERAL
PAINLEVEL_OUTOF10: 6
PAINLEVEL_OUTOF10: 7

## 2025-01-31 ASSESSMENT — PAIN - FUNCTIONAL ASSESSMENT: PAIN_FUNCTIONAL_ASSESSMENT: NONE - DENIES PAIN

## 2025-01-31 ASSESSMENT — PAIN DESCRIPTION - ORIENTATION: ORIENTATION: RIGHT

## 2025-01-31 ASSESSMENT — ENCOUNTER SYMPTOMS
ALLERGIC/IMMUNOLOGIC NEGATIVE: 1
WHEEZING: 0
COUGH: 0
CHEST TIGHTNESS: 0
BLOOD IN STOOL: 0
SORE THROAT: 0
SHORTNESS OF BREATH: 0
EYE PAIN: 0
ABDOMINAL PAIN: 0
ABDOMINAL DISTENTION: 0

## 2025-01-31 ASSESSMENT — PAIN DESCRIPTION - DESCRIPTORS: DESCRIPTORS: ACHING;DULL

## 2025-01-31 ASSESSMENT — PAIN DESCRIPTION - LOCATION: LOCATION: ARM

## 2025-01-31 NOTE — PROGRESS NOTES
ADVANCED HEART FAILURE CENTER  [facility] in [place], [place]  Advanced Heart Failure Inpatient Consult Note      Patient name: Sanford Joiner Sr.  Patient : 1960  Patient MRN: [id number]  Date of service: 25    Chief Complaint   Patient presents with    Shortness of Breath         ASSESSMENT:  Sanford Joiner Sr. is a 65 y.o. male admitted with recurrent driveline abscess and infection  Chronic systolic heart failure due to non ischemic cardiomyopathy, s/p HM3 LVAD implantation (2023) as destination therapy, stage D, on optimal GDMT limited by hypovolemia  Not a transplant candidate due to advanced obstructive lung disease, was declined at [facility] for [facility] and transplant.   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   Admitted 2024 for evaluation of sepsis, Enterobacter bacteremia      INTERVAL HISTORY:  NAEON, patient feels well, MAPs within goal  Blood cultures  growing enterobacter, repeat BC NTD  Labs reviewed: WBC downtrending today, creatinine stable (1.6)  CT negative for fluid collection.  Plan for line today PICC versus laly catheter    PLAN/RECOMMENDATIONS:  Sepsis evaluation/leukocytosis  WBC 17.6--14.4--16.6  CT chest abdomen pelvis without any fluid collection.  lactic acidosis resolved  BC + enterobacter cloacae bacteremia  ID Consult - repeat blood cultures NGTD, cont cefepime x 6 weeks end date 3/6/2025, will need oral suppressive therapy with fluoroquinolone.  As per ID note, E. cloacae sensitive to Levaquin, and after IV antibiotic conclusion will be on Levaquin as long as he tolerates.  Chest xray - Lungs clear  Keep MAP > 70<90  Discussed at multidisciplinary rounds and patient was deemed not a candidate for pump exchange due to his complex medical history including chronic respiratory disease.  Reviewed by Dr. Gomez and not felt to need surgical debridement at this time.  Continue  writing orders, performing medical decision making, and documenting.

## 2025-01-31 NOTE — PROGRESS NOTES
Pharmacist Note - Warfarin Dosing  Consult provided for this 65 y.o.male to manage warfarin for LVAD    INR Goal: Other, 1.8-2.2    Home regimen/ tablet size: 4 mg Sun, Wed, Fri; 2 mg all other days of the week     Drugs that may increase INR: None  Drugs that may decrease INR: None  Other current anticoagulants/ drugs that may increase bleeding risk: None  Risk factors: Age > 65 and Decompensated Heart Failure  Daily INR ordered through: 4/15    Recent Labs     01/29/25  0531 01/30/25  0531 01/31/25  0040   HGB 9.3* 9.1* 9.3*   INR 2.1* 2.0* 1.8*     Date               INR                  Dose  1/22  2.5  Hold   1/23  2.8  Hold  1/24  1.9  4mg  1/25  1.4  4mg  1/26  1.8  2mg  1/27  1.9  2mg  1/28                 1.9                  2 mg  1/29                 2.1                  2 mg  1/30  2.0  2 mg  1/31  1.8  4 mg                                                                                 Assessment/ Plan:  Will order home dose of warfarin 4mg PO x1 dose. INR within goal.    Pharmacy will continue to monitor daily and adjust therapy as indicated.  Please contact the pharmacist at [phone] or [phone] for outpatient recommendations if needed.           Brenda Mcmahon, PharmD, BCPS

## 2025-01-31 NOTE — PROGRESS NOTES
Referral source:   Sanford Joiner Sr. at [facility] in [facility] 4 CV SERVICES UNIT.  attended rounds on the CV Services Unit as part of the Interdisciplinary team where the patient's ongoing care was discussed. I reviewed the medical record as part of this encounter.     Outcome: Interdisciplinary team are aware of  availability and were encouraged to request Spiritual Health support as needed.      The  on-call can be reached at ([phone]).     Rev. Mariana Heck MDiv, [facility]  Staff

## 2025-01-31 NOTE — DISCHARGE INSTRUCTIONS
Discharge Instructions       PATIENT ID: Sanford Joiner Sr.  MRN: [id number]   YOB: 1960    DATE OF ADMISSION: 1/22/2025   DATE OF DISCHARGE: 1/31/2025    PRIMARY CARE PROVIDER: Ranjan Porter     ATTENDING PHYSICIAN: Mary Christian MD   DISCHARGING PROVIDER: Mary Christian MD    To contact this individual call [phone] and ask the  to page.   If unavailable ask to be transferred the Adult Hospitalist Department.    DISCHARGE DIAGNOSES Sepsis secondary to driveline infection and bacteremia     CONSULTATIONS:  IP CONSULT TO PHARMACY  IP CONSULT TO HOSPITALIST  IP CONSULT TO PHARMACY  IP CONSULT TO INFECTIOUS DISEASES  IP CONSULT TO PHARMACY  IP CONSULT TO CARDIOVASCULAR SURGERY  IP CONSULT TO INFECTIOUS DISEASES  IP CONSULT TO NEPHROLOGY  IP CONSULT HOME HEALTH  IP CONSULT TO INTERVENTIONAL RADIOLOGY    PROCEDURES/SURGERIES: * No surgery found *      PENDING TEST RESULTS:   At the time of discharge the following test results are still pending: repeat BCX 1/27 with NGTD x 4 days at time of discharge     FOLLOW UP APPOINTMENTS:    Follow-up Information       Follow up With Specialties Details Why Contact Info    [facility]  Call Please call [facility] at discharge so they can resume skilled nursing. [phone]    [facility]  Follow up Pharmacy for your IV Antibiotics. [phone]              ADDITIONAL CARE RECOMMENDATIONS: You were admitted with sepsis from a driveline infection that spread to your blood. You will discharge on antibiotics through the IV twice a day through 3/6/2025 and then an oral antibiotic thereafter.  Please follow-up with infectious disease so that you do not have any lapses in antibiotic therapy.  Please follow-up with your advanced heart failure provider as previously scheduled.  Please ensure that you are doing your dressing changes twice a day.    DIET: cardiac diet    ACTIVITY: activity as tolerated    WOUND CARE: Patient

## 2025-01-31 NOTE — PROGRESS NOTES
Pt arrives via wheelchair to angio department accompanied by [facility] RN for PICC Line  procedure. All assessments completed and consent was reviewed.  Education given was regarding procedure, local anesthetic, post-procedure care and  management/follow-up. Opportunity for questions was provided and all questions and concerns were addressed.

## 2025-01-31 NOTE — CARE COORDINATION
Transition of Care Plan:     RUR: 18%  Prior Level of Functioning: LVAD. Lives w wife and open to [facility]. Ind w mobility. O2.  Disposition: (Needs IV ABX training) JAN [facility] and AVS updated. [facility]- IV ABX end date 3/6.  MAJO: 1 day  If SNF or IPR: Date FOC offered: NA  Follow up appointments: Specialist  DME needed: NA  Transportation at discharge: Wife  IM/IMM Medicare/[facility] letter given: 1/23  Caregiver Contact: WifeAshley [phone], [phone]  Discharge Caregiver contacted prior to discharge?   Care Conference needed?   Barriers to discharge:    Patient getting his Maria De Jesus cath today and then will be ready for d/c.  CM updated [facility] and requested they schedule the IV ABX training.    Update 4:30pm: [facility].  Met w patient and his wife at bedside and discussed the d/c plan w [facility] and [facility]. Also reviewed an important message from Medicare.  Patient verbalized understanding and gave permission for possible discharge within 4 hours of receiving IMM.  All questions have been answered and CM wished family well.    [facility] and has been in touch w family. Wife will then transport home.     KATJA Leyva CCM  Care Management   Available on [employer] or [phone]

## 2025-01-31 NOTE — PROGRESS NOTES
Pt arrives via wheelchair to angio department accompanied by Johana RAMOS for PICC Line procedure. All assessments completed and consent was reviewed.  Education given was regarding procedure, local anesthetic, post-procedure care and  management/follow-up. Opportunity for questions was provided and all questions and concerns were addressed.

## 2025-01-31 NOTE — PROGRESS NOTES
Called and spoke with Primary RN. Angio ready for pt to come to dept for procedure. RN says she will call back once she is ready to bring the patient down.

## 2025-01-31 NOTE — DISCHARGE SUMMARY
Discharge Summary       PATIENT ID: Sanford Joiner Sr.  MRN: [id number]   YOB: 1960    DATE OF ADMISSION: 1/22/2025  2:29 PM    DATE OF DISCHARGE: 01/31/25    PRIMARY CARE PROVIDER: Ranjan Porter MD     ATTENDING PHYSICIAN: Mary Christian MD   DISCHARGING PROVIDER: Mary Christian MD    To contact this individual call [phone] and ask the  to page.  If unavailable ask to be transferred the Adult Hospitalist Department.    CONSULTATIONS: IP CONSULT TO PHARMACY  IP CONSULT TO HOSPITALIST  IP CONSULT TO PHARMACY  IP CONSULT TO INFECTIOUS DISEASES  IP CONSULT TO PHARMACY  IP CONSULT TO CARDIOVASCULAR SURGERY  IP CONSULT TO INFECTIOUS DISEASES  IP CONSULT TO NEPHROLOGY  IP CONSULT HOME HEALTH  IP CONSULT TO INTERVENTIONAL RADIOLOGY    PROCEDURES/SURGERIES: * No surgery found *     ADMITTING DIAGNOSES & HOSPITAL COURSE:   Sanford Joiner Sr. is a 65 y.o. male who presented with chronic driveline infection and was found to have sepsis and bacteremia. Cultures grew enterobacter cloacae; ID was consulted and patient will discharge on cefepime through 3/6, followed by levaquin suppressive therapy. He will also require twice per day dressing changes which he will complete at home with his wife and home health nursing.     DISCHARGE DIAGNOSES / PLAN:      Sepsis, POA  Hx chronic driveline infection, on chronic suppression therapy with cefdinir;  Enterobacter Cloacae Bacteremia  Sternal and driveline wound  Sternal wire fracture  DAWOOD on CKD  Leukocytosis  -Admitted with purulent drainage from abdominal incision;  -Blood culture 1/22: Enterobacter cloacae complex in 1 out of 4 bottles;  -Cardiac surgery consulted: No plans for surgical intervention;  -Blood culture 1/24 & 1/27: No growth;  -Wound cultures with MSSA, e. Cloacae   -CT chest/abdomen/pelvis with IV contrast ordered for 1/29 to evaluate for fluid collection/abscesses; no definite fluid collections however limited by streak artifact

## 2025-01-31 NOTE — PROGRESS NOTES
TRANSFER - OUT REPORT:    Verbal report given to Johana RAMOS on Sanford Joiner Sr.  being transferred to CVSU for routine progression of patient care       Report consisted of patient's Situation, Background, Assessment and   Recommendations(SBAR).     Information from the following report(s) Nurse Handoff Report and Event Log was reviewed with the receiving nurse.           Lines:   PICC 01/31/25 Right Brachial (Active)   Central Line Being Utilized Yes 01/31/25 1530   Site Assessment Clean, dry & intact 01/31/25 1530   Lumen #1 Color/Status Red;Brisk blood return;Alcohol cap applied 01/31/25 1530   Lumen #2 Color/Status Purple;Brisk blood return;Alcohol cap applied 01/31/25 1530   Alcohol Cap Used Yes 01/31/25 1530   Dressing Type Gauze 01/31/25 1530   Dressing Status New dressing applied;Clean, dry & intact 01/31/25 1530       Peripheral IV 01/24/25 Distal;Left;Posterior Forearm (Active)   Site Assessment Other (Comment) 01/31/25 1121   Line Status Flushed;Capped 01/31/25 1121   Line Care Connections checked and tightened 01/31/25 0845   Phlebitis Assessment No symptoms 01/31/25 0845   Infiltration Assessment 0 01/31/25 0845   Alcohol Cap Used Yes 01/31/25 0845   Dressing Status Clean, dry & intact 01/31/25 0845   Dressing Type Transparent 01/31/25 0845        Opportunity for questions and clarification was provided.    CVSU RN transported pt back to unit  Patient transported with:  Monitor and Registered Nurse

## 2025-01-31 NOTE — PLAN OF CARE
Problem: Chronic Conditions and Co-morbidities  Goal: Patient's chronic conditions and co-morbidity symptoms are monitored and maintained or improved  1/30/2025 2117 by Paxton Ford RN  Outcome: Progressing  1/30/2025 1244 by Mayela Chen RN  Outcome: Progressing  Flowsheets  Taken 1/30/2025 1244  Care Plan - Patient's Chronic Conditions and Co-Morbidity Symptoms are Monitored and Maintained or Improved:   Monitor and assess patient's chronic conditions and comorbid symptoms for stability, deterioration, or improvement   Collaborate with multidisciplinary team to address chronic and comorbid conditions and prevent exacerbation or deterioration   Update acute care plan with appropriate goals if chronic or comorbid symptoms are exacerbated and prevent overall improvement and discharge  Taken 1/30/2025 0740  Care Plan - Patient's Chronic Conditions and Co-Morbidity Symptoms are Monitored and Maintained or Improved:   Monitor and assess patient's chronic conditions and comorbid symptoms for stability, deterioration, or improvement   Collaborate with multidisciplinary team to address chronic and comorbid conditions and prevent exacerbation or deterioration   Update acute care plan with appropriate goals if chronic or comorbid symptoms are exacerbated and prevent overall improvement and discharge     Problem: Discharge Planning  Goal: Discharge to home or other facility with appropriate resources  1/30/2025 2117 by Paxton Ford, RN  Outcome: Progressing  1/30/2025 1244 by Mayela Chen RN  Outcome: Progressing  Flowsheets  Taken 1/30/2025 1244  Discharge to home or other facility with appropriate resources:   Identify barriers to discharge with patient and caregiver   Arrange for needed discharge resources and transportation as appropriate   Identify discharge learning needs (meds, wound care, etc)  Taken 1/30/2025 0740  Discharge to home or other facility with appropriate resources:   Identify barriers

## 2025-01-31 NOTE — PROGRESS NOTES
1030: Patient to IR for line placement    1120: Patient back from IR. Unable to complete line placement d/t emergencies throughout hospital.    1155: Spoke w/ RN in angio. Patient does not need to be NPO for procedure. Diet order placed.    1335: Angio called for update on line placement. Angio RN unaware of scheduled time for procedure at this time.    1432: Patient to IR with this RN.    1545: Patient back from Angio after double lumen midline placed with this RN.    1645: [facility] at bedside doing teaching.    1815: DL and MSI dressing changes completed. IV antibiotic switched to infuse over 30 minutes per pharmacy so patient can receive prior to discharge. Attending aware.    1900: AVS reviewed with patient and spouse at bedside. All questions answered. Patient discharged with all belongings and LVAD equipment. Stable at time of D/C.

## 2025-02-03 ENCOUNTER — TELEPHONE (OUTPATIENT)
Age: 65
End: 2025-02-03

## 2025-02-03 ENCOUNTER — TELEPHONE (OUTPATIENT)
Age: 65
End: 2025-02-03
Payer: MEDICARE

## 2025-02-03 DIAGNOSIS — Z79.01 CHRONIC ANTICOAGULATION: Primary | ICD-10-CM

## 2025-02-03 LAB — INR BLD: 2.4

## 2025-02-03 PROCEDURE — 93793 ANTICOAG MGMT PT WARFARIN: CPT | Performed by: NURSE PRACTITIONER

## 2025-02-03 NOTE — TELEPHONE ENCOUNTER
ADVANCED HEART FAILURE CENTER  [facility] in [place], [place]  LVAD PATIENT DISCHARGE FOLLOW UP CALL       Date of call: 02/03/25  Date of discharge: 1/31/25  Discharge disposition: Home or Self Care    Called and spoke with patient and patient's home health nurse MADYSON James who states they are currently located at patient's home. Reported INR of 2.4.     MEDICATIONS:     Med rec completed based off of discharge AVS? Yes  Discrepancies noted:  Patient does not have Trelegy at home. Patient only has 25mg hydralazine pills at home however received notification from the pharmacy that 10mg pills are ready for . Provider note states 75mg of toprol, patient was discharged on 50mg. - Notified YUMI Quiles who stated VORB patient to continue 50mg of Toprol.   Do you have all of your prescriptions and are they filled? No  Do you have a copy of your discharge instructions? Yes    FOLLOW UP:     Future Appointments   Date Time Provider Department Center   2/5/2025  9:00 AM Nahomi Quiles APRN - NP [facility] BS AMB   3/4/2025 10:00 AM Billy Ames MD [facility] BS AMB     Reminded patient to schedule follow up with Pulmonology and to schedule octreotide injections with infusion, he verbalized understanding.       TRANSPORTATION:   Patient utilizes for transportation: Family/caregiver  and Medicare rides   Do you have any transportation barriers at this time?  No      SUPPLIES:   Do you have all LVAD supplies?  Yes      1625: INR result reviewed with YUMI Quiles NP  who made the following recommendations (VORB): 2mg daily and recheck Thursday. Patient notified and verbalized understanding. They had no further questions. Patient's home health nurse MADYSON James also notified.  (See anticoag tracker)         Funmi Barth, RACHEL  LVAD coordinator   [facility] [place]

## 2025-02-04 ENCOUNTER — TELEPHONE (OUTPATIENT)
Age: 65
End: 2025-02-04

## 2025-02-04 LAB
ALBUMIN SERPL-MCNC: 4 G/DL (ref 3.9–4.9)
ALP SERPL-CCNC: 98 IU/L (ref 44–121)
ALT SERPL-CCNC: 50 IU/L (ref 0–44)
AST SERPL-CCNC: 28 IU/L (ref 0–40)
BASOPHILS # BLD AUTO: 0.1 X10E3/UL (ref 0–0.2)
BASOPHILS NFR BLD AUTO: 1 %
BILIRUB SERPL-MCNC: 0.2 MG/DL (ref 0–1.2)
BUN SERPL-MCNC: 26 MG/DL (ref 8–27)
BUN/CREAT SERPL: 17 (ref 10–24)
CALCIUM SERPL-MCNC: 9.6 MG/DL (ref 8.6–10.2)
CHLORIDE SERPL-SCNC: 104 MMOL/L (ref 96–106)
CO2 SERPL-SCNC: 19 MMOL/L (ref 20–29)
CREAT SERPL-MCNC: 1.49 MG/DL (ref 0.76–1.27)
EGFRCR SERPLBLD CKD-EPI 2021: 52 ML/MIN/1.73
EOSINOPHIL # BLD AUTO: 0.5 X10E3/UL (ref 0–0.4)
EOSINOPHIL NFR BLD AUTO: 4 %
ERYTHROCYTE [DISTWIDTH] IN BLOOD BY AUTOMATED COUNT: 13.3 % (ref 11.6–15.4)
GLOBULIN SER CALC-MCNC: 3.3 G/DL (ref 1.5–4.5)
GLUCOSE SERPL-MCNC: 91 MG/DL (ref 70–99)
HCT VFR BLD AUTO: 31 % (ref 37.5–51)
HGB BLD-MCNC: 9.8 G/DL (ref 13–17.7)
IMM GRANULOCYTES # BLD AUTO: 0.1 X10E3/UL (ref 0–0.1)
IMM GRANULOCYTES NFR BLD AUTO: 1 %
INR PPP: 1.8 (ref 0.9–1.2)
LDH SERPL L TO P-CCNC: 285 IU/L (ref 121–224)
LYMPHOCYTES # BLD AUTO: 1 X10E3/UL (ref 0.7–3.1)
LYMPHOCYTES NFR BLD AUTO: 7 %
MAGNESIUM SERPL-MCNC: 2.3 MG/DL (ref 1.6–2.3)
MCH RBC QN AUTO: 28 PG (ref 26.6–33)
MCHC RBC AUTO-ENTMCNC: 31.6 G/DL (ref 31.5–35.7)
MCV RBC AUTO: 89 FL (ref 79–97)
MONOCYTES # BLD AUTO: 1.2 X10E3/UL (ref 0.1–0.9)
MONOCYTES NFR BLD AUTO: 9 %
NEUTROPHILS # BLD AUTO: 10.4 X10E3/UL (ref 1.4–7)
NEUTROPHILS NFR BLD AUTO: 78 %
PLATELET # BLD AUTO: 451 X10E3/UL (ref 150–450)
POTASSIUM SERPL-SCNC: 5.3 MMOL/L (ref 3.5–5.2)
PROT SERPL-MCNC: 7.3 G/DL (ref 6–8.5)
PROTHROMBIN TIME: 19.8 SEC (ref 9.1–12)
RBC # BLD AUTO: 3.5 X10E6/UL (ref 4.14–5.8)
SODIUM SERPL-SCNC: 141 MMOL/L (ref 134–144)
WBC # BLD AUTO: 13.2 X10E3/UL (ref 3.4–10.8)

## 2025-02-04 NOTE — TELEPHONE ENCOUNTER
Telephone Call RE:  Appointment reminder     Outcome:     [x] Patient confirmed appointment   [] Patient rescheduled appointment for    [] Unable to reach  [] Left message              [] Other:       Covered parking permit.

## 2025-02-05 ENCOUNTER — TELEPHONE (OUTPATIENT)
Facility: HOSPITAL | Age: 65
End: 2025-02-05

## 2025-02-05 ENCOUNTER — TELEPHONE (OUTPATIENT)
Age: 65
End: 2025-02-05

## 2025-02-05 LAB — NT-PROBNP SERPL-MCNC: 1331 PG/ML (ref 0–376)

## 2025-02-05 NOTE — TELEPHONE ENCOUNTER
Donna Pickett APRN - ROSSANA  [facility] Int Med Clinical Staff1 minute ago (2:03 PM)       Please schedule in 3-4 weeks

## 2025-02-07 ENCOUNTER — TELEPHONE (OUTPATIENT)
Age: 65
End: 2025-02-07

## 2025-02-07 DIAGNOSIS — Z79.01 CHRONIC ANTICOAGULATION: Primary | ICD-10-CM

## 2025-02-07 LAB — INR BLD: 2.3

## 2025-02-07 NOTE — TELEPHONE ENCOUNTER
ADVANCED HEART FAILURE CENTER  [facility] in [place], [place]      Patient's home health nurse reported INR of 2.4 from meter 02/03, but labs drawn same day showed INR of 1.8.  INR result and venous results reviewed with MICHAELA Villa NP who made the following recommendations (VORB): no changes and recheck 02/10 via venipuncture. Patient notified and verbalized understanding. They had no further questions. Patient's home health nurse MADYSON James notified, requested she draw venous sample Monday and corollate against her POC meter, she confirmed she would draw this.  (See anticoag tracker)     Funmi Barth RN

## 2025-02-08 NOTE — PROGRESS NOTES
Saw patient. History and films reviewed.  Risks and benefits explained. Agrees with surgery. Patient seen by PA/NP.   Findings/Conditions: driveline infection  Plan of Care: debridement and wound vac    Risk of morbidity and mortality - high  Medical decision making - high complexity

## 2025-02-10 ENCOUNTER — TELEPHONE (OUTPATIENT)
Age: 65
End: 2025-02-10

## 2025-02-10 NOTE — TELEPHONE ENCOUNTER
Called and spoke with patient regarding inclement weather and likely clinic closures this week. Notified of appt rescheduled to 02/13 at 1pm. Patient verbalized understanding, stated he will call to schedule transportation .

## 2025-02-11 ENCOUNTER — TELEPHONE (OUTPATIENT)
Age: 65
End: 2025-02-11
Payer: MEDICARE

## 2025-02-11 DIAGNOSIS — Z79.01 CHRONIC ANTICOAGULATION: Primary | ICD-10-CM

## 2025-02-11 DIAGNOSIS — I50.22 CHRONIC SYSTOLIC HEART FAILURE (HCC): ICD-10-CM

## 2025-02-11 DIAGNOSIS — Z95.811 LVAD (LEFT VENTRICULAR ASSIST DEVICE) PRESENT (HCC): ICD-10-CM

## 2025-02-11 LAB
INR PPP: 1.6 (ref 0.9–1.2)
PROTHROMBIN TIME: 17.4 SEC (ref 9.1–12)

## 2025-02-11 PROCEDURE — 93793 ANTICOAG MGMT PT WARFARIN: CPT

## 2025-02-11 NOTE — TELEPHONE ENCOUNTER
ADVANCED HEART FAILURE CENTER  [facility] in [place], [place]      INR result reviewed with MICHAELA Villa NP who made the following recommendations (VORB):     Anabell Villa, TAIWO - CNP (2/11/25 12:26 PM)  Patients INR is 1.9 on the POC meter and 1.6 via the lab work   Will give 3 mg tonight then resume regular dosing. Recheck 2/14.    Patient notified and verbalized understanding. He stated he is unable to present to clinic visit 2/13 d/t transportation issues. Per patient wife is working and he is unable to obtain ride covered by insurance as he did not provide enough notice. Informed him will have CEDRIC Barth RN VAD coordinator reach out to discuss. He had no further questions. (See anticoag tracker)     CASEY MONTEMAYOR RN

## 2025-02-11 NOTE — TELEPHONE ENCOUNTER
Message received from patient's home health nurse reporting her POC INR meter returned result of 1.9 yesterday at the same time that she margo venous sample.       1300: called and spoke with patient, he stated he is unable to come appointment on Thursday as his insurance stated he needs to give them 3 days notice for transportation. Patient rescheduled to 2/19 at 09am, he confirmed he has transportation available for this appointment .

## 2025-02-14 ENCOUNTER — TELEPHONE (OUTPATIENT)
Age: 65
End: 2025-02-14
Payer: MEDICARE

## 2025-02-14 DIAGNOSIS — Z79.01 CHRONIC ANTICOAGULATION: Primary | ICD-10-CM

## 2025-02-14 LAB — INR BLD: 2.1

## 2025-02-14 PROCEDURE — 93793 ANTICOAG MGMT PT WARFARIN: CPT | Performed by: NURSE PRACTITIONER

## 2025-02-14 NOTE — TELEPHONE ENCOUNTER
ADVANCED HEART FAILURE CENTER  [facility] in [place], [place]      INR result reviewed with ADILENE Maurer NP who made the following recommendations (VORB): no changes and recheck 02/17. Patient notified and verbalized understanding. Patient's home health nurse MADYSON aJmes also notified. MADYSON James also stated she would draw another venous INR to ensure accuracy of INR meter. They had no further questions. (See anticoag tracker)     Funmi Barth RN

## 2025-02-17 ENCOUNTER — TELEPHONE (OUTPATIENT)
Age: 65
End: 2025-02-17

## 2025-02-18 ENCOUNTER — TELEPHONE (OUTPATIENT)
Age: 65
End: 2025-02-18
Payer: MEDICARE

## 2025-02-18 DIAGNOSIS — Z79.01 CHRONIC ANTICOAGULATION: Primary | ICD-10-CM

## 2025-02-18 LAB
ALBUMIN SERPL-MCNC: 4.4 G/DL (ref 3.9–4.9)
ALP SERPL-CCNC: 104 IU/L (ref 44–121)
ALT SERPL-CCNC: 33 IU/L (ref 0–44)
AST SERPL-CCNC: 30 IU/L (ref 0–40)
BASOPHILS # BLD AUTO: 0.2 X10E3/UL (ref 0–0.2)
BASOPHILS NFR BLD AUTO: 1 %
BILIRUB SERPL-MCNC: <0.2 MG/DL (ref 0–1.2)
BUN SERPL-MCNC: 26 MG/DL (ref 8–27)
BUN/CREAT SERPL: 20 (ref 10–24)
CALCIUM SERPL-MCNC: 9.7 MG/DL (ref 8.6–10.2)
CHLORIDE SERPL-SCNC: 103 MMOL/L (ref 96–106)
CO2 SERPL-SCNC: 16 MMOL/L (ref 20–29)
CREAT SERPL-MCNC: 1.33 MG/DL (ref 0.76–1.27)
EGFRCR SERPLBLD CKD-EPI 2021: 59 ML/MIN/1.73
EOSINOPHIL # BLD AUTO: 1.2 X10E3/UL (ref 0–0.4)
EOSINOPHIL NFR BLD AUTO: 10 %
ERYTHROCYTE [DISTWIDTH] IN BLOOD BY AUTOMATED COUNT: 13.7 % (ref 11.6–15.4)
GLOBULIN SER CALC-MCNC: 3.2 G/DL (ref 1.5–4.5)
GLUCOSE SERPL-MCNC: 78 MG/DL (ref 70–99)
HCT VFR BLD AUTO: 33.2 % (ref 37.5–51)
HGB BLD-MCNC: 10.5 G/DL (ref 13–17.7)
IMM GRANULOCYTES # BLD AUTO: 0.1 X10E3/UL (ref 0–0.1)
IMM GRANULOCYTES NFR BLD AUTO: 1 %
INR PPP: 1.5 (ref 0.9–1.2)
LDH SERPL L TO P-CCNC: 349 IU/L (ref 121–224)
LYMPHOCYTES # BLD AUTO: 1.2 X10E3/UL (ref 0.7–3.1)
LYMPHOCYTES NFR BLD AUTO: 10 %
MAGNESIUM SERPL-MCNC: 2.1 MG/DL (ref 1.6–2.3)
MCH RBC QN AUTO: 28 PG (ref 26.6–33)
MCHC RBC AUTO-ENTMCNC: 31.6 G/DL (ref 31.5–35.7)
MCV RBC AUTO: 89 FL (ref 79–97)
MONOCYTES # BLD AUTO: 1.4 X10E3/UL (ref 0.1–0.9)
MONOCYTES NFR BLD AUTO: 11 %
NEUTROPHILS # BLD AUTO: 8.4 X10E3/UL (ref 1.4–7)
NEUTROPHILS NFR BLD AUTO: 67 %
PLATELET # BLD AUTO: 340 X10E3/UL (ref 150–450)
POTASSIUM SERPL-SCNC: 5.1 MMOL/L (ref 3.5–5.2)
PROT SERPL-MCNC: 7.6 G/DL (ref 6–8.5)
PROTHROMBIN TIME: 16.7 SEC (ref 9.1–12)
RBC # BLD AUTO: 3.75 X10E6/UL (ref 4.14–5.8)
SODIUM SERPL-SCNC: 141 MMOL/L (ref 134–144)
WBC # BLD AUTO: 12.5 X10E3/UL (ref 3.4–10.8)

## 2025-02-18 PROCEDURE — 93793 ANTICOAG MGMT PT WARFARIN: CPT | Performed by: NURSE PRACTITIONER

## 2025-02-18 NOTE — TELEPHONE ENCOUNTER
Patient's venous INR 1.5, Patient's home health MADYSON James reported POC value returned result of 1.8. Reviewed with YUMI Quiles, also reviewed prior values via venous results and POC.     ADVANCED HEART FAILURE CENTER  [facility] in [place], [place]      INR result reviewed with YUMI Quiles, ROSSANA  who made the following recommendations (VORB): 4mg tonight and recheck 02/24. Patient notified and verbalized understanding. They had no further questions. Patient's home health nurse MADYSON James also notified.  (See anticoag tracker)     Patient also notified of labwork normal per RUBEN Garibay NP, he verbalized understanding     Geni Garibay APRN - Funmi Tinoco RN  His labs look pretty good.  Kidney function is a little better.   WBC down trending.   No new concerns at this time.   If you can let him know next time you talk to him.        Funmi Barth, RACHEL

## 2025-02-18 NOTE — PATIENT INSTRUCTIONS
Medication changes:  -no medication changes today         Testing Ordered:  -no new testing today       Other Recommendations:     PLEASE SCHEDULE FOLLOW UP:      -[facility] [place]-  [phone]    -Infectious disease (Dr. Rajan)- [phone]    -Infusion center (octreotide injections) - [phone]     Effective October 2, 2024 the LVAD emergency pager ([phone]) will no longer be in service. Going forward, please contact the answering service at [phone] and request to speak with the Heart Failure provider on-call for all after hours needs or emergencies.     Continue to change drive line exit site dressing 3 times a week using sterile technique,     Ensure you are drinking an adequate amount of water with a goal of 6-8 eight ounce glasses (1.5-2 liters) of fluid daily. Your urine should be clear and light yellow straw colored.       Follow up 03/27 with [facility]      Monthly LVAD Education Tip:    LVAD Equipment Maintenance:    Cleaning and Caring for Equipment:     General Cleaning Rules:  Use a damp cloth to clean exterior surfaces of the external parts of equipment as needed.  Water, with or without a mild dish soap, may be used as a surface .   Do NOT allow water to enter the interior of devices or put equipment into water or liquid. Submersion in water or liquid may cause LVAD to stop!    Caring for the :  Clean the outside parts of the  with a damp, lint-free cloth as needed. You may use warm water and a mild dish soap if more aggressive cleaning needed.  NEVER put the  into water or liquid. This may cause the pump to stop.   DO NOT disconnect the  from the driveline or power source for cleaning. This will cause the pump to stop.  Clean the outside parts of the  power cables with a damp, lint-free cloth. You may use warm water and a mild dish soap if more aggressive

## 2025-02-19 ENCOUNTER — OFFICE VISIT (OUTPATIENT)
Age: 65
End: 2025-02-19

## 2025-02-19 VITALS
SYSTOLIC BLOOD PRESSURE: 80 MMHG | WEIGHT: 168.4 LBS | RESPIRATION RATE: 18 BRPM | HEART RATE: 95 BPM | BODY MASS INDEX: 26.43 KG/M2 | TEMPERATURE: 98.2 F | HEIGHT: 67 IN | OXYGEN SATURATION: 99 %

## 2025-02-19 DIAGNOSIS — Z95.811 LVAD (LEFT VENTRICULAR ASSIST DEVICE) PRESENT (HCC): Primary | ICD-10-CM

## 2025-02-19 DIAGNOSIS — I50.22 CHRONIC SYSTOLIC HF (HEART FAILURE) (HCC): ICD-10-CM

## 2025-02-19 LAB — NT-PROBNP SERPL-MCNC: 1022 PG/ML (ref 0–376)

## 2025-02-19 ASSESSMENT — ENCOUNTER SYMPTOMS
WHEEZING: 0
SHORTNESS OF BREATH: 1
EYE PAIN: 0
ABDOMINAL PAIN: 0
ALLERGIC/IMMUNOLOGIC NEGATIVE: 1
BLOOD IN STOOL: 0
SORE THROAT: 0
ABDOMINAL DISTENTION: 0
COUGH: 0
CHEST TIGHTNESS: 0

## 2025-02-19 ASSESSMENT — PATIENT HEALTH QUESTIONNAIRE - PHQ9
2. FEELING DOWN, DEPRESSED OR HOPELESS: NOT AT ALL
1. LITTLE INTEREST OR PLEASURE IN DOING THINGS: NOT AT ALL
SUM OF ALL RESPONSES TO PHQ QUESTIONS 1-9: 0
SUM OF ALL RESPONSES TO PHQ QUESTIONS 1-9: 0
SUM OF ALL RESPONSES TO PHQ9 QUESTIONS 1 & 2: 0
SUM OF ALL RESPONSES TO PHQ QUESTIONS 1-9: 0
SUM OF ALL RESPONSES TO PHQ QUESTIONS 1-9: 0

## 2025-02-19 NOTE — PROGRESS NOTES
ADVANCED HEART FAILURE CENTER  [place], [place]  Advanced Heart Failure Clinic Note      Patient name: Sanford Joiner Sr.  Patient : 1960  Patient MRN: [id number]  Date of service: 25    Chief Complaint   Patient presents with    Follow-Up from Hospital    Shortness of Breath     On exertion    Dizziness     When walking up stairs         ASSESSMENT:  Sanford Joiner Sr. is a 65 y.o. male with Chronic systolic heart failure due to non ischemic cardiomyopathy, s/p HM3 LVAD implantation (3/27/2023) as destination therapy, stage D, on optimal GDMT limited by hypovolemia  Not a transplant candidate due to advanced obstructive lung disease, was declined at [facility] for LVAD and transplant.   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  Discharged 25      INTERVAL HISTORY:  -MAP 80 with pulse;  HR mildly elevated for him   -labs : K 5.1; creat 1.33; pBNP 1022; WBC 12.5; Hg 10.5;   -weight down slightly from last clinic visit  -Sanford Joiner Sr. is here for hospital follow up.    History of Present Illness    He reports experiencing mild dizziness this morning when climbing the stairs to clinic.  He does not experience dizziness during straight-line walking or upon standing.    His respiratory function is slightly compromised today.  A bad day instead of a good day. He does not report any systemic symptoms such as fever, chills, or sore throat. Additionally, he does not experience chest pain, palpitations, or fluttering sensations. There is no reported edema in his lower extremities or abdomen. He also does not report any complications with his line or driveline, and his equipment is functioning optimally.  Drainage amount on driveline has been consistent.            PLAN/RECOMMENDATIONS:  Sepsis

## 2025-02-19 NOTE — PROGRESS NOTES
ADVANCED HEART FAILURE CENTER  [facility] in [place], [place]  LVAD Coordinator Clinic Visit Note  Chief Complaint   Patient presents with    Follow-Up from Hospital    Shortness of Breath     On exertion    Dizziness     When walking up stairs       BP (!) 80/0 (Site: Left Upper Arm, Position: Sitting, Cuff Size: Medium Adult)   Pulse 95   Temp 98.2 °F (36.8 °C) (Oral)   Resp 18   Ht 1.702 m (5' 7.01\")   Wt 76.4 kg (168 lb 6.4 oz)   SpO2 99%   BMI 26.37 kg/m²     LVAD  LVAD Type:: Left Ventricular Assist Device (LVAD)  Pump Speed (rpm): 5200  Pump Flow (lpm): 3.3  MAP (mmHg): 80 (strong palpable pulse present)  Pump Pulse Index (PI): 5.9  Pump Power (Mei): 3.8  Battery Life Checked: Yes  Backup Controller Present: Yes  Driveline Dressing: Clean, Dry and Intact  Outpatient: Yes  MAP in Therapeutic Range (Outpatient): Yes       Sanford Joiner Sr. is a 65 y.o. male with a history of NICM with Heartmate 3 implant date of 03/27/23. Alarm history reviewed. Please see VAD flow sheet for readings. Rare PI events noted. Several low voltage adverse alarms noted, no other adverse alarms noted. Settings reviewed, but no changes made. Patient states he forgot to change his batteries, educate patient to change batteries before they become low, he verbalized understanding.     Patient denied worsening s/s of driveline infection or driveline trauma (including  drops). Denies LVAD alarms at home. See flow sheet for details. Driveline inspected for integrity.  Above reported to provider.      Patient was seen in clinic for routine follow up at which time it was noted batteries expiring, new clips and batteries provided to to ensure safe operation of device per  guidelines.    (Verified primary controller- [id number], and back up battery-[id number]; and back up controller [id number] and back up battery- [id number])     Patient stated he no longer has transportation benefits through

## 2025-02-21 ENCOUNTER — TELEPHONE (OUTPATIENT)
Age: 65
End: 2025-02-21

## 2025-02-21 DIAGNOSIS — I50.22 CHRONIC SYSTOLIC HF (HEART FAILURE) (HCC): ICD-10-CM

## 2025-02-21 DIAGNOSIS — Z95.811 LVAD (LEFT VENTRICULAR ASSIST DEVICE) PRESENT (HCC): Primary | ICD-10-CM

## 2025-02-21 DIAGNOSIS — T82.7XXA INFECTION ASSOCIATED WITH DRIVELINE OF LEFT VENTRICULAR ASSIST DEVICE (LVAD): ICD-10-CM

## 2025-02-21 NOTE — TELEPHONE ENCOUNTER
1144: received message from patient's home health nurse stating patient is having more swelling around sternal wound, appears to have accumulation under skin which is new and worsening this week.     Discussed with YUMI Quiles who stated VORB patient should come to ER for fever, chills or other systemic symptoms and notify Dr. Rajan.     1310: called and spoke with patient. Notified him that RN will notify Dr. Rajan and requested he be alert for any outreach from his office, he verbalized understanding. Educated patient that he should present to the ER for fever, chills, nausea, vomiting, excessive fatigue or other systemic s/s of infection, he verbalized understanding. Also notified patient's home health nurse MADYSON James.     Dr. Rajan notified via chart message.       Rafita Rajan MD  You3 days ago       William Choudhary,    Thanks for letting me know.    He needs to see CT surgery to see what they can do about this as his infections have been incredibly difficult to suppress without significant drainage.    Omer     You  Nahomi Quiles, APRN - NP; Rafita Rajan MD3 days ago     MP  Hi Dr. 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 His home health nurse reached out to me regarding increased swelling on his sternum. He previously had a large abscess (drained multiple times) and resulting chronic wound there.    Nahomi asked me to notify you regarding increasing drainage/abscess despite continued Cefepime.       Reviewed above reply from Dr. Rajan with YUMI Quiles who stated she will discuss with Dr. Gomez.    Nahomi Quiles, APRN - NP  You1 minute ago (1:01 PM)     Please order CT chest abdomen pelvis with IV contrast stat and I will reach out to Patricia.     1314: reviewed with YUMI Quiles who stated VORB CT chest/abdomen/pelvis WITH contrast.  Call placed to patient, left voicemail notifying of order for STAT CT scan. Requested call back

## 2025-02-24 ENCOUNTER — TELEPHONE (OUTPATIENT)
Age: 65
End: 2025-02-24
Payer: MEDICARE

## 2025-02-24 DIAGNOSIS — Z79.01 CHRONIC ANTICOAGULATION: Primary | ICD-10-CM

## 2025-02-24 LAB — INR BLD: 2.3

## 2025-02-24 PROCEDURE — 93793 ANTICOAG MGMT PT WARFARIN: CPT | Performed by: NURSE PRACTITIONER

## 2025-02-24 NOTE — TELEPHONE ENCOUNTER
ADVANCED HEART FAILURE CENTER  [facility] in [place], [place]      INR result reviewed with ADILENE Maurer NP who made the following recommendations (VORB): no changes and recheck 1 week.Patient notified of provider instructions via voicemail, requested call back to confirm. Patient's hh nurse MADYSON James also notified.  (See anticoag tracker)     02/25: called and spoke with patient who confirmed he received instructions     Funmi Barth RN

## 2025-02-26 ENCOUNTER — TELEPHONE (OUTPATIENT)
Age: 65
End: 2025-02-26

## 2025-02-26 NOTE — TELEPHONE ENCOUNTER
Insurance concerns. Per LVAD RN Coordinator, pt notes continued difficulty with his Medicaid coverage. Pt in need of SW assist with determining barriers to full Medicaid coverage.     SW left voicemail for pt to return call.     Plan: SW will await pt response and assist with determining barriers to full Medicaid coverage. SW will also assist pt with determining if pt still has a transportation benefit as a Medicaid recipient.       Vane Shay, MSW, LCSW  Clinical   [facility]  Advanced Heart Failure  [phone]

## 2025-02-27 ENCOUNTER — TELEPHONE (OUTPATIENT)
Age: 65
End: 2025-02-27

## 2025-02-27 NOTE — TELEPHONE ENCOUNTER
Insurance concerns continued    2/26: SW received return voice message from pt later on 2/26. Pt explained that he is in need of assist with determining if he still has Medicaid (either through [facility] Dual Medicare/Medicaid or [place] Medicaid separately).     SW left voice message for pt to return call.      Plan: SW will attempt to reach back out to pt later this afternoon.       Vane Shay, MSW, LCSW  Clinical   [facility]  Advanced Heart Failure  [phone]

## 2025-02-27 NOTE — TELEPHONE ENCOUNTER
Insurance concerns and outpatient clinic transportation coordination    SW spoke with pt this afternoon about his insurance concerns and clinic appointment transportation options. Pt asked for SW assist with understanding if he is trying to determine if he has Medicaid as part of his [employer] plan. SW explained that pt's [employer] plan is a Dual Medicare and Medicaid plan. SW explained that pt's Medicare and Medicaid are managed by pt's [employer] plan. SW explained that [employer] determines what benefits it will provide members. Pt asked if he still has a transportation benift through [employer]. SW explained that [employer] discontinued it's transportation benefit for members. SW advised pt on community transportation options including the following programs: [facility] and [employer]. SW advised pt try to call the programs today to initiate enrollment. Pt expressed agreement with reaching out to the programs this week to start enrollment.       Community Transportation (for medical appointments)    [facility]: [phone] (ask for Jaelyn)  Enrollment: Call [facility] directly at [phone] to complete enrollment application over the phone.   Trips must be scheduled no later than 7-10 business days prior to date of your appointment.  [facility] will call you 48 hours before your appointment to confirm if they will have a volunteer available for your appointment date.   The accepting volunteer  will also call you to introduce themselves and discuss the transportation details regarding your trip.     [employer]: [phone] (, Yenny)  [employer]: this service works with [facility] with patients 55 and older. There is a 4-ride limit per month, you must register in advance, but rides are USUALLY free or low cost. Patients must book their ride at least 7-10 days in

## 2025-02-28 ENCOUNTER — TELEPHONE (OUTPATIENT)
Age: 65
End: 2025-02-28

## 2025-02-28 NOTE — TELEPHONE ENCOUNTER
Message received from patient's home health nurse MADYSON James stating she assisted patient to schedule his CT scan for 03/12. Inquired if that was the earliest available appointment as this was ordered STAT, Per MADYSON James patient then rescheduled CT scan to 03/05 as this is the earliest that patient can get a ride.      Dicussed with YUMI Quiles who stated no new orders at this time, will await results of CT

## 2025-03-03 ENCOUNTER — TELEPHONE (OUTPATIENT)
Age: 65
End: 2025-03-03
Payer: MEDICARE

## 2025-03-03 DIAGNOSIS — Z79.01 CHRONIC ANTICOAGULATION: Primary | ICD-10-CM

## 2025-03-03 LAB — INR BLD: 2.6

## 2025-03-03 PROCEDURE — 93793 ANTICOAG MGMT PT WARFARIN: CPT | Performed by: NURSE PRACTITIONER

## 2025-03-04 ENCOUNTER — TELEPHONE (OUTPATIENT)
Age: 65
End: 2025-03-04

## 2025-03-04 LAB
ALBUMIN SERPL-MCNC: 3.8 G/DL (ref 3.9–4.9)
ALP SERPL-CCNC: 104 IU/L (ref 44–121)
ALT SERPL-CCNC: 12 IU/L (ref 0–44)
AST SERPL-CCNC: 22 IU/L (ref 0–40)
BASOPHILS # BLD AUTO: 0.2 X10E3/UL (ref 0–0.2)
BASOPHILS NFR BLD AUTO: 2 %
BILIRUB SERPL-MCNC: <0.2 MG/DL (ref 0–1.2)
BUN SERPL-MCNC: 21 MG/DL (ref 8–27)
BUN/CREAT SERPL: 14 (ref 10–24)
CALCIUM SERPL-MCNC: 9.9 MG/DL (ref 8.6–10.2)
CHLORIDE SERPL-SCNC: 104 MMOL/L (ref 96–106)
CO2 SERPL-SCNC: 16 MMOL/L (ref 20–29)
CREAT SERPL-MCNC: 1.53 MG/DL (ref 0.76–1.27)
EGFRCR SERPLBLD CKD-EPI 2021: 50 ML/MIN/1.73
EOSINOPHIL # BLD AUTO: 1.2 X10E3/UL (ref 0–0.4)
EOSINOPHIL NFR BLD AUTO: 9 %
ERYTHROCYTE [DISTWIDTH] IN BLOOD BY AUTOMATED COUNT: 14.5 % (ref 11.6–15.4)
GLOBULIN SER CALC-MCNC: 3.6 G/DL (ref 1.5–4.5)
GLUCOSE SERPL-MCNC: 90 MG/DL (ref 70–99)
HCT VFR BLD AUTO: 36.9 % (ref 37.5–51)
HGB BLD-MCNC: 11.3 G/DL (ref 13–17.7)
IMM GRANULOCYTES # BLD AUTO: 0.1 X10E3/UL (ref 0–0.1)
IMM GRANULOCYTES NFR BLD AUTO: 1 %
INR PPP: 2.1 (ref 0.9–1.2)
LDH SERPL L TO P-CCNC: 276 IU/L (ref 121–224)
LYMPHOCYTES # BLD AUTO: 1.4 X10E3/UL (ref 0.7–3.1)
LYMPHOCYTES NFR BLD AUTO: 10 %
MAGNESIUM SERPL-MCNC: 1.9 MG/DL (ref 1.6–2.3)
MCH RBC QN AUTO: 27.6 PG (ref 26.6–33)
MCHC RBC AUTO-ENTMCNC: 30.6 G/DL (ref 31.5–35.7)
MCV RBC AUTO: 90 FL (ref 79–97)
MONOCYTES # BLD AUTO: 1.6 X10E3/UL (ref 0.1–0.9)
MONOCYTES NFR BLD AUTO: 12 %
NEUTROPHILS # BLD AUTO: 8.9 X10E3/UL (ref 1.4–7)
NEUTROPHILS NFR BLD AUTO: 66 %
PLATELET # BLD AUTO: 340 X10E3/UL (ref 150–450)
POTASSIUM SERPL-SCNC: 5.1 MMOL/L (ref 3.5–5.2)
PROT SERPL-MCNC: 7.4 G/DL (ref 6–8.5)
PROTHROMBIN TIME: 22.4 SEC (ref 9.1–12)
RBC # BLD AUTO: 4.09 X10E6/UL (ref 4.14–5.8)
SODIUM SERPL-SCNC: 143 MMOL/L (ref 134–144)
WBC # BLD AUTO: 13.4 X10E3/UL (ref 3.4–10.8)

## 2025-03-04 NOTE — TELEPHONE ENCOUNTER
Home health nurse called- Noy James to follow up with Plan of care. Per Noy pt was doing well he has an abscess on his sternum that was draining very little and now has opened back up. I explained that pt needed a follow up, we were unable to reach pt. Noy explained that patients situation at home is not good. His wife works full time. He has transportation issues, and is not able to do a virtual visit. They only have one phone that patients keeps while his wife is at work. IV ABT is scheduled to end on 3/6. Per Noy she is concerned and asking if IV ABT should be extending.   Pt is scheduled to have Chest CT tomorrow.

## 2025-03-05 ENCOUNTER — TELEPHONE (OUTPATIENT)
Age: 65
End: 2025-03-05

## 2025-03-05 ENCOUNTER — TELEPHONE (OUTPATIENT)
Facility: HOSPITAL | Age: 65
End: 2025-03-05

## 2025-03-05 ENCOUNTER — HOSPITAL ENCOUNTER (OUTPATIENT)
Age: 65
Discharge: HOME | End: 2025-03-08
Payer: MEDICARE

## 2025-03-05 DIAGNOSIS — Z95.811 LVAD (LEFT VENTRICULAR ASSIST DEVICE) PRESENT (HCC): ICD-10-CM

## 2025-03-05 DIAGNOSIS — T82.7XXA INFECTION ASSOCIATED WITH DRIVELINE OF LEFT VENTRICULAR ASSIST DEVICE (LVAD): ICD-10-CM

## 2025-03-05 DIAGNOSIS — I50.22 CHRONIC SYSTOLIC HF (HEART FAILURE) (HCC): ICD-10-CM

## 2025-03-05 LAB — NT-PROBNP SERPL-MCNC: 1632 PG/ML (ref 0–376)

## 2025-03-05 PROCEDURE — 6360000004 HC RX CONTRAST MEDICATION: Performed by: RADIOLOGY

## 2025-03-05 PROCEDURE — 71260 CT THORAX DX C+: CPT

## 2025-03-05 RX ORDER — IOPAMIDOL 755 MG/ML
100 INJECTION, SOLUTION INTRAVASCULAR
Status: COMPLETED | OUTPATIENT
Start: 2025-03-05 | End: 2025-03-05

## 2025-03-05 RX ADMIN — IOPAMIDOL 100 ML: 755 INJECTION, SOLUTION INTRAVENOUS at 12:19

## 2025-03-05 NOTE — TELEPHONE ENCOUNTER
1501: call received from patient's home health nurse SUMANKaren James stating she has tried multiple times to reach patient with no response. Discussed [facility] and ID have also left messages with [employer] with no response however patient did present and have CT scan done today. MADYSON James stated she reached out to ID since patient's IV abx end tomorrow and she does not have orders for antibiotics after that. Notified her that CT scan result note from YUMI Quiles was forwarded to Dr. Rajan and will sent to ID NP as well. SUMANKaren Jacob stated she has an appointment to see patient Friday and anticipates seeing him that day, stated she will continue to encourage patient to present for evaluation per provider recommendations. YUMI Quiles notified.

## 2025-03-05 NOTE — TELEPHONE ENCOUNTER
CT imaging with fluid collection below xyphoid process.  Home health has been unable to reach patient with my recommendation from yesterday to go to the ED.  Was able to reach patient and wife today.  Advised for them to go to the ED.  He currently does not have reliable transportation.  Advised to call insurance company to arrange medical transport or ask a friend to bring him to the ED.  Will extend cefepime for another 7 days through 3/12/2025.  Home health seeing patient again this Friday.

## 2025-03-05 NOTE — TELEPHONE ENCOUNTER
1151: Spoke with YUMI Quiles NP who inquired if patient reached to discuss recommendations to present to ED. Noted unable to reach patient this morning. She requested patient be contacted and informed to present to ED after completion of CT scan scheduled for 1230. Attempted to contact patient. Message left. Also left message for MADYSON James RN with [facility] to notify of recommendations and request assistance reaching patient. Will await return response.     CASEY MONTEMAYOR RN  VAD Coordinator    
 This was not mentioned on the CT in January.  However, it looks to be in the same location of the abscess from CT in July.  Patient has not returned any of our calls.  If he calls back I would still recommend ER, which is consistent with IDs recommendations from conversation you forwarded earlier. IF he does not want to go to the ER lets atleast get him an appointment here so we can assess site and re-culture. I also want to interrogate his VAD and ICD (please have him send manual interrogation of ICD before visit if he is agreeable).  Please send imaging to infectious disease team to see if they have any further recommendations from a ID standpoint.  At this point he knows we are trying to reach out to him.  I actually called josh cruz to tell him to call us.  He told staff there he would so I would give him some time to get back to us.\"      Funmi Barth RN

## 2025-03-05 NOTE — TELEPHONE ENCOUNTER
[facility] has tried to reach out to Sanford Joiner Sr. a few times (since 3/3) to let him know he needs to be evaluated in the ER due to  nurse report on 3/3 of fall with possible loss of consciousness on 2/28.   nurse also reported site of abscess on abdomen worse.  Pt had scheduled appointment for CT at [facility] today.  Pt checked into appointment.  I called staff at [facility] asking them to let Sanford Joiner Sr. know we have been trying to get in touch with him and to please give us a call.  Staff communicated this to patient and per staff member patient said he would give us a call.

## 2025-03-05 NOTE — TELEPHONE ENCOUNTER
Noy called back and advised pt needs to be seen in ED for further assessment. Unable to reach pt via phone. Noy will reach out to pt.

## 2025-03-06 ENCOUNTER — TELEPHONE (OUTPATIENT)
Age: 65
End: 2025-03-06

## 2025-03-06 NOTE — TELEPHONE ENCOUNTER
Called and spoke with Noy the nurse with HH. Discussed new plan to extend IV ABT until 3/12. HH will see pt tomorrow for follow up as well.     Call placed to [facility] and gave verbal orders per Donna to extend IV ABT until 3/12.

## 2025-03-06 NOTE — TELEPHONE ENCOUNTER
Reviewed with RUBEN Garibay who stated VORB hh to collect weekly labs. Discussed with MADYSON James, requested she collect labwork weekly per provider request, she confirmed understanding.

## 2025-03-06 NOTE — TELEPHONE ENCOUNTER
Donna Pickett APRN - ROSSANA  [facility] Clinical Staff15 hours ago (4:42 PM)       Please call to extend antibiotics through 3/12/2025.  See current note.

## 2025-03-06 NOTE — TELEPHONE ENCOUNTER
----- Message from TAIWO Byrne NP sent at 3/5/2025  3:00 PM EST -----  Is he currently on a schedule of labs?   I would like labs once a week for a little bit, if he isn't already on a schedule for that.  ----- Message -----  From: Funmi Barth RN  Sent: 3/5/2025   2:04 PM EST  To: TAIWO Jimenez NP    He has home health so he can get labs when ever she visits twice a week  ----- Message -----  From: Geni Garibay APRN - NP  Sent: 3/5/2025  10:21 AM EST  To: Funmi Barth RN    pBNP is slightly up instead of down, which doesn't match that the other labs look concentrated.    When will he have his next set of labs?

## 2025-03-07 ENCOUNTER — CLINICAL DOCUMENTATION (OUTPATIENT)
Facility: HOSPITAL | Age: 65
End: 2025-03-07

## 2025-03-07 ENCOUNTER — TELEPHONE (OUTPATIENT)
Age: 65
End: 2025-03-07

## 2025-03-07 NOTE — TELEPHONE ENCOUNTER
Message received from patient's home  health nurse SUMANKaren James reporting MAP of 78, weight of 166lb. She stated that he has transportation and is agreeable to coming to the ER on Monday 03/10 per provider instructions.     Patient discussed at [facility] with YUMI Quiles and Dr. Gomez. Per Dr. Gomez in regards to sternal abscess and results of CT scan, no surgical intervention indicated at this time unless patient becomes acutely septic or large increase in abscess seen. Dr. Gomez recommended continuing suppressive antibiotics at this time and monitoring of sternal site. Per YUMI Quiles no indication for ER visit from [facility] perspective as it has now been over a week since patient's fall and no plan for surgery. Per YUMI Quiles patient does need to be seen for evaluation asap however this could be in clinic.     YUMI Quiles discussed patient via phone with Infectious disease NP RAISA Pickett. Per YUMI Quiles ID stated that in order to make determination regarding antibiotic change they would need surgical abscess culture, ER visit would not change ID plan of care due to no CT surgery intervention at this time and therefore okay for patient to follow up in clinic at soonest available opportunity. Per YUMI Quiles if unable to present to clinic then patient should come to ER as previously planned.     1544: call placed to patient. Left voicemail discussing he should be seen asap for evaluation and that per providers he could be scheduled for this in clinic. Requested call back from patient to discuss. Instructed patient if he does not have transportation to clinic next week he should present to the ER per his previous plan.

## 2025-03-08 NOTE — PROGRESS NOTES
RACHEL Barth reached out that Mr. Joiner should have transportation available to come to the ED this coming Monday. However after discussion with NP Nahomi Simpson from cardiology, cardiology feels this could be managed outpatient.     I discussed with cardiology NP via telephone that CT imaging shows an abscess involving from the driveline which means he is likely failing current antibiotic regimen requiring a washout for source control.  She discussed with Dr. Jama who recommends to continue with oral suppressive therapy and that no washout is indicated at this time.  He is concerned that the infection would recur as after previous washout.    Will stop IV cefepime next week and patient will need to start oral suppressive Levaquin.      Recommended to monitor progression of the abscess via CT scan outpatient and to monitor for symptoms of sepsis.     Patient has been extremely difficult to get a hold off but cardiology will attempt to reach patient to schedule follow-up and will continue to monitor for signs of sepsis and progression of abscess..

## 2025-03-10 ENCOUNTER — TELEPHONE (OUTPATIENT)
Age: 65
End: 2025-03-10
Payer: MEDICARE

## 2025-03-10 DIAGNOSIS — Z79.01 CHRONIC ANTICOAGULATION: Primary | ICD-10-CM

## 2025-03-10 LAB — INR BLD: 2.4

## 2025-03-10 PROCEDURE — 93793 ANTICOAG MGMT PT WARFARIN: CPT | Performed by: NURSE PRACTITIONER

## 2025-03-10 NOTE — TELEPHONE ENCOUNTER
ADVANCED HEART FAILURE CENTER  [facility] in [place], [place]      INR result reviewed with YUMI Quiles NP  who made the following recommendations (VORB): 2mg tonight and recheck 1 week. Patient notified and verbalized understanding. He confirmed he make coumadin adjustment recommended last week. Patient's home health nurse MADYSON James also notified.  They had no further questions. (See anticoag tracker)     Discussed need for [facility] visit per YUMI Quiles request. Patient confirmed he can come to clinic for provider visit tomorrow at 2pm     Funmi Barth RN

## 2025-03-11 ENCOUNTER — TELEPHONE (OUTPATIENT)
Age: 65
End: 2025-03-11

## 2025-03-11 ENCOUNTER — OFFICE VISIT (OUTPATIENT)
Age: 65
End: 2025-03-11

## 2025-03-11 VITALS
HEART RATE: 95 BPM | BODY MASS INDEX: 25.77 KG/M2 | WEIGHT: 164.2 LBS | HEIGHT: 67 IN | TEMPERATURE: 97.9 F | RESPIRATION RATE: 18 BRPM | SYSTOLIC BLOOD PRESSURE: 86 MMHG | OXYGEN SATURATION: 97 %

## 2025-03-11 DIAGNOSIS — R91.1 PULMONARY NODULE: ICD-10-CM

## 2025-03-11 DIAGNOSIS — L08.9 SOFT TISSUE INFECTION: Primary | ICD-10-CM

## 2025-03-11 DIAGNOSIS — L02.211 CUTANEOUS ABSCESS OF ABDOMINAL WALL: ICD-10-CM

## 2025-03-11 DIAGNOSIS — L02.213 ABSCESS OF STERNAL REGION: ICD-10-CM

## 2025-03-11 RX ORDER — LEVOFLOXACIN 500 MG/1
500 TABLET, FILM COATED ORAL DAILY
Qty: 30 TABLET | Refills: 1 | Status: SHIPPED | OUTPATIENT
Start: 2025-03-11 | End: 2025-05-10

## 2025-03-11 ASSESSMENT — ENCOUNTER SYMPTOMS
ABDOMINAL PAIN: 0
SORE THROAT: 0
EYE PAIN: 0
CHEST TIGHTNESS: 0
COUGH: 0
WHEEZING: 1
ABDOMINAL DISTENTION: 0
SHORTNESS OF BREATH: 1
ALLERGIC/IMMUNOLOGIC NEGATIVE: 1
BLOOD IN STOOL: 0

## 2025-03-11 ASSESSMENT — PATIENT HEALTH QUESTIONNAIRE - PHQ9
SUM OF ALL RESPONSES TO PHQ QUESTIONS 1-9: 0
2. FEELING DOWN, DEPRESSED OR HOPELESS: NOT AT ALL
SUM OF ALL RESPONSES TO PHQ QUESTIONS 1-9: 0
1. LITTLE INTEREST OR PLEASURE IN DOING THINGS: NOT AT ALL

## 2025-03-11 NOTE — TELEPHONE ENCOUNTER
0955: called and spoke with [facility] who stated that patient's last ICD transmission was in Jan of 2023 and patient's last interrogation (done in office) is from March of 2024. Stated that patient's remote interrogations have been \"no shows\" meaning  his device was not connected.     0959: called and spoke with patient who stated that he thinks his transmitter is in the attic packed away, stated he can look for it but it would take him a while to get to the attic and could not do this today. Requested patient have a family member retreive his transmitter as soon as he is able to that he can send transmissions to [facility], he verbalized understanding . MICHAELA Villa notified during clinic visit.

## 2025-03-11 NOTE — PATIENT INSTRUCTIONS
Medication changes:    One you finish your IV antibiotics START Levoquin 500mg by mouth daily     Testing Ordered:    An order for CT SCAN has been placed to be done in 3 months. You will be receiving an automated call from Coordination of Care to schedule this test. If you are unavailable to receive the call or would like to contact coordination of care yourself you may contact [phone] to schedule. You will need to contact coordination of care yourself if you miss their calls as they will only make 3 attempts to reach you.      Cultures of your driveline were collected in clinic, we will notify you of the results of these once received.     Other Recommendations:     Please call [phone] to schedule a follow up with Dr. Rajan     Effective October 2, 2024 the LVAD emergency pager ([phone]) will no longer be in service. Going forward, please contact the answering service at [phone] and request to speak with the Heart Failure provider on-call for all after hours needs or emergencies.     Continue to change drive line exit site dressing 3 times a week using sterile technique,     Ensure you are drinking an adequate amount of water with a goal of 6-8 eight ounce glasses (1.5-2 liters) of fluid daily. Your urine should be clear and light yellow straw colored.       Follow up in 1 month with [facility]    For further patient and caregiver resources as well as access to online LVAD support groups visit [email or web address]       Please bring your daily sheets and medications to your next appointment.      Please monitor your weights daily upon waking and after using the bathroom. Keep a written records of your weights and bring to your next appointment. If you have a weight gain of 3 or more pounds overnight OR 5 or more pounds in one week please contact our office.       Thank you for allowing us the privilege of being a part of your healthcare team! Please do not hesitate to

## 2025-03-11 NOTE — PROGRESS NOTES
ADVANCED HEART FAILURE CENTER  [facility] in [place], [place]  LVAD Coordinator Clinic Visit Note  Chief Complaint   Patient presents with    Shortness of Breath     On exertion    Follow-up     VAD       BP (!) 86/0 (BP Site: Left Upper Arm, Patient Position: Sitting, BP Cuff Size: Medium Adult)   Pulse 95   Temp 97.9 °F (36.6 °C) (Oral)   Resp 18   Ht 1.702 m (5' 7.01\")   Wt 74.5 kg (164 lb 3.2 oz)   SpO2 97%   BMI 25.71 kg/m²     LVAD  LVAD Type:: Left Ventricular Assist Device (LVAD)  Pump Speed (rpm): 5200  Pump Flow (lpm): 3.7  MAP (mmHg): 86  Pump Pulse Index (PI): 4.5  Pump Power (Mei): 3.8       Sanford Joiner Sr. is a 65 y.o. male with a history of NICM with Heartmate 3 implant date of 03/27/23. Alarm history reviewed. Please see VAD flow sheet for readings. Frequent PI events noted. No adverse alarms noted. Settings reviewed, but no changes made.    Patient denied driveline trauma (including  drops). Denies LVAD alarms at home. See flow sheet for details. Driveline inspected for integrity.    Drive line dressing change completed per policy (dressing supplies used: Sterile drape, Sterile Sorba View Shield, Sterile 4x4 gauze sponges (10 pack) x2, Sterile 2x2 Split Sponges, Sterile 2x2 Gauze sponges (x2); Sterile Cotton-tipped applicators; Chlorhexidine Gluconate 4% solution, Sterile 0.9% normal saline 100 ml (x2), Sterile gloves, Clean gloves). Patient with golf ball sized round area of tissue protruding from lower sternum with brown drainage. Driveline with thick purulent green drainage.      Driveline site assessed by provider in office.     Patient declined assistance scehduling follow up with stated he will call to schedule .      All orders entered per VORB. All provider instructions placed in AVS and reviewed with patient. Educated patient on medication changes, expected follow up.  Time given to ask questions. Patient verbalized understanding and had no 
scan  Discussed care with infectious disease and pulmonary during appointment

## 2025-03-13 ENCOUNTER — RESULTS FOLLOW-UP (OUTPATIENT)
Age: 65
End: 2025-03-13

## 2025-03-13 LAB
ALBUMIN SERPL-MCNC: 4.1 G/DL (ref 3.9–4.9)
ALP SERPL-CCNC: 98 IU/L (ref 44–121)
ALT SERPL-CCNC: 17 IU/L (ref 0–44)
AST SERPL-CCNC: 22 IU/L (ref 0–40)
BASOPHILS # BLD AUTO: 0.1 X10E3/UL (ref 0–0.2)
BASOPHILS NFR BLD AUTO: 1 %
BILIRUB SERPL-MCNC: <0.2 MG/DL (ref 0–1.2)
BUN SERPL-MCNC: 21 MG/DL (ref 8–27)
BUN/CREAT SERPL: 17 (ref 10–24)
CALCIUM SERPL-MCNC: 9.4 MG/DL (ref 8.6–10.2)
CHLORIDE SERPL-SCNC: 105 MMOL/L (ref 96–106)
CO2 SERPL-SCNC: 20 MMOL/L (ref 20–29)
CREAT SERPL-MCNC: 1.21 MG/DL (ref 0.76–1.27)
EGFRCR SERPLBLD CKD-EPI 2021: 66 ML/MIN/1.73
EOSINOPHIL # BLD AUTO: 1 X10E3/UL (ref 0–0.4)
EOSINOPHIL NFR BLD AUTO: 8 %
ERYTHROCYTE [DISTWIDTH] IN BLOOD BY AUTOMATED COUNT: 15.1 % (ref 11.6–15.4)
GLOBULIN SER CALC-MCNC: 2.9 G/DL (ref 1.5–4.5)
GLUCOSE SERPL-MCNC: 98 MG/DL (ref 70–99)
HCT VFR BLD AUTO: 32.7 % (ref 37.5–51)
HGB BLD-MCNC: 10.1 G/DL (ref 13–17.7)
IMM GRANULOCYTES # BLD AUTO: 0.1 X10E3/UL (ref 0–0.1)
IMM GRANULOCYTES NFR BLD AUTO: 1 %
INR PPP: 2.6 (ref 0.9–1.2)
LDH SERPL L TO P-CCNC: 229 IU/L (ref 121–224)
LYMPHOCYTES # BLD AUTO: 1.3 X10E3/UL (ref 0.7–3.1)
LYMPHOCYTES NFR BLD AUTO: 11 %
MAGNESIUM SERPL-MCNC: 1.7 MG/DL (ref 1.6–2.3)
MCH RBC QN AUTO: 27.4 PG (ref 26.6–33)
MCHC RBC AUTO-ENTMCNC: 30.9 G/DL (ref 31.5–35.7)
MCV RBC AUTO: 89 FL (ref 79–97)
MONOCYTES # BLD AUTO: 0.9 X10E3/UL (ref 0.1–0.9)
MONOCYTES NFR BLD AUTO: 8 %
NEUTROPHILS # BLD AUTO: 8.5 X10E3/UL (ref 1.4–7)
NEUTROPHILS NFR BLD AUTO: 71 %
PLATELET # BLD AUTO: 296 X10E3/UL (ref 150–450)
POTASSIUM SERPL-SCNC: 4.2 MMOL/L (ref 3.5–5.2)
PROT SERPL-MCNC: 7 G/DL (ref 6–8.5)
PROTHROMBIN TIME: 26.8 SEC (ref 9.1–12)
RBC # BLD AUTO: 3.68 X10E6/UL (ref 4.14–5.8)
SODIUM SERPL-SCNC: 141 MMOL/L (ref 134–144)
SPECIMEN STATUS REPORT: NORMAL
WBC # BLD AUTO: 11.8 X10E3/UL (ref 3.4–10.8)

## 2025-03-13 NOTE — TELEPHONE ENCOUNTER
Called and spoke to patient, notified no changes based on labwork baseline for patient per RUBEN Garibay. He verbalized understanding

## 2025-03-13 NOTE — TELEPHONE ENCOUNTER
----- Message from TAIWO Byrne NP sent at 3/13/2025  2:42 PM EDT -----  These look good for him.  No new changes or recommendations.  If you can let him know next time you talk with him.

## 2025-03-14 LAB — NT-PROBNP SERPL-MCNC: 1848 PG/ML (ref 0–376)

## 2025-03-15 LAB
BACTERIA SPEC AEROBE CULT: ABNORMAL
BACTERIA SPEC AEROBE CULT: NORMAL
BACTERIA SPEC ANAEROBE CULT: ABNORMAL
GRAM STN SPEC: NORMAL
GRAM STN SPEC: NORMAL

## 2025-03-17 ENCOUNTER — RESULTS FOLLOW-UP (OUTPATIENT)
Age: 65
End: 2025-03-17

## 2025-03-17 ENCOUNTER — TELEPHONE (OUTPATIENT)
Age: 65
End: 2025-03-17
Payer: MEDICARE

## 2025-03-17 DIAGNOSIS — Z79.01 CHRONIC ANTICOAGULATION: Primary | ICD-10-CM

## 2025-03-17 LAB
BACTERIA SPEC AEROBE CULT: NORMAL
BACTERIA SPEC ANAEROBE CULT: NORMAL
GRAM STN SPEC: NORMAL
GRAM STN SPEC: NORMAL
INR BLD: 2

## 2025-03-17 PROCEDURE — 93793 ANTICOAG MGMT PT WARFARIN: CPT | Performed by: NURSE PRACTITIONER

## 2025-03-17 NOTE — TELEPHONE ENCOUNTER
ADVANCED HEART FAILURE CENTER  [facility] in [place], [place]      INR result reviewed with ADILENE Maurer NP who made the following recommendations (VORB): new maintenance dose 4 mg every Sun, Fri; 2 mg all other days  and recheck 1 week. Patient notified and verbalized understanding. Patient's home health nurse MADYSON James also notified.They had no further questions. (See anticoag tracker)     Funmi Barth RN

## 2025-03-18 ENCOUNTER — TELEPHONE (OUTPATIENT)
Age: 65
End: 2025-03-18

## 2025-03-18 ENCOUNTER — RESULTS FOLLOW-UP (OUTPATIENT)
Age: 65
End: 2025-03-18

## 2025-03-18 LAB
ALBUMIN SERPL-MCNC: 4 G/DL (ref 3.9–4.9)
ALP SERPL-CCNC: 104 IU/L (ref 44–121)
ALT SERPL-CCNC: 12 IU/L (ref 0–44)
AST SERPL-CCNC: 20 IU/L (ref 0–40)
BASOPHILS # BLD AUTO: 0.2 X10E3/UL (ref 0–0.2)
BASOPHILS NFR BLD AUTO: 1 %
BILIRUB SERPL-MCNC: <0.2 MG/DL (ref 0–1.2)
BUN SERPL-MCNC: 20 MG/DL (ref 8–27)
BUN/CREAT SERPL: 17 (ref 10–24)
CALCIUM SERPL-MCNC: 8.9 MG/DL (ref 8.6–10.2)
CHLORIDE SERPL-SCNC: 98 MMOL/L (ref 96–106)
CO2 SERPL-SCNC: 22 MMOL/L (ref 20–29)
CREAT SERPL-MCNC: 1.19 MG/DL (ref 0.76–1.27)
EGFRCR SERPLBLD CKD-EPI 2021: 68 ML/MIN/1.73
EOSINOPHIL # BLD AUTO: 1.1 X10E3/UL (ref 0–0.4)
EOSINOPHIL NFR BLD AUTO: 9 %
ERYTHROCYTE [DISTWIDTH] IN BLOOD BY AUTOMATED COUNT: 15.7 % (ref 11.6–15.4)
GLOBULIN SER CALC-MCNC: 2.9 G/DL (ref 1.5–4.5)
GLUCOSE SERPL-MCNC: 63 MG/DL (ref 70–99)
HCT VFR BLD AUTO: 35.7 % (ref 37.5–51)
HGB BLD-MCNC: 11.1 G/DL (ref 13–17.7)
IMM GRANULOCYTES # BLD AUTO: 0.1 X10E3/UL (ref 0–0.1)
IMM GRANULOCYTES NFR BLD AUTO: 1 %
INR PPP: 1.7 (ref 0.9–1.2)
LDH SERPL L TO P-CCNC: 255 IU/L (ref 121–224)
LYMPHOCYTES # BLD AUTO: 1.3 X10E3/UL (ref 0.7–3.1)
LYMPHOCYTES NFR BLD AUTO: 10 %
MAGNESIUM SERPL-MCNC: 1.7 MG/DL (ref 1.6–2.3)
MCH RBC QN AUTO: 28.2 PG (ref 26.6–33)
MCHC RBC AUTO-ENTMCNC: 31.1 G/DL (ref 31.5–35.7)
MCV RBC AUTO: 91 FL (ref 79–97)
MONOCYTES # BLD AUTO: 1.4 X10E3/UL (ref 0.1–0.9)
MONOCYTES NFR BLD AUTO: 11 %
NEUTROPHILS # BLD AUTO: 9.2 X10E3/UL (ref 1.4–7)
NEUTROPHILS NFR BLD AUTO: 68 %
PLATELET # BLD AUTO: 279 X10E3/UL (ref 150–450)
POTASSIUM SERPL-SCNC: 4.2 MMOL/L (ref 3.5–5.2)
PROT SERPL-MCNC: 6.9 G/DL (ref 6–8.5)
PROTHROMBIN TIME: 18.7 SEC (ref 9.1–12)
RBC # BLD AUTO: 3.93 X10E6/UL (ref 4.14–5.8)
SODIUM SERPL-SCNC: 139 MMOL/L (ref 134–144)
WBC # BLD AUTO: 13.2 X10E3/UL (ref 3.4–10.8)

## 2025-03-18 NOTE — TELEPHONE ENCOUNTER
Donna Pickett, TAIWO - NP to [facility] Clinical Staff         3/18/25 11:10 AM  OK to remove PICC line.

## 2025-03-18 NOTE — TELEPHONE ENCOUNTER
----- Message from TAIWO Fagan CNP sent at 3/17/2025  5:00 PM EDT -----  Patient will need to remain on Levaquin.  He will need follow up with infectious disease as an out patient.      Anabell Villa APRN - CNP Pasti, Megan, RN  Patient will need to remain on Levaquin.  He will need follow up with infectious disease as an out patient.   03/13/2025 - Results: Paolo, Bs Incoming Amb Ref Lab Orders To [facility] and others  (Newest Message First)  Anabell Villa APRN - CNP to Donna Pickett APRN - NP Ko, Hyunsun D, APRN - NP  Me       3/18/25 11:20 AM   Good morning,  Mr. Joiner, an LVAD patient, has had a known infection/ abscess.  The most recent culture showed stenotrophomonas maltophilia.  Per the results it is susceptible to the Levaquin that he is already on.  We have asked him to follow up with your office but, we wanted to reach out to make sure that he is on the right antibiotics.  Respectfully,  Anabell Villa NP  Advanced Heart Failure  Anabell Villa APRN - CNP to Me        3/17/25  5:00 PM  Result Note  Patient will need to remain on Levaquin.  He will need follow up with infectious disease as an out patient.

## 2025-03-18 NOTE — TELEPHONE ENCOUNTER
Called and spoke with patient, notified of labwork results, reminded to schedule follow up with ID, he verbalized understanding.

## 2025-03-18 NOTE — TELEPHONE ENCOUNTER
Geni Garibay, TAIWO - NP to Me        3/18/25  8:37 AM  Result Note  Labs relatively steady.  No large changes. No new recommendations or orders.  Magnesium; Lactate Dehydrogenase; Protime-INR; Comprehensive Metabolic Panel; CBC with Auto Differential

## 2025-03-19 ENCOUNTER — TELEPHONE (OUTPATIENT)
Facility: HOSPITAL | Age: 65
End: 2025-03-19

## 2025-03-19 DIAGNOSIS — T82.7XXA INFECTION ASSOCIATED WITH DRIVELINE OF LEFT VENTRICULAR ASSIST DEVICE (LVAD): Primary | ICD-10-CM

## 2025-03-19 DIAGNOSIS — B95.61 BACTEREMIA DUE TO METHICILLIN SUSCEPTIBLE STAPHYLOCOCCUS AUREUS (MSSA): ICD-10-CM

## 2025-03-19 DIAGNOSIS — R78.81 BACTEREMIA DUE TO METHICILLIN SUSCEPTIBLE STAPHYLOCOCCUS AUREUS (MSSA): ICD-10-CM

## 2025-03-19 LAB — NT-PROBNP SERPL-MCNC: 1229 PG/ML (ref 0–376)

## 2025-03-21 ENCOUNTER — TELEPHONE (OUTPATIENT)
Age: 65
End: 2025-03-21

## 2025-03-21 DIAGNOSIS — I50.22 CHRONIC SYSTOLIC HF (HEART FAILURE) (HCC): Primary | ICD-10-CM

## 2025-03-21 RX ORDER — MINOCYCLINE HYDROCHLORIDE 100 MG/1
100 CAPSULE ORAL 2 TIMES DAILY
Qty: 28 CAPSULE | Refills: 0 | Status: SHIPPED | OUTPATIENT
Start: 2025-03-21

## 2025-03-21 NOTE — TELEPHONE ENCOUNTER
Pt was instructed by Heart Failure Center to schedule an appointment with a provider as a follow up.     clarification needed on antibiotic needed as well.     levoFLOXacin (LEVAQUIN) 500 MG tablet      Please review and return call at [phone]

## 2025-03-21 NOTE — TELEPHONE ENCOUNTER
Discussed patient with RUBEN Garibay who ordered  VORB EKG to be performed next week.      1604: called and spoke with patient, he stated that he will have transportation and would be able to have EKG performed next Wednesday.  Patient stated he spoke with ID and has follow up with them in April.     03/24: called and spoke with patient regarding EKG. Notified patient of EKG order. Educated patient he should present to out patient registration to have this completed and a walk in test, he verbalized understanding.

## 2025-03-24 ENCOUNTER — TELEPHONE (OUTPATIENT)
Age: 65
End: 2025-03-24
Payer: MEDICARE

## 2025-03-24 DIAGNOSIS — Z79.01 CHRONIC ANTICOAGULATION: Primary | ICD-10-CM

## 2025-03-24 LAB — INR BLD: 3.5

## 2025-03-24 PROCEDURE — 93793 ANTICOAG MGMT PT WARFARIN: CPT | Performed by: NURSE PRACTITIONER

## 2025-03-24 NOTE — TELEPHONE ENCOUNTER
ADVANCED HEART FAILURE CENTER  [facility] in [place], [place]      INR result reviewed with ADILENE Maurer NP who made the following recommendations (VORB): hold for two nights and recheck 1 week. Patient notified and verbalized understanding. They had no further questions. Patient's home health nurse MADYSON James also notified.  (See anticoag tracker)     Funmi Barth RN

## 2025-03-25 ENCOUNTER — RESULTS FOLLOW-UP (OUTPATIENT)
Age: 65
End: 2025-03-25

## 2025-03-25 LAB
ALBUMIN SERPL-MCNC: 4.1 G/DL (ref 3.9–4.9)
ALP SERPL-CCNC: 104 IU/L (ref 44–121)
ALT SERPL-CCNC: 18 IU/L (ref 0–44)
AST SERPL-CCNC: 21 IU/L (ref 0–40)
BASOPHILS # BLD AUTO: 0.2 X10E3/UL (ref 0–0.2)
BASOPHILS NFR BLD AUTO: 2 %
BILIRUB SERPL-MCNC: <0.2 MG/DL (ref 0–1.2)
BUN SERPL-MCNC: 17 MG/DL (ref 8–27)
BUN/CREAT SERPL: 11 (ref 10–24)
CALCIUM SERPL-MCNC: 9.7 MG/DL (ref 8.6–10.2)
CHLORIDE SERPL-SCNC: 99 MMOL/L (ref 96–106)
CO2 SERPL-SCNC: 20 MMOL/L (ref 20–29)
CREAT SERPL-MCNC: 1.5 MG/DL (ref 0.76–1.27)
EGFRCR SERPLBLD CKD-EPI 2021: 51 ML/MIN/1.73
EOSINOPHIL # BLD AUTO: 1.2 X10E3/UL (ref 0–0.4)
EOSINOPHIL NFR BLD AUTO: 11 %
ERYTHROCYTE [DISTWIDTH] IN BLOOD BY AUTOMATED COUNT: 15.5 % (ref 11.6–15.4)
GLOBULIN SER CALC-MCNC: 3 G/DL (ref 1.5–4.5)
GLUCOSE SERPL-MCNC: 72 MG/DL (ref 70–99)
HCT VFR BLD AUTO: 37.6 % (ref 37.5–51)
HGB BLD-MCNC: 11.5 G/DL (ref 13–17.7)
IMM GRANULOCYTES # BLD AUTO: 0.2 X10E3/UL (ref 0–0.1)
IMM GRANULOCYTES NFR BLD AUTO: 2 %
INR PPP: 3 (ref 0.9–1.2)
LDH SERPL L TO P-CCNC: 232 IU/L (ref 121–224)
LYMPHOCYTES # BLD AUTO: 1.7 X10E3/UL (ref 0.7–3.1)
LYMPHOCYTES NFR BLD AUTO: 17 %
MAGNESIUM SERPL-MCNC: 1.8 MG/DL (ref 1.6–2.3)
MCH RBC QN AUTO: 27.5 PG (ref 26.6–33)
MCHC RBC AUTO-ENTMCNC: 30.6 G/DL (ref 31.5–35.7)
MCV RBC AUTO: 90 FL (ref 79–97)
MONOCYTES # BLD AUTO: 1.1 X10E3/UL (ref 0.1–0.9)
MONOCYTES NFR BLD AUTO: 10 %
NEUTROPHILS # BLD AUTO: 6 X10E3/UL (ref 1.4–7)
NEUTROPHILS NFR BLD AUTO: 58 %
NT-PROBNP SERPL-MCNC: 610 PG/ML (ref 0–376)
PLATELET # BLD AUTO: 347 X10E3/UL (ref 150–450)
POTASSIUM SERPL-SCNC: 4.4 MMOL/L (ref 3.5–5.2)
PROT SERPL-MCNC: 7.1 G/DL (ref 6–8.5)
PROTHROMBIN TIME: 30.6 SEC (ref 9.1–12)
RBC # BLD AUTO: 4.18 X10E6/UL (ref 4.14–5.8)
SODIUM SERPL-SCNC: 142 MMOL/L (ref 134–144)
WBC # BLD AUTO: 10.3 X10E3/UL (ref 3.4–10.8)

## 2025-03-26 ENCOUNTER — HOSPITAL ENCOUNTER (OUTPATIENT)
Facility: HOSPITAL | Age: 65
Discharge: HOME | End: 2025-03-29
Payer: MEDICARE

## 2025-03-26 ENCOUNTER — TRANSCRIBE ORDERS (OUTPATIENT)
Facility: HOSPITAL | Age: 65
End: 2025-03-26

## 2025-03-26 ENCOUNTER — TELEPHONE (OUTPATIENT)
Age: 65
End: 2025-03-26

## 2025-03-26 DIAGNOSIS — I50.22 CHRONIC SYSTOLIC HF (HEART FAILURE) (HCC): Primary | ICD-10-CM

## 2025-03-26 DIAGNOSIS — I50.22 CHRONIC SYSTOLIC HF (HEART FAILURE) (HCC): ICD-10-CM

## 2025-03-26 LAB
EKG ATRIAL RATE: 76 BPM
EKG DIAGNOSIS: NORMAL
EKG P AXIS: 63 DEGREES
EKG P-R INTERVAL: 208 MS
EKG Q-T INTERVAL: 418 MS
EKG QRS DURATION: 148 MS
EKG QTC CALCULATION (BAZETT): 470 MS
EKG R AXIS: -71 DEGREES
EKG T AXIS: 65 DEGREES
EKG VENTRICULAR RATE: 76 BPM

## 2025-03-26 PROCEDURE — 93005 ELECTROCARDIOGRAM TRACING: CPT

## 2025-03-26 RX ORDER — PRAVASTATIN SODIUM 40 MG
40 TABLET ORAL DAILY
Qty: 90 TABLET | Refills: 1 | Status: SHIPPED | OUTPATIENT
Start: 2025-03-26

## 2025-03-26 NOTE — TELEPHONE ENCOUNTER
Labwork reviewed by RUBEN Garibay who stated VORB hh to collect labwork every other week. Patient's home health nurse MADYSON James notified who verbalized understanding.

## 2025-03-26 NOTE — TELEPHONE ENCOUNTER
1104: Message received from patient's significant other, Ashley, inquiring if they can present in 20-30 minutes for EKG. She requested return call at [phone] to discuss.    1108: Returned call and spoke with patient. Informed him EKG is a walk-in test and he may present at his convenience today for completion. Educated patient to present to Outpatient Registration to register. He verbalized understanding and had no further questions.    CASEY MONTEMAYOR RN  VAD Coordinator

## 2025-03-26 NOTE — TELEPHONE ENCOUNTER
----- Message from TAIWO Byrne NP sent at 3/26/2025 11:56 AM EDT -----  CBC is good too.  He can go to every other week for lab draws.

## 2025-03-27 NOTE — TELEPHONE ENCOUNTER
Patient discussed with RUBEN Hightower who stated EKG and labwork WNL, VORB patient to start pravastatin 40mg nightly.     Called and spoke with patient, notified of labwork and EKG WNL and provider order to start pravastatin 40mg nightly, patient verbalized understanding. Patient's hh nurse MADYSON James also notified.       Requested Prescriptions     Signed Prescriptions Disp Refills    pravastatin (PRAVACHOL) 40 MG tablet 90 tablet 1     Sig: Take 1 tablet by mouth daily     Authorizing Provider: TAWANA HIGHTOWER     Ordering User: FILI PELLETIER

## 2025-03-31 ENCOUNTER — TELEPHONE (OUTPATIENT)
Age: 65
End: 2025-03-31
Payer: MEDICARE

## 2025-03-31 DIAGNOSIS — Z79.01 CHRONIC ANTICOAGULATION: Primary | ICD-10-CM

## 2025-03-31 LAB — INR BLD: 2.8

## 2025-03-31 PROCEDURE — 93793 ANTICOAG MGMT PT WARFARIN: CPT | Performed by: NURSE PRACTITIONER

## 2025-03-31 NOTE — TELEPHONE ENCOUNTER
ADVANCED HEART FAILURE CENTER  [facility] in [place], [place]      INR result reviewed with YUMI Quiles NP  who made the following recommendations (VORB): hold tonight, 2mg daily and recheck 1 week. Patient notified and verbalized understanding. They had no further questions. Patient's home health nurse MADYSON James also notified. (See anticoag tracker)     Funmi Barth RN

## 2025-04-01 ENCOUNTER — TELEPHONE (OUTPATIENT)
Age: 65
End: 2025-04-01

## 2025-04-01 NOTE — TELEPHONE ENCOUNTER
Telephone Call RE:  Appointment reminder     Outcome:     [x] Patient confirmed appointment   [] Patient rescheduled appointment for    [] Unable to reach  [] Left message              [] Other:       Pt is in need of an uber in order to be able to attend his appointment. Please arrange transportation.

## 2025-04-01 NOTE — TELEPHONE ENCOUNTER
Called and spoke with patient who confirmed that he needs transportation for Thursday's appointment. Discussed possibility of utilizing Uber through transportation dann. Discussed patient will need to be able to ambulate to the car independently. Patient agreeable, stated he has no problem ambulating to curb to meet uber. Patient confirmed MIKE will be coming to his home to check equipment tomorrow, informed patient that in the case MIKE checks equipment he does not need to bring to clinic. He verbalized understanding.      1636: called and spoke with patient, notified of uber ride arranged for 08:30am on Thursday, patient verbalized understanding, stated he has been contacted with details as well.

## 2025-04-01 NOTE — TELEPHONE ENCOUNTER
Uber booked, patient will be picked up at 8:30am, needs to be outside waiting.  Return ride is will call.  Funmi you will also be notified when ride is dispatched.

## 2025-04-02 NOTE — PROGRESS NOTES
ADVANCED HEART FAILURE CENTER  [facility] in [place], [place]  Mechanical Circulatory Support Clinic Note    Patient name: Sanford Joiner Sr.  Patient : 1960  Patient MRN: [id number]  Date of service: 25    Primary care physician: Ranjan Porter MD  LVAD cardiologist: MARJORIE    CHIEF COMPLAINT:  Follow up for heart failure, LVAD, anticoagulation management    ASSESSMENT:  Sanford Joiner Sr. is a 65 y.o. 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 Discussed importance of compliance with injection schedule  Chronic anticoagulation with Warfarin, target INR 1.8-2.2, elevated at 2.8 on 3/31.     Arrhythmia/ICD:  ICD interrogation every 3 months per routine   Follow-up with EP cardiologist  Needs home transmitter    Hypertension:  Doppler MAP 90, typically within target range. No medication changes.     COPD/Asthma  Albuterol PRN

## 2025-04-03 ENCOUNTER — OFFICE VISIT (OUTPATIENT)
Age: 65
End: 2025-04-03
Payer: MEDICARE

## 2025-04-03 ENCOUNTER — CLINICAL DOCUMENTATION (OUTPATIENT)
Age: 65
End: 2025-04-03

## 2025-04-03 VITALS
HEIGHT: 67 IN | TEMPERATURE: 97.5 F | OXYGEN SATURATION: 97 % | SYSTOLIC BLOOD PRESSURE: 90 MMHG | RESPIRATION RATE: 18 BRPM | BODY MASS INDEX: 25.9 KG/M2 | HEART RATE: 55 BPM | WEIGHT: 165 LBS

## 2025-04-03 DIAGNOSIS — Z95.811 LVAD (LEFT VENTRICULAR ASSIST DEVICE) PRESENT (HCC): ICD-10-CM

## 2025-04-03 DIAGNOSIS — I50.22 CHRONIC SYSTOLIC (CONGESTIVE) HEART FAILURE: Primary | ICD-10-CM

## 2025-04-03 DIAGNOSIS — Z13.1 SCREENING FOR DIABETES MELLITUS: ICD-10-CM

## 2025-04-03 DIAGNOSIS — I10 ESSENTIAL HYPERTENSION: ICD-10-CM

## 2025-04-03 DIAGNOSIS — D63.1 ANEMIA IN STAGE 3 CHRONIC KIDNEY DISEASE, UNSPECIFIED WHETHER STAGE 3A OR 3B CKD: ICD-10-CM

## 2025-04-03 DIAGNOSIS — Z95.810 S/P ICD (INTERNAL CARDIAC DEFIBRILLATOR) PROCEDURE: ICD-10-CM

## 2025-04-03 DIAGNOSIS — N18.30 ANEMIA IN STAGE 3 CHRONIC KIDNEY DISEASE, UNSPECIFIED WHETHER STAGE 3A OR 3B CKD: ICD-10-CM

## 2025-04-03 DIAGNOSIS — E55.9 VITAMIN D DEFICIENCY: ICD-10-CM

## 2025-04-03 DIAGNOSIS — I42.8 NON-ISCHEMIC CARDIOMYOPATHY (HCC): ICD-10-CM

## 2025-04-03 DIAGNOSIS — Z13.220 SCREENING FOR HYPERLIPIDEMIA: ICD-10-CM

## 2025-04-03 PROCEDURE — G8427 DOCREV CUR MEDS BY ELIG CLIN: HCPCS | Performed by: INTERNAL MEDICINE

## 2025-04-03 PROCEDURE — G8419 CALC BMI OUT NRM PARAM NOF/U: HCPCS | Performed by: INTERNAL MEDICINE

## 2025-04-03 PROCEDURE — 1123F ACP DISCUSS/DSCN MKR DOCD: CPT | Performed by: INTERNAL MEDICINE

## 2025-04-03 PROCEDURE — 3017F COLORECTAL CA SCREEN DOC REV: CPT | Performed by: INTERNAL MEDICINE

## 2025-04-03 PROCEDURE — 1036F TOBACCO NON-USER: CPT | Performed by: INTERNAL MEDICINE

## 2025-04-03 PROCEDURE — 99215 OFFICE O/P EST HI 40 MIN: CPT | Performed by: INTERNAL MEDICINE

## 2025-04-03 ASSESSMENT — PATIENT HEALTH QUESTIONNAIRE - PHQ9
2. FEELING DOWN, DEPRESSED OR HOPELESS: NOT AT ALL
SUM OF ALL RESPONSES TO PHQ QUESTIONS 1-9: 0
1. LITTLE INTEREST OR PLEASURE IN DOING THINGS: NOT AT ALL
SUM OF ALL RESPONSES TO PHQ QUESTIONS 1-9: 0

## 2025-04-03 NOTE — PATIENT INSTRUCTIONS
Medication changes:    No medication changes today.      Testing Ordered:    Lab work has been drawn today. You will be contacted with any abnormal results requiring changes to your current plan of care.      An order for CT scan has been placed to be done in June. You will be receiving an automated call from Coordination of Care to schedule this test. If you are unavailable to receive the call or would like to contact coordination of care yourself you may contact [phone] to schedule. You will need to contact coordination of care yourself if you miss their calls as they will only make 3 attempts to reach you.      Annual equipment check completed today.     LVAD annual education review completed today.       Other Recommendations:     Please follow up with your dentist for routine evaluation. You will need to take preventative antibiotics prior to all dental work. Please notify our office any time you have a dental visit scheduled.     Please call to schedule Octriotide injections with the infusion center ([phone])     Continue to change drive line exit site dressing 3 times a week using sterile technique,     Ensure you are drinking an adequate amount of water with a goal of 6-8 eight ounce glasses (1.5-2 liters) of fluid daily. Your urine should be clear and light yellow straw colored.       Follow up in 2 months with [facility]      Monthly LVAD Education Tip:      For further patient and caregiver resources as well as access to online LVAD support groups visit [email or web address]       Please bring your daily sheets and medications to your next appointment.      Please monitor your weights daily upon waking and after using the bathroom. Keep a written records of your weights and bring to your next appointment. If you have a weight gain of 3 or more pounds overnight OR 5 or more pounds in one week please contact our office.       Thank you for allowing us the privilege of being a

## 2025-04-04 ENCOUNTER — TELEPHONE (OUTPATIENT)
Age: 65
End: 2025-04-04

## 2025-04-04 ENCOUNTER — RESULTS FOLLOW-UP (OUTPATIENT)
Age: 65
End: 2025-04-04

## 2025-04-04 LAB
25(OH)D3+25(OH)D2 SERPL-MCNC: 77.9 NG/ML (ref 30–100)
CHOLEST SERPL-MCNC: 124 MG/DL (ref 100–199)
FERRITIN SERPL-MCNC: 140 NG/ML (ref 30–400)
HBA1C MFR BLD: 5.1 % (ref 4.8–5.6)
HDLC SERPL-MCNC: 32 MG/DL
IRON SATN MFR SERPL: 20 % (ref 15–55)
IRON SERPL-MCNC: 63 UG/DL (ref 38–169)
LDLC SERPL CALC-MCNC: 68 MG/DL (ref 0–99)
T4 FREE SERPL-MCNC: 1.28 NG/DL (ref 0.82–1.77)
TIBC SERPL-MCNC: 309 UG/DL (ref 250–450)
TRIGL SERPL-MCNC: 137 MG/DL (ref 0–149)
TSH SERPL DL<=0.005 MIU/L-ACNC: 3.48 UIU/ML (ref 0.45–4.5)
UIBC SERPL-MCNC: 246 UG/DL (ref 111–343)
URATE SERPL-MCNC: 7.2 MG/DL (ref 3.8–8.4)
VLDLC SERPL CALC-MCNC: 24 MG/DL (ref 5–40)

## 2025-04-04 NOTE — TELEPHONE ENCOUNTER
Call received from patient stating that he spoke with someone earlier who instructed him to pay his balance through [employer] and then call to notify office. Informed patient will pass message along to clinical manager.

## 2025-04-04 NOTE — PROGRESS NOTES
ADVANCED HEART FAILURE CENTER  LVAD Annual Visit    Chief Complaint   Patient presents with    Shortness of Breath     Exertion     Follow-up     LVAD    Dizziness     At random on occasion       BP (!) 90/0 (BP Site: Right Upper Arm, Patient Position: Sitting, BP Cuff Size: Medium Adult)   Pulse 55   Temp 97.5 °F (36.4 °C) (Oral)   Resp 18   Ht 1.702 m (5' 7.01\")   Wt 74.8 kg (165 lb)   SpO2 97%   BMI 25.84 kg/m²      LVAD  LVAD Type:: Left Ventricular Assist Device (LVAD)  Pump Speed (rpm): 5200  Pump Flow (lpm): 3.1  MAP (mmHg): 90  Pump Pulse Index (PI): 3  Pump Power (Mei): 3.7  Battery Life Checked: Yes  Backup Controller Present: Yes  Driveline Dressing: Old drainage  Outpatient: Yes  MAP in Therapeutic Range (Outpatient): Yes       Sanford Joiner Sr. is a 65 y.o. male with a history of NICM with Heartmate 3 implant date of 03/27/2023. LVAD interrogation completed in clinic.     Alarm history reviewed. Please see VAD flow sheet for readings. Frequent PI events noted. No adverse alarms noted. Settings reviewed, but no changes made.    Patient denied worsening s/s of driveline infection or driveline trauma (including  drops).. Driveline site with minimal old drainage noted on dressing.  All information reported to provider.     Annual equipment to be completed by Sarita on patient's own LVAD Mobile Power Unit and Universal Battery Charger. Please see further documentation in equipment log.      Annual labs ordered per Dr. Jose Miguel GATICA. annual education review, completed in clinic. Patient unable to complete 6 minute walk due to respiratory status.    Annual  exchange competency deferred as patient did not bring caregiver with him.     The following education was provided to patient at visit today:  -Mobile power unit maintenance: Changing AA batteries twice yearly, monitoring cords for damage  -Back up : Charging back up battery twice yearly  -14v

## 2025-04-07 ENCOUNTER — TELEPHONE (OUTPATIENT)
Age: 65
End: 2025-04-07
Payer: MEDICARE

## 2025-04-07 DIAGNOSIS — Z79.01 CHRONIC ANTICOAGULATION: Primary | ICD-10-CM

## 2025-04-07 LAB — INR BLD: 1.7

## 2025-04-07 PROCEDURE — 93793 ANTICOAG MGMT PT WARFARIN: CPT | Performed by: NURSE PRACTITIONER

## 2025-04-07 NOTE — TELEPHONE ENCOUNTER
ADVANCED HEART FAILURE CENTER  [facility] in [place], [place]      INR result reviewed with ADILENE Maurer NP who made the following recommendations (VORB): no changes and recheck 1 week. Patient notified and verbalized understanding. They had no further questions. Patient's home health nurse MADYSON James also notified.  (See anticoag tracker)     Funmi Barth RN

## 2025-04-08 ENCOUNTER — TELEPHONE (OUTPATIENT)
Age: 65
End: 2025-04-08

## 2025-04-08 DIAGNOSIS — R79.89 OTHER SPECIFIED ABNORMAL FINDINGS OF BLOOD CHEMISTRY: ICD-10-CM

## 2025-04-08 DIAGNOSIS — K92.2 UPPER GI BLEED: Primary | ICD-10-CM

## 2025-04-08 DIAGNOSIS — R19.7 DIARRHEA, UNSPECIFIED TYPE: ICD-10-CM

## 2025-04-08 DIAGNOSIS — D64.9 ANEMIA, UNSPECIFIED TYPE: ICD-10-CM

## 2025-04-08 DIAGNOSIS — N18.30 STAGE 3 CHRONIC KIDNEY DISEASE, UNSPECIFIED WHETHER STAGE 3A OR 3B CKD (HCC): ICD-10-CM

## 2025-04-08 DIAGNOSIS — Z95.811 LVAD (LEFT VENTRICULAR ASSIST DEVICE) PRESENT (HCC): ICD-10-CM

## 2025-04-08 DIAGNOSIS — R19.7 DIARRHEA, UNSPECIFIED TYPE: Primary | ICD-10-CM

## 2025-04-08 LAB
BASOPHILS # BLD AUTO: 0.2 X10E3/UL (ref 0–0.2)
BASOPHILS NFR BLD AUTO: 2 %
EOSINOPHIL # BLD AUTO: 0.9 X10E3/UL (ref 0–0.4)
EOSINOPHIL NFR BLD AUTO: 8 %
ERYTHROCYTE [DISTWIDTH] IN BLOOD BY AUTOMATED COUNT: 15.2 % (ref 11.6–15.4)
HCT VFR BLD AUTO: 40.7 % (ref 37.5–51)
HGB BLD-MCNC: 12.6 G/DL (ref 13–17.7)
IMM GRANULOCYTES # BLD AUTO: 0.1 X10E3/UL (ref 0–0.1)
IMM GRANULOCYTES NFR BLD AUTO: 1 %
INR PPP: 1.4 (ref 0.9–1.2)
LYMPHOCYTES # BLD AUTO: 1.4 X10E3/UL (ref 0.7–3.1)
LYMPHOCYTES NFR BLD AUTO: 12 %
MCH RBC QN AUTO: 27.9 PG (ref 26.6–33)
MCHC RBC AUTO-ENTMCNC: 31 G/DL (ref 31.5–35.7)
MCV RBC AUTO: 90 FL (ref 79–97)
MONOCYTES # BLD AUTO: 1.8 X10E3/UL (ref 0.1–0.9)
MONOCYTES NFR BLD AUTO: 16 %
NEUTROPHILS # BLD AUTO: 7.2 X10E3/UL (ref 1.4–7)
NEUTROPHILS NFR BLD AUTO: 61 %
PLATELET # BLD AUTO: 241 X10E3/UL (ref 150–450)
PROTHROMBIN TIME: 15.2 SEC (ref 9.1–12)
RBC # BLD AUTO: 4.52 X10E6/UL (ref 4.14–5.8)
WBC # BLD AUTO: 11.6 X10E3/UL (ref 3.4–10.8)

## 2025-04-08 RX ORDER — ALLOPURINOL 100 MG/1
TABLET ORAL
Qty: 45 TABLET | Refills: 2 | Status: SHIPPED | OUTPATIENT
Start: 2025-04-08

## 2025-04-08 RX ORDER — ONDANSETRON 2 MG/ML
8 INJECTION INTRAMUSCULAR; INTRAVENOUS
OUTPATIENT
Start: 2025-04-08

## 2025-04-08 RX ORDER — ACETAMINOPHEN 325 MG/1
650 TABLET ORAL
OUTPATIENT
Start: 2025-04-08

## 2025-04-08 RX ORDER — DIPHENHYDRAMINE HYDROCHLORIDE 50 MG/ML
50 INJECTION, SOLUTION INTRAMUSCULAR; INTRAVENOUS
OUTPATIENT
Start: 2025-04-08

## 2025-04-08 RX ORDER — FAMOTIDINE 10 MG/ML
20 INJECTION, SOLUTION INTRAVENOUS
OUTPATIENT
Start: 2025-04-08

## 2025-04-08 RX ORDER — ALBUTEROL SULFATE 90 UG/1
4 INHALANT RESPIRATORY (INHALATION) PRN
OUTPATIENT
Start: 2025-04-08

## 2025-04-08 RX ORDER — SODIUM CHLORIDE 9 MG/ML
INJECTION, SOLUTION INTRAVENOUS CONTINUOUS
OUTPATIENT
Start: 2025-04-08

## 2025-04-08 RX ORDER — EPINEPHRINE 1 MG/ML
0.3 INJECTION, SOLUTION, CONCENTRATE INTRAVENOUS PRN
OUTPATIENT
Start: 2025-04-08

## 2025-04-08 RX ORDER — HYDROCORTISONE SODIUM SUCCINATE 100 MG/2ML
100 INJECTION INTRAMUSCULAR; INTRAVENOUS
OUTPATIENT
Start: 2025-04-08

## 2025-04-08 RX ORDER — OCTREOTIDE ACETATE 20 MG
20 KIT INTRAMUSCULAR ONCE
Status: CANCELLED | OUTPATIENT
Start: 2025-04-08 | End: 2025-04-08

## 2025-04-08 NOTE — TELEPHONE ENCOUNTER
1009: call placed to [facility] in regards to scheduling patient's octreotide injections. Spoke with Niurka who stated patient's treatment plan is  and they require therapy plan for scheduling.      1351: Called and spoke with Claudia with [facility], patient scheduled for octreotide injection  at 08:15.     1357: Called and spoke with coordination of care, patient scheduled for CT  at 0830 with 0730 arrival.     1413: Called and spoke with patient, notified of the [facility] appointment and scheduled CT scan. Patient verbalized understanding, stated that he did have transportation scheduled for his appointment with Dr. Rajan  however he was told that  is not available that day. Patient stated he tried to call to reschedule appointment but was not able to get in touch with office, requested assistance rescheduling. Informed patient RN will reach out to their office for assistance rescheduling, he verbalized understanding.      : 1142: called and spoke with patient who confirmed he received voicemail from Dr. Rajan's office regarding appointment on  which was changed to virtual

## 2025-04-08 NOTE — TELEPHONE ENCOUNTER
Requested Prescriptions     Signed Prescriptions Disp Refills    allopurinol (ZYLOPRIM) 100 MG tablet 45 tablet 2     Sig: TAKE 1/2 (ONE-HALF) TABLET BY MOUTH DAILY     Authorizing Provider: TAWANA HIGHTOWER     Ordering User: FILI PELLETIER

## 2025-04-09 ENCOUNTER — RESULTS FOLLOW-UP (OUTPATIENT)
Age: 65
End: 2025-04-09

## 2025-04-09 ENCOUNTER — TELEPHONE (OUTPATIENT)
Age: 65
End: 2025-04-09
Payer: MEDICARE

## 2025-04-09 DIAGNOSIS — Z95.811 LVAD (LEFT VENTRICULAR ASSIST DEVICE) PRESENT (HCC): ICD-10-CM

## 2025-04-09 DIAGNOSIS — Z79.01 LONG TERM (CURRENT) USE OF ANTICOAGULANTS: Primary | ICD-10-CM

## 2025-04-09 DIAGNOSIS — I50.22 CHRONIC SYSTOLIC (CONGESTIVE) HEART FAILURE: ICD-10-CM

## 2025-04-09 LAB
ALBUMIN SERPL-MCNC: 4.2 G/DL (ref 3.9–4.9)
ALP SERPL-CCNC: 133 IU/L (ref 44–121)
ALT SERPL-CCNC: 14 IU/L (ref 0–44)
AST SERPL-CCNC: 30 IU/L (ref 0–40)
BILIRUB SERPL-MCNC: <0.2 MG/DL (ref 0–1.2)
BUN SERPL-MCNC: 34 MG/DL (ref 8–27)
BUN/CREAT SERPL: 22 (ref 10–24)
CALCIUM SERPL-MCNC: 10.2 MG/DL (ref 8.6–10.2)
CHLORIDE SERPL-SCNC: ABNORMAL MMOL/L
CO2 SERPL-SCNC: 18 MMOL/L (ref 20–29)
CREAT SERPL-MCNC: 1.56 MG/DL (ref 0.76–1.27)
EGFRCR SERPLBLD CKD-EPI 2021: 49 ML/MIN/1.73
GLOBULIN SER CALC-MCNC: 3.7 G/DL (ref 1.5–4.5)
GLUCOSE SERPL-MCNC: 89 MG/DL (ref 70–99)
LDH SERPL L TO P-CCNC: 226 IU/L (ref 121–224)
MAGNESIUM SERPL-MCNC: 1.9 MG/DL (ref 1.6–2.3)
NT-PROBNP SERPL-MCNC: 1095 PG/ML (ref 0–376)
POTASSIUM SERPL-SCNC: ABNORMAL MMOL/L
PROT SERPL-MCNC: 7.9 G/DL (ref 6–8.5)
SODIUM SERPL-SCNC: ABNORMAL MMOL/L

## 2025-04-09 PROCEDURE — 93793 ANTICOAG MGMT PT WARFARIN: CPT | Performed by: NURSE PRACTITIONER

## 2025-04-09 NOTE — TELEPHONE ENCOUNTER
----- Message -----  From: Geni Garibay APRN - NP  Sent: 4/9/2025   9:24 AM EDT  To: Funmi Barth RN    Other than the low INR, his labs look quite good for him.  No new changes.  If you can let him know next time you speak with him.    1604: Attempted to contact patient to notify. Message left. Will await return call.     CASEY MONTEMAYOR RN  VAD Coordinator

## 2025-04-09 NOTE — TELEPHONE ENCOUNTER
ADVANCED HEART FAILURE CENTER  [facility] in [place], [place]      INR result from lab on 4/7/25, 1.4, reviewed with ADILENE Maurer NP who made the following recommendations (VORB): Continue current coumadin maintenance plan and recheck INR as planned 4/14/25. (See anticoag tracker)     1604: Attempted to contact patient to notify. Message left. Will await return call.     CASEY MONTEMAYOR RN

## 2025-04-10 NOTE — TELEPHONE ENCOUNTER
2397: Patient notified. He verbalized understanding and had no further questions.    CASEY MONTEMAYOR RN  VAD Coordinator

## 2025-04-10 NOTE — TELEPHONE ENCOUNTER
6207: Patient notified. He verbalized understanding and had no further questions.     CASEY MONTEMAYOR RN  VAD Coordinator

## 2025-04-11 ENCOUNTER — TELEPHONE (OUTPATIENT)
Age: 65
End: 2025-04-11

## 2025-04-11 NOTE — TELEPHONE ENCOUNTER
Telephone Call RE:  Lab Reminder      Outcome:     [x] Patient verbalizes understanding    [] Unable to reach   [] Left message              []     INR due 4/14/25    CASEY MONTEMAYOR RN

## 2025-04-14 ENCOUNTER — TELEPHONE (OUTPATIENT)
Age: 65
End: 2025-04-14
Payer: MEDICARE

## 2025-04-14 ENCOUNTER — TELEPHONE (OUTPATIENT)
Age: 65
End: 2025-04-14

## 2025-04-14 DIAGNOSIS — Z79.01 CHRONIC ANTICOAGULATION: Primary | ICD-10-CM

## 2025-04-14 LAB — INR BLD: 2.4

## 2025-04-14 PROCEDURE — 93793 ANTICOAG MGMT PT WARFARIN: CPT | Performed by: NURSE PRACTITIONER

## 2025-04-14 NOTE — TELEPHONE ENCOUNTER
"              After Visit Summary   1/16/2018    Cristina Jones    MRN: [id number]           Patient Information     Date Of Birth          2017        Visit Information        Provider Department      1/16/2018 11:00 AM Dustin Cabrera MD [facility]        Today's Diagnoses     Encounter for routine child health examination w/o abnormal findings    -  1      Care Instructions      Preventive Care at the 4 Month Visit  Growth Measurements & Percentiles  Head Circumference: 16.85\" (42.8 cm) (96 %, Source: WHO (Girls, 0-2 years)) 96 %ile based on WHO (Girls, 0-2 years) head circumference-for-age data using vitals from 1/16/2018.   Weight: 14 lbs 15 oz / 6.78 kg (actual weight) 67 %ile based on WHO (Girls, 0-2 years) weight-for-age data using vitals from 1/16/2018.   Length: 2' 1.394\" / 64.5 cm 87 %ile based on WHO (Girls, 0-2 years) length-for-age data using vitals from 1/16/2018.   Weight for length: 38 %ile based on WHO (Girls, 0-2 years) weight-for-recumbent length data using vitals from 1/16/2018.    Your baby s next Preventive Check-up will be at 6 months of age      Development    At this age, your baby may:    Raise her head high when lying on her stomach.    Raise her body on her hands when lying on her stomach.    Roll from her stomach to her back.    Play with her hands and hold a rattle.    Look at a mobile and move her hands.    Start social contact by smiling, cooing, laughing and squealing.    Cry when a parent moves out of sight.    Understand when a bottle is being prepared or getting ready to breastfeed and be able to wait for it for a short time.      Feeding Tips  Breast Milk    Nurse on demand     Check out the handout on Employed Breastfeeding Mother. https://www.lactationtraining.com/resources/educational-materials/handouts-parents/employed-breastfeeding-mother/download    Formula     Many babies feed 4 to 6 times per day, 6 to 8 oz at each " The patient has an appointment already on 04/25/2025. Santana MORAN for the VV.   feeding.    Don't prop the bottle.      Use a pacifier if the baby wants to suck.      Foods    It is often between 4-6 months that your baby will start watching you eat intently and then mouthing or grabbing for food. Follow her cues to start and stop eating.  Many people start by mixing rice cereal with breast milk or formula. Do not put cereal into a bottle.    To reduce your child's chance of developing peanut allergy, you can start introducing peanut-containing foods in small amounts around 6 months of age.  If your child has severe eczema, egg allergy or both, consult with your doctor first about possible allergy-testing and introduction of small amounts of peanut-containing foods at 4-6 months old.   Stools    If you give your baby pureéd foods, her stools may be less firm, occur less often, have a strong odor or become a different color.      Sleep    About 80 percent of 4-month-old babies sleep at least five to six hours in a row at night.  If your baby doesn t, try putting her to bed while drowsy/tired but awake.  Give your baby the same safe toy or blanket.  This is called a  transition object.   Do not play with or have a lot of contact with your baby at nighttime.    Your baby does not need to be fed if she wakes up during the night more frequently than every 5-6 hours.        Safety    The car seat should be in the rear seat facing backwards until your child weighs more than 20 pounds and turns 2 years old.    Do not let anyone smoke around your baby (or in your house or car) at any time.    Never leave your baby alone, even for a few seconds.  Your baby may be able to roll over.  Take any safety precautions.    Keep baby powders,  and small objects out of the baby s reach at all times.    Do not use infant walkers.  They can cause serious accidents and serve no useful purpose.  A better choice is an stationary exersaucer.      What Your Baby Needs    Give your baby toys that she can shake or  bang.  A toy that makes noise as it s moved increases your baby s awareness.  She will repeat that activity.    Sing rhythmic songs or nursery rhymes.    Your baby may drool a lot or put objects into her mouth.  Make sure your baby is safe from small or sharp objects.    Read to your baby every night.                  Follow-ups after your visit        Follow-up notes from your care team     Return in about 2 months (around 3/16/2018) for Physical Exam.      Your next 10 appointments already scheduled     Mar 20, 2018 11:00 AM CDT   Well Child with Dustin Cabrera MD   [facility]    [facility] [address] [phone]              Who to contact     If you have questions or need follow up information about today's clinic visit or your schedule please contact [facility] directly at [phone].  Normal or non-critical lab and imaging results will be communicated to you by MyChart, letter or phone within 4 business days after the clinic has received the results. If you do not hear from us within 7 days, please contact the clinic through ShareRoothart or phone. If you have a critical or abnormal lab result, we will notify you by phone as soon as possible.  Submit refill requests through [employer] or call your pharmacy and they will forward the refill request to us. Please allow 3 business days for your refill to be completed.          Additional Information About Your Visit        Blink Booking Information     [employer]ing lets you send messages to your doctor, view your test results, renew your prescriptions, schedule appointments and more. To sign up, go to [email or web address], contact your [place] clinic or call [phone] during business hours.            Care EveryWhere ID     This is your Care EveryWhere ID. This could be used by other organizations to access your [facility]  "records  [id number]        Your Vitals Were     Pulse Temperature Height Head Circumference BMI (Body Mass Index)       136 98.8  F (37.1  C) (Rectal) 2' 1.39\" (0.645 m) 16.85\" (42.8 cm) 16.29 kg/m2        Blood Pressure from Last 3 Encounters:   No data found for BP    Weight from Last 3 Encounters:   01/16/18 14 lb 15 oz (6.776 kg) (67 %)*   11/16/17 11 lb 4.5 oz (5.117 kg) (49 %)*   10/03/17 8 lb 2 oz (3.685 kg) (43 %)*     * Growth percentiles are based on WHO (Girls, 0-2 years) data.              We Performed the Following     DTAP - HIB - IPV VACCINE, IM USE (Pentacel) [00284]     PNEUMOCOCCAL CONJ VACCINE 13 VALENT IM [24205]     ROTAVIRUS VACC 2 DOSE ORAL     Screening Questionnaire for Immunizations     VACCINE ADMIN, NASAL/ORAL     VACCINE ADMINISTRATION, EACH ADDITIONAL     VACCINE ADMINISTRATION, INITIAL        Primary Care Provider Office Phone # Fax #    Dustin Cabrera [phone] [address] [address]        Equal Access to Services     [facility] RADHA AH: Hadii sandra storeyo Socorbin, waaxda luqadaha, qaybta kaalmada adeegyada, seth harrisonn ah. [facility] [phone].    ATENCIÓN: Si habla español, tiene a au disposición servicios gratuitos de asistencia lingüística. [facility] [phone].    We comply with applicable federal civil rights laws and [place] laws. We do not discriminate on the basis of race, color, national origin, age, disability, sex, sexual orientation, or gender identity.            Thank you!     Thank you for choosing [facility]  for your care. Our goal is always to provide you with excellent care. Hearing back from our patients is one way we can continue to improve our services. Please take a few minutes to complete the written survey that you may receive in the mail after your visit with us. Thank you!             Your Updated Medication List - Protect others around you: " Learn how to safely use, store and throw away your medicines at www.disposemymeds.org.          This list is accurate as of: 18 11:54 AM.  Always use your most recent med list.                   Brand Name Dispense Instructions for use Diagnosis    cholecalciferol 400 UNIT/ML Liqd liquid    AQUEOUS VITAMIN D    1 Bottle    Take 1 mL (400 Units) by mouth daily    WCC (well child check),  under 8 days old       Digital Thermometer/Beeper Misc     1 each    1 Device as needed Use as needed to check temperature    WCC (well child check),  under 8 days old

## 2025-04-14 NOTE — TELEPHONE ENCOUNTER
Patient's transportation is not able to bring him to the office on 04/25/2025. Were can I reschedule him to? Is a virtual ok? Please let me know so that I can tell the patient.

## 2025-04-14 NOTE — TELEPHONE ENCOUNTER
ADVANCED HEART FAILURE CENTER  [facility] in [place], [place]      INR result reviewed with ADILENE Maurer NP who made the following recommendations (VORB): new maintenance plan:4 mg every Sun; 2 mg all other days  and recheck 1 week. Patient notified and verbalized understanding. They had no further questions. Patient's home health nurse MADYSON James also notified (See anticoag tracker)     Funmi Barth RN

## 2025-04-21 ENCOUNTER — TELEPHONE (OUTPATIENT)
Age: 65
End: 2025-04-21
Payer: MEDICARE

## 2025-04-21 DIAGNOSIS — Z79.01 CHRONIC ANTICOAGULATION: Primary | ICD-10-CM

## 2025-04-21 LAB — INR BLD: 3.3

## 2025-04-21 PROCEDURE — 93793 ANTICOAG MGMT PT WARFARIN: CPT | Performed by: NURSE PRACTITIONER

## 2025-04-21 NOTE — TELEPHONE ENCOUNTER
ADVANCED HEART FAILURE CENTER  [facility] in [place], [place]      INR result reviewed with ADILENE Maurer NP who made the following recommendations (VORB): Hold tonight, new maintenance plan: 2mg daily and recheck 04/24. Patient notified and verbalized understanding. Patient's home health nurse MADYSON James also notified. They had no further questions. (See anticoag tracker)     Funmi Barth RN

## 2025-04-22 LAB
BASOPHILS # BLD AUTO: 0.1 X10E3/UL (ref 0–0.2)
BASOPHILS NFR BLD AUTO: 1 %
EOSINOPHIL # BLD AUTO: 1.7 X10E3/UL (ref 0–0.4)
EOSINOPHIL NFR BLD AUTO: 14 %
ERYTHROCYTE [DISTWIDTH] IN BLOOD BY AUTOMATED COUNT: 14.8 % (ref 11.6–15.4)
HCT VFR BLD AUTO: 40.4 % (ref 37.5–51)
HGB BLD-MCNC: 12.5 G/DL (ref 13–17.7)
IMM GRANULOCYTES # BLD AUTO: 0.1 X10E3/UL (ref 0–0.1)
IMM GRANULOCYTES NFR BLD AUTO: 1 %
INR PPP: 2.7 (ref 0.9–1.2)
LYMPHOCYTES # BLD AUTO: 1.4 X10E3/UL (ref 0.7–3.1)
LYMPHOCYTES NFR BLD AUTO: 11 %
MCH RBC QN AUTO: 28.2 PG (ref 26.6–33)
MCHC RBC AUTO-ENTMCNC: 30.9 G/DL (ref 31.5–35.7)
MCV RBC AUTO: 91 FL (ref 79–97)
MONOCYTES # BLD AUTO: 1.1 X10E3/UL (ref 0.1–0.9)
MONOCYTES NFR BLD AUTO: 9 %
NEUTROPHILS # BLD AUTO: 8.1 X10E3/UL (ref 1.4–7)
NEUTROPHILS NFR BLD AUTO: 64 %
PLATELET # BLD AUTO: 327 X10E3/UL (ref 150–450)
PROTHROMBIN TIME: 28.2 SEC (ref 9.1–12)
RBC # BLD AUTO: 4.44 X10E6/UL (ref 4.14–5.8)
WBC # BLD AUTO: 12.5 X10E3/UL (ref 3.4–10.8)

## 2025-04-23 ENCOUNTER — TELEPHONE (OUTPATIENT)
Age: 65
End: 2025-04-23

## 2025-04-23 LAB
ALBUMIN SERPL-MCNC: 4.2 G/DL (ref 3.9–4.9)
ALP SERPL-CCNC: 123 IU/L (ref 44–121)
ALT SERPL-CCNC: 18 IU/L (ref 0–44)
AST SERPL-CCNC: 25 IU/L (ref 0–40)
BILIRUB SERPL-MCNC: 0.2 MG/DL (ref 0–1.2)
BUN SERPL-MCNC: 21 MG/DL (ref 8–27)
BUN/CREAT SERPL: 14 (ref 10–24)
CALCIUM SERPL-MCNC: 9.3 MG/DL (ref 8.6–10.2)
CHLORIDE SERPL-SCNC: 101 MMOL/L (ref 96–106)
CO2 SERPL-SCNC: 22 MMOL/L (ref 20–29)
CREAT SERPL-MCNC: 1.46 MG/DL (ref 0.76–1.27)
EGFRCR SERPLBLD CKD-EPI 2021: 53 ML/MIN/1.73
GLOBULIN SER CALC-MCNC: 3.1 G/DL (ref 1.5–4.5)
GLUCOSE SERPL-MCNC: 62 MG/DL (ref 70–99)
LDH SERPL L TO P-CCNC: 295 IU/L (ref 121–224)
MAGNESIUM SERPL-MCNC: 1.9 MG/DL (ref 1.6–2.3)
POTASSIUM SERPL-SCNC: 4.7 MMOL/L (ref 3.5–5.2)
PROT SERPL-MCNC: 7.3 G/DL (ref 6–8.5)
SODIUM SERPL-SCNC: 143 MMOL/L (ref 134–144)
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
## 2025-04-24 ENCOUNTER — TELEPHONE (OUTPATIENT)
Age: 65
End: 2025-04-24

## 2025-04-24 LAB — INR BLD: 3.1

## 2025-04-25 ENCOUNTER — TELEMEDICINE (OUTPATIENT)
Age: 65
End: 2025-04-25
Payer: MEDICARE

## 2025-04-25 DIAGNOSIS — T82.7XXA INFECTION ASSOCIATED WITH DRIVELINE OF LEFT VENTRICULAR ASSIST DEVICE (LVAD): Primary | ICD-10-CM

## 2025-04-25 PROCEDURE — 1123F ACP DISCUSS/DSCN MKR DOCD: CPT | Performed by: INTERNAL MEDICINE

## 2025-04-25 PROCEDURE — 3017F COLORECTAL CA SCREEN DOC REV: CPT | Performed by: INTERNAL MEDICINE

## 2025-04-25 PROCEDURE — G8419 CALC BMI OUT NRM PARAM NOF/U: HCPCS | Performed by: INTERNAL MEDICINE

## 2025-04-25 PROCEDURE — 99213 OFFICE O/P EST LOW 20 MIN: CPT | Performed by: INTERNAL MEDICINE

## 2025-04-25 PROCEDURE — G8427 DOCREV CUR MEDS BY ELIG CLIN: HCPCS | Performed by: INTERNAL MEDICINE

## 2025-04-25 PROCEDURE — 1036F TOBACCO NON-USER: CPT | Performed by: INTERNAL MEDICINE

## 2025-04-25 RX ORDER — LEVOFLOXACIN 500 MG/1
500 TABLET, FILM COATED ORAL DAILY
Qty: 30 TABLET | Refills: 5 | Status: SHIPPED | OUTPATIENT
Start: 2025-04-25 | End: 2025-10-22

## 2025-04-25 NOTE — TELEPHONE ENCOUNTER
ADVANCED HEART FAILURE CENTER  [facility] in [place], [place]      INR result reviewed with ADILENE Maurer NP who made the following recommendations (VORB): hold tonight, 1mg tomorrow   and recheck Monday. Patient notified and verbalized understanding. They had no further questions. Patient's home health nurse MADYSON James also notified.  (See anticoag tracker)     Funmi Barth RN

## 2025-04-26 NOTE — PROGRESS NOTES
Sanford Joiner Sr., was evaluated through a synchronous (real-time) audio-video encounter. The patient (or guardian if applicable) is aware that this is a billable service, which includes applicable co-pays. This Virtual Visit was conducted with patient's (and/or legal guardian's) consent. Patient identification was verified, and a caregiver was present when appropriate.   The patient was located at Home: [address] [address]  Provider was located at Facility (Appt Dept): [address]  [place] [address]  Confirm you are appropriately licensed, registered, or certified to deliver care in the state where the patient is located as indicated above. If you are not or unsure, please re-schedule the visit: Yes, I confirm.     Sanford Joiner Sr. (:  1960) is a Established patient, presenting virtually for evaluation of the following:    Assessment and Plan: (developed based on review of pertinent history, physical exam, labs, studies, and medications)    Doing well on Levaquin - continue current therapy at 500mg PO once daily.    Monthly EKG evaluation @ cardiology office.    Follow up in 2025 to coordinate with me - if doing well at that time and infection under control may be able to lower the dose to 250mg PO once daily.    Counseled regarding tendonitis, neuromuscular side effects, nausea, vomiting, diarrhea, allergic reactions which he does not have currently.    Subjective:    Doing well on Levaquin - adherent and tolerating well.  Not taking minocycline reportedly.  No noted Levaquin side effects and EKG without significantly longer QTc.  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  Still draining but he has been able to remain out of the hospital thus far.    Objective:  Patient-Reported Vitals  No data recorded       Physical exam deferred due to virtual visit with the exception of below:    --Rafita Rajan MD     Thank you for the referral.

## 2025-04-28 ENCOUNTER — TELEPHONE (OUTPATIENT)
Age: 65
End: 2025-04-28

## 2025-04-28 ENCOUNTER — TELEPHONE (OUTPATIENT)
Age: 65
End: 2025-04-28
Payer: MEDICARE

## 2025-04-28 DIAGNOSIS — I50.22 CHRONIC SYSTOLIC HF (HEART FAILURE) (HCC): Primary | ICD-10-CM

## 2025-04-28 DIAGNOSIS — I50.22 CHRONIC SYSTOLIC (CONGESTIVE) HEART FAILURE (HCC): ICD-10-CM

## 2025-04-28 DIAGNOSIS — Z79.01 CHRONIC ANTICOAGULATION: Primary | ICD-10-CM

## 2025-04-28 LAB — INR BLD: 1.5

## 2025-04-28 PROCEDURE — 93793 ANTICOAG MGMT PT WARFARIN: CPT | Performed by: NURSE PRACTITIONER

## 2025-04-28 NOTE — TELEPHONE ENCOUNTER
ADVANCED HEART FAILURE CENTER  [facility] in [place], [place]      INR result reviewed with RUBEN Garibay NP who made the following recommendations (VORB): 3mg tonight and recheck 1 week. Patient notified and verbalized understanding. They had no further questions. Patient's home health nurse MADYSON James also notified. (See anticoag tracker)     Funmi Barth RN

## 2025-04-28 NOTE — TELEPHONE ENCOUNTER
-- Message from Dr. Farshad Gillespie MD sent at 4/28/2025  8:26 AM EDT -----  He needs monthly ECGs to follow QTC  ----- Message -----  From: Rafita Rajan MD  Sent: 4/25/2025   9:04 PM EDT  To: Funmi Barth RN; Farshad Gillespie MD

## 2025-04-28 NOTE — TELEPHONE ENCOUNTER
3mg today.  Recheck tomorrow, please.  If he stays this low, we'll have have to use lovenox.  Today I will hold off due to his low goal.     Geni Garibay NP DNP, RN, [facility]-BC  Advanced Heart Failure Center  [facility]  [address]  Phone: [phone]

## 2025-04-28 NOTE — TELEPHONE ENCOUNTER
----- Message from Dr. Farshad Gillespie MD sent at 4/28/2025  8:26 AM EDT -----  He needs monthly ECGs to follow QTC  ----- Message -----  From: Rafita Rajan MD  Sent: 4/25/2025   9:04 PM EDT  To: Funmi Barth RN; Farshad Gillespie MD

## 2025-04-28 NOTE — TELEPHONE ENCOUNTER
Patient discussed with Dr. Gillespie, per Dr. Gillespie VORB patient to have monthly EKGs to monitor QTC.      1429: Called and spoke with patient, notified of provider order for monthly EKG. Instructed to present to outpatient registration this week to have this performed, he verbalized understanding.

## 2025-05-01 ENCOUNTER — TELEPHONE (OUTPATIENT)
Age: 65
End: 2025-05-01

## 2025-05-01 NOTE — TELEPHONE ENCOUNTER
1115: left voicemail with patient requesting over due INR, requested patient return call to coordinator with INR value from home meter

## 2025-05-02 ENCOUNTER — TELEPHONE (OUTPATIENT)
Age: 65
End: 2025-05-02

## 2025-05-02 LAB — INR BLD: 2

## 2025-05-02 NOTE — TELEPHONE ENCOUNTER
ADVANCED HEART FAILURE CENTER  [facility] in [place], [place]      INR result reviewed with RUBEN Garibay NP who made the following recommendations (VORB): no changes and recheck 1 week. Patient notified of provider instructions via voicemail, requested call back to confirm     1601: Patient notified of provider instructions, he verbalized understanding. Patient's home health nurse ANY Walters also notified.      (See anticoag tracker)     Funmi Barth RN

## 2025-05-04 NOTE — ED NOTES
ED patient report received form RACHEL Yao.  All questions answered.   Manual doppler BP taken prior to shift change with both Rns at bedside.   
MD making rounds in ED, asks me to remove BIPAP and place on NC at this time.  Patient placed on 4L NC with sat of 97%. Sitting up in bed sipping ice water.   
Patient assisted up in bed to eat dinner tray. Family member at bedside.   No other needs at this time.   
Patient had another large BM on self in bed.   Patient cleansed with sanitizing wipes and all sheets/gown/chux changed.     
Patient had large, soft bowel movement, smeared up back and onto hands.   Patient thoroughly cleaned with sanitizing wipes and chux/sheets replaced at this time.   Pulse ox removed and placed on different finger d/t contamination.   LVAD wire also covered in feces, cleaned wire with alcohol wipes.   
Wound culture obtained from drive line site in RLQ of abdomen.   Sterile technique utilized next to change dressing. Surrounding skin cleansed with chlorhexidine and dressed with sterile gauze and tegaderm.   
2

## 2025-05-06 LAB
ALBUMIN SERPL-MCNC: 4.2 G/DL (ref 3.9–4.9)
ALP SERPL-CCNC: 114 IU/L (ref 44–121)
ALT SERPL-CCNC: 12 IU/L (ref 0–44)
AST SERPL-CCNC: 22 IU/L (ref 0–40)
BASOPHILS # BLD AUTO: 0.1 X10E3/UL (ref 0–0.2)
BASOPHILS NFR BLD AUTO: 1 %
BILIRUB SERPL-MCNC: ABNORMAL MG/DL (ref 0–1.2)
BUN SERPL-MCNC: 18 MG/DL (ref 8–27)
BUN/CREAT SERPL: 12 (ref 10–24)
CALCIUM SERPL-MCNC: 9 MG/DL (ref 8.6–10.2)
CHLORIDE SERPL-SCNC: 99 MMOL/L (ref 96–106)
CO2 SERPL-SCNC: 24 MMOL/L (ref 20–29)
CREAT SERPL-MCNC: 1.52 MG/DL (ref 0.76–1.27)
EGFRCR SERPLBLD CKD-EPI 2021: 51 ML/MIN/1.73
EOSINOPHIL # BLD AUTO: 1.1 X10E3/UL (ref 0–0.4)
EOSINOPHIL NFR BLD AUTO: 8 %
ERYTHROCYTE [DISTWIDTH] IN BLOOD BY AUTOMATED COUNT: 16 % (ref 11.6–15.4)
GLOBULIN SER CALC-MCNC: 3.1 G/DL (ref 1.5–4.5)
GLUCOSE SERPL-MCNC: 93 MG/DL (ref 70–99)
HCT VFR BLD AUTO: 41.5 % (ref 37.5–51)
HGB BLD-MCNC: 13.3 G/DL (ref 13–17.7)
IMM GRANULOCYTES NFR BLD AUTO: 1 %
LDH SERPL L TO P-CCNC: 245 IU/L (ref 121–224)
LYMPHOCYTES # BLD AUTO: 1.6 X10E3/UL (ref 0.7–3.1)
LYMPHOCYTES NFR BLD AUTO: 11 %
MAGNESIUM SERPL-MCNC: 1.8 MG/DL (ref 1.6–2.3)
MCH RBC QN AUTO: 28.5 PG (ref 26.6–33)
MCHC RBC AUTO-ENTMCNC: 32 G/DL (ref 31.5–35.7)
MCV RBC AUTO: 89 FL (ref 79–97)
MONOCYTES # BLD AUTO: 1.2 X10E3/UL (ref 0.1–0.9)
MONOCYTES NFR BLD AUTO: 9 %
NEUTROPHILS # BLD AUTO: 10 X10E3/UL (ref 1.4–7)
NEUTROPHILS NFR BLD AUTO: 70 %
NT-PROBNP SERPL-MCNC: 1488 PG/ML (ref 0–376)
PLATELET # BLD AUTO: 308 X10E3/UL (ref 150–450)
POTASSIUM SERPL-SCNC: 3.9 MMOL/L (ref 3.5–5.2)
PROT SERPL-MCNC: 7.3 G/DL (ref 6–8.5)
RBC # BLD AUTO: 4.66 X10E6/UL (ref 4.14–5.8)
SODIUM SERPL-SCNC: 139 MMOL/L (ref 134–144)
WBC # BLD AUTO: 14.2 X10E3/UL (ref 3.4–10.8)

## 2025-05-07 ENCOUNTER — RESULTS FOLLOW-UP (OUTPATIENT)
Age: 65
End: 2025-05-07

## 2025-05-07 NOTE — TELEPHONE ENCOUNTER
----- Message from TAIWO Adams NP sent at 5/7/2025  9:34 AM EDT -----  WBC trending up but rest of labs are stable.  Any fevers? Changes in his drive line infection?    1034: Contacted patient to discuss. He denied any fevers, chills, changes to driveline, or other signs of infection. He stated he is still having drainage on the abdominal wound, which is unchanged and has not increased. He stated overall he feels well. No recent illnesses. Informed him will notify provider. Also noted patient overdue for monthly QT monitoring EKG. Reiterated need for monthly EKGs d/t patient's antibiotic regimen. Explained he may present to [facility] for completion and does not need appt as it is a walk in test. Requested he complete as soon as he is able. Also reminded patient of INR due tomorrow. He verbalized understanding and had no further questions.     Nessa Maurer APRN - NP     5/7/25 11:22 AM   Ok, monitor for now    1125: Reviewed with ADILENE Maurer NP who recommended [facility] health collect repeat CBC next week. Spoke with ANY Walters RN with [facility] who confirmed will repeat CBC next week. Contacted patient to notify. Educated patient to notify VAD team should he develop any new symptoms including fever, chills, pain at sternal or driveline site, general malaise, or new/changed drainage. Patient verbalized understanding and had no further questions.     CASEY MONTEMAYOR RN  VAD Coordinator

## 2025-05-08 ENCOUNTER — TELEPHONE (OUTPATIENT)
Age: 65
End: 2025-05-08

## 2025-05-08 DIAGNOSIS — I50.22 CHRONIC SYSTOLIC (CONGESTIVE) HEART FAILURE (HCC): ICD-10-CM

## 2025-05-08 DIAGNOSIS — Z79.01 CHRONIC ANTICOAGULATION: ICD-10-CM

## 2025-05-08 DIAGNOSIS — I50.22 CHRONIC SYSTOLIC HF (HEART FAILURE) (HCC): Primary | ICD-10-CM

## 2025-05-08 LAB — INR BLD: 2.7

## 2025-05-08 NOTE — TELEPHONE ENCOUNTER
ADVANCED HEART FAILURE CENTER  [facility] in [place], [place]    INR result reviewed with MICHAELA Villa NP who made the following recommendations (VORB): Decrease coumadin to 1mg tonight, the resume 2mg daily and recheck INR Monday. ANY Walters RN with [facility] notified while at patients home, verbalized understanding, an informed patient. They had no further questions. (See anticoag tracker)     CASEY MONTEMAYOR RN

## 2025-05-12 ENCOUNTER — TELEPHONE (OUTPATIENT)
Age: 65
End: 2025-05-12
Payer: MEDICARE

## 2025-05-12 DIAGNOSIS — Z79.01 LONG TERM (CURRENT) USE OF ANTICOAGULANTS: Primary | ICD-10-CM

## 2025-05-12 LAB
BASOPHILS # BLD AUTO: 0.1 X10E3/UL (ref 0–0.2)
BASOPHILS NFR BLD AUTO: 1 %
EOSINOPHIL # BLD AUTO: 1.4 X10E3/UL (ref 0–0.4)
EOSINOPHIL NFR BLD AUTO: 9 %
ERYTHROCYTE [DISTWIDTH] IN BLOOD BY AUTOMATED COUNT: 15.7 % (ref 11.6–15.4)
HCT VFR BLD AUTO: 39.8 % (ref 37.5–51)
HGB BLD-MCNC: 12.8 G/DL (ref 13–17.7)
IMM GRANULOCYTES NFR BLD AUTO: 0 %
INR BLD: 4.2
LYMPHOCYTES # BLD AUTO: 1.7 X10E3/UL (ref 0.7–3.1)
LYMPHOCYTES NFR BLD AUTO: 11 %
MCH RBC QN AUTO: 28 PG (ref 26.6–33)
MCHC RBC AUTO-ENTMCNC: 32.2 G/DL (ref 31.5–35.7)
MCV RBC AUTO: 87 FL (ref 79–97)
MONOCYTES # BLD AUTO: 1.2 X10E3/UL (ref 0.1–0.9)
MONOCYTES NFR BLD AUTO: 8 %
NEUTROPHILS # BLD AUTO: 10.5 X10E3/UL (ref 1.4–7)
NEUTROPHILS NFR BLD AUTO: 71 %
PLATELET # BLD AUTO: 293 X10E3/UL (ref 150–450)
RBC # BLD AUTO: 4.57 X10E6/UL (ref 4.14–5.8)
WBC # BLD AUTO: 14.9 X10E3/UL (ref 3.4–10.8)

## 2025-05-12 PROCEDURE — 93793 ANTICOAG MGMT PT WARFARIN: CPT | Performed by: NURSE PRACTITIONER

## 2025-05-12 NOTE — TELEPHONE ENCOUNTER
ADVANCED HEART FAILURE CENTER  [facility] in [place], [place]      INR result reviewed with RUBEN Garibay NP who made the following recommendations (VORB):     Geni Garibay APRN - NP  YouJust now (4:38 PM)     Let's hold and recheck tomorrow, please.     Patient notified and verbalized understanding. They had no further questions. (See anticoag tracker)     SNEHA LAL RN  VAD Coordinator

## 2025-05-13 ENCOUNTER — TELEPHONE (OUTPATIENT)
Age: 65
End: 2025-05-13
Payer: MEDICARE

## 2025-05-13 ENCOUNTER — RESULTS FOLLOW-UP (OUTPATIENT)
Age: 65
End: 2025-05-13

## 2025-05-13 DIAGNOSIS — Z79.01 LONG TERM (CURRENT) USE OF ANTICOAGULANTS: Primary | ICD-10-CM

## 2025-05-13 LAB — INR BLD: 2.6

## 2025-05-13 NOTE — TELEPHONE ENCOUNTER
ADVANCED HEART FAILURE CENTER  [facility] in [place], [place]      INR result reviewed with RUBEN Garibay NP who made the following recommendations (VORB):     Geni Garibay APRN - NP  You7 minutes ago (4:54 PM)     Decrease to 1mg tonight and recheck Friday.     Patient notified and verbalized understanding. They had no further questions. (See anticoag tracker)     SNEHA LAL RN  VAD Coordinator

## 2025-05-13 NOTE — TELEPHONE ENCOUNTER
----- Message from TAIWO Adams NP sent at 5/13/2025 12:16 PM EDT -----  Unchanged WBC, monitor for now  ----- Message -----  From: Kassie Boone Incoming Amb Ref Lab Orders To [facility]  Sent: 5/12/2025   7:13 PM EDT  To: TAIWO Vilchis NP    Reviewed labs. Patient verbalized understanding of no change to plan of care.     SNEHA LAL RN  VAD Coordinator

## 2025-05-13 NOTE — TELEPHONE ENCOUNTER
Decrease to 1mg tonight and recheck Friday.    Geni Garibay NP  DNP, RN, AGACN-BC  Advanced Heart Failure Center    [address] , [address]  Phone: [phone]

## 2025-05-14 PROCEDURE — 93793 ANTICOAG MGMT PT WARFARIN: CPT | Performed by: NURSE PRACTITIONER

## 2025-05-16 ENCOUNTER — TELEPHONE (OUTPATIENT)
Age: 65
End: 2025-05-16
Payer: MEDICARE

## 2025-05-16 DIAGNOSIS — Z79.01 LONG TERM (CURRENT) USE OF ANTICOAGULANTS: Primary | ICD-10-CM

## 2025-05-16 LAB — INR BLD: 2.9

## 2025-05-16 NOTE — TELEPHONE ENCOUNTER
.ADVANCED HEART FAILURE CENTER  [facility] in [place], [place]      INR result reviewed with YUMI Quiles NP  who made the following recommendations (VORB):     Nahomi Quiles Y, APRN - NP  You1 hour ago (2:57 PM)     Take 1 mg tonight and Sunday, 2 mg Saturday.  Recheck INR on Monday.     Patient notified and verbalized understanding. He had no further questions. (See anticoag tracker)     SNEHA LAL RN  VAD Coordinator

## 2025-05-19 ENCOUNTER — TELEPHONE (OUTPATIENT)
Age: 65
End: 2025-05-19
Payer: MEDICARE

## 2025-05-19 DIAGNOSIS — Z79.01 LONG TERM (CURRENT) USE OF ANTICOAGULANTS: Primary | ICD-10-CM

## 2025-05-19 DIAGNOSIS — Z95.811 LVAD (LEFT VENTRICULAR ASSIST DEVICE) PRESENT (HCC): ICD-10-CM

## 2025-05-19 DIAGNOSIS — I50.22 CHRONIC SYSTOLIC HF (HEART FAILURE) (HCC): ICD-10-CM

## 2025-05-19 LAB — INR BLD: 2.9

## 2025-05-19 PROCEDURE — 93793 ANTICOAG MGMT PT WARFARIN: CPT | Performed by: NURSE PRACTITIONER

## 2025-05-19 NOTE — TELEPHONE ENCOUNTER
ADVANCED HEART FAILURE CENTER  [facility] in [place], [place]      INR result reviewed with MICHAELA Villa NP who made the following recommendations (VORB):     Anabell Villa, APRN - CNP  You2 hours ago (1:56 PM)     INR 2.9 Reduce to 1 mg today and 1 mg tomorrow. Recheck Friday 2/23     Patient notified and verbalized understanding. They had no further questions. (See anticoag tracker)     CASEY MONTEMAYOR RN

## 2025-05-20 ENCOUNTER — TELEPHONE (OUTPATIENT)
Age: 65
End: 2025-05-20

## 2025-05-20 DIAGNOSIS — Z95.811 LVAD (LEFT VENTRICULAR ASSIST DEVICE) PRESENT (HCC): ICD-10-CM

## 2025-05-20 DIAGNOSIS — Z79.01 LONG TERM (CURRENT) USE OF ANTICOAGULANTS: Primary | ICD-10-CM

## 2025-05-20 DIAGNOSIS — I50.22 CHRONIC SYSTOLIC HF (HEART FAILURE) (HCC): ICD-10-CM

## 2025-05-20 LAB — INR BLD: 2.6

## 2025-05-20 PROCEDURE — 93793 ANTICOAG MGMT PT WARFARIN: CPT

## 2025-05-20 NOTE — TELEPHONE ENCOUNTER
ADVANCED HEART FAILURE CENTER  [facility] in [place], [place]      INR result reviewed with MICHAELA Villa NP who made the following recommendations (VORB): Continue plan for 1mg coumadin tonight, then resume 2mg daily, and recheck INR Friday. Patient notified and verbalized understanding. They had no further questions. (See anticoag tracker)     CASEY MONTEMAYOR RN

## 2025-05-22 ENCOUNTER — TELEPHONE (OUTPATIENT)
Age: 65
End: 2025-05-22

## 2025-05-22 NOTE — TELEPHONE ENCOUNTER
1335: Spoke with ANY Walters RN with [facility] who reported home health recertification auth denied. Informed her will reach out to [facility] for further details and to request appeal. Per ANY Walters Auth #: [id number] under RUBEN Garibay NP.    1346: Contacted [facility]. Informed by Kesha case is being handled by [facility] (Ph: [phone] Option 2). Call transferred. Spoke with Mak who reported auth denied as request did not meet medical necessity (diagnosis codes T82.XXD, L02.213, B95.61, B95.2). Urgent appeal initiated. Per Mak supporting medical documentation must be faxed to [phone]. He stated once documentation received received determination will be made in 72 hours. He stated call ref #: [id number]. Documentation faxed. Will await determination response. ANY Walters RN updated.     CASEY MONTEMAYOR RN  VAD Coordinator

## 2025-05-23 ENCOUNTER — TELEPHONE (OUTPATIENT)
Age: 65
End: 2025-05-23
Payer: MEDICARE

## 2025-05-23 DIAGNOSIS — I50.22 CHRONIC SYSTOLIC HF (HEART FAILURE) (HCC): ICD-10-CM

## 2025-05-23 DIAGNOSIS — Z79.01 LONG TERM (CURRENT) USE OF ANTICOAGULANTS: Primary | ICD-10-CM

## 2025-05-23 DIAGNOSIS — Z95.811 LVAD (LEFT VENTRICULAR ASSIST DEVICE) PRESENT (HCC): ICD-10-CM

## 2025-05-23 LAB — INR BLD: 2.5

## 2025-05-23 PROCEDURE — 93793 ANTICOAG MGMT PT WARFARIN: CPT

## 2025-05-23 NOTE — TELEPHONE ENCOUNTER
Fax received from [email or web address] requesting patient fill out appointed representative documentation so appeal may be filed by the practice on his behalf. Spoke with ANY Walters RN with [facility] who stated will present to office to obtain for patient and return once completed. Patient notified of need for documentation. He confirmed will complete and had no further questions. Will fax once received.     CASEY MONTEMAYOR RN  VAD Coordinator

## 2025-05-23 NOTE — TELEPHONE ENCOUNTER
ADVANCED HEART FAILURE CENTER  [facility] in [place], [place]      INR result reviewed with MICHAELA Villa NP who made the following recommendations (VORB):     Anabell Villa APRN - CNP (5/23/25 4:19 PM)  INR 2.2  Take 1 mg tonight and resume regular dosing tomorrow.  Recheck 5/27.    Patient notified and verbalized understanding. They had no further questions. (See anticoag tracker)     CASEY MONTEMAYOR RN

## 2025-05-27 ENCOUNTER — TELEPHONE (OUTPATIENT)
Age: 65
End: 2025-05-27
Payer: MEDICARE

## 2025-05-27 DIAGNOSIS — I50.22 CHRONIC SYSTOLIC HF (HEART FAILURE) (HCC): ICD-10-CM

## 2025-05-27 DIAGNOSIS — Z79.01 LONG TERM (CURRENT) USE OF ANTICOAGULANTS: Primary | ICD-10-CM

## 2025-05-27 DIAGNOSIS — Z95.811 LVAD (LEFT VENTRICULAR ASSIST DEVICE) PRESENT (HCC): ICD-10-CM

## 2025-05-27 LAB — INR BLD: 3.4

## 2025-05-27 PROCEDURE — 93793 ANTICOAG MGMT PT WARFARIN: CPT | Performed by: NURSE PRACTITIONER

## 2025-05-27 NOTE — TELEPHONE ENCOUNTER
ADVANCED HEART FAILURE CENTER  [facility] in [place], [place]      INR result reviewed with YUMI Quiles NP  who made the following recommendations (VORB):     Nahomi Quiles APRN - NP (5/27/25 12:49 PM)  Alternate 1 mg and 2 mg every other night, recheck INR Monday    Patient notified and verbalized understanding. They had no further questions. (See anticoag tracker)     CASEY MONTEMAYOR RN

## 2025-05-27 NOTE — TELEPHONE ENCOUNTER
1648: Appointed representative paperwork received from patient and faxed to [facility] this date.     CASEY MONTEMAYOR RN  VAD Coordinator     116

## 2025-05-29 NOTE — TELEPHONE ENCOUNTER
1030: Message received from Stanislav from [facility] stating patient's member ID missing on authorized representative form. She requested form be refaxed with member ID included. Form refaxed this date.     CASEY MONTEMAYOR RN  VAD Coordinator

## 2025-05-30 NOTE — TELEPHONE ENCOUNTER
1345: Call received from Karli with [facility] reporting appointed representative form received and validated. Per Karli will proceed with appeal with typical time to decision 72 hrs. Requested she reach out if any further documentation required. She verbalized understanding and had no further questions.    1500: Call received from patient who stated he contacted [facility] to inquire about status of appeal and was informed by staff patient did not meet medical necessity. Updated on above and explained a specialty team is handling the appeal. Informed him will update him as information received from [facility]. He verbalized understanding and had no further questions.    CASEY MONTEMAYOR RN  VAD Coordinator

## 2025-06-02 ENCOUNTER — TELEPHONE (OUTPATIENT)
Age: 65
End: 2025-06-02

## 2025-06-02 NOTE — TELEPHONE ENCOUNTER
1003: Call received from patient requesting to reschedule appointment tomorrow. Appointment rescheduled to next available for practice and patient 7/3/25 at 1100. Patient verbalized understanding and had no further questions.    CASEY MONTEMAYOR RN  VAD Coordinator

## 2025-06-02 NOTE — TELEPHONE ENCOUNTER
0838: Spoke with [employer] who stated auth appeal has been approved. Initial denial overturned and home health services are approved. Contacted [facility] to notify.    1003: Patient notified. He verbalized understanding and had no further questions.    CASEY MONTEMAYOR RN  VAD Coordinator

## 2025-06-03 ENCOUNTER — TELEPHONE (OUTPATIENT)
Age: 65
End: 2025-06-03
Payer: MEDICARE

## 2025-06-03 DIAGNOSIS — I50.22 CHRONIC SYSTOLIC HF (HEART FAILURE) (HCC): ICD-10-CM

## 2025-06-03 DIAGNOSIS — Z95.811 LVAD (LEFT VENTRICULAR ASSIST DEVICE) PRESENT (HCC): ICD-10-CM

## 2025-06-03 DIAGNOSIS — Z79.01 LONG TERM (CURRENT) USE OF ANTICOAGULANTS: Primary | ICD-10-CM

## 2025-06-03 LAB — INR BLD: 2.3

## 2025-06-03 PROCEDURE — 93793 ANTICOAG MGMT PT WARFARIN: CPT | Performed by: NURSE PRACTITIONER

## 2025-06-03 RX ORDER — WARFARIN SODIUM 1 MG/1
2 TABLET ORAL EVERY EVENING
Qty: 240 TABLET | Refills: 0 | Status: SHIPPED | OUTPATIENT
Start: 2025-06-03

## 2025-06-03 NOTE — TELEPHONE ENCOUNTER
Requested Prescriptions     Signed Prescriptions Disp Refills    warfarin (COUMADIN) 1 MG tablet 240 tablet 0     Sig: Take 2 tablets by mouth every evening May take additional dose daily PRN as directed by provider for subtherapeutic INR.     Authorizing Provider: JEN THOMAS     Ordering User: CASEY MONTEMAYOR

## 2025-06-03 NOTE — TELEPHONE ENCOUNTER
ADVANCED HEART FAILURE CENTER  [facility] in [place], [place]    INR result reviewed with ADILENE Maurer NP who made the following recommendations (VORB):     Nessa Maurer APRN - NP (6/3/25 4:20 PM)  1mg tues/thurs, 2mg al other, 1 week    Patient notified and verbalized understanding. They had no further questions. (See anticoag tracker)     CASEY MONTEMAYOR RN

## 2025-06-10 ENCOUNTER — ANTI-COAG VISIT (OUTPATIENT)
Age: 65
End: 2025-06-10
Payer: MEDICARE

## 2025-06-10 LAB — INR BLD: 3.5

## 2025-06-10 PROCEDURE — 93793 ANTICOAG MGMT PT WARFARIN: CPT | Performed by: NURSE PRACTITIONER

## 2025-06-11 NOTE — PROGRESS NOTES
ADVANCED HEART FAILURE CENTER  [facility] in [place], [place]      INR result reviewed with RUBEN Garibay NP who made the following recommendations (VORB): Take 0.5mg 6/10, hold coumadin 6/11, and recheck INR 6/12. Patient notified of update in instructions for 6/11/25 and verbalized understanding. They had no further questions. (See anticoag tracker)     CASEY MONTEMAYOR RN

## 2025-06-12 ENCOUNTER — TELEPHONE (OUTPATIENT)
Age: 65
End: 2025-06-12
Payer: MEDICARE

## 2025-06-12 DIAGNOSIS — Z79.01 LONG TERM (CURRENT) USE OF ANTICOAGULANTS: Primary | ICD-10-CM

## 2025-06-12 DIAGNOSIS — Z95.811 LVAD (LEFT VENTRICULAR ASSIST DEVICE) PRESENT (HCC): ICD-10-CM

## 2025-06-12 DIAGNOSIS — I50.22 CHRONIC SYSTOLIC HF (HEART FAILURE) (HCC): ICD-10-CM

## 2025-06-12 LAB — INR BLD: 1.9

## 2025-06-12 PROCEDURE — 93793 ANTICOAG MGMT PT WARFARIN: CPT | Performed by: NURSE PRACTITIONER

## 2025-06-12 NOTE — TELEPHONE ENCOUNTER
Decrease Sunday to 1mg and recheck Monday.      Geni Garibay NP  DNP, RN, [facility]  Advanced Heart Failure Center  [facility]  [address], [address]  Phone: [phone]

## 2025-06-12 NOTE — TELEPHONE ENCOUNTER
ADVANCED HEART FAILURE CENTER  [facility] in [place], [place]      INR result reviewed with RUBEN Garibay NP who made the following recommendations (VORB):     Geni Garibay APRN - ROSSANA (6/12/25 4:19 PM)  Decrease Sunday to 1mg and recheck Monday.    Patient notified and verbalized understanding. They had no further questions. (See anticoag tracker)     CASEY MONTEMAYOR RN

## 2025-06-13 LAB
ALBUMIN SERPL-MCNC: 3.9 G/DL (ref 3.9–4.9)
ALP SERPL-CCNC: 102 IU/L (ref 44–121)
ALT SERPL-CCNC: 10 IU/L (ref 0–44)
AST SERPL-CCNC: 20 IU/L (ref 0–40)
BASOPHILS # BLD AUTO: 0.1 X10E3/UL (ref 0–0.2)
BASOPHILS NFR BLD AUTO: 1 %
BILIRUB SERPL-MCNC: 0.5 MG/DL (ref 0–1.2)
BUN SERPL-MCNC: 18 MG/DL (ref 8–27)
BUN/CREAT SERPL: 15 (ref 10–24)
CALCIUM SERPL-MCNC: 8.8 MG/DL (ref 8.6–10.2)
CHLORIDE SERPL-SCNC: 102 MMOL/L (ref 96–106)
CO2 SERPL-SCNC: 21 MMOL/L (ref 20–29)
CREAT SERPL-MCNC: 1.21 MG/DL (ref 0.76–1.27)
EGFRCR SERPLBLD CKD-EPI 2021: 66 ML/MIN/1.73
EOSINOPHIL # BLD AUTO: 1 X10E3/UL (ref 0–0.4)
EOSINOPHIL NFR BLD AUTO: 8 %
ERYTHROCYTE [DISTWIDTH] IN BLOOD BY AUTOMATED COUNT: 15.4 % (ref 11.6–15.4)
GLOBULIN SER CALC-MCNC: 2.5 G/DL (ref 1.5–4.5)
GLUCOSE SERPL-MCNC: 84 MG/DL (ref 70–99)
HCT VFR BLD AUTO: 40.8 % (ref 37.5–51)
HGB BLD-MCNC: 12.6 G/DL (ref 13–17.7)
IMM GRANULOCYTES # BLD AUTO: 0.1 X10E3/UL (ref 0–0.1)
IMM GRANULOCYTES NFR BLD AUTO: 1 %
LDH SERPL L TO P-CCNC: 266 IU/L (ref 121–224)
LYMPHOCYTES # BLD AUTO: 1.1 X10E3/UL (ref 0.7–3.1)
LYMPHOCYTES NFR BLD AUTO: 8 %
MAGNESIUM SERPL-MCNC: 1.5 MG/DL (ref 1.6–2.3)
MCH RBC QN AUTO: 28.6 PG (ref 26.6–33)
MCHC RBC AUTO-ENTMCNC: 30.9 G/DL (ref 31.5–35.7)
MCV RBC AUTO: 93 FL (ref 79–97)
MONOCYTES # BLD AUTO: 1 X10E3/UL (ref 0.1–0.9)
MONOCYTES NFR BLD AUTO: 8 %
NEUTROPHILS # BLD AUTO: 9.7 X10E3/UL (ref 1.4–7)
NEUTROPHILS NFR BLD AUTO: 74 %
NT-PROBNP SERPL-MCNC: 1246 PG/ML (ref 0–376)
PLATELET # BLD AUTO: 314 X10E3/UL (ref 150–450)
POTASSIUM SERPL-SCNC: 3.8 MMOL/L (ref 3.5–5.2)
PROT SERPL-MCNC: 6.4 G/DL (ref 6–8.5)
RBC # BLD AUTO: 4.41 X10E6/UL (ref 4.14–5.8)
SODIUM SERPL-SCNC: 140 MMOL/L (ref 134–144)
WBC # BLD AUTO: 12.9 X10E3/UL (ref 3.4–10.8)

## 2025-06-16 ENCOUNTER — TELEPHONE (OUTPATIENT)
Age: 65
End: 2025-06-16
Payer: MEDICARE

## 2025-06-16 DIAGNOSIS — Z95.811 LVAD (LEFT VENTRICULAR ASSIST DEVICE) PRESENT (HCC): ICD-10-CM

## 2025-06-16 DIAGNOSIS — Z79.01 LONG TERM (CURRENT) USE OF ANTICOAGULANTS: Primary | ICD-10-CM

## 2025-06-16 DIAGNOSIS — I50.22 CHRONIC SYSTOLIC HF (HEART FAILURE) (HCC): ICD-10-CM

## 2025-06-16 LAB — INR BLD: 2.6

## 2025-06-16 PROCEDURE — 93793 ANTICOAG MGMT PT WARFARIN: CPT | Performed by: NURSE PRACTITIONER

## 2025-06-16 NOTE — TELEPHONE ENCOUNTER
ADVANCED HEART FAILURE CENTER  [facility] in [place], [place]      INR result reviewed with RUBEN Garibay NP who made the following recommendations (VORB):     Geni Garibay APRN - ROSSANA (6/16/25 3:33 PM)  He's all over the place.   Decrease to 1mg tonight and recheck Thursday.    Patient notified and verbalized understanding. They had no further questions. (See anticoag tracker)     CASEY MONTEMAYOR RN

## 2025-06-16 NOTE — TELEPHONE ENCOUNTER
Decrease to 1mg tonight and recheck Thursday.      Geni Garibay NP  DNP, RN, [facility]  Advanced Heart Failure Center  [facility]  [address], [address]  Phone: [phone]

## 2025-06-18 ENCOUNTER — TELEPHONE (OUTPATIENT)
Age: 65
End: 2025-06-18

## 2025-06-18 NOTE — TELEPHONE ENCOUNTER
1022: Message received from patient's significant other requesting return call with details of upcoming appointment so they can set up transportation.     1103: Returned call to Ashley. Confirmed upcoming appointment 7/3/25 at 1100 for routine LVAD follow up. Address provided. She verbalized understanding and had no further questions.     CASEY MONTEMAYOR RN  VAD Coordinator

## 2025-06-19 DIAGNOSIS — I50.22 CHRONIC SYSTOLIC HEART FAILURE (HCC): ICD-10-CM

## 2025-06-20 ENCOUNTER — TELEPHONE (OUTPATIENT)
Age: 65
End: 2025-06-20
Payer: MEDICARE

## 2025-06-20 DIAGNOSIS — I50.22 CHRONIC SYSTOLIC HEART FAILURE (HCC): ICD-10-CM

## 2025-06-20 DIAGNOSIS — Z79.01 LONG TERM (CURRENT) USE OF ANTICOAGULANTS: Primary | ICD-10-CM

## 2025-06-20 LAB — INR BLD: 1.8

## 2025-06-20 PROCEDURE — 93793 ANTICOAG MGMT PT WARFARIN: CPT | Performed by: NURSE PRACTITIONER

## 2025-06-20 RX ORDER — METOPROLOL SUCCINATE 50 MG/1
75 TABLET, EXTENDED RELEASE ORAL DAILY
Qty: 135 TABLET | Refills: 0 | Status: SHIPPED | OUTPATIENT
Start: 2025-06-20

## 2025-06-20 NOTE — TELEPHONE ENCOUNTER
Requested Prescriptions     Signed Prescriptions Disp Refills    metoprolol succinate (TOPROL XL) 50 MG extended release tablet 135 tablet 0     Sig: Take 1.5 tablets by mouth daily     Authorizing Provider: TAWANA HIGHTOWER     Ordering User: CASEY MONTEMAYOR

## 2025-06-20 NOTE — TELEPHONE ENCOUNTER
ADVANCED HEART FAILURE CENTER  [facility] in [place], [place]    INR result reviewed with ADILENE Maurer NP who made the following recommendations (VORB): Continue current coumadin maintenance plan and recheck INR in 1 week. Patient notified and verbalized understanding. They had no further questions. (See anticoag tracker)     CASEY MONTEMAYOR RN

## 2025-06-24 ENCOUNTER — TELEPHONE (OUTPATIENT)
Age: 65
End: 2025-06-24
Payer: MEDICARE

## 2025-06-24 DIAGNOSIS — I50.22 CHRONIC SYSTOLIC HEART FAILURE (HCC): ICD-10-CM

## 2025-06-24 DIAGNOSIS — Z79.01 LONG TERM (CURRENT) USE OF ANTICOAGULANTS: Primary | ICD-10-CM

## 2025-06-24 LAB — INR BLD: 2.5

## 2025-06-24 PROCEDURE — 93793 ANTICOAG MGMT PT WARFARIN: CPT

## 2025-06-24 NOTE — TELEPHONE ENCOUNTER
ADVANCED HEART FAILURE CENTER  [facility] in [place], [place]      INR result reviewed with MICHAELA Villa NP who made the following recommendations (VORB):     Anabell Villa APRN - CNP (6/24/25 2:32 PM)  INR  2.5.  Take 0.5 mg today then resume normal dosing.  Recheck 7/1    Patient notified and verbalized understanding. They had no further questions. (See anticoag tracker)     CASEY MONTEMAYOR RN

## 2025-06-27 RX ORDER — CEFDINIR 300 MG/1
300 CAPSULE ORAL 2 TIMES DAILY
Qty: 60 CAPSULE | Refills: 2 | Status: SHIPPED | OUTPATIENT
Start: 2025-06-27

## 2025-07-01 ENCOUNTER — TELEPHONE (OUTPATIENT)
Age: 65
End: 2025-07-01
Payer: MEDICARE

## 2025-07-01 DIAGNOSIS — Z79.01 LONG TERM (CURRENT) USE OF ANTICOAGULANTS: Primary | ICD-10-CM

## 2025-07-01 LAB — INR BLD: 2

## 2025-07-01 PROCEDURE — 93793 ANTICOAG MGMT PT WARFARIN: CPT

## 2025-07-01 NOTE — TELEPHONE ENCOUNTER
ADVANCED HEART FAILURE CENTER  [facility] in [place], [place]      INR result reviewed with MICHAELA Villa NP who made the following recommendations (VORB): Continue current coumadin maintenance dosing and recheck INR in 1 week. Patient notified and verbalized understanding. They had no further questions. (See anticoag tracker)     CASEY MONTEMAYOR RN

## 2025-07-02 NOTE — PATIENT INSTRUCTIONS
Medication changes:    None today      Testing Ordered:    EKG completed in clinic today.     An order for an EKG has been placed to be done in ONE MONTH. This is a walk in test. Please present to [facility]'s outpatient registration for completion.     Please contact coordination of care at [phone] to reschedule your CT scan (Missed appointment 6/4/2025) ASAP.     Monthly labs with Home Health      Other Recommendations:     Please contact Pulmonology to schedule a follow up appointment.     Effective October 2, 2024 the LVAD emergency pager ([phone]) will no longer be in service. Going forward, please contact the answering service at [phone] and request to speak with the Heart Failure provider on-call for all after hours needs or emergencies.     Continue to change drive line exit site dressing 3 times a week using sterile technique,     Ensure you are drinking an adequate amount of water with a goal of 6-8 eight ounce glasses (1.5-2 liters) of fluid daily. Your urine should be clear and light yellow straw colored.       Follow up on Thursday September 4th, 2025 at 11:00AM with [facility]    For further patient and caregiver resources as well as access to online LVAD support groups visit [email or web address]       Please bring your daily sheets and medications to your next appointment.      Please monitor your weights daily upon waking and after using the bathroom. Keep a written records of your weights and bring to your next appointment. If you have a weight gain of 3 or more pounds overnight OR 5 or more pounds in one week please contact our office.       Thank you for allowing us the privilege of being a part of your healthcare team! Please do not hesitate to contact our office at [phone] with any questions or concerns.     Monthly LVAD Education Tip:    Heart-Healthy Diet: Care Instructions  Your Care Instructions     A heart-healthy diet has lots of vegetables, fruits,

## 2025-07-03 ENCOUNTER — OFFICE VISIT (OUTPATIENT)
Age: 65
End: 2025-07-03

## 2025-07-03 VITALS
HEART RATE: 89 BPM | SYSTOLIC BLOOD PRESSURE: 92 MMHG | WEIGHT: 157.2 LBS | HEIGHT: 67 IN | RESPIRATION RATE: 20 BRPM | OXYGEN SATURATION: 99 % | BODY MASS INDEX: 24.67 KG/M2 | TEMPERATURE: 98.6 F

## 2025-07-03 DIAGNOSIS — I50.22 CHRONIC SYSTOLIC HEART FAILURE (HCC): Primary | ICD-10-CM

## 2025-07-03 NOTE — PROGRESS NOTES
ADVANCED HEART FAILURE CENTER  [facility] in [place], [place]  LVAD Coordinator Clinic Visit Note    Chief Complaint   Patient presents with    Shortness of Breath     W/ exertion    Follow-up     LVAD       BP (!) 92/0 (BP Site: Right Upper Arm, Patient Position: Sitting, BP Cuff Size: Medium Adult)   Pulse 89   Temp 98.6 °F (37 °C) (Temporal)   Resp 20   Ht 1.702 m (5' 7.01\")   Wt 71.3 kg (157 lb 3.2 oz)   SpO2 99%   BMI 24.62 kg/m²      LVAD  LVAD Type:: Left Ventricular Assist Device (LVAD)  Pump Speed (rpm): 5200  Pump Flow (lpm): 3  MAP (mmHg): 92  Pump Pulse Index (PI): 7.4  Pump Power (Mei): 3.9     Sanford Joiner Sr. is a 65 y.o. male with a history of NICM with Heartmate 3 implant date of 03/27/2023. LVAD interrogation completed in clinic. Notable for several PI events, no adverse alarms. Denies LVAD alarms at home.  See flow sheet for details. All information reported to provider.      All orders entered per VORB. EKG completed in clinic. All provider instructions placed in AVS and reviewed with patient. LVAD education provided on traveling with an LVAD, LVAD alarms and emergency response, battery calibration, and heart healthy diet. Time given to ask questions. Patient verbalized understanding and had no further questions.     CASEY MONTEMAYOR RN  VAD Coordinator  
MOUTH DAILY, Disp: 90 tablet, Rfl: 1    acetaminophen (AMINOFEN) 325 MG tablet, Take 2 tablets by mouth every 6 hours as needed for Pain, Disp: 120 tablet, Rfl: 3    Omega 3 1000 MG CAPS, Take 1 capsule by mouth daily, Disp: 90 capsule, Rfl: 1    bumetanide (BUMEX) 1 MG tablet, Take 1 tablet by mouth as needed (weight gain of 3lb overnight or 5lb in 1 week or shortness of breath), Disp: 10 tablet, Rfl: 1    OXYGEN, Inhale 2 L/min into the lungs as needed for Shortness of Breath. O2 via nasal cannula, Disp: , Rfl:     gentamicin (GARAMYCIN) 0.1 % ointment, Apply topically to driveline exit site every day with dressing change, Disp: 30 g, Rfl: 1    lactobacillus (CULTURELLE) capsule, Take 1 capsule by mouth daily (with breakfast), Disp: 30 capsule, Rfl: 2    albuterol sulfate HFA (PROVENTIL;VENTOLIN;PROAIR) 108 (90 Base) MCG/ACT inhaler, Inhale 2 puffs into the lungs every 4 hours as needed for Shortness of Breath or Wheezing, Disp: 18 g, Rfl: 3    Magnesium 400 MG TABS, Take 1 tablet by mouth in the morning and at bedtime, Disp: , Rfl:     vitamin D3 (CHOLECALCIFEROL) 25 MCG (1000 UT) TABS tablet, Take 2 tablets by mouth daily, Disp: 180 tablet, Rfl: 1    fluticasone-umeclidin-vilant (TRELEGY ELLIPTA) 200-62.5-25 MCG/ACT AEPB inhaler, Inhale 1 puff into the lungs daily, Disp: , Rfl:     PATIENT CARE TEAM:  Patient Care Team:  Ranjan Porter MD as PCP - General  Ranjan Porter MD as PCP - Empaneled Provider    Thank you for your referral and allowing me to participate in this patient's care.    TAIWO Fagan - CNP [facility]    Advanced Heart Failure Center  [facility]  [address] [place] [address], [address]  Phone: [phone]  Fax: [phone]    45 minutes of time spent in chart preparation, reviewing recent laboratories, reviewing imaging studies, reviewing physician and advanced practitioner notes, providing heart failure specific education, updating medications and arranging

## 2025-07-08 ENCOUNTER — TELEPHONE (OUTPATIENT)
Age: 65
End: 2025-07-08
Payer: MEDICARE

## 2025-07-08 DIAGNOSIS — Z79.01 LONG TERM (CURRENT) USE OF ANTICOAGULANTS: Primary | ICD-10-CM

## 2025-07-08 LAB
ALBUMIN SERPL-MCNC: 4.1 G/DL (ref 3.9–4.9)
ALP SERPL-CCNC: 90 IU/L (ref 44–121)
ALT SERPL-CCNC: 12 IU/L (ref 0–44)
AST SERPL-CCNC: 26 IU/L (ref 0–40)
BASOPHILS # BLD AUTO: 0.1 X10E3/UL (ref 0–0.2)
BASOPHILS NFR BLD AUTO: 1 %
BILIRUB SERPL-MCNC: 0.3 MG/DL (ref 0–1.2)
BUN SERPL-MCNC: 19 MG/DL (ref 8–27)
BUN/CREAT SERPL: 14 (ref 10–24)
CALCIUM SERPL-MCNC: 9 MG/DL (ref 8.6–10.2)
CHLORIDE SERPL-SCNC: 101 MMOL/L (ref 96–106)
CO2 SERPL-SCNC: 22 MMOL/L (ref 20–29)
CREAT SERPL-MCNC: 1.34 MG/DL (ref 0.76–1.27)
EGFRCR SERPLBLD CKD-EPI 2021: 59 ML/MIN/1.73
EOSINOPHIL # BLD AUTO: 1.6 X10E3/UL (ref 0–0.4)
EOSINOPHIL NFR BLD AUTO: 13 %
ERYTHROCYTE [DISTWIDTH] IN BLOOD BY AUTOMATED COUNT: 17.2 % (ref 11.6–15.4)
GLOBULIN SER CALC-MCNC: 2.4 G/DL (ref 1.5–4.5)
GLUCOSE SERPL-MCNC: 88 MG/DL (ref 70–99)
HCT VFR BLD AUTO: 40.7 % (ref 37.5–51)
HGB BLD-MCNC: 13.3 G/DL (ref 13–17.7)
IMM GRANULOCYTES NFR BLD AUTO: 1 %
INR BLD: 1.5
LDH SERPL L TO P-CCNC: 249 IU/L (ref 121–224)
LYMPHOCYTES # BLD AUTO: 1.7 X10E3/UL (ref 0.7–3.1)
LYMPHOCYTES NFR BLD AUTO: 14 %
MAGNESIUM SERPL-MCNC: 1.7 MG/DL (ref 1.6–2.3)
MCH RBC QN AUTO: 29.2 PG (ref 26.6–33)
MCHC RBC AUTO-ENTMCNC: 32.7 G/DL (ref 31.5–35.7)
MCV RBC AUTO: 89 FL (ref 79–97)
MONOCYTES # BLD AUTO: 1.3 X10E3/UL (ref 0.1–0.9)
MONOCYTES NFR BLD AUTO: 11 %
NEUTROPHILS # BLD AUTO: 7.2 X10E3/UL (ref 1.4–7)
NEUTROPHILS NFR BLD AUTO: 60 %
PLATELET # BLD AUTO: 243 X10E3/UL (ref 150–450)
POTASSIUM SERPL-SCNC: 4.1 MMOL/L (ref 3.5–5.2)
PROT SERPL-MCNC: 6.5 G/DL (ref 6–8.5)
RBC # BLD AUTO: 4.56 X10E6/UL (ref 4.14–5.8)
SODIUM SERPL-SCNC: 139 MMOL/L (ref 134–144)
WBC # BLD AUTO: 11.9 X10E3/UL (ref 3.4–10.8)

## 2025-07-08 PROCEDURE — 93793 ANTICOAG MGMT PT WARFARIN: CPT | Performed by: NURSE PRACTITIONER

## 2025-07-08 NOTE — TELEPHONE ENCOUNTER
ADVANCED HEART FAILURE CENTER  [facility] in [place], [place]      INR result reviewed with ADILENE Maurer NP who made the following recommendations (VORB):     Nessa Maurer APRN - NP (7/8/25 2:23 PM)  2mg tonight, 1 week  (See anticoag tracker)     Patient notified and verbalized understanding. They had no further questions.     CASEY MONTEMAYOR RN

## 2025-07-09 ENCOUNTER — RESULTS FOLLOW-UP (OUTPATIENT)
Age: 65
End: 2025-07-09

## 2025-07-09 LAB — NT-PROBNP SERPL-MCNC: 644 PG/ML (ref 0–376)

## 2025-07-09 NOTE — TELEPHONE ENCOUNTER
----- Message from TAIWO Adams NP sent at 7/9/2025 10:26 AM EDT -----  Labs stable     1131: Patient notified of results. He verbalized understanding and had no further questions.     CASEY MONTEMAYOR RN  VAD Coordinator

## 2025-07-11 ENCOUNTER — TELEPHONE (OUTPATIENT)
Age: 65
End: 2025-07-11

## 2025-07-11 NOTE — TELEPHONE ENCOUNTER
Pt wanted to discuss with Dr. Rafita Rajan if he should continue to take the cefdinir (OMNICEF) 300 MG capsule medication or not

## 2025-07-15 ENCOUNTER — TELEPHONE (OUTPATIENT)
Age: 65
End: 2025-07-15
Payer: MEDICARE

## 2025-07-15 DIAGNOSIS — Z79.01 LONG TERM (CURRENT) USE OF ANTICOAGULANTS: Primary | ICD-10-CM

## 2025-07-15 LAB — INR BLD: 1.8

## 2025-07-15 PROCEDURE — 93793 ANTICOAG MGMT PT WARFARIN: CPT | Performed by: NURSE PRACTITIONER

## 2025-07-15 NOTE — TELEPHONE ENCOUNTER
ADVANCED HEART FAILURE CENTER  [facility] in [place], [place]      INR result reviewed with ADILENE Maurer NP who made the following recommendations (VORB): Continue current coumadin maintenance plan and recheck INR in 1 week. Patient notified and verbalized understanding. They had no further questions. (See anticoag tracker)     CASEY MONTEMAYOR RN

## 2025-07-18 ENCOUNTER — TELEPHONE (OUTPATIENT)
Age: 65
End: 2025-07-18

## 2025-07-18 NOTE — TELEPHONE ENCOUNTER
1414: Call received from ANY Walters RN with [facility] inquiring if patient may shower as sternal abscess closed and no longer draining. Reviewed with MICHAELA Villa NP who recommended VORB patient abstain from showering and can further assess at next clinic visit given extensive history. ANY Walters RN notified. She verbalized understanding and had no further questions.     CASEY MONTEMAYOR RN  VAD Coordinator

## 2025-07-21 NOTE — TELEPHONE ENCOUNTER
Appointment made VV on 09/19/2025. The patient stated he is having car trouble and can't make it in person.

## 2025-07-22 ENCOUNTER — TELEPHONE (OUTPATIENT)
Age: 65
End: 2025-07-22
Payer: MEDICARE

## 2025-07-22 DIAGNOSIS — Z79.01 LONG TERM (CURRENT) USE OF ANTICOAGULANTS: Primary | ICD-10-CM

## 2025-07-22 LAB — INR BLD: 3

## 2025-07-22 PROCEDURE — 93793 ANTICOAG MGMT PT WARFARIN: CPT | Performed by: NURSE PRACTITIONER

## 2025-07-22 NOTE — TELEPHONE ENCOUNTER
ADVANCED HEART FAILURE CENTER  [facility] in [place], [place]      INR result reviewed with YUMI Quiles NP  who made the following recommendations (VORB):     Nahomi Quiles APRN - NP (7/22/25 1:55 PM)  Hold tonight, 1 mg the next 2 nights, recheck INR Friday.    Patient notified and verbalized understanding. They had no further questions. (See anticoag tracker)     CASEY MONTEMAYOR RN

## 2025-07-25 ENCOUNTER — TELEPHONE (OUTPATIENT)
Age: 65
End: 2025-07-25
Payer: MEDICARE

## 2025-07-25 DIAGNOSIS — Z79.01 LONG TERM (CURRENT) USE OF ANTICOAGULANTS: Primary | ICD-10-CM

## 2025-07-25 LAB — INR BLD: 1.6

## 2025-07-25 PROCEDURE — 93793 ANTICOAG MGMT PT WARFARIN: CPT | Performed by: NURSE PRACTITIONER

## 2025-07-25 NOTE — TELEPHONE ENCOUNTER
ADVANCED HEART FAILURE CENTER  [facility] in [place], [place]    INR result reviewed with RUBEN Garibay NP who made the following recommendations (VORB):     Geni Garibay APRN - ROSSANA (7/25/25 1:37 PM)  No change.  Recheck Monday.  We'll ideally avoid loveox with him.    Patient notified and verbalized understanding. They had no further questions. (See anticoag tracker)     CASEY MONTEMAYOR RN

## 2025-07-25 NOTE — TELEPHONE ENCOUNTER
No change.  Recheck Monday.  We'll ideally avoid loveox with him.            Geni Garibay, ROSSANA  DNP, RN, [facility]  Advanced Heart Failure Center  [facility]  [address], [address]  Phone: [phone]

## 2025-07-29 ENCOUNTER — TELEPHONE (OUTPATIENT)
Age: 65
End: 2025-07-29
Payer: MEDICARE

## 2025-07-29 ENCOUNTER — TELEPHONE (OUTPATIENT)
Age: 65
End: 2025-07-29

## 2025-07-29 DIAGNOSIS — Z79.01 LONG TERM (CURRENT) USE OF ANTICOAGULANTS: Primary | ICD-10-CM

## 2025-07-29 LAB — INR BLD: 1.3

## 2025-07-29 PROCEDURE — 93793 ANTICOAG MGMT PT WARFARIN: CPT

## 2025-07-29 RX ORDER — ENOXAPARIN SODIUM 100 MG/ML
80 INJECTION SUBCUTANEOUS 2 TIMES DAILY
Qty: 10 EACH | Refills: 2 | Status: SHIPPED | OUTPATIENT
Start: 2025-07-29

## 2025-07-29 NOTE — TELEPHONE ENCOUNTER
ADVANCED HEART FAILURE CENTER  [facility] in [place], [place]    INR result reviewed with MICHAELA Villa NP who made the following recommendations (VORB):     Anabell Villa, TAIWO - CNP (7/29/25 5:17 PM)  INR 1.3, start lovenox 1 mg / kg bid. Increase today's dose to 2 mg. Recheck on 8/1   (See anticoag tracker)     1804: Attempted to contact [facility]. Call disconnected.     1805: Attempted to contact [facility]. Call disconnected.    1808: Attempted to contact patient to notify of instructions. Message left with detailed instructions and requested patient return call in the morning to confirm.    1810: Spoke with Ivelisse at [facility]. She confirmed prescription for lovenox received and does not require prior auth. She stated the pharmacy does not have in stock, but will place expedited order and will have ready for  tomorrow. She had no further questions.     1820: Spoke with ANY Walters RN with [facility] to update. She stated she will also attempt to reach patient to notify of all above. She had no further questions. Will attempt to reach patient tomorrow morning.    CASEY MONTEMAYOR RN

## 2025-07-29 NOTE — TELEPHONE ENCOUNTER
Psychosocial support    Late entry from 7/17: SW received voice message from pt on 7/17 requesting SW assist with accessing his MyChart account information.     SW spoke with pt this morning about his MyChart account concern. Pt explained that he has already resolved the issue. Pt asked if LVAD team receive his [employer] form last week (delivered to office by his home RN last week). SW located pt's updated [employer] form and fax confirmation from 7/22/25 in [employer]. SW informed pt that LVAD team received pt's [employer] form and that form was successfully faxed on 7/22/25. Pt expressed appreciation for this information.     Vane Shay, MSW, LCSW  Clinical   [facility]  [facility]  Advanced Heart Failure  [phone]

## 2025-07-30 NOTE — TELEPHONE ENCOUNTER
1309: Call received from ANY Walters RN reporting patient received dosing instructions. She stated he will  lovenox and start today. They had no further questions.     CASEY MONTEMAYOR RN  VAD Coordinator

## 2025-08-01 ENCOUNTER — TELEPHONE (OUTPATIENT)
Age: 65
End: 2025-08-01
Payer: MEDICARE

## 2025-08-01 DIAGNOSIS — Z79.01 CHRONIC ANTICOAGULATION: Primary | ICD-10-CM

## 2025-08-01 LAB — INR BLD: 1.4

## 2025-08-01 PROCEDURE — 93793 ANTICOAG MGMT PT WARFARIN: CPT | Performed by: NURSE PRACTITIONER

## 2025-08-05 ENCOUNTER — TELEPHONE (OUTPATIENT)
Age: 65
End: 2025-08-05
Payer: MEDICARE

## 2025-08-05 DIAGNOSIS — Z79.01 LONG TERM (CURRENT) USE OF ANTICOAGULANTS: Primary | ICD-10-CM

## 2025-08-05 LAB — INR BLD: 1.8

## 2025-08-05 PROCEDURE — 93793 ANTICOAG MGMT PT WARFARIN: CPT | Performed by: NURSE PRACTITIONER

## 2025-08-11 RX ORDER — WARFARIN SODIUM 1 MG/1
TABLET ORAL
Qty: 240 TABLET | Refills: 0 | Status: SHIPPED | OUTPATIENT
Start: 2025-08-11

## 2025-08-12 ENCOUNTER — TELEPHONE (OUTPATIENT)
Age: 65
End: 2025-08-12
Payer: MEDICARE

## 2025-08-12 DIAGNOSIS — Z79.01 LONG TERM (CURRENT) USE OF ANTICOAGULANTS: Primary | ICD-10-CM

## 2025-08-12 LAB — INR BLD: 4.6

## 2025-08-12 PROCEDURE — 93793 ANTICOAG MGMT PT WARFARIN: CPT | Performed by: NURSE PRACTITIONER

## 2025-08-13 LAB
BASOPHILS # BLD AUTO: 0.2 X10E3/UL (ref 0–0.2)
BASOPHILS NFR BLD AUTO: 2 %
EOSINOPHIL # BLD AUTO: 1.3 X10E3/UL (ref 0–0.4)
EOSINOPHIL NFR BLD AUTO: 14 %
ERYTHROCYTE [DISTWIDTH] IN BLOOD BY AUTOMATED COUNT: 15.4 % (ref 11.6–15.4)
HCT VFR BLD AUTO: 39.8 % (ref 37.5–51)
HGB BLD-MCNC: 12.5 G/DL (ref 13–17.7)
IMM GRANULOCYTES # BLD AUTO: 0 X10E3/UL (ref 0–0.1)
IMM GRANULOCYTES NFR BLD AUTO: 0 %
LYMPHOCYTES # BLD AUTO: 1.3 X10E3/UL (ref 0.7–3.1)
LYMPHOCYTES NFR BLD AUTO: 13 %
MCH RBC QN AUTO: 29.6 PG (ref 26.6–33)
MCHC RBC AUTO-ENTMCNC: 31.4 G/DL (ref 31.5–35.7)
MCV RBC AUTO: 94 FL (ref 79–97)
MONOCYTES # BLD AUTO: 0.9 X10E3/UL (ref 0.1–0.9)
MONOCYTES NFR BLD AUTO: 10 %
NEUTROPHILS # BLD AUTO: 5.9 X10E3/UL (ref 1.4–7)
NEUTROPHILS NFR BLD AUTO: 61 %
PLATELET # BLD AUTO: 269 X10E3/UL (ref 150–450)
RBC # BLD AUTO: 4.22 X10E6/UL (ref 4.14–5.8)
WBC # BLD AUTO: 9.7 X10E3/UL (ref 3.4–10.8)

## 2025-08-14 ENCOUNTER — RESULTS FOLLOW-UP (OUTPATIENT)
Age: 65
End: 2025-08-14

## 2025-08-14 LAB
ALBUMIN SERPL-MCNC: 4 G/DL (ref 3.9–4.9)
ALP SERPL-CCNC: 67 IU/L (ref 44–121)
ALT SERPL-CCNC: 20 IU/L (ref 0–44)
AST SERPL-CCNC: 31 IU/L (ref 0–40)
BILIRUB SERPL-MCNC: <0.2 MG/DL (ref 0–1.2)
BUN SERPL-MCNC: 17 MG/DL (ref 8–27)
BUN/CREAT SERPL: 13 (ref 10–24)
CALCIUM SERPL-MCNC: 8.6 MG/DL (ref 8.6–10.2)
CHLORIDE SERPL-SCNC: 105 MMOL/L (ref 96–106)
CO2 SERPL-SCNC: 19 MMOL/L (ref 20–29)
CREAT SERPL-MCNC: 1.26 MG/DL (ref 0.76–1.27)
EGFRCR SERPLBLD CKD-EPI 2021: 63 ML/MIN/1.73
GLOBULIN SER CALC-MCNC: 2 G/DL (ref 1.5–4.5)
GLUCOSE SERPL-MCNC: 74 MG/DL (ref 70–99)
LDH SERPL L TO P-CCNC: 239 IU/L (ref 121–224)
MAGNESIUM SERPL-MCNC: 1.8 MG/DL (ref 1.6–2.3)
NT-PROBNP SERPL-MCNC: 743 PG/ML (ref 0–376)
POTASSIUM SERPL-SCNC: 3.9 MMOL/L (ref 3.5–5.2)
PROT SERPL-MCNC: 6 G/DL (ref 6–8.5)
SODIUM SERPL-SCNC: 144 MMOL/L (ref 134–144)

## 2025-08-15 ENCOUNTER — TELEPHONE (OUTPATIENT)
Age: 65
End: 2025-08-15
Payer: MEDICARE

## 2025-08-15 DIAGNOSIS — Z79.01 LONG TERM (CURRENT) USE OF ANTICOAGULANTS: Primary | ICD-10-CM

## 2025-08-15 LAB — INR BLD: 1.8

## 2025-08-15 PROCEDURE — 93793 ANTICOAG MGMT PT WARFARIN: CPT | Performed by: NURSE PRACTITIONER

## 2025-08-19 ENCOUNTER — TELEPHONE (OUTPATIENT)
Age: 65
End: 2025-08-19
Payer: MEDICARE

## 2025-08-19 DIAGNOSIS — Z79.01 LONG TERM (CURRENT) USE OF ANTICOAGULANTS: Primary | ICD-10-CM

## 2025-08-19 LAB — INR BLD: 4.4

## 2025-08-19 PROCEDURE — 93793 ANTICOAG MGMT PT WARFARIN: CPT | Performed by: NURSE PRACTITIONER

## 2025-08-22 ENCOUNTER — TELEPHONE (OUTPATIENT)
Age: 65
End: 2025-08-22
Payer: MEDICARE

## 2025-08-22 DIAGNOSIS — Z79.01 LONG TERM (CURRENT) USE OF ANTICOAGULANTS: Primary | ICD-10-CM

## 2025-08-22 LAB — INR BLD: 3.8

## 2025-08-22 PROCEDURE — 93793 ANTICOAG MGMT PT WARFARIN: CPT

## 2025-08-26 ENCOUNTER — TELEPHONE (OUTPATIENT)
Age: 65
End: 2025-08-26
Payer: MEDICARE

## 2025-08-26 DIAGNOSIS — Z79.01 LONG TERM (CURRENT) USE OF ANTICOAGULANTS: Primary | ICD-10-CM

## 2025-08-26 LAB — INR BLD: 1.5

## 2025-08-26 PROCEDURE — 93793 ANTICOAG MGMT PT WARFARIN: CPT

## 2025-08-29 ENCOUNTER — TELEPHONE (OUTPATIENT)
Age: 65
End: 2025-08-29
Payer: MEDICARE

## 2025-08-29 DIAGNOSIS — Z79.01 LONG TERM (CURRENT) USE OF ANTICOAGULANTS: Primary | ICD-10-CM

## 2025-08-29 LAB — INR BLD: 1.2

## 2025-08-29 PROCEDURE — 93793 ANTICOAG MGMT PT WARFARIN: CPT | Performed by: NURSE PRACTITIONER

## 2025-09-02 ENCOUNTER — TELEPHONE (OUTPATIENT)
Age: 65
End: 2025-09-02

## 2025-09-02 ENCOUNTER — TELEPHONE (OUTPATIENT)
Age: 65
End: 2025-09-02
Payer: MEDICARE

## 2025-09-02 DIAGNOSIS — Z79.01 LONG TERM (CURRENT) USE OF ANTICOAGULANTS: Primary | ICD-10-CM

## 2025-09-02 LAB — INR BLD: 1.8

## 2025-09-02 PROCEDURE — 93793 ANTICOAG MGMT PT WARFARIN: CPT | Performed by: NURSE PRACTITIONER

## 2025-09-03 ENCOUNTER — TELEPHONE (OUTPATIENT)
Age: 65
End: 2025-09-03

## 2025-09-04 ENCOUNTER — OFFICE VISIT (OUTPATIENT)
Age: 65
End: 2025-09-04

## 2025-09-04 VITALS
TEMPERATURE: 98 F | OXYGEN SATURATION: 96 % | BODY MASS INDEX: 27.88 KG/M2 | WEIGHT: 177.6 LBS | HEIGHT: 67 IN | HEART RATE: 83 BPM | RESPIRATION RATE: 18 BRPM | SYSTOLIC BLOOD PRESSURE: 116 MMHG

## 2025-09-04 DIAGNOSIS — I50.22 CHRONIC SYSTOLIC CONGESTIVE HEART FAILURE (HCC): Primary | ICD-10-CM

## 2025-09-04 DIAGNOSIS — Z95.811 LVAD (LEFT VENTRICULAR ASSIST DEVICE) PRESENT (HCC): ICD-10-CM

## 2025-09-04 DIAGNOSIS — I50.22 CHRONIC SYSTOLIC HEART FAILURE (HCC): ICD-10-CM

## 2025-09-04 DIAGNOSIS — R06.02 SHORTNESS OF BREATH: ICD-10-CM

## 2025-09-04 RX ORDER — PANTOPRAZOLE SODIUM 40 MG/1
TABLET, DELAYED RELEASE ORAL
Qty: 90 TABLET | Refills: 1 | Status: SHIPPED | OUTPATIENT
Start: 2025-09-04

## 2025-09-04 RX ORDER — HYDRALAZINE HYDROCHLORIDE 10 MG/1
10 TABLET, FILM COATED ORAL EVERY 8 HOURS SCHEDULED
Qty: 270 TABLET | Refills: 1 | Status: SHIPPED | OUTPATIENT
Start: 2025-09-04

## 2025-09-04 RX ORDER — METOPROLOL SUCCINATE 50 MG/1
75 TABLET, EXTENDED RELEASE ORAL DAILY
Qty: 135 TABLET | Refills: 1 | Status: SHIPPED | OUTPATIENT
Start: 2025-09-04

## 2025-09-04 RX ORDER — SACUBITRIL AND VALSARTAN 24; 26 MG/1; MG/1
0.5 TABLET ORAL 2 TIMES DAILY
Qty: 30 TABLET | Refills: 3 | Status: SHIPPED | OUTPATIENT
Start: 2025-09-04

## 2025-09-04 ASSESSMENT — PATIENT HEALTH QUESTIONNAIRE - PHQ9
SUM OF ALL RESPONSES TO PHQ QUESTIONS 1-9: 0
SUM OF ALL RESPONSES TO PHQ QUESTIONS 1-9: 0
2. FEELING DOWN, DEPRESSED OR HOPELESS: NOT AT ALL
SUM OF ALL RESPONSES TO PHQ QUESTIONS 1-9: 0
1. LITTLE INTEREST OR PLEASURE IN DOING THINGS: NOT AT ALL
SUM OF ALL RESPONSES TO PHQ QUESTIONS 1-9: 0

## (undated) DEVICE — ANGIOGRAPHIC CATHETER: Brand: IMPULSE™

## (undated) DEVICE — GLIDESHEATH SLENDER STAINLESS STEEL KIT: Brand: GLIDESHEATH SLENDER

## (undated) DEVICE — AGENT HEMSTAT W4XL4IN OXIDIZED REGENERATED CELOS ABSRB SFT

## (undated) DEVICE — KIT MFLD ISOLATN NACL CNTRST PRT TBNG SPIK W/ PRSS TRNSDUC

## (undated) DEVICE — MARKER ENDOSCOPIC 10ML INDIA INK STERILE

## (undated) DEVICE — JELLY LUBRICATING 2.7GM H2O SOL GREASELESS E-Z

## (undated) DEVICE — FIAPC® PROBE W/ FILTER 2200 A OD 2.3MM/6.9FR; L 2.2M/7.2FT: Brand: ERBE

## (undated) DEVICE — TOWEL,OR,DSP,ST,BLUE,STD,4/PK,20PK/CS: Brand: MEDLINE

## (undated) DEVICE — SOLUTION IV 1000ML 140MEQ/L SOD 5MEQ/L K 3MEQ/L MG 27MEQ/L

## (undated) DEVICE — DRAPE PRB US TRNSDCR 6X96IN --

## (undated) DEVICE — GLOVE SURG SZ 65 L12IN FNGR THK94MIL STD WHT LTX FREE

## (undated) DEVICE — TOWEL SURG W17XL27IN STD BLU COT NONFENESTRATED PREWASHED

## (undated) DEVICE — SKIN PREP TRAY 4 COMPARTM TRAY: Brand: MEDLINE INDUSTRIES, INC.

## (undated) DEVICE — APPLICATOR MEDICATED 26 CC SOLUTION HI LT ORNG CHLORAPREP

## (undated) DEVICE — COVER LT HNDL PLAS RIG 1 PER PK

## (undated) DEVICE — PACK PROCEDURE SURG HRT CATH

## (undated) DEVICE — PENCIL ES CRD L10FT HND SWCHING ROCK SWCH W/ EDGE COAT BLDE

## (undated) DEVICE — WRAP SURG W1.31XL1.34M CARD FOR PT 165-172CM THERMOWRP

## (undated) DEVICE — 3M™ TEGADERM™ TRANSPARENT FILM DRESSING FRAME STYLE, 1626W, 4 IN X 4-3/4 IN (10 CM X 12 CM), 50/CT 4CT/CASE: Brand: 3M™ TEGADERM™

## (undated) DEVICE — SWAB CULT DBL W/O CHAR RAYON TIP AMIES GEL CLMN FOR COLL

## (undated) DEVICE — COVER,TABLE,HEAVY DUTY,60"X90",STRL: Brand: MEDLINE

## (undated) DEVICE — SPONGE GZ W4XL4IN COT 12 PLY TYP VII WVN C FLD DSGN STERILE

## (undated) DEVICE — 1000ML,PRESSURE INFUSER W/STOPCOCK: Brand: MEDLINE

## (undated) DEVICE — 6FR THERMODILUTION BALLOON CATHETER SWAN (ARROW)

## (undated) DEVICE — SPONGE LAP W18XL18IN WHT COT 4 PLY FLD STRUNG RADPQ DISP ST 2 PER PACK

## (undated) DEVICE — SHEET, T, LAPAROTOMY, STERILE: Brand: MEDLINE

## (undated) DEVICE — ANGIOGRAPHY KIT

## (undated) DEVICE — Device: Brand: SINGLE USE INJECTOR NM600/610

## (undated) DEVICE — COVER,LIGHT,CAMERA,HARD,1/PK,STRL: Brand: MEDLINE

## (undated) DEVICE — KIT HND CTRL 3 W STPCOCK ROT END 54IN PREM HI PRSS TBNG AT

## (undated) DEVICE — SPECIAL PROCEDURE DRAPE 32" X 34": Brand: SPECIAL PROCEDURE DRAPE

## (undated) DEVICE — CO-SET DELIVERY SYSTEM FOR 123 ROOM TEMPATURE INJECTATE: Brand: CO-SET+

## (undated) DEVICE — TIDISHIELD TRANSPORT, CONTAINMENT COVER FOR BACK TABLE 4'6" (1.37M) TO 8' (2.43M) IN LENGTH: Brand: TIDISHIELD

## (undated) DEVICE — KIT MED IMAG CNTRST AGNT W/ IOPAMIDOL REUSE

## (undated) DEVICE — X-RAY DETECTABLE SPONGES,16 PLY: Brand: VISTEC

## (undated) DEVICE — SPLINT WR POS F/ARTERIAL ACC -- BX/10

## (undated) DEVICE — GLOVE ORANGE PI 7 1/2   MSG9075

## (undated) DEVICE — BASIN ST MAJOR-NO CAUTERY: Brand: MEDLINE INDUSTRIES, INC.

## (undated) DEVICE — GLOVE SURG SZ 6 THK91MIL LTX FREE SYN POLYISOPRENE ANTI

## (undated) DEVICE — ELECTRODE PT RET AD L9FT HI MOIST COND ADH HYDRGEL CORDED

## (undated) DEVICE — WASTE KIT - ST MARY: Brand: MEDLINE INDUSTRIES, INC.

## (undated) DEVICE — GLOVE SURG SZ 7.5 L11.2IN THK9.8MIL STRW LTX POLYMER BEAD

## (undated) DEVICE — GOWN,SIRUS,NONRNF,SETINSLV,XL,20/CS: Brand: MEDLINE

## (undated) DEVICE — TUBING, SUCTION, 1/4" X 12', STRAIGHT: Brand: MEDLINE

## (undated) DEVICE — PACK,BASIC,SIRUS,V: Brand: MEDLINE

## (undated) DEVICE — 40418 TRENDELENBURG ONE-STEP ARM PROTECTORS LARGE (1 PAIR): Brand: 40418 TRENDELENBURG ONE-STEP ARM PROTECTORS LARGE (1 PAIR)